# Patient Record
Sex: MALE | Race: BLACK OR AFRICAN AMERICAN | NOT HISPANIC OR LATINO | Employment: OTHER | URBAN - METROPOLITAN AREA
[De-identification: names, ages, dates, MRNs, and addresses within clinical notes are randomized per-mention and may not be internally consistent; named-entity substitution may affect disease eponyms.]

---

## 2017-04-11 ENCOUNTER — OFFICE VISIT (OUTPATIENT)
Dept: UROLOGY | Facility: CLINIC | Age: 77
End: 2017-04-11
Payer: MEDICARE

## 2017-04-11 VITALS
SYSTOLIC BLOOD PRESSURE: 122 MMHG | HEIGHT: 69 IN | DIASTOLIC BLOOD PRESSURE: 82 MMHG | BODY MASS INDEX: 28.31 KG/M2 | WEIGHT: 191.13 LBS | HEART RATE: 70 BPM

## 2017-04-11 DIAGNOSIS — N40.1 BPH WITH OBSTRUCTION/LOWER URINARY TRACT SYMPTOMS: ICD-10-CM

## 2017-04-11 DIAGNOSIS — N20.0 KIDNEY STONES: Primary | ICD-10-CM

## 2017-04-11 DIAGNOSIS — R35.1 NOCTURIA MORE THAN TWICE PER NIGHT: ICD-10-CM

## 2017-04-11 DIAGNOSIS — N13.8 BPH WITH OBSTRUCTION/LOWER URINARY TRACT SYMPTOMS: ICD-10-CM

## 2017-04-11 PROCEDURE — 3074F SYST BP LT 130 MM HG: CPT | Mod: S$GLB,,, | Performed by: UROLOGY

## 2017-04-11 PROCEDURE — 1159F MED LIST DOCD IN RCRD: CPT | Mod: S$GLB,,, | Performed by: UROLOGY

## 2017-04-11 PROCEDURE — 1126F AMNT PAIN NOTED NONE PRSNT: CPT | Mod: S$GLB,,, | Performed by: UROLOGY

## 2017-04-11 PROCEDURE — 99214 OFFICE O/P EST MOD 30 MIN: CPT | Mod: S$GLB,,, | Performed by: UROLOGY

## 2017-04-11 PROCEDURE — 3079F DIAST BP 80-89 MM HG: CPT | Mod: S$GLB,,, | Performed by: UROLOGY

## 2017-04-11 PROCEDURE — 1157F ADVNC CARE PLAN IN RCRD: CPT | Mod: S$GLB,,, | Performed by: UROLOGY

## 2017-04-11 PROCEDURE — 1160F RVW MEDS BY RX/DR IN RCRD: CPT | Mod: S$GLB,,, | Performed by: UROLOGY

## 2017-04-11 PROCEDURE — 99999 PR PBB SHADOW E&M-EST. PATIENT-LVL III: CPT | Mod: PBBFAC,,, | Performed by: UROLOGY

## 2017-04-11 RX ORDER — APIXABAN 5 MG/1
TABLET, FILM COATED ORAL 2 TIMES DAILY
Status: ON HOLD | COMMUNITY
Start: 2017-04-01 | End: 2018-05-15 | Stop reason: HOSPADM

## 2017-04-11 RX ORDER — ASCORBIC ACID 500 MG
500 TABLET ORAL DAILY
COMMUNITY
End: 2017-11-02

## 2017-04-11 NOTE — PROGRESS NOTES
Subjective:       Patient ID: Agus Ramos Jr. is a 76 y.o. male who was last seen in this office Visit date not found    Chief Complaint:   Chief Complaint   Patient presents with    Benign Prostatic Hypertrophy     annual visit with psa     Hematuria         History of Present Illness  Benign Prostatic Hypertrophy  Patient complains of lower urinary tract symptoms. He reports nocturia three times a night. He denies straining, urgency and weak stream. Patient states symptoms are of mild severity. Onset of symptoms was several years ago and was gradual in onset.He has no personal history and no family history of prostate cancer. He reports a history of no complicating symptoms. He denies flank pain, gross hematuria, and recurrent UTI.  He is currently taking no prostate medications.    Kidney Stone  He was admitted for pyelonephritis in September 2016.  CT from that time show a 1 cm proximal left ureteral stone.  We were not consulted at the time.  He is not sure that he passed this stone.  He denies gross hematuria, dysuria, fever, or flank pain.  He has never required a procedure for stones before.    ACTIVE MEDICAL ISSUES:  Patient Active Problem List   Diagnosis    Atrial fibrillation, new onset    HTN (hypertension)    CAD (coronary artery disease)    Dyslipidemia    Pyelonephritis, acute    Altered mental status    NSTEMI (non-ST elevated myocardial infarction)    Essential hypertension    Paroxysmal atrial fibrillation    Chronic kidney disease, stage III (moderate)    DM (diabetes mellitus) type II controlled with renal manifestation    Chronic diastolic CHF (congestive heart failure)       ALLERGIES AND MEDICATIONS: updated and reviewed.  Review of patient's allergies indicates:  No Known Allergies  Current Outpatient Prescriptions   Medication Sig    amlodipine (NORVASC) 10 MG tablet Take 10 mg by mouth once daily.    ascorbic acid, vitamin C, (VITAMIN C) 500 MG tablet Take 500 mg by  "mouth once daily.    aspirin 325 MG tablet Take 81 mg by mouth once daily.     atenolol (TENORMIN) 100 MG tablet Take 50 mg by mouth 2 (two) times daily.     ELIQUIS 5 mg Tab     fish oil-omega-3 fatty acids 300-1,000 mg capsule Take 2 g by mouth 2 (two) times daily.     furosemide (LASIX) 40 MG tablet Take 1 tablet (40 mg total) by mouth once daily.    glipiZIDE (GLUCOTROL) 5 MG tablet Take 10 mg by mouth 2 (two) times daily before meals.    linagliptin-metformin (JENTADUETO) 2.5-500 mg Tab Take by mouth 2 (two) times daily.    losartan (COZAAR) 50 MG tablet Take 50 mg by mouth once daily.    nitroGLYCERIN (NITROSTAT) 0.4 MG SL tablet Place 0.4 mg under the tongue every 5 (five) minutes as needed.    polyethylene glycol (GLYCOLAX) 17 gram PwPk Take by mouth as needed.     simvastatin (ZOCOR) 40 MG tablet Take 40 mg by mouth once daily.     No current facility-administered medications for this visit.        Review of Systems   Constitutional: Negative for activity change, fatigue, fever and unexpected weight change.   HENT: Negative for congestion.    Eyes: Negative for redness.   Respiratory: Negative for chest tightness and shortness of breath.    Cardiovascular: Negative for chest pain and leg swelling.   Gastrointestinal: Negative for abdominal pain, constipation, diarrhea, nausea and vomiting.   Genitourinary: Negative for dysuria, flank pain, frequency, hematuria, penile pain, penile swelling, scrotal swelling, testicular pain and urgency.   Musculoskeletal: Negative for arthralgias and back pain.   Neurological: Negative for dizziness and light-headedness.   Psychiatric/Behavioral: Negative for behavioral problems and confusion. The patient is not nervous/anxious.    All other systems reviewed and are negative.      Objective:      Vitals:    04/11/17 0946   BP: 122/82   Pulse: 70   Weight: 86.7 kg (191 lb 2.2 oz)   Height: 5' 9" (1.753 m)     Physical Exam   Nursing note and vitals " reviewed.  Constitutional: He is oriented to person, place, and time. He appears well-developed and well-nourished.   HENT:   Head: Normocephalic.   Eyes: Conjunctivae are normal.   Neck: Normal range of motion. Neck supple. No tracheal deviation present. No thyromegaly present.   Cardiovascular: Normal rate and normal heart sounds.    Pulmonary/Chest: Effort normal and breath sounds normal. No respiratory distress. He has no wheezes.   Abdominal: Soft. Bowel sounds are normal. There is no hepatosplenomegaly. There is no tenderness. There is no rebound and no CVA tenderness. No hernia.   Musculoskeletal: Normal range of motion. He exhibits no edema or tenderness.   Lymphadenopathy:     He has no cervical adenopathy.   Neurological: He is alert and oriented to person, place, and time.   Skin: Skin is warm and dry. No rash noted. No erythema.     Psychiatric: He has a normal mood and affect. His behavior is normal. Judgment and thought content normal.       Urine dipstick shows positive for RBC's.  Micro exam: 250 RBC's per HPF.      March 2017  PSA 1.6      CT 9/2016, reviewed    Assessment:       1. Kidney stones    2. BPH with obstruction/lower urinary tract symptoms    3. Nocturia more than twice per night          Plan:       1. Kidney stones  If that stone is still present, then he will need a procedure,.    - US Retroperitoneal Complete (Kidney and; Future    2. BPH with obstruction/lower urinary tract symptoms  stable    3. Nocturia more than twice per night  stable            Return in about 2 weeks (around 4/25/2017) for Follow up, Review X-ray.

## 2017-04-28 ENCOUNTER — HOSPITAL ENCOUNTER (OUTPATIENT)
Dept: RADIOLOGY | Facility: HOSPITAL | Age: 77
Discharge: HOME | End: 2017-04-28
Attending: UROLOGY
Payer: MEDICARE

## 2017-04-28 DIAGNOSIS — N20.0 KIDNEY STONES: ICD-10-CM

## 2017-04-28 PROCEDURE — 76770 US EXAM ABDO BACK WALL COMP: CPT | Mod: TC

## 2017-04-28 PROCEDURE — 76770 US EXAM ABDO BACK WALL COMP: CPT | Mod: 26,,, | Performed by: RADIOLOGY

## 2017-05-04 ENCOUNTER — OFFICE VISIT (OUTPATIENT)
Dept: UROLOGY | Facility: CLINIC | Age: 77
End: 2017-05-04
Payer: MEDICARE

## 2017-05-04 VITALS
WEIGHT: 189.63 LBS | HEIGHT: 69 IN | BODY MASS INDEX: 28.08 KG/M2 | SYSTOLIC BLOOD PRESSURE: 138 MMHG | HEART RATE: 72 BPM | DIASTOLIC BLOOD PRESSURE: 89 MMHG

## 2017-05-04 DIAGNOSIS — N40.0 BENIGN PROSTATIC HYPERPLASIA WITHOUT LOWER URINARY TRACT SYMPTOMS, UNSPECIFIED MORPHOLOGY: ICD-10-CM

## 2017-05-04 DIAGNOSIS — N20.0 KIDNEY STONES: Primary | ICD-10-CM

## 2017-05-04 DIAGNOSIS — R35.1 NOCTURIA MORE THAN TWICE PER NIGHT: ICD-10-CM

## 2017-05-04 DIAGNOSIS — R33.9 INCOMPLETE EMPTYING OF BLADDER: ICD-10-CM

## 2017-05-04 LAB
BILIRUB SERPL-MCNC: NORMAL MG/DL
BLOOD URINE, POC: POSITIVE
COLOR, POC UA: YELLOW
GLUCOSE UR QL STRIP: NORMAL
KETONES UR QL STRIP: NORMAL
LEUKOCYTE ESTERASE URINE, POC: NORMAL
NITRITE, POC UA: NORMAL
PH, POC UA: 5
PROTEIN, POC: NORMAL
SPECIFIC GRAVITY, POC UA: 1030
UROBILINOGEN, POC UA: NORMAL

## 2017-05-04 PROCEDURE — 1160F RVW MEDS BY RX/DR IN RCRD: CPT | Mod: S$GLB,,, | Performed by: UROLOGY

## 2017-05-04 PROCEDURE — 99214 OFFICE O/P EST MOD 30 MIN: CPT | Mod: 25,S$GLB,, | Performed by: UROLOGY

## 2017-05-04 PROCEDURE — 3075F SYST BP GE 130 - 139MM HG: CPT | Mod: S$GLB,,, | Performed by: UROLOGY

## 2017-05-04 PROCEDURE — 1126F AMNT PAIN NOTED NONE PRSNT: CPT | Mod: S$GLB,,, | Performed by: UROLOGY

## 2017-05-04 PROCEDURE — 81001 URINALYSIS AUTO W/SCOPE: CPT | Mod: S$GLB,,, | Performed by: UROLOGY

## 2017-05-04 PROCEDURE — 3079F DIAST BP 80-89 MM HG: CPT | Mod: S$GLB,,, | Performed by: UROLOGY

## 2017-05-04 PROCEDURE — 1159F MED LIST DOCD IN RCRD: CPT | Mod: S$GLB,,, | Performed by: UROLOGY

## 2017-05-04 PROCEDURE — 99999 PR PBB SHADOW E&M-EST. PATIENT-LVL III: CPT | Mod: PBBFAC,,, | Performed by: UROLOGY

## 2017-05-04 NOTE — MR AVS SNAPSHOT
[facility]  [address] [facility] [address]  [place] [address]  Phone: [phone]                  Agus Ramos Jr.   2017 10:30 AM   Office Visit    Description:  Male : 1940   Provider:  ROGERIO Wiggins MD   Department:  [facility]           Reason for Visit     Nephrolithiasis     Benign Prostatic Hypertrophy           Diagnoses this Visit        Comments    Kidney stones    -  Primary     Benign prostatic hyperplasia without lower urinary tract symptoms, unspecified morphology         Incomplete emptying of bladder         Nocturia more than twice per night                To Do List           Goals (5 Years of Data)     None      Follow-Up and Disposition     Return in about 6 months (around 2017) for Review X-ray.      [facility]     [facility] Nurse Care Line -  Assistance  Unless otherwise directed by your provider, please contact [facility], our nurse care line that is available for  assistance.     Registered nurses in the [facility] provide: appointment scheduling, clinical advisement, health education, and other advisory services.  Call: [phone] (toll free)               Medications           Message regarding Medications     Verify the changes and/or additions to your medication regime listed below are the same as discussed with your clinician today.  If any of these changes or additions are incorrect, please notify your healthcare provider.             Verify that the below list of medications is an accurate representation of the medications you are currently taking.  If none reported, the list may be blank. If incorrect, please contact your healthcare provider. Carry this list with you in case of emergency.           Current Medications     amlodipine (NORVASC) 10 MG tablet Take 10 mg by mouth once daily.    ascorbic acid, vitamin C, (VITAMIN C) 500 MG tablet Take 500 mg by mouth once daily.    aspirin 325 MG tablet  "Take 81 mg by mouth once daily.     atenolol (TENORMIN) 100 MG tablet Take 50 mg by mouth 2 (two) times daily.     ELIQUIS 5 mg Tab     fish oil-omega-3 fatty acids 300-1,000 mg capsule Take 2 g by mouth 2 (two) times daily.     furosemide (LASIX) 40 MG tablet Take 1 tablet (40 mg total) by mouth once daily.    glipiZIDE (GLUCOTROL) 5 MG tablet Take 10 mg by mouth 2 (two) times daily before meals.    linagliptin-metformin (JENTADUETO) 2.5-500 mg Tab Take by mouth 2 (two) times daily.    losartan (COZAAR) 50 MG tablet Take 50 mg by mouth once daily.    nitroGLYCERIN (NITROSTAT) 0.4 MG SL tablet Place 0.4 mg under the tongue every 5 (five) minutes as needed.    polyethylene glycol (GLYCOLAX) 17 gram PwPk Take by mouth as needed.     simvastatin (ZOCOR) 40 MG tablet Take 40 mg by mouth once daily.           Clinical Reference Information           Your Vitals Were     BP Pulse Height Weight BMI    138/89 72 5' 9" (1.753 m) 86 kg (189 lb 9.5 oz) 28 kg/m2      Blood Pressure          Most Recent Value    BP  138/89      Allergies as of 5/4/2017     No Known Allergies      Immunizations Administered on Date of Encounter - 5/4/2017     None      Orders Placed During Today's Visit      Normal Orders This Visit    POCT urinalysis, dipstick or tablet reag     Future Labs/Procedures Expected by Expires    X-Ray Abdomen AP 1 View  11/4/2017 5/4/2018      Smoking Cessation     If you would like to quit smoking:   You may be eligible for free services if you are a [place] resident and started smoking cigarettes before September 1, 1988.  Call the [employer]oking [employer]ess[employer] ([employer]) toll free at (665) 090-3724 or (012) 485-3092.   Call 4-800-QUIT-NOW if you do not meet the above criteria.   Contact us via email: [email or web address]   View our website for more information: [email or web address]        Language Assistance Services     ATTENTION: Language assistance services are available, free of charge. Please call " [phone].      ATENCIÓN: Si habla español, tiene a dowell disposición servicios gratuitos de asistencia lingüística. Llame al [phone].     CHÚ Ý: N?u b?n nói Ti?ng Vi?t, có các d?ch v? h? tr? ngôn ng? mi?n phí dành cho b?n. G?i s? [phone].         [facility] complies with applicable Federal civil rights laws and does not discriminate on the basis of race, color, national origin, age, disability, or sex.

## 2017-05-04 NOTE — PROGRESS NOTES
Subjective:       Patient ID: Agus Ramos Jr. is a 76 y.o. male who was last seen in this office 4/11/2017    Chief Complaint:   Chief Complaint   Patient presents with    Nephrolithiasis     2 week f/u with ultrasound     Benign Prostatic Hypertrophy       History of Present Illness  Benign Prostatic Hypertrophy  Patient complains of lower urinary tract symptoms. He reports nocturia three times a night. He denies straining, urgency and weak stream. Patient states symptoms are of mild severity. Onset of symptoms was several years ago and was gradual in onset.He has no personal history and no family history of prostate cancer. He reports a history of no complicating symptoms. He denies flank pain, gross hematuria, and recurrent UTI.  He is currently taking no prostate medications.    Kidney Stone  He was admitted for pyelonephritis in September 2016.  CT from that time show a 1 cm proximal left ureteral stone.  We were not consulted at the time.  He is not sure that he passed this stone.  He denies gross hematuria, dysuria, fever, or flank pain.  He has never required a procedure for stones before.  He is back with a renal ultrasound.      ACTIVE MEDICAL ISSUES:  Patient Active Problem List   Diagnosis    Atrial fibrillation, new onset    HTN (hypertension)    CAD (coronary artery disease)    Dyslipidemia    Pyelonephritis, acute    Altered mental status    NSTEMI (non-ST elevated myocardial infarction)    Essential hypertension    Paroxysmal atrial fibrillation    Chronic kidney disease, stage III (moderate)    DM (diabetes mellitus) type II controlled with renal manifestation    Chronic diastolic CHF (congestive heart failure)       ALLERGIES AND MEDICATIONS: updated and reviewed.  Review of patient's allergies indicates:  No Known Allergies  Current Outpatient Prescriptions   Medication Sig    amlodipine (NORVASC) 10 MG tablet Take 10 mg by mouth once daily.    ascorbic acid, vitamin C,  "(VITAMIN C) 500 MG tablet Take 500 mg by mouth once daily.    aspirin 325 MG tablet Take 81 mg by mouth once daily.     atenolol (TENORMIN) 100 MG tablet Take 50 mg by mouth 2 (two) times daily.     ELIQUIS 5 mg Tab     fish oil-omega-3 fatty acids 300-1,000 mg capsule Take 2 g by mouth 2 (two) times daily.     furosemide (LASIX) 40 MG tablet Take 1 tablet (40 mg total) by mouth once daily.    glipiZIDE (GLUCOTROL) 5 MG tablet Take 10 mg by mouth 2 (two) times daily before meals.    linagliptin-metformin (JENTADUETO) 2.5-500 mg Tab Take by mouth 2 (two) times daily.    losartan (COZAAR) 50 MG tablet Take 50 mg by mouth once daily.    nitroGLYCERIN (NITROSTAT) 0.4 MG SL tablet Place 0.4 mg under the tongue every 5 (five) minutes as needed.    polyethylene glycol (GLYCOLAX) 17 gram PwPk Take by mouth as needed.     simvastatin (ZOCOR) 40 MG tablet Take 40 mg by mouth once daily.     No current facility-administered medications for this visit.        Review of Systems   Constitutional: Negative for activity change, fatigue, fever and unexpected weight change.   HENT: Negative for congestion.    Eyes: Negative for redness.   Respiratory: Negative for chest tightness and shortness of breath.    Cardiovascular: Negative for chest pain and leg swelling.   Gastrointestinal: Negative for abdominal pain, constipation, diarrhea, nausea and vomiting.   Genitourinary: Negative for dysuria, flank pain, frequency, hematuria, penile pain, penile swelling, scrotal swelling, testicular pain and urgency.   Musculoskeletal: Negative for arthralgias and back pain.   Neurological: Negative for dizziness and light-headedness.   Psychiatric/Behavioral: Negative for behavioral problems and confusion. The patient is not nervous/anxious.    All other systems reviewed and are negative.      Objective:      Vitals:    05/04/17 1040   BP: 138/89   Pulse: 72   Weight: 86 kg (189 lb 9.5 oz)   Height: 5' 9" (1.753 m)     Physical Exam "   Nursing note and vitals reviewed.  Constitutional: He is oriented to person, place, and time. He appears well-developed and well-nourished.   HENT:   Head: Normocephalic.   Eyes: Conjunctivae are normal.   Neck: Normal range of motion. Neck supple. No tracheal deviation present. No thyromegaly present.   Cardiovascular: Normal rate and normal heart sounds.    Pulmonary/Chest: Effort normal and breath sounds normal. No respiratory distress. He has no wheezes.   Abdominal: Soft. Bowel sounds are normal. There is no hepatosplenomegaly. There is no tenderness. There is no rebound and no CVA tenderness. No hernia.   Musculoskeletal: Normal range of motion. He exhibits no edema or tenderness.   Lymphadenopathy:     He has no cervical adenopathy.   Neurological: He is alert and oriented to person, place, and time.   Skin: Skin is warm and dry. No rash noted. No erythema.     Psychiatric: He has a normal mood and affect. His behavior is normal. Judgment and thought content normal.       Urine dipstick shows negative for all components.  Micro exam: negative for WBC's or RBC's.      US Retroperitoneal Complete (Kidney and   Status: Final result   MyChart Results Release   MyChart Status: Declined    Signed by   Signed Credentials Date/Time    Phone Pager   RASHID BOGGS MD 4/28/2017 11:34 [phone]   PACS Images   Show images for US Retroperitoneal Complete (Kidney and   Reviewed By Arleen Wiggins MD on 4/28/2017 12:39 PM   External Result Report   External Result Report   Narrative   Renal ultrasound    History: Calculus of kidney    Comparison: March 25, 2016    Technique: Sonographic evaluation of the kidneys and bladder was performed.    Findings: The right kidney measures 11.4 cm in length.  There is no hydronephrosis.  The resistive index within a parenchymal artery is 0.7.      The left kidney measures 10.5 cm in length.  There is no hydronephrosis. The resistive index within a  parenchymal artery is 0.7.There is a 9 mm cyst within the midpole of the kidney.    The prostate volume is 53 cc. Postvoid residual is 139 mL.   Impression     There is no hydronephrosis. The post void residual is 139 mL..      Electronically signed by: RASHID BOGGS MD  Date: 04/28/17  Time: 11:34           Assessment:       1. Kidney stones    2. Benign prostatic hyperplasia without lower urinary tract symptoms, unspecified morphology    3. Incomplete emptying of bladder    4. Nocturia more than twice per night          Plan:       1. Kidney stones  He seems to have passed it.  He understands that the definitive test would be a CT so it is possible that the stone is still there.    - X-Ray Abdomen AP 1 View; Future  - POCT urinalysis, dipstick or tablet reag    2. Benign prostatic hyperplasia without lower urinary tract symptoms, unspecified morphology  He is not bothered by his symptoms.    3. Incomplete emptying of bladder  He may need Flomax at some point    4. Nocturia more than twice per night  Limit evening fluids            Return in about 6 months (around 11/4/2017) for Review X-ray.

## 2017-10-30 ENCOUNTER — TELEPHONE (OUTPATIENT)
Dept: UROLOGY | Facility: CLINIC | Age: 77
End: 2017-10-30

## 2017-10-30 NOTE — TELEPHONE ENCOUNTER
----- Message from Venkata Jean sent at 10/30/2017 12:02 PM CDT -----  Contact: spouse/ [phone]  Calling to speak with nurse regarding Kub at hosp. Needs to know if come straight to office first or what

## 2017-11-02 ENCOUNTER — HOSPITAL ENCOUNTER (OUTPATIENT)
Dept: RADIOLOGY | Facility: HOSPITAL | Age: 77
Discharge: HOME | End: 2017-11-02
Attending: UROLOGY
Payer: MEDICARE

## 2017-11-02 ENCOUNTER — OFFICE VISIT (OUTPATIENT)
Dept: UROLOGY | Facility: CLINIC | Age: 77
End: 2017-11-02
Payer: MEDICARE

## 2017-11-02 VITALS
HEIGHT: 69 IN | WEIGHT: 190.25 LBS | DIASTOLIC BLOOD PRESSURE: 82 MMHG | SYSTOLIC BLOOD PRESSURE: 138 MMHG | BODY MASS INDEX: 28.18 KG/M2 | HEART RATE: 70 BPM

## 2017-11-02 DIAGNOSIS — N13.8 BPH WITH OBSTRUCTION/LOWER URINARY TRACT SYMPTOMS: Primary | ICD-10-CM

## 2017-11-02 DIAGNOSIS — R35.1 NOCTURIA MORE THAN TWICE PER NIGHT: ICD-10-CM

## 2017-11-02 DIAGNOSIS — R35.0 URINARY FREQUENCY: ICD-10-CM

## 2017-11-02 DIAGNOSIS — N20.0 KIDNEY STONES: ICD-10-CM

## 2017-11-02 DIAGNOSIS — N40.1 BPH WITH OBSTRUCTION/LOWER URINARY TRACT SYMPTOMS: Primary | ICD-10-CM

## 2017-11-02 PROCEDURE — 74000 XR ABDOMEN AP 1 VIEW: CPT | Mod: TC

## 2017-11-02 PROCEDURE — 74000 XR ABDOMEN AP 1 VIEW: CPT | Mod: 26,,, | Performed by: RADIOLOGY

## 2017-11-02 PROCEDURE — 99999 PR PBB SHADOW E&M-EST. PATIENT-LVL III: CPT | Mod: PBBFAC,,, | Performed by: UROLOGY

## 2017-11-02 PROCEDURE — 99214 OFFICE O/P EST MOD 30 MIN: CPT | Mod: S$GLB,,, | Performed by: UROLOGY

## 2017-11-02 RX ORDER — METFORMIN HYDROCHLORIDE 500 MG/1
500 TABLET ORAL 2 TIMES DAILY WITH MEALS
Status: ON HOLD | COMMUNITY
End: 2018-12-02 | Stop reason: HOSPADM

## 2017-11-02 NOTE — PROGRESS NOTES
Subjective:       Patient ID: Agus Ramos Jr. is a 77 y.o. male who was last seen in this office Visit date not found    Chief Complaint:   Chief Complaint   Patient presents with    Nephrolithiasis     6 month f/u with kub     Benign Prostatic Hypertrophy       History of Present Illness  Benign Prostatic Hypertrophy  Patient complains of lower urinary tract symptoms. He reports nocturia three times a night. He denies straining, urgency and weak stream. Patient states symptoms are of mild severity. Onset of symptoms was several years ago and was gradual in onset.He has no personal history and no family history of prostate cancer. He reports a history of no complicating symptoms. He denies flank pain, gross hematuria, and recurrent UTI.  He is currently taking no prostate medications.    Kidney Stone  He was admitted for pyelonephritis in September 2016.  CT from that time show a 1 cm proximal left ureteral stone.  We were not consulted at the time.  He is not sure that he passed this stone.  He denies gross hematuria, dysuria, fever, or flank pain.  He has never required a procedure for stones before. Renal ultrasound last time did not show any stone or hydronephrosis.  He is back today with a KUB.    ACTIVE MEDICAL ISSUES:  Patient Active Problem List   Diagnosis    Atrial fibrillation, new onset    HTN (hypertension)    CAD (coronary artery disease)    Dyslipidemia    Pyelonephritis, acute    Altered mental status    NSTEMI (non-ST elevated myocardial infarction)    Essential hypertension    Paroxysmal atrial fibrillation    Chronic kidney disease, stage III (moderate)    DM (diabetes mellitus) type II controlled with renal manifestation    Chronic diastolic CHF (congestive heart failure)       ALLERGIES AND MEDICATIONS: updated and reviewed.  Review of patient's allergies indicates:  No Known Allergies  Current Outpatient Prescriptions   Medication Sig    amlodipine (NORVASC) 10 MG tablet Take  "10 mg by mouth once daily.    aspirin 325 MG tablet Take 81 mg by mouth once daily.     ELIQUIS 5 mg Tab     fish oil-omega-3 fatty acids 300-1,000 mg capsule Take 2 g by mouth 2 (two) times daily.     furosemide (LASIX) 40 MG tablet Take 1 tablet (40 mg total) by mouth once daily.    glipiZIDE (GLUCOTROL) 5 MG tablet Take 10 mg by mouth 2 (two) times daily before meals.    losartan (COZAAR) 50 MG tablet Take 50 mg by mouth once daily.    metFORMIN (GLUCOPHAGE) 500 MG tablet Take 500 mg by mouth 2 (two) times daily with meals.    nitroGLYCERIN (NITROSTAT) 0.4 MG SL tablet Place 0.4 mg under the tongue every 5 (five) minutes as needed.    polyethylene glycol (GLYCOLAX) 17 gram PwPk Take by mouth as needed.      No current facility-administered medications for this visit.        Review of Systems   Constitutional: Negative for activity change, fatigue, fever and unexpected weight change.   HENT: Negative for congestion.    Eyes: Negative for redness.   Respiratory: Negative for chest tightness and shortness of breath.    Cardiovascular: Negative for chest pain and leg swelling.   Gastrointestinal: Negative for abdominal pain, constipation, diarrhea, nausea and vomiting.   Genitourinary: Negative for dysuria, flank pain, frequency, hematuria, penile pain, penile swelling, scrotal swelling, testicular pain and urgency.   Musculoskeletal: Negative for arthralgias and back pain.   Neurological: Negative for dizziness and light-headedness.   Psychiatric/Behavioral: Negative for behavioral problems and confusion. The patient is not nervous/anxious.    All other systems reviewed and are negative.      Objective:      Vitals:    11/02/17 1046   BP: 138/82   Pulse: 70   Weight: 86.3 kg (190 lb 4.1 oz)   Height: 5' 9" (1.753 m)     Physical Exam   Nursing note and vitals reviewed.  Constitutional: He is oriented to person, place, and time. He appears well-developed and well-nourished.   HENT:   Head: Normocephalic. "   Eyes: Conjunctivae are normal.   Neck: Normal range of motion. Neck supple. No tracheal deviation present. No thyromegaly present.   Cardiovascular: Normal rate and normal heart sounds.    Pulmonary/Chest: Effort normal and breath sounds normal. No respiratory distress. He has no wheezes.   Abdominal: Soft. Bowel sounds are normal. There is no hepatosplenomegaly. There is no tenderness. There is no rebound and no CVA tenderness. No hernia.   Musculoskeletal: Normal range of motion. He exhibits no edema or tenderness.   Lymphadenopathy:     He has no cervical adenopathy.   Neurological: He is alert and oriented to person, place, and time.   Skin: Skin is warm and dry. No rash noted. No erythema.     Psychiatric: He has a normal mood and affect. His behavior is normal. Judgment and thought content normal.       Urine dipstick shows negative for all components.  Micro exam: negative for WBC's or RBC's.    X-Ray Abdomen AP 1 View   Status: Final result   MyChart Results Release     MyChart Status: Declined    PACS Images     Show images for X-Ray Abdomen AP 1 View   External Result Report     External Result Report   Narrative     KUB    Comparison : None    Results: AP radiograph.  Large amount of retained stool..  Nonspecific bowel gas pattern.  No organomegaly or abnormal calcifications.  The osseous structures demonstrate degenerative changes of the lumbar spine and bilateral hip joints.   Impression      As above      Electronically signed by: KARI QUILES MD  Date: 11/02/17  Time: 10:34           Assessment:       1. BPH with obstruction/lower urinary tract symptoms    2. Kidney stones    3. Urinary frequency    4. Nocturia more than twice per night          Plan:       1. BPH with obstruction/lower urinary tract symptoms  He may benefit from Flomax but he wants to hold.  - Prostate Specific Antigen, Diagnostic; Future    2. Kidney stones  monitor  - US Retroperitoneal Complete (Kidney and; Future    3.  Urinary frequency  Likley due to Lasix    4. Nocturia more than twice per night  Limit evening fluids            Return in about 6 months (around 5/2/2018) for Follow up, Review X-ray.

## 2018-05-03 ENCOUNTER — HOSPITAL ENCOUNTER (INPATIENT)
Facility: HOSPITAL | Age: 78
LOS: 12 days | Discharge: HOME | DRG: 291 | End: 2018-05-15
Attending: EMERGENCY MEDICINE | Admitting: EMERGENCY MEDICINE
Payer: MEDICARE

## 2018-05-03 DIAGNOSIS — D63.8 ANEMIA OF CHRONIC DISEASE: Chronic | ICD-10-CM

## 2018-05-03 DIAGNOSIS — I48.91 ATRIAL FIBRILLATION, UNSPECIFIED TYPE: ICD-10-CM

## 2018-05-03 DIAGNOSIS — R07.9 CHEST PAIN: ICD-10-CM

## 2018-05-03 DIAGNOSIS — D64.9 SYMPTOMATIC ANEMIA: ICD-10-CM

## 2018-05-03 DIAGNOSIS — J43.2 CENTRILOBULAR EMPHYSEMA: ICD-10-CM

## 2018-05-03 DIAGNOSIS — R60.9 EDEMA: ICD-10-CM

## 2018-05-03 DIAGNOSIS — J96.01 ACUTE HYPOXEMIC RESPIRATORY FAILURE: ICD-10-CM

## 2018-05-03 DIAGNOSIS — E16.2 HYPOGLYCEMIA: ICD-10-CM

## 2018-05-03 DIAGNOSIS — R06.02 SOB (SHORTNESS OF BREATH): Primary | ICD-10-CM

## 2018-05-03 DIAGNOSIS — I34.0 NONRHEUMATIC MITRAL VALVE INSUFFICIENCY: ICD-10-CM

## 2018-05-03 DIAGNOSIS — I50.33 ACUTE ON CHRONIC DIASTOLIC CONGESTIVE HEART FAILURE: ICD-10-CM

## 2018-05-03 LAB
ABO + RH BLD: NORMAL
ALBUMIN SERPL BCP-MCNC: 3.6 G/DL
ALP SERPL-CCNC: 133 U/L
ALT SERPL W/O P-5'-P-CCNC: 102 U/L
ANION GAP SERPL CALC-SCNC: 11 MMOL/L
ANISOCYTOSIS BLD QL SMEAR: SLIGHT
AST SERPL-CCNC: 74 U/L
BASOPHILS # BLD AUTO: 0.03 K/UL
BASOPHILS NFR BLD: 0.3 %
BILIRUB SERPL-MCNC: 0.6 MG/DL
BLD GP AB SCN CELLS X3 SERPL QL: NORMAL
BLD PROD TYP BPU: NORMAL
BLD PROD TYP BPU: NORMAL
BLOOD UNIT EXPIRATION DATE: NORMAL
BLOOD UNIT EXPIRATION DATE: NORMAL
BLOOD UNIT TYPE CODE: 5100
BLOOD UNIT TYPE CODE: 5100
BLOOD UNIT TYPE: NORMAL
BLOOD UNIT TYPE: NORMAL
BNP SERPL-MCNC: 640 PG/ML
BUN SERPL-MCNC: 36 MG/DL
CALCIUM SERPL-MCNC: 10.8 MG/DL
CHLORIDE SERPL-SCNC: 110 MMOL/L
CO2 SERPL-SCNC: 20 MMOL/L
CODING SYSTEM: NORMAL
CODING SYSTEM: NORMAL
CREAT SERPL-MCNC: 1.6 MG/DL
DIFFERENTIAL METHOD: ABNORMAL
DISPENSE STATUS: NORMAL
DISPENSE STATUS: NORMAL
EOSINOPHIL # BLD AUTO: 0 K/UL
EOSINOPHIL NFR BLD: 0.2 %
ERYTHROCYTE [DISTWIDTH] IN BLOOD BY AUTOMATED COUNT: 19 %
EST. GFR  (AFRICAN AMERICAN): 47 ML/MIN/1.73 M^2
EST. GFR  (NON AFRICAN AMERICAN): 40 ML/MIN/1.73 M^2
GLUCOSE SERPL-MCNC: 45 MG/DL
HCT VFR BLD AUTO: 23.2 %
HGB BLD-MCNC: 6.3 G/DL
HYPOCHROMIA BLD QL SMEAR: ABNORMAL
INR PPP: 1.2
LYMPHOCYTES # BLD AUTO: 1.3 K/UL
LYMPHOCYTES NFR BLD: 14.8 %
MCH RBC QN AUTO: 17.7 PG
MCHC RBC AUTO-ENTMCNC: 27.2 G/DL
MCV RBC AUTO: 65 FL
MONOCYTES # BLD AUTO: 1.1 K/UL
MONOCYTES NFR BLD: 12.3 %
NEUTROPHILS # BLD AUTO: 6.5 K/UL
NEUTROPHILS NFR BLD: 72.8 %
NRBC BLD-RTO: 4 /100 WBC
PLATELET # BLD AUTO: 388 K/UL
PMV BLD AUTO: 9.3 FL
POCT GLUCOSE: 115 MG/DL (ref 70–110)
POCT GLUCOSE: 127 MG/DL (ref 70–110)
POCT GLUCOSE: 40 MG/DL (ref 70–110)
POIKILOCYTOSIS BLD QL SMEAR: SLIGHT
POLYCHROMASIA BLD QL SMEAR: ABNORMAL
POTASSIUM SERPL-SCNC: 5.1 MMOL/L
PROT SERPL-MCNC: 6.3 G/DL
PROTHROMBIN TIME: 13 SEC
RBC # BLD AUTO: 3.55 M/UL
SCHISTOCYTES BLD QL SMEAR: PRESENT
SODIUM SERPL-SCNC: 141 MMOL/L
TRANS ERYTHROCYTES VOL PATIENT: NORMAL ML
TRANS ERYTHROCYTES VOL PATIENT: NORMAL ML
TROPONIN I SERPL DL<=0.01 NG/ML-MCNC: 0.1 NG/ML
TROPONIN I SERPL DL<=0.01 NG/ML-MCNC: 0.11 NG/ML
WBC # BLD AUTO: 8.99 K/UL

## 2018-05-03 PROCEDURE — 84484 ASSAY OF TROPONIN QUANT: CPT

## 2018-05-03 PROCEDURE — 25000003 PHARM REV CODE 250: Performed by: EMERGENCY MEDICINE

## 2018-05-03 PROCEDURE — 94761 N-INVAS EAR/PLS OXIMETRY MLT: CPT

## 2018-05-03 PROCEDURE — 99284 EMERGENCY DEPT VISIT MOD MDM: CPT | Mod: 25

## 2018-05-03 PROCEDURE — 82962 GLUCOSE BLOOD TEST: CPT

## 2018-05-03 PROCEDURE — 36430 TRANSFUSION BLD/BLD COMPNT: CPT

## 2018-05-03 PROCEDURE — 93010 ELECTROCARDIOGRAM REPORT: CPT | Mod: ,,, | Performed by: INTERNAL MEDICINE

## 2018-05-03 PROCEDURE — 93005 ELECTROCARDIOGRAM TRACING: CPT

## 2018-05-03 PROCEDURE — 12000002 HC ACUTE/MED SURGE SEMI-PRIVATE ROOM

## 2018-05-03 PROCEDURE — 63600175 PHARM REV CODE 636 W HCPCS: Performed by: EMERGENCY MEDICINE

## 2018-05-03 PROCEDURE — 25000003 PHARM REV CODE 250: Performed by: HOSPITALIST

## 2018-05-03 PROCEDURE — 96374 THER/PROPH/DIAG INJ IV PUSH: CPT

## 2018-05-03 PROCEDURE — 84484 ASSAY OF TROPONIN QUANT: CPT | Mod: 91

## 2018-05-03 PROCEDURE — 94640 AIRWAY INHALATION TREATMENT: CPT

## 2018-05-03 PROCEDURE — 36415 COLL VENOUS BLD VENIPUNCTURE: CPT

## 2018-05-03 PROCEDURE — 83880 ASSAY OF NATRIURETIC PEPTIDE: CPT

## 2018-05-03 PROCEDURE — 85610 PROTHROMBIN TIME: CPT

## 2018-05-03 PROCEDURE — 80053 COMPREHEN METABOLIC PANEL: CPT

## 2018-05-03 PROCEDURE — 85025 COMPLETE CBC W/AUTO DIFF WBC: CPT

## 2018-05-03 PROCEDURE — 25000242 PHARM REV CODE 250 ALT 637 W/ HCPCS: Performed by: EMERGENCY MEDICINE

## 2018-05-03 PROCEDURE — 86850 RBC ANTIBODY SCREEN: CPT

## 2018-05-03 PROCEDURE — 86920 COMPATIBILITY TEST SPIN: CPT

## 2018-05-03 PROCEDURE — P9021 RED BLOOD CELLS UNIT: HCPCS

## 2018-05-03 RX ORDER — FUROSEMIDE 10 MG/ML
40 INJECTION INTRAMUSCULAR; INTRAVENOUS
Status: DISCONTINUED | OUTPATIENT
Start: 2018-05-03 | End: 2018-05-03

## 2018-05-03 RX ORDER — FUROSEMIDE 10 MG/ML
40 INJECTION INTRAMUSCULAR; INTRAVENOUS
Status: COMPLETED | OUTPATIENT
Start: 2018-05-03 | End: 2018-05-03

## 2018-05-03 RX ORDER — METOPROLOL TARTRATE 50 MG/1
50 TABLET ORAL 2 TIMES DAILY
Status: DISCONTINUED | OUTPATIENT
Start: 2018-05-03 | End: 2018-05-15 | Stop reason: HOSPADM

## 2018-05-03 RX ORDER — DEXTROSE 50 % IN WATER (D50W) INTRAVENOUS SYRINGE
25
Status: COMPLETED | OUTPATIENT
Start: 2018-05-03 | End: 2018-05-03

## 2018-05-03 RX ORDER — HYDROCODONE BITARTRATE AND ACETAMINOPHEN 500; 5 MG/1; MG/1
TABLET ORAL
Status: DISCONTINUED | OUTPATIENT
Start: 2018-05-03 | End: 2018-05-15 | Stop reason: HOSPADM

## 2018-05-03 RX ORDER — ALBUTEROL SULFATE 2.5 MG/.5ML
2.5 SOLUTION RESPIRATORY (INHALATION)
Status: COMPLETED | OUTPATIENT
Start: 2018-05-03 | End: 2018-05-03

## 2018-05-03 RX ORDER — METOPROLOL TARTRATE 50 MG/1
75 TABLET ORAL 2 TIMES DAILY
Status: ON HOLD | COMMUNITY
End: 2018-06-03 | Stop reason: HOSPADM

## 2018-05-03 RX ORDER — PANTOPRAZOLE SODIUM 40 MG/1
40 TABLET, DELAYED RELEASE ORAL DAILY
Status: DISCONTINUED | OUTPATIENT
Start: 2018-05-04 | End: 2018-05-04

## 2018-05-03 RX ORDER — LOSARTAN POTASSIUM 25 MG/1
50 TABLET ORAL DAILY
Status: DISCONTINUED | OUTPATIENT
Start: 2018-05-04 | End: 2018-05-15 | Stop reason: HOSPADM

## 2018-05-03 RX ORDER — AMLODIPINE BESYLATE 5 MG/1
10 TABLET ORAL DAILY
Status: DISCONTINUED | OUTPATIENT
Start: 2018-05-04 | End: 2018-05-15 | Stop reason: HOSPADM

## 2018-05-03 RX ADMIN — ALBUTEROL SULFATE 2.5 MG: 2.5 SOLUTION RESPIRATORY (INHALATION) at 01:05

## 2018-05-03 RX ADMIN — FUROSEMIDE 40 MG: 10 INJECTION, SOLUTION INTRAMUSCULAR; INTRAVENOUS at 10:05

## 2018-05-03 RX ADMIN — FUROSEMIDE 40 MG: 10 INJECTION, SOLUTION INTRAMUSCULAR; INTRAVENOUS at 02:05

## 2018-05-03 RX ADMIN — DEXTROSE MONOHYDRATE 25 G: 25 INJECTION, SOLUTION INTRAVENOUS at 03:05

## 2018-05-03 RX ADMIN — METOPROLOL TARTRATE 50 MG: 50 TABLET ORAL at 08:05

## 2018-05-03 NOTE — ED PROVIDER NOTES
Encounter Date: 5/3/2018  77-year-old male complains of shortness of breath. He is alert, with no obvious respiratory distress. Crackles in bilateral bases.  Orders placed.  Patient will be seen by a provider in exam room for full evaluation and treatment.  Neil DOHERTY, 12:14 p.m.  SCRIBE #1 NOTE: I, Loni Casarez, am scribing for, and in the presence of,  Yuli Reis MD. I have scribed the following portions of the note - Other sections scribed: HPI, ROS, PE.       History     Chief Complaint   Patient presents with    Shortness of Breath     reports SOB that started 2 weeks ago. . reports having low BP. Sent over by [facility] [place] with reported a.fib. Denies CP at this time.     CC: Shortness of Breath    HPI: This 77 y.o male who has CHF, CAD, CABG, DM, HTN, PVD presents to the ED for an evaluation of gradually worsening, constant shortness of breath for the past 2-3 weeks. Patient also reports of associated bilateral leg swelling.  Patient reports she was evaluated by his PCP 3 days ago for his shortness of breath and reports being advised to return to his PCP today for a follow up.  Patient reports upon arrival to the appointment with his PCP today, he was advised to come to the ED for further evaluation.  Patient denies fever, chills, nausea, emesis, diarrhea, chest pain, back pain, or any other associated symptoms.  Patient reports he has been compliant with his medications, including his Lasix, which he reports last taking this morning.  No prior tx.  No alleviating factors.        The history is provided by the patient. No  was used.     Review of patient's allergies indicates:  No Known Allergies  Past Medical History:   Diagnosis Date    Congestive heart failure     Coronary artery disease     Diabetes mellitus     Hypertension     Peripheral vascular disease     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     S/P femoral-popliteal bypass surgery left    Stented coronary artery     Tobacco use       Past Surgical History:   Procedure Laterality Date    CARDIAC PACEMAKER PLACEMENT      CARDIAC SURGERY       Family History   Problem Relation Age of Onset    Diabetes Father     Diabetes Mother      Social History   Substance Use Topics    Smoking status: Current Every Day Smoker     Packs/day: 0.25     Years: 60.00     Types: Cigarettes    Smokeless tobacco: Never Used    Alcohol use No     Review of Systems   Constitutional: Negative for activity change, appetite change, chills and fever.   HENT: Negative for congestion, ear pain, facial swelling, rhinorrhea, sore throat and trouble swallowing.    Eyes: Negative for pain.   Respiratory: Positive for shortness of breath. Negative for cough.    Cardiovascular: Positive for leg swelling. Negative for chest pain and palpitations.   Gastrointestinal: Negative for abdominal pain, blood in stool, constipation, diarrhea, nausea and vomiting.   Genitourinary: Negative for decreased urine volume and dysuria.   Musculoskeletal: Negative for back pain, neck pain and neck stiffness.   Skin: Negative for rash and wound.   Neurological: Negative for weakness, numbness and headaches.       Physical Exam     Initial Vitals   BP Pulse Resp Temp SpO2   -- -- -- -- --      MAP       --         Physical Exam    Nursing note and vitals reviewed.  Constitutional: Vital signs are normal. He appears well-developed and well-nourished. He is active.  Non-toxic appearance. No distress.   HENT:   Head: Normocephalic and atraumatic.   Eyes: EOM are normal.   Neck: Trachea normal. Neck supple.   Cardiovascular: Normal rate and regular rhythm.   Pulmonary/Chest: He is in respiratory distress (mildly). He has decreased breath sounds.   Patient is still able to speak in complete sentences.   Abdominal: Soft. Normal appearance and bowel sounds are normal. He exhibits no distension. There is no tenderness.   Musculoskeletal: Normal range of motion. He exhibits edema (3+ pitting edema of  the bilateral lower extremities).   Neurological: He is alert and oriented to person, place, and time. He has normal strength. No cranial nerve deficit.   Skin: Skin is warm, dry and intact. No rash noted.   Psychiatric: He has a normal mood and affect.         ED Course   Procedures  Labs Reviewed - No data to display  EKG Readings: (Independently Interpreted)   Initial Reading: No STEMI. Rhythm: Atrial Fibrillation. Heart Rate: 94. Clinical Impression: Atrial Fibrillation with PVCs Other Impression: Irregularly Irregular          Medical Decision Making:   Clinical Tests:   Lab Tests: Ordered and Reviewed  ED Management:  77 year old patient with history of chronic AFib on Eliquis with 2-3 weeks of gradually worsening dyspnea.  Also with new bilateral lower extremity pitting edema. Found to be anemic. Has refused rectal exam, required to search for source of anemia. Pt will require transfusion. Has agreed, wife signed consent, wife present for all conversations. Spoke w Dr. Whitney, pt to be admitted and transfused, lasix in between transfusions. Pt did have episode of hypoglycemia during stay which resolved quickly with glucose. Otherwise stable.             Scribe Attestation:   Scribe #1: I performed the above scribed service and the documentation accurately describes the services I performed. I attest to the accuracy of the note.    Attending Attestation:           Physician Attestation for Scribe:  Physician Attestation Statement for Scribe #1: I, Yuli Reis MD, reviewed documentation, as scribed by Loni Casarez in my presence, and it is both accurate and complete.                    Clinical Impression:   The primary encounter diagnosis was Symptomatic anemia. Diagnoses of SOB (shortness of breath) and Atrial fibrillation, unspecified type were also pertinent to this visit.                           Yuli Reis MD  05/10/18 2192

## 2018-05-03 NOTE — PLAN OF CARE
Problem: Anemia (Adult)  Intervention: Facilitate Safe Activity   05/03/18 1748   Musculoskeletal Interventions   Fatigue Management activity assistance provided;paced activity encouraged   Safety Interventions   Safety Precautions emergency equipment at bedside     Intervention: Assist with Determining Underlying Cause   05/03/18 1748   Safety Interventions   Bleeding Precautions blood pressure closely monitored     Intervention: Minimize Infection Risk   05/03/18 1748   Safety Interventions   Infection Prevention hydration promoted       Goal: Identify Related Risk Factors and Signs and Symptoms  Related risk factors and signs and symptoms are identified upon initiation of Human Response Clinical Practice Guideline (CPG)  Outcome: Ongoing (interventions implemented as appropriate)  Patient admitted for low H&H, typed and screened in ED. Will transfuse 2UPRBC'S when ready per orders. Telemetry monitor intact, patient running AFib in 70's-80's. Pacemaker SOB noted on exertion. 2L 02 infusing via nasal canula. 02 sats 96%. Vss, afebrile. Plan of care reviewed with patient and family, verbalizing understanding. Will continue to monitor.    05/03/18 1748   Anemia   Related Risk Factors (Anemia) chronic illness;poor nutrition   Signs and Symptoms (Anemia) bruising;arrhythmia;dyspnea;fatigue;syncope/near syncope;weight loss     Goal: Symptom Improvement  Patient will demonstrate the desired outcomes by discharge/transition of care.  Outcome: Ongoing (interventions implemented as appropriate)   05/03/18 1748   Anemia (Adult)   Symptom Improvement making progress toward outcome

## 2018-05-04 PROBLEM — Z95.0 PACEMAKER: Status: ACTIVE | Noted: 2018-05-04

## 2018-05-04 PROBLEM — D63.8 ANEMIA OF CHRONIC DISEASE: Chronic | Status: ACTIVE | Noted: 2018-05-04

## 2018-05-04 PROBLEM — Z79.01 CHRONIC ANTICOAGULATION: Chronic | Status: ACTIVE | Noted: 2018-05-04

## 2018-05-04 PROBLEM — R79.89 ELEVATED TROPONIN: Status: ACTIVE | Noted: 2018-05-04

## 2018-05-04 PROBLEM — Z72.0 TOBACCO ABUSE: Chronic | Status: ACTIVE | Noted: 2018-05-04

## 2018-05-04 LAB
ALBUMIN SERPL BCP-MCNC: 3.5 G/DL
ALP SERPL-CCNC: 127 U/L
ALT SERPL W/O P-5'-P-CCNC: 137 U/L
ANION GAP SERPL CALC-SCNC: 11 MMOL/L
ANION GAP SERPL CALC-SCNC: 11 MMOL/L
ANISOCYTOSIS BLD QL SMEAR: ABNORMAL
AORTIC VALVE REGURGITATION: ABNORMAL
AST SERPL-CCNC: 87 U/L
BASOPHILS # BLD AUTO: 0.04 K/UL
BASOPHILS NFR BLD: 0.4 %
BILIRUB SERPL-MCNC: 0.9 MG/DL
BUN SERPL-MCNC: 34 MG/DL
BUN SERPL-MCNC: 34 MG/DL
CALCIUM SERPL-MCNC: 10.7 MG/DL
CALCIUM SERPL-MCNC: 10.7 MG/DL
CHLORIDE SERPL-SCNC: 109 MMOL/L
CHLORIDE SERPL-SCNC: 110 MMOL/L
CO2 SERPL-SCNC: 22 MMOL/L
CO2 SERPL-SCNC: 22 MMOL/L
CREAT SERPL-MCNC: 1.4 MG/DL
CREAT SERPL-MCNC: 1.4 MG/DL
DIASTOLIC DYSFUNCTION: NO
DIFFERENTIAL METHOD: ABNORMAL
EOSINOPHIL # BLD AUTO: 0 K/UL
EOSINOPHIL NFR BLD: 0.2 %
ERYTHROCYTE [DISTWIDTH] IN BLOOD BY AUTOMATED COUNT: 22.3 %
EST. GFR  (AFRICAN AMERICAN): 55 ML/MIN/1.73 M^2
EST. GFR  (AFRICAN AMERICAN): 55 ML/MIN/1.73 M^2
EST. GFR  (NON AFRICAN AMERICAN): 47 ML/MIN/1.73 M^2
EST. GFR  (NON AFRICAN AMERICAN): 47 ML/MIN/1.73 M^2
ESTIMATED AVG GLUCOSE: 131 MG/DL
ESTIMATED PA SYSTOLIC PRESSURE: 46.44
GLUCOSE SERPL-MCNC: 84 MG/DL
GLUCOSE SERPL-MCNC: 84 MG/DL
HBA1C MFR BLD HPLC: 6.2 %
HCT VFR BLD AUTO: 28.7 %
HGB BLD-MCNC: 8.5 G/DL
HYPOCHROMIA BLD QL SMEAR: ABNORMAL
LYMPHOCYTES # BLD AUTO: 1.8 K/UL
LYMPHOCYTES NFR BLD: 15.7 %
MAGNESIUM SERPL-MCNC: 2.2 MG/DL
MCH RBC QN AUTO: 20.7 PG
MCHC RBC AUTO-ENTMCNC: 29.6 G/DL
MCV RBC AUTO: 70 FL
MITRAL VALVE REGURGITATION: ABNORMAL
MONOCYTES # BLD AUTO: 1.7 K/UL
MONOCYTES NFR BLD: 14.7 %
NEUTROPHILS # BLD AUTO: 7.8 K/UL
NEUTROPHILS NFR BLD: 69.9 %
PHOSPHATE SERPL-MCNC: 3.4 MG/DL
PLATELET # BLD AUTO: 285 K/UL
PLATELET BLD QL SMEAR: ABNORMAL
PMV BLD AUTO: 9.8 FL
POCT GLUCOSE: 105 MG/DL (ref 70–110)
POCT GLUCOSE: 111 MG/DL (ref 70–110)
POCT GLUCOSE: 175 MG/DL (ref 70–110)
POCT GLUCOSE: 230 MG/DL (ref 70–110)
POCT GLUCOSE: 92 MG/DL (ref 70–110)
POIKILOCYTOSIS BLD QL SMEAR: ABNORMAL
POLYCHROMASIA BLD QL SMEAR: ABNORMAL
POTASSIUM SERPL-SCNC: 5 MMOL/L
POTASSIUM SERPL-SCNC: 5 MMOL/L
PROT SERPL-MCNC: 6.2 G/DL
RBC # BLD AUTO: 4.1 M/UL
RETIRED EF AND QEF - SEE NOTES: 50 (ref 55–65)
SODIUM SERPL-SCNC: 142 MMOL/L
SODIUM SERPL-SCNC: 143 MMOL/L
TRICUSPID VALVE REGURGITATION: ABNORMAL
TROPONIN I SERPL DL<=0.01 NG/ML-MCNC: 0.1 NG/ML
WBC # BLD AUTO: 11.27 K/UL

## 2018-05-04 PROCEDURE — 83735 ASSAY OF MAGNESIUM: CPT

## 2018-05-04 PROCEDURE — 25000003 PHARM REV CODE 250: Performed by: HOSPITALIST

## 2018-05-04 PROCEDURE — 80053 COMPREHEN METABOLIC PANEL: CPT

## 2018-05-04 PROCEDURE — 93306 TTE W/DOPPLER COMPLETE: CPT

## 2018-05-04 PROCEDURE — 63600175 PHARM REV CODE 636 W HCPCS: Performed by: HOSPITALIST

## 2018-05-04 PROCEDURE — 83036 HEMOGLOBIN GLYCOSYLATED A1C: CPT

## 2018-05-04 PROCEDURE — S4991 NICOTINE PATCH NONLEGEND: HCPCS | Performed by: INTERNAL MEDICINE

## 2018-05-04 PROCEDURE — 84100 ASSAY OF PHOSPHORUS: CPT

## 2018-05-04 PROCEDURE — 80048 BASIC METABOLIC PNL TOTAL CA: CPT

## 2018-05-04 PROCEDURE — 63600175 PHARM REV CODE 636 W HCPCS: Performed by: INTERNAL MEDICINE

## 2018-05-04 PROCEDURE — 94760 N-INVAS EAR/PLS OXIMETRY 1: CPT

## 2018-05-04 PROCEDURE — 36415 COLL VENOUS BLD VENIPUNCTURE: CPT

## 2018-05-04 PROCEDURE — 85025 COMPLETE CBC W/AUTO DIFF WBC: CPT

## 2018-05-04 PROCEDURE — 25000242 PHARM REV CODE 250 ALT 637 W/ HCPCS: Performed by: INTERNAL MEDICINE

## 2018-05-04 PROCEDURE — 93017 CV STRESS TEST TRACING ONLY: CPT

## 2018-05-04 PROCEDURE — 99223 1ST HOSP IP/OBS HIGH 75: CPT | Mod: 25,,, | Performed by: INTERNAL MEDICINE

## 2018-05-04 PROCEDURE — 12000002 HC ACUTE/MED SURGE SEMI-PRIVATE ROOM

## 2018-05-04 PROCEDURE — 94640 AIRWAY INHALATION TREATMENT: CPT

## 2018-05-04 PROCEDURE — 63600175 PHARM REV CODE 636 W HCPCS

## 2018-05-04 PROCEDURE — 78452 HT MUSCLE IMAGE SPECT MULT: CPT | Mod: 26,,, | Performed by: INTERNAL MEDICINE

## 2018-05-04 PROCEDURE — 99900035 HC TECH TIME PER 15 MIN (STAT)

## 2018-05-04 PROCEDURE — 93306 TTE W/DOPPLER COMPLETE: CPT | Mod: 26,,, | Performed by: INTERNAL MEDICINE

## 2018-05-04 PROCEDURE — 93018 CV STRESS TEST I&R ONLY: CPT | Mod: ,,, | Performed by: INTERNAL MEDICINE

## 2018-05-04 PROCEDURE — 25000003 PHARM REV CODE 250: Performed by: INTERNAL MEDICINE

## 2018-05-04 PROCEDURE — 93016 CV STRESS TEST SUPVJ ONLY: CPT | Mod: ,,, | Performed by: INTERNAL MEDICINE

## 2018-05-04 RX ORDER — AMOXICILLIN 250 MG
1 CAPSULE ORAL 2 TIMES DAILY PRN
Status: DISCONTINUED | OUTPATIENT
Start: 2018-05-04 | End: 2018-05-15 | Stop reason: HOSPADM

## 2018-05-04 RX ORDER — IBUPROFEN 200 MG
24 TABLET ORAL
Status: DISCONTINUED | OUTPATIENT
Start: 2018-05-04 | End: 2018-05-15 | Stop reason: HOSPADM

## 2018-05-04 RX ORDER — ASPIRIN 325 MG
325 TABLET ORAL DAILY
Status: DISCONTINUED | OUTPATIENT
Start: 2018-05-04 | End: 2018-05-04

## 2018-05-04 RX ORDER — GLUCAGON 1 MG
1 KIT INJECTION
Status: DISCONTINUED | OUTPATIENT
Start: 2018-05-04 | End: 2018-05-15 | Stop reason: HOSPADM

## 2018-05-04 RX ORDER — CLONIDINE HYDROCHLORIDE 0.1 MG/1
0.1 TABLET ORAL 3 TIMES DAILY PRN
Status: DISCONTINUED | OUTPATIENT
Start: 2018-05-04 | End: 2018-05-15 | Stop reason: HOSPADM

## 2018-05-04 RX ORDER — INSULIN ASPART 100 [IU]/ML
0-5 INJECTION, SOLUTION INTRAVENOUS; SUBCUTANEOUS
Status: DISCONTINUED | OUTPATIENT
Start: 2018-05-04 | End: 2018-05-15 | Stop reason: HOSPADM

## 2018-05-04 RX ORDER — ACETAMINOPHEN 500 MG
500 TABLET ORAL EVERY 6 HOURS PRN
Status: DISCONTINUED | OUTPATIENT
Start: 2018-05-04 | End: 2018-05-15 | Stop reason: HOSPADM

## 2018-05-04 RX ORDER — IBUPROFEN 200 MG
16 TABLET ORAL
Status: DISCONTINUED | OUTPATIENT
Start: 2018-05-04 | End: 2018-05-15 | Stop reason: HOSPADM

## 2018-05-04 RX ORDER — IPRATROPIUM BROMIDE AND ALBUTEROL SULFATE 2.5; .5 MG/3ML; MG/3ML
3 SOLUTION RESPIRATORY (INHALATION) EVERY 6 HOURS PRN
Status: DISCONTINUED | OUTPATIENT
Start: 2018-05-04 | End: 2018-05-09

## 2018-05-04 RX ORDER — INSULIN ASPART 100 [IU]/ML
3 INJECTION, SOLUTION INTRAVENOUS; SUBCUTANEOUS
Status: DISCONTINUED | OUTPATIENT
Start: 2018-05-04 | End: 2018-05-06

## 2018-05-04 RX ORDER — ONDANSETRON 8 MG/1
8 TABLET, ORALLY DISINTEGRATING ORAL EVERY 8 HOURS PRN
Status: DISCONTINUED | OUTPATIENT
Start: 2018-05-04 | End: 2018-05-15 | Stop reason: HOSPADM

## 2018-05-04 RX ORDER — IBUPROFEN 200 MG
1 TABLET ORAL DAILY
Status: DISCONTINUED | OUTPATIENT
Start: 2018-05-04 | End: 2018-05-15 | Stop reason: HOSPADM

## 2018-05-04 RX ORDER — RAMELTEON 8 MG/1
8 TABLET ORAL NIGHTLY PRN
Status: DISCONTINUED | OUTPATIENT
Start: 2018-05-04 | End: 2018-05-15 | Stop reason: HOSPADM

## 2018-05-04 RX ORDER — FUROSEMIDE 40 MG/1
40 TABLET ORAL DAILY
Status: DISCONTINUED | OUTPATIENT
Start: 2018-05-04 | End: 2018-05-04

## 2018-05-04 RX ORDER — ASPIRIN 81 MG/1
81 TABLET ORAL DAILY
Status: DISCONTINUED | OUTPATIENT
Start: 2018-05-04 | End: 2018-05-15 | Stop reason: HOSPADM

## 2018-05-04 RX ORDER — FUROSEMIDE 10 MG/ML
40 INJECTION INTRAMUSCULAR; INTRAVENOUS 2 TIMES DAILY
Status: DISCONTINUED | OUTPATIENT
Start: 2018-05-04 | End: 2018-05-06

## 2018-05-04 RX ORDER — REGADENOSON 0.08 MG/ML
INJECTION, SOLUTION INTRAVENOUS
Status: DISPENSED
Start: 2018-05-04 | End: 2018-05-05

## 2018-05-04 RX ADMIN — METOPROLOL TARTRATE 50 MG: 50 TABLET ORAL at 09:05

## 2018-05-04 RX ADMIN — METOPROLOL TARTRATE 50 MG: 50 TABLET ORAL at 10:05

## 2018-05-04 RX ADMIN — INSULIN ASPART 1 UNITS: 100 INJECTION, SOLUTION INTRAVENOUS; SUBCUTANEOUS at 09:05

## 2018-05-04 RX ADMIN — FUROSEMIDE 40 MG: 10 INJECTION, SOLUTION INTRAMUSCULAR; INTRAVENOUS at 10:05

## 2018-05-04 RX ADMIN — LOSARTAN POTASSIUM 50 MG: 25 TABLET, FILM COATED ORAL at 10:05

## 2018-05-04 RX ADMIN — APIXABAN 5 MG: 5 TABLET, FILM COATED ORAL at 09:05

## 2018-05-04 RX ADMIN — INSULIN ASPART 3 UNITS: 100 INJECTION, SOLUTION INTRAVENOUS; SUBCUTANEOUS at 04:05

## 2018-05-04 RX ADMIN — AMLODIPINE BESYLATE 10 MG: 5 TABLET ORAL at 10:05

## 2018-05-04 RX ADMIN — APIXABAN 5 MG: 5 TABLET, FILM COATED ORAL at 10:05

## 2018-05-04 RX ADMIN — INSULIN DETEMIR 10 UNITS: 100 INJECTION, SOLUTION SUBCUTANEOUS at 09:05

## 2018-05-04 RX ADMIN — ASPIRIN 81 MG: 81 TABLET, COATED ORAL at 10:05

## 2018-05-04 RX ADMIN — FUROSEMIDE 40 MG: 10 INJECTION, SOLUTION INTRAMUSCULAR; INTRAVENOUS at 07:05

## 2018-05-04 RX ADMIN — IPRATROPIUM BROMIDE AND ALBUTEROL SULFATE 3 ML: .5; 2.5 SOLUTION RESPIRATORY (INHALATION) at 01:05

## 2018-05-04 NOTE — PLAN OF CARE
Assumed care of . Agus Ramos Jr., admitted with shortness of breath, YOO, and lower extremity edema. Found to have low Hgb 6.5, no bleeding identified though patient refused and continues to refuse rectal exam. Transfused 2U with appropriate response to Hgb 8.5. Still no signs of bleeding. Continues to have shortness of breath. CXR on admit showing vascular congestion,  (higher than prior for him), trop mild elevated. Though his symptoms may be in part due to continued symptomatic anemia, I am concerned for CHF exacerbation in this patient with history of CAD s/p CABG with ICD in place. Review of last TTE 9/2016 shows diastolic dysfunction.     Plan  - increase lasix to 40mg IV BID  - check TTE  - monitor CBC for sustained Hgb response, monitor for bleeding  - multiple reports of VTach runs with no ICD shocks, patient asymptomatic--- will consult Cardiology     Tigist Whitney MD  [facility]   05/04/2018 8:17 AM

## 2018-05-04 NOTE — PLAN OF CARE
Problem: Diabetes, Type 2 (Adult)  Intervention: Support/Optimize Psychosocial Response to Condition   05/03/18 2000   Coping/Psychosocial Interventions   Supportive Measures active listening utilized;self-care encouraged;verbalization of feelings encouraged   Environmental Support calm environment promoted;comfort object encouraged;distractions minimized;environmental consistency promoted     Intervention: Optimize Glycemic Control   05/04/18 0148   Nutrition Interventions   Glycemic Management blood glucose monitoring       Goal: Signs and Symptoms of Listed Potential Problems Will be Absent, Minimized or Managed (Diabetes, Type 2)  Signs and symptoms of listed potential problems will be absent, minimized or managed by discharge/transition of care (reference Diabetes, Type 2 (Adult) CPG).   Outcome: Ongoing (interventions implemented as appropriate)   05/04/18 0148   Diabetes, Type 2   Problems Assessed (Type 2 Diabetes) all   Problems Present (Type 2 Diabetes) situational response       Problem: Fall Risk (Adult)  Intervention: Monitor/Assist with Self Care   05/03/18 2000   Functional Level Current   Ambulation 0 - independent  (Stand by assist)   Transferring 0 - independent  (Stand by assist)   Toileting 0 - independent  (Stand by assist)   Bathing 2 - assistive person   Dressing 2 - assistive person   Eating 0 - independent   Communication 0 - understands/communicates without difficulty   Swallowing 0 - swallows foods/liquids without difficulty   Daily Care Interventions   Self-Care Promotion independence encouraged;BADL personal objects within reach;BADL personal routines maintained   Activity   Activity Assistance Provided assistance, stand-by     Intervention: Reduce Risk/Promote Restraint Free Environment   05/04/18 0148   Safety Interventions   Environmental Safety Modification assistive device/personal items within reach;clutter free environment maintained;lighting adjusted;room near unit station;room  organization consistent   Prevent  Drop/Fall   Safety/Security Measures bed alarm set     Intervention: Review Medications/Identify Contributors to Fall Risk   18   Safety Interventions   Medication Review/Management medications reviewed     Intervention: Patient Rounds   18   Safety Interventions   Patient Rounds bed in low position;bed wheels locked;call light in reach;clutter free environment maintained;ID band on;placement of personal items at bedside;toileting offered;visualized patient     Intervention: Safety Promotion/Fall Prevention   18   Safety Interventions   Safety Promotion/Fall Prevention assistive device/personal item within reach;bed alarm set;commode/urinal/bedpan at bedside;medications reviewed;nonskid shoes/socks when out of bed;room near unit station;side rails raised x 2     Intervention: Safety Precautions   18   Safety Interventions   Safety Precautions emergency equipment at bedside       Goal: Identify Related Risk Factors and Signs and Symptoms  Related risk factors and signs and symptoms are identified upon initiation of Human Response Clinical Practice Guideline (CPG)   Outcome: Ongoing (interventions implemented as appropriate)   18   Fall Risk   Related Risk Factors (Fall Risk) age-related changes;inadequate lighting;slipper/uneven surfaces;environment unfamiliar;polypharmacy   Signs and Symptoms (Fall Risk) presence of risk factors     Goal: Absence of Falls  Patient will demonstrate the desired outcomes by discharge/transition of care.   Outcome: Ongoing (interventions implemented as appropriate)   18   Fall Risk (Adult)   Absence of Falls making progress toward outcome       Problem: Patient Care Overview  Goal: Plan of Care Review  Outcome: Ongoing (interventions implemented as appropriate)   18   Coping/Psychosocial   Plan Of Care Reviewed With patient       Problem: Fall Risk  (Adult)  Intervention: Monitor/Assist with Self Care   18   Functional Level Current   Ambulation 0 - independent  (Stand by assist)   Transferring 0 - independent  (Stand by assist)   Toileting 0 - independent  (Stand by assist)   Bathing 2 - assistive person   Dressing 2 - assistive person   Eating 0 - independent   Communication 0 - understands/communicates without difficulty   Swallowing 0 - swallows foods/liquids without difficulty   Daily Care Interventions   Self-Care Promotion independence encouraged;BADL personal objects within reach;BADL personal routines maintained   Activity   Activity Assistance Provided assistance, stand-by     Intervention: Reduce Risk/Promote Restraint Free Environment   18   Safety Interventions   Safety Precautions emergency equipment at bedside   Safety Interventions   Environmental Safety Modification assistive device/personal items within reach;clutter free environment maintained;lighting adjusted;room near unit station;room organization consistent   Prevent  Drop/Fall   Safety/Security Measures bed alarm set     Intervention: Review Medications/Identify Contributors to Fall Risk   18   Safety Interventions   Medication Review/Management medications reviewed     Intervention: Patient Rounds   18   Safety Interventions   Patient Rounds bed in low position;bed wheels locked;call light in reach;clutter free environment maintained;ID band on;placement of personal items at bedside;toileting offered;visualized patient     Intervention: Safety Promotion/Fall Prevention   18   Safety Interventions   Safety Promotion/Fall Prevention assistive device/personal item within reach;bed alarm set;commode/urinal/bedpan at bedside;medications reviewed;nonskid shoes/socks when out of bed;room near unit station;side rails raised x 2     Intervention: Safety Precautions   18   Safety Interventions   Safety Precautions emergency  equipment at bedside       Goal: Identify Related Risk Factors and Signs and Symptoms  Related risk factors and signs and symptoms are identified upon initiation of Human Response Clinical Practice Guideline (CPG)   Outcome: Ongoing (interventions implemented as appropriate)   05/04/18 0148   Fall Risk   Related Risk Factors (Fall Risk) age-related changes;inadequate lighting;slipper/uneven surfaces;environment unfamiliar;polypharmacy   Signs and Symptoms (Fall Risk) presence of risk factors     Goal: Absence of Falls  Patient will demonstrate the desired outcomes by discharge/transition of care.   Outcome: Ongoing (interventions implemented as appropriate)   05/04/18 0148   Fall Risk (Adult)   Absence of Falls making progress toward outcome       Problem: Anemia (Adult)  Intervention: Facilitate Safe Activity   05/04/18 0148   Safety Interventions   Safety Precautions emergency equipment at bedside     Intervention: Assist with Determining Underlying Cause   05/03/18 2000   Safety Interventions   Bleeding Precautions blood pressure closely monitored;coagulation study results reviewed     Intervention: Minimize Infection Risk   05/03/18 2000   Safety Interventions   Infection Prevention gylcemic control management;rest/sleep promoted

## 2018-05-04 NOTE — H&P
[facility] Medicine  History & Physical    Patient Name: Agus Ramos Jr.  MRN: [id number]  Admission Date: 5/3/2018  Attending Physician: Tigist Whitney MD   Primary Care Provider: Keaton Hardy MD         Patient information was obtained from patient.     Subjective:     Principal Problem:Symptomatic anemia    Chief Complaint: Dyspnea for two weeks.    HPI: Mr. Agus Ramos Jr. is a 77 y.o. male with essential hypertension, type 2 diabetes mellitus (HbA1c 7.5%Sept 2016), CKD Stage 3, chronic atrial fibrillation ([id number] score 4) on chronic anticoagulation, anemia of chronic disease, and tobacco abuse who presents to [facility] ED with complaints of dyspnea for two weeks.  The dyspnea is on exertion and is associated with some mild wheezing and a non-productive cough.  He denies any fevers, chills, nausea, vomiting, chest pain, palpitations, diaphoresis, hemoptysis, nor any lower extremity pain or swelling.  There have been no sick contacts or recent travel.  He has not experienced any PND or orthopnea, and has not experienced any bleeding anywhere.  He otherwise has been doing well.    Chart Review:  Previous Hospitalizations  Date Hospital Diagnosis   Sept 2016 [facility]- UTI, hyperkalemia    Sept 2013 [facility]- AFib with RVR, ANIA/DCCV     Outpatient Follow-Up  Date of Visit Physician Service   Nov 2017 Neil Wiggins MD Urology      Past Medical History:   Diagnosis Date    Anemia 05/03/2018    pending blood transfusion    Anticoagulant long-term use     Congestive heart failure     Coronary artery disease     Diabetes mellitus     Hypertension     MI (myocardial infarction)     Pacemaker     left chest    Peripheral vascular disease     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     S/P femoral-popliteal bypass surgery left    Stented coronary artery     Tobacco use        Past Surgical History:   Procedure Laterality Date    CARDIAC CATHETERIZATION      CARDIAC PACEMAKER PLACEMENT       CARDIAC SURGERY      HEMORRHOID SURGERY         Review of patient's allergies indicates:  No Known Allergies    No current facility-administered medications on file prior to encounter.      Current Outpatient Prescriptions on File Prior to Encounter   Medication Sig    amlodipine (NORVASC) 10 MG tablet Take 10 mg by mouth once daily.    aspirin 325 MG tablet Take 81 mg by mouth once daily.     ELIQUIS 5 mg Tab Take by mouth 2 (two) times daily.     fish oil-omega-3 fatty acids 300-1,000 mg capsule Take 2 g by mouth 2 (two) times daily.     furosemide (LASIX) 40 MG tablet Take 1 tablet (40 mg total) by mouth once daily.    glipiZIDE (GLUCOTROL) 5 MG tablet Take 10 mg by mouth 2 (two) times daily before meals.    losartan (COZAAR) 50 MG tablet Take 50 mg by mouth once daily.    metFORMIN (GLUCOPHAGE) 500 MG tablet Take 500 mg by mouth 2 (two) times daily with meals.    nitroGLYCERIN (NITROSTAT) 0.4 MG SL tablet Place 0.4 mg under the tongue every 5 (five) minutes as needed.    polyethylene glycol (GLYCOLAX) 17 gram PwPk Take by mouth as needed.      Family History     Problem Relation (Age of Onset)    Diabetes Father, Mother        Social History Main Topics    Smoking status: Current Every Day Smoker     Packs/day: 0.25     Years: 60.00     Types: Cigarettes    Smokeless tobacco: Never Used    Alcohol use No    Drug use: No    Sexual activity: Not Currently     Review of Systems   Constitutional: Positive for activity change and fatigue. Negative for appetite change, chills, diaphoresis, fever and unexpected weight change.   HENT: Negative.    Eyes: Negative.    Respiratory: Positive for cough, shortness of breath and wheezing. Negative for chest tightness.    Cardiovascular: Negative for chest pain, palpitations and leg swelling.   Gastrointestinal: Negative for abdominal distention, abdominal pain, blood in stool, constipation, diarrhea, nausea and vomiting.   Genitourinary: Negative for dysuria  and hematuria.   Musculoskeletal: Negative.    Skin: Negative.    Neurological: Negative for dizziness, seizures, syncope, weakness and light-headedness.   Psychiatric/Behavioral: Negative.      Objective:     Vital Signs (Most Recent):  Temp: 97.2 °F (36.2 °C) (05/04/18 0305)  Pulse: 81 (05/04/18 0305)  Resp: 18 (05/04/18 0305)  BP: (!) 143/65 (05/04/18 0305)  SpO2: 95 % (05/04/18 0305) Vital Signs (24h Range):  Temp:  [96.2 °F (35.7 °C)-98.7 °F (37.1 °C)] 97.2 °F (36.2 °C)  Pulse:  [] 81  Resp:  [18-28] 18  SpO2:  [91 %-97 %] 95 %  BP: ()/(54-95) 143/65     Weight: 87.5 kg (192 lb 14.4 oz)  Body mass index is 28.49 kg/m².    Physical Exam   Constitutional: He is oriented to person, place, and time. He appears well-developed and well-nourished. No distress.   HENT:   Head: Normocephalic and atraumatic.   Right Ear: External ear normal.   Left Ear: External ear normal.   Nose: Nose normal.   Eyes: Right eye exhibits no discharge. Left eye exhibits no discharge.   Neck: Normal range of motion.   Cardiovascular:   Irregularly irregular, normal rate, no murmurs or gallops   Pulmonary/Chest:   Mildly increased work of breathing with end-expiratory wheezing bilaterally   Abdominal: Soft. Bowel sounds are normal. He exhibits no distension. There is no tenderness. There is no rebound and no guarding.   Musculoskeletal: Normal range of motion. He exhibits no edema.   Neurological: He is alert and oriented to person, place, and time.   Skin: Skin is warm and dry. He is not diaphoretic. No erythema.   Psychiatric: He has a normal mood and affect. His behavior is normal. Judgment and thought content normal.   Nursing note and vitals reviewed.          Significant Labs: All pertinent labs within the past 24 hours have been reviewed.    Significant Imaging: I have reviewed and interpreted all pertinent imaging results/findings within the past 24 hours.    Assessment/Plan:     * Symptomatic anemia    It has been about  1.5 years since we last have labwork for Mr. Ramos.  At that time, his hemoglobin was 12.5 grams; today it is 6.3 grams.  It is unclear how acute the anemia is but it's likely been at least since his onset of symptoms two weeks ago.  Of note, he is currently on apixaban for atrial fibrillation which could be contributing to his anemia.  He has been started pRBC transfusion; will recheck his H/H in the morning to assess whether he needs another transfusion.        Essential hypertension    Patient's blood pressure is well-controlled; will continue home regimen of amlodipine, furosemide, and losartan, and provide as-needed clonidine.        Type 2 diabetes mellitus, controlled, with renal complications    Well-controlled on a home regimen of metformin and a sulfonylurea; will provide basal-prandial insulin along with insulin sliding scale.  Of note, this patient should not be on metformin given his poor chronic renal disease.        CKD Stage 3    His renal function appears to be at his baseline; will continue to monitor his urine output.        Chronic atrial fibrillation    He is currently in atrial fibrillation but with a controlled ventricular response; will continue his home regimen of metoprolol and apixaban.        Chronic anticoagulation    As addressed above.        Anemia of chronic disease    As addressed above.        Tobacco abuse    Patient was counseled on smoking cessation and he will be provided a nicotine transdermal patch applied while inpatient.  Will provide additional smoking cessation counseling prior to discharge.          VTE Risk Mitigation         Ordered     apixaban tablet 5 mg  2 times daily      05/04/18 0405           Megan Yip M.D.  Staff Nocturnist  Department of Hospital Medicine  [facility]  Pager: [phone]

## 2018-05-04 NOTE — ASSESSMENT & PLAN NOTE
It has been about 1.5 years since we last have labwork for Mr. Ramos.  At that time, his hemoglobin was 12.5 grams; today it is 6.3 grams.  It is unclear how acute the anemia is but it's likely been at least since his onset of symptoms two weeks ago.  Of note, he is currently on apixaban for atrial fibrillation which could be contributing to his anemia.  He has been started pRBC transfusion; will recheck his H/H in the morning to assess whether he needs another transfusion.

## 2018-05-04 NOTE — NURSING
Off floor via wheelchair to have stress test, Oxygen in use @ 2 L/NC, no distress noted, no concerns voiced, safety maintained.

## 2018-05-04 NOTE — CONSULTS
[facility]  Cardiology  Consult Note    Patient Name: Agus Ramos Jr.  MRN: [id number]  Admission Date: 5/3/2018  Hospital Length of Stay: 1 days  Code Status: Full Code   Attending Provider: Tigist Whitney MD   Consulting Provider: Tobi Mathias MD  Primary Care Physician: Keaton Hardy MD  Principal Problem:Symptomatic anemia    Patient information was obtained from patient and ER records.     Consults  Subjective:     Chief Complaint:  WCT, elevated troponin       HPI: Mr. Agus Ramos Jr. is a 77 y.o. male with essential hypertension, type 2 diabetes mellitus (HbA1c 7.5%Sept 2016), CKD Stage 3, chronic atrial fibrillation ([id number] score 4) on chronic anticoagulation, anemia of chronic disease, and tobacco abuse who presents to [facility] ED with complaints of dyspnea for two weeks.  The dyspnea is on exertion and is associated with some mild wheezing and a non-productive cough.  He denies any fevers, chills, nausea, vomiting, chest pain, palpitations, diaphoresis, hemoptysis, nor any lower extremity pain or swelling.  There have been no sick contacts or recent travel.  He has not experienced any PND or orthopnea, and has not experienced any bleeding anywhere.  He otherwise has been doing well.    Telemetry reviewed - variable rates with A-fib, frequent paced beats, brief runs of WCT - likely A-fib with aberrancy  Denies CP    Followed by [facility]  Chronic A-fib on eliquis  Biotronik PPM 9/27/16  CAD/CABG 2010    Echo 9/24/16    1 - Normal left ventricular systolic function (EF 60-65%).     2 - Mild left atrial enlargement.     3 - Left ventricular diastolic dysfunction.     4 - Mild mitral regurgitation.     5 - The estimated PA systolic pressure is greater than 35 mmHg.     6 - Normal right ventricular systolic function .     Past Medical History:   Diagnosis Date    Anemia 05/03/2018    pending blood transfusion    Anticoagulant long-term use     Congestive heart failure      Coronary artery disease     Diabetes mellitus     Hypertension     MI (myocardial infarction)     Pacemaker     left chest    Peripheral vascular disease     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     S/P femoral-popliteal bypass surgery left    Stented coronary artery     Tobacco use        Past Surgical History:   Procedure Laterality Date    CARDIAC CATHETERIZATION      CARDIAC PACEMAKER PLACEMENT      CARDIAC SURGERY      HEMORRHOID SURGERY         Review of patient's allergies indicates:  No Known Allergies    No current facility-administered medications on file prior to encounter.      Current Outpatient Prescriptions on File Prior to Encounter   Medication Sig    amlodipine (NORVASC) 10 MG tablet Take 10 mg by mouth once daily.    aspirin 325 MG tablet Take 81 mg by mouth once daily.     ELIQUIS 5 mg Tab Take by mouth 2 (two) times daily.     fish oil-omega-3 fatty acids 300-1,000 mg capsule Take 2 g by mouth 2 (two) times daily.     furosemide (LASIX) 40 MG tablet Take 1 tablet (40 mg total) by mouth once daily.    glipiZIDE (GLUCOTROL) 5 MG tablet Take 10 mg by mouth 2 (two) times daily before meals.    losartan (COZAAR) 50 MG tablet Take 50 mg by mouth once daily.    metFORMIN (GLUCOPHAGE) 500 MG tablet Take 500 mg by mouth 2 (two) times daily with meals.    nitroGLYCERIN (NITROSTAT) 0.4 MG SL tablet Place 0.4 mg under the tongue every 5 (five) minutes as needed.    polyethylene glycol (GLYCOLAX) 17 gram PwPk Take by mouth as needed.      Family History     Problem Relation (Age of Onset)    Diabetes Father, Mother        Social History Main Topics    Smoking status: Current Every Day Smoker     Packs/day: 0.25     Years: 60.00     Types: Cigarettes    Smokeless tobacco: Never Used    Alcohol use No    Drug use: No    Sexual activity: Not Currently     Review of Systems   Constitution: Negative for decreased appetite.   HENT: Negative for ear discharge.    Eyes: Negative for blurred vision.    Endocrine: Negative for polyphagia.   Skin: Negative for nail changes.   Neurological: Negative for aphonia.   Psychiatric/Behavioral: Negative for hallucinations.     Objective:     Vital Signs (Most Recent):  Temp: 97.7 °F (36.5 °C) (05/04/18 0734)  Pulse: (!) 113 (05/04/18 0734)  Resp: 18 (05/04/18 0734)  BP: (!) 173/79 (05/04/18 0734)  SpO2: (!) 93 % (05/04/18 0734) Vital Signs (24h Range):  Temp:  [96.2 °F (35.7 °C)-98.7 °F (37.1 °C)] 97.7 °F (36.5 °C)  Pulse:  [] 113  Resp:  [18-28] 18  SpO2:  [91 %-97 %] 93 %  BP: ()/(54-95) 173/79     Weight: 86.8 kg (191 lb 5.8 oz)  Body mass index is 28.26 kg/m².    SpO2: (!) 93 %  O2 Device (Oxygen Therapy): nasal cannula      Intake/Output Summary (Last 24 hours) at 05/04/18 1013  Last data filed at 05/04/18 0533   Gross per 24 hour   Intake          2064.67 ml   Output              480 ml   Net          1584.67 ml       Lines/Drains/Airways     Peripheral Intravenous Line                 Peripheral IV - Single Lumen 05/04/18 0020 Right Upper Arm less than 1 day                Physical Exam   Constitutional: He is oriented to person, place, and time. He appears well-developed and well-nourished.   HENT:   Head: Normocephalic and atraumatic.   Eyes: Conjunctivae are normal. Pupils are equal, round, and reactive to light.   Neck: Normal range of motion. Neck supple.   Cardiovascular: Normal rate, normal heart sounds and intact distal pulses.  An irregularly irregular rhythm present.   Pulmonary/Chest: Effort normal and breath sounds normal.   Abdominal: Soft. Bowel sounds are normal.   Musculoskeletal: Normal range of motion.   Neurological: He is alert and oriented to person, place, and time.   Skin: Skin is warm and dry.       Significant Labs: All pertinent lab results from the last 24 hours have been reviewed.    Significant Imaging: Echocardiogram:   2D echo with color flow doppler:   Results for orders placed or performed during the hospital encounter  of 09/24/16   2D echo with color flow doppler   Result Value Ref Range    EF 60 55 - 65    Mitral Valve Regurgitation MILD     Diastolic Dysfunction Yes (A)     Est. PA Systolic Pressure 34.81     Tricuspid Valve Regurgitation TRIVIAL      Assessment and Plan:     Elevated troponin    Mild elevation- likely demand ischemia from anemia. Hx CABG. Echo and stress test today        Pacemaker    Biotronik        Acute on chronic diastolic congestive heart failure    Diuresis and afterload reduction as tolerated        Type 2 diabetes mellitus, controlled, with renal complications             CKD Stage 3             Essential hypertension             Chronic atrial fibrillation    Suspect WCT is aberrancy with A-fib. Will have Biotronik interrogate pacemaker (device is not an AICD). On eliquis - may need to hold if anemia worsens            VTE Risk Mitigation         Ordered     apixaban tablet 5 mg  2 times daily      05/04/18 6048          Thank you for your consult. I will follow-up with patient. Please contact us if you have any additional questions.    Tobi Mathias MD  Cardiology   [facility]

## 2018-05-04 NOTE — ASSESSMENT & PLAN NOTE
Patient's blood pressure is well-controlled; will continue home regimen of amlodipine, furosemide, and losartan, and provide as-needed clonidine.

## 2018-05-04 NOTE — HPI
Mr. Agus Ramos Jr. is a 77 y.o. male with essential hypertension, type 2 diabetes mellitus (HbA1c 7.5%Sept 2016), CKD Stage 3, chronic atrial fibrillation ([id number] score 4) on chronic anticoagulation, anemia of chronic disease, and tobacco abuse who presents to [facility] ED with complaints of dyspnea for two weeks.  The dyspnea is on exertion and is associated with some mild wheezing and a non-productive cough.  He denies any fevers, chills, nausea, vomiting, chest pain, palpitations, diaphoresis, hemoptysis, nor any lower extremity pain or swelling.  There have been no sick contacts or recent travel.  He has not experienced any PND or orthopnea, and has not experienced any bleeding anywhere.  He otherwise has been doing well.

## 2018-05-04 NOTE — SUBJECTIVE & OBJECTIVE
Past Medical History:   Diagnosis Date    Anemia 05/03/2018    pending blood transfusion    Anticoagulant long-term use     Congestive heart failure     Coronary artery disease     Diabetes mellitus     Hypertension     MI (myocardial infarction)     Pacemaker     left chest    Peripheral vascular disease     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     S/P femoral-popliteal bypass surgery left    Stented coronary artery     Tobacco use        Past Surgical History:   Procedure Laterality Date    CARDIAC CATHETERIZATION      CARDIAC PACEMAKER PLACEMENT      CARDIAC SURGERY      HEMORRHOID SURGERY         Review of patient's allergies indicates:  No Known Allergies    No current facility-administered medications on file prior to encounter.      Current Outpatient Prescriptions on File Prior to Encounter   Medication Sig    amlodipine (NORVASC) 10 MG tablet Take 10 mg by mouth once daily.    aspirin 325 MG tablet Take 81 mg by mouth once daily.     ELIQUIS 5 mg Tab Take by mouth 2 (two) times daily.     fish oil-omega-3 fatty acids 300-1,000 mg capsule Take 2 g by mouth 2 (two) times daily.     furosemide (LASIX) 40 MG tablet Take 1 tablet (40 mg total) by mouth once daily.    glipiZIDE (GLUCOTROL) 5 MG tablet Take 10 mg by mouth 2 (two) times daily before meals.    losartan (COZAAR) 50 MG tablet Take 50 mg by mouth once daily.    metFORMIN (GLUCOPHAGE) 500 MG tablet Take 500 mg by mouth 2 (two) times daily with meals.    nitroGLYCERIN (NITROSTAT) 0.4 MG SL tablet Place 0.4 mg under the tongue every 5 (five) minutes as needed.    polyethylene glycol (GLYCOLAX) 17 gram PwPk Take by mouth as needed.      Family History     Problem Relation (Age of Onset)    Diabetes Father, Mother        Social History Main Topics    Smoking status: Current Every Day Smoker     Packs/day: 0.25     Years: 60.00     Types: Cigarettes    Smokeless tobacco: Never Used    Alcohol use No    Drug use: No    Sexual activity: Not  Currently     Review of Systems   Constitutional: Positive for activity change and fatigue. Negative for appetite change, chills, diaphoresis, fever and unexpected weight change.   HENT: Negative.    Eyes: Negative.    Respiratory: Positive for cough, shortness of breath and wheezing. Negative for chest tightness.    Cardiovascular: Negative for chest pain, palpitations and leg swelling.   Gastrointestinal: Negative for abdominal distention, abdominal pain, blood in stool, constipation, diarrhea, nausea and vomiting.   Genitourinary: Negative for dysuria and hematuria.   Musculoskeletal: Negative.    Skin: Negative.    Neurological: Negative for dizziness, seizures, syncope, weakness and light-headedness.   Psychiatric/Behavioral: Negative.      Objective:     Vital Signs (Most Recent):  Temp: 97.2 °F (36.2 °C) (05/04/18 0305)  Pulse: 81 (05/04/18 0305)  Resp: 18 (05/04/18 0305)  BP: (!) 143/65 (05/04/18 0305)  SpO2: 95 % (05/04/18 0305) Vital Signs (24h Range):  Temp:  [96.2 °F (35.7 °C)-98.7 °F (37.1 °C)] 97.2 °F (36.2 °C)  Pulse:  [] 81  Resp:  [18-28] 18  SpO2:  [91 %-97 %] 95 %  BP: ()/(54-95) 143/65     Weight: 87.5 kg (192 lb 14.4 oz)  Body mass index is 28.49 kg/m².    Physical Exam   Constitutional: He is oriented to person, place, and time. He appears well-developed and well-nourished. No distress.   HENT:   Head: Normocephalic and atraumatic.   Right Ear: External ear normal.   Left Ear: External ear normal.   Nose: Nose normal.   Eyes: Right eye exhibits no discharge. Left eye exhibits no discharge.   Neck: Normal range of motion.   Cardiovascular:   Irregularly irregular, normal rate, no murmurs or gallops   Pulmonary/Chest:   Mildly increased work of breathing with end-expiratory wheezing bilaterally   Abdominal: Soft. Bowel sounds are normal. He exhibits no distension. There is no tenderness. There is no rebound and no guarding.   Musculoskeletal: Normal range of motion. He exhibits no  edema.   Neurological: He is alert and oriented to person, place, and time.   Skin: Skin is warm and dry. He is not diaphoretic. No erythema.   Psychiatric: He has a normal mood and affect. His behavior is normal. Judgment and thought content normal.   Nursing note and vitals reviewed.          Significant Labs: All pertinent labs within the past 24 hours have been reviewed.    Significant Imaging: I have reviewed and interpreted all pertinent imaging results/findings within the past 24 hours.

## 2018-05-04 NOTE — ASSESSMENT & PLAN NOTE
Well-controlled on a home regimen of metformin and a sulfonylurea; will provide basal-prandial insulin along with insulin sliding scale.  Of note, this patient should not be on metformin given his poor chronic renal disease.

## 2018-05-04 NOTE — ASSESSMENT & PLAN NOTE
Suspect WCT is aberrancy with A-fib. Will have Biotronik interrogate pacemaker (device is not an AICD). On eliquis - may need to hold if anemia worsens

## 2018-05-04 NOTE — ASSESSMENT & PLAN NOTE
He is currently in atrial fibrillation but with a controlled ventricular response; will continue his home regimen of metoprolol and apixaban.

## 2018-05-04 NOTE — NURSING
Informed Dr. Yip of Patient's Troponin's level and inform him about Pt's c/o of SOB & use of accessory muscles. Ordered Duonebs Q6H.

## 2018-05-04 NOTE — PLAN OF CARE
"   05/04/18 1633   Discharge Assessment   Assessment Type Discharge Planning Assessment   Confirmed/corrected address and phone number on facesheet? Yes   Assessment information obtained from? Patient   Prior to hospitilization cognitive status: Alert/Oriented   Prior to hospitalization functional status: Independent   Current cognitive status: Alert/Oriented   Current Functional Status: Independent   Lives With spouse   Able to Return to Prior Arrangements yes   Is patient able to care for self after discharge? Yes   Who are your caregiver(s) and their phone number(s)? Jessika, pt spouse [phone]   Patient's perception of discharge disposition home or selfcare   Readmission Within The Last 30 Days no previous admission in last 30 days   Patient currently being followed by outpatient case management? No   Patient currently receives any other outside agency services? No   Equipment Currently Used at Home none   Do you have any problems affording any of your prescribed medications? Yes   If yes, what medications? Elquis    Is the patient taking medications as prescribed? yes   Does the patient have transportation home? Yes   Transportation Available family or friend will provide   Dialysis Name and Scheduled days N/A    Does the patient receive services at the [facility]? No   Discharge Plan A Home with family   Discharge Plan B Home with family;Home Health   Patient/Family In Agreement With Plan yes     SW to patient's room to discuss Helping the patient manage care at home.   TN/SW role explained to pt.     "Discharge planning begins on Admission" pamphlet discussed and placed in "My Health Packet" and placed at bedside.     SW's name and contact info placed on white board.     Preferred Appointment time: Afternoon appointments    Preferred pharmacy:   [facility]  [facility] - [address]  [address]  [place] [address]  Phone: [phone] Fax: [phone]    "

## 2018-05-04 NOTE — NURSING
Returned to floor via wheelchair from cards, no distress noted, oxygen in use @ 2 l/nc, safety maintained.

## 2018-05-04 NOTE — SUBJECTIVE & OBJECTIVE
Past Medical History:   Diagnosis Date    Anemia 05/03/2018    pending blood transfusion    Anticoagulant long-term use     Congestive heart failure     Coronary artery disease     Diabetes mellitus     Hypertension     MI (myocardial infarction)     Pacemaker     left chest    Peripheral vascular disease     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     S/P femoral-popliteal bypass surgery left    Stented coronary artery     Tobacco use        Past Surgical History:   Procedure Laterality Date    CARDIAC CATHETERIZATION      CARDIAC PACEMAKER PLACEMENT      CARDIAC SURGERY      HEMORRHOID SURGERY         Review of patient's allergies indicates:  No Known Allergies    No current facility-administered medications on file prior to encounter.      Current Outpatient Prescriptions on File Prior to Encounter   Medication Sig    amlodipine (NORVASC) 10 MG tablet Take 10 mg by mouth once daily.    aspirin 325 MG tablet Take 81 mg by mouth once daily.     ELIQUIS 5 mg Tab Take by mouth 2 (two) times daily.     fish oil-omega-3 fatty acids 300-1,000 mg capsule Take 2 g by mouth 2 (two) times daily.     furosemide (LASIX) 40 MG tablet Take 1 tablet (40 mg total) by mouth once daily.    glipiZIDE (GLUCOTROL) 5 MG tablet Take 10 mg by mouth 2 (two) times daily before meals.    losartan (COZAAR) 50 MG tablet Take 50 mg by mouth once daily.    metFORMIN (GLUCOPHAGE) 500 MG tablet Take 500 mg by mouth 2 (two) times daily with meals.    nitroGLYCERIN (NITROSTAT) 0.4 MG SL tablet Place 0.4 mg under the tongue every 5 (five) minutes as needed.    polyethylene glycol (GLYCOLAX) 17 gram PwPk Take by mouth as needed.      Family History     Problem Relation (Age of Onset)    Diabetes Father, Mother        Social History Main Topics    Smoking status: Current Every Day Smoker     Packs/day: 0.25     Years: 60.00     Types: Cigarettes    Smokeless tobacco: Never Used    Alcohol use No    Drug use: No    Sexual activity: Not  Currently     Review of Systems   Constitution: Negative for decreased appetite.   HENT: Negative for ear discharge.    Eyes: Negative for blurred vision.   Endocrine: Negative for polyphagia.   Skin: Negative for nail changes.   Neurological: Negative for aphonia.   Psychiatric/Behavioral: Negative for hallucinations.     Objective:     Vital Signs (Most Recent):  Temp: 97.7 °F (36.5 °C) (05/04/18 0734)  Pulse: (!) 113 (05/04/18 0734)  Resp: 18 (05/04/18 0734)  BP: (!) 173/79 (05/04/18 0734)  SpO2: (!) 93 % (05/04/18 0734) Vital Signs (24h Range):  Temp:  [96.2 °F (35.7 °C)-98.7 °F (37.1 °C)] 97.7 °F (36.5 °C)  Pulse:  [] 113  Resp:  [18-28] 18  SpO2:  [91 %-97 %] 93 %  BP: ()/(54-95) 173/79     Weight: 86.8 kg (191 lb 5.8 oz)  Body mass index is 28.26 kg/m².    SpO2: (!) 93 %  O2 Device (Oxygen Therapy): nasal cannula      Intake/Output Summary (Last 24 hours) at 05/04/18 1013  Last data filed at 05/04/18 0533   Gross per 24 hour   Intake          2064.67 ml   Output              480 ml   Net          1584.67 ml       Lines/Drains/Airways     Peripheral Intravenous Line                 Peripheral IV - Single Lumen 05/04/18 0020 Right Upper Arm less than 1 day                Physical Exam   Constitutional: He is oriented to person, place, and time. He appears well-developed and well-nourished.   HENT:   Head: Normocephalic and atraumatic.   Eyes: Conjunctivae are normal. Pupils are equal, round, and reactive to light.   Neck: Normal range of motion. Neck supple.   Cardiovascular: Normal rate, normal heart sounds and intact distal pulses.  An irregularly irregular rhythm present.   Pulmonary/Chest: Effort normal and breath sounds normal.   Abdominal: Soft. Bowel sounds are normal.   Musculoskeletal: Normal range of motion.   Neurological: He is alert and oriented to person, place, and time.   Skin: Skin is warm and dry.       Significant Labs: All pertinent lab results from the last 24 hours have been  reviewed.    Significant Imaging: Echocardiogram:   2D echo with color flow doppler:   Results for orders placed or performed during the hospital encounter of 09/24/16   2D echo with color flow doppler   Result Value Ref Range    EF 60 55 - 65    Mitral Valve Regurgitation MILD     Diastolic Dysfunction Yes (A)     Est. PA Systolic Pressure 34.81     Tricuspid Valve Regurgitation TRIVIAL

## 2018-05-04 NOTE — NURSING
Returned to floor via wheelchair from test, oxygen in use @ 2 L/NC, no distress noted safety maintained.

## 2018-05-04 NOTE — HPI
HPI: Mr. Agus Ramos Jr. is a 77 y.o. male with essential hypertension, type 2 diabetes mellitus (HbA1c 7.5%Sept 2016), CKD Stage 3, chronic atrial fibrillation ([id number] score 4) on chronic anticoagulation, anemia of chronic disease, and tobacco abuse who presents to [facility] ED with complaints of dyspnea for two weeks.  The dyspnea is on exertion and is associated with some mild wheezing and a non-productive cough.  He denies any fevers, chills, nausea, vomiting, chest pain, palpitations, diaphoresis, hemoptysis, nor any lower extremity pain or swelling.  There have been no sick contacts or recent travel.  He has not experienced any PND or orthopnea, and has not experienced any bleeding anywhere.  He otherwise has been doing well.    Telemetry reviewed - variable rates with A-fib, frequent paced beats, brief runs of WCT - likely A-fib with aberrancy  Denies CP    Followed by [facility]  Chronic A-fib on eliquis  Biotronik PPM 9/27/16  CAD/CABG 2010    Echo 9/24/16    1 - Normal left ventricular systolic function (EF 60-65%).     2 - Mild left atrial enlargement.     3 - Left ventricular diastolic dysfunction.     4 - Mild mitral regurgitation.     5 - The estimated PA systolic pressure is greater than 35 mmHg.     6 - Normal right ventricular systolic function .

## 2018-05-05 LAB
ANISOCYTOSIS BLD QL SMEAR: ABNORMAL
BASOPHILS # BLD AUTO: 0.03 K/UL
BASOPHILS NFR BLD: 0.3 %
BURR CELLS BLD QL SMEAR: ABNORMAL
DIFFERENTIAL METHOD: ABNORMAL
EOSINOPHIL # BLD AUTO: 0.2 K/UL
EOSINOPHIL NFR BLD: 2.5 %
ERYTHROCYTE [DISTWIDTH] IN BLOOD BY AUTOMATED COUNT: 22.6 %
HCT VFR BLD AUTO: 29.3 %
HGB BLD-MCNC: 8.5 G/DL
HYPOCHROMIA BLD QL SMEAR: ABNORMAL
LYMPHOCYTES # BLD AUTO: 1.4 K/UL
LYMPHOCYTES NFR BLD: 14.8 %
MCH RBC QN AUTO: 20.5 PG
MCHC RBC AUTO-ENTMCNC: 29 G/DL
MCV RBC AUTO: 71 FL
MONOCYTES # BLD AUTO: 1.6 K/UL
MONOCYTES NFR BLD: 16.7 %
NEUTROPHILS # BLD AUTO: 6.4 K/UL
NEUTROPHILS NFR BLD: 66.7 %
OVALOCYTES BLD QL SMEAR: ABNORMAL
PLATELET # BLD AUTO: 298 K/UL
PMV BLD AUTO: 9.5 FL
POCT GLUCOSE: 183 MG/DL (ref 70–110)
POCT GLUCOSE: 192 MG/DL (ref 70–110)
POCT GLUCOSE: 241 MG/DL (ref 70–110)
POCT GLUCOSE: 260 MG/DL (ref 70–110)
POIKILOCYTOSIS BLD QL SMEAR: SLIGHT
POLYCHROMASIA BLD QL SMEAR: ABNORMAL
RBC # BLD AUTO: 4.15 M/UL
TARGETS BLD QL SMEAR: ABNORMAL
WBC # BLD AUTO: 9.75 K/UL

## 2018-05-05 PROCEDURE — 12000002 HC ACUTE/MED SURGE SEMI-PRIVATE ROOM

## 2018-05-05 PROCEDURE — 63600175 PHARM REV CODE 636 W HCPCS: Performed by: HOSPITALIST

## 2018-05-05 PROCEDURE — 85025 COMPLETE CBC W/AUTO DIFF WBC: CPT

## 2018-05-05 PROCEDURE — 25000003 PHARM REV CODE 250: Performed by: HOSPITALIST

## 2018-05-05 PROCEDURE — 99232 SBSQ HOSP IP/OBS MODERATE 35: CPT | Mod: ,,, | Performed by: INTERNAL MEDICINE

## 2018-05-05 PROCEDURE — 36415 COLL VENOUS BLD VENIPUNCTURE: CPT

## 2018-05-05 PROCEDURE — 25000003 PHARM REV CODE 250: Performed by: INTERNAL MEDICINE

## 2018-05-05 RX ADMIN — FUROSEMIDE 40 MG: 10 INJECTION, SOLUTION INTRAMUSCULAR; INTRAVENOUS at 05:05

## 2018-05-05 RX ADMIN — APIXABAN 5 MG: 5 TABLET, FILM COATED ORAL at 08:05

## 2018-05-05 RX ADMIN — ASPIRIN 81 MG: 81 TABLET, COATED ORAL at 08:05

## 2018-05-05 RX ADMIN — INSULIN DETEMIR 10 UNITS: 100 INJECTION, SOLUTION SUBCUTANEOUS at 10:05

## 2018-05-05 RX ADMIN — AMLODIPINE BESYLATE 10 MG: 5 TABLET ORAL at 08:05

## 2018-05-05 RX ADMIN — INSULIN ASPART 3 UNITS: 100 INJECTION, SOLUTION INTRAVENOUS; SUBCUTANEOUS at 04:05

## 2018-05-05 RX ADMIN — INSULIN ASPART 3 UNITS: 100 INJECTION, SOLUTION INTRAVENOUS; SUBCUTANEOUS at 12:05

## 2018-05-05 RX ADMIN — METOPROLOL TARTRATE 50 MG: 50 TABLET ORAL at 08:05

## 2018-05-05 RX ADMIN — INSULIN ASPART 1 UNITS: 100 INJECTION, SOLUTION INTRAVENOUS; SUBCUTANEOUS at 10:05

## 2018-05-05 RX ADMIN — APIXABAN 5 MG: 5 TABLET, FILM COATED ORAL at 10:05

## 2018-05-05 RX ADMIN — LOSARTAN POTASSIUM 50 MG: 25 TABLET, FILM COATED ORAL at 08:05

## 2018-05-05 RX ADMIN — INSULIN ASPART 3 UNITS: 100 INJECTION, SOLUTION INTRAVENOUS; SUBCUTANEOUS at 08:05

## 2018-05-05 RX ADMIN — METOPROLOL TARTRATE 50 MG: 50 TABLET ORAL at 10:05

## 2018-05-05 RX ADMIN — FUROSEMIDE 40 MG: 10 INJECTION, SOLUTION INTRAMUSCULAR; INTRAVENOUS at 08:05

## 2018-05-05 NOTE — HOSPITAL COURSE
Denies CP  Patient reports PPM was checked and is working well - no report in chart    Echo 5/4/18    1 - Low normal left ventricular systolic function (EF 50-55%).     2 - Concentric hypertrophy.     3 - Mild left atrial enlargement.     4 - Pulmonary hypertension. The estimated PA systolic pressure is 46 mmHg.     5 - Mild aortic regurgitation.     6 - Mild to moderate mitral regurgitation.     7 - Mild tricuspid regurgitation.     Stress test 5/4/18  Impression: ABNORMAL MYOCARDIAL PERFUSION  1. The perfusion scan is free of evidence for myocardial ischemia.   2. There is mild intensity fixed defect in the apical wall of the left ventricle, consistent with myocardial injury, and moderate intensity fixed defect in the inferolateral wall of the left ventricle, consistent with myocardial injury.   3. Resting wall motion is physiologic.   4. Visually estimated LV function is low normal.

## 2018-05-05 NOTE — PLAN OF CARE
Problem: Patient Care Overview  Goal: Plan of Care Review  Outcome: Ongoing (interventions implemented as appropriate)  Pt free of accidental injury during shift. No s/s of distress noted. Denies pain/discomfort. Able to make needs known. Safety measures maintained. Call bell within reach. Will continue to monitor

## 2018-05-05 NOTE — SUBJECTIVE & OBJECTIVE
Interval History:     Review of Systems   Constitution: Negative for decreased appetite.   HENT: Negative for ear discharge.    Eyes: Negative for blurred vision.   Endocrine: Negative for polyphagia.   Skin: Negative for nail changes.   Neurological: Negative for aphonia.   Psychiatric/Behavioral: Negative for hallucinations.     Objective:     Vital Signs (Most Recent):  Temp: 97 °F (36.1 °C) (05/05/18 0805)  Pulse: 77 (05/05/18 0805)  Resp: 20 (05/05/18 0805)  BP: (!) 147/70 (05/05/18 0805)  SpO2: 99 % (05/05/18 0805) Vital Signs (24h Range):  Temp:  [97 °F (36.1 °C)-98 °F (36.7 °C)] 97 °F (36.1 °C)  Pulse:  [77-94] 77  Resp:  [18-20] 20  SpO2:  [90 %-99 %] 99 %  BP: (114-152)/(70-81) 147/70     Weight: 86.9 kg (191 lb 8.9 oz)  Body mass index is 28.29 kg/m².     SpO2: 99 %  O2 Device (Oxygen Therapy): nasal cannula      Intake/Output Summary (Last 24 hours) at 05/05/18 0841  Last data filed at 05/05/18 0500   Gross per 24 hour   Intake              240 ml   Output              600 ml   Net             -360 ml       Lines/Drains/Airways     Peripheral Intravenous Line                 Peripheral IV - Single Lumen 05/04/18 0020 Right Upper Arm 1 day                Physical Exam   Constitutional: He is oriented to person, place, and time. He appears well-developed and well-nourished.   HENT:   Head: Normocephalic and atraumatic.   Eyes: Conjunctivae are normal. Pupils are equal, round, and reactive to light.   Neck: Normal range of motion. Neck supple.   Cardiovascular: Normal rate, normal heart sounds and intact distal pulses.  An irregularly irregular rhythm present.   Pulmonary/Chest: Effort normal and breath sounds normal.   Abdominal: Soft. Bowel sounds are normal.   Musculoskeletal: Normal range of motion.   Neurological: He is alert and oriented to person, place, and time.   Skin: Skin is warm and dry.       Significant Labs: All pertinent lab results from the last 24 hours have been reviewed.    Significant  Imaging: Echocardiogram:   2D echo with color flow doppler:   Results for orders placed or performed during the hospital encounter of 05/03/18   2D echo with color flow doppler   Result Value Ref Range    EF 50 55 - 65    Mitral Valve Regurgitation MILD TO MODERATE     Aortic Valve Regurgitation MILD     Est. PA Systolic Pressure 46.44 (A)     Tricuspid Valve Regurgitation MILD

## 2018-05-05 NOTE — ASSESSMENT & PLAN NOTE
- prior TTE showing diastolic dysfunction   - with edema, pulmonary edema on CXR, elevated BNP  - repeat TTE with normal EF, no comment on diastology  - continue lasix 40mg IV BID  - wean O2 as tolerated  - NST 5/4 with no ischemia, does show scar

## 2018-05-05 NOTE — ASSESSMENT & PLAN NOTE
Mild elevation- likely demand ischemia from anemia. Hx CABG. Echo with good EF. Stress test with no ischemia - mainly scar - no further w/u planned

## 2018-05-05 NOTE — SUBJECTIVE & OBJECTIVE
Interval History: Continued shortness of breath, improving. No chest pain. No bleeding.     Review of Systems   Constitutional: Negative for chills and fever.   Respiratory: Positive for shortness of breath. Negative for cough and chest tightness.    Cardiovascular: Positive for leg swelling. Negative for chest pain and palpitations.   Gastrointestinal: Negative for abdominal pain, anal bleeding, blood in stool, constipation, diarrhea, nausea and vomiting.   Genitourinary: Negative for difficulty urinating.     Objective:     Vital Signs (Most Recent):  Temp: 97 °F (36.1 °C) (05/05/18 0805)  Pulse: 77 (05/05/18 0805)  Resp: 20 (05/05/18 0805)  BP: (!) 147/70 (05/05/18 0805)  SpO2: 99 % (05/05/18 0805) Vital Signs (24h Range):  Temp:  [97 °F (36.1 °C)-98 °F (36.7 °C)] 97 °F (36.1 °C)  Pulse:  [77-94] 77  Resp:  [18-20] 20  SpO2:  [90 %-99 %] 99 %  BP: (114-152)/(70-81) 147/70     Weight: 86.9 kg (191 lb 8.9 oz)  Body mass index is 28.29 kg/m².    Intake/Output Summary (Last 24 hours) at 05/05/18 1137  Last data filed at 05/05/18 0500   Gross per 24 hour   Intake              240 ml   Output              600 ml   Net             -360 ml      Physical Exam   Constitutional: He is oriented to person, place, and time. He appears well-developed and well-nourished. No distress.   HENT:   Head: Normocephalic and atraumatic.   Nose: Nose normal.   Mouth/Throat: Oropharynx is clear and moist.   Cardiovascular: Intact distal pulses.  Exam reveals no gallop and no friction rub.    No murmur heard.  Device L chest wall. Irregularly irregular   Pulmonary/Chest: Effort normal. No respiratory distress. He has no wheezes. He has no rales.   3L NC, weaned to 2L. Decreased breath sounds throughout   Abdominal: Soft. Bowel sounds are normal. He exhibits no distension and no mass. There is no tenderness. There is no guarding.   Musculoskeletal: He exhibits edema. He exhibits no tenderness or deformity.   Neurological: He is alert and  oriented to person, place, and time.   Skin: He is not diaphoretic.       Significant Labs: All pertinent labs within the past 24 hours have been reviewed.    Significant Imaging: I have reviewed and interpreted all pertinent imaging results/findings within the past 24 hours.

## 2018-05-05 NOTE — PLAN OF CARE
Problem: Patient Care Overview  Goal: Plan of Care Review  Outcome: Ongoing (interventions implemented as appropriate)  Patient ambulated from bed to chair and chair to bed, no falls, trauma or injury thus far this shift. Blood glucose monitored before meals and at hour of sleep. Pm results elevated, coverage per sliding scale. Hemoglobin 8.5 this am. Continue with plan of care and continue to monitor patient.

## 2018-05-05 NOTE — PROGRESS NOTES
[facility]  Cardiology  Progress Note    Patient Name: Agus Ramos Jr.  MRN: [id number]  Admission Date: 5/3/2018  Hospital Length of Stay: 2 days  Code Status: Full Code   Attending Physician: Tigist Whitney MD   Primary Care Physician: Keaton Hardy MD  Expected Discharge Date:   Principal Problem:Symptomatic anemia    Subjective:     Hospital Course:   Denies CP  Patient reports PPM was checked and is working well - no report in chart    Echo 5/4/18    1 - Low normal left ventricular systolic function (EF 50-55%).     2 - Concentric hypertrophy.     3 - Mild left atrial enlargement.     4 - Pulmonary hypertension. The estimated PA systolic pressure is 46 mmHg.     5 - Mild aortic regurgitation.     6 - Mild to moderate mitral regurgitation.     7 - Mild tricuspid regurgitation.     Stress test 5/4/18  Impression: ABNORMAL MYOCARDIAL PERFUSION  1. The perfusion scan is free of evidence for myocardial ischemia.   2. There is mild intensity fixed defect in the apical wall of the left ventricle, consistent with myocardial injury, and moderate intensity fixed defect in the inferolateral wall of the left ventricle, consistent with myocardial injury.   3. Resting wall motion is physiologic.   4. Visually estimated LV function is low normal.     Interval History:     Review of Systems   Constitution: Negative for decreased appetite.   HENT: Negative for ear discharge.    Eyes: Negative for blurred vision.   Endocrine: Negative for polyphagia.   Skin: Negative for nail changes.   Neurological: Negative for aphonia.   Psychiatric/Behavioral: Negative for hallucinations.     Objective:     Vital Signs (Most Recent):  Temp: 97 °F (36.1 °C) (05/05/18 0805)  Pulse: 77 (05/05/18 0805)  Resp: 20 (05/05/18 0805)  BP: (!) 147/70 (05/05/18 0805)  SpO2: 99 % (05/05/18 0805) Vital Signs (24h Range):  Temp:  [97 °F (36.1 °C)-98 °F (36.7 °C)] 97 °F (36.1 °C)  Pulse:  [77-94] 77  Resp:  [18-20] 20  SpO2:  [90  %-99 %] 99 %  BP: (114-152)/(70-81) 147/70     Weight: 86.9 kg (191 lb 8.9 oz)  Body mass index is 28.29 kg/m².     SpO2: 99 %  O2 Device (Oxygen Therapy): nasal cannula      Intake/Output Summary (Last 24 hours) at 05/05/18 0841  Last data filed at 05/05/18 0500   Gross per 24 hour   Intake              240 ml   Output              600 ml   Net             -360 ml       Lines/Drains/Airways     Peripheral Intravenous Line                 Peripheral IV - Single Lumen 05/04/18 0020 Right Upper Arm 1 day                Physical Exam   Constitutional: He is oriented to person, place, and time. He appears well-developed and well-nourished.   HENT:   Head: Normocephalic and atraumatic.   Eyes: Conjunctivae are normal. Pupils are equal, round, and reactive to light.   Neck: Normal range of motion. Neck supple.   Cardiovascular: Normal rate, normal heart sounds and intact distal pulses.  An irregularly irregular rhythm present.   Pulmonary/Chest: Effort normal and breath sounds normal.   Abdominal: Soft. Bowel sounds are normal.   Musculoskeletal: Normal range of motion.   Neurological: He is alert and oriented to person, place, and time.   Skin: Skin is warm and dry.       Significant Labs: All pertinent lab results from the last 24 hours have been reviewed.    Significant Imaging: Echocardiogram:   2D echo with color flow doppler:   Results for orders placed or performed during the hospital encounter of 05/03/18   2D echo with color flow doppler   Result Value Ref Range    EF 50 55 - 65    Mitral Valve Regurgitation MILD TO MODERATE     Aortic Valve Regurgitation MILD     Est. PA Systolic Pressure 46.44 (A)     Tricuspid Valve Regurgitation MILD      Assessment and Plan:     Brief HPI:     Elevated troponin    Mild elevation- likely demand ischemia from anemia. Hx CABG. Echo with good EF. Stress test with no ischemia - mainly scar - no further w/u planned        Pacemaker    Biotronik        Acute on chronic diastolic  congestive heart failure    Diuresis and afterload reduction as tolerated        Type 2 diabetes mellitus, controlled, with renal complications             CKD Stage 3             Essential hypertension             Chronic atrial fibrillation    Suspect WCT is aberrancy with A-fib. Will have Biotronik interrogate pacemaker (device is not an AICD). On eliquis - may need to hold if anemia worsens            VTE Risk Mitigation         Ordered     apixaban tablet 5 mg  2 times daily      05/04/18 0405        Will f/u prn    Tobi Mathias MD  Cardiology  [facility]

## 2018-05-05 NOTE — PROGRESS NOTES
[facility] Hospital Medicine  Progress Note    Patient Name: Agus Ramos Jr.  MRN: [id number]  Patient Class: IP- Inpatient   Admission Date: 5/3/2018  Length of Stay: 2 days  Attending Physician: Tigist Whitney MD  Primary Care Provider: Keaton Hardy MD        Subjective:     Principal Problem:Symptomatic anemia    HPI:  Mr. Agus Ramos Jr. is a 77 y.o. male with essential hypertension, type 2 diabetes mellitus (HbA1c 7.5%Sept 2016), CKD Stage 3, chronic atrial fibrillation ([id number] score 4) on chronic anticoagulation, anemia of chronic disease, and tobacco abuse who presents to [facility] ED with complaints of dyspnea for two weeks.  The dyspnea is on exertion and is associated with some mild wheezing and a non-productive cough.  He denies any fevers, chills, nausea, vomiting, chest pain, palpitations, diaphoresis, hemoptysis, nor any lower extremity pain or swelling.  There have been no sick contacts or recent travel.  He has not experienced any PND or orthopnea, and has not experienced any bleeding anywhere.  He otherwise has been doing well.    Hospital Course:  Admitted with concern for symptomatic anemia and HFpEF exacerbation. Found to have low Hgb 6.5, no bleeding identified though patient refused and continues to refuse rectal exam. Transfused 2U with appropriate response to Hgb 8.5. Continued shortness of breath symptoms after transfusion. Started diuresis for CHF exacerbation. TTE 5/4/2018 with normal EF, NST negative for ischemia but does show scar.     Interval History: Continued shortness of breath, improving. No chest pain. No bleeding.     Review of Systems   Constitutional: Negative for chills and fever.   Respiratory: Positive for shortness of breath. Negative for cough and chest tightness.    Cardiovascular: Positive for leg swelling. Negative for chest pain and palpitations.   Gastrointestinal: Negative for abdominal pain, anal bleeding, blood in stool,  constipation, diarrhea, nausea and vomiting.   Genitourinary: Negative for difficulty urinating.     Objective:     Vital Signs (Most Recent):  Temp: 97 °F (36.1 °C) (05/05/18 0805)  Pulse: 77 (05/05/18 0805)  Resp: 20 (05/05/18 0805)  BP: (!) 147/70 (05/05/18 0805)  SpO2: 99 % (05/05/18 0805) Vital Signs (24h Range):  Temp:  [97 °F (36.1 °C)-98 °F (36.7 °C)] 97 °F (36.1 °C)  Pulse:  [77-94] 77  Resp:  [18-20] 20  SpO2:  [90 %-99 %] 99 %  BP: (114-152)/(70-81) 147/70     Weight: 86.9 kg (191 lb 8.9 oz)  Body mass index is 28.29 kg/m².    Intake/Output Summary (Last 24 hours) at 05/05/18 1137  Last data filed at 05/05/18 0500   Gross per 24 hour   Intake              240 ml   Output              600 ml   Net             -360 ml      Physical Exam   Constitutional: He is oriented to person, place, and time. He appears well-developed and well-nourished. No distress.   HENT:   Head: Normocephalic and atraumatic.   Nose: Nose normal.   Mouth/Throat: Oropharynx is clear and moist.   Cardiovascular: Intact distal pulses.  Exam reveals no gallop and no friction rub.    No murmur heard.  Device L chest wall. Irregularly irregular   Pulmonary/Chest: Effort normal. No respiratory distress. He has no wheezes. He has no rales.   3L NC, weaned to 2L. Decreased breath sounds throughout   Abdominal: Soft. Bowel sounds are normal. He exhibits no distension and no mass. There is no tenderness. There is no guarding.   Musculoskeletal: He exhibits edema. He exhibits no tenderness or deformity.   Neurological: He is alert and oriented to person, place, and time.   Skin: He is not diaphoretic.       Significant Labs: All pertinent labs within the past 24 hours have been reviewed.    Significant Imaging: I have reviewed and interpreted all pertinent imaging results/findings within the past 24 hours.    Assessment/Plan:      * Symptomatic anemia    - unknown baseline Hgb  - presented with Hgb 6.3  - transfused 2U with appropriate  response  - refuses rectal exam. No objective blood loss  - monitor Hgb        Elevated troponin    NST 5/4 with no ischemia  May be due to strain from HFpEF exacerbation         Pacemaker    In place          Tobacco abuse    Patient was counseled on smoking cessation and he will be provided a nicotine transdermal patch applied while inpatient.  Will provide additional smoking cessation counseling prior to discharge.        Anemia of chronic disease    As addressed above.        Chronic anticoagulation    As addressed above.        Acute on chronic diastolic congestive heart failure    - prior TTE showing diastolic dysfunction   - with edema, pulmonary edema on CXR, elevated BNP  - repeat TTE with normal EF, no comment on diastology  - continue lasix 40mg IV BID  - wean O2 as tolerated  - NST 5/4 with no ischemia, does show scar           Type 2 diabetes mellitus, controlled, with renal complications    - Well-controlled on a home regimen of metformin and a sulfonylurea;   - detemir 10 + aspart 3 QAC while hospitalized  - ADA diet        CKD Stage 3    - renal function stable  - renally dose all meds, avoid nephrotoxins  - monitor         Essential hypertension    - generally well controlled   - continue losartan and metoprolol        Chronic atrial fibrillation    - remains in Afib  - rate controlled with metoprolol tartrate 50 mg BID  - anticoagulation with apixaban          VTE Risk Mitigation         Ordered     apixaban tablet 5 mg  2 times daily      05/04/18 2110              Tigist Whitney MD  Department of Hospital Medicine   [facility]

## 2018-05-05 NOTE — ASSESSMENT & PLAN NOTE
- unknown baseline Hgb  - presented with Hgb 6.3  - transfused 2U with appropriate response  - refuses rectal exam. No objective blood loss  - monitor Hgb

## 2018-05-05 NOTE — HOSPITAL COURSE
Admitted with concern for symptomatic anemia and HFpEF exacerbation. Found to have low Hgb 6.5, no bleeding identified though patient refused and continues to refuse rectal exam. Transfused 2U with appropriate response to Hgb 8.5. Continued shortness of breath symptoms after transfusion. Started diuresis for CHF exacerbation. TTE 5/4/2018 with normal EF, NST negative for ischemia but does show scar. Still requiring O2. Continuing diuresis.     5/8- pt with brief period of confusion, refused labs this morning but was oriented x3 after I sat down and spoke with him for awhile. Still requiring 2 liters oxygen and edema to ANGY Jennifer with wife, Jessika vias phone and she said he called her today and was not confused and oriented. Will check urine culture and continue diuresis.   5/9 - urine culture growing staph, start rocephin, no acute changes on CXR; schedule nebs and continue to wean oxygen as tolerated   5/10- urine culture now shows no growth?, waiting to hear from lab; calcium persistently high, hypercalcemia workup pending, chest CT pending, pt still requiring oxygen despite aggressive diuresis and pulmonary care   5/11- Chest CT concern for right pleural effusion ?loculated; elevated calcium more concern for malignancy - d/w IR and not enough fluid to drain but can jackelyn diagnostic tap, will broaden antibiotics x 24 hours, wait for further studies, schedule diagnostic tap next week, off eliquis on lovenox bridge   5/12 - test for home oxygen with significant drop in O2 saturation to 54% on room air with exercise; Chest CTA did not show significant findings except small pleural effusion that could explain hypoxia, pulmonology consulted, off anticoagulant for potential diagnostic thoracentesis   5/13 - pulmonology consulted; started on spiriva, plan to dc home with home oxygen  5/14 - insurance has not authorized oxygen yet, continue IV lasix ,  Patient has been discharged with home oxygen and  follow up with  Pulmonology and PCP as out patient.

## 2018-05-05 NOTE — ASSESSMENT & PLAN NOTE
- remains in Afib  - rate controlled with metoprolol tartrate 50 mg BID  - anticoagulation with apixaban

## 2018-05-05 NOTE — ASSESSMENT & PLAN NOTE
- Well-controlled on a home regimen of metformin and a sulfonylurea;   - detemir 10 + aspart 3 QAC while hospitalized  - ADA diet

## 2018-05-06 PROBLEM — J96.01 ACUTE HYPOXEMIC RESPIRATORY FAILURE: Status: ACTIVE | Noted: 2018-05-06

## 2018-05-06 LAB
ANION GAP SERPL CALC-SCNC: 6 MMOL/L
ANISOCYTOSIS BLD QL SMEAR: ABNORMAL
BASOPHILS # BLD AUTO: 0.03 K/UL
BASOPHILS NFR BLD: 0.3 %
BUN SERPL-MCNC: 22 MG/DL
BURR CELLS BLD QL SMEAR: ABNORMAL
CALCIUM SERPL-MCNC: 10.5 MG/DL
CHLORIDE SERPL-SCNC: 107 MMOL/L
CO2 SERPL-SCNC: 28 MMOL/L
CREAT SERPL-MCNC: 1.3 MG/DL
DIFFERENTIAL METHOD: ABNORMAL
EOSINOPHIL # BLD AUTO: 0.3 K/UL
EOSINOPHIL NFR BLD: 2.8 %
ERYTHROCYTE [DISTWIDTH] IN BLOOD BY AUTOMATED COUNT: 22.9 %
EST. GFR  (AFRICAN AMERICAN): 60 ML/MIN/1.73 M^2
EST. GFR  (NON AFRICAN AMERICAN): 52 ML/MIN/1.73 M^2
GLUCOSE SERPL-MCNC: 267 MG/DL
HCT VFR BLD AUTO: 28.8 %
HGB BLD-MCNC: 8.3 G/DL
HYPOCHROMIA BLD QL SMEAR: ABNORMAL
LYMPHOCYTES # BLD AUTO: 1.4 K/UL
LYMPHOCYTES NFR BLD: 13.4 %
MCH RBC QN AUTO: 20.8 PG
MCHC RBC AUTO-ENTMCNC: 28.8 G/DL
MCV RBC AUTO: 72 FL
MONOCYTES # BLD AUTO: 1.5 K/UL
MONOCYTES NFR BLD: 14.7 %
NEUTROPHILS # BLD AUTO: 7 K/UL
NEUTROPHILS NFR BLD: 70 %
OVALOCYTES BLD QL SMEAR: ABNORMAL
PLATELET # BLD AUTO: 302 K/UL
PLATELET BLD QL SMEAR: ABNORMAL
PMV BLD AUTO: 9.6 FL
POCT GLUCOSE: 162 MG/DL (ref 70–110)
POCT GLUCOSE: 172 MG/DL (ref 70–110)
POCT GLUCOSE: 245 MG/DL (ref 70–110)
POCT GLUCOSE: 252 MG/DL (ref 70–110)
POIKILOCYTOSIS BLD QL SMEAR: SLIGHT
POLYCHROMASIA BLD QL SMEAR: ABNORMAL
POTASSIUM SERPL-SCNC: 4.4 MMOL/L
RBC # BLD AUTO: 4 M/UL
SODIUM SERPL-SCNC: 141 MMOL/L
TARGETS BLD QL SMEAR: ABNORMAL
WBC # BLD AUTO: 10.23 K/UL

## 2018-05-06 PROCEDURE — 25000003 PHARM REV CODE 250: Performed by: INTERNAL MEDICINE

## 2018-05-06 PROCEDURE — 25000003 PHARM REV CODE 250: Performed by: HOSPITALIST

## 2018-05-06 PROCEDURE — S4991 NICOTINE PATCH NONLEGEND: HCPCS | Performed by: INTERNAL MEDICINE

## 2018-05-06 PROCEDURE — 27000221 HC OXYGEN, UP TO 24 HOURS

## 2018-05-06 PROCEDURE — 85025 COMPLETE CBC W/AUTO DIFF WBC: CPT

## 2018-05-06 PROCEDURE — 25000242 PHARM REV CODE 250 ALT 637 W/ HCPCS: Performed by: INTERNAL MEDICINE

## 2018-05-06 PROCEDURE — 80048 BASIC METABOLIC PNL TOTAL CA: CPT

## 2018-05-06 PROCEDURE — 12000002 HC ACUTE/MED SURGE SEMI-PRIVATE ROOM

## 2018-05-06 PROCEDURE — 36415 COLL VENOUS BLD VENIPUNCTURE: CPT

## 2018-05-06 PROCEDURE — 94640 AIRWAY INHALATION TREATMENT: CPT

## 2018-05-06 PROCEDURE — 63600175 PHARM REV CODE 636 W HCPCS: Performed by: HOSPITALIST

## 2018-05-06 RX ORDER — FUROSEMIDE 10 MG/ML
60 INJECTION INTRAMUSCULAR; INTRAVENOUS 2 TIMES DAILY
Status: DISCONTINUED | OUTPATIENT
Start: 2018-05-06 | End: 2018-05-06

## 2018-05-06 RX ORDER — INSULIN ASPART 100 [IU]/ML
5 INJECTION, SOLUTION INTRAVENOUS; SUBCUTANEOUS
Status: DISCONTINUED | OUTPATIENT
Start: 2018-05-06 | End: 2018-05-15 | Stop reason: HOSPADM

## 2018-05-06 RX ORDER — FUROSEMIDE 10 MG/ML
80 INJECTION INTRAMUSCULAR; INTRAVENOUS 2 TIMES DAILY
Status: DISCONTINUED | OUTPATIENT
Start: 2018-05-06 | End: 2018-05-15

## 2018-05-06 RX ADMIN — ASPIRIN 81 MG: 81 TABLET, COATED ORAL at 08:05

## 2018-05-06 RX ADMIN — NICOTINE 1 PATCH: 21 PATCH, EXTENDED RELEASE TRANSDERMAL at 08:05

## 2018-05-06 RX ADMIN — APIXABAN 5 MG: 5 TABLET, FILM COATED ORAL at 08:05

## 2018-05-06 RX ADMIN — INSULIN ASPART 2 UNITS: 100 INJECTION, SOLUTION INTRAVENOUS; SUBCUTANEOUS at 08:05

## 2018-05-06 RX ADMIN — FUROSEMIDE 40 MG: 10 INJECTION, SOLUTION INTRAMUSCULAR; INTRAVENOUS at 08:05

## 2018-05-06 RX ADMIN — IPRATROPIUM BROMIDE AND ALBUTEROL SULFATE 3 ML: .5; 2.5 SOLUTION RESPIRATORY (INHALATION) at 08:05

## 2018-05-06 RX ADMIN — INSULIN ASPART 3 UNITS: 100 INJECTION, SOLUTION INTRAVENOUS; SUBCUTANEOUS at 08:05

## 2018-05-06 RX ADMIN — METOPROLOL TARTRATE 50 MG: 50 TABLET ORAL at 09:05

## 2018-05-06 RX ADMIN — INSULIN ASPART 5 UNITS: 100 INJECTION, SOLUTION INTRAVENOUS; SUBCUTANEOUS at 05:05

## 2018-05-06 RX ADMIN — LOSARTAN POTASSIUM 50 MG: 25 TABLET, FILM COATED ORAL at 08:05

## 2018-05-06 RX ADMIN — INSULIN ASPART 5 UNITS: 100 INJECTION, SOLUTION INTRAVENOUS; SUBCUTANEOUS at 12:05

## 2018-05-06 RX ADMIN — INSULIN ASPART 1 UNITS: 100 INJECTION, SOLUTION INTRAVENOUS; SUBCUTANEOUS at 09:05

## 2018-05-06 RX ADMIN — FUROSEMIDE 80 MG: 10 INJECTION, SOLUTION INTRAMUSCULAR; INTRAVENOUS at 05:05

## 2018-05-06 RX ADMIN — APIXABAN 5 MG: 5 TABLET, FILM COATED ORAL at 09:05

## 2018-05-06 RX ADMIN — METOPROLOL TARTRATE 50 MG: 50 TABLET ORAL at 08:05

## 2018-05-06 RX ADMIN — AMLODIPINE BESYLATE 10 MG: 5 TABLET ORAL at 08:05

## 2018-05-06 NOTE — PLAN OF CARE
Problem: Patient Care Overview  Goal: Plan of Care Review  Outcome: Ongoing (interventions implemented as appropriate)   05/06/18 2090   Coping/Psychosocial   Plan Of Care Reviewed With patient     Patient is oriented and cooperative.  3LNC and prn RT for YOO/SOB.  No reports of pain. BCG monitored and SSI/scheduled insulin administered as ordered.  Family at bedside.  Bed alarm set.  Will continue to monitor.

## 2018-05-06 NOTE — SUBJECTIVE & OBJECTIVE
Interval History: Shortness of breath and edema improving    Review of Systems   Constitutional: Negative for chills and fever.   Respiratory: Positive for shortness of breath. Negative for cough and chest tightness.    Cardiovascular: Positive for leg swelling. Negative for chest pain and palpitations.   Gastrointestinal: Negative for abdominal pain, anal bleeding, blood in stool, constipation, diarrhea, nausea and vomiting.   Genitourinary: Negative for difficulty urinating.     Objective:     Vital Signs (Most Recent):  Temp: 97.2 °F (36.2 °C) (05/06/18 0751)  Pulse: 88 (05/06/18 0811)  Resp: (!) 24 (05/06/18 0811)  BP: (!) 169/74 (05/06/18 0751)  SpO2: (!) 92 % (05/06/18 0811) Vital Signs (24h Range):  Temp:  [97.2 °F (36.2 °C)-98.4 °F (36.9 °C)] 97.2 °F (36.2 °C)  Pulse:  [73-88] 88  Resp:  [18-24] 24  SpO2:  [91 %-100 %] 92 %  BP: (115-169)/(55-74) 169/74     Weight: 88.8 kg (195 lb 12.3 oz)  Body mass index is 28.91 kg/m².    Intake/Output Summary (Last 24 hours) at 05/06/18 0943  Last data filed at 05/06/18 0811   Gross per 24 hour   Intake              270 ml   Output              725 ml   Net             -455 ml      Physical Exam   Constitutional: He is oriented to person, place, and time. He appears well-developed and well-nourished. No distress.   HENT:   Head: Normocephalic and atraumatic.   Nose: Nose normal.   Mouth/Throat: Oropharynx is clear and moist.   Cardiovascular: Intact distal pulses.  Exam reveals no gallop and no friction rub.    No murmur heard.  Device L chest wall. Irregularly irregular   Pulmonary/Chest: Effort normal. No respiratory distress. He has no wheezes. He has no rales.   3L NC. Decreased breath sounds at bases, improving   Abdominal: Soft. Bowel sounds are normal. He exhibits no distension and no mass. There is no tenderness. There is no guarding.   Musculoskeletal: He exhibits edema. He exhibits no tenderness or deformity.   Neurological: He is alert and oriented to person,  place, and time.   Skin: He is not diaphoretic.       Significant Labs: All pertinent labs within the past 24 hours have been reviewed.    Significant Imaging: I have reviewed and interpreted all pertinent imaging results/findings within the past 24 hours.

## 2018-05-06 NOTE — PLAN OF CARE
Problem: Patient Care Overview  Goal: Plan of Care Review  Outcome: Ongoing (interventions implemented as appropriate)   05/06/18 0600   Coping/Psychosocial   Plan Of Care Reviewed With patient   Patient is AOx4, with oxygen at 3L via nasal cannula. No complaints of pain. Dyspnea on exertion noted but not in distress. Ambulating without difficulty. Blood sugar of 241 mg/dL at bedtime, sliding scale insulin and scheduled levemir given. Call light and personal items in reach, urinal at bedside. Plan of care continued.

## 2018-05-06 NOTE — ASSESSMENT & PLAN NOTE
- prior TTE showing diastolic dysfunction   - with edema, pulmonary edema on CXR, elevated BNP  - repeat TTE with normal EF, no comment on diastology  - wean O2 as tolerated  - NST 5/4 with no ischemia, does show scar   - still requiring O2- increase lasix to 60mg IV BID  - if still requiring O2 tomorrow will repeat CXR

## 2018-05-06 NOTE — ASSESSMENT & PLAN NOTE
- Well-controlled on a home regimen of metformin and a sulfonylurea;   - detemir 15 + aspart 5 QAC while hospitalized  - ADA diet

## 2018-05-07 LAB
ANION GAP SERPL CALC-SCNC: 4 MMOL/L
ANISOCYTOSIS BLD QL SMEAR: SLIGHT
BASOPHILS # BLD AUTO: 0.02 K/UL
BASOPHILS NFR BLD: 0.2 %
BUN SERPL-MCNC: 20 MG/DL
BURR CELLS BLD QL SMEAR: ABNORMAL
CALCIUM SERPL-MCNC: 10.3 MG/DL
CHLORIDE SERPL-SCNC: 106 MMOL/L
CO2 SERPL-SCNC: 31 MMOL/L
CREAT SERPL-MCNC: 1.1 MG/DL
DACRYOCYTES BLD QL SMEAR: ABNORMAL
DIFFERENTIAL METHOD: ABNORMAL
EOSINOPHIL # BLD AUTO: 0.2 K/UL
EOSINOPHIL NFR BLD: 2.1 %
ERYTHROCYTE [DISTWIDTH] IN BLOOD BY AUTOMATED COUNT: 23.1 %
EST. GFR  (AFRICAN AMERICAN): >60 ML/MIN/1.73 M^2
EST. GFR  (NON AFRICAN AMERICAN): >60 ML/MIN/1.73 M^2
GLUCOSE SERPL-MCNC: 147 MG/DL
HCT VFR BLD AUTO: 28.5 %
HGB BLD-MCNC: 8.1 G/DL
LYMPHOCYTES # BLD AUTO: 1.7 K/UL
LYMPHOCYTES NFR BLD: 18.4 %
MCH RBC QN AUTO: 20.5 PG
MCHC RBC AUTO-ENTMCNC: 28.4 G/DL
MCV RBC AUTO: 72 FL
MONOCYTES # BLD AUTO: 1.4 K/UL
MONOCYTES NFR BLD: 14.8 %
NEUTROPHILS # BLD AUTO: 6 K/UL
NEUTROPHILS NFR BLD: 64.8 %
OVALOCYTES BLD QL SMEAR: ABNORMAL
PLATELET # BLD AUTO: 275 K/UL
PMV BLD AUTO: 9.5 FL
POCT GLUCOSE: 100 MG/DL (ref 70–110)
POCT GLUCOSE: 147 MG/DL (ref 70–110)
POCT GLUCOSE: 148 MG/DL (ref 70–110)
POCT GLUCOSE: 266 MG/DL (ref 70–110)
POIKILOCYTOSIS BLD QL SMEAR: SLIGHT
POLYCHROMASIA BLD QL SMEAR: ABNORMAL
POTASSIUM SERPL-SCNC: 3.9 MMOL/L
RBC # BLD AUTO: 3.96 M/UL
SODIUM SERPL-SCNC: 141 MMOL/L
WBC # BLD AUTO: 9.31 K/UL

## 2018-05-07 PROCEDURE — 25000003 PHARM REV CODE 250: Performed by: HOSPITALIST

## 2018-05-07 PROCEDURE — 85025 COMPLETE CBC W/AUTO DIFF WBC: CPT

## 2018-05-07 PROCEDURE — 12000002 HC ACUTE/MED SURGE SEMI-PRIVATE ROOM

## 2018-05-07 PROCEDURE — S4991 NICOTINE PATCH NONLEGEND: HCPCS | Performed by: INTERNAL MEDICINE

## 2018-05-07 PROCEDURE — 80048 BASIC METABOLIC PNL TOTAL CA: CPT

## 2018-05-07 PROCEDURE — 94640 AIRWAY INHALATION TREATMENT: CPT

## 2018-05-07 PROCEDURE — 27000221 HC OXYGEN, UP TO 24 HOURS

## 2018-05-07 PROCEDURE — 25000003 PHARM REV CODE 250: Performed by: INTERNAL MEDICINE

## 2018-05-07 PROCEDURE — 36415 COLL VENOUS BLD VENIPUNCTURE: CPT

## 2018-05-07 PROCEDURE — 25000242 PHARM REV CODE 250 ALT 637 W/ HCPCS: Performed by: INTERNAL MEDICINE

## 2018-05-07 PROCEDURE — 94761 N-INVAS EAR/PLS OXIMETRY MLT: CPT

## 2018-05-07 PROCEDURE — 63600175 PHARM REV CODE 636 W HCPCS: Performed by: HOSPITALIST

## 2018-05-07 RX ADMIN — METOPROLOL TARTRATE 50 MG: 50 TABLET ORAL at 08:05

## 2018-05-07 RX ADMIN — INSULIN ASPART 5 UNITS: 100 INJECTION, SOLUTION INTRAVENOUS; SUBCUTANEOUS at 04:05

## 2018-05-07 RX ADMIN — METOPROLOL TARTRATE 50 MG: 50 TABLET ORAL at 10:05

## 2018-05-07 RX ADMIN — INSULIN ASPART 3 UNITS: 100 INJECTION, SOLUTION INTRAVENOUS; SUBCUTANEOUS at 12:05

## 2018-05-07 RX ADMIN — APIXABAN 5 MG: 5 TABLET, FILM COATED ORAL at 08:05

## 2018-05-07 RX ADMIN — CLONIDINE HYDROCHLORIDE 0.1 MG: 0.1 TABLET ORAL at 05:05

## 2018-05-07 RX ADMIN — LOSARTAN POTASSIUM 50 MG: 25 TABLET, FILM COATED ORAL at 08:05

## 2018-05-07 RX ADMIN — INSULIN ASPART 5 UNITS: 100 INJECTION, SOLUTION INTRAVENOUS; SUBCUTANEOUS at 12:05

## 2018-05-07 RX ADMIN — IPRATROPIUM BROMIDE AND ALBUTEROL SULFATE 3 ML: .5; 2.5 SOLUTION RESPIRATORY (INHALATION) at 10:05

## 2018-05-07 RX ADMIN — ASPIRIN 81 MG: 81 TABLET, COATED ORAL at 08:05

## 2018-05-07 RX ADMIN — IPRATROPIUM BROMIDE AND ALBUTEROL SULFATE 3 ML: .5; 2.5 SOLUTION RESPIRATORY (INHALATION) at 12:05

## 2018-05-07 RX ADMIN — FUROSEMIDE 80 MG: 10 INJECTION, SOLUTION INTRAMUSCULAR; INTRAVENOUS at 08:05

## 2018-05-07 RX ADMIN — NICOTINE 1 PATCH: 21 PATCH, EXTENDED RELEASE TRANSDERMAL at 08:05

## 2018-05-07 RX ADMIN — AMLODIPINE BESYLATE 10 MG: 5 TABLET ORAL at 08:05

## 2018-05-07 RX ADMIN — FUROSEMIDE 80 MG: 10 INJECTION, SOLUTION INTRAMUSCULAR; INTRAVENOUS at 04:05

## 2018-05-07 RX ADMIN — APIXABAN 5 MG: 5 TABLET, FILM COATED ORAL at 10:05

## 2018-05-07 RX ADMIN — INSULIN ASPART 5 UNITS: 100 INJECTION, SOLUTION INTRAVENOUS; SUBCUTANEOUS at 08:05

## 2018-05-07 NOTE — SUBJECTIVE & OBJECTIVE
Interval History: Shortness of breath and edema improving, now on 2L O2    Review of Systems   Constitutional: Negative for chills and fever.   Respiratory: Positive for shortness of breath. Negative for cough and chest tightness.    Cardiovascular: Positive for leg swelling. Negative for chest pain and palpitations.   Gastrointestinal: Negative for abdominal pain, anal bleeding, blood in stool, constipation, diarrhea, nausea and vomiting.   Genitourinary: Negative for difficulty urinating.     Objective:     Vital Signs (Most Recent):  Temp: 98.4 °F (36.9 °C) (05/07/18 1044)  Pulse: 71 (05/07/18 1044)  Resp: 20 (05/07/18 1044)  BP: 134/67 (05/07/18 1044)  SpO2: 95 % (05/07/18 1044) Vital Signs (24h Range):  Temp:  [97.5 °F (36.4 °C)-98.7 °F (37.1 °C)] 98.4 °F (36.9 °C)  Pulse:  [67-91] 71  Resp:  [16-21] 20  SpO2:  [90 %-99 %] 95 %  BP: (129-145)/(59-80) 134/67     Weight: 87.2 kg (192 lb 3.9 oz)  Body mass index is 28.39 kg/m².    Intake/Output Summary (Last 24 hours) at 05/07/18 1211  Last data filed at 05/07/18 0908   Gross per 24 hour   Intake              540 ml   Output             1570 ml   Net            -1030 ml      Physical Exam   Constitutional: He is oriented to person, place, and time. He appears well-developed and well-nourished. No distress.   HENT:   Head: Normocephalic and atraumatic.   Nose: Nose normal.   Mouth/Throat: Oropharynx is clear and moist.   Cardiovascular: Intact distal pulses.  Exam reveals no gallop and no friction rub.    No murmur heard.  Device L chest wall. Irregularly irregular   Pulmonary/Chest: Effort normal. No respiratory distress. He has no wheezes. He has no rales.   2L NC. Decreased breath sounds at bases, improving   Abdominal: Soft. Bowel sounds are normal. He exhibits no distension and no mass. There is no tenderness. There is no guarding.   Musculoskeletal: He exhibits edema (ankles and shins). He exhibits no tenderness or deformity.   Neurological: He is alert and  oriented to person, place, and time.   Skin: He is not diaphoretic.       Significant Labs: All pertinent labs within the past 24 hours have been reviewed.    Significant Imaging: I have reviewed and interpreted all pertinent imaging results/findings within the past 24 hours.

## 2018-05-07 NOTE — ASSESSMENT & PLAN NOTE
- prior TTE showing diastolic dysfunction   - with edema, pulmonary edema on CXR, elevated BNP  - repeat TTE with normal EF, no comment on diastology  - wean O2 as tolerated  - NST 5/4 with no ischemia, does show scar   - increased lasix to 80mg IV BID  - continue diuresis

## 2018-05-07 NOTE — PLAN OF CARE
Problem: Diabetes, Type 2 (Adult)  Intervention: Optimize Glycemic Control   05/07/18 1547   Nutrition Interventions   Glycemic Management blood glucose monitoring;supplemental insulin given         Problem: Fall Risk (Adult)  Intervention: Patient Rounds   05/07/18 1400   Safety Interventions   Patient Rounds bed in low position;bed wheels locked;call light in reach;clutter free environment maintained;ID band on;placement of personal items at bedside;toileting offered;visualized patient         Problem: Anemia (Adult)  Intervention: Minimize Infection Risk   05/07/18 0800   Safety Interventions   Infection Prevention gylcemic control management

## 2018-05-07 NOTE — PROGRESS NOTES
[facility] Hospital Medicine  Progress Note    Patient Name: Agus Ramos Jr.  MRN: [id number]  Patient Class: IP- Inpatient   Admission Date: 5/3/2018  Length of Stay: 4 days  Attending Physician: Tigist Whitney MD  Primary Care Provider: Keaton Hardy MD        Subjective:     Principal Problem:Symptomatic anemia    HPI:  Mr. Agus Ramos Jr. is a 77 y.o. male with essential hypertension, type 2 diabetes mellitus (HbA1c 7.5%Sept 2016), CKD Stage 3, chronic atrial fibrillation ([id number] score 4) on chronic anticoagulation, anemia of chronic disease, and tobacco abuse who presents to [facility] ED with complaints of dyspnea for two weeks.  The dyspnea is on exertion and is associated with some mild wheezing and a non-productive cough.  He denies any fevers, chills, nausea, vomiting, chest pain, palpitations, diaphoresis, hemoptysis, nor any lower extremity pain or swelling.  There have been no sick contacts or recent travel.  He has not experienced any PND or orthopnea, and has not experienced any bleeding anywhere.  He otherwise has been doing well.    Hospital Course:  Admitted with concern for symptomatic anemia and HFpEF exacerbation. Found to have low Hgb 6.5, no bleeding identified though patient refused and continues to refuse rectal exam. Transfused 2U with appropriate response to Hgb 8.5. Continued shortness of breath symptoms after transfusion. Started diuresis for CHF exacerbation. TTE 5/4/2018 with normal EF, NST negative for ischemia but does show scar. Still requiring O2. Continuing diuresis.     Interval History: Shortness of breath and edema improving, now on 2L O2    Review of Systems   Constitutional: Negative for chills and fever.   Respiratory: Positive for shortness of breath. Negative for cough and chest tightness.    Cardiovascular: Positive for leg swelling. Negative for chest pain and palpitations.   Gastrointestinal: Negative for abdominal pain, anal bleeding,  blood in stool, constipation, diarrhea, nausea and vomiting.   Genitourinary: Negative for difficulty urinating.     Objective:     Vital Signs (Most Recent):  Temp: 98.4 °F (36.9 °C) (05/07/18 1044)  Pulse: 71 (05/07/18 1044)  Resp: 20 (05/07/18 1044)  BP: 134/67 (05/07/18 1044)  SpO2: 95 % (05/07/18 1044) Vital Signs (24h Range):  Temp:  [97.5 °F (36.4 °C)-98.7 °F (37.1 °C)] 98.4 °F (36.9 °C)  Pulse:  [67-91] 71  Resp:  [16-21] 20  SpO2:  [90 %-99 %] 95 %  BP: (129-145)/(59-80) 134/67     Weight: 87.2 kg (192 lb 3.9 oz)  Body mass index is 28.39 kg/m².    Intake/Output Summary (Last 24 hours) at 05/07/18 1211  Last data filed at 05/07/18 0908   Gross per 24 hour   Intake              540 ml   Output             1570 ml   Net            -1030 ml      Physical Exam   Constitutional: He is oriented to person, place, and time. He appears well-developed and well-nourished. No distress.   HENT:   Head: Normocephalic and atraumatic.   Nose: Nose normal.   Mouth/Throat: Oropharynx is clear and moist.   Cardiovascular: Intact distal pulses.  Exam reveals no gallop and no friction rub.    No murmur heard.  Device L chest wall. Irregularly irregular   Pulmonary/Chest: Effort normal. No respiratory distress. He has no wheezes. He has no rales.   2L NC. Decreased breath sounds at bases, improving   Abdominal: Soft. Bowel sounds are normal. He exhibits no distension and no mass. There is no tenderness. There is no guarding.   Musculoskeletal: He exhibits edema (ankles and shins). He exhibits no tenderness or deformity.   Neurological: He is alert and oriented to person, place, and time.   Skin: He is not diaphoretic.       Significant Labs: All pertinent labs within the past 24 hours have been reviewed.    Significant Imaging: I have reviewed and interpreted all pertinent imaging results/findings within the past 24 hours.    Assessment/Plan:      * Symptomatic anemia    - unknown baseline Hgb  - presented with Hgb 6.3  -  transfused 2U with appropriate response  - refuses rectal exam. No objective blood loss  - monitor Hgb-- stable        Acute hypoxemic respiratory failure    Due to pulmonary edema  Continue diuresis          Elevated troponin    NST 5/4 with no ischemia  May be due to strain from HFpEF exacerbation         Pacemaker    In place          Tobacco abuse    Patient was counseled on smoking cessation and he will be provided a nicotine transdermal patch applied while inpatient.  Will provide additional smoking cessation counseling prior to discharge.        Anemia of chronic disease    As addressed above.        Chronic anticoagulation    As addressed above.        Acute on chronic diastolic congestive heart failure    - prior TTE showing diastolic dysfunction   - with edema, pulmonary edema on CXR, elevated BNP  - repeat TTE with normal EF, no comment on diastology  - wean O2 as tolerated  - NST 5/4 with no ischemia, does show scar   - increased lasix to 80mg IV BID  - continue diuresis          Type 2 diabetes mellitus, controlled, with renal complications    - Well-controlled on a home regimen of metformin and a sulfonylurea;   - detemir 15 + aspart 5 QAC while hospitalized  - ADA diet        CKD Stage 3    - renal function stable  - renally dose all meds, avoid nephrotoxins  - monitor         Essential hypertension    - generally well controlled   - continue losartan and metoprolol        Chronic atrial fibrillation    - remains in Afib  - rate controlled with metoprolol tartrate 50 mg BID  - anticoagulation with apixaban          VTE Risk Mitigation         Ordered     apixaban tablet 5 mg  2 times daily      05/04/18 1937              Tigist Whitney MD  Department of Hospital Medicine   [facility]

## 2018-05-07 NOTE — PROGRESS NOTES
WRITTEN HEALTHCARE DISCHARGE INFORMATION     Things that YOU are responsible for to Manage Your Care At Home:  1. Getting your prescriptions filled.  2. Taking you medications as directed. DO NOT MISS ANY DOSES!  3. Going to your follow-up doctor appointments. This is important because it allows the doctor to monitor your progress and to determine if any changes need to be made to your treatment plan.    If you are unable to make your follow up appointments, please call the number listed and reschedule this appointment.     After discharge, if you need assistance, you can call [facility] for 24/7 assistance at [phone]    If you are experience any signs or symptoms, Call your doctor or Call 911 and come to your nearest Emergency Room.    Thank you for choosing [facility] and allowing us to care for you.   From your care management team: Danii CHATMAN,RSW [phone]     You should receive a call from [facility] within 48-72 hours to help manage your care after discharge. Please try to make sure that you answer your phone for this important phone call.     Follow-up Information     Keaton Hardy MD On 5/16/2018.    Why:  Wednesday at 10am. Hospital Follow up appointment.   Contact information:  [address] [facility] [address]  [address]  [place] [address]  [phone]           Kyler Marley MD On 5/24/2018.    Specialty:  Cardiology  Why:  Thursday at 10:20am. Hospital Follow Up appointment.   Contact information:  [address] [place] [address]  [address]  [place] [address]  [phone]

## 2018-05-07 NOTE — PLAN OF CARE
Problem: Patient Care Overview  Goal: Plan of Care Review  Outcome: Ongoing (interventions implemented as appropriate)     05/07/18 0056   Coping/Psychosocial   Plan Of Care Reviewed With patient   Patient still with complaints of shortness of breath. Edema to both lower legs noted. Continues with nebulization treatment prn. Oxygen titrated to 2L with O2 saturation of 97%. Blood sugar was 252 mg/dL at bedtime, sliding scale insulin and levemir given. Uses urinal to void, urine output being monitored. No overt signs of bleeding noted. Plan of care continued.

## 2018-05-07 NOTE — ASSESSMENT & PLAN NOTE
- unknown baseline Hgb  - presented with Hgb 6.3  - transfused 2U with appropriate response  - refuses rectal exam. No objective blood loss  - monitor Hgb-- stable

## 2018-05-08 PROBLEM — F05 ACUTE CONFUSIONAL STATE: Status: ACTIVE | Noted: 2018-05-08

## 2018-05-08 LAB
ANION GAP SERPL CALC-SCNC: 4 MMOL/L
BUN SERPL-MCNC: 15 MG/DL
CALCIUM SERPL-MCNC: 11 MG/DL
CHLORIDE SERPL-SCNC: 107 MMOL/L
CO2 SERPL-SCNC: 32 MMOL/L
CREAT SERPL-MCNC: 1.1 MG/DL
EST. GFR  (AFRICAN AMERICAN): >60 ML/MIN/1.73 M^2
EST. GFR  (NON AFRICAN AMERICAN): >60 ML/MIN/1.73 M^2
GLUCOSE SERPL-MCNC: 150 MG/DL
POCT GLUCOSE: 137 MG/DL (ref 70–110)
POCT GLUCOSE: 154 MG/DL (ref 70–110)
POCT GLUCOSE: 195 MG/DL (ref 70–110)
POCT GLUCOSE: 199 MG/DL (ref 70–110)
POTASSIUM SERPL-SCNC: 4.2 MMOL/L
SODIUM SERPL-SCNC: 143 MMOL/L

## 2018-05-08 PROCEDURE — 25000003 PHARM REV CODE 250: Performed by: HOSPITALIST

## 2018-05-08 PROCEDURE — 12000002 HC ACUTE/MED SURGE SEMI-PRIVATE ROOM

## 2018-05-08 PROCEDURE — 87086 URINE CULTURE/COLONY COUNT: CPT

## 2018-05-08 PROCEDURE — 63600175 PHARM REV CODE 636 W HCPCS: Performed by: HOSPITALIST

## 2018-05-08 PROCEDURE — 27000221 HC OXYGEN, UP TO 24 HOURS

## 2018-05-08 PROCEDURE — 94761 N-INVAS EAR/PLS OXIMETRY MLT: CPT

## 2018-05-08 PROCEDURE — 25000003 PHARM REV CODE 250: Performed by: INTERNAL MEDICINE

## 2018-05-08 PROCEDURE — 87088 URINE BACTERIA CULTURE: CPT

## 2018-05-08 PROCEDURE — 87077 CULTURE AEROBIC IDENTIFY: CPT

## 2018-05-08 PROCEDURE — 80048 BASIC METABOLIC PNL TOTAL CA: CPT

## 2018-05-08 PROCEDURE — 87186 SC STD MICRODIL/AGAR DIL: CPT

## 2018-05-08 PROCEDURE — 36415 COLL VENOUS BLD VENIPUNCTURE: CPT

## 2018-05-08 RX ADMIN — METOPROLOL TARTRATE 50 MG: 50 TABLET ORAL at 09:05

## 2018-05-08 RX ADMIN — INSULIN ASPART 5 UNITS: 100 INJECTION, SOLUTION INTRAVENOUS; SUBCUTANEOUS at 09:05

## 2018-05-08 RX ADMIN — FUROSEMIDE 80 MG: 10 INJECTION, SOLUTION INTRAMUSCULAR; INTRAVENOUS at 05:05

## 2018-05-08 RX ADMIN — ASPIRIN 81 MG: 81 TABLET, COATED ORAL at 09:05

## 2018-05-08 RX ADMIN — LOSARTAN POTASSIUM 50 MG: 25 TABLET, FILM COATED ORAL at 09:05

## 2018-05-08 RX ADMIN — INSULIN ASPART 5 UNITS: 100 INJECTION, SOLUTION INTRAVENOUS; SUBCUTANEOUS at 05:05

## 2018-05-08 RX ADMIN — APIXABAN 5 MG: 5 TABLET, FILM COATED ORAL at 09:05

## 2018-05-08 RX ADMIN — INSULIN ASPART 5 UNITS: 100 INJECTION, SOLUTION INTRAVENOUS; SUBCUTANEOUS at 11:05

## 2018-05-08 RX ADMIN — FUROSEMIDE 80 MG: 10 INJECTION, SOLUTION INTRAMUSCULAR; INTRAVENOUS at 09:05

## 2018-05-08 RX ADMIN — AMLODIPINE BESYLATE 10 MG: 5 TABLET ORAL at 09:05

## 2018-05-08 NOTE — ASSESSMENT & PLAN NOTE
Wax and wanes, no reported dementia or issues at home per wife  Check urine culture   O2 saturation appropriate on 2 liters  No focal deficits to suggest CVA, TIA

## 2018-05-08 NOTE — PLAN OF CARE
Problem: Patient Care Overview  Goal: Plan of Care Review  Outcome: Ongoing (interventions implemented as appropriate)   05/08/18 0615   Coping/Psychosocial   Plan Of Care Reviewed With patient   No shortness of breath reported. Denies pain. Continues with oxygen at 2L via nasal cannula. Edema to lower legs improving. Frequent urination from lasix. Urine output continues to be monitored. Blood sugar of 148 mg/dL at bedtime, scheduled insulin given. Call light and personal items in reach, urinal at bedside. Safety maintained.

## 2018-05-08 NOTE — NURSING
Patient refusing blood draw. States he does not want to get stuck again. Explained importance of having his blood levels monitored but still refused. Will attempt again later.

## 2018-05-08 NOTE — PLAN OF CARE
Problem: Patient Care Overview  Goal: Plan of Care Review  Outcome: Ongoing (interventions implemented as appropriate)   05/08/18 8539   Coping/Psychosocial   Plan Of Care Reviewed With patient   Pt is AAOx4; NAD; VSS; no c/o pain or SOB throughout the day; Generalized edema noted to hands and legs;  resp equal bilat and unlabored, fluid can still be heard moving through lungs;  Pt voiding to urinal;  Pt remains free of falls;  Sub Q insulin given as ordered; IV flushes without difficulty;  Will continue to monitor and assess.

## 2018-05-08 NOTE — SUBJECTIVE & OBJECTIVE
Interval History: pt c/o SOB when oxygen is off     Review of Systems   Constitutional: Negative for chills and fever.   Respiratory: Positive for shortness of breath. Negative for cough and chest tightness.    Cardiovascular: Positive for leg swelling. Negative for chest pain and palpitations.   Gastrointestinal: Negative for abdominal pain, anal bleeding, blood in stool, constipation, diarrhea, nausea and vomiting.   Genitourinary: Negative for difficulty urinating.     Objective:     Vital Signs (Most Recent):  Temp: 98 °F (36.7 °C) (05/08/18 0717)  Pulse: 73 (05/08/18 0717)  Resp: 17 (05/08/18 0717)  BP: (!) 150/72 (05/08/18 0717)  SpO2: 97 % (05/08/18 0717) Vital Signs (24h Range):  Temp:  [97.8 °F (36.6 °C)-98.6 °F (37 °C)] 98 °F (36.7 °C)  Pulse:  [] 73  Resp:  [17-20] 17  SpO2:  [94 %-98 %] 97 %  BP: (104-211)/(60-90) 150/72     Weight: 87.2 kg (192 lb 3.9 oz)  Body mass index is 28.39 kg/m².    Intake/Output Summary (Last 24 hours) at 05/08/18 1137  Last data filed at 05/08/18 0800   Gross per 24 hour   Intake              840 ml   Output             1900 ml   Net            -1060 ml      Physical Exam   Constitutional: He is oriented to person, place, and time. He appears well-developed and well-nourished. No distress.   HENT:   Head: Normocephalic and atraumatic.   Nose: Nose normal.   Mouth/Throat: Oropharynx is clear and moist.   Cardiovascular: Intact distal pulses.  Exam reveals no gallop and no friction rub.    No murmur heard.  Device L chest wall. Irregularly irregular   Pulmonary/Chest: Effort normal. No respiratory distress. He has no wheezes. He has no rales.   2L NC. Decreased breath sounds at bases, improving   Abdominal: Soft. Bowel sounds are normal. He exhibits no distension and no mass. There is no tenderness. There is no guarding.   Musculoskeletal: He exhibits edema (ankles and shins). He exhibits no tenderness or deformity.   Neurological: He is alert and oriented to person,  place, and time.   Skin: He is not diaphoretic.       Significant Labs:   ABGs: No results for input(s): PH, PCO2, HCO3, POCSATURATED, BE, TOTALHB, COHB, METHB, O2HB, POCFIO2 in the last 48 hours.  BMP:   Recent Labs  Lab 05/08/18  0826   *      K 4.2      CO2 32*   BUN 15   CREATININE 1.1   CALCIUM 11.0*     CBC:   Recent Labs  Lab 05/07/18  0432   WBC 9.31   HGB 8.1*   HCT 28.5*        POCT Glucose:   Recent Labs  Lab 05/07/18  2050 05/08/18  0719 05/08/18  1117   POCTGLUCOSE 148* 137* 199*       Significant Imaging: I have reviewed all pertinent imaging results/findings within the past 24 hours.

## 2018-05-08 NOTE — PLAN OF CARE
05/08/18 1644   Discharge Reassessment   Assessment Type Discharge Planning Reassessment   Do you have any problems affording any of your prescribed medications? TBD   Discharge Plan A Home with family   Discharge Plan B Home with family;Home Health   Patient choice form signed by patient/caregiver N/A   Can the patient answer the patient profile reliably? Yes, cognitively intact   How does the patient rate their overall health at the present time? Fair   Describe the patient's ability to walk at the present time. Walks with the help of equipment   How often would a person be available to care for the patient? Often   Number of comorbid conditions (as recorded on the chart) Five or more     Not able to wean O2.

## 2018-05-08 NOTE — NURSING
"Pt refusing blood draw again; pt educated on the importance of blood draw; pt states "I'm still confused I'll do it later"  "

## 2018-05-08 NOTE — PROGRESS NOTES
[facility] Hospital Medicine  Progress Note    Patient Name: Agus Ramos Jr.  MRN: [id number]  Patient Class: IP- Inpatient   Admission Date: 5/3/2018  Length of Stay: 5 days  Attending Physician: An Joshi MD  Primary Care Provider: Keaton Hardy MD        Subjective:     Principal Problem:Symptomatic anemia    HPI:  Mr. Agus Ramos Jr. is a 77 y.o. male with essential hypertension, type 2 diabetes mellitus (HbA1c 7.5%Sept 2016), CKD Stage 3, chronic atrial fibrillation ([id number] score 4) on chronic anticoagulation, anemia of chronic disease, and tobacco abuse who presents to [facility] ED with complaints of dyspnea for two weeks.  The dyspnea is on exertion and is associated with some mild wheezing and a non-productive cough.  He denies any fevers, chills, nausea, vomiting, chest pain, palpitations, diaphoresis, hemoptysis, nor any lower extremity pain or swelling.  There have been no sick contacts or recent travel.  He has not experienced any PND or orthopnea, and has not experienced any bleeding anywhere.  He otherwise has been doing well.    Hospital Course:  Admitted with concern for symptomatic anemia and HFpEF exacerbation. Found to have low Hgb 6.5, no bleeding identified though patient refused and continues to refuse rectal exam. Transfused 2U with appropriate response to Hgb 8.5. Continued shortness of breath symptoms after transfusion. Started diuresis for CHF exacerbation. TTE 5/4/2018 with normal EF, NST negative for ischemia but does show scar. Still requiring O2. Continuing diuresis.     5/8- pt with brief period of confusion, refused labs this morning but was oriented x3 after I sat down and spoke with him for awhile. Still requiring 2 liters oxygen and edema to LE. Soke with wife, Jessika vias phone and she said he called her today and was not confused and oriented. Will check urine culture and continue diuresis.     Interval History: pt c/o SOB when oxygen is off      Review of Systems   Constitutional: Negative for chills and fever.   Respiratory: Positive for shortness of breath. Negative for cough and chest tightness.    Cardiovascular: Positive for leg swelling. Negative for chest pain and palpitations.   Gastrointestinal: Negative for abdominal pain, anal bleeding, blood in stool, constipation, diarrhea, nausea and vomiting.   Genitourinary: Negative for difficulty urinating.     Objective:     Vital Signs (Most Recent):  Temp: 98 °F (36.7 °C) (05/08/18 0717)  Pulse: 73 (05/08/18 0717)  Resp: 17 (05/08/18 0717)  BP: (!) 150/72 (05/08/18 0717)  SpO2: 97 % (05/08/18 0717) Vital Signs (24h Range):  Temp:  [97.8 °F (36.6 °C)-98.6 °F (37 °C)] 98 °F (36.7 °C)  Pulse:  [] 73  Resp:  [17-20] 17  SpO2:  [94 %-98 %] 97 %  BP: (104-211)/(60-90) 150/72     Weight: 87.2 kg (192 lb 3.9 oz)  Body mass index is 28.39 kg/m².    Intake/Output Summary (Last 24 hours) at 05/08/18 1137  Last data filed at 05/08/18 0800   Gross per 24 hour   Intake              840 ml   Output             1900 ml   Net            -1060 ml      Physical Exam   Constitutional: He is oriented to person, place, and time. He appears well-developed and well-nourished. No distress.   HENT:   Head: Normocephalic and atraumatic.   Nose: Nose normal.   Mouth/Throat: Oropharynx is clear and moist.   Cardiovascular: Intact distal pulses.  Exam reveals no gallop and no friction rub.    No murmur heard.  Device L chest wall. Irregularly irregular   Pulmonary/Chest: Effort normal. No respiratory distress. He has no wheezes. He has no rales.   2L NC. Decreased breath sounds at bases, improving   Abdominal: Soft. Bowel sounds are normal. He exhibits no distension and no mass. There is no tenderness. There is no guarding.   Musculoskeletal: He exhibits edema (ankles and shins). He exhibits no tenderness or deformity.   Neurological: He is alert and oriented to person, place, and time.   Skin: He is not diaphoretic.        Significant Labs:   ABGs: No results for input(s): PH, PCO2, HCO3, POCSATURATED, BE, TOTALHB, COHB, METHB, O2HB, POCFIO2 in the last 48 hours.  BMP:   Recent Labs  Lab 05/08/18  0826   *      K 4.2      CO2 32*   BUN 15   CREATININE 1.1   CALCIUM 11.0*     CBC:   Recent Labs  Lab 05/07/18  0432   WBC 9.31   HGB 8.1*   HCT 28.5*        POCT Glucose:   Recent Labs  Lab 05/07/18  2050 05/08/18  0719 05/08/18  1117   POCTGLUCOSE 148* 137* 199*       Significant Imaging: I have reviewed all pertinent imaging results/findings within the past 24 hours.    Assessment/Plan:      * Symptomatic anemia    - unknown baseline Hgb  - presented with Hgb 6.3  - transfused 2U with appropriate response  - refuses rectal exam. No objective blood loss  - monitor Hgb-- stable        Acute confusional state    Wax and wanes, no reported dementia or issues at home per wife  Check urine culture   O2 saturation appropriate on 2 liters  No focal deficits to suggest CVA, TIA         Acute hypoxemic respiratory failure    Due to pulmonary edema  Continue diuresis  Wean oxygen as tolerated           Elevated troponin    NST 5/4 with no ischemia  May be due to strain from HFpEF exacerbation         Pacemaker    In place          Tobacco abuse    Patient was counseled on smoking cessation and he will be provided a nicotine transdermal patch applied while inpatient.  Will provide additional smoking cessation counseling prior to discharge.        Anemia of chronic disease    As addressed above.        Chronic anticoagulation    As addressed above.        Acute on chronic diastolic congestive heart failure    - prior TTE showing diastolic dysfunction   - with edema, pulmonary edema on CXR, elevated BNP  - repeat TTE with normal EF, no comment on diastology  - wean O2 as tolerated  - NST 5/4 with no ischemia, does show scar   - increased lasix to 80mg IV BID  - continue diuresis          Type 2 diabetes mellitus,  controlled, with renal complications    - Well-controlled on a home regimen of metformin and a sulfonylurea;   - detemir 15 + aspart 5 QAC while hospitalized  - ADA diet        CKD Stage 3    - renal function stable  - renally dose all meds, avoid nephrotoxins  - monitor         Essential hypertension    - generally well controlled   - continue losartan and metoprolol        Chronic atrial fibrillation    - remains in Afib  - rate controlled with metoprolol tartrate 50 mg BID  - anticoagulation with apixaban          VTE Risk Mitigation         Ordered     apixaban tablet 5 mg  2 times daily      05/04/18 4774              An Joshi MD  Department of Hospital Medicine   [facility]

## 2018-05-09 LAB
ALLENS TEST: ABNORMAL
ANION GAP SERPL CALC-SCNC: 4 MMOL/L
BUN SERPL-MCNC: 17 MG/DL
CALCIUM SERPL-MCNC: 10.7 MG/DL
CHLORIDE SERPL-SCNC: 106 MMOL/L
CO2 SERPL-SCNC: 34 MMOL/L
CREAT SERPL-MCNC: 1 MG/DL
DELSYS: ABNORMAL
EST. GFR  (AFRICAN AMERICAN): >60 ML/MIN/1.73 M^2
EST. GFR  (NON AFRICAN AMERICAN): >60 ML/MIN/1.73 M^2
FLOW: 2
GLUCOSE SERPL-MCNC: 117 MG/DL
HCO3 UR-SCNC: 34 MMOL/L (ref 24–28)
MODE: ABNORMAL
PCO2 BLDA: 54.6 MMHG (ref 35–45)
PH SMN: 7.4 [PH] (ref 7.35–7.45)
PO2 BLDA: 99 MMHG (ref 80–100)
POC BE: 8 MMOL/L
POC SATURATED O2: 98 % (ref 95–100)
POC TCO2: 36 MMOL/L (ref 23–27)
POCT GLUCOSE: 106 MG/DL (ref 70–110)
POCT GLUCOSE: 112 MG/DL (ref 70–110)
POCT GLUCOSE: 173 MG/DL (ref 70–110)
POTASSIUM SERPL-SCNC: 4.1 MMOL/L
SAMPLE: ABNORMAL
SITE: ABNORMAL
SODIUM SERPL-SCNC: 144 MMOL/L
SP02: 99

## 2018-05-09 PROCEDURE — 80048 BASIC METABOLIC PNL TOTAL CA: CPT

## 2018-05-09 PROCEDURE — 25000242 PHARM REV CODE 250 ALT 637 W/ HCPCS: Performed by: HOSPITALIST

## 2018-05-09 PROCEDURE — 12000002 HC ACUTE/MED SURGE SEMI-PRIVATE ROOM

## 2018-05-09 PROCEDURE — 63600175 PHARM REV CODE 636 W HCPCS: Performed by: HOSPITALIST

## 2018-05-09 PROCEDURE — 82803 BLOOD GASES ANY COMBINATION: CPT

## 2018-05-09 PROCEDURE — 25000003 PHARM REV CODE 250: Performed by: INTERNAL MEDICINE

## 2018-05-09 PROCEDURE — 94640 AIRWAY INHALATION TREATMENT: CPT

## 2018-05-09 PROCEDURE — 94761 N-INVAS EAR/PLS OXIMETRY MLT: CPT

## 2018-05-09 PROCEDURE — 25000003 PHARM REV CODE 250: Performed by: HOSPITALIST

## 2018-05-09 PROCEDURE — 99900035 HC TECH TIME PER 15 MIN (STAT)

## 2018-05-09 PROCEDURE — 27000221 HC OXYGEN, UP TO 24 HOURS

## 2018-05-09 PROCEDURE — 36600 WITHDRAWAL OF ARTERIAL BLOOD: CPT

## 2018-05-09 PROCEDURE — 36415 COLL VENOUS BLD VENIPUNCTURE: CPT

## 2018-05-09 RX ORDER — IPRATROPIUM BROMIDE AND ALBUTEROL SULFATE 2.5; .5 MG/3ML; MG/3ML
3 SOLUTION RESPIRATORY (INHALATION) EVERY 6 HOURS
Status: DISCONTINUED | OUTPATIENT
Start: 2018-05-09 | End: 2018-05-15 | Stop reason: HOSPADM

## 2018-05-09 RX ADMIN — INSULIN ASPART 5 UNITS: 100 INJECTION, SOLUTION INTRAVENOUS; SUBCUTANEOUS at 05:05

## 2018-05-09 RX ADMIN — METOPROLOL TARTRATE 50 MG: 50 TABLET ORAL at 08:05

## 2018-05-09 RX ADMIN — CEFTRIAXONE 1 G: 1 INJECTION, SOLUTION INTRAVENOUS at 05:05

## 2018-05-09 RX ADMIN — IPRATROPIUM BROMIDE AND ALBUTEROL SULFATE 3 ML: .5; 2.5 SOLUTION RESPIRATORY (INHALATION) at 11:05

## 2018-05-09 RX ADMIN — FUROSEMIDE 80 MG: 10 INJECTION, SOLUTION INTRAMUSCULAR; INTRAVENOUS at 08:05

## 2018-05-09 RX ADMIN — ASPIRIN 81 MG: 81 TABLET, COATED ORAL at 08:05

## 2018-05-09 RX ADMIN — INSULIN ASPART 5 UNITS: 100 INJECTION, SOLUTION INTRAVENOUS; SUBCUTANEOUS at 08:05

## 2018-05-09 RX ADMIN — INSULIN ASPART 5 UNITS: 100 INJECTION, SOLUTION INTRAVENOUS; SUBCUTANEOUS at 11:05

## 2018-05-09 RX ADMIN — APIXABAN 5 MG: 5 TABLET, FILM COATED ORAL at 08:05

## 2018-05-09 RX ADMIN — IPRATROPIUM BROMIDE AND ALBUTEROL SULFATE 3 ML: .5; 2.5 SOLUTION RESPIRATORY (INHALATION) at 08:05

## 2018-05-09 RX ADMIN — LOSARTAN POTASSIUM 50 MG: 25 TABLET, FILM COATED ORAL at 08:05

## 2018-05-09 RX ADMIN — FUROSEMIDE 80 MG: 10 INJECTION, SOLUTION INTRAMUSCULAR; INTRAVENOUS at 05:05

## 2018-05-09 RX ADMIN — AMLODIPINE BESYLATE 10 MG: 5 TABLET ORAL at 08:05

## 2018-05-09 NOTE — ASSESSMENT & PLAN NOTE
Wax and wanes, no reported dementia or issues at home per wife  Check urine culture  - staph, start rocephin   O2 saturation appropriate on 2 liters  No focal deficits to suggest CVA, TIA

## 2018-05-09 NOTE — PLAN OF CARE
Problem: Diabetes, Type 2 (Adult)  Goal: Signs and Symptoms of Listed Potential Problems Will be Absent, Minimized or Managed (Diabetes, Type 2)  Signs and symptoms of listed potential problems will be absent, minimized or managed by discharge/transition of care (reference Diabetes, Type 2 (Adult) CPG).   Outcome: Ongoing (interventions implemented as appropriate)   05/09/18 0251   Diabetes, Type 2   Problems Assessed (Type 2 Diabetes) hyperglycemia   Received scheduled insulin for blood glucose 195 mg/dl at hs.    Problem: Fall Risk (Adult)  Goal: Absence of Falls  Patient will demonstrate the desired outcomes by discharge/transition of care.   Outcome: Ongoing (interventions implemented as appropriate)   05/09/18 0251   Fall Risk (Adult)   Absence of Falls making progress toward outcome   Located close to nursing station, instructed to call for assistance as needed.    Problem: Patient Care Overview  Goal: Plan of Care Review  Outcome: Outcome(s) achieved Date Met: 05/09/18 05/09/18 0251   Coping/Psychosocial   Plan Of Care Reviewed With patient   Resting quietly during night, no report of pain or discomfort. Tolerating diet and drinking po fluids. Voiding per urinal.

## 2018-05-09 NOTE — PROGRESS NOTES
"SW reviewed pt's follow up appointments.   Follow-up Information     Keaton Hardy MD On 5/16/2018.    Why:  Wednesday at 10am. Hospital Follow up appointment.   Contact information:  [address]  [address]  [place] [address]  [phone]           Kyler Marley MD On 5/24/2018.    Specialty:  Cardiology  Why:  Thursday at 10:20am. Hospital Follow Up appointment.   Contact information:  [address]  [address]  [place] [id number]  [phone]                 SW inquired about HELP AT HOME. Pt reiterated that he has his wife Jessika at home. SW inquired about what pt is RESPONSIBLE for MANAGING YOUR HELP AT HOME. Pt stated that he takes is medications at home and makes it to his doctor appointments. SW voiced understanding.     EDUCATION:  Pt provided with educational information on " Anemia ".  Information reviewed and placed in :My Healthcare Packet" to be brought home for pt to use as resource after discharge.  Information included:  signs and symptoms to look for and call the doctor if experiencing, and symptoms that may indicate a medical emergency: CALL 911.      All questions answered.  Teach back method used.  Pt  verbalized understanding of all information by stating, "  That he is aware of the signs and symptoms to watch for and when to contact MD and when to report to the ED immediately.     "

## 2018-05-09 NOTE — PROGRESS NOTES
[facility] Hospital Medicine  Progress Note    Patient Name: Agus Ramos Jr.  MRN: [id number]  Patient Class: IP- Inpatient   Admission Date: 5/3/2018  Length of Stay: 6 days  Attending Physician: An Joshi MD  Primary Care Provider: Keaton Hardy MD        Subjective:     Principal Problem:Symptomatic anemia    HPI:  Mr. Agus Ramos Jr. is a 77 y.o. male with essential hypertension, type 2 diabetes mellitus (HbA1c 7.5%Sept 2016), CKD Stage 3, chronic atrial fibrillation ([id number] score 4) on chronic anticoagulation, anemia of chronic disease, and tobacco abuse who presents to [facility] ED with complaints of dyspnea for two weeks.  The dyspnea is on exertion and is associated with some mild wheezing and a non-productive cough.  He denies any fevers, chills, nausea, vomiting, chest pain, palpitations, diaphoresis, hemoptysis, nor any lower extremity pain or swelling.  There have been no sick contacts or recent travel.  He has not experienced any PND or orthopnea, and has not experienced any bleeding anywhere.  He otherwise has been doing well.    Hospital Course:  Admitted with concern for symptomatic anemia and HFpEF exacerbation. Found to have low Hgb 6.5, no bleeding identified though patient refused and continues to refuse rectal exam. Transfused 2U with appropriate response to Hgb 8.5. Continued shortness of breath symptoms after transfusion. Started diuresis for CHF exacerbation. TTE 5/4/2018 with normal EF, NST negative for ischemia but does show scar. Still requiring O2. Continuing diuresis.     5/8- pt with brief period of confusion, refused labs this morning but was oriented x3 after I sat down and spoke with him for awhile. Still requiring 2 liters oxygen and edema to LE. Soke with wife, Jessika vias phone and she said he called her today and was not confused and oriented. Will check urine culture and continue diuresis.   5/9 - urine culture growing staph, start  rocephin, no acute changes on CXR; schedule nebs and continue to wean oxygen as tolerated     Interval History: pt pleasantly confused     Review of Systems   Constitutional: Negative for chills and fever.   Respiratory: Positive for shortness of breath. Negative for cough and chest tightness.    Cardiovascular: Positive for leg swelling. Negative for chest pain and palpitations.   Gastrointestinal: Negative for abdominal pain, anal bleeding, blood in stool, constipation, diarrhea, nausea and vomiting.   Genitourinary: Negative for difficulty urinating.     Objective:     Vital Signs (Most Recent):  Temp: 98.1 °F (36.7 °C) (05/09/18 1125)  Pulse: 71 (05/09/18 1136)  Resp: 18 (05/09/18 1136)  BP: 109/65 (05/09/18 1125)  SpO2: 98 % (05/09/18 1136) Vital Signs (24h Range):  Temp:  [97.9 °F (36.6 °C)-98.2 °F (36.8 °C)] 98.1 °F (36.7 °C)  Pulse:  [71-86] 71  Resp:  [18-20] 18  SpO2:  [91 %-100 %] 98 %  BP: (109-124)/(58-65) 109/65     Weight: 86.4 kg (190 lb 7.6 oz)  Body mass index is 28.13 kg/m².    Intake/Output Summary (Last 24 hours) at 05/09/18 1643  Last data filed at 05/09/18 1322   Gross per 24 hour   Intake              820 ml   Output             1500 ml   Net             -680 ml      Physical Exam   Constitutional: He is oriented to person, place, and time. He appears well-developed and well-nourished. No distress.   HENT:   Head: Normocephalic and atraumatic.   Nose: Nose normal.   Mouth/Throat: Oropharynx is clear and moist.   Cardiovascular: Intact distal pulses.  Exam reveals no gallop and no friction rub.    No murmur heard.  Device L chest wall. Irregularly irregular   Pulmonary/Chest: Effort normal. No respiratory distress. He has no wheezes. He has no rales.   2L NC. Decreased breath sounds at bases, improving   Abdominal: Soft. Bowel sounds are normal. He exhibits no distension and no mass. There is no tenderness. There is no guarding.   Musculoskeletal: He exhibits edema (ankles and shins). He  exhibits no tenderness or deformity.   Neurological: He is alert and oriented to person, place, and time.   Skin: He is not diaphoretic.       Significant Labs:   ABGs:   Recent Labs  Lab 05/09/18  0944   PH 7.402   PCO2 54.6*   HCO3 34.0*   POCSATURATED 98   BE 8     BMP:   Recent Labs  Lab 05/09/18  0403   *      K 4.1      CO2 34*   BUN 17   CREATININE 1.0   CALCIUM 10.7*     CBC: No results for input(s): WBC, HGB, HCT, PLT in the last 48 hours.    Significant Imaging: I have reviewed all pertinent imaging results/findings within the past 24 hours.    Assessment/Plan:      * Symptomatic anemia    - unknown baseline Hgb  - presented with Hgb 6.3  - transfused 2U with appropriate response  - refuses rectal exam. No objective blood loss  - monitor Hgb-- stable        Acute confusional state    Wax and wanes, no reported dementia or issues at home per wife  Check urine culture  - staph, start rocephin   O2 saturation appropriate on 2 liters  No focal deficits to suggest CVA, TIA         Acute hypoxemic respiratory failure    Due to pulmonary edema  Continue diuresis  Wean oxygen as tolerated   Schedule nebs           Elevated troponin    NST 5/4 with no ischemia  May be due to strain from HFpEF exacerbation         Pacemaker    In place          Tobacco abuse    Patient was counseled on smoking cessation and he will be provided a nicotine transdermal patch applied while inpatient.  Will provide additional smoking cessation counseling prior to discharge.        Anemia of chronic disease    As addressed above.        Chronic anticoagulation    As addressed above.        Acute on chronic diastolic congestive heart failure    - prior TTE showing diastolic dysfunction   - with edema, pulmonary edema on CXR, elevated BNP  - repeat TTE with normal EF, no comment on diastology  - wean O2 as tolerated  - NST 5/4 with no ischemia, does show scar   - increased lasix to 80mg IV BID  - continue diuresis           Type 2 diabetes mellitus, controlled, with renal complications    - Well-controlled on a home regimen of metformin and a sulfonylurea;   - detemir 15 + aspart 5 QAC while hospitalized  - ADA diet        CKD Stage 3    - renal function stable  - renally dose all meds, avoid nephrotoxins  - monitor         Essential hypertension    - generally well controlled   - continue losartan and metoprolol        Chronic atrial fibrillation    - remains in Afib  - rate controlled with metoprolol tartrate 50 mg BID  - anticoagulation with apixaban          VTE Risk Mitigation         Ordered     apixaban tablet 5 mg  2 times daily      05/04/18 4128              An Joshi MD  Department of Hospital Medicine   [facility]

## 2018-05-09 NOTE — SUBJECTIVE & OBJECTIVE
Interval History: pt pleasantly confused     Review of Systems   Constitutional: Negative for chills and fever.   Respiratory: Positive for shortness of breath. Negative for cough and chest tightness.    Cardiovascular: Positive for leg swelling. Negative for chest pain and palpitations.   Gastrointestinal: Negative for abdominal pain, anal bleeding, blood in stool, constipation, diarrhea, nausea and vomiting.   Genitourinary: Negative for difficulty urinating.     Objective:     Vital Signs (Most Recent):  Temp: 98.1 °F (36.7 °C) (05/09/18 1125)  Pulse: 71 (05/09/18 1136)  Resp: 18 (05/09/18 1136)  BP: 109/65 (05/09/18 1125)  SpO2: 98 % (05/09/18 1136) Vital Signs (24h Range):  Temp:  [97.9 °F (36.6 °C)-98.2 °F (36.8 °C)] 98.1 °F (36.7 °C)  Pulse:  [71-86] 71  Resp:  [18-20] 18  SpO2:  [91 %-100 %] 98 %  BP: (109-124)/(58-65) 109/65     Weight: 86.4 kg (190 lb 7.6 oz)  Body mass index is 28.13 kg/m².    Intake/Output Summary (Last 24 hours) at 05/09/18 1643  Last data filed at 05/09/18 1322   Gross per 24 hour   Intake              820 ml   Output             1500 ml   Net             -680 ml      Physical Exam   Constitutional: He is oriented to person, place, and time. He appears well-developed and well-nourished. No distress.   HENT:   Head: Normocephalic and atraumatic.   Nose: Nose normal.   Mouth/Throat: Oropharynx is clear and moist.   Cardiovascular: Intact distal pulses.  Exam reveals no gallop and no friction rub.    No murmur heard.  Device L chest wall. Irregularly irregular   Pulmonary/Chest: Effort normal. No respiratory distress. He has no wheezes. He has no rales.   2L NC. Decreased breath sounds at bases, improving   Abdominal: Soft. Bowel sounds are normal. He exhibits no distension and no mass. There is no tenderness. There is no guarding.   Musculoskeletal: He exhibits edema (ankles and shins). He exhibits no tenderness or deformity.   Neurological: He is alert and oriented to person, place, and  time.   Skin: He is not diaphoretic.       Significant Labs:   ABGs:   Recent Labs  Lab 05/09/18  0944   PH 7.402   PCO2 54.6*   HCO3 34.0*   POCSATURATED 98   BE 8     BMP:   Recent Labs  Lab 05/09/18  0403   *      K 4.1      CO2 34*   BUN 17   CREATININE 1.0   CALCIUM 10.7*     CBC: No results for input(s): WBC, HGB, HCT, PLT in the last 48 hours.    Significant Imaging: I have reviewed all pertinent imaging results/findings within the past 24 hours.

## 2018-05-10 PROBLEM — E83.52 HYPERCALCEMIA: Status: ACTIVE | Noted: 2018-05-10

## 2018-05-10 LAB
ANION GAP SERPL CALC-SCNC: 5 MMOL/L
BUN SERPL-MCNC: 14 MG/DL
CALCIUM SERPL-MCNC: 11.1 MG/DL
CHLORIDE SERPL-SCNC: 103 MMOL/L
CO2 SERPL-SCNC: 32 MMOL/L
CREAT SERPL-MCNC: 1.1 MG/DL
EST. GFR  (AFRICAN AMERICAN): >60 ML/MIN/1.73 M^2
EST. GFR  (NON AFRICAN AMERICAN): >60 ML/MIN/1.73 M^2
GLUCOSE SERPL-MCNC: 180 MG/DL
POCT GLUCOSE: 125 MG/DL (ref 70–110)
POCT GLUCOSE: 133 MG/DL (ref 70–110)
POCT GLUCOSE: 165 MG/DL (ref 70–110)
POCT GLUCOSE: 196 MG/DL (ref 70–110)
POCT GLUCOSE: 252 MG/DL (ref 70–110)
POTASSIUM SERPL-SCNC: 3.7 MMOL/L
SODIUM SERPL-SCNC: 140 MMOL/L

## 2018-05-10 PROCEDURE — 82397 CHEMILUMINESCENT ASSAY: CPT

## 2018-05-10 PROCEDURE — 12000002 HC ACUTE/MED SURGE SEMI-PRIVATE ROOM

## 2018-05-10 PROCEDURE — 63600175 PHARM REV CODE 636 W HCPCS: Performed by: HOSPITALIST

## 2018-05-10 PROCEDURE — 36415 COLL VENOUS BLD VENIPUNCTURE: CPT

## 2018-05-10 PROCEDURE — 25000003 PHARM REV CODE 250: Performed by: INTERNAL MEDICINE

## 2018-05-10 PROCEDURE — 25000003 PHARM REV CODE 250: Performed by: HOSPITALIST

## 2018-05-10 PROCEDURE — 94640 AIRWAY INHALATION TREATMENT: CPT

## 2018-05-10 PROCEDURE — 94761 N-INVAS EAR/PLS OXIMETRY MLT: CPT

## 2018-05-10 PROCEDURE — 25000242 PHARM REV CODE 250 ALT 637 W/ HCPCS: Performed by: HOSPITALIST

## 2018-05-10 PROCEDURE — 82306 VITAMIN D 25 HYDROXY: CPT

## 2018-05-10 PROCEDURE — 83970 ASSAY OF PARATHORMONE: CPT

## 2018-05-10 PROCEDURE — 84165 PROTEIN E-PHORESIS SERUM: CPT | Mod: 26,,, | Performed by: PATHOLOGY

## 2018-05-10 PROCEDURE — 80048 BASIC METABOLIC PNL TOTAL CA: CPT

## 2018-05-10 PROCEDURE — 27000221 HC OXYGEN, UP TO 24 HOURS

## 2018-05-10 PROCEDURE — 25500020 PHARM REV CODE 255: Performed by: HOSPITALIST

## 2018-05-10 PROCEDURE — 82330 ASSAY OF CALCIUM: CPT

## 2018-05-10 PROCEDURE — 84165 PROTEIN E-PHORESIS SERUM: CPT

## 2018-05-10 RX ADMIN — IPRATROPIUM BROMIDE AND ALBUTEROL SULFATE 3 ML: .5; 2.5 SOLUTION RESPIRATORY (INHALATION) at 01:05

## 2018-05-10 RX ADMIN — METOPROLOL TARTRATE 50 MG: 50 TABLET ORAL at 08:05

## 2018-05-10 RX ADMIN — INSULIN ASPART 5 UNITS: 100 INJECTION, SOLUTION INTRAVENOUS; SUBCUTANEOUS at 11:05

## 2018-05-10 RX ADMIN — APIXABAN 5 MG: 5 TABLET, FILM COATED ORAL at 08:05

## 2018-05-10 RX ADMIN — IPRATROPIUM BROMIDE AND ALBUTEROL SULFATE 3 ML: .5; 2.5 SOLUTION RESPIRATORY (INHALATION) at 07:05

## 2018-05-10 RX ADMIN — INSULIN ASPART 5 UNITS: 100 INJECTION, SOLUTION INTRAVENOUS; SUBCUTANEOUS at 08:05

## 2018-05-10 RX ADMIN — INSULIN ASPART 1 UNITS: 100 INJECTION, SOLUTION INTRAVENOUS; SUBCUTANEOUS at 08:05

## 2018-05-10 RX ADMIN — CEFTRIAXONE 1 G: 1 INJECTION, SOLUTION INTRAVENOUS at 05:05

## 2018-05-10 RX ADMIN — FUROSEMIDE 80 MG: 10 INJECTION, SOLUTION INTRAMUSCULAR; INTRAVENOUS at 08:05

## 2018-05-10 RX ADMIN — INSULIN ASPART 5 UNITS: 100 INJECTION, SOLUTION INTRAVENOUS; SUBCUTANEOUS at 05:05

## 2018-05-10 RX ADMIN — LOSARTAN POTASSIUM 50 MG: 25 TABLET, FILM COATED ORAL at 08:05

## 2018-05-10 RX ADMIN — ASPIRIN 81 MG: 81 TABLET, COATED ORAL at 08:05

## 2018-05-10 RX ADMIN — AMLODIPINE BESYLATE 10 MG: 5 TABLET ORAL at 08:05

## 2018-05-10 RX ADMIN — FUROSEMIDE 80 MG: 10 INJECTION, SOLUTION INTRAMUSCULAR; INTRAVENOUS at 05:05

## 2018-05-10 RX ADMIN — IPRATROPIUM BROMIDE AND ALBUTEROL SULFATE 3 ML: .5; 2.5 SOLUTION RESPIRATORY (INHALATION) at 12:05

## 2018-05-10 RX ADMIN — IOHEXOL 75 ML: 350 INJECTION, SOLUTION INTRAVENOUS at 06:05

## 2018-05-10 NOTE — ASSESSMENT & PLAN NOTE
Differential includes MM, parathyroid disease, vit d toxicity, malignancy   PTH, PTH-r, vitamin D, spep, upep, ionized calcium   Continue lasix  Chest CT

## 2018-05-10 NOTE — PLAN OF CARE
Problem: Diabetes, Type 2 (Adult)  Goal: Signs and Symptoms of Listed Potential Problems Will be Absent, Minimized or Managed (Diabetes, Type 2)  Signs and symptoms of listed potential problems will be absent, minimized or managed by discharge/transition of care (reference Diabetes, Type 2 (Adult) CPG).   Outcome: Ongoing (interventions implemented as appropriate)   05/10/18 0257   Diabetes, Type 2   Problems Assessed (Type 2 Diabetes) hyperglycemia   Received scheduled insulin for blood glucose 196 mg/dl at , no ss coverage required.    Problem: Patient Care Overview  Goal: Plan of Care Review  Outcome: Ongoing (interventions implemented as appropriate)   05/10/18 0257   Coping/Psychosocial   Plan Of Care Reviewed With patient   Resting quietly during night, no report of pain or discomfort. Tolerating diet and drinking po fluids, voiding per urinal.    Problem: Fall Risk (Adult)  Goal: Absence of Falls  Patient will demonstrate the desired outcomes by discharge/transition of care.   Outcome: Ongoing (interventions implemented as appropriate)   05/10/18 0257   Fall Risk (Adult)   Absence of Falls making progress toward outcome   Located close to nursing station, instructed to call for assistance when needed.    Problem: Urinary Tract Infection (Adult)  Goal: Signs and Symptoms of Listed Potential Problems Will be Absent, Minimized or Managed (Urinary Tract Infection)  Signs and symptoms of listed potential problems will be absent, minimized or managed by discharge/transition of care (reference Urinary Tract Infection (Adult) CPG).  Outcome: Ongoing (interventions implemented as appropriate)   05/10/18 0257   Urinary Tract Infection   Problems Assessed (Urinary Tract Infection) infection progression   Receiving iv abx as ordered.

## 2018-05-10 NOTE — PLAN OF CARE
Problem: Patient Care Overview  Goal: Plan of Care Review  Outcome: Ongoing (interventions implemented as appropriate)  Pt free of accidental injury during shift. No s/s of distress noted. Denies pain/discomfort. Able to make needs known. CBG within normal limits. Safety measures maintained. Call bell within reach. Will continue to monitor

## 2018-05-10 NOTE — PROGRESS NOTES
[facility] Hospital Medicine  Progress Note    Patient Name: Agus Ramos Jr.  MRN: [id number]  Patient Class: IP- Inpatient   Admission Date: 5/3/2018  Length of Stay: 7 days  Attending Physician: An Joshi MD  Primary Care Provider: Keaton Hardy MD        Subjective:     Principal Problem:Symptomatic anemia    HPI:  Mr. Agus Ramos Jr. is a 77 y.o. male with essential hypertension, type 2 diabetes mellitus (HbA1c 7.5%Sept 2016), CKD Stage 3, chronic atrial fibrillation ([id number] score 4) on chronic anticoagulation, anemia of chronic disease, and tobacco abuse who presents to [facility] ED with complaints of dyspnea for two weeks.  The dyspnea is on exertion and is associated with some mild wheezing and a non-productive cough.  He denies any fevers, chills, nausea, vomiting, chest pain, palpitations, diaphoresis, hemoptysis, nor any lower extremity pain or swelling.  There have been no sick contacts or recent travel.  He has not experienced any PND or orthopnea, and has not experienced any bleeding anywhere.  He otherwise has been doing well.    Hospital Course:  Admitted with concern for symptomatic anemia and HFpEF exacerbation. Found to have low Hgb 6.5, no bleeding identified though patient refused and continues to refuse rectal exam. Transfused 2U with appropriate response to Hgb 8.5. Continued shortness of breath symptoms after transfusion. Started diuresis for CHF exacerbation. TTE 5/4/2018 with normal EF, NST negative for ischemia but does show scar. Still requiring O2. Continuing diuresis.     5/8- pt with brief period of confusion, refused labs this morning but was oriented x3 after I sat down and spoke with him for awhile. Still requiring 2 liters oxygen and edema to LE. Soke with wife, Jessika vias phone and she said he called her today and was not confused and oriented. Will check urine culture and continue diuresis.   5/9 - urine culture growing staph, start  rocephin, no acute changes on CXR; schedule nebs and continue to wean oxygen as tolerated   5/10- urine culture now shows no growth?, waiting to hear from lab; calcium persistently high, hypercalcemia workup pending, chest CT pending, pt still requiring oxygen despite aggressive diuresis and pulmonary care     Interval History: pt still requiring oxygen, elevated calcium     Review of Systems   Constitutional: Negative for chills and fever.   Respiratory: Positive for shortness of breath. Negative for cough and chest tightness.    Cardiovascular: Positive for leg swelling. Negative for chest pain and palpitations.   Gastrointestinal: Negative for abdominal pain, anal bleeding, blood in stool, constipation, diarrhea, nausea and vomiting.   Genitourinary: Negative for difficulty urinating.     Objective:     Vital Signs (Most Recent):  Temp: 97.9 °F (36.6 °C) (05/10/18 1632)  Pulse: 72 (05/10/18 1632)  Resp: 18 (05/10/18 1632)  BP: (!) 120/58 (05/10/18 1632)  SpO2: (!) 94 % (05/10/18 1632) Vital Signs (24h Range):  Temp:  [97.3 °F (36.3 °C)-98.1 °F (36.7 °C)] 97.9 °F (36.6 °C)  Pulse:  [69-79] 72  Resp:  [18-20] 18  SpO2:  [93 %-99 %] 94 %  BP: (120-146)/(57-89) 120/58     Weight: 86.4 kg (190 lb 7.6 oz)  Body mass index is 28.13 kg/m².    Intake/Output Summary (Last 24 hours) at 05/10/18 8103  Last data filed at 05/10/18 1200   Gross per 24 hour   Intake              780 ml   Output             1300 ml   Net             -520 ml      Physical Exam   Constitutional: He is oriented to person, place, and time. He appears well-developed and well-nourished. No distress.   HENT:   Head: Normocephalic and atraumatic.   Nose: Nose normal.   Mouth/Throat: Oropharynx is clear and moist.   Cardiovascular: Intact distal pulses.  Exam reveals no gallop and no friction rub.    No murmur heard.  Device L chest wall. Irregularly irregular   Pulmonary/Chest: Effort normal. No respiratory distress. He has no wheezes. He has no rales.    2L NC. Decreased breath sounds at bases, improving   Abdominal: Soft. Bowel sounds are normal. He exhibits no distension and no mass. There is no tenderness. There is no guarding.   Musculoskeletal: He exhibits edema (ankles and shins). He exhibits no tenderness or deformity.   Neurological: He is alert and oriented to person, place, and time.   Skin: He is not diaphoretic.       Significant Labs:   BMP:   Recent Labs  Lab 05/10/18  0953   *      K 3.7      CO2 32*   BUN 14   CREATININE 1.1   CALCIUM 11.1*     CBC: No results for input(s): WBC, HGB, HCT, PLT in the last 48 hours.  POCT Glucose:   Recent Labs  Lab 05/10/18  0716 05/10/18  1113 05/10/18  1633   POCTGLUCOSE 125* 165* 133*       Significant Imaging: I have reviewed all pertinent imaging results/findings within the past 24 hours.    Assessment/Plan:      * Symptomatic anemia    - unknown baseline Hgb  - presented with Hgb 6.3  - transfused 2U with appropriate response  - refuses rectal exam. No objective blood loss  - monitor Hgb-- stable        Hypercalcemia    Differential includes MM, parathyroid disease, vit d toxicity, malignancy   PTH, PTH-r, vitamin D, spep, upep, ionized calcium   Continue lasix  Chest CT           Acute confusional state    Wax and wanes, no reported dementia or issues at home per wife  Check urine culture  - staph, start rocephin   O2 saturation appropriate on 2 liters  No focal deficits to suggest CVA, TIA         Acute hypoxemic respiratory failure    Due to pulmonary edema  Continue diuresis  Wean oxygen as tolerated   Schedule nebs           Elevated troponin    NST 5/4 with no ischemia  May be due to strain from HFpEF exacerbation         Pacemaker    In place          Tobacco abuse    Patient was counseled on smoking cessation and he will be provided a nicotine transdermal patch applied while inpatient.  Will provide additional smoking cessation counseling prior to discharge.        Anemia of chronic  disease    As addressed above.        Chronic anticoagulation    As addressed above.        Acute on chronic diastolic congestive heart failure    - prior TTE showing diastolic dysfunction   - with edema, pulmonary edema on CXR, elevated BNP  - repeat TTE with normal EF, no comment on diastology  - wean O2 as tolerated  - NST 5/4 with no ischemia, does show scar   - increased lasix to 80mg IV BID  - continue diuresis          Type 2 diabetes mellitus, controlled, with renal complications    - Well-controlled on a home regimen of metformin and a sulfonylurea;   - detemir 15 + aspart 5 QAC while hospitalized  - ADA diet        CKD Stage 3    - renal function stable  - renally dose all meds, avoid nephrotoxins  - monitor         Essential hypertension    - generally well controlled   - continue losartan and metoprolol        Chronic atrial fibrillation    - remains in Afib  - rate controlled with metoprolol tartrate 50 mg BID  - anticoagulation with apixaban          VTE Risk Mitigation         Ordered     apixaban tablet 5 mg  2 times daily      05/04/18 9593              An Joshi MD  Department of Hospital Medicine   [facility]

## 2018-05-10 NOTE — SUBJECTIVE & OBJECTIVE
Interval History: pt still requiring oxygen, elevated calcium     Review of Systems   Constitutional: Negative for chills and fever.   Respiratory: Positive for shortness of breath. Negative for cough and chest tightness.    Cardiovascular: Positive for leg swelling. Negative for chest pain and palpitations.   Gastrointestinal: Negative for abdominal pain, anal bleeding, blood in stool, constipation, diarrhea, nausea and vomiting.   Genitourinary: Negative for difficulty urinating.     Objective:     Vital Signs (Most Recent):  Temp: 97.9 °F (36.6 °C) (05/10/18 1632)  Pulse: 72 (05/10/18 1632)  Resp: 18 (05/10/18 1632)  BP: (!) 120/58 (05/10/18 1632)  SpO2: (!) 94 % (05/10/18 1632) Vital Signs (24h Range):  Temp:  [97.3 °F (36.3 °C)-98.1 °F (36.7 °C)] 97.9 °F (36.6 °C)  Pulse:  [69-79] 72  Resp:  [18-20] 18  SpO2:  [93 %-99 %] 94 %  BP: (120-146)/(57-89) 120/58     Weight: 86.4 kg (190 lb 7.6 oz)  Body mass index is 28.13 kg/m².    Intake/Output Summary (Last 24 hours) at 05/10/18 1733  Last data filed at 05/10/18 1200   Gross per 24 hour   Intake              780 ml   Output             1300 ml   Net             -520 ml      Physical Exam   Constitutional: He is oriented to person, place, and time. He appears well-developed and well-nourished. No distress.   HENT:   Head: Normocephalic and atraumatic.   Nose: Nose normal.   Mouth/Throat: Oropharynx is clear and moist.   Cardiovascular: Intact distal pulses.  Exam reveals no gallop and no friction rub.    No murmur heard.  Device L chest wall. Irregularly irregular   Pulmonary/Chest: Effort normal. No respiratory distress. He has no wheezes. He has no rales.   2L NC. Decreased breath sounds at bases, improving   Abdominal: Soft. Bowel sounds are normal. He exhibits no distension and no mass. There is no tenderness. There is no guarding.   Musculoskeletal: He exhibits edema (ankles and shins). He exhibits no tenderness or deformity.   Neurological: He is alert and  oriented to person, place, and time.   Skin: He is not diaphoretic.       Significant Labs:   BMP:   Recent Labs  Lab 05/10/18  0953   *      K 3.7      CO2 32*   BUN 14   CREATININE 1.1   CALCIUM 11.1*     CBC: No results for input(s): WBC, HGB, HCT, PLT in the last 48 hours.  POCT Glucose:   Recent Labs  Lab 05/10/18  0716 05/10/18  1113 05/10/18  1633   POCTGLUCOSE 125* 165* 133*       Significant Imaging: I have reviewed all pertinent imaging results/findings within the past 24 hours.

## 2018-05-11 LAB
25(OH)D3+25(OH)D2 SERPL-MCNC: 30 NG/ML
ANION GAP SERPL CALC-SCNC: 6 MMOL/L
BACTERIA UR CULT: NORMAL
BUN SERPL-MCNC: 15 MG/DL
CA-I BLDV-SCNC: 1.46 MMOL/L
CALCIUM SERPL-MCNC: 10.6 MG/DL
CHLORIDE SERPL-SCNC: 105 MMOL/L
CO2 SERPL-SCNC: 32 MMOL/L
CREAT SERPL-MCNC: 1.1 MG/DL
EST. GFR  (AFRICAN AMERICAN): >60 ML/MIN/1.73 M^2
EST. GFR  (NON AFRICAN AMERICAN): >60 ML/MIN/1.73 M^2
GLUCOSE SERPL-MCNC: 134 MG/DL
POCT GLUCOSE: 143 MG/DL (ref 70–110)
POCT GLUCOSE: 164 MG/DL (ref 70–110)
POCT GLUCOSE: 203 MG/DL (ref 70–110)
POCT GLUCOSE: 243 MG/DL (ref 70–110)
POTASSIUM SERPL-SCNC: 3.6 MMOL/L
PTH-INTACT SERPL-MCNC: 292.3 PG/ML
SODIUM SERPL-SCNC: 143 MMOL/L

## 2018-05-11 PROCEDURE — 25000003 PHARM REV CODE 250: Performed by: HOSPITALIST

## 2018-05-11 PROCEDURE — 80048 BASIC METABOLIC PNL TOTAL CA: CPT

## 2018-05-11 PROCEDURE — 63600175 PHARM REV CODE 636 W HCPCS: Performed by: HOSPITALIST

## 2018-05-11 PROCEDURE — 94640 AIRWAY INHALATION TREATMENT: CPT

## 2018-05-11 PROCEDURE — 27000221 HC OXYGEN, UP TO 24 HOURS

## 2018-05-11 PROCEDURE — 25000003 PHARM REV CODE 250: Performed by: INTERNAL MEDICINE

## 2018-05-11 PROCEDURE — 12000002 HC ACUTE/MED SURGE SEMI-PRIVATE ROOM

## 2018-05-11 PROCEDURE — 25000242 PHARM REV CODE 250 ALT 637 W/ HCPCS: Performed by: HOSPITALIST

## 2018-05-11 PROCEDURE — 94761 N-INVAS EAR/PLS OXIMETRY MLT: CPT

## 2018-05-11 PROCEDURE — 36415 COLL VENOUS BLD VENIPUNCTURE: CPT

## 2018-05-11 RX ADMIN — APIXABAN 5 MG: 5 TABLET, FILM COATED ORAL at 08:05

## 2018-05-11 RX ADMIN — INSULIN ASPART 5 UNITS: 100 INJECTION, SOLUTION INTRAVENOUS; SUBCUTANEOUS at 03:05

## 2018-05-11 RX ADMIN — INSULIN ASPART 5 UNITS: 100 INJECTION, SOLUTION INTRAVENOUS; SUBCUTANEOUS at 08:05

## 2018-05-11 RX ADMIN — PIPERACILLIN AND TAZOBACTAM 4.5 G: 4; .5 INJECTION, POWDER, LYOPHILIZED, FOR SOLUTION INTRAVENOUS; PARENTERAL at 10:05

## 2018-05-11 RX ADMIN — INSULIN ASPART 2 UNITS: 100 INJECTION, SOLUTION INTRAVENOUS; SUBCUTANEOUS at 12:05

## 2018-05-11 RX ADMIN — METOPROLOL TARTRATE 50 MG: 50 TABLET ORAL at 08:05

## 2018-05-11 RX ADMIN — INSULIN ASPART 1 UNITS: 100 INJECTION, SOLUTION INTRAVENOUS; SUBCUTANEOUS at 10:05

## 2018-05-11 RX ADMIN — FUROSEMIDE 80 MG: 10 INJECTION, SOLUTION INTRAMUSCULAR; INTRAVENOUS at 05:05

## 2018-05-11 RX ADMIN — IPRATROPIUM BROMIDE AND ALBUTEROL SULFATE 3 ML: .5; 2.5 SOLUTION RESPIRATORY (INHALATION) at 08:05

## 2018-05-11 RX ADMIN — ASPIRIN 81 MG: 81 TABLET, COATED ORAL at 08:05

## 2018-05-11 RX ADMIN — AMLODIPINE BESYLATE 10 MG: 5 TABLET ORAL at 08:05

## 2018-05-11 RX ADMIN — INSULIN ASPART 5 UNITS: 100 INJECTION, SOLUTION INTRAVENOUS; SUBCUTANEOUS at 12:05

## 2018-05-11 RX ADMIN — FUROSEMIDE 80 MG: 10 INJECTION, SOLUTION INTRAMUSCULAR; INTRAVENOUS at 08:05

## 2018-05-11 RX ADMIN — LOSARTAN POTASSIUM 50 MG: 25 TABLET, FILM COATED ORAL at 08:05

## 2018-05-11 RX ADMIN — PIPERACILLIN AND TAZOBACTAM 4.5 G: 4; .5 INJECTION, POWDER, LYOPHILIZED, FOR SOLUTION INTRAVENOUS; PARENTERAL at 03:05

## 2018-05-11 RX ADMIN — IPRATROPIUM BROMIDE AND ALBUTEROL SULFATE 3 ML: .5; 2.5 SOLUTION RESPIRATORY (INHALATION) at 01:05

## 2018-05-11 RX ADMIN — IPRATROPIUM BROMIDE AND ALBUTEROL SULFATE 3 ML: .5; 2.5 SOLUTION RESPIRATORY (INHALATION) at 07:05

## 2018-05-11 RX ADMIN — IPRATROPIUM BROMIDE AND ALBUTEROL SULFATE 3 ML: .5; 2.5 SOLUTION RESPIRATORY (INHALATION) at 12:05

## 2018-05-11 NOTE — PROGRESS NOTES
"   [facility]  Adult Nutrition  Progress Note    SUMMARY       Recommendations    Recommendation/Intervention: 1) Add Cardiac Restriction diet order 2) Provided diet education to patient with handouts, explanation. Answered questions and concerns. Provided RD contact number. 3) RD to monitor  Goals: 1) patient to continue to consume >=85% of EEN and EPN x7  days  Nutrition Goal Status: new  Communication of RD Recs: reviewed with RN    Reason for Assessment    Reason for Assessment: length of stay  Diagnosis: other (see comments) (SOB, A-Fib, Hypoglycemia, Edema, Chest Pain)    Relevant Medical History: CHF, CAD, DM, HTN, CKD3  General Information Comments: Patient consuming 100% of meals with tolerance. Denies difficulties with swallowing or chewing. Denies abdominal pain, nausea, vomiting, GI discomfort.     Nutrition Discharge Planning: Patient to discharge on a Diabetic, Cardiac Diet with renal restriction as appropriate.     Nutrition Risk Screen    Nutrition Risk Screen: no indicators present    Nutrition/Diet History    Patient Reported Diet/Restrictions/Preferences: general  Do you have any cultural, spiritual, Congregation conflicts, given your current situation?: No cultural, Congregation or ethnic food preferences.   Food Allergies: NKFA    Anthropometrics    Temp: 97.9 °F (36.6 °C)  Height Method: Measured  Height: 5' 9" (175.3 cm)  Height (inches): 69 in  Weight Method: Bed Scale  Weight: 86.2 kg (190 lb 0.6 oz)  Weight (lb): 190.04 lb  Ideal Body Weight (IBW), Male: 160 lb  % Ideal Body Weight, Male (lb): 118.78 lb  BMI (Calculated): 28.1  BMI Grade: 25 - 29.9 - overweight       Lab/Procedures/Meds    Pertinent Labs Reviewed: reviewed  Pertinent Labs Comments: Troponin 0.096 (H),  (H), Hemoglobin A1C 6.2%  Pertinent Medications Reviewed: reviewed  Pertinent Medications Comments: insulin, lasix    Physical Findings/Assessment    Overall Physical Appearance: edematous, nourished, " overweight, on oxygen therapy (moderate edema to feet)  Oral/Mouth Cavity: WDL  Skin: edema, intact (Jeremiah Score 20)    Estimated/Assessed Needs    Weight Used For Calorie Calculations: 77 kg (158 lb 11.7 oz) (NHANES II SBW)     Energy Need Method: Kcal/kg (1800 - 2000)  Protein Requirements: 70 - 77 g  Weight Used For Protein Calculations: 77 kg (160 lb 15 oz) (NHANES II SBW)  Fluid Need Method: RDA Method, other (see comments) (or per MD)     CHO Requirement: 200 g      Nutrition Prescription Ordered    Current Diet Order: 2000 calorie Diabetic, Renal    Evaluation of Received Nutrient/Fluid Intake    I/O: 50/1400  Energy Calories Required: meeting needs  Protein Required: meeting needs  Fluid Required: meeting needs  Comments: LBM: 5/7  Tolerance: tolerating  % Intake of Estimated Energy Needs: 75 - 100 %  % Meal Intake: 75 - 100 %    Nutrition Risk    Level of Risk/Frequency of Follow-up:  (F/U 1x weekly)     Assessment and Plan    Nutrition Diagnosis:  Problem: Altered Nutrition related laboratory values  Etiology: excessive sodium intake  Signs/Symptoms: Troponin 0.096,   Status:  new       Monitor and Evaluation    Food and Nutrient Intake: energy intake, food and beverage intake  Food and Nutrient Adminstration: diet order  Knowledge/Beliefs/Attitudes: food and nutrition knowledge/skill, beliefs and attitudes  Physical Activity and Function: nutrition-related ADLs and IADLs  Anthropometric Measurements: weight, weight change, body mass index  Biochemical Data, Medical Tests and Procedures: electrolyte and renal panel, gastrointestinal profile, glucose/endocrine profile, inflammatory profile, lipid profile  Nutrition-Focused Physical Findings: overall appearance, skin     Nutrition Follow-Up    yes

## 2018-05-11 NOTE — ASSESSMENT & PLAN NOTE
Differential includes MM, parathyroid disease, vit d toxicity, malignancy   PTH, PTH-r, vitamin D, spep, upep, ionized calcium   Continue lasix  Chest CT - loculated pleural effusion

## 2018-05-11 NOTE — PROGRESS NOTES
[facility] Hospital Medicine  Progress Note    Patient Name: Agus Ramos Jr.  MRN: [id number]  Patient Class: IP- Inpatient   Admission Date: 5/3/2018  Length of Stay: 8 days  Attending Physician: An Joshi MD  Primary Care Provider: Keaton Hardy MD        Subjective:     Principal Problem:Symptomatic anemia    HPI:  Mr. Agus Ramos Jr. is a 77 y.o. male with essential hypertension, type 2 diabetes mellitus (HbA1c 7.5%Sept 2016), CKD Stage 3, chronic atrial fibrillation ([id number] score 4) on chronic anticoagulation, anemia of chronic disease, and tobacco abuse who presents to [facility] ED with complaints of dyspnea for two weeks.  The dyspnea is on exertion and is associated with some mild wheezing and a non-productive cough.  He denies any fevers, chills, nausea, vomiting, chest pain, palpitations, diaphoresis, hemoptysis, nor any lower extremity pain or swelling.  There have been no sick contacts or recent travel.  He has not experienced any PND or orthopnea, and has not experienced any bleeding anywhere.  He otherwise has been doing well.    Hospital Course:  Admitted with concern for symptomatic anemia and HFpEF exacerbation. Found to have low Hgb 6.5, no bleeding identified though patient refused and continues to refuse rectal exam. Transfused 2U with appropriate response to Hgb 8.5. Continued shortness of breath symptoms after transfusion. Started diuresis for CHF exacerbation. TTE 5/4/2018 with normal EF, NST negative for ischemia but does show scar. Still requiring O2. Continuing diuresis.     5/8- pt with brief period of confusion, refused labs this morning but was oriented x3 after I sat down and spoke with him for awhile. Still requiring 2 liters oxygen and edema to LE. Soke with wife, Jessika vias phone and she said he called her today and was not confused and oriented. Will check urine culture and continue diuresis.   5/9 - urine culture growing staph, start  rocephin, no acute changes on CXR; schedule nebs and continue to wean oxygen as tolerated   5/10- urine culture now shows no growth?, waiting to hear from lab; calcium persistently high, hypercalcemia workup pending, chest CT pending, pt still requiring oxygen despite aggressive diuresis and pulmonary care   5/11- Chest CT concern for right pleural effusion ?loculated; elevated calcium more concern for malignancy - d/w IR and not enough fluid to drain but can jackelyn diagnostic tap, will broaden antibiotics x 24 hours, wait for further studies, schedule diagnostic tap next week, off eliquis on lovenox bridge     Interval History: pt still with hypoxia     Review of Systems   Constitutional: Negative for chills and fever.   Respiratory: Positive for shortness of breath. Negative for cough and chest tightness.    Cardiovascular: Positive for leg swelling. Negative for chest pain and palpitations.   Gastrointestinal: Negative for abdominal pain, anal bleeding, blood in stool, constipation, diarrhea, nausea and vomiting.   Genitourinary: Negative for difficulty urinating.     Objective:     Vital Signs (Most Recent):  Temp: 97.9 °F (36.6 °C) (05/11/18 0744)  Pulse: 74 (05/11/18 1320)  Resp: 20 (05/11/18 1320)  BP: 136/65 (05/11/18 1118)  SpO2: 98 % (05/11/18 1320) Vital Signs (24h Range):  Temp:  [97.8 °F (36.6 °C)-99.1 °F (37.3 °C)] 97.9 °F (36.6 °C)  Pulse:  [72-88] 74  Resp:  [18-20] 20  SpO2:  [89 %-98 %] 98 %  BP: (111-136)/(58-65) 136/65     Weight: 86.2 kg (190 lb 0.6 oz)  Body mass index is 28.06 kg/m².    Intake/Output Summary (Last 24 hours) at 05/11/18 1354  Last data filed at 05/11/18 1000   Gross per 24 hour   Intake               50 ml   Output              900 ml   Net             -850 ml      Physical Exam   Constitutional: He is oriented to person, place, and time. He appears well-developed and well-nourished. No distress.   HENT:   Head: Normocephalic and atraumatic.   Nose: Nose normal.   Mouth/Throat:  Oropharynx is clear and moist.   Cardiovascular: Intact distal pulses.  Exam reveals no gallop and no friction rub.    No murmur heard.  Device L chest wall. Irregularly irregular   Pulmonary/Chest: Effort normal. No respiratory distress. He has no wheezes. He has no rales.   2L NC. Decreased breath sounds at bases, improving   Abdominal: Soft. Bowel sounds are normal. He exhibits no distension and no mass. There is no tenderness. There is no guarding.   Musculoskeletal: He exhibits edema (ankles and shins). He exhibits no tenderness or deformity.   Neurological: He is alert and oriented to person, place, and time.   Skin: He is not diaphoretic.       Significant Labs:   Blood Culture: No results for input(s): LABBLOO in the last 48 hours.  BMP:   Recent Labs  Lab 05/11/18  0501   *      K 3.6      CO2 32*   BUN 15   CREATININE 1.1   CALCIUM 10.6*     CBC: No results for input(s): WBC, HGB, HCT, PLT in the last 48 hours.    Significant Imaging: I have reviewed all pertinent imaging results/findings within the past 24 hours.    Assessment/Plan:      * Symptomatic anemia    - unknown baseline Hgb  - presented with Hgb 6.3  - transfused 2U with appropriate response  - refuses rectal exam. No objective blood loss  - monitor Hgb-- stable        Hypercalcemia    Differential includes MM, parathyroid disease, vit d toxicity, malignancy   PTH, PTH-r, vitamin D, spep, upep, ionized calcium   Continue lasix  Chest CT - loculated pleural effusion           Acute confusional state    Wax and wanes, no reported dementia or issues at home per wife  Check urine culture  - staph, start rocephin   O2 saturation appropriate on 2 liters  No focal deficits to suggest CVA, TIA         Acute hypoxemic respiratory failure    Due to pulmonary edema  Continue diuresis  Wean oxygen as tolerated   Schedule nebs           Elevated troponin    NST 5/4 with no ischemia  May be due to strain from HFpEF exacerbation          Pacemaker    In place          Tobacco abuse    Patient was counseled on smoking cessation and he will be provided a nicotine transdermal patch applied while inpatient.  Will provide additional smoking cessation counseling prior to discharge.        Anemia of chronic disease    As addressed above.        Chronic anticoagulation    As addressed above.        Acute on chronic diastolic congestive heart failure    - prior TTE showing diastolic dysfunction   - with edema, pulmonary edema on CXR, elevated BNP  - repeat TTE with normal EF, no comment on diastology  - wean O2 as tolerated  - NST 5/4 with no ischemia, does show scar   - increased lasix to 80mg IV BID  - continue diuresis          Type 2 diabetes mellitus, controlled, with renal complications    - Well-controlled on a home regimen of metformin and a sulfonylurea;   - detemir 15 + aspart 5 QAC while hospitalized  - ADA diet        CKD Stage 3    - renal function stable  - renally dose all meds, avoid nephrotoxins  - monitor         Essential hypertension    - generally well controlled   - continue losartan and metoprolol        Chronic atrial fibrillation    - remains in Afib  - rate controlled with metoprolol tartrate 50 mg BID  - anticoagulation with apixaban - hold for outpatient thoracentesis           VTE Risk Mitigation     None              An Joshi MD  Department of Hospital Medicine   [facility]

## 2018-05-11 NOTE — SUBJECTIVE & OBJECTIVE
Interval History: pt still with hypoxia     Review of Systems   Constitutional: Negative for chills and fever.   Respiratory: Positive for shortness of breath. Negative for cough and chest tightness.    Cardiovascular: Positive for leg swelling. Negative for chest pain and palpitations.   Gastrointestinal: Negative for abdominal pain, anal bleeding, blood in stool, constipation, diarrhea, nausea and vomiting.   Genitourinary: Negative for difficulty urinating.     Objective:     Vital Signs (Most Recent):  Temp: 97.9 °F (36.6 °C) (05/11/18 0744)  Pulse: 74 (05/11/18 1320)  Resp: 20 (05/11/18 1320)  BP: 136/65 (05/11/18 1118)  SpO2: 98 % (05/11/18 1320) Vital Signs (24h Range):  Temp:  [97.8 °F (36.6 °C)-99.1 °F (37.3 °C)] 97.9 °F (36.6 °C)  Pulse:  [72-88] 74  Resp:  [18-20] 20  SpO2:  [89 %-98 %] 98 %  BP: (111-136)/(58-65) 136/65     Weight: 86.2 kg (190 lb 0.6 oz)  Body mass index is 28.06 kg/m².    Intake/Output Summary (Last 24 hours) at 05/11/18 1354  Last data filed at 05/11/18 1000   Gross per 24 hour   Intake               50 ml   Output              900 ml   Net             -850 ml      Physical Exam   Constitutional: He is oriented to person, place, and time. He appears well-developed and well-nourished. No distress.   HENT:   Head: Normocephalic and atraumatic.   Nose: Nose normal.   Mouth/Throat: Oropharynx is clear and moist.   Cardiovascular: Intact distal pulses.  Exam reveals no gallop and no friction rub.    No murmur heard.  Device L chest wall. Irregularly irregular   Pulmonary/Chest: Effort normal. No respiratory distress. He has no wheezes. He has no rales.   2L NC. Decreased breath sounds at bases, improving   Abdominal: Soft. Bowel sounds are normal. He exhibits no distension and no mass. There is no tenderness. There is no guarding.   Musculoskeletal: He exhibits edema (ankles and shins). He exhibits no tenderness or deformity.   Neurological: He is alert and oriented to person, place, and  time.   Skin: He is not diaphoretic.       Significant Labs:   Blood Culture: No results for input(s): LABBLOO in the last 48 hours.  BMP:   Recent Labs  Lab 05/11/18  0501   *      K 3.6      CO2 32*   BUN 15   CREATININE 1.1   CALCIUM 10.6*     CBC: No results for input(s): WBC, HGB, HCT, PLT in the last 48 hours.    Significant Imaging: I have reviewed all pertinent imaging results/findings within the past 24 hours.

## 2018-05-11 NOTE — ASSESSMENT & PLAN NOTE
- remains in Afib  - rate controlled with metoprolol tartrate 50 mg BID  - anticoagulation with apixaban - hold for outpatient thoracentesis

## 2018-05-11 NOTE — PLAN OF CARE
Problem: Patient Care Overview  Goal: Plan of Care Review  05/11/2018    Recommendations    Recommendation/Intervention: 1) Add Cardiac Restriction diet order 2) Provided diet education to patient with handouts, explanation. Answered questions and concerns. Provided RD contact number. 3) RD to monitor  Goals: 1) patient to continue to consume >=85% of EEN and EPN x7  days  Nutrition Goal Status: new  Communication of RD Recs: reviewed with HAYDEE Vazquez, MPH, RD, LDN

## 2018-05-11 NOTE — PLAN OF CARE
05/11/18 0937   Discharge Reassessment   Assessment Type Discharge Planning Reassessment   Do you have any problems affording any of your prescribed medications? No   Discharge Plan A Other  (Surgery consulted. May need Chest tube. Has liver lesions. Work up for Hypoxia in process)   Discharge Plan B Other   Patient choice form signed by patient/caregiver N/A   Can the patient answer the patient profile reliably? Yes, cognitively intact   How does the patient rate their overall health at the present time? Fair   Describe the patient's ability to walk at the present time. Walks with the help of equipment   How often would a person be available to care for the patient? Often   Number of comorbid conditions (as recorded on the chart) Five or more

## 2018-05-11 NOTE — PLAN OF CARE
Problem: Patient Care Overview  Goal: Plan of Care Review  Outcome: Ongoing (interventions implemented as appropriate)  Pt free of accidental injury during shift. No s/s of distress noted. Denies pain at present. Tolerating diet well. Antibiotics administered as scheduled. Remains on O2 2 L NC sating upper 90's. Able to make needs known. Urinal at bedside. Frequent rounds made. Will continue to monitor

## 2018-05-11 NOTE — PLAN OF CARE
Problem: Fall Risk (Adult)  Goal: Absence of Falls  Patient will demonstrate the desired outcomes by discharge/transition of care.   Outcome: Ongoing (interventions implemented as appropriate)  Absence of falls noted at present. SR up x's2. Fall risk protocol IP. Pt reminded to call for assistance prior to getting OOB. Verbalized understanding. Door ajar at all times. Call bell within reach. Will cont to monitor.

## 2018-05-11 NOTE — PROGRESS NOTES
EDIS received call from MD stating that she would like for pt to have IR appointment schedule for next week for pt's thoracentesis. EDIS voiced understanding. EDIS spoke with Lida, RN in Radiology, in regards to scheduling pt with IR as an outpatient. Isamar voiced that she has written patient information down and will have scheduling to schedule his appointment for the end of next week. EDIS inquired if pt will have a call saying when his appointment is. Lida voiced that someone will call pt with information. EDIS voiced understanding. Awaiting appointment.

## 2018-05-12 LAB
ANION GAP SERPL CALC-SCNC: 7 MMOL/L
BUN SERPL-MCNC: 17 MG/DL
CALCIUM SERPL-MCNC: 11.2 MG/DL
CHLORIDE SERPL-SCNC: 105 MMOL/L
CO2 SERPL-SCNC: 31 MMOL/L
CREAT SERPL-MCNC: 1.3 MG/DL
EST. GFR  (AFRICAN AMERICAN): 60 ML/MIN/1.73 M^2
EST. GFR  (NON AFRICAN AMERICAN): 52 ML/MIN/1.73 M^2
GLUCOSE SERPL-MCNC: 153 MG/DL
POCT GLUCOSE: 120 MG/DL (ref 70–110)
POCT GLUCOSE: 135 MG/DL (ref 70–110)
POCT GLUCOSE: 205 MG/DL (ref 70–110)
POCT GLUCOSE: 280 MG/DL (ref 70–110)
POTASSIUM SERPL-SCNC: 3.5 MMOL/L
SODIUM SERPL-SCNC: 143 MMOL/L

## 2018-05-12 PROCEDURE — 27000221 HC OXYGEN, UP TO 24 HOURS

## 2018-05-12 PROCEDURE — 25000242 PHARM REV CODE 250 ALT 637 W/ HCPCS: Performed by: HOSPITALIST

## 2018-05-12 PROCEDURE — 25000003 PHARM REV CODE 250: Performed by: HOSPITALIST

## 2018-05-12 PROCEDURE — 12000002 HC ACUTE/MED SURGE SEMI-PRIVATE ROOM

## 2018-05-12 PROCEDURE — 36415 COLL VENOUS BLD VENIPUNCTURE: CPT

## 2018-05-12 PROCEDURE — 63600175 PHARM REV CODE 636 W HCPCS: Performed by: HOSPITALIST

## 2018-05-12 PROCEDURE — 80048 BASIC METABOLIC PNL TOTAL CA: CPT

## 2018-05-12 PROCEDURE — 94640 AIRWAY INHALATION TREATMENT: CPT

## 2018-05-12 PROCEDURE — 94761 N-INVAS EAR/PLS OXIMETRY MLT: CPT

## 2018-05-12 RX ADMIN — FUROSEMIDE 80 MG: 10 INJECTION, SOLUTION INTRAMUSCULAR; INTRAVENOUS at 09:05

## 2018-05-12 RX ADMIN — PIPERACILLIN AND TAZOBACTAM 4.5 G: 4; .5 INJECTION, POWDER, LYOPHILIZED, FOR SOLUTION INTRAVENOUS; PARENTERAL at 06:05

## 2018-05-12 RX ADMIN — INSULIN ASPART 3 UNITS: 100 INJECTION, SOLUTION INTRAVENOUS; SUBCUTANEOUS at 12:05

## 2018-05-12 RX ADMIN — INSULIN ASPART 5 UNITS: 100 INJECTION, SOLUTION INTRAVENOUS; SUBCUTANEOUS at 08:05

## 2018-05-12 RX ADMIN — INSULIN ASPART 5 UNITS: 100 INJECTION, SOLUTION INTRAVENOUS; SUBCUTANEOUS at 04:05

## 2018-05-12 RX ADMIN — IPRATROPIUM BROMIDE AND ALBUTEROL SULFATE 3 ML: .5; 2.5 SOLUTION RESPIRATORY (INHALATION) at 12:05

## 2018-05-12 RX ADMIN — LOSARTAN POTASSIUM 50 MG: 25 TABLET, FILM COATED ORAL at 09:05

## 2018-05-12 RX ADMIN — IPRATROPIUM BROMIDE AND ALBUTEROL SULFATE 3 ML: .5; 2.5 SOLUTION RESPIRATORY (INHALATION) at 08:05

## 2018-05-12 RX ADMIN — IPRATROPIUM BROMIDE AND ALBUTEROL SULFATE 3 ML: .5; 2.5 SOLUTION RESPIRATORY (INHALATION) at 01:05

## 2018-05-12 RX ADMIN — ASPIRIN 81 MG: 81 TABLET, COATED ORAL at 09:05

## 2018-05-12 RX ADMIN — IPRATROPIUM BROMIDE AND ALBUTEROL SULFATE 3 ML: .5; 2.5 SOLUTION RESPIRATORY (INHALATION) at 07:05

## 2018-05-12 RX ADMIN — METOPROLOL TARTRATE 50 MG: 50 TABLET ORAL at 08:05

## 2018-05-12 RX ADMIN — METOPROLOL TARTRATE 50 MG: 50 TABLET ORAL at 09:05

## 2018-05-12 RX ADMIN — INSULIN ASPART 5 UNITS: 100 INJECTION, SOLUTION INTRAVENOUS; SUBCUTANEOUS at 11:05

## 2018-05-12 RX ADMIN — AMLODIPINE BESYLATE 10 MG: 5 TABLET ORAL at 09:05

## 2018-05-12 NOTE — PLAN OF CARE
Problem: Fall Risk (Adult)  Goal: Absence of Falls  Patient will demonstrate the desired outcomes by discharge/transition of care.   Outcome: Ongoing (interventions implemented as appropriate)  Absence of falls noted at present. SR up x's2. Fall risk protocol IP. Pt reminded to call for assistance prior to getting OOB. Verbalized understanding. Call bell within reach. Will cont to monitor.

## 2018-05-12 NOTE — SUBJECTIVE & OBJECTIVE
Interval History: pt loss IV access, awaiting replacement, pulmonology consulted    Review of Systems   Constitutional: Negative for chills and fever.   Respiratory: Positive for shortness of breath. Negative for cough and chest tightness.    Cardiovascular: Positive for leg swelling. Negative for chest pain and palpitations.   Gastrointestinal: Negative for abdominal pain, anal bleeding, blood in stool, constipation, diarrhea, nausea and vomiting.   Genitourinary: Negative for difficulty urinating.     Objective:     Vital Signs (Most Recent):  Temp: 98 °F (36.7 °C) (05/12/18 1309)  Pulse: 73 (05/12/18 1325)  Resp: 18 (05/12/18 1325)  BP: 123/60 (05/12/18 1309)  SpO2: 98 % (05/12/18 1325) Vital Signs (24h Range):  Temp:  [97.5 °F (36.4 °C)-98.2 °F (36.8 °C)] 98 °F (36.7 °C)  Pulse:  [72-73] 73  Resp:  [18-20] 18  SpO2:  [95 %-100 %] 98 %  BP: (107-126)/(52-76) 123/60     Weight: 86.2 kg (190 lb 0.6 oz)  Body mass index is 28.06 kg/m².    Intake/Output Summary (Last 24 hours) at 05/12/18 1646  Last data filed at 05/12/18 1100   Gross per 24 hour   Intake              840 ml   Output             1875 ml   Net            -1035 ml      Physical Exam   Constitutional: He is oriented to person, place, and time. He appears well-developed and well-nourished. No distress.   HENT:   Head: Normocephalic and atraumatic.   Nose: Nose normal.   Mouth/Throat: Oropharynx is clear and moist.   Cardiovascular: Intact distal pulses.  Exam reveals no gallop and no friction rub.    No murmur heard.  Device L chest wall. Irregularly irregular   Pulmonary/Chest: Effort normal. No respiratory distress. He has no wheezes. He has no rales.   2L NC. Decreased breath sounds at bases, improving   Abdominal: Soft. Bowel sounds are normal. He exhibits no distension and no mass. There is no tenderness. There is no guarding.   Musculoskeletal: He exhibits edema (ankles and shins). He exhibits no tenderness or deformity.   Neurological: He is alert  and oriented to person, place, and time.   Skin: He is not diaphoretic.       Significant Labs: All pertinent labs within the past 24 hours have been reviewed.    Significant Imaging: I have reviewed all pertinent imaging results/findings within the past 24 hours.

## 2018-05-12 NOTE — NURSING
Check room air O2 sat at rest, if greater than 88%:   Check ambulating RA O2 sat, if 88% or less:   Reapply O2 while ambulating and note O2 sat     Please chart as follows:     Testing for Home O2        O2 Sats on RA at rest =   96    O2 sats on RA with exercise  =   54    O2 sats on _2_L O2 with exercise =Nursing:  Please assess patient's O2 sats as follow to determine      76

## 2018-05-12 NOTE — NURSING
IV out,  With new IV unable to be started by this writer and the house supervisor.   MD aware of no IV at present time,  And unable to start a new IV.   House supervisor will attempt to get an IV with vein finder.

## 2018-05-12 NOTE — NURSING
Check room air O2 sat at rest, if greater than 88%:   Check ambulating RA O2 sat, if 88% or less:   Reapply O2 while ambulating and note O2 sat     Please chart as follows:     Testing for Home O2     O2 Sats on RA at rest =     O2 sats on RA with exercise  =     O2 sats on __L O2 with exercise =Nursing:  Please assess patient's O2 sats as follow to determine

## 2018-05-12 NOTE — PROGRESS NOTES
[facility] Hospital Medicine  Progress Note    Patient Name: Agus Rmaos Jr.  MRN: [id number]  Patient Class: IP- Inpatient   Admission Date: 5/3/2018  Length of Stay: 9 days  Attending Physician: An Joshi MD  Primary Care Provider: Keaton Hardy MD        Subjective:     Principal Problem:Symptomatic anemia    HPI:  Mr. Agus Ramos Jr. is a 77 y.o. male with essential hypertension, type 2 diabetes mellitus (HbA1c 7.5%Sept 2016), CKD Stage 3, chronic atrial fibrillation ([id number] score 4) on chronic anticoagulation, anemia of chronic disease, and tobacco abuse who presents to [facility] ED with complaints of dyspnea for two weeks.  The dyspnea is on exertion and is associated with some mild wheezing and a non-productive cough.  He denies any fevers, chills, nausea, vomiting, chest pain, palpitations, diaphoresis, hemoptysis, nor any lower extremity pain or swelling.  There have been no sick contacts or recent travel.  He has not experienced any PND or orthopnea, and has not experienced any bleeding anywhere.  He otherwise has been doing well.    Hospital Course:  Admitted with concern for symptomatic anemia and HFpEF exacerbation. Found to have low Hgb 6.5, no bleeding identified though patient refused and continues to refuse rectal exam. Transfused 2U with appropriate response to Hgb 8.5. Continued shortness of breath symptoms after transfusion. Started diuresis for CHF exacerbation. TTE 5/4/2018 with normal EF, NST negative for ischemia but does show scar. Still requiring O2. Continuing diuresis.     5/8- pt with brief period of confusion, refused labs this morning but was oriented x3 after I sat down and spoke with him for awhile. Still requiring 2 liters oxygen and edema to LE. Soke with wife, Jessika vias phone and she said he called her today and was not confused and oriented. Will check urine culture and continue diuresis.   5/9 - urine culture growing staph, start  rocephin, no acute changes on CXR; schedule nebs and continue to wean oxygen as tolerated   5/10- urine culture now shows no growth?, waiting to hear from lab; calcium persistently high, hypercalcemia workup pending, chest CT pending, pt still requiring oxygen despite aggressive diuresis and pulmonary care   5/11- Chest CT concern for right pleural effusion ?loculated; elevated calcium more concern for malignancy - d/w IR and not enough fluid to drain but can jackelyn diagnostic tap, will broaden antibiotics x 24 hours, wait for further studies, schedule diagnostic tap next week, off eliquis on lovenox bridge   5/12 - test for home oxygen with significant drop in O2 saturation to 54% on room air with exercise; Chest CTA did not show significant findings except small pleural effusion that could explain hypoxia, pulmonology consulted, off anticoagulant for potential diagnostic thoracentesis     Interval History: pt loss IV access, awaiting replacement, pulmonology consulted    Review of Systems   Constitutional: Negative for chills and fever.   Respiratory: Positive for shortness of breath. Negative for cough and chest tightness.    Cardiovascular: Positive for leg swelling. Negative for chest pain and palpitations.   Gastrointestinal: Negative for abdominal pain, anal bleeding, blood in stool, constipation, diarrhea, nausea and vomiting.   Genitourinary: Negative for difficulty urinating.     Objective:     Vital Signs (Most Recent):  Temp: 98 °F (36.7 °C) (05/12/18 1309)  Pulse: 73 (05/12/18 1325)  Resp: 18 (05/12/18 1325)  BP: 123/60 (05/12/18 1309)  SpO2: 98 % (05/12/18 1325) Vital Signs (24h Range):  Temp:  [97.5 °F (36.4 °C)-98.2 °F (36.8 °C)] 98 °F (36.7 °C)  Pulse:  [72-73] 73  Resp:  [18-20] 18  SpO2:  [95 %-100 %] 98 %  BP: (107-126)/(52-76) 123/60     Weight: 86.2 kg (190 lb 0.6 oz)  Body mass index is 28.06 kg/m².    Intake/Output Summary (Last 24 hours) at 05/12/18 1646  Last data filed at 05/12/18 1100    Gross per 24 hour   Intake              840 ml   Output             1875 ml   Net            -1035 ml      Physical Exam   Constitutional: He is oriented to person, place, and time. He appears well-developed and well-nourished. No distress.   HENT:   Head: Normocephalic and atraumatic.   Nose: Nose normal.   Mouth/Throat: Oropharynx is clear and moist.   Cardiovascular: Intact distal pulses.  Exam reveals no gallop and no friction rub.    No murmur heard.  Device L chest wall. Irregularly irregular   Pulmonary/Chest: Effort normal. No respiratory distress. He has no wheezes. He has no rales.   2L NC. Decreased breath sounds at bases, improving   Abdominal: Soft. Bowel sounds are normal. He exhibits no distension and no mass. There is no tenderness. There is no guarding.   Musculoskeletal: He exhibits edema (ankles and shins). He exhibits no tenderness or deformity.   Neurological: He is alert and oriented to person, place, and time.   Skin: He is not diaphoretic.       Significant Labs: All pertinent labs within the past 24 hours have been reviewed.    Significant Imaging: I have reviewed all pertinent imaging results/findings within the past 24 hours.    Assessment/Plan:      * Symptomatic anemia    - unknown baseline Hgb  - presented with Hgb 6.3  - transfused 2U with appropriate response  - refuses rectal exam. No objective blood loss  - monitor Hgb-- stable        Hypercalcemia    Differential includes MM, parathyroid disease, vit d toxicity, malignancy   PTH, PTH-r, vitamin D, spep, upep, ionized calcium   Continue lasix  Chest CT - loculated pleural effusion           Acute confusional state    Wax and wanes, no reported dementia or issues at home per wife  Check urine culture  - staph, start rocephin   O2 saturation appropriate on 2 liters  No focal deficits to suggest CVA, TIA         Acute hypoxemic respiratory failure    Due to pulmonary edema, small pleural effusion does not explain significant hypoxia,  pulmonology consulted for input  Will work on home oxygen as he qualified with O2 saturation 54% on room air   Continue diuresis  Wean oxygen as tolerated   Schedule nebs   Outpatient therapeutic thoracentesis off anticoagulation set up         Elevated troponin    NST 5/4 with no ischemia  May be due to strain from HFpEF exacerbation         Pacemaker    In place          Tobacco abuse    Patient was counseled on smoking cessation and he will be provided a nicotine transdermal patch applied while inpatient.  Will provide additional smoking cessation counseling prior to discharge.        Anemia of chronic disease    As addressed above.        Chronic anticoagulation    As addressed above.        Acute on chronic diastolic congestive heart failure    - prior TTE showing diastolic dysfunction   - with edema, pulmonary edema on CXR, elevated BNP  - repeat TTE with normal EF, no comment on diastology  - wean O2 as tolerated  - NST 5/4 with no ischemia, does show scar   - increased lasix to 80mg IV BID  - continue diuresis          Type 2 diabetes mellitus, controlled, with renal complications    - Well-controlled on a home regimen of metformin and a sulfonylurea;   - detemir 15 + aspart 5 QAC while hospitalized  - ADA diet        CKD Stage 3    - renal function stable  - renally dose all meds, avoid nephrotoxins  - monitor         Essential hypertension    - generally well controlled   - continue losartan and metoprolol        Chronic atrial fibrillation    - remains in Afib  - rate controlled with metoprolol tartrate 50 mg BID  - anticoagulation with apixaban - hold for outpatient thoracentesis           VTE Risk Mitigation     None              An Joshi MD  Department of Hospital Medicine   [facility]

## 2018-05-12 NOTE — ASSESSMENT & PLAN NOTE
Due to pulmonary edema, small pleural effusion does not explain significant hypoxia, pulmonology consulted for input  Will work on home oxygen as he qualified with O2 saturation 54% on room air   Continue diuresis  Wean oxygen as tolerated   Schedule nebs   Outpatient therapeutic thoracentesis off anticoagulation set up

## 2018-05-12 NOTE — NURSING
Kyle (House Supervisor) stated he will come start IV as soon as possible with the vein finder.

## 2018-05-13 PROBLEM — J43.2 CENTRILOBULAR EMPHYSEMA: Status: ACTIVE | Noted: 2018-05-13

## 2018-05-13 PROBLEM — R93.89 ABNORMAL CHEST CT: Status: ACTIVE | Noted: 2018-05-13

## 2018-05-13 LAB
ACANTHOCYTES BLD QL SMEAR: PRESENT
ANION GAP SERPL CALC-SCNC: 6 MMOL/L
ANISOCYTOSIS BLD QL SMEAR: SLIGHT
BASOPHILS # BLD AUTO: 0.05 K/UL
BASOPHILS NFR BLD: 0.7 %
BUN SERPL-MCNC: 14 MG/DL
CALCIUM SERPL-MCNC: 11 MG/DL
CHLORIDE SERPL-SCNC: 104 MMOL/L
CO2 SERPL-SCNC: 32 MMOL/L
CREAT SERPL-MCNC: 1.1 MG/DL
DIFFERENTIAL METHOD: ABNORMAL
EOSINOPHIL # BLD AUTO: 0.3 K/UL
EOSINOPHIL NFR BLD: 4.3 %
ERYTHROCYTE [DISTWIDTH] IN BLOOD BY AUTOMATED COUNT: 24.6 %
EST. GFR  (AFRICAN AMERICAN): >60 ML/MIN/1.73 M^2
EST. GFR  (NON AFRICAN AMERICAN): >60 ML/MIN/1.73 M^2
GLUCOSE SERPL-MCNC: 181 MG/DL
HCT VFR BLD AUTO: 28 %
HGB BLD-MCNC: 8 G/DL
HYPOCHROMIA BLD QL SMEAR: ABNORMAL
LYMPHOCYTES # BLD AUTO: 1.5 K/UL
LYMPHOCYTES NFR BLD: 19.6 %
MCH RBC QN AUTO: 20.3 PG
MCHC RBC AUTO-ENTMCNC: 28.6 G/DL
MCV RBC AUTO: 71 FL
MONOCYTES # BLD AUTO: 1.1 K/UL
MONOCYTES NFR BLD: 14.1 %
NEUTROPHILS # BLD AUTO: 4.6 K/UL
NEUTROPHILS NFR BLD: 61.3 %
OVALOCYTES BLD QL SMEAR: ABNORMAL
PLATELET # BLD AUTO: 233 K/UL
PLATELET BLD QL SMEAR: ABNORMAL
PMV BLD AUTO: 9.7 FL
POCT GLUCOSE: 156 MG/DL (ref 70–110)
POCT GLUCOSE: 157 MG/DL (ref 70–110)
POCT GLUCOSE: 295 MG/DL (ref 70–110)
POCT GLUCOSE: 344 MG/DL (ref 70–110)
POCT GLUCOSE: 405 MG/DL (ref 70–110)
POIKILOCYTOSIS BLD QL SMEAR: SLIGHT
POLYCHROMASIA BLD QL SMEAR: ABNORMAL
POTASSIUM SERPL-SCNC: 3.7 MMOL/L
RBC # BLD AUTO: 3.94 M/UL
SODIUM SERPL-SCNC: 142 MMOL/L
WBC # BLD AUTO: 7.45 K/UL

## 2018-05-13 PROCEDURE — 94640 AIRWAY INHALATION TREATMENT: CPT

## 2018-05-13 PROCEDURE — 99223 1ST HOSP IP/OBS HIGH 75: CPT | Mod: ,,, | Performed by: INTERNAL MEDICINE

## 2018-05-13 PROCEDURE — 80048 BASIC METABOLIC PNL TOTAL CA: CPT

## 2018-05-13 PROCEDURE — 36415 COLL VENOUS BLD VENIPUNCTURE: CPT

## 2018-05-13 PROCEDURE — 12000002 HC ACUTE/MED SURGE SEMI-PRIVATE ROOM

## 2018-05-13 PROCEDURE — 25000242 PHARM REV CODE 250 ALT 637 W/ HCPCS: Performed by: INTERNAL MEDICINE

## 2018-05-13 PROCEDURE — 63600175 PHARM REV CODE 636 W HCPCS: Performed by: HOSPITALIST

## 2018-05-13 PROCEDURE — 25000003 PHARM REV CODE 250: Performed by: HOSPITALIST

## 2018-05-13 PROCEDURE — 85025 COMPLETE CBC W/AUTO DIFF WBC: CPT

## 2018-05-13 PROCEDURE — 94761 N-INVAS EAR/PLS OXIMETRY MLT: CPT

## 2018-05-13 PROCEDURE — 27000221 HC OXYGEN, UP TO 24 HOURS

## 2018-05-13 PROCEDURE — 25000242 PHARM REV CODE 250 ALT 637 W/ HCPCS: Performed by: HOSPITALIST

## 2018-05-13 RX ORDER — TIOTROPIUM BROMIDE 18 UG/1
1 CAPSULE ORAL; RESPIRATORY (INHALATION) DAILY
Status: DISCONTINUED | OUTPATIENT
Start: 2018-05-13 | End: 2018-05-15 | Stop reason: HOSPADM

## 2018-05-13 RX ADMIN — IPRATROPIUM BROMIDE AND ALBUTEROL SULFATE 3 ML: .5; 2.5 SOLUTION RESPIRATORY (INHALATION) at 11:05

## 2018-05-13 RX ADMIN — LOSARTAN POTASSIUM 50 MG: 25 TABLET, FILM COATED ORAL at 09:05

## 2018-05-13 RX ADMIN — INSULIN ASPART 5 UNITS: 100 INJECTION, SOLUTION INTRAVENOUS; SUBCUTANEOUS at 04:05

## 2018-05-13 RX ADMIN — INSULIN ASPART 3 UNITS: 100 INJECTION, SOLUTION INTRAVENOUS; SUBCUTANEOUS at 12:05

## 2018-05-13 RX ADMIN — IPRATROPIUM BROMIDE AND ALBUTEROL SULFATE 3 ML: .5; 2.5 SOLUTION RESPIRATORY (INHALATION) at 01:05

## 2018-05-13 RX ADMIN — PIPERACILLIN AND TAZOBACTAM 4.5 G: 4; .5 INJECTION, POWDER, LYOPHILIZED, FOR SOLUTION INTRAVENOUS; PARENTERAL at 05:05

## 2018-05-13 RX ADMIN — INSULIN ASPART 5 UNITS: 100 INJECTION, SOLUTION INTRAVENOUS; SUBCUTANEOUS at 12:05

## 2018-05-13 RX ADMIN — INSULIN ASPART 2 UNITS: 100 INJECTION, SOLUTION INTRAVENOUS; SUBCUTANEOUS at 09:05

## 2018-05-13 RX ADMIN — ASPIRIN 81 MG: 81 TABLET, COATED ORAL at 09:05

## 2018-05-13 RX ADMIN — INSULIN ASPART 5 UNITS: 100 INJECTION, SOLUTION INTRAVENOUS; SUBCUTANEOUS at 07:05

## 2018-05-13 RX ADMIN — TIOTROPIUM BROMIDE 18 MCG: 18 CAPSULE ORAL; RESPIRATORY (INHALATION) at 05:05

## 2018-05-13 RX ADMIN — IPRATROPIUM BROMIDE AND ALBUTEROL SULFATE 3 ML: .5; 2.5 SOLUTION RESPIRATORY (INHALATION) at 07:05

## 2018-05-13 RX ADMIN — FUROSEMIDE 80 MG: 10 INJECTION, SOLUTION INTRAMUSCULAR; INTRAVENOUS at 09:05

## 2018-05-13 RX ADMIN — FUROSEMIDE 80 MG: 10 INJECTION, SOLUTION INTRAMUSCULAR; INTRAVENOUS at 05:05

## 2018-05-13 RX ADMIN — METOPROLOL TARTRATE 50 MG: 50 TABLET ORAL at 09:05

## 2018-05-13 RX ADMIN — AMLODIPINE BESYLATE 10 MG: 5 TABLET ORAL at 09:05

## 2018-05-13 RX ADMIN — IPRATROPIUM BROMIDE AND ALBUTEROL SULFATE 3 ML: .5; 2.5 SOLUTION RESPIRATORY (INHALATION) at 12:05

## 2018-05-13 RX ADMIN — PIPERACILLIN AND TAZOBACTAM 4.5 G: 4; .5 INJECTION, POWDER, LYOPHILIZED, FOR SOLUTION INTRAVENOUS; PARENTERAL at 09:05

## 2018-05-13 NOTE — PLAN OF CARE
Problem: Fall Risk (Adult)  Goal: Absence of Falls  Patient will demonstrate the desired outcomes by discharge/transition of care.   Outcome: Ongoing (interventions implemented as appropriate)  Absence of falls noted at present. SR up x's2. Fall risk protocol IP. Pt reminded to call for assistance prior to getting OOB. Verbalized understanding. Call bell within reach. Will conto to monitor.

## 2018-05-14 LAB
ALBUMIN SERPL ELPH-MCNC: 3.27 G/DL
ALPHA1 GLOB SERPL ELPH-MCNC: 0.37 G/DL
ALPHA2 GLOB SERPL ELPH-MCNC: 0.76 G/DL
ANION GAP SERPL CALC-SCNC: 6 MMOL/L
B-GLOBULIN SERPL ELPH-MCNC: 0.81 G/DL
BUN SERPL-MCNC: 15 MG/DL
CALCIUM SERPL-MCNC: 11.1 MG/DL
CHLORIDE SERPL-SCNC: 103 MMOL/L
CO2 SERPL-SCNC: 30 MMOL/L
CREAT SERPL-MCNC: 1.4 MG/DL
EST. GFR  (AFRICAN AMERICAN): 55 ML/MIN/1.73 M^2
EST. GFR  (NON AFRICAN AMERICAN): 47 ML/MIN/1.73 M^2
GAMMA GLOB SERPL ELPH-MCNC: 0.68 G/DL
GLUCOSE SERPL-MCNC: 254 MG/DL
LDH SERPL L TO P-CCNC: 276 U/L
PATHOLOGIST INTERPRETATION SPE: NORMAL
POCT GLUCOSE: 120 MG/DL (ref 70–110)
POCT GLUCOSE: 173 MG/DL (ref 70–110)
POCT GLUCOSE: 183 MG/DL (ref 70–110)
POCT GLUCOSE: 202 MG/DL (ref 70–110)
POTASSIUM SERPL-SCNC: 3.5 MMOL/L
PROT SERPL-MCNC: 5.9 G/DL
SODIUM SERPL-SCNC: 139 MMOL/L

## 2018-05-14 PROCEDURE — 25000003 PHARM REV CODE 250: Performed by: HOSPITALIST

## 2018-05-14 PROCEDURE — 99233 SBSQ HOSP IP/OBS HIGH 50: CPT | Mod: ,,, | Performed by: INTERNAL MEDICINE

## 2018-05-14 PROCEDURE — 83615 LACTATE (LD) (LDH) ENZYME: CPT

## 2018-05-14 PROCEDURE — 94640 AIRWAY INHALATION TREATMENT: CPT

## 2018-05-14 PROCEDURE — 36415 COLL VENOUS BLD VENIPUNCTURE: CPT

## 2018-05-14 PROCEDURE — 27000221 HC OXYGEN, UP TO 24 HOURS

## 2018-05-14 PROCEDURE — 12000002 HC ACUTE/MED SURGE SEMI-PRIVATE ROOM

## 2018-05-14 PROCEDURE — 80048 BASIC METABOLIC PNL TOTAL CA: CPT

## 2018-05-14 PROCEDURE — 25000242 PHARM REV CODE 250 ALT 637 W/ HCPCS: Performed by: HOSPITALIST

## 2018-05-14 PROCEDURE — 63600175 PHARM REV CODE 636 W HCPCS: Performed by: HOSPITALIST

## 2018-05-14 RX ADMIN — PIPERACILLIN AND TAZOBACTAM 4.5 G: 4; .5 INJECTION, POWDER, LYOPHILIZED, FOR SOLUTION INTRAVENOUS; PARENTERAL at 05:05

## 2018-05-14 RX ADMIN — PIPERACILLIN AND TAZOBACTAM 4.5 G: 4; .5 INJECTION, POWDER, LYOPHILIZED, FOR SOLUTION INTRAVENOUS; PARENTERAL at 01:05

## 2018-05-14 RX ADMIN — IPRATROPIUM BROMIDE AND ALBUTEROL SULFATE 3 ML: .5; 2.5 SOLUTION RESPIRATORY (INHALATION) at 08:05

## 2018-05-14 RX ADMIN — INSULIN ASPART 2 UNITS: 100 INJECTION, SOLUTION INTRAVENOUS; SUBCUTANEOUS at 12:05

## 2018-05-14 RX ADMIN — LOSARTAN POTASSIUM 50 MG: 25 TABLET, FILM COATED ORAL at 08:05

## 2018-05-14 RX ADMIN — INSULIN ASPART 5 UNITS: 100 INJECTION, SOLUTION INTRAVENOUS; SUBCUTANEOUS at 12:05

## 2018-05-14 RX ADMIN — FUROSEMIDE 80 MG: 10 INJECTION, SOLUTION INTRAMUSCULAR; INTRAVENOUS at 05:05

## 2018-05-14 RX ADMIN — METOPROLOL TARTRATE 50 MG: 50 TABLET ORAL at 08:05

## 2018-05-14 RX ADMIN — IPRATROPIUM BROMIDE AND ALBUTEROL SULFATE 3 ML: .5; 2.5 SOLUTION RESPIRATORY (INHALATION) at 07:05

## 2018-05-14 RX ADMIN — TIOTROPIUM BROMIDE 18 MCG: 18 CAPSULE ORAL; RESPIRATORY (INHALATION) at 08:05

## 2018-05-14 RX ADMIN — AMLODIPINE BESYLATE 10 MG: 5 TABLET ORAL at 08:05

## 2018-05-14 RX ADMIN — INSULIN ASPART 5 UNITS: 100 INJECTION, SOLUTION INTRAVENOUS; SUBCUTANEOUS at 05:05

## 2018-05-14 RX ADMIN — IPRATROPIUM BROMIDE AND ALBUTEROL SULFATE 3 ML: .5; 2.5 SOLUTION RESPIRATORY (INHALATION) at 01:05

## 2018-05-14 RX ADMIN — FUROSEMIDE 80 MG: 10 INJECTION, SOLUTION INTRAMUSCULAR; INTRAVENOUS at 08:05

## 2018-05-14 RX ADMIN — ASPIRIN 81 MG: 81 TABLET, COATED ORAL at 08:05

## 2018-05-14 RX ADMIN — PIPERACILLIN AND TAZOBACTAM 4.5 G: 4; .5 INJECTION, POWDER, LYOPHILIZED, FOR SOLUTION INTRAVENOUS; PARENTERAL at 08:05

## 2018-05-14 RX ADMIN — METOPROLOL TARTRATE 50 MG: 50 TABLET ORAL at 09:05

## 2018-05-14 RX ADMIN — INSULIN ASPART 5 UNITS: 100 INJECTION, SOLUTION INTRAVENOUS; SUBCUTANEOUS at 08:05

## 2018-05-14 NOTE — HPI
The patient is a 77 yom who presents with dyspnea over the past month. The patient notes dyspnea on exertion that has progressively worsened over the past month. It has been associated with lower extremity swelling. He has had intermittent periods of dyspnea over the past several years.    He is a current smoker. He does not use inhalers at home though they had been recommended to him previously. He denies cough, chest pain, or weight loss.

## 2018-05-14 NOTE — PLAN OF CARE
05/14/18 1411   Discharge Reassessment   Assessment Type Discharge Planning Reassessment   Discharge Plan A Home with family  (Awaiting [facility] approval for Home O2. Still on IV Abx. Pulm consulted and appointment scheduled. )   Discharge Plan B Home with family;Home Health   Patient choice form signed by patient/caregiver N/A   Can the patient answer the patient profile reliably? Yes, cognitively intact   How does the patient rate their overall health at the present time? Fair   Describe the patient's ability to walk at the present time. Walks with the help of equipment   How often would a person be available to care for the patient? Often   Number of comorbid conditions (as recorded on the chart) Five or more

## 2018-05-14 NOTE — PROGRESS NOTES
EDIS faxed DME order for Home O2 and Nebulizer to [facility]. SW reached out to Lara, [facility] to inform her that information has been faxed and will need to be processed.    12:03pm  EDIS spoke with Khong, [facility], who stated that she will submit for auth for oxygen. EDIS inquired if it would be today. Nancie stated if approved yes.    12:05pm  EDIS spoke with [facility], who stated that once pt is officially discharged she will schedule pt appointment. EDIS voiced that results needed to be sent to Pulmonology clinic. [facility] voiced understanding and stated that she will make sure to have results sent. EDIS voiced understanding.

## 2018-05-14 NOTE — ASSESSMENT & PLAN NOTE
Patient had FEV1 50% in 2013 with mixed restrictive-obstructive pattern on pft. Has continued to smoke. Recommend starting spiriva.

## 2018-05-14 NOTE — SUBJECTIVE & OBJECTIVE
Interval History: no new complaints     Review of Systems   Constitutional: Negative for chills and fever.   Respiratory: Positive for shortness of breath. Negative for cough and chest tightness.    Cardiovascular: Positive for leg swelling. Negative for chest pain and palpitations.   Gastrointestinal: Negative for abdominal pain, anal bleeding, blood in stool, constipation, diarrhea, nausea and vomiting.   Genitourinary: Negative for difficulty urinating.     Objective:     Vital Signs (Most Recent):  Temp: 97.5 °F (36.4 °C) (05/13/18 1910)  Pulse: 71 (05/13/18 1956)  Resp: 20 (05/13/18 1956)  BP: (!) 117/56 (05/13/18 1910)  SpO2: 99 % (05/13/18 1956) Vital Signs (24h Range):  Temp:  [97.5 °F (36.4 °C)-98.3 °F (36.8 °C)] 97.5 °F (36.4 °C)  Pulse:  [71-75] 71  Resp:  [17-20] 20  SpO2:  [92 %-100 %] 99 %  BP: (109-148)/(56-70) 117/56     Weight: 86.2 kg (190 lb 0.6 oz)  Body mass index is 28.06 kg/m².    Intake/Output Summary (Last 24 hours) at 05/13/18 2204  Last data filed at 05/13/18 1730   Gross per 24 hour   Intake             1650 ml   Output              400 ml   Net             1250 ml      Physical Exam   Constitutional: He is oriented to person, place, and time. He appears well-developed and well-nourished. No distress.   HENT:   Head: Normocephalic and atraumatic.   Nose: Nose normal.   Mouth/Throat: Oropharynx is clear and moist.   Cardiovascular: Intact distal pulses.  Exam reveals no gallop and no friction rub.    No murmur heard.  Device L chest wall. Irregularly irregular   Pulmonary/Chest: Effort normal. No respiratory distress. He has no wheezes. He has no rales.   2L NC. Decreased breath sounds at bases, improving   Abdominal: Soft. Bowel sounds are normal. He exhibits no distension and no mass. There is no tenderness. There is no guarding.   Musculoskeletal: He exhibits edema (ankles and shins). He exhibits no tenderness or deformity.   Neurological: He is alert and oriented to person, place, and  time.   Skin: He is not diaphoretic.       Significant Labs:   BMP:   Recent Labs  Lab 05/13/18  0410   *      K 3.7      CO2 32*   BUN 14   CREATININE 1.1   CALCIUM 11.0*     CBC:   Recent Labs  Lab 05/13/18  0410   WBC 7.45   HGB 8.0*   HCT 28.0*          Significant Imaging: I have reviewed all pertinent imaging results/findings within the past 24 hours.

## 2018-05-14 NOTE — ASSESSMENT & PLAN NOTE
Recommend weaning oxygen to goal SaO2 greater than 88%. If unable to wean from oxygen then recommend discharging home on supplemental oxygen.

## 2018-05-14 NOTE — PROGRESS NOTES
"[facility]  Pulmonology  Progress Note    Patient Name: Agus Ramos Jr.  MRN: [id number]  Admission Date: 5/3/2018  Hospital Length of Stay: 11 days  Code Status: Full Code  Attending Provider: An Joshi MD  Primary Care Provider: Keaton Hardy MD   Principal Problem: Symptomatic anemia    Subjective:     Interval History: feeling "a little better."  No acute issue.      Objective:     Vital Signs (Most Recent):  Temp: 97.9 °F (36.6 °C) (05/14/18 1157)  Pulse: 71 (05/14/18 1157)  Resp: 18 (05/14/18 1157)  BP: 116/80 (05/14/18 1157)  SpO2: 99 % (05/14/18 1157) Vital Signs (24h Range):  Temp:  [97.5 °F (36.4 °C)-97.9 °F (36.6 °C)] 97.9 °F (36.6 °C)  Pulse:  [70-76] 71  Resp:  [17-20] 18  SpO2:  [92 %-99 %] 99 %  BP: (116-172)/(56-80) 116/80     Weight: 86.2 kg (190 lb 0.6 oz)  Body mass index is 28.06 kg/m².      Intake/Output Summary (Last 24 hours) at 05/14/18 1203  Last data filed at 05/14/18 0952   Gross per 24 hour   Intake             1380 ml   Output             1050 ml   Net              330 ml       Physical Exam   Constitutional: He is oriented to person, place, and time. He appears well-developed and well-nourished. No distress.   HENT:   Head: Normocephalic and atraumatic.   Nose: Nose normal.   Mouth/Throat: Oropharynx is clear and moist.   Cardiovascular: Intact distal pulses.  Exam reveals no gallop and no friction rub.    No murmur heard.  Device L chest wall. Irregularly irregular   Pulmonary/Chest: Effort normal. No respiratory distress. He has no wheezes.   2L NC. Rales at bases.   Abdominal: Soft. Bowel sounds are normal. He exhibits no distension and no mass. There is no tenderness. There is no guarding.   Musculoskeletal: He exhibits edema (ankles and shins). He exhibits no tenderness or deformity.   Neurological: He is alert and oriented to person, place, and time.   Skin: He is not diaphoretic.       Vents:       Lines/Drains/Airways     Peripheral Intravenous " Line                 Peripheral IV - Single Lumen 05/13/18 0133 Left Forearm 1 day                Significant Labs:    CBC/Anemia Profile:    Recent Labs  Lab 05/13/18  0410   WBC 7.45   HGB 8.0*   HCT 28.0*      MCV 71*   RDW 24.6*        Chemistries:    Recent Labs  Lab 05/13/18  0410 05/14/18  0401    139   K 3.7 3.5    103   CO2 32* 30*   BUN 14 15   CREATININE 1.1 1.4   CALCIUM 11.0* 11.1*     bnp 640  Echo  5/4/18    1 - Low normal left ventricular systolic function (EF 50-55%).     2 - Concentric hypertrophy.     3 - Mild left atrial enlargement.     4 - Pulmonary hypertension. The estimated PA systolic pressure is 46 mmHg.     5 - Mild aortic regurgitation.     6 - Mild to moderate mitral regurgitation.     7 - Mild tricuspid regurgitation.     ABGs: No results for input(s): PH, PCO2, HCO3, POCSATURATED, BE in the last 48 hours.    Significant Imaging:  CT: I have reviewed all pertinent results/findings within the past 24 hours and my personal findings are:  5/10/18 small right effusion    Assessment/Plan:     Abnormal chest CT    As noted above, his RLL abnormalities were present on CT abd in 2016 though a little different at that time (e.g. no associated effusion). The effusion is small.  Await IR's inputs. Will need outpt follow up in pulmonary clinic.        Centrilobular emphysema    Patient had FEV1 50% in 2013 with mixed restrictive-obstructive pattern on pft. Has continued to smoke. Lama + albuterol        Acute hypoxemic respiratory failure    Most likely due to volume overload.  Diuresed as tolerated.  Recommend weaning oxygen to goal SaO2 greater than 88%. If unable to wean from oxygen then recommend discharging home on supplemental oxygen.               Marcos Pabon MD  Pulmonology  [facility]

## 2018-05-14 NOTE — ASSESSMENT & PLAN NOTE
Most likely due to volume overload.  Diuresed as tolerated.  Recommend weaning oxygen to goal SaO2 greater than 88%. If unable to wean from oxygen then recommend discharging home on supplemental oxygen.

## 2018-05-14 NOTE — ASSESSMENT & PLAN NOTE
As noted above, his RLL abnormalities were present on CT abd in 2016 though a little different at that time (e.g. no associated effusion). The effusion is too small for me to tap though I would recommend IR evaluate for thoracentesis given pt's risk factors of advanced age & smoking history. Will need outpt follow up in pulmonary clinic.

## 2018-05-14 NOTE — ASSESSMENT & PLAN NOTE
As noted above, his RLL abnormalities were present on CT abd in 2016 though a little different at that time (e.g. no associated effusion). The effusion is small.  Await IR's inputs. Will need outpt follow up in pulmonary clinic.

## 2018-05-14 NOTE — PROGRESS NOTES
[facility] Hospital Medicine  Progress Note    Patient Name: Agus Ramos Jr.  MRN: [id number]  Patient Class: IP- Inpatient   Admission Date: 5/3/2018  Length of Stay: 11 days  Attending Physician: An Joshi MD  Primary Care Provider: Keaton Hardy MD        Subjective:     Principal Problem:Symptomatic anemia    HPI:  Mr. Agus Ramos Jr. is a 77 y.o. male with essential hypertension, type 2 diabetes mellitus (HbA1c 7.5%Sept 2016), CKD Stage 3, chronic atrial fibrillation ([id number] score 4) on chronic anticoagulation, anemia of chronic disease, and tobacco abuse who presents to [facility] ED with complaints of dyspnea for two weeks.  The dyspnea is on exertion and is associated with some mild wheezing and a non-productive cough.  He denies any fevers, chills, nausea, vomiting, chest pain, palpitations, diaphoresis, hemoptysis, nor any lower extremity pain or swelling.  There have been no sick contacts or recent travel.  He has not experienced any PND or orthopnea, and has not experienced any bleeding anywhere.  He otherwise has been doing well.    Hospital Course:  Admitted with concern for symptomatic anemia and HFpEF exacerbation. Found to have low Hgb 6.5, no bleeding identified though patient refused and continues to refuse rectal exam. Transfused 2U with appropriate response to Hgb 8.5. Continued shortness of breath symptoms after transfusion. Started diuresis for CHF exacerbation. TTE 5/4/2018 with normal EF, NST negative for ischemia but does show scar. Still requiring O2. Continuing diuresis.     5/8- pt with brief period of confusion, refused labs this morning but was oriented x3 after I sat down and spoke with him for awhile. Still requiring 2 liters oxygen and edema to LE. Soke with wife, Jessika vias phone and she said he called her today and was not confused and oriented. Will check urine culture and continue diuresis.   5/9 - urine culture growing staph, start  rocephin, no acute changes on CXR; schedule nebs and continue to wean oxygen as tolerated   5/10- urine culture now shows no growth?, waiting to hear from lab; calcium persistently high, hypercalcemia workup pending, chest CT pending, pt still requiring oxygen despite aggressive diuresis and pulmonary care   5/11- Chest CT concern for right pleural effusion ?loculated; elevated calcium more concern for malignancy - d/w IR and not enough fluid to drain but can jackelyn diagnostic tap, will broaden antibiotics x 24 hours, wait for further studies, schedule diagnostic tap next week, off eliquis on lovenox bridge   5/12 - test for home oxygen with significant drop in O2 saturation to 54% on room air with exercise; Chest CTA did not show significant findings except small pleural effusion that could explain hypoxia, pulmonology consulted, off anticoagulant for potential diagnostic thoracentesis   5/13 - pulmonology consulted; started on spiriva, plan to dc home with home oxygen    Interval History: no new complaints     Review of Systems   Constitutional: Negative for chills and fever.   Respiratory: Positive for shortness of breath. Negative for cough and chest tightness.    Cardiovascular: Positive for leg swelling. Negative for chest pain and palpitations.   Gastrointestinal: Negative for abdominal pain, anal bleeding, blood in stool, constipation, diarrhea, nausea and vomiting.   Genitourinary: Negative for difficulty urinating.     Objective:     Vital Signs (Most Recent):  Temp: 97.5 °F (36.4 °C) (05/13/18 1910)  Pulse: 71 (05/13/18 1956)  Resp: 20 (05/13/18 1956)  BP: (!) 117/56 (05/13/18 1910)  SpO2: 99 % (05/13/18 1956) Vital Signs (24h Range):  Temp:  [97.5 °F (36.4 °C)-98.3 °F (36.8 °C)] 97.5 °F (36.4 °C)  Pulse:  [71-75] 71  Resp:  [17-20] 20  SpO2:  [92 %-100 %] 99 %  BP: (109-148)/(56-70) 117/56     Weight: 86.2 kg (190 lb 0.6 oz)  Body mass index is 28.06 kg/m².    Intake/Output Summary (Last 24 hours) at  05/13/18 2204  Last data filed at 05/13/18 1730   Gross per 24 hour   Intake             1650 ml   Output              400 ml   Net             1250 ml      Physical Exam   Constitutional: He is oriented to person, place, and time. He appears well-developed and well-nourished. No distress.   HENT:   Head: Normocephalic and atraumatic.   Nose: Nose normal.   Mouth/Throat: Oropharynx is clear and moist.   Cardiovascular: Intact distal pulses.  Exam reveals no gallop and no friction rub.    No murmur heard.  Device L chest wall. Irregularly irregular   Pulmonary/Chest: Effort normal. No respiratory distress. He has no wheezes. He has no rales.   2L NC. Decreased breath sounds at bases, improving   Abdominal: Soft. Bowel sounds are normal. He exhibits no distension and no mass. There is no tenderness. There is no guarding.   Musculoskeletal: He exhibits edema (ankles and shins). He exhibits no tenderness or deformity.   Neurological: He is alert and oriented to person, place, and time.   Skin: He is not diaphoretic.       Significant Labs:   BMP:   Recent Labs  Lab 05/13/18  0410   *      K 3.7      CO2 32*   BUN 14   CREATININE 1.1   CALCIUM 11.0*     CBC:   Recent Labs  Lab 05/13/18  0410   WBC 7.45   HGB 8.0*   HCT 28.0*          Significant Imaging: I have reviewed all pertinent imaging results/findings within the past 24 hours.    Assessment/Plan:      * Symptomatic anemia    - unknown baseline Hgb  - presented with Hgb 6.3  - transfused 2U with appropriate response  - refuses rectal exam. No objective blood loss  - monitor Hgb-- stable        Hypercalcemia    Differential includes MM, parathyroid disease, vit d toxicity, malignancy   PTH, PTH-r, vitamin D, spep, upep, ionized calcium   Continue lasix  Chest CT - loculated pleural effusion           Acute confusional state    Wax and wanes, no reported dementia or issues at home per wife  Check urine culture  - staph, start rocephin   O2  saturation appropriate on 2 liters  No focal deficits to suggest CVA, TIA         Acute hypoxemic respiratory failure    Due to pulmonary edema, small pleural effusion does not explain significant hypoxia, pulmonology consulted for input  Will work on home oxygen as he qualified with O2 saturation 54% on room air   Continue diuresis  Wean oxygen as tolerated   Schedule nebs   Outpatient therapeutic thoracentesis off anticoagulation set up         Elevated troponin    NST 5/4 with no ischemia  May be due to strain from HFpEF exacerbation         Pacemaker    In place          Tobacco abuse    Patient was counseled on smoking cessation and he will be provided a nicotine transdermal patch applied while inpatient.  Will provide additional smoking cessation counseling prior to discharge.        Anemia of chronic disease    As addressed above.        Chronic anticoagulation    As addressed above.        Acute on chronic diastolic congestive heart failure    - prior TTE showing diastolic dysfunction   - with edema, pulmonary edema on CXR, elevated BNP  - repeat TTE with normal EF, no comment on diastology  - wean O2 as tolerated  - NST 5/4 with no ischemia, does show scar   - increased lasix to 80mg IV BID  - continue diuresis          Type 2 diabetes mellitus, controlled, with renal complications    - Well-controlled on a home regimen of metformin and a sulfonylurea;   - detemir 15 + aspart 5 QAC while hospitalized  - ADA diet        CKD Stage 3    - renal function stable  - renally dose all meds, avoid nephrotoxins  - monitor         Essential hypertension    - generally well controlled   - continue losartan and metoprolol        Chronic atrial fibrillation    - remains in Afib  - rate controlled with metoprolol tartrate 50 mg BID  - anticoagulation with apixaban - hold for outpatient thoracentesis           VTE Risk Mitigation     None              An Joshi MD  Department of Hospital Medicine   [facility]

## 2018-05-14 NOTE — PLAN OF CARE
Problem: Patient Care Overview  Goal: Plan of Care Review  Outcome: Ongoing (interventions implemented as appropriate)   05/14/18 2895   Coping/Psychosocial   Plan Of Care Reviewed With patient   Patient denies pain or discomfort.Gen edema present. Informed to keep lower extremities elevated. Zosyn given as ordered.o2 @ 2 lpm infusing as ordered. No complains of SOB at this time. Resp treatment ordered every 6 hours.Patient states that he feels better today. Adequate food consumed. No falls or injuries noted this admit.Sitting on bed side at this time.

## 2018-05-14 NOTE — SUBJECTIVE & OBJECTIVE
"Interval History: feeling "a little better."  No acute issue.      Objective:     Vital Signs (Most Recent):  Temp: 97.9 °F (36.6 °C) (05/14/18 1157)  Pulse: 71 (05/14/18 1157)  Resp: 18 (05/14/18 1157)  BP: 116/80 (05/14/18 1157)  SpO2: 99 % (05/14/18 1157) Vital Signs (24h Range):  Temp:  [97.5 °F (36.4 °C)-97.9 °F (36.6 °C)] 97.9 °F (36.6 °C)  Pulse:  [70-76] 71  Resp:  [17-20] 18  SpO2:  [92 %-99 %] 99 %  BP: (116-172)/(56-80) 116/80     Weight: 86.2 kg (190 lb 0.6 oz)  Body mass index is 28.06 kg/m².      Intake/Output Summary (Last 24 hours) at 05/14/18 1203  Last data filed at 05/14/18 0952   Gross per 24 hour   Intake             1380 ml   Output             1050 ml   Net              330 ml       Physical Exam   Constitutional: He is oriented to person, place, and time. He appears well-developed and well-nourished. No distress.   HENT:   Head: Normocephalic and atraumatic.   Nose: Nose normal.   Mouth/Throat: Oropharynx is clear and moist.   Cardiovascular: Intact distal pulses.  Exam reveals no gallop and no friction rub.    No murmur heard.  Device L chest wall. Irregularly irregular   Pulmonary/Chest: Effort normal. No respiratory distress. He has no wheezes.   2L NC. Rales at bases.   Abdominal: Soft. Bowel sounds are normal. He exhibits no distension and no mass. There is no tenderness. There is no guarding.   Musculoskeletal: He exhibits edema (ankles and shins). He exhibits no tenderness or deformity.   Neurological: He is alert and oriented to person, place, and time.   Skin: He is not diaphoretic.       Vents:       Lines/Drains/Airways     Peripheral Intravenous Line                 Peripheral IV - Single Lumen 05/13/18 0133 Left Forearm 1 day                Significant Labs:    CBC/Anemia Profile:    Recent Labs  Lab 05/13/18  0410   WBC 7.45   HGB 8.0*   HCT 28.0*      MCV 71*   RDW 24.6*        Chemistries:    Recent Labs  Lab 05/13/18  0410 05/14/18  0401    139   K 3.7 3.5   CL " 104 103   CO2 32* 30*   BUN 14 15   CREATININE 1.1 1.4   CALCIUM 11.0* 11.1*     bnp 640  Echo  5/4/18    1 - Low normal left ventricular systolic function (EF 50-55%).     2 - Concentric hypertrophy.     3 - Mild left atrial enlargement.     4 - Pulmonary hypertension. The estimated PA systolic pressure is 46 mmHg.     5 - Mild aortic regurgitation.     6 - Mild to moderate mitral regurgitation.     7 - Mild tricuspid regurgitation.     ABGs: No results for input(s): PH, PCO2, HCO3, POCSATURATED, BE in the last 48 hours.    Significant Imaging:  CT: I have reviewed all pertinent results/findings within the past 24 hours and my personal findings are:  5/10/18 small right effusion

## 2018-05-15 VITALS
TEMPERATURE: 98 F | BODY MASS INDEX: 28.15 KG/M2 | DIASTOLIC BLOOD PRESSURE: 78 MMHG | RESPIRATION RATE: 17 BRPM | HEART RATE: 65 BPM | SYSTOLIC BLOOD PRESSURE: 144 MMHG | HEIGHT: 69 IN | WEIGHT: 190.06 LBS | OXYGEN SATURATION: 95 %

## 2018-05-15 PROBLEM — J43.2 CENTRILOBULAR EMPHYSEMA: Status: ACTIVE | Noted: 2018-05-15

## 2018-05-15 LAB
ANION GAP SERPL CALC-SCNC: 5 MMOL/L
BUN SERPL-MCNC: 14 MG/DL
CALCIUM SERPL-MCNC: 11.1 MG/DL
CHLORIDE SERPL-SCNC: 105 MMOL/L
CO2 SERPL-SCNC: 31 MMOL/L
CREAT SERPL-MCNC: 1.4 MG/DL
EST. GFR  (AFRICAN AMERICAN): 55 ML/MIN/1.73 M^2
EST. GFR  (NON AFRICAN AMERICAN): 47 ML/MIN/1.73 M^2
GLUCOSE SERPL-MCNC: 146 MG/DL
POTASSIUM SERPL-SCNC: 3.6 MMOL/L
PTH RELATED PROT SERPL-SCNC: <0.2 PMOL/L
SODIUM SERPL-SCNC: 141 MMOL/L

## 2018-05-15 PROCEDURE — 25000003 PHARM REV CODE 250: Performed by: HOSPITALIST

## 2018-05-15 PROCEDURE — 94640 AIRWAY INHALATION TREATMENT: CPT

## 2018-05-15 PROCEDURE — 36415 COLL VENOUS BLD VENIPUNCTURE: CPT

## 2018-05-15 PROCEDURE — 94761 N-INVAS EAR/PLS OXIMETRY MLT: CPT

## 2018-05-15 PROCEDURE — 27000221 HC OXYGEN, UP TO 24 HOURS

## 2018-05-15 PROCEDURE — 63600175 PHARM REV CODE 636 W HCPCS: Performed by: HOSPITALIST

## 2018-05-15 PROCEDURE — 25000242 PHARM REV CODE 250 ALT 637 W/ HCPCS: Performed by: HOSPITALIST

## 2018-05-15 PROCEDURE — 80048 BASIC METABOLIC PNL TOTAL CA: CPT

## 2018-05-15 RX ORDER — IPRATROPIUM BROMIDE AND ALBUTEROL SULFATE 2.5; .5 MG/3ML; MG/3ML
3 SOLUTION RESPIRATORY (INHALATION) EVERY 6 HOURS
Qty: 1 BOX | Refills: 0 | Status: SHIPPED | OUTPATIENT
Start: 2018-05-15 | End: 2021-06-18

## 2018-05-15 RX ORDER — FUROSEMIDE 40 MG/1
40 TABLET ORAL DAILY
Status: DISCONTINUED | OUTPATIENT
Start: 2018-05-15 | End: 2018-05-15 | Stop reason: HOSPADM

## 2018-05-15 RX ADMIN — IPRATROPIUM BROMIDE AND ALBUTEROL SULFATE 3 ML: .5; 2.5 SOLUTION RESPIRATORY (INHALATION) at 08:05

## 2018-05-15 RX ADMIN — ASPIRIN 81 MG: 81 TABLET, COATED ORAL at 08:05

## 2018-05-15 RX ADMIN — TIOTROPIUM BROMIDE 18 MCG: 18 CAPSULE ORAL; RESPIRATORY (INHALATION) at 08:05

## 2018-05-15 RX ADMIN — INSULIN ASPART 5 UNITS: 100 INJECTION, SOLUTION INTRAVENOUS; SUBCUTANEOUS at 08:05

## 2018-05-15 RX ADMIN — AMLODIPINE BESYLATE 10 MG: 5 TABLET ORAL at 08:05

## 2018-05-15 RX ADMIN — IPRATROPIUM BROMIDE AND ALBUTEROL SULFATE 3 ML: .5; 2.5 SOLUTION RESPIRATORY (INHALATION) at 12:05

## 2018-05-15 RX ADMIN — FUROSEMIDE 40 MG: 40 TABLET ORAL at 01:05

## 2018-05-15 RX ADMIN — FUROSEMIDE 80 MG: 10 INJECTION, SOLUTION INTRAMUSCULAR; INTRAVENOUS at 08:05

## 2018-05-15 RX ADMIN — LOSARTAN POTASSIUM 50 MG: 25 TABLET, FILM COATED ORAL at 08:05

## 2018-05-15 RX ADMIN — IPRATROPIUM BROMIDE AND ALBUTEROL SULFATE 3 ML: .5; 2.5 SOLUTION RESPIRATORY (INHALATION) at 01:05

## 2018-05-15 RX ADMIN — METOPROLOL TARTRATE 50 MG: 50 TABLET ORAL at 08:05

## 2018-05-15 RX ADMIN — INSULIN ASPART 5 UNITS: 100 INJECTION, SOLUTION INTRAVENOUS; SUBCUTANEOUS at 12:05

## 2018-05-15 NOTE — DISCHARGE SUMMARY
[facility] Hospital Medicine  Discharge Summary      Patient Name: Agus Ramos Jr.  MRN: [id number]  Admission Date: 5/3/2018  Hospital Length of Stay: 12 days  Discharge Date and Time:  05/15/2018 12:54 PM  Attending Physician: Ace Cisneros MD   Discharging Provider: Ace Cisneros MD  Primary Care Provider: Keaton Hardy MD      HPI:   Mr. Agus Ramos Jr. is a 77 y.o. male with essential hypertension, type 2 diabetes mellitus (HbA1c 7.5%Sept 2016), CKD Stage 3, chronic atrial fibrillation ([id number] score 4) on chronic anticoagulation, anemia of chronic disease, and tobacco abuse who presents to [facility] ED with complaints of dyspnea for two weeks.  The dyspnea is on exertion and is associated with some mild wheezing and a non-productive cough.  He denies any fevers, chills, nausea, vomiting, chest pain, palpitations, diaphoresis, hemoptysis, nor any lower extremity pain or swelling.  There have been no sick contacts or recent travel.  He has not experienced any PND or orthopnea, and has not experienced any bleeding anywhere.  He otherwise has been doing well.    * No surgery found *      Hospital Course:   Admitted with concern for symptomatic anemia and HFpEF exacerbation. Found to have low Hgb 6.5, no bleeding identified though patient refused and continues to refuse rectal exam. Transfused 2U with appropriate response to Hgb 8.5. Continued shortness of breath symptoms after transfusion. Started diuresis for CHF exacerbation. TTE 5/4/2018 with normal EF, NST negative for ischemia but does show scar. Still requiring O2. Continuing diuresis.     5/8- pt with brief period of confusion, refused labs this morning but was oriented x3 after I sat down and spoke with him for awhile. Still requiring 2 liters oxygen and edema to LECharo Rodriguez with wife, Jessika vias phone and she said he called her today and was not confused and oriented. Will check urine culture and continue  diuresis.   5/9 - urine culture growing staph, start rocephin, no acute changes on CXR; schedule nebs and continue to wean oxygen as tolerated   5/10- urine culture now shows no growth?, waiting to hear from lab; calcium persistently high, hypercalcemia workup pending, chest CT pending, pt still requiring oxygen despite aggressive diuresis and pulmonary care   5/11- Chest CT concern for right pleural effusion ?loculated; elevated calcium more concern for malignancy - d/w IR and not enough fluid to drain but can jackelyn diagnostic tap, will broaden antibiotics x 24 hours, wait for further studies, schedule diagnostic tap next week, off eliquis on lovenox bridge   5/12 - test for home oxygen with significant drop in O2 saturation to 54% on room air with exercise; Chest CTA did not show significant findings except small pleural effusion that could explain hypoxia, pulmonology consulted, off anticoagulant for potential diagnostic thoracentesis   5/13 - pulmonology consulted; started on spiriva, plan to dc home with home oxygen  5/14 - insurance has not authorized oxygen yet, continue IV lasix ,  Patient has been discharged with home oxygen and  follow up with Pulmonology and PCP as out patient.     Consults:   Consults         Status Ordering Provider     Inpatient consult to Cardiology  Once     Provider:  Tobi Mathias MD    Acknowledged AGGIE RAMOS     Inpatient consult to Pulmonology  Once     Provider:  Chevy Cuellar MD    Completed JOSH CUELLAR     Inpatient consult to Social Work  Once     Provider:  (Not yet assigned)    RADHA Graves          No new Assessment & Plan notes have been filed under this hospital service since the last note was generated.  Service: Hospital Medicine    Final Active Diagnoses:    Diagnosis Date Noted POA    PRINCIPAL PROBLEM:  Symptomatic anemia [D64.9] 05/03/2018 Yes    Chronic atrial fibrillation [I48.2] 09/19/2013 Yes     Chronic     Centrilobular emphysema [J43.2] 05/15/2018 Yes    Abnormal chest CT [R93.8] 05/13/2018 Yes    Hypercalcemia [E83.52] 05/10/2018 Yes    Acute confusional state [F05] 05/08/2018 No    Acute hypoxemic respiratory failure [J96.01] 05/06/2018 Yes    Chronic anticoagulation [Z79.01] 05/04/2018 Not Applicable     Chronic    Anemia of chronic disease [D63.8] 05/04/2018 Yes     Chronic    Tobacco abuse [Z72.0] 05/04/2018 Yes     Chronic    Pacemaker [Z95.0] 05/04/2018 Yes    Elevated troponin [R74.8] 05/04/2018 Yes    CKD Stage 3 [N18.3] 09/24/2016 Yes     Chronic    Type 2 diabetes mellitus, controlled, with renal complications [E11.29] 09/24/2016 Yes     Chronic    Acute on chronic diastolic congestive heart failure [I50.33] 09/24/2016 Yes    Essential hypertension [I10] 09/24/2016 Yes     Chronic      Problems Resolved During this Admission:    Diagnosis Date Noted Date Resolved POA    HTN (hypertension) [I10] 09/19/2013 05/04/2018 Yes    CAD (coronary artery disease) [I25.10] 09/19/2013 05/04/2018 Yes    Dyslipidemia [E78.5] 09/19/2013 05/04/2018 Yes    Paroxysmal atrial fibrillation [I48.0] 09/24/2016 05/04/2018 Yes       Discharged Condition: stable    Disposition: Home or Self Care    Follow Up:  Follow-up Information     Keaton Hardy MD On 5/16/2018.    Why:  Wednesday at 10am. Hospital Follow up appointment.   Contact information:  [address] [facility] [address]  [address]  [place] [id number]  [phone]           Kyler Marley MD On 5/24/2018.    Specialty:  Cardiology  Why:  Thursday at 10:20am. Hospital Follow Up appointment.   Contact information:  [address] [place] [address]  [address]  [facility] [id number] [phone]             Vane Dove MD On 6/20/2018.    Specialties:  Intensive Care, Pulmonary Disease  Why:  Wednesday at 3pm. You are on the Waiting List.   Contact information:  [facility] [place] [facility] [address]  [phone]             Millie Bell MD On 5/25/2018.    Specialties:   "Hematology and Oncology, Hematology  Why:  Friday at 10am. Hospital Follow up appointment.   Contact information:  [address]   [address]  [place] [address] [phone]             [facility].    Specialty:  DME Provider  Contact information:  [place] [address] [phone]                 Patient Instructions:     OXYGEN FOR HOME USE   Order Specific Question Answer Comments   Liter Flow 2    Duration With activity    Qualifying SpO2: 54%    Testing done at: Exercise/Activity    Route nasal cannula    Device home concentrator with portable unit    Length of need (in months): 99 mos    Patient condition with qualifying saturation CHF    Height: 5' 9" (1.753 m)    Weight: 86.2 kg (190 lb 0.6 oz)    Does patient have medical equipment at home? none    Alternative treatment measures have been tried or considered and deemed clinically ineffective. Yes      NEBULIZER FOR HOME USE   Order Specific Question Answer Comments   Height: 5' 9" (1.753 m)    Weight: 86.2 kg (190 lb 0.6 oz)    Does patient have medical equipment at home? none    Length of need (1-99 months): 99      NEBULIZER FOR HOME USE   Order Specific Question Answer Comments   Height: 5' 9" (1.753 m)    Weight: 86.2 kg (190 lb 0.6 oz)    Does patient have medical equipment at home? none    Length of need (1-99 months): 99      NEBULIZER KIT (SUPPLIES) FOR HOME USE   Order Specific Question Answer Comments   Height: 5' 9" (1.753 m)    Weight: 86.2 kg (190 lb 0.6 oz)    Does patient have medical equipment at home? none    Length of need (1-99 months): 99    Mask or Mouthpiece? Mask      CULTURE, FLUID  (AEROBIC)   Standing Status: Future  Standing Exp. Date: 07/13/19     CULTURE, ANAEROBE   Standing Status: Future  Standing Exp. Date: 07/13/19     US Guided Thoracentesis   Standing Status: Future  Standing Exp. Date: 05/14/19   Order Comments: Off eliquis for 5 days, diagnostic thoracentesis with concern for malignancy "   Order Specific Question Answer Comments   Reason for Exam: loculated pleural effusion, diagnostic    May the Radiologist modify the order per protocol to meet the clinical needs of the patient? Yes      WBC & Diff,Body Fluid Pleural Fluid, Right   Standing Status: Future  Standing Exp. Date: 07/13/19   Order Specific Question Answer Comments   Specimen Source Pleural Fluid, Right      LDH, Peritoneal, Pleural Fluid or IRINA Drainage, In-House Pleural Fluid, Right   Standing Status: Future  Standing Exp. Date: 07/13/19   Order Specific Question Answer Comments   Specimen Source Pleural Fluid, Right      Activity as tolerated     Cytology Specimen-Medical Cytology (Fluid/Wash/Brush)   Standing Status: Future  Standing Exp. Date: 07/13/19   Order Specific Question Answer Comments   Clinical Information: hypercalcemia, smoker, weight loss, concern for malignnacy    Specific Site: right          Significant Diagnostic Studies: Labs:   BMP:   Recent Labs  Lab 05/14/18  0401 05/15/18  0434   * 146*    141   K 3.5 3.6    105   CO2 30* 31*   BUN 15 14   CREATININE 1.4 1.4   CALCIUM 11.1* 11.1*    and CMP   Recent Labs  Lab 05/14/18  0401 05/15/18  0434    141   K 3.5 3.6    105   CO2 30* 31*   * 146*   BUN 15 14   CREATININE 1.4 1.4   CALCIUM 11.1* 11.1*   ANIONGAP 6* 5*   ESTGFRAFRICA 55* 55*   EGFRNONAA 47* 47*     Radiology: X-Ray: CXR: X-Ray Chest  CT scan: chest     Pending Diagnostic Studies:     Procedure Component Value Units Date/Time    NM Myocardial Perfusion Spect Multi Pharmacologic [[id number]] Resulted:  05/04/18 1217    Order Status:  Sent Lab Status:  In process Updated:  05/04/18 1458    PTH-related peptide [[id number]] Collected:  05/10/18 1759    Order Status:  Sent Lab Status:  In process Updated:  05/10/18 1800    Specimen:  Blood from Blood          Medications:  Reconciled Home Medications:      Medication List      START taking these medications     albuterol-ipratropium 2.5mg-0.5mg/3mL 0.5 mg-3 mg(2.5 mg base)/3 mL nebulizer solution  Commonly known as:  DUO-NEB  Take 3 mLs by nebulization every 6 (six) hours. Rescue        CONTINUE taking these medications    amLODIPine 10 MG tablet  Commonly known as:  NORVASC  Take 10 mg by mouth once daily.     aspirin 325 MG tablet  Take 81 mg by mouth once daily.     fish oil-omega-3 fatty acids 300-1,000 mg capsule  Take 2 g by mouth 2 (two) times daily.     furosemide 40 MG tablet  Commonly known as:  LASIX  Take 1 tablet (40 mg total) by mouth once daily.     glipiZIDE 5 MG tablet  Commonly known as:  GLUCOTROL  Take 10 mg by mouth 2 (two) times daily before meals.     losartan 50 MG tablet  Commonly known as:  COZAAR  Take 50 mg by mouth once daily.     metFORMIN 500 MG tablet  Commonly known as:  GLUCOPHAGE  Take 500 mg by mouth 2 (two) times daily with meals.     metoprolol tartrate 50 MG tablet  Commonly known as:  LOPRESSOR  Take 50 mg by mouth 2 (two) times daily.     nitroGLYCERIN 0.4 MG SL tablet  Commonly known as:  NITROSTAT  Place 0.4 mg under the tongue every 5 (five) minutes as needed.     polyethylene glycol 17 gram Pwpk  Commonly known as:  GLYCOLAX  Take by mouth as needed.        STOP taking these medications    ELIQUIS 5 mg Tab  Generic drug:  apixaban            Indwelling Lines/Drains at time of discharge:   Lines/Drains/Airways          No matching active lines, drains, or airways          Time spent on the discharge of patient: more than 30  minutes  Patient was seen and examined on the date of discharge and determined to be suitable for discharge.         Ace Cisneros MD  Department of Hospital Medicine  [facility]

## 2018-05-15 NOTE — PROGRESS NOTES
[facility] Hospital Medicine  Progress Note    Patient Name: Agus Ramos Jr.  MRN: [id number]  Patient Class: IP- Inpatient   Admission Date: 5/3/2018  Length of Stay: 11 days  Attending Physician: An Joshi MD  Primary Care Provider: Keaton Hardy MD        Subjective:     Principal Problem:Symptomatic anemia    HPI:  Mr. Agus Ramos Jr. is a 77 y.o. male with essential hypertension, type 2 diabetes mellitus (HbA1c 7.5%Sept 2016), CKD Stage 3, chronic atrial fibrillation (CGA9MA1-DEQx score 4) on chronic anticoagulation, anemia of chronic disease, and tobacco abuse who presents to [facility] ED with complaints of dyspnea for two weeks.  The dyspnea is on exertion and is associated with some mild wheezing and a non-productive cough.  He denies any fevers, chills, nausea, vomiting, chest pain, palpitations, diaphoresis, hemoptysis, nor any lower extremity pain or swelling.  There have been no sick contacts or recent travel.  He has not experienced any PND or orthopnea, and has not experienced any bleeding anywhere.  He otherwise has been doing well.    Hospital Course:  Admitted with concern for symptomatic anemia and HFpEF exacerbation. Found to have low Hgb 6.5, no bleeding identified though patient refused and continues to refuse rectal exam. Transfused 2U with appropriate response to Hgb 8.5. Continued shortness of breath symptoms after transfusion. Started diuresis for CHF exacerbation. TTE 5/4/2018 with normal EF, NST negative for ischemia but does show scar. Still requiring O2. Continuing diuresis.     5/8- pt with brief period of confusion, refused labs this morning but was oriented x3 after I sat down and spoke with him for awhile. Still requiring 2 liters oxygen and edema to LE. Soke with wife, Jessika vias phone and she said he called her today and was not confused and oriented. Will check urine culture and continue diuresis.   5/9 - urine culture growing staph, start  rocephin, no acute changes on CXR; schedule nebs and continue to wean oxygen as tolerated   5/10- urine culture now shows no growth?, waiting to hear from lab; calcium persistently high, hypercalcemia workup pending, chest CT pending, pt still requiring oxygen despite aggressive diuresis and pulmonary care   5/11- Chest CT concern for right pleural effusion ?loculated; elevated calcium more concern for malignancy - d/w IR and not enough fluid to drain but can jackelyn diagnostic tap, will broaden antibiotics x 24 hours, wait for further studies, schedule diagnostic tap next week, off eliquis on lovenox bridge   5/12 - test for home oxygen with significant drop in O2 saturation to 54% on room air with exercise; Chest CTA did not show significant findings except small pleural effusion that could explain hypoxia, pulmonology consulted, off anticoagulant for potential diagnostic thoracentesis   5/13 - pulmonology consulted; started on spiriva, plan to dc home with home oxygen  5/14 - insurance has not authorized oxygen yet, continue IV lasix and possible dc tomorrow, outpatient diagnostic thoracentesis     Interval History: pt reports feeling better, fluid going down in legs     Review of Systems   Constitutional: Negative for chills and fever.   Respiratory: Positive for shortness of breath. Negative for cough and chest tightness.    Cardiovascular: Positive for leg swelling. Negative for chest pain and palpitations.   Gastrointestinal: Negative for abdominal pain, anal bleeding, blood in stool, constipation, diarrhea, nausea and vomiting.   Genitourinary: Negative for difficulty urinating.     Objective:     Vital Signs (Most Recent):  Temp: 97.3 °F (36.3 °C) (05/14/18 1951)  Pulse: 72 (05/14/18 1951)  Resp: 19 (05/14/18 1951)  BP: 131/60 (05/14/18 1951)  SpO2: 100 % (05/14/18 1951) Vital Signs (24h Range):  Temp:  [97.3 °F (36.3 °C)-98 °F (36.7 °C)] 97.3 °F (36.3 °C)  Pulse:  [70-76] 72  Resp:  [17-19] 19  SpO2:  [94  %-100 %] 100 %  BP: (116-172)/(60-80) 131/60     Weight: 86.2 kg (190 lb 0.6 oz)  Body mass index is 28.06 kg/m².    Intake/Output Summary (Last 24 hours) at 05/14/18 2228  Last data filed at 05/14/18 1905   Gross per 24 hour   Intake             2220 ml   Output             1650 ml   Net              570 ml      Physical Exam   Constitutional: He is oriented to person, place, and time. He appears well-developed and well-nourished. No distress.   HENT:   Head: Normocephalic and atraumatic.   Nose: Nose normal.   Mouth/Throat: Oropharynx is clear and moist.   Cardiovascular: Intact distal pulses.  Exam reveals no gallop and no friction rub.    No murmur heard.  Device L chest wall. Irregularly irregular   Pulmonary/Chest: Effort normal. No respiratory distress. He has no wheezes.   2L NC. Rales at bases.   Abdominal: Soft. Bowel sounds are normal. He exhibits no distension and no mass. There is no tenderness. There is no guarding.   Musculoskeletal: He exhibits edema (ankles and shins). He exhibits no tenderness or deformity.   Neurological: He is alert and oriented to person, place, and time.   Skin: He is not diaphoretic.       Significant Labs:   BMP:   Recent Labs  Lab 05/14/18  0401   *      K 3.5      CO2 30*   BUN 15   CREATININE 1.4   CALCIUM 11.1*     CBC:   Recent Labs  Lab 05/13/18  0410   WBC 7.45   HGB 8.0*   HCT 28.0*          Significant Imaging: I have reviewed all pertinent imaging results/findings within the past 24 hours.    Assessment/Plan:      * Symptomatic anemia    - unknown baseline Hgb  - presented with Hgb 6.3  - transfused 2U with appropriate response  - refuses rectal exam. No objective blood loss  - monitor Hgb-- stable        Hypercalcemia    Differential includes MM, parathyroid disease, vit d toxicity, malignancy   PTH, PTH-r, vitamin D, spep, upep, ionized calcium   Continue lasix  Chest CT - loculated pleural effusion           Acute confusional state     Wax and wanes, no reported dementia or issues at home per wife  Check urine culture  - staph, start rocephin   O2 saturation appropriate on 2 liters  No focal deficits to suggest CVA, TIA         Acute hypoxemic respiratory failure    Due to pulmonary edema, small pleural effusion does not explain significant hypoxia, pulmonology consulted for input  Will work on home oxygen as he qualified with O2 saturation 54% on room air   Continue diuresis  Wean oxygen as tolerated   Schedule nebs   Outpatient therapeutic thoracentesis off anticoagulation set up         Elevated troponin    NST 5/4 with no ischemia  May be due to strain from HFpEF exacerbation         Pacemaker    In place          Tobacco abuse    Patient was counseled on smoking cessation and he will be provided a nicotine transdermal patch applied while inpatient.  Will provide additional smoking cessation counseling prior to discharge.        Anemia of chronic disease    As addressed above.        Chronic anticoagulation    As addressed above.        Acute on chronic diastolic congestive heart failure    - prior TTE showing diastolic dysfunction   - with edema, pulmonary edema on CXR, elevated BNP  - repeat TTE with normal EF, no comment on diastology  - wean O2 as tolerated  - NST 5/4 with no ischemia, does show scar   - increased lasix to 80mg IV BID  - continue diuresis          Type 2 diabetes mellitus, controlled, with renal complications    - Well-controlled on a home regimen of metformin and a sulfonylurea;   - detemir 15 + aspart 5 QAC while hospitalized  - ADA diet        CKD Stage 3    - renal function stable  - renally dose all meds, avoid nephrotoxins  - monitor         Essential hypertension    - generally well controlled   - continue losartan and metoprolol        Chronic atrial fibrillation    - remains in Afib  - rate controlled with metoprolol tartrate 50 mg BID  - anticoagulation with apixaban - hold for outpatient thoracentesis            VTE Risk Mitigation     None              An Joshi MD  Department of Hospital Medicine   [facility]

## 2018-05-15 NOTE — PROGRESS NOTES
WRITTEN HEALTHCARE DISCHARGE INFORMATION     Things that YOU are responsible for to Manage Your Care At Home:  1. Getting your prescriptions filled.  2. Taking you medications as directed. DO NOT MISS ANY DOSES!  3. Going to your follow-up doctor appointments. This is important because it allows the doctor to monitor your progress and to determine if any changes need to be made to your treatment plan.    If you are unable to make your follow up appointments, please call the number listed and reschedule this appointment.     After discharge, if you need assistance, you can call [facility] Line for 24/7 assistance at [phone]    If you are experience any signs or symptoms, Call your doctor or Call 911 and come to your nearest Emergency Room.    Thank you for choosing [facility] and allowing us to care for you.   From your care management team: Danii CHATMAN,RSW [phone]     You should receive a call from [facility] within 48-72 hours to help manage your care after discharge. Please try to make sure that you answer your phone for this important phone call.     Follow-up Information     Keaton Hardy MD On 5/16/2018.    Why:  Wednesday at 10am. Hospital Follow up appointment.   Contact information:  [address] [facility] [address]  [address]  [place] [id number]  [phone]           Kyler Marley MD On 5/24/2018.    Specialty:  Cardiology  Why:  Thursday at 10:20am. Hospital Follow Up appointment.   Contact information:  [address]  [address]  [facility] [address]  [phone]             Vane Dove MD On 6/20/2018.    Specialties:  Intensive Care, Pulmonary Disease  Why:  Wednesday at 3pm. You are on the Waiting List.   Contact information:  [address]  [facility] [address]  [phone]             Millie Bell MD On 5/25/2018.    Specialties:  Hematology and Oncology, Hematology  Why:  Friday at 10am. Hospital Follow up appointment.   Contact  information:  [address] [facility] [facility] [facility]  [address]  [place] [address]  [phone]             [facility] Beth  [place].    Specialty:  DME Provider  Contact information:  [facility] [place] [address] [phone]

## 2018-05-15 NOTE — SUBJECTIVE & OBJECTIVE
Interval History: pt reports feeling better,    Review of Systems   Constitutional: Negative for chills and fever.   Respiratory: Negative for cough, chest tightness and shortness of breath.    Cardiovascular: Negative for chest pain and palpitations.   Gastrointestinal: Negative for abdominal pain, anal bleeding, blood in stool, constipation, diarrhea, nausea and vomiting.   Genitourinary: Negative for difficulty urinating.     Objective:     Vital Signs (Most Recent):  Temp: 97.6 °F (36.4 °C) (05/15/18 1143)  Pulse: 74 (05/15/18 1143)  Resp: 18 (05/15/18 1143)  BP: (!) 144/78 (05/15/18 1143)  SpO2: (!) 92 % (05/15/18 1143) Vital Signs (24h Range):  Temp:  [97.3 °F (36.3 °C)-98.5 °F (36.9 °C)] 97.6 °F (36.4 °C)  Pulse:  [68-75] 74  Resp:  [17-20] 18  SpO2:  [92 %-100 %] 92 %  BP: (131-147)/(59-78) 144/78     Weight: 86.2 kg (190 lb 0.6 oz)  Body mass index is 28.06 kg/m².    Intake/Output Summary (Last 24 hours) at 05/15/18 1251  Last data filed at 05/15/18 0908   Gross per 24 hour   Intake             1660 ml   Output             1400 ml   Net              260 ml      Physical Exam   Constitutional: He is oriented to person, place, and time. He appears well-developed and well-nourished. No distress.   HENT:   Head: Normocephalic and atraumatic.   Nose: Nose normal.   Mouth/Throat: Oropharynx is clear and moist.   Cardiovascular: Intact distal pulses.  Exam reveals no gallop and no friction rub.    No murmur heard.  Device L chest wall. Irregularly irregular   Pulmonary/Chest: Effort normal. No respiratory distress. He has no wheezes.   2L NC. Rales at bases.   Abdominal: Soft. Bowel sounds are normal. He exhibits no distension and no mass. There is no tenderness. There is no guarding.   Musculoskeletal: He exhibits edema (ankles and shins). He exhibits no tenderness or deformity.   Neurological: He is alert and oriented to person, place, and time.   Skin: He is not diaphoretic.       Significant Labs:   BMP:      Recent Labs  Lab 05/15/18  0434   *      K 3.6      CO2 31*   BUN 14   CREATININE 1.4   CALCIUM 11.1*     CBC: No results for input(s): WBC, HGB, HCT, PLT in the last 48 hours.    Significant Imaging: I have reviewed all pertinent imaging results/findings within the past 24 hours.

## 2018-05-15 NOTE — SUBJECTIVE & OBJECTIVE
Interval History: pt reports feeling better, fluid going down in legs     Review of Systems   Constitutional: Negative for chills and fever.   Respiratory: Positive for shortness of breath. Negative for cough and chest tightness.    Cardiovascular: Positive for leg swelling. Negative for chest pain and palpitations.   Gastrointestinal: Negative for abdominal pain, anal bleeding, blood in stool, constipation, diarrhea, nausea and vomiting.   Genitourinary: Negative for difficulty urinating.     Objective:     Vital Signs (Most Recent):  Temp: 97.3 °F (36.3 °C) (05/14/18 1951)  Pulse: 72 (05/14/18 1951)  Resp: 19 (05/14/18 1951)  BP: 131/60 (05/14/18 1951)  SpO2: 100 % (05/14/18 1951) Vital Signs (24h Range):  Temp:  [97.3 °F (36.3 °C)-98 °F (36.7 °C)] 97.3 °F (36.3 °C)  Pulse:  [70-76] 72  Resp:  [17-19] 19  SpO2:  [94 %-100 %] 100 %  BP: (116-172)/(60-80) 131/60     Weight: 86.2 kg (190 lb 0.6 oz)  Body mass index is 28.06 kg/m².    Intake/Output Summary (Last 24 hours) at 05/14/18 2228  Last data filed at 05/14/18 1905   Gross per 24 hour   Intake             2220 ml   Output             1650 ml   Net              570 ml      Physical Exam   Constitutional: He is oriented to person, place, and time. He appears well-developed and well-nourished. No distress.   HENT:   Head: Normocephalic and atraumatic.   Nose: Nose normal.   Mouth/Throat: Oropharynx is clear and moist.   Cardiovascular: Intact distal pulses.  Exam reveals no gallop and no friction rub.    No murmur heard.  Device L chest wall. Irregularly irregular   Pulmonary/Chest: Effort normal. No respiratory distress. He has no wheezes.   2L NC. Rales at bases.   Abdominal: Soft. Bowel sounds are normal. He exhibits no distension and no mass. There is no tenderness. There is no guarding.   Musculoskeletal: He exhibits edema (ankles and shins). He exhibits no tenderness or deformity.   Neurological: He is alert and oriented to person, place, and time.   Skin:  He is not diaphoretic.       Significant Labs:   BMP:   Recent Labs  Lab 05/14/18  0401   *      K 3.5      CO2 30*   BUN 15   CREATININE 1.4   CALCIUM 11.1*     CBC:   Recent Labs  Lab 05/13/18  0410   WBC 7.45   HGB 8.0*   HCT 28.0*          Significant Imaging: I have reviewed all pertinent imaging results/findings within the past 24 hours.

## 2018-05-15 NOTE — ASSESSMENT & PLAN NOTE
Wax and wanes, no reported dementia or issues at home per wife  Check urine culture  - staph, start rocephin   O2 saturation appropriate on 2 liters  No focal deficits to suggest CVA, TIA   Resolved.

## 2018-05-15 NOTE — NURSING
Pt AAOx4; NAD; VSS; resp equal bilat and unlabored; catheter intact; left unit via wheelchair with O2 tank and belongings with family member.

## 2018-05-15 NOTE — PLAN OF CARE
"   05/15/18 1317   Final Note   Assessment Type Final Discharge Note   Discharge Disposition Home   What phone number can be called within the next 1-3 days to see how you are doing after discharge? [phone]   Hospital Follow Up  Appt(s) scheduled? Yes   Discharge plans and expectations educations in teach back method with documentation complete? Yes   Right Care Referral Info   Post Acute Recommendation No Care        EDUCATION:  Pt and pt's wife provided with educational information on " Asthma and CHF ".  Information reviewed and placed in :My Healthcare Packet" to be brought home for pt to use as resource after discharge.  Information included:  signs and symptoms to look for and call the doctor if experiencing, and symptoms that may indicate a medical emergency: CALL 911.      All questions answered.  Teach back method used.  Pt  verbalized understanding of all information by stating, "  That he is aware of the signs and symptoms to watch for and when to contact MD and when to report to the ED immediately. Also SW took this time to ask pt to provide at least 3 things such as SOB, chest tightness, and Dizziness are responsible to manage his care at home following d/c from hospital.  Pt informed SW take Rx to the pharmacy to be filled when discharged, be sure to take as prescribed and to go to any and all appointments scheduled at time of discharge.    "

## 2018-05-15 NOTE — PLAN OF CARE
Problem: Patient Care Overview  Goal: Plan of Care Review  Patient resting comfortably. No distress noted. Is on 2 liters of oxygen. Urinal at bedside. Will monitor blood glucose. Encouraged to call for assist. Call light in reach. Will continue to monitor. Derrek

## 2018-05-16 ENCOUNTER — PATIENT OUTREACH (OUTPATIENT)
Dept: ADMINISTRATIVE | Facility: CLINIC | Age: 78
End: 2018-05-16

## 2018-05-16 NOTE — PROGRESS NOTES
C3 nurse attempted to contact patient. No answer. The following message was left for the patient to return the call:  Good AFTERNOON  I am a nurse calling on behalf of [facility] from the Care Coordination Center.  This is a Transitional Care Call for Agus Ramos Jr. When you have a moment please contact us at [phone] or [phone] Monday through Friday, between the hours of 8 am to 4 pm. We look forward to speaking with you. On behalf of [facility] have a nice day.    The patient has a scheduled HOSFU appointment with Keaton Hardy MD on 5/16/2018  at 10am

## 2018-05-17 ENCOUNTER — TELEPHONE (OUTPATIENT)
Dept: INTERVENTIONAL RADIOLOGY/VASCULAR | Facility: HOSPITAL | Age: 78
End: 2018-05-17

## 2018-05-17 NOTE — PATIENT INSTRUCTIONS
Anemia   Anemia is a condition that occurs when your body does not have enough healthy red blood cells (RBCs). Your RBCs are the parts of your blood that carry oxygen throughout your body. A protein called hemoglobin allows your RBCs to absorb and release oxygen. Without enough RBCs or hemoglobin, your body doesnt get enough oxygen. Symptoms of anemia may then occur.    Symptoms of Anemia   Some people with anemia have no symptoms. But most people have symptoms that range from mild to severe. These can include:  Tiredness (fatigue)  Weakness  Pale skin  Shortness of breath  Dizziness or fainting  Rapid heartbeat  Trouble doing normal amounts of activity  Jaundice (yellowing of your eyes, skin, or mouth; dark urine)    Causes of Anemia   Anemia can occur when your body:  Loses too much blood  Does not make enough RBCs  Destroys your RBCs at a faster rate than it can replace them  Does not make a normal amount of hemoglobin in your RBCs    These problems can occur for many reasons, including:  A condition that you are born with (congenital or inherited). This includes sickle cell disease or thalassemia.  Heavy bleeding for any reason, including injury, surgery, childbirth, or even heavy menstrual periods.  Being low in certain nutrients, such as iron, folate, or vitamin B12. This may be due to poor diet. Also, a condition like celiac disease or Crohns disease can cause poor absorption of these nutrients  Certain chronic conditions like diabetes, arthritis, or kidney disease.  Certain chronic infections like tuberculosis or HIV.  Exposure to certain medications, such as those used for chemotherapy.  There are different types of anemia. Your doctor can tell you more about the type of anemia you have and what may have caused it.    Diagnosing Anemia   To diagnose anemia, your doctor gives you blood tests. These can include:  Complete blood cell count (CBC). This test measures the amounts of the different types of blood  cells.  Blood smear. This test checks the size and shape of your blood cells. To perform the test, your doctor views a drop of your blood under a microscope. Your doctor uses a stain to make the blood cells easier to see.  Iron studies. These tests measure the amount of iron in your blood. Your body needs iron to make hemoglobin in your RBCs.  Vitamin B12 and folate studies. These tests check for some of the components that help give RBCs a normal size and shape.  Reticulocyte count. This test measures the amount of new RBCs that your bone marrow makes.   Hemoglobin electrophoresis. This test checks for problems with your hemoglobin in RBCs.    Treating Anemia   Treatment for anemia is based on the type of anemia, its cause, and the severity of your symptoms. Treatments may include:  Diet changes. This involves increasing the amount of certain nutrients in your diet, such as iron, vitamin B12, or folate. Your doctor may also prescribe nutrient supplements.  Medications. Certain medications treat the cause of your anemia. Others help build new RBCs or relieve symptoms. If a medication is the cause of your anemia, your may need to stop or change it.   Blood transfusions. Replacing some of your blood can increase the number of healthy RBCs in your body.  Surgery. In some cases, your doctor can do surgery to treat the underlying cause of anemia. If you need surgery, your doctor will explain the procedure and outline the risks and benefits for you.    Long-term Concerns   If you have a certain type of anemia, you can expect a full recovery after treatment. If you have other types of anemia (especially a type you're born with), you will need to manage it for life. Your doctor can tell you more.    © 6903-1246 [employer], [address] [place] [address], [place] [address]. All rights reserved. This information is not intended as a substitute for professional medical care. Always follow your healthcare professional's  instructions.

## 2018-05-22 ENCOUNTER — NURSE TRIAGE (OUTPATIENT)
Dept: ADMINISTRATIVE | Facility: CLINIC | Age: 78
End: 2018-05-22

## 2018-05-22 NOTE — TELEPHONE ENCOUNTER
Reason for Disposition   MODERATE longstanding difficulty breathing (e.g., speaks in phrases, SOB even at rest, pulse 100-120) and SAME as normal    Protocols used: ST BREATHING DIFFICULTY-A-OH  Patient wife is calling to inform of a difficulty breathing episode. However she states she put the patients O2 on and the difficulty subsided. She said she has not given the treatment and will save it if the difficulty arise again patient ststes he feels much better.

## 2018-05-23 ENCOUNTER — HOSPITAL ENCOUNTER (OUTPATIENT)
Dept: PREADMISSION TESTING | Facility: HOSPITAL | Age: 78
Discharge: HOME | End: 2018-05-23
Attending: RADIOLOGY
Payer: MEDICARE

## 2018-05-23 VITALS
RESPIRATION RATE: 17 BRPM | SYSTOLIC BLOOD PRESSURE: 118 MMHG | DIASTOLIC BLOOD PRESSURE: 74 MMHG | HEART RATE: 65 BPM | WEIGHT: 190.25 LBS | OXYGEN SATURATION: 96 % | TEMPERATURE: 97 F | HEIGHT: 68 IN | BODY MASS INDEX: 28.83 KG/M2

## 2018-05-23 DIAGNOSIS — J96.01 ACUTE HYPOXEMIC RESPIRATORY FAILURE: Primary | ICD-10-CM

## 2018-05-23 DIAGNOSIS — Z01.818 PRE-OP TESTING: ICD-10-CM

## 2018-05-23 DIAGNOSIS — Z79.01 CHRONIC ANTICOAGULATION: Chronic | ICD-10-CM

## 2018-05-23 LAB
ALBUMIN SERPL BCP-MCNC: 3.2 G/DL
ALP SERPL-CCNC: 111 U/L
ALT SERPL W/O P-5'-P-CCNC: 16 U/L
ANION GAP SERPL CALC-SCNC: 7 MMOL/L
AST SERPL-CCNC: 23 U/L
BILIRUB SERPL-MCNC: 0.6 MG/DL
BUN SERPL-MCNC: 17 MG/DL
CALCIUM SERPL-MCNC: 11.3 MG/DL
CHLORIDE SERPL-SCNC: 110 MMOL/L
CO2 SERPL-SCNC: 25 MMOL/L
CREAT SERPL-MCNC: 1.3 MG/DL
EST. GFR  (AFRICAN AMERICAN): 60 ML/MIN/1.73 M^2
EST. GFR  (NON AFRICAN AMERICAN): 52 ML/MIN/1.73 M^2
GLUCOSE SERPL-MCNC: 49 MG/DL
INR PPP: 1
POTASSIUM SERPL-SCNC: 4 MMOL/L
PROT SERPL-MCNC: 6.3 G/DL
PROTHROMBIN TIME: 10.6 SEC
SODIUM SERPL-SCNC: 142 MMOL/L

## 2018-05-23 PROCEDURE — 36415 COLL VENOUS BLD VENIPUNCTURE: CPT

## 2018-05-23 PROCEDURE — 80053 COMPREHEN METABOLIC PANEL: CPT

## 2018-05-23 PROCEDURE — 85610 PROTHROMBIN TIME: CPT

## 2018-05-23 RX ORDER — ASPIRIN 81 MG/1
81 TABLET ORAL DAILY
COMMUNITY
End: 2018-07-09

## 2018-05-23 NOTE — DISCHARGE INSTRUCTIONS
"  Your surgery is scheduled for __Thursday May 24, 2018__________________.      Please report to SAME DAY SURGERY UNIT on the 2nd FLOOR at _8:00______ a.m.  Use front door entrance. The doors open at 0530 am.      If you need WHEELCHAIR assistance please call  [phone] from your cell phone or "[phone]"  from the  hospital courtesy phone located to the right after you enter the hospital lobby.      INSTRUCTIONS IMPORTANT!!!  ¨ Do not eat or drink after 12 midnight-including water. OK to brush teeth, no   gum, candy or mints!    ¨ Take only these medicines with a small swallow of water-morning of surgery.  Take Amlodipine and Metoprolol with swallow of water.        __x__  Prep instructions:   SHOWER     __x__  Please shower using Hibiclens soap the night before AND  the morning of  your surgery/procedure. Do not use Hibiclens on your face or genitals               If your surgery is around your belly button (Navel) be sure to wash inside your  belly button also. Rinse hibiclens off completely.  __x__  No shaving of procedural area at least 4-5 days before surgery due to  increased risk of skin irritation and/or possible infection.  ____  Do not wear makeup, including mascara. WEARING EYE MAKEUP MAY  LEAD TO SERIOUS EYE INJURY during surgery.  __x__  No powder, lotions or creams to your body.  __x__  You may wear only deodorant on the day of surgery.  __x__  Please remove all jewelry, including piercings and leave at home.  __x__  No money or valuables needed. Please leave at home.  You may bring your cell phone.  ____  Please bring any documents given by your doctor.  _x___  If going home the same day, arrange for a ride home. You will not be able to   drive if Anesthesia was used.  ____  Children under 18 years require a parent / guardian present the entire time   they are in surgery / recovery.  _x___  Wear loose fitting clothing. Allow for dressings, bandages.  _x___  Stop Aspirin, Ibuprofen, Motrin and Aleve at least " 3-5 days before surgery, unless otherwise instructed by your doctor, or the nurse.              You MAY use Tylenol/acetaminophen until day of surgery.  _x___  If you take diabetic medication, do not take am of surgery unless instructed by Doctor.  _x___  Call MD for temperature above 101 degrees.        _x___ Stop taking any Fish Oil supplement or any Vitamins that contain Vitamin  E at least 5 days prior to surgery.          I have read or had read and explained to me, and understand the above information.  Additional comments or instructions:Please call   [phone] if you have any questions regarding the instructions above.

## 2018-05-24 ENCOUNTER — SURGERY (OUTPATIENT)
Age: 78
End: 2018-05-24

## 2018-05-24 PROBLEM — J90 PLEURAL EFFUSION: Status: ACTIVE | Noted: 2018-05-24

## 2018-05-25 ENCOUNTER — INITIAL CONSULT (OUTPATIENT)
Dept: HEMATOLOGY/ONCOLOGY | Facility: CLINIC | Age: 78
End: 2018-05-25
Payer: MEDICARE

## 2018-05-25 VITALS
DIASTOLIC BLOOD PRESSURE: 63 MMHG | OXYGEN SATURATION: 99 % | HEART RATE: 78 BPM | WEIGHT: 192.69 LBS | BODY MASS INDEX: 29.2 KG/M2 | TEMPERATURE: 99 F | HEIGHT: 68 IN | SYSTOLIC BLOOD PRESSURE: 135 MMHG

## 2018-05-25 DIAGNOSIS — N18.30 CKD (CHRONIC KIDNEY DISEASE) STAGE 3, GFR 30-59 ML/MIN: Chronic | ICD-10-CM

## 2018-05-25 DIAGNOSIS — D64.9 ANEMIA, UNSPECIFIED TYPE: Primary | ICD-10-CM

## 2018-05-25 DIAGNOSIS — E83.52 HYPERCALCEMIA: ICD-10-CM

## 2018-05-25 PROCEDURE — 3075F SYST BP GE 130 - 139MM HG: CPT | Mod: CPTII,S$GLB,, | Performed by: INTERNAL MEDICINE

## 2018-05-25 PROCEDURE — 3078F DIAST BP <80 MM HG: CPT | Mod: CPTII,S$GLB,, | Performed by: INTERNAL MEDICINE

## 2018-05-25 PROCEDURE — 99204 OFFICE O/P NEW MOD 45 MIN: CPT | Mod: S$GLB,,, | Performed by: INTERNAL MEDICINE

## 2018-05-25 PROCEDURE — 99999 PR PBB SHADOW E&M-EST. PATIENT-LVL III: CPT | Mod: PBBFAC,,, | Performed by: INTERNAL MEDICINE

## 2018-05-25 NOTE — PROGRESS NOTES
Subjective:       Patient ID: Agus Ramos Jr. is a 77 y.o. male.    Chief Complaint: No chief complaint on file.    HPI   REASON FOR CONSULTATION: ANEMIA  REFERRING PHYSICIAN: Blanco Brunson MD    77-year-old male with past medical history of Congestive heart failure, Coronary artery disease.Diabetes mellitus, AFib Hypertension; MI (myocardial infarction),  Pacemaker; Peripheral vascular disease; S/P CABG x 3;  Stented coronary artery, Tobacco use, CKD stage 3 Anemia in CKD seen today in consultation for anemia.  Patient recently hospitalized with CHF exacerbation found to have low hemoglobin of 6.5 grams/deciliter.  No active bleeding noted. Patient refused a rectal exam.  He underwent 2 units packed red blood cell transfusion with appropriate response to hemoglobin of 8.5.  He was treated for CHF exacerbation.  He underwent chest CT imaging which revealed concern for loculated right pleural effusion.  Patient was followed by pulmonology during hospitalization and has close follow-up planned as an outpatient.  He reports  IR cancelled procedure yesterday  for diagnostic tap.  He reports his recent transfusion was his first.  He has undergone a colonoscopy 10 years ago at [facility] which was unremarkable according to patient.  Appetite and weight stable.  Patient a poor historian appears confused at times.  He is currently in wheelchair he was discharged home on home O2.  He denies shortness of breath cough.  No fatigue.  CBC from 05/13/2018 reveals a white blood cell count of 7.4 hemoglobin of 8 grams/dL hematocrit of 28% MCV of 71 platelet count of 233k.  He is here for further evaluation            Past Medical History:   Diagnosis Date    Anemia 05/03/2018    pending blood transfusion    Anticoagulant long-term use     Congestive heart failure     Coronary artery disease     Diabetes mellitus     Encounter for blood transfusion     Hypertension     MI (myocardial infarction)     Pacemaker     left  "chest    Peripheral vascular disease     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     S/P femoral-popliteal bypass surgery left    Stented coronary artery     Tobacco use        Past Surgical History:   Procedure Laterality Date    CARDIAC CATHETERIZATION      CARDIAC PACEMAKER PLACEMENT      CARDIAC SURGERY      HEMORRHOID SURGERY             Review of Systems   Constitutional: Negative for appetite change, fatigue, fever and unexpected weight change.   HENT: Negative for mouth sores.    Eyes: Negative for visual disturbance.   Respiratory: Negative for cough and shortness of breath.    Cardiovascular: Negative for chest pain.   Gastrointestinal: Negative for abdominal pain and diarrhea.   Genitourinary: Negative for frequency.   Musculoskeletal: Negative for back pain.   Skin: Negative for rash.   Neurological: Negative for headaches.   Hematological: Negative for adenopathy.   Psychiatric/Behavioral: The patient is not nervous/anxious.        Objective:       Vitals:    05/25/18 1008   BP: 135/63   BP Location: Left arm   Patient Position: Sitting   BP Method: Medium (Automatic)   Pulse: 78   Temp: 98.6 °F (37 °C)   TempSrc: Oral   SpO2: 99%   Weight: 87.4 kg (192 lb 10.9 oz)   Height: 5' 8" (1.727 m)       Physical Exam   Constitutional: He is oriented to person, place, and time. He appears well-developed and well-nourished.   HENT:   Head: Normocephalic.   Mouth/Throat: Oropharynx is clear and moist. No oropharyngeal exudate.   Eyes: Conjunctivae are normal. Pupils are equal, round, and reactive to light. No scleral icterus.   Neck: Normal range of motion. Neck supple. No thyromegaly present.   Cardiovascular: Normal rate, regular rhythm and normal heart sounds.    No murmur heard.  Pulmonary/Chest: Effort normal. He has no wheezes. He has no rales.   Bibasilar crackles   Abdominal: Soft. Bowel sounds are normal. He exhibits no distension and no mass. There is no hepatosplenomegaly. There is no tenderness. There is no rebound " and no guarding.   Musculoskeletal: Normal range of motion. He exhibits edema.   Neurological: He is alert and oriented to person, place, and time. No cranial nerve deficit.   Skin: No rash noted. No erythema.   Psychiatric: He has a normal mood and affect.       CT chest w/contrast 5/10/2018   Moderate-sized loculated right pleural effusion clinical correlation advised.    Patchy bibasilar opacities suggesting atelectasis.    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    Multifocal hypodensities within the liver partially visualized which may represent cysts though indeterminate.  Follow-up dedicated hepatic imaging recommended.    Results for ASHLEY GONZALEZ JR. (MRN [id number]) as of 5/25/2018 13:18   Ref. Range 5/13/2018 04:10   WBC Latest Ref Range: 3.90 - 12.70 K/uL 7.45   RBC Latest Ref Range: 4.60 - 6.20 M/uL 3.94 (L)   Hemoglobin Latest Ref Range: 14.0 - 18.0 g/dL 8.0 (L)   Hematocrit Latest Ref Range: 40.0 - 54.0 % 28.0 (L)   MCV Latest Ref Range: 82 - 98 fL 71 (L)   MCH Latest Ref Range: 27.0 - 31.0 pg 20.3 (L)   MCHC Latest Ref Range: 32.0 - 36.0 g/dL 28.6 (L)   RDW Latest Ref Range: 11.5 - 14.5 % 24.6 (H)   Platelets Latest Ref Range: 150 - 350 K/uL 233     Assessment:       1. Anemia, unspecified type    2. CKD Stage 3    3. Hypercalcemia        Plan:   77-year-old with multiple medical diagnoses with longstanding history of anemia in chronic kidney disease with recent hospitalization with progressive microcytic anemia of unknown etiology requiring transfusion support.  Review of laboratory data dating back to at least 2016 reveals a hemoglobin in the range of 12-13 g/dL  Recent hospitalization with acute drop in hemoglobin to 6.3g/dl  with MCV of 65 of unknown etiology..  Iron deficiency anemia is suspected with a low MCV.   It is recommended that he undergo a formal GI evaluation for any evidence of blood loss.   Anticoagulation has been held since his hospitalization.  He is followed by a endocrinology for chronic hypercalcemia related to hyperparathyroidism.  Plan anemia profile testing including CBC, CMP, iron studies, serum free light chains, zinc, copper, LDH, haptoglobin.  Await GI evaluation.  If no evidence of bleeding patient patient develops recurrent anemia he may need to undergo further evaluation for an underlying hematological neoplasm.  All questions posed answered to patient's satisfaction    Thank you for allowing me to participate in the care of your patient.     Cc: MD Keaton Castle MD

## 2018-05-25 NOTE — LETTER
May 30, 2018      Ace Cisneros MD  [address] [place] [address]           [facility]  [address] [place] [address]  Phone: [phone]          Patient: Agus Ramos Jr.   MR Number: [id number]   YOB: 1940   Date of Visit: 5/25/2018       Dear Dr. Ace Cisneros:    Thank you for referring Agus Ramos to me for evaluation. Attached you will find relevant portions of my assessment and plan of care.    If you have questions, please do not hesitate to call me. I look forward to following Agus Ramos along with you.    Sincerely,    Rufus Esparza  CC:  No Recipients    If you would like to receive this communication electronically, please contact [email or web address] or [phone] to request more information on [employer] Link access.    For providers and/or their staff who would like to refer a patient to [facility], please contact us through our one-stop-shop provider referral line, [facility] , at [phone].    If you feel you have received this communication in error or would no longer like to receive these types of communications, please e-mail [email or web address]

## 2018-05-25 NOTE — Clinical Note
"QUEST LABS FROM LAST YR DR. CHUA NOTE - CONSENT DR. MARTINEZ - [facility] RECORDS - CONSENT Fax recent hosp labs to DR. CHUA"S OFFICE Labs prior to f/u "

## 2018-05-27 ENCOUNTER — HOSPITAL ENCOUNTER (INPATIENT)
Facility: HOSPITAL | Age: 78
LOS: 7 days | Discharge: HOME HEALTH | DRG: 291 | End: 2018-06-03
Attending: EMERGENCY MEDICINE | Admitting: INTERNAL MEDICINE
Payer: MEDICARE

## 2018-05-27 DIAGNOSIS — N18.30 CKD (CHRONIC KIDNEY DISEASE) STAGE 3, GFR 30-59 ML/MIN: Chronic | ICD-10-CM

## 2018-05-27 DIAGNOSIS — D50.9 IRON DEFICIENCY ANEMIA, UNSPECIFIED IRON DEFICIENCY ANEMIA TYPE: ICD-10-CM

## 2018-05-27 DIAGNOSIS — I50.9 ACUTE ON CHRONIC CONGESTIVE HEART FAILURE, UNSPECIFIED HEART FAILURE TYPE: Primary | ICD-10-CM

## 2018-05-27 DIAGNOSIS — R06.02 SOB (SHORTNESS OF BREATH): ICD-10-CM

## 2018-05-27 DIAGNOSIS — R09.02 HYPOXIA: ICD-10-CM

## 2018-05-27 LAB
ALBUMIN SERPL BCP-MCNC: 3.1 G/DL
ALP SERPL-CCNC: 117 U/L
ALT SERPL W/O P-5'-P-CCNC: 16 U/L
ANION GAP SERPL CALC-SCNC: 8 MMOL/L
ANISOCYTOSIS BLD QL SMEAR: ABNORMAL
AST SERPL-CCNC: 19 U/L
BACTERIA #/AREA URNS HPF: NORMAL /HPF
BASOPHILS # BLD AUTO: 0.03 K/UL
BASOPHILS NFR BLD: 0.3 %
BILIRUB SERPL-MCNC: 0.5 MG/DL
BILIRUB UR QL STRIP: NEGATIVE
BNP SERPL-MCNC: 626 PG/ML
BUN SERPL-MCNC: 15 MG/DL
CALCIUM SERPL-MCNC: 10.8 MG/DL
CHLORIDE SERPL-SCNC: 109 MMOL/L
CLARITY UR: CLEAR
CO2 SERPL-SCNC: 25 MMOL/L
COLOR UR: YELLOW
CREAT SERPL-MCNC: 1.2 MG/DL
DIFFERENTIAL METHOD: ABNORMAL
EOSINOPHIL # BLD AUTO: 0.1 K/UL
EOSINOPHIL NFR BLD: 1.4 %
ERYTHROCYTE [DISTWIDTH] IN BLOOD BY AUTOMATED COUNT: 23.6 %
EST. GFR  (AFRICAN AMERICAN): >60 ML/MIN/1.73 M^2
EST. GFR  (NON AFRICAN AMERICAN): 57 ML/MIN/1.73 M^2
GLUCOSE SERPL-MCNC: 216 MG/DL
GLUCOSE UR QL STRIP: NEGATIVE
HCT VFR BLD AUTO: 30.4 %
HGB BLD-MCNC: 8.6 G/DL
HGB UR QL STRIP: NEGATIVE
HYALINE CASTS #/AREA URNS LPF: 0 /LPF
HYPOCHROMIA BLD QL SMEAR: ABNORMAL
INR PPP: 1
KETONES UR QL STRIP: NEGATIVE
LEUKOCYTE ESTERASE UR QL STRIP: NEGATIVE
LYMPHOCYTES # BLD AUTO: 0.8 K/UL
LYMPHOCYTES NFR BLD: 8.8 %
MAGNESIUM SERPL-MCNC: 1.9 MG/DL
MCH RBC QN AUTO: 20.1 PG
MCHC RBC AUTO-ENTMCNC: 28.3 G/DL
MCV RBC AUTO: 71 FL
MICROSCOPIC COMMENT: NORMAL
MONOCYTES # BLD AUTO: 0.9 K/UL
MONOCYTES NFR BLD: 9.7 %
NEUTROPHILS # BLD AUTO: 7.6 K/UL
NEUTROPHILS NFR BLD: 80.2 %
NITRITE UR QL STRIP: NEGATIVE
PH UR STRIP: 5 [PH] (ref 5–8)
PLATELET # BLD AUTO: 540 K/UL
PMV BLD AUTO: 9.2 FL
POCT GLUCOSE: 85 MG/DL (ref 70–110)
POIKILOCYTOSIS BLD QL SMEAR: SLIGHT
POLYCHROMASIA BLD QL SMEAR: ABNORMAL
POTASSIUM SERPL-SCNC: 4.5 MMOL/L
PROT SERPL-MCNC: 6.1 G/DL
PROT UR QL STRIP: ABNORMAL
PROTHROMBIN TIME: 10.7 SEC
RBC # BLD AUTO: 4.27 M/UL
RBC #/AREA URNS HPF: 0 /HPF (ref 0–4)
SODIUM SERPL-SCNC: 142 MMOL/L
SP GR UR STRIP: 1.02 (ref 1–1.03)
SQUAMOUS #/AREA URNS HPF: 2 /HPF
TROPONIN I SERPL DL<=0.01 NG/ML-MCNC: 0.1 NG/ML
TROPONIN I SERPL DL<=0.01 NG/ML-MCNC: 0.11 NG/ML
URN SPEC COLLECT METH UR: ABNORMAL
UROBILINOGEN UR STRIP-ACNC: NEGATIVE EU/DL
WBC # BLD AUTO: 9.57 K/UL
WBC #/AREA URNS HPF: 2 /HPF (ref 0–5)

## 2018-05-27 PROCEDURE — 21400001 HC TELEMETRY ROOM

## 2018-05-27 PROCEDURE — 63600175 PHARM REV CODE 636 W HCPCS: Performed by: EMERGENCY MEDICINE

## 2018-05-27 PROCEDURE — 27000221 HC OXYGEN, UP TO 24 HOURS

## 2018-05-27 PROCEDURE — 25000003 PHARM REV CODE 250: Performed by: EMERGENCY MEDICINE

## 2018-05-27 PROCEDURE — 93005 ELECTROCARDIOGRAM TRACING: CPT

## 2018-05-27 PROCEDURE — 96374 THER/PROPH/DIAG INJ IV PUSH: CPT

## 2018-05-27 PROCEDURE — 83880 ASSAY OF NATRIURETIC PEPTIDE: CPT

## 2018-05-27 PROCEDURE — 36415 COLL VENOUS BLD VENIPUNCTURE: CPT

## 2018-05-27 PROCEDURE — 80053 COMPREHEN METABOLIC PANEL: CPT

## 2018-05-27 PROCEDURE — 25000242 PHARM REV CODE 250 ALT 637 W/ HCPCS: Performed by: EMERGENCY MEDICINE

## 2018-05-27 PROCEDURE — 94761 N-INVAS EAR/PLS OXIMETRY MLT: CPT

## 2018-05-27 PROCEDURE — 85610 PROTHROMBIN TIME: CPT

## 2018-05-27 PROCEDURE — 83735 ASSAY OF MAGNESIUM: CPT

## 2018-05-27 PROCEDURE — 84484 ASSAY OF TROPONIN QUANT: CPT | Mod: 91

## 2018-05-27 PROCEDURE — 84484 ASSAY OF TROPONIN QUANT: CPT

## 2018-05-27 PROCEDURE — 94640 AIRWAY INHALATION TREATMENT: CPT

## 2018-05-27 PROCEDURE — 93010 ELECTROCARDIOGRAM REPORT: CPT | Mod: ,,, | Performed by: INTERNAL MEDICINE

## 2018-05-27 PROCEDURE — 99285 EMERGENCY DEPT VISIT HI MDM: CPT | Mod: 25

## 2018-05-27 PROCEDURE — 85025 COMPLETE CBC W/AUTO DIFF WBC: CPT

## 2018-05-27 PROCEDURE — 81000 URINALYSIS NONAUTO W/SCOPE: CPT

## 2018-05-27 RX ORDER — FUROSEMIDE 10 MG/ML
40 INJECTION INTRAMUSCULAR; INTRAVENOUS
Status: COMPLETED | OUTPATIENT
Start: 2018-05-27 | End: 2018-05-27

## 2018-05-27 RX ORDER — ASPIRIN 81 MG/1
81 TABLET ORAL DAILY
Status: DISCONTINUED | OUTPATIENT
Start: 2018-05-28 | End: 2018-06-03 | Stop reason: HOSPADM

## 2018-05-27 RX ORDER — AMLODIPINE BESYLATE 5 MG/1
10 TABLET ORAL DAILY
Status: DISCONTINUED | OUTPATIENT
Start: 2018-05-28 | End: 2018-05-28

## 2018-05-27 RX ORDER — LOSARTAN POTASSIUM 25 MG/1
50 TABLET ORAL DAILY
Status: DISCONTINUED | OUTPATIENT
Start: 2018-05-28 | End: 2018-05-28

## 2018-05-27 RX ORDER — GLIPIZIDE 5 MG/1
10 TABLET ORAL
Status: DISCONTINUED | OUTPATIENT
Start: 2018-05-27 | End: 2018-05-28

## 2018-05-27 RX ORDER — METFORMIN HYDROCHLORIDE 500 MG/1
500 TABLET ORAL 2 TIMES DAILY WITH MEALS
Status: DISCONTINUED | OUTPATIENT
Start: 2018-05-27 | End: 2018-05-28

## 2018-05-27 RX ORDER — ENOXAPARIN SODIUM 100 MG/ML
40 INJECTION SUBCUTANEOUS EVERY 24 HOURS
Status: DISCONTINUED | OUTPATIENT
Start: 2018-05-27 | End: 2018-06-03 | Stop reason: HOSPADM

## 2018-05-27 RX ORDER — FUROSEMIDE 10 MG/ML
40 INJECTION INTRAMUSCULAR; INTRAVENOUS 3 TIMES DAILY
Status: DISCONTINUED | OUTPATIENT
Start: 2018-05-27 | End: 2018-05-28

## 2018-05-27 RX ORDER — IPRATROPIUM BROMIDE AND ALBUTEROL SULFATE 2.5; .5 MG/3ML; MG/3ML
3 SOLUTION RESPIRATORY (INHALATION) EVERY 6 HOURS
Status: DISCONTINUED | OUTPATIENT
Start: 2018-05-27 | End: 2018-05-28

## 2018-05-27 RX ADMIN — GLIPIZIDE 10 MG: 5 TABLET ORAL at 06:05

## 2018-05-27 RX ADMIN — FUROSEMIDE 40 MG: 10 INJECTION, SOLUTION INTRAMUSCULAR; INTRAVENOUS at 08:05

## 2018-05-27 RX ADMIN — FUROSEMIDE 40 MG: 10 INJECTION, SOLUTION INTRAMUSCULAR; INTRAVENOUS at 02:05

## 2018-05-27 RX ADMIN — IPRATROPIUM BROMIDE AND ALBUTEROL SULFATE 3 ML: .5; 2.5 SOLUTION RESPIRATORY (INHALATION) at 07:05

## 2018-05-27 RX ADMIN — METOPROLOL TARTRATE 75 MG: 50 TABLET ORAL at 08:05

## 2018-05-27 RX ADMIN — ENOXAPARIN SODIUM 40 MG: 100 INJECTION SUBCUTANEOUS at 06:05

## 2018-05-27 RX ADMIN — METFORMIN HYDROCHLORIDE 500 MG: 500 TABLET, FILM COATED ORAL at 06:05

## 2018-05-27 RX ADMIN — FUROSEMIDE 40 MG: 10 INJECTION, SOLUTION INTRAMUSCULAR; INTRAVENOUS at 06:05

## 2018-05-27 NOTE — ED PROVIDER NOTES
Encounter Date: 5/27/2018    SCRIBE #1 NOTE: I, En Shala, am scribing for, and in the presence of, RABIA Loyd MD. Other sections scribed: HPI, ROS.       History     Chief Complaint   Patient presents with    Shortness of Breath     sob started today.  Hx COPD and CHF.   + bilat lower ext edema x 3 weeks.   denies chest pains, denies n/v/d or fever.     CC: Shortness of Breath  HPI: This 77 y.o. male with HTN, HLD, DM type II with CKD stage 3, CHF, CAD s/p CABG x3 and PCI, cardiac pacemaker in place, chronic A-Fib, PVD s/p femoral-popliteal hypass surgery, H/o tobacco abuse presents to the ED c/o SOB and bilateral leg swelling that have been gradually building up since he dc'd from this this facility 13 days ago. He reports compliance with his prescribed Lasix and low-sodium diet. He admits that he hhas been drinking a lot of water do to the warm weather. He weighs himself daily, but denies that he is gaining weight. He denies chest pain, fever, chills, abdominal pain.    Per review of medical records, pt was admitted to this hospital on 5/3 for HFpPEF exacerbation and symptomatic anemia and had transfusion, diuresis, TTE with normal EF, and negative NST. He developed pleural effusion during admit, but did not require thoracentesis. He was dc'd home 5/14 on home O2 and taken off Apixaban.      The history is provided by the patient and medical records.     Review of patient's allergies indicates:  No Known Allergies  Past Medical History:   Diagnosis Date    Anemia 05/03/2018    pending blood transfusion    Anticoagulant long-term use     Congestive heart failure     Coronary artery disease     Diabetes mellitus     Encounter for blood transfusion     Hypertension     MI (myocardial infarction)     Pacemaker     left chest    Peripheral vascular disease     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     S/P femoral-popliteal bypass surgery left    Stented coronary artery     Tobacco use      Past Surgical History:    Procedure Laterality Date    CARDIAC CATHETERIZATION      CARDIAC PACEMAKER PLACEMENT      CARDIAC SURGERY      HEMORRHOID SURGERY       Family History   Problem Relation Age of Onset    Diabetes Father     Diabetes Mother      Social History   Substance Use Topics    Smoking status: Former Smoker     Packs/day: 0.25     Years: 60.00     Types: Cigarettes     Quit date: 5/8/2018    Smokeless tobacco: Never Used    Alcohol use No     Review of Systems   Constitutional: Negative for chills and fever.   HENT: Negative for ear pain, rhinorrhea and sore throat.    Eyes: Negative for pain and visual disturbance.   Respiratory: Positive for shortness of breath.    Cardiovascular: Positive for leg swelling. Negative for chest pain.   Gastrointestinal: Negative for abdominal pain, diarrhea, nausea and vomiting.   Genitourinary: Negative for difficulty urinating, dysuria and flank pain.   Musculoskeletal: Negative for arthralgias and myalgias.   Skin: Negative for rash.   Neurological: Negative for dizziness and headaches.       Physical Exam     Initial Vitals [05/27/18 1305]   BP Pulse Resp Temp SpO2   130/81 97 18 97.3 °F (36.3 °C) 95 %      MAP       97.33         Physical Exam    Vitals reviewed.  Constitutional: He appears well-developed and well-nourished.   HENT:   Head: Normocephalic and atraumatic.   Eyes: EOM are normal. Pupils are equal, round, and reactive to light.   Neck: Normal range of motion. Neck supple.   Cardiovascular: Normal rate, normal heart sounds and intact distal pulses. An irregularly irregular rhythm present.   Pulmonary/Chest: He is in respiratory distress. He has wheezes. He has rhonchi.   Abdominal: Soft. Bowel sounds are normal.   Musculoskeletal: Normal range of motion. He exhibits edema (pitting bilateral pretibial: 2+).   Neurological: He is alert and oriented to person, place, and time.   Skin: Skin is warm and dry.   Psychiatric: He has a normal mood and affect.         ED  Course   Procedures  Labs Reviewed   CBC W/ AUTO DIFFERENTIAL - Abnormal; Notable for the following:        Result Value    RBC 4.27 (*)     Hemoglobin 8.6 (*)     Hematocrit 30.4 (*)     MCV 71 (*)     MCH 20.1 (*)     MCHC 28.3 (*)     RDW 23.6 (*)     Platelets 540 (*)     Lymph # 0.8 (*)     Gran% 80.2 (*)     Lymph% 8.8 (*)     All other components within normal limits   COMPREHENSIVE METABOLIC PANEL - Abnormal; Notable for the following:     Glucose 216 (*)     Calcium 10.8 (*)     Albumin 3.1 (*)     eGFR if non  57 (*)     All other components within normal limits   TROPONIN I - Abnormal; Notable for the following:     Troponin I 0.107 (*)     All other components within normal limits   B-TYPE NATRIURETIC PEPTIDE - Abnormal; Notable for the following:      (*)     All other components within normal limits   URINALYSIS - Abnormal; Notable for the following:     Protein, UA 3+ (*)     All other components within normal limits   PROTIME-INR   MAGNESIUM   URINALYSIS MICROSCOPIC     EKG Readings: (Independently Interpreted)   Initial Reading: No STEMI. Rhythm: Atrial Fibrillation.   Rate controlled afib at 75       X-Rays:   Independently Interpreted Readings:   Chest X-Ray: Increased vascular markings consistent with CHF are present.     Medical Decision Making:   History:   Old Medical Records: I decided to obtain old medical records.  Old Records Summarized: records from previous admission(s).  Initial Assessment:   Medical decision-making:    The patient received a medical screening exam. If performed, the EKG was independently evaluated by me and is pending final cardiology evaluation.  If performed, all radiographic studies were independently evaluated by me and are pending final radiology evaluation. If labs were ordered, they were reviewed. Vital signs are independently assessed by me.  If performed, the pulse oximetry was independently evaluated by me.  I decided to obtain the  patient's past medical record.  If available, I reviewed the patient's past medical record, including most recent labs and radiology reports.    ED Management:  Pt sats to 87% on 5L NC with talking and moving. Pulmonary edema on cxr. Will start diuresis and admit to IM.     I discussed the patient's presentation and workup with the hospitalist who agrees with placing the patient in the hospital.  I have placed orders for the hospitalist.   RABIA Loyd M.D. 3:13 PM 5/27/2018              Scribe Attestation:   Scribe #1: I performed the above scribed service and the documentation accurately describes the services I performed. I attest to the accuracy of the note.    Attending Attestation:           Physician Attestation for Scribe:  Physician Attestation Statement for Scribe #1: I, RABIA Loyd MD, reviewed documentation, as scribed by En Last in my presence, and it is both accurate and complete.                    Clinical Impression:   The primary encounter diagnosis was Acute on chronic congestive heart failure, unspecified heart failure type. Diagnoses of SOB (shortness of breath) and Hypoxia were also pertinent to this visit.                           Nils Loyd MD  05/27/18 1518       Nils Loyd MD  05/27/18 1513

## 2018-05-27 NOTE — ED TRIAGE NOTES
Pt reports sob and nonproductive cough x3-4wks. Pt reports swelling to feet and worsening sob with ambulation and laying flat. Pt denies fever and cp

## 2018-05-28 LAB
ANION GAP SERPL CALC-SCNC: 8 MMOL/L
BUN SERPL-MCNC: 15 MG/DL
CALCIUM SERPL-MCNC: 10.8 MG/DL
CHLORIDE SERPL-SCNC: 109 MMOL/L
CO2 SERPL-SCNC: 24 MMOL/L
CREAT SERPL-MCNC: 1 MG/DL
EST. GFR  (AFRICAN AMERICAN): >60 ML/MIN/1.73 M^2
EST. GFR  (NON AFRICAN AMERICAN): >60 ML/MIN/1.73 M^2
GLUCOSE SERPL-MCNC: 48 MG/DL
POCT GLUCOSE: 147 MG/DL (ref 70–110)
POCT GLUCOSE: 194 MG/DL (ref 70–110)
POCT GLUCOSE: 89 MG/DL (ref 70–110)
POCT GLUCOSE: 98 MG/DL (ref 70–110)
POTASSIUM SERPL-SCNC: 3.9 MMOL/L
SODIUM SERPL-SCNC: 141 MMOL/L
TROPONIN I SERPL DL<=0.01 NG/ML-MCNC: 0.1 NG/ML

## 2018-05-28 PROCEDURE — 99223 1ST HOSP IP/OBS HIGH 75: CPT | Mod: ,,, | Performed by: INTERNAL MEDICINE

## 2018-05-28 PROCEDURE — 94640 AIRWAY INHALATION TREATMENT: CPT

## 2018-05-28 PROCEDURE — 25000003 PHARM REV CODE 250: Performed by: INTERNAL MEDICINE

## 2018-05-28 PROCEDURE — 25000003 PHARM REV CODE 250: Performed by: EMERGENCY MEDICINE

## 2018-05-28 PROCEDURE — 21400001 HC TELEMETRY ROOM

## 2018-05-28 PROCEDURE — 27000221 HC OXYGEN, UP TO 24 HOURS

## 2018-05-28 PROCEDURE — 25000242 PHARM REV CODE 250 ALT 637 W/ HCPCS: Performed by: EMERGENCY MEDICINE

## 2018-05-28 PROCEDURE — 63600175 PHARM REV CODE 636 W HCPCS: Performed by: EMERGENCY MEDICINE

## 2018-05-28 PROCEDURE — 63600175 PHARM REV CODE 636 W HCPCS: Performed by: HOSPITALIST

## 2018-05-28 PROCEDURE — 36415 COLL VENOUS BLD VENIPUNCTURE: CPT

## 2018-05-28 PROCEDURE — 84484 ASSAY OF TROPONIN QUANT: CPT

## 2018-05-28 PROCEDURE — 25000003 PHARM REV CODE 250: Performed by: HOSPITALIST

## 2018-05-28 PROCEDURE — 94761 N-INVAS EAR/PLS OXIMETRY MLT: CPT

## 2018-05-28 PROCEDURE — 80048 BASIC METABOLIC PNL TOTAL CA: CPT

## 2018-05-28 PROCEDURE — 25000242 PHARM REV CODE 250 ALT 637 W/ HCPCS: Performed by: INTERNAL MEDICINE

## 2018-05-28 RX ORDER — IPRATROPIUM BROMIDE AND ALBUTEROL SULFATE 2.5; .5 MG/3ML; MG/3ML
3 SOLUTION RESPIRATORY (INHALATION) EVERY 6 HOURS
Status: DISCONTINUED | OUTPATIENT
Start: 2018-05-28 | End: 2018-06-03 | Stop reason: HOSPADM

## 2018-05-28 RX ORDER — GLUCAGON 1 MG
1 KIT INJECTION
Status: DISCONTINUED | OUTPATIENT
Start: 2018-05-28 | End: 2018-06-03 | Stop reason: HOSPADM

## 2018-05-28 RX ORDER — LOSARTAN POTASSIUM 25 MG/1
75 TABLET ORAL DAILY
Status: DISCONTINUED | OUTPATIENT
Start: 2018-05-28 | End: 2018-05-28

## 2018-05-28 RX ORDER — FUROSEMIDE 10 MG/ML
40 INJECTION INTRAMUSCULAR; INTRAVENOUS 2 TIMES DAILY
Status: DISCONTINUED | OUTPATIENT
Start: 2018-05-28 | End: 2018-05-29

## 2018-05-28 RX ORDER — GUAIFENESIN/DEXTROMETHORPHAN 100-10MG/5
5 SYRUP ORAL EVERY 4 HOURS PRN
Status: DISCONTINUED | OUTPATIENT
Start: 2018-05-28 | End: 2018-06-03 | Stop reason: HOSPADM

## 2018-05-28 RX ORDER — LOSARTAN POTASSIUM 25 MG/1
100 TABLET ORAL DAILY
Status: DISCONTINUED | OUTPATIENT
Start: 2018-05-28 | End: 2018-06-03 | Stop reason: HOSPADM

## 2018-05-28 RX ORDER — IBUPROFEN 200 MG
24 TABLET ORAL
Status: DISCONTINUED | OUTPATIENT
Start: 2018-05-28 | End: 2018-06-03 | Stop reason: HOSPADM

## 2018-05-28 RX ORDER — IBUPROFEN 200 MG
16 TABLET ORAL
Status: DISCONTINUED | OUTPATIENT
Start: 2018-05-28 | End: 2018-06-03 | Stop reason: HOSPADM

## 2018-05-28 RX ORDER — INSULIN ASPART 100 [IU]/ML
1-10 INJECTION, SOLUTION INTRAVENOUS; SUBCUTANEOUS
Status: DISCONTINUED | OUTPATIENT
Start: 2018-05-28 | End: 2018-06-03 | Stop reason: HOSPADM

## 2018-05-28 RX ADMIN — METOPROLOL TARTRATE 75 MG: 50 TABLET ORAL at 09:05

## 2018-05-28 RX ADMIN — FUROSEMIDE 40 MG: 10 INJECTION, SOLUTION INTRAMUSCULAR; INTRAVENOUS at 09:05

## 2018-05-28 RX ADMIN — FUROSEMIDE 40 MG: 10 INJECTION, SOLUTION INTRAMUSCULAR; INTRAVENOUS at 08:05

## 2018-05-28 RX ADMIN — GUAIFENESIN AND DEXTROMETHORPHAN 5 ML: 100; 10 SYRUP ORAL at 09:05

## 2018-05-28 RX ADMIN — IPRATROPIUM BROMIDE AND ALBUTEROL SULFATE 3 ML: .5; 2.5 SOLUTION RESPIRATORY (INHALATION) at 12:05

## 2018-05-28 RX ADMIN — ENOXAPARIN SODIUM 40 MG: 100 INJECTION SUBCUTANEOUS at 05:05

## 2018-05-28 RX ADMIN — ASPIRIN 81 MG: 81 TABLET, COATED ORAL at 08:05

## 2018-05-28 RX ADMIN — IPRATROPIUM BROMIDE AND ALBUTEROL SULFATE 3 ML: .5; 2.5 SOLUTION RESPIRATORY (INHALATION) at 07:05

## 2018-05-28 RX ADMIN — LOSARTAN POTASSIUM 100 MG: 25 TABLET, FILM COATED ORAL at 09:05

## 2018-05-28 RX ADMIN — METOPROLOL TARTRATE 75 MG: 50 TABLET ORAL at 08:05

## 2018-05-28 RX ADMIN — IPRATROPIUM BROMIDE AND ALBUTEROL SULFATE 3 ML: .5; 2.5 SOLUTION RESPIRATORY (INHALATION) at 01:05

## 2018-05-28 NOTE — HPI
Agus Ramos Jr. is a 77 y.o. male that (in part)  has a past medical history of Anemia; Anticoagulant long-term use; Congestive heart failure; Coronary artery disease; Diabetes mellitus; Encounter for blood transfusion; Hypertension; MI (myocardial infarction); Pacemaker; Peripheral vascular disease; S/P CABG x 3; S/P femoral-popliteal bypass surgery; Stented coronary artery; and Tobacco use.  has a past surgical history that includes Cardiac surgery; Cardiac pacemaker placement; Cardiac catheterization; and Hemorrhoid surgery.  Presents to [facility] complaining of recurrent shortness of breath.  Patient was discharged to the Hospital approximately 2 weeks ago after being treated for 12 days for an acute CHF exacerbation and symptomatic anemia.  Was also complicated with hypoxia and requirement for home oxygen prior to discharge.  He has shortness of breath at rest and dyspnea with exertion.  Worsened with movement.  Minimally relieved with rest and supplemental oxygen.  Moderate to severe intensity.  Associated with unintentional weight gain.  Denies chest pain.  Daily frequency.  Constant duration.    In the emergency department chest x-ray, routine laboratory studies, EKG, cardiac enzymes were obtained.  There is concern the patient was having recurrent acute on chronic diastolic heart failure.  He was given Lasix in the ED.    Hospital medicine has been asked to admit for further evaluation and treatment.

## 2018-05-28 NOTE — ASSESSMENT & PLAN NOTE
Suspect exac of HFpEF, ddx includes bronchitis  Recent testing earlier this month, no plan to repeat echo or MPI  Likely med/diet noncompliance  Inc IV lasix to 80mg bid  Cont GDMT: ASA/Statin/BBl/ARB  Change metoprolol to coreg 25mg bid  Next drug to add: aldact  Consider GI eval for etiology of anemia

## 2018-05-28 NOTE — PLAN OF CARE
Problem: Diabetes, Type 2 (Adult)  Goal: Signs and Symptoms of Listed Potential Problems Will be Absent, Minimized or Managed (Diabetes, Type 2)  Signs and symptoms of listed potential problems will be absent, minimized or managed by discharge/transition of care (reference Diabetes, Type 2 (Adult) CPG).   Outcome: Ongoing (interventions implemented as appropriate)  Blood sugar checks AC/HS, covered with insulin as needed.    Problem: Fall Risk (Adult)  Goal: Absence of Falls  Patient will demonstrate the desired outcomes by discharge/transition of care.   Outcome: Ongoing (interventions implemented as appropriate)  Patient remains free of falls, call bell within reach, bed alarm on.

## 2018-05-28 NOTE — ASSESSMENT & PLAN NOTE
?Acute anemia noted on last admit  Currently stable vs discharge, but much lower than prior baseline  Consider GI eval

## 2018-05-28 NOTE — H&P
[facility] Hospital Medicine  History & Physical    Patient Name: Agus Ramos Jr.  MRN: [id number]  Admission Date: 05/28/2018  Attending Physician: Blanco Hardy MD, MPH      PCP:     Keaton Hardy MD    CC:     Chief Complaint   Patient presents with    Shortness of Breath     sob started today.  Hx COPD and CHF.   + bilat lower ext edema x 3 weeks.   denies chest pains, denies n/v/d or fever.       HISTORY OF PRESENT ILLNESS:     Agus Ramos Jr. is a 77 y.o. male that (in part)  has a past medical history of Anemia; Anticoagulant long-term use; Congestive heart failure; Coronary artery disease; Diabetes mellitus; Encounter for blood transfusion; Hypertension; MI (myocardial infarction); Pacemaker; Peripheral vascular disease; S/P CABG x 3; S/P femoral-popliteal bypass surgery; Stented coronary artery; and Tobacco use.  has a past surgical history that includes Cardiac surgery; Cardiac pacemaker placement; Cardiac catheterization; and Hemorrhoid surgery.  Presents to [facility] Emergency Department complaining of recurrent shortness of breath.  Patient was discharged to the Hospital approximately 2 weeks ago after being treated for 12 days for an acute CHF exacerbation and symptomatic anemia.  Was also complicated with hypoxia and requirement for home oxygen prior to discharge.  He has shortness of breath at rest and dyspnea with exertion.  Worsened with movement.  Minimally relieved with rest and supplemental oxygen.  Moderate to severe intensity.  Associated with unintentional weight gain.  Denies chest pain.  Daily frequency.  Constant duration.    In the emergency department chest x-ray, routine laboratory studies, EKG, cardiac enzymes were obtained.  There is concern the patient was having recurrent acute on chronic diastolic heart failure.  He was given Lasix in the ED.    Hospital medicine has been asked to admit for further evaluation and  treatment.     ==============================================================================  Hospital Course from May 3, 2018-May 15, 2018:   Admitted with concern for symptomatic anemia and HFpEF exacerbation. Found to have low Hgb 6.5, no bleeding identified though patient refused and continues to refuse rectal exam. Transfused 2U with appropriate response to Hgb 8.5. Continued shortness of breath symptoms after transfusion. Started diuresis for CHF exacerbation. TTE 5/4/2018 with normal EF, NST negative for ischemia but does show scar. Still requiring O2. Continuing diuresis.      5/8- pt with brief period of confusion, refused labs this morning but was oriented x3 after I sat down and spoke with him for awhile. Still requiring 2 liters oxygen and edema to ANGY Jennifer with wife, Jessika vias phone and she said he called her today and was not confused and oriented. Will check urine culture and continue diuresis.   5/9 - urine culture growing staph, start rocephin, no acute changes on CXR; schedule nebs and continue to wean oxygen as tolerated   5/10- urine culture now shows no growth?, waiting to hear from lab; calcium persistently high, hypercalcemia workup pending, chest CT pending, pt still requiring oxygen despite aggressive diuresis and pulmonary care   5/11- Chest CT concern for right pleural effusion ?loculated; elevated calcium more concern for malignancy - d/w IR and not enough fluid to drain but can jackelyn diagnostic tap, will broaden antibiotics x 24 hours, wait for further studies, schedule diagnostic tap next week, off eliquis on lovenox bridge   5/12 - test for home oxygen with significant drop in O2 saturation to 54% on room air with exercise; Chest CTA did not show significant findings except small pleural effusion that could explain hypoxia, pulmonology consulted, off anticoagulant for potential diagnostic thoracentesis   5/13 - pulmonology consulted; started on spiriva, plan to dc home with home  oxygen  5/14 - insurance has not authorized oxygen yet, continue IV lasix ,  Patient has been discharged with home oxygen and  follow up with Pulmonology and PCP as out patient.     REVIEW OF SYSTEMS:     -- Constitutional: No fever or chills.  Generalized fatigue and weakness.  -- Eyes: No visual changes, diplopia, pain, tearing, blind spots, or discharge.   -- Ears, nose, mouth, throat, and face: No congestion, sore throat, epistaxis, d/c, bleeding gums, neck stiffness masses, or dental issues.  -- Respiratory: As above in history of present illness.  -- Cardiovascular: As above in history of present illness.   -- Gastrointestinal: No vomiting, abdominal pain, hematemesis, melena, dyspepsia, or change in bowel habits.  -- Genitourinary: Decreased urine output.  No hematuria, dysuria, frequency, urgency, nocturia, polyuria, stones, or incontinence.  -- Integument/breast: No rash, pruritis, pigmentation changes, dryness, or changes in hair  -- Hematologic/lymphatic:  + Easy bruising.  Denies lymphadenopathy.   -- Musculoskeletal: Chronic arthralgias.  No acute arthralgias, acute myalgias, joint swelling, acute limitations of ROM  -- Neurological: No seizures, headaches, incoordination, paraesthesias, ataxia, vertigo, or tremors.  -- Behavioral/Psych: No auditory or visual hallucinations, depression, or suicidal/homicidal ideations.  -- Endocrine: Positive for unintentional weight gain.  No heat or cold intolerance, polydipsia,  -- Allergy/Immunologic: No recurrent infections or adverse reaction to food, insects, or difficulty breathing.        PAST MEDICAL / SURGICAL HISTORY:     Past Medical History:   Diagnosis Date    Anemia 05/03/2018    pending blood transfusion    Anticoagulant long-term use     Congestive heart failure     Coronary artery disease     Diabetes mellitus     Encounter for blood transfusion     Hypertension     MI (myocardial infarction)     Pacemaker     left chest    Peripheral  vascular disease     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     S/P femoral-popliteal bypass surgery left    Stented coronary artery     Tobacco use      Past Surgical History:   Procedure Laterality Date    CARDIAC CATHETERIZATION      CARDIAC PACEMAKER PLACEMENT      CARDIAC SURGERY      HEMORRHOID SURGERY           FAMILY HISTORY:     Family History   Problem Relation Age of Onset    Diabetes Father     Diabetes Mother          SOCIAL HISTORY:     Social History     Social History    Marital status:      Spouse name: N/A    Number of children: N/A    Years of education: N/A     Social History Main Topics    Smoking status: Former Smoker     Packs/day: 0.25     Years: 60.00     Types: Cigarettes     Quit date: 5/8/2018    Smokeless tobacco: Never Used    Alcohol use No    Drug use: No    Sexual activity: Not Currently     Partners: Female     Other Topics Concern    None     Social History Narrative    None         ALLERGIES:       Review of patient's allergies indicates:  No Known Allergies      HOME MEDICATIONS:     Prior to Admission medications    Medication Sig Start Date End Date Taking? Authorizing Provider   albuterol-ipratropium 2.5mg-0.5mg/3mL (DUO-NEB) 0.5 mg-3 mg(2.5 mg base)/3 mL nebulizer solution Take 3 mLs by nebulization every 6 (six) hours. Rescue 5/15/18 5/15/19  Ace Cisneros MD   amlodipine (NORVASC) 10 MG tablet Take 10 mg by mouth once daily.    Historical Provider, MD   aspirin (ECOTRIN) 81 MG EC tablet Take 81 mg by mouth once daily.    Historical Provider, MD   fish oil-omega-3 fatty acids 300-1,000 mg capsule Take 2 g by mouth 2 (two) times daily.     Historical Provider, MD   furosemide (LASIX) 40 MG tablet Take 1 tablet (40 mg total) by mouth once daily.  Patient taking differently: Take 40 mg by mouth once daily. 1 tab in the morning  1 in the evening every other day 9/28/16   Ace Cisneros MD   glipiZIDE (GLUCOTROL) 5 MG tablet Take 10 mg by mouth 2 (two) times  daily before meals.    Historical Provider, MD   losartan (COZAAR) 50 MG tablet Take 50 mg by mouth once daily.    Historical Provider, MD   metFORMIN (GLUCOPHAGE) 500 MG tablet Take 500 mg by mouth 2 (two) times daily with meals.    Historical Provider, MD   metoprolol tartrate (LOPRESSOR) 50 MG tablet Take 75 mg by mouth 2 (two) times daily.     Historical Provider, MD   nitroGLYCERIN (NITROSTAT) 0.4 MG SL tablet Place 0.4 mg under the tongue every 5 (five) minutes as needed.    Historical Provider, MD   polyethylene glycol (GLYCOLAX) 17 gram PwPk Take by mouth as needed.     Historical Provider, MD          HOSPITAL MEDICATIONS:     Scheduled Meds:    albuterol-ipratropium  3 mL Nebulization Q6H    amLODIPine  10 mg Oral Daily    aspirin  81 mg Oral Daily    enoxaparin  40 mg Subcutaneous Daily    furosemide  40 mg Intravenous BID    losartan  50 mg Oral Daily    metoprolol tartrate  75 mg Oral BID     Continuous Infusions:   PRN Meds: dextrose 50%, dextrose 50%, glucagon (human recombinant), glucose, glucose, insulin aspart U-100, pneumoc 13-alan conj-dip cr(PF)      PHYSICAL EXAM:     Wt Readings from Last 1 Encounters:   05/27/18 1700 89.5 kg (197 lb 5 oz)   05/27/18 1305 83.9 kg (185 lb)     Body mass index is 29.14 kg/m².  Vitals:    05/27/18 1928 05/27/18 2052 05/28/18 0027 05/28/18 0030   BP:  (!) 134/98  (!) 142/69   BP Location:  Left arm  Left arm   Patient Position:  Sitting  Sitting   Pulse: 81 79 74 77   Resp: 14 17 14 17   Temp:  97.8 °F (36.6 °C)  97.6 °F (36.4 °C)   TempSrc:  Oral  Oral   SpO2: 98% (!) 93% 95% 95%   Weight:       Height:              -- General appearance: well developed. appears stated age   -- Head: normocephalic, atraumatic   -- Eyes: conjunctivae clear. Extraocular muscles intact  -- Nose: Nares normal. Septum midline.   -- Mouth/Throat: lips, mucosa, and tongue normal. no throat erythema.   -- Neck: supple, symmetrical, trachea midline, no JVD and thyroid not grossly  enlarged, appears symmetric  -- Lungs: Decreased breath sounds auscultation bilaterally with poor air excursion and prolonged expiratory phase.  Positive for bibasilar inspiratory crackles. normal respiratory effort. No use of accessory muscles.   -- Chest wall: + Pacemaker in situ.  no tenderness. equal bilateral chest rise   -- Heart: regular rate and regular rhythm. S1, S2 normal.  no click, rub or gallop   -- Abdomen: soft, non-tender, non-distended, non-tympanic; bowel sounds normal; no masses  -- Extremities: 1+ bilateral lower extremity edema to the level of the midcalf.   no cyanosis, clubbing .   -- Pulses: 2+ and symmetric   -- Skin: color normal, texture normal, turgor normal. No rashes or lesions.   -- Neurologic: Globally decreased muscle strength and tone. No focal numbness or weakness. CNII-XII intact. Renetta coma scale: eyes open spontaneously-4, oriented & converses-5, obeys commands-6.      LABORATORY STUDIES:     Recent Results (from the past 36 hour(s))   CBC auto differential    Collection Time: 05/27/18  1:30 PM   Result Value Ref Range    WBC 9.57 3.90 - 12.70 K/uL    RBC 4.27 (L) 4.60 - 6.20 M/uL    Hemoglobin 8.6 (L) 14.0 - 18.0 g/dL    Hematocrit 30.4 (L) 40.0 - 54.0 %    MCV 71 (L) 82 - 98 fL    MCH 20.1 (L) 27.0 - 31.0 pg    MCHC 28.3 (L) 32.0 - 36.0 g/dL    RDW 23.6 (H) 11.5 - 14.5 %    Platelets 540 (H) 150 - 350 K/uL    MPV 9.2 9.2 - 12.9 fL    Gran # (ANC) 7.6 1.8 - 7.7 K/uL    Lymph # 0.8 (L) 1.0 - 4.8 K/uL    Mono # 0.9 0.3 - 1.0 K/uL    Eos # 0.1 0.0 - 0.5 K/uL    Baso # 0.03 0.00 - 0.20 K/uL    Gran% 80.2 (H) 38.0 - 73.0 %    Lymph% 8.8 (L) 18.0 - 48.0 %    Mono% 9.7 4.0 - 15.0 %    Eosinophil% 1.4 0.0 - 8.0 %    Basophil% 0.3 0.0 - 1.9 %    Aniso Moderate     Poik Slight     Poly Occasional     Hypo Moderate     Differential Method Automated    Comprehensive metabolic panel    Collection Time: 05/27/18  1:30 PM   Result Value Ref Range    Sodium 142 136 - 145 mmol/L    Potassium  4.5 3.5 - 5.1 mmol/L    Chloride 109 95 - 110 mmol/L    CO2 25 23 - 29 mmol/L    Glucose 216 (H) 70 - 110 mg/dL    BUN, Bld 15 8 - 23 mg/dL    Creatinine 1.2 0.5 - 1.4 mg/dL    Calcium 10.8 (H) 8.7 - 10.5 mg/dL    Total Protein 6.1 6.0 - 8.4 g/dL    Albumin 3.1 (L) 3.5 - 5.2 g/dL    Total Bilirubin 0.5 0.1 - 1.0 mg/dL    Alkaline Phosphatase 117 55 - 135 U/L    AST 19 10 - 40 U/L    ALT 16 10 - 44 U/L    Anion Gap 8 8 - 16 mmol/L    eGFR if African American >60 >60 mL/min/1.73 m^2    eGFR if non African American 57 (A) >60 mL/min/1.73 m^2   Troponin I    Collection Time: 05/27/18  1:30 PM   Result Value Ref Range    Troponin I 0.107 (H) 0.000 - 0.026 ng/mL   Brain natriuretic peptide    Collection Time: 05/27/18  1:30 PM   Result Value Ref Range     (H) 0 - 99 pg/mL   Protime-INR    Collection Time: 05/27/18  1:30 PM   Result Value Ref Range    Prothrombin Time 10.7 9.0 - 12.5 sec    INR 1.0 0.8 - 1.2   Magnesium    Collection Time: 05/27/18  1:30 PM   Result Value Ref Range    Magnesium 1.9 1.6 - 2.6 mg/dL   Urinalysis    Collection Time: 05/27/18  2:05 PM   Result Value Ref Range    Specimen UA Urine, Clean Catch     Color, UA Yellow Yellow, Straw, Wendy    Appearance, UA Clear Clear    pH, UA 5.0 5.0 - 8.0    Specific Gravity, UA 1.020 1.005 - 1.030    Protein, UA 3+ (A) Negative    Glucose, UA Negative Negative    Ketones, UA Negative Negative    Bilirubin (UA) Negative Negative    Occult Blood UA Negative Negative    Nitrite, UA Negative Negative    Urobilinogen, UA Negative <2.0 EU/dL    Leukocytes, UA Negative Negative   Urinalysis Microscopic    Collection Time: 05/27/18  2:05 PM   Result Value Ref Range    RBC, UA 0 0 - 4 /hpf    WBC, UA 2 0 - 5 /hpf    Bacteria, UA None None-Occ /hpf    Squam Epithel, UA 2 /hpf    Hyaline Casts, UA 0 0-1/lpf /lpf    Microscopic Comment SEE COMMENT    POCT glucose    Collection Time: 05/27/18  9:26 PM   Result Value Ref Range    POCT Glucose 85 70 - 110 mg/dL    Troponin I    Collection Time: 05/27/18  9:48 PM   Result Value Ref Range    Troponin I 0.099 (H) 0.000 - 0.026 ng/mL       Lab Results   Component Value Date    INR 1.0 05/27/2018    INR 1.0 05/23/2018    INR 1.2 05/03/2018     Lab Results   Component Value Date    HGBA1C 6.2 (H) 05/04/2018     Recent Labs      05/27/18   2126   POCTGLUCOSE  85           IMAGING:     Imaging Results          X-Ray Chest AP Portable (Final result)  Result time 05/27/18 14:55:44    Final result by Blossom Morgan MD (05/27/18 14:55:44)                 Impression:      Cardiac decompensation.      Electronically signed by: Blossom Morgan MD  Date:    05/27/2018  Time:    14:55             Narrative:    EXAMINATION:  XR CHEST AP PORTABLE    CLINICAL HISTORY:  Shortness of breath    TECHNIQUE:  Single frontal view of the chest was performed.    COMPARISON:  05/10/2018    FINDINGS:  Cardiomegaly with pulmonary vascular congestion and mild to moderate pulmonary edema.  Small bilateral pleural effusions are present.  Left-sided pacemaker device is in place.  Multiple fractured sternal wires are noted, stable.  Calcified atheromatous disease affects the tortuous and dilated aortic knob.                                  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       Acute CHF exacerbation  · Evidenced by history, elevated BNP, pulmonary edema, peripheral edema  · Provide diuresis w/ IV medication  · Maintain w/ beta-blocker  · ACE inhibitor or ARB if GFR allows and remains stable  · If 1) Patient cannot tolerate ACEi or 2) NYHA class III or above, add spironolactone (or eplerenone) and hydralazine-isosorbide dinitrate   · Daily Weights  · Strict I/O  · Fluid restriction to 1,500cc daily  · Low-sodium cardiac diet  · Obtain 2D echo if <6 months     EF   Date Value Ref Range Status   05/04/2018 50 55 - 65    09/24/2016 60 55 - 65      · Chest X-ray  · Check TSH, albumin, UA, and renal function  · EKG and cardiac enzymes PRN  · DVT prophylaxis w/ pharmacological and/or mechanical measures  · Oxygen supplementation support PRN  · Cardiology consult for further evaluation and recommendations    Instructions given to patient/family:  Monitor daily weight.  Regular activity within patient's limitations.  Low salt, low fat and low choleterol diet and restrict fluid < 2L per day.  Call MD if SOB, chest pain, weight gain > 2-3 lbs per day and/or 5-6 lbs per week.   No smoking. Annual influenza vaccine required.    Minimally elevated troponin level in the setting of Coronary artery disease, CAD, and CHF  · Equivocal evidence of acute coronary syndrome at this time, but no evidence of acute ischemia on EKG.  Troponin level trend is flat overall  · Maintain adequate blood pressure control  · Heart healthy diet  · Aspirin  · Statin    Chronic kidney disease  · Renal dose medications  · Avoid nephrotoxic agents  · Maintain euvolemic state    Diabetes Mellitus Type 2  · BG is slightly above  acceptable range at this time; insulin given   · Maintain w/ subcutaneous insulin management order set  · Hold oral diabetic meds  · ADA 1800 kcal diet  · BG goal while in patient is <180mg/dL  · HgA1c = Pending    Hypertension  · Goal while inpatient is a systolic blood  pressure less than 160mmHg  · BP in acceptable range at this time  · Continue current home regimen with hold parameters  · PRN antihypertensives available    Hyperlipidemia   · Lipid panel - as an outpatient  · Cardiac diet  · Continue statin    Tobacco abuse   · Chronic tobacco use  · Tobacco cessation counseling  · Nicotine patch offered; consider Wellbutrin or Chantix through PCP as an outpatient (will require closer monitoring)  · I discussed with the patient regarding the hazardous effects of smoking on increasing risk of heart attack and stroke, worsening lung functions, and increasing cancer risk.   Patient was urged to stop smoking now.  I also offered nicotine taper (such as nicotine patch and gum) to help ease the craving to smoke.    Paroxysmal atrial fibrillation  · Currently rate controlled  · Maintain with beta-blocker with hold parameters  · Monitor on telemetry  · Maintain magnesium around 2.0  · Maintain potassium around 4.0    Chronic anticoagulation  · For treatment of paroxysmal atrial fibrillation  · Continue warfarin.  · Monitor INR closely          VTE Risk Mitigation         Ordered     IP VTE HIGH RISK PATIENT  Once      05/27/18 1729     Place DENIS hose  Until discontinued      05/27/18 1729     Place sequential compression device  Until discontinued      05/27/18 1729     enoxaparin injection 40 mg  Daily      05/27/18 1729            Adult PRN medications available   DVT prophylaxis given       DISPOSITION:     Will admit to the Hospital Medicine service for further evaluation and treatment.    Chart reviewed and updated where applicable.    High Risk Conditions:  Patient has a condition that poses threat to life and bodily function: Acute CHF exacerbation      ===============================================================    Blanco Hardy MD, MPH  Department of Hospital Medicine   [facility]  [phone] pg  (7pm - 6am)          This note is dictated using  [employer] 360 voice recognition software.  There are word recognition mistakes that are occasionally missed on review.

## 2018-05-28 NOTE — HPI
77 y.o. male that (in part)  has a past medical history of Anemia; Anticoagulant long-term use; Congestive heart failure; Coronary artery disease; Diabetes mellitus; Encounter for blood transfusion; Hypertension; MI (myocardial infarction); Pacemaker; Peripheral vascular disease; S/P CABG x 3; S/P femoral-popliteal bypass surgery; Stented coronary artery; and Tobacco use.  has a past surgical history that includes Cardiac surgery; Cardiac pacemaker placement; Cardiac catheterization; and Hemorrhoid surgery.  Presents to [facility] Emergency Department complaining of recurrent shortness of breath.  Patient was discharged to the Hospital approximately 2 weeks ago after being treated for 12 days for an acute CHF exacerbation and symptomatic anemia.  Was also complicated with hypoxia and requirement for home oxygen prior to discharge.  He has shortness of breath at rest and dyspnea with exertion.  Worsened with movement.  Minimally relieved with rest and supplemental oxygen.  Moderate to severe intensity.  Associated with unintentional weight gain.  Denies chest pain.  Daily frequency.  Constant duration.  In the emergency department chest x-ray, routine laboratory studies, EKG, cardiac enzymes were obtained.  There is concern the patient was having recurrent acute on chronic diastolic heart failure.  He was given Lasix in the ED.  Hospital medicine has been asked to admit for further evaluation and treatment.     Pt followed by [facility] with hx of CAD/CABG and Biotronik PPM.  Seen by Dr. Mathias during recent admission earlier this month.  Testing during that admission was neg.  He reports sob associated with nonprod cough and LE edema.  He denies med noncompliance but is currently salting his food.  He had no anginal sxs.  He reports ?compliance with eliquis and has had no BRBPR or melena.  Still anemic.  Poor historian in regards to his prior medical  care.    ==============================================================================  Hospital Course from May 3, 2018-May 15, 2018:   Admitted with concern for symptomatic anemia and HFpEF exacerbation. Found to have low Hgb 6.5, no bleeding identified though patient refused and continues to refuse rectal exam. Transfused 2U with appropriate response to Hgb 8.5. Continued shortness of breath symptoms after transfusion. Started diuresis for CHF exacerbation. TTE 5/4/2018 with normal EF, NST negative for ischemia but does show scar. Still requiring O2. Continuing diuresis.   5/8- pt with brief period of confusion, refused labs this morning but was oriented x3 after I sat down and spoke with him for awhile. Still requiring 2 liters oxygen and edema to ANGY Jennifer with wife, Jessika vias phone and she said he called her today and was not confused and oriented. Will check urine culture and continue diuresis.   5/9 - urine culture growing staph, start rocephin, no acute changes on CXR; schedule nebs and continue to wean oxygen as tolerated   5/10- urine culture now shows no growth?, waiting to hear from lab; calcium persistently high, hypercalcemia workup pending, chest CT pending, pt still requiring oxygen despite aggressive diuresis and pulmonary care   5/11- Chest CT concern for right pleural effusion ?loculated; elevated calcium more concern for malignancy - d/w IR and not enough fluid to drain but can jackelyn diagnostic tap, will broaden antibiotics x 24 hours, wait for further studies, schedule diagnostic tap next week, off eliquis on lovenox bridge   5/12 - test for home oxygen with significant drop in O2 saturation to 54% on room air with exercise; Chest CTA did not show significant findings except small pleural effusion that could explain hypoxia, pulmonology consulted, off anticoagulant for potential diagnostic thoracentesis   5/13 - pulmonology consulted; started on spiriva, plan to dc home with home oxygen  5/14 -  insurance has not authorized oxygen yet, continue IV lasix ,  Patient has been discharged with home oxygen and  follow up with Pulmonology and PCP as out patient.

## 2018-05-28 NOTE — SUBJECTIVE & OBJECTIVE
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      Current Outpatient Prescriptions on File Prior to Encounter   Medication Sig    albuterol-ipratropium 2.5mg-0.5mg/3mL (DUO-NEB) 0.5 mg-3 mg(2.5 mg base)/3 mL nebulizer solution Take 3 mLs by nebulization every 6 (six) hours. Rescue    amlodipine (NORVASC) 10 MG tablet Take 10 mg by mouth once daily.    aspirin (ECOTRIN) 81 MG EC tablet Take 81 mg by mouth once daily.    fish oil-omega-3 fatty acids 300-1,000 mg capsule Take 2 g by mouth 2 (two) times daily.     furosemide (LASIX) 40 MG tablet Take 1 tablet (40 mg total) by mouth once daily. (Patient taking differently: Take 40 mg by mouth once daily. 1 tab in the morning  1 in the evening every other day)    glipiZIDE (GLUCOTROL) 5 MG tablet Take 10 mg by mouth 2 (two) times daily before meals.    losartan (COZAAR) 50 MG tablet Take 50 mg by mouth once daily.    metFORMIN (GLUCOPHAGE) 500 MG tablet Take 500 mg by mouth 2 (two) times daily with meals.    metoprolol tartrate (LOPRESSOR) 50 MG tablet Take 75 mg by mouth 2 (two) times daily.     nitroGLYCERIN (NITROSTAT) 0.4 MG SL tablet Place 0.4 mg under the tongue every 5 (five) minutes as needed.    polyethylene glycol (GLYCOLAX) 17 gram  PwPk Take by mouth as needed.      Family History     Problem Relation (Age of Onset)    Diabetes Father, Mother        Social History Main Topics    Smoking status: Former Smoker     Packs/day: 0.25     Years: 60.00     Types: Cigarettes     Quit date: 5/8/2018    Smokeless tobacco: Never Used    Alcohol use No    Drug use: No    Sexual activity: Not Currently     Partners: Female     Review of Systems   Constitution: Negative for chills, diaphoresis, fever, weakness and malaise/fatigue.   HENT: Negative for nosebleeds.    Eyes: Negative for blurred vision and double vision.   Cardiovascular: Positive for leg swelling. Negative for chest pain, claudication, cyanosis, dyspnea on exertion, orthopnea, palpitations, paroxysmal nocturnal dyspnea and syncope.   Respiratory: Positive for cough and shortness of breath. Negative for wheezing.    Skin: Negative for dry skin and poor wound healing.   Musculoskeletal: Negative for back pain, joint swelling and myalgias.   Gastrointestinal: Negative for abdominal pain, nausea and vomiting.   Genitourinary: Negative for hematuria.   Neurological: Negative for dizziness, headaches, numbness and seizures.   Psychiatric/Behavioral: Negative for altered mental status and depression.     Objective:     Vital Signs (Most Recent):  Temp: 96.1 °F (35.6 °C) (05/28/18 0501)  Pulse: 79 (05/28/18 0712)  Resp: 17 (05/28/18 0712)  BP: (!) 160/83 (05/28/18 0501)  SpO2: 98 % (05/28/18 0712) Vital Signs (24h Range):  Temp:  [96.1 °F (35.6 °C)-97.8 °F (36.6 °C)] 96.1 °F (35.6 °C)  Pulse:  [] 79  Resp:  [14-54] 17  SpO2:  [76 %-98 %] 98 %  BP: (120-160)/(60-98) 160/83     Weight: 72.3 kg (159 lb 6.3 oz)  Body mass index is 23.54 kg/m².    SpO2: 98 %  O2 Device (Oxygen Therapy): nasal cannula      Intake/Output Summary (Last 24 hours) at 05/28/18 0739  Last data filed at 05/28/18 0600   Gross per 24 hour   Intake              540 ml   Output              200 ml   Net              340 ml        Lines/Drains/Airways     Peripheral Intravenous Line                 Peripheral IV - Single Lumen 05/27/18 1330 Right Antecubital less than 1 day                Physical Exam   Constitutional: He is oriented to person, place, and time. He appears well-developed and well-nourished.   HENT:   Head: Normocephalic and atraumatic.   Eyes: Conjunctivae and EOM are normal. Pupils are equal, round, and reactive to light. No scleral icterus.   Neck: Normal range of motion. Neck supple. No JVD present. Carotid bruit is not present. No tracheal deviation present. No thyromegaly present.   Cardiovascular: Normal rate, regular rhythm, S1 normal and S2 normal.  Exam reveals distant heart sounds. Exam reveals no gallop and no friction rub.    No murmur heard.  Pulmonary/Chest: Effort normal and breath sounds normal. No respiratory distress. He has no wheezes. He has no rales. He exhibits no tenderness.   Abdominal: Soft. He exhibits no distension.   Musculoskeletal: He exhibits edema.   Neurological: He is alert and oriented to person, place, and time. He has normal strength. No cranial nerve deficit.   Skin: Skin is warm and dry. No rash noted.   Psychiatric: He has a normal mood and affect. His behavior is normal.       Current Medications:   albuterol-ipratropium  3 mL Nebulization Q6H    amLODIPine  10 mg Oral Daily    aspirin  81 mg Oral Daily    enoxaparin  40 mg Subcutaneous Daily    furosemide  40 mg Intravenous BID    losartan  50 mg Oral Daily    metoprolol tartrate  75 mg Oral BID       dextrose 50%, dextrose 50%, glucagon (human recombinant), glucose, glucose, insulin aspart U-100, pneumoc 13-alan conj-dip cr(PF)    Laboratory:  CBC:    Recent Labs  Lab 05/05/18  1208 05/06/18  0711 05/07/18  0432 05/13/18  0410 05/27/18  1330   WHITE BLOOD CELL COUNT 9.75 10.23 9.31 7.45 9.57   HEMOGLOBIN 8.5 L 8.3 L 8.1 L 8.0 L 8.6 L   HEMATOCRIT 29.3 L 28.8 L 28.5 L 28.0 L 30.4 L   PLATELETS 298 302 275 233 540 H        CHEMISTRIES:    Recent Labs  Lab 05/04/18  0613  05/14/18  0401 05/15/18  0434 05/23/18  1010 05/27/18  1330 05/28/18  0412   GLUCOSE 84  < > 254 H 146 H 49  H 48 LL   SODIUM 142  < > 139 141 142 142 141   POTASSIUM 5.0  < > 3.5 3.6 4.0 4.5 3.9   BUN BLD 34 H  < > 15 14 17 15 15   CREATININE 1.4  < > 1.4 1.4 1.3 1.2 1.0   EGFR IF  55 A  < > 55 A 55 A 60 >60 >60   EGFR IF NON- 47 A  < > 47 A 47 A 52 A 57 A >60   CALCIUM 10.7 H  < > 11.1 H 11.1 H 11.3 H 10.8 H 10.8 H   MAGNESIUM 2.2  --   --   --   --  1.9  --    < > = values in this interval not displayed.    CARDIAC BIOMARKERS:    Recent Labs  Lab 05/03/18  1834 05/03/18  2334 05/27/18  1330 05/27/18  2148 05/28/18  0412   TROPONIN I 0.113 H 0.096 H 0.107 H 0.099 H 0.104 H       COAGS:    Recent Labs  Lab 09/24/16  0740 05/03/18  1351 05/23/18  1010 05/27/18  1330   INR 1.1 1.2 1.0 1.0       LIPIDS/LFTS:    Recent Labs  Lab 09/25/16  0607  09/28/16  0607 05/03/18  1351 05/04/18  0613 05/23/18  1010 05/27/18  1330   CHOLESTEROL 85 L  --   --   --   --   --   --    TRIGLYCERIDES 115  --   --   --   --   --   --    HDL 30 L  --   --   --   --   --   --    LDL CHOLESTEROL 32.0 L  --   --   --   --   --   --    NON-HDL CHOLESTEROL 55  --   --   --   --   --   --    AST 73 H  < > 41 H 74 H 87 H 23 19   ALT 44  < > 41 102 H 137 H 16 16   < > = values in this interval not displayed.    BNP:    Recent Labs  Lab 07/06/16  1527 09/24/16  0740 05/03/18  1351 05/27/18  1330    H 110 H 640 H 626 H       TSH:        Free T4:        Diagnostic Results:  ECG (personally reviewed tracings):  5/27/18 AF 75, RBBB, occ , similar to 5/4/18    Chest X-Ray (personally reviewed image(s)): 5/27/18 CMeg, CHF, L sided PPM, prior sternotomy    Echo: 5/4/18    1 - Low normal left ventricular systolic function (EF 50-55%).     2 - Concentric hypertrophy.     3 - Mild left atrial enlargement.     4 - Pulmonary hypertension. The estimated PA  systolic pressure is 46 mmHg.     5 - Mild aortic regurgitation.     6 - Mild to moderate mitral regurgitation.     7 - Mild tricuspid regurgitation.    8 - Ao root dil 3.9cm     Stress Test: L MPI 5/4/18  Nuclear Quantitative Functional Analysis:   Quantitative figures are not accurate.   Visually estimated LV function is low normal.   Impression: ABNORMAL MYOCARDIAL PERFUSION  1. The perfusion scan is free of evidence for myocardial ischemia.   2. There is mild intensity fixed defect in the apical wall of the left ventricle, consistent with myocardial injury, and moderate intensity fixed defect in the inferolateral wall of the left ventricle, consistent with myocardial injury.   3. Resting wall motion is physiologic.   4. Visually estimated LV function is low normal.   5. The ventricular volumes are normal at rest and stress.   6. The extracardiac distribution of radioactivity is normal.

## 2018-05-28 NOTE — ASSESSMENT & PLAN NOTE
Recent testing earlier this month, no plan to repeat echo or MPI  Likely med/diet noncompliance  Agree with IV lasix and transition to PO in 24 hrs  Cont GDMT: ASA/Statin/BBl/ARB  Stop amlod and titrate losartan  Consider GI eval for etiology of anemia

## 2018-05-28 NOTE — CONSULTS
[facility]  Cardiology  Consult Note    Patient Name: Agus Ramos Jr.  MRN: [id number]  Admission Date: 5/27/2018  Hospital Length of Stay: 1 days  Code Status: Full Code   Attending Provider: Ede Gómez MD   Consulting Provider: Blanco Crenshaw MD  Primary Care Physician: Keaton Hardy MD  Principal Problem:Acute on chronic diastolic congestive heart failure    Patient information was obtained from patient and ER records.     Inpatient consult to Cardiology  Consult performed by: BLANCO CRENSHAW  Consult ordered by: BLANCO GREY  Reason for consult: HFpEF        Subjective:     Chief Complaint:  SOB, cough     HPI:   77 y.o. male that (in part)  has a past medical history of Anemia; Anticoagulant long-term use; Congestive heart failure; Coronary artery disease; Diabetes mellitus; Encounter for blood transfusion; Hypertension; MI (myocardial infarction); Pacemaker; Peripheral vascular disease; S/P CABG x 3; S/P femoral-popliteal bypass surgery; Stented coronary artery; and Tobacco use.  has a past surgical history that includes Cardiac surgery; Cardiac pacemaker placement; Cardiac catheterization; and Hemorrhoid surgery.  Presents to [facility] Emergency Department complaining of recurrent shortness of breath.  Patient was discharged to the Hospital approximately 2 weeks ago after being treated for 12 days for an acute CHF exacerbation and symptomatic anemia.  Was also complicated with hypoxia and requirement for home oxygen prior to discharge.  He has shortness of breath at rest and dyspnea with exertion.  Worsened with movement.  Minimally relieved with rest and supplemental oxygen.  Moderate to severe intensity.  Associated with unintentional weight gain.  Denies chest pain.  Daily frequency.  Constant duration.  In the emergency department chest x-ray, routine laboratory studies, EKG, cardiac enzymes were obtained.  There is concern the  patient was having recurrent acute on chronic diastolic heart failure.  He was given Lasix in the ED.  Hospital medicine has been asked to admit for further evaluation and treatment.     Pt followed by [facility] with hx of CAD/CABG and Biotronik PPM.  Seen by Dr. Mathias during recent admission earlier this month.  Testing during that admission was neg.  He reports sob associated with nonprod cough and LE edema.  He denies med noncompliance but is currently salting his food.  He had no anginal sxs.  He reports ?compliance with eliquis and has had no BRBPR or melena.  Still anemic.  Poor historian in regards to his prior medical care.    ==============================================================================  Hospital Course from May 3, 2018-May 15, 2018:   Admitted with concern for symptomatic anemia and HFpEF exacerbation. Found to have low Hgb 6.5, no bleeding identified though patient refused and continues to refuse rectal exam. Transfused 2U with appropriate response to Hgb 8.5. Continued shortness of breath symptoms after transfusion. Started diuresis for CHF exacerbation. TTE 5/4/2018 with normal EF, NST negative for ischemia but does show scar. Still requiring O2. Continuing diuresis.   5/8- pt with brief period of confusion, refused labs this morning but was oriented x3 after I sat down and spoke with him for awhile. Still requiring 2 liters oxygen and edema to LE. Soke with wife, Jessika vias phone and she said he called her today and was not confused and oriented. Will check urine culture and continue diuresis.   5/9 - urine culture growing staph, start rocephin, no acute changes on CXR; schedule nebs and continue to wean oxygen as tolerated   5/10- urine culture now shows no growth?, waiting to hear from lab; calcium persistently high, hypercalcemia workup pending, chest CT pending, pt still requiring oxygen despite aggressive diuresis and pulmonary care   5/11- Chest CT concern for right pleural effusion  ?loculated; elevated calcium more concern for malignancy - d/w IR and not enough fluid to drain but can jackelyn diagnostic tap, will broaden antibiotics x 24 hours, wait for further studies, schedule diagnostic tap next week, off eliquis on lovenox bridge   5/12 - test for home oxygen with significant drop in O2 saturation to 54% on room air with exercise; Chest CTA did not show significant findings except small pleural effusion that could explain hypoxia, pulmonology consulted, off anticoagulant for potential diagnostic thoracentesis   5/13 - pulmonology consulted; started on spiriva, plan to dc home with home oxygen  5/14 - insurance has not authorized oxygen yet, continue IV lasix ,  Patient has been discharged with home oxygen and  follow up with Pulmonology and PCP as out patient.     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      Current Outpatient Prescriptions on File Prior to Encounter   Medication Sig    albuterol-ipratropium 2.5mg-0.5mg/3mL (DUO-NEB) 0.5 mg-3 mg(2.5 mg base)/3 mL nebulizer solution Take 3 mLs by nebulization every 6 (six) hours. Rescue    amlodipine (NORVASC) 10 MG tablet Take 10 mg by mouth once daily.    aspirin (ECOTRIN) 81 MG EC tablet Take 81 mg by mouth once  daily.    fish oil-omega-3 fatty acids 300-1,000 mg capsule Take 2 g by mouth 2 (two) times daily.     furosemide (LASIX) 40 MG tablet Take 1 tablet (40 mg total) by mouth once daily. (Patient taking differently: Take 40 mg by mouth once daily. 1 tab in the morning  1 in the evening every other day)    glipiZIDE (GLUCOTROL) 5 MG tablet Take 10 mg by mouth 2 (two) times daily before meals.    losartan (COZAAR) 50 MG tablet Take 50 mg by mouth once daily.    metFORMIN (GLUCOPHAGE) 500 MG tablet Take 500 mg by mouth 2 (two) times daily with meals.    metoprolol tartrate (LOPRESSOR) 50 MG tablet Take 75 mg by mouth 2 (two) times daily.     nitroGLYCERIN (NITROSTAT) 0.4 MG SL tablet Place 0.4 mg under the tongue every 5 (five) minutes as needed.    polyethylene glycol (GLYCOLAX) 17 gram PwPk Take by mouth as needed.      Family History     Problem Relation (Age of Onset)    Diabetes Father, Mother        Social History Main Topics    Smoking status: Former Smoker     Packs/day: 0.25     Years: 60.00     Types: Cigarettes     Quit date: 5/8/2018    Smokeless tobacco: Never Used    Alcohol use No    Drug use: No    Sexual activity: Not Currently     Partners: Female     Review of Systems   Constitution: Negative for chills, diaphoresis, fever, weakness and malaise/fatigue.   HENT: Negative for nosebleeds.    Eyes: Negative for blurred vision and double vision.   Cardiovascular: Positive for leg swelling. Negative for chest pain, claudication, cyanosis, dyspnea on exertion, orthopnea, palpitations, paroxysmal nocturnal dyspnea and syncope.   Respiratory: Positive for cough and shortness of breath. Negative for wheezing.    Skin: Negative for dry skin and poor wound healing.   Musculoskeletal: Negative for back pain, joint swelling and myalgias.   Gastrointestinal: Negative for abdominal pain, nausea and vomiting.   Genitourinary: Negative for hematuria.   Neurological: Negative for dizziness, headaches,  numbness and seizures.   Psychiatric/Behavioral: Negative for altered mental status and depression.     Objective:     Vital Signs (Most Recent):  Temp: 96.1 °F (35.6 °C) (05/28/18 0501)  Pulse: 79 (05/28/18 0712)  Resp: 17 (05/28/18 0712)  BP: (!) 160/83 (05/28/18 0501)  SpO2: 98 % (05/28/18 0712) Vital Signs (24h Range):  Temp:  [96.1 °F (35.6 °C)-97.8 °F (36.6 °C)] 96.1 °F (35.6 °C)  Pulse:  [] 79  Resp:  [14-54] 17  SpO2:  [76 %-98 %] 98 %  BP: (120-160)/(60-98) 160/83     Weight: 72.3 kg (159 lb 6.3 oz)  Body mass index is 23.54 kg/m².    SpO2: 98 %  O2 Device (Oxygen Therapy): nasal cannula      Intake/Output Summary (Last 24 hours) at 05/28/18 0739  Last data filed at 05/28/18 0600   Gross per 24 hour   Intake              540 ml   Output              200 ml   Net              340 ml       Lines/Drains/Airways     Peripheral Intravenous Line                 Peripheral IV - Single Lumen 05/27/18 1330 Right Antecubital less than 1 day                Physical Exam   Constitutional: He is oriented to person, place, and time. He appears well-developed and well-nourished.   HENT:   Head: Normocephalic and atraumatic.   Eyes: Conjunctivae and EOM are normal. Pupils are equal, round, and reactive to light. No scleral icterus.   Neck: Normal range of motion. Neck supple. No JVD present. Carotid bruit is not present. No tracheal deviation present. No thyromegaly present.   Cardiovascular: Normal rate, regular rhythm, S1 normal and S2 normal.  Exam reveals distant heart sounds. Exam reveals no gallop and no friction rub.    No murmur heard.  Pulmonary/Chest: Effort normal and breath sounds normal. No respiratory distress. He has no wheezes. He has no rales. He exhibits no tenderness.   Abdominal: Soft. He exhibits no distension.   Musculoskeletal: He exhibits edema.   Neurological: He is alert and oriented to person, place, and time. He has normal strength. No cranial nerve deficit.   Skin: Skin is warm and dry.  No rash noted.   Psychiatric: He has a normal mood and affect. His behavior is normal.       Current Medications:   albuterol-ipratropium  3 mL Nebulization Q6H    amLODIPine  10 mg Oral Daily    aspirin  81 mg Oral Daily    enoxaparin  40 mg Subcutaneous Daily    furosemide  40 mg Intravenous BID    losartan  50 mg Oral Daily    metoprolol tartrate  75 mg Oral BID       dextrose 50%, dextrose 50%, glucagon (human recombinant), glucose, glucose, insulin aspart U-100, pneumoc 13-alan conj-dip cr(PF)    Laboratory:  CBC:    Recent Labs  Lab 05/05/18  1208 05/06/18  0711 05/07/18  0432 05/13/18  0410 05/27/18  1330   WHITE BLOOD CELL COUNT 9.75 10.23 9.31 7.45 9.57   HEMOGLOBIN 8.5 L 8.3 L 8.1 L 8.0 L 8.6 L   HEMATOCRIT 29.3 L 28.8 L 28.5 L 28.0 L 30.4 L   PLATELETS 298 302 275 233 540 H       CHEMISTRIES:    Recent Labs  Lab 05/04/18  0613  05/14/18  0401 05/15/18  0434 05/23/18  1010 05/27/18  1330 05/28/18  0412   GLUCOSE 84  < > 254 H 146 H 49  H 48 LL   SODIUM 142  < > 139 141 142 142 141   POTASSIUM 5.0  < > 3.5 3.6 4.0 4.5 3.9   BUN BLD 34 H  < > 15 14 17 15 15   CREATININE 1.4  < > 1.4 1.4 1.3 1.2 1.0   EGFR IF  55 A  < > 55 A 55 A 60 >60 >60   EGFR IF NON- 47 A  < > 47 A 47 A 52 A 57 A >60   CALCIUM 10.7 H  < > 11.1 H 11.1 H 11.3 H 10.8 H 10.8 H   MAGNESIUM 2.2  --   --   --   --  1.9  --    < > = values in this interval not displayed.    CARDIAC BIOMARKERS:    Recent Labs  Lab 05/03/18  1834 05/03/18  2334 05/27/18  1330 05/27/18  2148 05/28/18  0412   TROPONIN I 0.113 H 0.096 H 0.107 H 0.099 H 0.104 H       COAGS:    Recent Labs  Lab 09/24/16  0740 05/03/18  1351 05/23/18  1010 05/27/18  1330   INR 1.1 1.2 1.0 1.0       LIPIDS/LFTS:    Recent Labs  Lab 09/25/16  0607  09/28/16  0607 05/03/18  1351 05/04/18  0613 05/23/18  1010 05/27/18  1330   CHOLESTEROL 85 L  --   --   --   --   --   --    TRIGLYCERIDES 115  --   --   --   --   --   --    HDL 30 L  --   --   --    --   --   --    LDL CHOLESTEROL 32.0 L  --   --   --   --   --   --    NON-HDL CHOLESTEROL 55  --   --   --   --   --   --    AST 73 H  < > 41 H 74 H 87 H 23 19   ALT 44  < > 41 102 H 137 H 16 16   < > = values in this interval not displayed.    BNP:    Recent Labs  Lab 07/06/16  1527 09/24/16  0740 05/03/18  1351 05/27/18  1330    H 110 H 640 H 626 H       TSH:        Free T4:        Diagnostic Results:  ECG (personally reviewed tracings):  5/27/18 AF 75, RBBB, occ , similar to 5/4/18    Chest X-Ray (personally reviewed image(s)): 5/27/18 CMeg, CHF, L sided PPM, prior sternotomy    Echo: 5/4/18    1 - Low normal left ventricular systolic function (EF 50-55%).     2 - Concentric hypertrophy.     3 - Mild left atrial enlargement.     4 - Pulmonary hypertension. The estimated PA systolic pressure is 46 mmHg.     5 - Mild aortic regurgitation.     6 - Mild to moderate mitral regurgitation.     7 - Mild tricuspid regurgitation.    8 - Ao root dil 3.9cm     Stress Test: L MPI 5/4/18  Nuclear Quantitative Functional Analysis:   Quantitative figures are not accurate.   Visually estimated LV function is low normal.   Impression: ABNORMAL MYOCARDIAL PERFUSION  1. The perfusion scan is free of evidence for myocardial ischemia.   2. There is mild intensity fixed defect in the apical wall of the left ventricle, consistent with myocardial injury, and moderate intensity fixed defect in the inferolateral wall of the left ventricle, consistent with myocardial injury.   3. Resting wall motion is physiologic.   4. Visually estimated LV function is low normal.   5. The ventricular volumes are normal at rest and stress.   6. The extracardiac distribution of radioactivity is normal.       Assessment and Plan:     * Acute on chronic diastolic congestive heart failure    Suspect exac of HFpEF, ddx includes bronchitis  Recent testing earlier this month, no plan to repeat echo or MPI  Likely med/diet noncompliance  Agree with IV  lasix and transition to PO in 24 hrs  Cont GDMT: ASA/Statin/BBl/ARB  Stop amlod and titrate losartan  Consider GI eval for etiology of anemia        Pacemaker    Biotronik PPM in place, appears to be functioning normally  Followed prev by [facility]        Essential hypertension    Diet noncompliance suspected  Stop amlod  Inc losartan to 100mg qd  Monitor renal fxn        Chronic atrial fibrillation    Prev on Eliquis, not on current med list  Rates acceptable and PPM in place  ?stopped for anemia on recent admit  Consider GI eval for assessment of anemia  Resume eliquis 5mg bid if no bleeding source identified        Type 2 diabetes mellitus, controlled, with renal complications    Per IM        CKD Stage 3    Stable renal fxn        Anemia of chronic disease    ?Acute anemia noted on last admit  Currently stable vs discharge, but much lower than prior baseline  Consider GI eval            VTE Risk Mitigation         Ordered     IP VTE HIGH RISK PATIENT  Once      05/27/18 1729     Place DENIS hose  Until discontinued      05/27/18 1729     Place sequential compression device  Until discontinued      05/27/18 1729     enoxaparin injection 40 mg  Daily      05/27/18 1729          Thank you for your consult. I will follow-up with patient. Please contact us if you have any additional questions.    Blanco Brunson MD  Cardiology   [facility] [place]

## 2018-05-28 NOTE — ASSESSMENT & PLAN NOTE
Prev on Eliquis, not on current med list  Rates acceptable and PPM in place  ?stopped for anemia on recent admit  Consider GI eval for assessment of anemia  Resume eliquis 5mg bid if no bleeding source identified

## 2018-05-28 NOTE — PLAN OF CARE
05/28/18 1027   Discharge Assessment   Assessment Type Discharge Planning Assessment   To patient's room to complete DC needs assessment, pt out of room.  TN to follow up at a later time.

## 2018-05-28 NOTE — CARE UPDATE
Reviewed H and P by my colleague and agree with A and P. Patient presenting with CHF- likely acute diastolic heart failure given preserved EF on recent echo.  Patient started on IV lasix. .  Likely will have short hospital stay. Patient admitted earlier this month.

## 2018-05-29 LAB
ANION GAP SERPL CALC-SCNC: 7 MMOL/L
ANION GAP SERPL CALC-SCNC: 7 MMOL/L
ANISOCYTOSIS BLD QL SMEAR: SLIGHT
BASOPHILS # BLD AUTO: 0.05 K/UL
BASOPHILS NFR BLD: 0.6 %
BUN SERPL-MCNC: 18 MG/DL
BUN SERPL-MCNC: 18 MG/DL
CALCIUM SERPL-MCNC: 10.9 MG/DL
CALCIUM SERPL-MCNC: 10.9 MG/DL
CHLORIDE SERPL-SCNC: 107 MMOL/L
CHLORIDE SERPL-SCNC: 107 MMOL/L
CO2 SERPL-SCNC: 25 MMOL/L
CO2 SERPL-SCNC: 25 MMOL/L
CREAT SERPL-MCNC: 1.1 MG/DL
CREAT SERPL-MCNC: 1.1 MG/DL
DIFFERENTIAL METHOD: ABNORMAL
EOSINOPHIL # BLD AUTO: 0.2 K/UL
EOSINOPHIL NFR BLD: 2.1 %
ERYTHROCYTE [DISTWIDTH] IN BLOOD BY AUTOMATED COUNT: 23.6 %
EST. GFR  (AFRICAN AMERICAN): >60 ML/MIN/1.73 M^2
EST. GFR  (AFRICAN AMERICAN): >60 ML/MIN/1.73 M^2
EST. GFR  (NON AFRICAN AMERICAN): >60 ML/MIN/1.73 M^2
EST. GFR  (NON AFRICAN AMERICAN): >60 ML/MIN/1.73 M^2
GLUCOSE SERPL-MCNC: 137 MG/DL
GLUCOSE SERPL-MCNC: 137 MG/DL
HCT VFR BLD AUTO: 28 %
HGB BLD-MCNC: 7.9 G/DL
HYPOCHROMIA BLD QL SMEAR: ABNORMAL
LYMPHOCYTES # BLD AUTO: 1.7 K/UL
LYMPHOCYTES NFR BLD: 19.8 %
MAGNESIUM SERPL-MCNC: 2.1 MG/DL
MCH RBC QN AUTO: 19.6 PG
MCHC RBC AUTO-ENTMCNC: 28.2 G/DL
MCV RBC AUTO: 69 FL
MONOCYTES # BLD AUTO: 1.2 K/UL
MONOCYTES NFR BLD: 14 %
NEUTROPHILS # BLD AUTO: 5.4 K/UL
NEUTROPHILS NFR BLD: 64 %
PHOSPHATE SERPL-MCNC: 3.1 MG/DL
PLATELET # BLD AUTO: 510 K/UL
PMV BLD AUTO: 9.7 FL
POCT GLUCOSE: 131 MG/DL (ref 70–110)
POCT GLUCOSE: 151 MG/DL (ref 70–110)
POCT GLUCOSE: 167 MG/DL (ref 70–110)
POCT GLUCOSE: 171 MG/DL (ref 70–110)
POTASSIUM SERPL-SCNC: 4 MMOL/L
POTASSIUM SERPL-SCNC: 4 MMOL/L
RBC # BLD AUTO: 4.04 M/UL
SODIUM SERPL-SCNC: 139 MMOL/L
SODIUM SERPL-SCNC: 139 MMOL/L
TARGETS BLD QL SMEAR: ABNORMAL
WBC # BLD AUTO: 8.43 K/UL

## 2018-05-29 PROCEDURE — 27000221 HC OXYGEN, UP TO 24 HOURS

## 2018-05-29 PROCEDURE — 94761 N-INVAS EAR/PLS OXIMETRY MLT: CPT

## 2018-05-29 PROCEDURE — 83735 ASSAY OF MAGNESIUM: CPT

## 2018-05-29 PROCEDURE — 21400001 HC TELEMETRY ROOM

## 2018-05-29 PROCEDURE — 36415 COLL VENOUS BLD VENIPUNCTURE: CPT

## 2018-05-29 PROCEDURE — 25000003 PHARM REV CODE 250: Performed by: INTERNAL MEDICINE

## 2018-05-29 PROCEDURE — 63600175 PHARM REV CODE 636 W HCPCS: Performed by: INTERNAL MEDICINE

## 2018-05-29 PROCEDURE — 63600175 PHARM REV CODE 636 W HCPCS: Performed by: HOSPITALIST

## 2018-05-29 PROCEDURE — 63600175 PHARM REV CODE 636 W HCPCS: Performed by: EMERGENCY MEDICINE

## 2018-05-29 PROCEDURE — 99233 SBSQ HOSP IP/OBS HIGH 50: CPT | Mod: ,,, | Performed by: INTERNAL MEDICINE

## 2018-05-29 PROCEDURE — 80048 BASIC METABOLIC PNL TOTAL CA: CPT

## 2018-05-29 PROCEDURE — 25000003 PHARM REV CODE 250: Performed by: EMERGENCY MEDICINE

## 2018-05-29 PROCEDURE — 94640 AIRWAY INHALATION TREATMENT: CPT

## 2018-05-29 PROCEDURE — 25000003 PHARM REV CODE 250: Performed by: HOSPITALIST

## 2018-05-29 PROCEDURE — 84100 ASSAY OF PHOSPHORUS: CPT

## 2018-05-29 PROCEDURE — 85025 COMPLETE CBC W/AUTO DIFF WBC: CPT

## 2018-05-29 PROCEDURE — 25000242 PHARM REV CODE 250 ALT 637 W/ HCPCS: Performed by: INTERNAL MEDICINE

## 2018-05-29 RX ORDER — FERROUS SULFATE 325(65) MG
325 TABLET, DELAYED RELEASE (ENTERIC COATED) ORAL DAILY
Status: DISCONTINUED | OUTPATIENT
Start: 2018-05-29 | End: 2018-06-03 | Stop reason: HOSPADM

## 2018-05-29 RX ORDER — CINACALCET 30 MG/1
30 TABLET, FILM COATED ORAL 2 TIMES DAILY WITH MEALS
Status: DISCONTINUED | OUTPATIENT
Start: 2018-05-29 | End: 2018-06-03 | Stop reason: HOSPADM

## 2018-05-29 RX ORDER — CARVEDILOL 6.25 MG/1
25 TABLET ORAL 2 TIMES DAILY
Status: DISCONTINUED | OUTPATIENT
Start: 2018-05-29 | End: 2018-05-30

## 2018-05-29 RX ORDER — FUROSEMIDE 10 MG/ML
80 INJECTION INTRAMUSCULAR; INTRAVENOUS 2 TIMES DAILY
Status: DISCONTINUED | OUTPATIENT
Start: 2018-05-29 | End: 2018-05-30

## 2018-05-29 RX ORDER — ALPRAZOLAM 0.5 MG/1
0.5 TABLET ORAL EVERY 8 HOURS PRN
Status: DISCONTINUED | OUTPATIENT
Start: 2018-05-29 | End: 2018-05-29

## 2018-05-29 RX ADMIN — FUROSEMIDE 80 MG: 10 INJECTION, SOLUTION INTRAMUSCULAR; INTRAVENOUS at 08:05

## 2018-05-29 RX ADMIN — ALPRAZOLAM 0.5 MG: 0.5 TABLET ORAL at 12:05

## 2018-05-29 RX ADMIN — INSULIN ASPART 2 UNITS: 100 INJECTION, SOLUTION INTRAVENOUS; SUBCUTANEOUS at 08:05

## 2018-05-29 RX ADMIN — IPRATROPIUM BROMIDE AND ALBUTEROL SULFATE 3 ML: .5; 2.5 SOLUTION RESPIRATORY (INHALATION) at 07:05

## 2018-05-29 RX ADMIN — CINACALCET HYDROCHLORIDE 30 MG: 30 TABLET, COATED ORAL at 04:05

## 2018-05-29 RX ADMIN — CARVEDILOL 25 MG: 6.25 TABLET, FILM COATED ORAL at 08:05

## 2018-05-29 RX ADMIN — LOSARTAN POTASSIUM 100 MG: 25 TABLET, FILM COATED ORAL at 08:05

## 2018-05-29 RX ADMIN — ENOXAPARIN SODIUM 40 MG: 100 INJECTION SUBCUTANEOUS at 04:05

## 2018-05-29 RX ADMIN — GUAIFENESIN AND DEXTROMETHORPHAN 5 ML: 100; 10 SYRUP ORAL at 12:05

## 2018-05-29 RX ADMIN — ASPIRIN 81 MG: 81 TABLET, COATED ORAL at 08:05

## 2018-05-29 RX ADMIN — FUROSEMIDE 80 MG: 10 INJECTION, SOLUTION INTRAMUSCULAR; INTRAVENOUS at 06:05

## 2018-05-29 RX ADMIN — INSULIN ASPART 2 UNITS: 100 INJECTION, SOLUTION INTRAVENOUS; SUBCUTANEOUS at 05:05

## 2018-05-29 RX ADMIN — IPRATROPIUM BROMIDE AND ALBUTEROL SULFATE 3 ML: .5; 2.5 SOLUTION RESPIRATORY (INHALATION) at 01:05

## 2018-05-29 RX ADMIN — FERROUS SULFATE TAB EC 325 MG (65 MG FE EQUIVALENT) 325 MG: 325 (65 FE) TABLET DELAYED RESPONSE at 08:05

## 2018-05-29 RX ADMIN — GUAIFENESIN AND DEXTROMETHORPHAN 5 ML: 100; 10 SYRUP ORAL at 08:05

## 2018-05-29 RX ADMIN — INSULIN ASPART 2 UNITS: 100 INJECTION, SOLUTION INTRAVENOUS; SUBCUTANEOUS at 12:05

## 2018-05-29 RX ADMIN — GUAIFENESIN AND DEXTROMETHORPHAN 5 ML: 100; 10 SYRUP ORAL at 04:05

## 2018-05-29 RX ADMIN — IPRATROPIUM BROMIDE AND ALBUTEROL SULFATE 3 ML: .5; 2.5 SOLUTION RESPIRATORY (INHALATION) at 08:05

## 2018-05-29 RX ADMIN — IPRATROPIUM BROMIDE AND ALBUTEROL SULFATE 3 ML: .5; 2.5 SOLUTION RESPIRATORY (INHALATION) at 12:05

## 2018-05-29 NOTE — PLAN OF CARE
Problem: Diabetes, Type 2 (Adult)  Intervention: Support/Optimize Psychosocial Response to Condition   05/28/18 2005 05/29/18 0224   Coping/Psychosocial Interventions   Supportive Measures --  active listening utilized;self-care encouraged;verbalization of feelings encouraged   Environmental Support calm environment promoted;distractions minimized;environmental consistency promoted;personal routine supported --    Psychosocial Support   Family/Support System Care --  self-care encouraged     Intervention: Optimize Glycemic Control   05/29/18 0224   Nutrition Interventions   Glycemic Management blood glucose monitoring       Goal: Signs and Symptoms of Listed Potential Problems Will be Absent, Minimized or Managed (Diabetes, Type 2)  Signs and symptoms of listed potential problems will be absent, minimized or managed by discharge/transition of care (reference Diabetes, Type 2 (Adult) CPG).   Outcome: Ongoing (interventions implemented as appropriate)   05/29/18 0224   Diabetes, Type 2   Problems Assessed (Type 2 Diabetes) all   Problems Present (Type 2 Diabetes) situational response       Problem: Patient Care Overview  Goal: Plan of Care Review  Outcome: Ongoing (interventions implemented as appropriate)   05/29/18 0224   Coping/Psychosocial   Plan Of Care Reviewed With patient       Problem: Pressure Ulcer Risk (Jeremiah Scale) (Adult,Obstetrics,Pediatric)  Intervention: Promote/Optimize Nutrition   05/29/18 0224   Nutrition Interventions   Oral Nutrition Promotion rest periods promoted     Intervention: Prevent/Manage Excess Moisture   05/29/18 0224   Hygiene Care   Perineal Care absorbent pad changed   Bathing/Skin Care bedtime care   Skin Interventions   Skin Protection Tubing/devices free from skin contact     Intervention: Maintain Head of Bed Elevation Less Than 30 Degrees as Tolerated   05/28/18 1800   Positioning   Head of Bed (HOB) HOB at 30-45 degrees     Intervention: Prevent/Minimize Sheer/Friction  Injuries   05/29/18 0224   Skin Interventions   Pressure Reduction Techniques frequent weight shift encouraged   Positioning   Positioning/Transfer Devices pillows     Intervention: Turn/Reposition Often   05/28/18 1602 05/29/18 0224   Skin Interventions   Pressure Reduction Techniques --  frequent weight shift encouraged   Positioning   Body Position positioned/repositioned independently;up in chair --        Goal: Identify Related Risk Factors and Signs and Symptoms  Related risk factors and signs and symptoms are identified upon initiation of Human Response Clinical Practice Guideline (CPG)   Outcome: Ongoing (interventions implemented as appropriate)   05/28/18 1949   Pressure Ulcer Risk (Jeremiah Scale)   Related Risk Factors (Pressure Ulcer Risk (Jeremiah Scale)) age extremes;medication     Goal: Skin Integrity  Patient will demonstrate the desired outcomes by discharge/transition of care.   Outcome: Ongoing (interventions implemented as appropriate)   05/29/18 0224   Pressure Ulcer Risk (Jeremiah Scale) (Adult,Obstetrics,Pediatric)   Skin Integrity making progress toward outcome       Problem: Anxiety (Adult)  Intervention: Promote Anxiety Reduction/Resolution   05/28/18 2005 05/29/18 0224   Coping/Psychosocial Interventions   Supportive Measures --  active listening utilized;self-care encouraged;verbalization of feelings encouraged   Environmental Support calm environment promoted;distractions minimized;environmental consistency promoted;personal routine supported --    Psychosocial Support   Family/Support System Care --  self-care encouraged       Goal: Identify Related Risk Factors and Signs and Symptoms  Related risk factors and signs and symptoms are identified upon initiation of Human Response Clinical Practice Guideline (CPG)   Outcome: Ongoing (interventions implemented as appropriate)   05/29/18 0224   Anxiety   Related Risk Factors (Anxiety) other (see comments);situational/maturational  crisis  (Respiratory)     Goal: Reduction/Resolution  Patient will demonstrate the desired outcomes by discharge/transition of care.   Outcome: Ongoing (interventions implemented as appropriate)   18   Anxiety (Adult)   Reduction/Resolution making progress toward outcome       Problem: Respiratory Distress Syndrome ([place],NICU)  Intervention: Correct and Maintain Adequate Oxygenation   18   Respiratory Interventions   Airway/Ventilation Management (Infant) airway patency maintained;calming measures promoted;position adjusted     Intervention: Position to Optimize Ventilation   18   Developmental Care   Developmental Care Positioning Body HOB elevated     Intervention: Provide Preventive/Supportive Care   18   Developmental Care   Environmental Modifications clustered care;lighting decreased;noise decreased   Hypothermia Management (Infant)   Warming Method adjust environmental temperature       Goal: Signs and Symptoms of Listed Potential Problems Will be Absent, Minimized or Managed (Respiratory Distress Syndrome)  Signs and symptoms of listed potential problems will be absent, minimized or managed by discharge/transition of care (reference Respiratory Distress Syndrome ([place],NICU) CPG).   Outcome: Ongoing (interventions implemented as appropriate)   18   Respiratory Distress Syndrome   Problems Assessed (Respiratory Distress Syndrome) all   Problems Present (Respiratory Distress Syndrome) other (see comments)  (Report SOB)       Problem: Fall Risk (Adult)  Intervention: Monitor/Assist with Self Care   18   Daily Care Interventions   Self-Care Promotion --  independence encouraged;BADL personal objects within reach;BADL personal routines maintained   Functional Level Current   Ambulation 2 - assistive person --    Transferring 2 - assistive person --    Toileting 2 - assistive person --    Bathing 2 - assistive person --     Dressing 2 - assistive person --    Eating 0 - independent --    Communication 0 - understands/communicates without difficulty --    Swallowing 0 - swallows foods/liquids without difficulty --    Activity   Activity Assistance Provided assistance, 1 person --      Intervention: Reduce Risk/Promote Restraint Free Environment   18   Safety Interventions   Environmental Safety Modification assistive device/personal items within reach;clutter free environment maintained;lighting adjusted;mobility aid in reach;room near unit station;room organization consistent   Prevent Brandon Drop/Fall   Safety/Security Measures bed alarm set     Intervention: Review Medications/Identify Contributors to Fall Risk   18   Safety Interventions   Medication Review/Management medications reviewed     Intervention: Patient Rounds   18   Safety Interventions   Patient Rounds bed in low position;bed wheels locked;call light in reach;clutter free environment maintained;ID band on;placement of personal items at bedside;toileting offered;visualized patient     Intervention: Safety Promotion/Fall Prevention   18   Safety Interventions   Safety Promotion/Fall Prevention assistive device/personal item within reach;bed alarm set;Fall Risk reviewed with patient/family;lighting adjusted;medications reviewed;nonskid shoes/socks when out of bed;room near unit station;side rails raised x 2;supervised activity;toileting scheduled;instructed to call staff for mobility       Goal: Identify Related Risk Factors and Signs and Symptoms  Related risk factors and signs and symptoms are identified upon initiation of Human Response Clinical Practice Guideline (CPG)   Outcome: Ongoing (interventions implemented as appropriate)   18   Fall Risk   Related Risk Factors (Fall Risk) environment unfamiliar;slipper/uneven surfaces;history of falls;gait/mobility problems;inadequate lighting;polypharmacy   Signs and  Symptoms (Fall Risk) presence of risk factors     Goal: Absence of Falls  Patient will demonstrate the desired outcomes by discharge/transition of care.   Outcome: Ongoing (interventions implemented as appropriate)   05/29/18 6987   Fall Risk (Adult)   Absence of Falls making progress toward outcome

## 2018-05-29 NOTE — ASSESSMENT & PLAN NOTE
Chronic tobacco use  Tobacco cessation counseling  Nicotine patch offered; consider Wellbutrin or Chantix through PCP as an outpatient (will require closer monitoring)  I discussed with the patient regarding the hazardous effects of smoking on increasing risk of heart attack and stroke, worsening lung functions, and increasing cancer risk.   Patient was urged to stop smoking now.  I also offered nicotine taper (such as nicotine patch and gum) to help ease the craving to smoke.

## 2018-05-29 NOTE — ASSESSMENT & PLAN NOTE
Gradually improving but still needs more diuresis as he evidently becomes SOB with laying down. Will contnue with IV lasix 80 mg BID. Renal panel in AM to check potassium and Cr. BP at goal. Appreciate further Cardiology recs.

## 2018-05-29 NOTE — SUBJECTIVE & OBJECTIVE
Interval History: feeling better but still with SOB. Calcium is up but has been this way for years    Review of Systems   Constitutional: Negative.    Respiratory: Positive for shortness of breath.    Cardiovascular: Positive for leg swelling.   Gastrointestinal: Negative.    Genitourinary: Negative.    Musculoskeletal: Negative.    Skin: Negative.    Neurological: Negative.    Psychiatric/Behavioral: Negative.      Objective:     Vital Signs (Most Recent):  Temp: 97.5 °F (36.4 °C) (05/29/18 1128)  Pulse: 77 (05/29/18 1128)  Resp: 16 (05/29/18 1128)  BP: 123/64 (05/29/18 1128)  SpO2: 97 % (05/29/18 1128) Vital Signs (24h Range):  Temp:  [97 °F (36.1 °C)-98.6 °F (37 °C)] 97.5 °F (36.4 °C)  Pulse:  [68-95] 77  Resp:  [16-21] 16  SpO2:  [92 %-98 %] 97 %  BP: (123-157)/(59-93) 123/64     Weight: 85.7 kg (188 lb 15 oz)  Body mass index is 27.9 kg/m².    Intake/Output Summary (Last 24 hours) at 05/29/18 1147  Last data filed at 05/29/18 0635   Gross per 24 hour   Intake              896 ml   Output             1250 ml   Net             -354 ml      Physical Exam   Constitutional: He is oriented to person, place, and time. He appears well-developed. No distress.   Cardiovascular: Normal rate, regular rhythm and intact distal pulses.    Pulmonary/Chest: Effort normal. He has no wheezes. He has rales (bibasilar).   Abdominal: Soft. Bowel sounds are normal.   Musculoskeletal: Normal range of motion. He exhibits edema.   Neurological: He is alert and oriented to person, place, and time.   Skin: Skin is warm. He is not diaphoretic.   Psychiatric: He has a normal mood and affect. His behavior is normal. Judgment and thought content normal. Cognition and memory are impaired.   Nursing note and vitals reviewed.      Significant Labs: All pertinent labs within the past 24 hours have been reviewed.    Significant Imaging: I have reviewed all pertinent imaging results/findings within the past 24 hours.  I have reviewed and  interpreted all pertinent imaging results/findings within the past 24 hours.

## 2018-05-29 NOTE — PROGRESS NOTES
[facility]  Cardiology  Progress Note    Patient Name: Agus Ramos Jr.  MRN: [id number]  Admission Date: 5/27/2018  Hospital Length of Stay: 2 days  Code Status: Full Code   Attending Physician: Eileen Kinsey MD   Primary Care Physician: Keaton Hardy MD  Expected Discharge Date:   Principal Problem:Acute on chronic diastolic congestive heart failure    Subjective:     Interval Hx: still sob, no cp.    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      Current Outpatient Prescriptions on File Prior to Encounter   Medication Sig    albuterol-ipratropium 2.5mg-0.5mg/3mL (DUO-NEB) 0.5 mg-3 mg(2.5 mg base)/3 mL nebulizer solution Take 3 mLs by nebulization every 6 (six) hours. Rescue    amlodipine (NORVASC) 10 MG tablet Take 10 mg by mouth once daily.    aspirin (ECOTRIN) 81 MG EC tablet Take 81 mg by mouth once daily.    fish oil-omega-3 fatty acids 300-1,000 mg capsule Take 2 g by mouth 2 (two) times daily.     furosemide (LASIX) 40 MG tablet Take 1 tablet (40 mg total) by mouth once daily. (Patient taking differently: Take 40 mg by mouth once daily. 1 tab in the morning  1 in the evening every other day)    glipiZIDE (GLUCOTROL) 5  MG tablet Take 10 mg by mouth 2 (two) times daily before meals.    losartan (COZAAR) 50 MG tablet Take 50 mg by mouth once daily.    metFORMIN (GLUCOPHAGE) 500 MG tablet Take 500 mg by mouth 2 (two) times daily with meals.    metoprolol tartrate (LOPRESSOR) 50 MG tablet Take 75 mg by mouth 2 (two) times daily.     nitroGLYCERIN (NITROSTAT) 0.4 MG SL tablet Place 0.4 mg under the tongue every 5 (five) minutes as needed.    polyethylene glycol (GLYCOLAX) 17 gram PwPk Take by mouth as needed.      Family History     Problem Relation (Age of Onset)    Diabetes Father, Mother        Social History Main Topics    Smoking status: Former Smoker     Packs/day: 0.25     Years: 60.00     Types: Cigarettes     Quit date: 5/8/2018    Smokeless tobacco: Never Used    Alcohol use No    Drug use: No    Sexual activity: Not Currently     Partners: Female     Review of Systems   Gastrointestinal: Negative for melena.   Genitourinary: Negative for hematuria.     Objective:     Vital Signs (Most Recent):  Temp: 98 °F (36.7 °C) (05/29/18 0759)  Pulse: 68 (05/29/18 0759)  Resp: 18 (05/29/18 0759)  BP: (!) 152/73 (05/29/18 0759)  SpO2: (!) 92 % (05/29/18 0759) Vital Signs (24h Range):  Temp:  [97 °F (36.1 °C)-98.6 °F (37 °C)] 98 °F (36.7 °C)  Pulse:  [68-95] 68  Resp:  [16-21] 18  SpO2:  [92 %-98 %] 92 %  BP: (119-157)/(59-93) 152/73     Weight: 85.7 kg (188 lb 15 oz)  Body mass index is 27.9 kg/m².    SpO2: (!) 92 %  O2 Device (Oxygen Therapy): nasal cannula      Intake/Output Summary (Last 24 hours) at 05/29/18 0814  Last data filed at 05/29/18 0635   Gross per 24 hour   Intake              896 ml   Output             1250 ml   Net             -354 ml       Lines/Drains/Airways     Peripheral Intravenous Line                 Peripheral IV - Single Lumen 05/27/18 1330 Right Antecubital 1 day                Physical Exam   Constitutional: He is oriented to person, place, and time. He appears well-developed and well-nourished.    HENT:   Head: Normocephalic and atraumatic.   Eyes: Conjunctivae and EOM are normal. Pupils are equal, round, and reactive to light. No scleral icterus.   Neck: Normal range of motion. Neck supple. No JVD present. Carotid bruit is not present. No tracheal deviation present. No thyromegaly present.   Cardiovascular: Normal rate, regular rhythm, S1 normal and S2 normal.  Exam reveals distant heart sounds. Exam reveals no gallop and no friction rub.    No murmur heard.  Pulmonary/Chest: Effort normal and breath sounds normal. No respiratory distress. He has no wheezes. He has no rales. He exhibits no tenderness.   Abdominal: Soft. He exhibits no distension.   Musculoskeletal: He exhibits edema.   Neurological: He is alert and oriented to person, place, and time. He has normal strength. No cranial nerve deficit.   Skin: Skin is warm and dry. No rash noted.   Psychiatric: He has a normal mood and affect. His behavior is normal.       Current Medications:   albuterol-ipratropium  3 mL Nebulization Q6H    aspirin  81 mg Oral Daily    enoxaparin  40 mg Subcutaneous Daily    furosemide  40 mg Intravenous BID    losartan  100 mg Oral Daily    metoprolol tartrate  75 mg Oral BID       ALPRAZolam, dextromethorphan-guaifenesin  mg/5 ml, dextrose 50%, dextrose 50%, glucagon (human recombinant), glucose, glucose, insulin aspart U-100, pneumoc 13-alan conj-dip cr(PF)    Laboratory:  CBC:    Recent Labs  Lab 05/06/18  0711 05/07/18  0432 05/13/18  0410 05/27/18  1330 05/29/18  0410   WHITE BLOOD CELL COUNT 10.23 9.31 7.45 9.57 8.43   HEMOGLOBIN 8.3 L 8.1 L 8.0 L 8.6 L 7.9 L   HEMATOCRIT 28.8 L 28.5 L 28.0 L 30.4 L 28.0 L   PLATELETS 302 275 233 540 H 510 H       CHEMISTRIES:    Recent Labs  Lab 05/04/18  0613  05/15/18  0434 05/23/18  1010 05/27/18  1330 05/28/18  0412 05/29/18  0410   GLUCOSE 84  < > 146 H 49  H 48  H  137 H   SODIUM 142  < > 141 142 142 141 139  139   POTASSIUM 5.0  < > 3.6 4.0 4.5 3.9  4.0  4.0   BUN BLD 34 H  < > 14 17 15 15 18  18   CREATININE 1.4  < > 1.4 1.3 1.2 1.0 1.1  1.1   EGFR IF  55 A  < > 55 A 60 >60 >60 >60  >60   EGFR IF NON- 47 A  < > 47 A 52 A 57 A >60 >60  >60   CALCIUM 10.7 H  < > 11.1 H 11.3 H 10.8 H 10.8 H 10.9 H  10.9 H   MAGNESIUM 2.2  --   --   --  1.9  --  2.1   < > = values in this interval not displayed.    CARDIAC BIOMARKERS:    Recent Labs  Lab 05/03/18  1834 05/03/18  2334 05/27/18  1330 05/27/18  2148 05/28/18  0412   TROPONIN I 0.113 H 0.096 H 0.107 H 0.099 H 0.104 H       COAGS:    Recent Labs  Lab 09/24/16  0740 05/03/18  1351 05/23/18  1010 05/27/18  1330   INR 1.1 1.2 1.0 1.0       LIPIDS/LFTS:    Recent Labs  Lab 09/25/16  0607  09/28/16  0607 05/03/18  1351 05/04/18  0613 05/23/18  1010 05/27/18  1330   CHOLESTEROL 85 L  --   --   --   --   --   --    TRIGLYCERIDES 115  --   --   --   --   --   --    HDL 30 L  --   --   --   --   --   --    LDL CHOLESTEROL 32.0 L  --   --   --   --   --   --    NON-HDL CHOLESTEROL 55  --   --   --   --   --   --    AST 73 H  < > 41 H 74 H 87 H 23 19   ALT 44  < > 41 102 H 137 H 16 16   < > = values in this interval not displayed.    BNP:    Recent Labs  Lab 07/06/16  1527 09/24/16  0740 05/03/18  1351 05/27/18  1330    H 110 H 640 H 626 H       TSH:        Free T4:        Diagnostic Results:  ECG (personally reviewed tracings):  5/27/18 AF 75, RBBB, occ , similar to 5/4/18    Chest X-Ray (personally reviewed image(s)): 5/27/18 CMeg, CHF, L sided PPM, prior sternotomy    Echo: 5/4/18    1 - Low normal left ventricular systolic function (EF 50-55%).     2 - Concentric hypertrophy.     3 - Mild left atrial enlargement.     4 - Pulmonary hypertension. The estimated PA systolic pressure is 46 mmHg.     5 - Mild aortic regurgitation.     6 - Mild to moderate mitral regurgitation.     7 - Mild tricuspid regurgitation.    8 - Ao root dil 3.9cm     Stress Test: L MPI 5/4/18  Nuclear  Quantitative Functional Analysis:   Quantitative figures are not accurate.   Visually estimated LV function is low normal.   Impression: ABNORMAL MYOCARDIAL PERFUSION  1. The perfusion scan is free of evidence for myocardial ischemia.   2. There is mild intensity fixed defect in the apical wall of the left ventricle, consistent with myocardial injury, and moderate intensity fixed defect in the inferolateral wall of the left ventricle, consistent with myocardial injury.   3. Resting wall motion is physiologic.   4. Visually estimated LV function is low normal.   5. The ventricular volumes are normal at rest and stress.   6. The extracardiac distribution of radioactivity is normal.       Assessment and Plan:     * Acute on chronic diastolic congestive heart failure    Suspect exac of HFpEF, ddx includes bronchitis  Recent testing earlier this month, no plan to repeat echo or MPI  Likely med/diet noncompliance  Inc IV lasix to 80mg bid  Cont GDMT: ASA/Statin/BBl/ARB  Change metoprolol to coreg 25mg bid  Next drug to add: aldact  Consider GI eval for etiology of anemia        Pacemaker    Biotronik PPM in place, appears to be functioning normally  Followed prev by [facility]        Essential hypertension    Diet noncompliance suspected  Cont Losartan  Change metoprolol to coreg 25mg bid  Next drug to add: aldact  Monitor renal fxn        Chronic atrial fibrillation    Prev on Eliquis, not on current med list  Rates acceptable and PPM in place  ?stopped for anemia on recent admit  Consider GI eval for assessment of anemia  Resume eliquis 5mg bid if no bleeding source identified        Type 2 diabetes mellitus, controlled, with renal complications    Per IM        CKD Stage 3    Stable renal fxn        Anemia of chronic disease    ?Acute anemia noted on last admit  Currently stable vs discharge, but much lower than prior baseline  Consider GI eval            VTE Risk Mitigation         Ordered     IP VTE HIGH RISK PATIENT  Once       05/27/18 1729     Place DENIS hose  Until discontinued      05/27/18 1729     Place sequential compression device  Until discontinued      05/27/18 1729     enoxaparin injection 40 mg  Daily      05/27/18 1729          Blanco Brunson MD  Cardiology  [facility]

## 2018-05-29 NOTE — ASSESSMENT & PLAN NOTE
Hgb is low at 7.9 g/dL however it has been ~ 8.1 g/dL. Is declining a rectal exam. Not nauseous or vomiting. Tolerating diet. No heart burn. I discussed with patient to let nurse look at stool when he has a BM. If Hgb lower and/or evidence of blood stools/enema, will consult GI. Eliquis currently on hold. Is on ASA

## 2018-05-29 NOTE — ASSESSMENT & PLAN NOTE
Diet noncompliance suspected  Cont Losartan  Change metoprolol to coreg 25mg bid  Next drug to add: aldact  Monitor renal fxn

## 2018-05-29 NOTE — PLAN OF CARE
Problem: Diabetes, Type 2 (Adult)  Goal: Signs and Symptoms of Listed Potential Problems Will be Absent, Minimized or Managed (Diabetes, Type 2)  Signs and symptoms of listed potential problems will be absent, minimized or managed by discharge/transition of care (reference Diabetes, Type 2 (Adult) CPG).   Outcome: Ongoing (interventions implemented as appropriate)   05/29/18 1750   Diabetes, Type 2   Problems Assessed (Type 2 Diabetes) hyperglycemia;hypoglycemia;situational response   Problems Present (Type 2 Diabetes) hyperglycemia

## 2018-05-29 NOTE — PLAN OF CARE
Problem: Pressure Ulcer Risk (Jeremiah Scale) (Adult,Obstetrics,Pediatric)  Goal: Identify Related Risk Factors and Signs and Symptoms  Related risk factors and signs and symptoms are identified upon initiation of Human Response Clinical Practice Guideline (CPG)   Outcome: Outcome(s) achieved Date Met: 05/29/18 05/29/18 1752   Pressure Ulcer Risk (Jeremiah Scale)   Related Risk Factors (Pressure Ulcer Risk (Jeremiah Scale)) hospitalization prolonged;medication

## 2018-05-29 NOTE — PLAN OF CARE
"TN met with patient at bedside to complete discharge needs assessment. TN explained duties of case management to patient.  TN reviewed  "[facility] Packet", "Discharge Planning Begins on Admission" brochure and discussed "Help at Home". Patient stated that his wife Jessika is his HELP AT HOME. TN also discussed his responsibilities to manage his health at home. Patient was informed to leave folder at bedside during hospital stay. Contact information added to white board.    Patient takes a taxi to doctor appointments.     PCP- Dr. Keaton Hardy    Patient Preferred Pharmacy:   [id number]  [place] - [address]  [address] [facility]  [place] [address]  Phone: [phone] Fax: [phone]      Appointment Time Preference: Afternoon       05/29/18 1448   Discharge Assessment   Assessment Type Discharge Planning Assessment   Confirmed/corrected address and phone number on facesheet? Yes   Assessment information obtained from? Patient   Prior to hospitilization cognitive status: Alert/Oriented   Prior to hospitalization functional status: Independent;Assistive Equipment   Current cognitive status: Alert/Oriented   Current Functional Status: Independent;Assistive Equipment   Lives With spouse   Able to Return to Prior Arrangements yes   Is patient able to care for self after discharge? Yes   Who are your caregiver(s) and their phone number(s)? Jessika- spouse @ [phone]   Patient's perception of discharge disposition home or selfcare   Readmission Within The Last 30 Days previous discharge plan unsuccessful  (patient admitted on 5/24 for Pleural Effusion )   Patient currently being followed by outpatient case management? No   Patient currently receives any other outside agency services? No   Equipment Currently Used at Home walker, rolling;rollator;glucometer   Do you have any problems affording any of your prescribed medications? Yes  (patient unable to name medications)   Is the patient " taking medications as prescribed? yes   Does the patient have transportation home? No  (patient states he takes a taxi )   Does the patient receive services at the [facility]? No   Discharge Plan A Home;Home with family   Discharge Plan B (TBD)   Patient/Family In Agreement With Plan yes   Readmission Questionnaire   At the time of your discharge, did someone talk to you about what your health problems were? Yes   At the time of discharge, did someone talk to you about what to watch out for regarding worsening of your health problem? Yes   At the time of discharge, did someone talk to you about what to do if you experienced worsening of your health problem? Yes   At the time of discharge, did someone talk to you about which medication to take when you left the hospital and which ones to stop taking? Yes   At the time of discharge, did someone talk to you about when and where to follow up with a doctor after you left the hospital? Yes   How often do you need to have someone help you when you read instructions, pamphlets, or other written material from your doctor or pharmacy? Often   Do you have problems taking your medications as prescribed? No   Do you have any problems affording any of  your prescribed medications? Yes   Do you have problems obtaining/receiving your medications? No   Does the patient have transportation to healthcare appointments? No  (TAXI )   Living Arrangements house   Does the patient have family/friends to help with healtcare needs after discharge? yes   Have you felt down, depressed, or hopeless? 0   Have you felt little interest or pleasure in doing things? 0

## 2018-05-29 NOTE — SUBJECTIVE & OBJECTIVE
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      Current Outpatient Prescriptions on File Prior to Encounter   Medication Sig    albuterol-ipratropium 2.5mg-0.5mg/3mL (DUO-NEB) 0.5 mg-3 mg(2.5 mg base)/3 mL nebulizer solution Take 3 mLs by nebulization every 6 (six) hours. Rescue    amlodipine (NORVASC) 10 MG tablet Take 10 mg by mouth once daily.    aspirin (ECOTRIN) 81 MG EC tablet Take 81 mg by mouth once daily.    fish oil-omega-3 fatty acids 300-1,000 mg capsule Take 2 g by mouth 2 (two) times daily.     furosemide (LASIX) 40 MG tablet Take 1 tablet (40 mg total) by mouth once daily. (Patient taking differently: Take 40 mg by mouth once daily. 1 tab in the morning  1 in the evening every other day)    glipiZIDE (GLUCOTROL) 5 MG tablet Take 10 mg by mouth 2 (two) times daily before meals.    losartan (COZAAR) 50 MG tablet Take 50 mg by mouth once daily.    metFORMIN (GLUCOPHAGE) 500 MG tablet Take 500 mg by mouth 2 (two) times daily with meals.    metoprolol tartrate (LOPRESSOR) 50 MG tablet Take 75 mg by mouth 2 (two) times daily.     nitroGLYCERIN (NITROSTAT) 0.4 MG SL tablet Place 0.4 mg under the tongue every 5 (five) minutes as needed.    polyethylene glycol (GLYCOLAX) 17 gram  PwPk Take by mouth as needed.      Family History     Problem Relation (Age of Onset)    Diabetes Father, Mother        Social History Main Topics    Smoking status: Former Smoker     Packs/day: 0.25     Years: 60.00     Types: Cigarettes     Quit date: 5/8/2018    Smokeless tobacco: Never Used    Alcohol use No    Drug use: No    Sexual activity: Not Currently     Partners: Female     Review of Systems   Gastrointestinal: Negative for melena.   Genitourinary: Negative for hematuria.     Objective:     Vital Signs (Most Recent):  Temp: 98 °F (36.7 °C) (05/29/18 0759)  Pulse: 68 (05/29/18 0759)  Resp: 18 (05/29/18 0759)  BP: (!) 152/73 (05/29/18 0759)  SpO2: (!) 92 % (05/29/18 0759) Vital Signs (24h Range):  Temp:  [97 °F (36.1 °C)-98.6 °F (37 °C)] 98 °F (36.7 °C)  Pulse:  [68-95] 68  Resp:  [16-21] 18  SpO2:  [92 %-98 %] 92 %  BP: (119-157)/(59-93) 152/73     Weight: 85.7 kg (188 lb 15 oz)  Body mass index is 27.9 kg/m².    SpO2: (!) 92 %  O2 Device (Oxygen Therapy): nasal cannula      Intake/Output Summary (Last 24 hours) at 05/29/18 0814  Last data filed at 05/29/18 0635   Gross per 24 hour   Intake              896 ml   Output             1250 ml   Net             -354 ml       Lines/Drains/Airways     Peripheral Intravenous Line                 Peripheral IV - Single Lumen 05/27/18 1330 Right Antecubital 1 day                Physical Exam   Constitutional: He is oriented to person, place, and time. He appears well-developed and well-nourished.   HENT:   Head: Normocephalic and atraumatic.   Eyes: Conjunctivae and EOM are normal. Pupils are equal, round, and reactive to light. No scleral icterus.   Neck: Normal range of motion. Neck supple. No JVD present. Carotid bruit is not present. No tracheal deviation present. No thyromegaly present.   Cardiovascular: Normal rate, regular rhythm, S1 normal and S2 normal.  Exam reveals distant heart sounds. Exam reveals no gallop and no friction rub.    No murmur  heard.  Pulmonary/Chest: Effort normal and breath sounds normal. No respiratory distress. He has no wheezes. He has no rales. He exhibits no tenderness.   Abdominal: Soft. He exhibits no distension.   Musculoskeletal: He exhibits edema.   Neurological: He is alert and oriented to person, place, and time. He has normal strength. No cranial nerve deficit.   Skin: Skin is warm and dry. No rash noted.   Psychiatric: He has a normal mood and affect. His behavior is normal.       Current Medications:   albuterol-ipratropium  3 mL Nebulization Q6H    aspirin  81 mg Oral Daily    enoxaparin  40 mg Subcutaneous Daily    furosemide  40 mg Intravenous BID    losartan  100 mg Oral Daily    metoprolol tartrate  75 mg Oral BID       ALPRAZolam, dextromethorphan-guaifenesin  mg/5 ml, dextrose 50%, dextrose 50%, glucagon (human recombinant), glucose, glucose, insulin aspart U-100, pneumoc 13-alan conj-dip cr(PF)    Laboratory:  CBC:    Recent Labs  Lab 05/06/18  0711 05/07/18  0432 05/13/18  0410 05/27/18  1330 05/29/18  0410   WHITE BLOOD CELL COUNT 10.23 9.31 7.45 9.57 8.43   HEMOGLOBIN 8.3 L 8.1 L 8.0 L 8.6 L 7.9 L   HEMATOCRIT 28.8 L 28.5 L 28.0 L 30.4 L 28.0 L   PLATELETS 302 275 233 540 H 510 H       CHEMISTRIES:    Recent Labs  Lab 05/04/18  0613  05/15/18  0434 05/23/18  1010 05/27/18  1330 05/28/18  0412 05/29/18  0410   GLUCOSE 84  < > 146 H 49  H 48  H  137 H   SODIUM 142  < > 141 142 142 141 139  139   POTASSIUM 5.0  < > 3.6 4.0 4.5 3.9 4.0  4.0   BUN BLD 34 H  < > 14 17 15 15 18  18   CREATININE 1.4  < > 1.4 1.3 1.2 1.0 1.1  1.1   EGFR IF  55 A  < > 55 A 60 >60 >60 >60  >60   EGFR IF NON- 47 A  < > 47 A 52 A 57 A >60 >60  >60   CALCIUM 10.7 H  < > 11.1 H 11.3 H 10.8 H 10.8 H 10.9 H  10.9 H   MAGNESIUM 2.2  --   --   --  1.9  --  2.1   < > = values in this interval not displayed.    CARDIAC BIOMARKERS:    Recent Labs  Lab 05/03/18  1834 05/03/18  7354  05/27/18  1330 05/27/18  2148 05/28/18  0412   TROPONIN I 0.113 H 0.096 H 0.107 H 0.099 H 0.104 H       COAGS:    Recent Labs  Lab 09/24/16  0740 05/03/18  1351 05/23/18  1010 05/27/18  1330   INR 1.1 1.2 1.0 1.0       LIPIDS/LFTS:    Recent Labs  Lab 09/25/16  0607  09/28/16  0607 05/03/18  1351 05/04/18  0613 05/23/18  1010 05/27/18  1330   CHOLESTEROL 85 L  --   --   --   --   --   --    TRIGLYCERIDES 115  --   --   --   --   --   --    HDL 30 L  --   --   --   --   --   --    LDL CHOLESTEROL 32.0 L  --   --   --   --   --   --    NON-HDL CHOLESTEROL 55  --   --   --   --   --   --    AST 73 H  < > 41 H 74 H 87 H 23 19   ALT 44  < > 41 102 H 137 H 16 16   < > = values in this interval not displayed.    BNP:    Recent Labs  Lab 07/06/16  1527 09/24/16  0740 05/03/18  1351 05/27/18  1330    H 110 H 640 H 626 H       TSH:        Free T4:        Diagnostic Results:  ECG (personally reviewed tracings):  5/27/18 AF 75, RBBB, occ , similar to 5/4/18    Chest X-Ray (personally reviewed image(s)): 5/27/18 CMeg, CHF, L sided PPM, prior sternotomy    Echo: 5/4/18    1 - Low normal left ventricular systolic function (EF 50-55%).     2 - Concentric hypertrophy.     3 - Mild left atrial enlargement.     4 - Pulmonary hypertension. The estimated PA systolic pressure is 46 mmHg.     5 - Mild aortic regurgitation.     6 - Mild to moderate mitral regurgitation.     7 - Mild tricuspid regurgitation.    8 - Ao root dil 3.9cm     Stress Test: L MPI 5/4/18  Nuclear Quantitative Functional Analysis:   Quantitative figures are not accurate.   Visually estimated LV function is low normal.   Impression: ABNORMAL MYOCARDIAL PERFUSION  1. The perfusion scan is free of evidence for myocardial ischemia.   2. There is mild intensity fixed defect in the apical wall of the left ventricle, consistent with myocardial injury, and moderate intensity fixed defect in the inferolateral wall of the left ventricle, consistent with myocardial  injury.   3. Resting wall motion is physiologic.   4. Visually estimated LV function is low normal.   5. The ventricular volumes are normal at rest and stress.   6. The extracardiac distribution of radioactivity is normal.

## 2018-05-29 NOTE — ASSESSMENT & PLAN NOTE
Given elevated PTH I will start trial of cinacalcet. Not a candidate for surgery at this time given issues with heart failure. iCa in AM

## 2018-05-29 NOTE — PLAN OF CARE
Problem: Patient Care Overview  Goal: Plan of Care Review  Outcome: Ongoing (interventions implemented as appropriate)   05/28/18 1951   Coping/Psychosocial   Plan Of Care Reviewed With patient

## 2018-05-29 NOTE — PLAN OF CARE
Problem: Pressure Ulcer Risk (Jeremiah Scale) (Adult,Obstetrics,Pediatric)  Goal: Identify Related Risk Factors and Signs and Symptoms  Related risk factors and signs and symptoms are identified upon initiation of Human Response Clinical Practice Guideline (CPG)   Outcome: Ongoing (interventions implemented as appropriate)   05/28/18 1949   Pressure Ulcer Risk (Jeremiah Scale)   Related Risk Factors (Pressure Ulcer Risk (Jeremiah Scale)) age extremes;medication

## 2018-05-29 NOTE — PROGRESS NOTES
[facility] Hospital Medicine  Progress Note    Patient Name: Agus Ramos Jr.  MRN: [id number]  Patient Class: IP- Inpatient   Admission Date: 5/27/2018  Length of Stay: 2 days  Attending Physician: Eileen Kinsey MD  Primary Care Provider: Keaton Hardy MD        Subjective:     Principal Problem:Acute on chronic diastolic congestive heart failure    HPI:    Agus Ramos Jr. is a 77 y.o. male that (in part)  has a past medical history of Anemia; Anticoagulant long-term use; Congestive heart failure; Coronary artery disease; Diabetes mellitus; Encounter for blood transfusion; Hypertension; MI (myocardial infarction); Pacemaker; Peripheral vascular disease; S/P CABG x 3; S/P femoral-popliteal bypass surgery; Stented coronary artery; and Tobacco use.  has a past surgical history that includes Cardiac surgery; Cardiac pacemaker placement; Cardiac catheterization; and Hemorrhoid surgery.  Presents to [facility] Emergency Department complaining of recurrent shortness of breath.  Patient was discharged to the Hospital approximately 2 weeks ago after being treated for 12 days for an acute CHF exacerbation and symptomatic anemia.  Was also complicated with hypoxia and requirement for home oxygen prior to discharge.  He has shortness of breath at rest and dyspnea with exertion.  Worsened with movement.  Minimally relieved with rest and supplemental oxygen.  Moderate to severe intensity.  Associated with unintentional weight gain.  Denies chest pain.  Daily frequency.  Constant duration.    In the emergency department chest x-ray, routine laboratory studies, EKG, cardiac enzymes were obtained.  There is concern the patient was having recurrent acute on chronic diastolic heart failure.  He was given Lasix in the ED.    Hospital medicine has been asked to admit for further evaluation and treatment.      ==============================================================================  Hospital Course from May 3, 2018-May 15, 2018:   Admitted with concern for symptomatic anemia and HFpEF exacerbation. Found to have low Hgb 6.5, no bleeding identified though patient refused and continues to refuse rectal exam. Transfused 2U with appropriate response to Hgb 8.5. Continued shortness of breath symptoms after transfusion. Started diuresis for CHF exacerbation. TTE 5/4/2018 with normal EF, NST negative for ischemia but does show scar. Still requiring O2. Continuing diuresis.      5/8- pt with brief period of confusion, refused labs this morning but was oriented x3 after I sat down and spoke with him for awhile. Still requiring 2 liters oxygen and edema to LE. Jennifer with wife, Jessika vias phone and she said he called her today and was not confused and oriented. Will check urine culture and continue diuresis.   5/9 - urine culture growing staph, start rocephin, no acute changes on CXR; schedule nebs and continue to wean oxygen as tolerated   5/10- urine culture now shows no growth?, waiting to hear from lab; calcium persistently high, hypercalcemia workup pending, chest CT pending, pt still requiring oxygen despite aggressive diuresis and pulmonary care   5/11- Chest CT concern for right pleural effusion ?loculated; elevated calcium more concern for malignancy - d/w IR and not enough fluid to drain but can jackelyn diagnostic tap, will broaden antibiotics x 24 hours, wait for further studies, schedule diagnostic tap next week, off eliquis on lovenox bridge   5/12 - test for home oxygen with significant drop in O2 saturation to 54% on room air with exercise; Chest CTA did not show significant findings except small pleural effusion that could explain hypoxia, pulmonology consulted, off anticoagulant for potential diagnostic thoracentesis   5/13 - pulmonology consulted; started on spiriva, plan to dc home with home oxygen  5/14 -  insurance has not authorized oxygen yet, continue IV lasix ,  Patient has been discharged with home oxygen and  follow up with Pulmonology and PCP as out patient.       Hospital Course:  No notes on file    Interval History: feeling better but still with SOB. Calcium is up but has been this way for years    Review of Systems   Constitutional: Negative.    Respiratory: Positive for shortness of breath.    Cardiovascular: Positive for leg swelling.   Gastrointestinal: Negative.    Genitourinary: Negative.    Musculoskeletal: Negative.    Skin: Negative.    Neurological: Negative.    Psychiatric/Behavioral: Negative.      Objective:     Vital Signs (Most Recent):  Temp: 97.5 °F (36.4 °C) (05/29/18 1128)  Pulse: 77 (05/29/18 1128)  Resp: 16 (05/29/18 1128)  BP: 123/64 (05/29/18 1128)  SpO2: 97 % (05/29/18 1128) Vital Signs (24h Range):  Temp:  [97 °F (36.1 °C)-98.6 °F (37 °C)] 97.5 °F (36.4 °C)  Pulse:  [68-95] 77  Resp:  [16-21] 16  SpO2:  [92 %-98 %] 97 %  BP: (123-157)/(59-93) 123/64     Weight: 85.7 kg (188 lb 15 oz)  Body mass index is 27.9 kg/m².    Intake/Output Summary (Last 24 hours) at 05/29/18 1147  Last data filed at 05/29/18 0635   Gross per 24 hour   Intake              896 ml   Output             1250 ml   Net             -354 ml      Physical Exam   Constitutional: He is oriented to person, place, and time. He appears well-developed. No distress.   Cardiovascular: Normal rate, regular rhythm and intact distal pulses.    Pulmonary/Chest: Effort normal. He has no wheezes. He has rales (bibasilar).   Abdominal: Soft. Bowel sounds are normal.   Musculoskeletal: Normal range of motion. He exhibits edema.   Neurological: He is alert and oriented to person, place, and time.   Skin: Skin is warm. He is not diaphoretic.   Psychiatric: He has a normal mood and affect. His behavior is normal. Judgment and thought content normal. Cognition and memory are impaired.   Nursing note and vitals reviewed.      Significant  Labs: All pertinent labs within the past 24 hours have been reviewed.    Significant Imaging: I have reviewed all pertinent imaging results/findings within the past 24 hours.  I have reviewed and interpreted all pertinent imaging results/findings within the past 24 hours.    Assessment/Plan:      * Acute on chronic diastolic congestive heart failure    Gradually improving but still needs more diuresis as he evidently becomes SOB with laying down. Will contnue with IV lasix 80 mg BID. Renal panel in AM to check potassium and Cr. BP at goal. Appreciate further Cardiology recs.           Hypercalcemia    Given elevated PTH I will start trial of cinacalcet. Not a candidate for surgery at this time given issues with heart failure. iCa in AM        Pacemaker    No acute issues.           Tobacco abuse    Chronic tobacco use  Tobacco cessation counseling  Nicotine patch offered; consider Wellbutrin or Chantix through PCP as an outpatient (will require closer monitoring)  I discussed with the patient regarding the hazardous effects of smoking on increasing risk of heart attack and stroke, worsening lung functions, and increasing cancer risk.   Patient was urged to stop smoking now.  I also offered nicotine taper (such as nicotine patch and gum) to help ease the craving to smoke.          Anemia of chronic disease    Hgb is low at 7.9 g/dL however it has been ~ 8.1 g/dL. Is declining a rectal exam. Not nauseous or vomiting. Tolerating diet. No heart burn. I discussed with patient to let nurse look at stool when he has a BM. If Hgb lower and/or evidence of blood stools/enema, will consult GI. Eliquis currently on hold. Is on ASA          Chronic anticoagulation    Is on eliquis for AFib. Currently on hold.           Type 2 diabetes mellitus, controlled, with renal complications    BG currently at goal. Continue insulin as needed for goal BG < 180          CKD Stage 3    Stable and at baseline. Renal panel in AM while on IV  diuresis.           Essential hypertension    Stable on current regimen          Chronic atrial fibrillation    Stable on PO BB. Cardiac monitoring.             VTE Risk Mitigation         Ordered     IP VTE HIGH RISK PATIENT  Once      05/27/18 1729     Place DENIS hose  Until discontinued      05/27/18 1729     Place sequential compression device  Until discontinued      05/27/18 1729     enoxaparin injection 40 mg  Daily      05/27/18 1729              Eileen Domingo MD  Department of Hospital Medicine   [facility]

## 2018-05-30 LAB
ALBUMIN SERPL BCP-MCNC: 2.7 G/DL
ANION GAP SERPL CALC-SCNC: 8 MMOL/L
ANISOCYTOSIS BLD QL SMEAR: ABNORMAL
BASOPHILS # BLD AUTO: 0.04 K/UL
BASOPHILS NFR BLD: 0.5 %
BUN SERPL-MCNC: 20 MG/DL
CA-I BLDV-SCNC: 1.42 MMOL/L
CALCIUM SERPL-MCNC: 10.6 MG/DL
CHLORIDE SERPL-SCNC: 105 MMOL/L
CO2 SERPL-SCNC: 28 MMOL/L
CREAT SERPL-MCNC: 1.3 MG/DL
DACRYOCYTES BLD QL SMEAR: ABNORMAL
DIFFERENTIAL METHOD: ABNORMAL
EOSINOPHIL # BLD AUTO: 0.2 K/UL
EOSINOPHIL NFR BLD: 2.1 %
ERYTHROCYTE [DISTWIDTH] IN BLOOD BY AUTOMATED COUNT: 23.4 %
EST. GFR  (AFRICAN AMERICAN): 60 ML/MIN/1.73 M^2
EST. GFR  (NON AFRICAN AMERICAN): 52 ML/MIN/1.73 M^2
FERRITIN SERPL-MCNC: 14 NG/ML
FOLATE SERPL-MCNC: 7.5 NG/ML
GIANT PLATELETS BLD QL SMEAR: PRESENT
GLUCOSE SERPL-MCNC: 134 MG/DL
HCT VFR BLD AUTO: 27.9 %
HGB BLD-MCNC: 7.9 G/DL
HYPOCHROMIA BLD QL SMEAR: ABNORMAL
IRON SERPL-MCNC: 16 UG/DL
LYMPHOCYTES # BLD AUTO: 0.9 K/UL
LYMPHOCYTES NFR BLD: 11.8 %
MCH RBC QN AUTO: 19.7 PG
MCHC RBC AUTO-ENTMCNC: 28.3 G/DL
MCV RBC AUTO: 69 FL
MONOCYTES # BLD AUTO: 1 K/UL
MONOCYTES NFR BLD: 12.1 %
NEUTROPHILS # BLD AUTO: 5.9 K/UL
NEUTROPHILS NFR BLD: 74 %
OVALOCYTES BLD QL SMEAR: ABNORMAL
PHOSPHATE SERPL-MCNC: 3.3 MG/DL
PLATELET # BLD AUTO: 542 K/UL
PLATELET BLD QL SMEAR: ABNORMAL
PMV BLD AUTO: 9.6 FL
POCT GLUCOSE: 139 MG/DL (ref 70–110)
POCT GLUCOSE: 142 MG/DL (ref 70–110)
POCT GLUCOSE: 143 MG/DL (ref 70–110)
POIKILOCYTOSIS BLD QL SMEAR: SLIGHT
POLYCHROMASIA BLD QL SMEAR: ABNORMAL
POTASSIUM SERPL-SCNC: 4.3 MMOL/L
RBC # BLD AUTO: 4.02 M/UL
SATURATED IRON: 4 %
SCHISTOCYTES BLD QL SMEAR: ABNORMAL
SODIUM SERPL-SCNC: 141 MMOL/L
TARGETS BLD QL SMEAR: ABNORMAL
TOTAL IRON BINDING CAPACITY: 369 UG/DL
TRANSFERRIN SERPL-MCNC: 249 MG/DL
VIT B12 SERPL-MCNC: 313 PG/ML
WBC # BLD AUTO: 7.99 K/UL

## 2018-05-30 PROCEDURE — 25000003 PHARM REV CODE 250: Performed by: EMERGENCY MEDICINE

## 2018-05-30 PROCEDURE — 27000221 HC OXYGEN, UP TO 24 HOURS

## 2018-05-30 PROCEDURE — 63600175 PHARM REV CODE 636 W HCPCS: Performed by: EMERGENCY MEDICINE

## 2018-05-30 PROCEDURE — 21400001 HC TELEMETRY ROOM

## 2018-05-30 PROCEDURE — 82746 ASSAY OF FOLIC ACID SERUM: CPT

## 2018-05-30 PROCEDURE — 82330 ASSAY OF CALCIUM: CPT

## 2018-05-30 PROCEDURE — 25000003 PHARM REV CODE 250: Performed by: INTERNAL MEDICINE

## 2018-05-30 PROCEDURE — 80069 RENAL FUNCTION PANEL: CPT

## 2018-05-30 PROCEDURE — 82728 ASSAY OF FERRITIN: CPT

## 2018-05-30 PROCEDURE — 36415 COLL VENOUS BLD VENIPUNCTURE: CPT

## 2018-05-30 PROCEDURE — 99222 1ST HOSP IP/OBS MODERATE 55: CPT | Mod: ,,, | Performed by: INTERNAL MEDICINE

## 2018-05-30 PROCEDURE — 85025 COMPLETE CBC W/AUTO DIFF WBC: CPT

## 2018-05-30 PROCEDURE — 94799 UNLISTED PULMONARY SVC/PX: CPT

## 2018-05-30 PROCEDURE — 94761 N-INVAS EAR/PLS OXIMETRY MLT: CPT

## 2018-05-30 PROCEDURE — 99233 SBSQ HOSP IP/OBS HIGH 50: CPT | Mod: ,,, | Performed by: INTERNAL MEDICINE

## 2018-05-30 PROCEDURE — 63600175 PHARM REV CODE 636 W HCPCS: Performed by: INTERNAL MEDICINE

## 2018-05-30 PROCEDURE — 83540 ASSAY OF IRON: CPT

## 2018-05-30 PROCEDURE — 99900035 HC TECH TIME PER 15 MIN (STAT)

## 2018-05-30 PROCEDURE — 82607 VITAMIN B-12: CPT

## 2018-05-30 PROCEDURE — 94640 AIRWAY INHALATION TREATMENT: CPT

## 2018-05-30 PROCEDURE — 25000242 PHARM REV CODE 250 ALT 637 W/ HCPCS: Performed by: INTERNAL MEDICINE

## 2018-05-30 RX ORDER — FUROSEMIDE 40 MG/1
40 TABLET ORAL 2 TIMES DAILY
Status: DISCONTINUED | OUTPATIENT
Start: 2018-05-30 | End: 2018-05-31

## 2018-05-30 RX ORDER — CARVEDILOL 6.25 MG/1
50 TABLET ORAL 2 TIMES DAILY
Status: DISCONTINUED | OUTPATIENT
Start: 2018-05-30 | End: 2018-06-01

## 2018-05-30 RX ADMIN — IRON SUCROSE 200 MG: 20 INJECTION, SOLUTION INTRAVENOUS at 03:05

## 2018-05-30 RX ADMIN — IPRATROPIUM BROMIDE AND ALBUTEROL SULFATE 3 ML: .5; 2.5 SOLUTION RESPIRATORY (INHALATION) at 01:05

## 2018-05-30 RX ADMIN — CINACALCET HYDROCHLORIDE 30 MG: 30 TABLET, COATED ORAL at 09:05

## 2018-05-30 RX ADMIN — IPRATROPIUM BROMIDE AND ALBUTEROL SULFATE 3 ML: .5; 2.5 SOLUTION RESPIRATORY (INHALATION) at 07:05

## 2018-05-30 RX ADMIN — CINACALCET HYDROCHLORIDE 30 MG: 30 TABLET, COATED ORAL at 06:05

## 2018-05-30 RX ADMIN — ENOXAPARIN SODIUM 40 MG: 100 INJECTION SUBCUTANEOUS at 06:05

## 2018-05-30 RX ADMIN — FERROUS SULFATE TAB EC 325 MG (65 MG FE EQUIVALENT) 325 MG: 325 (65 FE) TABLET DELAYED RESPONSE at 09:05

## 2018-05-30 RX ADMIN — LOSARTAN POTASSIUM 100 MG: 25 TABLET, FILM COATED ORAL at 09:05

## 2018-05-30 RX ADMIN — CARVEDILOL 50 MG: 6.25 TABLET, FILM COATED ORAL at 08:05

## 2018-05-30 RX ADMIN — FUROSEMIDE 40 MG: 40 TABLET ORAL at 06:05

## 2018-05-30 RX ADMIN — FUROSEMIDE 40 MG: 40 TABLET ORAL at 09:05

## 2018-05-30 RX ADMIN — CARVEDILOL 50 MG: 6.25 TABLET, FILM COATED ORAL at 09:05

## 2018-05-30 RX ADMIN — ASPIRIN 81 MG: 81 TABLET, COATED ORAL at 09:05

## 2018-05-30 NOTE — ASSESSMENT & PLAN NOTE
Given elevated PTH will continue trial of cinacalcet. Not a candidate for surgery at this time given issues with heart failure. iCa in upper limit of normal. Needs f/u with endocrinology

## 2018-05-30 NOTE — CONSULTS
[facility]  Hematology/Oncology  Consult Note    Patient Name: Agus Ramos Jr.  MRN: [id number]  Admission Date: 5/27/2018  Hospital Length of Stay: 3 days  Code Status: Full Code   Attending Provider: Hesham Hart MD  Consulting Provider: Millie eBll MD  Primary Care Physician: Keaton Hardy MD  Principal Problem:Acute on chronic diastolic congestive heart failure    Inpatient consult to Hematology/Oncology - [facility]  Consult performed by: MILLIE BELL  Consult ordered by: HESHAM HART        Subjective:   REASON FOR CONSULTATION; Symptomatic iron deficiency anemia  HPI:  Patient is a 77-year-old gentleman with past medical history of CHF, CAD, diabetes mellitus, hypertension, PVD, status post CABG x3 presented to the ED with complaint of progressively worsening shortness of breath and leg swelling since recent hospital discharge and patient was discharged on supplemental oxygen Patient was recently hospitalized for acute CHF exacerbation and symptomatic anemia.  At that timeat Hb 6.5g/dl .  Patient was transfused 2 units with appropriate response to hemoglobin of 8.5. He was dc'd home 5/14 on home O2 and taken off Apixaban.  CBC on presentation reveals a white blood cell count of 9.2 hemoglobin of 8.6 grams/deciliter hematocrit of 30.4% MCV of 71 and a platelet count of 540.  Consult for symptomatic iron deficiency.     Oncology Treatment Plan:   [No treatment plan]    Medications:  Continuous Infusions:  Scheduled Meds:   albuterol-ipratropium  3 mL Nebulization Q6H    aspirin  81 mg Oral Daily    carvedilol  50 mg Oral BID    cinacalcet  30 mg Oral BID WM    enoxaparin  40 mg Subcutaneous Daily    ferrous sulfate  325 mg Oral Daily    furosemide  40 mg Oral BID    losartan  100 mg Oral Daily     PRN Meds:dextromethorphan-guaifenesin  mg/5 ml, dextrose 50%, dextrose 50%, glucagon (human recombinant), glucose, glucose, insulin aspart U-100, pneumoc 13-alan  conj-dip cr(PF)     Review of patient's allergies indicates:  No Known Allergies     Past Medical History:   Diagnosis Date    Anemia 05/03/2018    pending blood transfusion    Anticoagulant long-term use     Congestive heart failure     Coronary artery disease     Diabetes mellitus     Encounter for blood transfusion     Hypertension     MI (myocardial infarction)     Pacemaker     left chest    Peripheral vascular disease     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     S/P femoral-popliteal bypass surgery left    Stented coronary artery     Tobacco use      Past Surgical History:   Procedure Laterality Date    CARDIAC CATHETERIZATION      CARDIAC PACEMAKER PLACEMENT      CARDIAC SURGERY      HEMORRHOID SURGERY       Family History     Problem Relation (Age of Onset)    Diabetes Father, Mother        Social History Main Topics    Smoking status: Former Smoker     Packs/day: 0.25     Years: 60.00     Types: Cigarettes     Quit date: 5/8/2018    Smokeless tobacco: Never Used    Alcohol use No    Drug use: No    Sexual activity: Not Currently     Partners: Female       Review of Systems   Constitutional: Positive for fatigue. Negative for appetite change, fever and unexpected weight change.   HENT: Negative for mouth sores.    Eyes: Negative for visual disturbance.   Respiratory: Positive for shortness of breath. Negative for cough.    Cardiovascular: Positive for leg swelling. Negative for chest pain.   Gastrointestinal: Negative for abdominal pain and diarrhea.   Genitourinary: Negative for frequency.   Musculoskeletal: Negative for back pain.   Skin: Negative for rash.   Neurological: Negative for headaches.   Hematological: Negative for adenopathy.   Psychiatric/Behavioral: The patient is not nervous/anxious.      Objective:     Vital Signs (Most Recent):  Temp: 98.8 °F (37.1 °C) (05/30/18 0801)  Pulse: 83 (05/30/18 0801)  Resp: 18 (05/30/18 0801)  BP: (!) 140/65 (05/30/18 0801)  SpO2: 98 % (05/30/18 0801) Vital  Signs (24h Range):  Temp:  [96.2 °F (35.7 °C)-98.8 °F (37.1 °C)] 98.8 °F (37.1 °C)  Pulse:  [73-98] 83  Resp:  [18-20] 18  SpO2:  [94 %-100 %] 98 %  BP: (123-140)/(63-97) 140/65     Weight: 84.5 kg (186 lb 4.6 oz)  Body mass index is 27.51 kg/m².  Body surface area is 2.03 meters squared.      Intake/Output Summary (Last 24 hours) at 05/30/18 1259  Last data filed at 05/30/18 0800   Gross per 24 hour   Intake              476 ml   Output             2550 ml   Net            -2074 ml       Physical Exam   Constitutional: He is oriented to person, place, and time. He appears well-developed and well-nourished.   HENT:   Head: Normocephalic.   Mouth/Throat: Oropharynx is clear and moist. No oropharyngeal exudate.   Eyes: Conjunctivae are normal. Pupils are equal, round, and reactive to light. No scleral icterus.   Neck: Normal range of motion. Neck supple. No thyromegaly present.   Cardiovascular: Normal rate, regular rhythm and normal heart sounds.    No murmur heard.  Pulmonary/Chest: Effort normal. He has no wheezes. He has rales.   Abdominal: Soft. Bowel sounds are normal. He exhibits no distension and no mass. There is no hepatosplenomegaly. There is no tenderness. There is no rebound and no guarding.   Musculoskeletal: Normal range of motion. He exhibits edema.   Neurological: He is alert and oriented to person, place, and time. No cranial nerve deficit.   Skin: No rash noted. No erythema.   Psychiatric: He has a normal mood and affect.       Significant Labs:   All pertinent labs within the past 24 hours have been reviewed    Diagnostic Results:  I have reviewed and interpreted all pertinent imaging results/findings within the past 24 hours    Assessment/Plan:     * Acute on chronic diastolic congestive heart failure    Followed by Cardiology        Symptomatic anemia    Pt with chronic anemia with component of Fe deficiency  Plan anemia profile testing    Plan IV Fe therapy during hospitalization   Consider GI  eval in setting of GEOVANNI             Chronic atrial fibrillation    Anticoagulation held last hospitalization    Agree with GI eval prior to restart of anticoagulation therapy             Thank you for your consult.      Millie Bell MD  Hematology/Oncology  [facility]

## 2018-05-30 NOTE — SUBJECTIVE & OBJECTIVE
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      Current Outpatient Prescriptions on File Prior to Encounter   Medication Sig    albuterol-ipratropium 2.5mg-0.5mg/3mL (DUO-NEB) 0.5 mg-3 mg(2.5 mg base)/3 mL nebulizer solution Take 3 mLs by nebulization every 6 (six) hours. Rescue    amlodipine (NORVASC) 10 MG tablet Take 10 mg by mouth once daily.    aspirin (ECOTRIN) 81 MG EC tablet Take 81 mg by mouth once daily.    fish oil-omega-3 fatty acids 300-1,000 mg capsule Take 2 g by mouth 2 (two) times daily.     furosemide (LASIX) 40 MG tablet Take 1 tablet (40 mg total) by mouth once daily. (Patient taking differently: Take 40 mg by mouth once daily. 1 tab in the morning  1 in the evening every other day)    glipiZIDE (GLUCOTROL) 5 MG tablet Take 10 mg by mouth 2 (two) times daily before meals.    losartan (COZAAR) 50 MG tablet Take 50 mg by mouth once daily.    metFORMIN (GLUCOPHAGE) 500 MG tablet Take 500 mg by mouth 2 (two) times daily with meals.    metoprolol tartrate (LOPRESSOR) 50 MG tablet Take 75 mg by mouth 2 (two) times daily.     nitroGLYCERIN (NITROSTAT) 0.4 MG SL tablet Place 0.4 mg under the tongue every 5 (five) minutes as needed.    polyethylene glycol (GLYCOLAX) 17 gram  PwPk Take by mouth as needed.      Family History     Problem Relation (Age of Onset)    Diabetes Father, Mother        Social History Main Topics    Smoking status: Former Smoker     Packs/day: 0.25     Years: 60.00     Types: Cigarettes     Quit date: 5/8/2018    Smokeless tobacco: Never Used    Alcohol use No    Drug use: No    Sexual activity: Not Currently     Partners: Female     Review of Systems   Gastrointestinal: Negative for melena.   Genitourinary: Negative for hematuria.     Objective:     Vital Signs (Most Recent):  Temp: 96.2 °F (35.7 °C) (05/30/18 0446)  Pulse: 98 (05/30/18 0446)  Resp: 18 (05/30/18 0446)  BP: 134/63 (05/30/18 0446)  SpO2: 95 % (05/30/18 0446) Vital Signs (24h Range):  Temp:  [96.2 °F (35.7 °C)-98.5 °F (36.9 °C)] 96.2 °F (35.7 °C)  Pulse:  [68-98] 98  Resp:  [16-20] 18  SpO2:  [92 %-100 %] 95 %  BP: (123-152)/(63-97) 134/63     Weight: 84.5 kg (186 lb 4.6 oz)  Body mass index is 27.51 kg/m².    SpO2: 95 %  O2 Device (Oxygen Therapy): nasal cannula      Intake/Output Summary (Last 24 hours) at 05/30/18 0705  Last data filed at 05/30/18 0600   Gross per 24 hour   Intake              660 ml   Output             3250 ml   Net            -2590 ml       Lines/Drains/Airways     Peripheral Intravenous Line                 Peripheral IV - Single Lumen 05/27/18 1330 Right Antecubital 2 days                Physical Exam   Constitutional: He is oriented to person, place, and time. He appears well-developed and well-nourished.   HENT:   Head: Normocephalic and atraumatic.   Eyes: Conjunctivae and EOM are normal. Pupils are equal, round, and reactive to light. No scleral icterus.   Neck: Normal range of motion. Neck supple. No JVD present. Carotid bruit is not present. No tracheal deviation present. No thyromegaly present.   Cardiovascular: Normal rate, regular rhythm, S1 normal and S2 normal.  Exam reveals distant heart sounds. Exam reveals no gallop and no friction rub.    No murmur  heard.  Pulmonary/Chest: Effort normal and breath sounds normal. No respiratory distress. He has no wheezes. He has no rales. He exhibits no tenderness.   Abdominal: Soft. He exhibits no distension.   Musculoskeletal: He exhibits edema.   Neurological: He is alert and oriented to person, place, and time. He has normal strength. No cranial nerve deficit.   Skin: Skin is warm and dry. No rash noted.   Psychiatric: He has a normal mood and affect. His behavior is normal.       Current Medications:   albuterol-ipratropium  3 mL Nebulization Q6H    aspirin  81 mg Oral Daily    carvedilol  25 mg Oral BID    cinacalcet  30 mg Oral BID WM    enoxaparin  40 mg Subcutaneous Daily    ferrous sulfate  325 mg Oral Daily    furosemide  80 mg Intravenous BID    losartan  100 mg Oral Daily       dextromethorphan-guaifenesin  mg/5 ml, dextrose 50%, dextrose 50%, glucagon (human recombinant), glucose, glucose, insulin aspart U-100, pneumoc 13-alan conj-dip cr(PF)    Laboratory:  CBC:    Recent Labs  Lab 05/07/18  0432 05/13/18  0410 05/27/18  1330 05/29/18  0410 05/30/18  0422   WHITE BLOOD CELL COUNT 9.31 7.45 9.57 8.43 7.99   HEMOGLOBIN 8.1 L 8.0 L 8.6 L 7.9 L 7.9 L   HEMATOCRIT 28.5 L 28.0 L 30.4 L 28.0 L 27.9 L   PLATELETS 275 233 540 H 510 H 542 H       CHEMISTRIES:    Recent Labs  Lab 05/04/18  0613  05/23/18  1010 05/27/18  1330 05/28/18  0412 05/29/18  0410 05/30/18  0422   GLUCOSE 84  < > 49  H 48  H  137 H 134 H   SODIUM 142  < > 142 142 141 139  139 141   POTASSIUM 5.0  < > 4.0 4.5 3.9 4.0  4.0 4.3   BUN BLD 34 H  < > 17 15 15 18  18 20   CREATININE 1.4  < > 1.3 1.2 1.0 1.1  1.1 1.3   EGFR IF  55 A  < > 60 >60 >60 >60  >60 60   EGFR IF NON- 47 A  < > 52 A 57 A >60 >60  >60 52 A   CALCIUM 10.7 H  < > 11.3 H 10.8 H 10.8 H 10.9 H  10.9 H 10.6 H   MAGNESIUM 2.2  --   --  1.9  --  2.1  --    < > = values in this interval not displayed.    CARDIAC  BIOMARKERS:    Recent Labs  Lab 05/03/18  1834 05/03/18  2334 05/27/18  1330 05/27/18  2148 05/28/18  0412   TROPONIN I 0.113 H 0.096 H 0.107 H 0.099 H 0.104 H       COAGS:    Recent Labs  Lab 09/24/16  0740 05/03/18  1351 05/23/18  1010 05/27/18  1330   INR 1.1 1.2 1.0 1.0       LIPIDS/LFTS:    Recent Labs  Lab 09/25/16  0607  09/28/16  0607 05/03/18  1351 05/04/18  0613 05/23/18  1010 05/27/18  1330   CHOLESTEROL 85 L  --   --   --   --   --   --    TRIGLYCERIDES 115  --   --   --   --   --   --    HDL 30 L  --   --   --   --   --   --    LDL CHOLESTEROL 32.0 L  --   --   --   --   --   --    NON-HDL CHOLESTEROL 55  --   --   --   --   --   --    AST 73 H  < > 41 H 74 H 87 H 23 19   ALT 44  < > 41 102 H 137 H 16 16   < > = values in this interval not displayed.    BNP:    Recent Labs  Lab 07/06/16  1527 09/24/16  0740 05/03/18  1351 05/27/18  1330    H 110 H 640 H 626 H       TSH:        Free T4:        Diagnostic Results:  ECG (personally reviewed tracings):  5/27/18 AF 75, RBBB, occ , similar to 5/4/18    Chest X-Ray (personally reviewed image(s)): 5/27/18 CMeg, CHF, L sided PPM, prior sternotomy    Echo: 5/4/18    1 - Low normal left ventricular systolic function (EF 50-55%).     2 - Concentric hypertrophy.     3 - Mild left atrial enlargement.     4 - Pulmonary hypertension. The estimated PA systolic pressure is 46 mmHg.     5 - Mild aortic regurgitation.     6 - Mild to moderate mitral regurgitation.     7 - Mild tricuspid regurgitation.    8 - Ao root dil 3.9cm     Stress Test: L MPI 5/4/18  Nuclear Quantitative Functional Analysis:   Quantitative figures are not accurate.   Visually estimated LV function is low normal.   Impression: ABNORMAL MYOCARDIAL PERFUSION  1. The perfusion scan is free of evidence for myocardial ischemia.   2. There is mild intensity fixed defect in the apical wall of the left ventricle, consistent with myocardial injury, and moderate intensity fixed defect in the  inferolateral wall of the left ventricle, consistent with myocardial injury.   3. Resting wall motion is physiologic.   4. Visually estimated LV function is low normal.   5. The ventricular volumes are normal at rest and stress.   6. The extracardiac distribution of radioactivity is normal.

## 2018-05-30 NOTE — PROGRESS NOTES
[facility] Hospital Medicine  Progress Note    Patient Name: Agus Ramos Jr.  MRN: [id number]  Patient Class: IP- Inpatient   Admission Date: 5/27/2018  Length of Stay: 3 days  Attending Physician: Eileen Kinsey MD  Primary Care Provider: Keaton Hardy MD        Subjective:     Principal Problem:Acute on chronic diastolic congestive heart failure    HPI:    Agus Ramos Jr. is a 77 y.o. male that (in part)  has a past medical history of Anemia; Anticoagulant long-term use; Congestive heart failure; Coronary artery disease; Diabetes mellitus; Encounter for blood transfusion; Hypertension; MI (myocardial infarction); Pacemaker; Peripheral vascular disease; S/P CABG x 3; S/P femoral-popliteal bypass surgery; Stented coronary artery; and Tobacco use.  has a past surgical history that includes Cardiac surgery; Cardiac pacemaker placement; Cardiac catheterization; and Hemorrhoid surgery.  Presents to [facility] Emergency Department complaining of recurrent shortness of breath.  Patient was discharged to the Hospital approximately 2 weeks ago after being treated for 12 days for an acute CHF exacerbation and symptomatic anemia.  Was also complicated with hypoxia and requirement for home oxygen prior to discharge.  He has shortness of breath at rest and dyspnea with exertion.  Worsened with movement.  Minimally relieved with rest and supplemental oxygen.  Moderate to severe intensity.  Associated with unintentional weight gain.  Denies chest pain.  Daily frequency.  Constant duration.    In the emergency department chest x-ray, routine laboratory studies, EKG, cardiac enzymes were obtained.  There is concern the patient was having recurrent acute on chronic diastolic heart failure.  He was given Lasix in the ED.    Hospital medicine has been asked to admit for further evaluation and treatment.      ==============================================================================  Hospital Course from May 3, 2018-May 15, 2018:   Admitted with concern for symptomatic anemia and HFpEF exacerbation. Found to have low Hgb 6.5, no bleeding identified though patient refused and continues to refuse rectal exam. Transfused 2U with appropriate response to Hgb 8.5. Continued shortness of breath symptoms after transfusion. Started diuresis for CHF exacerbation. TTE 5/4/2018 with normal EF, NST negative for ischemia but does show scar. Still requiring O2. Continuing diuresis.      5/8- pt with brief period of confusion, refused labs this morning but was oriented x3 after I sat down and spoke with him for awhile. Still requiring 2 liters oxygen and edema to LE. Jennifer with wife, Jessika vias phone and she said he called her today and was not confused and oriented. Will check urine culture and continue diuresis.   5/9 - urine culture growing staph, start rocephin, no acute changes on CXR; schedule nebs and continue to wean oxygen as tolerated   5/10- urine culture now shows no growth?, waiting to hear from lab; calcium persistently high, hypercalcemia workup pending, chest CT pending, pt still requiring oxygen despite aggressive diuresis and pulmonary care   5/11- Chest CT concern for right pleural effusion ?loculated; elevated calcium more concern for malignancy - d/w IR and not enough fluid to drain but can jackelyn diagnostic tap, will broaden antibiotics x 24 hours, wait for further studies, schedule diagnostic tap next week, off eliquis on lovenox bridge   5/12 - test for home oxygen with significant drop in O2 saturation to 54% on room air with exercise; Chest CTA did not show significant findings except small pleural effusion that could explain hypoxia, pulmonology consulted, off anticoagulant for potential diagnostic thoracentesis   5/13 - pulmonology consulted; started on spiriva, plan to dc home with home oxygen  5/14 -  insurance has not authorized oxygen yet, continue IV lasix ,  Patient has been discharged with home oxygen and  follow up with Pulmonology and PCP as out patient.       Hospital Course:  No notes on file    Interval History: iCa in upper limit of normal. Hgb low but unchanged. Has low iron and ferritin. Pending B12 and folic acid. No BMs yet. Still refusing rectal exam. His breathing is actually better today. Cr up a bit    Review of Systems   Constitutional: Negative.    Respiratory: Positive for shortness of breath (better).    Cardiovascular: Positive for leg swelling (better).   Gastrointestinal: Negative.    Genitourinary: Negative.    Musculoskeletal: Negative.    Skin: Negative.    Neurological: Negative.    Psychiatric/Behavioral: Negative.      Objective:     Vital Signs (Most Recent):  Temp: 98.8 °F (37.1 °C) (05/30/18 0801)  Pulse: 83 (05/30/18 0801)  Resp: 18 (05/30/18 0801)  BP: (!) 140/65 (05/30/18 0801)  SpO2: 98 % (05/30/18 0801) Vital Signs (24h Range):  Temp:  [96.2 °F (35.7 °C)-98.8 °F (37.1 °C)] 98.8 °F (37.1 °C)  Pulse:  [73-98] 83  Resp:  [18-20] 18  SpO2:  [94 %-100 %] 98 %  BP: (123-140)/(63-97) 140/65     Weight: 84.5 kg (186 lb 4.6 oz)  Body mass index is 27.51 kg/m².    Intake/Output Summary (Last 24 hours) at 05/30/18 1220  Last data filed at 05/30/18 0800   Gross per 24 hour   Intake              476 ml   Output             2550 ml   Net            -2074 ml      Physical Exam   Constitutional: He is oriented to person, place, and time. He appears well-developed. No distress.   Cardiovascular: Normal rate, regular rhythm and intact distal pulses.    Pulmonary/Chest: Effort normal. He has no wheezes. He has rales (bibasilar).   Abdominal: Soft. Bowel sounds are normal.   Musculoskeletal: Normal range of motion. He exhibits edema.   Neurological: He is alert and oriented to person, place, and time.   Skin: Skin is warm. He is not diaphoretic.   Psychiatric: He has a normal mood and affect.  His behavior is normal. Judgment and thought content normal. Cognition and memory are impaired.   Nursing note and vitals reviewed.      Significant Labs: All pertinent labs within the past 24 hours have been reviewed.    Significant Imaging: I have reviewed all pertinent imaging results/findings within the past 24 hours.  I have reviewed and interpreted all pertinent imaging results/findings within the past 24 hours.    Assessment/Plan:      * Acute on chronic diastolic congestive heart failure    Is better today. CXR reviewed. Has minimal pulm edema but suggests bibasilar atelectasis which are evident on exam. Transitioned to PO lasix. Renal panel in AM to check potassium and Cr. BP at goal. I added incentive spirometer for atelectasis. Appreciate further Cardiology recs.           Hypercalcemia    Given elevated PTH will continue trial of cinacalcet. Not a candidate for surgery at this time given issues with heart failure. iCa in upper limit of normal. Needs f/u with endocrinology        Pacemaker    No acute issues.           Tobacco abuse    Chronic tobacco use  Tobacco cessation counseling  Nicotine patch offered; consider Wellbutrin or Chantix through PCP as an outpatient (will require closer monitoring)  I discussed with the patient regarding the hazardous effects of smoking on increasing risk of heart attack and stroke, worsening lung functions, and increasing cancer risk.   Patient was urged to stop smoking now.  I also offered nicotine taper (such as nicotine patch and gum) to help ease the craving to smoke.          Anemia of chronic disease    Hgb is low at 7.9 g/dL however it has been ~ 8.1 g/dL prior to admit. Is declining a rectal exam. Not nauseous or vomiting. Tolerating diet. No heart burn. I discussed with patient to let nurse look at stool when he has a BM. If Hgb lower and/or evidence of blood stools/enema, will consult GI. Will consult Hematology for co-management of iron deficiency. Pending  B12 and folic acid. Eliquis currently on hold. Is on ASA          Chronic anticoagulation    Is on eliquis for AFib. Currently on hold.           Type 2 diabetes mellitus, controlled, with renal complications    BG currently at goal. Continue insulin as needed for goal BG < 180          CKD Stage 3    Stable and at baseline. Renal panel in AM while on IV diuresis.           Essential hypertension    Stable on current regimen          Chronic atrial fibrillation    Stable on PO BB. Cardiac monitoring.             VTE Risk Mitigation         Ordered     IP VTE HIGH RISK PATIENT  Once      05/27/18 1729     Place DENIS hose  Until discontinued      05/27/18 1729     Place sequential compression device  Until discontinued      05/27/18 1729     enoxaparin injection 40 mg  Daily      05/27/18 1729              Eileen Domingo MD  Department of Hospital Medicine   [facility]

## 2018-05-30 NOTE — ASSESSMENT & PLAN NOTE
Hgb is low at 7.9 g/dL however it has been ~ 8.1 g/dL prior to admit. Is declining a rectal exam. Not nauseous or vomiting. Tolerating diet. No heart burn. I discussed with patient to let nurse look at stool when he has a BM. If Hgb lower and/or evidence of blood stools/enema, will consult GI. Will consult Hematology for co-management of iron deficiency. Pending B12 and folic acid. Eliquis currently on hold. Is on ASA

## 2018-05-30 NOTE — ASSESSMENT & PLAN NOTE
Is better today. CXR reviewed. Has minimal pulm edema but suggests bibasilar atelectasis which are evident on exam. Transitioned to PO lasix. Renal panel in AM to check potassium and Cr. BP at goal. I added incentive spirometer for atelectasis. Appreciate further Cardiology recs.

## 2018-05-30 NOTE — PLAN OF CARE
Problem: Diabetes, Type 2 (Adult)  Intervention: Optimize Glycemic Control   05/30/18 1642   Nutrition Interventions   Glycemic Management blood glucose monitoring       Goal: Signs and Symptoms of Listed Potential Problems Will be Absent, Minimized or Managed (Diabetes, Type 2)  Signs and symptoms of listed potential problems will be absent, minimized or managed by discharge/transition of care (reference Diabetes, Type 2 (Adult) CPG).   Outcome: Ongoing (interventions implemented as appropriate)   05/30/18 1642   Diabetes, Type 2   Problems Assessed (Type 2 Diabetes) all   Problems Present (Type 2 Diabetes) hyperglycemia       Problem: Patient Care Overview  Goal: Plan of Care Review  Outcome: Ongoing (interventions implemented as appropriate)   05/30/18 1642   Coping/Psychosocial   Plan Of Care Reviewed With patient       Problem: Pressure Ulcer Risk (Jeremiah Scale) (Adult,Obstetrics,Pediatric)  Goal: Skin Integrity  Patient will demonstrate the desired outcomes by discharge/transition of care.   Outcome: Ongoing (interventions implemented as appropriate)   05/30/18 1642   Pressure Ulcer Risk (Jeremiah Scale) (Adult,Obstetrics,Pediatric)   Skin Integrity making progress toward outcome

## 2018-05-30 NOTE — ASSESSMENT & PLAN NOTE
Anticoagulation held last hospitalization    Agree with GI eval prior to restart of anticoagulation therapy

## 2018-05-30 NOTE — PLAN OF CARE
Problem: Diabetes, Type 2 (Adult)  Intervention: Support/Optimize Psychosocial Response to Condition   05/29/18 0800 05/29/18 2011   Coping/Psychosocial Interventions   Supportive Measures --  active listening utilized;self-care encouraged;verbalization of feelings encouraged   Environmental Support --  calm environment promoted;distractions minimized;environmental consistency promoted;personal routine supported;rest periods encouraged   Psychosocial Support   Family/Support System Care self-care encouraged --      Intervention: Optimize Glycemic Control   05/29/18 2011   Nutrition Interventions   Glycemic Management blood glucose monitoring       Goal: Signs and Symptoms of Listed Potential Problems Will be Absent, Minimized or Managed (Diabetes, Type 2)  Signs and symptoms of listed potential problems will be absent, minimized or managed by discharge/transition of care (reference Diabetes, Type 2 (Adult) CPG).   Outcome: Ongoing (interventions implemented as appropriate)   05/30/18 0454   Diabetes, Type 2   Problems Assessed (Type 2 Diabetes) all   Problems Present (Type 2 Diabetes) situational response       Problem: Patient Care Overview  Goal: Plan of Care Review  Outcome: Ongoing (interventions implemented as appropriate)   05/30/18 0454   Coping/Psychosocial   Plan Of Care Reviewed With patient       Problem: Pressure Ulcer Risk (Jeremiah Scale) (Adult,Obstetrics,Pediatric)  Intervention: Promote/Optimize Nutrition   05/30/18 0454   Nutrition Interventions   Oral Nutrition Promotion rest periods promoted     Intervention: Prevent/Manage Excess Moisture   05/29/18 0224 05/29/18 2011   Hygiene Care   Perineal Care absorbent pad changed --    Bathing/Skin Care --  bedtime care   Skin Interventions   Skin Protection --  Tubing/devices free from skin contact     Intervention: Maintain Head of Bed Elevation Less Than 30 Degrees as Tolerated   05/30/18 0446   Positioning   Head of Bed (HOB) HOB elevated      Intervention: Prevent/Minimize Sheer/Friction Injuries   05/29/18 0800 05/29/18 2011   Skin Interventions   Pressure Reduction Devices Pressure-redistributing mattress utilized --    Pressure Reduction Techniques --  frequent weight shift encouraged   Positioning   Positioning/Transfer Devices --  pillows     Intervention: Turn/Reposition Often   05/29/18 2011 05/30/18 0446   Skin Interventions   Pressure Reduction Techniques frequent weight shift encouraged --    Positioning   Body Position --  side-lying, left;side-lying, right;supine       Goal: Skin Integrity  Patient will demonstrate the desired outcomes by discharge/transition of care.   Outcome: Ongoing (interventions implemented as appropriate)   05/30/18 0454   Pressure Ulcer Risk (Jeremiah Scale) (Adult,Obstetrics,Pediatric)   Skin Integrity making progress toward outcome       Problem: Anxiety (Adult)  Intervention: Promote Anxiety Reduction/Resolution   05/29/18 0800 05/29/18 2011   Coping/Psychosocial Interventions   Supportive Measures --  active listening utilized;self-care encouraged;verbalization of feelings encouraged   Environmental Support --  calm environment promoted;distractions minimized;environmental consistency promoted;personal routine supported;rest periods encouraged   Psychosocial Support   Family/Support System Care self-care encouraged --    Cognitive Interventions   Sensory Stimulation Regulation --  care clustered;lighting decreased;quiet environment promoted       Goal: Identify Related Risk Factors and Signs and Symptoms  Related risk factors and signs and symptoms are identified upon initiation of Human Response Clinical Practice Guideline (CPG)   Outcome: Ongoing (interventions implemented as appropriate)   05/29/18 0224 05/30/18 0454   Anxiety   Related Risk Factors (Anxiety) other (see comments);situational/maturational crisis  (Respiratory) --    Signs and Symptoms (Anxiety) --  nervousness/tension/restlessness     Goal:  Reduction/Resolution  Patient will demonstrate the desired outcomes by discharge/transition of care.   Outcome: Ongoing (interventions implemented as appropriate)   18   Anxiety (Adult)   Reduction/Resolution making progress toward outcome       Problem: Respiratory Distress Syndrome (,NICU)  Intervention: Correct and Maintain Adequate Oxygenation   18   Respiratory Interventions   Airway/Ventilation Management (Infant) airway patency maintained;calming measures promoted     Intervention: Position to Optimize Ventilation   18   Developmental Care   Developmental Care Positioning Body HOB elevated       Goal: Signs and Symptoms of Listed Potential Problems Will be Absent, Minimized or Managed (Respiratory Distress Syndrome)  Signs and symptoms of listed potential problems will be absent, minimized or managed by discharge/transition of care (reference Respiratory Distress Syndrome ([place],NICU) CPG).   Outcome: Ongoing (interventions implemented as appropriate)   18   Respiratory Distress Syndrome   Problems Assessed (Respiratory Distress Syndrome) all   Problems Present (Respiratory Distress Syndrome) other (see comments)  (Report SOB)       Problem: Fall Risk (Adult)  Intervention: Monitor/Assist with Self Care   18   Daily Care Interventions   Self-Care Promotion independence encouraged --    Functional Level Current   Ambulation --  2 - assistive person   Transferring --  2 - assistive person   Toileting --  2 - assistive person   Bathing --  2 - assistive person   Dressing --  2 - assistive person   Eating --  0 - independent   Communication --  0 - understands/communicates without difficulty   Swallowing --  0 - swallows foods/liquids without difficulty   Activity   Activity Assistance Provided --  assistance, 1 person     Intervention: Reduce Risk/Promote Restraint Free Environment   18   Safety Interventions   Environmental  Safety Modification assistive device/personal items within reach;clutter free environment maintained;lighting adjusted;mobility aid in reach;room near unit station;room organization consistent   Prevent  Drop/Fall   Safety/Security Measures bed alarm set     Intervention: Review Medications/Identify Contributors to Fall Risk   18   Safety Interventions   Medication Review/Management medications reviewed     Intervention: Patient Rounds   18   Safety Interventions   Patient Rounds bed in low position;bed wheels locked;call light in reach;clutter free environment maintained;ID band on;placement of personal items at bedside;toileting offered;visualized patient     Intervention: Safety Promotion/Fall Prevention   18   Safety Interventions   Safety Promotion/Fall Prevention assistive device/personal item within reach;bed alarm set;Fall Risk reviewed with patient/family;lighting adjusted;nonskid shoes/socks when out of bed;room near unit station;side rails raised x 2;supervised activity;toileting scheduled;instructed to call staff for mobility       Goal: Identify Related Risk Factors and Signs and Symptoms  Related risk factors and signs and symptoms are identified upon initiation of Human Response Clinical Practice Guideline (CPG)   Outcome: Ongoing (interventions implemented as appropriate)   18   Fall Risk   Related Risk Factors (Fall Risk) environment unfamiliar;slipper/uneven surfaces;history of falls;gait/mobility problems;inadequate lighting;polypharmacy   Signs and Symptoms (Fall Risk) presence of risk factors     Goal: Absence of Falls  Patient will demonstrate the desired outcomes by discharge/transition of care.   Outcome: Ongoing (interventions implemented as appropriate)   18   Fall Risk (Adult)   Absence of Falls making progress toward outcome

## 2018-05-30 NOTE — PLAN OF CARE
Problem: Patient Care Overview  Goal: Plan of Care Review  Patient aerosol Q6 given via msk tolerated well sats 98% on 2lpm

## 2018-05-30 NOTE — ASSESSMENT & PLAN NOTE
Diet noncompliance suspected  Cont Losartan  Inc coreg 50mg bid  Next drug to add: aldact  Monitor renal fxn

## 2018-05-30 NOTE — ASSESSMENT & PLAN NOTE
Suspect exac of HFpEF, ddx includes bronchitis  Recent testing earlier this month, no plan to repeat echo or MPI  Likely med/diet noncompliance  Change lasix to 40mg po bid  Cont GDMT: ASA/Statin/BBl/ARB  Inc coreg 50mg bid  Next drug to add: aldact  Consider GI eval for etiology of anemia

## 2018-05-30 NOTE — ASSESSMENT & PLAN NOTE
Pt with chronic anemia with component of Fe deficiency  Plan anemia profile testing    Plan IV Fe therapy during hospitalization   Consider GI eval in setting of GEOVANNI

## 2018-05-30 NOTE — PROGRESS NOTES
[facility]  Cardiology  Progress Note    Patient Name: Agus Ramos Jr.  MRN: [id number]  Admission Date: 5/27/2018  Hospital Length of Stay: 3 days  Code Status: Full Code   Attending Physician: Eileen Kinsey MD   Primary Care Physician: Keaton Hardy MD  Expected Discharge Date:   Principal Problem:Acute on chronic diastolic congestive heart failure    Subjective:     Interval Hx: no cp, less sob.  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      Current Outpatient Prescriptions on File Prior to Encounter   Medication Sig    albuterol-ipratropium 2.5mg-0.5mg/3mL (DUO-NEB) 0.5 mg-3 mg(2.5 mg base)/3 mL nebulizer solution Take 3 mLs by nebulization every 6 (six) hours. Rescue    amlodipine (NORVASC) 10 MG tablet Take 10 mg by mouth once daily.    aspirin (ECOTRIN) 81 MG EC tablet Take 81 mg by mouth once daily.    fish oil-omega-3 fatty acids 300-1,000 mg capsule Take 2 g by mouth 2 (two) times daily.     furosemide (LASIX) 40 MG tablet Take 1 tablet (40 mg total) by mouth once daily. (Patient taking differently: Take 40 mg by mouth once daily. 1 tab in the morning  1 in the evening every other  day)    glipiZIDE (GLUCOTROL) 5 MG tablet Take 10 mg by mouth 2 (two) times daily before meals.    losartan (COZAAR) 50 MG tablet Take 50 mg by mouth once daily.    metFORMIN (GLUCOPHAGE) 500 MG tablet Take 500 mg by mouth 2 (two) times daily with meals.    metoprolol tartrate (LOPRESSOR) 50 MG tablet Take 75 mg by mouth 2 (two) times daily.     nitroGLYCERIN (NITROSTAT) 0.4 MG SL tablet Place 0.4 mg under the tongue every 5 (five) minutes as needed.    polyethylene glycol (GLYCOLAX) 17 gram PwPk Take by mouth as needed.      Family History     Problem Relation (Age of Onset)    Diabetes Father, Mother        Social History Main Topics    Smoking status: Former Smoker     Packs/day: 0.25     Years: 60.00     Types: Cigarettes     Quit date: 5/8/2018    Smokeless tobacco: Never Used    Alcohol use No    Drug use: No    Sexual activity: Not Currently     Partners: Female     Review of Systems   Gastrointestinal: Negative for melena.   Genitourinary: Negative for hematuria.     Objective:     Vital Signs (Most Recent):  Temp: 96.2 °F (35.7 °C) (05/30/18 0446)  Pulse: 98 (05/30/18 0446)  Resp: 18 (05/30/18 0446)  BP: 134/63 (05/30/18 0446)  SpO2: 95 % (05/30/18 0446) Vital Signs (24h Range):  Temp:  [96.2 °F (35.7 °C)-98.5 °F (36.9 °C)] 96.2 °F (35.7 °C)  Pulse:  [68-98] 98  Resp:  [16-20] 18  SpO2:  [92 %-100 %] 95 %  BP: (123-152)/(63-97) 134/63     Weight: 84.5 kg (186 lb 4.6 oz)  Body mass index is 27.51 kg/m².    SpO2: 95 %  O2 Device (Oxygen Therapy): nasal cannula      Intake/Output Summary (Last 24 hours) at 05/30/18 0705  Last data filed at 05/30/18 0600   Gross per 24 hour   Intake              660 ml   Output             3250 ml   Net            -2590 ml       Lines/Drains/Airways     Peripheral Intravenous Line                 Peripheral IV - Single Lumen 05/27/18 1330 Right Antecubital 2 days                Physical Exam   Constitutional: He is oriented to person, place, and time. He appears  well-developed and well-nourished.   HENT:   Head: Normocephalic and atraumatic.   Eyes: Conjunctivae and EOM are normal. Pupils are equal, round, and reactive to light. No scleral icterus.   Neck: Normal range of motion. Neck supple. No JVD present. Carotid bruit is not present. No tracheal deviation present. No thyromegaly present.   Cardiovascular: Normal rate, regular rhythm, S1 normal and S2 normal.  Exam reveals distant heart sounds. Exam reveals no gallop and no friction rub.    No murmur heard.  Pulmonary/Chest: Effort normal and breath sounds normal. No respiratory distress. He has no wheezes. He has no rales. He exhibits no tenderness.   Abdominal: Soft. He exhibits no distension.   Musculoskeletal: He exhibits edema.   Neurological: He is alert and oriented to person, place, and time. He has normal strength. No cranial nerve deficit.   Skin: Skin is warm and dry. No rash noted.   Psychiatric: He has a normal mood and affect. His behavior is normal.       Current Medications:   albuterol-ipratropium  3 mL Nebulization Q6H    aspirin  81 mg Oral Daily    carvedilol  25 mg Oral BID    cinacalcet  30 mg Oral BID WM    enoxaparin  40 mg Subcutaneous Daily    ferrous sulfate  325 mg Oral Daily    furosemide  80 mg Intravenous BID    losartan  100 mg Oral Daily       dextromethorphan-guaifenesin  mg/5 ml, dextrose 50%, dextrose 50%, glucagon (human recombinant), glucose, glucose, insulin aspart U-100, pneumoc 13-alan conj-dip cr(PF)    Laboratory:  CBC:    Recent Labs  Lab 05/07/18  0432 05/13/18  0410 05/27/18  1330 05/29/18  0410 05/30/18  0422   WHITE BLOOD CELL COUNT 9.31 7.45 9.57 8.43 7.99   HEMOGLOBIN 8.1 L 8.0 L 8.6 L 7.9 L 7.9 L   HEMATOCRIT 28.5 L 28.0 L 30.4 L 28.0 L 27.9 L   PLATELETS 275 233 540 H 510 H 542 H       CHEMISTRIES:    Recent Labs  Lab 05/04/18  0613  05/23/18  1010 05/27/18  1330 05/28/18  0412 05/29/18  0410 05/30/18  0422   GLUCOSE 84  < > 49  H 48  H  137 H  134 H   SODIUM 142  < > 142 142 141 139  139 141   POTASSIUM 5.0  < > 4.0 4.5 3.9 4.0  4.0 4.3   BUN BLD 34 H  < > 17 15 15 18  18 20   CREATININE 1.4  < > 1.3 1.2 1.0 1.1  1.1 1.3   EGFR IF  55 A  < > 60 >60 >60 >60  >60 60   EGFR IF NON- 47 A  < > 52 A 57 A >60 >60  >60 52 A   CALCIUM 10.7 H  < > 11.3 H 10.8 H 10.8 H 10.9 H  10.9 H 10.6 H   MAGNESIUM 2.2  --   --  1.9  --  2.1  --    < > = values in this interval not displayed.    CARDIAC BIOMARKERS:    Recent Labs  Lab 05/03/18  1834 05/03/18  2334 05/27/18  1330 05/27/18  2148 05/28/18  0412   TROPONIN I 0.113 H 0.096 H 0.107 H 0.099 H 0.104 H       COAGS:    Recent Labs  Lab 09/24/16  0740 05/03/18  1351 05/23/18  1010 05/27/18  1330   INR 1.1 1.2 1.0 1.0       LIPIDS/LFTS:    Recent Labs  Lab 09/25/16  0607  09/28/16  0607 05/03/18  1351 05/04/18  0613 05/23/18  1010 05/27/18  1330   CHOLESTEROL 85 L  --   --   --   --   --   --    TRIGLYCERIDES 115  --   --   --   --   --   --    HDL 30 L  --   --   --   --   --   --    LDL CHOLESTEROL 32.0 L  --   --   --   --   --   --    NON-HDL CHOLESTEROL 55  --   --   --   --   --   --    AST 73 H  < > 41 H 74 H 87 H 23 19   ALT 44  < > 41 102 H 137 H 16 16   < > = values in this interval not displayed.    BNP:    Recent Labs  Lab 07/06/16  1527 09/24/16  0740 05/03/18  1351 05/27/18  1330    H 110 H 640 H 626 H       TSH:        Free T4:        Diagnostic Results:  ECG (personally reviewed tracings):  5/27/18 AF 75, RBBB, occ , similar to 5/4/18    Chest X-Ray (personally reviewed image(s)): 5/27/18 CMeg, CHF, L sided PPM, prior sternotomy    Echo: 5/4/18    1 - Low normal left ventricular systolic function (EF 50-55%).     2 - Concentric hypertrophy.     3 - Mild left atrial enlargement.     4 - Pulmonary hypertension. The estimated PA systolic pressure is 46 mmHg.     5 - Mild aortic regurgitation.     6 - Mild to moderate mitral regurgitation.     7 - Mild tricuspid  regurgitation.    8 - Ao root dil 3.9cm     Stress Test: L MPI 5/4/18  Nuclear Quantitative Functional Analysis:   Quantitative figures are not accurate.   Visually estimated LV function is low normal.   Impression: ABNORMAL MYOCARDIAL PERFUSION  1. The perfusion scan is free of evidence for myocardial ischemia.   2. There is mild intensity fixed defect in the apical wall of the left ventricle, consistent with myocardial injury, and moderate intensity fixed defect in the inferolateral wall of the left ventricle, consistent with myocardial injury.   3. Resting wall motion is physiologic.   4. Visually estimated LV function is low normal.   5. The ventricular volumes are normal at rest and stress.   6. The extracardiac distribution of radioactivity is normal.       Assessment and Plan:     * Acute on chronic diastolic congestive heart failure    Suspect exac of HFpEF, ddx includes bronchitis  Recent testing earlier this month, no plan to repeat echo or MPI  Likely med/diet noncompliance  Change lasix to 40mg po bid  Cont GDMT: ASA/Statin/BBl/ARB  Inc coreg 50mg bid  Next drug to add: aldact  Consider GI eval for etiology of anemia        Pacemaker    Biotronik PPM in place, appears to be functioning normally  Followed prev by [facility]        Essential hypertension    Diet noncompliance suspected  Cont Losartan  Inc coreg 50mg bid  Next drug to add: aldact  Monitor renal fxn        Chronic atrial fibrillation    Prev on Eliquis, not on current med list  Rates acceptable and PPM in place  ?stopped for anemia on recent admit  Consider GI eval for assessment of anemia  Resume eliquis 5mg bid if no bleeding source identified        Type 2 diabetes mellitus, controlled, with renal complications    Per IM        CKD Stage 3    Stable renal fxn        Anemia of chronic disease    ?Acute anemia noted on last admit  Currently stable vs discharge, but much lower than prior baseline  Consider GI eval            VTE Risk Mitigation          Ordered     IP VTE HIGH RISK PATIENT  Once      05/27/18 1729     Place DENIS hose  Until discontinued      05/27/18 1729     Place sequential compression device  Until discontinued      05/27/18 1729     enoxaparin injection 40 mg  Daily      05/27/18 1729          Blanco Brunson MD  Cardiology  [facility]

## 2018-05-30 NOTE — HPI
Patient is a 77-year-old gentleman with past medical history of CHF, CAD, diabetes mellitus, hypertension, PVD, status post CABG x3 presented to the ED with complaint of progressively worsening shortness of breath and leg swelling since recent hospital discharge and patient was discharged on supplemental oxygen Patient was recently hospitalized for acute CHF exacerbation and symptomatic anemia.  At that timeat Hb 6.5g/dl .  Patient was transfused 2 units with appropriate response to hemoglobin of 8.5. He was dc'd home 5/14 on home O2 and taken off Apixaban.  CBC on presentation reveals a white blood cell count of 9.2 hemoglobin of 8.6 grams/deciliter hematocrit of 30.4% MCV of 71 and a platelet count of 540.  Consult for symptomatic iron deficiency.

## 2018-05-31 PROBLEM — D50.9 IRON DEFICIENCY ANEMIA: Status: ACTIVE | Noted: 2018-05-04

## 2018-05-31 LAB
ALBUMIN SERPL BCP-MCNC: 2.7 G/DL
ANION GAP SERPL CALC-SCNC: 6 MMOL/L
ANISOCYTOSIS BLD QL SMEAR: ABNORMAL
BASOPHILS # BLD AUTO: 0.05 K/UL
BASOPHILS NFR BLD: 0.7 %
BUN SERPL-MCNC: 18 MG/DL
CALCIUM SERPL-MCNC: 10.1 MG/DL
CHLORIDE SERPL-SCNC: 103 MMOL/L
CO2 SERPL-SCNC: 32 MMOL/L
CREAT SERPL-MCNC: 1.2 MG/DL
DACRYOCYTES BLD QL SMEAR: ABNORMAL
DIFFERENTIAL METHOD: ABNORMAL
EOSINOPHIL # BLD AUTO: 0.1 K/UL
EOSINOPHIL NFR BLD: 1.5 %
ERYTHROCYTE [DISTWIDTH] IN BLOOD BY AUTOMATED COUNT: 23.8 %
EST. GFR  (AFRICAN AMERICAN): >60 ML/MIN/1.73 M^2
EST. GFR  (NON AFRICAN AMERICAN): 57 ML/MIN/1.73 M^2
GLUCOSE SERPL-MCNC: 130 MG/DL
HCT VFR BLD AUTO: 29 %
HGB BLD-MCNC: 8 G/DL
HYPOCHROMIA BLD QL SMEAR: ABNORMAL
LYMPHOCYTES # BLD AUTO: 1.1 K/UL
LYMPHOCYTES NFR BLD: 15.6 %
MCH RBC QN AUTO: 19.2 PG
MCHC RBC AUTO-ENTMCNC: 27.6 G/DL
MCV RBC AUTO: 70 FL
MONOCYTES # BLD AUTO: 1.1 K/UL
MONOCYTES NFR BLD: 15.2 %
NEUTROPHILS # BLD AUTO: 4.9 K/UL
NEUTROPHILS NFR BLD: 67.5 %
OVALOCYTES BLD QL SMEAR: ABNORMAL
PHOSPHATE SERPL-MCNC: 2.8 MG/DL
PLATELET # BLD AUTO: 549 K/UL
PLATELET BLD QL SMEAR: ABNORMAL
PMV BLD AUTO: 10 FL
POCT GLUCOSE: 132 MG/DL (ref 70–110)
POCT GLUCOSE: 155 MG/DL (ref 70–110)
POCT GLUCOSE: 180 MG/DL (ref 70–110)
POCT GLUCOSE: 223 MG/DL (ref 70–110)
POIKILOCYTOSIS BLD QL SMEAR: SLIGHT
POLYCHROMASIA BLD QL SMEAR: ABNORMAL
POTASSIUM SERPL-SCNC: 3.7 MMOL/L
RBC # BLD AUTO: 4.17 M/UL
SODIUM SERPL-SCNC: 141 MMOL/L
TARGETS BLD QL SMEAR: ABNORMAL
WBC # BLD AUTO: 7.31 K/UL

## 2018-05-31 PROCEDURE — G8978 MOBILITY CURRENT STATUS: HCPCS | Mod: CJ

## 2018-05-31 PROCEDURE — 99232 SBSQ HOSP IP/OBS MODERATE 35: CPT | Mod: ,,, | Performed by: INTERNAL MEDICINE

## 2018-05-31 PROCEDURE — 25000003 PHARM REV CODE 250: Performed by: INTERNAL MEDICINE

## 2018-05-31 PROCEDURE — 97165 OT EVAL LOW COMPLEX 30 MIN: CPT

## 2018-05-31 PROCEDURE — 85025 COMPLETE CBC W/AUTO DIFF WBC: CPT

## 2018-05-31 PROCEDURE — 94799 UNLISTED PULMONARY SVC/PX: CPT

## 2018-05-31 PROCEDURE — 21400001 HC TELEMETRY ROOM

## 2018-05-31 PROCEDURE — 25000242 PHARM REV CODE 250 ALT 637 W/ HCPCS: Performed by: INTERNAL MEDICINE

## 2018-05-31 PROCEDURE — 99233 SBSQ HOSP IP/OBS HIGH 50: CPT | Mod: ,,, | Performed by: INTERNAL MEDICINE

## 2018-05-31 PROCEDURE — 63600175 PHARM REV CODE 636 W HCPCS: Performed by: INTERNAL MEDICINE

## 2018-05-31 PROCEDURE — 80069 RENAL FUNCTION PANEL: CPT

## 2018-05-31 PROCEDURE — 97161 PT EVAL LOW COMPLEX 20 MIN: CPT

## 2018-05-31 PROCEDURE — 25000003 PHARM REV CODE 250: Performed by: EMERGENCY MEDICINE

## 2018-05-31 PROCEDURE — 27000221 HC OXYGEN, UP TO 24 HOURS

## 2018-05-31 PROCEDURE — 63600175 PHARM REV CODE 636 W HCPCS: Performed by: EMERGENCY MEDICINE

## 2018-05-31 PROCEDURE — 94761 N-INVAS EAR/PLS OXIMETRY MLT: CPT

## 2018-05-31 PROCEDURE — G8979 MOBILITY GOAL STATUS: HCPCS | Mod: CI

## 2018-05-31 PROCEDURE — 36415 COLL VENOUS BLD VENIPUNCTURE: CPT

## 2018-05-31 PROCEDURE — 94640 AIRWAY INHALATION TREATMENT: CPT

## 2018-05-31 RX ORDER — LIDOCAINE HYDROCHLORIDE 10 MG/ML
1 INJECTION INFILTRATION; PERINEURAL ONCE
Status: DISCONTINUED | OUTPATIENT
Start: 2018-05-31 | End: 2018-06-03 | Stop reason: HOSPADM

## 2018-05-31 RX ORDER — FUROSEMIDE 10 MG/ML
40 INJECTION INTRAMUSCULAR; INTRAVENOUS ONCE
Status: COMPLETED | OUTPATIENT
Start: 2018-05-31 | End: 2018-05-31

## 2018-05-31 RX ORDER — FUROSEMIDE 40 MG/1
80 TABLET ORAL DAILY
Status: DISCONTINUED | OUTPATIENT
Start: 2018-06-01 | End: 2018-06-03

## 2018-05-31 RX ORDER — POLYETHYLENE GLYCOL 3350 17 G/17G
17 POWDER, FOR SOLUTION ORAL 2 TIMES DAILY
Status: DISCONTINUED | OUTPATIENT
Start: 2018-05-31 | End: 2018-06-03 | Stop reason: HOSPADM

## 2018-05-31 RX ORDER — CYANOCOBALAMIN (VITAMIN B-12) 250 MCG
500 TABLET ORAL DAILY
Status: DISCONTINUED | OUTPATIENT
Start: 2018-06-01 | End: 2018-06-03 | Stop reason: HOSPADM

## 2018-05-31 RX ADMIN — GUAIFENESIN AND DEXTROMETHORPHAN 5 ML: 100; 10 SYRUP ORAL at 10:05

## 2018-05-31 RX ADMIN — IPRATROPIUM BROMIDE AND ALBUTEROL SULFATE 3 ML: .5; 2.5 SOLUTION RESPIRATORY (INHALATION) at 07:05

## 2018-05-31 RX ADMIN — POLYETHYLENE GLYCOL 3350 17 G: 17 POWDER, FOR SOLUTION ORAL at 09:05

## 2018-05-31 RX ADMIN — INSULIN DETEMIR 5 UNITS: 100 INJECTION, SOLUTION SUBCUTANEOUS at 09:05

## 2018-05-31 RX ADMIN — CINACALCET HYDROCHLORIDE 30 MG: 30 TABLET, COATED ORAL at 08:05

## 2018-05-31 RX ADMIN — IPRATROPIUM BROMIDE AND ALBUTEROL SULFATE 3 ML: .5; 2.5 SOLUTION RESPIRATORY (INHALATION) at 02:05

## 2018-05-31 RX ADMIN — INSULIN ASPART 2 UNITS: 100 INJECTION, SOLUTION INTRAVENOUS; SUBCUTANEOUS at 08:05

## 2018-05-31 RX ADMIN — INSULIN ASPART 2 UNITS: 100 INJECTION, SOLUTION INTRAVENOUS; SUBCUTANEOUS at 12:05

## 2018-05-31 RX ADMIN — POLYETHYLENE GLYCOL 3350 17 G: 17 POWDER, FOR SOLUTION ORAL at 10:05

## 2018-05-31 RX ADMIN — CARVEDILOL 50 MG: 6.25 TABLET, FILM COATED ORAL at 09:05

## 2018-05-31 RX ADMIN — FERROUS SULFATE TAB EC 325 MG (65 MG FE EQUIVALENT) 325 MG: 325 (65 FE) TABLET DELAYED RESPONSE at 08:05

## 2018-05-31 RX ADMIN — FUROSEMIDE 40 MG: 40 TABLET ORAL at 08:05

## 2018-05-31 RX ADMIN — LOSARTAN POTASSIUM 100 MG: 25 TABLET, FILM COATED ORAL at 08:05

## 2018-05-31 RX ADMIN — FUROSEMIDE 40 MG: 10 INJECTION, SOLUTION INTRAMUSCULAR; INTRAVENOUS at 10:05

## 2018-05-31 RX ADMIN — ENOXAPARIN SODIUM 40 MG: 100 INJECTION SUBCUTANEOUS at 04:05

## 2018-05-31 RX ADMIN — GUAIFENESIN AND DEXTROMETHORPHAN 5 ML: 100; 10 SYRUP ORAL at 01:05

## 2018-05-31 RX ADMIN — IPRATROPIUM BROMIDE AND ALBUTEROL SULFATE 3 ML: .5; 2.5 SOLUTION RESPIRATORY (INHALATION) at 01:05

## 2018-05-31 RX ADMIN — ASPIRIN 81 MG: 81 TABLET, COATED ORAL at 08:05

## 2018-05-31 RX ADMIN — CARVEDILOL 50 MG: 6.25 TABLET, FILM COATED ORAL at 08:05

## 2018-05-31 NOTE — CONSULTS
"[facility]  Hematology/Oncology  Progress Note    Patient Name: Agus Ramos Jr.  MRN: [id number]  Admission Date: 5/27/2018  Hospital Length of Stay: 4 days  Code Status: Full Code   Attending Provider: Eileen Kinsey MD  Consulting Provider: Millie Bell MD  Primary Care Physician: Keaton Hardy MD  Principal Problem:Acute on chronic diastolic congestive heart failure    Consults  Subjective:     HPI:  Patient is a 77-year-old gentleman with past medical history of CHF, CAD, diabetes mellitus, hypertension, PVD, status post CABG x3 presented to the ED with complaint of progressively worsening shortness of breath and leg swelling since recent hospital discharge and patient was discharged on supplemental oxygen Patient was recently hospitalized for acute CHF exacerbation and symptomatic anemia.  At that timeat Hb 6.5g/dl .  Patient was transfused 2 units with appropriate response to hemoglobin of 8.5. He was dc'd home 5/14 on home O2 and taken off Apixaban.  CBC on presentation reveals a white blood cell count of 9.2 hemoglobin of 8.6 grams/deciliter hematocrit of 30.4% MCV of 71 and a platelet count of 540.  Consult for symptomatic iron deficiency.     Interval History: Pt sitting up eating lunch. Reports he is feeling:" a little better. " Still SOB and mild fatigue.     Oncology Treatment Plan:   [No treatment plan]    Medications:  Continuous Infusions:  Scheduled Meds:   albuterol-ipratropium  3 mL Nebulization Q6H    aspirin  81 mg Oral Daily    carvedilol  50 mg Oral BID    cinacalcet  30 mg Oral BID WM    [START ON 6/1/2018] cyanocobalamin  500 mcg Oral Daily    enoxaparin  40 mg Subcutaneous Daily    ferrous sulfate  325 mg Oral Daily    [START ON 6/1/2018] furosemide  80 mg Oral Daily    insulin detemir U-100  5 Units Subcutaneous QHS    losartan  100 mg Oral Daily    polyethylene glycol  17 g Oral BID     PRN Meds:dextromethorphan-guaifenesin  mg/5 ml, " dextrose 50%, dextrose 50%, glucagon (human recombinant), glucose, glucose, insulin aspart U-100, pneumoc 13-alan conj-dip cr(PF)     Review of Systems   Constitutional: Positive for fatigue. Negative for appetite change, fever and unexpected weight change.   HENT: Negative for mouth sores.    Eyes: Negative for visual disturbance.   Respiratory: Positive for shortness of breath. Negative for cough.    Cardiovascular: Positive for leg swelling. Negative for chest pain.   Gastrointestinal: Negative for abdominal pain and diarrhea.   Genitourinary: Negative for frequency.   Musculoskeletal: Negative for back pain.   Skin: Negative for rash.   Neurological: Negative for headaches.   Hematological: Negative for adenopathy.   Psychiatric/Behavioral: The patient is not nervous/anxious.      Objective:     Vital Signs (Most Recent):  Temp: 97.5 °F (36.4 °C) (05/31/18 1142)  Pulse: 80 (05/31/18 1328)  Resp: 18 (05/31/18 1328)  BP: 118/65 (05/31/18 1142)  SpO2: 100 % (05/31/18 1328) Vital Signs (24h Range):  Temp:  [96.4 °F (35.8 °C)-99.3 °F (37.4 °C)] 97.5 °F (36.4 °C)  Pulse:  [] 80  Resp:  [16-22] 18  SpO2:  [90 %-100 %] 100 %  BP: (117-134)/(59-97) 118/65     Weight: 84.5 kg (186 lb 4.6 oz)  Body mass index is 27.51 kg/m².  Body surface area is 2.03 meters squared.      Intake/Output Summary (Last 24 hours) at 05/31/18 1407  Last data filed at 05/31/18 0600   Gross per 24 hour   Intake             1155 ml   Output             1725 ml   Net             -570 ml       Physical Exam   Constitutional: He is oriented to person, place, and time. He appears well-developed and well-nourished.   HENT:   Head: Normocephalic.   Mouth/Throat: Oropharynx is clear and moist. No oropharyngeal exudate.   Eyes: Conjunctivae are normal. No scleral icterus.   Neck: Normal range of motion. Neck supple. No thyromegaly present.   Cardiovascular: Normal rate, regular rhythm and normal heart sounds.    No murmur heard.  Pulmonary/Chest:  Effort normal. He has no wheezes. He has rales.   Abdominal: Soft. Bowel sounds are normal. There is no tenderness. There is no rebound and no guarding.   Musculoskeletal: Normal range of motion. He exhibits edema.   Neurological: He is alert and oriented to person, place, and time. No cranial nerve deficit.   Skin: No rash noted. No erythema.   Psychiatric: He has a normal mood and affect.       Significant Labs:   All pertinent labs within the past 24 hours have been reviewed    Diagnostic Results:  I have reviewed and interpreted all pertinent imaging results/findings within the past 24 hours    Assessment/Plan:     * Acute on chronic diastolic congestive heart failure    Followed by Cardiology        Symptomatic anemia    Pt with chronic anemia with component of Fe deficiency  S/p IV Venofer x 1   Hb 8g/dl   Cont Fe supp  Cont B12 supp    GI consult pending              Chronic atrial fibrillation    Anticoagulation held last hospitalization    Agree with GI eval prior to restart of anticoagulation therapy                 Millie Bell MD  Hematology/Oncology  [facility] [place]

## 2018-05-31 NOTE — ASSESSMENT & PLAN NOTE
Was better but now more symptomatic after switching to PO yesterday. I will give an extra dose of IV lasix now and adjust PO dose starting tomorrow. Renal panel in AM to check potassium and Cr. BP at goal. Continue incentive spirometer for atelectasis. Appreciate further Cardiology recs.

## 2018-05-31 NOTE — ASSESSMENT & PLAN NOTE
Given elevated PTH will continue trial of cinacalcet. Not a candidate for surgery at this time given issues with heart failure. Per wife, he used to be on sensipar up until 2016 due to insurance coverage and high co-pay. iCa in upper limit of normal. Needs f/u with endocrinology as outpatient

## 2018-05-31 NOTE — NURSING
Bedside Report given to night nurse. Walking rounds completed. Visualized and assessed patient NAD noted. Safety precautions maintained and call light within reach.    Chart check completed.

## 2018-05-31 NOTE — PT/OT/SLP EVAL
Physical Therapy Evaluation    Patient Name:  Agus Ramos Jr.   MRN:  [id number]    Recommendations:     Discharge Recommendations:  home with home health   Discharge Equipment Recommendations: none   Barriers to discharge: None    Assessment:     Agus Ramos Jr. is a 77 y.o. male admitted with a medical diagnosis of Acute on chronic diastolic congestive heart failure.  He presents with the following impairments/functional limitations:  weakness, impaired endurance, impaired functional mobilty, gait instability, decreased lower extremity function, impaired cardiopulmonary response to activity.    Rehab Prognosis:  fair; patient would benefit from acute skilled PT services to address these deficits and reach maximum level of function.      Recent Surgery: * No surgery found *      Plan:     During this hospitalization, patient to be seen 6 x/week to address the above listed problems via gait training, therapeutic activities, therapeutic exercises  · Plan of Care Expires:  06/14/18   Plan of Care Reviewed with: patient, spouse    Subjective     Communicated with nurse Celestin prior to session.  Patient found supine in bed upon PT entry to room, agreeable to evaluation.      Chief Complaint: SOB with ambulation.   Patient comments/goals: To walk.   Pain/Comfort:  · Pain Rating 1: 0/10    Living Environment:  Patient lives at home with his spouse. Prior to admission, patients level of function was independent but limited in distance by SOB. Patient has the following equipment: walker, rolling. Upon discharge, patient will have assistance from spouse.    Objective:     Patient found with: peripheral IV, telemetry, oxygen     General Precautions: Standard, fall, respiratory   Orthopedic Precautions:N/A   Braces: N/A     Exams:  · Cognitive Exam:  Patient is oriented to Person and Place and follows 100% of simple commands   · Gross Motor Coordination:  WFL  · Postural Exam:  Patient presented with the following  abnormalities:    · -       Rounded shoulders  · -       Forward head  · Sensation:    · -       Intact  · Skin Integrity/Edema:      · -       Edema: Mild left foot  · RLE ROM: WFL  · RLE Strength: WFL  · LLE ROM: WFL  · LLE Strength: WFL    Functional Mobility:  · Transfers:     · Sit to Stand:  contact guard assistance with no AD  · Gait: Patient ambulated 80ft with Rolling Walker and CGA using 3-point gait and 2L NC O2. Patient demonstrated decreased arnold and decreased step length during gait due to impaired balance, decreased strength and decreased endurance. Patient needed 1 standing rest break during ambulation due to complaint of dizziness.     AM-PAC 6 CLICK MOBILITY  Total Score:22     Patient left up in chair with all lines intact, call button in reach, nurse notified, and PCT and spouse present.    GOALS:    Physical Therapy Goals        Problem: Physical Therapy Goal    Goal Priority Disciplines Outcome Goal Variances Interventions   Physical Therapy Goal     PT/OT, PT      Description:  Goals to be met by: 18    Patient will increase functional independence with mobility by performin. Sit to stand transfer with Supervision  2. Gait  x 200 feet with Supervision using Rolling Walker if needed  3. Lower extremity exercise program x30 reps per handout, with supervision                      History:     Past Medical History:   Diagnosis Date    Anemia 2018    pending blood transfusion    Anticoagulant long-term use     Congestive heart failure     Coronary artery disease     Diabetes mellitus     Encounter for blood transfusion     Hypertension     MI (myocardial infarction)     Pacemaker     left chest    Peripheral vascular disease     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     S/P femoral-popliteal bypass surgery left    Stented coronary artery     Tobacco use        Past Surgical History:   Procedure Laterality Date    CARDIAC CATHETERIZATION      CARDIAC PACEMAKER PLACEMENT      CARDIAC  SURGERY      HEMORRHOID SURGERY       Time Tracking:     PT Received On: 05/31/18  PT Start Time: 1423     PT Stop Time: 1438  PT Total Time (min): 15 min     Billable Minutes: Evaluation  15 with OT present    Katie Coughlin, PT  05/31/2018

## 2018-05-31 NOTE — PT/OT/SLP EVAL
Occupational Therapy   Evaluation    Name: Agus Ramos Jr.  MRN: [id number]  Admitting Diagnosis:  Acute on chronic diastolic congestive heart failure      Recommendations:     Discharge Recommendations: home with home health  Discharge Equipment Recommendations:  none  Barriers to discharge:  None    History:     Occupational Profile:  Living Environment: Pt lives with spouse with no concerns in the home  Previous level of function: independent  Roles and Routines: Pt does not drive  Equipment Owned:  walker, rolling  Assistance upon Discharge: Wife can assist as needed    Past Medical History:   Diagnosis Date    Anemia 05/03/2018    pending blood transfusion    Anticoagulant long-term use     Congestive heart failure     Coronary artery disease     Diabetes mellitus     Encounter for blood transfusion     Hypertension     MI (myocardial infarction)     Pacemaker     left chest    Peripheral vascular disease     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     S/P femoral-popliteal bypass surgery left    Stented coronary artery     Tobacco use        Past Surgical History:   Procedure Laterality Date    CARDIAC CATHETERIZATION      CARDIAC PACEMAKER PLACEMENT      CARDIAC SURGERY      HEMORRHOID SURGERY         Subjective     Chief Complaint: breathing  Patient/Family stated goals: none stated  Communicated with: PT prior to session.  Pain/Comfort:  · Pain Rating 1: 0/10    Patients cultural, spiritual, Jain conflicts given the current situation:      Objective:     Patient found with: peripheral IV, telemetry, oxygen    General Precautions: Standard, fall, respiratory   Orthopedic Precautions:N/A   Braces: N/A     Occupational Performance:      Functional Mobility/Transfers:  · Patient completed Sit <> Stand Transfer with contact guard assistance  with  rolling walker   · Patient completed Bed <> Chair Transfer using Stand Pivot technique with contact guard assistance with rolling walker  · Functional Mobility: Pt  "able to ambulate to short distance    Activities of Daily Living:  · Feeding:  modified independence    · UB Dressing: contact guard assistance      Cognitive/Visual Perceptual:  Cognitive/Psychosocial Skills:     -       Oriented to: Person, Place, Time and Situation   -       Follows Commands/attention:Follows multistep  commands  -       Communication: clear/fluent    Physical Exam:  Upper Extremity Range of Motion:     -       Right Upper Extremity: WFL  -       Left Upper Extremity: WFL  Upper Extremity Strength:    -       Right Upper Extremity: WFL  -       Left Upper Extremity: WFL   Strength:    -       Right Upper Extremity: WFL  -       Left Upper Extremity: WFL  Fine Motor Coordination:    -       Intact  Gross motor coordination:   WFL    Patient left up in chair with all lines intact, call button in reach and wife present    AMPA 6 Click:  [facility] Total Score: 19    Education:    Assessment:     Agus Ramos Jr. is a 77 y.o. male with a medical diagnosis of Acute on chronic diastolic congestive heart failure.  He presents with good family support.  Performance deficits affecting function are weakness, impaired endurance, impaired self care skills, impaired functional mobilty, gait instability, impaired balance, decreased upper extremity function, decreased lower extremity function.      Rehab Prognosis:  good; patient would benefit from acute skilled OT services to address these deficits and reach maximum level of function.         Clinical Decision Makin.  OT Low:  "Pt evaluation falls under low complexity for evaluation coding due to performance deficits noted in 1-3 areas as stated above and 0 co-morbities affecting current functional status. Data obtained from problem focused assessments. No modifications or assistance was required for completion of evaluation. Only brief occupational profile and history review completed."     Plan:     Patient to be seen 3 x/week to address the above " listed problems via therapeutic activities, therapeutic exercises  · Plan of Care Expires:    · Plan of Care Reviewed with: patient, spouse    This Plan of care has been discussed with the patient who was involved in its development and understands and is in agreement with the identified goals and treatment plan    GOALS:    Occupational Therapy Goals        Problem: Occupational Therapy Goal    Goal Priority Disciplines Outcome Interventions   Occupational Therapy Goal     OT, PT/OT Ongoing (interventions implemented as appropriate)    Description:  Goals to be met by: 6/8/18     Patient will increase functional independence with ADLs by performing:    UE Dressing with Set-up Assistance.  LE Dressing with Set-up Assistance.  Grooming while standing with Set-up Assistance.  Toileting from toilet with Set-up Assistance for hygiene and clothing management.   Bathing from  edge of bed with Modified [place].  Toilet transfer to toilet with Supervision.  Upper extremity exercise program x15 reps per handout, with independence.                      Time Tracking:     OT Date of Treatment: 05/31/18  OT Start Time: 1417  OT Stop Time: 1435  OT Total Time (min): 18 min    Billable Minutes:Evaluation 18    Nazia Urban OT  5/31/2018

## 2018-05-31 NOTE — PLAN OF CARE
Problem: Patient Care Overview  Goal: Plan of Care Review  Patient on nasal cannula 2 lpm. Aerosol treatment given as ordered. NARN.

## 2018-05-31 NOTE — SUBJECTIVE & OBJECTIVE
Interval History: more short of breath today. Was switched to PO furosemide yesterday. Is using IS. Hgb up a bit to 8. Iron low and B12 in low end of normal. Folic acid ok. S/p a dose of IV iron. Pending GI eval    Review of Systems   Constitutional: Negative.    Respiratory: Positive for shortness of breath.    Cardiovascular: Positive for leg swelling (better).   Gastrointestinal: Negative.    Genitourinary: Negative.    Musculoskeletal: Negative.    Skin: Negative.    Neurological: Negative.    Psychiatric/Behavioral: Negative.      Objective:     Vital Signs (Most Recent):  Temp: 96.4 °F (35.8 °C) (05/31/18 0759)  Pulse: 72 (05/31/18 0759)  Resp: 18 (05/31/18 0759)  BP: (!) 133/97 (05/31/18 0759)  SpO2: (!) 90 % (05/31/18 0759) Vital Signs (24h Range):  Temp:  [96.4 °F (35.8 °C)-99.3 °F (37.4 °C)] 96.4 °F (35.8 °C)  Pulse:  [] 72  Resp:  [18-22] 18  SpO2:  [90 %-100 %] 90 %  BP: (117-134)/(59-97) 133/97     Weight: 84.5 kg (186 lb 4.6 oz)  Body mass index is 27.51 kg/m².    Intake/Output Summary (Last 24 hours) at 05/31/18 1034  Last data filed at 05/31/18 0600   Gross per 24 hour   Intake             1155 ml   Output             1725 ml   Net             -570 ml      Physical Exam   Constitutional: He is oriented to person, place, and time. He appears well-developed. No distress.   Cardiovascular: Normal rate, regular rhythm and intact distal pulses.    Pulmonary/Chest: Effort normal. He has no wheezes. He has rales (bibasilar).   Abdominal: Soft. Bowel sounds are normal.   Musculoskeletal: Normal range of motion. He exhibits edema (trace).   Neurological: He is alert and oriented to person, place, and time.   Skin: Skin is warm. He is not diaphoretic.   Psychiatric: He has a normal mood and affect. His behavior is normal. Judgment and thought content normal. Cognition and memory are impaired.   Nursing note and vitals reviewed.      Significant Labs: All pertinent labs within the past 24 hours have been  reviewed.    Significant Imaging: I have reviewed all pertinent imaging results/findings within the past 24 hours.  I have reviewed and interpreted all pertinent imaging results/findings within the past 24 hours.

## 2018-05-31 NOTE — PROGRESS NOTES
[facility]  Cardiology  Progress Note    Patient Name: Agus Ramso Jr.  MRN: [id number]  Admission Date: 5/27/2018  Hospital Length of Stay: 4 days  Code Status: Full Code   Attending Physician: Eileen Kinsey MD   Primary Care Physician: Keaton Hardy MD  Expected Discharge Date:   Principal Problem:Acute on chronic diastolic congestive heart failure    Subjective:     Interval Hx: no cp, less sob.  Case d/w Dr. Domingo.    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      Current Outpatient Prescriptions on File Prior to Encounter   Medication Sig    albuterol-ipratropium 2.5mg-0.5mg/3mL (DUO-NEB) 0.5 mg-3 mg(2.5 mg base)/3 mL nebulizer solution Take 3 mLs by nebulization every 6 (six) hours. Rescue    amlodipine (NORVASC) 10 MG tablet Take 10 mg by mouth once daily.    aspirin (ECOTRIN) 81 MG EC tablet Take 81 mg by mouth once daily.    fish oil-omega-3 fatty acids 300-1,000 mg capsule Take 2 g by mouth 2 (two) times daily.     furosemide (LASIX) 40 MG tablet Take 1 tablet (40 mg total) by mouth once daily. (Patient taking differently: Take 40 mg by mouth once daily. 1 tab in the morning  1 in the evening every other day)     glipiZIDE (GLUCOTROL) 5 MG tablet Take 10 mg by mouth 2 (two) times daily before meals.    losartan (COZAAR) 50 MG tablet Take 50 mg by mouth once daily.    metFORMIN (GLUCOPHAGE) 500 MG tablet Take 500 mg by mouth 2 (two) times daily with meals.    metoprolol tartrate (LOPRESSOR) 50 MG tablet Take 75 mg by mouth 2 (two) times daily.     nitroGLYCERIN (NITROSTAT) 0.4 MG SL tablet Place 0.4 mg under the tongue every 5 (five) minutes as needed.    polyethylene glycol (GLYCOLAX) 17 gram PwPk Take by mouth as needed.      Family History     Problem Relation (Age of Onset)    Diabetes Father, Mother        Social History Main Topics    Smoking status: Former Smoker     Packs/day: 0.25     Years: 60.00     Types: Cigarettes     Quit date: 5/8/2018    Smokeless tobacco: Never Used    Alcohol use No    Drug use: No    Sexual activity: Not Currently     Partners: Female     Review of Systems   Gastrointestinal: Negative for melena.   Genitourinary: Negative for hematuria.     Objective:     Vital Signs (Most Recent):  Temp: 96.4 °F (35.8 °C) (05/31/18 0759)  Pulse: 72 (05/31/18 0759)  Resp: 18 (05/31/18 0759)  BP: (!) 133/97 (05/31/18 0759)  SpO2: (!) 90 % (05/31/18 0759) Vital Signs (24h Range):  Temp:  [96.4 °F (35.8 °C)-99.3 °F (37.4 °C)] 96.4 °F (35.8 °C)  Pulse:  [] 72  Resp:  [18-22] 18  SpO2:  [90 %-100 %] 90 %  BP: (117-134)/(59-97) 133/97     Weight: 84.5 kg (186 lb 4.6 oz)  Body mass index is 27.51 kg/m².    SpO2: (!) 90 %  O2 Device (Oxygen Therapy): nasal cannula      Intake/Output Summary (Last 24 hours) at 05/31/18 0814  Last data filed at 05/31/18 0600   Gross per 24 hour   Intake             1155 ml   Output             1725 ml   Net             -570 ml       Lines/Drains/Airways     Peripheral Intravenous Line                 Peripheral IV - Single Lumen 05/27/18 1330 Right Antecubital 3 days                Physical Exam   Constitutional: He is oriented to person, place, and time. He appears  well-developed and well-nourished.   HENT:   Head: Normocephalic and atraumatic.   Eyes: Conjunctivae and EOM are normal. Pupils are equal, round, and reactive to light. No scleral icterus.   Neck: Normal range of motion. Neck supple. No JVD present. Carotid bruit is not present. No tracheal deviation present. No thyromegaly present.   Cardiovascular: Normal rate, regular rhythm, S1 normal and S2 normal.  Exam reveals distant heart sounds. Exam reveals no gallop and no friction rub.    No murmur heard.  Pulmonary/Chest: Effort normal and breath sounds normal. No respiratory distress. He has no wheezes. He has no rales. He exhibits no tenderness.   Abdominal: Soft. He exhibits no distension.   Musculoskeletal: He exhibits edema.   Neurological: He is alert and oriented to person, place, and time. He has normal strength. No cranial nerve deficit.   Skin: Skin is warm and dry. No rash noted.   Psychiatric: He has a normal mood and affect. His behavior is normal.       Current Medications:   albuterol-ipratropium  3 mL Nebulization Q6H    aspirin  81 mg Oral Daily    carvedilol  50 mg Oral BID    cinacalcet  30 mg Oral BID WM    enoxaparin  40 mg Subcutaneous Daily    ferrous sulfate  325 mg Oral Daily    furosemide  40 mg Oral BID    losartan  100 mg Oral Daily       dextromethorphan-guaifenesin  mg/5 ml, dextrose 50%, dextrose 50%, glucagon (human recombinant), glucose, glucose, insulin aspart U-100, pneumoc 13-alan conj-dip cr(PF)    Laboratory:  CBC:    Recent Labs  Lab 05/13/18  0410 05/27/18  1330 05/29/18  0410 05/30/18  0422 05/31/18  0444   WHITE BLOOD CELL COUNT 7.45 9.57 8.43 7.99 7.31   HEMOGLOBIN 8.0 L 8.6 L 7.9 L 7.9 L 8.0 L   HEMATOCRIT 28.0 L 30.4 L 28.0 L 27.9 L 29.0 L   PLATELETS 233 540 H 510 H 542 H 549 H       CHEMISTRIES:    Recent Labs  Lab 05/04/18  0613  05/27/18  1330 05/28/18  0412 05/29/18  0410 05/30/18  0422 05/31/18  0444   GLUCOSE 84  < > 216 H 48  H  137 H 134 H 130 H    SODIUM 142  < > 142 141 139  139 141 141   POTASSIUM 5.0  < > 4.5 3.9 4.0  4.0 4.3 3.7   BUN BLD 34 H  < > 15 15 18  18 20 18   CREATININE 1.4  < > 1.2 1.0 1.1  1.1 1.3 1.2   EGFR IF  55 A  < > >60 >60 >60  >60 60 >60   EGFR IF NON- 47 A  < > 57 A >60 >60  >60 52 A 57 A   CALCIUM 10.7 H  < > 10.8 H 10.8 H 10.9 H  10.9 H 10.6 H 10.1   MAGNESIUM 2.2  --  1.9  --  2.1  --   --    < > = values in this interval not displayed.    CARDIAC BIOMARKERS:    Recent Labs  Lab 05/03/18  1834 05/03/18  2334 05/27/18  1330 05/27/18  2148 05/28/18  0412   TROPONIN I 0.113 H 0.096 H 0.107 H 0.099 H 0.104 H       COAGS:    Recent Labs  Lab 09/24/16  0740 05/03/18  1351 05/23/18  1010 05/27/18  1330   INR 1.1 1.2 1.0 1.0       LIPIDS/LFTS:    Recent Labs  Lab 09/25/16  0607  09/28/16  0607 05/03/18  1351 05/04/18  0613 05/23/18  1010 05/27/18  1330   CHOLESTEROL 85 L  --   --   --   --   --   --    TRIGLYCERIDES 115  --   --   --   --   --   --    HDL 30 L  --   --   --   --   --   --    LDL CHOLESTEROL 32.0 L  --   --   --   --   --   --    NON-HDL CHOLESTEROL 55  --   --   --   --   --   --    AST 73 H  < > 41 H 74 H 87 H 23 19   ALT 44  < > 41 102 H 137 H 16 16   < > = values in this interval not displayed.    BNP:    Recent Labs  Lab 07/06/16  1527 09/24/16  0740 05/03/18  1351 05/27/18  1330    H 110 H 640 H 626 H       TSH:        Free T4:        Diagnostic Results:  ECG (personally reviewed tracings):  5/27/18 AF 75, RBBB, occ , similar to 5/4/18    Chest X-Ray (personally reviewed image(s)): 5/27/18 CMeg, CHF, L sided PPM, prior sternotomy    Echo: 5/4/18    1 - Low normal left ventricular systolic function (EF 50-55%).     2 - Concentric hypertrophy.     3 - Mild left atrial enlargement.     4 - Pulmonary hypertension. The estimated PA systolic pressure is 46 mmHg.     5 - Mild aortic regurgitation.     6 - Mild to moderate mitral regurgitation.     7 - Mild tricuspid  regurgitation.    8 - Ao root dil 3.9cm     Stress Test: L MPI 5/4/18  Nuclear Quantitative Functional Analysis:   Quantitative figures are not accurate.   Visually estimated LV function is low normal.   Impression: ABNORMAL MYOCARDIAL PERFUSION  1. The perfusion scan is free of evidence for myocardial ischemia.   2. There is mild intensity fixed defect in the apical wall of the left ventricle, consistent with myocardial injury, and moderate intensity fixed defect in the inferolateral wall of the left ventricle, consistent with myocardial injury.   3. Resting wall motion is physiologic.   4. Visually estimated LV function is low normal.   5. The ventricular volumes are normal at rest and stress.   6. The extracardiac distribution of radioactivity is normal.       Assessment and Plan:       * Acute on chronic diastolic congestive heart failure    Suspect exac of HFpEF, ddx includes bronchitis  Recent testing earlier this month, no plan to repeat echo or MPI  Likely med/diet noncompliance  Cont lasix to 40mg po bid  Cont GDMT: ASA/Statin/BBl/ARB  Cont coreg 50mg bid  Next drug to add: aldact  Consider GI eval for etiology of anemia        Pacemaker    Biotronik PPM in place, appears to be functioning normally  Followed prev by [facility]        Essential hypertension    Diet noncompliance suspected  Cont Losartan  Cont coreg 50mg bid  Next drug to add: aldact  Monitor renal fxn        Chronic atrial fibrillation    Prev on Eliquis, not on current med list  Rates acceptable and PPM in place  ?stopped for anemia on recent admit  Consider GI eval for assessment of anemia  Resume eliquis 5mg bid if no bleeding source identified        Type 2 diabetes mellitus, controlled, with renal complications    Per IM        CKD Stage 3    Stable renal fxn        Anemia of chronic disease    ?Acute anemia noted on last admit  Currently stable vs discharge, but much lower than prior baseline  Consider GI eval            VTE Risk Mitigation          Ordered     IP VTE HIGH RISK PATIENT  Once      05/27/18 1729     Place DENIS hose  Until discontinued      05/27/18 1729     Place sequential compression device  Until discontinued      05/27/18 1729     enoxaparin injection 40 mg  Daily      05/27/18 1729        Cardiology will sign off, pls call with questions.    Blanco Brunson MD  Cardiology  [facility]

## 2018-05-31 NOTE — SUBJECTIVE & OBJECTIVE
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      Current Outpatient Prescriptions on File Prior to Encounter   Medication Sig    albuterol-ipratropium 2.5mg-0.5mg/3mL (DUO-NEB) 0.5 mg-3 mg(2.5 mg base)/3 mL nebulizer solution Take 3 mLs by nebulization every 6 (six) hours. Rescue    amlodipine (NORVASC) 10 MG tablet Take 10 mg by mouth once daily.    aspirin (ECOTRIN) 81 MG EC tablet Take 81 mg by mouth once daily.    fish oil-omega-3 fatty acids 300-1,000 mg capsule Take 2 g by mouth 2 (two) times daily.     furosemide (LASIX) 40 MG tablet Take 1 tablet (40 mg total) by mouth once daily. (Patient taking differently: Take 40 mg by mouth once daily. 1 tab in the morning  1 in the evening every other day)    glipiZIDE (GLUCOTROL) 5 MG tablet Take 10 mg by mouth 2 (two) times daily before meals.    losartan (COZAAR) 50 MG tablet Take 50 mg by mouth once daily.    metFORMIN (GLUCOPHAGE) 500 MG tablet Take 500 mg by mouth 2 (two) times daily with meals.    metoprolol tartrate (LOPRESSOR) 50 MG tablet Take 75 mg by mouth 2 (two) times daily.     nitroGLYCERIN (NITROSTAT) 0.4 MG SL tablet Place 0.4 mg under the tongue every 5 (five) minutes as needed.    polyethylene glycol (GLYCOLAX) 17 gram  PwPk Take by mouth as needed.      Family History     Problem Relation (Age of Onset)    Diabetes Father, Mother        Social History Main Topics    Smoking status: Former Smoker     Packs/day: 0.25     Years: 60.00     Types: Cigarettes     Quit date: 5/8/2018    Smokeless tobacco: Never Used    Alcohol use No    Drug use: No    Sexual activity: Not Currently     Partners: Female     Review of Systems   Gastrointestinal: Negative for melena.   Genitourinary: Negative for hematuria.     Objective:     Vital Signs (Most Recent):  Temp: 96.4 °F (35.8 °C) (05/31/18 0759)  Pulse: 72 (05/31/18 0759)  Resp: 18 (05/31/18 0759)  BP: (!) 133/97 (05/31/18 0759)  SpO2: (!) 90 % (05/31/18 0759) Vital Signs (24h Range):  Temp:  [96.4 °F (35.8 °C)-99.3 °F (37.4 °C)] 96.4 °F (35.8 °C)  Pulse:  [] 72  Resp:  [18-22] 18  SpO2:  [90 %-100 %] 90 %  BP: (117-134)/(59-97) 133/97     Weight: 84.5 kg (186 lb 4.6 oz)  Body mass index is 27.51 kg/m².    SpO2: (!) 90 %  O2 Device (Oxygen Therapy): nasal cannula      Intake/Output Summary (Last 24 hours) at 05/31/18 0814  Last data filed at 05/31/18 0600   Gross per 24 hour   Intake             1155 ml   Output             1725 ml   Net             -570 ml       Lines/Drains/Airways     Peripheral Intravenous Line                 Peripheral IV - Single Lumen 05/27/18 1330 Right Antecubital 3 days                Physical Exam   Constitutional: He is oriented to person, place, and time. He appears well-developed and well-nourished.   HENT:   Head: Normocephalic and atraumatic.   Eyes: Conjunctivae and EOM are normal. Pupils are equal, round, and reactive to light. No scleral icterus.   Neck: Normal range of motion. Neck supple. No JVD present. Carotid bruit is not present. No tracheal deviation present. No thyromegaly present.   Cardiovascular: Normal rate, regular rhythm, S1 normal and S2 normal.  Exam reveals distant heart sounds. Exam reveals no gallop and no friction rub.    No  murmur heard.  Pulmonary/Chest: Effort normal and breath sounds normal. No respiratory distress. He has no wheezes. He has no rales. He exhibits no tenderness.   Abdominal: Soft. He exhibits no distension.   Musculoskeletal: He exhibits edema.   Neurological: He is alert and oriented to person, place, and time. He has normal strength. No cranial nerve deficit.   Skin: Skin is warm and dry. No rash noted.   Psychiatric: He has a normal mood and affect. His behavior is normal.       Current Medications:   albuterol-ipratropium  3 mL Nebulization Q6H    aspirin  81 mg Oral Daily    carvedilol  50 mg Oral BID    cinacalcet  30 mg Oral BID WM    enoxaparin  40 mg Subcutaneous Daily    ferrous sulfate  325 mg Oral Daily    furosemide  40 mg Oral BID    losartan  100 mg Oral Daily       dextromethorphan-guaifenesin  mg/5 ml, dextrose 50%, dextrose 50%, glucagon (human recombinant), glucose, glucose, insulin aspart U-100, pneumoc 13-alan conj-dip cr(PF)    Laboratory:  CBC:    Recent Labs  Lab 05/13/18  0410 05/27/18  1330 05/29/18  0410 05/30/18  0422 05/31/18  0444   WHITE BLOOD CELL COUNT 7.45 9.57 8.43 7.99 7.31   HEMOGLOBIN 8.0 L 8.6 L 7.9 L 7.9 L 8.0 L   HEMATOCRIT 28.0 L 30.4 L 28.0 L 27.9 L 29.0 L   PLATELETS 233 540 H 510 H 542 H 549 H       CHEMISTRIES:    Recent Labs  Lab 05/04/18  0613  05/27/18  1330 05/28/18  0412 05/29/18  0410 05/30/18  0422 05/31/18  0444   GLUCOSE 84  < > 216 H 48  H  137 H 134 H 130 H   SODIUM 142  < > 142 141 139  139 141 141   POTASSIUM 5.0  < > 4.5 3.9 4.0  4.0 4.3 3.7   BUN BLD 34 H  < > 15 15 18  18 20 18   CREATININE 1.4  < > 1.2 1.0 1.1  1.1 1.3 1.2   EGFR IF  55 A  < > >60 >60 >60  >60 60 >60   EGFR IF NON- 47 A  < > 57 A >60 >60  >60 52 A 57 A   CALCIUM 10.7 H  < > 10.8 H 10.8 H 10.9 H  10.9 H 10.6 H 10.1   MAGNESIUM 2.2  --  1.9  --  2.1  --   --    < > = values in this interval not displayed.    CARDIAC  BIOMARKERS:    Recent Labs  Lab 05/03/18  1834 05/03/18  2334 05/27/18  1330 05/27/18  2148 05/28/18  0412   TROPONIN I 0.113 H 0.096 H 0.107 H 0.099 H 0.104 H       COAGS:    Recent Labs  Lab 09/24/16  0740 05/03/18  1351 05/23/18  1010 05/27/18  1330   INR 1.1 1.2 1.0 1.0       LIPIDS/LFTS:    Recent Labs  Lab 09/25/16  0607  09/28/16  0607 05/03/18  1351 05/04/18  0613 05/23/18  1010 05/27/18  1330   CHOLESTEROL 85 L  --   --   --   --   --   --    TRIGLYCERIDES 115  --   --   --   --   --   --    HDL 30 L  --   --   --   --   --   --    LDL CHOLESTEROL 32.0 L  --   --   --   --   --   --    NON-HDL CHOLESTEROL 55  --   --   --   --   --   --    AST 73 H  < > 41 H 74 H 87 H 23 19   ALT 44  < > 41 102 H 137 H 16 16   < > = values in this interval not displayed.    BNP:    Recent Labs  Lab 07/06/16  1527 09/24/16  0740 05/03/18  1351 05/27/18  1330    H 110 H 640 H 626 H       TSH:        Free T4:        Diagnostic Results:  ECG (personally reviewed tracings):  5/27/18 AF 75, RBBB, occ , similar to 5/4/18    Chest X-Ray (personally reviewed image(s)): 5/27/18 CMeg, CHF, L sided PPM, prior sternotomy    Echo: 5/4/18    1 - Low normal left ventricular systolic function (EF 50-55%).     2 - Concentric hypertrophy.     3 - Mild left atrial enlargement.     4 - Pulmonary hypertension. The estimated PA systolic pressure is 46 mmHg.     5 - Mild aortic regurgitation.     6 - Mild to moderate mitral regurgitation.     7 - Mild tricuspid regurgitation.    8 - Ao root dil 3.9cm     Stress Test: L MPI 5/4/18  Nuclear Quantitative Functional Analysis:   Quantitative figures are not accurate.   Visually estimated LV function is low normal.   Impression: ABNORMAL MYOCARDIAL PERFUSION  1. The perfusion scan is free of evidence for myocardial ischemia.   2. There is mild intensity fixed defect in the apical wall of the left ventricle, consistent with myocardial injury, and moderate intensity fixed defect in the  inferolateral wall of the left ventricle, consistent with myocardial injury.   3. Resting wall motion is physiologic.   4. Visually estimated LV function is low normal.   5. The ventricular volumes are normal at rest and stress.   6. The extracardiac distribution of radioactivity is normal.

## 2018-05-31 NOTE — PROGRESS NOTES
[facility] Hospital Medicine  Progress Note    Patient Name: Agus Ramos Jr.  MRN: [id number]  Patient Class: IP- Inpatient   Admission Date: 5/27/2018  Length of Stay: 4 days  Attending Physician: Eileen Kinsey MD  Primary Care Provider: Keaton Hardy MD        Subjective:     Principal Problem:Acute on chronic diastolic congestive heart failure    HPI:    Agus Ramos Jr. is a 77 y.o. male that (in part)  has a past medical history of Anemia; Anticoagulant long-term use; Congestive heart failure; Coronary artery disease; Diabetes mellitus; Encounter for blood transfusion; Hypertension; MI (myocardial infarction); Pacemaker; Peripheral vascular disease; S/P CABG x 3; S/P femoral-popliteal bypass surgery; Stented coronary artery; and Tobacco use.  has a past surgical history that includes Cardiac surgery; Cardiac pacemaker placement; Cardiac catheterization; and Hemorrhoid surgery.  Presents to [facility] Emergency Department complaining of recurrent shortness of breath.  Patient was discharged to the Hospital approximately 2 weeks ago after being treated for 12 days for an acute CHF exacerbation and symptomatic anemia.  Was also complicated with hypoxia and requirement for home oxygen prior to discharge.  He has shortness of breath at rest and dyspnea with exertion.  Worsened with movement.  Minimally relieved with rest and supplemental oxygen.  Moderate to severe intensity.  Associated with unintentional weight gain.  Denies chest pain.  Daily frequency.  Constant duration.    In the emergency department chest x-ray, routine laboratory studies, EKG, cardiac enzymes were obtained.  There is concern the patient was having recurrent acute on chronic diastolic heart failure.  He was given Lasix in the ED.    Hospital medicine has been asked to admit for further evaluation and treatment.      ==============================================================================  Hospital Course from May 3, 2018-May 15, 2018:   Admitted with concern for symptomatic anemia and HFpEF exacerbation. Found to have low Hgb 6.5, no bleeding identified though patient refused and continues to refuse rectal exam. Transfused 2U with appropriate response to Hgb 8.5. Continued shortness of breath symptoms after transfusion. Started diuresis for CHF exacerbation. TTE 5/4/2018 with normal EF, NST negative for ischemia but does show scar. Still requiring O2. Continuing diuresis.      5/8- pt with brief period of confusion, refused labs this morning but was oriented x3 after I sat down and spoke with him for awhile. Still requiring 2 liters oxygen and edema to LE. Jennifer with wife, Jessika vias phone and she said he called her today and was not confused and oriented. Will check urine culture and continue diuresis.   5/9 - urine culture growing staph, start rocephin, no acute changes on CXR; schedule nebs and continue to wean oxygen as tolerated   5/10- urine culture now shows no growth?, waiting to hear from lab; calcium persistently high, hypercalcemia workup pending, chest CT pending, pt still requiring oxygen despite aggressive diuresis and pulmonary care   5/11- Chest CT concern for right pleural effusion ?loculated; elevated calcium more concern for malignancy - d/w IR and not enough fluid to drain but can jackelyn diagnostic tap, will broaden antibiotics x 24 hours, wait for further studies, schedule diagnostic tap next week, off eliquis on lovenox bridge   5/12 - test for home oxygen with significant drop in O2 saturation to 54% on room air with exercise; Chest CTA did not show significant findings except small pleural effusion that could explain hypoxia, pulmonology consulted, off anticoagulant for potential diagnostic thoracentesis   5/13 - pulmonology consulted; started on spiriva, plan to dc home with home oxygen  5/14 -  insurance has not authorized oxygen yet, continue IV lasix ,  Patient has been discharged with home oxygen and  follow up with Pulmonology and PCP as out patient.       Hospital Course:  No notes on file    Interval History: more short of breath today. Was switched to PO furosemide yesterday. Is using IS. Hgb up a bit to 8. Iron low and B12 in low end of normal. Folic acid ok. S/p a dose of IV iron. Pending GI eval    Review of Systems   Constitutional: Negative.    Respiratory: Positive for shortness of breath.    Cardiovascular: Positive for leg swelling (better).   Gastrointestinal: Negative.    Genitourinary: Negative.    Musculoskeletal: Negative.    Skin: Negative.    Neurological: Negative.    Psychiatric/Behavioral: Negative.      Objective:     Vital Signs (Most Recent):  Temp: 96.4 °F (35.8 °C) (05/31/18 0759)  Pulse: 72 (05/31/18 0759)  Resp: 18 (05/31/18 0759)  BP: (!) 133/97 (05/31/18 0759)  SpO2: (!) 90 % (05/31/18 0759) Vital Signs (24h Range):  Temp:  [96.4 °F (35.8 °C)-99.3 °F (37.4 °C)] 96.4 °F (35.8 °C)  Pulse:  [] 72  Resp:  [18-22] 18  SpO2:  [90 %-100 %] 90 %  BP: (117-134)/(59-97) 133/97     Weight: 84.5 kg (186 lb 4.6 oz)  Body mass index is 27.51 kg/m².    Intake/Output Summary (Last 24 hours) at 05/31/18 1034  Last data filed at 05/31/18 0600   Gross per 24 hour   Intake             1155 ml   Output             1725 ml   Net             -570 ml      Physical Exam   Constitutional: He is oriented to person, place, and time. He appears well-developed. No distress.   Cardiovascular: Normal rate, regular rhythm and intact distal pulses.    Pulmonary/Chest: Effort normal. He has no wheezes. He has rales (bibasilar).   Abdominal: Soft. Bowel sounds are normal.   Musculoskeletal: Normal range of motion. He exhibits edema (trace).   Neurological: He is alert and oriented to person, place, and time.   Skin: Skin is warm. He is not diaphoretic.   Psychiatric: He has a normal mood and affect.  His behavior is normal. Judgment and thought content normal. Cognition and memory are impaired.   Nursing note and vitals reviewed.      Significant Labs: All pertinent labs within the past 24 hours have been reviewed.    Significant Imaging: I have reviewed all pertinent imaging results/findings within the past 24 hours.  I have reviewed and interpreted all pertinent imaging results/findings within the past 24 hours.    Assessment/Plan:      * Acute on chronic diastolic congestive heart failure    Was better but now more symptomatic after switching to PO yesterday. I will give an extra dose of IV lasix now and adjust PO dose starting tomorrow. Renal panel in AM to check potassium and Cr. BP at goal. Continue incentive spirometer for atelectasis. Appreciate further Cardiology recs.           Hypercalcemia    Given elevated PTH will continue trial of cinacalcet. Not a candidate for surgery at this time given issues with heart failure. Per wife, he used to be on sensipar up until 2016 due to insurance coverage and high co-pay. iCa in upper limit of normal. Needs f/u with endocrinology as outpatient        Pacemaker    No acute issues.           Tobacco abuse    Abstinent since last admission. Encouraged to continue abstinent. Is motivated to do so.           Iron deficiency anemia    Hgb is low at 7.9-8 g/dL however it has been ~ 8.1 g/dL prior to admit. Is symptomatic.  Is declining a rectal exam. Not nauseous or vomiting. Tolerating diet. No heart burn. I discussed with patient to let nurse look at stool when he has a BM (none yet). On PO ferrous sulfate, s/p IV iron (given by Hematology) and on B12 supplementation. Folic acid level ok. Is on ASA. GI consult placed for eval as he does need to be on eliquis for AFib. Appreciate further recs from Hematology as well.         Chronic anticoagulation    Was on eliquis for AFib. Is current on hold while workup for anemia in process          Type 2 diabetes mellitus,  controlled, with renal complications    BG currently at goal. Continue insulin as needed for goal BG < 180          CKD Stage 3    Stable and at baseline. Renal panel in AM while on IV diuresis.           Essential hypertension    Stable on current regimen          Chronic atrial fibrillation    Stable on PO BB. Cardiac monitoring.             VTE Risk Mitigation         Ordered     IP VTE HIGH RISK PATIENT  Once      05/27/18 1729     Place DENIS hose  Until discontinued      05/27/18 1729     Place sequential compression device  Until discontinued      05/27/18 1729     enoxaparin injection 40 mg  Daily      05/27/18 1729          [facility], PT/OT, treat constipation, GI ulisesal    Eileen Domingo MD  Department of Hospital Medicine   [facility]

## 2018-05-31 NOTE — ASSESSMENT & PLAN NOTE
Pt with chronic anemia with component of Fe deficiency  S/p IV Venofer x 1   Hb 8g/dl   Cont Fe supp  Cont B12 supp    GI consult pending

## 2018-05-31 NOTE — CONSULTS
Reason for Consult:  Anemia    HPI:  Pt is a 77 y.o. male who was admitted on 5/27/18 and currently being treated for CHF.  Pt. Now with anemia, chronic, usually moderate in severity.  No obvious precipitating factors.  No alleviating/exacerbating factors.  Possibly some associated SOB, though could be CHF related.  No reports of associated blood in his stool.  No abd. Pain, N/V, F/C, wt. Loss.  NO dysphagia, GERD sx's,  Yellowing of eyes/skin.  No diarrhea/constipation.  No black/tarry stools.      Pt. Thinks he had a C-scope about 10 years ago.      Past Medical History:   Diagnosis Date    Anemia 05/03/2018    pending blood transfusion    Anticoagulant long-term use     Congestive heart failure     Coronary artery disease     Diabetes mellitus     Encounter for blood transfusion     Hypertension     MI (myocardial infarction)     Pacemaker     left chest    Peripheral vascular disease     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     S/P femoral-popliteal bypass surgery left    Stented coronary artery     Tobacco use        Past Surgical History:   Procedure Laterality Date    CARDIAC CATHETERIZATION      CARDIAC PACEMAKER PLACEMENT      CARDIAC SURGERY      HEMORRHOID SURGERY         Family History   Problem Relation Age of Onset    Diabetes Father     Diabetes Mother        Social History     Social History    Marital status:      Spouse name: N/A    Number of children: N/A    Years of education: N/A     Occupational History    Not on file.     Social History Main Topics    Smoking status: Former Smoker     Packs/day: 0.25     Years: 60.00     Types: Cigarettes     Quit date: 5/8/2018    Smokeless tobacco: Never Used    Alcohol use No    Drug use: No    Sexual activity: Not Currently     Partners: Female     Other Topics Concern    Not on file     Social History Narrative    No narrative on file       Endoscopic History:  C-scope about 10 years ago    Review of patient's allergies indicates:  No  Known Allergies    No current facility-administered medications on file prior to encounter.      Current Outpatient Prescriptions on File Prior to Encounter   Medication Sig Dispense Refill    albuterol-ipratropium 2.5mg-0.5mg/3mL (DUO-NEB) 0.5 mg-3 mg(2.5 mg base)/3 mL nebulizer solution Take 3 mLs by nebulization every 6 (six) hours. Rescue 1 Box 0    amlodipine (NORVASC) 10 MG tablet Take 10 mg by mouth once daily.      aspirin (ECOTRIN) 81 MG EC tablet Take 81 mg by mouth once daily.      fish oil-omega-3 fatty acids 300-1,000 mg capsule Take 2 g by mouth 2 (two) times daily.       furosemide (LASIX) 40 MG tablet Take 1 tablet (40 mg total) by mouth once daily. (Patient taking differently: Take 40 mg by mouth once daily. 1 tab in the morning  1 in the evening every other day)      glipiZIDE (GLUCOTROL) 5 MG tablet Take 10 mg by mouth 2 (two) times daily before meals.      losartan (COZAAR) 50 MG tablet Take 50 mg by mouth once daily.      metFORMIN (GLUCOPHAGE) 500 MG tablet Take 500 mg by mouth 2 (two) times daily with meals.      metoprolol tartrate (LOPRESSOR) 50 MG tablet Take 75 mg by mouth 2 (two) times daily.       nitroGLYCERIN (NITROSTAT) 0.4 MG SL tablet Place 0.4 mg under the tongue every 5 (five) minutes as needed.      polyethylene glycol (GLYCOLAX) 17 gram PwPk Take by mouth as needed.        Scheduled Meds:   albuterol-ipratropium  3 mL Nebulization Q6H    aspirin  81 mg Oral Daily    carvedilol  50 mg Oral BID    cinacalcet  30 mg Oral BID WM    [START ON 6/1/2018] cyanocobalamin  500 mcg Oral Daily    enoxaparin  40 mg Subcutaneous Daily    ferrous sulfate  325 mg Oral Daily    [START ON 6/1/2018] furosemide  80 mg Oral Daily    insulin detemir U-100  5 Units Subcutaneous QHS    losartan  100 mg Oral Daily    polyethylene glycol  17 g Oral BID     Continuous Infusions:  PRN Meds:.dextromethorphan-guaifenesin  mg/5 ml, dextrose 50%, dextrose 50%, glucagon (human  recombinant), glucose, glucose, insulin aspart U-100, pneumoc 13-alan conj-dip cr(PF)    Review of Systems:  CONSTITUTIONAL: Negative for weakness, fever, weight loss, weight gain.  HEENT: Eyes: Negative for double/blurred vision. Ears: Negative pain or loss of hearing. Nose:Negative for nasal congestion. Negative for rhinorrhea Mouth: Negative for dry mouth/pain Throat: Negative for masses or hoarseness.  CARDIOVASCULAR: Negative for chest pain or palpitations.  RESPIRATORY: Negative for SOB, wheezes  GASTROINTESTINAL: See HPI  GENITOURINARY: Negative for dysuria/hematuria.  MUSCULOSKELETAL: Negative for osteoarthritis, muscle pain.  SKIN: Negative for rashes/lesions.  NEUROLOGIC: Negative for headaches, numbness/tingling.  ENDOCRINE: + for diabetes, no thyroid abnormalities.  HEMATOLOGIC: Negative for anemia or blood dyscrasias.    Patient Vitals for the past 24 hrs:   BP Temp Temp src Pulse Resp SpO2 Weight   05/31/18 1328 - - - 80 18 100 % -   05/31/18 1142 118/65 97.5 °F (36.4 °C) Oral 87 16 (!) 94 % -   05/31/18 0759 (!) 133/97 96.4 °F (35.8 °C) Oral 72 18 (!) 90 % -   05/31/18 0718 - - - 92 18 98 % -   05/31/18 0512 134/64 98.1 °F (36.7 °C) Oral 96 (!) 22 (!) 93 % 84.5 kg (186 lb 4.6 oz)   05/31/18 0213 - - - 79 19 - -   05/31/18 0212 - - - 79 19 - -   05/31/18 0123 (!) 117/59 99.3 °F (37.4 °C) Oral 91 (!) 21 95 % -   05/30/18 2102 132/62 97.6 °F (36.4 °C) Oral 104 18 97 % -   05/30/18 2000 - - - 77 - - -   05/30/18 1928 - - - 77 (!) 21 97 % -   05/30/18 1608 130/66 98.4 °F (36.9 °C) Oral 74 18 100 % -       Gen: Well developed, well nourished, no apparent distress, cooperative  HEENT: Anicteric, PERRLA, normocephalic, neck symmetrical  CV: S1, S2, no murmers/rubs, non-displaced PMI  Lungs: CTA-B, normal excursion  Abd: Soft, NT, ND, normal BS's, no HSM  Ext: No c/c/e, 1+ DP pulses to BLE's  Neuro: CN II-XII grossly intact, no asterixis.  Skin: No rashes/lesions, normal texture  Psych: AA&O x 4, normal  affect    Labs:    Recent Labs  Lab 05/31/18  0444   CALCIUM 10.1      K 3.7   CO2 32*      BUN 18   CREATININE 1.2     Recent Results (from the past 336 hour(s))   CBC auto differential    Collection Time: 05/31/18  4:44 AM   Result Value Ref Range    WBC 7.31 3.90 - 12.70 K/uL    Hemoglobin 8.0 (L) 14.0 - 18.0 g/dL    Hematocrit 29.0 (L) 40.0 - 54.0 %    Platelets 549 (H) 150 - 350 K/uL   CBC auto differential    Collection Time: 05/30/18  4:22 AM   Result Value Ref Range    WBC 7.99 3.90 - 12.70 K/uL    Hemoglobin 7.9 (L) 14.0 - 18.0 g/dL    Hematocrit 27.9 (L) 40.0 - 54.0 %    Platelets 542 (H) 150 - 350 K/uL   CBC auto differential    Collection Time: 05/29/18  4:10 AM   Result Value Ref Range    WBC 8.43 3.90 - 12.70 K/uL    Hemoglobin 7.9 (L) 14.0 - 18.0 g/dL    Hematocrit 28.0 (L) 40.0 - 54.0 %    Platelets 510 (H) 150 - 350 K/uL     Ferritin - 14    Imaging:  Reviewed CXR, + Cardiomegaly    Assessment:  Pt. Is a 77 y.o. male with:  1. Anemia, Microcytic, Fe-deficient - No overt sx's of GI bleeding.  Last c-scope about 10 years ago.    2. CAD, CHF, DM, HTN, PAD.....    Recommendations:  1. Monitor for sx's of GI bleeding.  2. Transfuse per PCP.  3. NPO p MN.  4. EGD tomorrow.  5. Outpatient C-scope, if H/H stable (patient had already eaten solid food today).        I would like to take this opportunity to thank you for this consult.  If you have any questions or concerns, please do not hesitate to contact me.

## 2018-05-31 NOTE — PHYSICIAN QUERY
PT Name: Agus Ramos Jr.  MR #: [id number]    Physician Query Form - Respiratory Condition Clarification      CDS/: Elisa Munoz RN, CCDS              Contact information: [email or web address]    This form is a permanent document in the medical record.    Query Date: May 31, 2018    By submitting this query, we are merely seeking further clarification of documentation. Please utilize your independent clinical judgment when addressing the question(s) below.    The Medical record contains the following   Indicators   Supporting Clinical Findings Location in Medical Record   X   SOB, YOO, Wheezing, Productive Cough, Use of Accessory Muscles, etc. presents to the ED c/o SOB and bilateral leg swelling  5/27 ed note   X   Acute/Chronic Illness Acute on chronic diastolic congestive heart failure  5/28 h/p      Radiology Findings     X   Respiratory Distress or Failure He is in respiratory distress and has wheezing 5/27 ed note   X   Hypoxia or Hypercapnia hypoxia 5/27 ed note   X   RR         ABGs         O2 sat Pt sats to 87% on 5L NC with talking and moving. 5/27 ed note      BiPAP/Intubation     X   Supplemental O2 2L NC    Still requiring O2. Continuing diuresis.      Patient on nasal cannula 2 lpm. Aerosol treatment given as ordered 5/29 resp note    5/29 prog note      5/30 resp note   X   Home O2, Oxygen Dependence He was dc'd home 5/14 on home O2  5/27 ed note      Treatment     X   Other  was discharged to the Hospital approximately 2 weeks ago after being treated for 12 days for an acute CHF exacerbation and symptomatic anemia.  Was also complicated with hypoxia and requirement for home oxygen prior to discharge 5/28 h/p     Provider, please specify diagnosis or diagnoses associated with above clinical findings.      [    ] Chronic Respiratory Failure with Hypoxia  [    ] Other Chronic Respiratory Failure  [    ] Acute Respiratory Insufficiency  [    ] Acute Respiratory Distress  [    ] Hypoxia Only  [x     ] Other Respiratory Diagnosis (please specify): _____acute on chronic respiratory failurew with hypoxia___________________________________  [    ] Clinically Undetermined    Please document in your progress notes daily for the duration of treatment until resolved and include in your discharge summary.

## 2018-05-31 NOTE — ASSESSMENT & PLAN NOTE
Diet noncompliance suspected  Cont Losartan  Cont coreg 50mg bid  Next drug to add: aldact  Monitor renal fxn

## 2018-05-31 NOTE — ASSESSMENT & PLAN NOTE
Hgb is low at 7.9-8 g/dL however it has been ~ 8.1 g/dL prior to admit. Is symptomatic.  Is declining a rectal exam. Not nauseous or vomiting. Tolerating diet. No heart burn. I discussed with patient to let nurse look at stool when he has a BM (none yet). On PO ferrous sulfate, s/p IV iron (given by Hematology) and on B12 supplementation. Folic acid level ok. Is on ASA. GI consult placed for eval as he does need to be on eliquis for AFib. Appreciate further recs from Hematology as well.

## 2018-05-31 NOTE — ASSESSMENT & PLAN NOTE
Suspect exac of HFpEF, ddx includes bronchitis  Recent testing earlier this month, no plan to repeat echo or MPI  Likely med/diet noncompliance  Cont lasix to 40mg po bid  Cont GDMT: ASA/Statin/BBl/ARB  Cont coreg 50mg bid  Next drug to add: aldact  Consider GI eval for etiology of anemia

## 2018-05-31 NOTE — PLAN OF CARE
Problem: Occupational Therapy Goal  Goal: Occupational Therapy Goal  Goals to be met by: 6/8/18     Patient will increase functional independence with ADLs by performing:    UE Dressing with Set-up Assistance.  LE Dressing with Set-up Assistance.  Grooming while standing with Set-up Assistance.  Toileting from toilet with Set-up Assistance for hygiene and clothing management.   Bathing from  edge of bed with Modified [place].  Toilet transfer to toilet with Supervision.  Upper extremity exercise program x15 reps per handout, with independence.    Outcome: Ongoing (interventions implemented as appropriate)  OT completed evaluation      REC: Home health OT

## 2018-05-31 NOTE — SUBJECTIVE & OBJECTIVE
"Interval History: Pt sitting up eating lunch. Reports he is feeling:" a little better. " Still SOB and mild fatigue.     Oncology Treatment Plan:   [No treatment plan]    Medications:  Continuous Infusions:  Scheduled Meds:   albuterol-ipratropium  3 mL Nebulization Q6H    aspirin  81 mg Oral Daily    carvedilol  50 mg Oral BID    cinacalcet  30 mg Oral BID WM    [START ON 6/1/2018] cyanocobalamin  500 mcg Oral Daily    enoxaparin  40 mg Subcutaneous Daily    ferrous sulfate  325 mg Oral Daily    [START ON 6/1/2018] furosemide  80 mg Oral Daily    insulin detemir U-100  5 Units Subcutaneous QHS    losartan  100 mg Oral Daily    polyethylene glycol  17 g Oral BID     PRN Meds:dextromethorphan-guaifenesin  mg/5 ml, dextrose 50%, dextrose 50%, glucagon (human recombinant), glucose, glucose, insulin aspart U-100, pneumoc 13-alan conj-dip cr(PF)     Review of Systems   Constitutional: Positive for fatigue. Negative for appetite change, fever and unexpected weight change.   HENT: Negative for mouth sores.    Eyes: Negative for visual disturbance.   Respiratory: Positive for shortness of breath. Negative for cough.    Cardiovascular: Positive for leg swelling. Negative for chest pain.   Gastrointestinal: Negative for abdominal pain and diarrhea.   Genitourinary: Negative for frequency.   Musculoskeletal: Negative for back pain.   Skin: Negative for rash.   Neurological: Negative for headaches.   Hematological: Negative for adenopathy.   Psychiatric/Behavioral: The patient is not nervous/anxious.      Objective:     Vital Signs (Most Recent):  Temp: 97.5 °F (36.4 °C) (05/31/18 1142)  Pulse: 80 (05/31/18 1328)  Resp: 18 (05/31/18 1328)  BP: 118/65 (05/31/18 1142)  SpO2: 100 % (05/31/18 1328) Vital Signs (24h Range):  Temp:  [96.4 °F (35.8 °C)-99.3 °F (37.4 °C)] 97.5 °F (36.4 °C)  Pulse:  [] 80  Resp:  [16-22] 18  SpO2:  [90 %-100 %] 100 %  BP: (117-134)/(59-97) 118/65     Weight: 84.5 kg (186 lb 4.6 " oz)  Body mass index is 27.51 kg/m².  Body surface area is 2.03 meters squared.      Intake/Output Summary (Last 24 hours) at 05/31/18 1407  Last data filed at 05/31/18 0600   Gross per 24 hour   Intake             1155 ml   Output             1725 ml   Net             -570 ml       Physical Exam   Constitutional: He is oriented to person, place, and time. He appears well-developed and well-nourished.   HENT:   Head: Normocephalic.   Mouth/Throat: Oropharynx is clear and moist. No oropharyngeal exudate.   Eyes: Conjunctivae are normal. No scleral icterus.   Neck: Normal range of motion. Neck supple. No thyromegaly present.   Cardiovascular: Normal rate, regular rhythm and normal heart sounds.    No murmur heard.  Pulmonary/Chest: Effort normal. He has no wheezes. He has rales.   Abdominal: Soft. Bowel sounds are normal. There is no tenderness. There is no rebound and no guarding.   Musculoskeletal: Normal range of motion. He exhibits edema.   Neurological: He is alert and oriented to person, place, and time. No cranial nerve deficit.   Skin: No rash noted. No erythema.   Psychiatric: He has a normal mood and affect.       Significant Labs:   All pertinent labs within the past 24 hours have been reviewed    Diagnostic Results:  I have reviewed and interpreted all pertinent imaging results/findings within the past 24 hours

## 2018-05-31 NOTE — PLAN OF CARE
Problem: Diabetes, Type 2 (Adult)  Intervention: Support/Optimize Psychosocial Response to Condition   05/29/18 0800 05/30/18 2000   Coping/Psychosocial Interventions   Supportive Measures --  active listening utilized;self-care encouraged;verbalization of feelings encouraged   Environmental Support --  calm environment promoted;distractions minimized;environmental consistency promoted;personal routine supported   Psychosocial Support   Family/Support System Care self-care encouraged --      Intervention: Optimize Glycemic Control   05/30/18 2000   Nutrition Interventions   Glycemic Management blood glucose monitoring       Goal: Signs and Symptoms of Listed Potential Problems Will be Absent, Minimized or Managed (Diabetes, Type 2)  Signs and symptoms of listed potential problems will be absent, minimized or managed by discharge/transition of care (reference Diabetes, Type 2 (Adult) CPG).   Outcome: Ongoing (interventions implemented as appropriate)   05/31/18 0350   Diabetes, Type 2   Problems Assessed (Type 2 Diabetes) all   Problems Present (Type 2 Diabetes) situational response       Problem: Patient Care Overview  Goal: Plan of Care Review  Outcome: Ongoing (interventions implemented as appropriate)   05/31/18 0350   Coping/Psychosocial   Plan Of Care Reviewed With patient       Problem: Pressure Ulcer Risk (Jeremiah Scale) (Adult,Obstetrics,Pediatric)  Intervention: Promote/Optimize Nutrition   05/30/18 0454   Nutrition Interventions   Oral Nutrition Promotion rest periods promoted     Intervention: Prevent/Manage Excess Moisture   05/29/18 0224 05/29/18 2011   Hygiene Care   Perineal Care absorbent pad changed --    Bathing/Skin Care --  bedtime care   Skin Interventions   Skin Protection --  Tubing/devices free from skin contact     Intervention: Maintain Head of Bed Elevation Less Than 30 Degrees as Tolerated   05/30/18 1800   Positioning   Head of Bed (HOB) HOB elevated     Intervention: Prevent/Minimize  Sheer/Friction Injuries   05/29/18 0800 05/29/18 2011 05/30/18 2000   Skin Interventions   Pressure Reduction Devices Pressure-redistributing mattress utilized --  --    Pressure Reduction Techniques --  frequent weight shift encouraged --    Positioning   Positioning/Transfer Devices --  --  pillows     Intervention: Turn/Reposition Often   05/29/18 2011 05/30/18 2000   Skin Interventions   Pressure Reduction Techniques frequent weight shift encouraged --    Positioning   Body Position --  positioned/repositioned independently       Goal: Skin Integrity  Patient will demonstrate the desired outcomes by discharge/transition of care.   Outcome: Ongoing (interventions implemented as appropriate)   05/31/18 0350   Pressure Ulcer Risk (Jeremiah Scale) (Adult,Obstetrics,Pediatric)   Skin Integrity making progress toward outcome       Problem: Anxiety (Adult)  Intervention: Promote Anxiety Reduction/Resolution   05/29/18 0800 05/30/18 2000   Coping/Psychosocial Interventions   Supportive Measures --  active listening utilized;self-care encouraged;verbalization of feelings encouraged   Environmental Support --  calm environment promoted;distractions minimized;environmental consistency promoted;personal routine supported   Psychosocial Support   Family/Support System Care self-care encouraged --    Cognitive Interventions   Sensory Stimulation Regulation --  care clustered;lighting decreased;quiet environment promoted       Goal: Identify Related Risk Factors and Signs and Symptoms  Related risk factors and signs and symptoms are identified upon initiation of Human Response Clinical Practice Guideline (CPG)   Outcome: Ongoing (interventions implemented as appropriate)   05/29/18 0224 05/30/18 0454   Anxiety   Related Risk Factors (Anxiety) other (see comments);situational/maturational crisis  (Respiratory) --    Signs and Symptoms (Anxiety) --  nervousness/tension/restlessness     Goal: Reduction/Resolution  Patient will  demonstrate the desired outcomes by discharge/transition of care.   Outcome: Ongoing (interventions implemented as appropriate)   18 0350   Anxiety (Adult)   Reduction/Resolution making progress toward outcome       Problem: Respiratory Distress Syndrome ([place],NICU)  Intervention: Correct and Maintain Adequate Oxygenation   18 0454   Respiratory Interventions   Airway/Ventilation Management (Infant) airway patency maintained;calming measures promoted     Intervention: Position to Optimize Ventilation   18   Developmental Care   Developmental Care Positioning Body HOB elevated     Intervention: Provide Preventive/Supportive Care   18   Developmental Care   Environmental Modifications clustered care;lighting decreased;noise decreased   Hypothermia Management (Infant)   Warming Method adjust environmental temperature       Goal: Signs and Symptoms of Listed Potential Problems Will be Absent, Minimized or Managed (Respiratory Distress Syndrome)  Signs and symptoms of listed potential problems will be absent, minimized or managed by discharge/transition of care (reference Respiratory Distress Syndrome (,NICU) CPG).   Outcome: Ongoing (interventions implemented as appropriate)   18   Respiratory Distress Syndrome   Problems Assessed (Respiratory Distress Syndrome) all   Problems Present (Respiratory Distress Syndrome) other (see comments)  (Report SOB)       Problem: Fall Risk (Adult)  Intervention: Monitor/Assist with Self Care   18 0800 18   Daily Care Interventions   Self-Care Promotion independence encouraged --    Functional Level Current   Ambulation --  0 - independent   Transferring --  1 - assistive equipment   Toileting --  0 - independent   Bathing --  0 - independent   Dressing --  0 - independent   Eating --  0 - independent   Communication --  0 - understands/communicates without difficulty   Swallowing --  0 - swallows foods/liquids  without difficulty   Activity   Activity Assistance Provided --  independent     Intervention: Reduce Risk/Promote Restraint Free Environment   18   Safety Interventions   Environmental Safety Modification assistive device/personal items within reach;clutter free environment maintained;lighting adjusted;mobility aid in reach;room near unit station;room organization consistent   Prevent [place] Drop/Fall   Safety/Security Measures bed alarm set     Intervention: Review Medications/Identify Contributors to Fall Risk   18   Safety Interventions   Medication Review/Management medications reviewed     Intervention: Patient Rounds   18   Safety Interventions   Patient Rounds bed in low position;bed wheels locked;call light in reach;clutter free environment maintained;ID band on;placement of personal items at bedside;toileting offered;visualized patient     Intervention: Safety Promotion/Fall Prevention   18   Safety Interventions   Safety Promotion/Fall Prevention assistive device/personal item within reach;bed alarm set;commode/urinal/bedpan at bedside;Fall Risk reviewed with patient/family;lighting adjusted;nonskid shoes/socks when out of bed;room near unit station;side rails raised x 2;supervised activity;toileting scheduled;instructed to call staff for mobility     Intervention: Safety Precautions   18 035   Safety Interventions   Safety Precautions emergency equipment at bedside       Goal: Identify Related Risk Factors and Signs and Symptoms  Related risk factors and signs and symptoms are identified upon initiation of Human Response Clinical Practice Guideline (CPG)   Outcome: Ongoing (interventions implemented as appropriate)   18   Fall Risk   Related Risk Factors (Fall Risk) environment unfamiliar;slipper/uneven surfaces;history of falls;gait/mobility problems;inadequate lighting;polypharmacy   Signs and Symptoms (Fall Risk) presence of risk factors      Goal: Absence of Falls  Patient will demonstrate the desired outcomes by discharge/transition of care.   Outcome: Ongoing (interventions implemented as appropriate)   05/31/18 0350   Fall Risk (Adult)   Absence of Falls making progress toward outcome

## 2018-06-01 ENCOUNTER — ANESTHESIA EVENT (OUTPATIENT)
Dept: ENDOSCOPY | Facility: HOSPITAL | Age: 78
DRG: 291 | End: 2018-06-01
Payer: MEDICARE

## 2018-06-01 ENCOUNTER — ANESTHESIA (OUTPATIENT)
Dept: ENDOSCOPY | Facility: HOSPITAL | Age: 78
DRG: 291 | End: 2018-06-01
Payer: MEDICARE

## 2018-06-01 ENCOUNTER — SURGERY (OUTPATIENT)
Age: 78
End: 2018-06-01

## 2018-06-01 PROBLEM — J96.21 ACUTE ON CHRONIC RESPIRATORY FAILURE WITH HYPOXIA: Status: ACTIVE | Noted: 2018-06-01

## 2018-06-01 LAB
ALBUMIN SERPL BCP-MCNC: 2.7 G/DL
ANION GAP SERPL CALC-SCNC: 6 MMOL/L
ANISOCYTOSIS BLD QL SMEAR: SLIGHT
BASOPHILS # BLD AUTO: 0.03 K/UL
BASOPHILS NFR BLD: 0.4 %
BNP SERPL-MCNC: 333 PG/ML
BUN SERPL-MCNC: 18 MG/DL
BURR CELLS BLD QL SMEAR: ABNORMAL
CALCIUM SERPL-MCNC: 10.1 MG/DL
CHLORIDE SERPL-SCNC: 105 MMOL/L
CO2 SERPL-SCNC: 33 MMOL/L
CREAT SERPL-MCNC: 1.2 MG/DL
D DIMER PPP IA.FEU-MCNC: 0.87 MG/L FEU
DIFFERENTIAL METHOD: ABNORMAL
EOSINOPHIL # BLD AUTO: 0.2 K/UL
EOSINOPHIL NFR BLD: 2.1 %
ERYTHROCYTE [DISTWIDTH] IN BLOOD BY AUTOMATED COUNT: 23.5 %
EST. GFR  (AFRICAN AMERICAN): >60 ML/MIN/1.73 M^2
EST. GFR  (NON AFRICAN AMERICAN): 57 ML/MIN/1.73 M^2
GIANT PLATELETS BLD QL SMEAR: PRESENT
GLUCOSE SERPL-MCNC: 116 MG/DL
HCT VFR BLD AUTO: 27.4 %
HGB BLD-MCNC: 7.7 G/DL
HYPOCHROMIA BLD QL SMEAR: ABNORMAL
LYMPHOCYTES # BLD AUTO: 1.3 K/UL
LYMPHOCYTES NFR BLD: 18 %
MCH RBC QN AUTO: 19.5 PG
MCHC RBC AUTO-ENTMCNC: 28.1 G/DL
MCV RBC AUTO: 70 FL
MONOCYTES # BLD AUTO: 1.2 K/UL
MONOCYTES NFR BLD: 15.8 %
NEUTROPHILS # BLD AUTO: 4.6 K/UL
NEUTROPHILS NFR BLD: 64 %
OVALOCYTES BLD QL SMEAR: ABNORMAL
PHOSPHATE SERPL-MCNC: 2.7 MG/DL
PLATELET # BLD AUTO: 494 K/UL
PLATELET BLD QL SMEAR: ABNORMAL
PMV BLD AUTO: 9.4 FL
POCT GLUCOSE: 122 MG/DL (ref 70–110)
POCT GLUCOSE: 127 MG/DL (ref 70–110)
POCT GLUCOSE: 132 MG/DL (ref 70–110)
POCT GLUCOSE: 154 MG/DL (ref 70–110)
POLYCHROMASIA BLD QL SMEAR: ABNORMAL
POTASSIUM SERPL-SCNC: 3.6 MMOL/L
RBC # BLD AUTO: 3.94 M/UL
SCHISTOCYTES BLD QL SMEAR: ABNORMAL
SODIUM SERPL-SCNC: 144 MMOL/L
TARGETS BLD QL SMEAR: ABNORMAL
WBC # BLD AUTO: 7.26 K/UL

## 2018-06-01 PROCEDURE — 25000003 PHARM REV CODE 250: Performed by: EMERGENCY MEDICINE

## 2018-06-01 PROCEDURE — 63600175 PHARM REV CODE 636 W HCPCS: Performed by: NURSE ANESTHETIST, CERTIFIED REGISTERED

## 2018-06-01 PROCEDURE — 85025 COMPLETE CBC W/AUTO DIFF WBC: CPT

## 2018-06-01 PROCEDURE — 25000003 PHARM REV CODE 250: Performed by: NURSE ANESTHETIST, CERTIFIED REGISTERED

## 2018-06-01 PROCEDURE — 25000242 PHARM REV CODE 250 ALT 637 W/ HCPCS: Performed by: INTERNAL MEDICINE

## 2018-06-01 PROCEDURE — 27201012 HC FORCEPS, HOT/COLD, DISP: Performed by: INTERNAL MEDICINE

## 2018-06-01 PROCEDURE — 0DB68ZX EXCISION OF STOMACH, VIA NATURAL OR ARTIFICIAL OPENING ENDOSCOPIC, DIAGNOSTIC: ICD-10-PCS | Performed by: INTERNAL MEDICINE

## 2018-06-01 PROCEDURE — 36415 COLL VENOUS BLD VENIPUNCTURE: CPT

## 2018-06-01 PROCEDURE — 63600175 PHARM REV CODE 636 W HCPCS: Performed by: INTERNAL MEDICINE

## 2018-06-01 PROCEDURE — 83880 ASSAY OF NATRIURETIC PEPTIDE: CPT

## 2018-06-01 PROCEDURE — 25000003 PHARM REV CODE 250: Performed by: INTERNAL MEDICINE

## 2018-06-01 PROCEDURE — D9220A PRA ANESTHESIA: Mod: CRNA,,, | Performed by: NURSE ANESTHETIST, CERTIFIED REGISTERED

## 2018-06-01 PROCEDURE — 80069 RENAL FUNCTION PANEL: CPT

## 2018-06-01 PROCEDURE — 27000221 HC OXYGEN, UP TO 24 HOURS

## 2018-06-01 PROCEDURE — 21400001 HC TELEMETRY ROOM

## 2018-06-01 PROCEDURE — 94761 N-INVAS EAR/PLS OXIMETRY MLT: CPT

## 2018-06-01 PROCEDURE — 99900035 HC TECH TIME PER 15 MIN (STAT)

## 2018-06-01 PROCEDURE — 37000008 HC ANESTHESIA 1ST 15 MINUTES: Performed by: INTERNAL MEDICINE

## 2018-06-01 PROCEDURE — 88305 TISSUE EXAM BY PATHOLOGIST: CPT | Performed by: PATHOLOGY

## 2018-06-01 PROCEDURE — 37000009 HC ANESTHESIA EA ADD 15 MINS: Performed by: INTERNAL MEDICINE

## 2018-06-01 PROCEDURE — 63600175 PHARM REV CODE 636 W HCPCS: Performed by: EMERGENCY MEDICINE

## 2018-06-01 PROCEDURE — 85379 FIBRIN DEGRADATION QUANT: CPT

## 2018-06-01 PROCEDURE — 43239 EGD BIOPSY SINGLE/MULTIPLE: CPT | Performed by: INTERNAL MEDICINE

## 2018-06-01 PROCEDURE — D9220A PRA ANESTHESIA: Mod: ANES,,, | Performed by: ANESTHESIOLOGY

## 2018-06-01 PROCEDURE — 94640 AIRWAY INHALATION TREATMENT: CPT

## 2018-06-01 PROCEDURE — 88305 TISSUE EXAM BY PATHOLOGIST: CPT | Mod: 26,,, | Performed by: PATHOLOGY

## 2018-06-01 RX ORDER — LIDOCAINE HCL/PF 100 MG/5ML
SYRINGE (ML) INTRAVENOUS
Status: DISCONTINUED | OUTPATIENT
Start: 2018-06-01 | End: 2018-06-01

## 2018-06-01 RX ORDER — PHENYLEPHRINE HYDROCHLORIDE 10 MG/ML
INJECTION INTRAVENOUS
Status: DISCONTINUED | OUTPATIENT
Start: 2018-06-01 | End: 2018-06-01

## 2018-06-01 RX ORDER — PHENYLEPHRINE HCL IN 0.9% NACL 1 MG/10 ML
SYRINGE (ML) INTRAVENOUS
Status: DISPENSED
Start: 2018-06-01 | End: 2018-06-02

## 2018-06-01 RX ORDER — CARVEDILOL 6.25 MG/1
25 TABLET ORAL 2 TIMES DAILY
Status: DISCONTINUED | OUTPATIENT
Start: 2018-06-01 | End: 2018-06-03

## 2018-06-01 RX ORDER — LIDOCAINE HYDROCHLORIDE 20 MG/ML
INJECTION, SOLUTION EPIDURAL; INFILTRATION; INTRACAUDAL; PERINEURAL
Status: DISPENSED
Start: 2018-06-01 | End: 2018-06-02

## 2018-06-01 RX ORDER — PROPOFOL 10 MG/ML
VIAL (ML) INTRAVENOUS
Status: DISCONTINUED | OUTPATIENT
Start: 2018-06-01 | End: 2018-06-01

## 2018-06-01 RX ORDER — SODIUM CHLORIDE 9 MG/ML
INJECTION, SOLUTION INTRAVENOUS ONCE
Status: COMPLETED | OUTPATIENT
Start: 2018-06-01 | End: 2018-06-01

## 2018-06-01 RX ORDER — HYDROCODONE BITARTRATE AND ACETAMINOPHEN 500; 5 MG/1; MG/1
TABLET ORAL
Status: DISCONTINUED | OUTPATIENT
Start: 2018-06-01 | End: 2018-06-01

## 2018-06-01 RX ORDER — PROPOFOL 10 MG/ML
VIAL (ML) INTRAVENOUS
Status: DISPENSED
Start: 2018-06-01 | End: 2018-06-02

## 2018-06-01 RX ORDER — SODIUM CHLORIDE 9 MG/ML
INJECTION, SOLUTION INTRAVENOUS CONTINUOUS PRN
Status: DISCONTINUED | OUTPATIENT
Start: 2018-06-01 | End: 2018-06-01

## 2018-06-01 RX ADMIN — LOSARTAN POTASSIUM 100 MG: 25 TABLET, FILM COATED ORAL at 08:06

## 2018-06-01 RX ADMIN — SODIUM CHLORIDE: 0.9 INJECTION, SOLUTION INTRAVENOUS at 12:06

## 2018-06-01 RX ADMIN — FUROSEMIDE 80 MG: 40 TABLET ORAL at 08:06

## 2018-06-01 RX ADMIN — POLYETHYLENE GLYCOL 3350 17 G: 17 POWDER, FOR SOLUTION ORAL at 09:06

## 2018-06-01 RX ADMIN — SODIUM CHLORIDE: 0.9 INJECTION, SOLUTION INTRAVENOUS at 11:06

## 2018-06-01 RX ADMIN — CARVEDILOL 25 MG: 6.25 TABLET, FILM COATED ORAL at 09:06

## 2018-06-01 RX ADMIN — PROPOFOL 40 MG: 10 INJECTION, EMULSION INTRAVENOUS at 12:06

## 2018-06-01 RX ADMIN — FERROUS SULFATE TAB EC 325 MG (65 MG FE EQUIVALENT) 325 MG: 325 (65 FE) TABLET DELAYED RESPONSE at 08:06

## 2018-06-01 RX ADMIN — IPRATROPIUM BROMIDE AND ALBUTEROL SULFATE 3 ML: .5; 2.5 SOLUTION RESPIRATORY (INHALATION) at 07:06

## 2018-06-01 RX ADMIN — LIDOCAINE HYDROCHLORIDE 100 MG: 20 INJECTION, SOLUTION INTRAVENOUS at 12:06

## 2018-06-01 RX ADMIN — ENOXAPARIN SODIUM 40 MG: 100 INJECTION SUBCUTANEOUS at 04:06

## 2018-06-01 RX ADMIN — ASPIRIN 81 MG: 81 TABLET, COATED ORAL at 08:06

## 2018-06-01 RX ADMIN — PHENYLEPHRINE HYDROCHLORIDE 200 MCG: 10 INJECTION INTRAVENOUS at 12:06

## 2018-06-01 RX ADMIN — INSULIN ASPART 2 UNITS: 100 INJECTION, SOLUTION INTRAVENOUS; SUBCUTANEOUS at 04:06

## 2018-06-01 RX ADMIN — CYANOCOBALAMIN TAB 250 MCG 500 MCG: 250 TAB at 08:06

## 2018-06-01 RX ADMIN — IPRATROPIUM BROMIDE AND ALBUTEROL SULFATE 3 ML: .5; 2.5 SOLUTION RESPIRATORY (INHALATION) at 12:06

## 2018-06-01 RX ADMIN — INSULIN DETEMIR 5 UNITS: 100 INJECTION, SOLUTION SUBCUTANEOUS at 09:06

## 2018-06-01 RX ADMIN — POLYETHYLENE GLYCOL 3350 17 G: 17 POWDER, FOR SOLUTION ORAL at 08:06

## 2018-06-01 RX ADMIN — PROPOFOL 10 MG: 10 INJECTION, EMULSION INTRAVENOUS at 12:06

## 2018-06-01 RX ADMIN — CARVEDILOL 50 MG: 6.25 TABLET, FILM COATED ORAL at 08:06

## 2018-06-01 RX ADMIN — IRON SUCROSE 200 MG: 20 INJECTION, SOLUTION INTRAVENOUS at 04:06

## 2018-06-01 RX ADMIN — PROPOFOL 20 MG: 10 INJECTION, EMULSION INTRAVENOUS at 12:06

## 2018-06-01 NOTE — TRANSFER OF CARE
"Anesthesia Transfer of Care Note    Patient: Agus Ramos Jr.    Procedure(s) Performed: Procedure(s) (LRB):  EGD (ESOPHAGOGASTRODUODENOSCOPY) (N/A)    Patient location: GI    Anesthesia Type: general    Transport from OR: Transported from OR on room air with adequate spontaneous ventilation    Post pain: adequate analgesia    Post assessment: no apparent anesthetic complications and tolerated procedure well    Post vital signs: stable    Level of consciousness: awake, alert and oriented    Nausea/Vomiting: no nausea/vomiting    Complications: none    Transfer of care protocol was followed      Last vitals:   Visit Vitals  BP (!) 125/59 (BP Location: Right arm, Patient Position: Lying)   Pulse 73   Temp 36.7 °C (98 °F) (Oral)   Resp (!) 22   Ht 5' 9" (1.753 m)   Wt 84 kg (185 lb 3 oz)   SpO2 100%   BMI 27.35 kg/m²     "

## 2018-06-01 NOTE — ASSESSMENT & PLAN NOTE
Hgb is low at 7.9-8 g/dL however it has been ~ 8.1 g/dL prior to admit. Is 7.7 g/dL today. Is symptomatic.  Is declining a rectal exam. Not nauseous or vomiting. Tolerating diet. No heart burn. I discussed with patient to let nurse look at stool when he has a BM (none yet). On PO ferrous sulfate, IV iron (given by Hematology) and on B12 supplementation. Folic acid level ok. Is on ASA. Is to undergo EGD today to further eval etiology. Will f/u results.

## 2018-06-01 NOTE — PLAN OF CARE
Problem: Diabetes, Type 2 (Adult)  Intervention: Support/Optimize Psychosocial Response to Condition   06/01/18 0502   Coping/Psychosocial Interventions   Supportive Measures active listening utilized   Environmental Support distractions minimized;rest periods encouraged;personal routine supported   Psychosocial Support   Family/Support System Care self-care encouraged     Intervention: Optimize Glycemic Control   06/01/18 0502   Nutrition Interventions   Glycemic Management blood glucose monitoring       Goal: Signs and Symptoms of Listed Potential Problems Will be Absent, Minimized or Managed (Diabetes, Type 2)  Signs and symptoms of listed potential problems will be absent, minimized or managed by discharge/transition of care (reference Diabetes, Type 2 (Adult) CPG).    06/01/18 0502   Diabetes, Type 2   Problems Assessed (Type 2 Diabetes) situational response   Problems Present (Type 2 Diabetes) situational response       Problem: Patient Care Overview  Goal: Plan of Care Review  Outcome: Ongoing (interventions implemented as appropriate)   06/01/18 0502   Coping/Psychosocial   Plan Of Care Reviewed With patient     Rested quietly throughout this shift.  No complaints of pain noted throughout this shift.  Noted to refuse to wear BiPap throughout this shift.  Remained free from falls and trauma throughout this shift.  Call bell within reach.  Will continue to monitor.

## 2018-06-01 NOTE — ANESTHESIA PREPROCEDURE EVALUATION
06/01/2018  Agus Ramos Jr. is a 78 y.o., male.    Anesthesia Evaluation     I have reviewed the Nursing Notes.      Review of Systems  Anesthesia Hx:  No problems with previous Anesthesia   Social:  Non-Smoker    Cardiovascular:   Pacemaker Hypertension Valvular problems/Murmurs, MR, AI Past MI CAD  CABG/stent  CHF YOO ECG has been reviewed. Echo 5/2018:  EF 55%  Mild-Mod MR  Mild-Mod AI  + pulm HTN (40's)    S/p remote CABG & stent  Pt admitted w/ acute exacerbation of chronic CHF, now improved   Pulmonary:   COPD    Renal/:   Chronic Renal Disease, CRI    Hepatic/GI:   No Bowel Prep. Denies Liver Disease. Denies Hepatitis. Anemia of unknown etiology   Neurological:  Neurology Normal    Endocrine:   Diabetes, type 2        Physical Exam  General:  Well nourished    Airway/Jaw/Neck:  AIRWAY FINDINGS: Normal           Mental Status:  Mental Status Findings: Normal        Anesthesia Plan  Type of Anesthesia, risks & benefits discussed:  Anesthesia Type:  general  Patient's Preference:   Intra-op Monitoring Plan: standard ASA monitors  Intra-op Monitoring Plan Comments:   Post Op Pain Control Plan:   Post Op Pain Control Plan Comments:   Induction:   IV  Beta Blocker:  Patient is on a Beta-Blocker and has received one dose within the past 24 hours (No further documentation required).       Informed Consent: Patient understands risks and agrees with Anesthesia plan.  Questions answered. Anesthesia consent signed with patient.  ASA Score: 3     Day of Surgery Review of History & Physical:    H&P update referred to the surgeon.     Anesthesia Plan Notes: Pt c/o SOB in GI holding area. Vitals checked and consistent w/ his vitals in flowsheet. Jeff[facility] CP. Spoke to Dr. Domingo who         Ready For Surgery From Anesthesia Perspective.

## 2018-06-01 NOTE — PROGRESS NOTES
06/01/18 0752   Patient Assessment/Suction   Level of Consciousness (AVPU) alert   Respiratory Effort Normal;Unlabored   Expansion/Accessory Muscles/Retractions expansion symmetric   All Lung Fields Breath Sounds diminished   Rhythm/Pattern, Respiratory pattern regular   PRE-TX-O2-ETCO2   O2 Device (Oxygen Therapy) nasal cannula   $ Is the patient on Low Flow Oxygen? Yes   Flow (L/min) 2   SpO2 (!) 94 %   Pulse 75   Resp 20   Aerosol Therapy   $ Aerosol Therapy Charges Aerosol Treatment   Respiratory Treatment Status given   SVN/Inhaler Treatment Route mask   Position During Treatment HOB at 45 degrees   Patient Tolerance good   Post-Treatment   Post-treatment Heart Rate (beats/min) 72   Post-treatment Resp Rate (breaths/min) 18   All Fields Breath Sounds unchanged   Preset CPAP/BiPAP Settings   Mode Of Delivery BiPAP;Standby  (no skin break down)

## 2018-06-01 NOTE — SUBJECTIVE & OBJECTIVE
Interval History: better but still feels SOB with minimal activity. Hgb 7.7 today without clear evidence of bleeding. No BMs yet.     Review of Systems   Constitutional: Negative.    Respiratory: Positive for shortness of breath.    Cardiovascular: Negative for leg swelling.   Gastrointestinal: Negative.    Genitourinary: Negative.    Musculoskeletal: Negative.    Skin: Negative.    Neurological: Negative.    Psychiatric/Behavioral: Negative.      Objective:     Vital Signs (Most Recent):  Temp: 98 °F (36.7 °C) (06/01/18 0746)  Pulse: 75 (06/01/18 0752)  Resp: 20 (06/01/18 0752)  BP: 127/72 (06/01/18 0746)  SpO2: 96 % (06/01/18 0800) Vital Signs (24h Range):  Temp:  [96.5 °F (35.8 °C)-98 °F (36.7 °C)] 98 °F (36.7 °C)  Pulse:  [60-96] 75  Resp:  [14-22] 20  SpO2:  [93 %-100 %] 96 %  BP: (112-127)/(56-72) 127/72     Weight: 84 kg (185 lb 3 oz)  Body mass index is 27.35 kg/m².    Intake/Output Summary (Last 24 hours) at 06/01/18 1032  Last data filed at 06/01/18 0800   Gross per 24 hour   Intake                0 ml   Output              525 ml   Net             -525 ml      Physical Exam   Constitutional: He is oriented to person, place, and time. He appears well-developed. No distress.   Cardiovascular: Normal rate, regular rhythm and intact distal pulses.    Pulmonary/Chest: Effort normal. He has no wheezes. He has no rales (bibasilar).   Bibasilar coarse breath sounds   Abdominal: Soft. Bowel sounds are normal.   Musculoskeletal: Normal range of motion. He exhibits edema (trace).   Neurological: He is alert and oriented to person, place, and time.   Skin: Skin is warm. He is not diaphoretic.   Psychiatric: He has a normal mood and affect. His behavior is normal. Judgment and thought content normal. Cognition and memory are not impaired.   Nursing note and vitals reviewed.      Significant Labs: All pertinent labs within the past 24 hours have been reviewed.    Significant Imaging: I have reviewed all pertinent  imaging results/findings within the past 24 hours.  I have reviewed and interpreted all pertinent imaging results/findings within the past 24 hours.

## 2018-06-01 NOTE — PT/OT/SLP PROGRESS
Physical Therapy      Patient Name:  Agus Ramos Jr.   MRN:  [id number]    Patient not seen today secondary to SAJI for EGD. Will follow-up 6/2/2018.    Devorah Roe, PT

## 2018-06-01 NOTE — PROGRESS NOTES
Pt not in room   Pt undergoing procedure  Hb 7.7g/dl   Additional IV Fe x 1 planned today  Consider prbc transfusion    D/w Dr. Jose L Ramirez to cover over wkd

## 2018-06-01 NOTE — ASSESSMENT & PLAN NOTE
I believe patient has been adequately diuresed but still feeling SOB. Continue current PO furosemide dose. Potassium and Cr stable. Will add BNP to further evaluate for hypervolemia, as well as a D dimer to start workup for PE. BP at goal. Continue incentive spirometer for atelectasis. Appreciate further Cardiology recs.

## 2018-06-01 NOTE — [employer] PATIENT INSTRUCTIONS
Discharge Summary/Instructions after an Endoscopic Procedure  Patient Name: Agus Ramos  Patient MRN: [id number]  Patient YOB: 1940 Friday, June 01, 2018  Ty Hickman MD  RESTRICTIONS:  During your procedure today, you received medications for sedation.  These   medications may affect your judgment, balance and coordination.  Therefore,   for 24 hours, you have the following restrictions:   - DO NOT drive a car, operate machinery, make legal/financial decisions,   sign important papers or drink alcohol.    ACTIVITY:  The following day: return to full activity including work, except no heavy   lifting, straining or running for 3 days if polyps were removed.  DIET:  Eat and drink normally unless instructed otherwise.     TREATMENT FOR COMMON SIDE EFFECTS:  - Mild abdominal pain, nausea, belching, bloating or excessive gas:  rest,   eat lightly and use a heating pad.  - Sore Throat: treat with throat lozenges and/or gargle with warm salt   water.  - Because air was used during the procedure, expelling large amounts of air   from your rectum or belching is normal.  - If a bowel prep was taken, you may not have a bowel movement for 1-3 days.    This is normal.  SYMPTOMS TO WATCH FOR AND REPORT TO YOUR PHYSICIAN:  1. Abdominal pain or bloating, other than gas cramps.  2. Chest pain.  3. Back pain.  4. Signs of infection such as: chills or fever occurring within 24 hours   after the procedure.  5. Rectal bleeding, which would show as bright red, maroon, or black stools.   (A tablespoon of blood from the rectum is not serious, especially if   hemorrhoids are present.)  6. Vomiting.  7. Weakness or dizziness.  GO DIRECTLY TO THE NEAREST EMERGENCY ROOM IF YOU HAVE ANY OF THE FOLLOWING:      Difficulty breathing              Chills and/or fever over 101 F   Persistent vomiting and/or vomiting blood   Severe abdominal pain   Severe chest pain   Black, tarry stools   Bleeding- more than one tablespoon   Any  other symptom or condition that you feel may need urgent attention  Your doctor recommends these additional instructions:  If any biopsies were taken, your doctors clinic will contact you in 1 to 2   weeks with any results.  - Return patient to hospital [facility] for ongoing care.   - H/H low, but stable.  - Will arrange outpt colonoscopy in near future - call with questions.  For questions, problems or results please call your physician - Ty Hickman MD at Work:  [phone].  [facility] can be reached at [phone]     IF A COMPLICATION OR EMERGENCY SITUATION ARISES AND YOU ARE UNABLE TO REACH   YOUR PHYSICIAN - GO DIRECTLY TO THE EMERGENCY ROOM.  MD Ty Watters MD  6/1/2018 1:03:05 PM  This report has been verified and signed electronically.  [employer]

## 2018-06-01 NOTE — PROGRESS NOTES
[facility] Hospital Medicine  Progress Note    Patient Name: Agus Ramos Jr.  MRN: [id number]  Patient Class: IP- Inpatient   Admission Date: 5/27/2018  Length of Stay: 5 days  Attending Physician: Eileen Kinsey MD  Primary Care Provider: Keaton Hardy MD        Subjective:     Principal Problem:Acute on chronic diastolic congestive heart failure    HPI:    Agus Ramos Jr. is a 77 y.o. male that (in part)  has a past medical history of Anemia; Anticoagulant long-term use; Congestive heart failure; Coronary artery disease; Diabetes mellitus; Encounter for blood transfusion; Hypertension; MI (myocardial infarction); Pacemaker; Peripheral vascular disease; S/P CABG x 3; S/P femoral-popliteal bypass surgery; Stented coronary artery; and Tobacco use.  has a past surgical history that includes Cardiac surgery; Cardiac pacemaker placement; Cardiac catheterization; and Hemorrhoid surgery.  Presents to [facility] Emergency Department complaining of recurrent shortness of breath.  Patient was discharged to the Hospital approximately 2 weeks ago after being treated for 12 days for an acute CHF exacerbation and symptomatic anemia.  Was also complicated with hypoxia and requirement for home oxygen prior to discharge.  He has shortness of breath at rest and dyspnea with exertion.  Worsened with movement.  Minimally relieved with rest and supplemental oxygen.  Moderate to severe intensity.  Associated with unintentional weight gain.  Denies chest pain.  Daily frequency.  Constant duration.    In the emergency department chest x-ray, routine laboratory studies, EKG, cardiac enzymes were obtained.  There is concern the patient was having recurrent acute on chronic diastolic heart failure.  He was given Lasix in the ED.    Hospital medicine has been asked to admit for further evaluation and treatment.         Hospital Course:  No notes on file    Interval History: better but  still feels SOB with minimal activity. Hgb 7.7 today without clear evidence of bleeding. No BMs yet.     Review of Systems   Constitutional: Negative.    Respiratory: Positive for shortness of breath.    Cardiovascular: Negative for leg swelling.   Gastrointestinal: Negative.    Genitourinary: Negative.    Musculoskeletal: Negative.    Skin: Negative.    Neurological: Negative.    Psychiatric/Behavioral: Negative.      Objective:     Vital Signs (Most Recent):  Temp: 98 °F (36.7 °C) (06/01/18 0746)  Pulse: 75 (06/01/18 0752)  Resp: 20 (06/01/18 0752)  BP: 127/72 (06/01/18 0746)  SpO2: 96 % (06/01/18 0800) Vital Signs (24h Range):  Temp:  [96.5 °F (35.8 °C)-98 °F (36.7 °C)] 98 °F (36.7 °C)  Pulse:  [60-96] 75  Resp:  [14-22] 20  SpO2:  [93 %-100 %] 96 %  BP: (112-127)/(56-72) 127/72     Weight: 84 kg (185 lb 3 oz)  Body mass index is 27.35 kg/m².    Intake/Output Summary (Last 24 hours) at 06/01/18 1032  Last data filed at 06/01/18 0800   Gross per 24 hour   Intake                0 ml   Output              525 ml   Net             -525 ml      Physical Exam   Constitutional: He is oriented to person, place, and time. He appears well-developed. No distress.   Cardiovascular: Normal rate, regular rhythm and intact distal pulses.    Pulmonary/Chest: Effort normal. He has no wheezes. He has no rales (bibasilar).   Bibasilar coarse breath sounds   Abdominal: Soft. Bowel sounds are normal.   Musculoskeletal: Normal range of motion. He exhibits edema (trace).   Neurological: He is alert and oriented to person, place, and time.   Skin: Skin is warm. He is not diaphoretic.   Psychiatric: He has a normal mood and affect. His behavior is normal. Judgment and thought content normal. Cognition and memory are not impaired.   Nursing note and vitals reviewed.      Significant Labs: All pertinent labs within the past 24 hours have been reviewed.    Significant Imaging: I have reviewed all pertinent imaging results/findings within  the past 24 hours.  I have reviewed and interpreted all pertinent imaging results/findings within the past 24 hours.    Assessment/Plan:      * Acute on chronic diastolic congestive heart failure    I believe patient has been adequately diuresed but still feeling SOB. Continue current PO furosemide dose. Potassium and Cr stable. Will add BNP to further evaluate for hypervolemia, as well as a D dimer to start workup for PE. BP at goal. Continue incentive spirometer for atelectasis. Appreciate further Cardiology recs.           Acute on chronic respiratory failure with hypoxia    As above        Iron deficiency anemia    Hgb is low at 7.9-8 g/dL however it has been ~ 8.1 g/dL prior to admit. Is 7.7 g/dL today. Is symptomatic.  Is declining a rectal exam. Not nauseous or vomiting. Tolerating diet. No heart burn. I discussed with patient to let nurse look at stool when he has a BM (none yet). On PO ferrous sulfate, IV iron (given by Hematology) and on B12 supplementation. Folic acid level ok. Is on ASA. Is to undergo EGD today to further eval etiology. Will f/u results.         Hypercalcemia    Given elevated PTH will continue trial of cinacalcet. Not a candidate for surgery at this time given issues with heart failure. Per wife, he used to be on sensipar up until 2016 due to insurance coverage and high co-pay. iCa in upper limit of normal. Needs f/u with endocrinology as outpatient        Pacemaker    No acute issues.           Tobacco abuse    Abstinent since last admission. Encouraged to continue abstinent. Is motivated to do so.           Chronic anticoagulation    Was on eliquis for AFib. Is current on hold while workup for anemia in process          Type 2 diabetes mellitus, controlled, with renal complications    BG currently at goal. Continue insulin as needed for goal BG < 180          CKD Stage 3    Stable and at baseline.           Essential hypertension    Stable on current regimen          Chronic atrial  fibrillation    Stable on PO BB. Cardiac monitoring.             VTE Risk Mitigation         Ordered     IP VTE HIGH RISK PATIENT  Once      05/27/18 1729     Place DENIS hose  Until discontinued      05/27/18 1729     Place sequential compression device  Until discontinued      05/27/18 1729     enoxaparin injection 40 mg  Daily      05/27/18 1729          EGD with no source of bleeding. After re-evaluating patient after procedure he states he no longer feels short of breath and feels ok. Speaking in full sentences. I will not obtain CBC in AM to avoid more pronounce anemia from blood draws. D dimer elevated to 0.80. CTA non coronary was negative last month when admitted for the same issue. BNP in the 300s which means there is not much fluid on board. Will continue with maintenance furosemide PO, supp O2 and consider transfusion in AM if symptomatic. Is already on PO and IV iron. Discussed with patient's wife.     Eileen Domingo MD  Department of Hospital Medicine   [facility]

## 2018-06-01 NOTE — PLAN OF CARE
EDIS contacted Dr. Hardy office @ [phone] to schedule PCP follow-up, EDIS spoke to Alondra, appointment scheduled for 6/6/18 @ 10:00am. Therapy recommends home health, patient and spouse both in agreement with using [facility] as home health provider. SW will continue to assist as needed. Patient have home oxygen and portable tank at bedside.          06/01/18 3768   Discharge Reassessment   Assessment Type Discharge Planning Reassessment   Provided patient/caregiver education on the expected discharge date and the discharge plan No   Do you have any problems affording any of your prescribed medications? No   Discharge Plan A Home with family;Home Health   Discharge Plan B Home with family  (PCP F/U)   Can the patient answer the patient profile reliably? Yes, cognitively intact   How does the patient rate their overall health at the present time? Fair   Describe the patient's ability to walk at the present time. Walks with the help of equipment   How often would a person be available to care for the patient? Whenever needed

## 2018-06-01 NOTE — UM SECONDARY REVIEW
[facility]    PA - Medical Necessity Issue  Agus Ramos Jr. is a 77 y.o. male that (in part)  has a past medical history of Anemia; Anticoagulant long-term use; Congestive heart failure; Coronary artery disease; Diabetes mellitus; Encounter for blood transfusion; Hypertension; MI (myocardial infarction); Pacemaker; Peripheral vascular disease; S/P CABG x 3; S/P femoral-popliteal bypass surgery; Stented coronary artery; and Tobacco use.  has a past surgical history that includes Cardiac surgery; Cardiac pacemaker placement; Cardiac catheterization; and Hemorrhoid surgery.  Presents to [facility] Emergency Department complaining of recurrent shortness of breath.  Patient was discharged to the Hospital approximately 2 weeks ago after being treated for 12 days for an acute CHF exacerbation and symptomatic anemia.  Was also complicated with hypoxia and requirement for home oxygen prior to discharge.  He has shortness of breath at rest and dyspnea with exertion.  Worsened with movement.  Minimally relieved with rest and supplemental oxygen.  Moderate to severe intensity.  Associated with unintentional weight gain.  Denies chest pain.  Daily frequency.  Constant duration.     better but still feels SOB with minimal activity. Hgb 7.7 today without clear evidence of bleeding. No BMs yet.    * Acute on chronic diastolic congestive heart failure     I believe patient has been adequately diuresed but still feeling SOB. Continue current PO furosemide dose. Potassium and Cr stable. Will add BNP to further evaluate for hypervolemia, as well as a D dimer to start workup for PE. BP at goal. Continue incentive spirometer for atelectasis.         Iron deficiency anemia     Hgb is low at 7.9-8 g/dL however it has been ~ 8.1 g/dL prior to admit. Is 7.7 g/dL today. Is symptomatic.  Is declining a rectal exam. Not nauseous or vomiting. Tolerating diet. No heart burn. I discussed with patient to let  nurse look at stool when he has a BM (none yet). On PO ferrous sulfate, IV iron (given by Hematology) and on B12 supplementation. Folic acid level ok. Is on ASA. Is to undergo EGD today to further eval etiology. Will f/u results.             EGD results:  Impression:          - Normal esophagus.                       - Erythematous mucosa in the antrum. Biopsied.                       - Normal examined duodenum.  Recommendation:      - Return patient to hospital [facility] for ongoing care.                       - H/H low, but stable.                       - Will arrange outpt colonoscopy in near future -                        call with questions.      Does not really meet IP criteria anymore but seems to have  Medical Necessity     Approved Inpatient

## 2018-06-02 LAB
POCT GLUCOSE: 103 MG/DL (ref 70–110)
POCT GLUCOSE: 111 MG/DL (ref 70–110)
POCT GLUCOSE: 163 MG/DL (ref 70–110)
POCT GLUCOSE: 189 MG/DL (ref 70–110)

## 2018-06-02 PROCEDURE — 94761 N-INVAS EAR/PLS OXIMETRY MLT: CPT

## 2018-06-02 PROCEDURE — 27000221 HC OXYGEN, UP TO 24 HOURS

## 2018-06-02 PROCEDURE — 97530 THERAPEUTIC ACTIVITIES: CPT

## 2018-06-02 PROCEDURE — 25000003 PHARM REV CODE 250: Performed by: EMERGENCY MEDICINE

## 2018-06-02 PROCEDURE — 25000003 PHARM REV CODE 250: Performed by: INTERNAL MEDICINE

## 2018-06-02 PROCEDURE — G8979 MOBILITY GOAL STATUS: HCPCS | Mod: CI

## 2018-06-02 PROCEDURE — 25000242 PHARM REV CODE 250 ALT 637 W/ HCPCS: Performed by: INTERNAL MEDICINE

## 2018-06-02 PROCEDURE — G8978 MOBILITY CURRENT STATUS: HCPCS | Mod: CJ

## 2018-06-02 PROCEDURE — 94799 UNLISTED PULMONARY SVC/PX: CPT

## 2018-06-02 PROCEDURE — G8980 MOBILITY D/C STATUS: HCPCS | Mod: CJ

## 2018-06-02 PROCEDURE — 63600175 PHARM REV CODE 636 W HCPCS: Performed by: EMERGENCY MEDICINE

## 2018-06-02 PROCEDURE — 94640 AIRWAY INHALATION TREATMENT: CPT

## 2018-06-02 PROCEDURE — 21400001 HC TELEMETRY ROOM

## 2018-06-02 RX ADMIN — LOSARTAN POTASSIUM 100 MG: 25 TABLET, FILM COATED ORAL at 08:06

## 2018-06-02 RX ADMIN — CARVEDILOL 25 MG: 6.25 TABLET, FILM COATED ORAL at 09:06

## 2018-06-02 RX ADMIN — IPRATROPIUM BROMIDE AND ALBUTEROL SULFATE 3 ML: .5; 2.5 SOLUTION RESPIRATORY (INHALATION) at 07:06

## 2018-06-02 RX ADMIN — INSULIN DETEMIR 5 UNITS: 100 INJECTION, SOLUTION SUBCUTANEOUS at 09:06

## 2018-06-02 RX ADMIN — FERROUS SULFATE TAB EC 325 MG (65 MG FE EQUIVALENT) 325 MG: 325 (65 FE) TABLET DELAYED RESPONSE at 08:06

## 2018-06-02 RX ADMIN — ENOXAPARIN SODIUM 40 MG: 100 INJECTION SUBCUTANEOUS at 05:06

## 2018-06-02 RX ADMIN — POLYETHYLENE GLYCOL 3350 17 G: 17 POWDER, FOR SOLUTION ORAL at 08:06

## 2018-06-02 RX ADMIN — INSULIN ASPART 1 UNITS: 100 INJECTION, SOLUTION INTRAVENOUS; SUBCUTANEOUS at 09:06

## 2018-06-02 RX ADMIN — IPRATROPIUM BROMIDE AND ALBUTEROL SULFATE 3 ML: .5; 2.5 SOLUTION RESPIRATORY (INHALATION) at 01:06

## 2018-06-02 RX ADMIN — CARVEDILOL 25 MG: 6.25 TABLET, FILM COATED ORAL at 08:06

## 2018-06-02 RX ADMIN — FUROSEMIDE 80 MG: 40 TABLET ORAL at 08:06

## 2018-06-02 RX ADMIN — IPRATROPIUM BROMIDE AND ALBUTEROL SULFATE 3 ML: .5; 2.5 SOLUTION RESPIRATORY (INHALATION) at 12:06

## 2018-06-02 RX ADMIN — CYANOCOBALAMIN TAB 250 MCG 500 MCG: 250 TAB at 08:06

## 2018-06-02 RX ADMIN — IPRATROPIUM BROMIDE AND ALBUTEROL SULFATE 3 ML: .5; 2.5 SOLUTION RESPIRATORY (INHALATION) at 08:06

## 2018-06-02 RX ADMIN — POLYETHYLENE GLYCOL 3350 17 G: 17 POWDER, FOR SOLUTION ORAL at 09:06

## 2018-06-02 RX ADMIN — ASPIRIN 81 MG: 81 TABLET, COATED ORAL at 08:06

## 2018-06-02 RX ADMIN — INSULIN ASPART 2 UNITS: 100 INJECTION, SOLUTION INTRAVENOUS; SUBCUTANEOUS at 01:06

## 2018-06-02 NOTE — ASSESSMENT & PLAN NOTE
I believe patient has been adequately diuresed but still feeling SOB. Continue current PO furosemide dose. Potassium and Cr stable. BNP in the 300s. IV lasix not indicated for this therefore continue PO maintenance regimen as he is now. Ddime 0.80 which is elevated, however he now states he feels better. I believe anemia is playing a role. Will not transfuse as he feels better today. Hold off on CBC in AM so I do not aggravate his anemia by taking more blood from him. BP at goal. Continue incentive spirometer for atelectasis. Appreciate further Cardiology recs.

## 2018-06-02 NOTE — PLAN OF CARE
Problem: Physical Therapy Goal  Goal: Physical Therapy Goal  Goals to be met by: 18    Patient will increase functional independence with mobility by performin. Sit to stand transfer with Supervision  2. Gait  x 200 feet with Supervision using Rolling Walker if needed  3. Lower extremity exercise program x30 reps per handout, with supervision     Outcome: Ongoing (interventions implemented as appropriate)  Pt ambulated 130 ft with RW, CGA requiring verbal cueing on proper walker management when ambulating.  Pt also performed B LE ther ex's seated in the chair requiring verbal cueing on proper exercise form:  LAQ's, Seated hip flexion, ankle pumps, Seated hip add./abd.

## 2018-06-02 NOTE — PROGRESS NOTES
[facility]  Hematology/Oncology  Progress Note    Patient Name: Agus Ramos Jr.  Admission Date: 5/27/2018  Hospital Length of Stay: 6 days  Code Status: Full Code     Subjective:     Interval History:     06/02/18: refused prbc. Tolerating iron infusion. No fever or chills.     Oncology Treatment Plan:   [No treatment plan]    Medications:  Continuous Infusions:  Scheduled Meds:   albuterol-ipratropium  3 mL Nebulization Q6H    aspirin  81 mg Oral Daily    carvedilol  25 mg Oral BID    cinacalcet  30 mg Oral BID WM    cyanocobalamin  500 mcg Oral Daily    enoxaparin  40 mg Subcutaneous Daily    ferrous sulfate  325 mg Oral Daily    furosemide  80 mg Oral Daily    insulin detemir U-100  5 Units Subcutaneous QHS    lidocaine HCL 10 mg/ml (1%)  1 mL Intradermal Once    losartan  100 mg Oral Daily    polyethylene glycol  17 g Oral BID     PRN Meds:dextromethorphan-guaifenesin  mg/5 ml, dextrose 50%, dextrose 50%, glucagon (human recombinant), glucose, glucose, insulin aspart U-100, pneumoc 13-alan conj-dip cr(PF)     Review of Systems   SOB improving  Decreased appetite  Denies cp    Objective:     Vital Signs (Most Recent):  Temp: 98.4 °F (36.9 °C) (06/02/18 1137)  Pulse: 61 (06/02/18 1137)  Resp: 16 (06/02/18 1137)  BP: 116/62 (06/02/18 1137)  SpO2: 96 % (06/02/18 1137) Vital Signs (24h Range):  Temp:  [97.5 °F (36.4 °C)-98.6 °F (37 °C)] 98.4 °F (36.9 °C)  Pulse:  [] 61  Resp:  [16-22] 16  SpO2:  [92 %-100 %] 96 %  BP: ()/(54-72) 116/62     Weight: 83.1 kg (183 lb 3.2 oz)  Body mass index is 27.05 kg/m².  Body surface area is 2.01 meters squared.      Intake/Output Summary (Last 24 hours) at 06/02/18 1313  Last data filed at 06/02/18 1258   Gross per 24 hour   Intake                0 ml   Output              600 ml   Net             -600 ml       Physical Exam    Constitutional: NAD, sitting up in bed  Oral mucosa moist, con pale    Cardiovascular: S1s2, RR.     Pulmonary/Chest: crackle at the bases    Abdominal: Soft. Bowel sounds are normal.   Musculoskeletal: NO ROM   Neurological: He is alert and oriented x 2  Skin: warm   Psychiatric: calm       Significant Labs:   CBC:   Recent Labs  Lab 06/01/18  0439   WBC 7.26   HGB 7.7*   HCT 27.4*   *   , CMP:   Recent Labs  Lab 06/01/18  0439      K 3.6      CO2 33*   *   BUN 18   CREATININE 1.2   CALCIUM 10.1   ALBUMIN 2.7*   ANIONGAP 6*   EGFRNONAA 57*   , Coagulation: No results for input(s): PT, INR, APTT in the last 48 hours., LDH: No results for input(s): LDHCSF, BFSOURCE in the last 48 hours., Reticulocytes: No results for input(s): RETIC in the last 48 hours., Tumor Markers: No results for input(s): PSA, CEA, , AFPTM, [id number],  in the last 48 hours.    Invalid input(s): ALGTM, Uric Acid No results for input(s): URICACID in the last 48 hours. and Urine Studies: No results for input(s): COLORU, APPEARANCEUA, PHUR, SPECGRAV, PROTEINUA, GLUCUA, KETONESU, BILIRUBINUA, OCCULTUA, NITRITE, UROBILINOGEN, LEUKOCYTESUR, RBCUA, WBCUA, BACTERIA, SQUAMEPITHEL, HYALINECASTS in the last 48 hours.    Invalid input(s): WRIGHTSUR    Diagnostic Results:      Assessment/Plan:     Active Diagnoses:    Diagnosis Date Noted POA    PRINCIPAL PROBLEM:  Acute on chronic diastolic congestive heart failure [I50.33] 09/24/2016 Yes    Acute on chronic respiratory failure with hypoxia [J96.21] 06/01/2018 Yes    Hypercalcemia [E83.52] 05/10/2018 Yes    Chronic anticoagulation [Z79.01] 05/04/2018 Not Applicable     Chronic    Tobacco abuse [Z72.0] 05/04/2018 Yes     Chronic    Pacemaker [Z95.0] 05/04/2018 Yes    Iron deficiency anemia [D50.9] 05/04/2018 Yes    Symptomatic anemia [D64.9] 05/03/2018 Yes    Essential hypertension [I10] 09/24/2016 Yes     Chronic    CKD Stage 3 [N18.3] 09/24/2016 Yes     Chronic    Type 2 diabetes mellitus, controlled, with renal complications [E11.29] 09/24/2016 Yes      Chronic    Chronic atrial fibrillation [I48.2] 09/19/2013 Yes     Chronic      Problems Resolved During this Admission:    Diagnosis Date Noted Date Resolved POA     Symptomatic anemia: microcytic   - has iron def.      Pt with chronic anemia   S/p IV Venofer x 1   Hb < 8g/dl and refused prbc   Cont Fe infu/supplement   Cont B12 supp     GI see pt and underwent EGD- no acute finding  - recommended elective C-scope out pt      SOB: acute on chronic    - prn BiPAP     Discussed with Dr. Domingo     Covering for dr. Arabella Ramirez M.D  Internal Medicine & Geriatric Medicine  Hematology & Oncology  Palliative Medicine    [address], [address]  [place] [address]  [phone] (Office)  [phone] (Fax)

## 2018-06-02 NOTE — PT/OT/SLP PROGRESS
"Physical Therapy Treatment    Patient Name:  Agus Ramos Jr.   MRN:  [id number]    Recommendations:     Discharge Recommendations:  home with home health   Discharge Equipment Recommendations: none   Barriers to discharge: None    Assessment:     Agus Ramos Jr. is a 78 y.o. male admitted with a medical diagnosis of Acute on chronic diastolic congestive heart failure.  He presents with the following impairments/functional limitations:  weakness, impaired endurance, impaired self care skills, impaired functional mobilty, gait instability, impaired balance, decreased upper extremity function, decreased lower extremity function.  Pt ambulated 130 ft with RW, CGA requiring verbal cueing on proper walker management when ambulating.  Pt also performed B LE ther ex's seated in the chair requiring verbal cueing on proper exercise form:  LAQ's, Seated hip flexion, ankle pumps, Seated hip add./abd.    Rehab Prognosis:  Good; patient would benefit from acute skilled PT services to address these deficits and reach maximum level of function.      Recent Surgery: Procedure(s) (LRB):  EGD (ESOPHAGOGASTRODUODENOSCOPY) (N/A) 1 Day Post-Op    Plan:     During this hospitalization, patient to be seen 6 x/week to address the above listed problems via gait training, therapeutic activities, therapeutic exercises  · Plan of Care Expires:  06/14/18   Plan of Care Reviewed with: patient    Subjective     Communicated with nursing prior to session.  Patient found laying on his R side upon PT entry to room, agreeable to treatment.      Chief Complaint: Pt reports no complaints or concerns at this time.    Patient comments/goals: Pt states " I'll get up and do some exercises".    Pain/Comfort:  · Pain Rating 1: 0/10    Patients cultural, spiritual, Hindu conflicts given the current situation:      Objective:     Patient found with: oxygen, peripheral IV, telemetry     General Precautions: Standard, fall, respiratory   Orthopedic " Precautions:N/A   Braces: N/A     Functional Mobility:  · Bed Mobility:     · Scooting: stand by assistance  · Supine to Sit: stand by assistance  · Transfers:     · Sit to Stand:  stand by assistance with rolling walker  · Gait: Pt ambulated 130 ft with RW, CGA requiring verbal cueing on proper walker management when ambulating.   · Balance: Pt with good sitting and standing balance.        AM-PAC 6 CLICK MOBILITY  Turning over in bed (including adjusting bedclothes, sheets and blankets)?: 4  Sitting down on and standing up from a chair with arms (e.g., wheelchair, bedside commode, etc.): 4  Moving from lying on back to sitting on the side of the bed?: 4  Moving to and from a bed to a chair (including a wheelchair)?: 4  Need to walk in hospital room?: 3  Climbing 3-5 steps with a railing?: 3  Total Score: 22       Therapeutic Activities and Exercises:   Pt performed B LE ther ex's seated in the chair requiring verbal cueing on proper exercise form x 15 reps:  LAQ's, Seated hip flexion, ankle pumps, Seated hip add./abd.    Patient left up in chair with all lines intact and call button in reach..    GOALS:    Physical Therapy Goals        Problem: Physical Therapy Goal    Goal Priority Disciplines Outcome Goal Variances Interventions   Physical Therapy Goal     PT/OT, PT      Description:  Goals to be met by: 18    Patient will increase functional independence with mobility by performin. Sit to stand transfer with Supervision  2. Gait  x 200 feet with Supervision using Rolling Walker if needed  3. Lower extremity exercise program x30 reps per handout, with supervision                      Time Tracking:     PT Received On: 18  PT Start Time: 0945     PT Stop Time: 1007  PT Total Time (min): 22 min     Billable Minutes: Therapeutic Activity 22    Treatment Type: Treatment  PT/PTA: PTA     PTA Visit Number: 1     Rob Crum, PTA  2018

## 2018-06-02 NOTE — PLAN OF CARE
Problem: Diabetes, Type 2 (Adult)  Intervention: Support/Optimize Psychosocial Response to Condition   06/02/18 0553   Coping/Psychosocial Interventions   Supportive Measures active listening utilized   Environmental Support rest periods encouraged;distractions minimized;environmental consistency promoted;personal routine supported   Psychosocial Support   Family/Support System Care presence promoted;self-care encouraged       Goal: Signs and Symptoms of Listed Potential Problems Will be Absent, Minimized or Managed (Diabetes, Type 2)  Signs and symptoms of listed potential problems will be absent, minimized or managed by discharge/transition of care (reference Diabetes, Type 2 (Adult) CPG).    06/02/18 0553   Diabetes, Type 2   Problems Assessed (Type 2 Diabetes) situational response   Problems Present (Type 2 Diabetes) situational response       Problem: Patient Care Overview  Goal: Plan of Care Review  Outcome: Ongoing (interventions implemented as appropriate)   06/02/18 0553   Coping/Psychosocial   Plan Of Care Reviewed With patient   Rested quietly throughout this shift.  No complaints of pain noted throughout this shift.  Remained free from falls and trauma throughout this shift. Purposeful hourly rounding in progress.  Call bell within reach.  Will continue to monitor.

## 2018-06-02 NOTE — ASSESSMENT & PLAN NOTE
Hgb is low at 7.9-8 g/dL however it has been ~ 8.1 g/dL prior to admit. Is 7.7 g/dL 6/1. Was symptomatic but now feeling better. Is declining a rectal exam. Not nauseous or vomiting. Tolerating diet. No heart burn. On PO ferrous sulfate, IV iron (given by Hematology) and on B12 supplementation. Folic acid level ok. Is on ASA. EGD with no evident source of bleeding.

## 2018-06-02 NOTE — PROGRESS NOTES
[facility] Hospital Medicine  Progress Note    Patient Name: Agus Ramos Jr.  MRN: [id number]  Patient Class: IP- Inpatient   Admission Date: 5/27/2018  Length of Stay: 6 days  Attending Physician: Eileen Kinsey MD  Primary Care Provider: Keaton Hardy MD        Subjective:     Principal Problem:Acute on chronic diastolic congestive heart failure    HPI:    Agus Ramos Jr. is a 77 y.o. male that (in part)  has a past medical history of Anemia; Anticoagulant long-term use; Congestive heart failure; Coronary artery disease; Diabetes mellitus; Encounter for blood transfusion; Hypertension; MI (myocardial infarction); Pacemaker; Peripheral vascular disease; S/P CABG x 3; S/P femoral-popliteal bypass surgery; Stented coronary artery; and Tobacco use.  has a past surgical history that includes Cardiac surgery; Cardiac pacemaker placement; Cardiac catheterization; and Hemorrhoid surgery.  Presents to [facility] Emergency Department complaining of recurrent shortness of breath.  Patient was discharged to the Hospital approximately 2 weeks ago after being treated for 12 days for an acute CHF exacerbation and symptomatic anemia.  Was also complicated with hypoxia and requirement for home oxygen prior to discharge.  He has shortness of breath at rest and dyspnea with exertion.  Worsened with movement.  Minimally relieved with rest and supplemental oxygen.  Moderate to severe intensity.  Associated with unintentional weight gain.  Denies chest pain.  Daily frequency.  Constant duration.    In the emergency department chest x-ray, routine laboratory studies, EKG, cardiac enzymes were obtained.  There is concern the patient was having recurrent acute on chronic diastolic heart failure.  He was given Lasix in the ED.    Hospital medicine has been asked to admit for further evaluation and treatment.         Hospital Course:  No notes on file    Interval History: feels better  today.     Review of Systems   Constitutional: Negative.    Respiratory: Positive for shortness of breath.    Cardiovascular: Negative for leg swelling.   Gastrointestinal: Negative.    Genitourinary: Negative.    Musculoskeletal: Negative.    Skin: Negative.    Neurological: Negative.    Psychiatric/Behavioral: Negative.      Objective:     Vital Signs (Most Recent):  Temp: 98.1 °F (36.7 °C) (06/02/18 1613)  Pulse: 80 (06/02/18 1613)  Resp: 16 (06/02/18 1613)  BP: (!) 148/72 (06/02/18 1613)  SpO2: 98 % (06/02/18 1710) Vital Signs (24h Range):  Temp:  [97.5 °F (36.4 °C)-98.6 °F (37 °C)] 98.1 °F (36.7 °C)  Pulse:  [] 80  Resp:  [16-18] 16  SpO2:  [87 %-100 %] 98 %  BP: (110-148)/(57-72) 148/72     Weight: 83.1 kg (183 lb 3.2 oz)  Body mass index is 27.05 kg/m².    Intake/Output Summary (Last 24 hours) at 06/02/18 1831  Last data filed at 06/02/18 1700   Gross per 24 hour   Intake              240 ml   Output              800 ml   Net             -560 ml      Physical Exam   Constitutional: He is oriented to person, place, and time. He appears well-developed. No distress.   Cardiovascular: Normal rate, regular rhythm and intact distal pulses.    Pulmonary/Chest: Effort normal. He has no wheezes. He has no rales (bibasilar).   Bibasilar coarse breath sounds   Abdominal: Soft. Bowel sounds are normal.   Musculoskeletal: Normal range of motion. He exhibits edema (trace).   Neurological: He is alert and oriented to person, place, and time.   Skin: Skin is warm. He is not diaphoretic.   Psychiatric: He has a normal mood and affect. His behavior is normal. Judgment and thought content normal. Cognition and memory are not impaired.   Nursing note and vitals reviewed.      Significant Labs: All pertinent labs within the past 24 hours have been reviewed.    Significant Imaging: I have reviewed all pertinent imaging results/findings within the past 24 hours.  I have reviewed and interpreted all pertinent imaging  results/findings within the past 24 hours.    Assessment/Plan:      * Acute on chronic diastolic congestive heart failure    I believe patient has been adequately diuresed but still feeling SOB. Continue current PO furosemide dose. Potassium and Cr stable. BNP in the 300s. IV lasix not indicated for this therefore continue PO maintenance regimen as he is now. Ddime 0.80 which is elevated, however he now states he feels better. I believe anemia is playing a role. Will not transfuse as he feels better today. Hold off on CBC in AM so I do not aggravate his anemia by taking more blood from him. BP at goal. Continue incentive spirometer for atelectasis. Appreciate further Cardiology recs.           Acute on chronic respiratory failure with hypoxia    As above        Iron deficiency anemia    Hgb is low at 7.9-8 g/dL however it has been ~ 8.1 g/dL prior to admit. Is 7.7 g/dL 6/1. Was symptomatic but now feeling better. Is declining a rectal exam. Not nauseous or vomiting. Tolerating diet. No heart burn. On PO ferrous sulfate, IV iron (given by Hematology) and on B12 supplementation. Folic acid level ok. Is on ASA. EGD with no evident source of bleeding.          Hypercalcemia    Given elevated PTH will continue trial of cinacalcet. Not a candidate for surgery at this time given issues with heart failure. Per wife, he used to be on sensipar up until 2016 due to insurance coverage and high co-pay. iCa in upper limit of normal. Needs f/u with endocrinology as outpatient        Pacemaker    No acute issues.           Tobacco abuse    Abstinent since last admission. Encouraged to continue abstinent. Is motivated to do so.           Chronic anticoagulation    Was on eliquis for AFib. Is current on hold while workup for anemia in process          Type 2 diabetes mellitus, controlled, with renal complications    BG currently at goal. Continue insulin as needed for goal BG < 180          CKD Stage 3    Stable and at baseline.            Essential hypertension    Stable on current regimen          Chronic atrial fibrillation    Stable on PO BB. Cardiac monitoring.             VTE Risk Mitigation         Ordered     IP VTE HIGH RISK PATIENT  Once      05/27/18 1729     Place DENIS hose  Until discontinued      05/27/18 1729     Place sequential compression device  Until discontinued      05/27/18 1729     enoxaparin injection 40 mg  Daily      05/27/18 1729        Tentative discharge tomorrow. Will need GI follow up for colonoscopy and labs in 3-5 days to re-eval anemia. Will also need       Eileen Domingo MD  Department of Hospital Medicine   [facility]

## 2018-06-02 NOTE — NURSING
Resting quietly in bed. Able to make needs known.  Denies pain at this time. Encouraged to call with PRN assist.  Verbalized understanding.  Purposeful hourly rounding in progress.  Call bell within reach.  Will continue to monitor.

## 2018-06-02 NOTE — SUBJECTIVE & OBJECTIVE
Interval History: feels better today.     Review of Systems   Constitutional: Negative.    Respiratory: Positive for shortness of breath.    Cardiovascular: Negative for leg swelling.   Gastrointestinal: Negative.    Genitourinary: Negative.    Musculoskeletal: Negative.    Skin: Negative.    Neurological: Negative.    Psychiatric/Behavioral: Negative.      Objective:     Vital Signs (Most Recent):  Temp: 98.1 °F (36.7 °C) (06/02/18 1613)  Pulse: 80 (06/02/18 1613)  Resp: 16 (06/02/18 1613)  BP: (!) 148/72 (06/02/18 1613)  SpO2: 98 % (06/02/18 1710) Vital Signs (24h Range):  Temp:  [97.5 °F (36.4 °C)-98.6 °F (37 °C)] 98.1 °F (36.7 °C)  Pulse:  [] 80  Resp:  [16-18] 16  SpO2:  [87 %-100 %] 98 %  BP: (110-148)/(57-72) 148/72     Weight: 83.1 kg (183 lb 3.2 oz)  Body mass index is 27.05 kg/m².    Intake/Output Summary (Last 24 hours) at 06/02/18 1831  Last data filed at 06/02/18 1700   Gross per 24 hour   Intake              240 ml   Output              800 ml   Net             -560 ml      Physical Exam   Constitutional: He is oriented to person, place, and time. He appears well-developed. No distress.   Cardiovascular: Normal rate, regular rhythm and intact distal pulses.    Pulmonary/Chest: Effort normal. He has no wheezes. He has no rales (bibasilar).   Bibasilar coarse breath sounds   Abdominal: Soft. Bowel sounds are normal.   Musculoskeletal: Normal range of motion. He exhibits edema (trace).   Neurological: He is alert and oriented to person, place, and time.   Skin: Skin is warm. He is not diaphoretic.   Psychiatric: He has a normal mood and affect. His behavior is normal. Judgment and thought content normal. Cognition and memory are not impaired.   Nursing note and vitals reviewed.      Significant Labs: All pertinent labs within the past 24 hours have been reviewed.    Significant Imaging: I have reviewed all pertinent imaging results/findings within the past 24 hours.  I have reviewed and interpreted  all pertinent imaging results/findings within the past 24 hours.

## 2018-06-03 VITALS
SYSTOLIC BLOOD PRESSURE: 133 MMHG | HEART RATE: 69 BPM | HEIGHT: 69 IN | TEMPERATURE: 99 F | BODY MASS INDEX: 27.13 KG/M2 | RESPIRATION RATE: 18 BRPM | DIASTOLIC BLOOD PRESSURE: 65 MMHG | OXYGEN SATURATION: 96 % | WEIGHT: 183.19 LBS

## 2018-06-03 LAB
POCT GLUCOSE: 124 MG/DL (ref 70–110)
POCT GLUCOSE: 214 MG/DL (ref 70–110)

## 2018-06-03 PROCEDURE — 94640 AIRWAY INHALATION TREATMENT: CPT

## 2018-06-03 PROCEDURE — 25000003 PHARM REV CODE 250: Performed by: INTERNAL MEDICINE

## 2018-06-03 PROCEDURE — 94761 N-INVAS EAR/PLS OXIMETRY MLT: CPT

## 2018-06-03 PROCEDURE — 25000242 PHARM REV CODE 250 ALT 637 W/ HCPCS: Performed by: INTERNAL MEDICINE

## 2018-06-03 PROCEDURE — 25000003 PHARM REV CODE 250: Performed by: EMERGENCY MEDICINE

## 2018-06-03 RX ORDER — FERROUS SULFATE 325(65) MG
325 TABLET, DELAYED RELEASE (ENTERIC COATED) ORAL DAILY
Refills: 0 | COMMUNITY
Start: 2018-06-03 | End: 2018-07-09

## 2018-06-03 RX ORDER — CARVEDILOL 6.25 MG/1
6.25 TABLET ORAL 2 TIMES DAILY
Qty: 60 TABLET | Refills: 11 | Status: SHIPPED | OUTPATIENT
Start: 2018-06-03 | End: 2018-07-09

## 2018-06-03 RX ORDER — UBIDECARENONE 75 MG
500 CAPSULE ORAL DAILY
COMMUNITY
Start: 2018-06-03 | End: 2018-07-09

## 2018-06-03 RX ORDER — FUROSEMIDE 40 MG/1
40 TABLET ORAL DAILY
Status: DISCONTINUED | OUTPATIENT
Start: 2018-06-03 | End: 2018-06-03 | Stop reason: HOSPADM

## 2018-06-03 RX ORDER — CARVEDILOL 6.25 MG/1
6.25 TABLET ORAL 2 TIMES DAILY
Status: DISCONTINUED | OUTPATIENT
Start: 2018-06-03 | End: 2018-06-03 | Stop reason: HOSPADM

## 2018-06-03 RX ADMIN — LOSARTAN POTASSIUM 100 MG: 25 TABLET, FILM COATED ORAL at 08:06

## 2018-06-03 RX ADMIN — INSULIN ASPART 4 UNITS: 100 INJECTION, SOLUTION INTRAVENOUS; SUBCUTANEOUS at 12:06

## 2018-06-03 RX ADMIN — ASPIRIN 81 MG: 81 TABLET, COATED ORAL at 08:06

## 2018-06-03 RX ADMIN — IPRATROPIUM BROMIDE AND ALBUTEROL SULFATE 3 ML: .5; 2.5 SOLUTION RESPIRATORY (INHALATION) at 08:06

## 2018-06-03 RX ADMIN — FERROUS SULFATE TAB EC 325 MG (65 MG FE EQUIVALENT) 325 MG: 325 (65 FE) TABLET DELAYED RESPONSE at 08:06

## 2018-06-03 RX ADMIN — FUROSEMIDE 40 MG: 40 TABLET ORAL at 08:06

## 2018-06-03 RX ADMIN — CARVEDILOL 6.25 MG: 6.25 TABLET, FILM COATED ORAL at 08:06

## 2018-06-03 RX ADMIN — CYANOCOBALAMIN TAB 250 MCG 500 MCG: 250 TAB at 08:06

## 2018-06-03 RX ADMIN — IPRATROPIUM BROMIDE AND ALBUTEROL SULFATE 3 ML: .5; 2.5 SOLUTION RESPIRATORY (INHALATION) at 12:06

## 2018-06-03 NOTE — NURSING
Resting quietly in bed.  Able to make needs known.  Denies pain at this time.   Encouraged to call with PRN assist.  Verbalized understanding.  Instructed to call staff for assist with mobility and transfer.  Verbalized understanding. Call bell within reach. Will continue to monitor.

## 2018-06-03 NOTE — PLAN OF CARE
06/03/18 1038   Final Note   Assessment Type Final Discharge Note   Discharge Disposition Home-Health  ([facility])   What phone number can be called within the next 1-3 days to see how you are doing after discharge? ([phone] )   Hospital Follow Up  Appt(s) scheduled? Yes   Discharge plans and expectations educations in teach back method with documentation complete? Yes   Right Care Referral Info   Post Acute Recommendation Home-care   Referral Type Home Health   Facility Name [place], [place]

## 2018-06-03 NOTE — NURSING
Pt discharge. Transported off unit with pct via wheelchair and accompanied by family. Pt aaox4. resp even and unlabored on 2l 02 per NC. In no acute distress.

## 2018-06-03 NOTE — PLAN OF CARE
[facility]    HOME HEALTH ORDERS  FACE TO FACE ENCOUNTER    Patient Name: Agus Ramos Jr.  YOB: 1940    PCP: Keaton Hardy MD   PCP Address: [address] / [place] [address]  PCP Phone Number: [phone]  PCP Fax: [phone]    Encounter Date: 06/03/2018    Admit to Home Health    Diagnoses:  Active Hospital Problems    Diagnosis  POA    *Acute on chronic diastolic congestive heart failure [I50.33]  Yes     Priority: 1 - High    Acute on chronic respiratory failure with hypoxia [J96.21]  Yes     Priority: 2     Iron deficiency anemia [D50.9]  Yes     Priority: 3     Hypercalcemia [E83.52]  Yes    Chronic anticoagulation [Z79.01]  Not Applicable     Chronic    Tobacco abuse [Z72.0]  Yes     Chronic    Pacemaker [Z95.0]  Yes    Symptomatic anemia [D64.9]  Yes    Essential hypertension [I10]  Yes     Chronic    CKD Stage 3 [N18.3]  Yes     Chronic    Type 2 diabetes mellitus, controlled, with renal complications [E11.29]  Yes     Chronic    Chronic atrial fibrillation [I48.2]  Yes     Chronic      Resolved Hospital Problems    Diagnosis Date Resolved POA   No resolved problems to display.       Future Appointments  Date Time Provider Department Center   6/20/2018 3:00 PM [facility]ove [facility]   6/21/2018 8:55 AM LAB, [facility]   6/29/2018 10:00 AM Millie Bell MD [facility] HEM [facility]     Follow-up Information     Keaton Hardy MD. Go on 6/6/2018.    Specialty:  Internal Medicine  Why:  Outpatient Services, PCP Follow-up Appointment, Please arrive to clinic for 10:00AM  Contact information:  [address]  [place] [address]  [phone]                     I have seen and examined this patient face to face today. My clinical findings that support the need for the home health skilled services and home bound status are the following:  Requiring assistive device to leave home  due to unsteady gait caused by  Heart Failure, Weakness/Debility and Anemia.  Patient with medication mismanagement issues requiring home bound status as evidenced by  Unstable vital signs (blood pressure, heart rate) and Poor understanding of medication regimen/dosage.  Medical restrictions requiring assistance of another human to leave home due to  Dyspnea on exertion (SOB) and Home oxygen requirement.    Allergies:Review of patient's allergies indicates:  No Known Allergies    Diet: cardiac diet and diabetic diet: 1800 calorie    Activities: ambulate in house with assistance    Nursing:   SN to complete comprehensive assessment including routine vital signs. Instruct on disease process and s/s of complications to report to MD. Review/verify medication list sent home with the patient at time of discharge  and instruct patient/caregiver as needed. Frequency may be adjusted depending on start of care date.    Notify MD if SBP > 160 or < 90; DBP > 90 or < 50; HR > 120 or < 50; Temp > 101    Labs: CBC and BMP on 6/5/2018. Send results to Dr Keaton Hardy (PCP)      CONSULTS:    Physical Therapy to evaluate and treat. Evaluate for home safety and equipment needs; Establish/upgrade home exercise program. Perform / instruct on therapeutic exercises, gait training, transfer training, and Range of Motion.  Occupational Therapy to evaluate and treat. Evaluate home environment for safety and equipment needs. Perform/Instruct on transfers, ADL training, ROM, and therapeutic exercises.    MISCELLANEOUS CARE:  Home Oxygen:  Oxygen at 2 L/min nasal canula to be used:  Continuously. and Assess oxygen saturation via pulse oximeter as needed for increase in SOB.    Medications: Review discharge medications with patient and family and provide education.      Current Discharge Medication List      START taking these medications    Details   carvedilol (COREG) 6.25 MG tablet Take 1 tablet (6.25 mg total) by mouth 2 (two) times  daily.  Qty: 60 tablet, Refills: 11      cyanocobalamin 500 MCG tablet Take 1 tablet (500 mcg total) by mouth once daily.      ferrous sulfate 325 (65 FE) MG EC tablet Take 1 tablet (325 mg total) by mouth once daily.  Refills: 0         CONTINUE these medications which have NOT CHANGED    Details   albuterol-ipratropium 2.5mg-0.5mg/3mL (DUO-NEB) 0.5 mg-3 mg(2.5 mg base)/3 mL nebulizer solution Take 3 mLs by nebulization every 6 (six) hours. Rescue  Qty: 1 Box, Refills: 0      aspirin (ECOTRIN) 81 MG EC tablet Take 81 mg by mouth once daily.      fish oil-omega-3 fatty acids 300-1,000 mg capsule Take 2 g by mouth 2 (two) times daily.       furosemide (LASIX) 40 MG tablet Take 1 tablet (40 mg total) by mouth once daily.      glipiZIDE (GLUCOTROL) 5 MG tablet Take 10 mg by mouth 2 (two) times daily before meals.      losartan (COZAAR) 50 MG tablet Take 50 mg by mouth once daily.      metFORMIN (GLUCOPHAGE) 500 MG tablet Take 500 mg by mouth 2 (two) times daily with meals.      nitroGLYCERIN (NITROSTAT) 0.4 MG SL tablet Place 0.4 mg under the tongue every 5 (five) minutes as needed.      polyethylene glycol (GLYCOLAX) 17 gram PwPk Take by mouth as needed.          STOP taking these medications       amlodipine (NORVASC) 10 MG tablet Comments:   Reason for Stopping:         metoprolol tartrate (LOPRESSOR) 50 MG tablet Comments:   Reason for Stopping:               I certify that this patient is confined to his home and needs physical therapy and occupational therapy.

## 2018-06-03 NOTE — HOSPITAL COURSE
Mr Ramos presented with acute on chronic respiratory failure with hypoxia. Initially treated with IV lasix for suspected diastolic heart failure exacerbation. Responded ok to this but still with dyspnea and using accessory muscles with minimal activity. CXR and lung exam consistent with bibasilar atelectasis and small effusion. An incentive spirometer added resulting in good re-expansion of lung by day 3 of incentive spirometer use. Hgb also noted to be low chronically ranging between high end of 7 g/dL and lower end of 8 g/dL. Eliquis on hold since his last admission. Uses this for AFib. Is noted to be iron and B12 deficient. Hematology and GI consulted. Started on PO ferrous sulfate, PO B12 and IV iron (given by Hematologist). Underwent EGD to eval for bleed. Found some erythematous mucosa in the antrum (Biopsied) but otherwise normal. Plan is to proceed with colonoscopy as outpatient. Had a BM with brown stool. CBC held for two days and patient actually felt better within that period of time. No blood transfusion given. Patient is to continue with supplemental O2 (has portable unit at home), ferrous sulfate PO (with miralax daily to prevent constipation), PO B12 and PO lasix. Is to hold off on eliquis until colonoscopy done and cleared by GI (and Hematologist) to restart. Is on ASA 81 mg daily. Home health set up for PT/OT, nursing and repeat CBC and BMP in 3 days. F/u with PCP within next 7 days.

## 2018-06-03 NOTE — PLAN OF CARE
06/03/18 0849   Medicare Message   Important Message from Medicare regarding Discharge Appeal Rights Given to patient/caregiver;Explained to patient/caregiver;Signed/date by patient/caregiver;Other (comments)

## 2018-06-03 NOTE — PROGRESS NOTES
Patient to discharge with Home Health Services; no preference for provider was indicated; Patient Choice form signed for [facility] to choose provider. Contacted [facility] to set up HH services for patient; faxed plan of care and patient packet to [facility] [phone]    9:00am  Elen returned call and indicated that she will be processing the services    9:45am  Elen called and indicated that [facility] was chosen as HH provider; faxed and rite faxed to [facility] and [facility]

## 2018-06-03 NOTE — PLAN OF CARE
Problem: Diabetes, Type 2 (Adult)  Intervention: Support/Optimize Psychosocial Response to Condition   06/03/18 0615   Coping/Psychosocial Interventions   Supportive Measures active listening utilized;verbalization of feelings encouraged   Environmental Support rest periods encouraged;distractions minimized   Psychosocial Support   Family/Support System Care presence promoted;support provided;self-care encouraged     Intervention: Optimize Glycemic Control   06/03/18 0615   Nutrition Interventions   Glycemic Management blood glucose monitoring       Goal: Signs and Symptoms of Listed Potential Problems Will be Absent, Minimized or Managed (Diabetes, Type 2)  Signs and symptoms of listed potential problems will be absent, minimized or managed by discharge/transition of care (reference Diabetes, Type 2 (Adult) CPG).   Outcome: Ongoing (interventions implemented as appropriate)   06/03/18 0615   Diabetes, Type 2   Problems Assessed (Type 2 Diabetes) hyperglycemia   Problems Present (Type 2 Diabetes) hyperglycemia       Problem: Patient Care Overview  Goal: Plan of Care Review  Outcome: Ongoing (interventions implemented as appropriate)   06/03/18 0615   Coping/Psychosocial   Plan Of Care Reviewed With patient     Rested quietly in bed throughout this shift.  No complaints of pain noted throughout this shift.  Remained free from falls and trauma throughout this shift.  Purposeful hourly rounding in progress.  Call bell within reach.  Will continue to monitor.        detailed exam

## 2018-06-03 NOTE — NURSING
Discharge instructions given to pt. Pt verbalized understanding. Iv removed with catheter intact. Pt aaox4. resp even and unlabored on 2L O2 per Nc. In no distress. Spoke with pt wife. States she will arrive after 12pm.

## 2018-06-03 NOTE — DISCHARGE SUMMARY
[facility] Hospital Medicine  Discharge Summary      Patient Name: Agus Ramos Jr.  MRN: [id number]  Admission Date: 5/27/2018  Hospital Length of Stay: 7 days  Discharge Date and Time:  06/03/2018 8:52 AM  Attending Physician: Eileen Kinsey MD   Discharging Provider: Eileen Domingo MD  Primary Care Provider: Keaton Hardy MD      HPI:     Agus Ramos Jr. is a 77 y.o. male that (in part)  has a past medical history of Anemia; Anticoagulant long-term use; Congestive heart failure; Coronary artery disease; Diabetes mellitus; Encounter for blood transfusion; Hypertension; MI (myocardial infarction); Pacemaker; Peripheral vascular disease; S/P CABG x 3; S/P femoral-popliteal bypass surgery; Stented coronary artery; and Tobacco use.  has a past surgical history that includes Cardiac surgery; Cardiac pacemaker placement; Cardiac catheterization; and Hemorrhoid surgery.  Presents to [facility] Emergency Department complaining of recurrent shortness of breath.  Patient was discharged to the Hospital approximately 2 weeks ago after being treated for 12 days for an acute CHF exacerbation and symptomatic anemia.  Was also complicated with hypoxia and requirement for home oxygen prior to discharge.  He has shortness of breath at rest and dyspnea with exertion.  Worsened with movement.  Minimally relieved with rest and supplemental oxygen.  Moderate to severe intensity.  Associated with unintentional weight gain.  Denies chest pain.  Daily frequency.  Constant duration.    In the emergency department chest x-ray, routine laboratory studies, EKG, cardiac enzymes were obtained.  There is concern the patient was having recurrent acute on chronic diastolic heart failure.  He was given Lasix in the ED.    Hospital medicine has been asked to admit for further evaluation and treatment.         Procedure(s) (LRB):  EGD (ESOPHAGOGASTRODUODENOSCOPY) (N/A)      Hospital Course:     Richard presented with acute on chronic respiratory failure with hypoxia. Initially treated with IV lasix for suspected diastolic heart failure exacerbation. Responded ok to this but still with dyspnea and using accessory muscles with minimal activity. CXR and lung exam consistent with bibasilar atelectasis and small effusion. An incentive spirometer added resulting in good re-expansion of lung by day 3 of incentive spirometer use. Hgb also noted to be low chronically ranging between high end of 7 g/dL and lower end of 8 g/dL. Eliquis on hold since his last admission. Uses this for AFib. Is noted to be iron and B12 deficient. Hematology and GI consulted. Started on PO ferrous sulfate, PO B12 and IV iron (given by Hematologist). Underwent EGD to eval for bleed. Found some erythematous mucosa in the antrum (Biopsied) but otherwise normal. Plan is to proceed with colonoscopy as outpatient. Had a BM with brown stool. CBC held for two days and patient actually felt better within that period of time. No blood transfusion given. Patient is to continue with supplemental O2 (has portable unit at home), ferrous sulfate PO (with miralax daily to prevent constipation), PO B12 and PO lasix. Is to hold off on eliquis until colonoscopy done and cleared by GI (and Hematologist) to restart. Is on ASA 81 mg daily. Home health set up for PT/OT, nursing and repeat CBC and BMP in 3 days. F/u with PCP within next 7 days.      Consults:   Consults         Status Ordering Provider     Inpatient consult to Cardiology  Once     Provider:  Blanco Brunson MD    Completed BLANCO GREY     Inpatient consult to Gastroenterology  Once     Provider:  Sunday Nunez MD    Completed HESHAM HART     Inpatient consult to Hematology/Oncology - [facility]  Once     Provider:  Millie Bell MD    Completed HESHAM HART        Final Active Diagnoses:    Diagnosis Date Noted POA    PRINCIPAL PROBLEM:  Acute on chronic diastolic  congestive heart failure [I50.33] 09/24/2016 Yes    Acute on chronic respiratory failure with hypoxia [J96.21] 06/01/2018 Yes    Iron deficiency anemia [D50.9] 05/04/2018 Yes    Hypercalcemia [E83.52] 05/10/2018 Yes    Chronic anticoagulation [Z79.01] 05/04/2018 Not Applicable     Chronic    Tobacco abuse [Z72.0] 05/04/2018 Yes     Chronic    Pacemaker [Z95.0] 05/04/2018 Yes    Symptomatic anemia [D64.9] 05/03/2018 Yes    Essential hypertension [I10] 09/24/2016 Yes     Chronic    CKD Stage 3 [N18.3] 09/24/2016 Yes     Chronic    Type 2 diabetes mellitus, controlled, with renal complications [E11.29] 09/24/2016 Yes     Chronic    Chronic atrial fibrillation [I48.2] 09/19/2013 Yes     Chronic      Problems Resolved During this Admission:    Diagnosis Date Noted Date Resolved POA       Discharged Condition: good    Disposition: [facility]-Health Care [facility]    Follow Up:  Follow-up Information     Keaton Hardy MD. Go on 6/6/2018.    Specialty:  Internal Medicine  Why:  Outpatient Services, PCP Follow-up Appointment, Please arrive to clinic for 10:00AM  Contact information:  [facility] [address]A [address]ILL [facility]  [address] [place], [place] [id number] [phone]                 Medications:  Reconciled Home Medications:      Medication List      START taking these medications    carvedilol 6.25 MG tablet  Commonly known as:  COREG  Take 1 tablet (6.25 mg total) by mouth 2 (two) times daily.     cyanocobalamin 500 MCG tablet  Take 1 tablet (500 mcg total) by mouth once daily.     ferrous sulfate 325 (65 FE) MG EC tablet  Take 1 tablet (325 mg total) by mouth once daily.        CHANGE how you take these medications    furosemide 40 MG tablet  Commonly known as:  LASIX  Take 1 tablet (40 mg total) by mouth once daily.  What changed:  additional instructions        CONTINUE taking these medications    albuterol-ipratropium 2.5 mg-0.5 mg/3 mL nebulizer solution  Commonly known as:  DUO-NEB  Take 3 mLs by nebulization every 6 (six) hours.  Rescue     aspirin 81 MG EC tablet  Commonly known as:  ECOTRIN  Take 81 mg by mouth once daily.     fish oil-omega-3 fatty acids 300-1,000 mg capsule  Take 2 g by mouth 2 (two) times daily.     glipiZIDE 5 MG tablet  Commonly known as:  GLUCOTROL  Take 10 mg by mouth 2 (two) times daily before meals.     losartan 50 MG tablet  Commonly known as:  COZAAR  Take 50 mg by mouth once daily.     metFORMIN 500 MG tablet  Commonly known as:  GLUCOPHAGE  Take 500 mg by mouth 2 (two) times daily with meals.     nitroGLYCERIN 0.4 MG SL tablet  Commonly known as:  NITROSTAT  Place 0.4 mg under the tongue every 5 (five) minutes as needed.     polyethylene glycol 17 gram Pwpk  Commonly known as:  GLYCOLAX  Take by mouth as needed.        STOP taking these medications    amLODIPine 10 MG tablet  Commonly known as:  NORVASC     metoprolol tartrate 50 MG tablet  Commonly known as:  LOPRESSOR            Indwelling Lines/Drains at time of discharge:   Lines/Drains/Airways          No matching active lines, drains, or airways          Time spent on the discharge of patient: > 35 minutes  Patient was seen and examined on the date of discharge and determined to be suitable for discharge.         Eileen Domingo MD  Department of Hospital Medicine  [facility]

## 2018-06-03 NOTE — PLAN OF CARE
06/03/18 0849   Medicare Message   Important Message from Medicare regarding Discharge Appeal Rights Given to patient/caregiver;Explained to patient/caregiver;Signed/date by patient/caregiver;Other (comments)   Patient expressed understanding of IMM Notice; initialed; dated

## 2018-06-04 NOTE — ANESTHESIA POSTPROCEDURE EVALUATION
"Anesthesia Post Evaluation    Patient: Agus Ramos Jr.    Procedure(s) Performed: Procedure(s) (LRB):  EGD (ESOPHAGOGASTRODUODENOSCOPY) (N/A)    Final Anesthesia Type: general  Patient location during evaluation: GI PACU  Patient participation: Yes- Able to Participate  Level of consciousness: awake and alert and oriented  Post-procedure vital signs: reviewed and stable  Pain management: adequate  Airway patency: patent  PONV status at discharge: No PONV  Anesthetic complications: no      Cardiovascular status: blood pressure returned to baseline and hemodynamically stable  Respiratory status: unassisted and spontaneous ventilation  Hydration status: euvolemic  Follow-up not needed.  Comments: Pt's breathing is at baseline        Visit Vitals  /65 (BP Location: Left arm, Patient Position: Sitting)   Pulse 69   Temp 36.9 °C (98.5 °F) (Oral)   Resp 18   Ht 5' 9" (1.753 m)   Wt 83.1 kg (183 lb 3.2 oz)   SpO2 96%   BMI 27.05 kg/m²       Pain/Jg Score: Pain Assessment Performed: Yes (6/3/2018 10:00 AM)  Presence of Pain: denies (6/3/2018 10:00 AM)      "

## 2018-06-05 ENCOUNTER — PATIENT OUTREACH (OUTPATIENT)
Dept: ADMINISTRATIVE | Facility: CLINIC | Age: 78
End: 2018-06-05

## 2018-06-05 RX ORDER — ATORVASTATIN CALCIUM 10 MG/1
10 TABLET, FILM COATED ORAL DAILY
COMMUNITY
End: 2018-06-07

## 2018-06-05 NOTE — PT/OT/SLP DISCHARGE
Physical Therapy Discharge Summary    Name: Agus Ramos Jr.  MRN: [id number]   Principal Problem: Acute on chronic diastolic congestive heart failure     Patient Discharged from acute Physical Therapy on 6/3/18.  Please refer to prior PT noted date on 18 for functional status.     Assessment:     Goals partially met. Patient appropriate for care in another setting.    Objective:     GOALS:    Physical Therapy Goals        Problem: Physical Therapy Goal    Goal Priority Disciplines Outcome Goal Variances Interventions   Physical Therapy Goal     PT/OT, PT Unable to achieve outcome(s) by discharge     Description:  Goals to be met by: 18    Patient will increase functional independence with mobility by performin. Sit to stand transfer with Supervision  2. Gait  x 200 feet with Supervision using Rolling Walker if needed  3. Lower extremity exercise program x30 reps per handout, with supervision                      Reasons for Discontinuation of Therapy Services  Transfer to alternate level of care.      Plan:     Patient Discharged to: Home with Home Health Service.    Katie Coughlin, PT  2018

## 2018-06-05 NOTE — PATIENT INSTRUCTIONS
"Discharge Instructions for Heart Failure  You have been diagnosed with heart failure. The term "heart failure" sounds scary as it suggests the heart has stopped working. But it actually means the heart is not doing its job as well as it should. Heart failure happens when your heart muscle cannot keep up with your body's need for blood flow. Symptoms of heart failure can be controlled by changes in your lifestyle and by following your doctor's advice.   Home Care  Activity   Ask your doctor about an exercise program. You can benefit from simple activities such as walking or gardening. Exercising most days of the week can make you feel better. Don't be discouraged if your progress is slow at first. Rest as needed and stop activity if you develop symptoms such as chest pain, lightheadedness, or significant shortness of breath. Your doctor may prescribe a cardiac rehabilitation program. This is a program to help recover from heart disease through professional lifestyle counseling and education and medically supervised physical activity.   Diet   Follow a heart healthy diet and work hard to remove salt from your diet. Try to limit total salt intake to 2000 mg a day or less. Salt causes your body to retain water, which can make it harder for your heart to pump. You can limit salt by doing the following:  Limit canned, dried, packaged, and fast foods.  Don't add salt to your food at the table.  Season foods with herbs instead of salt when you cook.  Monitor your fluid intake. Drinking too much fluid can make heart failure worse. It is commonly advised to limit total fluid intake to less than 66 ounces (2 liters) a day.  Limit alcohol. Too much alcohol can be harmful to the heart. Alcohol should be limited to no more than one serving a day for women and two servings a day for men.  Tobacco   Break the smoking habit. Smoking increases your chance of having a heart attack, which will worsen heart failure. Quitting smoking is " the number one thing you can do to improve your health. Enroll in a stop smoking program and ask your doctor about medications or nicotine replacement therapy. These methods improve your chances of success.  Medications   Take your medications exactly as prescribed. Learn the names and purpose of each of your medications. Keep an accurate medication list with you at all times including current dosages. Don't skip doses. If you miss a dose of your medication, take it as soon as you remember, unless it's almost time for your next dose. In that case, just wait and take your next dose at the normal time. Don't take a double dose. If you are unsure, contact your doctor or pharmacist.  Weight monitoring   Weigh yourself every day. Do this at the same time of day and in the same kind of clothes. Ideally, weigh yourself first thing every morning after you empty your bladder, but before you eat breakfast. Record your weight and take a record of it to each of your doctor's visit. If your weight increases by 3 pounds in one day or 5 pounds in one week, you should contact your doctor. This is a sign that you are retaining more fluid than you should be, which can worsen heart failure.  Symptoms   Heart failure can cause a variety of symptoms including the following:  Shortness of breath  Difficulty breathing at night  Swelling in the legs and feet or in the abdomen  Becoming easily fatigued  Irregular or rapid heartbeat  Weakness or lightheadedness  It is important to know what to do if you develop signs of worsening heart failure.  Follow-Up  Make a follow-up appointment as directed by our staff. They will provide specific instruction for timing of appointments. Depending on the type and severity of heart failure you have, you may require follow up as early as 7 days from hospital discharge. Keep appointments for checkups and lab tests that are needed to check your medications and condition.  .    When to Call Your Doctor  Call  your doctor right away if you have any of the following signs of worsening heart failure:  Sudden weight gain (3 or more pounds in one day or 5 or more pounds in one week)  Trouble breathing not related to being active  New or increased swelling of your legs or ankles  Swelling or pain in your abdomen  Breathing trouble at night (waking up short of breath, needing more pillows to breathe)  Frequent coughing that doesnt go away  Feeling much more tired than usual  When to Seek Emergency Medical Attention   Call 911 right away if you develop:  Severe shortness of breath, such that you cannot catch your breath, even resting  Severe chest pain that does not resolve with rest or nitroglycerin  Pink, foamy mucus with cough and shortness of breath  A continuous rapid or irregular heartbeat  Passing out or fainting  Acute stroke symptoms such as sudden numbness or weakness on one side of your face, arm, or leg, or sudden confusion, trouble speaking or vision changes.   © 4071-2704 [employer], [address] [place] [address], [place] [address]. All rights reserved. This information is not intended as a substitute for professional medical care. Always follow your healthcare professional's instructions.

## 2018-06-07 ENCOUNTER — OFFICE VISIT (OUTPATIENT)
Dept: UROLOGY | Facility: CLINIC | Age: 78
End: 2018-06-07
Payer: MEDICARE

## 2018-06-07 VITALS
SYSTOLIC BLOOD PRESSURE: 118 MMHG | BODY MASS INDEX: 29.03 KG/M2 | DIASTOLIC BLOOD PRESSURE: 72 MMHG | WEIGHT: 196 LBS | HEART RATE: 68 BPM | HEIGHT: 69 IN

## 2018-06-07 DIAGNOSIS — N20.0 KIDNEY STONES: Primary | ICD-10-CM

## 2018-06-07 DIAGNOSIS — R35.1 NOCTURIA MORE THAN TWICE PER NIGHT: ICD-10-CM

## 2018-06-07 DIAGNOSIS — N40.0 BPH WITHOUT OBSTRUCTION/LOWER URINARY TRACT SYMPTOMS: ICD-10-CM

## 2018-06-07 PROCEDURE — 99213 OFFICE O/P EST LOW 20 MIN: CPT | Mod: S$GLB,,, | Performed by: UROLOGY

## 2018-06-07 PROCEDURE — 3074F SYST BP LT 130 MM HG: CPT | Mod: CPTII,S$GLB,, | Performed by: UROLOGY

## 2018-06-07 PROCEDURE — 3078F DIAST BP <80 MM HG: CPT | Mod: CPTII,S$GLB,, | Performed by: UROLOGY

## 2018-06-07 PROCEDURE — 99999 PR PBB SHADOW E&M-EST. PATIENT-LVL III: CPT | Mod: PBBFAC,,, | Performed by: UROLOGY

## 2018-06-07 RX ORDER — METOPROLOL SUCCINATE 50 MG/1
75 TABLET, EXTENDED RELEASE ORAL 2 TIMES DAILY
Status: ON HOLD | COMMUNITY
End: 2018-07-09

## 2018-06-07 RX ORDER — AMLODIPINE BESYLATE 10 MG/1
10 TABLET ORAL DAILY
COMMUNITY
End: 2018-07-09

## 2018-06-07 NOTE — PROGRESS NOTES
Subjective:       Patient ID: Agus Ramos Jr. is a 78 y.o. male who was last seen in this office Visit date not found    Chief Complaint:   Chief Complaint   Patient presents with    Benign Prostatic Hypertrophy     6 month f/u pt was in the hospital no ultrasound or psa was done         History of Present Illness  Benign Prostatic Hypertrophy  Patient complains of lower urinary tract symptoms. He reports nocturia three times a night. He denies straining, urgency and weak stream. Patient states symptoms are of mild severity. Onset of symptoms was several years ago and was gradual in onset.He has no personal history and no family history of prostate cancer. He reports a history of no complicating symptoms. He denies flank pain, gross hematuria, and recurrent UTI.  He is currently taking no prostate medications.    Kidney Stone  He was admitted for pyelonephritis in September 2016.  CT from that time show a 1 cm proximal left ureteral stone.  We were not consulted at the time.  He is not sure that he passed this stone.  He denies gross hematuria, dysuria, fever, or flank pain.  He has never required a procedure for stones before. Renal ultrasound last time did not show any stone or hydronephrosis.  He is back today with a KUB.      ACTIVE MEDICAL ISSUES:  Patient Active Problem List   Diagnosis    Chronic atrial fibrillation    Essential hypertension    CKD Stage 3    Type 2 diabetes mellitus, controlled, with renal complications    Acute on chronic diastolic congestive heart failure    Symptomatic anemia    Chronic anticoagulation    Iron deficiency anemia    Tobacco abuse    Pacemaker    Elevated troponin    Acute hypoxemic respiratory failure    Acute confusional state    Hypercalcemia    Centrilobular emphysema    Abnormal chest CT    Pleural effusion    Acute on chronic respiratory failure with hypoxia       ALLERGIES AND MEDICATIONS: updated and reviewed.  Review of patient's allergies  indicates:  No Known Allergies  Current Outpatient Prescriptions   Medication Sig    albuterol-ipratropium 2.5mg-0.5mg/3mL (DUO-NEB) 0.5 mg-3 mg(2.5 mg base)/3 mL nebulizer solution Take 3 mLs by nebulization every 6 (six) hours. Rescue    amLODIPine (NORVASC) 10 MG tablet Take 10 mg by mouth once daily.    aspirin (ECOTRIN) 81 MG EC tablet Take 81 mg by mouth once daily.    carvedilol (COREG) 6.25 MG tablet Take 1 tablet (6.25 mg total) by mouth 2 (two) times daily.    cyanocobalamin 500 MCG tablet Take 1 tablet (500 mcg total) by mouth once daily.    ferrous sulfate 325 (65 FE) MG EC tablet Take 1 tablet (325 mg total) by mouth once daily.    fish oil-omega-3 fatty acids 300-1,000 mg capsule Take 2 g by mouth 2 (two) times daily.     furosemide (LASIX) 40 MG tablet Take 1 tablet (40 mg total) by mouth once daily. (Patient taking differently: Take 40 mg by mouth once daily. 1 tab in the morning  1 in the evening every other day)    glipiZIDE (GLUCOTROL) 5 MG tablet Take 10 mg by mouth 2 (two) times daily before meals.    losartan (COZAAR) 50 MG tablet Take 50 mg by mouth once daily.    metFORMIN (GLUCOPHAGE) 500 MG tablet Take 500 mg by mouth 2 (two) times daily with meals.    metoprolol succinate (TOPROL-XL) 50 MG 24 hr tablet Take 50 mg by mouth once daily.    nitroGLYCERIN (NITROSTAT) 0.4 MG SL tablet Place 0.4 mg under the tongue every 5 (five) minutes as needed.    polyethylene glycol (GLYCOLAX) 17 gram PwPk Take by mouth as needed.      No current facility-administered medications for this visit.        Review of Systems   Constitutional: Negative for activity change, fatigue, fever and unexpected weight change.   HENT: Negative for congestion.    Eyes: Negative for redness.   Respiratory: Negative for chest tightness and shortness of breath.    Cardiovascular: Negative for chest pain and leg swelling.   Gastrointestinal: Negative for abdominal pain, constipation, diarrhea, nausea and vomiting.  "  Genitourinary: Negative for dysuria, flank pain, frequency, hematuria, penile pain, penile swelling, scrotal swelling, testicular pain and urgency.   Musculoskeletal: Negative for arthralgias and back pain.   Neurological: Negative for dizziness and light-headedness.   Psychiatric/Behavioral: Negative for behavioral problems and confusion. The patient is not nervous/anxious.    All other systems reviewed and are negative.      Objective:      Vitals:    06/07/18 0954   BP: 118/72   Pulse: 68   Weight: 88.9 kg (195 lb 15.8 oz)   Height: 5' 9" (1.753 m)     Physical Exam   Nursing note and vitals reviewed.  Constitutional: He is oriented to person, place, and time. He appears well-developed and well-nourished.   HENT:   Head: Normocephalic.   Eyes: Conjunctivae are normal.   Neck: Normal range of motion. Neck supple. No tracheal deviation present. No thyromegaly present.   Cardiovascular: Normal rate and normal heart sounds.    Pulmonary/Chest: Effort normal and breath sounds normal. No respiratory distress. He has no wheezes.   Abdominal: Soft. Bowel sounds are normal. There is no hepatosplenomegaly. There is no tenderness. There is no rebound and no CVA tenderness. No hernia.   Musculoskeletal: Normal range of motion. He exhibits no edema or tenderness.   Lymphadenopathy:     He has no cervical adenopathy.   Neurological: He is alert and oriented to person, place, and time.   Skin: Skin is warm and dry. No rash noted. No erythema.     Psychiatric: He has a normal mood and affect. His behavior is normal. Judgment and thought content normal.       Urine dipstick shows negative for all components.  Micro exam: negative for WBC's or RBC's.    Assessment:       1. Kidney stones    2. BPH without obstruction/lower urinary tract symptoms    3. Nocturia more than twice per night          Plan:       1. Kidney stones    - US Retroperitoneal Complete (Kidney and; Future    2. BPH without obstruction/lower urinary tract " symptoms  He is declining prostate cancer screening at this point.    3. Nocturia more than twice per night  Limit evening fluids            Follow-up in about 1 year (around 6/7/2019) for Follow up, Review X-ray.

## 2018-06-12 ENCOUNTER — NURSE TRIAGE (OUTPATIENT)
Dept: ADMINISTRATIVE | Facility: CLINIC | Age: 78
End: 2018-06-12

## 2018-06-12 NOTE — TELEPHONE ENCOUNTER
Reason for Disposition   MILD difficulty breathing (e.g., minimal/no SOB at rest, SOB with walking, pulse < 100) of new onset or worse than normal    Protocols used: ST BREATHING DIFFICULTY-A-OH

## 2018-06-20 ENCOUNTER — OFFICE VISIT (OUTPATIENT)
Dept: PULMONOLOGY | Facility: CLINIC | Age: 78
End: 2018-06-20
Payer: MEDICARE

## 2018-06-20 ENCOUNTER — TELEPHONE (OUTPATIENT)
Dept: UROLOGY | Facility: CLINIC | Age: 78
End: 2018-06-20

## 2018-06-20 VITALS
OXYGEN SATURATION: 96 % | SYSTOLIC BLOOD PRESSURE: 118 MMHG | BODY MASS INDEX: 28.24 KG/M2 | DIASTOLIC BLOOD PRESSURE: 72 MMHG | WEIGHT: 190.69 LBS | HEART RATE: 86 BPM | HEIGHT: 69 IN

## 2018-06-20 DIAGNOSIS — R93.89 ABNORMAL CHEST CT: ICD-10-CM

## 2018-06-20 DIAGNOSIS — I50.33 ACUTE ON CHRONIC DIASTOLIC CONGESTIVE HEART FAILURE: ICD-10-CM

## 2018-06-20 DIAGNOSIS — J43.2 CENTRILOBULAR EMPHYSEMA: ICD-10-CM

## 2018-06-20 DIAGNOSIS — J96.01 ACUTE HYPOXEMIC RESPIRATORY FAILURE: ICD-10-CM

## 2018-06-20 PROCEDURE — 99999 PR PBB SHADOW E&M-EST. PATIENT-LVL III: CPT | Mod: PBBFAC,,, | Performed by: INTERNAL MEDICINE

## 2018-06-20 PROCEDURE — 3074F SYST BP LT 130 MM HG: CPT | Mod: CPTII,S$GLB,, | Performed by: INTERNAL MEDICINE

## 2018-06-20 PROCEDURE — 3078F DIAST BP <80 MM HG: CPT | Mod: CPTII,S$GLB,, | Performed by: INTERNAL MEDICINE

## 2018-06-20 PROCEDURE — 99204 OFFICE O/P NEW MOD 45 MIN: CPT | Mod: S$GLB,,, | Performed by: INTERNAL MEDICINE

## 2018-06-20 RX ORDER — ATORVASTATIN CALCIUM 20 MG/1
20 TABLET, FILM COATED ORAL DAILY
COMMUNITY
End: 2018-07-09

## 2018-06-20 RX ORDER — OMEPRAZOLE 40 MG/1
CAPSULE, DELAYED RELEASE ORAL
COMMUNITY
Start: 2018-06-13 | End: 2018-07-09

## 2018-06-20 NOTE — TELEPHONE ENCOUNTER
Spoke to pt's wife she states pt is still taking lipitor 20mg an just wanted us to add it back to his chart. He made a mistake he thought he stop taking it. So advised her I would add it back on./greg

## 2018-06-20 NOTE — PATIENT INSTRUCTIONS

## 2018-06-20 NOTE — PROGRESS NOTES
"Subjective:       Patient ID: Agus Ramos Jr. is a 78 y.o. male.    Chief Complaint: Abnormal Ct Scan    78 year old with a history of COPD.  Patient complains of intermittent wheezing, cough with acitivity.  No improvement with nebulizer inhaler.  Cough is non-productive.  No hemopysis.  Patient complains of YOO.  Has microcytic anemia, iron deficiency which he states had previously been on IV iron therapy.  Patient also has diastolic heart failure with volume overload, he is not exactly adherent to a low sodium diet.  He has been told that he has COPD and has tried Spiriva in the past without benefit also.        Abnormal Ct Scan   Pertinent negatives include no abdominal pain, arthralgias, chest pain or headaches.     Review of Systems   Constitutional: Negative for weight loss and weight gain.   HENT: Negative for trouble swallowing.    Respiratory: Negative for hemoptysis.    Cardiovascular: Negative for chest pain and leg swelling.   Genitourinary: Negative for difficulty urinating.   Endocrine: Negative for cold intolerance and heat intolerance.    Musculoskeletal: Negative for arthralgias.   Gastrointestinal: Negative for abdominal pain, abdominal distention and acid reflux.   Neurological: Negative for headaches.   Hematological: Negative for adenopathy.   Psychiatric/Behavioral: Negative for confusion.       Objective:       Vitals:    06/20/18 1522   BP: 118/72   BP Location: Left arm   Patient Position: Sitting   Pulse: 86   SpO2: 96%  Comment: 2.0   Weight: 86.5 kg (190 lb 11.2 oz)   Height: 5' 9" (1.753 m)     Physical Exam   Constitutional: He is oriented to person, place, and time. He appears well-developed.   HENT:   Head: Normocephalic.   Right Ear: External ear normal.   Left Ear: External ear normal.   Nose: Nose normal.   Mouth/Throat: Oropharynx is clear and moist.   Neck: Normal range of motion. Neck supple.   Cardiovascular: Regular rhythm.    No murmur heard.  Pulmonary/Chest: Normal " expansion and hyperinflation. He has no wheezes. He has no rales.   Abdominal: Soft. Bowel sounds are normal. He exhibits no distension and no mass. There is no hepatosplenomegaly.   Musculoskeletal: He exhibits edema (2 plus pitting).   Lymphadenopathy: No supraclavicular adenopathy is present.     He has no cervical adenopathy.   Neurological: He is alert and oriented to person, place, and time.   Skin: Skin is warm and dry.   Psychiatric: He has a normal mood and affect.     Personal Diagnostic Review  CT of chest performed on 5/10/2018 with contrast revealed Moderate-sized loculated right pleural effusion clinical correlation advised.    Patchy bibasilar opacities suggesting atelectasis.    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    Multifocal hypodensities within the liver partially visualized which may represent cysts though indeterminate.  Follow-up dedicated hepatic imaging recommended..  No flowsheet data found.      Assessment:       1. Abnormal chest CT    2. Centrilobular emphysema    3. Acute hypoxemic respiratory failure    4. Acute on chronic diastolic congestive heart failure        Outpatient Encounter Prescriptions as of 6/20/2018   Medication Sig Dispense Refill    albuterol-ipratropium 2.5mg-0.5mg/3mL (DUO-NEB) 0.5 mg-3 mg(2.5 mg base)/3 mL nebulizer solution Take 3 mLs by nebulization every 6 (six) hours. Rescue 1 Box 0    aspirin (ECOTRIN) 81 MG EC tablet Take 81 mg by mouth once daily.      atorvastatin (LIPITOR) 20 MG tablet Take 20 mg by mouth once daily.      carvedilol (COREG) 6.25 MG tablet Take 1 tablet (6.25 mg total) by mouth 2 (two) times daily. 60 tablet 11    cyanocobalamin 500 MCG tablet Take 1 tablet (500 mcg total) by mouth once daily.      ferrous sulfate 325 (65 FE) MG EC tablet Take 1 tablet (325 mg total) by mouth once daily.  0    fish oil-omega-3 fatty acids 300-1,000 mg capsule  "Take 2 g by mouth 2 (two) times daily.       furosemide (LASIX) 40 MG tablet Take 1 tablet (40 mg total) by mouth once daily. (Patient taking differently: Take 40 mg by mouth once daily. 1 tab in the morning  1 in the evening every other day)      glipiZIDE (GLUCOTROL) 5 MG tablet Take 10 mg by mouth 2 (two) times daily before meals.      losartan (COZAAR) 50 MG tablet Take 50 mg by mouth once daily.      metFORMIN (GLUCOPHAGE) 500 MG tablet Take 500 mg by mouth 2 (two) times daily with meals.      metoprolol succinate (TOPROL-XL) 50 MG 24 hr tablet Take 50 mg by mouth once daily.      nitroGLYCERIN (NITROSTAT) 0.4 MG SL tablet Place 0.4 mg under the tongue every 5 (five) minutes as needed.      omeprazole (PRILOSEC) 40 MG capsule       polyethylene glycol (GLYCOLAX) 17 gram PwPk Take by mouth as needed.       amLODIPine (NORVASC) 10 MG tablet Take 10 mg by mouth once daily.       No facility-administered encounter medications on file as of 6/20/2018.      No orders of the defined types were placed in this encounter.    Plan:     Problem List Items Addressed This Visit     Acute on chronic diastolic congestive heart failure    Overview     Low sodium diet discussed.           Acute hypoxemic respiratory failure    Overview     Likely combination of COPD and HFpEF with exacerbation.         Centrilobular emphysema    Overview     Patient is not interested in a controller, does not feel he gets relief from duoneb nebulizer.         Abnormal chest CT    Overview     Although pleural effusion is described as "moderate", I would consider it extremely small.  After this CT he had a IR ultrasound who agreed that this was insignificant.               "

## 2018-06-20 NOTE — TELEPHONE ENCOUNTER
----- Message from Eileen Vasquez sent at 6/20/2018  9:34 AM CDT -----  Contact: wife  Pt wife calling to find out if pt is to stop taking the 20 mg of atorvastatin as well as the 10 mg. Please call [phone].

## 2018-06-21 ENCOUNTER — LAB VISIT (OUTPATIENT)
Dept: LAB | Facility: HOSPITAL | Age: 78
End: 2018-06-21
Attending: INTERNAL MEDICINE
Payer: MEDICARE

## 2018-06-21 DIAGNOSIS — E83.52 HYPERCALCEMIA: ICD-10-CM

## 2018-06-21 DIAGNOSIS — D64.9 ANEMIA, UNSPECIFIED TYPE: ICD-10-CM

## 2018-06-21 DIAGNOSIS — N18.30 CKD (CHRONIC KIDNEY DISEASE) STAGE 3, GFR 30-59 ML/MIN: Chronic | ICD-10-CM

## 2018-06-21 LAB
ALBUMIN SERPL BCP-MCNC: 3 G/DL
ALP SERPL-CCNC: 107 U/L
ALT SERPL W/O P-5'-P-CCNC: 19 U/L
ANION GAP SERPL CALC-SCNC: 9 MMOL/L
AST SERPL-CCNC: 20 U/L
BASOPHILS # BLD AUTO: 0.03 K/UL
BASOPHILS NFR BLD: 0.4 %
BILIRUB SERPL-MCNC: 0.8 MG/DL
BUN SERPL-MCNC: 20 MG/DL
CALCIUM SERPL-MCNC: 11.2 MG/DL
CHLORIDE SERPL-SCNC: 106 MMOL/L
CO2 SERPL-SCNC: 26 MMOL/L
CREAT SERPL-MCNC: 1.3 MG/DL
DIFFERENTIAL METHOD: ABNORMAL
EOSINOPHIL # BLD AUTO: 0.2 K/UL
EOSINOPHIL NFR BLD: 2 %
ERYTHROCYTE [DISTWIDTH] IN BLOOD BY AUTOMATED COUNT: 28.4 %
ERYTHROCYTE [SEDIMENTATION RATE] IN BLOOD BY WESTERGREN METHOD: 12 MM/HR
EST. GFR  (AFRICAN AMERICAN): 60 ML/MIN/1.73 M^2
EST. GFR  (NON AFRICAN AMERICAN): 52 ML/MIN/1.73 M^2
FERRITIN SERPL-MCNC: 47 NG/ML
GLUCOSE SERPL-MCNC: 139 MG/DL
HAPTOGLOB SERPL-MCNC: 178 MG/DL
HCT VFR BLD AUTO: 33.1 %
HGB BLD-MCNC: 9.6 G/DL
IRON SERPL-MCNC: 96 UG/DL
LDH SERPL L TO P-CCNC: 254 U/L
LYMPHOCYTES # BLD AUTO: 1 K/UL
LYMPHOCYTES NFR BLD: 11.9 %
MCH RBC QN AUTO: 21.9 PG
MCHC RBC AUTO-ENTMCNC: 29 G/DL
MCV RBC AUTO: 75 FL
MONOCYTES # BLD AUTO: 0.9 K/UL
MONOCYTES NFR BLD: 10.7 %
NEUTROPHILS # BLD AUTO: 6 K/UL
NEUTROPHILS NFR BLD: 74.7 %
PLATELET # BLD AUTO: 405 K/UL
PMV BLD AUTO: 9.5 FL
POTASSIUM SERPL-SCNC: 4.1 MMOL/L
PROT SERPL-MCNC: 5.9 G/DL
RBC # BLD AUTO: 4.39 M/UL
RETICS/RBC NFR AUTO: 2.6 %
SATURATED IRON: 25 %
SODIUM SERPL-SCNC: 141 MMOL/L
TOTAL IRON BINDING CAPACITY: 391 UG/DL
TRANSFERRIN SERPL-MCNC: 264 MG/DL
WBC # BLD AUTO: 7.96 K/UL

## 2018-06-21 PROCEDURE — 80053 COMPREHEN METABOLIC PANEL: CPT

## 2018-06-21 PROCEDURE — 83520 IMMUNOASSAY QUANT NOS NONAB: CPT | Mod: 59

## 2018-06-21 PROCEDURE — 83540 ASSAY OF IRON: CPT

## 2018-06-21 PROCEDURE — 83010 ASSAY OF HAPTOGLOBIN QUANT: CPT

## 2018-06-21 PROCEDURE — 83615 LACTATE (LD) (LDH) ENZYME: CPT

## 2018-06-21 PROCEDURE — 85652 RBC SED RATE AUTOMATED: CPT

## 2018-06-21 PROCEDURE — 85045 AUTOMATED RETICULOCYTE COUNT: CPT

## 2018-06-21 PROCEDURE — 82525 ASSAY OF COPPER: CPT

## 2018-06-21 PROCEDURE — 84630 ASSAY OF ZINC: CPT

## 2018-06-21 PROCEDURE — 36415 COLL VENOUS BLD VENIPUNCTURE: CPT

## 2018-06-21 PROCEDURE — 82728 ASSAY OF FERRITIN: CPT

## 2018-06-21 PROCEDURE — 85025 COMPLETE CBC W/AUTO DIFF WBC: CPT

## 2018-06-22 LAB
KAPPA LC SER QL IA: 2.6 MG/DL
KAPPA LC/LAMBDA SER IA: 0.99
LAMBDA LC SER QL IA: 2.62 MG/DL

## 2018-06-25 LAB — ZINC SERPL-MCNC: 74 UG/DL (ref 60–130)

## 2018-06-26 LAB — COPPER SERPL-MCNC: 1873 UG/L (ref 665–1480)

## 2018-06-29 ENCOUNTER — OFFICE VISIT (OUTPATIENT)
Dept: HEMATOLOGY/ONCOLOGY | Facility: CLINIC | Age: 78
End: 2018-06-29
Payer: MEDICARE

## 2018-06-29 VITALS
OXYGEN SATURATION: 90 % | TEMPERATURE: 99 F | SYSTOLIC BLOOD PRESSURE: 138 MMHG | DIASTOLIC BLOOD PRESSURE: 68 MMHG | HEART RATE: 88 BPM

## 2018-06-29 DIAGNOSIS — D50.9 IRON DEFICIENCY ANEMIA, UNSPECIFIED IRON DEFICIENCY ANEMIA TYPE: ICD-10-CM

## 2018-06-29 DIAGNOSIS — I10 ESSENTIAL HYPERTENSION: Chronic | ICD-10-CM

## 2018-06-29 DIAGNOSIS — N18.30 CKD (CHRONIC KIDNEY DISEASE) STAGE 3, GFR 30-59 ML/MIN: Chronic | ICD-10-CM

## 2018-06-29 DIAGNOSIS — D64.9 ANEMIA, UNSPECIFIED TYPE: Primary | ICD-10-CM

## 2018-06-29 PROCEDURE — 99999 PR PBB SHADOW E&M-EST. PATIENT-LVL IV: CPT | Mod: PBBFAC,,, | Performed by: INTERNAL MEDICINE

## 2018-06-29 PROCEDURE — 3075F SYST BP GE 130 - 139MM HG: CPT | Mod: CPTII,S$GLB,, | Performed by: INTERNAL MEDICINE

## 2018-06-29 PROCEDURE — 3078F DIAST BP <80 MM HG: CPT | Mod: CPTII,S$GLB,, | Performed by: INTERNAL MEDICINE

## 2018-06-29 PROCEDURE — 99213 OFFICE O/P EST LOW 20 MIN: CPT | Mod: S$GLB,,, | Performed by: INTERNAL MEDICINE

## 2018-06-29 NOTE — PROGRESS NOTES
Subjective:       Patient ID: Agus Ramos Jr. is a 78 y.o. male.    Chief Complaint: Follow-up    HPI   Diagnosis: ANEMIA      77-year-old male with past medical history of Congestive heart failure, Coronary artery disease.Diabetes mellitus, AFib Hypertension; MI (myocardial infarction),  Pacemaker; Peripheral vascular disease; S/P CABG x 3;  Stented coronary artery, Tobacco use, CKD stage 3 Anemia in CKD seen today for f/u for anemia.  Patient hospitalized  5/2018 with CHF exacerbation found to have low hemoglobin of 6.5 grams/deciliter.  No active bleeding noted. Patient refused a rectal exam.  He underwent 2 units packed red blood cell transfusion with appropriate response to hemoglobin of 8.5.  He was treated for CHF exacerbation.  He underwent chest CT imaging which revealed concern for loculated right pleural effusion.  Patient was followed by pulmonology during hospitalization and has close follow-up planned as an outpatient.  He reports  IR cancelled procedure yesterday  for diagnostic tap.  He reports his recent transfusion was his first.  He has undergone a colonoscopy 10 years ago at [facility] which was unremarkable according to patient.  Appetite and weight stable.  Patient a poor historian appears confused at times.  He is currently in wheelchair he was discharged home on home O2.  He denies shortness of breath cough.  No fatigue.  CBC from 05/13/2018 reveals a white blood cell count of 7.4 hemoglobin of 8 grams/dL hematocrit of 28% MCV of 71 platelet count of 233k.      Pt re hospitalized 5/27/2018 with acute on chronic respiratory failure with hypoxia. . CXR and lung exam consistent with bibasilar atelectasis and small effusion. . Hgb also noted to be low chronically ranging between high end of 7 g/dL and lower end of 8 g/dL. Pt  Underwent EGD to eval for bleed. Found some erythematous mucosa in the antrum (Biopsied) but otherwise norm.CBC on presentation reveals a white blood cell count of 9.2  hemoglobin of 8.6 grams/deciliter hematocrit of 30.4% MCV of 71 and a platelet count of 540 . He underwent prbc transfusion and IV Fe during hospitalization.     underwent EGD- no acute finding    Pt  followed by GI and C scope planned as an outpatient  Patient is forgetful and has poor insight into his disease(s)   He is on chronic O2.  Patient is out of O2 during today's visit  Pulse ox 87-90%  Patient is unaware that oxygen tank low    No melena, hematochezia or change in bowel habits.     CBC and 6/272018 reveals a white blood cell count of 7 900/mm3 hemoglobin of 9.6 grams/deciliter hematocrit of 33.1% a platelet count of 405k    He is followed by a endocrinology for chronic hypercalcemia related to hyperparathyroidism.     Past Medical History:   Diagnosis Date    Anemia 05/03/2018    pending blood transfusion    Anticoagulant long-term use     Congestive heart failure     Coronary artery disease     Diabetes mellitus     Encounter for blood transfusion     Hypertension     MI (myocardial infarction)     Pacemaker     left chest    Peripheral vascular disease     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     S/P femoral-popliteal bypass surgery left    Stented coronary artery     Tobacco use        Past Surgical History:   Procedure Laterality Date    CARDIAC CATHETERIZATION      CARDIAC PACEMAKER PLACEMENT      CARDIAC SURGERY      ESOPHAGOGASTRODUODENOSCOPY N/A 6/1/2018    Procedure: EGD (ESOPHAGOGASTRODUODENOSCOPY);  Surgeon: Ty Hickman MD;  Location: [facility];  Service: Endoscopy;  Laterality: N/A;    HEMORRHOID SURGERY             Review of Systems   Constitutional: Negative for appetite change, fatigue, fever and unexpected weight change.   HENT: Negative for mouth sores.    Eyes: Negative for visual disturbance.   Respiratory: Negative for cough and shortness of breath.    Cardiovascular: Negative for chest pain.   Gastrointestinal: Negative for abdominal pain and diarrhea.   Genitourinary: Negative for  frequency.   Musculoskeletal: Negative for back pain.   Skin: Negative for rash.   Neurological: Negative for headaches.   Hematological: Negative for adenopathy.   Psychiatric/Behavioral: The patient is not nervous/anxious.        Objective:       Vitals:    06/29/18 1014 06/29/18 1018   BP: (!) 141/85 138/68   BP Location: Left arm Left arm   Patient Position: Sitting Sitting   BP Method: Medium (Automatic) Large (Manual)   Pulse: 88    Temp: 98.5 °F (36.9 °C)    TempSrc: Oral    SpO2: (!) 87% (!) 90%       Physical Exam   Constitutional: He is oriented to person, place, and time. He appears well-developed and well-nourished.   In wheelchair   HENT:   Head: Normocephalic.   Mouth/Throat: Oropharynx is clear and moist. No oropharyngeal exudate.   Eyes: Conjunctivae are normal. No scleral icterus.   Neck: Normal range of motion. Neck supple. No thyromegaly present.   Cardiovascular: Normal rate, regular rhythm and normal heart sounds.    No murmur heard.  Pulmonary/Chest: Effort normal. He has no wheezes. He has no rales.   Abdominal: Soft. Bowel sounds are normal. There is no hepatosplenomegaly. There is no tenderness. There is no rebound and no guarding.   Musculoskeletal: He exhibits edema.   In wheelchair   Neurological: He is alert and oriented to person, place, and time. No cranial nerve deficit.   Skin: No rash noted. No erythema.   Psychiatric: He has a normal mood and affect.       CT chest w/contrast 5/10/2018   Moderate-sized loculated right pleural effusion clinical correlation advised.    Patchy bibasilar opacities suggesting atelectasis.    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    Multifocal hypodensities within the liver partially visualized which may represent cysts though indeterminate.  Follow-up dedicated hepatic imaging recommended.        Results for ASLHEY GONZALEZ JR. (MRN [id number]) as of 6/29/2018  10:18   Ref. Range 6/21/2018 09:16   Zinc, Serum-ALT Latest Ref Range: 60 - 130 ug/dL 74     Results for ASHLEY GONZALEZ JR. (MRN [id number]) as of 6/29/2018 10:18   Ref. Range 6/21/2018 09:16   [place] Free Light Chains Latest Ref Range: 0.33 - 1.94 mg/dL 2.60 (H)   Lambda Free Light Chains Latest Ref Range: 0.57 - 2.63 mg/dL 2.62   Kappa/Lambda FLC Ratio Latest Ref Range: 0.26 - 1.65  0.99   Results for ASHLEY GONZALEZ JR. (MRN [id number]) as of 6/29/2018 10:18   Ref. Range 6/21/2018 09:16   Haptoglobin Latest Ref Range: 30 - 250 mg/dL 178     Results for ASHLEY GONZALEZ JR. (MRN [id number]) as of 6/29/2018 16:39   Ref. Range 6/21/2018 09:16   Haptoglobin Latest Ref Range: 30 - 250 mg/dL 178   Retic Latest Ref Range: 0.4 - 2.0 % 2.6 (H)   Sed Rate Latest Ref Range: 0 - 10 mm/Hr 12 (H)       Lab Results   Component Value Date    WBC 7.96 06/21/2018    HGB 9.6 (L) 06/21/2018    HCT 33.1 (L) 06/21/2018    MCV 75 (L) 06/21/2018     (H) 06/21/2018     Lab Results   Component Value Date    IRON 96 06/21/2018    TIBC 391 06/21/2018    FERRITIN 47 06/21/2018       SPEP nl     Assessment:       1. Anemia, unspecified type    2. CKD Stage 3    3. Essential hypertension        Plan:   77-year-old with multiple medical diagnoses with longstanding history of anemia in chronic kidney disease  S/p 2u prbc 5/3/2018   S/p IV Fe   CBC and 6/272018 reveals a wbc 7 900/mm3 Hb 9.6 grams/deciliter Hct  33.1% a plt ct 405k  He is followed by GI and process of undergoing GI evaluation  C scope planned in August  Patient will restart anticoagulation until planned procedure    Await GI eval  Cont to monitor counts    Pt agreeable with plan    F/u 2 mos with cbc. Fe studies, retic ct         Cc: MD Keaton Castle MD

## 2018-07-09 ENCOUNTER — HOSPITAL ENCOUNTER (OUTPATIENT)
Facility: HOSPITAL | Age: 78
Discharge: HOME | End: 2018-07-10
Attending: EMERGENCY MEDICINE | Admitting: INTERNAL MEDICINE
Payer: MEDICARE

## 2018-07-09 DIAGNOSIS — I50.9 ACUTE CHF: ICD-10-CM

## 2018-07-09 DIAGNOSIS — Z72.0 TOBACCO ABUSE: Chronic | ICD-10-CM

## 2018-07-09 DIAGNOSIS — D64.9 ANEMIA, UNSPECIFIED TYPE: ICD-10-CM

## 2018-07-09 DIAGNOSIS — Z95.0 PACEMAKER: ICD-10-CM

## 2018-07-09 DIAGNOSIS — R79.89 ELEVATED TROPONIN I LEVEL: ICD-10-CM

## 2018-07-09 DIAGNOSIS — Z79.01 CHRONIC ANTICOAGULATION: Chronic | ICD-10-CM

## 2018-07-09 DIAGNOSIS — I50.1 ACUTE PULMONARY EDEMA WITH CONGESTIVE HEART FAILURE: Primary | ICD-10-CM

## 2018-07-09 DIAGNOSIS — I50.20 SYSTOLIC CONGESTIVE HEART FAILURE: ICD-10-CM

## 2018-07-09 DIAGNOSIS — E11.21 CONTROLLED TYPE 2 DIABETES MELLITUS WITH DIABETIC NEPHROPATHY, UNSPECIFIED WHETHER LONG TERM INSULIN USE: Chronic | ICD-10-CM

## 2018-07-09 DIAGNOSIS — D64.9 SYMPTOMATIC ANEMIA: ICD-10-CM

## 2018-07-09 DIAGNOSIS — N18.30 CKD (CHRONIC KIDNEY DISEASE) STAGE 3, GFR 30-59 ML/MIN: Chronic | ICD-10-CM

## 2018-07-09 DIAGNOSIS — I50.33 ACUTE ON CHRONIC DIASTOLIC CONGESTIVE HEART FAILURE: ICD-10-CM

## 2018-07-09 DIAGNOSIS — I48.20 CHRONIC ATRIAL FIBRILLATION: Chronic | ICD-10-CM

## 2018-07-09 DIAGNOSIS — I10 ESSENTIAL HYPERTENSION: Chronic | ICD-10-CM

## 2018-07-09 DIAGNOSIS — R06.02 SHORTNESS OF BREATH: ICD-10-CM

## 2018-07-09 DIAGNOSIS — D50.9 IRON DEFICIENCY ANEMIA, UNSPECIFIED IRON DEFICIENCY ANEMIA TYPE: ICD-10-CM

## 2018-07-09 LAB
ALBUMIN SERPL BCP-MCNC: 3.1 G/DL
ALP SERPL-CCNC: 132 U/L
ALT SERPL W/O P-5'-P-CCNC: 18 U/L
ANION GAP SERPL CALC-SCNC: 7 MMOL/L
ANISOCYTOSIS BLD QL SMEAR: ABNORMAL
AST SERPL-CCNC: 18 U/L
BASOPHILS # BLD AUTO: 0.02 K/UL
BASOPHILS NFR BLD: 0.3 %
BILIRUB SERPL-MCNC: 1 MG/DL
BNP SERPL-MCNC: 553 PG/ML
BUN SERPL-MCNC: 25 MG/DL
CALCIUM SERPL-MCNC: 11.6 MG/DL
CHLORIDE SERPL-SCNC: 105 MMOL/L
CO2 SERPL-SCNC: 28 MMOL/L
CREAT SERPL-MCNC: 1.5 MG/DL
DIFFERENTIAL METHOD: ABNORMAL
EOSINOPHIL # BLD AUTO: 0.1 K/UL
EOSINOPHIL NFR BLD: 0.8 %
ERYTHROCYTE [DISTWIDTH] IN BLOOD BY AUTOMATED COUNT: 24.7 %
EST. GFR  (AFRICAN AMERICAN): 50 ML/MIN/1.73 M^2
EST. GFR  (NON AFRICAN AMERICAN): 43 ML/MIN/1.73 M^2
GIANT PLATELETS BLD QL SMEAR: PRESENT
GLUCOSE SERPL-MCNC: 114 MG/DL
HCT VFR BLD AUTO: 34.3 %
HGB BLD-MCNC: 10.2 G/DL
HYPOCHROMIA BLD QL SMEAR: ABNORMAL
INR PPP: 1.1
LYMPHOCYTES # BLD AUTO: 1.1 K/UL
LYMPHOCYTES NFR BLD: 13.9 %
MCH RBC QN AUTO: 22.3 PG
MCHC RBC AUTO-ENTMCNC: 29.7 G/DL
MCV RBC AUTO: 75 FL
MONOCYTES # BLD AUTO: 0.9 K/UL
MONOCYTES NFR BLD: 12 %
NEUTROPHILS # BLD AUTO: 5.6 K/UL
NEUTROPHILS NFR BLD: 73 %
OVALOCYTES BLD QL SMEAR: ABNORMAL
PLATELET # BLD AUTO: 312 K/UL
PLATELET BLD QL SMEAR: ABNORMAL
PMV BLD AUTO: 10 FL
POCT GLUCOSE: 151 MG/DL (ref 70–110)
POCT GLUCOSE: 72 MG/DL (ref 70–110)
POIKILOCYTOSIS BLD QL SMEAR: SLIGHT
POLYCHROMASIA BLD QL SMEAR: ABNORMAL
POTASSIUM SERPL-SCNC: 4.1 MMOL/L
PROT SERPL-MCNC: 6.4 G/DL
PROTHROMBIN TIME: 11.5 SEC
RBC # BLD AUTO: 4.57 M/UL
SODIUM SERPL-SCNC: 140 MMOL/L
TROPONIN I SERPL DL<=0.01 NG/ML-MCNC: 0.16 NG/ML
WBC # BLD AUTO: 7.65 K/UL
WBC TOXIC VACUOLES BLD QL SMEAR: PRESENT

## 2018-07-09 PROCEDURE — 84439 ASSAY OF FREE THYROXINE: CPT

## 2018-07-09 PROCEDURE — 25000242 PHARM REV CODE 250 ALT 637 W/ HCPCS: Performed by: EMERGENCY MEDICINE

## 2018-07-09 PROCEDURE — 96376 TX/PRO/DX INJ SAME DRUG ADON: CPT

## 2018-07-09 PROCEDURE — 84443 ASSAY THYROID STIM HORMONE: CPT

## 2018-07-09 PROCEDURE — 83880 ASSAY OF NATRIURETIC PEPTIDE: CPT

## 2018-07-09 PROCEDURE — 96375 TX/PRO/DX INJ NEW DRUG ADDON: CPT

## 2018-07-09 PROCEDURE — 94761 N-INVAS EAR/PLS OXIMETRY MLT: CPT

## 2018-07-09 PROCEDURE — 83036 HEMOGLOBIN GLYCOSYLATED A1C: CPT

## 2018-07-09 PROCEDURE — 86141 C-REACTIVE PROTEIN HS: CPT

## 2018-07-09 PROCEDURE — 84481 FREE ASSAY (FT-3): CPT

## 2018-07-09 PROCEDURE — 96372 THER/PROPH/DIAG INJ SC/IM: CPT

## 2018-07-09 PROCEDURE — 63600175 PHARM REV CODE 636 W HCPCS: Performed by: EMERGENCY MEDICINE

## 2018-07-09 PROCEDURE — 85610 PROTHROMBIN TIME: CPT

## 2018-07-09 PROCEDURE — 93005 ELECTROCARDIOGRAM TRACING: CPT

## 2018-07-09 PROCEDURE — 85025 COMPLETE CBC W/AUTO DIFF WBC: CPT

## 2018-07-09 PROCEDURE — 25000003 PHARM REV CODE 250: Performed by: EMERGENCY MEDICINE

## 2018-07-09 PROCEDURE — 84484 ASSAY OF TROPONIN QUANT: CPT | Mod: 91

## 2018-07-09 PROCEDURE — 84484 ASSAY OF TROPONIN QUANT: CPT

## 2018-07-09 PROCEDURE — G0378 HOSPITAL OBSERVATION PER HR: HCPCS

## 2018-07-09 PROCEDURE — 80053 COMPREHEN METABOLIC PANEL: CPT

## 2018-07-09 PROCEDURE — 27000221 HC OXYGEN, UP TO 24 HOURS

## 2018-07-09 PROCEDURE — 99285 EMERGENCY DEPT VISIT HI MDM: CPT | Mod: 25

## 2018-07-09 PROCEDURE — 36415 COLL VENOUS BLD VENIPUNCTURE: CPT

## 2018-07-09 PROCEDURE — 93010 ELECTROCARDIOGRAM REPORT: CPT | Mod: ,,, | Performed by: INTERNAL MEDICINE

## 2018-07-09 PROCEDURE — 96374 THER/PROPH/DIAG INJ IV PUSH: CPT

## 2018-07-09 PROCEDURE — 94640 AIRWAY INHALATION TREATMENT: CPT

## 2018-07-09 RX ORDER — ATORVASTATIN CALCIUM 20 MG/1
20 TABLET, FILM COATED ORAL NIGHTLY
COMMUNITY

## 2018-07-09 RX ORDER — INSULIN ASPART 100 [IU]/ML
0-5 INJECTION, SOLUTION INTRAVENOUS; SUBCUTANEOUS
Status: DISCONTINUED | OUTPATIENT
Start: 2018-07-09 | End: 2018-07-10 | Stop reason: HOSPADM

## 2018-07-09 RX ORDER — FUROSEMIDE 80 MG/1
40 TABLET ORAL 2 TIMES DAILY
Status: ON HOLD | COMMUNITY
End: 2019-04-16 | Stop reason: HOSPADM

## 2018-07-09 RX ORDER — METHYLPREDNISOLONE SOD SUCC 125 MG
80 VIAL (EA) INJECTION EVERY 8 HOURS
Status: DISCONTINUED | OUTPATIENT
Start: 2018-07-09 | End: 2018-07-10 | Stop reason: HOSPADM

## 2018-07-09 RX ORDER — IBUPROFEN 200 MG
16 TABLET ORAL
Status: DISCONTINUED | OUTPATIENT
Start: 2018-07-09 | End: 2018-07-10 | Stop reason: HOSPADM

## 2018-07-09 RX ORDER — INSULIN ASPART 100 [IU]/ML
5 INJECTION, SOLUTION INTRAVENOUS; SUBCUTANEOUS
Status: DISCONTINUED | OUTPATIENT
Start: 2018-07-10 | End: 2018-07-10 | Stop reason: HOSPADM

## 2018-07-09 RX ORDER — IPRATROPIUM BROMIDE AND ALBUTEROL SULFATE 2.5; .5 MG/3ML; MG/3ML
3 SOLUTION RESPIRATORY (INHALATION) EVERY 6 HOURS
Status: DISCONTINUED | OUTPATIENT
Start: 2018-07-10 | End: 2018-07-10 | Stop reason: HOSPADM

## 2018-07-09 RX ORDER — IBUPROFEN 200 MG
24 TABLET ORAL
Status: DISCONTINUED | OUTPATIENT
Start: 2018-07-09 | End: 2018-07-10 | Stop reason: HOSPADM

## 2018-07-09 RX ORDER — FUROSEMIDE 10 MG/ML
40 INJECTION INTRAMUSCULAR; INTRAVENOUS
Status: COMPLETED | OUTPATIENT
Start: 2018-07-09 | End: 2018-07-09

## 2018-07-09 RX ORDER — ASPIRIN 325 MG
81 TABLET ORAL DAILY
Status: ON HOLD | COMMUNITY
End: 2018-07-10 | Stop reason: HOSPADM

## 2018-07-09 RX ORDER — IPRATROPIUM BROMIDE AND ALBUTEROL SULFATE 2.5; .5 MG/3ML; MG/3ML
3 SOLUTION RESPIRATORY (INHALATION)
Status: COMPLETED | OUTPATIENT
Start: 2018-07-09 | End: 2018-07-09

## 2018-07-09 RX ORDER — FUROSEMIDE 10 MG/ML
60 INJECTION INTRAMUSCULAR; INTRAVENOUS EVERY 6 HOURS
Status: DISCONTINUED | OUTPATIENT
Start: 2018-07-10 | End: 2018-07-10

## 2018-07-09 RX ORDER — NAPROXEN SODIUM 220 MG/1
81 TABLET, FILM COATED ORAL DAILY
Qty: 30 TABLET | Refills: 0 | COMMUNITY
Start: 2018-07-09 | End: 2018-09-07

## 2018-07-09 RX ORDER — LOSARTAN POTASSIUM 25 MG/1
50 TABLET ORAL DAILY
Status: DISCONTINUED | OUTPATIENT
Start: 2018-07-10 | End: 2018-07-10

## 2018-07-09 RX ORDER — AMLODIPINE BESYLATE 5 MG/1
5 TABLET ORAL DAILY
Status: ON HOLD | COMMUNITY
End: 2018-12-02 | Stop reason: HOSPADM

## 2018-07-09 RX ORDER — METOPROLOL TARTRATE 50 MG/1
50 TABLET ORAL
Status: COMPLETED | OUTPATIENT
Start: 2018-07-09 | End: 2018-07-09

## 2018-07-09 RX ORDER — METOPROLOL TARTRATE 25 MG/1
25 TABLET, FILM COATED ORAL 2 TIMES DAILY
Status: ON HOLD | COMMUNITY
End: 2018-12-02 | Stop reason: HOSPADM

## 2018-07-09 RX ORDER — DILTIAZEM HYDROCHLORIDE 5 MG/ML
15 INJECTION INTRAVENOUS
Status: COMPLETED | OUTPATIENT
Start: 2018-07-09 | End: 2018-07-09

## 2018-07-09 RX ORDER — GLUCAGON 1 MG
1 KIT INJECTION
Status: DISCONTINUED | OUTPATIENT
Start: 2018-07-09 | End: 2018-07-10 | Stop reason: HOSPADM

## 2018-07-09 RX ADMIN — FUROSEMIDE 40 MG: 10 INJECTION, SOLUTION INTRAMUSCULAR; INTRAVENOUS at 02:07

## 2018-07-09 RX ADMIN — METHYLPREDNISOLONE SODIUM SUCCINATE 80 MG: 125 INJECTION, POWDER, FOR SOLUTION INTRAMUSCULAR; INTRAVENOUS at 09:07

## 2018-07-09 RX ADMIN — IPRATROPIUM BROMIDE AND ALBUTEROL SULFATE 3 ML: .5; 2.5 SOLUTION RESPIRATORY (INHALATION) at 02:07

## 2018-07-09 RX ADMIN — METOPROLOL TARTRATE 50 MG: 50 TABLET ORAL at 05:07

## 2018-07-09 RX ADMIN — DILTIAZEM HYDROCHLORIDE 15 MG: 5 INJECTION INTRAVENOUS at 05:07

## 2018-07-09 RX ADMIN — NITROGLYCERIN 1 INCH: 20 OINTMENT TOPICAL at 02:07

## 2018-07-09 NOTE — ED TRIAGE NOTES
Pt c/o sob for 4wks with edema in lower extremities. Pt on home o2 at 2L-NC. Crackles on ascultation

## 2018-07-09 NOTE — PLAN OF CARE
Discharge planning assessment completed.  Patient from home with spuose and needs assistance (RW) with ambulation. Patient has home o2 and Nebulizer.  Patient prefers morning appointments and patient's spouse assists with transportation.  Other that wife and HH, there is no other help at home.Patient's preferred pharamcy is [facility] on [employer].  Patient had HH through [facility] .  Per wife, patient has been seen by SN but PT is pending.      07/09/18 9106   Discharge Assessment   Assessment Type Discharge Planning Assessment   Assessment information obtained from? Patient;Caregiver  (Wife)   Prior to hospitilization cognitive status: Alert/Oriented   Prior to hospitalization functional status: Independent;Assistive Equipment   Current cognitive status: Alert/Oriented   Current Functional Status: Independent;Assistive Equipment   Facility Arrived From: home   Lives With spouse   Able to Return to Prior Arrangements yes   Is patient able to care for self after discharge? Yes  (with assistance)   Who are your caregiver(s) and their phone number(s)? Jessika Ramos; [phone]   Patient's perception of discharge disposition home health  ([facility])   Readmission Within The Last 30 Days no previous admission in last 30 days   Patient currently being followed by outpatient case management? No   Patient currently receives any other outside agency services? No   Equipment Currently Used at Home walker, rolling;nebulizer;oxygen   Do you have any problems affording any of your prescribed medications? No   Is the patient taking medications as prescribed? yes   Does the patient have transportation home? Yes   Transportation Available family or friend will provide  (wife assists with transportation to app.)   Dialysis Name and Scheduled days n/a   Does the patient receive services at the [facility]? No   Discharge Plan A Home Health   Patient/Family In Agreement With Plan yes   Annika  Branden Alexandre, AMY-EDIS, [facility]  7/9/2018

## 2018-07-09 NOTE — ED PROVIDER NOTES
Encounter Date: 7/9/2018  78 y.o. male with CHF c/o SOB worsening x2 days. Orders placed.  Patient will be seen by another provider for further evaluation when an exam room is available. Neil DOHERTY, 1:45 PM    SCRIBE #1 NOTE: IRadha am scribing for, and in the presence of,  Kevon Payan MD. I have scribed the following portions of the note - Other sections scribed: ROS, HPI.       History     Chief Complaint   Patient presents with    Shortness of Breath     SOB that began on yesterday, wears 2 L NC daily; hx of CHF     CC: Shortness of Breath    HPI: Patient is an 78 y.o. M with a past medical history of A-Fib; Anemia (05/03/2018); Anticoagulant long-term use; Congestive heart failure; Coronary artery disease; Diabetes mellitus; Emphysema; Hypertension; MI; Pacemaker; Peripheral vascular disease; CABG x 3 (10 ); and Stented coronary artery who presents to the ED for evaluation of a 4-month history of consistent shortness of breath. No modifying factors reported. Patient has home O2 (2 L) and reports taking 4 breathing treatments per day. He also complains of progressively worsening BLE edema and intermittent palpitations. No symptomatic treatment prior to arrival. Patient denies chest pain and/or abdominal distention.        The history is provided by the patient. No  was used.     Review of patient's allergies indicates:  No Known Allergies  Past Medical History:   Diagnosis Date    Anemia 05/03/2018    pending blood transfusion    Anticoagulant long-term use     Congestive heart failure     Coronary artery disease     Diabetes mellitus     Encounter for blood transfusion     Hypertension     MI (myocardial infarction)     Pacemaker     left chest    Peripheral vascular disease     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     S/P femoral-popliteal bypass surgery left    Stented coronary artery     Tobacco use      Past Surgical History:   Procedure Laterality Date    CARDIAC  CATHETERIZATION      CARDIAC PACEMAKER PLACEMENT      CARDIAC SURGERY      ESOPHAGOGASTRODUODENOSCOPY N/A 6/1/2018    Procedure: EGD (ESOPHAGOGASTRODUODENOSCOPY);  Surgeon: Ty Hickman MD;  Location: [facility];  Service: Endoscopy;  Laterality: N/A;    HEMORRHOID SURGERY       Family History   Problem Relation Age of Onset    Diabetes Father     Diabetes Mother      Social History   Substance Use Topics    Smoking status: Former Smoker     Packs/day: 1.00     Years: 60.00     Types: Cigarettes     Quit date: 5/8/2018    Smokeless tobacco: Never Used    Alcohol use No     Review of Systems   Constitutional: Negative for chills and fever.   HENT: Negative for sore throat.    Eyes: Negative for visual disturbance.   Respiratory: Positive for shortness of breath.    Cardiovascular: Positive for palpitations (intermittent) and leg swelling (bilaterally). Negative for chest pain.   Gastrointestinal: Negative for abdominal distention, nausea and vomiting.   Genitourinary: Negative for dysuria.   Musculoskeletal: Negative for back pain.   Skin: Negative for rash.   Neurological: Negative for light-headedness and headaches.       Physical Exam     Initial Vitals [07/09/18 1345]   BP Pulse Resp Temp SpO2   (!) 143/97 95 19 97.6 °F (36.4 °C) (!) 91 %      MAP       --         Physical Exam    Nursing note and vitals reviewed.  Constitutional: He appears well-developed and well-nourished. He is not diaphoretic. No distress.   HENT:   Head: Normocephalic and atraumatic.   Eyes: EOM are normal. Pupils are equal, round, and reactive to light. No scleral icterus.   Arcus senilis.  Pale conjunctiva.    Neck: Normal range of motion. Neck supple.   Cardiovascular: Normal heart sounds and intact distal pulses. An irregular rhythm present. Tachycardia present.  Exam reveals no gallop and no friction rub.    No murmur heard.  Irregular rate, fluctuating between 100 and 130.   Pulmonary/Chest: No stridor. No respiratory  distress. He has no wheezes. He has no rhonchi. He has rales (more than midway up the lungs).   Abdominal: Soft. Bowel sounds are normal. He exhibits no distension. There is tenderness (non-focal). There is no rebound and no guarding.   Musculoskeletal: Normal range of motion. He exhibits edema (BLE edema (1+, pitting)). He exhibits no tenderness.   Neurological: He is alert and oriented to person, place, and time. He has normal strength. No cranial nerve deficit.   Skin: Skin is warm and dry. No rash noted.   Psychiatric: He has a normal mood and affect. His behavior is normal.         ED Course   Procedures  Labs Reviewed   CBC W/ AUTO DIFFERENTIAL - Abnormal; Notable for the following:        Result Value    RBC 4.57 (*)     Hemoglobin 10.2 (*)     Hematocrit 34.3 (*)     MCV 75 (*)     MCH 22.3 (*)     MCHC 29.7 (*)     RDW 24.7 (*)     Lymph% 13.9 (*)     All other components within normal limits   COMPREHENSIVE METABOLIC PANEL - Abnormal; Notable for the following:     Glucose 114 (*)     BUN, Bld 25 (*)     Creatinine 1.5 (*)     Calcium 11.6 (*)     Albumin 3.1 (*)     Anion Gap 7 (*)     eGFR if  50 (*)     eGFR if non  43 (*)     All other components within normal limits   TROPONIN I - Abnormal; Notable for the following:     Troponin I 0.157 (*)     All other components within normal limits   B-TYPE NATRIURETIC PEPTIDE - Abnormal; Notable for the following:      (*)     All other components within normal limits   PROTIME-INR     EKG Readings: (Independently Interpreted)   Initial Reading: No STEMI. Rhythm: Atrial Fibrillation. Heart Rate: 100. Ectopy: No Ectopy. Conduction: RBBB. ST Segments: Normal ST Segments. T Waves Flipped: II, III, AVF, V4, V5, V6 and V3. Axis: Normal. Clinical Impression: with RBBB       Imaging Results          X-Ray Chest AP Portable (Final result)  Result time 07/09/18 14:40:17    Final result by Jovan Land MD (07/09/18 14:40:17)                  Impression:      CHF pattern similar to prior exam as further detailed above.      Electronically signed by: Jovan Land MD  Date:    07/09/2018  Time:    14:40             Narrative:    EXAMINATION:  XR CHEST AP PORTABLE    CLINICAL HISTORY:  CHF;    TECHNIQUE:  Frontal view of the chest was performed.    COMPARISON:  05/30/2018    FINDINGS:  Pacing device in the left chest wall with leads in stable position.  Prior median sternotomy for apparent cardiac revascularization procedure.  Multiple broken sternal wires.  Multiple overlying cardiac monitoring leads.    The cardiomediastinal silhouette is mildly enlarged similar to prior study.  Mild pulmonary vascular engorgement with perihilar opacities suggesting some component of pulmonary edema.  No large focal consolidation identified.  There are small bilateral pleural effusions.  No pneumothorax.                                 Medical Decision Making:   ED Management:  Will obs to trend trops and diurese. Noted 2 short runs of aivr or junctional rhythm. Will offer additional po metoprolol. Asymptomatic and good rate control. Will be in telemetry while in obs.             Scribe Attestation:   Scribe #1: I performed the above scribed service and the documentation accurately describes the services I performed. I attest to the accuracy of the note.    Attending Attestation:           Physician Attestation for Scribe:  Physician Attestation Statement for Scribe #1: I, Kevon Payan MD, reviewed documentation, as scribed by Radha Epstein in my presence, and it is both accurate and complete.                    Clinical Impression:   The primary encounter diagnosis was Acute pulmonary edema with congestive heart failure. Diagnoses of Shortness of breath and Elevated troponin I level were also pertinent to this visit.                             Kevon Payan MD  07/09/18 7498       Kevon Payan MD  07/09/18 0543

## 2018-07-09 NOTE — CONSULTS
Discharge planning assessment completed.  Patient from home with spuose and needs assistance (RW) with ambulation. Patient has home o2 and Nebulizer.  Patient prefers morning appointments and patient's spouse assists with transportation.  Other that wife and HH, there is no other help at home.Patient's preferred pharamcy is [facility] on [address].  Patient had HH through [facility] .  Per wife, patient has been seen by SN but PT is pending.      07/09/18 8126   Discharge Assessment   Assessment Type Discharge Planning Assessment   Assessment information obtained from? Patient;Caregiver  (Wife)   Prior to hospitilization cognitive status: Alert/Oriented   Prior to hospitalization functional status: Independent;Assistive Equipment   Current cognitive status: Alert/Oriented   Current Functional Status: Independent;Assistive Equipment   Facility Arrived From: home   Lives With spouse   Able to Return to Prior Arrangements yes   Is patient able to care for self after discharge? Yes  (with assistance)   Who are your caregiver(s) and their phone number(s)? Jessika Ramos; [phone]   Patient's perception of discharge disposition home health  ([facility])   Readmission Within The Last 30 Days no previous admission in last 30 days   Patient currently being followed by outpatient case management? No   Patient currently receives any other outside agency services? No   Equipment Currently Used at Home walker, rolling;nebulizer;oxygen   Do you have any problems affording any of your prescribed medications? No   Is the patient taking medications as prescribed? yes   Does the patient have transportation home? Yes   Transportation Available family or friend will provide  (wife assists with transportation to app.)   Dialysis Name and Scheduled days n/a   Does the patient receive services at the [facility]? No   Discharge Plan A Home Health   Patient/Family In Agreement With Plan yes   Annika  Branden Alexandre, AMY-EDIS, [facility]  7/9/2018

## 2018-07-10 VITALS
SYSTOLIC BLOOD PRESSURE: 129 MMHG | OXYGEN SATURATION: 97 % | WEIGHT: 190.06 LBS | RESPIRATION RATE: 20 BRPM | BODY MASS INDEX: 28.15 KG/M2 | TEMPERATURE: 98 F | HEIGHT: 69 IN | HEART RATE: 88 BPM | DIASTOLIC BLOOD PRESSURE: 69 MMHG

## 2018-07-10 LAB
25(OH)D3+25(OH)D2 SERPL-MCNC: 34 NG/ML
AORTIC VALVE REGURGITATION: ABNORMAL
CHOLEST SERPL-MCNC: 103 MG/DL
CHOLEST/HDLC SERPL: 2.8 {RATIO}
CRP SERPL-MCNC: 21.79 MG/L
ERYTHROCYTE [SEDIMENTATION RATE] IN BLOOD BY WESTERGREN METHOD: 18 MM/HR
ESTIMATED AVG GLUCOSE: 97 MG/DL
ESTIMATED PA SYSTOLIC PRESSURE: 72.32
HBA1C MFR BLD HPLC: 5 %
HDLC SERPL-MCNC: 37 MG/DL
HDLC SERPL: 35.9 %
LDLC SERPL CALC-MCNC: 52 MG/DL
MITRAL VALVE REGURGITATION: ABNORMAL
NONHDLC SERPL-MCNC: 66 MG/DL
POCT GLUCOSE: 209 MG/DL (ref 70–110)
POCT GLUCOSE: 211 MG/DL (ref 70–110)
POCT GLUCOSE: 255 MG/DL (ref 70–110)
PREALB SERPL-MCNC: 18 MG/DL
RETIRED EF AND QEF - SEE NOTES: 50 (ref 55–65)
T3FREE SERPL-MCNC: 2 PG/ML
T4 FREE SERPL-MCNC: 1.06 NG/DL
TRICUSPID VALVE REGURGITATION: ABNORMAL
TRIGL SERPL-MCNC: 70 MG/DL
TROPONIN I SERPL DL<=0.01 NG/ML-MCNC: 0.14 NG/ML
TROPONIN I SERPL DL<=0.01 NG/ML-MCNC: 0.15 NG/ML
TSH SERPL DL<=0.005 MIU/L-ACNC: 1.11 UIU/ML

## 2018-07-10 PROCEDURE — 63600175 PHARM REV CODE 636 W HCPCS: Performed by: EMERGENCY MEDICINE

## 2018-07-10 PROCEDURE — 83970 ASSAY OF PARATHORMONE: CPT

## 2018-07-10 PROCEDURE — 27000221 HC OXYGEN, UP TO 24 HOURS

## 2018-07-10 PROCEDURE — 84134 ASSAY OF PREALBUMIN: CPT

## 2018-07-10 PROCEDURE — S4991 NICOTINE PATCH NONLEGEND: HCPCS | Performed by: HOSPITALIST

## 2018-07-10 PROCEDURE — 63600175 PHARM REV CODE 636 W HCPCS: Performed by: HOSPITALIST

## 2018-07-10 PROCEDURE — 84484 ASSAY OF TROPONIN QUANT: CPT

## 2018-07-10 PROCEDURE — 25000242 PHARM REV CODE 250 ALT 637 W/ HCPCS: Performed by: EMERGENCY MEDICINE

## 2018-07-10 PROCEDURE — 93306 TTE W/DOPPLER COMPLETE: CPT

## 2018-07-10 PROCEDURE — 25000003 PHARM REV CODE 250: Performed by: EMERGENCY MEDICINE

## 2018-07-10 PROCEDURE — 94640 AIRWAY INHALATION TREATMENT: CPT

## 2018-07-10 PROCEDURE — 93306 TTE W/DOPPLER COMPLETE: CPT | Mod: 26,,, | Performed by: INTERNAL MEDICINE

## 2018-07-10 PROCEDURE — 82306 VITAMIN D 25 HYDROXY: CPT

## 2018-07-10 PROCEDURE — 96372 THER/PROPH/DIAG INJ SC/IM: CPT

## 2018-07-10 PROCEDURE — 96375 TX/PRO/DX INJ NEW DRUG ADDON: CPT | Mod: 59

## 2018-07-10 PROCEDURE — 25000003 PHARM REV CODE 250: Performed by: HOSPITALIST

## 2018-07-10 PROCEDURE — 25000003 PHARM REV CODE 250: Performed by: NURSE PRACTITIONER

## 2018-07-10 PROCEDURE — 85652 RBC SED RATE AUTOMATED: CPT

## 2018-07-10 PROCEDURE — 96376 TX/PRO/DX INJ SAME DRUG ADON: CPT | Mod: 59

## 2018-07-10 PROCEDURE — 36415 COLL VENOUS BLD VENIPUNCTURE: CPT

## 2018-07-10 PROCEDURE — 82962 GLUCOSE BLOOD TEST: CPT

## 2018-07-10 PROCEDURE — 94799 UNLISTED PULMONARY SVC/PX: CPT

## 2018-07-10 PROCEDURE — G0378 HOSPITAL OBSERVATION PER HR: HCPCS

## 2018-07-10 PROCEDURE — 94761 N-INVAS EAR/PLS OXIMETRY MLT: CPT

## 2018-07-10 PROCEDURE — 80061 LIPID PANEL: CPT

## 2018-07-10 RX ORDER — FUROSEMIDE 10 MG/ML
40 INJECTION INTRAMUSCULAR; INTRAVENOUS 2 TIMES DAILY
Status: DISCONTINUED | OUTPATIENT
Start: 2018-07-10 | End: 2018-07-10 | Stop reason: HOSPADM

## 2018-07-10 RX ORDER — ASPIRIN 325 MG
325 TABLET ORAL DAILY
Status: DISCONTINUED | OUTPATIENT
Start: 2018-07-10 | End: 2018-07-10 | Stop reason: HOSPADM

## 2018-07-10 RX ORDER — IPRATROPIUM BROMIDE AND ALBUTEROL SULFATE 2.5; .5 MG/3ML; MG/3ML
SOLUTION RESPIRATORY (INHALATION)
Status: DISCONTINUED
Start: 2018-07-10 | End: 2018-07-10 | Stop reason: HOSPADM

## 2018-07-10 RX ORDER — IBUPROFEN 200 MG
1 TABLET ORAL DAILY
Status: DISCONTINUED | OUTPATIENT
Start: 2018-07-10 | End: 2018-07-10 | Stop reason: HOSPADM

## 2018-07-10 RX ORDER — TRAMADOL HYDROCHLORIDE 50 MG/1
100 TABLET ORAL EVERY 6 HOURS PRN
Status: DISCONTINUED | OUTPATIENT
Start: 2018-07-10 | End: 2018-07-10 | Stop reason: HOSPADM

## 2018-07-10 RX ORDER — AMLODIPINE BESYLATE 5 MG/1
5 TABLET ORAL DAILY
Status: DISCONTINUED | OUTPATIENT
Start: 2018-07-10 | End: 2018-07-10 | Stop reason: HOSPADM

## 2018-07-10 RX ORDER — FUROSEMIDE 20 MG/1
20 TABLET ORAL DAILY
Status: DISCONTINUED | OUTPATIENT
Start: 2018-07-10 | End: 2018-07-10

## 2018-07-10 RX ORDER — METOPROLOL TARTRATE 1 MG/ML
5 INJECTION, SOLUTION INTRAVENOUS EVERY 5 MIN PRN
Status: DISCONTINUED | OUTPATIENT
Start: 2018-07-10 | End: 2018-07-10 | Stop reason: HOSPADM

## 2018-07-10 RX ORDER — ISOSORBIDE DINITRATE 20 MG/1
20 TABLET ORAL 3 TIMES DAILY
Status: DISCONTINUED | OUTPATIENT
Start: 2018-07-10 | End: 2018-07-10 | Stop reason: HOSPADM

## 2018-07-10 RX ORDER — ATORVASTATIN CALCIUM 10 MG/1
20 TABLET, FILM COATED ORAL NIGHTLY
Status: DISCONTINUED | OUTPATIENT
Start: 2018-07-10 | End: 2018-07-10 | Stop reason: HOSPADM

## 2018-07-10 RX ORDER — NITROGLYCERIN 0.4 MG/1
0.4 TABLET SUBLINGUAL EVERY 5 MIN PRN
Status: DISCONTINUED | OUTPATIENT
Start: 2018-07-10 | End: 2018-07-10 | Stop reason: HOSPADM

## 2018-07-10 RX ORDER — ASPIRIN 81 MG/1
81 TABLET ORAL DAILY
COMMUNITY
Start: 2018-07-10 | End: 2018-07-10 | Stop reason: HOSPADM

## 2018-07-10 RX ORDER — GUAIFENESIN/DEXTROMETHORPHAN 100-10MG/5
10 SYRUP ORAL EVERY 6 HOURS
Status: DISCONTINUED | OUTPATIENT
Start: 2018-07-10 | End: 2018-07-10 | Stop reason: HOSPADM

## 2018-07-10 RX ORDER — HYDRALAZINE HYDROCHLORIDE 25 MG/1
75 TABLET, FILM COATED ORAL EVERY 8 HOURS
Status: DISCONTINUED | OUTPATIENT
Start: 2018-07-10 | End: 2018-07-10 | Stop reason: HOSPADM

## 2018-07-10 RX ORDER — HYDRALAZINE HYDROCHLORIDE 25 MG/1
50 TABLET, FILM COATED ORAL EVERY 8 HOURS
Status: DISCONTINUED | OUTPATIENT
Start: 2018-07-10 | End: 2018-07-10

## 2018-07-10 RX ORDER — FUROSEMIDE 10 MG/ML
40 INJECTION INTRAMUSCULAR; INTRAVENOUS EVERY 12 HOURS
Status: DISCONTINUED | OUTPATIENT
Start: 2018-07-10 | End: 2018-07-10

## 2018-07-10 RX ORDER — ISOSORBIDE DINITRATE AND HYDRALAZINE HYDROCHLORIDE 37.5; 2 MG/1; MG/1
1 TABLET ORAL 3 TIMES DAILY
Status: DISCONTINUED | OUTPATIENT
Start: 2018-07-10 | End: 2018-07-10

## 2018-07-10 RX ORDER — SODIUM CHLORIDE 9 MG/ML
INJECTION, SOLUTION INTRAVENOUS CONTINUOUS
Status: DISCONTINUED | OUTPATIENT
Start: 2018-07-10 | End: 2018-07-10

## 2018-07-10 RX ADMIN — NITROGLYCERIN 1 INCH: 20 OINTMENT TOPICAL at 05:07

## 2018-07-10 RX ADMIN — IPRATROPIUM BROMIDE AND ALBUTEROL SULFATE 3 ML: .5; 2.5 SOLUTION RESPIRATORY (INHALATION) at 12:07

## 2018-07-10 RX ADMIN — ISOSORBIDE DINITRATE 20 MG: 20 TABLET ORAL at 09:07

## 2018-07-10 RX ADMIN — INSULIN ASPART 2 UNITS: 100 INJECTION, SOLUTION INTRAVENOUS; SUBCUTANEOUS at 12:07

## 2018-07-10 RX ADMIN — APIXABAN 5 MG: 5 TABLET, FILM COATED ORAL at 08:07

## 2018-07-10 RX ADMIN — INSULIN ASPART 3 UNITS: 100 INJECTION, SOLUTION INTRAVENOUS; SUBCUTANEOUS at 06:07

## 2018-07-10 RX ADMIN — FUROSEMIDE 40 MG: 10 INJECTION, SOLUTION INTRAMUSCULAR; INTRAVENOUS at 08:07

## 2018-07-10 RX ADMIN — ASPIRIN 325 MG ORAL TABLET 325 MG: 325 PILL ORAL at 08:07

## 2018-07-10 RX ADMIN — AMLODIPINE BESYLATE 5 MG: 5 TABLET ORAL at 08:07

## 2018-07-10 RX ADMIN — NITROGLYCERIN 1 INCH: 20 OINTMENT TOPICAL at 12:07

## 2018-07-10 RX ADMIN — GUAIFENESIN AND DEXTROMETHORPHAN 10 ML: 100; 10 SYRUP ORAL at 12:07

## 2018-07-10 RX ADMIN — FUROSEMIDE 60 MG: 10 INJECTION, SOLUTION INTRAVENOUS at 12:07

## 2018-07-10 RX ADMIN — IPRATROPIUM BROMIDE AND ALBUTEROL SULFATE 3 ML: .5; 2.5 SOLUTION RESPIRATORY (INHALATION) at 07:07

## 2018-07-10 RX ADMIN — ISOSORBIDE DINITRATE 20 MG: 20 TABLET ORAL at 03:07

## 2018-07-10 RX ADMIN — INSULIN ASPART 5 UNITS: 100 INJECTION, SOLUTION INTRAVENOUS; SUBCUTANEOUS at 12:07

## 2018-07-10 RX ADMIN — HYDRALAZINE HYDROCHLORIDE 50 MG: 25 TABLET ORAL at 03:07

## 2018-07-10 RX ADMIN — METOPROLOL SUCCINATE 75 MG: 50 TABLET, EXTENDED RELEASE ORAL at 08:07

## 2018-07-10 RX ADMIN — GUAIFENESIN AND DEXTROMETHORPHAN 10 ML: 100; 10 SYRUP ORAL at 05:07

## 2018-07-10 RX ADMIN — METHYLPREDNISOLONE SODIUM SUCCINATE 80 MG: 125 INJECTION, POWDER, FOR SOLUTION INTRAMUSCULAR; INTRAVENOUS at 05:07

## 2018-07-10 RX ADMIN — IPRATROPIUM BROMIDE AND ALBUTEROL SULFATE 3 ML: .5; 2.5 SOLUTION RESPIRATORY (INHALATION) at 01:07

## 2018-07-10 RX ADMIN — INSULIN ASPART 5 UNITS: 100 INJECTION, SOLUTION INTRAVENOUS; SUBCUTANEOUS at 06:07

## 2018-07-10 RX ADMIN — INSULIN DETEMIR 12 UNITS: 100 INJECTION, SOLUTION SUBCUTANEOUS at 08:07

## 2018-07-10 NOTE — PROGRESS NOTES
07/09/18 1913   Vital Signs   Temp 96 °F (35.6 °C)   Temp src Oral   Pulse 83   Heart Rate Source Monitor   Resp 18   SpO2 95 %   BP (!) 148/85   MAP (mmHg) 110   BP Location Right arm   Patient Position Lying     Patient arrived to unit via stretcher accompanied by family. Patient is awake and alert, disoriented to time and sometimes situation. Patient appears short of breathe and is using his abdominal muscles to breathe. Shift assessment initiated. Tele monitoring initiated. IV is c/d/i. Skin is intact. Patient oriented to room, bed is low, alarm set and the call light is within reach.

## 2018-07-10 NOTE — HOSPITAL COURSE
78 y.o. past medical history of Anemia; Anticoagulant long-term use; Congestive heart failure; Coronary artery disease; Diabetes mellitus; Encounter for blood transfusion; Hypertension; MI (myocardial infarction); Pacemaker; Peripheral vascular disease; S/P CABG x 3; S/P femoral-popliteal bypass surgery; Stented coronary artery; and Tobacco use. Patient presented for evaluation of SOB with associated LE edema. He was administered IV lasix from which he responded well. He is sating above 97% on usual 2L oxygen. His losartan was held during hospitalization 2/2 renal and restarted at discharge. He has chronic troponin emia, had elevated troponins this time that trended flat. BNP consistent with prior. Repeat 2D echo showed findings consistent with prior echo. Patient denies chest pain. Is at his baseline. Follow up in clinic with cardiology.

## 2018-07-10 NOTE — H&P
[facility] Hospital Medicine  History & Physical    Patient Name: Agus Ramos Jr.  MRN: [id number]  Admission Date: 07/10/2018  Attending Physician: Blanco Hardy MD, MPH      PCP:     Keaton Hardy MD    CC:     Chief Complaint   Patient presents with    Shortness of Breath     SOB that began on yesterday, wears 2 L NC daily; hx of CHF       HISTORY OF PRESENT ILLNESS:     Agus Ramos Jr. is a 78 y.o. male that (in part)  has a past medical history of Anemia; Anticoagulant long-term use; Congestive heart failure; Coronary artery disease; Diabetes mellitus; Encounter for blood transfusion; Hypertension; MI (myocardial infarction); Pacemaker; Peripheral vascular disease; S/P CABG x 3; S/P femoral-popliteal bypass surgery; Stented coronary artery; and Tobacco use.  has a past surgical history that includes Cardiac surgery; Cardiac pacemaker placement; Cardiac catheterization; Hemorrhoid surgery; and Esophagogastroduodenoscopy (N/A, 6/1/2018).   Presents to [facility] Emergency Department complaining of shortness of breath as described below in detail.  Patient reports complaints of the home medication regimen for CHF, COPD, diabetes.     Description of symptoms    Location:  Chest  Onset:  Several weeks ago progressive worsening  Character:  Shortness of breath and dyspnea with exertion  Frequency:  Daily  Duration:  Constant  Associated Symptoms:  Positive for nonproductive cough, shortness of breath, dyspnea exertion, peripheral edema.  Denies fever, chills, stridor, wheezing, night sweats, hemoptysis, syncope, palpitations, or abdominal pain.  Radiation:  Radiation of chest  Exacerbating factors:  Minimal physical exertion  Relieving factors:  Some relief with supplemental oxygen and Lasix given in the ED.  Patient uses 2 L nasal cannula oxygen at home for treatment of emphysema     In the emergency department routine laboratory studies, EKG, cardiac  enzymes, chest x-ray were obtained.  There is evidence of acute CHF exacerbation with pulmonary edema.  Patient was given diuretics.  He was also minimally elevated troponin level appears to be consistent with his baseline troponin level.    Hospital medicine has been asked to admit for further evaluation and treatment.       REVIEW OF SYSTEMS:     -- Constitutional: No fever or chills.  -- Eyes: No visual changes, diplopia, pain, tearing, blind spots, or discharge.   -- Ears, nose, mouth, throat, and face: No congestion, sore throat, epistaxis, d/c, bleeding gums, neck stiffness masses, or dental issues.  -- Respiratory:  As above in the HPI  -- Cardiovascular:  As above in the HPI.   -- Gastrointestinal: No vomiting, abdominal pain, hematemesis, melena, dyspepsia, or change in bowel habits.  -- Genitourinary: No hematuria, dysuria, frequency, urgency, nocturia, polyuria, stones, or incontinence.  -- Integument/breast: No rash, pruritis, pigmentation changes, dryness, or changes in hair  -- Hematologic/lymphatic: No easy bruising or lymphadenopathy.   -- Musculoskeletal: + peripheral edema.  No acute arthralgias, acute myalgias, joint swelling, acute limitations of ROM.  Subacute generalized muscle weakness without focal deficit  -- Neurological: No seizures, headaches, incoordination, paraesthesias, ataxia, vertigo, or tremors.  -- Behavioral/Psych: No auditory or visual hallucinations, depression, or suicidal/homicidal ideations.  -- Endocrine: No heat or cold intolerance, polydipsia, or unintentional weight gain / loss.  -- Allergy/Immunologic: No recurrent infections or adverse reaction to food, insects, or difficulty breathing.    Pain Scale  0 on scale of 1 to 10    PAST MEDICAL / SURGICAL HISTORY:     Past Medical History:   Diagnosis Date    Anemia 05/03/2018    pending blood transfusion    Anticoagulant long-term use     Congestive heart failure     Coronary artery disease     Diabetes mellitus      Encounter for blood transfusion     Hypertension     MI (myocardial infarction)     Pacemaker     left chest    Peripheral vascular disease     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     S/P femoral-popliteal bypass surgery left    Stented coronary artery     Tobacco use      Past Surgical History:   Procedure Laterality Date    CARDIAC CATHETERIZATION      CARDIAC PACEMAKER PLACEMENT      CARDIAC SURGERY      ESOPHAGOGASTRODUODENOSCOPY N/A 6/1/2018    Procedure: EGD (ESOPHAGOGASTRODUODENOSCOPY);  Surgeon: Ty Hickman MD;  [facility];  Service: Endoscopy;  Laterality: N/A;    HEMORRHOID SURGERY           FAMILY HISTORY:     Family History   Problem Relation Age of Onset    Diabetes Father     Diabetes Mother          SOCIAL HISTORY:     Social History     Social History    Marital status:      Spouse name: N/A    Number of children: N/A    Years of education: N/A     Social History Main Topics    Smoking status: Former Smoker     Packs/day: 1.00     Years: 60.00     Types: Cigarettes     Quit date: 5/8/2018    Smokeless tobacco: Never Used    Alcohol use No    Drug use: No    Sexual activity: Not Currently     Partners: Female     Other Topics Concern    None     Social History Narrative    None         ALLERGIES:       Review of patient's allergies indicates:  No Known Allergies      HEALTH SCREENING:     Prevnar 13 pneumonia vaccine =  evidence of previous vaccination found in the medical record      HOME MEDICATIONS:     Prior to Admission medications    Medication Sig Start Date End Date Taking? Authorizing Provider   amLODIPine (NORVASC) 5 MG tablet Take 5 mg by mouth once daily.   Yes Historical Provider, MD   apixaban (ELIQUIS) 5 mg Tab Take 5 mg by mouth 2 (two) times daily.   Yes Historical Provider, MD   aspirin 325 MG tablet Take 81 mg by mouth once daily.   Yes Historical Provider, MD   atorvastatin (LIPITOR) 20 MG tablet Take 20 mg by mouth every evening.   Yes Historical Provider, MD    fish oil-omega-3 fatty acids 300-1,000 mg capsule Take 2 g by mouth 2 (two) times daily.    Yes Historical Provider, MD   furosemide (LASIX) 80 MG tablet Take 40 mg by mouth 2 (two) times daily.    Yes Historical Provider, MD   glipiZIDE (GLUCOTROL) 5 MG tablet Take 10 mg by mouth 2 (two) times daily before meals.   Yes Historical Provider, MD   losartan (COZAAR) 50 MG tablet Take 50 mg by mouth once daily.   Yes Historical Provider, MD   metFORMIN (GLUCOPHAGE) 500 MG tablet Take 500 mg by mouth 2 (two) times daily with meals.   Yes Historical Provider, MD   metoprolol tartrate (LOPRESSOR) 25 MG tablet Take 25 mg by mouth 2 (two) times daily.   Yes Historical Provider, MD   polyethylene glycol (GLYCOLAX) 17 gram PwPk Take by mouth as needed.    Yes Historical Provider, MD   albuterol-ipratropium 2.5mg-0.5mg/3mL (DUO-NEB) 0.5 mg-3 mg(2.5 mg base)/3 mL nebulizer solution Take 3 mLs by nebulization every 6 (six) hours. Rescue 5/15/18 5/15/19  Ace Cisneros MD   aspirin 81 MG Chew Take 1 tablet (81 mg total) by mouth once daily. 7/9/18 7/9/19  Kevon Payan MD   nitroGLYCERIN (NITROSTAT) 0.4 MG SL tablet Place 0.4 mg under the tongue every 5 (five) minutes as needed.    Historical Provider, MD          HOSPITAL MEDICATIONS:     Scheduled Meds:    albuterol-ipratropium  3 mL Nebulization Q6H    amLODIPine  5 mg Oral Daily    apixaban  5 mg Oral BID    aspirin  325 mg Oral Daily    atorvastatin  20 mg Oral QHS    dextromethorphan-guaifenesin  mg/5 ml  10 mL Oral Q6H    furosemide  40 mg Intravenous Q12H    insulin aspart U-100  5 Units Subcutaneous TIDWM    insulin detemir U-100  12 Units Subcutaneous Daily    isosorbide-hydrALAZINE 20-37.5 mg  1 tablet Oral TID    losartan  50 mg Oral Daily    methylPREDNISolone sodium succinate  80 mg Intravenous Q8H    metoprolol succinate  75 mg Oral BID    nicotine  1 patch Transdermal Daily    nitroGLYCERIN 2% TD oint  1 inch Topical (Top)  Q6H    nitroGLYCERIN 2% TD oint  2 inch Topical (Top) Once     Continuous Infusions:   PRN Meds: dextrose 50%, dextrose 50%, glucagon (human recombinant), glucose, glucose, insulin aspart U-100, metoprolol, nitroGLYCERIN      PHYSICAL EXAM:     Wt Readings from Last 1 Encounters:   07/10/18 0329 86.2 kg (190 lb 0.6 oz)   07/09/18 1636 86.2 kg (190 lb)   07/09/18 1345 86.2 kg (190 lb)     Body mass index is 28.06 kg/m².  Vitals:    07/09/18 2343 07/10/18 0054 07/10/18 0328 07/10/18 0329   BP: 127/78  (!) 156/71    BP Location: Left arm  Right arm    Patient Position: Lying  Lying    Pulse: 93 (!) 58 76    Resp: 18 18 18    Temp: 96.4 °F (35.8 °C)  97.7 °F (36.5 °C)    TempSrc: Oral  Oral    SpO2: (!) 94% 98% (!) 93%    Weight:    86.2 kg (190 lb 0.6 oz)   Height:            -- General appearance:  Chronically ill-appearing male who is lying in bed and appears comfortable.  well developed. appears stated age   -- Head: normocephalic, atraumatic   -- Eyes: conjunctivae clear. Extraocular muscles intact  -- Nose nasal cannula oxygen in place.: Nares normal. Septum midline.   -- Mouth/Throat: lips, mucosa, and tongue normal. no throat erythema.   -- Neck: supple, symmetrical, trachea midline, no JVD and thyroid not grossly enlarged, appears symmetric  -- Lungs:  Decreased breath sounds to auscultation bilaterally with prolonged expiratory phase and poor air excursion.  Bibasilar crackles. normal respiratory effort. No use of accessory muscles.   -- Chest wall: no tenderness. equal bilateral chest rise   -- Heart: regular rate and regular rhythm. S1, S2 normal.  no click, rub or gallop   -- Abdomen: soft, non-tender, non-distended, non-tympanic; bowel sounds normal; no masses  -- Extremities: no cyanosis, clubbing.  1+ bilateral pitting edema to the level of the mid leg  -- Pulses: 2+ and symmetric   -- Skin: color normal, texture normal, turgor normal. No rashes or lesions.   -- Neurologic:  Globally decreased muscle  strength and tone. No focal numbness or weakness. CNII-XII intact. [place] coma scale: eyes open spontaneously-4, oriented & converses-5, obeys commands-6.      LABORATORY STUDIES:     Recent Results (from the past 36 hour(s))   CBC auto differential    Collection Time: 07/09/18  2:10 PM   Result Value Ref Range    WBC 7.65 3.90 - 12.70 K/uL    RBC 4.57 (L) 4.60 - 6.20 M/uL    Hemoglobin 10.2 (L) 14.0 - 18.0 g/dL    Hematocrit 34.3 (L) 40.0 - 54.0 %    MCV 75 (L) 82 - 98 fL    MCH 22.3 (L) 27.0 - 31.0 pg    MCHC 29.7 (L) 32.0 - 36.0 g/dL    RDW 24.7 (H) 11.5 - 14.5 %    Platelets 312 150 - 350 K/uL    MPV 10.0 9.2 - 12.9 fL    Gran # (ANC) 5.6 1.8 - 7.7 K/uL    Lymph # 1.1 1.0 - 4.8 K/uL    Mono # 0.9 0.3 - 1.0 K/uL    Eos # 0.1 0.0 - 0.5 K/uL    Baso # 0.02 0.00 - 0.20 K/uL    Gran% 73.0 38.0 - 73.0 %    Lymph% 13.9 (L) 18.0 - 48.0 %    Mono% 12.0 4.0 - 15.0 %    Eosinophil% 0.8 0.0 - 8.0 %    Basophil% 0.3 0.0 - 1.9 %    Platelet Estimate Appears normal     Aniso Moderate     Poik Slight     Poly Occasional     Hypo Moderate     Ovalocytes Occasional     Large/Giant Platelets Present     Vacuolated Granulocytes Present     Differential Method Automated    Comprehensive metabolic panel    Collection Time: 07/09/18  2:10 PM   Result Value Ref Range    Sodium 140 136 - 145 mmol/L    Potassium 4.1 3.5 - 5.1 mmol/L    Chloride 105 95 - 110 mmol/L    CO2 28 23 - 29 mmol/L    Glucose 114 (H) 70 - 110 mg/dL    BUN, Bld 25 (H) 8 - 23 mg/dL    Creatinine 1.5 (H) 0.5 - 1.4 mg/dL    Calcium 11.6 (H) 8.7 - 10.5 mg/dL    Total Protein 6.4 6.0 - 8.4 g/dL    Albumin 3.1 (L) 3.5 - 5.2 g/dL    Total Bilirubin 1.0 0.1 - 1.0 mg/dL    Alkaline Phosphatase 132 55 - 135 U/L    AST 18 10 - 40 U/L    ALT 18 10 - 44 U/L    Anion Gap 7 (L) 8 - 16 mmol/L    eGFR if African American 50 (A) >60 mL/min/1.73 m^2    eGFR if non African American 43 (A) >60 mL/min/1.73 m^2   Troponin I    Collection Time: 07/09/18  2:10 PM   Result Value Ref Range     Troponin I 0.157 (H) 0.000 - 0.026 ng/mL   Brain natriuretic peptide    Collection Time: 07/09/18  2:10 PM   Result Value Ref Range     (H) 0 - 99 pg/mL   Protime-INR    Collection Time: 07/09/18  2:10 PM   Result Value Ref Range    Prothrombin Time 11.5 9.0 - 12.5 sec    INR 1.1 0.8 - 1.2   POCT glucose    Collection Time: 07/09/18  7:56 PM   Result Value Ref Range    POCT Glucose 72 70 - 110 mg/dL   POCT glucose    Collection Time: 07/09/18 11:42 PM   Result Value Ref Range    POCT Glucose 151 (H) 70 - 110 mg/dL   Troponin I    Collection Time: 07/09/18 11:43 PM   Result Value Ref Range    Troponin I 0.146 (H) 0.000 - 0.026 ng/mL       Lab Results   Component Value Date    INR 1.1 07/09/2018    INR 1.0 05/27/2018    INR 1.0 05/23/2018     Lab Results   Component Value Date    HGBA1C 6.2 (H) 05/04/2018     Recent Labs      07/09/18   1956  07/09/18   2342   POCTGLUCOSE  72  151*           IMAGING:     Imaging Results          X-Ray Chest AP Portable (Final result)  Result time 07/09/18 14:40:17    Final result by Jovan Land MD (07/09/18 14:40:17)                 Impression:      CHF pattern similar to prior exam as further detailed above.      Electronically signed by: Jovan Land MD  Date:    07/09/2018  Time:    14:40             Narrative:    EXAMINATION:  XR CHEST AP PORTABLE    CLINICAL HISTORY:  CHF;    TECHNIQUE:  Frontal view of the chest was performed.    COMPARISON:  05/30/2018    FINDINGS:  Pacing device in the left chest wall with leads in stable position.  Prior median sternotomy for apparent cardiac revascularization procedure.  Multiple broken sternal wires.  Multiple overlying cardiac monitoring leads.    The cardiomediastinal silhouette is mildly enlarged similar to prior study.  Mild pulmonary vascular engorgement with perihilar opacities suggesting some component of pulmonary edema.  No large focal consolidation identified.  There are small bilateral pleural effusions.  No  pneumothorax.                                  CONSULTS:     IP CONSULT TO SOCIAL WORK/CASE MANAGEMENT       ASSESSMENT & PLAN:     Primary Diagnosis:  Acute pulmonary edema with congestive heart failure    Active Hospital Problems    Diagnosis  POA    *Acute pulmonary edema with congestive heart failure [I50.1]  Yes     Priority: 1 - High    Acute on chronic diastolic congestive heart failure [I50.33]  Yes     Priority: 1 - High     Low sodium diet discussed.        Centrilobular emphysema [J43.2]  Yes     Patient is not interested in a controller, does not feel he gets relief from duoneb nebulizer.     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       Acute CHF exacerbation  · Evidenced by history, elevated BNP, pulmonary edema, peripheral edema  · Provide diuresis w/ IV medication  · Maintain w/ beta-blocker  · ACE inhibitor or ARB if GFR allows and remains stable  · If 1) Patient cannot tolerate ACEi or 2) NYHA class III or above, add spironolactone (or eplerenone) and hydralazine-isosorbide dinitrate   · Daily Weights  · Strict I/O  · Fluid restriction to 1,500cc daily  · Low-sodium cardiac diet  · Obtain 2D echo if <6 months     EF   Date Value Ref Range Status   05/04/2018 50 55 - 65    09/24/2016 60 55 - 65      · Chest X-ray  · Check TSH, albumin, UA, and renal function  · EKG and cardiac enzymes PRN  · DVT prophylaxis w/ pharmacological and/or mechanical measures  · Oxygen supplementation  support PRN    Instructions given to patient/family:  Monitor daily weight.  Regular activity within patient's limitations.  Low salt, low fat and low choleterol diet and restrict fluid < 2L per day.  Call MD if SOB, chest pain, weight gain > 2-3 lbs per day and/or 5-6 lbs per week.   No smoking. Annual influenza vaccine required.    Elevated troponin  · Intitial EKG findings shows no evidence of ST-elevation MI  · Both troponins are normal limits, but consistent with his baseline troponinemia  Recent Labs      07/09/18   1410  07/09/18   2343   TROPONINI  0.157*  0.146*     · Initiated ACS protocol to rule out MI, then explore noncardiac etiology  · Trend cardiac enzymes  · Aspirin  · Beta-blocker (after stress or immediately if evidence of NSTEMI)  · Statin  · Supplemental oxygen  · nitroglycerine and morphine PRN  · 2D echocardiogram  · TSH, FT3, FT4  · ESR and cardiac specific CRP   · PT, PTT, INR   · Tight glycemic control  · Monitor on telemetry unit  · Consult cardiologist  · Will admit for rule out acute MI and possible further provocative testing for risk stratification.    Coronary artery disease  · Equivocal evidence of acute coronary syndrome at this time with minimally elevated troponin level  · Maintain adequate blood pressure control  · Heart healthy diet  · Aspirin  · Statin    Hypertension  · Goal while inpatient is a systolic blood pressure less than 160mmHg  · BP in acceptable range at this time  · Continue current home regimen with hold parameters  · PRN antihypertensives available    Diabetes mellitus type 2  · BG in acceptable range at this time  · Maintain w/ subcutaneous insulin management order set  · Hold oral diabetic meds  · ADA 1800 kcal diet  · BG goal while in patient is <180mg/dL  · HgA1c = Pending    Tobacco abuse   · Chronic tobacco use  · Tobacco cessation counseling  · Nicotine patch offered; consider Wellbutrin or Chantix through PCP as an outpatient (will require closer  monitoring)  · I discussed with the patient regarding the hazardous effects of smoking on increasing risk of heart attack and stroke, worsening lung functions, and increasing cancer risk.   Patient was urged to stop smoking now.  I also offered nicotine taper (such as nicotine patch and gum) to help ease the craving to smoke.    Stable COPD  · No acute issues  · Continue with home medication regimen  · Nebulizer treatments (albuterol/atrovent)  · Titrate O2 sats between 88 to 93%.  No supplemental 02 for 02 saturation greater than 93% due to V/Q mismatch  · Consider initiating regimen of Breo, Advair 500/50mg, Spiriva 18mcg, and/or Daliresp 500mcg at or before discharge.  May also defer to outpatient pulmonology after formal pulmonary function testing.  · Mucolytics as needed  · Outpatient referral to pulmonology for further optimization  · Tobacco cessation counseling    Peripheral vascular disease   · No evidence of acute issue at this time  · Maintain adequate blood pressure control  · Heart healthy diet  · Aspirin  · Statin    Chronic anticoagulation  · For treatment of chronic atrial fibrillation  · Monitor INR closely    Chronic Kidney Disease  · Renal dose medications  · Avoid nephrotoxic agents  · Maintain euvolemic state          VTE Risk Mitigation         Ordered     apixaban tablet 5 mg  2 times daily      07/10/18 0337     IP VTE HIGH RISK PATIENT  Once      07/09/18 1914     Place sequential compression device  Until discontinued      07/09/18 1914            Adult PRN medications available   DVT prophylaxis given       DISPOSITION:     Will admit to the Hospital Medicine service for further evaluation and treatment.    Chart reviewed and updated where applicable.    High Risk Conditions:  Patient has a condition that poses threat to life and bodily function:  Acute CHF exacerbation and elevated troponin level      ===============================================================    Blanco Hardy  MD, MPH  Department of Hospital Medicine   [facility]  [phone] pg  (7pm - 6am)          This note is dictated using Dragon Medical 360 voice recognition software.  There are word recognition mistakes that are occasionally missed on review.

## 2018-07-10 NOTE — PLAN OF CARE
Problem: Patient Care Overview  Goal: Plan of Care Review   07/10/18 0240   Coping/Psychosocial   Plan Of Care Reviewed With patient       Problem: Fall Risk (Adult)  Intervention: Safety Promotion/Fall Prevention   07/09/18 1950   Safety Interventions   Safety Promotion/Fall Prevention bed alarm set;commode/urinal/bedpan at bedside;Fall Risk reviewed with patient/family;lighting adjusted;nonskid shoes/socks when out of bed;side rails raised x 2         Problem: Cardiac: Heart Failure (Adult)  Intervention: Provide Oxygenation/Ventilation/Perfusion Support   07/10/18 0240   Positioning   Head of Bed (HOB) HOB at 30-45 degrees   Respiratory Interventions   Airway/Ventilation Management airway patency maintained;pulmonary hygiene promoted     Intervention: Support Psychosocial Response to Heart Failure   07/10/18 0240   Coping/Psychosocial Interventions   Supportive Measures active listening utilized     Intervention: Gradually Correct Positive Fluid Balance   07/10/18 0240   Nutrition Interventions   Fluid/Electrolyte Management electrolyte binding therapy initiated;fluids restricted       Goal: Signs and Symptoms of Listed Potential Problems Will be Absent, Minimized or Managed (Cardiac: Heart Failure)  Signs and symptoms of listed potential problems will be absent, minimized or managed by discharge/transition of care (reference Cardiac: Heart Failure (Adult) CPG).   07/10/18 0240   Cardiac: Heart Failure   Problems Assessed (Heart Failure) all   Problems Present (Heart Failure) decreased quality of life;fluid/electrolyte imbalance;functional decline/self-care deficit;respiratory compromise;situational response

## 2018-07-10 NOTE — HPI
Agus Ramos Jr. is a 78 y.o. male that (in part)  has a past medical history of Anemia; Anticoagulant long-term use; Congestive heart failure; Coronary artery disease; Diabetes mellitus; Encounter for blood transfusion; Hypertension; MI (myocardial infarction); Pacemaker; Peripheral vascular disease; S/P CABG x 3; S/P femoral-popliteal bypass surgery; Stented coronary artery; and Tobacco use.  has a past surgical history that includes Cardiac surgery; Cardiac pacemaker placement; Cardiac catheterization; Hemorrhoid surgery; and Esophagogastroduodenoscopy (N/A, 6/1/2018).   Presents to [facility] Emergency Department complaining of shortness of breath as described below in detail.  Patient reports complaints of the home medication regimen for CHF, COPD, diabetes.     Description of symptoms    Location:  Chest  Onset:  Several weeks ago progressive worsening  Character:  Shortness of breath and dyspnea with exertion  Frequency:  Daily  Duration:  Constant  Associated Symptoms:  Positive for nonproductive cough, shortness of breath, dyspnea exertion, peripheral edema.  Denies fever, chills, stridor, wheezing, night sweats, hemoptysis, syncope, palpitations, or abdominal pain.  Radiation:  Radiation of chest  Exacerbating factors:  Minimal physical exertion  Relieving factors:  Some relief with supplemental oxygen and Lasix given in the ED.  Patient uses 2 L nasal cannula oxygen at home for treatment of emphysema     In the emergency department routine laboratory studies, EKG, cardiac enzymes, chest x-ray were obtained.  There is evidence of acute CHF exacerbation with pulmonary edema.  Patient was given diuretics.  He was also minimally elevated troponin level appears to be consistent with his baseline troponin level.    Hospital medicine has been asked to admit for further evaluation and treatment.

## 2018-07-10 NOTE — NURSING
Patient to Cardiology via wheelchair with oxygen  by transport. Telemetry monitoring in progress. Patient in NAD and without complaints.

## 2018-07-10 NOTE — DISCHARGE SUMMARY
[facility] Hospital Medicine  Discharge Summary      Patient Name: Agus Ramos Jr.  MRN: [id number]  Admission Date: 7/9/2018  Hospital Length of Stay: 0 days  Discharge Date and Time:  07/10/2018 5:33 PM  Attending Physician: Brenda Burger MD   Discharging Provider: LEANNE Solano  Primary Care Provider: Keaton Hardy MD      HPI:   Agus Ramos Jr. is a 78 y.o. male that (in part)  has a past medical history of Anemia; Anticoagulant long-term use; Congestive heart failure; Coronary artery disease; Diabetes mellitus; Encounter for blood transfusion; Hypertension; MI (myocardial infarction); Pacemaker; Peripheral vascular disease; S/P CABG x 3; S/P femoral-popliteal bypass surgery; Stented coronary artery; and Tobacco use.  has a past surgical history that includes Cardiac surgery; Cardiac pacemaker placement; Cardiac catheterization; Hemorrhoid surgery; and Esophagogastroduodenoscopy (N/A, 6/1/2018).   Presents to [facility] Emergency Department complaining of shortness of breath as described below in detail.  Patient reports complaints of the home medication regimen for CHF, COPD, diabetes.     Description of symptoms    Location:  Chest  Onset:  Several weeks ago progressive worsening  Character:  Shortness of breath and dyspnea with exertion  Frequency:  Daily  Duration:  Constant  Associated Symptoms:  Positive for nonproductive cough, shortness of breath, dyspnea exertion, peripheral edema.  Denies fever, chills, stridor, wheezing, night sweats, hemoptysis, syncope, palpitations, or abdominal pain.  Radiation:  Radiation of chest  Exacerbating factors:  Minimal physical exertion  Relieving factors:  Some relief with supplemental oxygen and Lasix given in the ED.  Patient uses 2 L nasal cannula oxygen at home for treatment of emphysema     In the emergency department routine laboratory studies, EKG, cardiac enzymes, chest x-ray were obtained.   There is evidence of acute CHF exacerbation with pulmonary edema.  Patient was given diuretics.  He was also minimally elevated troponin level appears to be consistent with his baseline troponin level.    Hospital medicine has been asked to admit for further evaluation and treatment.       * No surgery found *      Hospital Course:   78 y.o. past medical history of Anemia; Anticoagulant long-term use; Congestive heart failure; Coronary artery disease; Diabetes mellitus; Encounter for blood transfusion; Hypertension; MI (myocardial infarction); Pacemaker; Peripheral vascular disease; S/P CABG x 3; S/P femoral-popliteal bypass surgery; Stented coronary artery; and Tobacco use. Patient presented for evaluation of SOB with associated LE edema. He was administered IV lasix from which he responded well. He is sating above 97% on usual 2L oxygen. His losartan was held during hospitalization 2/2 renal and restarted at discharge. He has chronic troponin emia, had elevated troponins this time that trended flat. BNP consistent with prior. Repeat 2D echo showed findings consistent with prior echo. Patient denies chest pain. Is at his baseline. Follow up in clinic with cardiology.     Consults:   Consults         Status Ordering Provider     IP consult to case management  Once     Provider:  (Not yet assigned)    Completed SUSANA DELA CRUZ          No new Assessment & Plan notes have been filed under this hospital service since the last note was generated.  Service: Hospital Medicine    Final Active Diagnoses:    Diagnosis Date Noted POA    PRINCIPAL PROBLEM:  Acute pulmonary edema with congestive heart failure [I50.1] 07/09/2018 Yes    Centrilobular emphysema [J43.2] 05/15/2018 Yes    Troponin level elevated [R74.8] 05/04/2018 Yes    Tobacco abuse [Z72.0] 05/04/2018 Yes     Chronic    Chronic anticoagulation [Z79.01] 05/04/2018 Not Applicable     Chronic    Pacemaker [Z95.0] 05/04/2018 Yes    Anemia [D64.9]  05/03/2018 Yes    Acute on chronic diastolic congestive heart failure [I50.33] 09/24/2016 Yes    CKD Stage 3 [N18.3] 09/24/2016 Yes     Chronic    Essential hypertension [I10] 09/24/2016 Yes     Chronic    Type 2 diabetes mellitus, controlled, 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 Go on 7/23/2018.    Specialty:  Internal Medicine  Why:  Hospital follow up, please arrive at 10:00 AM  Contact information:  [address]  [address]  [place] [address]  [phone]             Tobi Mathias MD. Go on 7/26/2018.    Specialty:  Cardiology  Why:  Hospital follow up, please arrive at 1:15 PM   Contact information:  [address]  [address]  [place] [address]  [phone]                 Patient Instructions:     Diet Cardiac   Scheduling Instructions: Fluid restriction     Diet Cardiac     Activity as tolerated     Activity as tolerated         Significant Diagnostic Studies: Labs:   CMP   Recent Labs  Lab 07/09/18  1410      K 4.1      CO2 28   *   BUN 25*   CREATININE 1.5*   CALCIUM 11.6*   PROT 6.4   ALBUMIN 3.1*   BILITOT 1.0   ALKPHOS 132   AST 18   ALT 18   ANIONGAP 7*   ESTGFRAFRICA 50*   EGFRNONAA 43*   , CBC   Recent Labs  Lab 07/09/18  1410   WBC 7.65   HGB 10.2*   HCT 34.3*      , INR   Lab Results   Component Value Date    INR 1.1 07/09/2018    INR 1.0 05/27/2018    INR 1.0 05/23/2018   , Lipid Panel   Lab Results   Component Value Date    CHOL 103 (L) 07/10/2018    HDL 37 (L) 07/10/2018    LDLCALC 52.0 (L) 07/10/2018    TRIG 70 07/10/2018    CHOLHDL 35.9 07/10/2018   , Troponin   Recent Labs  Lab 07/10/18  0607   TROPONINI 0.144*    and A1C:   Recent Labs  Lab  05/04/18  0612 07/09/18  1410   HGBA1C 6.2* 5.0       Pending Diagnostic Studies:     Procedure Component Value Units Date/Time    PTH, intact [[id number]] Collected:  07/10/18 0943    Order Status:  Sent Lab Status:  In process Updated:  07/10/18 1003    Specimen:  Blood from Blood          Medications:  Reconciled Home Medications:      Medication List      START taking these medications    aspirin 81 MG Chew  Take 1 tablet (81 mg total) by mouth once daily.  Replaces:  aspirin 81 MG EC tablet        CHANGE how you take these medications    amLODIPine 5 MG tablet  Commonly known as:  NORVASC  Take 5 mg by mouth once daily.  What changed:  Another medication with the same name was removed. Continue taking this medication, and follow the directions you see here.     atorvastatin 20 MG tablet  Commonly known as:  LIPITOR  Take 20 mg by mouth every evening.  What changed:  Another medication with the same name was removed. Continue taking this medication, and follow the directions you see here.     furosemide 80 MG tablet  Commonly known as:  LASIX  Take 40 mg by mouth 2 (two) times daily.  What changed:  Another medication with the same name was removed. Continue taking this medication, and follow the directions you see here.        CONTINUE taking these medications    albuterol-ipratropium 2.5 mg-0.5 mg/3 mL nebulizer solution  Commonly known as:  DUO-NEB  Take 3 mLs by nebulization every 6 (six) hours. Rescue     ELIQUIS 5 mg Tab  Generic drug:  apixaban  Take 5 mg by mouth 2 (two) times daily.     fish oil-omega-3 fatty acids 300-1,000 mg capsule  Take 2 g by mouth 2 (two) times daily.     glipiZIDE 5 MG tablet  Commonly known as:  GLUCOTROL  Take 10 mg by mouth 2 (two) times daily before meals.     losartan 50 MG tablet  Commonly known as:  COZAAR  Take 50 mg by mouth once daily.     metFORMIN 500 MG tablet  Commonly known as:  GLUCOPHAGE  Take 500 mg by mouth 2 (two) times daily with meals.     metoprolol  tartrate 25 MG tablet  Commonly known as:  LOPRESSOR  Take 25 mg by mouth 2 (two) times daily.     nitroGLYCERIN 0.4 MG SL tablet  Commonly known as:  NITROSTAT  Place 0.4 mg under the tongue every 5 (five) minutes as needed.     polyethylene glycol 17 gram Pwpk  Commonly known as:  GLYCOLAX  Take by mouth as needed.        STOP taking these medications    aspirin 325 MG tablet     aspirin 81 MG EC tablet  Commonly known as:  ECOTRIN  Replaced by:  aspirin 81 MG Chew     carvedilol 6.25 MG tablet  Commonly known as:  COREG     cyanocobalamin 500 MCG tablet     ferrous sulfate 325 (65 FE) MG EC tablet     omeprazole 40 MG capsule  Commonly known as:  PRILOSEC            Indwelling Lines/Drains at time of discharge:   Lines/Drains/Airways          No matching active lines, drains, or airways          Time spent on the discharge of patient: 45 minutes  Patient was seen and examined on the date of discharge and determined to be suitable for discharge.         ANDRE Flower, FNP-C  Hospitalist - Department of Hospital Medicine  [address] [place] [address]  Office [phone]; Pager [phone]

## 2018-07-11 LAB — PTH-INTACT SERPL-MCNC: 103.6 PG/ML

## 2018-07-11 NOTE — NURSING
Nurse called and spoke with wife Jessika and notified her that patient is discharged from hospital and ready for . Nurse advised to bring patient's home oxygen to hospital for transport. Spouse verbalized understanding.

## 2018-07-11 NOTE — NURSING
Pt discharged. PIV removed catheter intact and tele monitor discontinued by previous shift nurse, HAYDEE Aguayo. Discharge orders reviewed with pt and pt's wife. Copy placed in blue folder and given to pt. Pt's belongings removed from room and given to pt. Pt assisted to wheelchair and is on personal 2LNC of oxygen. Pt appears to be in no apparent distress. Pt assisted off unit via wheelchair by transport via wheelchair. Family members are at pt's side.

## 2018-07-11 NOTE — NURSING
Message left for patient's spouse, Jessika at [phone] to return call to Observation unit regarding patient's discharge.

## 2018-08-29 ENCOUNTER — LAB VISIT (OUTPATIENT)
Dept: LAB | Facility: HOSPITAL | Age: 78
End: 2018-08-29
Attending: INTERNAL MEDICINE
Payer: MEDICARE

## 2018-08-29 DIAGNOSIS — D64.9 ANEMIA, UNSPECIFIED TYPE: ICD-10-CM

## 2018-08-29 LAB
ALBUMIN SERPL BCP-MCNC: 2.8 G/DL
ALP SERPL-CCNC: 128 U/L
ALT SERPL W/O P-5'-P-CCNC: 7 U/L
ANION GAP SERPL CALC-SCNC: 3 MMOL/L
ANISOCYTOSIS BLD QL SMEAR: SLIGHT
AST SERPL-CCNC: 13 U/L
BASOPHILS # BLD AUTO: 0.03 K/UL
BASOPHILS NFR BLD: 0.6 %
BILIRUB SERPL-MCNC: 0.4 MG/DL
BUN SERPL-MCNC: 16 MG/DL
CALCIUM SERPL-MCNC: 11.5 MG/DL
CHLORIDE SERPL-SCNC: 106 MMOL/L
CO2 SERPL-SCNC: 33 MMOL/L
CREAT SERPL-MCNC: 1.3 MG/DL
DIFFERENTIAL METHOD: ABNORMAL
EOSINOPHIL # BLD AUTO: 0.2 K/UL
EOSINOPHIL NFR BLD: 4.2 %
ERYTHROCYTE [DISTWIDTH] IN BLOOD BY AUTOMATED COUNT: 19.2 %
EST. GFR  (AFRICAN AMERICAN): 60 ML/MIN/1.73 M^2
EST. GFR  (NON AFRICAN AMERICAN): 52 ML/MIN/1.73 M^2
FERRITIN SERPL-MCNC: 47 NG/ML
GLUCOSE SERPL-MCNC: 79 MG/DL
HCT VFR BLD AUTO: 35.4 %
HGB BLD-MCNC: 10.4 G/DL
HYPOCHROMIA BLD QL SMEAR: ABNORMAL
IRON SERPL-MCNC: 273 UG/DL
LYMPHOCYTES # BLD AUTO: 1.2 K/UL
LYMPHOCYTES NFR BLD: 22.2 %
MCH RBC QN AUTO: 23.5 PG
MCHC RBC AUTO-ENTMCNC: 29.4 G/DL
MCV RBC AUTO: 80 FL
MONOCYTES # BLD AUTO: 0.5 K/UL
MONOCYTES NFR BLD: 10.2 %
NEUTROPHILS # BLD AUTO: 3.2 K/UL
NEUTROPHILS NFR BLD: 62.8 %
OVALOCYTES BLD QL SMEAR: ABNORMAL
PLATELET # BLD AUTO: 311 K/UL
PLATELET BLD QL SMEAR: ABNORMAL
PMV BLD AUTO: 9.3 FL
POLYCHROMASIA BLD QL SMEAR: ABNORMAL
POTASSIUM SERPL-SCNC: 4 MMOL/L
PROT SERPL-MCNC: 6 G/DL
RBC # BLD AUTO: 4.42 M/UL
RETICS/RBC NFR AUTO: 1.3 %
SATURATED IRON: 81 %
SODIUM SERPL-SCNC: 142 MMOL/L
TARGETS BLD QL SMEAR: ABNORMAL
TOTAL IRON BINDING CAPACITY: 336 UG/DL
TRANSFERRIN SERPL-MCNC: 227 MG/DL
WBC # BLD AUTO: 5.18 K/UL

## 2018-08-29 PROCEDURE — 85045 AUTOMATED RETICULOCYTE COUNT: CPT

## 2018-08-29 PROCEDURE — 85025 COMPLETE CBC W/AUTO DIFF WBC: CPT

## 2018-08-29 PROCEDURE — 82728 ASSAY OF FERRITIN: CPT

## 2018-08-29 PROCEDURE — 36415 COLL VENOUS BLD VENIPUNCTURE: CPT

## 2018-08-29 PROCEDURE — 80053 COMPREHEN METABOLIC PANEL: CPT

## 2018-08-29 PROCEDURE — 83540 ASSAY OF IRON: CPT

## 2018-09-07 ENCOUNTER — OFFICE VISIT (OUTPATIENT)
Dept: HEMATOLOGY/ONCOLOGY | Facility: CLINIC | Age: 78
End: 2018-09-07
Payer: MEDICARE

## 2018-09-07 VITALS
DIASTOLIC BLOOD PRESSURE: 82 MMHG | HEART RATE: 77 BPM | SYSTOLIC BLOOD PRESSURE: 150 MMHG | TEMPERATURE: 98 F | OXYGEN SATURATION: 99 %

## 2018-09-07 DIAGNOSIS — N18.9 CHRONIC KIDNEY DISEASE, UNSPECIFIED CKD STAGE: ICD-10-CM

## 2018-09-07 DIAGNOSIS — D50.9 IRON DEFICIENCY ANEMIA, UNSPECIFIED IRON DEFICIENCY ANEMIA TYPE: Primary | ICD-10-CM

## 2018-09-07 DIAGNOSIS — D50.9 MICROCYTIC ANEMIA: ICD-10-CM

## 2018-09-07 PROCEDURE — 99213 OFFICE O/P EST LOW 20 MIN: CPT | Mod: S$PBB,,, | Performed by: INTERNAL MEDICINE

## 2018-09-07 PROCEDURE — 99999 PR PBB SHADOW E&M-EST. PATIENT-LVL III: CPT | Mod: PBBFAC,,, | Performed by: INTERNAL MEDICINE

## 2018-09-07 PROCEDURE — 1101F PT FALLS ASSESS-DOCD LE1/YR: CPT | Mod: CPTII,,, | Performed by: INTERNAL MEDICINE

## 2018-09-07 PROCEDURE — 3077F SYST BP >= 140 MM HG: CPT | Mod: CPTII,,, | Performed by: INTERNAL MEDICINE

## 2018-09-07 PROCEDURE — 99213 OFFICE O/P EST LOW 20 MIN: CPT | Mod: PBBFAC | Performed by: INTERNAL MEDICINE

## 2018-09-07 PROCEDURE — 3079F DIAST BP 80-89 MM HG: CPT | Mod: CPTII,,, | Performed by: INTERNAL MEDICINE

## 2018-09-07 NOTE — PROGRESS NOTES
Subjective:       Patient ID: Agus Ramos Jr. is a 78 y.o. male.    Chief Complaint: Follow-up    HPI   Diagnosis: ANEMIA      77-year-old male with past medical history of Congestive heart failure, Coronary artery disease.Diabetes mellitus, AFib Hypertension; MI (myocardial infarction),  Pacemaker; Peripheral vascular disease; S/P CABG x 3;  Stented coronary artery, Tobacco use, CKD stage 3 Anemia in CKD seen today for f/u for anemia.  Patient hospitalized  5/2018 with CHF exacerbation found to have low hemoglobin of 6.5 grams/deciliter.  No active bleeding noted. Patient refused a rectal exam.  He underwent 2 units packed red blood cell transfusion with appropriate response to hemoglobin of 8.5.  He was treated for CHF exacerbation.  He underwent chest CT imaging which revealed concern for loculated right pleural effusion.  Patient was followed by pulmonology during hospitalization and has close follow-up planned as an outpatient.  He reports  IR cancelled procedure yesterday  for diagnostic tap.  He reports his recent transfusion was his first.  He has undergone a colonoscopy 10 years ago at [facility] which was unremarkable according to patient.    Patient a poor historian appears confused at times.  He is currently in wheelchair he was discharged home on home O2.  He denies shortness of breath cough.  No fatigue.  CBC from 05/13/2018 reveals a white blood cell count of 7.4 hemoglobin of 8 grams/dL hematocrit of 28% MCV of 71 platelet count of 233k.      Pt re hospitalized 5/27/2018 with acute on chronic respiratory failure with hypoxia. . CXR and lung exam consistent with bibasilar atelectasis and small effusion. . Hgb also noted to be low chronically ranging between high end of 7 g/dL and lower end of 8 g/dL. Pt  Underwent EGD to eval for bleed. Found some erythematous mucosa in the antrum (Biopsied) but otherwise norm.CBC on presentation reveals a white blood cell count of 9.2 hemoglobin of 8.6  grams/deciliter hematocrit of 30.4% MCV of 71 and a platelet count of 540 . He underwent prbc transfusion and IV Fe during hospitalization.     underwent EGD- no acute finding    He has no new issues  Mild fatigre  Pt  followed by GI and C scope planned in near future  Cardiac clearance pending   No CP   He is on chronic O2.      No melena, hematochezia or change in bowel habits.     CBC and  reveals a white blood cell count of 5100/mm3 hemoglobin of 10.4  grams/deciliter hematocrit of 35.4% a platelet count of 311k    He is followed by a endocrinology for chronic hypercalcemia related to hyperparathyroidism.     Past Medical History:   Diagnosis Date    Anemia 05/03/2018    pending blood transfusion    Anticoagulant long-term use     Congestive heart failure     Coronary artery disease     Diabetes mellitus     Encounter for blood transfusion     Hypertension     MI (myocardial infarction)     Pacemaker     left chest    Peripheral vascular disease     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     S/P femoral-popliteal bypass surgery left    Stented coronary artery     Tobacco use        Past Surgical History:   Procedure Laterality Date    CARDIAC CATHETERIZATION      CARDIAC PACEMAKER PLACEMENT      CARDIAC SURGERY      CARDIOVERSION N/A 9/19/2013    [facility]    EGD (ESOPHAGOGASTRODUODENOSCOPY) N/A 6/1/2018    Performed by [facility]man [facility] at [facility]O    ESOPHAGOGASTRODUODENOSCOPY N/A 6/1/2018    Procedure: EGD (ESOPHAGOGASTRODUODENOSCOPY);  Surgeon: Ty Hickman MD;  Location[facility];  Service: Endoscopy;  Laterality: N/A;    HEMORRHOID SURGERY      TRANSESOPHAGEAL ECHOCARDIOGRAM (ANIA) N/A 9/19/2013    Performed by Maryann Surgeon at [facility] MARYANN           Review of Systems   Constitutional: Negative for appetite change, fatigue, fever and unexpected weight change.   HENT: Negative for mouth sores.    Eyes: Negative for visual disturbance.   Respiratory: Negative for cough and shortness of  breath.    Cardiovascular: Negative for chest pain.   Gastrointestinal: Negative for abdominal pain and diarrhea.   Genitourinary: Negative for frequency.   Musculoskeletal: Negative for back pain.   Skin: Negative for rash.   Neurological: Negative for headaches.   Hematological: Negative for adenopathy.   Psychiatric/Behavioral: The patient is not nervous/anxious.        Objective:       Vitals:    09/07/18 1508 09/07/18 1511   BP: (!) 168/74 (!) 150/82   BP Location: Right arm Left arm   Patient Position: Sitting Sitting   BP Method: Large (Manual) Large (Manual)   Pulse: 77    Temp: 97.8 °F (36.6 °C)    TempSrc: Oral    SpO2: 99%        Physical Exam   Constitutional: He is oriented to person, place, and time. He appears well-developed and well-nourished.   In wheelchair   HENT:   Head: Normocephalic.   Mouth/Throat: Oropharynx is clear and moist. No oropharyngeal exudate.   Eyes: Conjunctivae are normal. No scleral icterus.   Neck: Normal range of motion. Neck supple. No thyromegaly present.   Cardiovascular: Normal rate, regular rhythm and normal heart sounds.   No murmur heard.  Pulmonary/Chest: Effort normal. He has no wheezes. He has no rales.   Abdominal: Soft. Bowel sounds are normal. There is no hepatosplenomegaly. There is no tenderness. There is no rebound and no guarding.   Musculoskeletal: He exhibits edema.   In wheelchair   Neurological: He is alert and oriented to person, place, and time. No cranial nerve deficit.   Skin: No rash noted. No erythema.   Psychiatric: He has a normal mood and affect.       CT chest w/contrast 5/10/2018   Moderate-sized loculated right pleural effusion clinical correlation advised.    Patchy bibasilar opacities suggesting atelectasis.    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    Multifocal hypodensities within the liver partially visualized which may represent cysts though  indeterminate.  Follow-up dedicated hepatic imaging recommended.        Results for ASHLEY GONZALEZ JR. (MRN [id number]) as of 6/29/2018 10:18   Ref. Range 6/21/2018 09:16   Zinc, Serum-ALT Latest Ref Range: 60 - 130 ug/dL 74     Results for ASHLEY GONZALEZ JR. (MRN [id number]) as of 6/29/2018 10:18   Ref. Range 6/21/2018 09:16   [place] Free Light Chains Latest Ref Range: 0.33 - 1.94 mg/dL 2.60 (H)   Lambda Free Light Chains Latest Ref Range: 0.57 - 2.63 mg/dL 2.62   Kappa/Lambda FLC Ratio Latest Ref Range: 0.26 - 1.65  0.99   Results for ASHLEY GONZALEZ JR. (MRN [id number]) as of 6/29/2018 10:18   Ref. Range 6/21/2018 09:16   Haptoglobin Latest Ref Range: 30 - 250 mg/dL 178     Results for ASHLEY GONZALEZ JR. (MRN [id number]) as of 6/29/2018 16:39   Ref. Range 6/21/2018 09:16   Haptoglobin Latest Ref Range: 30 - 250 mg/dL 178   Retic Latest Ref Range: 0.4 - 2.0 % 2.6 (H)   Sed Rate Latest Ref Range: 0 - 10 mm/Hr 12 (H)       Lab Results   Component Value Date    WBC 5.18 08/29/2018    HGB 10.4 (L) 08/29/2018    HCT 35.4 (L) 08/29/2018    MCV 80 (L) 08/29/2018     08/29/2018     Lab Results   Component Value Date    IRON 273 (H) 08/29/2018    TIBC 336 08/29/2018    FERRITIN 47 08/29/2018       SPEP nl     Assessment:       1. Iron deficiency anemia, unspecified iron deficiency anemia type        Plan:   77-year-old with multiple medical diagnoses with longstanding history of anemia in chronic kidney disease  S/p 2u prbc 5/3/2018   S/p IV Fe   Hb 10.4g/dl -stable   He is followed by GI and process of undergoing GI evaluation  Pt awaiting GI evaluation- postponed awaiting cardiac clearance   Cont to monitor counts    Pt agreeable with plan    F/u 2 mos with cbc. Fe studies, retic ct         Cc: MD Keaton Castle MD

## 2018-11-30 ENCOUNTER — HOSPITAL ENCOUNTER (OUTPATIENT)
Facility: HOSPITAL | Age: 78
Discharge: HOME | End: 2018-12-02
Attending: EMERGENCY MEDICINE | Admitting: HOSPITALIST
Payer: MEDICARE

## 2018-11-30 DIAGNOSIS — R82.71 BACTERIURIA: ICD-10-CM

## 2018-11-30 DIAGNOSIS — I50.23 ACUTE ON CHRONIC SYSTOLIC CONGESTIVE HEART FAILURE: Primary | ICD-10-CM

## 2018-11-30 DIAGNOSIS — E11.21 CONTROLLED TYPE 2 DIABETES MELLITUS WITH DIABETIC NEPHROPATHY, UNSPECIFIED WHETHER LONG TERM INSULIN USE: Chronic | ICD-10-CM

## 2018-11-30 DIAGNOSIS — N18.30 CKD (CHRONIC KIDNEY DISEASE) STAGE 3, GFR 30-59 ML/MIN: Chronic | ICD-10-CM

## 2018-11-30 DIAGNOSIS — I48.20 CHRONIC ATRIAL FIBRILLATION: Chronic | ICD-10-CM

## 2018-11-30 DIAGNOSIS — I10 ESSENTIAL HYPERTENSION: Chronic | ICD-10-CM

## 2018-11-30 DIAGNOSIS — R06.02 SHORTNESS OF BREATH: ICD-10-CM

## 2018-11-30 DIAGNOSIS — I50.9 CHF (CONGESTIVE HEART FAILURE): ICD-10-CM

## 2018-11-30 DIAGNOSIS — I48.91 ATRIAL FIBRILLATION: ICD-10-CM

## 2018-11-30 LAB
ALBUMIN SERPL BCP-MCNC: 2.7 G/DL
ALLENS TEST: ABNORMAL
ALP SERPL-CCNC: 135 U/L
ALT SERPL W/O P-5'-P-CCNC: 10 U/L
ANION GAP SERPL CALC-SCNC: 6 MMOL/L
AST SERPL-CCNC: 18 U/L
BACTERIA #/AREA URNS HPF: ABNORMAL /HPF
BASOPHILS # BLD AUTO: 0.03 K/UL
BASOPHILS NFR BLD: 0.4 %
BILIRUB SERPL-MCNC: 0.6 MG/DL
BILIRUB UR QL STRIP: NEGATIVE
BNP SERPL-MCNC: 661 PG/ML
BUN SERPL-MCNC: 27 MG/DL
CALCIUM SERPL-MCNC: 11.2 MG/DL
CHLORIDE SERPL-SCNC: 108 MMOL/L
CLARITY UR: ABNORMAL
CO2 SERPL-SCNC: 27 MMOL/L
COLOR UR: YELLOW
CREAT SERPL-MCNC: 1.5 MG/DL
DELSYS: ABNORMAL
DIFFERENTIAL METHOD: ABNORMAL
EOSINOPHIL # BLD AUTO: 0.1 K/UL
EOSINOPHIL NFR BLD: 1.1 %
ERYTHROCYTE [DISTWIDTH] IN BLOOD BY AUTOMATED COUNT: 18.3 %
EST. GFR  (AFRICAN AMERICAN): 50 ML/MIN/1.73 M^2
EST. GFR  (NON AFRICAN AMERICAN): 43 ML/MIN/1.73 M^2
FLOW: 4
GLUCOSE SERPL-MCNC: 133 MG/DL
GLUCOSE UR QL STRIP: NEGATIVE
HCO3 UR-SCNC: 25.4 MMOL/L (ref 24–28)
HCT VFR BLD AUTO: 34.2 %
HGB BLD-MCNC: 10.5 G/DL
HGB UR QL STRIP: ABNORMAL
HYALINE CASTS #/AREA URNS LPF: 0 /LPF
INR PPP: 1.1
KETONES UR QL STRIP: NEGATIVE
LEUKOCYTE ESTERASE UR QL STRIP: ABNORMAL
LYMPHOCYTES # BLD AUTO: 0.9 K/UL
LYMPHOCYTES NFR BLD: 11.3 %
MCH RBC QN AUTO: 24.1 PG
MCHC RBC AUTO-ENTMCNC: 30.7 G/DL
MCV RBC AUTO: 79 FL
MICROSCOPIC COMMENT: ABNORMAL
MODE: ABNORMAL
MONOCYTES # BLD AUTO: 0.9 K/UL
MONOCYTES NFR BLD: 10.8 %
NEUTROPHILS # BLD AUTO: 6.4 K/UL
NEUTROPHILS NFR BLD: 76.4 %
NITRITE UR QL STRIP: NEGATIVE
PCO2 BLDA: 40.5 MMHG (ref 35–45)
PH SMN: 7.41 [PH] (ref 7.35–7.45)
PH UR STRIP: 6 [PH] (ref 5–8)
PLATELET # BLD AUTO: 348 K/UL
PMV BLD AUTO: 9.8 FL
PO2 BLDA: 64 MMHG (ref 80–100)
POC BE: 1 MMOL/L
POC SATURATED O2: 92 % (ref 95–100)
POC TCO2: 27 MMOL/L (ref 23–27)
POCT GLUCOSE: 130 MG/DL (ref 70–110)
POCT GLUCOSE: 142 MG/DL (ref 70–110)
POTASSIUM SERPL-SCNC: 4.2 MMOL/L
PROT SERPL-MCNC: 5.9 G/DL
PROT UR QL STRIP: ABNORMAL
PROTHROMBIN TIME: 11.1 SEC
RBC # BLD AUTO: 4.35 M/UL
RBC #/AREA URNS HPF: 1 /HPF (ref 0–4)
SAMPLE: ABNORMAL
SITE: ABNORMAL
SODIUM SERPL-SCNC: 141 MMOL/L
SP GR UR STRIP: 1.01 (ref 1–1.03)
SQUAMOUS #/AREA URNS HPF: 2 /HPF
TROPONIN I SERPL DL<=0.01 NG/ML-MCNC: 0.1 NG/ML
TROPONIN I SERPL DL<=0.01 NG/ML-MCNC: 0.1 NG/ML
URN SPEC COLLECT METH UR: ABNORMAL
UROBILINOGEN UR STRIP-ACNC: NEGATIVE EU/DL
WBC # BLD AUTO: 8.34 K/UL
WBC #/AREA URNS HPF: 2 /HPF (ref 0–5)

## 2018-11-30 PROCEDURE — 99291 CRITICAL CARE FIRST HOUR: CPT | Mod: 25

## 2018-11-30 PROCEDURE — 25000003 PHARM REV CODE 250: Performed by: EMERGENCY MEDICINE

## 2018-11-30 PROCEDURE — 63600175 PHARM REV CODE 636 W HCPCS: Performed by: PHYSICIAN ASSISTANT

## 2018-11-30 PROCEDURE — G0378 HOSPITAL OBSERVATION PER HR: HCPCS

## 2018-11-30 PROCEDURE — 36600 WITHDRAWAL OF ARTERIAL BLOOD: CPT

## 2018-11-30 PROCEDURE — 80053 COMPREHEN METABOLIC PANEL: CPT

## 2018-11-30 PROCEDURE — 84484 ASSAY OF TROPONIN QUANT: CPT | Mod: 91

## 2018-11-30 PROCEDURE — 25000242 PHARM REV CODE 250 ALT 637 W/ HCPCS: Performed by: EMERGENCY MEDICINE

## 2018-11-30 PROCEDURE — 94761 N-INVAS EAR/PLS OXIMETRY MLT: CPT

## 2018-11-30 PROCEDURE — 27000221 HC OXYGEN, UP TO 24 HOURS

## 2018-11-30 PROCEDURE — 81000 URINALYSIS NONAUTO W/SCOPE: CPT

## 2018-11-30 PROCEDURE — 96374 THER/PROPH/DIAG INJ IV PUSH: CPT

## 2018-11-30 PROCEDURE — 82803 BLOOD GASES ANY COMBINATION: CPT

## 2018-11-30 PROCEDURE — 96375 TX/PRO/DX INJ NEW DRUG ADDON: CPT

## 2018-11-30 PROCEDURE — 82962 GLUCOSE BLOOD TEST: CPT

## 2018-11-30 PROCEDURE — 93005 ELECTROCARDIOGRAM TRACING: CPT

## 2018-11-30 PROCEDURE — 83880 ASSAY OF NATRIURETIC PEPTIDE: CPT

## 2018-11-30 PROCEDURE — 85610 PROTHROMBIN TIME: CPT

## 2018-11-30 PROCEDURE — 94640 AIRWAY INHALATION TREATMENT: CPT

## 2018-11-30 PROCEDURE — 85025 COMPLETE CBC W/AUTO DIFF WBC: CPT

## 2018-11-30 PROCEDURE — 25000003 PHARM REV CODE 250: Performed by: PHYSICIAN ASSISTANT

## 2018-11-30 PROCEDURE — 99900035 HC TECH TIME PER 15 MIN (STAT)

## 2018-11-30 PROCEDURE — 63600175 PHARM REV CODE 636 W HCPCS: Performed by: EMERGENCY MEDICINE

## 2018-11-30 PROCEDURE — 93010 ELECTROCARDIOGRAM REPORT: CPT | Mod: ,,, | Performed by: INTERNAL MEDICINE

## 2018-11-30 PROCEDURE — 84484 ASSAY OF TROPONIN QUANT: CPT

## 2018-11-30 PROCEDURE — 83036 HEMOGLOBIN GLYCOSYLATED A1C: CPT

## 2018-11-30 RX ORDER — FUROSEMIDE 10 MG/ML
40 INJECTION INTRAMUSCULAR; INTRAVENOUS 2 TIMES DAILY
Status: DISCONTINUED | OUTPATIENT
Start: 2018-12-01 | End: 2018-12-01

## 2018-11-30 RX ORDER — ATORVASTATIN CALCIUM 10 MG/1
20 TABLET, FILM COATED ORAL NIGHTLY
Status: DISCONTINUED | OUTPATIENT
Start: 2018-11-30 | End: 2018-12-02 | Stop reason: HOSPADM

## 2018-11-30 RX ORDER — HYDRALAZINE HYDROCHLORIDE 20 MG/ML
10 INJECTION INTRAMUSCULAR; INTRAVENOUS
Status: DISCONTINUED | OUTPATIENT
Start: 2018-11-30 | End: 2018-12-02 | Stop reason: HOSPADM

## 2018-11-30 RX ORDER — GLUCAGON 1 MG
1 KIT INJECTION
Status: DISCONTINUED | OUTPATIENT
Start: 2018-11-30 | End: 2018-12-02 | Stop reason: HOSPADM

## 2018-11-30 RX ORDER — INSULIN ASPART 100 [IU]/ML
1-10 INJECTION, SOLUTION INTRAVENOUS; SUBCUTANEOUS
Status: DISCONTINUED | OUTPATIENT
Start: 2018-11-30 | End: 2018-12-02 | Stop reason: HOSPADM

## 2018-11-30 RX ORDER — FUROSEMIDE 10 MG/ML
80 INJECTION INTRAMUSCULAR; INTRAVENOUS 2 TIMES DAILY
Status: DISCONTINUED | OUTPATIENT
Start: 2018-11-30 | End: 2018-11-30

## 2018-11-30 RX ORDER — METOPROLOL TARTRATE 25 MG/1
25 TABLET, FILM COATED ORAL 2 TIMES DAILY
Status: DISCONTINUED | OUTPATIENT
Start: 2018-11-30 | End: 2018-11-30

## 2018-11-30 RX ORDER — HYDROCODONE BITARTRATE AND ACETAMINOPHEN 5; 325 MG/1; MG/1
1 TABLET ORAL EVERY 4 HOURS PRN
Status: DISCONTINUED | OUTPATIENT
Start: 2018-11-30 | End: 2018-12-02 | Stop reason: HOSPADM

## 2018-11-30 RX ORDER — IPRATROPIUM BROMIDE AND ALBUTEROL SULFATE 2.5; .5 MG/3ML; MG/3ML
3 SOLUTION RESPIRATORY (INHALATION)
Status: COMPLETED | OUTPATIENT
Start: 2018-11-30 | End: 2018-11-30

## 2018-11-30 RX ORDER — FERROUS GLUCONATE 324(38)MG
324 TABLET ORAL
Status: DISCONTINUED | OUTPATIENT
Start: 2018-12-01 | End: 2018-12-02 | Stop reason: HOSPADM

## 2018-11-30 RX ORDER — AMLODIPINE BESYLATE 5 MG/1
5 TABLET ORAL DAILY
Status: DISCONTINUED | OUTPATIENT
Start: 2018-12-01 | End: 2018-12-02

## 2018-11-30 RX ORDER — IPRATROPIUM BROMIDE AND ALBUTEROL SULFATE 2.5; .5 MG/3ML; MG/3ML
3 SOLUTION RESPIRATORY (INHALATION) EVERY 4 HOURS
Status: DISCONTINUED | OUTPATIENT
Start: 2018-11-30 | End: 2018-11-30

## 2018-11-30 RX ORDER — FUROSEMIDE 10 MG/ML
80 INJECTION INTRAMUSCULAR; INTRAVENOUS
Status: COMPLETED | OUTPATIENT
Start: 2018-11-30 | End: 2018-11-30

## 2018-11-30 RX ORDER — LOSARTAN POTASSIUM 25 MG/1
50 TABLET ORAL DAILY
Status: DISCONTINUED | OUTPATIENT
Start: 2018-12-01 | End: 2018-11-30

## 2018-11-30 RX ORDER — NITROGLYCERIN 0.4 MG/1
0.4 TABLET SUBLINGUAL EVERY 5 MIN PRN
Status: DISCONTINUED | OUTPATIENT
Start: 2018-11-30 | End: 2018-12-02 | Stop reason: HOSPADM

## 2018-11-30 RX ORDER — IBUPROFEN 200 MG
24 TABLET ORAL
Status: DISCONTINUED | OUTPATIENT
Start: 2018-11-30 | End: 2018-12-02 | Stop reason: HOSPADM

## 2018-11-30 RX ORDER — HYDROCODONE BITARTRATE AND ACETAMINOPHEN 5; 325 MG/1; MG/1
1 TABLET ORAL
Status: COMPLETED | OUTPATIENT
Start: 2018-11-30 | End: 2018-11-30

## 2018-11-30 RX ORDER — POLYETHYLENE GLYCOL 3350 17 G/17G
17 POWDER, FOR SOLUTION ORAL 3 TIMES DAILY PRN
Status: DISCONTINUED | OUTPATIENT
Start: 2018-11-30 | End: 2018-12-02 | Stop reason: HOSPADM

## 2018-11-30 RX ORDER — FERROUS GLUCONATE 324(38)MG
324 TABLET ORAL
Status: ON HOLD | COMMUNITY
End: 2021-12-03 | Stop reason: HOSPADM

## 2018-11-30 RX ORDER — IBUPROFEN 200 MG
16 TABLET ORAL
Status: DISCONTINUED | OUTPATIENT
Start: 2018-11-30 | End: 2018-12-02 | Stop reason: HOSPADM

## 2018-11-30 RX ORDER — METHYLPREDNISOLONE SOD SUCC 125 MG
125 VIAL (EA) INJECTION EVERY 6 HOURS
Status: DISCONTINUED | OUTPATIENT
Start: 2018-11-30 | End: 2018-11-30

## 2018-11-30 RX ORDER — LEVALBUTEROL INHALATION SOLUTION 0.63 MG/3ML
0.63 SOLUTION RESPIRATORY (INHALATION) EVERY 8 HOURS
Status: DISCONTINUED | OUTPATIENT
Start: 2018-12-01 | End: 2018-12-02 | Stop reason: HOSPADM

## 2018-11-30 RX ADMIN — FUROSEMIDE 80 MG: 10 INJECTION, SOLUTION INTRAVENOUS at 12:11

## 2018-11-30 RX ADMIN — HYDRALAZINE HYDROCHLORIDE 10 MG: 20 INJECTION INTRAMUSCULAR; INTRAVENOUS at 04:11

## 2018-11-30 RX ADMIN — CEFTRIAXONE 1 G: 1 INJECTION, SOLUTION INTRAVENOUS at 05:11

## 2018-11-30 RX ADMIN — ATORVASTATIN CALCIUM 20 MG: 10 TABLET, FILM COATED ORAL at 08:11

## 2018-11-30 RX ADMIN — HYDROCODONE BITARTRATE AND ACETAMINOPHEN 1 TABLET: 5; 325 TABLET ORAL at 05:11

## 2018-11-30 RX ADMIN — IPRATROPIUM BROMIDE AND ALBUTEROL SULFATE 3 ML: .5; 3 SOLUTION RESPIRATORY (INHALATION) at 04:11

## 2018-11-30 RX ADMIN — METOPROLOL TARTRATE 25 MG: 25 TABLET ORAL at 08:11

## 2018-11-30 RX ADMIN — METHYLPREDNISOLONE SODIUM SUCCINATE 125 MG: 125 INJECTION, POWDER, FOR SOLUTION INTRAMUSCULAR; INTRAVENOUS at 08:11

## 2018-11-30 RX ADMIN — APIXABAN 5 MG: 5 TABLET, FILM COATED ORAL at 08:11

## 2018-11-30 RX ADMIN — OMEGA-3 FATTY ACIDS CAP 1000 MG 1 CAPSULE: 1000 CAP at 10:11

## 2018-11-30 RX ADMIN — IPRATROPIUM BROMIDE AND ALBUTEROL SULFATE 3 ML: .5; 3 SOLUTION RESPIRATORY (INHALATION) at 12:11

## 2018-11-30 NOTE — ED NOTES
Patient sitting up in bed with family at side. No acute distress and no needs noted. SR upX2 call light within reach. Continue to monitor.

## 2018-11-30 NOTE — ED PROVIDER NOTES
"Encounter Date: 11/30/2018    SORT:  78 y.o. male presenting to ED for SOB similar to past CHF flares per patient. Limited triage exam:  Non-toxic. If orders were placed, they are pending.     Candelario Toledo PA-C  11/30/2018  12:00 PM   SCRIBE #1 NOTE: I, Aria Gimenez, am scribing for, and in the presence of,  Keaton Schrader MD. I have scribed the following portions of the note - Other sections scribed: HPI, ROS.       History     Chief Complaint   Patient presents with    Shortness of Breath     " I am not breathing well, its been on and off for a week." Pt presents on 2L oxygen     CC: Shortness of Breath     HPI: This is a 79 y/o male with PMHx of Anemia, CHF, COPD, CAD, DM, HTN, Hyperlipidemia, MI, Pacemaker, Peripheral vascular disease, S/P CABG, S/P femoral-popliteal bypass surgery, and Stented coronary artery who presents to the ED for emergent evaluation of acute onset SOB that began yesterday with associated cough. Pt denies chest pain or tightness. Pt also notes urinary hesitancy. Pt had similar symptoms in the past and was told that he had fluid buildup secondary to CHF. Pt notes black stool secondary to taking iron pills for anemia. Denies chest pain, fever, chills, nausea, vomiting, diarrhea, abdominal pain, headache, or arm/leg trouble. No further symptoms reported.     Of note, pt denies smoking cigarettes, illicit drug use, or drinking EtOH. Denies known allergies.    Surgeries noted: Bypass and stent placement      The history is provided by the patient. No  was used.     Review of patient's allergies indicates:  No Known Allergies  Past Medical History:   Diagnosis Date    Anemia 05/03/2018    pending blood transfusion    Anticoagulant long-term use     Atrial fibrillation     Congestive heart failure     COPD (chronic obstructive pulmonary disease)     Coronary artery disease     Diabetes mellitus     Encounter for blood transfusion     Hypertension     MI " (myocardial infarction)     Pacemaker     left chest    Peripheral vascular disease     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     S/P femoral-popliteal bypass surgery left    Stented coronary artery     Tobacco use      Past Surgical History:   Procedure Laterality Date    CARDIAC CATHETERIZATION      CARDIAC PACEMAKER PLACEMENT      CARDIAC SURGERY      CARDIOVERSION N/A 2013    Performed by [facility] at [facility]    EGD (ESOPHAGOGASTRODUODENOSCOPY) N/A 2018    Performed by Ty Hickman MD at [facility]    ESOPHAGOGASTRODUODENOSCOPY N/A 2018    Procedure: EGD (ESOPHAGOGASTRODUODENOSCOPY);  Surgeon: Ty Hickman MD;  Location: [facility];  Service: Endoscopy;  Laterality: N/A;    HEMORRHOID SURGERY      TRANSESOPHAGEAL ECHOCARDIOGRAM (ANIA) N/A 2013    Performed by [facility] Surgeon at [facility]     Family History   Problem Relation Age of Onset    Diabetes Father     Diabetes Mother      Social History     Tobacco Use    Smoking status: Former Smoker     Packs/day: 1.00     Years: 60.00     Pack years: 60.00     Types: Cigarettes     Last attempt to quit: 2018     Years since quittin.5    Smokeless tobacco: Never Used   Substance Use Topics    Alcohol use: No    Drug use: No     Review of Systems   Constitutional: Negative for chills and fever.   HENT: Negative for congestion, ear pain, rhinorrhea and sore throat.    Eyes: Negative for pain and visual disturbance.   Respiratory: Positive for cough and shortness of breath.    Cardiovascular: Negative for chest pain.   Gastrointestinal: Positive for blood in stool (chronic secondary to taking iron pills). Negative for abdominal pain, diarrhea, nausea and vomiting.   Genitourinary: Positive for difficulty urinating. Negative for dysuria.   Musculoskeletal: Negative for back pain and neck pain.   Skin: Negative for rash.   Neurological: Negative for headaches.   All other systems reviewed and are negative.      Physical Exam     Initial Vitals  [11/30/18 1158]   BP Pulse Resp Temp SpO2   (!) 146/85 77 20 97.9 °F (36.6 °C) (!) 94 %      MAP       --         Physical Exam  The patient was examined specifically for the following:   General:No significant distress, Good color, Warm and dry. Head and neck:Scalp atraumatic, Neck supple. Neurological:Appropriate conversation, Gross motor deficits. Eyes:Conjugate gaze, Clear corneas. ENT: No epistaxis. Cardiac: Regular rate and rhythm, Grossly normal heart tones. Pulmonary: Wheezing, Rales. Gastrointestinal: Abdominal tenderness, Abdominal distention. Musculoskeletal: Extremity deformity, Apparent pain with range of motion of the joints. Skin: Rash.   The findings on examination were normal except for the following:  Patient is in moderate respiratory distress.  He has bilateral pedal edema.  Vital signs are essentially stable.  Room air oxygen saturations are 94%.  The abdomen is nontender. Heart tones are remarkable for an irregularly irregular rhythm. The abdomen is nontender the patient is alert and bright and appropriate in conversation with a grossly nonfocal neurologic exam.   ED Course   Critical Care  Date/Time: 11/30/2018 4:23 PM  Performed by: Keaton Schrader MD  Authorized by: Keaton Schrader MD   Direct patient critical care time: 35 minutes  Additional history critical care time: 11 minutes  Ordering / reviewing critical care time: 11 minutes  Documentation critical care time: 11 minutes  Consulting other physicians critical care time: 9 minutes  Consult with family critical care time: 5 minutes  Total critical care time (exclusive of procedural time) : 82 minutes  Critical care time was exclusive of separately billable procedures and treating other patients and teaching time.  Critical care was necessary to treat or prevent imminent or life-threatening deterioration of the following conditions: cardiac failure and respiratory failure.  Critical care was time spent personally by me on the following  activities: development of treatment plan with patient or surrogate, discussions with primary provider, evaluation of patient's response to treatment, examination of patient, obtaining history from patient or surrogate, ordering and performing treatments and interventions, ordering and review of laboratory studies, ordering and review of radiographic studies, re-evaluation of patient's condition, pulse oximetry and review of old charts.        Labs Reviewed   CBC W/ AUTO DIFFERENTIAL - Abnormal; Notable for the following components:       Result Value    RBC 4.35 (*)     Hemoglobin 10.5 (*)     Hematocrit 34.2 (*)     MCV 79 (*)     MCH 24.1 (*)     MCHC 30.7 (*)     RDW 18.3 (*)     Lymph # 0.9 (*)     Gran% 76.4 (*)     Lymph% 11.3 (*)     All other components within normal limits   COMPREHENSIVE METABOLIC PANEL - Abnormal; Notable for the following components:    Glucose 133 (*)     BUN, Bld 27 (*)     Creatinine 1.5 (*)     Calcium 11.2 (*)     Total Protein 5.9 (*)     Albumin 2.7 (*)     Anion Gap 6 (*)     eGFR if  50 (*)     eGFR if non  43 (*)     All other components within normal limits   TROPONIN I - Abnormal; Notable for the following components:    Troponin I 0.098 (*)     All other components within normal limits   B-TYPE NATRIURETIC PEPTIDE - Abnormal; Notable for the following components:     (*)     All other components within normal limits   URINALYSIS, REFLEX TO URINE CULTURE - Abnormal; Notable for the following components:    Appearance, UA Cloudy (*)     Protein, UA 2+ (*)     Occult Blood UA 1+ (*)     Leukocytes, UA Trace (*)     All other components within normal limits    Narrative:     Preferred Collection Type->Urine, Clean Catch   URINALYSIS MICROSCOPIC - Abnormal; Notable for the following components:    Bacteria, UA Many (*)     All other components within normal limits    Narrative:     Preferred Collection Type->Urine, Clean Catch   POCT  GLUCOSE - Abnormal; Notable for the following components:    POCT Glucose 142 (*)     All other components within normal limits   PROTIME-INR   POCT GLUCOSE MONITORING CONTINUOUS     EKG Readings: (Independently Interpreted)   This patient is in atrial fibrillation with a heart rate of 101.  Patient also has PVCs.  He also has paced beats.  There are nonspecific ST segment and T-wave changes.  There is no definite evidence of acute myocardial infarction or malignant arrhythmia.  The patient does have an intraventricular conduction delay.       Imaging Results          X-Ray Chest AP Portable (Final result)  Result time 11/30/18 12:51:42    Final result by Adama Velez MD (11/30/18 12:51:42)                 Impression:      1. Findings suggesting edema, noting possible trace effusions versus atelectasis or soft tissue attenuation.      Electronically signed by: Adama Velez MD  Date:    11/30/2018  Time:    12:51             Narrative:    EXAMINATION:  XR CHEST AP PORTABLE    CLINICAL HISTORY:  CHF;    TECHNIQUE:  Single frontal view of the chest was performed.    COMPARISON:  07/09/2018    FINDINGS:  Left chest wall pacer noted.  The cardiomediastinal silhouette is enlarged, similar to the previous exam noting tortuosity of the aorta, and prominence of the aortic arch.  There is stable elevation of the right hemidiaphragm..  There is slight obscuration of the costophrenic angles, likely related atelectasis and overlying soft tissues, trace effusions however are not excluded..  The trachea is midline.  The lungs are symmetrically expanded bilaterally with patchy increased interstitial and parenchymal attenuation, suggesting edema.  No large focal consolidation seen.  There is no pneumothorax.  The osseous structures are remarkable for degenerative changes..                              Medical decision making:  This patient presents to the emergency room with respiratory distress. I believe that he has an  exacerbation of congestive heart failure.  He will be admitted for diuresis.  I discussed this case with Dr. Whitney, internal medicine who recommends Lasix 80 mg b.i.d. Day.  We will fluid restrict him.  We will do a low-sodium diet.  I will trend his markers.  I will treat his back to urea with IV Rocephin.  I doubt sepsis.  Exacerbation of COPD remains in the differential diagnosis.  I will also treat with bronchodilators.                 Scribe Attestation:   Scribe #1: I performed the above scribed service and the documentation accurately describes the services I performed. I attest to the accuracy of the note.    Attending Attestation:           Physician Attestation for Scribe:  Physician Attestation Statement for Scribe #1: I, Keaton Schrader MD, reviewed documentation, as scribed by Aria Gimenez in my presence, and it is both accurate and complete.                    Clinical Impression:   The primary encounter diagnosis was Acute on chronic systolic congestive heart failure. Diagnoses of Shortness of breath and Bacteriuria were also pertinent to this visit.                             Keaton Schrader MD  11/30/18 [id number]

## 2018-11-30 NOTE — ED TRIAGE NOTES
Pt arrived to ED with c/o SOB x 2 day. R arm and leg swelling noted. Pt on EDX side and EKG done, EKG should abnormal rhythm. No acute distress noted. Pt connected to continuous cardiac monitor, bp cuff, and pulse ox.

## 2018-12-01 PROBLEM — Z95.1 HX OF CABG: Status: ACTIVE | Noted: 2018-12-01

## 2018-12-01 LAB
ANION GAP SERPL CALC-SCNC: 11 MMOL/L
AORTIC ROOT ANNULUS: 3.9 CM
AORTIC VALVE CUSP SEPERATION: 1.95 CM
ASCENDING AORTA: 3.1 CM
AV INDEX (PROSTH): 0.65
AV MEAN GRADIENT: 6.44 MMHG
AV PEAK GRADIENT: 12.67 MMHG
AV VALVE AREA: 2.15 CM2
BASOPHILS # BLD AUTO: 0.01 K/UL
BASOPHILS NFR BLD: 0.2 %
BSA FOR ECHO PROCEDURE: 2.02 M2
BUN SERPL-MCNC: 33 MG/DL
CALCIUM SERPL-MCNC: 10.9 MG/DL
CHLORIDE SERPL-SCNC: 107 MMOL/L
CO2 SERPL-SCNC: 21 MMOL/L
CREAT SERPL-MCNC: 1.5 MG/DL
CV ECHO LV RWT: 0.52 CM
DIFFERENTIAL METHOD: ABNORMAL
DOP CALC AO PEAK VEL: 1.78 M/S
DOP CALC AO VTI: 26.42 CM
DOP CALC LVOT AREA: 3.33 CM2
DOP CALC LVOT DIAMETER: 2.06 CM
DOP CALC LVOT STROKE VOLUME: 56.86 CM3
DOP CALCLVOT PEAK VEL VTI: 17.07 CM
ECHO LV POSTERIOR WALL: 1.33 CM (ref 0.6–1.1)
EOSINOPHIL # BLD AUTO: 0 K/UL
EOSINOPHIL NFR BLD: 0 %
ERYTHROCYTE [DISTWIDTH] IN BLOOD BY AUTOMATED COUNT: 18.2 %
EST. GFR  (AFRICAN AMERICAN): 50 ML/MIN/1.73 M^2
EST. GFR  (NON AFRICAN AMERICAN): 43 ML/MIN/1.73 M^2
ESTIMATED AVG GLUCOSE: 100 MG/DL
FRACTIONAL SHORTENING: 18 % (ref 28–44)
GLUCOSE SERPL-MCNC: 205 MG/DL
HBA1C MFR BLD HPLC: 5.1 %
HCT VFR BLD AUTO: 32.4 %
HGB BLD-MCNC: 10.3 G/DL
INTERVENTRICULAR SEPTUM: 1.52 CM (ref 0.6–1.1)
IVRT: 0.07 MSEC
LA MAJOR: 5.01 CM
LA MINOR: 5.69 CM
LA WIDTH: 3.99 CM
LEFT ATRIUM SIZE: 4.64 CM
LEFT ATRIUM VOLUME INDEX: 41.5 ML/M2
LEFT ATRIUM VOLUME: 83.85 CM3
LEFT INTERNAL DIMENSION IN SYSTOLE: 4.21 CM (ref 2.1–4)
LEFT VENTRICLE DIASTOLIC VOLUME INDEX: 61.71 ML/M2
LEFT VENTRICLE DIASTOLIC VOLUME: 124.66 ML
LEFT VENTRICLE MASS INDEX: 153.5 G/M2
LEFT VENTRICLE SYSTOLIC VOLUME INDEX: 39.2 ML/M2
LEFT VENTRICLE SYSTOLIC VOLUME: 79.24 ML
LEFT VENTRICULAR INTERNAL DIMENSION IN DIASTOLE: 5.12 CM (ref 3.5–6)
LEFT VENTRICULAR MASS: 310.14 G
LYMPHOCYTES # BLD AUTO: 0.5 K/UL
LYMPHOCYTES NFR BLD: 9.4 %
MCH RBC QN AUTO: 24.1 PG
MCHC RBC AUTO-ENTMCNC: 31.8 G/DL
MCV RBC AUTO: 76 FL
MONOCYTES # BLD AUTO: 0 K/UL
MONOCYTES NFR BLD: 0.5 %
MV PEAK E VEL: 1.29 M/S
NEUTROPHILS # BLD AUTO: 5.1 K/UL
NEUTROPHILS NFR BLD: 89.9 %
PISA TR MAX VEL: 4.41 M/S
PLATELET # BLD AUTO: 369 K/UL
PMV BLD AUTO: 10.7 FL
POCT GLUCOSE: 182 MG/DL (ref 70–110)
POCT GLUCOSE: 212 MG/DL (ref 70–110)
POCT GLUCOSE: 214 MG/DL (ref 70–110)
POCT GLUCOSE: 270 MG/DL (ref 70–110)
POTASSIUM SERPL-SCNC: 4.6 MMOL/L
PV PEAK VELOCITY: 1.09 CM/S
RA MAJOR: 4.49 CM
RA PRESSURE: 8 MMHG
RA WIDTH: 3.99 CM
RBC # BLD AUTO: 4.28 M/UL
RIGHT VENTRICULAR END-DIASTOLIC DIMENSION: 4.35 CM
RV TISSUE DOPPLER FREE WALL SYSTOLIC VELOCITY 1 (APICAL 4 CHAMBER VIEW): 5.78 M/S
SINUS: 3.91 CM
SODIUM SERPL-SCNC: 139 MMOL/L
STJ: 2.73 CM
TR MAX PG: 77.79 MMHG
TRICUSPID ANNULAR PLANE SYSTOLIC EXCURSION: 1.32 CM
TROPONIN I SERPL DL<=0.01 NG/ML-MCNC: 0.11 NG/ML
TV REST PULMONARY ARTERY PRESSURE: 85.79 MMHG
WBC # BLD AUTO: 5.63 K/UL

## 2018-12-01 PROCEDURE — 25000242 PHARM REV CODE 250 ALT 637 W/ HCPCS: Performed by: PHYSICIAN ASSISTANT

## 2018-12-01 PROCEDURE — 36415 COLL VENOUS BLD VENIPUNCTURE: CPT

## 2018-12-01 PROCEDURE — 25000242 PHARM REV CODE 250 ALT 637 W/ HCPCS: Performed by: INTERNAL MEDICINE

## 2018-12-01 PROCEDURE — 94640 AIRWAY INHALATION TREATMENT: CPT

## 2018-12-01 PROCEDURE — 85025 COMPLETE CBC W/AUTO DIFF WBC: CPT

## 2018-12-01 PROCEDURE — 96376 TX/PRO/DX INJ SAME DRUG ADON: CPT | Mod: 59

## 2018-12-01 PROCEDURE — 94644 CONT INHLJ TX 1ST HOUR: CPT

## 2018-12-01 PROCEDURE — 96372 THER/PROPH/DIAG INJ SC/IM: CPT

## 2018-12-01 PROCEDURE — G0378 HOSPITAL OBSERVATION PER HR: HCPCS

## 2018-12-01 PROCEDURE — 63600175 PHARM REV CODE 636 W HCPCS: Performed by: PHYSICIAN ASSISTANT

## 2018-12-01 PROCEDURE — 25000003 PHARM REV CODE 250: Performed by: PHYSICIAN ASSISTANT

## 2018-12-01 PROCEDURE — 99220 PR INITIAL OBSERVATION CARE,LEVL III: CPT | Mod: 25,,, | Performed by: INTERNAL MEDICINE

## 2018-12-01 PROCEDURE — 63600175 PHARM REV CODE 636 W HCPCS: Performed by: NURSE PRACTITIONER

## 2018-12-01 PROCEDURE — 25000003 PHARM REV CODE 250: Performed by: EMERGENCY MEDICINE

## 2018-12-01 PROCEDURE — 63600175 PHARM REV CODE 636 W HCPCS: Performed by: EMERGENCY MEDICINE

## 2018-12-01 PROCEDURE — 84484 ASSAY OF TROPONIN QUANT: CPT

## 2018-12-01 PROCEDURE — 80048 BASIC METABOLIC PNL TOTAL CA: CPT

## 2018-12-01 PROCEDURE — 25000003 PHARM REV CODE 250: Performed by: HOSPITALIST

## 2018-12-01 PROCEDURE — 27000221 HC OXYGEN, UP TO 24 HOURS

## 2018-12-01 RX ORDER — METHYLPREDNISOLONE SOD SUCC 125 MG
125 VIAL (EA) INJECTION EVERY 6 HOURS
Status: DISCONTINUED | OUTPATIENT
Start: 2018-12-01 | End: 2018-12-01

## 2018-12-01 RX ORDER — ALBUTEROL SULFATE 2.5 MG/.5ML
15 SOLUTION RESPIRATORY (INHALATION) ONCE
Status: COMPLETED | OUTPATIENT
Start: 2018-12-01 | End: 2018-12-01

## 2018-12-01 RX ORDER — FUROSEMIDE 40 MG/1
40 TABLET ORAL 2 TIMES DAILY
Status: DISCONTINUED | OUTPATIENT
Start: 2018-12-01 | End: 2018-12-02

## 2018-12-01 RX ORDER — GLUCOSAM/CHONDRO/HERB 149/HYAL 750-100 MG
1 TABLET ORAL 2 TIMES DAILY
Status: DISCONTINUED | OUTPATIENT
Start: 2018-12-01 | End: 2018-12-02 | Stop reason: HOSPADM

## 2018-12-01 RX ORDER — IPRATROPIUM BROMIDE 0.5 MG/2.5ML
1 SOLUTION RESPIRATORY (INHALATION) ONCE
Status: COMPLETED | OUTPATIENT
Start: 2018-12-01 | End: 2018-12-01

## 2018-12-01 RX ORDER — METHYLPREDNISOLONE SOD SUCC 125 MG
80 VIAL (EA) INJECTION EVERY 6 HOURS
Status: DISCONTINUED | OUTPATIENT
Start: 2018-12-01 | End: 2018-12-02

## 2018-12-01 RX ADMIN — APIXABAN 5 MG: 5 TABLET, FILM COATED ORAL at 09:12

## 2018-12-01 RX ADMIN — ATORVASTATIN CALCIUM 20 MG: 10 TABLET, FILM COATED ORAL at 09:12

## 2018-12-01 RX ADMIN — CEFTRIAXONE 1 G: 1 INJECTION, SOLUTION INTRAVENOUS at 05:12

## 2018-12-01 RX ADMIN — INSULIN ASPART 3 UNITS: 100 INJECTION, SOLUTION INTRAVENOUS; SUBCUTANEOUS at 09:12

## 2018-12-01 RX ADMIN — INSULIN ASPART 4 UNITS: 100 INJECTION, SOLUTION INTRAVENOUS; SUBCUTANEOUS at 12:12

## 2018-12-01 RX ADMIN — ALBUTEROL SULFATE 15 MG: 2.5 SOLUTION RESPIRATORY (INHALATION) at 01:12

## 2018-12-01 RX ADMIN — APIXABAN 5 MG: 5 TABLET, FILM COATED ORAL at 10:12

## 2018-12-01 RX ADMIN — METHYLPREDNISOLONE SODIUM SUCCINATE 80 MG: 125 INJECTION, POWDER, FOR SOLUTION INTRAMUSCULAR; INTRAVENOUS at 05:12

## 2018-12-01 RX ADMIN — METHYLPREDNISOLONE SODIUM SUCCINATE 125 MG: 125 INJECTION, POWDER, FOR SOLUTION INTRAMUSCULAR; INTRAVENOUS at 10:12

## 2018-12-01 RX ADMIN — LEVALBUTEROL HYDROCHLORIDE 0.63 MG: 0.63 SOLUTION RESPIRATORY (INHALATION) at 03:12

## 2018-12-01 RX ADMIN — OMEGA-3 FATTY ACIDS CAP 1000 MG 1 CAPSULE: 1000 CAP at 09:12

## 2018-12-01 RX ADMIN — LEVALBUTEROL HYDROCHLORIDE 0.63 MG: 0.63 SOLUTION RESPIRATORY (INHALATION) at 12:12

## 2018-12-01 RX ADMIN — FUROSEMIDE 40 MG: 40 TABLET ORAL at 10:12

## 2018-12-01 RX ADMIN — INSULIN ASPART 4 UNITS: 100 INJECTION, SOLUTION INTRAVENOUS; SUBCUTANEOUS at 10:12

## 2018-12-01 RX ADMIN — AMLODIPINE BESYLATE 5 MG: 5 TABLET ORAL at 10:12

## 2018-12-01 RX ADMIN — FERROUS GLUCONATE TAB 324 MG (37.5 MG ELEMENTAL IRON) 324 MG: 324 (37.5 FE) TAB at 10:12

## 2018-12-01 RX ADMIN — OMEGA-3 FATTY ACIDS CAP 1000 MG 1 CAPSULE: 1000 CAP at 10:12

## 2018-12-01 RX ADMIN — IPRATROPIUM BROMIDE 1 MG: 0.5 SOLUTION RESPIRATORY (INHALATION) at 01:12

## 2018-12-01 RX ADMIN — LEVALBUTEROL HYDROCHLORIDE 0.63 MG: 0.63 SOLUTION RESPIRATORY (INHALATION) at 11:12

## 2018-12-01 RX ADMIN — LEVALBUTEROL HYDROCHLORIDE 0.63 MG: 0.63 SOLUTION RESPIRATORY (INHALATION) at 07:12

## 2018-12-01 RX ADMIN — FUROSEMIDE 40 MG: 40 TABLET ORAL at 09:12

## 2018-12-01 NOTE — ASSESSMENT & PLAN NOTE
H&H stable compared to previous. No signs of active bleed. Will recheck CBC in AM and continue home iron supplementation

## 2018-12-01 NOTE — SUBJECTIVE & OBJECTIVE
Interval History: Still SOB, patient reports mild improvement in symptoms.     Review of Systems   Constitutional: Negative for chills and fever.   Eyes: Negative for photophobia and visual disturbance.   Respiratory: Positive for shortness of breath. Negative for chest tightness.    Cardiovascular: Negative for chest pain, palpitations and leg swelling.   Gastrointestinal: Negative for abdominal pain, nausea and vomiting.   Genitourinary: Negative for dysuria and hematuria.     Objective:     Vital Signs (Most Recent):  Temp: 97.4 °F (36.3 °C) (12/01/18 1647)  Pulse: 101 (12/01/18 1647)  Resp: 18 (12/01/18 1647)  BP: 132/84 (12/01/18 1647)  SpO2: (!) 94 % (12/01/18 1647) Vital Signs (24h Range):  Temp:  [97.1 °F (36.2 °C)-98.4 °F (36.9 °C)] 97.4 °F (36.3 °C)  Pulse:  [] 101  Resp:  [18-29] 18  SpO2:  [91 %-96 %] 94 %  BP: (115-183)/(69-88) 132/84     Weight: 83.5 kg (184 lb)  Body mass index is 27.17 kg/m².    Intake/Output Summary (Last 24 hours) at 12/1/2018 1738  Last data filed at 12/1/2018 1652  Gross per 24 hour   Intake 560 ml   Output 625 ml   Net -65 ml      Physical Exam   Constitutional: He is oriented to person, place, and time. He appears well-developed and well-nourished. No distress.   On 2L of O2   HENT:   Head: Normocephalic and atraumatic.   Eyes: Conjunctivae and EOM are normal. Right eye exhibits no discharge. Left eye exhibits no discharge.   Neck: Normal range of motion. No thyromegaly present.   Cardiovascular: Normal rate, regular rhythm and intact distal pulses.   No murmur heard.  Pulmonary/Chest: Effort normal. No respiratory distress. He has wheezes (few wheezes).   Diminished breath sounds, with poor air movement   Abdominal: Soft. Bowel sounds are normal. He exhibits no distension and no mass. There is no tenderness.   Musculoskeletal: He exhibits edema (trace). He exhibits no deformity.   Neurological: He is alert and oriented to person, place, and time.   Skin: Skin is warm and  dry.   Psychiatric: He has a normal mood and affect. His behavior is normal.   Nursing note and vitals reviewed.      Significant Labs:   CBC:   Recent Labs   Lab 11/30/18  1234 12/01/18  0515   WBC 8.34 5.63   HGB 10.5* 10.3*   HCT 34.2* 32.4*    369*     CMP:   Recent Labs   Lab 11/30/18  1234 12/01/18  0515    139   K 4.2 4.6    107   CO2 27 21*   * 205*   BUN 27* 33*   CREATININE 1.5* 1.5*   CALCIUM 11.2* 10.9*   PROT 5.9*  --    ALBUMIN 2.7*  --    BILITOT 0.6  --    ALKPHOS 135  --    AST 18  --    ALT 10  --    ANIONGAP 6* 11   EGFRNONAA 43* 43*     Coagulation:   Recent Labs   Lab 11/30/18  1234   INR 1.1     Urine Studies:   Recent Labs   Lab 11/30/18  1346   COLORU Yellow   APPEARANCEUA Cloudy*   PHUR 6.0   SPECGRAV 1.015   PROTEINUA 2+*   GLUCUA Negative   KETONESU Negative   BILIRUBINUA Negative   OCCULTUA 1+*   NITRITE Negative   UROBILINOGEN Negative   LEUKOCYTESUR Trace*   RBCUA 1   WBCUA 2   BACTERIA Many*   SQUAMEPITHEL 2   HYALINECASTS 0       Significant Imaging:   Imaging Results          X-Ray Chest AP Portable (Final result)  Result time 11/30/18 12:51:42    Final result by Adama Velez MD (11/30/18 12:51:42)                 Impression:      1. Findings suggesting edema, noting possible trace effusions versus atelectasis or soft tissue attenuation.      Electronically signed by: Adama Velez MD  Date:    11/30/2018  Time:    12:51             Narrative:    EXAMINATION:  XR CHEST AP PORTABLE    CLINICAL HISTORY:  CHF;    TECHNIQUE:  Single frontal view of the chest was performed.    COMPARISON:  07/09/2018    FINDINGS:  Left chest wall pacer noted.  The cardiomediastinal silhouette is enlarged, similar to the previous exam noting tortuosity of the aorta, and prominence of the aortic arch.  There is stable elevation of the right hemidiaphragm..  There is slight obscuration of the costophrenic angles, likely related atelectasis and overlying soft tissues, trace  effusions however are not excluded..  The trachea is midline.  The lungs are symmetrically expanded bilaterally with patchy increased interstitial and parenchymal attenuation, suggesting edema.  No large focal consolidation seen.  There is no pneumothorax.  The osseous structures are remarkable for degenerative changes..

## 2018-12-01 NOTE — HOSPITAL COURSE
Agus Ramos . 78 y.o. male placed for COPD exacerbation and UTI. Symptoms improve neb and steroid. Started on ICS/LABA.  Cardiology felt symptoms from COPD rather then CHF. 2 D echo EF 45%, normal diastolic function,mild to mod MR, mild AR, and pulmonary htn 85.7 mmhg. Patient has chronic atrial fibrillation and rate better controlled once started diltiazem. Patient walked with  ft and 02 saturation 92% on 2 L NC. Met with wife and daughter in law at bedside to discussed all the above. Patient stable for discharge home with HH and family.

## 2018-12-01 NOTE — ASSESSMENT & PLAN NOTE
Likely related to CHF/COPD/CRI. Doubt ACS. Stress test 5/2018 negative for ischemia. No further w/u planned

## 2018-12-01 NOTE — HPI
HPI: Agus Ramos Jr. 78 y.o. male with COPD, CHF, Afib, DM2, HTN, HLD, s/p pacemaker, presenting to the hospital with a chief complaint of SOB. He reports for the last week he has been experiencing increasing SOB. He reports he is currently unable to ambulate throughout the room. He has taken extra lasix at home without improvement of the symptoms. The SOB is worsened with exertion. He reports he occasionally experience orthopnea, but also becomes SOB when sitting up. He reports he does not feel as though he is holding extra fluid, and has minimal relief with lasix provided by ED. He reports compliance with his Low salt and fluid restriction diet. His wife has attempted to give extra doses of lasix at home without relief of the symptoms.  The duo-nebs in the ED have provided some relief. He denies fever/chills, change in cough, sick contacts, N/V/D, abdominal pain, chest pain, and syncope. He endorses dysuria and SOB.     In the ED,  Troponin elevated consistent with baseline, Cr of 1.5, UA with many bacteria, ABG with PH of 7.4 and O2 of 64, chest x-ray with trace effusions and evidence of edema, and EKG with afib.     Less SOB  Denies CP  EKG A-fib 101 IVCD  Troponin 0.1 flat pattern      Known to me from admission 5/2018    Chronic A-fib on eliquis  Biotronik PPM 9/27/16  CAD/CABG 2010    Echo 5/4/18    1 - Low normal left ventricular systolic function (EF 50-55%).     2 - Concentric hypertrophy.     3 - Mild left atrial enlargement.     4 - Pulmonary hypertension. The estimated PA systolic pressure is 46 mmHg.     5 - Mild aortic regurgitation.     6 - Mild to moderate mitral regurgitation.     7 - Mild tricuspid regurgitation.      Stress test 5/4/18  Impression: ABNORMAL MYOCARDIAL PERFUSION  1. The perfusion scan is free of evidence for myocardial ischemia.   2. There is mild intensity fixed defect in the apical wall of the left ventricle, consistent with myocardial injury, and moderate intensity  fixed defect in the inferolateral wall of the left ventricle, consistent with myocardial injury.   3. Resting wall motion is physiologic.   4. Visually estimated LV function is low normal.

## 2018-12-01 NOTE — CONSULTS
[facility]  Cardiology  Consult Note    Patient Name: Agus Ramos Jr.  MRN: [id number]  Admission Date: 11/30/2018  Hospital Length of Stay: 0 days  Code Status: Full Code   Attending Provider: Marita Starr MD   Consulting Provider: Tobi Mathias MD  Primary Care Physician: Keaton Hardy MD  Principal Problem:Acute on chronic systolic congestive heart failure    Patient information was obtained from patient and ER records.     Consults  Subjective:     Chief Complaint:  SOB     HPI: Agus Ramos Jr. 78 y.o. male with COPD, CHF, Afib, DM2, HTN, HLD, s/p pacemaker, presenting to the hospital with a chief complaint of SOB. He reports for the last week he has been experiencing increasing SOB. He reports he is currently unable to ambulate throughout the room. He has taken extra lasix at home without improvement of the symptoms. The SOB is worsened with exertion. He reports he occasionally experience orthopnea, but also becomes SOB when sitting up. He reports he does not feel as though he is holding extra fluid, and has minimal relief with lasix provided by ED. He reports compliance with his Low salt and fluid restriction diet. His wife has attempted to give extra doses of lasix at home without relief of the symptoms.  The duo-nebs in the ED have provided some relief. He denies fever/chills, change in cough, sick contacts, N/V/D, abdominal pain, chest pain, and syncope. He endorses dysuria and SOB.     In the ED,  Troponin elevated consistent with baseline, Cr of 1.5, UA with many bacteria, ABG with PH of 7.4 and O2 of 64, chest x-ray with trace effusions and evidence of edema, and EKG with afib.     Less SOB  Denies CP  EKG A-fib 101 IVCD  Troponin 0.1 flat pattern      Known to me from admission 5/2018    Chronic A-fib on eliquis  Biotronik PPM 9/27/16  CAD/CABG 2010    Echo 5/4/18    1 - Low normal left ventricular systolic function (EF 50-55%).     2 - Concentric hypertrophy.      3 - Mild left atrial enlargement.     4 - Pulmonary hypertension. The estimated PA systolic pressure is 46 mmHg.     5 - Mild aortic regurgitation.     6 - Mild to moderate mitral regurgitation.     7 - Mild tricuspid regurgitation.      Stress test 5/4/18  Impression: ABNORMAL MYOCARDIAL PERFUSION  1. The perfusion scan is free of evidence for myocardial ischemia.   2. There is mild intensity fixed defect in the apical wall of the left ventricle, consistent with myocardial injury, and moderate intensity fixed defect in the inferolateral wall of the left ventricle, consistent with myocardial injury.   3. Resting wall motion is physiologic.   4. Visually estimated LV function is low normal.        Past Medical History:   Diagnosis Date    Anemia 05/03/2018    pending blood transfusion    Anticoagulant long-term use     apixaban    Atrial fibrillation     CKD (chronic kidney disease)     Congestive heart failure     COPD (chronic obstructive pulmonary disease)     Coronary artery disease     Diabetes mellitus     Encounter for blood transfusion     HLD (hyperlipidemia)     Hypertension     MI (myocardial infarction)     Pacemaker     left chest    Peripheral vascular disease     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     S/P femoral-popliteal bypass surgery left    Stented coronary artery     Tobacco use        Past Surgical History:   Procedure Laterality Date    CARDIAC CATHETERIZATION      CARDIAC PACEMAKER PLACEMENT      CARDIAC SURGERY      3 vessel CABG    CARDIOVERSION N/A 9/19/2013    Performed by [facility]ann Surgeon at [facility]    cardioverted      CORONARY ANGIOPLASTY WITH STENT PLACEMENT      EGD (ESOPHAGOGASTRODUODENOSCOPY) N/A 6/1/2018    Performed by Ty iHckman MD at [facility] END[facility]    ESOPHAGOGASTRODUODENOSCOPY N/A 6/1/2018    Procedure: EGD (ESOPHAGOGASTRODUODENOSCOPY);  Surgeon: Ty Hickman MD;  Location: [facility] ENDO;  Service: Endoscopy;  Laterality: N/A;    HEMORRHOID SURGERY      TRANSESOPHAGEAL  ECHOCARDIOGRAM (ANIA) N/A 2013    [facility]    VASCULAR SURGERY      femoral artery popliteal bypass       Review of patient's allergies indicates:  No Known Allergies    No current facility-administered medications on file prior to encounter.      Current Outpatient Medications on File Prior to Encounter   Medication Sig    albuterol-ipratropium 2.5mg-0.5mg/3mL (DUO-NEB) 0.5 mg-3 mg(2.5 mg base)/3 mL nebulizer solution Take 3 mLs by nebulization every 6 (six) hours. Rescue    amLODIPine (NORVASC) 5 MG tablet Take 5 mg by mouth once daily.    apixaban (ELIQUIS) 5 mg Tab Take 5 mg by mouth 2 (two) times daily.    atorvastatin (LIPITOR) 20 MG tablet Take 20 mg by mouth every evening.    ferrous gluconate (FERGON) 324 MG tablet Take 324 mg by mouth daily with breakfast.    fish oil-omega-3 fatty acids 300-1,000 mg capsule Take 2 g by mouth 2 (two) times daily.     furosemide (LASIX) 80 MG tablet Take 40 mg by mouth 2 (two) times daily.     glipiZIDE (GLUCOTROL) 5 MG tablet Take 10 mg by mouth 2 (two) times daily before meals.    losartan (COZAAR) 50 MG tablet Take 50 mg by mouth once daily.    metoprolol tartrate (LOPRESSOR) 25 MG tablet Take 25 mg by mouth 2 (two) times daily.    metFORMIN (GLUCOPHAGE) 500 MG tablet Take 500 mg by mouth 2 (two) times daily with meals.    nitroGLYCERIN (NITROSTAT) 0.4 MG SL tablet Place 0.4 mg under the tongue every 5 (five) minutes as needed.    polyethylene glycol (GLYCOLAX) 17 gram PwPk Take by mouth as needed.      Family History     Problem Relation (Age of Onset)    Diabetes Father, Mother        Tobacco Use    Smoking status: Former Smoker     Packs/day: 1.00     Years: 60.00     Pack years: 60.00     Types: Cigarettes     Last attempt to quit: 2018     Years since quittin.5    Smokeless tobacco: Never Used   Substance and Sexual Activity    Alcohol use: No    Drug use: No    Sexual activity: Not on file     Review of  Systems   Constitution: Negative for decreased appetite.   HENT: Negative for ear discharge.    Eyes: Negative for blurred vision.   Endocrine: Negative for polyphagia.   Skin: Negative for nail changes.   Neurological: Negative for aphonia.   Psychiatric/Behavioral: Negative for hallucinations.     Objective:     Vital Signs (Most Recent):  Temp: 97.5 °F (36.4 °C) (12/01/18 0746)  Pulse: 91 (12/01/18 0747)  Resp: 20 (12/01/18 0747)  BP: (!) 183/88 (12/01/18 0746)  SpO2: (!) 94 % (12/01/18 0747) Vital Signs (24h Range):  Temp:  [97.1 °F (36.2 °C)-98.4 °F (36.9 °C)] 97.5 °F (36.4 °C)  Pulse:  [] 91  Resp:  [18-46] 20  SpO2:  [91 %-99 %] 94 %  BP: (115-213)/() 183/88     Weight: 83.5 kg (184 lb 1.4 oz)  Body mass index is 27.18 kg/m².    SpO2: (!) 94 %  O2 Device (Oxygen Therapy): nasal cannula      Intake/Output Summary (Last 24 hours) at 12/1/2018 1031  Last data filed at 12/1/2018 0529  Gross per 24 hour   Intake --   Output 665 ml   Net -665 ml       Lines/Drains/Airways     Peripheral Intravenous Line                 Peripheral IV - Single Lumen 11/30/18 1232 Right Antecubital less than 1 day                Physical Exam   Constitutional: He is oriented to person, place, and time. He appears well-developed and well-nourished.   HENT:   Head: Normocephalic and atraumatic.   Eyes: Conjunctivae are normal. Pupils are equal, round, and reactive to light.   Neck: Normal range of motion. Neck supple.   Cardiovascular: Normal rate, normal heart sounds and intact distal pulses. An irregularly irregular rhythm present.   Pulmonary/Chest: Effort normal.   Abdominal: Soft. Bowel sounds are normal.   Musculoskeletal: Normal range of motion.   Neurological: He is alert and oriented to person, place, and time.   Skin: Skin is warm and dry.       Significant Labs: All pertinent lab results from the last 24 hours have been reviewed.    Significant Imaging: Echocardiogram:   2D echo with color flow doppler:   Results  for orders placed or performed during the hospital encounter of 07/09/18   2D echo with color flow doppler   Result Value Ref Range    QEF 50 55 - 65    Mitral Valve Regurgitation TRIVIAL TO MILD     Aortic Valve Regurgitation MILD     Est. PA Systolic Pressure 72.32 (A)     Tricuspid Valve Regurgitation MILD TO MODERATE      Assessment and Plan:     * Acute on chronic systolic congestive heart failure    Diuresis and afterload reduction as tolerated. Echo 5/2018 with good EF     Hx of CABG          Centrilobular emphysema    Suspect this accounts for most of his SOB     Troponin level elevated    Likely related to CHF/COPD/CRI. Doubt ACS. Stress test 5/2018 negative for ischemia. No further w/u planned     Pacemaker    Biotronik     Type 2 diabetes mellitus, controlled, with renal complications          CKD Stage 3          Essential hypertension          Chronic atrial fibrillation    Rate controlled on eliquis         VTE Risk Mitigation (From admission, onward)        Ordered     apixaban tablet 5 mg  2 times daily      11/30/18 7803          Thank you for your consult. I will sign off. Please contact us if you have any additional questions.    Tobi Mathias MD  Cardiology   [facility]

## 2018-12-01 NOTE — ASSESSMENT & PLAN NOTE
Patient with lung exam few rales at bases,  elevated from baseline ,  chest x-ray with signs of edema.  Troponin elevated consistent with baseline.   Previous Echo with EF of 55% Repeat pending  Continue home metoprolol and holding losartan in setting of kidney function  IV lasix will monitor kidney function  Daily weights  Strict I&O's   Low salt cardiac diet  Cardiac monitoring  Will continue anticoagulation for afib

## 2018-12-01 NOTE — PLAN OF CARE
12/01/18 0845   Discharge Assessment   Assessment Type Discharge Planning Assessment   Confirmed/corrected address and phone number on facesheet? Yes   Assessment information obtained from? Patient   Communicated expected length of stay with patient/caregiver yes   Prior to hospitilization cognitive status: Alert/Oriented   Prior to hospitalization functional status: Independent  (Reported that he had HH but does not know provider name)   Current cognitive status: Alert/Oriented   Current Functional Status: Independent  (Repoted that he had HH but does not know provider name)   Facility Arrived From: Home   Lives With spouse   Able to Return to Prior Arrangements yes   Is patient able to care for self after discharge? Yes   Who are your caregiver(s) and their phone number(s)? Spouse-Jessika: [phone]   Patient's perception of discharge disposition home or selfcare   Readmission Within The Last 30 Days planned readmission   If yes, most recent facility name: [facility]   Patient currently being followed by outpatient case management? No   Patient currently receives any other outside agency services? No   Equipment Currently Used at Home none   Do you have any problems affording any of your prescribed medications? No   Is the patient taking medications as prescribed? yes   Does the patient have transportation home? Yes   Transportation Available car;public transportation;family or friend will provide   Does the patient receive services at the [facility]? No   Discharge Plan A Home with family;Home Health  (Possible HH if does not have; resume if actually has)   Discharge Plan B Other  (TBD)   Patient/Family In Agreement With Plan yes   Readmission Questionnaire   At the time of your discharge, did someone talk to you about what your health problems were? Yes   At the time of discharge, did someone talk to you about what to watch out for regarding worsening of your health problem? Yes   At the time of  discharge, did someone talk to you about what to do if you experienced worsening of your health problem? Yes   At the time of discharge, did someone talk to you about which medication to take when you left the hospital and which ones to stop taking? Yes   At the time of discharge, did someone talk to you about when and where to follow up with a doctor after you left the hospital? Yes   What do you believe caused you to be sick enough to be re-admitted? Same symptoms; unknown   How often do you need to have someone help you when you read instructions, pamphlets, or other written material from your doctor or pharmacy? Sometimes   Do you have problems taking your medications as prescribed? No   Do you have any problems affording any of  your prescribed medications? No   Do you have problems obtaining/receiving your medications? No   Does the patient have transportation to healthcare appointments? Yes   Living Arrangements house   Does your caregiver provide all the help you need? Yes   If no, what kind of help do you need at home? Assist with care when short of breath or weak-spouse assists     Patient reported that he is mostly independent at home with spouse who assists if/as needed; assist not usually needed unless SOB or weak; reportedly has HH but does not know provider name; reported no DME at home; no needs anticipated for discharge but resume HH if he has it; may benefit from HH    PCP: Keaton Hardy MD [address] [place] [address] [address] / [place]     Extended Emergency Contact Information  Primary Emergency Contact: Jessika Ramos  Address: [address] [place] [address] [place] [address] [place]  Home Phone: [phone]  Mobile Phone: [phone]  Relation: Spouse     [employer] [address] [place] - [address]  [address]  [place] [address]  Phone: [phone] Fax: [phone]    Payor: [employer] MANAGED MEDICARE / Plan: [employer] CHOICES 65 /  "Product Type: Medicare Advantage /      LCSW/Patient: Discharge Planning Review   LCSW to patient's room to discuss: Help at Home and who helps the patient manage care at home   Patient identified with 2 identifiers: Name and date of birth   LCSW name and contact number written on communication board   LCSW explained role to patient   To patient's room to discuss patient managing her care at home; who will help at home with recovery   "Discharge planning begins on Admission" brochure discussed and placed in "My Health Packet" at bedside   LCSW discussed preferences for follow-up appointments; providers: if services and/or equipment is ordered   Things You are responsible for at home:  1. Getting your prescriptions filled.  2. Taking you medications as directed. DO NOT MISS ANY DOSES!  3. Going to your follow-up doctor appointments. This is important because it allows the doctor to monitor your progress and to determine if any changes need to be made to your treatment plan.   1)  Warning signs/symptoms information reviewed with and provided to patient in blue folder  2)  Discussed and reinforced patient responsibility for managing health care at home: following-through with scheduled follow-up appointments or scheduling those that could not be set.       "

## 2018-12-01 NOTE — ASSESSMENT & PLAN NOTE
Possibly contributory to SOB. Some wheezing on exam. Denies symptoms consistent with a COPD exacerbation, no change in cough or sick contacts. Has refused controller medication in the past. Uses 2L of O2 at home.   -Solumederol x1 then transition to PO   -Supplemental O2 for 88-92%  -xopenex based on tachycardia  -Patient reports he no longer smokes    Still SOB with ambulation. Patient reports mild improvement in symptoms with steroids and breathing treatments. Will continue prednisone PO, xopenex, and supplemental O2

## 2018-12-01 NOTE — NURSING
O2 Sats on 2L via NC at rest = 93%    O2 sats on 2L via NC with exercise  = 88%    Patient ambulated approximately 30 feet in hallway with O2 assisted by nurse. Patient tolerated well. SOB noted. Patient returned to bed without difficulty. Patient resting. Will continue to monitor.

## 2018-12-01 NOTE — SUBJECTIVE & OBJECTIVE
Past Medical History:   Diagnosis Date    Anemia 05/03/2018    pending blood transfusion    Anticoagulant long-term use     apixaban    Atrial fibrillation     CKD (chronic kidney disease)     Congestive heart failure     COPD (chronic obstructive pulmonary disease)     Coronary artery disease     Diabetes mellitus     Encounter for blood transfusion     HLD (hyperlipidemia)     Hypertension     MI (myocardial infarction)     Pacemaker     left chest    Peripheral vascular disease     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     S/P femoral-popliteal bypass surgery left    Stented coronary artery     Tobacco use        Past Surgical History:   Procedure Laterality Date    CARDIAC CATHETERIZATION      CARDIAC PACEMAKER PLACEMENT      CARDIAC SURGERY      3 vessel CABG    CARDIOVERSION N/A 9/19/2013    [facility]    cardioverted      CORONARY ANGIOPLASTY WITH STENT PLACEMENT      EGD (ESOPHAGOGASTRODUODENOSCOPY) N/A 6/1/2018    [facility] ENDO    ESOPHAGOGASTRODUODENOSCOPY N/A 6/1/2018    Procedure: EGD (ESOPHAGOGASTRODUODENOSCOPY);  Surgeon: Ty Hickman MD;  [facility];  Service: Endoscopy;  Laterality: N/A;    HEMORRHOID SURGERY      TRANSESOPHAGEAL ECHOCARDIOGRAM (ANIA) N/A 9/19/2013    Per[facility] Surgeon [facility]    VASCULAR SURGERY      femoral artery popliteal bypass       Review of patient's allergies indicates:  No Known Allergies    No current facility-administered medications on file prior to encounter.      Current Outpatient Medications on File Prior to Encounter   Medication Sig    albuterol-ipratropium 2.5mg-0.5mg/3mL (DUO-NEB) 0.5 mg-3 mg(2.5 mg base)/3 mL nebulizer solution Take 3 mLs by nebulization every 6 (six) hours. Rescue    amLODIPine (NORVASC) 5 MG tablet Take 5 mg by mouth once daily.    apixaban (ELIQUIS) 5 mg Tab Take 5 mg by mouth 2 (two) times daily.    atorvastatin (LIPITOR) 20 MG tablet Take 20 mg by mouth  every evening.    ferrous gluconate (FERGON) 324 MG tablet Take 324 mg by mouth daily with breakfast.    fish oil-omega-3 fatty acids 300-1,000 mg capsule Take 2 g by mouth 2 (two) times daily.     furosemide (LASIX) 80 MG tablet Take 40 mg by mouth 2 (two) times daily.     glipiZIDE (GLUCOTROL) 5 MG tablet Take 10 mg by mouth 2 (two) times daily before meals.    losartan (COZAAR) 50 MG tablet Take 50 mg by mouth once daily.    metoprolol tartrate (LOPRESSOR) 25 MG tablet Take 25 mg by mouth 2 (two) times daily.    metFORMIN (GLUCOPHAGE) 500 MG tablet Take 500 mg by mouth 2 (two) times daily with meals.    nitroGLYCERIN (NITROSTAT) 0.4 MG SL tablet Place 0.4 mg under the tongue every 5 (five) minutes as needed.    polyethylene glycol (GLYCOLAX) 17 gram PwPk Take by mouth as needed.      Family History     Problem Relation (Age of Onset)    Diabetes Father, Mother        Tobacco Use    Smoking status: Former Smoker     Packs/day: 1.00     Years: 60.00     Pack years: 60.00     Types: Cigarettes     Last attempt to quit: 2018     Years since quittin.5    Smokeless tobacco: Never Used   Substance and Sexual Activity    Alcohol use: No    Drug use: No    Sexual activity: Not on file     Review of Systems   Constitutional: Negative for appetite change, chills and fever.   HENT: Negative for nosebleeds and tinnitus.    Eyes: Negative for photophobia and visual disturbance.   Respiratory: Positive for shortness of breath. Negative for cough and wheezing.    Cardiovascular: Negative for chest pain, palpitations and leg swelling.   Gastrointestinal: Negative for abdominal distention, abdominal pain, diarrhea, nausea and vomiting.        Dark stools from iron pills   Genitourinary: Positive for dysuria. Negative for flank pain and hematuria.   Musculoskeletal: Negative for gait problem and joint swelling.   Skin: Negative for rash and wound.   Neurological: Negative for seizures and syncope.      Objective:     Vital Signs (Most Recent):  Temp: 98.4 °F (36.9 °C) (11/30/18 1745)  Pulse: (!) 117 (11/30/18 1746)  Resp: (!) 29 (11/30/18 1746)  BP: 135/74 (11/30/18 1746)  SpO2: (!) 93 % (11/30/18 1749) Vital Signs (24h Range):  Temp:  [97.5 °F (36.4 °C)-98.4 °F (36.9 °C)] 98.4 °F (36.9 °C)  Pulse:  [] 117  Resp:  [20-46] 29  SpO2:  [93 %-99 %] 93 %  BP: (135-213)/() 135/74     Weight: 81.6 kg (180 lb)  Body mass index is 26.58 kg/m².    Physical Exam   Constitutional: He is oriented to person, place, and time. He appears well-developed and well-nourished. No distress.   Receiving breathing treatment   HENT:   Head: Normocephalic and atraumatic.   Right Ear: External ear normal.   Left Ear: External ear normal.   Eyes: Conjunctivae and EOM are normal. Right eye exhibits no discharge. Left eye exhibits no discharge.   Neck: Normal range of motion. No thyromegaly present.   Cardiovascular: Normal rate, regular rhythm and intact distal pulses.   No murmur heard.  Pulmonary/Chest: Effort normal. He has wheezes. He has rales.   Diminished breath sounds. Few wheezes at bases  Increased work of breathing   Abdominal: Soft. Bowel sounds are normal. He exhibits no distension and no mass. There is no tenderness.   Musculoskeletal: He exhibits edema (to mid shin). He exhibits no deformity.   +1 pedal edema bilaterally   Neurological: He is alert and oriented to person, place, and time.   Skin: Skin is warm and dry.   Psychiatric: He has a normal mood and affect. His behavior is normal.   Nursing note and vitals reviewed.        CRANIAL NERVES     CN III, IV, VI   Extraocular motions are normal.        Significant Labs:   CBC:   Recent Labs   Lab 11/30/18  1234   WBC 8.34   HGB 10.5*   HCT 34.2*        CMP:   Recent Labs   Lab 11/30/18  1234      K 4.2      CO2 27   *   BUN 27*   CREATININE 1.5*   CALCIUM 11.2*   PROT 5.9*   ALBUMIN 2.7*   BILITOT 0.6   ALKPHOS 135   AST 18   ALT 10    ANIONGAP 6*   EGFRNONAA 43*     Cardiac Markers:   Recent Labs   Lab 11/30/18  1234   *     Coagulation:   Recent Labs   Lab 11/30/18  1234   INR 1.1     Troponin:   Recent Labs   Lab 11/30/18  1234   TROPONINI 0.098*     Urine Studies:   Recent Labs   Lab 11/30/18  1346   COLORU Yellow   APPEARANCEUA Cloudy*   PHUR 6.0   SPECGRAV 1.015   PROTEINUA 2+*   GLUCUA Negative   KETONESU Negative   BILIRUBINUA Negative   OCCULTUA 1+*   NITRITE Negative   UROBILINOGEN Negative   LEUKOCYTESUR Trace*   RBCUA 1   WBCUA 2   BACTERIA Many*   SQUAMEPITHEL 2   HYALINECASTS 0       Significant Imaging:   Imaging Results          X-Ray Chest AP Portable (Final result)  Result time 11/30/18 12:51:42    Final result by Adama Velez MD (11/30/18 12:51:42)                 Impression:      1. Findings suggesting edema, noting possible trace effusions versus atelectasis or soft tissue attenuation.      Electronically signed by: Adama Velez MD  Date:    11/30/2018  Time:    12:51             Narrative:    EXAMINATION:  XR CHEST AP PORTABLE    CLINICAL HISTORY:  CHF;    TECHNIQUE:  Single frontal view of the chest was performed.    COMPARISON:  07/09/2018    FINDINGS:  Left chest wall pacer noted.  The cardiomediastinal silhouette is enlarged, similar to the previous exam noting tortuosity of the aorta, and prominence of the aortic arch.  There is stable elevation of the right hemidiaphragm..  There is slight obscuration of the costophrenic angles, likely related atelectasis and overlying soft tissues, trace effusions however are not excluded..  The trachea is midline.  The lungs are symmetrically expanded bilaterally with patchy increased interstitial and parenchymal attenuation, suggesting edema.  No large focal consolidation seen.  There is no pneumothorax.  The osseous structures are remarkable for degenerative changes..

## 2018-12-01 NOTE — ASSESSMENT & PLAN NOTE
Possibly contributory to SOB. Some wheezing on exam. Denies symptoms consistent with a COPD exacerbation, no change in cough or sick contacts. Has refused controller medication in the past. Uses 2L of O2 at home.   -Solumederol x1 then transition to PO   -Supplemental O2 for 88-92%  -xopenex based on tachycardia  -Patient reports he no longer smokes

## 2018-12-01 NOTE — HPI
Agus Ramos Jr. 78 y.o. male with COPD, CHF, Afib, DM2, HTN, HLD, s/p pacemaker, presenting to the hospital with a chief complaint of SOB. He reports for the last week he has been experiencing increasing SOB. He reports he is currently unable to ambulate throughout the room. He has taken extra lasix at home without improvement of the symptoms. The SOB is worsened with exertion. He reports he occasionally experience orthopnea, but also becomes SOB when sitting up. He reports he does not feel as though he is holding extra fluid, and has minimal relief with lasix provided by ED. He reports compliance with his Low salt and fluid restriction diet. His wife has attempted to give extra doses of lasix at home without relief of the symptoms.  The duo-nebs in the ED have provided some relief. He denies fever/chills, change in cough, sick contacts, N/V/D, abdominal pain, chest pain, and syncope. He endorses dysuria and SOB.    In the ED,  Troponin elevated consistent with baseline, Cr of 1.5, UA with many bacteria, ABG with PH of 7.4 and O2 of 64, chest x-ray with trace effusions and evidence of edema, and EKG with afib.

## 2018-12-01 NOTE — ASSESSMENT & PLAN NOTE
EKG in ED of afib, became mildly tachycardic after the addition of duo-nebs. Will continue home apixaban and metoprolol. Monitor overnight on telemetry.

## 2018-12-01 NOTE — H&P
"[facility] Medicine  History & Physical    Patient Name: Agus Ramos Jr.  MRN: [id number]  Admission Date: 11/30/2018  Attending Physician: Marita Starr MD   Primary Care Provider: Keaton Hardy MD         Patient information was obtained from patient and ER records.     Subjective:     Principal Problem:Acute on chronic systolic congestive heart failure    Chief Complaint:   Chief Complaint   Patient presents with    Shortness of Breath     " I am not breathing well, its been on and off for a week." Pt presents on 2L oxygen        HPI: Agus Ramos Jr. 78 y.o. male with COPD, CHF, Afib, DM2, HTN, HLD, s/p pacemaker, presenting to the hospital with a chief complaint of SOB. He reports for the last week he has been experiencing increasing SOB. He reports he is currently unable to ambulate throughout the room. He has taken extra lasix at home without improvement of the symptoms. The SOB is worsened with exertion. He reports he occasionally experience orthopnea, but also becomes SOB when sitting up. He reports he does not feel as though he is holding extra fluid, and has minimal relief with lasix provided by ED. He reports compliance with his Low salt and fluid restriction diet. His wife has attempted to give extra doses of lasix at home without relief of the symptoms.  The duo-nebs in the ED have provided some relief. He denies fever/chills, change in cough, sick contacts, N/V/D, abdominal pain, chest pain, and syncope. He endorses dysuria and SOB.    In the ED,  Troponin elevated consistent with baseline, Cr of 1.5, UA with many bacteria, ABG with PH of 7.4 and O2 of 64, chest x-ray with trace effusions and evidence of edema, and EKG with afib.     Past Medical History:   Diagnosis Date    Anemia 05/03/2018    pending blood transfusion    Anticoagulant long-term use     apixaban    Atrial fibrillation     CKD (chronic kidney disease)     Congestive heart failure     COPD " (chronic obstructive pulmonary disease)     Coronary artery disease     Diabetes mellitus     Encounter for blood transfusion     HLD (hyperlipidemia)     Hypertension     MI (myocardial infarction)     Pacemaker     left chest    Peripheral vascular disease     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     S/P femoral-popliteal bypass surgery left    Stented coronary artery     Tobacco use        Past Surgical History:   Procedure Laterality Date    CARDIAC CATHETERIZATION      CARDIAC PACEMAKER PLACEMENT      CARDIAC SURGERY      3 vessel CABG    CARDIOVERSION N/A 9/19/2013    Performed by [facility]geon at [facility]    cardioverted      CORONARY ANGIOPLASTY WITH STENT PLACEMENT      EGD (ESOPHAGOGASTRODUODENOSCOPY) N/A 6/1/2018    Performed by Ty Hickman MD at [facility] ENDO    ESOPHAGOGASTRODUODENOSCOPY N/A 6/1/2018    Procedure: EGD (ESOPHAGOGASTRODUODENOSCOPY);  Surgeon: Ty Hickman MD;  Location: [facility];  Service: Endoscopy;  Laterality: N/A;    HEMORRHOID SURGERY      TRANSESOPHAGEAL ECHOCARDIOGRAM (ANIA) N/A 9/19/2013    Performed by [facility] Surgeon at [facility]ARYANN    VASCULAR SURGERY      femoral artery popliteal bypass       Review of patient's allergies indicates:  No Known Allergies    No current facility-administered medications on file prior to encounter.      Current Outpatient Medications on File Prior to Encounter   Medication Sig    albuterol-ipratropium 2.5mg-0.5mg/3mL (DUO-NEB) 0.5 mg-3 mg(2.5 mg base)/3 mL nebulizer solution Take 3 mLs by nebulization every 6 (six) hours. Rescue    amLODIPine (NORVASC) 5 MG tablet Take 5 mg by mouth once daily.    apixaban (ELIQUIS) 5 mg Tab Take 5 mg by mouth 2 (two) times daily.    atorvastatin (LIPITOR) 20 MG tablet Take 20 mg by mouth every evening.    ferrous gluconate (FERGON) 324 MG tablet Take 324 mg by mouth daily with breakfast.    fish oil-omega-3 fatty acids 300-1,000 mg capsule Take 2 g by mouth 2 (two) times daily.     furosemide (LASIX) 80 MG tablet  Take 40 mg by mouth 2 (two) times daily.     glipiZIDE (GLUCOTROL) 5 MG tablet Take 10 mg by mouth 2 (two) times daily before meals.    losartan (COZAAR) 50 MG tablet Take 50 mg by mouth once daily.    metoprolol tartrate (LOPRESSOR) 25 MG tablet Take 25 mg by mouth 2 (two) times daily.    metFORMIN (GLUCOPHAGE) 500 MG tablet Take 500 mg by mouth 2 (two) times daily with meals.    nitroGLYCERIN (NITROSTAT) 0.4 MG SL tablet Place 0.4 mg under the tongue every 5 (five) minutes as needed.    polyethylene glycol (GLYCOLAX) 17 gram PwPk Take by mouth as needed.      Family History     Problem Relation (Age of Onset)    Diabetes Father, Mother        Tobacco Use    Smoking status: Former Smoker     Packs/day: 1.00     Years: 60.00     Pack years: 60.00     Types: Cigarettes     Last attempt to quit: 2018     Years since quittin.5    Smokeless tobacco: Never Used   Substance and Sexual Activity    Alcohol use: No    Drug use: No    Sexual activity: Not on file     Review of Systems   Constitutional: Negative for appetite change, chills and fever.   HENT: Negative for nosebleeds and tinnitus.    Eyes: Negative for photophobia and visual disturbance.   Respiratory: Positive for shortness of breath. Negative for cough and wheezing.    Cardiovascular: Negative for chest pain, palpitations and leg swelling.   Gastrointestinal: Negative for abdominal distention, abdominal pain, diarrhea, nausea and vomiting.        Dark stools from iron pills   Genitourinary: Positive for dysuria. Negative for flank pain and hematuria.   Musculoskeletal: Negative for gait problem and joint swelling.   Skin: Negative for rash and wound.   Neurological: Negative for seizures and syncope.     Objective:     Vital Signs (Most Recent):  Temp: 98.4 °F (36.9 °C) (18)  Pulse: (!) 117 (18)  Resp: (!) 29 (18)  BP: 135/74 (18)  SpO2: (!) 93 % (18) Vital Signs (24h Range):  Temp:   [97.5 °F (36.4 °C)-98.4 °F (36.9 °C)] 98.4 °F (36.9 °C)  Pulse:  [] 117  Resp:  [20-46] 29  SpO2:  [93 %-99 %] 93 %  BP: (135-213)/() 135/74     Weight: 81.6 kg (180 lb)  Body mass index is 26.58 kg/m².    Physical Exam   Constitutional: He is oriented to person, place, and time. He appears well-developed and well-nourished. No distress.   Receiving breathing treatment   HENT:   Head: Normocephalic and atraumatic.   Right Ear: External ear normal.   Left Ear: External ear normal.   Eyes: Conjunctivae and EOM are normal. Right eye exhibits no discharge. Left eye exhibits no discharge.   Neck: Normal range of motion. No thyromegaly present.   Cardiovascular: Normal rate, Irregular rhythm and intact distal pulses.   No murmur heard.  Pulmonary/Chest: Effort normal. He has wheezes. He has rales.   Diminished breath sounds. Few wheezes at bases  Increased work of breathing   Abdominal: Soft. Bowel sounds are normal. He exhibits no distension and no mass. There is no tenderness.   Musculoskeletal: He exhibits edema (to mid shin). He exhibits no deformity.   +1 pedal edema bilaterally   Neurological: He is alert and oriented to person, place, and time.   Skin: Skin is warm and dry.   Psychiatric: He has a normal mood and affect. His behavior is normal.   Nursing note and vitals reviewed.        CRANIAL NERVES     CN III, IV, VI   Extraocular motions are normal.        Significant Labs:   CBC:   Recent Labs   Lab 11/30/18  1234   WBC 8.34   HGB 10.5*   HCT 34.2*        CMP:   Recent Labs   Lab 11/30/18  1234      K 4.2      CO2 27   *   BUN 27*   CREATININE 1.5*   CALCIUM 11.2*   PROT 5.9*   ALBUMIN 2.7*   BILITOT 0.6   ALKPHOS 135   AST 18   ALT 10   ANIONGAP 6*   EGFRNONAA 43*     Cardiac Markers:   Recent Labs   Lab 11/30/18  1234   *     Coagulation:   Recent Labs   Lab 11/30/18  1234   INR 1.1     Troponin:   Recent Labs   Lab 11/30/18  1234   TROPONINI 0.098*     Urine  Studies:   Recent Labs   Lab 11/30/18  1346   COLORU Yellow   APPEARANCEUA Cloudy*   PHUR 6.0   SPECGRAV 1.015   PROTEINUA 2+*   GLUCUA Negative   KETONESU Negative   BILIRUBINUA Negative   OCCULTUA 1+*   NITRITE Negative   UROBILINOGEN Negative   LEUKOCYTESUR Trace*   RBCUA 1   WBCUA 2   BACTERIA Many*   SQUAMEPITHEL 2   HYALINECASTS 0       Significant Imaging:   Imaging Results          X-Ray Chest AP Portable (Final result)  Result time 11/30/18 12:51:42    Final result by Adama Velez MD (11/30/18 12:51:42)                 Impression:      1. Findings suggesting edema, noting possible trace effusions versus atelectasis or soft tissue attenuation.      Electronically signed by: Adama Velez MD  Date:    11/30/2018  Time:    12:51             Narrative:    EXAMINATION:  XR CHEST AP PORTABLE    CLINICAL HISTORY:  CHF;    TECHNIQUE:  Single frontal view of the chest was performed.    COMPARISON:  07/09/2018    FINDINGS:  Left chest wall pacer noted.  The cardiomediastinal silhouette is enlarged, similar to the previous exam noting tortuosity of the aorta, and prominence of the aortic arch.  There is stable elevation of the right hemidiaphragm..  There is slight obscuration of the costophrenic angles, likely related atelectasis and overlying soft tissues, trace effusions however are not excluded..  The trachea is midline.  The lungs are symmetrically expanded bilaterally with patchy increased interstitial and parenchymal attenuation, suggesting edema.  No large focal consolidation seen.  There is no pneumothorax.  The osseous structures are remarkable for degenerative changes..                                  Assessment/Plan:     * Acute on chronic systolic congestive heart failure    Patient with lung exam few rales at bases,  elevated from baseline ,  chest x-ray with signs of edema.  Troponin elevated consistent with baseline.   Previous Echo with EF of 55% Repeat pending  Continue home  metoprolol and holding losartan in setting of kidney function  IV lasix will monitor kidney function  Daily weights  Strict I&O's   Low salt cardiac diet  Cardiac monitoring  Will continue anticoagulation for afib     Bacteriuria    UA in the ED with many bacteria. Patient reports some symptoms of dysuria and difficulty urinating will treat with Rocephin.  patient afebrile and without leukocytosis.      Centrilobular emphysema    Possibly contributory to SOB. Some wheezing on exam. Denies symptoms consistent with a COPD exacerbation, no change in cough or sick contacts. Has refused controller medication in the past. Uses 2L of O2 at home.   -Solumederol x1 then transition to PO   -Supplemental O2 for 88-92%  -xopenex based on tachycardia  -Patient reports he no longer smokes     Troponin level elevated    Troponin elevated. Lower than previous. No complaints of chest pain at this time. EKG of afib. Most likely due to CHF. Will continue home apixaban, metoprolol. Monitor on telemetry and continue to trend troponin.      Iron deficiency anemia    H&H stable compared to previous. No signs of active bleed. Will recheck CBC in AM and continue home iron supplementation     Type 2 diabetes mellitus, controlled, with renal complications    BG of 133  Hold home metformin  A1c of 5.0 5 months ago  Dose adjusted insulin in hospital  Hypoglycemic protocol  POCT glucose checks  Diabetic diet low salt     CKD Stage 3    Creatinine near patient's baseline. Will monitor while patient is receiving IV lasix. Holding home losartan to prevent ADRIAN. Avoid nephrotoxic drugs and renal dose medications. Repeat BMP in AM.      Essential hypertension    Hypertensive over course of admission.  SBP goal of <160 in hosp.  Continue home amlodipine, metoprolol. Holding losartan in setting of kidney function with continued IV diuresis.   PRN hydralazine      Chronic atrial fibrillation    EKG in ED of afib, became mildly tachycardic after the  addition of duo-nebs. Will continue home apixaban and metoprolol. Monitor overnight on telemetry.        VTE Risk Mitigation (From admission, onward)        Ordered     apixaban tablet 5 mg  2 times daily      11/30/18 5383        VTE: apixaban  Code: full  Diet: diabetic low salt fluid restriction  Dispo: pending improvement in respiratory status     Fidel Moore PA-C  Department of Hospital Medicine   [facility]

## 2018-12-01 NOTE — NURSING
O2 Sats on 2L at rest = 90%    O2 sats on 2L with exercise  = 85%    Patient ambulated approximately 30 feet. Patient complain SOB. Telemetry Monitor tech called and reported patient had a 11 beat and 15 beat run of VTach. BESSY Eng notified. Will continue to monitor.

## 2018-12-01 NOTE — ASSESSMENT & PLAN NOTE
Creatinine near patient's baseline. Will monitor while patient is receiving IV lasix. Holding home losartan to prevent ADRIAN. Avoid nephrotoxic drugs and renal dose medications. Repeat BMP in AM.

## 2018-12-01 NOTE — ASSESSMENT & PLAN NOTE
Troponin elevated. Lower than previous. No complaints of chest pain at this time. EKG of afib. Most likely due to CHF. Will continue home apixaban, metoprolol. Monitor on telemetry and continue to trend troponin.

## 2018-12-01 NOTE — ASSESSMENT & PLAN NOTE
BG of 133  Hold home metformin  A1c of 5.0 5 months ago  Dose adjusted insulin in hospital  Hypoglycemic protocol  POCT glucose checks  Diabetic diet low salt

## 2018-12-01 NOTE — ASSESSMENT & PLAN NOTE
UA in the ED with many bacteria. Patient reports some symptoms of dysuria and difficulty urinating will treat with Rocephin.  patient afebrile and without leukocytosis.

## 2018-12-01 NOTE — ASSESSMENT & PLAN NOTE
Patient with lung exam few rales at bases,  elevated from baseline ,  chest x-ray with signs of edema.  Troponin elevated consistent with baseline.   Previous Echo with EF of 55% Repeat pending  Continue home metoprolol and holding losartan in setting of kidney function  IV lasix will monitor kidney function  Daily weights  Strict I&O's   Low salt cardiac diet  Cardiac monitoring  Will continue anticoagulation for afib    Seen today by cardiology. Believed SOB more likely due to COPD. Will discontinue IV diuretics and continue home PO lasix

## 2018-12-01 NOTE — ASSESSMENT & PLAN NOTE
Hypertensive over course of admission.  SBP goal of <160 in hosp.  Continue home amlodipine, metoprolol. Holding losartan in setting of kidney function with continued IV diuresis.   PRN hydralazine

## 2018-12-01 NOTE — ASSESSMENT & PLAN NOTE
Possibly contributory to SOB. Some wheezing on exam. Denies symptoms consistent with a COPD exacerbation, no change in cough or sick contacts. Has refused controller medication in the past.   -Solumederol x1 then transition to PO   -Supplemental O2 for 88-92%  -xopenex based on tachycardia  -Patient reports he no longer smokes

## 2018-12-01 NOTE — SUBJECTIVE & OBJECTIVE
Past Medical History:   Diagnosis Date    Anemia 05/03/2018    pending blood transfusion    Anticoagulant long-term use     apixaban    Atrial fibrillation     CKD (chronic kidney disease)     Congestive heart failure     COPD (chronic obstructive pulmonary disease)     Coronary artery disease     Diabetes mellitus     Encounter for blood transfusion     HLD (hyperlipidemia)     Hypertension     MI (myocardial infarction)     Pacemaker     left chest    Peripheral vascular disease     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     S/P femoral-popliteal bypass surgery left    Stented coronary artery     Tobacco use        Past Surgical History:   Procedure Laterality Date    CARDIAC CATHETERIZATION      CARDIAC PACEMAKER PLACEMENT      CARDIAC SURGERY      3 vessel CABG    CARDIOVERSION N/A 9/19/2013    [facility]    cardioverted      CORONARY ANGIOPLASTY WITH STENT PLACEMENT      EGD (ESOPHAGOGASTRODUODENOSCOPY) N/A 6/1/2018    Performed [facility] yT [facility]man [facility] at [facility] ENDO    ESOPHAGOGASTRODUODENOSCOPY N/A 6/1/2018    Procedure: EGD (ESOPHAGOGASTRODUODENOSCOPY);  Surgeon: Ty Hickman MD;  Location: [facility];  Service: Endoscopy;  Laterality: N/A;    HEMORRHOID SURGERY      TRANSESOPHAGEAL ECHOCARDIOGRAM (ANIA) N/A 9/19/2013    Performed by [facility] Surgeon at [facility]    VASCULAR SURGERY      femoral artery popliteal bypass       Review of patient's allergies indicates:  No Known Allergies    No current facility-administered medications on file prior to encounter.      Current Outpatient Medications on File Prior to Encounter   Medication Sig    albuterol-ipratropium 2.5mg-0.5mg/3mL (DUO-NEB) 0.5 mg-3 mg(2.5 mg base)/3 mL nebulizer solution Take 3 mLs by nebulization every 6 (six) hours. Rescue    amLODIPine (NORVASC) 5 MG tablet Take 5 mg by mouth once daily.    apixaban (ELIQUIS) 5 mg Tab Take 5 mg by mouth 2 (two) times daily.    atorvastatin (LIPITOR) 20 MG tablet Take 20 mg by mouth  every evening.    ferrous gluconate (FERGON) 324 MG tablet Take 324 mg by mouth daily with breakfast.    fish oil-omega-3 fatty acids 300-1,000 mg capsule Take 2 g by mouth 2 (two) times daily.     furosemide (LASIX) 80 MG tablet Take 40 mg by mouth 2 (two) times daily.     glipiZIDE (GLUCOTROL) 5 MG tablet Take 10 mg by mouth 2 (two) times daily before meals.    losartan (COZAAR) 50 MG tablet Take 50 mg by mouth once daily.    metoprolol tartrate (LOPRESSOR) 25 MG tablet Take 25 mg by mouth 2 (two) times daily.    metFORMIN (GLUCOPHAGE) 500 MG tablet Take 500 mg by mouth 2 (two) times daily with meals.    nitroGLYCERIN (NITROSTAT) 0.4 MG SL tablet Place 0.4 mg under the tongue every 5 (five) minutes as needed.    polyethylene glycol (GLYCOLAX) 17 gram PwPk Take by mouth as needed.      Family History     Problem Relation (Age of Onset)    Diabetes Father, Mother        Tobacco Use    Smoking status: Former Smoker     Packs/day: 1.00     Years: 60.00     Pack years: 60.00     Types: Cigarettes     Last attempt to quit: 2018     Years since quittin.5    Smokeless tobacco: Never Used   Substance and Sexual Activity    Alcohol use: No    Drug use: No    Sexual activity: Not on file     Review of Systems   Constitution: Negative for decreased appetite.   HENT: Negative for ear discharge.    Eyes: Negative for blurred vision.   Endocrine: Negative for polyphagia.   Skin: Negative for nail changes.   Neurological: Negative for aphonia.   Psychiatric/Behavioral: Negative for hallucinations.     Objective:     Vital Signs (Most Recent):  Temp: 97.5 °F (36.4 °C) (18)  Pulse: 91 (18)  Resp: 20 (18)  BP: (!) 183/88 (18)  SpO2: (!) 94 % (18) Vital Signs (24h Range):  Temp:  [97.1 °F (36.2 °C)-98.4 °F (36.9 °C)] 97.5 °F (36.4 °C)  Pulse:  [] 91  Resp:  [18-46] 20  SpO2:  [91 %-99 %] 94 %  BP: (115-213)/() 183/88     Weight: 83.5 kg (184 lb  1.4 oz)  Body mass index is 27.18 kg/m².    SpO2: (!) 94 %  O2 Device (Oxygen Therapy): nasal cannula      Intake/Output Summary (Last 24 hours) at 12/1/2018 1031  Last data filed at 12/1/2018 0529  Gross per 24 hour   Intake --   Output 665 ml   Net -665 ml       Lines/Drains/Airways     Peripheral Intravenous Line                 Peripheral IV - Single Lumen 11/30/18 1232 Right Antecubital less than 1 day                Physical Exam   Constitutional: He is oriented to person, place, and time. He appears well-developed and well-nourished.   HENT:   Head: Normocephalic and atraumatic.   Eyes: Conjunctivae are normal. Pupils are equal, round, and reactive to light.   Neck: Normal range of motion. Neck supple.   Cardiovascular: Normal rate, normal heart sounds and intact distal pulses. An irregularly irregular rhythm present.   Pulmonary/Chest: Effort normal.   Abdominal: Soft. Bowel sounds are normal.   Musculoskeletal: Normal range of motion.   Neurological: He is alert and oriented to person, place, and time.   Skin: Skin is warm and dry.       Significant Labs: All pertinent lab results from the last 24 hours have been reviewed.    Significant Imaging: Echocardiogram:   2D echo with color flow doppler:   Results for orders placed or performed during the hospital encounter of 07/09/18   2D echo with color flow doppler   Result Value Ref Range    QEF 50 55 - 65    Mitral Valve Regurgitation TRIVIAL TO MILD     Aortic Valve Regurgitation MILD     Est. PA Systolic Pressure 72.32 (A)     Tricuspid Valve Regurgitation MILD TO MODERATE

## 2018-12-01 NOTE — PLAN OF CARE
Problem: Patient Care Overview  Goal: Plan of Care Review   11/30/18 1930   Coping/Psychosocial   Plan Of Care Reviewed With patient   Pt remained free of falls during current shift. Denied pain and did not receive any prn pain medications. IV lasix is being administered and monitoring pt's output. Breathing treatments are being given Q8H. Plan of care and fall precautions reviewed with pt and verbalized understanding. Bed locked, lowered, SR up x2 and call light placed within reach.

## 2018-12-01 NOTE — NURSING
PER handoff received from ASHLEE Mtz RN. Responds to voice and oriented x4. Denies having any pain, however pt gets YOO. Assessment completed per Doc Flowsheets; reference if needed. Fall and safety precautions maintained. Bed alarm activated and audible. Bed locked in lowest position, with side rails up x2. Call bell and personal items within reach. Will continue to monitor pt for any changes.

## 2018-12-01 NOTE — ASSESSMENT & PLAN NOTE
Will continue home atorvastatin, losartan, and apixaban. Patient denies chest pain currently. Troponins have remained flat.

## 2018-12-02 VITALS
BODY MASS INDEX: 27.11 KG/M2 | DIASTOLIC BLOOD PRESSURE: 79 MMHG | HEIGHT: 69 IN | SYSTOLIC BLOOD PRESSURE: 142 MMHG | WEIGHT: 183 LBS | HEART RATE: 98 BPM | RESPIRATION RATE: 20 BRPM | OXYGEN SATURATION: 96 % | TEMPERATURE: 97 F

## 2018-12-02 LAB
ANION GAP SERPL CALC-SCNC: 9 MMOL/L
BASOPHILS # BLD AUTO: 0 K/UL
BASOPHILS NFR BLD: 0 %
BUN SERPL-MCNC: 41 MG/DL
CALCIUM SERPL-MCNC: 10.5 MG/DL
CHLORIDE SERPL-SCNC: 107 MMOL/L
CO2 SERPL-SCNC: 25 MMOL/L
CREAT SERPL-MCNC: 1.6 MG/DL
DIFFERENTIAL METHOD: ABNORMAL
EOSINOPHIL # BLD AUTO: 0 K/UL
EOSINOPHIL NFR BLD: 0 %
ERYTHROCYTE [DISTWIDTH] IN BLOOD BY AUTOMATED COUNT: 18.3 %
EST. GFR  (AFRICAN AMERICAN): 46 ML/MIN/1.73 M^2
EST. GFR  (NON AFRICAN AMERICAN): 40 ML/MIN/1.73 M^2
GLUCOSE SERPL-MCNC: 185 MG/DL
HCT VFR BLD AUTO: 32.8 %
HGB BLD-MCNC: 10.3 G/DL
LYMPHOCYTES # BLD AUTO: 0.4 K/UL
LYMPHOCYTES NFR BLD: 9.9 %
MCH RBC QN AUTO: 24.3 PG
MCHC RBC AUTO-ENTMCNC: 31.4 G/DL
MCV RBC AUTO: 77 FL
MONOCYTES # BLD AUTO: 0.5 K/UL
MONOCYTES NFR BLD: 10.8 %
NEUTROPHILS # BLD AUTO: 3.4 K/UL
NEUTROPHILS NFR BLD: 79.3 %
PLATELET # BLD AUTO: 362 K/UL
PMV BLD AUTO: 10.4 FL
POCT GLUCOSE: 187 MG/DL (ref 70–110)
POCT GLUCOSE: 195 MG/DL (ref 70–110)
POTASSIUM SERPL-SCNC: 4.4 MMOL/L
RBC # BLD AUTO: 4.24 M/UL
SODIUM SERPL-SCNC: 141 MMOL/L
WBC # BLD AUTO: 4.25 K/UL

## 2018-12-02 PROCEDURE — G0378 HOSPITAL OBSERVATION PER HR: HCPCS

## 2018-12-02 PROCEDURE — 93010 ELECTROCARDIOGRAM REPORT: CPT | Mod: ,,, | Performed by: INTERNAL MEDICINE

## 2018-12-02 PROCEDURE — 94761 N-INVAS EAR/PLS OXIMETRY MLT: CPT

## 2018-12-02 PROCEDURE — 97162 PT EVAL MOD COMPLEX 30 MIN: CPT | Performed by: PHYSICAL THERAPIST

## 2018-12-02 PROCEDURE — G8978 MOBILITY CURRENT STATUS: HCPCS | Mod: CK | Performed by: PHYSICAL THERAPIST

## 2018-12-02 PROCEDURE — 85025 COMPLETE CBC W/AUTO DIFF WBC: CPT

## 2018-12-02 PROCEDURE — 36415 COLL VENOUS BLD VENIPUNCTURE: CPT

## 2018-12-02 PROCEDURE — 27000221 HC OXYGEN, UP TO 24 HOURS

## 2018-12-02 PROCEDURE — 25000003 PHARM REV CODE 250: Performed by: PHYSICIAN ASSISTANT

## 2018-12-02 PROCEDURE — 25000003 PHARM REV CODE 250: Performed by: NURSE PRACTITIONER

## 2018-12-02 PROCEDURE — 63600175 PHARM REV CODE 636 W HCPCS: Performed by: NURSE PRACTITIONER

## 2018-12-02 PROCEDURE — 25000003 PHARM REV CODE 250: Performed by: HOSPITALIST

## 2018-12-02 PROCEDURE — 93005 ELECTROCARDIOGRAM TRACING: CPT

## 2018-12-02 PROCEDURE — 25000242 PHARM REV CODE 250 ALT 637 W/ HCPCS: Performed by: PHYSICIAN ASSISTANT

## 2018-12-02 PROCEDURE — 96372 THER/PROPH/DIAG INJ SC/IM: CPT

## 2018-12-02 PROCEDURE — 96376 TX/PRO/DX INJ SAME DRUG ADON: CPT

## 2018-12-02 PROCEDURE — G8980 MOBILITY D/C STATUS: HCPCS | Mod: CK | Performed by: PHYSICAL THERAPIST

## 2018-12-02 PROCEDURE — 25000003 PHARM REV CODE 250: Performed by: EMERGENCY MEDICINE

## 2018-12-02 PROCEDURE — G8979 MOBILITY GOAL STATUS: HCPCS | Mod: CI | Performed by: PHYSICAL THERAPIST

## 2018-12-02 PROCEDURE — 80048 BASIC METABOLIC PNL TOTAL CA: CPT

## 2018-12-02 PROCEDURE — 94640 AIRWAY INHALATION TREATMENT: CPT

## 2018-12-02 RX ORDER — DILTIAZEM HYDROCHLORIDE 30 MG/1
30 TABLET, FILM COATED ORAL EVERY 12 HOURS
Status: DISCONTINUED | OUTPATIENT
Start: 2018-12-02 | End: 2018-12-02 | Stop reason: HOSPADM

## 2018-12-02 RX ORDER — LOSARTAN POTASSIUM 50 MG/1
25 TABLET ORAL DAILY
Status: ON HOLD
Start: 2018-12-02 | End: 2019-04-16 | Stop reason: HOSPADM

## 2018-12-02 RX ORDER — FUROSEMIDE 40 MG/1
40 TABLET ORAL 2 TIMES DAILY
Status: DISCONTINUED | OUTPATIENT
Start: 2018-12-02 | End: 2018-12-02 | Stop reason: HOSPADM

## 2018-12-02 RX ORDER — DILTIAZEM HYDROCHLORIDE 30 MG/1
30 TABLET, FILM COATED ORAL EVERY 6 HOURS
Status: DISCONTINUED | OUTPATIENT
Start: 2018-12-02 | End: 2018-12-02

## 2018-12-02 RX ORDER — PREDNISONE 20 MG/1
40 TABLET ORAL DAILY
Qty: 10 TABLET | Refills: 0 | Status: SHIPPED | OUTPATIENT
Start: 2018-12-02 | End: 2018-12-07

## 2018-12-02 RX ORDER — CIPROFLOXACIN 500 MG/1
500 TABLET ORAL 2 TIMES DAILY
Qty: 10 TABLET | Refills: 0 | Status: SHIPPED | OUTPATIENT
Start: 2018-12-02 | End: 2018-12-07

## 2018-12-02 RX ORDER — DILTIAZEM HYDROCHLORIDE 120 MG/1
120 CAPSULE, COATED, EXTENDED RELEASE ORAL DAILY
Status: DISCONTINUED | OUTPATIENT
Start: 2018-12-02 | End: 2018-12-02

## 2018-12-02 RX ORDER — DILTIAZEM HYDROCHLORIDE 30 MG/1
30 TABLET, FILM COATED ORAL EVERY 12 HOURS
Qty: 60 TABLET | Refills: 3 | Status: SHIPPED | OUTPATIENT
Start: 2018-12-02 | End: 2021-06-18

## 2018-12-02 RX ORDER — FLUTICASONE FUROATE AND VILANTEROL 100; 25 UG/1; UG/1
1 POWDER RESPIRATORY (INHALATION) DAILY
Status: DISCONTINUED | OUTPATIENT
Start: 2018-12-02 | End: 2018-12-02 | Stop reason: HOSPADM

## 2018-12-02 RX ORDER — METHYLPREDNISOLONE SOD SUCC 125 MG
60 VIAL (EA) INJECTION EVERY 6 HOURS
Status: DISCONTINUED | OUTPATIENT
Start: 2018-12-02 | End: 2018-12-02 | Stop reason: HOSPADM

## 2018-12-02 RX ORDER — FUROSEMIDE 10 MG/ML
40 INJECTION INTRAMUSCULAR; INTRAVENOUS ONCE
Status: DISCONTINUED | OUTPATIENT
Start: 2018-12-02 | End: 2018-12-02

## 2018-12-02 RX ADMIN — METHYLPREDNISOLONE SODIUM SUCCINATE 80 MG: 125 INJECTION, POWDER, FOR SOLUTION INTRAMUSCULAR; INTRAVENOUS at 06:12

## 2018-12-02 RX ADMIN — METHYLPREDNISOLONE SODIUM SUCCINATE 80 MG: 125 INJECTION, POWDER, FOR SOLUTION INTRAMUSCULAR; INTRAVENOUS at 11:12

## 2018-12-02 RX ADMIN — AMLODIPINE BESYLATE 5 MG: 5 TABLET ORAL at 08:12

## 2018-12-02 RX ADMIN — INSULIN ASPART 2 UNITS: 100 INJECTION, SOLUTION INTRAVENOUS; SUBCUTANEOUS at 08:12

## 2018-12-02 RX ADMIN — LEVALBUTEROL HYDROCHLORIDE 0.63 MG: 0.63 SOLUTION RESPIRATORY (INHALATION) at 07:12

## 2018-12-02 RX ADMIN — OMEGA-3 FATTY ACIDS CAP 1000 MG 1 CAPSULE: 1000 CAP at 08:12

## 2018-12-02 RX ADMIN — FERROUS GLUCONATE TAB 324 MG (37.5 MG ELEMENTAL IRON) 324 MG: 324 (37.5 FE) TAB at 08:12

## 2018-12-02 RX ADMIN — DILTIAZEM HYDROCHLORIDE 30 MG: 30 TABLET, FILM COATED ORAL at 09:12

## 2018-12-02 RX ADMIN — APIXABAN 5 MG: 5 TABLET, FILM COATED ORAL at 08:12

## 2018-12-02 RX ADMIN — FUROSEMIDE 40 MG: 40 TABLET ORAL at 08:12

## 2018-12-02 RX ADMIN — METHYLPREDNISOLONE SODIUM SUCCINATE 80 MG: 125 INJECTION, POWDER, FOR SOLUTION INTRAMUSCULAR; INTRAVENOUS at 02:12

## 2018-12-02 NOTE — PLAN OF CARE
Problem: Physical Therapy Goal  Goal: Physical Therapy Goal  Goals to be met by: 12/15/2018     Patient will increase functional independence with mobility by performin. Supine to sit with Modified McKenzie  2. Sit to supine with Modified McKenzie  3. Sit to stand transfer with Modified McKenzie  4. Gait  x 350 feet with Modified McKenzie using Rolling Walker.    Recommend: HHPT and Shower chair at time of discharge.        Darwin Adams,PT  2018

## 2018-12-02 NOTE — PLAN OF CARE
[facility]    HOME HEALTH ORDERS  FACE TO FACE ENCOUNTER    Patient Name: Agus Ramos Jr.  YOB: 1940    PCP: Keaton Hardy MD   PCP Address: [address] / [place] [address]  PCP Phone Number: [phone]  PCP Fax: [phone]    Encounter Date: 12/02/2018    Admit to Home Health    Diagnoses:  Active Hospital Problems    Diagnosis  POA    *Centrilobular emphysema [J43.2]  Yes     Patient is not interested in a controller, does not feel he gets relief from duoneb nebulizer.      Hx of CABG [Z95.1]  Not Applicable    Acute on chronic systolic congestive heart failure [I50.23]  Yes    Bacteriuria [R82.71]  Unknown    Iron deficiency anemia [D50.9]  Yes    Troponin level elevated [R74.8]  Yes    Pacemaker [Z95.0]  Yes    CKD Stage 3 [N18.3]  Yes     Chronic    Essential hypertension [I10]  Yes     Chronic    Type 2 diabetes mellitus, controlled, with renal complications [E11.29]  Yes     Chronic    Chronic atrial fibrillation [I48.2]  Yes     Chronic      Resolved Hospital Problems   No resolved problems to display.       Future Appointments   Date Time Provider Department Center   12/7/2018  1:40 PM Millie Bell MD [facility] HEM ONC [facility]           I have seen and examined this patient face to face today. My clinical findings that support the need for the home health skilled services and home bound status are the following:  Weakness/numbness causing balance and gait disturbance due to Weakness/Debility making it taxing to leave home.  Requiring assistive device to leave home due to unsteady gait caused by  Weakness/Debility.  Medical restrictions requiring assistance of another human to leave home due to  Unstable ambulation.    Allergies:Review of patient's allergies indicates:  No Known Allergies    Diet: cardiac diet and diabetic diet: 2000 calorie    Activities: activity as tolerated    Nursing:   SN to complete comprehensive  assessment including routine vital signs. Instruct on disease process and s/s of complications to report to MD. Review/verify medication list sent home with the patient at time of discharge  and instruct patient/caregiver as needed. Frequency may be adjusted depending on start of care date.    Notify MD if SBP > 160 or < 90; DBP > 90 or < 50; HR > 120 or < 50; Temp > 101;       CONSULTS:    Physical Therapy to evaluate and treat. Evaluate for home safety and equipment needs; Establish/upgrade home exercise program. Perform / instruct on therapeutic exercises, gait training, transfer training, and Range of Motion.  Occupational Therapy to evaluate and treat. Evaluate home environment for safety and equipment needs. Perform/Instruct on transfers, ADL training, ROM, and therapeutic exercises.   to evaluate for community resources/long-range planning.  Aide to provide assistance with personal care, ADLs, and vital signs.    MISCELLANEOUS CARE:  Diabetic Care:   SN to perform and educate Diabetic management with blood glucose monitoring:, Fingerstick blood sugar AC and HS and Report CBG < 60 or > 350 to physician.    WOUND CARE ORDERS  n/a      Medications: Review discharge medications with patient and family and provide education.      Current Discharge Medication List      START taking these medications    Details   ciprofloxacin HCl (CIPRO) 500 MG tablet Take 1 tablet (500 mg total) by mouth 2 (two) times daily. for 5 days  Qty: 10 tablet, Refills: 0      diltiaZEM (CARDIZEM) 30 MG tablet Take 1 tablet (30 mg total) by mouth every 12 (twelve) hours.  Qty: 60 tablet, Refills: 3      predniSONE (DELTASONE) 20 MG tablet Take 2 tablets (40 mg total) by mouth once daily. for 5 days  Qty: 10 tablet, Refills: 0         CONTINUE these medications which have CHANGED    Details   losartan (COZAAR) 50 MG tablet Take 0.5 tablets (25 mg total) by mouth once daily.         CONTINUE these medications which have NOT  CHANGED    Details   albuterol-ipratropium 2.5mg-0.5mg/3mL (DUO-NEB) 0.5 mg-3 mg(2.5 mg base)/3 mL nebulizer solution Take 3 mLs by nebulization every 6 (six) hours. Rescue  Qty: 1 Box, Refills: 0      apixaban (ELIQUIS) 5 mg Tab Take 5 mg by mouth 2 (two) times daily.      atorvastatin (LIPITOR) 20 MG tablet Take 20 mg by mouth every evening.      ferrous gluconate (FERGON) 324 MG tablet Take 324 mg by mouth daily with breakfast.      fish oil-omega-3 fatty acids 300-1,000 mg capsule Take 2 g by mouth 2 (two) times daily.       furosemide (LASIX) 80 MG tablet Take 40 mg by mouth 2 (two) times daily.       glipiZIDE (GLUCOTROL) 5 MG tablet Take 10 mg by mouth 2 (two) times daily before meals.      nitroGLYCERIN (NITROSTAT) 0.4 MG SL tablet Place 0.4 mg under the tongue every 5 (five) minutes as needed.      polyethylene glycol (GLYCOLAX) 17 gram PwPk Take by mouth as needed.          STOP taking these medications       amLODIPine (NORVASC) 5 MG tablet Comments:   Reason for Stopping:         metoprolol tartrate (LOPRESSOR) 25 MG tablet Comments:   Reason for Stopping:         metFORMIN (GLUCOPHAGE) 500 MG tablet Comments:   Reason for Stopping:               I certify that this patient is confined to his home and needs intermittent skilled nursing care, physical therapy and occupational therapy.

## 2018-12-02 NOTE — NURSING
Patient resting in bed, respirations even and unlabored. Patient wearing oxygen. Patient denies any complaints. Patient instructed to notify staff if he needs to ambulate to urinate or transfer to bedside chair. Patient verbalized understanding. Will continue to monitor.

## 2018-12-02 NOTE — PROGRESS NOTES
[facility] Hospital Medicine  Progress Note    Patient Name: Agus Ramos Jr.  MRN: [id number]  Patient Class: OP- Observation   Admission Date: 11/30/2018  Length of Stay: 0 days  Attending Physician: Marita Starr MD  Primary Care Provider: Keaton Hardy MD        Subjective:     Principal Problem:Centrilobular emphysema    HPI:  Agus Ramos Jr. 78 y.o. male with COPD, CHF, Afib, DM2, HTN, HLD, s/p pacemaker, presenting to the hospital with a chief complaint of SOB. He reports for the last week he has been experiencing increasing SOB. He reports he is currently unable to ambulate throughout the room. He has taken extra lasix at home without improvement of the symptoms. The SOB is worsened with exertion. He reports he occasionally experience orthopnea, but also becomes SOB when sitting up. He reports he does not feel as though he is holding extra fluid, and has minimal relief with lasix provided by ED. He reports compliance with his Low salt and fluid restriction diet. His wife has attempted to give extra doses of lasix at home without relief of the symptoms.  The duo-nebs in the ED have provided some relief. He denies fever/chills, change in cough, sick contacts, N/V/D, abdominal pain, chest pain, and syncope. He endorses dysuria and SOB.    In the ED,  Troponin elevated consistent with baseline, Cr of 1.5, UA with many bacteria, ABG with PH of 7.4 and O2 of 64, chest x-ray with trace effusions and evidence of edema, and EKG with afib.     Hospital Course:  Agus Ramos Jr. 78 y.o. male placed in observation for management of SOB. In the ED,  Troponin elevated consistent with baseline, Cr of 1.5, UA with many bacteria, ABG with PH of 7.4 and O2 of 64, chest x-ray with trace effusions and evidence of edema, and EKG with afib. Started on lasix, xopenex, and steroids. Cardiology consulted and believed SOB symptoms likely secondary to COPD. Recommended afterload reduction and COPD  treatment. Patient desatted on initial walk test and still reports increased WOB. PT/OT consult pending.     Interval History: Patient reports some improvement in SOB, but still not at his baseline.     Review of Systems   Constitutional: Negative for chills and fever.   Eyes: Negative for photophobia and visual disturbance.   Respiratory: Positive for shortness of breath. Negative for chest tightness.    Cardiovascular: Negative for chest pain and palpitations.   Gastrointestinal: Negative for abdominal pain, nausea and vomiting.   Genitourinary: Negative for dysuria and hematuria.     Objective:     Vital Signs (Most Recent):  Temp: 97.4 °F (36.3 °C) (12/02/18 1118)  Pulse: 98 (12/02/18 1118)  Resp: 20 (12/02/18 1118)  BP: (!) 142/79 (12/02/18 1118)  SpO2: 96 % (12/02/18 1118) Vital Signs (24h Range):  Temp:  [97.4 °F (36.3 °C)-98 °F (36.7 °C)] 97.4 °F (36.3 °C)  Pulse:  [] 98  Resp:  [18-20] 20  SpO2:  [94 %-97 %] 96 %  BP: (128-157)/() 142/79     Weight: 83 kg (182 lb 15.7 oz)  Body mass index is 27.02 kg/m².    Intake/Output Summary (Last 24 hours) at 12/2/2018 1249  Last data filed at 12/2/2018 1143  Gross per 24 hour   Intake 240 ml   Output 300 ml   Net -60 ml      Physical Exam   Constitutional: He is oriented to person, place, and time. He appears well-developed and well-nourished. No distress.   Sitting next to bed on supplemental oxygen   HENT:   Head: Normocephalic and atraumatic.   Eyes: Conjunctivae and EOM are normal. Right eye exhibits no discharge. Left eye exhibits no discharge.   Neck: Normal range of motion. No thyromegaly present.   Cardiovascular: Normal rate and intact distal pulses.   No murmur heard.  irregular   Pulmonary/Chest: Breath sounds normal. No respiratory distress.   Increased work of breathing  Crackles at bases. Poor air movement throughout   Abdominal: Soft. Bowel sounds are normal. He exhibits no distension and no mass. There is no tenderness.   Musculoskeletal:  He exhibits no edema or deformity.   No edema to legs   Neurological: He is alert and oriented to person, place, and time.   Skin: Skin is warm and dry.   Psychiatric: He has a normal mood and affect. His behavior is normal.   Nursing note and vitals reviewed.      Significant Labs:   CBC:   Recent Labs   Lab 12/01/18  0515 12/02/18  0536   WBC 5.63 4.25   HGB 10.3* 10.3*   HCT 32.4* 32.8*   * 362*     CMP:   Recent Labs   Lab 12/01/18  0515 12/02/18  0535    141   K 4.6 4.4    107   CO2 21* 25   * 185*   BUN 33* 41*   CREATININE 1.5* 1.6*   CALCIUM 10.9* 10.5   ANIONGAP 11 9   EGFRNONAA 43* 40*       Significant Imaging:   Imaging Results          X-Ray Chest AP Portable (Final result)  Result time 11/30/18 12:51:42    Final result by Adama Velez MD (11/30/18 12:51:42)                 Impression:      1. Findings suggesting edema, noting possible trace effusions versus atelectasis or soft tissue attenuation.      Electronically signed by: Adama Velez MD  Date:    11/30/2018  Time:    12:51             Narrative:    EXAMINATION:  XR CHEST AP PORTABLE    CLINICAL HISTORY:  CHF;    TECHNIQUE:  Single frontal view of the chest was performed.    COMPARISON:  07/09/2018    FINDINGS:  Left chest wall pacer noted.  The cardiomediastinal silhouette is enlarged, similar to the previous exam noting tortuosity of the aorta, and prominence of the aortic arch.  There is stable elevation of the right hemidiaphragm..  There is slight obscuration of the costophrenic angles, likely related atelectasis and overlying soft tissues, trace effusions however are not excluded..  The trachea is midline.  The lungs are symmetrically expanded bilaterally with patchy increased interstitial and parenchymal attenuation, suggesting edema.  No large focal consolidation seen.  There is no pneumothorax.  The osseous structures are remarkable for degenerative changes..                                   Assessment/Plan:      * Centrilobular emphysema    Possibly contributory to SOB. Some wheezing on exam. Denies symptoms consistent with a COPD exacerbation, no change in cough or sick contacts. Has refused controller medication in the past. Uses 2L of O2 at home.   -Solumederol x1 then transition to PO   -Supplemental O2 for 88-92%  -xopenex based on tachycardia  -Patient reports he no longer smokes    Still SOB with ambulation. Patient reports mild improvement in symptoms with steroids and breathing treatments. Will continue prednisone PO, xopenex, and supplemental O2    12/2 Will decrease IV steroids and start patient on Breo. Mild improvement in symptoms. Continue breathing treatments. Patient still SOB with exertion.      Hx of CABG    Will continue home atorvastatin, losartan, and apixaban. Patient denies chest pain currently. Troponins have remained flat.      Bacteriuria    UA in the ED with many bacteria. Patient reports some symptoms of dysuria and difficulty urinating will treat with Rocephin.  patient afebrile and without leukocytosis.      Acute on chronic systolic congestive heart failure    Patient with lung exam few rales at bases,  elevated from baseline ,  chest x-ray with signs of edema.  Troponin elevated consistent with baseline.   Previous Echo with EF of 55% Repeat pending  Continue home metoprolol and holding losartan in setting of kidney function  IV lasix will monitor kidney function  Daily weights  Strict I&O's   Low salt cardiac diet  Cardiac monitoring  Will continue anticoagulation for afib    Seen by cardiology. Believed SOB more likely due to COPD. Will discontinue IV diuretics and continue home PO lasix     Troponin level elevated    Troponin elevated. Lower than previous. No complaints of chest pain at this time. EKG of afib. Most likely due to CHF. Will continue home apixaban, metoprolol. Monitor on telemetry and continue to trend troponin.      Pacemaker    Follows  with [employer]       Iron deficiency anemia    H&H stable compared to previous. No signs of active bleed. Will recheck CBC in AM and continue home iron supplementation     Type 2 diabetes mellitus, controlled, with renal complications    BG of 133  Hold home metformin  A1c of 5.0 5 months ago  Dose adjusted insulin in hospital  Hypoglycemic protocol  POCT glucose checks  Diabetic diet low salt     CKD Stage 3    Creatinine near patient's baseline. Will monitor while patient is receiving IV lasix. Holding home losartan to prevent ADRIAN. Avoid nephrotoxic drugs and renal dose medications. Repeat BMP in AM. Creatinine mildly increased. Holding losartan for now until Diltiazem as titrated     Essential hypertension    Hypertensive over course of admission.  SBP goal of <160 in hosp.  Continue home amlodipine, metoprolol. Holding losartan in setting of kidney function with continued IV diuresis.   PRN hydralazine   Started on diltiazem to control BP and HR. Avoided beta blocker duet to COPD. Will hold losartan as ditiazem is titrated     Chronic atrial fibrillation    EKG in ED of afib, became mildly tachycardic after the addition of duo-nebs. Will continue home apixaban and metoprolol. Monitor overnight on telemetry.     Started on Diltiazem to control HR of afib. Will avoid beta blocker given COPD history.        VTE Risk Mitigation (From admission, onward)        Ordered     apixaban tablet 5 mg  2 times daily      11/30/18 1730        VTE: apixaban  Code: full  Diet: diabetic cardiac  Dispo: pending improvement in respiratory status and PT/OT recs.       Fidel Moore PA-C  Department of Hospital Medicine   [facility]

## 2018-12-02 NOTE — PLAN OF CARE
Problem: Breathing Pattern Ineffective (Adult)  Intervention: Optimize Oxygenation/Ventilation/Perfusion   12/01/18 1911   Respiratory Interventions   Airway/Ventilation Management airway patency maintained;calming measures promoted   Breathing Techniques/Airway Clearance deep/controlled cough encouraged   Positioning   Head of Bed (HOB) HOB at 30 degrees     Intervention: Minimize Oxygen Consumption/Demand   12/01/18 0720 12/01/18 1810   Coping/Psychosocial Interventions   Environmental Support calm environment promoted;environmental consistency promoted;rest periods encouraged --    Activity   Activity Management --  activity adjusted per tolerance*     Intervention: Monitor/Manage Contributing Psychosocial Factors   12/01/18 0950   Coping/Psychosocial Interventions   Supportive Measures active listening utilized;relaxation techniques promoted;verbalization of feelings encouraged

## 2018-12-02 NOTE — ASSESSMENT & PLAN NOTE
Creatinine near patient's baseline. Will monitor while patient is receiving IV lasix. Holding home losartan to prevent ADRIAN. Avoid nephrotoxic drugs and renal dose medications. Repeat BMP in AM. Creatinine mildly increased. Holding losartan for now until Diltiazem as titrated

## 2018-12-02 NOTE — SUBJECTIVE & OBJECTIVE
Interval History: Patient reports some improvement in SOB, but still not at his baseline.     Review of Systems   Constitutional: Negative for chills and fever.   Eyes: Negative for photophobia and visual disturbance.   Respiratory: Positive for shortness of breath. Negative for chest tightness.    Cardiovascular: Negative for chest pain and palpitations.   Gastrointestinal: Negative for abdominal pain, nausea and vomiting.   Genitourinary: Negative for dysuria and hematuria.     Objective:     Vital Signs (Most Recent):  Temp: 97.4 °F (36.3 °C) (12/02/18 1118)  Pulse: 98 (12/02/18 1118)  Resp: 20 (12/02/18 1118)  BP: (!) 142/79 (12/02/18 1118)  SpO2: 96 % (12/02/18 1118) Vital Signs (24h Range):  Temp:  [97.4 °F (36.3 °C)-98 °F (36.7 °C)] 97.4 °F (36.3 °C)  Pulse:  [] 98  Resp:  [18-20] 20  SpO2:  [94 %-97 %] 96 %  BP: (128-157)/() 142/79     Weight: 83 kg (182 lb 15.7 oz)  Body mass index is 27.02 kg/m².    Intake/Output Summary (Last 24 hours) at 12/2/2018 1249  Last data filed at 12/2/2018 1143  Gross per 24 hour   Intake 240 ml   Output 300 ml   Net -60 ml      Physical Exam   Constitutional: He is oriented to person, place, and time. He appears well-developed and well-nourished. No distress.   Sitting next to bed on supplemental oxygen   HENT:   Head: Normocephalic and atraumatic.   Eyes: Conjunctivae and EOM are normal. Right eye exhibits no discharge. Left eye exhibits no discharge.   Neck: Normal range of motion. No thyromegaly present.   Cardiovascular: Normal rate and intact distal pulses.   No murmur heard.  irregular   Pulmonary/Chest: Breath sounds normal. No respiratory distress.   Increased work of breathing  Crackles at bases. Poor air movement throughout   Abdominal: Soft. Bowel sounds are normal. He exhibits no distension and no mass. There is no tenderness.   Musculoskeletal: He exhibits no edema or deformity.   No edema to legs   Neurological: He is alert and oriented to person,  place, and time.   Skin: Skin is warm and dry.   Psychiatric: He has a normal mood and affect. His behavior is normal.   Nursing note and vitals reviewed.      Significant Labs:   CBC:   Recent Labs   Lab 12/01/18  0515 12/02/18  0536   WBC 5.63 4.25   HGB 10.3* 10.3*   HCT 32.4* 32.8*   * 362*     CMP:   Recent Labs   Lab 12/01/18  0515 12/02/18  0535    141   K 4.6 4.4    107   CO2 21* 25   * 185*   BUN 33* 41*   CREATININE 1.5* 1.6*   CALCIUM 10.9* 10.5   ANIONGAP 11 9   EGFRNONAA 43* 40*       Significant Imaging:   Imaging Results          X-Ray Chest AP Portable (Final result)  Result time 11/30/18 12:51:42    Final result by Adama Velez MD (11/30/18 12:51:42)                 Impression:      1. Findings suggesting edema, noting possible trace effusions versus atelectasis or soft tissue attenuation.      Electronically signed by: Adama Velez MD  Date:    11/30/2018  Time:    12:51             Narrative:    EXAMINATION:  XR CHEST AP PORTABLE    CLINICAL HISTORY:  CHF;    TECHNIQUE:  Single frontal view of the chest was performed.    COMPARISON:  07/09/2018    FINDINGS:  Left chest wall pacer noted.  The cardiomediastinal silhouette is enlarged, similar to the previous exam noting tortuosity of the aorta, and prominence of the aortic arch.  There is stable elevation of the right hemidiaphragm..  There is slight obscuration of the costophrenic angles, likely related atelectasis and overlying soft tissues, trace effusions however are not excluded..  The trachea is midline.  The lungs are symmetrically expanded bilaterally with patchy increased interstitial and parenchymal attenuation, suggesting edema.  No large focal consolidation seen.  There is no pneumothorax.  The osseous structures are remarkable for degenerative changes..

## 2018-12-02 NOTE — PROGRESS NOTES
Patient will discharge with Home Health services; signed patient choice form for [facility] to choose his HH provider. Contacted [facility] to set up services; faxed plan of care and all needed patient information to [facility] at [phone].    Marcia indicated that patient previously had [facility]; [facility] will be patient's provider; [facility] confirmed acceptance.

## 2018-12-02 NOTE — PT/OT/SLP EVAL
Physical Therapy Evaluation    Patient Name:  Agus Ramos Jr.   MRN:  [id number]    Recommendations:     Discharge Recommendations:  home health PT   Discharge Equipment Recommendations: bedside commode, oxygen, walker, rolling(- 2.0 Lpm of O2 via N.C. )   Barriers to discharge: None    Assessment:     Agus Ramos Jr. is a 78 y.o. male admitted with a medical diagnosis of Centrilobular emphysema.  He presents with the following impairments/functional limitations:  weakness, impaired endurance, impaired functional mobilty, gait instability, decreased coordination, decreased safety awareness, decreased lower extremity function, impaired cardiopulmonary response to activity.    Rehab Prognosis:  Fair +; patient would benefit from acute skilled PT services to address these deficits and reach maximum level of function.      Recent Surgery: * No surgery found *      Plan:     During this hospitalization, patient to be seen 5 x/week to address the above listed problems via gait training, therapeutic activities, therapeutic exercises  · Plan of Care Expires:  12/15/18   Plan of Care Reviewed with: patient    Subjective     Communicated with Nurse Aguayo prior to session.  Patient found sitting up in chair upon PT entry to room, agreeable to evaluation.      Chief Complaint: SOB with exertion  Patient comments/goals: to return to PLOF    Pain/Comfort:  Pain Rating 1: 0/10    Patients cultural, spiritual, Islam conflicts given the current situation:      Living Environment:  Pt lives in a [facility] with 1 MIGUEL with Spouse  Prior to admission, patients level of function was Mod I.  Patient has the following equipment:  .  DME owned (not currently used): none.  Upon discharge, patient will have assistance from self and spouse.    Objective:     Patient found with: oxygen, peripheral IV, telemetry(- 2.0 Lpm of O2 via N.C. )     General Precautions: Standard, fall   Orthopedic Precautions:Full weight bearing   Braces: N/A      Exams:  · Cognitive Exam:  Patient is oriented to Person and Situation  · Gross Motor Coordination:  WFL  · Postural Exam:  Patient presented with the following abnormalities:    · -       Rounded shoulders  · -       Forward head  · -       Abnormal trunk flexion  · Skin Integrity/Edema:      · -       Skin integrity: Visible skin intact  · -       Edema: None noted .  · RLE ROM: WFL  · RLE Strength: WFL  · LLE ROM: WFL  · LLE Strength: WFL    Functional Mobility:  · Bed Mobility:     · Supine to Sit: minimum assistance  · Sit to Supine: minimum assistance  · Transfers:     · Sit to Stand:  minimum assistance with rolling walker  · Gait: 75' with RW and Min A/CGA and 2.0 Lpm of O2 via N.C. Pt self limiting with task and turned around to head back to room without warning or request.  Pt displayed no SOB with O2 sats in the 92% upon return to room.     AM-PAC 6 CLICK MOBILITY  Total Score:17     Patient left supine with all lines intact, call button in reach and Nurse Dede present.    GOALS:   Multidisciplinary Problems     Physical Therapy Goals        Problem: Physical Therapy Goal    Goal Priority Disciplines Outcome Goal Variances Interventions   Physical Therapy Goal     PT, PT/OT      Description:  Goals to be met by: 12/15/2018     Patient will increase functional independence with mobility by performin. Supine to sit with Modified Jennings  2. Sit to supine with Modified Jennings  3. Sit to stand transfer with Modified Jennings  4. Gait  x 350 feet with Modified Jennings using Rolling Walker.    Recommend: HHPT and Shower chair at time of discharge.                          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     S/P femoral-popliteal bypass surgery left    Stented coronary artery     Tobacco use        Past Surgical History:   Procedure Laterality Date    CARDIAC CATHETERIZATION      CARDIAC PACEMAKER PLACEMENT      CARDIAC SURGERY      3 vessel CABG    CARDIOVERSION N/A 9/19/2013    Performed by Maryann Surgeon at [facility] MARYANN    cardioverted      CORONARY ANGIOPLASTY WITH STENT PLACEMENT      EGD (ESOPHAGOGASTRODUODENOSCOPY) N/A 6/1/2018    Performed by Ty Hickman MD at [facility] ENDO    ESOPHAGOGASTRODUODENOSCOPY N/A 6/1/2018    Procedure: EGD (ESOPHAGOGASTRODUODENOSCOPY);  Surgeon: Ty Hickman MD;  Location: [facility];  Service: Endoscopy;  Laterality: N/A;    HEMORRHOID SURGERY      TRANSESOPHAGEAL ECHOCARDIOGRAM (ANIA) N/A 9/19/2013    Performed by [facility] at [facility]    VASCULAR SURGERY      femoral artery popliteal bypass       Time Tracking:     PT Received On: 12/02/18  PT Start Time: 1345     PT Stop Time: 1415  PT Total Time (min): 30 min     Billable Minutes: Evaluation 30 min      Darwin Adams, PT  12/02/2018

## 2018-12-02 NOTE — PROGRESS NOTES
[facility] Hospital Medicine  Progress Note    Patient Name: Agus Ramos Jr.  MRN: [id number]  Patient Class: OP- Observation   Admission Date: 11/30/2018  Length of Stay: 0 days  Attending Physician: Marita Starr MD  Primary Care Provider: Keaton Hardy MD        Subjective:     Principal Problem:Acute on chronic systolic congestive heart failure    HPI:  Agus Raoms Jr. 78 y.o. male with COPD, CHF, Afib, DM2, HTN, HLD, s/p pacemaker, presenting to the hospital with a chief complaint of SOB. He reports for the last week he has been experiencing increasing SOB. He reports he is currently unable to ambulate throughout the room. He has taken extra lasix at home without improvement of the symptoms. The SOB is worsened with exertion. He reports he occasionally experience orthopnea, but also becomes SOB when sitting up. He reports he does not feel as though he is holding extra fluid, and has minimal relief with lasix provided by ED. He reports compliance with his Low salt and fluid restriction diet. His wife has attempted to give extra doses of lasix at home without relief of the symptoms.  The duo-nebs in the ED have provided some relief. He denies fever/chills, change in cough, sick contacts, N/V/D, abdominal pain, chest pain, and syncope. He endorses dysuria and SOB.    In the ED,  Troponin elevated consistent with baseline, Cr of 1.5, UA with many bacteria, ABG with PH of 7.4 and O2 of 64, chest x-ray with trace effusions and evidence of edema, and EKG with afib.     Hospital Course:  Agus Ramos Jr. 78 y.o. male placed in observation for management of SOB. In the ED,  Troponin elevated consistent with baseline, Cr of 1.5, UA with many bacteria, ABG with PH of 7.4 and O2 of 64, chest x-ray with trace effusions and evidence of edema, and EKG with afib. Started on lasix, xopenex, and steroids. Cardiology consulted and believed SOB symptoms likely secondary to COPD. Recommended  afterload reduction and COPD treatment. Patient desatted on initial walk test and still reports increased WOB.     Interval History: Still SOB, patient reports mild improvement in symptoms.     Review of Systems   Constitutional: Negative for chills and fever.   Eyes: Negative for photophobia and visual disturbance.   Respiratory: Positive for shortness of breath. Negative for chest tightness.    Cardiovascular: Negative for chest pain, palpitations and leg swelling.   Gastrointestinal: Negative for abdominal pain, nausea and vomiting.   Genitourinary: Negative for dysuria and hematuria.     Objective:     Vital Signs (Most Recent):  Temp: 97.4 °F (36.3 °C) (12/01/18 1647)  Pulse: 101 (12/01/18 1647)  Resp: 18 (12/01/18 1647)  BP: 132/84 (12/01/18 1647)  SpO2: (!) 94 % (12/01/18 1647) Vital Signs (24h Range):  Temp:  [97.1 °F (36.2 °C)-98.4 °F (36.9 °C)] 97.4 °F (36.3 °C)  Pulse:  [] 101  Resp:  [18-29] 18  SpO2:  [91 %-96 %] 94 %  BP: (115-183)/(69-88) 132/84     Weight: 83.5 kg (184 lb)  Body mass index is 27.17 kg/m².    Intake/Output Summary (Last 24 hours) at 12/1/2018 1738  Last data filed at 12/1/2018 1652  Gross per 24 hour   Intake 560 ml   Output 625 ml   Net -65 ml      Physical Exam   Constitutional: He is oriented to person, place, and time. He appears well-developed and well-nourished. No distress.   On 2L of O2   HENT:   Head: Normocephalic and atraumatic.   Eyes: Conjunctivae and EOM are normal. Right eye exhibits no discharge. Left eye exhibits no discharge.   Neck: Normal range of motion. No thyromegaly present.   Cardiovascular: Normal rate, regular rhythm and intact distal pulses.   No murmur heard.  Pulmonary/Chest: Effort normal. No respiratory distress. He has wheezes (few wheezes).   Diminished breath sounds, with poor air movement   Abdominal: Soft. Bowel sounds are normal. He exhibits no distension and no mass. There is no tenderness.   Musculoskeletal: He exhibits edema (trace). He  exhibits no deformity.   Neurological: He is alert and oriented to person, place, and time.   Skin: Skin is warm and dry.   Psychiatric: He has a normal mood and affect. His behavior is normal.   Nursing note and vitals reviewed.      Significant Labs:   CBC:   Recent Labs   Lab 11/30/18  1234 12/01/18  0515   WBC 8.34 5.63   HGB 10.5* 10.3*   HCT 34.2* 32.4*    369*     CMP:   Recent Labs   Lab 11/30/18  1234 12/01/18  0515    139   K 4.2 4.6    107   CO2 27 21*   * 205*   BUN 27* 33*   CREATININE 1.5* 1.5*   CALCIUM 11.2* 10.9*   PROT 5.9*  --    ALBUMIN 2.7*  --    BILITOT 0.6  --    ALKPHOS 135  --    AST 18  --    ALT 10  --    ANIONGAP 6* 11   EGFRNONAA 43* 43*     Coagulation:   Recent Labs   Lab 11/30/18  1234   INR 1.1     Urine Studies:   Recent Labs   Lab 11/30/18  1346   COLORU Yellow   APPEARANCEUA Cloudy*   PHUR 6.0   SPECGRAV 1.015   PROTEINUA 2+*   GLUCUA Negative   KETONESU Negative   BILIRUBINUA Negative   OCCULTUA 1+*   NITRITE Negative   UROBILINOGEN Negative   LEUKOCYTESUR Trace*   RBCUA 1   WBCUA 2   BACTERIA Many*   SQUAMEPITHEL 2   HYALINECASTS 0       Significant Imaging:   Imaging Results          X-Ray Chest AP Portable (Final result)  Result time 11/30/18 12:51:42    Final result by Adama Velez MD (11/30/18 12:51:42)                 Impression:      1. Findings suggesting edema, noting possible trace effusions versus atelectasis or soft tissue attenuation.      Electronically signed by: Adama Velez MD  Date:    11/30/2018  Time:    12:51             Narrative:    EXAMINATION:  XR CHEST AP PORTABLE    CLINICAL HISTORY:  CHF;    TECHNIQUE:  Single frontal view of the chest was performed.    COMPARISON:  07/09/2018    FINDINGS:  Left chest wall pacer noted.  The cardiomediastinal silhouette is enlarged, similar to the previous exam noting tortuosity of the aorta, and prominence of the aortic arch.  There is stable elevation of the right hemidiaphragm..   There is slight obscuration of the costophrenic angles, likely related atelectasis and overlying soft tissues, trace effusions however are not excluded..  The trachea is midline.  The lungs are symmetrically expanded bilaterally with patchy increased interstitial and parenchymal attenuation, suggesting edema.  No large focal consolidation seen.  There is no pneumothorax.  The osseous structures are remarkable for degenerative changes..                                  Assessment/Plan:      * Acute on chronic systolic congestive heart failure    Patient with lung exam few rales at bases,  elevated from baseline ,  chest x-ray with signs of edema.  Troponin elevated consistent with baseline.   Previous Echo with EF of 55% Repeat pending  Continue home metoprolol and holding losartan in setting of kidney function  IV lasix will monitor kidney function  Daily weights  Strict I&O's   Low salt cardiac diet  Cardiac monitoring  Will continue anticoagulation for afib    Seen today by cardiology. Believed SOB more likely due to COPD. Will discontinue IV diuretics and continue home PO lasix     Hx of CABG    Will continue home atorvastatin, losartan, and apixaban. Patient denies chest pain currently. Troponins have remained flat.      Bacteriuria    UA in the ED with many bacteria. Patient reports some symptoms of dysuria and difficulty urinating will treat with Rocephin.  patient afebrile and without leukocytosis.      Centrilobular emphysema    Possibly contributory to SOB. Some wheezing on exam. Denies symptoms consistent with a COPD exacerbation, no change in cough or sick contacts. Has refused controller medication in the past. Uses 2L of O2 at home.   -Solumederol x1 then transition to PO   -Supplemental O2 for 88-92%  -xopenex based on tachycardia  -Patient reports he no longer smokes    Still SOB with ambulation. Patient reports mild improvement in symptoms with steroids and breathing treatments. Will continue  prednisone PO, xopenex, and supplemental O2     Troponin level elevated    Troponin elevated. Lower than previous. No complaints of chest pain at this time. EKG of afib. Most likely due to CHF. Will continue home apixaban, metoprolol. Monitor on telemetry and continue to trend troponin.      Pacemaker    Follows with [employer]       Iron deficiency anemia    H&H stable compared to previous. No signs of active bleed. Will recheck CBC in AM and continue home iron supplementation     Type 2 diabetes mellitus, controlled, with renal complications    BG of 133  Hold home metformin  A1c of 5.0 5 months ago  Dose adjusted insulin in hospital  Hypoglycemic protocol  POCT glucose checks  Diabetic diet low salt     CKD Stage 3    Creatinine near patient's baseline. Will monitor while patient is receiving IV lasix. Holding home losartan to prevent ADRIAN. Avoid nephrotoxic drugs and renal dose medications. Repeat BMP in AM.      Essential hypertension    Hypertensive over course of admission.  SBP goal of <160 in hosp.  Continue home amlodipine, metoprolol. Holding losartan in setting of kidney function with continued IV diuresis.   PRN hydralazine      Chronic atrial fibrillation    EKG in ED of afib, became mildly tachycardic after the addition of duo-nebs. Will continue home apixaban and metoprolol. Monitor overnight on telemetry.        VTE Risk Mitigation (From admission, onward)        Ordered     apixaban tablet 5 mg  2 times daily      11/30/18 1738        VTE: apixaban  Code: full  Diet: diabetic cardiac  Dispo: likely tomorrow pending improvement in respiratory status      Fidel Moore PA-C  Department of Hospital Medicine   [facility]

## 2018-12-02 NOTE — ASSESSMENT & PLAN NOTE
Patient with lung exam few rales at bases,  elevated from baseline ,  chest x-ray with signs of edema.  Troponin elevated consistent with baseline.   Previous Echo with EF of 55% Repeat pending  Continue home metoprolol and holding losartan in setting of kidney function  IV lasix will monitor kidney function  Daily weights  Strict I&O's   Low salt cardiac diet  Cardiac monitoring  Will continue anticoagulation for afib    Seen by cardiology. Believed SOB more likely due to COPD. Will discontinue IV diuretics and continue home PO lasix

## 2018-12-02 NOTE — NURSING
O2 Sats on 2L/NC at rest = 90%    O2 sats on 2L/NC with exercise  = 89%    Patient short of breath on exertion. Patient returned to bedside chair. Nurse instructed patient to do purse lip breathing. Will continue to monitor.

## 2018-12-02 NOTE — ASSESSMENT & PLAN NOTE
Hypertensive over course of admission.  SBP goal of <160 in hosp.  Continue home amlodipine, metoprolol. Holding losartan in setting of kidney function with continued IV diuresis.   PRN hydralazine   Started on diltiazem to control BP and HR. Avoided beta blocker duet to COPD. Will hold losartan as ditiazem is titrated

## 2018-12-02 NOTE — PLAN OF CARE
12/02/18 1535   Final Note   Assessment Type Final Discharge Note   Anticipated Discharge Disposition Home-Health   What phone number can be called within the next 1-3 days to see how you are doing after discharge? ([phone] )   Hospital Follow Up  Appt(s) scheduled? Yes   Discharge plans and expectations educations in teach back method with documentation complete? Yes   Right Care Referral Info   Post Acute Recommendation Home-care   Referral Type Home Health   Facility Name [facility], [place]        12/02/18 1535   Final Note   Assessment Type Final Discharge Note   Anticipated Discharge Disposition Home-Health   What phone number can be called within the next 1-3 days to see how you are doing after discharge? ([phone] )   Hospital Follow Up  Appt(s) scheduled? Yes   Discharge plans and expectations educations in teach back method with documentation complete? Yes   Right Care Referral Info   Post Acute Recommendation Home-care   Referral Type Home Health   Facility Name [facility], [place]        12/02/18 1535   Final Note   Assessment Type Final Discharge Note   Anticipated Discharge Disposition Home-Health   What phone number can be called within the next 1-3 days to see how you are doing after discharge? ([phone] )   Hospital Follow Up  Appt(s) scheduled? Yes   Discharge plans and expectations educations in teach back method with documentation complete? Yes   Right Care Referral Info   Post Acute Recommendation Home-care   Referral Type Home Health   Facility Name [facility], [place]        12/02/18 1535   Final Note   Assessment Type Final Discharge Note   Anticipated Discharge Disposition Home-Health   What phone number can be called within the next 1-3 days to see how you are doing after discharge? ([phone] )   Hospital Follow Up  Appt(s) scheduled? Yes   Discharge plans and expectations  educations in teach back method with documentation complete? Yes   Right Care Referral Info   Post Acute Recommendation Home-care   Referral Type Home Health   Facility Name [facility], [place]

## 2018-12-02 NOTE — ASSESSMENT & PLAN NOTE
Possibly contributory to SOB. Some wheezing on exam. Denies symptoms consistent with a COPD exacerbation, no change in cough or sick contacts. Has refused controller medication in the past. Uses 2L of O2 at home.   -Solumederol x1 then transition to PO   -Supplemental O2 for 88-92%  -xopenex based on tachycardia  -Patient reports he no longer smokes    Still SOB with ambulation. Patient reports mild improvement in symptoms with steroids and breathing treatments. Will continue prednisone PO, xopenex, and supplemental O2    12/2 Will decrease IV steroids and start patient on Breo. Mild improvement in symptoms. Continue breathing treatments. Patient still SOB with exertion.

## 2018-12-02 NOTE — NURSING
PER handoff received from RACHAEL Darling RN. Responds to voice and is oriented x4. Deneis having any pain and no apparent distress. Assessment completed per Doc Flowsheets; reference if needed. Fall and safety precautions maintained. Bed alarm activated and audible. Bed locked in lowest position, with side rails up x3. Call bell and personal items within reach. Will continue to monitor pt for any changes.

## 2018-12-02 NOTE — ASSESSMENT & PLAN NOTE
EKG in ED of afib, became mildly tachycardic after the addition of duo-nebs. Will continue home apixaban and metoprolol. Monitor overnight on telemetry.     Started on Diltiazem to control HR of afib. Will avoid beta blocker given COPD history.

## 2018-12-02 NOTE — NURSING
Reviewed all discharge instructions with patient's spouse and patient including, new medications, continued medications, changed medications, stopped medications, follow-up appointments and signs/symptoms to report to PCP or seek emergency medical care. Patient verbalized understanding to all instructions and education. Patient denies any complaints or concerns at this time. Awaiting transport.

## 2018-12-03 NOTE — DISCHARGE SUMMARY
[facility] Hospital Medicine  Discharge Summary      Patient Name: Agus Ramos Jr.  MRN: [id number]  Admission Date: 11/30/2018  Hospital Length of Stay: 0 days  Discharge Date and Time:  12/2/18  Attending Physician: No att. providers found   Discharging Provider: Jeanne Chowdhury NP  Primary Care Provider: Keaton Hardy MD      HPI:   Agus Ramos Jr. 78 y.o. male with COPD, CHF, Afib, DM2, HTN, HLD, s/p pacemaker, presenting to the hospital with a chief complaint of SOB. He reports for the last week he has been experiencing increasing SOB. He reports he is currently unable to ambulate throughout the room. He has taken extra lasix at home without improvement of the symptoms. The SOB is worsened with exertion. He reports he occasionally experience orthopnea, but also becomes SOB when sitting up. He reports he does not feel as though he is holding extra fluid, and has minimal relief with lasix provided by ED. He reports compliance with his Low salt and fluid restriction diet. His wife has attempted to give extra doses of lasix at home without relief of the symptoms.  The duo-nebs in the ED have provided some relief. He denies fever/chills, change in cough, sick contacts, N/V/D, abdominal pain, chest pain, and syncope. He endorses dysuria and SOB.    In the ED,  Troponin elevated consistent with baseline, Cr of 1.5, UA with many bacteria, ABG with PH of 7.4 and O2 of 64, chest x-ray with trace effusions and evidence of edema, and EKG with afib.     * No surgery found *      Hospital Course:   Agus Ramos Jr. 78 y.o. male placed for COPD exacerbation and UTI. Symptoms improve neb and steroid. Started on ICS/LABA.  Cardiology felt symptoms from COPD rather then CHF. 2 D echo EF 45%, normal diastolic function,mild to mod MR, mild AR, and pulmonary htn 85.7 mmhg. Patient has chronic atrial fibrillation and rate better controlled once started diltiazem. Patient walked with  ft  and 02 saturation 92% on 2 L NC. Met with wife and daughter in law at bedside to discussed all the above. Patient stable for discharge home with HH and family.      Consults:     No new Assessment & Plan notes have been filed under this hospital service since the last note was generated.  Service: Hospital Medicine    Final Active Diagnoses:    Diagnosis Date Noted POA    PRINCIPAL PROBLEM:  Centrilobular emphysema [J43.2] 05/15/2018 Yes    Hx of CABG [Z95.1] 12/01/2018 Not Applicable    Acute on chronic systolic congestive heart failure [I50.23] 11/30/2018 Yes    Bacteriuria [R82.71] 11/30/2018 Unknown    Iron deficiency anemia [D50.9] 05/04/2018 Yes    Troponin level elevated [R74.8] 05/04/2018 Yes    Pacemaker [Z95.0] 05/04/2018 Yes    CKD Stage 3 [N18.3] 09/24/2016 Yes     Chronic    Essential hypertension [I10] 09/24/2016 Yes     Chronic    Type 2 diabetes mellitus, controlled, 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    Why:  Home Health: SN; PT, OT, SW, Aide  Contact information:  [address] [facility] [address] [phone]             Schedule an appointment as soon as possible for a visit with Keaton Hardy MD.    Specialty:  Internal Medicine  Why:  Call to schedule PCP hosptial follow-up   Contact information:  [address] [place] [address]  [address]  [facility] [address]  [phone]                   Contact information for after-discharge care     Home Medical Care     [facility] .    Service:  Home Health Services  Contact information:  [address] [facility] [address] [phone]                             Patient Instructions:      Diet Cardiac     Diet diabetic     Notify your health care  provider if you experience any of the following:  difficulty breathing or increased cough       Pending Diagnostic Studies:     None         Medications:  Reconciled Home Medications:      Medication List      START taking these medications    ciprofloxacin HCl 500 MG tablet  Commonly known as:  CIPRO  Take 1 tablet (500 mg total) by mouth 2 (two) times daily. for 5 days     diltiaZEM 30 MG tablet  Commonly known as:  CARDIZEM  Take 1 tablet (30 mg total) by mouth every 12 (twelve) hours.     predniSONE 20 MG tablet  Commonly known as:  DELTASONE  Take 2 tablets (40 mg total) by mouth once daily. for 5 days        CHANGE how you take these medications    losartan 50 MG tablet  Commonly known as:  COZAAR  Take 0.5 tablets (25 mg total) by mouth once daily.  What changed:  how much to take        CONTINUE taking these medications    albuterol-ipratropium 2.5 mg-0.5 mg/3 mL nebulizer solution  Commonly known as:  DUO-NEB  Take 3 mLs by nebulization every 6 (six) hours. Rescue     atorvastatin 20 MG tablet  Commonly known as:  LIPITOR  Take 20 mg by mouth every evening.     ELIQUIS 5 mg Tab  Generic drug:  apixaban  Take 5 mg by mouth 2 (two) times daily.     ferrous gluconate 324 MG tablet  Commonly known as:  FERGON  Take 324 mg by mouth daily with breakfast.     fish oil-omega-3 fatty acids 300-1,000 mg capsule  Take 2 g by mouth 2 (two) times daily.     furosemide 80 MG tablet  Commonly known as:  LASIX  Take 40 mg by mouth 2 (two) times daily.     glipiZIDE 5 MG tablet  Commonly known as:  GLUCOTROL  Take 10 mg by mouth 2 (two) times daily before meals.     nitroGLYCERIN 0.4 MG SL tablet  Commonly known as:  NITROSTAT  Place 0.4 mg under the tongue every 5 (five) minutes as needed.     polyethylene glycol 17 gram Pwpk  Commonly known as:  GLYCOLAX  Take by mouth as needed.        STOP taking these medications    amLODIPine 5 MG tablet  Commonly known as:  NORVASC     metFORMIN 500 MG tablet  Commonly known as:   GLUCOPHAGE     metoprolol tartrate 25 MG tablet  Commonly known as:  LOPRESSOR            Indwelling Lines/Drains at time of discharge:   Lines/Drains/Airways          None          Time spent on the discharge of patient: 30 minutes  Patient was seen and examined on the date of discharge and determined to be suitable for discharge.         Jeanne Chowdhury NP  Department of Hospital Medicine  [facility]

## 2018-12-07 ENCOUNTER — OFFICE VISIT (OUTPATIENT)
Dept: HEMATOLOGY/ONCOLOGY | Facility: CLINIC | Age: 78
End: 2018-12-07
Payer: MEDICARE

## 2018-12-07 VITALS
HEART RATE: 72 BPM | WEIGHT: 184 LBS | TEMPERATURE: 99 F | DIASTOLIC BLOOD PRESSURE: 64 MMHG | OXYGEN SATURATION: 99 % | SYSTOLIC BLOOD PRESSURE: 122 MMHG | BODY MASS INDEX: 27.25 KG/M2 | HEIGHT: 69 IN

## 2018-12-07 DIAGNOSIS — J44.9 CHRONIC OBSTRUCTIVE PULMONARY DISEASE, UNSPECIFIED COPD TYPE: ICD-10-CM

## 2018-12-07 DIAGNOSIS — Z79.01 CHRONIC ANTICOAGULATION: Chronic | ICD-10-CM

## 2018-12-07 DIAGNOSIS — D50.9 IRON DEFICIENCY ANEMIA, UNSPECIFIED IRON DEFICIENCY ANEMIA TYPE: Primary | ICD-10-CM

## 2018-12-07 DIAGNOSIS — I48.20 CHRONIC ATRIAL FIBRILLATION: Chronic | ICD-10-CM

## 2018-12-07 PROCEDURE — 99999 PR PBB SHADOW E&M-EST. PATIENT-LVL IV: CPT | Mod: PBBFAC,,, | Performed by: INTERNAL MEDICINE

## 2018-12-07 PROCEDURE — 1101F PT FALLS ASSESS-DOCD LE1/YR: CPT | Mod: CPTII,S$GLB,, | Performed by: INTERNAL MEDICINE

## 2018-12-07 PROCEDURE — 3078F DIAST BP <80 MM HG: CPT | Mod: CPTII,S$GLB,, | Performed by: INTERNAL MEDICINE

## 2018-12-07 PROCEDURE — 99213 OFFICE O/P EST LOW 20 MIN: CPT | Mod: S$GLB,,, | Performed by: INTERNAL MEDICINE

## 2018-12-07 PROCEDURE — 3074F SYST BP LT 130 MM HG: CPT | Mod: CPTII,S$GLB,, | Performed by: INTERNAL MEDICINE

## 2018-12-07 RX ORDER — LOSARTAN POTASSIUM 25 MG/1
25 TABLET ORAL DAILY
Status: ON HOLD | COMMUNITY
Start: 2018-12-05 | End: 2021-06-15 | Stop reason: HOSPADM

## 2018-12-07 RX ORDER — FUROSEMIDE 40 MG/1
TABLET ORAL
Status: ON HOLD | COMMUNITY
Start: 2018-11-09 | End: 2019-04-16 | Stop reason: HOSPADM

## 2018-12-07 RX ORDER — METFORMIN HYDROCHLORIDE 500 MG/1
TABLET, EXTENDED RELEASE ORAL
Status: ON HOLD | COMMUNITY
Start: 2018-09-07 | End: 2019-04-16 | Stop reason: HOSPADM

## 2018-12-07 NOTE — PROGRESS NOTES
Subjective:       Patient ID: Agus Ramos Jr. is a 78 y.o. male.    Chief Complaint: Follow-up    HPI   Diagnosis: ANEMIA      78-year-old male with past medical history of Congestive heart failure, Coronary artery disease.Diabetes mellitus, AFib Hypertension; MI (myocardial infarction),  Pacemaker; Peripheral vascular disease; S/P CABG x 3;  Stented coronary artery, Tobacco use, CKD stage 3 Anemia in CKD seen today for f/u for anemia.  Patient hospitalized  5/2018 with CHF exacerbation found to have low hemoglobin of 6.5 grams/deciliter.  No active bleeding noted. Patient refused a rectal exam.  He underwent 2 units packed red blood cell transfusion with appropriate response to hemoglobin of 8.5.  He was treated for CHF exacerbation.  He underwent chest CT imaging which revealed concern for loculated right pleural effusion.  Patient was followed by pulmonology during hospitalization and has close follow-up planned as an outpatient.  He reports  IR cancelled procedure yesterday  for diagnostic tap.  He reports his recent transfusion was his first.  He has undergone a colonoscopy 10 years ago at [facility] which was unremarkable according to patient.    Patient a poor historian appears confused at times.  He is currently in wheelchair he was discharged home on home O2.  He denies shortness of breath cough.  No fatigue.  CBC from 05/13/2018 reveals a white blood cell count of 7.4 hemoglobin of 8 grams/dL hematocrit of 28% MCV of 71 platelet count of 233k.      Pt re hospitalized 5/27/2018 with acute on chronic respiratory failure with hypoxia. . CXR and lung exam consistent with bibasilar atelectasis and small effusion. . Hgb also noted to be low chronically ranging between high end of 7 g/dL and lower end of 8 g/dL. Pt  Underwent EGD to eval for bleed. Found some erythematous mucosa in the antrum (Biopsied) but otherwise norm.CBC on presentation reveals a white blood cell count of 9.2 hemoglobin of 8.6  grams/deciliter hematocrit of 30.4% MCV of 71 and a platelet count of 540 . He underwent prbc transfusion and IV Fe during hospitalization.     Pt recently hospitalized 11/30/2018 with SOB . He was diagnosed with COPD exacerbation and UTI. Treated with neb and steroid. Cardiology determined  symptoms from COPD rather then CHF. 2 D echo EF 45%, normal diastolic function,mild to mod MR, mild AR, and pulmonary htn 85.7 mmhg. Patient has chronic atrial fibrillation and rate better controlled once started diltiazem.   Patient walked with  ft and 02 saturation 92% on 2 L       EGD 6/1/2018 - no acute finding, bleeding source  Pt has not undergone C-scope  He is unclear regarding GI f/u       Mild fatigue-chronic   No CP   He is on chronic O2. 2liter  He has occasional cough-chronic   He has leg swelling -chronic       No melena, hematochezia or change in bowel habits.     CBC 12/2/2018   reveals a white blood cell count of 4250/mm3 hemoglobin of 10.3 grams/deciliter hematocrit of 32.9% a platelet count of 362 k    He is followed by a endocrinology for chronic hypercalcemia related to hyperparathyroidism.     Past Medical History:   Diagnosis Date    Anemia 05/03/2018    pending blood transfusion    Anticoagulant long-term use     apixaban    Atrial fibrillation     CKD (chronic kidney disease)     Congestive heart failure     COPD (chronic obstructive pulmonary disease)     Coronary artery disease     Diabetes mellitus     Encounter for blood transfusion     HLD (hyperlipidemia)     Hypertension     MI (myocardial infarction)     Pacemaker     left chest    Peripheral vascular disease     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     S/P femoral-popliteal bypass surgery left    Stented coronary artery     Tobacco use        Past Surgical History:   Procedure Laterality Date    CARDIAC CATHETERIZATION      CARDIAC PACEMAKER PLACEMENT      CARDIAC SURGERY      3 vessel CABG    CARDIOVERSION N/A 9/19/2013    Performed by  "Maryann Surgeon at [facility] MARYANN    cardioverted      CORONARY ANGIOPLASTY WITH STENT PLACEMENT      EGD (ESOPHAGOGASTRODUODENOSCOPY) N/A 6/1/2018    Performed by Ty Hickman MD at [facility] ENDO    ESOPHAGOGASTRODUODENOSCOPY N/A 6/1/2018    Procedure: EGD (ESOPHAGOGASTRODUODENOSCOPY);  Surgeon: Ty Hickman MD;  Location: [facility] ENDO;  Service: Endoscopy;  Laterality: N/A;    HEMORRHOID SURGERY      TRANSESOPHAGEAL ECHOCARDIOGRAM (ANIA) N/A 9/19/2013    Performed by [facility] Surgeon at [facility] MARYANN    VASCULAR SURGERY      femoral artery popliteal bypass           Review of Systems   Constitutional: Negative for appetite change, fatigue, fever and unexpected weight change.   HENT: Negative for mouth sores.    Eyes: Negative for visual disturbance.   Respiratory: Negative for cough and shortness of breath.    Cardiovascular: Negative for chest pain.   Gastrointestinal: Negative for abdominal pain and diarrhea.   Genitourinary: Negative for frequency.   Musculoskeletal: Negative for back pain.   Skin: Negative for rash.   Neurological: Negative for headaches.   Hematological: Negative for adenopathy.   Psychiatric/Behavioral: The patient is not nervous/anxious.        Objective:       Vitals:    12/07/18 1338   BP: 122/64   BP Location: Right arm   Patient Position: Sitting   BP Method: Large (Automatic)   Pulse: 72   Temp: 98.5 °F (36.9 °C)   TempSrc: Oral   SpO2: 99%   Weight: 83.5 kg (184 lb)   Height: 5' 9" (1.753 m)       Physical Exam   Constitutional: He is oriented to person, place, and time. He appears well-developed and well-nourished.   In wheelchair   HENT:   Head: Normocephalic.   Mouth/Throat: Oropharynx is clear and moist. No oropharyngeal exudate.   Eyes: Conjunctivae are normal. No scleral icterus.   Neck: Normal range of motion. Neck supple. No thyromegaly present.   Cardiovascular: Normal rate, regular rhythm and normal heart sounds.   No murmur heard.  Pulmonary/Chest: Effort normal. He has no wheezes. He has " no rales.   Abdominal: Soft. Bowel sounds are normal. There is no hepatosplenomegaly. There is no tenderness. There is no rebound and no guarding.   Musculoskeletal: He exhibits edema.   In wheelchair   Neurological: He is alert and oriented to person, place, and time. No cranial nerve deficit.   Skin: No rash noted. No erythema.   Psychiatric: He has a normal mood and affect.       CT chest w/contrast 5/10/2018   Moderate-sized loculated right pleural effusion clinical correlation advised.    Patchy bibasilar opacities suggesting atelectasis.    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    Multifocal hypodensities within the liver partially visualized which may represent cysts though indeterminate.  Follow-up dedicated hepatic imaging recommended.        Results for ASHLEY GONZALEZ JR. (MRN [id number]) as of 6/29/2018 10:18   Ref. Range 6/21/2018 09:16   Zinc, Serum-ALT Latest Ref Range: 60 - 130 ug/dL 74     Results for ASHLEY GONZALEZ JR. (MRN [id number]) as of 6/29/2018 10:18   Ref. Range 6/21/2018 09:16   [place] Free Light Chains Latest Ref Range: 0.33 - 1.94 mg/dL 2.60 (H)   Lambda Free Light Chains Latest Ref Range: 0.57 - 2.63 mg/dL 2.62   Kappa/Lambda FLC Ratio Latest Ref Range: 0.26 - 1.65  0.99       Results for ASHLEY GONZALEZ JR. (MRN [id number]) as of 6/29/2018 16:39   Ref. Range 6/21/2018 09:16   Haptoglobin Latest Ref Range: 30 - 250 mg/dL 178   Retic Latest Ref Range: 0.4 - 2.0 % 2.6 (H)   Sed Rate Latest Ref Range: 0 - 10 mm/Hr 12 (H)       Lab Results   Component Value Date    WBC 4.25 12/02/2018    HGB 10.3 (L) 12/02/2018    HCT 32.8 (L) 12/02/2018    MCV 77 (L) 12/02/2018     (H) 12/02/2018     Lab Results   Component Value Date    IRON 273 (H) 08/29/2018    TIBC 336 08/29/2018    FERRITIN 47 08/29/2018       SPEP nl     Assessment:       1. Iron deficiency anemia, unspecified iron deficiency anemia type     2. Chronic anticoagulation    3. Chronic atrial fibrillation    4. Chronic obstructive pulmonary disease, unspecified COPD type        Plan:   1-4 77-year-old with multiple medical diagnoses with longstanding history of anemia in chronic kidney disease  S/p 2u prbc 5/3/2018   S/p IV Fe   Hb 10.3g/dl -stable   He is followed by GI and process of undergoing GI evaluation  GI clinic reports PT CANCELED C-scope   Pt provided with contact info to reschedule GI eval with Dr. Nunez   Hb stable   Cont to monitor counts    Pt agreeable with plan      F/u 2 mos with cbc. Fe studies, retic ct         Cc: MD Keaton Castle MD Scott Pollack, MD

## 2018-12-29 ENCOUNTER — NURSE TRIAGE (OUTPATIENT)
Dept: ADMINISTRATIVE | Facility: CLINIC | Age: 78
End: 2018-12-29

## 2018-12-29 NOTE — TELEPHONE ENCOUNTER
Calf Arm and hands are swollen!!  In 7 days he has gained 5 lbs.  Called to Dr. Hardy  For advice on medication.  Left info with answering service who states she will contact patient and then transfer to Dr. Hardy

## 2018-12-31 ENCOUNTER — HOSPITAL ENCOUNTER (INPATIENT)
Facility: HOSPITAL | Age: 78
LOS: 8 days | Discharge: HOME HEALTH | DRG: 291 | End: 2019-01-08
Attending: EMERGENCY MEDICINE | Admitting: EMERGENCY MEDICINE
Payer: MEDICARE

## 2018-12-31 DIAGNOSIS — I50.9 ACUTE ON CHRONIC CONGESTIVE HEART FAILURE, UNSPECIFIED HEART FAILURE TYPE: Primary | ICD-10-CM

## 2018-12-31 DIAGNOSIS — R06.02 SHORTNESS OF BREATH: ICD-10-CM

## 2018-12-31 PROBLEM — J96.11 CHRONIC RESPIRATORY FAILURE WITH HYPOXIA: Chronic | Status: ACTIVE | Noted: 2018-06-01

## 2018-12-31 PROBLEM — Z72.0 TOBACCO ABUSE: Status: ACTIVE | Noted: 2018-05-04

## 2018-12-31 PROBLEM — J43.2 CENTRILOBULAR EMPHYSEMA: Chronic | Status: ACTIVE | Noted: 2018-05-15

## 2018-12-31 PROBLEM — D50.9 IRON DEFICIENCY ANEMIA: Chronic | Status: ACTIVE | Noted: 2018-05-04

## 2018-12-31 PROBLEM — Z95.0 PACEMAKER: Chronic | Status: ACTIVE | Noted: 2018-05-04

## 2018-12-31 LAB
ALBUMIN SERPL BCP-MCNC: 2.3 G/DL
ALP SERPL-CCNC: 138 U/L
ALT SERPL W/O P-5'-P-CCNC: 15 U/L
ANION GAP SERPL CALC-SCNC: 9 MMOL/L
AST SERPL-CCNC: 20 U/L
BASOPHILS # BLD AUTO: 0.02 K/UL
BASOPHILS NFR BLD: 0.2 %
BILIRUB SERPL-MCNC: 0.7 MG/DL
BNP SERPL-MCNC: 958 PG/ML
BUN SERPL-MCNC: 22 MG/DL
CALCIUM SERPL-MCNC: 10.4 MG/DL
CHLORIDE SERPL-SCNC: 111 MMOL/L
CO2 SERPL-SCNC: 23 MMOL/L
CREAT SERPL-MCNC: 1.4 MG/DL
DIFFERENTIAL METHOD: ABNORMAL
EOSINOPHIL # BLD AUTO: 0.1 K/UL
EOSINOPHIL NFR BLD: 1.1 %
ERYTHROCYTE [DISTWIDTH] IN BLOOD BY AUTOMATED COUNT: 20.9 %
EST. GFR  (AFRICAN AMERICAN): 54 ML/MIN/1.73 M^2
EST. GFR  (NON AFRICAN AMERICAN): 47 ML/MIN/1.73 M^2
GLUCOSE SERPL-MCNC: 89 MG/DL
HCT VFR BLD AUTO: 36.6 %
HGB BLD-MCNC: 11.1 G/DL
LYMPHOCYTES # BLD AUTO: 1 K/UL
LYMPHOCYTES NFR BLD: 12.2 %
MAGNESIUM SERPL-MCNC: 2.1 MG/DL
MCH RBC QN AUTO: 24.6 PG
MCHC RBC AUTO-ENTMCNC: 30.3 G/DL
MCV RBC AUTO: 81 FL
MONOCYTES # BLD AUTO: 1 K/UL
MONOCYTES NFR BLD: 13 %
NEUTROPHILS # BLD AUTO: 5.9 K/UL
NEUTROPHILS NFR BLD: 73.5 %
PLATELET # BLD AUTO: 350 K/UL
PMV BLD AUTO: 9.4 FL
POTASSIUM SERPL-SCNC: 4.2 MMOL/L
PROT SERPL-MCNC: 5.4 G/DL
RBC # BLD AUTO: 4.51 M/UL
SODIUM SERPL-SCNC: 143 MMOL/L
TROPONIN I SERPL DL<=0.01 NG/ML-MCNC: 0.11 NG/ML
TROPONIN I SERPL DL<=0.01 NG/ML-MCNC: 0.13 NG/ML
WBC # BLD AUTO: 8.01 K/UL

## 2018-12-31 PROCEDURE — 80053 COMPREHEN METABOLIC PANEL: CPT

## 2018-12-31 PROCEDURE — 25000003 PHARM REV CODE 250: Performed by: EMERGENCY MEDICINE

## 2018-12-31 PROCEDURE — G0378 HOSPITAL OBSERVATION PER HR: HCPCS

## 2018-12-31 PROCEDURE — 25000003 PHARM REV CODE 250: Performed by: HOSPITALIST

## 2018-12-31 PROCEDURE — 85025 COMPLETE CBC W/AUTO DIFF WBC: CPT

## 2018-12-31 PROCEDURE — 93010 ELECTROCARDIOGRAM REPORT: CPT | Mod: ,,, | Performed by: INTERNAL MEDICINE

## 2018-12-31 PROCEDURE — 25000242 PHARM REV CODE 250 ALT 637 W/ HCPCS: Performed by: EMERGENCY MEDICINE

## 2018-12-31 PROCEDURE — 84484 ASSAY OF TROPONIN QUANT: CPT | Mod: 91

## 2018-12-31 PROCEDURE — 96374 THER/PROPH/DIAG INJ IV PUSH: CPT

## 2018-12-31 PROCEDURE — 63600175 PHARM REV CODE 636 W HCPCS: Performed by: EMERGENCY MEDICINE

## 2018-12-31 PROCEDURE — 93005 ELECTROCARDIOGRAM TRACING: CPT

## 2018-12-31 PROCEDURE — 94761 N-INVAS EAR/PLS OXIMETRY MLT: CPT

## 2018-12-31 PROCEDURE — 36415 COLL VENOUS BLD VENIPUNCTURE: CPT

## 2018-12-31 PROCEDURE — 11000001 HC ACUTE MED/SURG PRIVATE ROOM

## 2018-12-31 PROCEDURE — 84484 ASSAY OF TROPONIN QUANT: CPT

## 2018-12-31 PROCEDURE — 96375 TX/PRO/DX INJ NEW DRUG ADDON: CPT

## 2018-12-31 PROCEDURE — 99285 EMERGENCY DEPT VISIT HI MDM: CPT | Mod: 25

## 2018-12-31 PROCEDURE — 27000221 HC OXYGEN, UP TO 24 HOURS

## 2018-12-31 PROCEDURE — 94640 AIRWAY INHALATION TREATMENT: CPT

## 2018-12-31 PROCEDURE — 83880 ASSAY OF NATRIURETIC PEPTIDE: CPT

## 2018-12-31 PROCEDURE — 93010 EKG 12-LEAD: ICD-10-PCS | Mod: ,,, | Performed by: INTERNAL MEDICINE

## 2018-12-31 PROCEDURE — 63600175 PHARM REV CODE 636 W HCPCS: Performed by: HOSPITALIST

## 2018-12-31 PROCEDURE — 83735 ASSAY OF MAGNESIUM: CPT

## 2018-12-31 RX ORDER — FUROSEMIDE 10 MG/ML
20 INJECTION INTRAMUSCULAR; INTRAVENOUS ONCE
Status: COMPLETED | OUTPATIENT
Start: 2018-12-31 | End: 2018-12-31

## 2018-12-31 RX ORDER — ATORVASTATIN CALCIUM 10 MG/1
20 TABLET, FILM COATED ORAL NIGHTLY
Status: DISCONTINUED | OUTPATIENT
Start: 2018-12-31 | End: 2019-01-08 | Stop reason: HOSPADM

## 2018-12-31 RX ORDER — FUROSEMIDE 10 MG/ML
40 INJECTION INTRAMUSCULAR; INTRAVENOUS 2 TIMES DAILY
Status: DISCONTINUED | OUTPATIENT
Start: 2018-12-31 | End: 2018-12-31

## 2018-12-31 RX ORDER — HYDRALAZINE HYDROCHLORIDE 20 MG/ML
10 INJECTION INTRAMUSCULAR; INTRAVENOUS
Status: COMPLETED | OUTPATIENT
Start: 2018-12-31 | End: 2018-12-31

## 2018-12-31 RX ORDER — POLYETHYLENE GLYCOL 3350 17 G/17G
17 POWDER, FOR SOLUTION ORAL DAILY
Status: DISCONTINUED | OUTPATIENT
Start: 2019-01-01 | End: 2019-01-08 | Stop reason: HOSPADM

## 2018-12-31 RX ORDER — LABETALOL HYDROCHLORIDE 5 MG/ML
20 INJECTION, SOLUTION INTRAVENOUS
Status: ACTIVE | OUTPATIENT
Start: 2018-12-31 | End: 2019-01-01

## 2018-12-31 RX ORDER — FUROSEMIDE 10 MG/ML
20 INJECTION INTRAMUSCULAR; INTRAVENOUS
Status: COMPLETED | OUTPATIENT
Start: 2018-12-31 | End: 2018-12-31

## 2018-12-31 RX ORDER — HYDRALAZINE HYDROCHLORIDE 20 MG/ML
10 INJECTION INTRAMUSCULAR; INTRAVENOUS EVERY 8 HOURS PRN
Status: DISCONTINUED | OUTPATIENT
Start: 2018-12-31 | End: 2019-01-08 | Stop reason: HOSPADM

## 2018-12-31 RX ORDER — DILTIAZEM HYDROCHLORIDE 5 MG/ML
20 INJECTION INTRAVENOUS
Status: COMPLETED | OUTPATIENT
Start: 2018-12-31 | End: 2018-12-31

## 2018-12-31 RX ORDER — FUROSEMIDE 10 MG/ML
40 INJECTION INTRAMUSCULAR; INTRAVENOUS 2 TIMES DAILY
Status: DISCONTINUED | OUTPATIENT
Start: 2019-01-01 | End: 2019-01-02

## 2018-12-31 RX ORDER — PROCHLORPERAZINE EDISYLATE 5 MG/ML
10 INJECTION INTRAMUSCULAR; INTRAVENOUS EVERY 6 HOURS PRN
Status: DISCONTINUED | OUTPATIENT
Start: 2018-12-31 | End: 2019-01-08 | Stop reason: HOSPADM

## 2018-12-31 RX ORDER — LOSARTAN POTASSIUM 25 MG/1
25 TABLET ORAL DAILY
Status: DISCONTINUED | OUTPATIENT
Start: 2019-01-01 | End: 2019-01-01

## 2018-12-31 RX ORDER — FUROSEMIDE 40 MG/1
40 TABLET ORAL 2 TIMES DAILY
Status: DISCONTINUED | OUTPATIENT
Start: 2018-12-31 | End: 2018-12-31

## 2018-12-31 RX ORDER — DILTIAZEM HYDROCHLORIDE 30 MG/1
30 TABLET, FILM COATED ORAL EVERY 12 HOURS
Status: DISCONTINUED | OUTPATIENT
Start: 2018-12-31 | End: 2019-01-01

## 2018-12-31 RX ORDER — ACETAMINOPHEN 325 MG/1
650 TABLET ORAL EVERY 6 HOURS PRN
Status: DISCONTINUED | OUTPATIENT
Start: 2018-12-31 | End: 2019-01-08 | Stop reason: HOSPADM

## 2018-12-31 RX ORDER — FERROUS GLUCONATE 324(38)MG
324 TABLET ORAL
Status: DISCONTINUED | OUTPATIENT
Start: 2019-01-01 | End: 2019-01-08 | Stop reason: HOSPADM

## 2018-12-31 RX ORDER — IPRATROPIUM BROMIDE AND ALBUTEROL SULFATE 2.5; .5 MG/3ML; MG/3ML
3 SOLUTION RESPIRATORY (INHALATION)
Status: COMPLETED | OUTPATIENT
Start: 2018-12-31 | End: 2018-12-31

## 2018-12-31 RX ORDER — IPRATROPIUM BROMIDE AND ALBUTEROL SULFATE 2.5; .5 MG/3ML; MG/3ML
3 SOLUTION RESPIRATORY (INHALATION) EVERY 6 HOURS
Status: DISCONTINUED | OUTPATIENT
Start: 2018-12-31 | End: 2019-01-01

## 2018-12-31 RX ORDER — FUROSEMIDE 10 MG/ML
40 INJECTION INTRAMUSCULAR; INTRAVENOUS
Status: DISCONTINUED | OUTPATIENT
Start: 2018-12-31 | End: 2018-12-31

## 2018-12-31 RX ADMIN — DILTIAZEM HYDROCHLORIDE 20 MG: 5 INJECTION INTRAVENOUS at 03:12

## 2018-12-31 RX ADMIN — FUROSEMIDE 20 MG: 10 INJECTION, SOLUTION INTRAMUSCULAR; INTRAVENOUS at 09:12

## 2018-12-31 RX ADMIN — DILTIAZEM HYDROCHLORIDE 30 MG: 30 TABLET, FILM COATED ORAL at 09:12

## 2018-12-31 RX ADMIN — IPRATROPIUM BROMIDE AND ALBUTEROL SULFATE 3 ML: .5; 3 SOLUTION RESPIRATORY (INHALATION) at 05:12

## 2018-12-31 RX ADMIN — APIXABAN 5 MG: 5 TABLET, FILM COATED ORAL at 09:12

## 2018-12-31 RX ADMIN — IPRATROPIUM BROMIDE AND ALBUTEROL SULFATE 3 ML: .5; 3 SOLUTION RESPIRATORY (INHALATION) at 08:12

## 2018-12-31 RX ADMIN — FUROSEMIDE 20 MG: 10 INJECTION, SOLUTION INTRAMUSCULAR; INTRAVENOUS at 05:12

## 2018-12-31 RX ADMIN — ATORVASTATIN CALCIUM 20 MG: 10 TABLET, FILM COATED ORAL at 09:12

## 2018-12-31 RX ADMIN — HYDRALAZINE HYDROCHLORIDE 10 MG: 20 INJECTION INTRAMUSCULAR; INTRAVENOUS at 04:12

## 2018-12-31 RX ADMIN — NITROGLYCERIN 4 INCH: 20 OINTMENT TOPICAL at 04:12

## 2018-12-31 NOTE — ED NOTES
md at bedside; verbal orders to go up on oxygen from 2L to 4L because pt is sob; pt tolerating well; pt sat up higher in bed

## 2018-12-31 NOTE — ED TRIAGE NOTES
Pt arrived via personal transportation with wife at bedside; per wife pt went to doctor's office today with sob; pt states sob has been going on for the past few months and just got worse the past few days; denies cp, fever, chills, diarrhea at this time; pt's wife states pt's doctor sent him here

## 2018-12-31 NOTE — ED NOTES
Per charge nurse pt was getting up to pee and felt short of breathe; nonrebreather placed per charge nurse; pt tolerated well

## 2018-12-31 NOTE — ED NOTES
Pt reports that its getting harder for him to breathe. Pt was attempting to use urinal. Pt taken off of nasal cannula and placed on non re breather at 10 LPM. MD notified.

## 2019-01-01 PROBLEM — I47.29 NSVT (NONSUSTAINED VENTRICULAR TACHYCARDIA): Status: ACTIVE | Noted: 2019-01-01

## 2019-01-01 PROBLEM — I25.810 CORONARY ARTERY DISEASE INVOLVING CORONARY BYPASS GRAFT OF NATIVE HEART WITHOUT ANGINA PECTORIS: Status: ACTIVE | Noted: 2019-01-01

## 2019-01-01 PROBLEM — I50.9 ACUTE ON CHRONIC CONGESTIVE HEART FAILURE: Status: ACTIVE | Noted: 2019-01-01

## 2019-01-01 PROBLEM — G47.33 OSA (OBSTRUCTIVE SLEEP APNEA): Status: ACTIVE | Noted: 2019-01-01

## 2019-01-01 PROBLEM — R33.9 URINARY RETENTION: Status: ACTIVE | Noted: 2019-01-01

## 2019-01-01 PROBLEM — R39.198 DIFFICULTY VOIDING: Status: ACTIVE | Noted: 2019-01-01

## 2019-01-01 LAB
TROPONIN I SERPL DL<=0.01 NG/ML-MCNC: 0.1 NG/ML
TROPONIN I SERPL DL<=0.01 NG/ML-MCNC: 0.14 NG/ML

## 2019-01-01 PROCEDURE — 25000003 PHARM REV CODE 250: Performed by: INTERNAL MEDICINE

## 2019-01-01 PROCEDURE — 63600175 PHARM REV CODE 636 W HCPCS: Performed by: HOSPITALIST

## 2019-01-01 PROCEDURE — 25000003 PHARM REV CODE 250: Performed by: EMERGENCY MEDICINE

## 2019-01-01 PROCEDURE — 99222 1ST HOSP IP/OBS MODERATE 55: CPT | Mod: ,,, | Performed by: UROLOGY

## 2019-01-01 PROCEDURE — 25000242 PHARM REV CODE 250 ALT 637 W/ HCPCS: Performed by: EMERGENCY MEDICINE

## 2019-01-01 PROCEDURE — 84484 ASSAY OF TROPONIN QUANT: CPT | Mod: 91

## 2019-01-01 PROCEDURE — 94640 AIRWAY INHALATION TREATMENT: CPT

## 2019-01-01 PROCEDURE — 25000003 PHARM REV CODE 250: Performed by: HOSPITALIST

## 2019-01-01 PROCEDURE — 25000003 PHARM REV CODE 250: Performed by: NURSE PRACTITIONER

## 2019-01-01 PROCEDURE — 36415 COLL VENOUS BLD VENIPUNCTURE: CPT

## 2019-01-01 PROCEDURE — 99222 PR INITIAL HOSPITAL CARE,LEVL II: ICD-10-PCS | Mod: ,,, | Performed by: UROLOGY

## 2019-01-01 PROCEDURE — 27000221 HC OXYGEN, UP TO 24 HOURS

## 2019-01-01 PROCEDURE — 94761 N-INVAS EAR/PLS OXIMETRY MLT: CPT

## 2019-01-01 PROCEDURE — 21400001 HC TELEMETRY ROOM

## 2019-01-01 PROCEDURE — 99223 1ST HOSP IP/OBS HIGH 75: CPT | Mod: ,,, | Performed by: INTERNAL MEDICINE

## 2019-01-01 PROCEDURE — 99223 PR INITIAL HOSPITAL CARE,LEVL III: ICD-10-PCS | Mod: ,,, | Performed by: INTERNAL MEDICINE

## 2019-01-01 RX ORDER — LOSARTAN POTASSIUM 25 MG/1
50 TABLET ORAL DAILY
Status: DISCONTINUED | OUTPATIENT
Start: 2019-01-01 | End: 2019-01-03

## 2019-01-01 RX ORDER — CARVEDILOL 3.12 MG/1
3.12 TABLET ORAL 2 TIMES DAILY
Status: DISCONTINUED | OUTPATIENT
Start: 2019-01-01 | End: 2019-01-02

## 2019-01-01 RX ORDER — TAMSULOSIN HYDROCHLORIDE 0.4 MG/1
0.4 CAPSULE ORAL DAILY
Status: DISCONTINUED | OUTPATIENT
Start: 2019-01-01 | End: 2019-01-08 | Stop reason: HOSPADM

## 2019-01-01 RX ORDER — ASPIRIN 81 MG/1
81 TABLET ORAL DAILY
Status: DISCONTINUED | OUTPATIENT
Start: 2019-01-01 | End: 2019-01-08 | Stop reason: HOSPADM

## 2019-01-01 RX ORDER — TAMSULOSIN HYDROCHLORIDE 0.4 MG/1
0.4 CAPSULE ORAL DAILY
Status: DISCONTINUED | OUTPATIENT
Start: 2019-01-01 | End: 2019-01-01

## 2019-01-01 RX ORDER — DEXTROMETHORPHAN POLISTIREX 30 MG/5 ML
1 SUSPENSION, EXTENDED RELEASE 12 HR ORAL ONCE
Status: COMPLETED | OUTPATIENT
Start: 2019-01-01 | End: 2019-01-01

## 2019-01-01 RX ORDER — IPRATROPIUM BROMIDE AND ALBUTEROL SULFATE 2.5; .5 MG/3ML; MG/3ML
3 SOLUTION RESPIRATORY (INHALATION) EVERY 6 HOURS
Status: DISCONTINUED | OUTPATIENT
Start: 2019-01-01 | End: 2019-01-08 | Stop reason: HOSPADM

## 2019-01-01 RX ADMIN — TAMSULOSIN HYDROCHLORIDE 0.4 MG: 0.4 CAPSULE ORAL at 12:01

## 2019-01-01 RX ADMIN — CARVEDILOL 3.12 MG: 3.12 TABLET, FILM COATED ORAL at 09:01

## 2019-01-01 RX ADMIN — FUROSEMIDE 40 MG: 10 INJECTION, SOLUTION INTRAVENOUS at 05:01

## 2019-01-01 RX ADMIN — MINERAL OIL 1 ENEMA: 100 ENEMA RECTAL at 12:01

## 2019-01-01 RX ADMIN — ATORVASTATIN CALCIUM 20 MG: 10 TABLET, FILM COATED ORAL at 09:01

## 2019-01-01 RX ADMIN — APIXABAN 5 MG: 5 TABLET, FILM COATED ORAL at 09:01

## 2019-01-01 RX ADMIN — LOSARTAN POTASSIUM 50 MG: 25 TABLET, FILM COATED ORAL at 09:01

## 2019-01-01 RX ADMIN — IPRATROPIUM BROMIDE AND ALBUTEROL SULFATE 3 ML: .5; 3 SOLUTION RESPIRATORY (INHALATION) at 08:01

## 2019-01-01 RX ADMIN — FUROSEMIDE 40 MG: 10 INJECTION, SOLUTION INTRAVENOUS at 09:01

## 2019-01-01 RX ADMIN — DILTIAZEM HYDROCHLORIDE 30 MG: 30 TABLET, FILM COATED ORAL at 09:01

## 2019-01-01 RX ADMIN — IPRATROPIUM BROMIDE AND ALBUTEROL SULFATE 3 ML: .5; 3 SOLUTION RESPIRATORY (INHALATION) at 07:01

## 2019-01-01 RX ADMIN — IPRATROPIUM BROMIDE AND ALBUTEROL SULFATE 3 ML: .5; 3 SOLUTION RESPIRATORY (INHALATION) at 01:01

## 2019-01-01 RX ADMIN — ASPIRIN 81 MG: 81 TABLET, COATED ORAL at 09:01

## 2019-01-01 RX ADMIN — FERROUS GLUCONATE TAB 324 MG (37.5 MG ELEMENTAL IRON) 324 MG: 324 (37.5 FE) TAB at 07:01

## 2019-01-01 RX ADMIN — POLYETHYLENE GLYCOL 3350 17 G: 17 POWDER, FOR SOLUTION ORAL at 10:01

## 2019-01-01 NOTE — ASSESSMENT & PLAN NOTE
No formal diagnosis however, suspect that the patient likely has some contributory symptoms of SJ - Elevated PA systolic pressure = 85.8 with diastolic dysfunction per 2D echo  Hypoxia in the setting of CHF ex + urinary retension  CPAP nightly to keep sats above 90%

## 2019-01-01 NOTE — NURSING
Post void bladder scan >515ml.  Pt stating he does have some feeling of still needing to urinate but can not.  LEANNE Navarro, notified.

## 2019-01-01 NOTE — SUBJECTIVE & OBJECTIVE
Past Medical History:   Diagnosis Date    Anemia 05/03/2018    pending blood transfusion    Anticoagulant long-term use     apixaban    Atrial fibrillation     CKD (chronic kidney disease)     Congestive heart failure     COPD (chronic obstructive pulmonary disease)     Coronary artery disease     Diabetes mellitus     Encounter for blood transfusion     HLD (hyperlipidemia)     Hypertension     MI (myocardial infarction)     On home oxygen therapy     Pacemaker     left chest    Peripheral vascular disease     Requires assistance with activities of daily living (ADL)     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     S/P femoral-popliteal bypass surgery left    Stented coronary artery     Tobacco use     Unsteady gait        Past Surgical History:   Procedure Laterality Date    CARDIAC CATHETERIZATION      CARDIAC PACEMAKER PLACEMENT      CARDIAC SURGERY      3 vessel CABG    CARDIOVERSION N/A 9/19/2013    Performed by [facility] at [facility]    cardioverted      CORONARY ANGIOPLASTY WITH STENT PLACEMENT      EGD (ESOPHAGOGASTRODUODENOSCOPY) N/A 6/1/2018    [facility] at [facility] ENDO    HEMORRHOID SURGERY      TRANSESOPHAGEAL ECHOCARDIOGRAM (ANIA) N/A 9/19/2013    Performed by [facility] Surgeon at [facility]    VASCULAR SURGERY      femoral artery popliteal bypass       Review of patient's allergies indicates:  No Known Allergies    No current facility-administered medications on file prior to encounter.      Current Outpatient Medications on File Prior to Encounter   Medication Sig    albuterol-ipratropium 2.5mg-0.5mg/3mL (DUO-NEB) 0.5 mg-3 mg(2.5 mg base)/3 mL nebulizer solution Take 3 mLs by nebulization every 6 (six) hours. Rescue    apixaban (ELIQUIS) 5 mg Tab Take 5 mg by mouth 2 (two) times daily.    atorvastatin (LIPITOR) 20 MG tablet Take 20 mg by mouth every evening.    diltiaZEM (CARDIZEM) 30 MG tablet Take 1 tablet (30 mg total) by mouth every 12 (twelve) hours.    ferrous gluconate  (FERGON) 324 MG tablet Take 324 mg by mouth daily with breakfast.    fish oil-omega-3 fatty acids 300-1,000 mg capsule Take 2 g by mouth 2 (two) times daily.     furosemide (LASIX) 40 MG tablet     furosemide (LASIX) 80 MG tablet Take 40 mg by mouth 2 (two) times daily.     glipiZIDE (GLUCOTROL) 5 MG tablet Take 10 mg by mouth 2 (two) times daily before meals.    losartan (COZAAR) 25 MG tablet     losartan (COZAAR) 50 MG tablet Take 0.5 tablets (25 mg total) by mouth once daily.    metFORMIN (GLUCOPHAGE-XR) 500 MG 24 hr tablet     nitroGLYCERIN (NITROSTAT) 0.4 MG SL tablet Place 0.4 mg under the tongue every 5 (five) minutes as needed.    polyethylene glycol (GLYCOLAX) 17 gram PwPk Take by mouth as needed.      Family History     Problem Relation (Age of Onset)    Diabetes Father, Mother        Tobacco Use    Smoking status: Former Smoker     Packs/day: 1.00     Years: 60.00     Pack years: 60.00     Types: Cigarettes     Last attempt to quit: 2018     Years since quittin.6    Smokeless tobacco: Never Used   Substance and Sexual Activity    Alcohol use: No    Drug use: No    Sexual activity: Not on file     Review of Systems   Constitutional: Negative for chills and fever.   Eyes: Negative for photophobia and visual disturbance.   Respiratory: Positive for shortness of breath. Negative for cough.    Cardiovascular: Positive for leg swelling. Negative for chest pain and palpitations.   Gastrointestinal: Negative for abdominal pain, diarrhea, nausea and vomiting.   Genitourinary: Negative for frequency, hematuria and urgency.   Skin: Negative for pallor, rash and wound.   Neurological: Negative for light-headedness and headaches.   Psychiatric/Behavioral: Negative for confusion and decreased concentration.     Objective:     Vital Signs (Most Recent):  Temp: 97.3 °F (36.3 °C) (18)  Pulse: 103 (18)  Resp: (!) 22 (18)  BP: (!) 174/82 (18)  SpO2: 98 %  (12/31/18 2016) Vital Signs (24h Range):  Temp:  [97.3 °F (36.3 °C)-98.3 °F (36.8 °C)] 97.3 °F (36.3 °C)  Pulse:  [] 103  Resp:  [20-26] 22  SpO2:  [90 %-100 %] 98 %  BP: (133-182)/() 174/82     Weight: 93 kg (205 lb 0.4 oz)  Body mass index is 30.28 kg/m².    Physical Exam   Constitutional: He is oriented to person, place, and time. He appears well-developed and well-nourished. He has a sickly appearance. No distress.   HENT:   Head: Normocephalic and atraumatic.   Right Ear: External ear normal.   Left Ear: External ear normal.   Nose: Nose normal.   Mouth/Throat: Oropharynx is clear and moist.   Eyes: Conjunctivae and EOM are normal. Pupils are equal, round, and reactive to light.   Neck: Normal range of motion. Neck supple.   Cardiovascular: Normal rate and intact distal pulses. An irregularly irregular rhythm present. Exam reveals distant heart sounds.   Pulmonary/Chest: Accessory muscle usage present. No respiratory distress. He has decreased breath sounds. He has no wheezes. He has no rales.   Abdominal: Soft. Bowel sounds are normal. He exhibits no distension. There is no tenderness.   No palpable hepatomegaly or splenomegaly   Musculoskeletal: Normal range of motion. He exhibits edema. He exhibits no tenderness.   Neurological: He is alert and oriented to person, place, and time.   Skin: Skin is warm and dry.   Psychiatric: He has a normal mood and affect. Thought content normal.   Nursing note and vitals reviewed.        CRANIAL NERVES     CN III, IV, VI   Pupils are equal, round, and reactive to light.  Extraocular motions are normal.        Significant Labs: All pertinent labs within the past 24 hours have been reviewed.    Significant Imaging: I have reviewed all pertinent imaging results/findings within the past 24 hours.

## 2019-01-01 NOTE — ASSESSMENT & PLAN NOTE
Elevated BNP, evidence of edema on exam and chest xray, troponin also mildly elevated consistent with prior CHF exacerbations.  Continue diuresis with IV lasix and afterload reduction as tolerated, monitor on tele, I/O's, daily weights.

## 2019-01-01 NOTE — ASSESSMENT & PLAN NOTE
Pt has HFpEF (EF 45% by recent echo)  Known CAD s/p CABG, MPI 5/2018 neg for ischemia  Trop elev c/w prior admissions/CHF exac, current presentation does not seem c/w ACS  NSVT noted  Suggest continued IV lasix diuresis, goal net neg 1-1.5L daily, may need stearns  Stop dilt  Start coreg 3.125mg bid  Titrate losartan 50mg qd  If continued NSVT despite adequate rx of CHF, pt will need diag cath

## 2019-01-01 NOTE — PROGRESS NOTES
[facility] Hospital Medicine  Progress Note    Patient Name: Agus Ramos Jr.  MRN: [id number]  Patient Class: OP- Observation   Admission Date: 12/31/2018  Length of Stay: 0 days  Attending Physician: Brenda Burger MD  Primary Care Provider: Keaton Hardy MD        Subjective:     Principal Problem:Acute on chronic diastolic congestive heart failure    HPI:  78 y.o. male with chronic a fib, HTN, HLD, CKD stage III, DM2, chronic OAC, tobacco abuse, chronic systolic heart failure, anemia, pacemaker in situ, COPD, and CAD s/p CABG presents with a complaint of SOB progressively worsening for the past 5 weeks.  His PCP directed him to the ED today.  Denies fever, chills, cough, chest pain, dizziness, syncope, N/V/D, abdominal pain, bloody or black stools, or dysuria.  Reports adherence to home treatment plan.  In the ED he was found to have elevated BNP of 958 and pulmonary edema on chest xray, troponin also mildly elevated at 0.126.  He received nebulizer treatment, IV lasix, and nitro paste with improvement.  Short runs of V tach noted with exertion on tele.  Discussed with Cardiology in ED and placed in observation for further evaluation and treatment.    Hospital Course:  Patient admits to poor compliance to diet over the holidays - He has LVEF 45% and PA pressure 85.79 - continued 2-3+ pitting edema; activity intolerance, dyspnea with minimal exertion and at rest, and although he uses home oxygen sat 2L he is requiring 3.5L to keep sats above 85%. His sat drops to 82% on his usual 2L at rest. Additionally, the patient has been having low urinary output despite IV lasix 40 mg BID 2/2 urinary retension. Multiple attempts at catheter placement ordered by ED were unsuccessful overnight - Ordered to discontinue attempts at catheter placement - bowel regimen and added Flomax to patient's medication regimen. Urology was consulted and agrees with decreasing attempts at catheter placement  unless the patient becomes uncomfortable due to inability to void - fluid restriction, daily weight, continue IV lasix - bowel regimen with mineral oil enemal - strict I&O's - supplemental oxygen at 3.5L    Interval History: slow to improve; patient denies being at baseline - 2-3+pitting edema and urinary retension    Review of Systems   Constitutional: Positive for activity change, appetite change and fatigue. Negative for chills, diaphoresis and fever.   HENT: Negative for congestion, hearing loss, sore throat, tinnitus and trouble swallowing.    Eyes: Negative for photophobia, discharge, itching and visual disturbance.   Respiratory: Positive for shortness of breath. Negative for apnea, cough, wheezing and stridor.         Dusky nailbeds; obvious WOB   Cardiovascular: Positive for leg swelling. Negative for chest pain and palpitations.   Gastrointestinal: Negative for abdominal distention, abdominal pain, blood in stool, constipation, diarrhea and nausea.   Endocrine: Negative for polydipsia, polyphagia and polyuria.   Genitourinary: Positive for difficulty urinating, hematuria and penile pain. Negative for dysuria, flank pain and frequency.   Musculoskeletal: Positive for joint swelling. Negative for arthralgias and neck stiffness.   Skin: Positive for pallor. Negative for color change, rash and wound.   Neurological: Positive for dizziness and weakness. Negative for tremors, seizures, light-headedness, numbness and headaches.   Hematological: Negative for adenopathy.   Psychiatric/Behavioral: Negative for hallucinations and self-injury.     Objective:     Vital Signs (Most Recent):  Temp: 97.6 °F (36.4 °C) (01/01/19 1133)  Pulse: 94 (01/01/19 1133)  Resp: 18 (01/01/19 1133)  BP: (!) 167/94 (01/01/19 1133)  SpO2: 97 % (01/01/19 1133) Vital Signs (24h Range):  Temp:  [97.2 °F (36.2 °C)-98.3 °F (36.8 °C)] 97.6 °F (36.4 °C)  Pulse:  [] 94  Resp:  [18-26] 18  SpO2:  [90 %-100 %] 97 %  BP: (120-189)/()  167/94     Weight: 92.5 kg (203 lb 14.8 oz)  Body mass index is 30.11 kg/m².    Intake/Output Summary (Last 24 hours) at 1/1/2019 1427  Last data filed at 1/1/2019 1240  Gross per 24 hour   Intake 240 ml   Output 520 ml   Net -280 ml      Physical Exam   Constitutional: He appears well-developed and well-nourished. He is cooperative.   HENT:   Head: Normocephalic and atraumatic.   Eyes: Conjunctivae, EOM and lids are normal. Pupils are equal, round, and reactive to light.   Neck: Normal range of motion and full passive range of motion without pain. Neck supple. JVD present. No edema present. No thyroid mass present.   Cardiovascular: Normal rate, S1 normal, S2 normal and intact distal pulses.   No murmur heard.  Pulmonary/Chest: He is in respiratory distress. He has rales.   Abdominal: Soft. Bowel sounds are normal. He exhibits no distension and no abdominal bruit. There is no splenomegaly or hepatomegaly. There is no tenderness. There is no CVA tenderness.   Genitourinary: Penile tenderness present.   Genitourinary Comments: Urinary retension   Musculoskeletal: Normal range of motion. He exhibits edema.   Lymphadenopathy:     He has no cervical adenopathy.     He has no axillary adenopathy.   Neurological: He is alert. He has normal reflexes. He displays no tremor. He displays no seizure activity.   Skin: Skin is warm, dry and intact.   Psychiatric: He has a normal mood and affect. His speech is normal. Thought content normal. Cognition and memory are normal.       Significant Labs:   CBC:   Recent Labs   Lab 12/31/18  1545   WBC 8.01   HGB 11.1*   HCT 36.6*        CMP:   Recent Labs   Lab 12/31/18  1545      K 4.2   *   CO2 23   GLU 89   BUN 22   CREATININE 1.4   CALCIUM 10.4   PROT 5.4*   ALBUMIN 2.3*   BILITOT 0.7   ALKPHOS 138*   AST 20   ALT 15   ANIONGAP 9   EGFRNONAA 47*     Cardiac Markers:   Recent Labs   Lab 12/31/18  1545   *     Lipase: No results for input(s): LIPASE in the  last 48 hours.  Troponin:   Recent Labs   Lab 12/31/18  2131 01/01/19  0346 01/01/19  0830   TROPONINI 0.113* 0.136* 0.104*     TSH:   Recent Labs   Lab 07/09/18  2343   TSH 1.108     Significant Imaging:   Imaging Results          X-Ray Chest AP Portable (Final result)  Result time 12/31/18 15:32:46    Final result by Macarena Woodard MD (12/31/18 15:32:46)                 Impression:      There is no evidence acute pulmonary disease.  The chest appears similar to November 30, 2018.      Electronically signed by: Macarena Woodard MD  Date:    12/31/2018  Time:    15:32             Narrative:    EXAMINATION:  XR CHEST AP PORTABLE    CLINICAL HISTORY:  Shortness of breath    TECHNIQUE:  Single frontal view of the chest was performed.    COMPARISON:  None    FINDINGS:  There is a left-sided pacer defibrillator with leads in the right atrium and right ventricle.  The patient is status post sternotomy.  The cardiac silhouette is enlarged.  There is no pneumothorax.                                  Assessment/Plan:      * Acute on chronic diastolic congestive heart failure    Patient with known heart failure and elevated PA pressures - presents after poor diet compliance with likely CHF exacerbation - has been slow to improve despite IV lasix - has urinary retension  Continuous cardiac monitoring and continue to trend CE  ASA 81 mg daily  Diuresis patient - furosemide 40 mg IVP BID  Pulse OX Q4 with vitals  Oxygen supplementation to keep sats above 89%  Add BB - coreg/ IV diuretic/continue home ARB &statin  Lipid panel and TSH for completeness  UA with next void         SJ (obstructive sleep apnea)    No formal diagnosis however, suspect that the patient likely has some contributory symptoms of SJ - Elevated PA systolic pressure = 85.8 with diastolic dysfunction per 2D echo  Hypoxia in the setting of CHF ex + urinary retension  CPAP nightly to keep sats above 90%     Urinary retention    Initial post void residuals>500  multiple attempts at cath placement unsuccessful - patient is voiding ok but continues to have high post void residuals - bowel regimen with enema administered and started on flomax. Urology consulted who agreed with plan and recommended discontinuing attempts at stearns placement considering patient is on chronic long term anticoagulation therapy with apixaban 2/2 afib - I agree with this opinion. His renal functions appear stable will continue to monitor closely if patient becomes uncomfortable will consider reattempt as recommended by urology with couda  Strict I&O's  Post void residuals q12  Monitor BP closely       Type 2 diabetes mellitus, controlled, with renal complications    Last HgbA1c   Lab Results   Component Value Date    HGBA1C 5.1 11/30/2018     Hold oral antihyperglycemics while inpatient  PRN sliding scale insulin  ACHS glucose monitoring   ADA diet     Essential hypertension    Decent controlled, continue home medications and monitor blood pressure, adjust as needed.     Chronic atrial fibrillation    Continue diltiazem and apixaban, monitor on tele     Acute on chronic systolic congestive heart failure    Elevated BNP, evidence of edema on exam and chest xray, troponin also mildly elevated consistent with prior CHF exacerbations.  Continue diuresis with IV lasix and afterload reduction as tolerated, monitor on tele, I/O's, daily weights.     Chronic respiratory failure with hypoxia    Continue home supplemental oxygen 2L NC     Centrilobular emphysema    Recently started on Breo, continue nebs     Pacemaker    Biotronic      Iron deficiency anemia    Patient H/H stable and consistent with baseline. No evidence acute bleeding or indication for transfusion at this time.      Chronic anticoagulation    Continue apixaban     CKD Stage 3    Stable, at baseline, maintain euvolemic state, strict I/O's, monitor renal function and electrolytes, avoid nephrotoxic agents.       VTE Risk Mitigation (From  admission, onward)        Ordered     apixaban tablet 5 mg  2 times daily      12/31/18 6165              ANDRE Flower, FNP-C  Hospitalist - Department of Hospital Medicine  [address] [place] [address]  Office [phone]; Pager [phone]

## 2019-01-01 NOTE — CONSULTS
[facility]  Urology  Consult Note    Patient Name: Agus Ramos Jr.  MRN: [id number]  Admission Date: 12/31/2018  Hospital Length of Stay: 0   Code Status: Full Code   Attending Provider: Brenda Burger MD   Consulting Provider: Frank Ibrahim MD  Primary Care Physician: Keaton Hardy MD  Principal Problem:Acute on chronic diastolic congestive heart failure    Inpatient consult to Urology  Consult performed by: Frank Ibrahim MD  Consult ordered by: Megan Yip MD          Subjective:     HPI:  79 y/o male admitted for CHF with complaint overnight of difficulty voiding. RN attempted I&O cath but unable to pass. He has since been able to void and no longer has discomfort. Bladder scan at 0900 was 400; he voided 200cc in urinal shortly thereafter. He has no associated c/o now. He is on flomax.    Past Medical History:   Diagnosis Date    Anemia 05/03/2018    pending blood transfusion    Anticoagulant long-term use     apixaban    Atrial fibrillation     CKD (chronic kidney disease)     Congestive heart failure     COPD (chronic obstructive pulmonary disease)     Coronary artery disease     Diabetes mellitus     Encounter for blood transfusion     HLD (hyperlipidemia)     Hypertension     MI (myocardial infarction)     On home oxygen therapy     Pacemaker     left chest    Peripheral vascular disease     Requires assistance with activities of daily living (ADL)     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     S/P femoral-popliteal bypass surgery left    Stented coronary artery     Tobacco use     Unsteady gait        Past Surgical History:   Procedure Laterality Date    CARDIAC CATHETERIZATION      CARDIAC PACEMAKER PLACEMENT      CARDIAC SURGERY      3 vessel CABG    CARDIOVERSION N/A 9/19/2013    Performed by [facility] at [facility] MARYANN    cardioverted      CORONARY ANGIOPLASTY WITH STENT PLACEMENT      EGD (ESOPHAGOGASTRODUODENOSCOPY) N/A 6/1/2018    Performed by Ty Hickman  MD at [facility] ENDO    HEMORRHOID SURGERY      TRANSESOPHAGEAL ECHOCARDIOGRAM (ANIA) N/A 2013    Performed by [facility]ann Surgeon at [facility] MARYANN    VASCULAR SURGERY      femoral artery popliteal bypass       Review of patient's allergies indicates:  No Known Allergies    Family History     Problem Relation (Age of Onset)    Diabetes Father, Mother          Tobacco Use    Smoking status: Former Smoker     Packs/day: 1.00     Years: 60.00     Pack years: 60.00     Types: Cigarettes     Last attempt to quit: 2018     Years since quittin.6    Smokeless tobacco: Never Used   Substance and Sexual Activity    Alcohol use: No    Drug use: No    Sexual activity: Not on file       Review of Systems   Constitutional: Negative for appetite change, chills and fever.   HENT: Negative for sneezing and sore throat.    Eyes: Negative for visual disturbance.   Respiratory: Positive for shortness of breath. Negative for chest tightness.    Cardiovascular: Positive for leg swelling. Negative for chest pain.   Gastrointestinal: Negative for abdominal distention, nausea and vomiting.   Genitourinary: Positive for difficulty urinating.   Musculoskeletal: Negative for arthralgias and neck pain.   Skin: Negative for color change.   Neurological: Negative for dizziness and seizures.   Psychiatric/Behavioral: Negative for hallucinations.       Objective:     Temp:  [97.2 °F (36.2 °C)-98.3 °F (36.8 °C)] 97.6 °F (36.4 °C)  Pulse:  [] 94  Resp:  [18-26] 18  SpO2:  [90 %-100 %] 97 %  BP: (120-189)/() 174/83     Body mass index is 30.11 kg/m².    Date 19 07 - 19 0659   Shift 2697-7710 1035-4865 1390-3325 24 Hour Total   INTAKE   P.O. 240   240   Shift Total(mL/kg) 240(2.6)   240(2.6)   OUTPUT   Urine(mL/kg/hr) 125   125   Shift Total(mL/kg) 125(1.4)   125(1.4)   Weight (kg) 92.5 92.5 92.5 92.5     Bladder Scan Volume (mL): 443 mL (19 0910)    Drains          None          Physical Exam   Constitutional: He  appears well-developed and well-nourished. No distress.   HENT:   Head: Normocephalic and atraumatic.   Eyes: Conjunctivae and EOM are normal. Pupils are equal, round, and reactive to light. Right eye exhibits no discharge. Left eye exhibits no discharge.   Neck: Normal range of motion. Neck supple. No tracheal deviation present. No thyromegaly present.   Cardiovascular: Normal rate.  PMI is not displaced.    Pulmonary/Chest: Effort normal. No accessory muscle usage. No respiratory distress. He exhibits no mass, no tenderness and no crepitus.   Abdominal: Soft. He exhibits no distension and no mass. There is no hepatosplenomegaly. There is no tenderness. No hernia.   Musculoskeletal: He exhibits edema.   Neurological: No cranial nerve deficit or sensory deficit.   Skin: Skin is warm and dry. No rash noted. No erythema.     Psychiatric: He has a normal mood and affect. His speech is normal and behavior is normal. Judgment and thought content normal. His mood appears not anxious. He does not exhibit a depressed mood.       Significant Labs:    BMP:  Recent Labs   Lab 12/31/18  1545      K 4.2   *   CO2 23   BUN 22   CREATININE 1.4   CALCIUM 10.4       CBC:  Recent Labs   Lab 12/31/18  1545   WBC 8.01   HGB 11.1*   HCT 36.6*                Assessment and Plan:     Difficulty voiding    -- Difficulty noted overnight, resolved today  -- Adequate voiding this AM; recommend holding on further catheter placement at this time and attempt only if he is uncomfortable due to inability to void.  -- If repeat catheter needed, recommend using 18 [place]inese coude stearns  -- Continue flomax         VTE Risk Mitigation (From admission, onward)        Ordered     apixaban tablet 5 mg  2 times daily      12/31/18 1853     IP VTE HIGH RISK PATIENT  Once      12/31/18 1853     Place sequential compression device  Until discontinued      12/31/18 1853     Place DENIS hose  Until discontinued      12/31/18 1853          Thank  you for your consult. I will follow-up with patient. Please contact us if you have any additional questions.    Frank Ibrahim MD  Urology  [facility]

## 2019-01-01 NOTE — PLAN OF CARE
01/01/19 1010   Discharge Assessment   Assessment Type Discharge Planning Assessment   Confirmed/corrected address and phone number on facesheet? Yes   Assessment information obtained from? Patient   Communicated expected length of stay with patient/caregiver yes   Prior to hospitilization cognitive status: Alert/Oriented   Prior to hospitalization functional status: Independent;Assistive Equipment;Needs Assistance  ([employer] and other DME; spouse assists with bathing; reportedly has HH)   Current cognitive status: Alert/Oriented   Current Functional Status: Independent;Assistive Equipment;Needs Assistance  (O2; other DME; spouse assists with bathing; reportedly has HH)   Facility Arrived From: Home   Lives With spouse   Able to Return to Prior Arrangements yes   Is patient able to care for self after discharge? Yes   Who are your caregiver(s) and their phone number(s)? Jessika-spouse: [phone]   Patient's perception of discharge disposition home or selfcare;home health  (Reportedly has HH)   Readmission Within the Last 30 Days other (see comments)  (Did not know or recall any hospitalizations)   Patient currently being followed by outpatient case management? No   Patient currently receives any other outside agency services? No   Equipment Currently Used at Home oxygen;other (see comments)  (Reported that he has other DME--could not recall)   Do you have any problems affording any of your prescribed medications? No   Is the patient taking medications as prescribed? yes   Does the patient have transportation home? Yes   Transportation Anticipated public transportation   Discharge Plan B Other  (TBD)   Patient/Family in Agreement with Plan yes     Patient reported that he is independent at home with spouse who assist with bathing; mostly independent; believes he has HH; uses [facility] and has other DME but could not recall; discharge to resume HH if current    PCP: Keaton Hardy MD  [address]  "/ [place]    Extended Emergency Contact Information  Primary Emergency Contact: Jessika Ramos  Address: [address] [address] [place]  Home Phone: [phone]  Mobile Phone: [phone]  Relation: Spouse     [employer] [address] [facility]  [address] [facility] [address]  Phone: [phone] Fax: [phone]    Payor: [employer]ARE / Plan: Medxnote 65 / Product Type: Medicare Advantage /      LCSW/Patient: Discharge Planning Review   LCSW to patient's room to discuss: Help at Home and who helps the patient manage care at home   Patient identified with 2 identifiers: Name and date of birth   LCSW name and contact number written on communication board   LCSW explained role to patient   To patient's room to discuss patient managing her care at home; who will help at home with recovery   "Discharge planning begins on Admission" brochure discussed and placed in "My Health Packet" at bedside   LCSW discussed preferences for follow-up appointments; providers: if services and/or equipment is ordered   Things You are responsible for at home:  1. Getting your prescriptions filled.  2. Taking you medications as directed. DO NOT MISS ANY DOSES!  3. Going to your follow-up doctor appointments. This is important because it allows the doctor to monitor your progress and to determine if any changes need to be made to your treatment plan.   1)  Warning signs/symptoms information reviewed with and provided to patient in blue folder  2)  Discussed and reinforced patient responsibility for managing health care at home: following-through with scheduled follow-up appointments or scheduling those that could not be set.       "

## 2019-01-01 NOTE — ASSESSMENT & PLAN NOTE
Initial post void residuals>500 multiple attempts at cath placement unsuccessful - patient is voiding ok but continues to have high post void residuals - bowel regimen with enema administered and started on flomax. Urology consulted who agreed with plan and recommended discontinuing attempts at stearns placement considering patient is on chronic long term anticoagulation therapy with apixaban 2/2 afib - I agree with this opinion. His renal functions appear stable will continue to monitor closely if patient becomes uncomfortable will consider reattempt as recommended by urology with thiago  Strict I&O's  Post void residuals q12  Monitor BP closely

## 2019-01-01 NOTE — PLAN OF CARE
Problem: Fall Injury Risk  Goal: Absence of Fall and Fall-Related Injury  Outcome: Ongoing (interventions implemented as appropriate)  Intervention: Identify and Manage Contributors to Fall Injury Risk   01/01/19 0434   Manage Acute Allergic Reaction   Medication Review/Management high risk medications identified   Identify and Manage Contributors to Fall Injury Risk   Self-Care Promotion safe use of adaptive equipment encouraged     Intervention: Promote Injury-Free Environment   01/01/19 0434   Optimize [place] and Functional Mobility   Environmental Safety Modification assistive device/personal items within reach;clutter free environment maintained;lighting adjusted;mattress on floor;room organization consistent;room near unit station;mobility aid in reach   Optimize Balance and Safe Activity   Safety Promotion/Fall Prevention assistive device/personal item within reach;bed alarm set;bed alarm refused;side rails raised x 2;instructed to call staff for mobility

## 2019-01-01 NOTE — HPI
78 y.o. male with chronic a fib, HTN, HLD, CKD stage III, DM2, chronic OAC, tobacco abuse, chronic systolic heart failure, anemia, pacemaker in situ, COPD, and CAD s/p CABG presents with a complaint of SOB progressively worsening for the past 5 weeks.  His PCP directed him to the ED today.  Denies fever, chills, cough, chest pain, dizziness, syncope, N/V/D, abdominal pain, bloody or black stools, or dysuria.  Reports adherence to home treatment plan.  In the ED he was found to have elevated BNP of 958 and pulmonary edema on chest xray, troponin also mildly elevated at 0.126.  He received nebulizer treatment, IV lasix, and nitro paste with improvement.  Short runs of V tach noted with exertion on tele.  Discussed with Cardiology in ED and placed in observation for further evaluation and treatment.    He describes several months of SOB and recent acute worsening.  Also complains of LH/dizziness without LOC.  No anginal sxs.  He was noted to have episodic WCT in ER with any activity.  He states he is feeling better since admit.  Apparent difficulty with UOP, may need urology to assist with stearns placement.    Seen by Dr. Mathias 12/1/18 and me during hospitalization 5/2018:  Pt prev followed by [facility] with hx of CAD/CABG and Biotronik PPM.  Seen by Dr. Mathias during recent admission earlier this month.  Testing during that admission was neg.  He reports sob associated with nonprod cough and LE edema.  He denies med noncompliance but is currently salting his food.  He had no anginal sxs.  He reports ?compliance with eliquis and has had no BRBPR or melena.  Still anemic.  Poor historian in regards to his prior medical care.

## 2019-01-01 NOTE — NURSING
Patient complaints that he cannot urinate well the whole day. Bladder scan done - 593 mls in bladder. Informed Dr. Yip - ordered for straight cath.      Patient felt dizzy as well after using urine bottle. BP checked 155/80 manually, HR 93. Sat him on bed - dizziness gradually improved.

## 2019-01-01 NOTE — ASSESSMENT & PLAN NOTE
Patient with known heart failure and elevated PA pressures - presents after poor diet compliance with likely CHF exacerbation - has been slow to improve despite IV lasix - has urinary retension  Continuous cardiac monitoring and continue to trend CE  ASA 81 mg daily  Diuresis patient - furosemide 40 mg IVP BID  Pulse OX Q4 with vitals  Oxygen supplementation to keep sats above 89%  Continue BB/diuretic/ACEI/ASA/statin

## 2019-01-01 NOTE — NURSING TRANSFER
Nursing Transfer Note      12/31/2018     Transfer From: ED    Transfer via stretcher    Transfer with 10L via NRB O2, cardiac monitoring    Transported by HAYDEE Koehler - ED    Medicines sent: no    Chart send with patient: Yes    Notified: spouse    Patient reassessed at: 12/31/2018, 1827    Upon arrival to floor: cardiac monitor applied, patient oriented to room, call bell in reach and bed in lowest position. Patient transferred to bed with assistance by RN and PCT. Patient connected to O2 via NRB. Will continue to monitor.

## 2019-01-01 NOTE — SUBJECTIVE & OBJECTIVE
Past Medical History:   Diagnosis Date    Anemia 2018    pending blood transfusion    Anticoagulant long-term use     apixaban    Atrial fibrillation     CKD (chronic kidney disease)     Congestive heart failure     COPD (chronic obstructive pulmonary disease)     Coronary artery disease     Diabetes mellitus     Encounter for blood transfusion     HLD (hyperlipidemia)     Hypertension     MI (myocardial infarction)     On home oxygen therapy     Pacemaker     left chest    Peripheral vascular disease     Requires assistance with activities of daily living (ADL)     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     S/P femoral-popliteal bypass surgery left    Stented coronary artery     Tobacco use     Unsteady gait        Past Surgical History:   Procedure Laterality Date    CARDIAC CATHETERIZATION      CARDIAC PACEMAKER PLACEMENT      CARDIAC SURGERY      3 vessel CABG    CARDIOVERSION N/A 2013    [facility]    cardioverted      CORONARY ANGIOPLASTY WITH STENT PLACEMENT      EGD (ESOPHAGOGASTRODUODENOSCOPY) N/A 2018    [facility]    HEMORRHOID SURGERY      TRANSESOPHAGEAL ECHOCARDIOGRAM (ANIA) N/A 2013    [facility]    VASCULAR SURGERY      femoral artery popliteal bypass       Review of patient's allergies indicates:  No Known Allergies    Family History     Problem Relation (Age of Onset)    Diabetes Father, Mother          Tobacco Use    Smoking status: Former Smoker     Packs/day: 1.00     Years: 60.00     Pack years: 60.00     Types: Cigarettes     Last attempt to quit: 2018     Years since quittin.6    Smokeless tobacco: Never Used   Substance and Sexual Activity    Alcohol use: No    Drug use: No    Sexual activity: Not on file       Review of Systems   Constitutional: Negative for appetite change, chills and fever.   HENT: Negative for sneezing and sore throat.    Eyes: Negative for  visual disturbance.   Respiratory: Positive for shortness of breath. Negative for chest tightness.    Cardiovascular: Positive for leg swelling. Negative for chest pain.   Gastrointestinal: Negative for abdominal distention, nausea and vomiting.   Genitourinary: Positive for difficulty urinating.   Musculoskeletal: Negative for arthralgias and neck pain.   Skin: Negative for color change.   Neurological: Negative for dizziness and seizures.   Psychiatric/Behavioral: Negative for hallucinations.       Objective:     Temp:  [97.2 °F (36.2 °C)-98.3 °F (36.8 °C)] 97.6 °F (36.4 °C)  Pulse:  [] 94  Resp:  [18-26] 18  SpO2:  [90 %-100 %] 97 %  BP: (120-189)/() 174/83     Body mass index is 30.11 kg/m².    Date 01/01/19 0700 - 01/02/19 0659   Shift 5356-0994 2612-9897 9899-5104 24 Hour Total   INTAKE   P.O. 240   240   Shift Total(mL/kg) 240(2.6)   240(2.6)   OUTPUT   Urine(mL/kg/hr) 125   125   Shift Total(mL/kg) 125(1.4)   125(1.4)   Weight (kg) 92.5 92.5 92.5 92.5     Bladder Scan Volume (mL): 443 mL (01/01/19 0910)    Drains          None          Physical Exam   Constitutional: He appears well-developed and well-nourished. No distress.   HENT:   Head: Normocephalic and atraumatic.   Eyes: Conjunctivae and EOM are normal. Pupils are equal, round, and reactive to light. Right eye exhibits no discharge. Left eye exhibits no discharge.   Neck: Normal range of motion. Neck supple. No tracheal deviation present. No thyromegaly present.   Cardiovascular: Normal rate.  PMI is not displaced.    Pulmonary/Chest: Effort normal. No accessory muscle usage. No respiratory distress. He exhibits no mass, no tenderness and no crepitus.   Abdominal: Soft. He exhibits no distension and no mass. There is no hepatosplenomegaly. There is no tenderness. No hernia.   Musculoskeletal: He exhibits edema.   Neurological: No cranial nerve deficit or sensory deficit.   Skin: Skin is warm and dry. No rash noted. No erythema.      Psychiatric: He has a normal mood and affect. His speech is normal and behavior is normal. Judgment and thought content normal. His mood appears not anxious. He does not exhibit a depressed mood.       Significant Labs:    BMP:  Recent Labs   Lab 12/31/18  1545      K 4.2   *   CO2 23   BUN 22   CREATININE 1.4   CALCIUM 10.4       CBC:  Recent Labs   Lab 12/31/18  1545   WBC 8.01   HGB 11.1*   HCT 36.6*

## 2019-01-01 NOTE — HPI
78 y.o. male with chronic a fib, HTN, HLD, CKD stage III, DM2, chronic OAC, tobacco abuse, chronic systolic heart failure, anemia, pacemaker in situ, COPD, and CAD s/p CABG presents with a complaint of SOB progressively worsening for the past 5 weeks.  His PCP directed him to the ED today.  Denies fever, chills, cough, chest pain, dizziness, syncope, N/V/D, abdominal pain, bloody or black stools, or dysuria.  Reports adherence to home treatment plan.  In the ED he was found to have elevated BNP of 958 and pulmonary edema on chest xray, troponin also mildly elevated at 0.126.  He received nebulizer treatment, IV lasix, and nitro paste with improvement.  Short runs of V tach noted with exertion on tele.  Discussed with Cardiology in ED and placed in observation for further evaluation and treatment.

## 2019-01-01 NOTE — H&P
[facility] Medicine  History & Physical    Patient Name: Agus Ramos Jr.  MRN: [id number]  Admission Date: 12/31/2018  Attending Physician: Brenda Burger MD   Primary Care Provider: Keaton Hardy MD         Patient information was obtained from patient, past medical records and ER records.     Subjective:     Principal Problem:Acute on chronic systolic congestive heart failure    Chief Complaint:   Chief Complaint   Patient presents with    Shortness of Breath     x 5 wks, hx of CHF and COPD, wears 2L home O2        HPI: 78 y.o. male with chronic a fib, HTN, HLD, CKD stage III, DM2, chronic OAC, tobacco abuse, chronic systolic heart failure, anemia, pacemaker in situ, COPD, and CAD s/p CABG presents with a complaint of SOB progressively worsening for the past 5 weeks.  His PCP directed him to the ED today.  Denies fever, chills, cough, chest pain, dizziness, syncope, N/V/D, abdominal pain, bloody or black stools, or dysuria.  Reports adherence to home treatment plan.  In the ED he was found to have elevated BNP of 958 and pulmonary edema on chest xray, troponin also mildly elevated at 0.126.  He received nebulizer treatment, IV lasix, and nitro paste with improvement.  Short runs of V tach noted with exertion on tele.  Discussed with Cardiology in ED and placed in observation for further evaluation and treatment.    Past Medical History:   Diagnosis Date    Anemia 05/03/2018    pending blood transfusion    Anticoagulant long-term use     apixaban    Atrial fibrillation     CKD (chronic kidney disease)     Congestive heart failure     COPD (chronic obstructive pulmonary disease)     Coronary artery disease     Diabetes mellitus     Encounter for blood transfusion     HLD (hyperlipidemia)     Hypertension     MI (myocardial infarction)     On home oxygen therapy     Pacemaker     left chest    Peripheral vascular disease     Requires assistance with  activities of daily living (ADL)     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     S/P femoral-popliteal bypass surgery left    Stented coronary artery     Tobacco use     Unsteady gait        Past Surgical History:   Procedure Laterality Date    CARDIAC CATHETERIZATION      CARDIAC PACEMAKER PLACEMENT      CARDIAC SURGERY      3 vessel CABG    CARDIOVERSION N/A 9/19/2013    [facility]    cardioverted      CORONARY ANGIOPLASTY WITH STENT PLACEMENT      EGD (ESOPHAGOGASTRODUODENOSCOPY) N/A 6/1/2018    Performed by Ty H[facility]man MD at [facility] ENDO    HEMORRHOID SURGERY      TRANSESOPHAGEAL ECHOCARDIOGRAM (ANIA) N/A 9/19/2013    Performed by [facility] at [facility]    VASCULAR SURGERY      femoral artery popliteal bypass       Review of patient's allergies indicates:  No Known Allergies    No current facility-administered medications on file prior to encounter.      Current Outpatient Medications on File Prior to Encounter   Medication Sig    albuterol-ipratropium 2.5mg-0.5mg/3mL (DUO-NEB) 0.5 mg-3 mg(2.5 mg base)/3 mL nebulizer solution Take 3 mLs by nebulization every 6 (six) hours. Rescue    apixaban (ELIQUIS) 5 mg Tab Take 5 mg by mouth 2 (two) times daily.    atorvastatin (LIPITOR) 20 MG tablet Take 20 mg by mouth every evening.    diltiaZEM (CARDIZEM) 30 MG tablet Take 1 tablet (30 mg total) by mouth every 12 (twelve) hours.    ferrous gluconate (FERGON) 324 MG tablet Take 324 mg by mouth daily with breakfast.    fish oil-omega-3 fatty acids 300-1,000 mg capsule Take 2 g by mouth 2 (two) times daily.     furosemide (LASIX) 40 MG tablet     furosemide (LASIX) 80 MG tablet Take 40 mg by mouth 2 (two) times daily.     glipiZIDE (GLUCOTROL) 5 MG tablet Take 10 mg by mouth 2 (two) times daily before meals.    losartan (COZAAR) 25 MG tablet     losartan (COZAAR) 50 MG tablet Take 0.5 tablets (25 mg total) by mouth once daily.    metFORMIN (GLUCOPHAGE-XR) 500 MG 24 hr tablet     nitroGLYCERIN  (NITROSTAT) 0.4 MG SL tablet Place 0.4 mg under the tongue every 5 (five) minutes as needed.    polyethylene glycol (GLYCOLAX) 17 gram PwPk Take by mouth as needed.      Family History     Problem Relation (Age of Onset)    Diabetes Father, Mother        Tobacco Use    Smoking status: Former Smoker     Packs/day: 1.00     Years: 60.00     Pack years: 60.00     Types: Cigarettes     Last attempt to quit: 2018     Years since quittin.6    Smokeless tobacco: Never Used   Substance and Sexual Activity    Alcohol use: No    Drug use: No    Sexual activity: Not on file     Review of Systems   Constitutional: Negative for chills and fever.   Eyes: Negative for photophobia and visual disturbance.   Respiratory: Positive for shortness of breath. Negative for cough.    Cardiovascular: Positive for leg swelling. Negative for chest pain and palpitations.   Gastrointestinal: Negative for abdominal pain, diarrhea, nausea and vomiting.   Genitourinary: Negative for frequency, hematuria and urgency.   Skin: Negative for pallor, rash and wound.   Neurological: Negative for light-headedness and headaches.   Psychiatric/Behavioral: Negative for confusion and decreased concentration.     Objective:     Vital Signs (Most Recent):  Temp: 97.3 °F (36.3 °C) (18)  Pulse: 103 (18)  Resp: (!) 22 (18)  BP: (!) 174/82 (18)  SpO2: 98 % (18) Vital Signs (24h Range):  Temp:  [97.3 °F (36.3 °C)-98.3 °F (36.8 °C)] 97.3 °F (36.3 °C)  Pulse:  [] 103  Resp:  [20-26] 22  SpO2:  [90 %-100 %] 98 %  BP: (133-182)/() 174/82     Weight: 93 kg (205 lb 0.4 oz)  Body mass index is 30.28 kg/m².    Physical Exam   Constitutional: He is oriented to person, place, and time. He appears well-developed and well-nourished. He has a sickly appearance. No distress.   HENT:   Head: Normocephalic and atraumatic.   Right Ear: External ear normal.   Left Ear: External ear normal.   Nose: Nose  normal.   Mouth/Throat: Oropharynx is clear and moist.   Eyes: Conjunctivae and EOM are normal. Pupils are equal, round, and reactive to light.   Neck: Normal range of motion. Neck supple.   Cardiovascular: Normal rate and intact distal pulses. An irregularly irregular rhythm present. Exam reveals distant heart sounds.   Pulmonary/Chest: Accessory muscle usage present. No respiratory distress. He has decreased breath sounds. He has no wheezes. He has no rales.   Abdominal: Soft. Bowel sounds are normal. He exhibits no distension. There is no tenderness.   No palpable hepatomegaly or splenomegaly   Musculoskeletal: Normal range of motion. He exhibits edema. He exhibits no tenderness.   Neurological: He is alert and oriented to person, place, and time.   Skin: Skin is warm and dry.   Psychiatric: He has a normal mood and affect. Thought content normal.   Nursing note and vitals reviewed.        CRANIAL NERVES     CN III, IV, VI   Pupils are equal, round, and reactive to light.  Extraocular motions are normal.        Significant Labs: All pertinent labs within the past 24 hours have been reviewed.    Significant Imaging: I have reviewed all pertinent imaging results/findings within the past 24 hours.    Assessment/Plan:     * Acute on chronic systolic congestive heart failure    Elevated BNP, evidence of edema on exam and chest xray, troponin also mildly elevated consistent with prior CHF exacerbations.  Continue diuresis with IV lasix and afterload reduction as tolerated, monitor on tele, I/O's, daily weights.     Chronic respiratory failure with hypoxia    Continue home supplemental oxygen 2L NC     Centrilobular emphysema    Recently started on Breo, continue nebs     Pacemaker    Biotronic      Iron deficiency anemia    Patient H/H stable and consistent with baseline. No evidence acute bleeding or indication for transfusion at this time.      Chronic anticoagulation    Continue apixaban     Type 2 diabetes  mellitus, controlled, with renal complications    Last HgbA1c   Lab Results   Component Value Date    HGBA1C 5.1 11/30/2018     Hold oral antihyperglycemics while inpatient  PRN sliding scale insulin  ACHS glucose monitoring   ADA diet     CKD Stage 3    Stable, at baseline, maintain euvolemic state, strict I/O's, monitor renal function and electrolytes, avoid nephrotoxic agents.     Essential hypertension    Well controlled, continue home medications and monitor blood pressure, adjust as needed.     Chronic atrial fibrillation    Continue diltiazem and apixaban, monitor on tele       VTE Risk Mitigation (From admission, onward)        Ordered     apixaban tablet 5 mg  2 times daily      12/31/18 1853     IP VTE HIGH RISK PATIENT  Once      12/31/18 1853     Place sequential compression device  Until discontinued      12/31/18 1853     Place DENIS hose  Until discontinued      12/31/18 1853        Giovani Churchill Jr., APRN, AGAB[facility]-BC  Hospitalist - Department of Hospital Medicine  [facility] [address] [place] [address]  Office #: [phone]; Pager #: [phone]

## 2019-01-01 NOTE — ASSESSMENT & PLAN NOTE
Patient with known heart failure and elevated PA pressures - presents after poor diet compliance with likely CHF exacerbation - has been slow to improve despite IV lasix - has urinary retension  Continuous cardiac monitoring and continue to trend CE  ASA 81 mg daily  Diuresis patient - furosemide 40 mg IVP BID  Pulse OX Q4 with vitals  Oxygen supplementation to keep sats above 89%  Add BB - coreg/ IV diuretic/continue home ARB &statin  Lipid panel and TSH for completeness  UA with next void  He refused CPAP over night,he has been consulted.  HR is elevated ,increased coreg,  Monitor UOP with  bladder scan.  Consulted PT,OT   check bladder scan.

## 2019-01-01 NOTE — ASSESSMENT & PLAN NOTE
-- Difficulty noted overnight, resolved today  -- Adequate voiding this AM; recommend holding on further catheter placement at this time and attempt only if he is uncomfortable due to inability to void.  -- If repeat catheter needed, recommend using 18 Armenian coude stearns  -- Continue flomax

## 2019-01-01 NOTE — HOSPITAL COURSE
Patient with history of CHF,admits to poor compliance to diet over the holidays - He has LVEF 45% and PA pressure 85.79 - continued 2-3+ pitting edema; activity intolerance, dyspnea with minimal exertion and at rest, and although he uses home oxygen sat 2L he is requiring 3.5L to keep sats above 85%. His sat drops to 82% on his usual 2L at rest. Additionally, the patient has been having low urinary output despite IV lasix 40 mg BID 2/2 urinary retension. Multiple attempts at catheter placement ordered by ED were unsuccessful overnight - Ordered to discontinue attempts at catheter placement - bowel regimen and added Flomax to patient's medication regimen. Urology was consulted and agrees with decreasing attempts at catheter placement unless the patient becomes uncomfortable due to inability to void - fluid restriction, daily weight, continue IV lasix - bowel regimen with mineral oil enemal - strict I&O's - supplemental oxygen at 3.5L  He refused CPAP over night,he has been consulted.  HR was elevated ,increased coreg,improved.  Monitored UOP with  bladder scan with improvement,did not required stearns,  Consulted PT,OT   Leg swelling did not improved .elevatde legs,leg swelling gradually is improved.  His symptoms much improved,he was stable on NC O 2.PT,IT was following.  Patient has been discharged home with HH, and follow up with PCP in next few days,CHF education has been done.

## 2019-01-01 NOTE — CONSULTS
[facility]  Cardiology  Consult Note    Patient Name: Agus Ramos Jr.  MRN: [id number]  Admission Date: 12/31/2018  Hospital Length of Stay: 0 days  Code Status: Full Code   Attending Provider: Brenda Burgre MD   Consulting Provider: Blanco Brunson MD  Primary Care Physician: Keaton Hardy MD  Principal Problem:Acute on chronic diastolic congestive heart failure    Patient information was obtained from patient and ER records.     Inpatient consult to Cardiology  Consult performed by: Blanco Brunson MD  Consult ordered by: Nils Loyd MD  Reason for consult: CAD/CHF/NSVT        Subjective:     Chief Complaint:  SOB     HPI:   78 y.o. male with chronic a fib, HTN, HLD, CKD stage III, DM2, chronic OAC, tobacco abuse, chronic systolic heart failure, anemia, pacemaker in situ, COPD, and CAD s/p CABG presents with a complaint of SOB progressively worsening for the past 5 weeks.  His PCP directed him to the ED today.  Denies fever, chills, cough, chest pain, dizziness, syncope, N/V/D, abdominal pain, bloody or black stools, or dysuria.  Reports adherence to home treatment plan.  In the ED he was found to have elevated BNP of 958 and pulmonary edema on chest xray, troponin also mildly elevated at 0.126.  He received nebulizer treatment, IV lasix, and nitro paste with improvement.  Short runs of V tach noted with exertion on tele.  Discussed with Cardiology in ED and placed in observation for further evaluation and treatment.    He describes several months of SOB and recent acute worsening.  Also complains of LH/dizziness without LOC.  No anginal sxs.  He was noted to have episodic WCT in ER with any activity.  He states he is feeling better since admit.  Apparent difficulty with UOP, may need urology to assist with stearns placement.    Seen by Dr. Mathias 12/1/18 and me during hospitalization 5/2018:  Pt prev followed by [facility] with hx of CAD/CABG and Biotronik PPM.  Seen by   [facility] during recent admission earlier this month.  Testing during that admission was neg.  He reports sob associated with nonprod cough and LE edema.  He denies med noncompliance but is currently salting his food.  He had no anginal sxs.  He reports ?compliance with eliquis and has had no BRBPR or melena.  Still anemic.  Poor historian in regards to his prior medical care.        Past Medical History:   Diagnosis Date    Anemia 05/03/2018    pending blood transfusion    Anticoagulant long-term use     apixaban    Atrial fibrillation     CKD (chronic kidney disease)     Congestive heart failure     COPD (chronic obstructive pulmonary disease)     Coronary artery disease     Diabetes mellitus     Encounter for blood transfusion     HLD (hyperlipidemia)     Hypertension     MI (myocardial infarction)     On home oxygen therapy     Pacemaker     left chest    Peripheral vascular disease     Requires assistance with activities of daily living (ADL)     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     S/P femoral-popliteal bypass surgery left    Stented coronary artery     Tobacco use     Unsteady gait        Past Surgical History:   Procedure Laterality Date    CARDIAC CATHETERIZATION      CARDIAC PACEMAKER PLACEMENT      CARDIAC SURGERY      3 vessel CABG    CARDIOVERSION N/A 9/19/2013    Performed by Maryann Surgeon at [facility] MARYANN    cardioverted      CORONARY ANGIOPLASTY WITH STENT PLACEMENT      EGD (ESOPHAGOGASTRODUODENOSCOPY) N/A 6/1/2018    Performed by Ty Hickman MD at [facility] ENDO    HEMORRHOID SURGERY      TRANSESOPHAGEAL ECHOCARDIOGRAM (ANIA) N/A 9/19/2013    Performed by [facility]ann Surgeon at [facility]    VASCULAR SURGERY      femoral artery popliteal bypass       Review of patient's allergies indicates:  No Known Allergies    No current facility-administered medications on file prior to encounter.      Current Outpatient Medications on File Prior to Encounter   Medication Sig    albuterol-ipratropium 2.5mg-0.5mg/3mL  (DUO-NEB) 0.5 mg-3 mg(2.5 mg base)/3 mL nebulizer solution Take 3 mLs by nebulization every 6 (six) hours. Rescue    apixaban (ELIQUIS) 5 mg Tab Take 5 mg by mouth 2 (two) times daily.    atorvastatin (LIPITOR) 20 MG tablet Take 20 mg by mouth every evening.    diltiaZEM (CARDIZEM) 30 MG tablet Take 1 tablet (30 mg total) by mouth every 12 (twelve) hours.    ferrous gluconate (FERGON) 324 MG tablet Take 324 mg by mouth daily with breakfast.    fish oil-omega-3 fatty acids 300-1,000 mg capsule Take 2 g by mouth 2 (two) times daily.     furosemide (LASIX) 40 MG tablet     furosemide (LASIX) 80 MG tablet Take 40 mg by mouth 2 (two) times daily.     glipiZIDE (GLUCOTROL) 5 MG tablet Take 10 mg by mouth 2 (two) times daily before meals.    losartan (COZAAR) 25 MG tablet     losartan (COZAAR) 50 MG tablet Take 0.5 tablets (25 mg total) by mouth once daily.    metFORMIN (GLUCOPHAGE-XR) 500 MG 24 hr tablet     nitroGLYCERIN (NITROSTAT) 0.4 MG SL tablet Place 0.4 mg under the tongue every 5 (five) minutes as needed.    polyethylene glycol (GLYCOLAX) 17 gram PwPk Take by mouth as needed.      Family History     Problem Relation (Age of Onset)    Diabetes Father, Mother        Tobacco Use    Smoking status: Former Smoker     Packs/day: 1.00     Years: 60.00     Pack years: 60.00     Types: Cigarettes     Last attempt to quit: 2018     Years since quittin.6    Smokeless tobacco: Never Used   Substance and Sexual Activity    Alcohol use: No    Drug use: No    Sexual activity: Not on file     Review of Systems   Constitution: Negative for chills, diaphoresis, fever, weakness and malaise/fatigue.   HENT: Negative for nosebleeds.    Eyes: Negative for blurred vision and double vision.   Cardiovascular: Positive for leg swelling. Negative for chest pain, claudication, cyanosis, dyspnea on exertion, orthopnea, palpitations, paroxysmal nocturnal dyspnea and syncope.   Respiratory: Positive for shortness of  breath. Negative for cough and wheezing.    Skin: Negative for dry skin and poor wound healing.   Musculoskeletal: Negative for back pain, joint swelling and myalgias.   Gastrointestinal: Negative for abdominal pain, nausea and vomiting.   Genitourinary: Negative for hematuria.   Neurological: Positive for dizziness. Negative for headaches, numbness and seizures.   Psychiatric/Behavioral: Negative for altered mental status and depression.     Objective:     Vital Signs (Most Recent):  Temp: 97.9 °F (36.6 °C) (01/01/19 0727)  Pulse: 80 (01/01/19 0756)  Resp: 18 (01/01/19 0756)  BP: (!) 174/83 (01/01/19 0727)  SpO2: 95 % (01/01/19 0756) Vital Signs (24h Range):  Temp:  [97.2 °F (36.2 °C)-98.3 °F (36.8 °C)] 97.9 °F (36.6 °C)  Pulse:  [] 80  Resp:  [18-26] 18  SpO2:  [90 %-100 %] 95 %  BP: (120-189)/() 174/83     Weight: 92.5 kg (203 lb 14.8 oz)  Body mass index is 30.11 kg/m².    SpO2: 95 %  O2 Device (Oxygen Therapy): nasal cannula      Intake/Output Summary (Last 24 hours) at 1/1/2019 0850  Last data filed at 1/1/2019 0847  Gross per 24 hour   Intake --   Output 395 ml   Net -395 ml       Lines/Drains/Airways     Peripheral Intravenous Line                 Peripheral IV - Single Lumen 12/31/18 1544 Right Hand less than 1 day                Physical Exam   Constitutional: He is oriented to person, place, and time. He appears well-developed and well-nourished.   HENT:   Head: Normocephalic and atraumatic.   Eyes: Conjunctivae and EOM are normal. Pupils are equal, round, and reactive to light. No scleral icterus.   Neck: Normal range of motion. Neck supple. No JVD present. Carotid bruit is not present. No tracheal deviation present. No thyromegaly present.   Cardiovascular: S1 normal and S2 normal. An irregularly irregular rhythm present. Tachycardia present. Exam reveals distant heart sounds. Exam reveals no gallop and no friction rub.   No murmur heard.  Pulmonary/Chest: Effort normal. No respiratory  distress. He has no wheezes. He has rales. He exhibits no tenderness.   Abdominal: Soft. He exhibits no distension.   obese   Musculoskeletal: Normal range of motion. He exhibits edema.   Neurological: He is alert and oriented to person, place, and time. He has normal strength. No cranial nerve deficit.   Skin: Skin is warm and dry. No rash noted.   Psychiatric: He has a normal mood and affect. His behavior is normal.       Current Medications:   albuterol-ipratropium  3 mL Nebulization Q6H    apixaban  5 mg Oral BID    atorvastatin  20 mg Oral QHS    diltiaZEM  30 mg Oral Q12H    ferrous gluconate  324 mg Oral Daily with breakfast    furosemide  40 mg Intravenous BID    losartan  25 mg Oral Daily    polyethylene glycol  17 g Oral Daily       acetaminophen, hydrALAZINE, prochlorperazine    Laboratory:  CBC:  Recent Labs   Lab 08/29/18  1013 11/30/18  1234 12/01/18  0515 12/02/18  0536 12/31/18  1545   WHITE BLOOD CELL COUNT 5.18 8.34 5.63 4.25 8.01   HEMOGLOBIN 10.4 L 10.5 L 10.3 L 10.3 L 11.1 L   HEMATOCRIT 35.4 L 34.2 L 32.4 L 32.8 L 36.6 L   PLATELETS 311 348 369 H 362 H 350       CHEMISTRIES:  Recent Labs   Lab 05/04/18  0613  05/27/18  1330  05/29/18  0410  08/29/18  1013 11/30/18  1234 12/01/18  0515 12/02/18  0535 12/31/18  1545 12/31/18  2131   GLUCOSE 84   < > 216 H   < > 137 H  137 H   < > 79 133 H 205 H 185 H 89  --    SODIUM 142   < > 142   < > 139  139   < > 142 141 139 141 143  --    POTASSIUM 5.0   < > 4.5   < > 4.0  4.0   < > 4.0 4.2 4.6 4.4 4.2  --    BUN BLD 34 H   < > 15   < > 18  18   < > 16 27 H 33 H 41 H 22  --    CREATININE 1.4   < > 1.2   < > 1.1  1.1   < > 1.3 1.5 H 1.5 H 1.6 H 1.4  --    EGFR IF  55 A   < > >60   < > >60  >60   < > 60 50 A 50 A 46 A 54 A  --    EGFR IF NON- 47 A   < > 57 A   < > >60  >60   < > 52 A 43 A 43 A 40 A 47 A  --    CALCIUM 10.7 H   < > 10.8 H   < > 10.9 H  10.9 H   < > 11.5 H 11.2 H 10.9 H 10.5 10.4  --    MAGNESIUM  2.2  --  1.9  --  2.1  --   --   --   --   --   --  2.1    < > = values in this interval not displayed.       CARDIAC BIOMARKERS:  Recent Labs   Lab 11/30/18  1803 12/01/18  0001 12/31/18  1545 12/31/18  2131 01/01/19  0346   TROPONIN I 0.100 H 0.107 H 0.126 H 0.113 H 0.136 H       COAGS:  Recent Labs   Lab 05/03/18  1351 05/23/18  1010 05/27/18  1330 07/09/18  1410 11/30/18  1234   INR 1.2 1.0 1.0 1.1 1.1       LIPIDS/LFTS:  Recent Labs   Lab 09/25/16  0607  06/21/18  0916 07/09/18  1410 07/10/18  0607 08/29/18  1013 11/30/18  1234 12/31/18  1545   CHOLESTEROL 85 L  --   --   --  103 L  --   --   --    TRIGLYCERIDES 115  --   --   --  70  --   --   --    HDL 30 L  --   --   --  37 L  --   --   --    LDL CHOLESTEROL 32.0 L  --   --   --  52.0 L  --   --   --    NON-HDL CHOLESTEROL 55  --   --   --  66  --   --   --    AST 73 H   < > 20 18  --  13 18 20   ALT 44   < > 19 18  --  7 L 10 15    < > = values in this interval not displayed.       BNP:  Recent Labs   Lab 05/27/18  1330 06/01/18  1042 07/09/18  1410 11/30/18  1234 12/31/18  1545    H 333 H 553 H 661 H 958 H       TSH:  Recent Labs   Lab 07/09/18  2343   TSH 1.108       Free T4:  Recent Labs   Lab 07/09/18  2343   FREE T4 1.06       Diagnostic Results:  ECG (personally reviewed tracings):  12/31/18 1457 AF 98, RBBB, PVC, inflat TW abnl ?isch, similar to 12/2/18    Chest X-Ray (personally reviewed image(s)): 12/31/18 CHF, L PPM (2 lead), sternotomy    Echo: 12/1/18  Mildly decreased left ventricular systolic function. The estimated ejection fraction is 45%  Concentric left ventricular hypertrophy.  Left ventricular diastolic dysfunction.  Mildly reduced right ventricular systolic function.  Severe left atrial enlargement.  Moderate atrial enlargement.  Mild aortic regurgitation.  Mild-to-moderate mitral regurgitation.  Moderate tricuspid regurgitation.  The estimated PA systolic pressure is 85.79 mm Hg  Pulmonary hypertension present.    Stress Test: L  MPI 5/4/18  Nuclear Quantitative Functional Analysis:   Quantitative figures are not accurate.   Visually estimated LV function is low normal.   Impression: ABNORMAL MYOCARDIAL PERFUSION  1. The perfusion scan is free of evidence for myocardial ischemia.   2. There is mild intensity fixed defect in the apical wall of the left ventricle, consistent with myocardial injury, and moderate intensity fixed defect in the inferolateral wall of the left ventricle, consistent with myocardial injury.   3. Resting wall motion is physiologic.   4. Visually estimated LV function is low normal.   5. The ventricular volumes are normal at rest and stress.   6. The extracardiac distribution of radioactivity is normal.       Assessment and Plan:     * Acute on chronic diastolic congestive heart failure    Pt has HFpEF (EF 45% by recent echo)  Known CAD s/p CABG, MPI 5/2018 neg for ischemia  Trop elev c/w prior admissions/CHF exac, current presentation does not seem c/w ACS  NSVT noted  Suggest continued IV lasix diuresis, goal net neg 1-1.5L daily, may need setarns  Stop dilt  Start coreg 3.125mg bid  Titrate losartan 50mg qd  If continued NSVT despite adequate rx of CHF, pt will need diag cath     Coronary artery disease involving coronary bypass graft of native heart without angina pectoris    Cont Statin/ARB  Add coreg  Start ASA 81mg qd     NSVT (nonsustained ventricular tachycardia)    As above  Start BBl  Rx CHF  If persistent episodes, will need [facility]     Pacemaker    Biotronik     Essential hypertension    Stop dilt  Start coreg and titrate losartan     Chronic atrial fibrillation    Chronic  Cont eliquis  Plan rate control strategy     Type 2 diabetes mellitus, controlled, with renal complications    Per IM     CKD Stage 3    Per IM  Creat stable     Chronic anticoagulation    As above         VTE Risk Mitigation (From admission, onward)        Ordered     apixaban tablet 5 mg  2 times daily      12/31/18 6583     IP VTE HIGH RISK  PATIENT  Once      12/31/18 1853     Place sequential compression device  Until discontinued      12/31/18 1853     Place DENIS hose  Until discontinued      12/31/18 1853          Thank you for your consult. I will follow-up with patient. Please contact us if you have any additional questions.    Blanco Brunson MD  Cardiology   [facility]

## 2019-01-01 NOTE — ASSESSMENT & PLAN NOTE
Last HgbA1c   Lab Results   Component Value Date    HGBA1C 5.1 11/30/2018     Hold oral antihyperglycemics while inpatient  PRN sliding scale insulin  ACHS glucose monitoring   ADA diet

## 2019-01-01 NOTE — SUBJECTIVE & OBJECTIVE
Interval History: slow to improve; patient denies being at baseline - 2-3+pitting edema and urinary retension    Review of Systems   Constitutional: Positive for activity change, appetite change and fatigue. Negative for chills, diaphoresis and fever.   HENT: Negative for congestion, hearing loss, sore throat, tinnitus and trouble swallowing.    Eyes: Negative for photophobia, discharge, itching and visual disturbance.   Respiratory: Positive for shortness of breath. Negative for apnea, cough, wheezing and stridor.         Dusky nailbeds; obvious WOB   Cardiovascular: Positive for leg swelling. Negative for chest pain and palpitations.   Gastrointestinal: Negative for abdominal distention, abdominal pain, blood in stool, constipation, diarrhea and nausea.   Endocrine: Negative for polydipsia, polyphagia and polyuria.   Genitourinary: Positive for difficulty urinating, hematuria and penile pain. Negative for dysuria, flank pain and frequency.   Musculoskeletal: Positive for joint swelling. Negative for arthralgias and neck stiffness.   Skin: Positive for pallor. Negative for color change, rash and wound.   Neurological: Positive for dizziness and weakness. Negative for tremors, seizures, light-headedness, numbness and headaches.   Hematological: Negative for adenopathy.   Psychiatric/Behavioral: Negative for hallucinations and self-injury.     Objective:     Vital Signs (Most Recent):  Temp: 97.6 °F (36.4 °C) (01/01/19 1133)  Pulse: 94 (01/01/19 1133)  Resp: 18 (01/01/19 1133)  BP: (!) 167/94 (01/01/19 1133)  SpO2: 97 % (01/01/19 1133) Vital Signs (24h Range):  Temp:  [97.2 °F (36.2 °C)-98.3 °F (36.8 °C)] 97.6 °F (36.4 °C)  Pulse:  [] 94  Resp:  [18-26] 18  SpO2:  [90 %-100 %] 97 %  BP: (120-189)/() 167/94     Weight: 92.5 kg (203 lb 14.8 oz)  Body mass index is 30.11 kg/m².    Intake/Output Summary (Last 24 hours) at 1/1/2019 1427  Last data filed at 1/1/2019 1240  Gross per 24 hour   Intake 240 ml    Output 520 ml   Net -280 ml      Physical Exam   Constitutional: He appears well-developed and well-nourished. He is cooperative.   HENT:   Head: Normocephalic and atraumatic.   Eyes: Conjunctivae, EOM and lids are normal. Pupils are equal, round, and reactive to light.   Neck: Normal range of motion and full passive range of motion without pain. Neck supple. JVD present. No edema present. No thyroid mass present.   Cardiovascular: Normal rate, S1 normal, S2 normal and intact distal pulses.   No murmur heard.  Pulmonary/Chest: He is in respiratory distress. He has rales.   Abdominal: Soft. Bowel sounds are normal. He exhibits no distension and no abdominal bruit. There is no splenomegaly or hepatomegaly. There is no tenderness. There is no CVA tenderness.   Genitourinary: Penile tenderness present.   Genitourinary Comments: Urinary retension   Musculoskeletal: Normal range of motion. He exhibits edema.   Lymphadenopathy:     He has no cervical adenopathy.     He has no axillary adenopathy.   Neurological: He is alert. He has normal reflexes. He displays no tremor. He displays no seizure activity.   Skin: Skin is warm, dry and intact.   Psychiatric: He has a normal mood and affect. His speech is normal. Thought content normal. Cognition and memory are normal.       Significant Labs:   CBC:   Recent Labs   Lab 12/31/18  1545   WBC 8.01   HGB 11.1*   HCT 36.6*        CMP:   Recent Labs   Lab 12/31/18  1545      K 4.2   *   CO2 23   GLU 89   BUN 22   CREATININE 1.4   CALCIUM 10.4   PROT 5.4*   ALBUMIN 2.3*   BILITOT 0.7   ALKPHOS 138*   AST 20   ALT 15   ANIONGAP 9   EGFRNONAA 47*     Cardiac Markers:   Recent Labs   Lab 12/31/18  1545   *     Lipase: No results for input(s): LIPASE in the last 48 hours.  Troponin:   Recent Labs   Lab 12/31/18  2131 01/01/19  0346 01/01/19  0830   TROPONINI 0.113* 0.136* 0.104*     TSH:   Recent Labs   Lab 07/09/18  2343   TSH 1.108     Significant Imaging:    Imaging Results          X-Ray Chest AP Portable (Final result)  Result time 12/31/18 15:32:46    Final result by Macarena Woodard MD (12/31/18 15:32:46)                 Impression:      There is no evidence acute pulmonary disease.  The chest appears similar to November 30, 2018.      Electronically signed by: Macarena Woodard MD  Date:    12/31/2018  Time:    15:32             Narrative:    EXAMINATION:  XR CHEST AP PORTABLE    CLINICAL HISTORY:  Shortness of breath    TECHNIQUE:  Single frontal view of the chest was performed.    COMPARISON:  None    FINDINGS:  There is a left-sided pacer defibrillator with leads in the right atrium and right ventricle.  The patient is status post sternotomy.  The cardiac silhouette is enlarged.  There is no pneumothorax.

## 2019-01-01 NOTE — NURSING
Tried In and out cathter but failed. 2x Nurse tried. Dr. Yip informed - awaiting further order.      Dr. Yip ordered for Urology consult.

## 2019-01-01 NOTE — SUBJECTIVE & OBJECTIVE
Past Medical History:   Diagnosis Date    Anemia 05/03/2018    pending blood transfusion    Anticoagulant long-term use     apixaban    Atrial fibrillation     CKD (chronic kidney disease)     Congestive heart failure     COPD (chronic obstructive pulmonary disease)     Coronary artery disease     Diabetes mellitus     Encounter for blood transfusion     HLD (hyperlipidemia)     Hypertension     MI (myocardial infarction)     On home oxygen therapy     Pacemaker     left chest    Peripheral vascular disease     Requires assistance with activities of daily living (ADL)     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     S/P femoral-popliteal bypass surgery left    Stented coronary artery     Tobacco use     Unsteady gait        Past Surgical History:   Procedure Laterality Date    CARDIAC CATHETERIZATION      CARDIAC PACEMAKER PLACEMENT      CARDIAC SURGERY      3 vessel CABG    CARDIOVERSION N/A 9/19/2013    [facility]formed [facility]    cardioverted      CORONARY ANGIOPLASTY WITH STENT PLACEMENT      EGD (ESOPHAGOGASTRODUODENOSCOPY) N/A 6/1/2018    [facility]    HEMORRHOID SURGERY      TRANSESOPHAGEAL ECHOCARDIOGRAM (ANIA) N/A 9/19/2013    Performed by [facility] at [facility]    VASCULAR SURGERY      femoral artery popliteal bypass       Review of patient's allergies indicates:  No Known Allergies    No current facility-administered medications on file prior to encounter.      Current Outpatient Medications on File Prior to Encounter   Medication Sig    albuterol-ipratropium 2.5mg-0.5mg/3mL (DUO-NEB) 0.5 mg-3 mg(2.5 mg base)/3 mL nebulizer solution Take 3 mLs by nebulization every 6 (six) hours. Rescue    apixaban (ELIQUIS) 5 mg Tab Take 5 mg by mouth 2 (two) times daily.    atorvastatin (LIPITOR) 20 MG tablet Take 20 mg by mouth every evening.    diltiaZEM (CARDIZEM) 30 MG tablet Take 1 tablet (30 mg total) by mouth every 12 (twelve) hours.    ferrous gluconate  (FERGON) 324 MG tablet Take 324 mg by mouth daily with breakfast.    fish oil-omega-3 fatty acids 300-1,000 mg capsule Take 2 g by mouth 2 (two) times daily.     furosemide (LASIX) 40 MG tablet     furosemide (LASIX) 80 MG tablet Take 40 mg by mouth 2 (two) times daily.     glipiZIDE (GLUCOTROL) 5 MG tablet Take 10 mg by mouth 2 (two) times daily before meals.    losartan (COZAAR) 25 MG tablet     losartan (COZAAR) 50 MG tablet Take 0.5 tablets (25 mg total) by mouth once daily.    metFORMIN (GLUCOPHAGE-XR) 500 MG 24 hr tablet     nitroGLYCERIN (NITROSTAT) 0.4 MG SL tablet Place 0.4 mg under the tongue every 5 (five) minutes as needed.    polyethylene glycol (GLYCOLAX) 17 gram PwPk Take by mouth as needed.      Family History     Problem Relation (Age of Onset)    Diabetes Father, Mother        Tobacco Use    Smoking status: Former Smoker     Packs/day: 1.00     Years: 60.00     Pack years: 60.00     Types: Cigarettes     Last attempt to quit: 2018     Years since quittin.6    Smokeless tobacco: Never Used   Substance and Sexual Activity    Alcohol use: No    Drug use: No    Sexual activity: Not on file     Review of Systems   Constitution: Negative for chills, diaphoresis, fever, weakness and malaise/fatigue.   HENT: Negative for nosebleeds.    Eyes: Negative for blurred vision and double vision.   Cardiovascular: Positive for leg swelling. Negative for chest pain, claudication, cyanosis, dyspnea on exertion, orthopnea, palpitations, paroxysmal nocturnal dyspnea and syncope.   Respiratory: Positive for shortness of breath. Negative for cough and wheezing.    Skin: Negative for dry skin and poor wound healing.   Musculoskeletal: Negative for back pain, joint swelling and myalgias.   Gastrointestinal: Negative for abdominal pain, nausea and vomiting.   Genitourinary: Negative for hematuria.   Neurological: Positive for dizziness. Negative for headaches, numbness and seizures.    Psychiatric/Behavioral: Negative for altered mental status and depression.     Objective:     Vital Signs (Most Recent):  Temp: 97.9 °F (36.6 °C) (01/01/19 0727)  Pulse: 80 (01/01/19 0756)  Resp: 18 (01/01/19 0756)  BP: (!) 174/83 (01/01/19 0727)  SpO2: 95 % (01/01/19 0756) Vital Signs (24h Range):  Temp:  [97.2 °F (36.2 °C)-98.3 °F (36.8 °C)] 97.9 °F (36.6 °C)  Pulse:  [] 80  Resp:  [18-26] 18  SpO2:  [90 %-100 %] 95 %  BP: (120-189)/() 174/83     Weight: 92.5 kg (203 lb 14.8 oz)  Body mass index is 30.11 kg/m².    SpO2: 95 %  O2 Device (Oxygen Therapy): nasal cannula      Intake/Output Summary (Last 24 hours) at 1/1/2019 0850  Last data filed at 1/1/2019 0847  Gross per 24 hour   Intake --   Output 395 ml   Net -395 ml       Lines/Drains/Airways     Peripheral Intravenous Line                 Peripheral IV - Single Lumen 12/31/18 1544 Right Hand less than 1 day                Physical Exam   Constitutional: He is oriented to person, place, and time. He appears well-developed and well-nourished.   HENT:   Head: Normocephalic and atraumatic.   Eyes: Conjunctivae and EOM are normal. Pupils are equal, round, and reactive to light. No scleral icterus.   Neck: Normal range of motion. Neck supple. No JVD present. Carotid bruit is not present. No tracheal deviation present. No thyromegaly present.   Cardiovascular: S1 normal and S2 normal. An irregularly irregular rhythm present. Tachycardia present. Exam reveals distant heart sounds. Exam reveals no gallop and no friction rub.   No murmur heard.  Pulmonary/Chest: Effort normal. No respiratory distress. He has no wheezes. He has rales. He exhibits no tenderness.   Abdominal: Soft. He exhibits no distension.   obese   Musculoskeletal: Normal range of motion. He exhibits edema.   Neurological: He is alert and oriented to person, place, and time. He has normal strength. No cranial nerve deficit.   Skin: Skin is warm and dry. No rash noted.   Psychiatric: He has  a normal mood and affect. His behavior is normal.       Current Medications:   albuterol-ipratropium  3 mL Nebulization Q6H    apixaban  5 mg Oral BID    atorvastatin  20 mg Oral QHS    diltiaZEM  30 mg Oral Q12H    ferrous gluconate  324 mg Oral Daily with breakfast    furosemide  40 mg Intravenous BID    losartan  25 mg Oral Daily    polyethylene glycol  17 g Oral Daily       acetaminophen, hydrALAZINE, prochlorperazine    Laboratory:  CBC:  Recent Labs   Lab 08/29/18  1013 11/30/18  1234 12/01/18  0515 12/02/18  0536 12/31/18  1545   WHITE BLOOD CELL COUNT 5.18 8.34 5.63 4.25 8.01   HEMOGLOBIN 10.4 L 10.5 L 10.3 L 10.3 L 11.1 L   HEMATOCRIT 35.4 L 34.2 L 32.4 L 32.8 L 36.6 L   PLATELETS 311 348 369 H 362 H 350       CHEMISTRIES:  Recent Labs   Lab 05/04/18  0613  05/27/18  1330  05/29/18  0410  08/29/18  1013 11/30/18  1234 12/01/18  0515 12/02/18  0535 12/31/18  1545 12/31/18  2131   GLUCOSE 84   < > 216 H   < > 137 H  137 H   < > 79 133 H 205 H 185 H 89  --    SODIUM 142   < > 142   < > 139  139   < > 142 141 139 141 143  --    POTASSIUM 5.0   < > 4.5   < > 4.0  4.0   < > 4.0 4.2 4.6 4.4 4.2  --    BUN BLD 34 H   < > 15   < > 18  18   < > 16 27 H 33 H 41 H 22  --    CREATININE 1.4   < > 1.2   < > 1.1  1.1   < > 1.3 1.5 H 1.5 H 1.6 H 1.4  --    EGFR IF  55 A   < > >60   < > >60  >60   < > 60 50 A 50 A 46 A 54 A  --    EGFR IF NON- 47 A   < > 57 A   < > >60  >60   < > 52 A 43 A 43 A 40 A 47 A  --    CALCIUM 10.7 H   < > 10.8 H   < > 10.9 H  10.9 H   < > 11.5 H 11.2 H 10.9 H 10.5 10.4  --    MAGNESIUM 2.2  --  1.9  --  2.1  --   --   --   --   --   --  2.1    < > = values in this interval not displayed.       CARDIAC BIOMARKERS:  Recent Labs   Lab 11/30/18  1803 12/01/18  0001 12/31/18  1545 12/31/18  2131 01/01/19  0346   TROPONIN I 0.100 H 0.107 H 0.126 H 0.113 H 0.136 H       COAGS:  Recent Labs   Lab 05/03/18  1351 05/23/18  1010 05/27/18  1330 07/09/18  1410  11/30/18  1234   INR 1.2 1.0 1.0 1.1 1.1       LIPIDS/LFTS:  Recent Labs   Lab 09/25/16  0607  06/21/18  0916 07/09/18  1410 07/10/18  0607 08/29/18  1013 11/30/18  1234 12/31/18  1545   CHOLESTEROL 85 L  --   --   --  103 L  --   --   --    TRIGLYCERIDES 115  --   --   --  70  --   --   --    HDL 30 L  --   --   --  37 L  --   --   --    LDL CHOLESTEROL 32.0 L  --   --   --  52.0 L  --   --   --    NON-HDL CHOLESTEROL 55  --   --   --  66  --   --   --    AST 73 H   < > 20 18  --  13 18 20   ALT 44   < > 19 18  --  7 L 10 15    < > = values in this interval not displayed.       BNP:  Recent Labs   Lab 05/27/18  1330 06/01/18  1042 07/09/18  1410 11/30/18  1234 12/31/18  1545    H 333 H 553 H 661 H 958 H       TSH:  Recent Labs   Lab 07/09/18  2343   TSH 1.108       Free T4:  Recent Labs   Lab 07/09/18  2343   FREE T4 1.06       Diagnostic Results:  ECG (personally reviewed tracings):  12/31/18 1457 AF 98, RBBB, PVC, inflat TW abnl ?isch, similar to 12/2/18    Chest X-Ray (personally reviewed image(s)): 12/31/18 CHF, L PPM (2 lead), sternotomy    Echo: 12/1/18  Mildly decreased left ventricular systolic function. The estimated ejection fraction is 45%  Concentric left ventricular hypertrophy.  Left ventricular diastolic dysfunction.  Mildly reduced right ventricular systolic function.  Severe left atrial enlargement.  Moderate atrial enlargement.  Mild aortic regurgitation.  Mild-to-moderate mitral regurgitation.  Moderate tricuspid regurgitation.  The estimated PA systolic pressure is 85.79 mm Hg  Pulmonary hypertension present.    Stress Test: L MPI 5/4/18  Nuclear Quantitative Functional Analysis:   Quantitative figures are not accurate.   Visually estimated LV function is low normal.   Impression: ABNORMAL MYOCARDIAL PERFUSION  1. The perfusion scan is free of evidence for myocardial ischemia.   2. There is mild intensity fixed defect in the apical wall of the left ventricle, consistent with myocardial  injury, and moderate intensity fixed defect in the inferolateral wall of the left ventricle, consistent with myocardial injury.   3. Resting wall motion is physiologic.   4. Visually estimated LV function is low normal.   5. The ventricular volumes are normal at rest and stress.   6. The extracardiac distribution of radioactivity is normal.

## 2019-01-01 NOTE — NURSING
Patient tried to use urine bottle, felt short of breath after. Requested to sit on his chair, breathing has improved on 2L oxygen.

## 2019-01-01 NOTE — NURSING
Received report from HAYDEE Aguayo. Alert and oriented. Short of breath on exertion - received patient with Non re breathing mask at 10L. Re checked Oxygen - 99% on non rebreathing mask . Change Oxgyen to nasal cannula - O2 sat 95%. IV line intact over right hand. Call bell given on his reach. Instructed to call for assistance all the time. No skid socks on. Bed on lowest position. Safety maintained.

## 2019-01-01 NOTE — NURSING
Patient tried urinating - passed 10mls only. No pain over suprapubic area, no discomfort either when palpated.     Repeat troponin 0.136 - dr. Yip Informed. Awaiting for order.

## 2019-01-01 NOTE — HPI
77 y/o male admitted for CHF with complaint overnight of difficulty voiding. RN attempted I&O cath but unable to pass. He has since been able to void and no longer has discomfort. Bladder scan at 0900 was 400; he voided 200cc in urinal shortly thereafter. He has no associated c/o now. He is on flomax.

## 2019-01-02 LAB
ANION GAP SERPL CALC-SCNC: 9 MMOL/L
BUN SERPL-MCNC: 24 MG/DL
CALCIUM SERPL-MCNC: 10.7 MG/DL
CHLORIDE SERPL-SCNC: 110 MMOL/L
CO2 SERPL-SCNC: 24 MMOL/L
CREAT SERPL-MCNC: 1.4 MG/DL
EST. GFR  (AFRICAN AMERICAN): 54 ML/MIN/1.73 M^2
EST. GFR  (NON AFRICAN AMERICAN): 47 ML/MIN/1.73 M^2
GLUCOSE SERPL-MCNC: 102 MG/DL
POTASSIUM SERPL-SCNC: 4.7 MMOL/L
SODIUM SERPL-SCNC: 143 MMOL/L

## 2019-01-02 PROCEDURE — 25000003 PHARM REV CODE 250: Performed by: INTERNAL MEDICINE

## 2019-01-02 PROCEDURE — 94761 N-INVAS EAR/PLS OXIMETRY MLT: CPT

## 2019-01-02 PROCEDURE — 99233 PR SUBSEQUENT HOSPITAL CARE,LEVL III: ICD-10-PCS | Mod: ,,, | Performed by: INTERNAL MEDICINE

## 2019-01-02 PROCEDURE — G8979 MOBILITY GOAL STATUS: HCPCS | Mod: CJ

## 2019-01-02 PROCEDURE — 97161 PT EVAL LOW COMPLEX 20 MIN: CPT

## 2019-01-02 PROCEDURE — 80048 BASIC METABOLIC PNL TOTAL CA: CPT

## 2019-01-02 PROCEDURE — G8987 SELF CARE CURRENT STATUS: HCPCS | Mod: CJ

## 2019-01-02 PROCEDURE — 94640 AIRWAY INHALATION TREATMENT: CPT

## 2019-01-02 PROCEDURE — G8988 SELF CARE GOAL STATUS: HCPCS | Mod: CI

## 2019-01-02 PROCEDURE — 25000242 PHARM REV CODE 250 ALT 637 W/ HCPCS: Performed by: EMERGENCY MEDICINE

## 2019-01-02 PROCEDURE — G8978 MOBILITY CURRENT STATUS: HCPCS | Mod: CK

## 2019-01-02 PROCEDURE — 25000003 PHARM REV CODE 250: Performed by: EMERGENCY MEDICINE

## 2019-01-02 PROCEDURE — 27000190 HC CPAP FULL FACE MASK W/VALVE

## 2019-01-02 PROCEDURE — 99232 PR SUBSEQUENT HOSPITAL CARE,LEVL II: ICD-10-PCS | Mod: ,,, | Performed by: UROLOGY

## 2019-01-02 PROCEDURE — 25000003 PHARM REV CODE 250: Performed by: HOSPITALIST

## 2019-01-02 PROCEDURE — 99232 SBSQ HOSP IP/OBS MODERATE 35: CPT | Mod: ,,, | Performed by: UROLOGY

## 2019-01-02 PROCEDURE — 63600175 PHARM REV CODE 636 W HCPCS: Performed by: EMERGENCY MEDICINE

## 2019-01-02 PROCEDURE — 99900035 HC TECH TIME PER 15 MIN (STAT)

## 2019-01-02 PROCEDURE — 21400001 HC TELEMETRY ROOM

## 2019-01-02 PROCEDURE — 97165 OT EVAL LOW COMPLEX 30 MIN: CPT

## 2019-01-02 PROCEDURE — 63600175 PHARM REV CODE 636 W HCPCS: Performed by: INTERNAL MEDICINE

## 2019-01-02 PROCEDURE — 94660 CPAP INITIATION&MGMT: CPT

## 2019-01-02 PROCEDURE — 97116 GAIT TRAINING THERAPY: CPT

## 2019-01-02 PROCEDURE — 25000003 PHARM REV CODE 250: Performed by: NURSE PRACTITIONER

## 2019-01-02 PROCEDURE — 27000221 HC OXYGEN, UP TO 24 HOURS

## 2019-01-02 PROCEDURE — 99233 SBSQ HOSP IP/OBS HIGH 50: CPT | Mod: ,,, | Performed by: INTERNAL MEDICINE

## 2019-01-02 PROCEDURE — 36415 COLL VENOUS BLD VENIPUNCTURE: CPT

## 2019-01-02 RX ORDER — CARVEDILOL 6.25 MG/1
6.25 TABLET ORAL 2 TIMES DAILY
Status: DISCONTINUED | OUTPATIENT
Start: 2019-01-02 | End: 2019-01-02

## 2019-01-02 RX ORDER — FUROSEMIDE 10 MG/ML
80 INJECTION INTRAMUSCULAR; INTRAVENOUS 2 TIMES DAILY
Status: DISCONTINUED | OUTPATIENT
Start: 2019-01-02 | End: 2019-01-03

## 2019-01-02 RX ORDER — CARVEDILOL 6.25 MG/1
25 TABLET ORAL 2 TIMES DAILY
Status: DISCONTINUED | OUTPATIENT
Start: 2019-01-02 | End: 2019-01-08 | Stop reason: HOSPADM

## 2019-01-02 RX ADMIN — CARVEDILOL 25 MG: 6.25 TABLET, FILM COATED ORAL at 08:01

## 2019-01-02 RX ADMIN — IPRATROPIUM BROMIDE AND ALBUTEROL SULFATE 3 ML: .5; 3 SOLUTION RESPIRATORY (INHALATION) at 12:01

## 2019-01-02 RX ADMIN — ATORVASTATIN CALCIUM 20 MG: 10 TABLET, FILM COATED ORAL at 08:01

## 2019-01-02 RX ADMIN — ASPIRIN 81 MG: 81 TABLET, COATED ORAL at 09:01

## 2019-01-02 RX ADMIN — TAMSULOSIN HYDROCHLORIDE 0.4 MG: 0.4 CAPSULE ORAL at 09:01

## 2019-01-02 RX ADMIN — CARVEDILOL 25 MG: 6.25 TABLET, FILM COATED ORAL at 09:01

## 2019-01-02 RX ADMIN — APIXABAN 5 MG: 5 TABLET, FILM COATED ORAL at 08:01

## 2019-01-02 RX ADMIN — IPRATROPIUM BROMIDE AND ALBUTEROL SULFATE 3 ML: .5; 3 SOLUTION RESPIRATORY (INHALATION) at 08:01

## 2019-01-02 RX ADMIN — FUROSEMIDE 80 MG: 10 INJECTION, SOLUTION INTRAVENOUS at 05:01

## 2019-01-02 RX ADMIN — LOSARTAN POTASSIUM 50 MG: 25 TABLET, FILM COATED ORAL at 09:01

## 2019-01-02 RX ADMIN — FERROUS GLUCONATE TAB 324 MG (37.5 MG ELEMENTAL IRON) 324 MG: 324 (37.5 FE) TAB at 09:01

## 2019-01-02 RX ADMIN — IPRATROPIUM BROMIDE AND ALBUTEROL SULFATE 3 ML: .5; 3 SOLUTION RESPIRATORY (INHALATION) at 07:01

## 2019-01-02 RX ADMIN — APIXABAN 5 MG: 5 TABLET, FILM COATED ORAL at 09:01

## 2019-01-02 RX ADMIN — FUROSEMIDE 80 MG: 10 INJECTION, SOLUTION INTRAVENOUS at 09:01

## 2019-01-02 RX ADMIN — IPRATROPIUM BROMIDE AND ALBUTEROL SULFATE 3 ML: .5; 3 SOLUTION RESPIRATORY (INHALATION) at 01:01

## 2019-01-02 RX ADMIN — HYDRALAZINE HYDROCHLORIDE 10 MG: 20 INJECTION INTRAMUSCULAR; INTRAVENOUS at 08:01

## 2019-01-02 NOTE — HOSPITAL COURSE
12/31/18: adm with CHF and concern for ?WCT (seems to be AF with BBB)  1/1/19: issues with urinary retention, stearns apparently not needed  1-3:  No acute events

## 2019-01-02 NOTE — PLAN OF CARE
Problem: Fall Injury Risk  Goal: Absence of Fall and Fall-Related Injury    Intervention: Identify and Manage Contributors to Fall Injury Risk   01/01/19 2015   Manage Acute Allergic Reaction   Medication Review/Management medications reviewed   Identify and Manage Contributors to Fall Injury Risk   Self-Care Promotion independence encouraged;BADL personal objects within reach;BADL personal routines maintained     Intervention: Promote Injury-Free Environment   01/01/19 2015 01/02/19 1600   Optimize [place] and Functional Mobility   Environmental Safety Modification assistive device/personal items within reach;clutter free environment maintained;lighting adjusted;room near unit station --    Optimize Balance and Safe Activity   Safety Promotion/Fall Prevention --  bed alarm set;Fall Risk reviewed with patient/family;instructed to call staff for mobility

## 2019-01-02 NOTE — ASSESSMENT & PLAN NOTE
Pt has HFpEF (EF 45% by recent echo)  Known CAD s/p CABG, MPI 5/2018 neg for ischemia  Trop elev c/w prior admissions/CHF exac, current presentation does not seem c/w ACS  No further NSVT  Suggest continued IV lasix diuresis, goal net neg 1-1.5L daily  Suggest stearns placement if pt continues to have difficulty with monitoring UOP  Inc coreg 6.25mg bid  Cont losartan 50mg qd  If continued NSVT despite adequate rx of CHF, pt will need diag cath

## 2019-01-02 NOTE — SUBJECTIVE & OBJECTIVE
Past Medical History:   Diagnosis Date    Anemia 05/03/2018    pending blood transfusion    Anticoagulant long-term use     apixaban    Atrial fibrillation     CKD (chronic kidney disease)     Congestive heart failure     COPD (chronic obstructive pulmonary disease)     Coronary artery disease     Diabetes mellitus     Encounter for blood transfusion     HLD (hyperlipidemia)     Hypertension     MI (myocardial infarction)     On home oxygen therapy     Pacemaker     left chest    Peripheral vascular disease     Requires assistance with activities of daily living (ADL)     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     S/P femoral-popliteal bypass surgery left    Stented coronary artery     Tobacco use     Unsteady gait        Past Surgical History:   Procedure Laterality Date    CARDIAC CATHETERIZATION      CARDIAC PACEMAKER PLACEMENT      CARDIAC SURGERY      3 vessel CABG    CARDIOVERSION N/A 9/19/2013    Performed [facility] at [facility]    cardioverted      CORONARY ANGIOPLASTY WITH STENT PLACEMENT      EGD (ESOPHAGOGASTRODUODENOSCOPY) N/A 6/1/2018    Performed by Ty Hickman MD at [facility] ENDO    HEMORRHOID SURGERY      TRANSESOPHAGEAL ECHOCARDIOGRAM (ANIA) N/A 9/19/2013    Performed by Maryann Surgeon at [facility]    VASCULAR SURGERY      femoral artery popliteal bypass       Review of patient's allergies indicates:  No Known Allergies    No current facility-administered medications on file prior to encounter.      Current Outpatient Medications on File Prior to Encounter   Medication Sig    albuterol-ipratropium 2.5mg-0.5mg/3mL (DUO-NEB) 0.5 mg-3 mg(2.5 mg base)/3 mL nebulizer solution Take 3 mLs by nebulization every 6 (six) hours. Rescue    apixaban (ELIQUIS) 5 mg Tab Take 5 mg by mouth 2 (two) times daily.    atorvastatin (LIPITOR) 20 MG tablet Take 20 mg by mouth every evening.    diltiaZEM (CARDIZEM) 30 MG tablet Take 1 tablet (30 mg total) by mouth every 12 (twelve) hours.    ferrous gluconate  (FERGON) 324 MG tablet Take 324 mg by mouth daily with breakfast.    fish oil-omega-3 fatty acids 300-1,000 mg capsule Take 2 g by mouth 2 (two) times daily.     furosemide (LASIX) 40 MG tablet     furosemide (LASIX) 80 MG tablet Take 40 mg by mouth 2 (two) times daily.     glipiZIDE (GLUCOTROL) 5 MG tablet Take 10 mg by mouth 2 (two) times daily before meals.    losartan (COZAAR) 25 MG tablet     losartan (COZAAR) 50 MG tablet Take 0.5 tablets (25 mg total) by mouth once daily.    metFORMIN (GLUCOPHAGE-XR) 500 MG 24 hr tablet     nitroGLYCERIN (NITROSTAT) 0.4 MG SL tablet Place 0.4 mg under the tongue every 5 (five) minutes as needed.    polyethylene glycol (GLYCOLAX) 17 gram PwPk Take by mouth as needed.      Family History     Problem Relation (Age of Onset)    Diabetes Father, Mother        Tobacco Use    Smoking status: Former Smoker     Packs/day: 1.00     Years: 60.00     Pack years: 60.00     Types: Cigarettes     Last attempt to quit: 2018     Years since quittin.6    Smokeless tobacco: Never Used   Substance and Sexual Activity    Alcohol use: No    Drug use: No    Sexual activity: Not on file     Review of Systems   Constitution: Negative for chills, diaphoresis, fever, weakness and malaise/fatigue.   HENT: Negative for nosebleeds.    Eyes: Negative for blurred vision and double vision.   Cardiovascular: Positive for leg swelling. Negative for chest pain, claudication, cyanosis, dyspnea on exertion, orthopnea, palpitations, paroxysmal nocturnal dyspnea and syncope.   Respiratory: Positive for shortness of breath. Negative for cough and wheezing.    Skin: Negative for dry skin and poor wound healing.   Musculoskeletal: Negative for back pain, joint swelling and myalgias.   Gastrointestinal: Negative for abdominal pain, nausea and vomiting.   Genitourinary: Negative for hematuria.   Neurological: Positive for dizziness. Negative for headaches, numbness and seizures.    Psychiatric/Behavioral: Negative for altered mental status and depression.     Objective:     Vital Signs (Most Recent):  Temp: 98 °F (36.7 °C) (01/02/19 0521)  Pulse: (!) 125 (01/02/19 0521)  Resp: (!) 22 (01/02/19 0521)  BP: (!) 133/96 (01/02/19 0521)  SpO2: (!) 92 % (01/02/19 0521) Vital Signs (24h Range):  Temp:  [97.5 °F (36.4 °C)-98 °F (36.7 °C)] 98 °F (36.7 °C)  Pulse:  [] 125  Resp:  [18-24] 22  SpO2:  [92 %-99 %] 92 %  BP: (133-176)/(70-96) 133/96     Weight: 92.5 kg (203 lb 14.8 oz)  Body mass index is 30.11 kg/m².    SpO2: (!) 92 %  O2 Device (Oxygen Therapy): nasal cannula      Intake/Output Summary (Last 24 hours) at 1/2/2019 0630  Last data filed at 1/1/2019 1915  Gross per 24 hour   Intake 480 ml   Output 600 ml   Net -120 ml       Lines/Drains/Airways     Peripheral Intravenous Line                 Peripheral IV - Single Lumen 12/31/18 1544 Right Hand 1 day                Physical Exam   Constitutional: He is oriented to person, place, and time. He appears well-developed and well-nourished.   HENT:   Head: Normocephalic and atraumatic.   Eyes: Conjunctivae and EOM are normal. Pupils are equal, round, and reactive to light. No scleral icterus.   Neck: Normal range of motion. Neck supple. No JVD present. Carotid bruit is not present. No tracheal deviation present. No thyromegaly present.   Cardiovascular: S1 normal and S2 normal. An irregularly irregular rhythm present. Tachycardia present. Exam reveals distant heart sounds. Exam reveals no gallop and no friction rub.   No murmur heard.  Pulmonary/Chest: Effort normal. No respiratory distress. He has no wheezes. He has rales. He exhibits no tenderness.   Abdominal: Soft. He exhibits no distension.   obese   Musculoskeletal: Normal range of motion. He exhibits edema.   Neurological: He is alert and oriented to person, place, and time. He has normal strength. No cranial nerve deficit.   Skin: Skin is warm and dry. No rash noted.   Psychiatric: He  has a normal mood and affect. His behavior is normal.       Current Medications:   albuterol-ipratropium  3 mL Nebulization Q6H    apixaban  5 mg Oral BID    aspirin  81 mg Oral Daily    atorvastatin  20 mg Oral QHS    carvedilol  3.125 mg Oral BID    ferrous gluconate  324 mg Oral Daily with breakfast    furosemide  40 mg Intravenous BID    losartan  50 mg Oral Daily    polyethylene glycol  17 g Oral Daily    tamsulosin  0.4 mg Oral Daily       acetaminophen, hydrALAZINE, prochlorperazine    Laboratory:  CBC:  Recent Labs   Lab 08/29/18  1013 11/30/18  1234 12/01/18  0515 12/02/18  0536 12/31/18  1545   WHITE BLOOD CELL COUNT 5.18 8.34 5.63 4.25 8.01   HEMOGLOBIN 10.4 L 10.5 L 10.3 L 10.3 L 11.1 L   HEMATOCRIT 35.4 L 34.2 L 32.4 L 32.8 L 36.6 L   PLATELETS 311 348 369 H 362 H 350       CHEMISTRIES:  Recent Labs   Lab 05/04/18  0613  05/27/18  1330  05/29/18  0410  08/29/18  1013 11/30/18  1234 12/01/18  0515 12/02/18  0535 12/31/18  1545 12/31/18  2131   GLUCOSE 84   < > 216 H   < > 137 H  137 H   < > 79 133 H 205 H 185 H 89  --    SODIUM 142   < > 142   < > 139  139   < > 142 141 139 141 143  --    POTASSIUM 5.0   < > 4.5   < > 4.0  4.0   < > 4.0 4.2 4.6 4.4 4.2  --    BUN BLD 34 H   < > 15   < > 18  18   < > 16 27 H 33 H 41 H 22  --    CREATININE 1.4   < > 1.2   < > 1.1  1.1   < > 1.3 1.5 H 1.5 H 1.6 H 1.4  --    EGFR IF  55 A   < > >60   < > >60  >60   < > 60 50 A 50 A 46 A 54 A  --    EGFR IF NON- 47 A   < > 57 A   < > >60  >60   < > 52 A 43 A 43 A 40 A 47 A  --    CALCIUM 10.7 H   < > 10.8 H   < > 10.9 H  10.9 H   < > 11.5 H 11.2 H 10.9 H 10.5 10.4  --    MAGNESIUM 2.2  --  1.9  --  2.1  --   --   --   --   --   --  2.1    < > = values in this interval not displayed.       CARDIAC BIOMARKERS:  Recent Labs   Lab 12/01/18  0001 12/31/18  1545 12/31/18  2131 01/01/19  0346 01/01/19  0830   TROPONIN I 0.107 H 0.126 H 0.113 H 0.136 H 0.104 H       COAGS:  Recent  Labs   Lab 05/03/18  1351 05/23/18  1010 05/27/18  1330 07/09/18  1410 11/30/18  1234   INR 1.2 1.0 1.0 1.1 1.1       LIPIDS/LFTS:  Recent Labs   Lab 09/25/16  0607  06/21/18  0916 07/09/18  1410 07/10/18  0607 08/29/18  1013 11/30/18  1234 12/31/18  1545   CHOLESTEROL 85 L  --   --   --  103 L  --   --   --    TRIGLYCERIDES 115  --   --   --  70  --   --   --    HDL 30 L  --   --   --  37 L  --   --   --    LDL CHOLESTEROL 32.0 L  --   --   --  52.0 L  --   --   --    NON-HDL CHOLESTEROL 55  --   --   --  66  --   --   --    AST 73 H   < > 20 18  --  13 18 20   ALT 44   < > 19 18  --  7 L 10 15    < > = values in this interval not displayed.       BNP:  Recent Labs   Lab 05/27/18  1330 06/01/18  1042 07/09/18  1410 11/30/18  1234 12/31/18  1545    H 333 H 553 H 661 H 958 H       TSH:  Recent Labs   Lab 07/09/18  2343   TSH 1.108       Free T4:  Recent Labs   Lab 07/09/18  2343   FREE T4 1.06       Diagnostic Results:  ECG (personally reviewed tracings):  12/31/18 1457 AF 98, RBBB, PVC, inflat TW abnl ?isch, similar to 12/2/18    Chest X-Ray (personally reviewed image(s)): 12/31/18 CHF, L PPM (2 lead), sternotomy    Echo: 12/1/18  Mildly decreased left ventricular systolic function. The estimated ejection fraction is 45%  Concentric left ventricular hypertrophy.  Left ventricular diastolic dysfunction.  Mildly reduced right ventricular systolic function.  Severe left atrial enlargement.  Moderate atrial enlargement.  Mild aortic regurgitation.  Mild-to-moderate mitral regurgitation.  Moderate tricuspid regurgitation.  The estimated PA systolic pressure is 85.79 mm Hg  Pulmonary hypertension present.    Stress Test: L MPI 5/4/18  Nuclear Quantitative Functional Analysis:   Quantitative figures are not accurate.   Visually estimated LV function is low normal.   Impression: ABNORMAL MYOCARDIAL PERFUSION  1. The perfusion scan is free of evidence for myocardial ischemia.   2. There is mild intensity fixed defect  in the apical wall of the left ventricle, consistent with myocardial injury, and moderate intensity fixed defect in the inferolateral wall of the left ventricle, consistent with myocardial injury.   3. Resting wall motion is physiologic.   4. Visually estimated LV function is low normal.   5. The ventricular volumes are normal at rest and stress.   6. The extracardiac distribution of radioactivity is normal.

## 2019-01-02 NOTE — PT/OT/SLP EVAL
Occupational Therapy   Evaluation    Name: Agus Ramos Jr.  MRN: [id number]  Admitting Diagnosis:  Acute on chronic combined systolic and diastolic congestive heart failure      Recommendations:     Discharge Recommendations: (home with family and HH OT)  Discharge Equipment Recommendations:  none  Barriers to discharge:  None    History:     Occupational Profile:  Living Environment: The patient lives with his spouse. The patient was resistant to providing a PLOF or info re: home  Previous level of function: Per chart, the patient receivied assist to bathe from his spouse. The patient amb using a RW.  Roles and Routines: did not provide info  Equipment Used at Home:  oxygen, walker, rolling  Assistance upon Discharge: spouse    Past Medical History:   Diagnosis Date    Anemia 05/03/2018    pending blood transfusion    Anticoagulant long-term use     apixaban    Atrial fibrillation     CKD (chronic kidney disease)     Congestive heart failure     COPD (chronic obstructive pulmonary disease)     Coronary artery disease     Diabetes mellitus     Encounter for blood transfusion     HLD (hyperlipidemia)     Hypertension     MI (myocardial infarction)     On home oxygen therapy     Pacemaker     left chest    Peripheral vascular disease     Requires assistance with activities of daily living (ADL)     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     S/P femoral-popliteal bypass surgery left    Stented coronary artery     Tobacco use     Unsteady gait        Past Surgical History:   Procedure Laterality Date    CARDIAC CATHETERIZATION      CARDIAC PACEMAKER PLACEMENT      CARDIAC SURGERY      3 vessel CABG    CARDIOVERSION N/A 9/19/2013    Performed by Maryann Surgeon at [facility] MARYANN    cardioverted      CORONARY ANGIOPLASTY WITH STENT PLACEMENT      EGD (ESOPHAGOGASTRODUODENOSCOPY) N/A 6/1/2018    Performed by Ty Hickman MD at [facility] ENDO    HEMORRHOID SURGERY      TRANSESOPHAGEAL ECHOCARDIOGRAM (ANIA) N/A 9/19/2013     "Performed by Maryann Surgeon at [facility] MARYANN    VASCULAR SURGERY      femoral artery popliteal bypass       Subjective     Chief Complaint: c/o "it's too early for me to have therapy"  Patient/Family Comments/goals: none stated     Pain/Comfort:  · Pain Rating 1: 0/10    Patients cultural, spiritual, Zoroastrian conflicts given the current situation: no    Objective:     Communicated with: nurse prior to session.  Patient found in bed with:  bed alarm, telemetry, peripheral IV, oxygen upon OT entry to room.  The patient was agreeable to OT eval with maximum encouragement and education.    General Precautions: Standard, fall, respiratory   Orthopedic Precautions:N/A   Braces: N/A     Occupational Performance:    Bed Mobility:    · Patient completed Scooting/Bridging with contact guard assistance  · Patient completed Supine to Sit with stand by assistance  · Patient completed Sit to Supine with stand by assistance    Functional Mobility/Transfers:  · Patient completed Sit <> Stand Transfer with minimum assistance  with  rolling walker  (1x from the bed, 1x from the chair)  · Functional Mobility: The patient amb using a RW with CGA ~25'x2.    Activities of Daily Living:  · Upper Body Dressing: contact guard assistance    · Lower Body Dressing: dependence      Cognitive/Visual Perceptual:  Cognitive/Psychosocial Skills:     -       Oriented to: Person and Place   -       Follows Commands/attention:Follows one-step commands  -       Communication: clear/fluent  -       Memory: No Deficits noted  -       Safety awareness/insight to disability: impaired   -       Mood/Affect/Coping skills/emotional control: slightly agitated with encouragement needed to participate in tasks    Physical Exam:  Balance: -       fair+  Postural examination/scapula alignment:    -       Rounded shoulders  -       Forward head  Skin integrity: Visible skin intact  Edema:  BUE  Upper Extremity Range of Motion:     -       Right Upper Extremity: WFL  - " "      Left Upper Extremity: WFL  Upper Extremity Strength: -       Right Upper Extremity: WFL  -       Left Upper Extremity: WFL    AMPAC 6 Click ADL:  AMPAC Total Score: 20    Treatment & Education:  The patient participated in OT eval. The patient was educated re: OT role and POC. The patient was given pillows to elevate BUE for edema control.  Education:    Patient left HOB elevated with all lines intact, call button in reach, bed alarm on, nurse notified and BUE elevated on pillows    Assessment:     Agus Ramos Jr. is a 78 y.o. male with a medical diagnosis of Acute on chronic combined systolic and diastolic congestive heart failure.  He presents with the following performance deficits affecting function: weakness, impaired endurance, impaired self care skills, gait instability, impaired functional mobilty, impaired balance, decreased upper extremity function, decreased safety awareness, impaired cardiopulmonary response to activity, edema.      Rehab Prognosis: Fair; patient would benefit from acute skilled OT services to address these deficits and reach maximum level of function.         Clinical Decision Makin.  OT Low:  "Pt evaluation falls under low complexity for evaluation coding due to performance deficits noted in 1-3 areas as stated above and 0 co-morbities affecting current functional status. Data obtained from problem focused assessments. No modifications or assistance was required for completion of evaluation. Only brief occupational profile and history review completed."     Plan:     Patient to be seen 2 x/week to address the above listed problems via self-care/home management, therapeutic activities, therapeutic exercises  · Plan of Care Expires: 19  · Plan of Care Reviewed with: patient    This Plan of care has been discussed with the patient who was involved in its development and understands and is in agreement with the identified goals and treatment plan    GOALS: "   Multidisciplinary Problems     Occupational Therapy Goals        Problem: Occupational Therapy Goal    Goal Priority Disciplines Outcome Interventions   Occupational Therapy Goal     OT, PT/OT Ongoing (interventions implemented as appropriate)    Description:  Goals to be met by: 1/16/19      Patient will increase functional independence with ADLs by performing:    UE Dressing with Modified [place] and Supervision.  LE Dressing with Stand-by Assistance.  Grooming while standing at sink with Stand-by Assistance.  Supine to sit with Supervision.  Step transfer with Stand-by Assistance  Toilet transfer to the toilet with Stand-by Assistance.  Upper extremity exercise program x10 reps per handout, with assistance as needed.                      Time Tracking:     OT Date of Treatment: 01/02/19  OT Start Time: 1436  OT Stop Time: 1503  OT Total Time (min): 27 min    Billable Minutes:Evaluation 15 (with PT)  Total Time 27    Anna Dumont OT  1/2/2019

## 2019-01-02 NOTE — PROGRESS NOTES
[facility]  Urology  Progress Note    Patient Name: Agus Ramos Jr.  MRN: [id number]  Admission Date: 12/31/2018  Hospital Length of Stay: 1 days  Code Status: Full Code   Attending Provider: Ace Cisneros MD   Primary Care Physician: Keaton Hardy MD    Subjective:     HPI:  79 y/o male admitted for CHF with complaint overnight of difficulty voiding. RN attempted I&O cath but unable to pass. He has since been able to void and no longer has discomfort. Bladder scan at 0900 was 400; he voided 200cc in urinal shortly thereafter. He has no associated c/o now. He is on flomax.    Interval History: He is voiding better.      Review of Systems   Constitutional: Negative.  Negative for fever.   HENT: Negative.    Eyes: Negative.    Respiratory: Negative for cough, chest tightness and shortness of breath.    Cardiovascular: Negative for chest pain.   Gastrointestinal: Negative.  Negative for constipation, diarrhea and nausea.   Genitourinary: Negative for difficulty urinating.   Musculoskeletal: Negative.    Neurological: Negative.    Psychiatric/Behavioral: Negative.      Objective:     Temp:  [97.5 °F (36.4 °C)-98.2 °F (36.8 °C)] 98.2 °F (36.8 °C)  Pulse:  [] 89  Resp:  [18-24] 22  SpO2:  [92 %-99 %] 94 %  BP: (133-188)/() 150/88     Body mass index is 30.11 kg/m².       Bladder Scan Volume (mL): 0 mL (01/02/19 0820)    Drains          None          Physical Exam   Nursing note and vitals reviewed.  Constitutional: He is oriented to person, place, and time. He appears well-developed and well-nourished.   HENT:   Head: Normocephalic.   Eyes: Conjunctivae are normal.   Neck: Normal range of motion. Neck supple. No tracheal deviation present. No thyromegaly present.   Cardiovascular: Normal rate and normal heart sounds.    Pulmonary/Chest: Effort normal and breath sounds normal. No respiratory distress. He has no wheezes.   Abdominal: Soft. Bowel sounds are normal. There is no  hepatosplenomegaly. There is no tenderness. There is no rebound and no CVA tenderness. No hernia.   Musculoskeletal: Normal range of motion. He exhibits no edema or tenderness.   Lymphadenopathy:     He has no cervical adenopathy.   Neurological: He is alert and oriented to person, place, and time.   Skin: Skin is warm and dry. No rash noted. No erythema.     Psychiatric: He has a normal mood and affect. His behavior is normal. Judgment and thought content normal.       Significant Labs:    BMP:  Recent Labs   Lab 12/31/18  1545 01/02/19  0546    143   K 4.2 4.7   * 110   CO2 23 24   BUN 22 24*   CREATININE 1.4 1.4   CALCIUM 10.4 10.7*       CBC:   Recent Labs   Lab 12/31/18  1545   WBC 8.01   HGB 11.1*   HCT 36.6*              Significant Imaging:                    Assessment/Plan:     Difficulty voiding    -- Difficulty noted overnight, resolved today  -- Adequate voiding this AM; recommend holding on further catheter placement at this time and attempt only if he is uncomfortable due to inability to void.  -- If repeat catheter needed, recommend using 18 Nepali coude stearns  -- Continue flomax    Will sign off  Follow up in 3-4 weeks         VTE Risk Mitigation (From admission, onward)        Ordered     apixaban tablet 5 mg  2 times daily      12/31/18 1540          AISHA Wiggins MD  Urology  [facility]

## 2019-01-02 NOTE — PROGRESS NOTES
[facility] Medicine  Progress Note    Patient Name: Agus Ramos Jr.  MRN: [id number]  Patient Class: IP- Inpatient   Admission Date: 12/31/2018  Length of Stay: 1 days  Attending Physician: Ace Cisneros MD  Primary Care Provider: Keaton Hardy MD        Subjective:     Principal Problem:Acute on chronic combined systolic and diastolic congestive heart failure    HPI:  78 y.o. male with chronic a fib, HTN, HLD, CKD stage III, DM2, chronic OAC, tobacco abuse, chronic systolic heart failure, anemia, pacemaker in situ, COPD, and CAD s/p CABG presents with a complaint of SOB progressively worsening for the past 5 weeks.  His PCP directed him to the ED today.  Denies fever, chills, cough, chest pain, dizziness, syncope, N/V/D, abdominal pain, bloody or black stools, or dysuria.  Reports adherence to home treatment plan.  In the ED he was found to have elevated BNP of 958 and pulmonary edema on chest xray, troponin also mildly elevated at 0.126.  He received nebulizer treatment, IV lasix, and nitro paste with improvement.  Short runs of V tach noted with exertion on tele.  Discussed with Cardiology in ED and placed in observation for further evaluation and treatment.    Hospital Course:  Patient admits to poor compliance to diet over the holidays - He has LVEF 45% and PA pressure 85.79 - continued 2-3+ pitting edema; activity intolerance, dyspnea with minimal exertion and at rest, and although he uses home oxygen sat 2L he is requiring 3.5L to keep sats above 85%. His sat drops to 82% on his usual 2L at rest. Additionally, the patient has been having low urinary output despite IV lasix 40 mg BID 2/2 urinary retension. Multiple attempts at catheter placement ordered by ED were unsuccessful overnight - Ordered to discontinue attempts at catheter placement - bowel regimen and added Flomax to patient's medication regimen. Urology was consulted and agrees with decreasing attempts at  catheter placement unless the patient becomes uncomfortable due to inability to void - fluid restriction, daily weight, continue IV lasix - bowel regimen with mineral oil enemal - strict I&O's - supplemental oxygen at 3.5L  He refused CPAP over night,he has been consulted.  HR is elevated ,increased coreg,  Monitor UOP with  bladder scan.  Consulted PT,OT       Interval History: per nursing,patient is refused CPAP over night.    Review of Systems   Constitutional: Positive for activity change, appetite change and fatigue. Negative for chills, diaphoresis and fever.   HENT: Negative for congestion, hearing loss, sore throat, tinnitus and trouble swallowing.    Eyes: Negative for photophobia, discharge, itching and visual disturbance.   Respiratory: Positive for shortness of breath. Negative for apnea, cough, wheezing and stridor.         Dusky nailbeds; obvious WOB   Cardiovascular: Positive for leg swelling. Negative for chest pain and palpitations.   Gastrointestinal: Negative for abdominal distention, abdominal pain, blood in stool, constipation, diarrhea and nausea.   Endocrine: Negative for polydipsia, polyphagia and polyuria.   Genitourinary: Positive for difficulty urinating, hematuria and penile pain. Negative for dysuria, flank pain and frequency.   Musculoskeletal: Positive for joint swelling. Negative for arthralgias and neck stiffness.   Skin: Positive for pallor. Negative for color change, rash and wound.   Neurological: Positive for dizziness and weakness. Negative for tremors, seizures, light-headedness, numbness and headaches.   Hematological: Negative for adenopathy.   Psychiatric/Behavioral: Negative for hallucinations and self-injury.     Objective:     Vital Signs (Most Recent):  Temp: 98 °F (36.7 °C) (01/02/19 0521)  Pulse: (!) 125 (01/02/19 0521)  Resp: (!) 22 (01/02/19 0521)  BP: (!) 133/96 (01/02/19 0521)  SpO2: (!) 92 % (01/02/19 0521) Vital Signs (24h Range):  Temp:  [97.5 °F (36.4 °C)-98 °F  (36.7 °C)] 98 °F (36.7 °C)  Pulse:  [] 125  Resp:  [18-24] 22  SpO2:  [92 %-99 %] 92 %  BP: (133-176)/(70-96) 133/96     Weight: 92.5 kg (203 lb 14.8 oz)  Body mass index is 30.11 kg/m².    Intake/Output Summary (Last 24 hours) at 1/2/2019 0740  Last data filed at 1/1/2019 2345  Gross per 24 hour   Intake 480 ml   Output 745 ml   Net -265 ml      Physical Exam   Constitutional: He appears well-developed and well-nourished. He is cooperative.   HENT:   Head: Normocephalic and atraumatic.   Eyes: Conjunctivae, EOM and lids are normal. Pupils are equal, round, and reactive to light.   Neck: Normal range of motion and full passive range of motion without pain. Neck supple. JVD present. No edema present. No thyroid mass present.   Cardiovascular: Normal rate, S1 normal, S2 normal and intact distal pulses.   No murmur heard.  Pulmonary/Chest: He is in respiratory distress. He has rales.   Abdominal: Soft. Bowel sounds are normal. He exhibits no distension and no abdominal bruit. There is no splenomegaly or hepatomegaly. There is no tenderness. There is no CVA tenderness.   Genitourinary: Penile tenderness present.   Genitourinary Comments: Urinary retension   Musculoskeletal: Normal range of motion. He exhibits edema.   Lymphadenopathy:     He has no cervical adenopathy.     He has no axillary adenopathy.   Neurological: He is alert. He has normal reflexes. He displays no tremor. He displays no seizure activity.   Skin: Skin is warm, dry and intact.   Psychiatric: He has a normal mood and affect. His speech is normal. Thought content normal. Cognition and memory are normal.       Significant Labs:   CBC:   Recent Labs   Lab 12/31/18  1545   WBC 8.01   HGB 11.1*   HCT 36.6*        CMP:   Recent Labs   Lab 12/31/18  1545      K 4.2   *   CO2 23   GLU 89   BUN 22   CREATININE 1.4   CALCIUM 10.4   PROT 5.4*   ALBUMIN 2.3*   BILITOT 0.7   ALKPHOS 138*   AST 20   ALT 15   ANIONGAP 9   EGFRNONAA 47*      Cardiac Markers:   Recent Labs   Lab 12/31/18  1545   *     Lipase: No results for input(s): LIPASE in the last 48 hours.  Troponin:   Recent Labs   Lab 12/31/18  2131 01/01/19  0346 01/01/19  0830   TROPONINI 0.113* 0.136* 0.104*     TSH:   Recent Labs   Lab 07/09/18  2343   TSH 1.108     Significant Imaging:   Imaging Results          X-Ray Chest AP Portable (Final result)  Result time 12/31/18 15:32:46    Final result by Macarena Woodard MD (12/31/18 15:32:46)                 Impression:      There is no evidence acute pulmonary disease.  The chest appears similar to November 30, 2018.      Electronically signed by: Macarena Woodard MD  Date:    12/31/2018  Time:    15:32             Narrative:    EXAMINATION:  XR CHEST AP PORTABLE    CLINICAL HISTORY:  Shortness of breath    TECHNIQUE:  Single frontal view of the chest was performed.    COMPARISON:  None    FINDINGS:  There is a left-sided pacer defibrillator with leads in the right atrium and right ventricle.  The patient is status post sternotomy.  The cardiac silhouette is enlarged.  There is no pneumothorax.                                  Assessment/Plan:      * Acute on chronic combined systolic and diastolic congestive heart failure    Patient with known heart failure and elevated PA pressures - presents after poor diet compliance with likely CHF exacerbation - has been slow to improve despite IV lasix - has urinary retension  Continuous cardiac monitoring and continue to trend CE  ASA 81 mg daily  Diuresis patient - furosemide 40 mg IVP BID  Pulse OX Q4 with vitals  Oxygen supplementation to keep sats above 89%  Add BB - coreg/ IV diuretic/continue home ARB &statin  Lipid panel and TSH for completeness  UA with next void  He refused CPAP over night,he has been consulted.  HR is elevated ,increased coreg,  Monitor UOP with  bladder scan.  Consulted PT,OT   check bladder scan.         Chronic atrial fibrillation    Continue diltiazem and  apixaban, monitor on tele,HR is elevated,incrteased coreg.     SJ (obstructive sleep apnea)    No formal diagnosis however, suspect that the patient likely has some contributory symptoms of SJ - Elevated PA systolic pressure = 85.8 with diastolic dysfunction per 2D echo  Hypoxia in the setting of CHF ex + urinary retension  CPAP nightly to keep sats above 90%     Urinary retention    Initial post void residuals>500 multiple attempts at cath placement unsuccessful - patient is voiding ok but continues to have high post void residuals - bowel regimen with enema administered and started on flomax. Urology consulted who agreed with plan and recommended discontinuing attempts at stearns placement considering patient is on chronic long term anticoagulation therapy with apixaban 2/2 afib - I agree with this opinion. His renal functions appear stable will continue to monitor closely if patient becomes uncomfortable will consider reattempt as recommended by urology with couda  Strict I&O's  Post void residuals q 6 with balder scan.  Monitor BP closely       Difficulty voiding    monitor with bladder scan,on flomax.       NSVT (nonsustained ventricular tachycardia)    cardiology is following,oncreased BB.       Chronic respiratory failure with hypoxia    Continue home supplemental oxygen 2L NC     Centrilobular emphysema    Recently started on Breo, continue nebs     Pacemaker    Biotronic      Iron deficiency anemia    Patient H/H stable and consistent with baseline. No evidence acute bleeding or indication for transfusion at this time.      Chronic anticoagulation    Continue apixaban     Type 2 diabetes mellitus, controlled, with renal complications    Last HgbA1c   Lab Results   Component Value Date    HGBA1C 5.1 11/30/2018     Hold oral antihyperglycemics while inpatient  PRN sliding scale insulin  ACHS glucose monitoring   ADA diet     CKD Stage 3    Stable, at baseline, maintain euvolemic state, strict I/O's, monitor  renal function and electrolytes, avoid nephrotoxic agents.     Essential hypertension    Decent controlled, continue home medications and monitor blood pressure, adjust as needed.       VTE Risk Mitigation (From admission, onward)        Ordered     apixaban tablet 5 mg  2 times daily      12/31/18 0451              Ace Cisneros MD  Department of Hospital Medicine   [facility]

## 2019-01-02 NOTE — ASSESSMENT & PLAN NOTE
Initial post void residuals>500 multiple attempts at cath placement unsuccessful - patient is voiding ok but continues to have high post void residuals - bowel regimen with enema administered and started on flomax. Urology consulted who agreed with plan and recommended discontinuing attempts at stearns placement considering patient is on chronic long term anticoagulation therapy with apixaban 2/2 afib - I agree with this opinion. His renal functions appear stable will continue to monitor closely if patient becomes uncomfortable will consider reattempt as recommended by urology with thiago  Strict I&O's  Post void residuals q 6 with balder scan.  Monitor BP closely

## 2019-01-02 NOTE — PT/OT/SLP EVAL
Physical Therapy Evaluation    Patient Name:  Agus Ramos Jr.   MRN:  [id number]    Recommendations:     Discharge Recommendations:  (home health PT with assistance from spouse as needed)   Discharge Equipment Recommendations: walker, rolling   Barriers to discharge: limited endurance for functional mobility     Assessment:     Agus Ramos Jr. is a 78 y.o. male admitted with a medical diagnosis of Acute on chronic combined systolic and diastolic congestive heart failure.  He presents with the following impairments/functional limitations:  weakness, impaired endurance, impaired functional mobilty, impaired balance, decreased safety awareness, impaired cardiopulmonary response to activity.    Rehab Prognosis: Good; patient would benefit from acute skilled PT services to address these deficits and reach maximum level of function.    Recent Surgery: * No surgery found *      Plan:     During this hospitalization, patient to be seen 2 x/week to address the identified rehab impairments via gait training, therapeutic activities, therapeutic exercises and progress toward the following goals:    · Plan of Care Expires:  01/16/19    Subjective     Chief Complaint: Too early in his recovery to be doing this.   Patient/Family Comments/goals: Did not verbalize.   Pain/Comfort:  · Pain Rating 1: 0/10    Living Environment:  Patient lives at home with his spouse. Prior to admission, patients level of function was independent. Equipment used at home: oxygen. Upon discharge, patient will have assistance from spouse.    Objective:     Communicated with nurse prior to session.  Patient found right sidelying in bed with peripheral IV, bed alarm on, telemetry, oxygen upon PT entry to room.    General Precautions: Standard, fall, respiratory   Orthopedic Precautions:N/A   Braces: N/A     Exams:  · Cognitive Exam:  Patient is oriented to Person  · Gross Motor Coordination:  WFL  · Sensation:    · -       Intact  · Skin  Integrity/Edema:      · -       Skin integrity: Visible skin intact  · RLE ROM: WFL  · RLE Strength: WFL  · LLE ROM: WFL  · LLE Strength: WFL    Functional Mobility:  · Bed Mobility:     · Supine to Sit: stand by assistance  · Sit to Supine: stand by assistance  · Transfers:     · Sit to Stand:  minimum assistance with rolling walker x1 from bed and x1 from bedside chair   · Gait: 25ft x2 trials with seated rest break between trials, RW, CGA, and oxygen. Patient limited in ambulation distance due to SOB. He needed verbal cues for pursed lip breathing.     AM-PAC 6 CLICK MOBILITY  Total Score:17     Patient left right sidelying with all lines intact, call button in reach, bed alarm on and nurse Kirti notified.    GOALS:   Multidisciplinary Problems     Physical Therapy Goals        Problem: Physical Therapy Goal    Goal Priority Disciplines Outcome Goal Variances Interventions   Physical Therapy Goal     PT, PT/OT      Description:  Goals to be met by: 19    Patient will increase functional independence with mobility by performin. Sit to stand transfer with Supervision  2. Gait x200 feet with Supervision using Rolling Walker  3. Lower extremity exercise program x30 reps per handout, with supervision                      History:     Past Medical History:   Diagnosis Date    Anemia 2018    pending blood transfusion    Anticoagulant long-term use     apixaban    Atrial fibrillation     CKD (chronic kidney disease)     Congestive heart failure     COPD (chronic obstructive pulmonary disease)     Coronary artery disease     Diabetes mellitus     Encounter for blood transfusion     HLD (hyperlipidemia)     Hypertension     MI (myocardial infarction)     On home oxygen therapy     Pacemaker     left chest    Peripheral vascular disease     Requires assistance with activities of daily living (ADL)     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     S/P femoral-popliteal bypass surgery left    Stented coronary  artery     Tobacco use     Unsteady gait        Past Surgical History:   Procedure Laterality Date    CARDIAC CATHETERIZATION      CARDIAC PACEMAKER PLACEMENT      CARDIAC SURGERY      3 vessel CABG    CARDIOVERSION N/A 9/19/2013    Performed by [facility]ann Surgeon at [facility]    cardioverted      CORONARY ANGIOPLASTY WITH STENT PLACEMENT      EGD (ESOPHAGOGASTRODUODENOSCOPY) N/A 6/1/2018    Performed [facility] at [facility] ENDO    HEMORRHOID SURGERY      TRANSESOPHAGEAL ECHOCARDIOGRAM (ANIA) N/A 9/19/2013    Performed by Maryann Surgeon at [facility]    VASCULAR SURGERY      femoral artery popliteal bypass     Time Tracking:     PT Received On: 01/02/19  PT Start Time: 1436     PT Stop Time: 1503  PT Total Time (min): 27 min     Billable Minutes: Evaluation 15 and Gait Training 12      Katie Coughlin, PT  01/02/2019

## 2019-01-02 NOTE — NURSING
Bladder scan performed which read 0. Patient stated he urinated in the toilet while having a bowel movement.

## 2019-01-02 NOTE — NURSING
Bedside report received from NIRALI Richard. Patient is awake and alert. Patient appears to be in no apparent distress. Safety maintained. Will continue to monitor.

## 2019-01-02 NOTE — PLAN OF CARE
Problem: Breathing Pattern Ineffective  Goal: Effective Breathing Pattern    Intervention: Promote Improved Breathing Pattern   01/01/19 2015 01/02/19 0620 01/02/19 0748   Promote Airway Secretion Clearance   Breathing Techniques/Airway Clearance --  --  deep/controlled cough encouraged;pursed-lip breathing encouraged   Promote Anxiety Reduction   Supportive Measures active listening utilized --  --    Prevent Additional Skin Injury   Head of Bed (HOB) --  HOB at 30-45 degrees --          Comments: O2 sats monitored throughout shift.  Neuro checks performed.  Patient urged to follow fall risk precautions.

## 2019-01-02 NOTE — SUBJECTIVE & OBJECTIVE
Interval History: He is voiding better.      Review of Systems   Constitutional: Negative.  Negative for fever.   HENT: Negative.    Eyes: Negative.    Respiratory: Negative for cough, chest tightness and shortness of breath.    Cardiovascular: Negative for chest pain.   Gastrointestinal: Negative.  Negative for constipation, diarrhea and nausea.   Genitourinary: Negative for difficulty urinating.   Musculoskeletal: Negative.    Neurological: Negative.    Psychiatric/Behavioral: Negative.      Objective:     Temp:  [97.5 °F (36.4 °C)-98.2 °F (36.8 °C)] 98.2 °F (36.8 °C)  Pulse:  [] 89  Resp:  [18-24] 22  SpO2:  [92 %-99 %] 94 %  BP: (133-188)/() 150/88     Body mass index is 30.11 kg/m².       Bladder Scan Volume (mL): 0 mL (01/02/19 0820)    Drains          None          Physical Exam   Nursing note and vitals reviewed.  Constitutional: He is oriented to person, place, and time. He appears well-developed and well-nourished.   HENT:   Head: Normocephalic.   Eyes: Conjunctivae are normal.   Neck: Normal range of motion. Neck supple. No tracheal deviation present. No thyromegaly present.   Cardiovascular: Normal rate and normal heart sounds.    Pulmonary/Chest: Effort normal and breath sounds normal. No respiratory distress. He has no wheezes.   Abdominal: Soft. Bowel sounds are normal. There is no hepatosplenomegaly. There is no tenderness. There is no rebound and no CVA tenderness. No hernia.   Musculoskeletal: Normal range of motion. He exhibits no edema or tenderness.   Lymphadenopathy:     He has no cervical adenopathy.   Neurological: He is alert and oriented to person, place, and time.   Skin: Skin is warm and dry. No rash noted. No erythema.     Psychiatric: He has a normal mood and affect. His behavior is normal. Judgment and thought content normal.       Significant Labs:    BMP:  Recent Labs   Lab 12/31/18  1545 01/02/19  0546    143   K 4.2 4.7   * 110   CO2 23 24   BUN 22 24*    CREATININE 1.4 1.4   CALCIUM 10.4 10.7*       CBC:   Recent Labs   Lab 12/31/18  1545   WBC 8.01   HGB 11.1*   HCT 36.6*              Significant Imaging:

## 2019-01-02 NOTE — PLAN OF CARE
Problem: Occupational Therapy Goal  Goal: Occupational Therapy Goal  Goals to be met by: 1/16/19      Patient will increase functional independence with ADLs by performing:    UE Dressing with Modified [place] and Supervision.  LE Dressing with Stand-by Assistance.  Grooming while standing at sink with Stand-by Assistance.  Supine to sit with Supervision.  Step transfer with Stand-by Assistance  Toilet transfer to the toilet with Stand-by Assistance.  Upper extremity exercise program x10 reps per handout, with assistance as needed.    Outcome: Ongoing (interventions implemented as appropriate)  The patient will benefit from OT to address functional deficits.    Comments: The patient will benefit from  OT

## 2019-01-02 NOTE — PLAN OF CARE
Problem: Fluid Imbalance (Heart Failure)  Goal: Fluid Balance    Intervention: Monitor and Manage Fluid Balance   01/01/19 1921   Monitor and Manage Cardiac Rhythm Effects   Fluid/Electrolyte Management fluids restricted;other (see comments)  (IV lasix administered as ordered)

## 2019-01-02 NOTE — PLAN OF CARE
Problem: Physical Therapy Goal  Goal: Physical Therapy Goal  Goals to be met by: 19    Patient will increase functional independence with mobility by performin. Sit to stand transfer with Supervision  2. Gait x200 feet with Supervision using Rolling Walker  3. Lower extremity exercise program x30 reps per handout, with supervision      Patient ambulated 25ft x2 trials with seated rest break between, RW, CGA, and oxygen. He stated that it is too early in his recovery for walking so he does not want PT to come back and see him.

## 2019-01-02 NOTE — PROGRESS NOTES
[facility]  Cardiology  Progress Note    Patient Name: Agus Ramos Jr.  MRN: [id number]  Admission Date: 12/31/2018  Hospital Length of Stay: 1 days  Code Status: Full Code   Attending Physician: Ace Cisneros MD   Primary Care Physician: Keaton Hardy MD  Expected Discharge Date:   Principal Problem:Acute on chronic diastolic congestive heart failure    Subjective:     Hospital Course:   12/31/18: adm with CHF and concern for ?WCT (seems to be AF with BBB)  1/1/19: issues with urinary retention, stearns apparently not needed    Interval Hx: still SOB, no CP.    Tele: -110s, occ PVCs (pers rev)      Past Medical History:   Diagnosis Date    Anemia 05/03/2018    pending blood transfusion    Anticoagulant long-term use     apixaban    Atrial fibrillation     CKD (chronic kidney disease)     Congestive heart failure     COPD (chronic obstructive pulmonary disease)     Coronary artery disease     Diabetes mellitus     Encounter for blood transfusion     HLD (hyperlipidemia)     Hypertension     MI (myocardial infarction)     On home oxygen therapy     Pacemaker     left chest    Peripheral vascular disease     Requires assistance with activities of daily living (ADL)     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     S/P femoral-popliteal bypass surgery left    Stented coronary artery     Tobacco use     Unsteady gait        Past Surgical History:   Procedure Laterality Date    CARDIAC CATHETERIZATION      CARDIAC PACEMAKER PLACEMENT      CARDIAC SURGERY      3 vessel CABG    CARDIOVERSION N/A 9/19/2013    Performed by [facility]ann Sur[facility] at [facility]    cardioverted      CORONARY ANGIOPLASTY WITH STENT PLACEMENT      EGD (ESOPHAGOGASTRODUODENOSCOPY) N/A 6/1/2018    Performed by Ty Hickman MD at [facility] ENDO    HEMORRHOID SURGERY      TRANSESOPHAGEAL ECHOCARDIOGRAM (ANIA) N/A 9/19/2013    Performed by Maryann Surgeon at [facility]    VASCULAR SURGERY      femoral artery popliteal bypass        Review of patient's allergies indicates:  No Known Allergies    No current facility-administered medications on file prior to encounter.      Current Outpatient Medications on File Prior to Encounter   Medication Sig    albuterol-ipratropium 2.5mg-0.5mg/3mL (DUO-NEB) 0.5 mg-3 mg(2.5 mg base)/3 mL nebulizer solution Take 3 mLs by nebulization every 6 (six) hours. Rescue    apixaban (ELIQUIS) 5 mg Tab Take 5 mg by mouth 2 (two) times daily.    atorvastatin (LIPITOR) 20 MG tablet Take 20 mg by mouth every evening.    diltiaZEM (CARDIZEM) 30 MG tablet Take 1 tablet (30 mg total) by mouth every 12 (twelve) hours.    ferrous gluconate (FERGON) 324 MG tablet Take 324 mg by mouth daily with breakfast.    fish oil-omega-3 fatty acids 300-1,000 mg capsule Take 2 g by mouth 2 (two) times daily.     furosemide (LASIX) 40 MG tablet     furosemide (LASIX) 80 MG tablet Take 40 mg by mouth 2 (two) times daily.     glipiZIDE (GLUCOTROL) 5 MG tablet Take 10 mg by mouth 2 (two) times daily before meals.    losartan (COZAAR) 25 MG tablet     losartan (COZAAR) 50 MG tablet Take 0.5 tablets (25 mg total) by mouth once daily.    metFORMIN (GLUCOPHAGE-XR) 500 MG 24 hr tablet     nitroGLYCERIN (NITROSTAT) 0.4 MG SL tablet Place 0.4 mg under the tongue every 5 (five) minutes as needed.    polyethylene glycol (GLYCOLAX) 17 gram PwPk Take by mouth as needed.      Family History     Problem Relation (Age of Onset)    Diabetes Father, Mother        Tobacco Use    Smoking status: Former Smoker     Packs/day: 1.00     Years: 60.00     Pack years: 60.00     Types: Cigarettes     Last attempt to quit: 2018     Years since quittin.6    Smokeless tobacco: Never Used   Substance and Sexual Activity    Alcohol use: No    Drug use: No    Sexual activity: Not on file     Review of Systems   Constitution: Negative for chills, diaphoresis, fever, weakness and malaise/fatigue.   HENT: Negative for nosebleeds.    Eyes:  Negative for blurred vision and double vision.   Cardiovascular: Positive for leg swelling. Negative for chest pain, claudication, cyanosis, dyspnea on exertion, orthopnea, palpitations, paroxysmal nocturnal dyspnea and syncope.   Respiratory: Positive for shortness of breath. Negative for cough and wheezing.    Skin: Negative for dry skin and poor wound healing.   Musculoskeletal: Negative for back pain, joint swelling and myalgias.   Gastrointestinal: Negative for abdominal pain, nausea and vomiting.   Genitourinary: Negative for hematuria.   Neurological: Positive for dizziness. Negative for headaches, numbness and seizures.   Psychiatric/Behavioral: Negative for altered mental status and depression.     Objective:     Vital Signs (Most Recent):  Temp: 98 °F (36.7 °C) (01/02/19 0521)  Pulse: (!) 125 (01/02/19 0521)  Resp: (!) 22 (01/02/19 0521)  BP: (!) 133/96 (01/02/19 0521)  SpO2: (!) 92 % (01/02/19 0521) Vital Signs (24h Range):  Temp:  [97.5 °F (36.4 °C)-98 °F (36.7 °C)] 98 °F (36.7 °C)  Pulse:  [] 125  Resp:  [18-24] 22  SpO2:  [92 %-99 %] 92 %  BP: (133-176)/(70-96) 133/96     Weight: 92.5 kg (203 lb 14.8 oz)  Body mass index is 30.11 kg/m².    SpO2: (!) 92 %  O2 Device (Oxygen Therapy): nasal cannula      Intake/Output Summary (Last 24 hours) at 1/2/2019 0630  Last data filed at 1/1/2019 1915  Gross per 24 hour   Intake 480 ml   Output 600 ml   Net -120 ml       Lines/Drains/Airways     Peripheral Intravenous Line                 Peripheral IV - Single Lumen 12/31/18 1544 Right Hand 1 day                Physical Exam   Constitutional: He is oriented to person, place, and time. He appears well-developed and well-nourished.   HENT:   Head: Normocephalic and atraumatic.   Eyes: Conjunctivae and EOM are normal. Pupils are equal, round, and reactive to light. No scleral icterus.   Neck: Normal range of motion. Neck supple. No JVD present. Carotid bruit is not present. No tracheal deviation present. No  thyromegaly present.   Cardiovascular: S1 normal and S2 normal. An irregularly irregular rhythm present. Tachycardia present. Exam reveals distant heart sounds. Exam reveals no gallop and no friction rub.   No murmur heard.  Pulmonary/Chest: Effort normal. No respiratory distress. He has no wheezes. He has rales. He exhibits no tenderness.   Abdominal: Soft. He exhibits no distension.   obese   Musculoskeletal: Normal range of motion. He exhibits edema.   Neurological: He is alert and oriented to person, place, and time. He has normal strength. No cranial nerve deficit.   Skin: Skin is warm and dry. No rash noted.   Psychiatric: He has a normal mood and affect. His behavior is normal.       Current Medications:   albuterol-ipratropium  3 mL Nebulization Q6H    apixaban  5 mg Oral BID    aspirin  81 mg Oral Daily    atorvastatin  20 mg Oral QHS    carvedilol  3.125 mg Oral BID    ferrous gluconate  324 mg Oral Daily with breakfast    furosemide  40 mg Intravenous BID    losartan  50 mg Oral Daily    polyethylene glycol  17 g Oral Daily    tamsulosin  0.4 mg Oral Daily       acetaminophen, hydrALAZINE, prochlorperazine    Laboratory:  CBC:  Recent Labs   Lab 08/29/18  1013 11/30/18  1234 12/01/18  0515 12/02/18  0536 12/31/18  1545   WHITE BLOOD CELL COUNT 5.18 8.34 5.63 4.25 8.01   HEMOGLOBIN 10.4 L 10.5 L 10.3 L 10.3 L 11.1 L   HEMATOCRIT 35.4 L 34.2 L 32.4 L 32.8 L 36.6 L   PLATELETS 311 348 369 H 362 H 350       CHEMISTRIES:  Recent Labs   Lab 05/04/18  0613  05/27/18  1330  05/29/18  0410  08/29/18  1013 11/30/18  1234 12/01/18  0515 12/02/18  0535 12/31/18  1545 12/31/18  2131   GLUCOSE 84   < > 216 H   < > 137 H  137 H   < > 79 133 H 205 H 185 H 89  --    SODIUM 142   < > 142   < > 139  139   < > 142 141 139 141 143  --    POTASSIUM 5.0   < > 4.5   < > 4.0  4.0   < > 4.0 4.2 4.6 4.4 4.2  --    BUN BLD 34 H   < > 15   < > 18  18   < > 16 27 H 33 H 41 H 22  --    CREATININE 1.4   < > 1.2   < > 1.1   1.1   < > 1.3 1.5 H 1.5 H 1.6 H 1.4  --    EGFR IF  55 A   < > >60   < > >60  >60   < > 60 50 A 50 A 46 A 54 A  --    EGFR IF NON- 47 A   < > 57 A   < > >60  >60   < > 52 A 43 A 43 A 40 A 47 A  --    CALCIUM 10.7 H   < > 10.8 H   < > 10.9 H  10.9 H   < > 11.5 H 11.2 H 10.9 H 10.5 10.4  --    MAGNESIUM 2.2  --  1.9  --  2.1  --   --   --   --   --   --  2.1    < > = values in this interval not displayed.       CARDIAC BIOMARKERS:  Recent Labs   Lab 12/01/18  0001 12/31/18  1545 12/31/18  2131 01/01/19  0346 01/01/19  0830   TROPONIN I 0.107 H 0.126 H 0.113 H 0.136 H 0.104 H       COAGS:  Recent Labs   Lab 05/03/18  1351 05/23/18  1010 05/27/18  1330 07/09/18  1410 11/30/18  1234   INR 1.2 1.0 1.0 1.1 1.1       LIPIDS/LFTS:  Recent Labs   Lab 09/25/16  0607  06/21/18  0916 07/09/18  1410 07/10/18  0607 08/29/18  1013 11/30/18  1234 12/31/18  1545   CHOLESTEROL 85 L  --   --   --  103 L  --   --   --    TRIGLYCERIDES 115  --   --   --  70  --   --   --    HDL 30 L  --   --   --  37 L  --   --   --    LDL CHOLESTEROL 32.0 L  --   --   --  52.0 L  --   --   --    NON-HDL CHOLESTEROL 55  --   --   --  66  --   --   --    AST 73 H   < > 20 18  --  13 18 20   ALT 44   < > 19 18  --  7 L 10 15    < > = values in this interval not displayed.       BNP:  Recent Labs   Lab 05/27/18  1330 06/01/18  1042 07/09/18  1410 11/30/18  1234 12/31/18  1545    H 333 H 553 H 661 H 958 H       TSH:  Recent Labs   Lab 07/09/18  2343   TSH 1.108       Free T4:  Recent Labs   Lab 07/09/18  2343   FREE T4 1.06       Diagnostic Results:  ECG (personally reviewed tracings):  12/31/18 1457 AF 98, RBBB, PVC, inflat TW abnl ?isch, similar to 12/2/18    Chest X-Ray (personally reviewed image(s)): 12/31/18 CHF, L PPM (2 lead), sternotomy    Echo: 12/1/18  Mildly decreased left ventricular systolic function. The estimated ejection fraction is 45%  Concentric left ventricular hypertrophy.  Left ventricular  diastolic dysfunction.  Mildly reduced right ventricular systolic function.  Severe left atrial enlargement.  Moderate atrial enlargement.  Mild aortic regurgitation.  Mild-to-moderate mitral regurgitation.  Moderate tricuspid regurgitation.  The estimated PA systolic pressure is 85.79 mm Hg  Pulmonary hypertension present.    Stress Test: L MPI 5/4/18  Nuclear Quantitative Functional Analysis:   Quantitative figures are not accurate.   Visually estimated LV function is low normal.   Impression: ABNORMAL MYOCARDIAL PERFUSION  1. The perfusion scan is free of evidence for myocardial ischemia.   2. There is mild intensity fixed defect in the apical wall of the left ventricle, consistent with myocardial injury, and moderate intensity fixed defect in the inferolateral wall of the left ventricle, consistent with myocardial injury.   3. Resting wall motion is physiologic.   4. Visually estimated LV function is low normal.   5. The ventricular volumes are normal at rest and stress.   6. The extracardiac distribution of radioactivity is normal.       Assessment and Plan:     * Acute on chronic diastolic congestive heart failure    Pt has HFpEF (EF 45% by recent echo)  Known CAD s/p CABG, MPI 5/2018 neg for ischemia  Trop elev c/w prior admissions/CHF exac, current presentation does not seem c/w ACS  No further NSVT  Suggest continued IV lasix diuresis, goal net neg 1-1.5L daily  Suggest steanrs placement if pt continues to have difficulty with monitoring UOP  Inc coreg 6.25mg bid  Cont losartan 50mg qd  If continued NSVT despite adequate rx of CHF, pt will need diag cath     Coronary artery disease involving coronary bypass graft of native heart without angina pectoris    Cont Statin/ARB/ASA  Titrate coreg       NSVT (nonsustained ventricular tachycardia)    As above  Titrate coreg  Rx CHF  If persistent episodes, will need [facility]     Pacemaker    Biotronik     Essential hypertension    Titrate coreg  Cont losartan     Chronic  atrial fibrillation    Chronic  Cont eliquis  Plan rate control strategy     Type 2 diabetes mellitus, controlled, with renal complications    Per IM     CKD Stage 3    Per IM  Labs pending today     Chronic anticoagulation    As above         VTE Risk Mitigation (From admission, onward)        Ordered     apixaban tablet 5 mg  2 times daily      12/31/18 9643          Blanco Brunson MD  Cardiology  [facility]

## 2019-01-03 LAB
ANION GAP SERPL CALC-SCNC: 6 MMOL/L
BUN SERPL-MCNC: 28 MG/DL
CALCIUM SERPL-MCNC: 10.3 MG/DL
CHLORIDE SERPL-SCNC: 111 MMOL/L
CO2 SERPL-SCNC: 25 MMOL/L
CREAT SERPL-MCNC: 1.4 MG/DL
EST. GFR  (AFRICAN AMERICAN): 54 ML/MIN/1.73 M^2
EST. GFR  (NON AFRICAN AMERICAN): 47 ML/MIN/1.73 M^2
GLUCOSE SERPL-MCNC: 111 MG/DL
POTASSIUM SERPL-SCNC: 4.2 MMOL/L
SODIUM SERPL-SCNC: 142 MMOL/L

## 2019-01-03 PROCEDURE — 99233 PR SUBSEQUENT HOSPITAL CARE,LEVL III: ICD-10-PCS | Mod: ,,, | Performed by: INTERNAL MEDICINE

## 2019-01-03 PROCEDURE — 94640 AIRWAY INHALATION TREATMENT: CPT

## 2019-01-03 PROCEDURE — 25000003 PHARM REV CODE 250: Performed by: HOSPITALIST

## 2019-01-03 PROCEDURE — 99900035 HC TECH TIME PER 15 MIN (STAT)

## 2019-01-03 PROCEDURE — 25000003 PHARM REV CODE 250: Performed by: NURSE PRACTITIONER

## 2019-01-03 PROCEDURE — 27000221 HC OXYGEN, UP TO 24 HOURS

## 2019-01-03 PROCEDURE — 99233 SBSQ HOSP IP/OBS HIGH 50: CPT | Mod: ,,, | Performed by: INTERNAL MEDICINE

## 2019-01-03 PROCEDURE — 94660 CPAP INITIATION&MGMT: CPT

## 2019-01-03 PROCEDURE — 25000003 PHARM REV CODE 250: Performed by: EMERGENCY MEDICINE

## 2019-01-03 PROCEDURE — 63600175 PHARM REV CODE 636 W HCPCS: Performed by: INTERNAL MEDICINE

## 2019-01-03 PROCEDURE — 80048 BASIC METABOLIC PNL TOTAL CA: CPT

## 2019-01-03 PROCEDURE — 21400001 HC TELEMETRY ROOM

## 2019-01-03 PROCEDURE — 25000242 PHARM REV CODE 250 ALT 637 W/ HCPCS: Performed by: EMERGENCY MEDICINE

## 2019-01-03 PROCEDURE — 25000003 PHARM REV CODE 250: Performed by: INTERNAL MEDICINE

## 2019-01-03 RX ORDER — FUROSEMIDE 10 MG/ML
100 INJECTION INTRAMUSCULAR; INTRAVENOUS 2 TIMES DAILY
Status: DISCONTINUED | OUTPATIENT
Start: 2019-01-03 | End: 2019-01-04

## 2019-01-03 RX ORDER — LOSARTAN POTASSIUM 25 MG/1
100 TABLET ORAL DAILY
Status: DISCONTINUED | OUTPATIENT
Start: 2019-01-03 | End: 2019-01-08 | Stop reason: HOSPADM

## 2019-01-03 RX ADMIN — FUROSEMIDE 100 MG: 10 INJECTION, SOLUTION INTRAMUSCULAR; INTRAVENOUS at 09:01

## 2019-01-03 RX ADMIN — FERROUS GLUCONATE TAB 324 MG (37.5 MG ELEMENTAL IRON) 324 MG: 324 (37.5 FE) TAB at 09:01

## 2019-01-03 RX ADMIN — CARVEDILOL 25 MG: 6.25 TABLET, FILM COATED ORAL at 09:01

## 2019-01-03 RX ADMIN — ATORVASTATIN CALCIUM 20 MG: 10 TABLET, FILM COATED ORAL at 10:01

## 2019-01-03 RX ADMIN — TAMSULOSIN HYDROCHLORIDE 0.4 MG: 0.4 CAPSULE ORAL at 09:01

## 2019-01-03 RX ADMIN — APIXABAN 5 MG: 5 TABLET, FILM COATED ORAL at 10:01

## 2019-01-03 RX ADMIN — LOSARTAN POTASSIUM 100 MG: 25 TABLET, FILM COATED ORAL at 09:01

## 2019-01-03 RX ADMIN — FUROSEMIDE 100 MG: 10 INJECTION, SOLUTION INTRAMUSCULAR; INTRAVENOUS at 05:01

## 2019-01-03 RX ADMIN — APIXABAN 5 MG: 5 TABLET, FILM COATED ORAL at 09:01

## 2019-01-03 RX ADMIN — CARVEDILOL 25 MG: 6.25 TABLET, FILM COATED ORAL at 10:01

## 2019-01-03 RX ADMIN — IPRATROPIUM BROMIDE AND ALBUTEROL SULFATE 3 ML: .5; 3 SOLUTION RESPIRATORY (INHALATION) at 01:01

## 2019-01-03 RX ADMIN — IPRATROPIUM BROMIDE AND ALBUTEROL SULFATE 3 ML: .5; 3 SOLUTION RESPIRATORY (INHALATION) at 07:01

## 2019-01-03 RX ADMIN — ASPIRIN 81 MG: 81 TABLET, COATED ORAL at 09:01

## 2019-01-03 NOTE — PROGRESS NOTES
[facility] Medicine  Progress Note    Patient Name: Agus Ramos Jr.  MRN: [id number]  Patient Class: IP- Inpatient   Admission Date: 12/31/2018  Length of Stay: 2 days  Attending Physician: Ace Cisneros MD  Primary Care Provider: Keaton Hardy MD        Subjective:     Principal Problem:Acute on chronic combined systolic and diastolic congestive heart failure    HPI:  78 y.o. male with chronic a fib, HTN, HLD, CKD stage III, DM2, chronic OAC, tobacco abuse, chronic systolic heart failure, anemia, pacemaker in situ, COPD, and CAD s/p CABG presents with a complaint of SOB progressively worsening for the past 5 weeks.  His PCP directed him to the ED today.  Denies fever, chills, cough, chest pain, dizziness, syncope, N/V/D, abdominal pain, bloody or black stools, or dysuria.  Reports adherence to home treatment plan.  In the ED he was found to have elevated BNP of 958 and pulmonary edema on chest xray, troponin also mildly elevated at 0.126.  He received nebulizer treatment, IV lasix, and nitro paste with improvement.  Short runs of V tach noted with exertion on tele.  Discussed with Cardiology in ED and placed in observation for further evaluation and treatment.    Hospital Course:  Patient admits to poor compliance to diet over the holidays - He has LVEF 45% and PA pressure 85.79 - continued 2-3+ pitting edema; activity intolerance, dyspnea with minimal exertion and at rest, and although he uses home oxygen sat 2L he is requiring 3.5L to keep sats above 85%. His sat drops to 82% on his usual 2L at rest. Additionally, the patient has been having low urinary output despite IV lasix 40 mg BID 2/2 urinary retension. Multiple attempts at catheter placement ordered by ED were unsuccessful overnight - Ordered to discontinue attempts at catheter placement - bowel regimen and added Flomax to patient's medication regimen. Urology was consulted and agrees with decreasing attempts at  catheter placement unless the patient becomes uncomfortable due to inability to void - fluid restriction, daily weight, continue IV lasix - bowel regimen with mineral oil enemal - strict I&O's - supplemental oxygen at 3.5L  He refused CPAP over night,he has been consulted.he is more complaint today.  HR is elevated ,increased coreg,improved.  Monitor UOP with  bladder scan.  Consulted PT,OT   Leg swelling did not improved yet.    Interval History: he is stable on NC O 2,    Review of Systems   Constitutional: Positive for activity change, appetite change and fatigue. Negative for chills, diaphoresis and fever.   HENT: Negative for congestion, hearing loss, sore throat, tinnitus and trouble swallowing.    Eyes: Negative for photophobia, discharge, itching and visual disturbance.   Respiratory: Positive for shortness of breath. Negative for apnea, cough, wheezing and stridor.         Dusky nailbeds; obvious WOB   Cardiovascular: Positive for leg swelling. Negative for chest pain and palpitations.   Gastrointestinal: Negative for abdominal distention, abdominal pain, blood in stool, constipation, diarrhea and nausea.   Endocrine: Negative for polydipsia, polyphagia and polyuria.   Genitourinary: Positive for difficulty urinating. Negative for dysuria, flank pain, frequency, hematuria and penile pain.   Musculoskeletal: Positive for joint swelling. Negative for arthralgias and neck stiffness.   Skin: Positive for pallor. Negative for color change, rash and wound.   Neurological: Positive for weakness. Negative for dizziness, tremors, seizures, light-headedness, numbness and headaches.   Hematological: Negative for adenopathy.   Psychiatric/Behavioral: Negative for hallucinations and self-injury.     Objective:     Vital Signs (Most Recent):  Temp: 97.1 °F (36.2 °C) (01/03/19 0742)  Pulse: 79 (01/03/19 0742)  Resp: 19 (01/03/19 0742)  BP: (!) 173/74 (01/03/19 0742)  SpO2: 98 % (01/03/19 0742) Vital Signs (24h  Range):  Temp:  [97.1 °F (36.2 °C)-98.4 °F (36.9 °C)] 97.1 °F (36.2 °C)  Pulse:  [68-94] 79  Resp:  [18-21] 19  SpO2:  [92 %-98 %] 98 %  BP: (110-180)/(63-88) 173/74     Weight: 92.5 kg (203 lb 14.8 oz)  Body mass index is 30.11 kg/m².    Intake/Output Summary (Last 24 hours) at 1/3/2019 0859  Last data filed at 1/3/2019 0200  Gross per 24 hour   Intake 220 ml   Output 510 ml   Net -290 ml      Physical Exam   Constitutional: He appears well-developed and well-nourished. He is cooperative.   HENT:   Head: Normocephalic and atraumatic.   Eyes: Conjunctivae, EOM and lids are normal. Pupils are equal, round, and reactive to light.   Neck: Normal range of motion and full passive range of motion without pain. Neck supple. JVD present. No edema present. No thyroid mass present.   Cardiovascular: Normal rate, S1 normal, S2 normal and intact distal pulses.   No murmur heard.  Pulmonary/Chest: No respiratory distress. He has rales.   Abdominal: Soft. Bowel sounds are normal. He exhibits no distension and no abdominal bruit. There is no splenomegaly or hepatomegaly. There is no tenderness. There is no CVA tenderness.   Genitourinary: No penile tenderness.   Genitourinary Comments: Urinary retension   Musculoskeletal: Normal range of motion. He exhibits edema.   Lymphadenopathy:     He has no cervical adenopathy.     He has no axillary adenopathy.   Neurological: He is alert. He has normal reflexes. He displays no tremor. He displays no seizure activity.   Skin: Skin is warm, dry and intact.   Psychiatric: He has a normal mood and affect. His speech is normal. Thought content normal. Cognition and memory are normal.       Significant Labs:   CBC:   No results for input(s): WBC, HGB, HCT, PLT in the last 48 hours.  CMP:   Recent Labs   Lab 01/02/19  0546 01/03/19  0613    142   K 4.7 4.2    111*   CO2 24 25    111*   BUN 24* 28*   CREATININE 1.4 1.4   CALCIUM 10.7* 10.3   ANIONGAP 9 6*   EGFRNONAA 47* 47*      Cardiac Markers:   No results for input(s): CKMB, MYOGLOBIN, BNP, TROPISTAT in the last 48 hours.  Lipase: No results for input(s): LIPASE in the last 48 hours.  Troponin:   No results for input(s): TROPONINI in the last 48 hours.  TSH:   Recent Labs   Lab 07/09/18  2343   TSH 1.108     Significant Imaging:   Imaging Results          X-Ray Chest AP Portable (Final result)  Result time 12/31/18 15:32:46    Final result by Macarena Woodard MD (12/31/18 15:32:46)                 Impression:      There is no evidence acute pulmonary disease.  The chest appears similar to November 30, 2018.      Electronically signed by: Macarena Woodard MD  Date:    12/31/2018  Time:    15:32             Narrative:    EXAMINATION:  XR CHEST AP PORTABLE    CLINICAL HISTORY:  Shortness of breath    TECHNIQUE:  Single frontal view of the chest was performed.    COMPARISON:  None    FINDINGS:  There is a left-sided pacer defibrillator with leads in the right atrium and right ventricle.  The patient is status post sternotomy.  The cardiac silhouette is enlarged.  There is no pneumothorax.                                  Assessment/Plan:      * Acute on chronic combined systolic and diastolic congestive heart failure    Patient with known heart failure and elevated PA pressures - presents after poor diet compliance with likely CHF exacerbation - has been slow to improve despite IV lasix - has urinary retension  Continuous cardiac monitoring and continue to trend CE  ASA 81 mg daily  Diuresis patient - furosemide 40 mg IVP BID  Pulse OX Q4 with vitals  Oxygen supplementation to keep sats above 89%  Add BB - coreg/ IV diuretic/continue home ARB &statin  Lipid panel and TSH for completeness  UA with next void  He refused CPAP over night,he has been consulted.  HR is elevated ,increased coreg,  Monitor UOP with  bladder scan.  Consulted PT,OT   check bladder scan.not require stearns  Leg swelling did not improved yet.  Lasix is  increased.         Chronic atrial fibrillation    Continue diltiazem and apixaban, monitor on tele,HR is elevated,incrteased coreg.     SJ (obstructive sleep apnea)    No formal diagnosis however, suspect that the patient likely has some contributory symptoms of SJ - Elevated PA systolic pressure = 85.8 with diastolic dysfunction per 2D echo  Hypoxia in the setting of CHF ex + urinary retension  CPAP nightly to keep sats above 90%     Urinary retention    Initial post void residuals>500 multiple attempts at cath placement unsuccessful - patient is voiding ok but continues to have high post void residuals - bowel regimen with enema administered and started on flomax. Urology consulted who agreed with plan and recommended discontinuing attempts at stearns placement considering patient is on chronic long term anticoagulation therapy with apixaban 2/2 afib - I agree with this opinion. His renal functions appear stable will continue to monitor closely if patient becomes uncomfortable will consider reattempt as recommended by urology with couda  Strict I&O's  Post void residuals q 6 with balder scan.  Monitor BP closely       Difficulty voiding    monitor with bladder scan,on flomax.not require stearns.       NSVT (nonsustained ventricular tachycardia)    cardiology is following,increased BB.improved.       Chronic respiratory failure with hypoxia    Continue home supplemental oxygen 2L NC     Centrilobular emphysema    Recently started on Breo, continue nebs     Pacemaker    Biotronic      Iron deficiency anemia    Patient H/H stable and consistent with baseline. No evidence acute bleeding or indication for transfusion at this time.      Chronic anticoagulation    Continue apixaban     Type 2 diabetes mellitus, controlled, with renal complications    Last HgbA1c   Lab Results   Component Value Date    HGBA1C 5.1 11/30/2018     Hold oral antihyperglycemics while inpatient  PRN sliding scale insulin  ACHS glucose monitoring    ADA diet     CKD Stage 3    Stable, at baseline, maintain euvolemic state, strict I/O's, monitor renal function and electrolytes, avoid nephrotoxic agents.     Essential hypertension    Decent controlled, continue home medications and monitor blood pressure, adjust as needed.       VTE Risk Mitigation (From admission, onward)        Ordered     apixaban tablet 5 mg  2 times daily      12/31/18 6642              Ace Cisneros MD  Department of Hospital Medicine   [facility]

## 2019-01-03 NOTE — ASSESSMENT & PLAN NOTE
Patient with known heart failure and elevated PA pressures - presents after poor diet compliance with likely CHF exacerbation - has been slow to improve despite IV lasix - has urinary retension  Continuous cardiac monitoring and continue to trend CE  ASA 81 mg daily  Diuresis patient - furosemide 40 mg IVP BID  Pulse OX Q4 with vitals  Oxygen supplementation to keep sats above 89%  Add BB - coreg/ IV diuretic/continue home ARB &statin  Lipid panel and TSH for completeness  UA with next void  He refused CPAP over night,he has been consulted.  HR is elevated ,increased coreg,  Monitor UOP with  bladder scan.  Consulted PT,OT   check bladder scan.not require stearns  Leg swelling did not improved yet.  Lasix is increased.

## 2019-01-03 NOTE — PROGRESS NOTES
[facility]  Cardiology  Progress Note    Patient Name: Agus Ramos Jr.  MRN: [id number]  Admission Date: 12/31/2018  Hospital Length of Stay: 2 days  Code Status: Full Code   Attending Physician: Ace Cisneros MD   Primary Care Physician: Keaton Hardy MD  Expected Discharge Date:   Principal Problem:Acute on chronic combined systolic and diastolic congestive heart failure    Subjective:     Hospital Course:   12/31/18: adm with CHF and concern for ?WCT (seems to be AF with BBB)  1/1/19: issues with urinary retention, stearns apparently not needed    Interval Hx: No CP, SOB improving.    Tele: AF 70s, occ PVCs/ (pers rev)      Past Medical History:   Diagnosis Date    Anemia 05/03/2018    pending blood transfusion    Anticoagulant long-term use     apixaban    Atrial fibrillation     CKD (chronic kidney disease)     Congestive heart failure     COPD (chronic obstructive pulmonary disease)     Coronary artery disease     Diabetes mellitus     Encounter for blood transfusion     HLD (hyperlipidemia)     Hypertension     MI (myocardial infarction)     On home oxygen therapy     Pacemaker     left chest    Peripheral vascular disease     Requires assistance with activities of daily living (ADL)     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     S/P femoral-popliteal bypass surgery left    Stented coronary artery     Tobacco use     Unsteady gait        Past Surgical History:   Procedure Laterality Date    CARDIAC CATHETERIZATION      CARDIAC PACEMAKER PLACEMENT      CARDIAC SURGERY      3 vessel CABG    CARDIOVERSION N/A 9/19/2013    Performed by Maryann Surgeon at [facility] MARYANN    cardioverted      CORONARY ANGIOPLASTY WITH STENT PLACEMENT      EGD (ESOPHAGOGASTRODUODENOSCOPY) N/A 6/1/2018    Performed by Ty Hickman MD at [facility] ENDO    HEMORRHOID SURGERY      TRANSESOPHAGEAL ECHOCARDIOGRAM (ANIA) N/A 9/19/2013    Performed by Maryann Surgeon at [facility] MARYANN    VASCULAR SURGERY      femoral artery  popliteal bypass       Review of patient's allergies indicates:  No Known Allergies    No current facility-administered medications on file prior to encounter.      Current Outpatient Medications on File Prior to Encounter   Medication Sig    albuterol-ipratropium 2.5mg-0.5mg/3mL (DUO-NEB) 0.5 mg-3 mg(2.5 mg base)/3 mL nebulizer solution Take 3 mLs by nebulization every 6 (six) hours. Rescue    apixaban (ELIQUIS) 5 mg Tab Take 5 mg by mouth 2 (two) times daily.    atorvastatin (LIPITOR) 20 MG tablet Take 20 mg by mouth every evening.    diltiaZEM (CARDIZEM) 30 MG tablet Take 1 tablet (30 mg total) by mouth every 12 (twelve) hours.    ferrous gluconate (FERGON) 324 MG tablet Take 324 mg by mouth daily with breakfast.    fish oil-omega-3 fatty acids 300-1,000 mg capsule Take 2 g by mouth 2 (two) times daily.     furosemide (LASIX) 40 MG tablet     furosemide (LASIX) 80 MG tablet Take 40 mg by mouth 2 (two) times daily.     glipiZIDE (GLUCOTROL) 5 MG tablet Take 10 mg by mouth 2 (two) times daily before meals.    losartan (COZAAR) 25 MG tablet     losartan (COZAAR) 50 MG tablet Take 0.5 tablets (25 mg total) by mouth once daily.    metFORMIN (GLUCOPHAGE-XR) 500 MG 24 hr tablet     nitroGLYCERIN (NITROSTAT) 0.4 MG SL tablet Place 0.4 mg under the tongue every 5 (five) minutes as needed.    polyethylene glycol (GLYCOLAX) 17 gram PwPk Take by mouth as needed.      Family History     Problem Relation (Age of Onset)    Diabetes Father, Mother        Tobacco Use    Smoking status: Former Smoker     Packs/day: 1.00     Years: 60.00     Pack years: 60.00     Types: Cigarettes     Last attempt to quit: 2018     Years since quittin.6    Smokeless tobacco: Never Used   Substance and Sexual Activity    Alcohol use: No    Drug use: No    Sexual activity: Not on file     Review of Systems   Gastrointestinal: Negative for melena.   Genitourinary: Negative for hematuria.     Objective:     Vital Signs (Most  Recent):  Temp: 97.1 °F (36.2 °C) (01/03/19 0742)  Pulse: 79 (01/03/19 0742)  Resp: 19 (01/03/19 0742)  BP: (!) 173/74 (01/03/19 0742)  SpO2: 98 % (01/03/19 0742) Vital Signs (24h Range):  Temp:  [97.1 °F (36.2 °C)-98.4 °F (36.9 °C)] 97.1 °F (36.2 °C)  Pulse:  [68-94] 79  Resp:  [18-21] 19  SpO2:  [92 %-98 %] 98 %  BP: (110-180)/(63-88) 173/74     Weight: 92.5 kg (203 lb 14.8 oz)  Body mass index is 30.11 kg/m².    SpO2: 98 %  O2 Device (Oxygen Therapy): nasal cannula w/ humidification      Intake/Output Summary (Last 24 hours) at 1/3/2019 0813  Last data filed at 1/3/2019 0200  Gross per 24 hour   Intake 280 ml   Output 510 ml   Net -230 ml       Lines/Drains/Airways     Peripheral Intravenous Line                 Peripheral IV - Single Lumen 12/31/18 1544 Right Hand 2 days                Physical Exam   Constitutional: He is oriented to person, place, and time. He appears well-developed and well-nourished.   HENT:   Head: Normocephalic and atraumatic.   Eyes: Conjunctivae and EOM are normal. Pupils are equal, round, and reactive to light. No scleral icterus.   Neck: Normal range of motion. Neck supple. No JVD present. Carotid bruit is not present. No tracheal deviation present. No thyromegaly present.   Cardiovascular: S1 normal and S2 normal. An irregularly irregular rhythm present. Tachycardia present. Exam reveals distant heart sounds. Exam reveals no gallop and no friction rub.   No murmur heard.  Pulmonary/Chest: Effort normal. No respiratory distress. He has no wheezes. He has no rales. He exhibits no tenderness.   Abdominal: Soft. He exhibits no distension.   obese   Musculoskeletal: Normal range of motion. He exhibits edema.   Neurological: He is alert and oriented to person, place, and time. He has normal strength. No cranial nerve deficit.   Skin: Skin is warm and dry. No rash noted.   Psychiatric: He has a normal mood and affect. His behavior is normal.       Current Medications:    albuterol-ipratropium  3 mL Nebulization Q6H    apixaban  5 mg Oral BID    aspirin  81 mg Oral Daily    atorvastatin  20 mg Oral QHS    carvedilol  25 mg Oral BID    ferrous gluconate  324 mg Oral Daily with breakfast    furosemide  80 mg Intravenous BID    losartan  50 mg Oral Daily    polyethylene glycol  17 g Oral Daily    tamsulosin  0.4 mg Oral Daily       acetaminophen, hydrALAZINE, prochlorperazine    Laboratory:  CBC:  Recent Labs   Lab 08/29/18  1013 11/30/18  1234 12/01/18  0515 12/02/18  0536 12/31/18  1545   WHITE BLOOD CELL COUNT 5.18 8.34 5.63 4.25 8.01   HEMOGLOBIN 10.4 L 10.5 L 10.3 L 10.3 L 11.1 L   HEMATOCRIT 35.4 L 34.2 L 32.4 L 32.8 L 36.6 L   PLATELETS 311 348 369 H 362 H 350       CHEMISTRIES:  Recent Labs   Lab 05/04/18  0613  05/27/18  1330  05/29/18  0410  12/01/18  0515 12/02/18  0535 12/31/18  1545 12/31/18  2131 01/02/19  0546 01/03/19  0613   GLUCOSE 84   < > 216 H   < > 137 H  137 H   < > 205 H 185 H 89  --  102 111 H   SODIUM 142   < > 142   < > 139  139   < > 139 141 143  --  143 142   POTASSIUM 5.0   < > 4.5   < > 4.0  4.0   < > 4.6 4.4 4.2  --  4.7 4.2   BUN BLD 34 H   < > 15   < > 18  18   < > 33 H 41 H 22  --  24 H 28 H   CREATININE 1.4   < > 1.2   < > 1.1  1.1   < > 1.5 H 1.6 H 1.4  --  1.4 1.4   EGFR IF  55 A   < > >60   < > >60  >60   < > 50 A 46 A 54 A  --  54 A 54 A   EGFR IF NON- 47 A   < > 57 A   < > >60  >60   < > 43 A 40 A 47 A  --  47 A 47 A   CALCIUM 10.7 H   < > 10.8 H   < > 10.9 H  10.9 H   < > 10.9 H 10.5 10.4  --  10.7 H 10.3   MAGNESIUM 2.2  --  1.9  --  2.1  --   --   --   --  2.1  --   --     < > = values in this interval not displayed.       CARDIAC BIOMARKERS:  Recent Labs   Lab 12/01/18  0001 12/31/18  1545 12/31/18  2131 01/01/19  0346 01/01/19  0830   TROPONIN I 0.107 H 0.126 H 0.113 H 0.136 H 0.104 H       COAGS:  Recent Labs   Lab 05/03/18  1351 05/23/18  1010 05/27/18  1330 07/09/18  1410 11/30/18  1234    INR 1.2 1.0 1.0 1.1 1.1       LIPIDS/LFTS:  Recent Labs   Lab 09/25/16  0607  06/21/18  0916 07/09/18  1410 07/10/18  0607 08/29/18  1013 11/30/18  1234 12/31/18  1545   CHOLESTEROL 85 L  --   --   --  103 L  --   --   --    TRIGLYCERIDES 115  --   --   --  70  --   --   --    HDL 30 L  --   --   --  37 L  --   --   --    LDL CHOLESTEROL 32.0 L  --   --   --  52.0 L  --   --   --    NON-HDL CHOLESTEROL 55  --   --   --  66  --   --   --    AST 73 H   < > 20 18  --  13 18 20   ALT 44   < > 19 18  --  7 L 10 15    < > = values in this interval not displayed.       BNP:  Recent Labs   Lab 05/27/18  1330 06/01/18  1042 07/09/18  1410 11/30/18  1234 12/31/18  1545    H 333 H 553 H 661 H 958 H       TSH:  Recent Labs   Lab 07/09/18  2343   TSH 1.108       Free T4:  Recent Labs   Lab 07/09/18  2343   FREE T4 1.06       Diagnostic Results:  ECG (personally reviewed tracings):  12/31/18 1457 AF 98, RBBB, PVC, inflat TW abnl ?isch, similar to 12/2/18    Chest X-Ray (personally reviewed image(s)): 12/31/18 CHF, L PPM (2 lead), sternotomy    Echo: 12/1/18  Mildly decreased left ventricular systolic function. The estimated ejection fraction is 45%  Concentric left ventricular hypertrophy.  Left ventricular diastolic dysfunction.  Mildly reduced right ventricular systolic function.  Severe left atrial enlargement.  Moderate atrial enlargement.  Mild aortic regurgitation.  Mild-to-moderate mitral regurgitation.  Moderate tricuspid regurgitation.  The estimated PA systolic pressure is 85.79 mm Hg  Pulmonary hypertension present.    Stress Test: L MPI 5/4/18  Nuclear Quantitative Functional Analysis:   Quantitative figures are not accurate.   Visually estimated LV function is low normal.   Impression: ABNORMAL MYOCARDIAL PERFUSION  1. The perfusion scan is free of evidence for myocardial ischemia.   2. There is mild intensity fixed defect in the apical wall of the left ventricle, consistent with myocardial injury, and  moderate intensity fixed defect in the inferolateral wall of the left ventricle, consistent with myocardial injury.   3. Resting wall motion is physiologic.   4. Visually estimated LV function is low normal.   5. The ventricular volumes are normal at rest and stress.   6. The extracardiac distribution of radioactivity is normal.       Assessment and Plan:     * Acute on chronic combined systolic and diastolic congestive heart failure    Pt has HFpEF (EF 45% by recent echo)  Known CAD s/p CABG, MPI 5/2018 neg for ischemia  Trop elev c/w prior admissions/CHF exac, current presentation does not seem c/w ACS  No further NSVT  Suggest continued IV lasix diuresis, goal net neg 1-1.5L daily  Inc lasix 100mg bid, consider zaroxylyn  Inc losartan 100mg qd     Coronary artery disease involving coronary bypass graft of native heart without angina pectoris    Cont Statin/ARB/coreg/ASA  Titrate losartan       NSVT (nonsustained ventricular tachycardia)    As above  Titrate losartan  Rx CHF  If persistent episodes, will need C     Pacemaker    Biotronik     Essential hypertension    Titrate  losartan     Chronic atrial fibrillation    Chronic  Cont eliquis  Plan rate control strategy     Type 2 diabetes mellitus, controlled, with renal complications    Per IM     CKD Stage 3    Per IM     Chronic anticoagulation    As above         VTE Risk Mitigation (From admission, onward)        Ordered     apixaban tablet 5 mg  2 times daily      12/31/18 4893          Blanco Brunson MD  Cardiology  [facility]

## 2019-01-03 NOTE — SUBJECTIVE & OBJECTIVE
Past Medical History:   Diagnosis Date    Anemia 05/03/2018    pending blood transfusion    Anticoagulant long-term use     apixaban    Atrial fibrillation     CKD (chronic kidney disease)     Congestive heart failure     COPD (chronic obstructive pulmonary disease)     Coronary artery disease     Diabetes mellitus     Encounter for blood transfusion     HLD (hyperlipidemia)     Hypertension     MI (myocardial infarction)     On home oxygen therapy     Pacemaker     left chest    Peripheral vascular disease     Requires assistance with activities of daily living (ADL)     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     S/P femoral-popliteal bypass surgery left    Stented coronary artery     Tobacco use     Unsteady gait        Past Surgical History:   Procedure Laterality Date    CARDIAC CATHETERIZATION      CARDIAC PACEMAKER PLACEMENT      CARDIAC SURGERY      3 vessel CABG    CARDIOVERSION N/A 9/19/2013    Performed by [facility] at [facility]    cardioverted      CORONARY ANGIOPLASTY WITH STENT PLACEMENT      EGD (ESOPHAGOGASTRODUODENOSCOPY) N/A 6/1/2018    Performed by Ty [facility]man [facility] at [facility] ENDO    HEMORRHOID SURGERY      TRANSESOPHAGEAL ECHOCARDIOGRAM (ANIA) N/A 9/19/2013    Performed by Maryann Surgeon at [facility]    VASCULAR SURGERY      femoral artery popliteal bypass       Review of patient's allergies indicates:  No Known Allergies    No current facility-administered medications on file prior to encounter.      Current Outpatient Medications on File Prior to Encounter   Medication Sig    albuterol-ipratropium 2.5mg-0.5mg/3mL (DUO-NEB) 0.5 mg-3 mg(2.5 mg base)/3 mL nebulizer solution Take 3 mLs by nebulization every 6 (six) hours. Rescue    apixaban (ELIQUIS) 5 mg Tab Take 5 mg by mouth 2 (two) times daily.    atorvastatin (LIPITOR) 20 MG tablet Take 20 mg by mouth every evening.    diltiaZEM (CARDIZEM) 30 MG tablet Take 1 tablet (30 mg total) by mouth every 12 (twelve) hours.    ferrous gluconate  (FERGON) 324 MG tablet Take 324 mg by mouth daily with breakfast.    fish oil-omega-3 fatty acids 300-1,000 mg capsule Take 2 g by mouth 2 (two) times daily.     furosemide (LASIX) 40 MG tablet     furosemide (LASIX) 80 MG tablet Take 40 mg by mouth 2 (two) times daily.     glipiZIDE (GLUCOTROL) 5 MG tablet Take 10 mg by mouth 2 (two) times daily before meals.    losartan (COZAAR) 25 MG tablet     losartan (COZAAR) 50 MG tablet Take 0.5 tablets (25 mg total) by mouth once daily.    metFORMIN (GLUCOPHAGE-XR) 500 MG 24 hr tablet     nitroGLYCERIN (NITROSTAT) 0.4 MG SL tablet Place 0.4 mg under the tongue every 5 (five) minutes as needed.    polyethylene glycol (GLYCOLAX) 17 gram PwPk Take by mouth as needed.      Family History     Problem Relation (Age of Onset)    Diabetes Father, Mother        Tobacco Use    Smoking status: Former Smoker     Packs/day: 1.00     Years: 60.00     Pack years: 60.00     Types: Cigarettes     Last attempt to quit: 2018     Years since quittin.6    Smokeless tobacco: Never Used   Substance and Sexual Activity    Alcohol use: No    Drug use: No    Sexual activity: Not on file     Review of Systems   Gastrointestinal: Negative for melena.   Genitourinary: Negative for hematuria.     Objective:     Vital Signs (Most Recent):  Temp: 97.1 °F (36.2 °C) (19)  Pulse: 79 (19)  Resp: 19 (19)  BP: (!) 173/74 (19)  SpO2: 98 % (19) Vital Signs (24h Range):  Temp:  [97.1 °F (36.2 °C)-98.4 °F (36.9 °C)] 97.1 °F (36.2 °C)  Pulse:  [68-94] 79  Resp:  [18-21] 19  SpO2:  [92 %-98 %] 98 %  BP: (110-180)/(63-88) 173/74     Weight: 92.5 kg (203 lb 14.8 oz)  Body mass index is 30.11 kg/m².    SpO2: 98 %  O2 Device (Oxygen Therapy): nasal cannula w/ humidification      Intake/Output Summary (Last 24 hours) at 1/3/2019 0813  Last data filed at 1/3/2019 0200  Gross per 24 hour   Intake 280 ml   Output 510 ml   Net -230 ml        Lines/Drains/Airways     Peripheral Intravenous Line                 Peripheral IV - Single Lumen 12/31/18 1544 Right Hand 2 days                Physical Exam   Constitutional: He is oriented to person, place, and time. He appears well-developed and well-nourished.   HENT:   Head: Normocephalic and atraumatic.   Eyes: Conjunctivae and EOM are normal. Pupils are equal, round, and reactive to light. No scleral icterus.   Neck: Normal range of motion. Neck supple. No JVD present. Carotid bruit is not present. No tracheal deviation present. No thyromegaly present.   Cardiovascular: S1 normal and S2 normal. An irregularly irregular rhythm present. Tachycardia present. Exam reveals distant heart sounds. Exam reveals no gallop and no friction rub.   No murmur heard.  Pulmonary/Chest: Effort normal. No respiratory distress. He has no wheezes. He has no rales. He exhibits no tenderness.   Abdominal: Soft. He exhibits no distension.   obese   Musculoskeletal: Normal range of motion. He exhibits edema.   Neurological: He is alert and oriented to person, place, and time. He has normal strength. No cranial nerve deficit.   Skin: Skin is warm and dry. No rash noted.   Psychiatric: He has a normal mood and affect. His behavior is normal.       Current Medications:   albuterol-ipratropium  3 mL Nebulization Q6H    apixaban  5 mg Oral BID    aspirin  81 mg Oral Daily    atorvastatin  20 mg Oral QHS    carvedilol  25 mg Oral BID    ferrous gluconate  324 mg Oral Daily with breakfast    furosemide  80 mg Intravenous BID    losartan  50 mg Oral Daily    polyethylene glycol  17 g Oral Daily    tamsulosin  0.4 mg Oral Daily       acetaminophen, hydrALAZINE, prochlorperazine    Laboratory:  CBC:  Recent Labs   Lab 08/29/18  1013 11/30/18  1234 12/01/18  0515 12/02/18  0536 12/31/18  1545   WHITE BLOOD CELL COUNT 5.18 8.34 5.63 4.25 8.01   HEMOGLOBIN 10.4 L 10.5 L 10.3 L 10.3 L 11.1 L   HEMATOCRIT 35.4 L 34.2 L 32.4 L 32.8  L 36.6 L   PLATELETS 311 348 369 H 362 H 350       CHEMISTRIES:  Recent Labs   Lab 05/04/18  0613  05/27/18  1330  05/29/18  0410  12/01/18  0515 12/02/18  0535 12/31/18  1545 12/31/18  2131 01/02/19  0546 01/03/19  0613   GLUCOSE 84   < > 216 H   < > 137 H  137 H   < > 205 H 185 H 89  --  102 111 H   SODIUM 142   < > 142   < > 139  139   < > 139 141 143  --  143 142   POTASSIUM 5.0   < > 4.5   < > 4.0  4.0   < > 4.6 4.4 4.2  --  4.7 4.2   BUN BLD 34 H   < > 15   < > 18  18   < > 33 H 41 H 22  --  24 H 28 H   CREATININE 1.4   < > 1.2   < > 1.1  1.1   < > 1.5 H 1.6 H 1.4  --  1.4 1.4   EGFR IF  55 A   < > >60   < > >60  >60   < > 50 A 46 A 54 A  --  54 A 54 A   EGFR IF NON- 47 A   < > 57 A   < > >60  >60   < > 43 A 40 A 47 A  --  47 A 47 A   CALCIUM 10.7 H   < > 10.8 H   < > 10.9 H  10.9 H   < > 10.9 H 10.5 10.4  --  10.7 H 10.3   MAGNESIUM 2.2  --  1.9  --  2.1  --   --   --   --  2.1  --   --     < > = values in this interval not displayed.       CARDIAC BIOMARKERS:  Recent Labs   Lab 12/01/18  0001 12/31/18  1545 12/31/18  2131 01/01/19  0346 01/01/19  0830   TROPONIN I 0.107 H 0.126 H 0.113 H 0.136 H 0.104 H       COAGS:  Recent Labs   Lab 05/03/18  1351 05/23/18  1010 05/27/18  1330 07/09/18  1410 11/30/18  1234   INR 1.2 1.0 1.0 1.1 1.1       LIPIDS/LFTS:  Recent Labs   Lab 09/25/16  0607  06/21/18  0916 07/09/18  1410 07/10/18  0607 08/29/18  1013 11/30/18  1234 12/31/18  1545   CHOLESTEROL 85 L  --   --   --  103 L  --   --   --    TRIGLYCERIDES 115  --   --   --  70  --   --   --    HDL 30 L  --   --   --  37 L  --   --   --    LDL CHOLESTEROL 32.0 L  --   --   --  52.0 L  --   --   --    NON-HDL CHOLESTEROL 55  --   --   --  66  --   --   --    AST 73 H   < > 20 18  --  13 18 20   ALT 44   < > 19 18  --  7 L 10 15    < > = values in this interval not displayed.       BNP:  Recent Labs   Lab 05/27/18  1330 06/01/18  1042 07/09/18  1410 11/30/18  1234 12/31/18  1545     H 333 H 553 H 661 H 958 H       TSH:  Recent Labs   Lab 07/09/18  2343   TSH 1.108       Free T4:  Recent Labs   Lab 07/09/18  2343   FREE T4 1.06       Diagnostic Results:  ECG (personally reviewed tracings):  12/31/18 1457 AF 98, RBBB, PVC, inflat TW abnl ?isch, similar to 12/2/18    Chest X-Ray (personally reviewed image(s)): 12/31/18 CHF, L PPM (2 lead), sternotomy    Echo: 12/1/18  Mildly decreased left ventricular systolic function. The estimated ejection fraction is 45%  Concentric left ventricular hypertrophy.  Left ventricular diastolic dysfunction.  Mildly reduced right ventricular systolic function.  Severe left atrial enlargement.  Moderate atrial enlargement.  Mild aortic regurgitation.  Mild-to-moderate mitral regurgitation.  Moderate tricuspid regurgitation.  The estimated PA systolic pressure is 85.79 mm Hg  Pulmonary hypertension present.    Stress Test: L MPI 5/4/18  Nuclear Quantitative Functional Analysis:   Quantitative figures are not accurate.   Visually estimated LV function is low normal.   Impression: ABNORMAL MYOCARDIAL PERFUSION  1. The perfusion scan is free of evidence for myocardial ischemia.   2. There is mild intensity fixed defect in the apical wall of the left ventricle, consistent with myocardial injury, and moderate intensity fixed defect in the inferolateral wall of the left ventricle, consistent with myocardial injury.   3. Resting wall motion is physiologic.   4. Visually estimated LV function is low normal.   5. The ventricular volumes are normal at rest and stress.   6. The extracardiac distribution of radioactivity is normal.

## 2019-01-03 NOTE — ASSESSMENT & PLAN NOTE
Pt has HFpEF (EF 45% by recent echo)  Known CAD s/p CABG, MPI 5/2018 neg for ischemia  Trop elev c/w prior admissions/CHF exac, current presentation does not seem c/w ACS  No further NSVT  Suggest continued IV lasix diuresis, goal net neg 1-1.5L daily  Inc lasix 100mg bid, consider zaroxylyn  Inc losartan 100mg qd

## 2019-01-03 NOTE — SUBJECTIVE & OBJECTIVE
Interval History: he is stable on NC O 2,    Review of Systems   Constitutional: Positive for activity change, appetite change and fatigue. Negative for chills, diaphoresis and fever.   HENT: Negative for congestion, hearing loss, sore throat, tinnitus and trouble swallowing.    Eyes: Negative for photophobia, discharge, itching and visual disturbance.   Respiratory: Positive for shortness of breath. Negative for apnea, cough, wheezing and stridor.         Dusky nailbeds; obvious WOB   Cardiovascular: Positive for leg swelling. Negative for chest pain and palpitations.   Gastrointestinal: Negative for abdominal distention, abdominal pain, blood in stool, constipation, diarrhea and nausea.   Endocrine: Negative for polydipsia, polyphagia and polyuria.   Genitourinary: Positive for difficulty urinating. Negative for dysuria, flank pain, frequency, hematuria and penile pain.   Musculoskeletal: Positive for joint swelling. Negative for arthralgias and neck stiffness.   Skin: Positive for pallor. Negative for color change, rash and wound.   Neurological: Positive for weakness. Negative for dizziness, tremors, seizures, light-headedness, numbness and headaches.   Hematological: Negative for adenopathy.   Psychiatric/Behavioral: Negative for hallucinations and self-injury.     Objective:     Vital Signs (Most Recent):  Temp: 97.1 °F (36.2 °C) (01/03/19 0742)  Pulse: 79 (01/03/19 0742)  Resp: 19 (01/03/19 0742)  BP: (!) 173/74 (01/03/19 0742)  SpO2: 98 % (01/03/19 0742) Vital Signs (24h Range):  Temp:  [97.1 °F (36.2 °C)-98.4 °F (36.9 °C)] 97.1 °F (36.2 °C)  Pulse:  [68-94] 79  Resp:  [18-21] 19  SpO2:  [92 %-98 %] 98 %  BP: (110-180)/(63-88) 173/74     Weight: 92.5 kg (203 lb 14.8 oz)  Body mass index is 30.11 kg/m².    Intake/Output Summary (Last 24 hours) at 1/3/2019 0859  Last data filed at 1/3/2019 0200  Gross per 24 hour   Intake 220 ml   Output 510 ml   Net -290 ml      Physical Exam   Constitutional: He appears  well-developed and well-nourished. He is cooperative.   HENT:   Head: Normocephalic and atraumatic.   Eyes: Conjunctivae, EOM and lids are normal. Pupils are equal, round, and reactive to light.   Neck: Normal range of motion and full passive range of motion without pain. Neck supple. JVD present. No edema present. No thyroid mass present.   Cardiovascular: Normal rate, S1 normal, S2 normal and intact distal pulses.   No murmur heard.  Pulmonary/Chest: No respiratory distress. He has rales.   Abdominal: Soft. Bowel sounds are normal. He exhibits no distension and no abdominal bruit. There is no splenomegaly or hepatomegaly. There is no tenderness. There is no CVA tenderness.   Genitourinary: No penile tenderness.   Genitourinary Comments: Urinary retension   Musculoskeletal: Normal range of motion. He exhibits edema.   Lymphadenopathy:     He has no cervical adenopathy.     He has no axillary adenopathy.   Neurological: He is alert. He has normal reflexes. He displays no tremor. He displays no seizure activity.   Skin: Skin is warm, dry and intact.   Psychiatric: He has a normal mood and affect. His speech is normal. Thought content normal. Cognition and memory are normal.       Significant Labs:   CBC:   No results for input(s): WBC, HGB, HCT, PLT in the last 48 hours.  CMP:   Recent Labs   Lab 01/02/19  0546 01/03/19  0613    142   K 4.7 4.2    111*   CO2 24 25    111*   BUN 24* 28*   CREATININE 1.4 1.4   CALCIUM 10.7* 10.3   ANIONGAP 9 6*   EGFRNONAA 47* 47*     Cardiac Markers:   No results for input(s): CKMB, MYOGLOBIN, BNP, TROPISTAT in the last 48 hours.  Lipase: No results for input(s): LIPASE in the last 48 hours.  Troponin:   No results for input(s): TROPONINI in the last 48 hours.  TSH:   Recent Labs   Lab 07/09/18  2343   TSH 1.108     Significant Imaging:   Imaging Results          X-Ray Chest AP Portable (Final result)  Result time 12/31/18 15:32:46    Final result by Macarena  MD Vance (12/31/18 15:32:46)                 Impression:      There is no evidence acute pulmonary disease.  The chest appears similar to November 30, 2018.      Electronically signed by: Macarena Woodard MD  Date:    12/31/2018  Time:    15:32             Narrative:    EXAMINATION:  XR CHEST AP PORTABLE    CLINICAL HISTORY:  Shortness of breath    TECHNIQUE:  Single frontal view of the chest was performed.    COMPARISON:  None    FINDINGS:  There is a left-sided pacer defibrillator with leads in the right atrium and right ventricle.  The patient is status post sternotomy.  The cardiac silhouette is enlarged.  There is no pneumothorax.

## 2019-01-03 NOTE — PLAN OF CARE
Problem: Fall Injury Risk  Goal: Absence of Fall and Fall-Related Injury    Intervention: Identify and Manage Contributors to Fall Injury Risk   01/02/19 2000   Manage Acute Allergic Reaction   Medication Review/Management medications reviewed   Identify and Manage Contributors to Fall Injury Risk   Self-Care Promotion independence encouraged;BADL personal objects within reach;BADL personal routines maintained     Intervention: Promote Injury-Free Environment   01/02/19 2000 01/03/19 0200   Optimize [place] and Functional Mobility   Environmental Safety Modification clutter free environment maintained;lighting adjusted;room near unit station --    Optimize Balance and Safe Activity   Safety Promotion/Fall Prevention --  bed alarm set;lighting adjusted;medications reviewed;nonskid shoes/socks when out of bed;room near unit station;side rails raised x 2;instructed to call staff for mobility         Comments: Fall prevention precautions used throughout shift, including low bed position, assistance when standing, and utilization of bed alarm.

## 2019-01-03 NOTE — NURSING
Bedside report received from NIRALI Richard. Patient is awake and alert, sitting at bedside. Patient appears to be in no apparent distress. Safety maintained. Will continue to monitor.

## 2019-01-03 NOTE — NURSING
Report given to NIRALI Richard. Patient was in no apparent distress. 12 Hour chart check complete.

## 2019-01-04 LAB
ANION GAP SERPL CALC-SCNC: 6 MMOL/L
BUN SERPL-MCNC: 27 MG/DL
CALCIUM SERPL-MCNC: 10.1 MG/DL
CHLORIDE SERPL-SCNC: 111 MMOL/L
CO2 SERPL-SCNC: 25 MMOL/L
CREAT SERPL-MCNC: 1.4 MG/DL
EST. GFR  (AFRICAN AMERICAN): 54 ML/MIN/1.73 M^2
EST. GFR  (NON AFRICAN AMERICAN): 47 ML/MIN/1.73 M^2
GLUCOSE SERPL-MCNC: 93 MG/DL
POCT GLUCOSE: 109 MG/DL (ref 70–110)
POCT GLUCOSE: 119 MG/DL (ref 70–110)
POCT GLUCOSE: 145 MG/DL (ref 70–110)
POTASSIUM SERPL-SCNC: 4.8 MMOL/L
SODIUM SERPL-SCNC: 142 MMOL/L

## 2019-01-04 PROCEDURE — 80048 BASIC METABOLIC PNL TOTAL CA: CPT

## 2019-01-04 PROCEDURE — 97530 THERAPEUTIC ACTIVITIES: CPT

## 2019-01-04 PROCEDURE — 27000221 HC OXYGEN, UP TO 24 HOURS

## 2019-01-04 PROCEDURE — 99232 SBSQ HOSP IP/OBS MODERATE 35: CPT | Mod: ,,, | Performed by: INTERNAL MEDICINE

## 2019-01-04 PROCEDURE — 36415 COLL VENOUS BLD VENIPUNCTURE: CPT

## 2019-01-04 PROCEDURE — 97110 THERAPEUTIC EXERCISES: CPT

## 2019-01-04 PROCEDURE — 21400001 HC TELEMETRY ROOM

## 2019-01-04 PROCEDURE — 25000003 PHARM REV CODE 250: Performed by: HOSPITALIST

## 2019-01-04 PROCEDURE — 97116 GAIT TRAINING THERAPY: CPT

## 2019-01-04 PROCEDURE — 25000003 PHARM REV CODE 250: Performed by: NURSE PRACTITIONER

## 2019-01-04 PROCEDURE — 25000003 PHARM REV CODE 250: Performed by: EMERGENCY MEDICINE

## 2019-01-04 PROCEDURE — 25000003 PHARM REV CODE 250: Performed by: INTERNAL MEDICINE

## 2019-01-04 PROCEDURE — 63600175 PHARM REV CODE 636 W HCPCS: Performed by: INTERNAL MEDICINE

## 2019-01-04 PROCEDURE — 94640 AIRWAY INHALATION TREATMENT: CPT

## 2019-01-04 PROCEDURE — 99232 PR SUBSEQUENT HOSPITAL CARE,LEVL II: ICD-10-PCS | Mod: ,,, | Performed by: INTERNAL MEDICINE

## 2019-01-04 PROCEDURE — 25000242 PHARM REV CODE 250 ALT 637 W/ HCPCS: Performed by: EMERGENCY MEDICINE

## 2019-01-04 PROCEDURE — 94761 N-INVAS EAR/PLS OXIMETRY MLT: CPT

## 2019-01-04 RX ORDER — GLUCAGON 1 MG
1 KIT INJECTION
Status: DISCONTINUED | OUTPATIENT
Start: 2019-01-04 | End: 2019-01-06 | Stop reason: SDUPTHER

## 2019-01-04 RX ORDER — IBUPROFEN 200 MG
24 TABLET ORAL
Status: DISCONTINUED | OUTPATIENT
Start: 2019-01-04 | End: 2019-01-08 | Stop reason: HOSPADM

## 2019-01-04 RX ORDER — IBUPROFEN 200 MG
16 TABLET ORAL
Status: DISCONTINUED | OUTPATIENT
Start: 2019-01-04 | End: 2019-01-06 | Stop reason: SDUPTHER

## 2019-01-04 RX ORDER — IBUPROFEN 200 MG
16 TABLET ORAL
Status: DISCONTINUED | OUTPATIENT
Start: 2019-01-04 | End: 2019-01-08 | Stop reason: HOSPADM

## 2019-01-04 RX ORDER — GLUCAGON 1 MG
1 KIT INJECTION
Status: DISCONTINUED | OUTPATIENT
Start: 2019-01-04 | End: 2019-01-08 | Stop reason: HOSPADM

## 2019-01-04 RX ORDER — INSULIN ASPART 100 [IU]/ML
0-5 INJECTION, SOLUTION INTRAVENOUS; SUBCUTANEOUS
Status: DISCONTINUED | OUTPATIENT
Start: 2019-01-04 | End: 2019-01-08 | Stop reason: HOSPADM

## 2019-01-04 RX ORDER — FUROSEMIDE 40 MG/1
80 TABLET ORAL 2 TIMES DAILY
Status: DISCONTINUED | OUTPATIENT
Start: 2019-01-04 | End: 2019-01-07

## 2019-01-04 RX ORDER — IBUPROFEN 200 MG
24 TABLET ORAL
Status: DISCONTINUED | OUTPATIENT
Start: 2019-01-04 | End: 2019-01-06 | Stop reason: SDUPTHER

## 2019-01-04 RX ADMIN — LOSARTAN POTASSIUM 100 MG: 25 TABLET, FILM COATED ORAL at 08:01

## 2019-01-04 RX ADMIN — FUROSEMIDE 80 MG: 40 TABLET ORAL at 05:01

## 2019-01-04 RX ADMIN — IPRATROPIUM BROMIDE AND ALBUTEROL SULFATE 3 ML: .5; 3 SOLUTION RESPIRATORY (INHALATION) at 07:01

## 2019-01-04 RX ADMIN — ATORVASTATIN CALCIUM 20 MG: 10 TABLET, FILM COATED ORAL at 09:01

## 2019-01-04 RX ADMIN — ASPIRIN 81 MG: 81 TABLET, COATED ORAL at 08:01

## 2019-01-04 RX ADMIN — APIXABAN 5 MG: 5 TABLET, FILM COATED ORAL at 08:01

## 2019-01-04 RX ADMIN — CARVEDILOL 25 MG: 6.25 TABLET, FILM COATED ORAL at 09:01

## 2019-01-04 RX ADMIN — FERROUS GLUCONATE TAB 324 MG (37.5 MG ELEMENTAL IRON) 324 MG: 324 (37.5 FE) TAB at 08:01

## 2019-01-04 RX ADMIN — FUROSEMIDE 100 MG: 10 INJECTION, SOLUTION INTRAMUSCULAR; INTRAVENOUS at 08:01

## 2019-01-04 RX ADMIN — APIXABAN 5 MG: 5 TABLET, FILM COATED ORAL at 09:01

## 2019-01-04 RX ADMIN — CARVEDILOL 25 MG: 6.25 TABLET, FILM COATED ORAL at 08:01

## 2019-01-04 RX ADMIN — TAMSULOSIN HYDROCHLORIDE 0.4 MG: 0.4 CAPSULE ORAL at 08:01

## 2019-01-04 RX ADMIN — IPRATROPIUM BROMIDE AND ALBUTEROL SULFATE 3 ML: .5; 3 SOLUTION RESPIRATORY (INHALATION) at 01:01

## 2019-01-04 NOTE — PROGRESS NOTES
[facility]  Cardiology  Progress Note    Patient Name: Agus Ramos Jr.  MRN: [id number]  Admission Date: 12/31/2018  Hospital Length of Stay: 3 days  Code Status: Full Code   Attending Physician: Ace Cisneros MD   Primary Care Physician: Keaton Hardy MD  Expected Discharge Date:   Principal Problem:Acute on chronic combined systolic and diastolic congestive heart failure    Subjective:     Hospital Course:   12/31/18: adm with CHF and concern for ?WCT (seems to be AF with BBB)  1/1/19: issues with urinary retention, stearns apparently not needed  1-3:  No acute events    Interval History:  He denies any chest pain shortness of breath    Telemetry:  AFib rate controlled    Review of Systems   All other systems reviewed and are negative.    Objective:     Vital Signs (Most Recent):  Temp: 97.5 °F (36.4 °C) (01/04/19 0719)  Pulse: 64 (01/04/19 0719)  Resp: 18 (01/04/19 0719)  BP: (!) 140/81 (01/04/19 0719)  SpO2: 96 % (01/04/19 0719) Vital Signs (24h Range):  Temp:  [97.5 °F (36.4 °C)-98.8 °F (37.1 °C)] 97.5 °F (36.4 °C)  Pulse:  [64-89] 64  Resp:  [16-20] 18  SpO2:  [92 %-98 %] 96 %  BP: (118-177)/(68-92) 140/81     Weight: 92.5 kg (203 lb 14.8 oz)  Body mass index is 30.11 kg/m².     SpO2: 96 %  O2 Device (Oxygen Therapy): nasal cannula      Intake/Output Summary (Last 24 hours) at 1/4/2019 1019  Last data filed at 1/3/2019 2030  Gross per 24 hour   Intake 360 ml   Output 670 ml   Net -310 ml       Lines/Drains/Airways     Peripheral Intravenous Line                 Peripheral IV - Single Lumen 12/31/18 1544 Right Hand 3 days                Physical Exam   Constitutional: He is oriented to person, place, and time. No distress.   Cardiovascular: Normal rate. An irregularly irregular rhythm present.   Pulmonary/Chest:   Decreased breath sounds bilaterally   Musculoskeletal: He exhibits no edema.   Neurological: He is alert and oriented to person, place, and time.       Significant Labs:    BMP:   Recent Labs   Lab 01/03/19  0613 01/04/19  0606   * 93    142   K 4.2 4.8   * 111*   CO2 25 25   BUN 28* 27*   CREATININE 1.4 1.4   CALCIUM 10.3 10.1    and CBC No results for input(s): WBC, HGB, HCT, PLT in the last 48 hours.    Significant Imaging: Echocardiogram:   Transthoracic echo (TTE) complete (Cupid Only):   Results for orders placed or performed during the hospital encounter of 11/30/18   Transthoracic echo (TTE) complete (Cupid Only)   Result Value Ref Range    BSA 2.02 m2    LA WIDTH 3.99 cm    AORTIC VALVE CUSP SEPERATION 1.95 cm    PV PEAK VELOCITY 1.09 cm/s    LVIDD 5.12 3.5 - 6.0 cm    IVS 1.52 (A) 0.6 - 1.1 cm    PW 1.33 (A) 0.6 - 1.1 cm    Ao root annulus 3.90 cm    LVIDS 4.21 (A) 2.1 - 4.0 cm    FS 18 28 - 44 %    LA volume 83.85 cm3    Sinus 3.91 cm    STJ 2.73 cm    Ascending aorta 3.10 cm    LV mass 310.14 g    LA size 4.64 cm    RVDD 4.35 cm    TAPSE 1.32 cm    RV S' 5.78 m/s    Left Ventricle Relative Wall Thickness 0.52 cm    AV mean gradient 6.44 mmHg    AV valve area 2.15 cm2    AV index (prosthetic) 0.65     IVRT 0.07 msec    LVOT diameter 2.06 cm    LVOT area 3.33 cm2    LVOT peak VTI 17.07 cm    Ao peak thien 1.78 m/s    Ao VTI 26.42 cm    LVOT stroke volume 56.86 cm3    AV peak gradient 12.67 mmHg    MV Peak E Thien 1.29 m/s    TR Max Thien 4.41 m/s    LV Systolic Volume 79.24 mL    LV Systolic Volume Index 39.2 mL/m2    LV Diastolic Volume 124.66 mL    LV Diastolic Volume Index 61.71 mL/m2    LA Volume Index 41.5 mL/m2    LV Mass Index 153.5 g/m2    RA Major Axis 4.49 cm    Left Atrium Minor Axis 5.69 cm    Left Atrium Major Axis 5.01 cm    Triscuspid Valve Regurgitation Peak Gradient 77.79 mmHg    RA Width 3.99 cm    Right Atrial Pressure (from IVC) 8 mmHg    TV rest pulmonary artery pressure 85.79 mmHg     Assessment and Plan:     Brief HPI:     * Acute on chronic combined systolic and diastolic congestive heart failure    Pt has HFpEF (EF 45% by recent  echo)  Known CAD s/p CABG, MPI 5/2018 neg for ischemia  Trop elev c/w prior admissions/CHF exac, current presentation does not seem c/w ACS  No further NSVT  Change to maintenance Lasix     Coronary artery disease involving coronary bypass graft of native heart without angina pectoris    Cont Statin/ARB/coreg/ASA  Titrate losartan       NSVT (nonsustained ventricular tachycardia)    As above  Titrate losartan  Rx CHF  If persistent episodes, will need [facility]     Pacemaker    Biotronik     Chronic anticoagulation    As above     Type 2 diabetes mellitus, controlled, with renal complications    Per IM     CKD Stage 3    Per IM     Essential hypertension    Titrate  losartan     Chronic atrial fibrillation    Chronic  Cont eliquis  Plan rate control strategy         VTE Risk Mitigation (From admission, onward)        Ordered     apixaban tablet 5 mg  2 times daily      12/31/18 1853        No further recommendations from CV standpoint.  We will see as needed    Raj Izquierdo MD  Cardiology  [facility] [place]

## 2019-01-04 NOTE — PROGRESS NOTES
"Patient refused to use CPAP machine during evening hours, stating he "can't breathe or talk" when it is on.  "

## 2019-01-04 NOTE — SUBJECTIVE & OBJECTIVE
Interval History:  He denies any chest pain shortness of breath    Telemetry:  AFib rate controlled    Review of Systems   All other systems reviewed and are negative.    Objective:     Vital Signs (Most Recent):  Temp: 97.5 °F (36.4 °C) (01/04/19 0719)  Pulse: 64 (01/04/19 0719)  Resp: 18 (01/04/19 0719)  BP: (!) 140/81 (01/04/19 0719)  SpO2: 96 % (01/04/19 0719) Vital Signs (24h Range):  Temp:  [97.5 °F (36.4 °C)-98.8 °F (37.1 °C)] 97.5 °F (36.4 °C)  Pulse:  [64-89] 64  Resp:  [16-20] 18  SpO2:  [92 %-98 %] 96 %  BP: (118-177)/(68-92) 140/81     Weight: 92.5 kg (203 lb 14.8 oz)  Body mass index is 30.11 kg/m².     SpO2: 96 %  O2 Device (Oxygen Therapy): nasal cannula      Intake/Output Summary (Last 24 hours) at 1/4/2019 1019  Last data filed at 1/3/2019 2030  Gross per 24 hour   Intake 360 ml   Output 670 ml   Net -310 ml       Lines/Drains/Airways     Peripheral Intravenous Line                 Peripheral IV - Single Lumen 12/31/18 1544 Right Hand 3 days                Physical Exam   Constitutional: He is oriented to person, place, and time. No distress.   Cardiovascular: Normal rate. An irregularly irregular rhythm present.   Pulmonary/Chest:   Decreased breath sounds bilaterally   Musculoskeletal: He exhibits no edema.   Neurological: He is alert and oriented to person, place, and time.       Significant Labs:   BMP:   Recent Labs   Lab 01/03/19  0613 01/04/19  0606   * 93    142   K 4.2 4.8   * 111*   CO2 25 25   BUN 28* 27*   CREATININE 1.4 1.4   CALCIUM 10.3 10.1    and CBC No results for input(s): WBC, HGB, HCT, PLT in the last 48 hours.    Significant Imaging: Echocardiogram:   Transthoracic echo (TTE) complete (Cupid Only):   Results for orders placed or performed during the hospital encounter of 11/30/18   Transthoracic echo (TTE) complete (Cupid Only)   Result Value Ref Range    BSA 2.02 m2    LA WIDTH 3.99 cm    AORTIC VALVE CUSP SEPERATION 1.95 cm    PV PEAK VELOCITY 1.09 cm/s     LVIDD 5.12 3.5 - 6.0 cm    IVS 1.52 (A) 0.6 - 1.1 cm    PW 1.33 (A) 0.6 - 1.1 cm    Ao root annulus 3.90 cm    LVIDS 4.21 (A) 2.1 - 4.0 cm    FS 18 28 - 44 %    LA volume 83.85 cm3    Sinus 3.91 cm    STJ 2.73 cm    Ascending aorta 3.10 cm    LV mass 310.14 g    LA size 4.64 cm    RVDD 4.35 cm    TAPSE 1.32 cm    RV S' 5.78 m/s    Left Ventricle Relative Wall Thickness 0.52 cm    AV mean gradient 6.44 mmHg    AV valve area 2.15 cm2    AV index (prosthetic) 0.65     IVRT 0.07 msec    LVOT diameter 2.06 cm    LVOT area 3.33 cm2    LVOT peak VTI 17.07 cm    Ao peak thien 1.78 m/s    Ao VTI 26.42 cm    LVOT stroke volume 56.86 cm3    AV peak gradient 12.67 mmHg    MV Peak E Thien 1.29 m/s    TR Max Thien 4.41 m/s    LV Systolic Volume 79.24 mL    LV Systolic Volume Index 39.2 mL/m2    LV Diastolic Volume 124.66 mL    LV Diastolic Volume Index 61.71 mL/m2    LA Volume Index 41.5 mL/m2    LV Mass Index 153.5 g/m2    RA Major Axis 4.49 cm    Left Atrium Minor Axis 5.69 cm    Left Atrium Major Axis 5.01 cm    Triscuspid Valve Regurgitation Peak Gradient 77.79 mmHg    RA Width 3.99 cm    Right Atrial Pressure (from IVC) 8 mmHg    TV rest pulmonary artery pressure 85.79 mmHg

## 2019-01-04 NOTE — PLAN OF CARE
Problem: Breathing Pattern Ineffective  Goal: Effective Breathing Pattern    Intervention: Promote Improved Breathing Pattern   01/04/19 0400   Prevent Additional Skin Injury   Head of Bed (HOB) HOB at 20-30 degrees         Comments: Patient's oxygen intake was monitored throughout the shift, as patient refused to wear CPAP mask for sleep.

## 2019-01-04 NOTE — ASSESSMENT & PLAN NOTE
Patient with known heart failure and elevated PA pressures - presents after poor diet compliance with likely CHF exacerbation - has been slow to improve despite IV lasix - has urinary retension  Continuous cardiac monitoring and continue to trend CE  ASA 81 mg daily  Diuresis patient - furosemide 40 mg IVP BID  Pulse OX Q4 with vitals  Oxygen supplementation to keep sats above 89%  Add BB - coreg/ IV diuretic/continue home ARB &statin  Lipid panel and TSH for completeness  UA with next void  He refused CPAP over night,he has been consulted.  HR is elevated ,increased coreg,  Monitor UOP with  bladder scan.  Consulted PT,OT   check bladder scan.not require stearns  Leg swelling did not improved yet.elevate legs.  Lasix is increased.

## 2019-01-04 NOTE — PLAN OF CARE
Problem: Physical Therapy Goal  Goal: Physical Therapy Goal  Goals to be met by: 19    Patient will increase functional independence with mobility by performin. Sit to stand transfer with Supervision  2. Gait x200 feet with Supervision using Rolling Walker  3. Lower extremity exercise program x30 reps per handout, with supervision     Outcome: Ongoing (interventions implemented as appropriate)    Patient ambulated 50ft x2 trials with seated rest break between trials, RW, and SBA.

## 2019-01-04 NOTE — PT/OT/SLP PROGRESS
Occupational Therapy   Treatment    Name: Agus Ramos Jr.  MRN: [id number]  Admitting Diagnosis:  Acute on chronic combined systolic and diastolic congestive heart failure       Recommendations:     Discharge Recommendations: (home with family and HH OT)  Discharge Equipment Recommendations:  none  Barriers to discharge:       Subjective   Patient agreeable to therapy.   Pain/Comfort:  · Pain Rating 1: 0/10    Objective:     Communicated with: Nurse Celestin prior to session.  Patient found with all lines intact and peripheral IV, telemetry, oxygen upon OT entry to room.    General Precautions: Standard, fall, respiratory   Orthopedic Precautions:N/A   Braces: N/A     Occupational Performance:    Bed Mobility:    · Patient completed Scooting/Bridging with stand by assistance  · Patient completed Supine to Sit with stand by assistance     Functional Mobility/Transfers:  · Patient completed Sit <> Stand Transfer with contact guard assistance with  rolling walker x 2 trials  · Patient completed Bed <> Chair Transfer using Step Transfer technique with stand by assistance and contact guard assistance with rolling walker  · Functional Mobility: Patient able to ambulate around bed to chair with CGA/RW.     Activities of Daily Living:  · Grooming: stand by assistance to wash hands with wipes after urinal use  · Toileting: stand by assistance to place and use urinal in standing, contact guard asssitance for standing balance during task    [facility] 6 Click ADL: 20    Treatment & Education:  Patient performed bed mobility, functional transfers, and ADL's.  Patient educated on and performed BUE AROM therex x 10 reps in all available planes.  Patient with edematous BUE, encouraged to perform throughout day.  Patient requires increased time and rest breaks due to SOB and fatigue with therapy activities. Patient educated on pursed lip breathing with exertion. Patient verbalizes understanding.     Patient left reclined up in chair with  all lines intact, call button in reach and nurse notified. Patient instructed to call for nursing assist before OOC. Patient verbalizes understanding.   Education:    Assessment:     Agus Ramos Jr. is a 78 y.o. male with a medical diagnosis of Acute on chronic combined systolic and diastolic congestive heart failure.  He presents with the following performance deficits affecting function are weakness, impaired endurance, impaired self care skills, impaired functional mobilty, gait instability, impaired balance, decreased upper extremity function, decreased lower extremity function, decreased safety awareness, edema, impaired cardiopulmonary response to activity. Patient tolerates OT session well. Patient progressing.     Rehab Prognosis:  Good; patient would benefit from acute skilled OT services to address these deficits and reach maximum level of function.       Plan:     Patient to be seen 2 x/week to address the above listed problems via self-care/home management, therapeutic activities, therapeutic exercises  · Plan of Care Expires: 01/16/19  · Plan of Care Reviewed with: patient    This Plan of care has been discussed with the patient who was involved in its development and understands and is in agreement with the identified goals and treatment plan    GOALS:   Multidisciplinary Problems     Occupational Therapy Goals        Problem: Occupational Therapy Goal    Goal Priority Disciplines Outcome Interventions   Occupational Therapy Goal     OT, PT/OT Ongoing (interventions implemented as appropriate)    Description:  Goals to be met by: 1/16/19      Patient will increase functional independence with ADLs by performing:    UE Dressing with Modified [place] and Supervision.  LE Dressing with Stand-by Assistance.  Grooming while standing at sink with Stand-by Assistance.  Supine to sit with Supervision.  Step transfer with Stand-by Assistance  Toilet transfer to the toilet with Stand-by  Assistance.  Upper extremity exercise program x10 reps per handout, with assistance as needed.                      Time Tracking:     OT Date of Treatment: 01/04/19  OT Start Time: 1230  OT Stop Time: 1256  OT Total Time (min): 26 min    Billable Minutes:Therapeutic Activity 13  Therapeutic Exercise 13    RAHUL Fatima  1/4/2019

## 2019-01-04 NOTE — PT/OT/SLP PROGRESS
Physical Therapy Treatment    Patient Name:  Agus Ramos Jr.   MRN:  [id number]    Recommendations:     Discharge Recommendations:  (home with home health PT and family assistance)   Discharge Equipment Recommendations: none   Barriers to discharge: None    Assessment:     Agus Ramos Jr. is a 78 y.o. male admitted with a medical diagnosis of Acute on chronic combined systolic and diastolic congestive heart failure.  He presents with the following impairments/functional limitations:  weakness, impaired endurance, gait instability, impaired balance, decreased lower extremity function, decreased safety awareness, impaired cardiopulmonary response to activity.    Rehab Prognosis: Good; patient would benefit from acute skilled PT services to address these deficits and reach maximum level of function.    Recent Surgery: * No surgery found *      Plan:     During this hospitalization, patient to be seen 2 x/week to address the identified rehab impairments via gait training, therapeutic activities, therapeutic exercises and progress toward the following goals:    · Plan of Care Expires:  01/16/19    Subjective     Chief Complaint: Enjoys sitting in chair today.   Patient/Family Comments/goals: To get back to PLOF.   Pain/Comfort:  · Pain Rating 1: 0/10      Objective:     Communicated with nurse Celestin prior to session.  Patient found reclined in bedside chair with telemetry, peripheral IV, oxygen  upon PT entry to room.     General Precautions: Standard, fall, respiratory   Orthopedic Precautions:N/A   Braces: N/A     Functional Mobility:  · Transfers:     · Sit to Stand:  stand by assistance with rolling walker x3 trials from bedside chair   · Gait:  Patient ambulated 50ft x2 trials with seated rest break between trials, Rolling Walker and SBA using 3-point gait. Patient demonstrated decreased arnold, decreased velocity of limb motion and decreased step length during gait due to impaired balance and decreased  endurance.    AM-PAC 6 CLICK MOBILITY  Turning over in bed (including adjusting bedclothes, sheets and blankets)?: 4  Sitting down on and standing up from a chair with arms (e.g., wheelchair, bedside commode, etc.): 4  Moving from lying on back to sitting on the side of the bed?: 4  Moving to and from a bed to a chair (including a wheelchair)?: 3  Need to walk in hospital room?: 3  Climbing 3-5 steps with a railing?: 1  Basic Mobility Total Score: 19     Therapeutic Activities and Exercises:   Patient performed B LE seated therex x15 reps for A/P, LAQ, hip flex, and pillow squeezes.     Patient left reclined in bedside chair with all lines intact, call button in reach and nurse Sabi notified.    GOALS:   Multidisciplinary Problems     Physical Therapy Goals        Problem: Physical Therapy Goal    Goal Priority Disciplines Outcome Goal Variances Interventions   Physical Therapy Goal     PT, PT/OT Ongoing (interventions implemented as appropriate)     Description:  Goals to be met by: 19    Patient will increase functional independence with mobility by performin. Sit to stand transfer with Supervision  2. Gait x200 feet with Supervision using Rolling Walker  3. Lower extremity exercise program x30 reps per handout, with supervision                      Time Tracking:     PT Received On: 19  PT Start Time: 1505     PT Stop Time: 1528  PT Total Time (min): 23 min     Billable Minutes: Gait Training 13 and Therapeutic Exercise 10    Treatment Type: Treatment  PT/PTA: PT     PTA Visit Number: 0     Katie Coughlin, PT  2019

## 2019-01-04 NOTE — PLAN OF CARE
Problem: Occupational Therapy Goal  Goal: Occupational Therapy Goal  Goals to be met by: 1/16/19      Patient will increase functional independence with ADLs by performing:    UE Dressing with Modified [place] and Supervision.  LE Dressing with Stand-by Assistance.  Grooming while standing at sink with Stand-by Assistance.  Supine to sit with Supervision.  Step transfer with Stand-by Assistance  Toilet transfer to the toilet with Stand-by Assistance.  Upper extremity exercise program x10 reps per handout, with assistance as needed.     Outcome: Ongoing (interventions implemented as appropriate)  Patient tolerated OT session well.  Patient progressing.

## 2019-01-04 NOTE — PROGRESS NOTES
[facility] Medicine  Progress Note    Patient Name: Agus Raoms Jr.  MRN: [id number]  Patient Class: IP- Inpatient   Admission Date: 12/31/2018  Length of Stay: 3 days  Attending Physician: Ace Cisneros MD  Primary Care Provider: Keaton Hardy MD        Subjective:     Principal Problem:Acute on chronic combined systolic and diastolic congestive heart failure    HPI:  78 y.o. male with chronic a fib, HTN, HLD, CKD stage III, DM2, chronic OAC, tobacco abuse, chronic systolic heart failure, anemia, pacemaker in situ, COPD, and CAD s/p CABG presents with a complaint of SOB progressively worsening for the past 5 weeks.  His PCP directed him to the ED today.  Denies fever, chills, cough, chest pain, dizziness, syncope, N/V/D, abdominal pain, bloody or black stools, or dysuria.  Reports adherence to home treatment plan.  In the ED he was found to have elevated BNP of 958 and pulmonary edema on chest xray, troponin also mildly elevated at 0.126.  He received nebulizer treatment, IV lasix, and nitro paste with improvement.  Short runs of V tach noted with exertion on tele.  Discussed with Cardiology in ED and placed in observation for further evaluation and treatment.    Hospital Course:  Patient admits to poor compliance to diet over the holidays - He has LVEF 45% and PA pressure 85.79 - continued 2-3+ pitting edema; activity intolerance, dyspnea with minimal exertion and at rest, and although he uses home oxygen sat 2L he is requiring 3.5L to keep sats above 85%. His sat drops to 82% on his usual 2L at rest. Additionally, the patient has been having low urinary output despite IV lasix 40 mg BID 2/2 urinary retension. Multiple attempts at catheter placement ordered by ED were unsuccessful overnight - Ordered to discontinue attempts at catheter placement - bowel regimen and added Flomax to patient's medication regimen. Urology was consulted and agrees with decreasing attempts at  catheter placement unless the patient becomes uncomfortable due to inability to void - fluid restriction, daily weight, continue IV lasix - bowel regimen with mineral oil enemal - strict I&O's - supplemental oxygen at 3.5L  He refused CPAP over night,he has been consulted.he is more complaint today.  HR is elevated ,increased coreg,improved.  Monitor UOP with  bladder scan.  Consulted PT,OT   Leg swelling did not improved yet.elevate legs,    Interval History: he is stable on NC O 2,    Review of Systems   Constitutional: Positive for activity change, appetite change and fatigue. Negative for chills, diaphoresis and fever.   HENT: Negative for congestion, hearing loss, sore throat, tinnitus and trouble swallowing.    Eyes: Negative for photophobia, discharge, itching and visual disturbance.   Respiratory: Negative for apnea, cough, shortness of breath, wheezing and stridor.         Dusky nailbeds; obvious WOB   Cardiovascular: Positive for leg swelling. Negative for chest pain and palpitations.   Gastrointestinal: Negative for abdominal distention, abdominal pain, blood in stool, constipation, diarrhea and nausea.   Endocrine: Negative for polydipsia, polyphagia and polyuria.   Genitourinary: Negative for difficulty urinating, dysuria, flank pain, frequency, hematuria and penile pain.   Musculoskeletal: Positive for joint swelling. Negative for arthralgias and neck stiffness.   Skin: Positive for pallor. Negative for color change, rash and wound.   Neurological: Positive for weakness. Negative for dizziness, tremors, seizures, light-headedness, numbness and headaches.   Hematological: Negative for adenopathy.   Psychiatric/Behavioral: Negative for hallucinations and self-injury.     Objective:     Vital Signs (Most Recent):  Temp: 97.5 °F (36.4 °C) (01/04/19 0719)  Pulse: 64 (01/04/19 0719)  Resp: 18 (01/04/19 0719)  BP: (!) 140/81 (01/04/19 0719)  SpO2: 96 % (01/04/19 0719) Vital Signs (24h Range):  Temp:  [97.5 °F  (36.4 °C)-98.8 °F (37.1 °C)] 97.5 °F (36.4 °C)  Pulse:  [64-89] 64  Resp:  [16-20] 18  SpO2:  [92 %-98 %] 96 %  BP: (118-177)/(68-92) 140/81     Weight: 92.5 kg (203 lb 14.8 oz)  Body mass index is 30.11 kg/m².    Intake/Output Summary (Last 24 hours) at 1/4/2019 0900  Last data filed at 1/3/2019 2030  Gross per 24 hour   Intake 480 ml   Output 850 ml   Net -370 ml      Physical Exam   Constitutional: He appears well-developed and well-nourished. He is cooperative.   HENT:   Head: Normocephalic and atraumatic.   Eyes: Conjunctivae, EOM and lids are normal. Pupils are equal, round, and reactive to light.   Neck: Normal range of motion and full passive range of motion without pain. Neck supple. JVD present. No edema present. No thyroid mass present.   Cardiovascular: Normal rate, S1 normal, S2 normal and intact distal pulses.   No murmur heard.  Pulmonary/Chest: No respiratory distress. He has no rales.   Abdominal: Soft. Bowel sounds are normal. He exhibits no distension and no abdominal bruit. There is no splenomegaly or hepatomegaly. There is no tenderness. There is no CVA tenderness.   Genitourinary: No penile tenderness.   Genitourinary Comments: Urinary retension   Musculoskeletal: Normal range of motion. He exhibits edema.   Lymphadenopathy:     He has no cervical adenopathy.     He has no axillary adenopathy.   Neurological: He is alert. He has normal reflexes. He displays no tremor. He displays no seizure activity.   Skin: Skin is warm, dry and intact.   Psychiatric: He has a normal mood and affect. His speech is normal. Thought content normal. Cognition and memory are normal.       Significant Labs:   CBC:   No results for input(s): WBC, HGB, HCT, PLT in the last 48 hours.  CMP:   Recent Labs   Lab 01/03/19  0613 01/04/19  0606    142   K 4.2 4.8   * 111*   CO2 25 25   * 93   BUN 28* 27*   CREATININE 1.4 1.4   CALCIUM 10.3 10.1   ANIONGAP 6* 6*   EGFRNONAA 47* 47*     Cardiac Markers:   No  results for input(s): CKMB, MYOGLOBIN, BNP, TROPISTAT in the last 48 hours.  Lipase: No results for input(s): LIPASE in the last 48 hours.  Troponin:   No results for input(s): TROPONINI in the last 48 hours.  TSH:   Recent Labs   Lab 07/09/18  2343   TSH 1.108     Significant Imaging:   Imaging Results          X-Ray Chest AP Portable (Final result)  Result time 12/31/18 15:32:46    Final result by Macarena Woodard MD (12/31/18 15:32:46)                 Impression:      There is no evidence acute pulmonary disease.  The chest appears similar to November 30, 2018.      Electronically signed by: Macarena Woodard MD  Date:    12/31/2018  Time:    15:32             Narrative:    EXAMINATION:  XR CHEST AP PORTABLE    CLINICAL HISTORY:  Shortness of breath    TECHNIQUE:  Single frontal view of the chest was performed.    COMPARISON:  None    FINDINGS:  There is a left-sided pacer defibrillator with leads in the right atrium and right ventricle.  The patient is status post sternotomy.  The cardiac silhouette is enlarged.  There is no pneumothorax.                                  Assessment/Plan:      * Acute on chronic combined systolic and diastolic congestive heart failure    Patient with known heart failure and elevated PA pressures - presents after poor diet compliance with likely CHF exacerbation - has been slow to improve despite IV lasix - has urinary retension  Continuous cardiac monitoring and continue to trend CE  ASA 81 mg daily  Diuresis patient - furosemide 40 mg IVP BID  Pulse OX Q4 with vitals  Oxygen supplementation to keep sats above 89%  Add BB - coreg/ IV diuretic/continue home ARB &statin  Lipid panel and TSH for completeness  UA with next void  He refused CPAP over night,he has been consulted.  HR is elevated ,increased coreg,  Monitor UOP with  bladder scan.  Consulted PT,OT   check bladder scan.not require stearns  Leg swelling did not improved yet.elevate legs.  Lasix is increased.         Chronic  atrial fibrillation    Continue diltiazem and apixaban, monitor on tele,HR is elevated,incrteased coreg.     SJ (obstructive sleep apnea)    No formal diagnosis however, suspect that the patient likely has some contributory symptoms of SJ - Elevated PA systolic pressure = 85.8 with diastolic dysfunction per 2D echo  Hypoxia in the setting of CHF ex + urinary retension  CPAP nightly to keep sats above 90%     Urinary retention    Initial post void residuals>500 multiple attempts at cath placement unsuccessful - patient is voiding ok but continues to have high post void residuals - bowel regimen with enema administered and started on flomax. Urology consulted who agreed with plan and recommended discontinuing attempts at stearns placement considering patient is on chronic long term anticoagulation therapy with apixaban 2/2 afib - I agree with this opinion. His renal functions appear stable will continue to monitor closely if patient becomes uncomfortable will consider reattempt as recommended by urology with couda  Strict I&O's  Post void residuals q 6 with balder scan.  Monitor BP closely       Difficulty voiding    monitor with bladder scan,on flomax.not require stearns.       NSVT (nonsustained ventricular tachycardia)    cardiology is following,increased BB.improved.       Chronic respiratory failure with hypoxia    Continue home supplemental oxygen 2L NC     Centrilobular emphysema    Recently started on Breo, continue nebs     Pacemaker    Biotronic      Iron deficiency anemia    Patient H/H stable and consistent with baseline. No evidence acute bleeding or indication for transfusion at this time.      Chronic anticoagulation    Continue apixaban     Type 2 diabetes mellitus, controlled, with renal complications    Last HgbA1c   Lab Results   Component Value Date    HGBA1C 5.1 11/30/2018     Hold oral antihyperglycemics while inpatient  PRN sliding scale insulin  ACHS glucose monitoring   ADA diet     CKD Stage 3     Stable, at baseline, maintain euvolemic state, strict I/O's, monitor renal function and electrolytes, avoid nephrotoxic agents.     Essential hypertension    Decent controlled, continue home medications and monitor blood pressure, adjust as needed.       VTE Risk Mitigation (From admission, onward)        Ordered     apixaban tablet 5 mg  2 times daily      12/31/18 8781              Ace Cisneros MD  Department of Hospital Medicine   [facility]

## 2019-01-04 NOTE — SUBJECTIVE & OBJECTIVE
Interval History: he is stable on NC O 2,    Review of Systems   Constitutional: Positive for activity change, appetite change and fatigue. Negative for chills, diaphoresis and fever.   HENT: Negative for congestion, hearing loss, sore throat, tinnitus and trouble swallowing.    Eyes: Negative for photophobia, discharge, itching and visual disturbance.   Respiratory: Negative for apnea, cough, shortness of breath, wheezing and stridor.         Dusky nailbeds; obvious WOB   Cardiovascular: Positive for leg swelling. Negative for chest pain and palpitations.   Gastrointestinal: Negative for abdominal distention, abdominal pain, blood in stool, constipation, diarrhea and nausea.   Endocrine: Negative for polydipsia, polyphagia and polyuria.   Genitourinary: Negative for difficulty urinating, dysuria, flank pain, frequency, hematuria and penile pain.   Musculoskeletal: Positive for joint swelling. Negative for arthralgias and neck stiffness.   Skin: Positive for pallor. Negative for color change, rash and wound.   Neurological: Positive for weakness. Negative for dizziness, tremors, seizures, light-headedness, numbness and headaches.   Hematological: Negative for adenopathy.   Psychiatric/Behavioral: Negative for hallucinations and self-injury.     Objective:     Vital Signs (Most Recent):  Temp: 97.5 °F (36.4 °C) (01/04/19 0719)  Pulse: 64 (01/04/19 0719)  Resp: 18 (01/04/19 0719)  BP: (!) 140/81 (01/04/19 0719)  SpO2: 96 % (01/04/19 0719) Vital Signs (24h Range):  Temp:  [97.5 °F (36.4 °C)-98.8 °F (37.1 °C)] 97.5 °F (36.4 °C)  Pulse:  [64-89] 64  Resp:  [16-20] 18  SpO2:  [92 %-98 %] 96 %  BP: (118-177)/(68-92) 140/81     Weight: 92.5 kg (203 lb 14.8 oz)  Body mass index is 30.11 kg/m².    Intake/Output Summary (Last 24 hours) at 1/4/2019 0900  Last data filed at 1/3/2019 2030  Gross per 24 hour   Intake 480 ml   Output 850 ml   Net -370 ml      Physical Exam   Constitutional: He appears well-developed and  well-nourished. He is cooperative.   HENT:   Head: Normocephalic and atraumatic.   Eyes: Conjunctivae, EOM and lids are normal. Pupils are equal, round, and reactive to light.   Neck: Normal range of motion and full passive range of motion without pain. Neck supple. JVD present. No edema present. No thyroid mass present.   Cardiovascular: Normal rate, S1 normal, S2 normal and intact distal pulses.   No murmur heard.  Pulmonary/Chest: No respiratory distress. He has no rales.   Abdominal: Soft. Bowel sounds are normal. He exhibits no distension and no abdominal bruit. There is no splenomegaly or hepatomegaly. There is no tenderness. There is no CVA tenderness.   Genitourinary: No penile tenderness.   Genitourinary Comments: Urinary retension   Musculoskeletal: Normal range of motion. He exhibits edema.   Lymphadenopathy:     He has no cervical adenopathy.     He has no axillary adenopathy.   Neurological: He is alert. He has normal reflexes. He displays no tremor. He displays no seizure activity.   Skin: Skin is warm, dry and intact.   Psychiatric: He has a normal mood and affect. His speech is normal. Thought content normal. Cognition and memory are normal.       Significant Labs:   CBC:   No results for input(s): WBC, HGB, HCT, PLT in the last 48 hours.  CMP:   Recent Labs   Lab 01/03/19  0613 01/04/19  0606    142   K 4.2 4.8   * 111*   CO2 25 25   * 93   BUN 28* 27*   CREATININE 1.4 1.4   CALCIUM 10.3 10.1   ANIONGAP 6* 6*   EGFRNONAA 47* 47*     Cardiac Markers:   No results for input(s): CKMB, MYOGLOBIN, BNP, TROPISTAT in the last 48 hours.  Lipase: No results for input(s): LIPASE in the last 48 hours.  Troponin:   No results for input(s): TROPONINI in the last 48 hours.  TSH:   Recent Labs   Lab 07/09/18  2343   TSH 1.108     Significant Imaging:   Imaging Results          X-Ray Chest AP Portable (Final result)  Result time 12/31/18 15:32:46    Final result by Macarena Woodard MD (12/31/18  15:32:46)                 Impression:      There is no evidence acute pulmonary disease.  The chest appears similar to November 30, 2018.      Electronically signed by: Macarena Woodard MD  Date:    12/31/2018  Time:    15:32             Narrative:    EXAMINATION:  XR CHEST AP PORTABLE    CLINICAL HISTORY:  Shortness of breath    TECHNIQUE:  Single frontal view of the chest was performed.    COMPARISON:  None    FINDINGS:  There is a left-sided pacer defibrillator with leads in the right atrium and right ventricle.  The patient is status post sternotomy.  The cardiac silhouette is enlarged.  There is no pneumothorax.

## 2019-01-05 LAB
POCT GLUCOSE: 160 MG/DL (ref 70–110)
POCT GLUCOSE: 165 MG/DL (ref 70–110)
POCT GLUCOSE: 169 MG/DL (ref 70–110)
POCT GLUCOSE: 97 MG/DL (ref 70–110)

## 2019-01-05 PROCEDURE — 25000003 PHARM REV CODE 250: Performed by: INTERNAL MEDICINE

## 2019-01-05 PROCEDURE — 27000221 HC OXYGEN, UP TO 24 HOURS

## 2019-01-05 PROCEDURE — 99900035 HC TECH TIME PER 15 MIN (STAT)

## 2019-01-05 PROCEDURE — 25000003 PHARM REV CODE 250: Performed by: NURSE PRACTITIONER

## 2019-01-05 PROCEDURE — 25000003 PHARM REV CODE 250: Performed by: EMERGENCY MEDICINE

## 2019-01-05 PROCEDURE — 25000242 PHARM REV CODE 250 ALT 637 W/ HCPCS: Performed by: EMERGENCY MEDICINE

## 2019-01-05 PROCEDURE — 94640 AIRWAY INHALATION TREATMENT: CPT

## 2019-01-05 PROCEDURE — 94761 N-INVAS EAR/PLS OXIMETRY MLT: CPT

## 2019-01-05 PROCEDURE — 25000003 PHARM REV CODE 250: Performed by: HOSPITALIST

## 2019-01-05 PROCEDURE — 21400001 HC TELEMETRY ROOM

## 2019-01-05 RX ADMIN — ATORVASTATIN CALCIUM 20 MG: 10 TABLET, FILM COATED ORAL at 10:01

## 2019-01-05 RX ADMIN — POLYETHYLENE GLYCOL 3350 17 G: 17 POWDER, FOR SOLUTION ORAL at 08:01

## 2019-01-05 RX ADMIN — IPRATROPIUM BROMIDE AND ALBUTEROL SULFATE 3 ML: .5; 3 SOLUTION RESPIRATORY (INHALATION) at 01:01

## 2019-01-05 RX ADMIN — APIXABAN 5 MG: 5 TABLET, FILM COATED ORAL at 10:01

## 2019-01-05 RX ADMIN — TAMSULOSIN HYDROCHLORIDE 0.4 MG: 0.4 CAPSULE ORAL at 08:01

## 2019-01-05 RX ADMIN — APIXABAN 5 MG: 5 TABLET, FILM COATED ORAL at 08:01

## 2019-01-05 RX ADMIN — CARVEDILOL 25 MG: 6.25 TABLET, FILM COATED ORAL at 08:01

## 2019-01-05 RX ADMIN — LOSARTAN POTASSIUM 100 MG: 25 TABLET, FILM COATED ORAL at 08:01

## 2019-01-05 RX ADMIN — FUROSEMIDE 80 MG: 40 TABLET ORAL at 08:01

## 2019-01-05 RX ADMIN — IPRATROPIUM BROMIDE AND ALBUTEROL SULFATE 3 ML: .5; 3 SOLUTION RESPIRATORY (INHALATION) at 07:01

## 2019-01-05 RX ADMIN — CARVEDILOL 25 MG: 6.25 TABLET, FILM COATED ORAL at 10:01

## 2019-01-05 RX ADMIN — FERROUS GLUCONATE TAB 324 MG (37.5 MG ELEMENTAL IRON) 324 MG: 324 (37.5 FE) TAB at 08:01

## 2019-01-05 RX ADMIN — ASPIRIN 81 MG: 81 TABLET, COATED ORAL at 08:01

## 2019-01-05 RX ADMIN — IPRATROPIUM BROMIDE AND ALBUTEROL SULFATE 3 ML: .5; 3 SOLUTION RESPIRATORY (INHALATION) at 12:01

## 2019-01-05 RX ADMIN — FUROSEMIDE 80 MG: 40 TABLET ORAL at 06:01

## 2019-01-05 NOTE — ASSESSMENT & PLAN NOTE
No formal diagnosis however, suspect that the patient likely has some contributory symptoms of SJ - Elevated PA systolic pressure = 85.8 with diastolic dysfunction per 2D echo  Hypoxia in the setting of CHF ex + urinary retension  CPAP nightly to keep sats above 90%  He refused again CPAP over night,

## 2019-01-05 NOTE — PROGRESS NOTES
[facility]  Progress Note    Patient Name: Agus Ramos Jr.  MRN: [id number]  Patient Class: IP- Inpatient   Admission Date: 12/31/2018  Length of Stay: 4 days  Attending Physician: Ace Cisneros MD  Primary Care Provider: Keaton Hardy MD        Subjective:     Principal Problem:Acute on chronic combined systolic and diastolic congestive heart failure    HPI:  78 y.o. male with chronic a fib, HTN, HLD, CKD stage III, DM2, chronic OAC, tobacco abuse, chronic systolic heart failure, anemia, pacemaker in situ, COPD, and CAD s/p CABG presents with a complaint of SOB progressively worsening for the past 5 weeks.  His PCP directed him to the ED today.  Denies fever, chills, cough, chest pain, dizziness, syncope, N/V/D, abdominal pain, bloody or black stools, or dysuria.  Reports adherence to home treatment plan.  In the ED he was found to have elevated BNP of 958 and pulmonary edema on chest xray, troponin also mildly elevated at 0.126.  He received nebulizer treatment, IV lasix, and nitro paste with improvement.  Short runs of V tach noted with exertion on tele.  Discussed with Cardiology in ED and placed in observation for further evaluation and treatment.    Hospital Course:  Patient admits to poor compliance to diet over the holidays - He has LVEF 45% and PA pressure 85.79 - continued 2-3+ pitting edema; activity intolerance, dyspnea with minimal exertion and at rest, and although he uses home oxygen sat 2L he is requiring 3.5L to keep sats above 85%. His sat drops to 82% on his usual 2L at rest. Additionally, the patient has been having low urinary output despite IV lasix 40 mg BID 2/2 urinary retension. Multiple attempts at catheter placement ordered by ED were unsuccessful overnight - Ordered to discontinue attempts at catheter placement - bowel regimen and added Flomax to patient's medication regimen. Urology was consulted and agrees with decreasing attempts at  catheter placement unless the patient becomes uncomfortable due to inability to void - fluid restriction, daily weight, continue IV lasix - bowel regimen with mineral oil enemal - strict I&O's - supplemental oxygen at 3.5L  He refused CPAP over night,he has been consulted.he is more complaint today.  HR is elevated ,increased coreg,improved.  Monitor UOP with  bladder scan.  Consulted PT,OT   Leg swelling did not improved yet.elevate legs,  He refused again CPAP over night,    Interval History: he is stable on NC O 2,    Review of Systems   Constitutional: Positive for activity change, appetite change and fatigue. Negative for chills, diaphoresis and fever.   HENT: Negative for congestion, hearing loss, sore throat, tinnitus and trouble swallowing.    Eyes: Negative for photophobia, discharge, itching and visual disturbance.   Respiratory: Negative for apnea, cough, shortness of breath, wheezing and stridor.         Dusky nailbeds; obvious WOB   Cardiovascular: Positive for leg swelling. Negative for chest pain and palpitations.   Gastrointestinal: Negative for abdominal distention, abdominal pain, blood in stool, constipation, diarrhea and nausea.   Endocrine: Negative for polydipsia, polyphagia and polyuria.   Genitourinary: Negative for difficulty urinating, dysuria, flank pain, frequency, hematuria and penile pain.   Musculoskeletal: Positive for joint swelling. Negative for arthralgias and neck stiffness.   Skin: Positive for pallor. Negative for color change, rash and wound.   Neurological: Positive for weakness. Negative for dizziness, tremors, seizures, light-headedness, numbness and headaches.   Hematological: Negative for adenopathy.   Psychiatric/Behavioral: Negative for hallucinations and self-injury.     Objective:     Vital Signs (Most Recent):  Temp: 97.5 °F (36.4 °C) (01/05/19 0712)  Pulse: 79 (01/05/19 0712)  Resp: 18 (01/05/19 0712)  BP: (!) 164/67 (01/05/19 0712)  SpO2: (!) 93 % (01/05/19 0712)  Vital Signs (24h Range):  Temp:  [97.5 °F (36.4 °C)-98.6 °F (37 °C)] 97.5 °F (36.4 °C)  Pulse:  [] 79  Resp:  [17-20] 18  SpO2:  [91 %-98 %] 93 %  BP: (125-164)/(66-81) 164/67     Weight: 77.6 kg (171 lb 1.2 oz)  Body mass index is 25.26 kg/m².    Intake/Output Summary (Last 24 hours) at 1/5/2019 0728  Last data filed at 1/4/2019 2215  Gross per 24 hour   Intake 480 ml   Output 700 ml   Net -220 ml      Physical Exam   Constitutional: He appears well-developed and well-nourished. He is cooperative.   HENT:   Head: Normocephalic and atraumatic.   Eyes: Conjunctivae, EOM and lids are normal. Pupils are equal, round, and reactive to light.   Neck: Normal range of motion and full passive range of motion without pain. Neck supple. JVD present. No edema present. No thyroid mass present.   Cardiovascular: Normal rate, S1 normal, S2 normal and intact distal pulses.   No murmur heard.  Pulmonary/Chest: No respiratory distress. He has no rales.   Abdominal: Soft. Bowel sounds are normal. He exhibits no distension and no abdominal bruit. There is no splenomegaly or hepatomegaly. There is no tenderness. There is no CVA tenderness.   Genitourinary: No penile tenderness.   Genitourinary Comments: Urinary retension   Musculoskeletal: Normal range of motion. He exhibits edema.   Lymphadenopathy:     He has no cervical adenopathy.     He has no axillary adenopathy.   Neurological: He is alert. He has normal reflexes. He displays no tremor. He displays no seizure activity.   Skin: Skin is warm, dry and intact.   Psychiatric: He has a normal mood and affect. His speech is normal. Thought content normal. Cognition and memory are normal.       Significant Labs:   CBC:   No results for input(s): WBC, HGB, HCT, PLT in the last 48 hours.  CMP:   Recent Labs   Lab 01/04/19  0606      K 4.8   *   CO2 25   GLU 93   BUN 27*   CREATININE 1.4   CALCIUM 10.1   ANIONGAP 6*   EGFRNONAA 47*     Cardiac Markers:   No results for  input(s): CKMB, MYOGLOBIN, BNP, TROPISTAT in the last 48 hours.  Lipase: No results for input(s): LIPASE in the last 48 hours.  Troponin:   No results for input(s): TROPONINI in the last 48 hours.  TSH:   Recent Labs   Lab 07/09/18  2343   TSH 1.108     Significant Imaging:   Imaging Results          X-Ray Chest AP Portable (Final result)  Result time 12/31/18 15:32:46    Final result by Macarena Woodard MD (12/31/18 15:32:46)                 Impression:      There is no evidence acute pulmonary disease.  The chest appears similar to November 30, 2018.      Electronically signed by: Macarena Woodard MD  Date:    12/31/2018  Time:    15:32             Narrative:    EXAMINATION:  XR CHEST AP PORTABLE    CLINICAL HISTORY:  Shortness of breath    TECHNIQUE:  Single frontal view of the chest was performed.    COMPARISON:  None    FINDINGS:  There is a left-sided pacer defibrillator with leads in the right atrium and right ventricle.  The patient is status post sternotomy.  The cardiac silhouette is enlarged.  There is no pneumothorax.                                  Assessment/Plan:      * Acute on chronic combined systolic and diastolic congestive heart failure    Patient with known heart failure and elevated PA pressures - presents after poor diet compliance with likely CHF exacerbation - has been slow to improve despite IV lasix - has urinary retension  Continuous cardiac monitoring and continue to trend CE  ASA 81 mg daily  Diuresis patient - furosemide 40 mg IVP BID  Pulse OX Q4 with vitals  Oxygen supplementation to keep sats above 89%  Add BB - coreg/ IV diuretic/continue home ARB &statin  Lipid panel and TSH for completeness  UA with next void  He refused CPAP over night,he has been consulted.  HR is elevated ,increased coreg,  Monitor UOP with  bladder scan.  Consulted PT,OT   check bladder scan.not require stearns  Leg swelling did not improved yet.elevate legs.  Lasix is increased.         Chronic atrial  fibrillation    Continue diltiazem and apixaban, monitor on tele,HR is elevated,incrteased coreg.     SJ (obstructive sleep apnea)    No formal diagnosis however, suspect that the patient likely has some contributory symptoms of SJ - Elevated PA systolic pressure = 85.8 with diastolic dysfunction per 2D echo  Hypoxia in the setting of CHF ex + urinary retension  CPAP nightly to keep sats above 90%  He refused again CPAP over night,     Urinary retention    Initial post void residuals>500 multiple attempts at cath placement unsuccessful - patient is voiding ok but continues to have high post void residuals - bowel regimen with enema administered and started on flomax. Urology consulted who agreed with plan and recommended discontinuing attempts at stearns placement considering patient is on chronic long term anticoagulation therapy with apixaban 2/2 afib - I agree with this opinion. His renal functions appear stable will continue to monitor closely if patient becomes uncomfortable will consider reattempt as recommended by urology with couda  Strict I&O's  Post void residuals q 6 with balder scan.  Monitor BP closely       Difficulty voiding    monitor with bladder scan,on flomax.not require stearns.       NSVT (nonsustained ventricular tachycardia)    cardiology is following,increased BB.improved.       Chronic respiratory failure with hypoxia    Continue home supplemental oxygen 2L NC     Centrilobular emphysema    Recently started on Breo, continue nebs     Pacemaker    Biotronic      Iron deficiency anemia    Patient H/H stable and consistent with baseline. No evidence acute bleeding or indication for transfusion at this time.      Chronic anticoagulation    Continue apixaban     Type 2 diabetes mellitus, controlled, with renal complications    Last HgbA1c   Lab Results   Component Value Date    HGBA1C 5.1 11/30/2018     Hold oral antihyperglycemics while inpatient  PRN sliding scale insulin  ACHS glucose monitoring    ADA diet     CKD Stage 3    Stable, at baseline, maintain euvolemic state, strict I/O's, monitor renal function and electrolytes, avoid nephrotoxic agents.     Essential hypertension    Decent controlled, continue home medications and monitor blood pressure, adjust as needed.       VTE Risk Mitigation (From admission, onward)        Ordered     apixaban tablet 5 mg  2 times daily      12/31/18 5341              Ace Cisneros MD  Department of Hospital Medicine   [facility]

## 2019-01-05 NOTE — SUBJECTIVE & OBJECTIVE
Interval History: he is stable on NC O 2,    Review of Systems   Constitutional: Positive for activity change, appetite change and fatigue. Negative for chills, diaphoresis and fever.   HENT: Negative for congestion, hearing loss, sore throat, tinnitus and trouble swallowing.    Eyes: Negative for photophobia, discharge, itching and visual disturbance.   Respiratory: Negative for apnea, cough, shortness of breath, wheezing and stridor.         Dusky nailbeds; obvious WOB   Cardiovascular: Positive for leg swelling. Negative for chest pain and palpitations.   Gastrointestinal: Negative for abdominal distention, abdominal pain, blood in stool, constipation, diarrhea and nausea.   Endocrine: Negative for polydipsia, polyphagia and polyuria.   Genitourinary: Negative for difficulty urinating, dysuria, flank pain, frequency, hematuria and penile pain.   Musculoskeletal: Positive for joint swelling. Negative for arthralgias and neck stiffness.   Skin: Positive for pallor. Negative for color change, rash and wound.   Neurological: Positive for weakness. Negative for dizziness, tremors, seizures, light-headedness, numbness and headaches.   Hematological: Negative for adenopathy.   Psychiatric/Behavioral: Negative for hallucinations and self-injury.     Objective:     Vital Signs (Most Recent):  Temp: 97.5 °F (36.4 °C) (01/05/19 0712)  Pulse: 79 (01/05/19 0712)  Resp: 18 (01/05/19 0712)  BP: (!) 164/67 (01/05/19 0712)  SpO2: (!) 93 % (01/05/19 0712) Vital Signs (24h Range):  Temp:  [97.5 °F (36.4 °C)-98.6 °F (37 °C)] 97.5 °F (36.4 °C)  Pulse:  [] 79  Resp:  [17-20] 18  SpO2:  [91 %-98 %] 93 %  BP: (125-164)/(66-81) 164/67     Weight: 77.6 kg (171 lb 1.2 oz)  Body mass index is 25.26 kg/m².    Intake/Output Summary (Last 24 hours) at 1/5/2019 1892  Last data filed at 1/4/2019 2215  Gross per 24 hour   Intake 480 ml   Output 700 ml   Net -220 ml      Physical Exam   Constitutional: He appears well-developed and  well-nourished. He is cooperative.   HENT:   Head: Normocephalic and atraumatic.   Eyes: Conjunctivae, EOM and lids are normal. Pupils are equal, round, and reactive to light.   Neck: Normal range of motion and full passive range of motion without pain. Neck supple. JVD present. No edema present. No thyroid mass present.   Cardiovascular: Normal rate, S1 normal, S2 normal and intact distal pulses.   No murmur heard.  Pulmonary/Chest: No respiratory distress. He has no rales.   Abdominal: Soft. Bowel sounds are normal. He exhibits no distension and no abdominal bruit. There is no splenomegaly or hepatomegaly. There is no tenderness. There is no CVA tenderness.   Genitourinary: No penile tenderness.   Genitourinary Comments: Urinary retension   Musculoskeletal: Normal range of motion. He exhibits edema.   Lymphadenopathy:     He has no cervical adenopathy.     He has no axillary adenopathy.   Neurological: He is alert. He has normal reflexes. He displays no tremor. He displays no seizure activity.   Skin: Skin is warm, dry and intact.   Psychiatric: He has a normal mood and affect. His speech is normal. Thought content normal. Cognition and memory are normal.       Significant Labs:   CBC:   No results for input(s): WBC, HGB, HCT, PLT in the last 48 hours.  CMP:   Recent Labs   Lab 01/04/19  0606      K 4.8   *   CO2 25   GLU 93   BUN 27*   CREATININE 1.4   CALCIUM 10.1   ANIONGAP 6*   EGFRNONAA 47*     Cardiac Markers:   No results for input(s): CKMB, MYOGLOBIN, BNP, TROPISTAT in the last 48 hours.  Lipase: No results for input(s): LIPASE in the last 48 hours.  Troponin:   No results for input(s): TROPONINI in the last 48 hours.  TSH:   Recent Labs   Lab 07/09/18  2343   TSH 1.108     Significant Imaging:   Imaging Results          X-Ray Chest AP Portable (Final result)  Result time 12/31/18 15:32:46    Final result by Macarena Woodard MD (12/31/18 15:32:46)                 Impression:      There is no  evidence acute pulmonary disease.  The chest appears similar to November 30, 2018.      Electronically signed by: Macarena Woodard MD  Date:    12/31/2018  Time:    15:32             Narrative:    EXAMINATION:  XR CHEST AP PORTABLE    CLINICAL HISTORY:  Shortness of breath    TECHNIQUE:  Single frontal view of the chest was performed.    COMPARISON:  None    FINDINGS:  There is a left-sided pacer defibrillator with leads in the right atrium and right ventricle.  The patient is status post sternotomy.  The cardiac silhouette is enlarged.  There is no pneumothorax.

## 2019-01-06 LAB
ANION GAP SERPL CALC-SCNC: 3 MMOL/L
BUN SERPL-MCNC: 28 MG/DL
CALCIUM SERPL-MCNC: 9.9 MG/DL
CHLORIDE SERPL-SCNC: 110 MMOL/L
CO2 SERPL-SCNC: 30 MMOL/L
CREAT SERPL-MCNC: 1.2 MG/DL
EST. GFR  (AFRICAN AMERICAN): >60 ML/MIN/1.73 M^2
EST. GFR  (NON AFRICAN AMERICAN): 57 ML/MIN/1.73 M^2
GLUCOSE SERPL-MCNC: 169 MG/DL
POCT GLUCOSE: 120 MG/DL (ref 70–110)
POCT GLUCOSE: 197 MG/DL (ref 70–110)
POCT GLUCOSE: 216 MG/DL (ref 70–110)
POTASSIUM SERPL-SCNC: 4.4 MMOL/L
SODIUM SERPL-SCNC: 143 MMOL/L

## 2019-01-06 PROCEDURE — 99900035 HC TECH TIME PER 15 MIN (STAT)

## 2019-01-06 PROCEDURE — 25000003 PHARM REV CODE 250: Performed by: INTERNAL MEDICINE

## 2019-01-06 PROCEDURE — 21400001 HC TELEMETRY ROOM

## 2019-01-06 PROCEDURE — 25000003 PHARM REV CODE 250: Performed by: HOSPITALIST

## 2019-01-06 PROCEDURE — 80048 BASIC METABOLIC PNL TOTAL CA: CPT

## 2019-01-06 PROCEDURE — 94761 N-INVAS EAR/PLS OXIMETRY MLT: CPT

## 2019-01-06 PROCEDURE — 25000003 PHARM REV CODE 250: Performed by: NURSE PRACTITIONER

## 2019-01-06 PROCEDURE — 36415 COLL VENOUS BLD VENIPUNCTURE: CPT

## 2019-01-06 PROCEDURE — 94640 AIRWAY INHALATION TREATMENT: CPT

## 2019-01-06 PROCEDURE — 27000221 HC OXYGEN, UP TO 24 HOURS

## 2019-01-06 PROCEDURE — 25000003 PHARM REV CODE 250: Performed by: EMERGENCY MEDICINE

## 2019-01-06 PROCEDURE — 25000242 PHARM REV CODE 250 ALT 637 W/ HCPCS: Performed by: EMERGENCY MEDICINE

## 2019-01-06 RX ADMIN — TAMSULOSIN HYDROCHLORIDE 0.4 MG: 0.4 CAPSULE ORAL at 09:01

## 2019-01-06 RX ADMIN — FERROUS GLUCONATE TAB 324 MG (37.5 MG ELEMENTAL IRON) 324 MG: 324 (37.5 FE) TAB at 10:01

## 2019-01-06 RX ADMIN — ASPIRIN 81 MG: 81 TABLET, COATED ORAL at 09:01

## 2019-01-06 RX ADMIN — IPRATROPIUM BROMIDE AND ALBUTEROL SULFATE 3 ML: .5; 3 SOLUTION RESPIRATORY (INHALATION) at 12:01

## 2019-01-06 RX ADMIN — IPRATROPIUM BROMIDE AND ALBUTEROL SULFATE 3 ML: .5; 3 SOLUTION RESPIRATORY (INHALATION) at 02:01

## 2019-01-06 RX ADMIN — FUROSEMIDE 80 MG: 40 TABLET ORAL at 09:01

## 2019-01-06 RX ADMIN — CARVEDILOL 25 MG: 6.25 TABLET, FILM COATED ORAL at 08:01

## 2019-01-06 RX ADMIN — APIXABAN 5 MG: 5 TABLET, FILM COATED ORAL at 09:01

## 2019-01-06 RX ADMIN — IPRATROPIUM BROMIDE AND ALBUTEROL SULFATE 3 ML: .5; 3 SOLUTION RESPIRATORY (INHALATION) at 07:01

## 2019-01-06 RX ADMIN — LOSARTAN POTASSIUM 100 MG: 25 TABLET, FILM COATED ORAL at 09:01

## 2019-01-06 RX ADMIN — FUROSEMIDE 80 MG: 40 TABLET ORAL at 08:01

## 2019-01-06 RX ADMIN — CARVEDILOL 25 MG: 6.25 TABLET, FILM COATED ORAL at 09:01

## 2019-01-06 RX ADMIN — ATORVASTATIN CALCIUM 20 MG: 10 TABLET, FILM COATED ORAL at 08:01

## 2019-01-06 RX ADMIN — APIXABAN 5 MG: 5 TABLET, FILM COATED ORAL at 08:01

## 2019-01-06 NOTE — PROGRESS NOTES
[facility] [place] [facility]  Progress Note    Patient Name: Agus Ramos Jr.  MRN: [id number]  Patient Class: IP- Inpatient   Admission Date: 12/31/2018  Length of Stay: 5 days  Attending Physician: Ace Cisneros MD  Primary Care Provider: Keaton Hardy MD        Subjective:     Principal Problem:Acute on chronic combined systolic and diastolic congestive heart failure    HPI:  78 y.o. male with chronic a fib, HTN, HLD, CKD stage III, DM2, chronic OAC, tobacco abuse, chronic systolic heart failure, anemia, pacemaker in situ, COPD, and CAD s/p CABG presents with a complaint of SOB progressively worsening for the past 5 weeks.  His PCP directed him to the ED today.  Denies fever, chills, cough, chest pain, dizziness, syncope, N/V/D, abdominal pain, bloody or black stools, or dysuria.  Reports adherence to home treatment plan.  In the ED he was found to have elevated BNP of 958 and pulmonary edema on chest xray, troponin also mildly elevated at 0.126.  He received nebulizer treatment, IV lasix, and nitro paste with improvement.  Short runs of V tach noted with exertion on tele.  Discussed with Cardiology in ED and placed in observation for further evaluation and treatment.    Hospital Course:  Patient admits to poor compliance to diet over the holidays - He has LVEF 45% and PA pressure 85.79 - continued 2-3+ pitting edema; activity intolerance, dyspnea with minimal exertion and at rest, and although he uses home oxygen sat 2L he is requiring 3.5L to keep sats above 85%. His sat drops to 82% on his usual 2L at rest. Additionally, the patient has been having low urinary output despite IV lasix 40 mg BID 2/2 urinary retension. Multiple attempts at catheter placement ordered by ED were unsuccessful overnight - Ordered to discontinue attempts at catheter placement - bowel regimen and added Flomax to patient's medication regimen. Urology was consulted and agrees with decreasing attempts at  catheter placement unless the patient becomes uncomfortable due to inability to void - fluid restriction, daily weight, continue IV lasix - bowel regimen with mineral oil enemal - strict I&O's - supplemental oxygen at 3.5L  He refused CPAP over night,he has been consulted.he is more complaint today.  HR is elevated ,increased coreg,improved.  Monitor UOP with  bladder scan.  Consulted PT,OT   Leg swelling did not improved yet.elevate legs,leg swelling gradually is improving,..  He refused again CPAP over night,    Interval History: he is stable on NC O 2,    Review of Systems   Constitutional: Positive for activity change, appetite change and fatigue. Negative for chills, diaphoresis and fever.   HENT: Negative for congestion, hearing loss, sore throat, tinnitus and trouble swallowing.    Eyes: Negative for photophobia, discharge, itching and visual disturbance.   Respiratory: Negative for apnea, cough, shortness of breath, wheezing and stridor.         Dusky nailbeds; obvious WOB   Cardiovascular: Positive for leg swelling. Negative for chest pain and palpitations.   Gastrointestinal: Negative for abdominal distention, abdominal pain, blood in stool, constipation, diarrhea and nausea.   Endocrine: Negative for polydipsia, polyphagia and polyuria.   Genitourinary: Negative for difficulty urinating, dysuria, flank pain, frequency, hematuria and penile pain.   Musculoskeletal: Positive for joint swelling. Negative for arthralgias and neck stiffness.   Skin: Positive for pallor. Negative for color change, rash and wound.   Neurological: Positive for weakness. Negative for dizziness, tremors, seizures, light-headedness, numbness and headaches.   Hematological: Negative for adenopathy.   Psychiatric/Behavioral: Negative for hallucinations and self-injury.     Objective:     Vital Signs (Most Recent):  Temp: 97.6 °F (36.4 °C) (01/06/19 0535)  Pulse: 78 (01/06/19 0721)  Resp: 18 (01/06/19 0721)  BP: (!) 145/76 (01/06/19  0721)  SpO2: 100 % (01/06/19 0721) Vital Signs (24h Range):  Temp:  [97.3 °F (36.3 °C)-97.9 °F (36.6 °C)] 97.6 °F (36.4 °C)  Pulse:  [61-89] 78  Resp:  [17-26] 18  SpO2:  [95 %-100 %] 100 %  BP: (118-154)/(70-79) 145/76     Weight: 77.6 kg (171 lb 1.2 oz)  Body mass index is 25.26 kg/m².    Intake/Output Summary (Last 24 hours) at 1/6/2019 0946  Last data filed at 1/6/2019 0000  Gross per 24 hour   Intake 240 ml   Output 450 ml   Net -210 ml      Physical Exam   Constitutional: He appears well-developed and well-nourished. He is cooperative.   HENT:   Head: Normocephalic and atraumatic.   Eyes: Conjunctivae, EOM and lids are normal. Pupils are equal, round, and reactive to light.   Neck: Normal range of motion and full passive range of motion without pain. Neck supple. JVD present. No edema present. No thyroid mass present.   Cardiovascular: Normal rate, S1 normal, S2 normal and intact distal pulses.   No murmur heard.  Pulmonary/Chest: No respiratory distress. He has no rales.   Abdominal: Soft. Bowel sounds are normal. He exhibits no distension and no abdominal bruit. There is no splenomegaly or hepatomegaly. There is no tenderness. There is no CVA tenderness.   Genitourinary: No penile tenderness.   Genitourinary Comments: Urinary retension   Musculoskeletal: Normal range of motion. He exhibits edema.   Lymphadenopathy:     He has no cervical adenopathy.     He has no axillary adenopathy.   Neurological: He is alert. He has normal reflexes. He displays no tremor. He displays no seizure activity.   Skin: Skin is warm, dry and intact.   Psychiatric: He has a normal mood and affect. His speech is normal. Thought content normal. Cognition and memory are normal.       Significant Labs:   CBC:   No results for input(s): WBC, HGB, HCT, PLT in the last 48 hours.  CMP:   No results for input(s): NA, K, CL, CO2, GLU, BUN, CREATININE, CALCIUM, PROT, ALBUMIN, BILITOT, ALKPHOS, AST, ALT, ANIONGAP, EGFRNONAA in the last 48  hours.    Invalid input(s): ESTGFAFR[place]  Cardiac Markers:   No results for input(s): CKMB, MYOGLOBIN, BNP, TROPISTAT in the last 48 hours.  Lipase: No results for input(s): LIPASE in the last 48 hours.  Troponin:   No results for input(s): TROPONINI in the last 48 hours.  TSH:   No results for input(s): TSH in the last 4320 hours.  Significant Imaging:   Imaging Results          X-Ray Chest AP Portable (Final result)  Result time 12/31/18 15:32:46    Final result by Macarena Woodard MD (12/31/18 15:32:46)                 Impression:      There is no evidence acute pulmonary disease.  The chest appears similar to November 30, 2018.      Electronically signed by: Macarena Woodard MD  Date:    12/31/2018  Time:    15:32             Narrative:    EXAMINATION:  XR CHEST AP PORTABLE    CLINICAL HISTORY:  Shortness of breath    TECHNIQUE:  Single frontal view of the chest was performed.    COMPARISON:  None    FINDINGS:  There is a left-sided pacer defibrillator with leads in the right atrium and right ventricle.  The patient is status post sternotomy.  The cardiac silhouette is enlarged.  There is no pneumothorax.                                  Assessment/Plan:      * Acute on chronic combined systolic and diastolic congestive heart failure    Patient with known heart failure and elevated PA pressures - presents after poor diet compliance with likely CHF exacerbation - has been slow to improve despite IV lasix - has urinary retension  Continuous cardiac monitoring and continue to trend CE  ASA 81 mg daily  Diuresis patient - furosemide 40 mg IVP BID  Pulse OX Q4 with vitals  Oxygen supplementation to keep sats above 89%  Add BB - coreg/ IV diuretic/continue home ARB &statin  Lipid panel and TSH for completeness  UA with next void  He refused CPAP over night,he has been consulted.  HR is elevated ,increased coreg,  Monitor UOP with  bladder scan.  Consulted PT,OT   check bladder scan.not require stearns  Leg swelling did  not improved yet.elevate legs.  Lasix is increased.leg swelling gradually is improving.         Chronic atrial fibrillation    Continue diltiazem and apixaban, monitor on tele,HR is elevated,incrteased coreg.     SJ (obstructive sleep apnea)    No formal diagnosis however, suspect that the patient likely has some contributory symptoms of SJ - Elevated PA systolic pressure = 85.8 with diastolic dysfunction per 2D echo  Hypoxia in the setting of CHF ex + urinary retension  CPAP nightly to keep sats above 90%  He refused again CPAP over night,     Urinary retention    Initial post void residuals>500 multiple attempts at cath placement unsuccessful - patient is voiding ok but continues to have high post void residuals - bowel regimen with enema administered and started on flomax. Urology consulted who agreed with plan and recommended discontinuing attempts at stearns placement considering patient is on chronic long term anticoagulation therapy with apixaban 2/2 afib - I agree with this opinion. His renal functions appear stable will continue to monitor closely if patient becomes uncomfortable will consider reattempt as recommended by urology with couda  Strict I&O's  Post void residuals q 6 with balder scan.  Monitor BP closely       Difficulty voiding    monitor with bladder scan,on flomax.not require stearns.       NSVT (nonsustained ventricular tachycardia)    cardiology is following,increased BB.improved.       Chronic respiratory failure with hypoxia    Continue home supplemental oxygen 2L NC     Centrilobular emphysema    Recently started on Breo, continue nebs     Pacemaker    Biotronic      Iron deficiency anemia    Patient H/H stable and consistent with baseline. No evidence acute bleeding or indication for transfusion at this time.      Chronic anticoagulation    Continue apixaban     Type 2 diabetes mellitus, controlled, with renal complications    Last HgbA1c   Lab Results   Component Value Date    HGBA1C 5.1  11/30/2018     Hold oral antihyperglycemics while inpatient  PRN sliding scale insulin  ACHS glucose monitoring   ADA diet     CKD Stage 3    Stable, at baseline, maintain euvolemic state, strict I/O's, monitor renal function and electrolytes, avoid nephrotoxic agents.     Essential hypertension    Decent controlled, continue home medications and monitor blood pressure, adjust as needed.       VTE Risk Mitigation (From admission, onward)        Ordered     apixaban tablet 5 mg  2 times daily      12/31/18 4145              Ace Cisenros MD  Department of Hospital Medicine   [facility]

## 2019-01-06 NOTE — ASSESSMENT & PLAN NOTE
Patient with known heart failure and elevated PA pressures - presents after poor diet compliance with likely CHF exacerbation - has been slow to improve despite IV lasix - has urinary retension  Continuous cardiac monitoring and continue to trend CE  ASA 81 mg daily  Diuresis patient - furosemide 40 mg IVP BID  Pulse OX Q4 with vitals  Oxygen supplementation to keep sats above 89%  Add BB - coreg/ IV diuretic/continue home ARB &statin  Lipid panel and TSH for completeness  UA with next void  He refused CPAP over night,he has been consulted.  HR is elevated ,increased coreg,  Monitor UOP with  bladder scan.  Consulted PT,OT   check bladder scan.not require stearns  Leg swelling did not improved yet.elevate legs.  Lasix is increased.leg swelling gradually is improving.

## 2019-01-06 NOTE — NURSING
Report received by HAYDEE Pollack. The pt is lying in bed resting. He is not in any pain or discomfort. Tele monitor in progress with alarms set. Safety precautions maintained and bed locked in lowest position. Side rails up X2. Call bell and personal items within reach.

## 2019-01-06 NOTE — SUBJECTIVE & OBJECTIVE
Interval History: he is stable on NC O 2,    Review of Systems   Constitutional: Positive for activity change, appetite change and fatigue. Negative for chills, diaphoresis and fever.   HENT: Negative for congestion, hearing loss, sore throat, tinnitus and trouble swallowing.    Eyes: Negative for photophobia, discharge, itching and visual disturbance.   Respiratory: Negative for apnea, cough, shortness of breath, wheezing and stridor.         Dusky nailbeds; obvious WOB   Cardiovascular: Positive for leg swelling. Negative for chest pain and palpitations.   Gastrointestinal: Negative for abdominal distention, abdominal pain, blood in stool, constipation, diarrhea and nausea.   Endocrine: Negative for polydipsia, polyphagia and polyuria.   Genitourinary: Negative for difficulty urinating, dysuria, flank pain, frequency, hematuria and penile pain.   Musculoskeletal: Positive for joint swelling. Negative for arthralgias and neck stiffness.   Skin: Positive for pallor. Negative for color change, rash and wound.   Neurological: Positive for weakness. Negative for dizziness, tremors, seizures, light-headedness, numbness and headaches.   Hematological: Negative for adenopathy.   Psychiatric/Behavioral: Negative for hallucinations and self-injury.     Objective:     Vital Signs (Most Recent):  Temp: 97.6 °F (36.4 °C) (01/06/19 0535)  Pulse: 78 (01/06/19 0721)  Resp: 18 (01/06/19 0721)  BP: (!) 145/76 (01/06/19 0721)  SpO2: 100 % (01/06/19 0721) Vital Signs (24h Range):  Temp:  [97.3 °F (36.3 °C)-97.9 °F (36.6 °C)] 97.6 °F (36.4 °C)  Pulse:  [61-89] 78  Resp:  [17-26] 18  SpO2:  [95 %-100 %] 100 %  BP: (118-154)/(70-79) 145/76     Weight: 77.6 kg (171 lb 1.2 oz)  Body mass index is 25.26 kg/m².    Intake/Output Summary (Last 24 hours) at 1/6/2019 0946  Last data filed at 1/6/2019 0000  Gross per 24 hour   Intake 240 ml   Output 450 ml   Net -210 ml      Physical Exam   Constitutional: He appears well-developed and  well-nourished. He is cooperative.   HENT:   Head: Normocephalic and atraumatic.   Eyes: Conjunctivae, EOM and lids are normal. Pupils are equal, round, and reactive to light.   Neck: Normal range of motion and full passive range of motion without pain. Neck supple. JVD present. No edema present. No thyroid mass present.   Cardiovascular: Normal rate, S1 normal, S2 normal and intact distal pulses.   No murmur heard.  Pulmonary/Chest: No respiratory distress. He has no rales.   Abdominal: Soft. Bowel sounds are normal. He exhibits no distension and no abdominal bruit. There is no splenomegaly or hepatomegaly. There is no tenderness. There is no CVA tenderness.   Genitourinary: No penile tenderness.   Genitourinary Comments: Urinary retension   Musculoskeletal: Normal range of motion. He exhibits edema.   Lymphadenopathy:     He has no cervical adenopathy.     He has no axillary adenopathy.   Neurological: He is alert. He has normal reflexes. He displays no tremor. He displays no seizure activity.   Skin: Skin is warm, dry and intact.   Psychiatric: He has a normal mood and affect. His speech is normal. Thought content normal. Cognition and memory are normal.       Significant Labs:   CBC:   No results for input(s): WBC, HGB, HCT, PLT in the last 48 hours.  CMP:   No results for input(s): NA, K, CL, CO2, GLU, BUN, CREATININE, CALCIUM, PROT, ALBUMIN, BILITOT, ALKPHOS, AST, ALT, ANIONGAP, EGFRNONAA in the last 48 hours.    Invalid input(s): ESTGFAFR[place]  Cardiac Markers:   No results for input(s): CKMB, MYOGLOBIN, BNP, TROPISTAT in the last 48 hours.  Lipase: No results for input(s): LIPASE in the last 48 hours.  Troponin:   No results for input(s): TROPONINI in the last 48 hours.  TSH:   No results for input(s): TSH in the last 4320 hours.  Significant Imaging:   Imaging Results          X-Ray Chest AP Portable (Final result)  Result time 12/31/18 15:32:46    Final result by Macarena Woodard MD (12/31/18 15:32:46)                  Impression:      There is no evidence acute pulmonary disease.  The chest appears similar to November 30, 2018.      Electronically signed by: Macarena Woodard MD  Date:    12/31/2018  Time:    15:32             Narrative:    EXAMINATION:  XR CHEST AP PORTABLE    CLINICAL HISTORY:  Shortness of breath    TECHNIQUE:  Single frontal view of the chest was performed.    COMPARISON:  None    FINDINGS:  There is a left-sided pacer defibrillator with leads in the right atrium and right ventricle.  The patient is status post sternotomy.  The cardiac silhouette is enlarged.  There is no pneumothorax.

## 2019-01-06 NOTE — PLAN OF CARE
Problem: Skin Injury Risk Increased  Goal: Skin Health and Integrity  Outcome: Ongoing (interventions implemented as appropriate)  Intervention: Optimize Skin Protection   01/06/19 1705   Prevent Additional Skin Injury   Head of Bed (HOB) HOB at 20 degrees   Pressure Reduction Devices heel offloading device utilized;positioning supports utilized   Pressure Reduction Techniques frequent weight shift encouraged;sit time limited to 2 hours

## 2019-01-06 NOTE — PLAN OF CARE
Problem: Fall Injury Risk  Goal: Absence of Fall and Fall-Related Injury    Intervention: Identify and Manage Contributors to Fall Injury Risk   01/04/19 1919   Manage Acute Allergic Reaction   Medication Review/Management medications reviewed   Identify and Manage Contributors to Fall Injury Risk   Self-Care Promotion independence encouraged;BADL personal objects within reach;BADL personal routines maintained

## 2019-01-07 LAB
ANION GAP SERPL CALC-SCNC: 5 MMOL/L
BASOPHILS # BLD AUTO: 0.02 K/UL
BASOPHILS NFR BLD: 0.4 %
BUN SERPL-MCNC: 25 MG/DL
CALCIUM SERPL-MCNC: 10.1 MG/DL
CHLORIDE SERPL-SCNC: 111 MMOL/L
CO2 SERPL-SCNC: 29 MMOL/L
CREAT SERPL-MCNC: 1.1 MG/DL
DIFFERENTIAL METHOD: ABNORMAL
EOSINOPHIL # BLD AUTO: 0.3 K/UL
EOSINOPHIL NFR BLD: 5.2 %
ERYTHROCYTE [DISTWIDTH] IN BLOOD BY AUTOMATED COUNT: 19.6 %
EST. GFR  (AFRICAN AMERICAN): >60 ML/MIN/1.73 M^2
EST. GFR  (NON AFRICAN AMERICAN): >60 ML/MIN/1.73 M^2
GLUCOSE SERPL-MCNC: 103 MG/DL
HCT VFR BLD AUTO: 34.7 %
HGB BLD-MCNC: 10.4 G/DL
LYMPHOCYTES # BLD AUTO: 1.1 K/UL
LYMPHOCYTES NFR BLD: 20 %
MCH RBC QN AUTO: 25 PG
MCHC RBC AUTO-ENTMCNC: 30 G/DL
MCV RBC AUTO: 83 FL
MONOCYTES # BLD AUTO: 1 K/UL
MONOCYTES NFR BLD: 18.6 %
NEUTROPHILS # BLD AUTO: 3.1 K/UL
NEUTROPHILS NFR BLD: 55.8 %
PLATELET # BLD AUTO: 275 K/UL
PMV BLD AUTO: 10.7 FL
POCT GLUCOSE: 118 MG/DL (ref 70–110)
POCT GLUCOSE: 166 MG/DL (ref 70–110)
POCT GLUCOSE: 197 MG/DL (ref 70–110)
POTASSIUM SERPL-SCNC: 4.3 MMOL/L
RBC # BLD AUTO: 4.16 M/UL
SODIUM SERPL-SCNC: 145 MMOL/L
WBC # BLD AUTO: 5.54 K/UL

## 2019-01-07 PROCEDURE — 97535 SELF CARE MNGMENT TRAINING: CPT

## 2019-01-07 PROCEDURE — 85025 COMPLETE CBC W/AUTO DIFF WBC: CPT

## 2019-01-07 PROCEDURE — 63600175 PHARM REV CODE 636 W HCPCS: Performed by: HOSPITALIST

## 2019-01-07 PROCEDURE — 25000003 PHARM REV CODE 250: Performed by: HOSPITALIST

## 2019-01-07 PROCEDURE — 21400001 HC TELEMETRY ROOM

## 2019-01-07 PROCEDURE — 94640 AIRWAY INHALATION TREATMENT: CPT

## 2019-01-07 PROCEDURE — 97110 THERAPEUTIC EXERCISES: CPT

## 2019-01-07 PROCEDURE — 25000242 PHARM REV CODE 250 ALT 637 W/ HCPCS: Performed by: EMERGENCY MEDICINE

## 2019-01-07 PROCEDURE — 25000003 PHARM REV CODE 250: Performed by: INTERNAL MEDICINE

## 2019-01-07 PROCEDURE — 36415 COLL VENOUS BLD VENIPUNCTURE: CPT

## 2019-01-07 PROCEDURE — 80048 BASIC METABOLIC PNL TOTAL CA: CPT

## 2019-01-07 PROCEDURE — 25000003 PHARM REV CODE 250: Performed by: NURSE PRACTITIONER

## 2019-01-07 PROCEDURE — 25000003 PHARM REV CODE 250: Performed by: EMERGENCY MEDICINE

## 2019-01-07 RX ORDER — FUROSEMIDE 10 MG/ML
40 INJECTION INTRAMUSCULAR; INTRAVENOUS 2 TIMES DAILY
Status: DISCONTINUED | OUTPATIENT
Start: 2019-01-07 | End: 2019-01-08 | Stop reason: HOSPADM

## 2019-01-07 RX ADMIN — CARVEDILOL 25 MG: 6.25 TABLET, FILM COATED ORAL at 10:01

## 2019-01-07 RX ADMIN — TAMSULOSIN HYDROCHLORIDE 0.4 MG: 0.4 CAPSULE ORAL at 10:01

## 2019-01-07 RX ADMIN — IPRATROPIUM BROMIDE AND ALBUTEROL SULFATE 3 ML: .5; 3 SOLUTION RESPIRATORY (INHALATION) at 01:01

## 2019-01-07 RX ADMIN — APIXABAN 5 MG: 5 TABLET, FILM COATED ORAL at 10:01

## 2019-01-07 RX ADMIN — CARVEDILOL 25 MG: 6.25 TABLET, FILM COATED ORAL at 08:01

## 2019-01-07 RX ADMIN — FERROUS GLUCONATE TAB 324 MG (37.5 MG ELEMENTAL IRON) 324 MG: 324 (37.5 FE) TAB at 10:01

## 2019-01-07 RX ADMIN — ATORVASTATIN CALCIUM 20 MG: 10 TABLET, FILM COATED ORAL at 08:01

## 2019-01-07 RX ADMIN — POLYETHYLENE GLYCOL 3350 17 G: 17 POWDER, FOR SOLUTION ORAL at 10:01

## 2019-01-07 RX ADMIN — APIXABAN 5 MG: 5 TABLET, FILM COATED ORAL at 08:01

## 2019-01-07 RX ADMIN — FUROSEMIDE 40 MG: 10 INJECTION, SOLUTION INTRAVENOUS at 10:01

## 2019-01-07 RX ADMIN — ASPIRIN 81 MG: 81 TABLET, COATED ORAL at 10:01

## 2019-01-07 RX ADMIN — LOSARTAN POTASSIUM 100 MG: 25 TABLET, FILM COATED ORAL at 10:01

## 2019-01-07 RX ADMIN — FUROSEMIDE 40 MG: 10 INJECTION, SOLUTION INTRAVENOUS at 05:01

## 2019-01-07 RX ADMIN — IPRATROPIUM BROMIDE AND ALBUTEROL SULFATE 3 ML: .5; 3 SOLUTION RESPIRATORY (INHALATION) at 07:01

## 2019-01-07 NOTE — PT/OT/SLP PROGRESS
Occupational Therapy   Treatment    Name: Agus Ramos Jr.  MRN: [id number]  Admitting Diagnosis:  Acute on chronic combined systolic and diastolic congestive heart failure       Recommendations:     Discharge Recommendations: (home with family and HH OT)  Discharge Equipment Recommendations:  none  Barriers to discharge:  None    Subjective   Patient agreeable to therapy.   Pain/Comfort:  · Pain Rating 1: 0/10    Objective:     Communicated with: Nurse Pollack prior to session.  Patient found with all lines intact and telemetry, peripheral IV, oxygen upon OT entry to room.    General Precautions: Standard, fall, respiratory   Orthopedic Precautions:N/A   Braces: N/A     Occupational Performance:    Bed Mobility:    · Patient completed Scooting/Bridging with stand by assistance  · Patient completed Supine to Sit with contact guard assistance     Functional Mobility/Transfers:  · Patient completed Sit <> Stand Transfer with contact guard assistance  with  rolling walker   · Patient completed Bed <> Chair Transfer using Step Transfer technique with contact guard assistance and stand by assistance with rolling walker  · Functional Mobility: Patient able to ambulate to sink and chair with SBA/RW. Patient required verbal cues for safety awareness. Patient tolerated standing balance for ADL's with SBA.    Activities of Daily Living:  · Grooming: stand by assistance standing at sink to wash face and hands, patient declined to perform oral care  · Upper Body Dressing: set up assistance    · Toileting: supervision and stand by assistance to place and use urinal in standing with RW. Patient declined to ambulate to toilet.    [facility] 6 Click ADL: 20    Treatment & Education:  Patient performed bed mobility, functional transfers, and ADL's as above.  Patient educated on BUE therex with red theraband via verbal/written instruction and demo. Patient required rest breaks and verbal cues for pursed lip breathing technique.   Patient  able to perform x 5 reps B shoulder flex, B shoulder horizontal and, B shoulder abd, B elbow ext, and B elbow flex. Patient provided with handout.  Patient also educated on BUE AROM therex x 10 reps in all available planes of motion. Patient provided with handout.   Patient verbalizes understanding of the above.     Patient left reclined up in chair with all lines intact, call button in reach and nurse notified  Education:    Assessment:     Agus Ramos Jr. is a 78 y.o. male with a medical diagnosis of Acute on chronic combined systolic and diastolic congestive heart failure.  He presents with the following performance deficits affecting function are weakness, impaired endurance, gait instability, impaired functional mobilty, impaired self care skills, impaired balance, decreased upper extremity function, decreased lower extremity function, decreased safety awareness, edema, impaired cardiopulmonary response to activity. Patient progressing.     Rehab Prognosis:  Good; patient would benefit from acute skilled OT services to address these deficits and reach maximum level of function.       Plan:     Patient to be seen 2 x/week to address the above listed problems via self-care/home management, therapeutic activities, therapeutic exercises  · Plan of Care Expires: 01/16/19  · Plan of Care Reviewed with: patient    This Plan of care has been discussed with the patient who was involved in its development and understands and is in agreement with the identified goals and treatment plan    GOALS:   Multidisciplinary Problems     Occupational Therapy Goals        Problem: Occupational Therapy Goal    Goal Priority Disciplines Outcome Interventions   Occupational Therapy Goal     OT, PT/OT Ongoing (interventions implemented as appropriate)    Description:  Goals to be met by: 1/16/19      Patient will increase functional independence with ADLs by performing:    UE Dressing with Modified [place] and Supervision.  MAGGIE  Dressing with Stand-by Assistance.  Grooming while standing at sink with Stand-by Assistance.  Supine to sit with Supervision.  Step transfer with Stand-by Assistance  Toilet transfer to the toilet with Stand-by Assistance.  Upper extremity exercise program x10 reps per handout, with assistance as needed.                      Time Tracking:     OT Date of Treatment: 01/07/19  OT Start Time: 1106  OT Stop Time: 1134  OT Total Time (min): 28 min    Billable Minutes:Self Care/Home Management 14  Therapeutic Exercise 14    RAHUL Fatima  1/7/2019

## 2019-01-07 NOTE — PROGRESS NOTES
[facility] [place] [facility]  Progress Note    Patient Name: Agus Ramos Jr.  MRN: [id number]  Patient Class: IP- Inpatient   Admission Date: 12/31/2018  Length of Stay: 6 days  Attending Physician: Ace Cisneros MD  Primary Care Provider: Keaton Hardy MD        Subjective:     Principal Problem:Acute on chronic combined systolic and diastolic congestive heart failure    HPI:  78 y.o. male with chronic a fib, HTN, HLD, CKD stage III, DM2, chronic OAC, tobacco abuse, chronic systolic heart failure, anemia, pacemaker in situ, COPD, and CAD s/p CABG presents with a complaint of SOB progressively worsening for the past 5 weeks.  His PCP directed him to the ED today.  Denies fever, chills, cough, chest pain, dizziness, syncope, N/V/D, abdominal pain, bloody or black stools, or dysuria.  Reports adherence to home treatment plan.  In the ED he was found to have elevated BNP of 958 and pulmonary edema on chest xray, troponin also mildly elevated at 0.126.  He received nebulizer treatment, IV lasix, and nitro paste with improvement.  Short runs of V tach noted with exertion on tele.  Discussed with Cardiology in ED and placed in observation for further evaluation and treatment.    Hospital Course:  Patient admits to poor compliance to diet over the holidays - He has LVEF 45% and PA pressure 85.79 - continued 2-3+ pitting edema; activity intolerance, dyspnea with minimal exertion and at rest, and although he uses home oxygen sat 2L he is requiring 3.5L to keep sats above 85%. His sat drops to 82% on his usual 2L at rest. Additionally, the patient has been having low urinary output despite IV lasix 40 mg BID 2/2 urinary retension. Multiple attempts at catheter placement ordered by ED were unsuccessful overnight - Ordered to discontinue attempts at catheter placement - bowel regimen and added Flomax to patient's medication regimen. Urology was consulted and agrees with decreasing attempts at  catheter placement unless the patient becomes uncomfortable due to inability to void - fluid restriction, daily weight, continue IV lasix - bowel regimen with mineral oil enemal - strict I&O's - supplemental oxygen at 3.5L  He refused CPAP over night,he has been consulted.he is more complaint today.  HR is elevated ,increased coreg,improved.  Monitor UOP with  bladder scan.  Consulted PT,OT   Leg swelling did not improved yet.elevate legs,leg swelling gradually is improving,..  He refused again CPAP over night,    Interval History: he is stable on NC O 2,    Review of Systems   Constitutional: Positive for activity change, appetite change and fatigue. Negative for chills, diaphoresis and fever.   HENT: Negative for congestion, hearing loss, sore throat, tinnitus and trouble swallowing.    Eyes: Negative for photophobia, discharge, itching and visual disturbance.   Respiratory: Negative for apnea, cough, shortness of breath, wheezing and stridor.         Dusky nailbeds; obvious WOB   Cardiovascular: Positive for leg swelling. Negative for chest pain and palpitations.   Gastrointestinal: Negative for abdominal distention, abdominal pain, blood in stool, constipation, diarrhea and nausea.   Endocrine: Negative for polydipsia, polyphagia and polyuria.   Genitourinary: Negative for difficulty urinating, dysuria, flank pain, frequency, hematuria and penile pain.   Musculoskeletal: Positive for joint swelling. Negative for arthralgias and neck stiffness.   Skin: Positive for pallor. Negative for color change, rash and wound.   Neurological: Positive for weakness. Negative for dizziness, tremors, seizures, light-headedness, numbness and headaches.   Hematological: Negative for adenopathy.   Psychiatric/Behavioral: Negative for hallucinations and self-injury.     Objective:     Vital Signs (Most Recent):  Temp: 98.1 °F (36.7 °C) (01/07/19 0735)  Pulse: 78 (01/07/19 0735)  Resp: 18 (01/07/19 0735)  BP: (!) 144/78 (01/07/19  0735)  SpO2: 100 % (01/07/19 0735) Vital Signs (24h Range):  Temp:  [97.5 °F (36.4 °C)-98.5 °F (36.9 °C)] 98.1 °F (36.7 °C)  Pulse:  [61-87] 78  Resp:  [18-22] 18  SpO2:  [90 %-100 %] 100 %  BP: (113-146)/(69-78) 144/78     Weight: 77.6 kg (171 lb 1.2 oz)  Body mass index is 25.26 kg/m².    Intake/Output Summary (Last 24 hours) at 1/7/2019 0908  Last data filed at 1/7/2019 0545  Gross per 24 hour   Intake 568 ml   Output 1325 ml   Net -757 ml      Physical Exam   Constitutional: He appears well-developed and well-nourished. He is cooperative.   HENT:   Head: Normocephalic and atraumatic.   Eyes: Conjunctivae, EOM and lids are normal. Pupils are equal, round, and reactive to light.   Neck: Normal range of motion and full passive range of motion without pain. Neck supple. JVD present. No edema present. No thyroid mass present.   Cardiovascular: Normal rate, S1 normal, S2 normal and intact distal pulses.   No murmur heard.  Pulmonary/Chest: No respiratory distress. He has no rales.   Abdominal: Soft. Bowel sounds are normal. He exhibits no distension and no abdominal bruit. There is no splenomegaly or hepatomegaly. There is no tenderness. There is no CVA tenderness.   Genitourinary: No penile tenderness.   Genitourinary Comments: Urinary retension   Musculoskeletal: Normal range of motion. He exhibits edema.   Lymphadenopathy:     He has no cervical adenopathy.     He has no axillary adenopathy.   Neurological: He is alert. He has normal reflexes. He displays no tremor. He displays no seizure activity.   Skin: Skin is warm, dry and intact.   Psychiatric: He has a normal mood and affect. His speech is normal. Thought content normal. Cognition and memory are normal.       Significant Labs:   CBC:   No results for input(s): WBC, HGB, HCT, PLT in the last 48 hours.  CMP:   Recent Labs   Lab 01/06/19  0921 01/07/19  0536    145   K 4.4 4.3    111*   CO2 30* 29   * 103   BUN 28* 25*   CREATININE 1.2 1.1    CALCIUM 9.9 10.1   ANIONGAP 3* 5*   EGFRNONAA 57* >60     Cardiac Markers:   No results for input(s): CKMB, MYOGLOBIN, BNP, TROPISTAT in the last 48 hours.  Lipase: No results for input(s): LIPASE in the last 48 hours.  Troponin:   No results for input(s): TROPONINI in the last 48 hours.  TSH:   No results for input(s): TSH in the last 4320 hours.  Significant Imaging:   Imaging Results          X-Ray Chest AP Portable (Final result)  Result time 12/31/18 15:32:46    Final result by Macarena Woodard MD (12/31/18 15:32:46)                 Impression:      There is no evidence acute pulmonary disease.  The chest appears similar to November 30, 2018.      Electronically signed by: Macarena Woodard MD  Date:    12/31/2018  Time:    15:32             Narrative:    EXAMINATION:  XR CHEST AP PORTABLE    CLINICAL HISTORY:  Shortness of breath    TECHNIQUE:  Single frontal view of the chest was performed.    COMPARISON:  None    FINDINGS:  There is a left-sided pacer defibrillator with leads in the right atrium and right ventricle.  The patient is status post sternotomy.  The cardiac silhouette is enlarged.  There is no pneumothorax.                                  Assessment/Plan:      * Acute on chronic combined systolic and diastolic congestive heart failure    Patient with known heart failure and elevated PA pressures - presents after poor diet compliance with likely CHF exacerbation - has been slow to improve despite IV lasix - has urinary retension  Continuous cardiac monitoring and continue to trend CE  ASA 81 mg daily  Diuresis patient - furosemide 40 mg IVP BID  Pulse OX Q4 with vitals  Oxygen supplementation to keep sats above 89%  Add BB - coreg/ IV diuretic/continue home ARB &statin  Lipid panel and TSH for completeness  UA with next void  He refused CPAP over night,he has been consulted.  HR is elevated ,increased coreg,  Monitor UOP with  bladder scan.  Consulted PT,OT   check bladder scan.not require  stearns  Leg swelling did not improved yet.elevate legs.  Lasix is increased.leg swelling gradually is improving.         Chronic atrial fibrillation    Continue diltiazem and apixaban, monitor on tele,HR is elevated,incrteased coreg.     SJ (obstructive sleep apnea)    No formal diagnosis however, suspect that the patient likely has some contributory symptoms of SJ - Elevated PA systolic pressure = 85.8 with diastolic dysfunction per 2D echo  Hypoxia in the setting of CHF ex + urinary retension  CPAP nightly to keep sats above 90%  He refused again CPAP over night,     Urinary retention    Initial post void residuals>500 multiple attempts at cath placement unsuccessful - patient is voiding ok but continues to have high post void residuals - bowel regimen with enema administered and started on flomax. Urology consulted who agreed with plan and recommended discontinuing attempts at stearns placement considering patient is on chronic long term anticoagulation therapy with apixaban 2/2 afib - I agree with this opinion. His renal functions appear stable will continue to monitor closely if patient becomes uncomfortable will consider reattempt as recommended by urology with couda  Strict I&O's  Post void residuals q 6 with balder scan.  Monitor BP closely       Difficulty voiding    monitor with bladder scan,on flomax.not require stearns.       NSVT (nonsustained ventricular tachycardia)    cardiology is following,increased BB.improved.       Chronic respiratory failure with hypoxia    Continue home supplemental oxygen 2L NC     Centrilobular emphysema    Recently started on Breo, continue nebs     Pacemaker    Biotronic      Iron deficiency anemia    Patient H/H stable and consistent with baseline. No evidence acute bleeding or indication for transfusion at this time.      Chronic anticoagulation    Continue apixaban     Type 2 diabetes mellitus, controlled, with renal complications    Last HgbA1c   Lab Results   Component  Value Date    HGBA1C 5.1 11/30/2018     Hold oral antihyperglycemics while inpatient  PRN sliding scale insulin  ACHS glucose monitoring   ADA diet     CKD Stage 3    Stable, at baseline, maintain euvolemic state, strict I/O's, monitor renal function and electrolytes, avoid nephrotoxic agents.     Essential hypertension    Decent controlled, continue home medications and monitor blood pressure, adjust as needed.       VTE Risk Mitigation (From admission, onward)        Ordered     apixaban tablet 5 mg  2 times daily      12/31/18 0409              Ace Cisneros MD  Department of Hospital Medicine   [facility]

## 2019-01-07 NOTE — PLAN OF CARE
Problem: Occupational Therapy Goal  Goal: Occupational Therapy Goal  Goals to be met by: 1/16/19      Patient will increase functional independence with ADLs by performing:    UE Dressing with Modified [place] and Supervision.  LE Dressing with Stand-by Assistance.  Grooming while standing at sink with Stand-by Assistance.  Supine to sit with Supervision.  Step transfer with Stand-by Assistance  Toilet transfer to the toilet with Stand-by Assistance.  Upper extremity exercise program x10 reps per handout, with assistance as needed.     Outcome: Ongoing (interventions implemented as appropriate)  Patient progressing toward OT goals and will benefit from continued OT to address functional deficits.

## 2019-01-07 NOTE — SUBJECTIVE & OBJECTIVE
Interval History: he is stable on NC O 2,    Review of Systems   Constitutional: Positive for activity change, appetite change and fatigue. Negative for chills, diaphoresis and fever.   HENT: Negative for congestion, hearing loss, sore throat, tinnitus and trouble swallowing.    Eyes: Negative for photophobia, discharge, itching and visual disturbance.   Respiratory: Negative for apnea, cough, shortness of breath, wheezing and stridor.         Dusky nailbeds; obvious WOB   Cardiovascular: Positive for leg swelling. Negative for chest pain and palpitations.   Gastrointestinal: Negative for abdominal distention, abdominal pain, blood in stool, constipation, diarrhea and nausea.   Endocrine: Negative for polydipsia, polyphagia and polyuria.   Genitourinary: Negative for difficulty urinating, dysuria, flank pain, frequency, hematuria and penile pain.   Musculoskeletal: Positive for joint swelling. Negative for arthralgias and neck stiffness.   Skin: Positive for pallor. Negative for color change, rash and wound.   Neurological: Positive for weakness. Negative for dizziness, tremors, seizures, light-headedness, numbness and headaches.   Hematological: Negative for adenopathy.   Psychiatric/Behavioral: Negative for hallucinations and self-injury.     Objective:     Vital Signs (Most Recent):  Temp: 98.1 °F (36.7 °C) (01/07/19 0735)  Pulse: 78 (01/07/19 0735)  Resp: 18 (01/07/19 0735)  BP: (!) 144/78 (01/07/19 0735)  SpO2: 100 % (01/07/19 0735) Vital Signs (24h Range):  Temp:  [97.5 °F (36.4 °C)-98.5 °F (36.9 °C)] 98.1 °F (36.7 °C)  Pulse:  [61-87] 78  Resp:  [18-22] 18  SpO2:  [90 %-100 %] 100 %  BP: (113-146)/(69-78) 144/78     Weight: 77.6 kg (171 lb 1.2 oz)  Body mass index is 25.26 kg/m².    Intake/Output Summary (Last 24 hours) at 1/7/2019 0908  Last data filed at 1/7/2019 0545  Gross per 24 hour   Intake 568 ml   Output 1325 ml   Net -757 ml      Physical Exam   Constitutional: He appears well-developed and  well-nourished. He is cooperative.   HENT:   Head: Normocephalic and atraumatic.   Eyes: Conjunctivae, EOM and lids are normal. Pupils are equal, round, and reactive to light.   Neck: Normal range of motion and full passive range of motion without pain. Neck supple. JVD present. No edema present. No thyroid mass present.   Cardiovascular: Normal rate, S1 normal, S2 normal and intact distal pulses.   No murmur heard.  Pulmonary/Chest: No respiratory distress. He has no rales.   Abdominal: Soft. Bowel sounds are normal. He exhibits no distension and no abdominal bruit. There is no splenomegaly or hepatomegaly. There is no tenderness. There is no CVA tenderness.   Genitourinary: No penile tenderness.   Genitourinary Comments: Urinary retension   Musculoskeletal: Normal range of motion. He exhibits edema.   Lymphadenopathy:     He has no cervical adenopathy.     He has no axillary adenopathy.   Neurological: He is alert. He has normal reflexes. He displays no tremor. He displays no seizure activity.   Skin: Skin is warm, dry and intact.   Psychiatric: He has a normal mood and affect. His speech is normal. Thought content normal. Cognition and memory are normal.       Significant Labs:   CBC:   No results for input(s): WBC, HGB, HCT, PLT in the last 48 hours.  CMP:   Recent Labs   Lab 01/06/19  0921 01/07/19  0536    145   K 4.4 4.3    111*   CO2 30* 29   * 103   BUN 28* 25*   CREATININE 1.2 1.1   CALCIUM 9.9 10.1   ANIONGAP 3* 5*   EGFRNONAA 57* >60     Cardiac Markers:   No results for input(s): CKMB, MYOGLOBIN, BNP, TROPISTAT in the last 48 hours.  Lipase: No results for input(s): LIPASE in the last 48 hours.  Troponin:   No results for input(s): TROPONINI in the last 48 hours.  TSH:   No results for input(s): TSH in the last 4320 hours.  Significant Imaging:   Imaging Results          X-Ray Chest AP Portable (Final result)  Result time 12/31/18 15:32:46    Final result by Macarena Woodard MD  (12/31/18 15:32:46)                 Impression:      There is no evidence acute pulmonary disease.  The chest appears similar to November 30, 2018.      Electronically signed by: Macarena Woodard MD  Date:    12/31/2018  Time:    15:32             Narrative:    EXAMINATION:  XR CHEST AP PORTABLE    CLINICAL HISTORY:  Shortness of breath    TECHNIQUE:  Single frontal view of the chest was performed.    COMPARISON:  None    FINDINGS:  There is a left-sided pacer defibrillator with leads in the right atrium and right ventricle.  The patient is status post sternotomy.  The cardiac silhouette is enlarged.  There is no pneumothorax.

## 2019-01-08 VITALS
TEMPERATURE: 98 F | DIASTOLIC BLOOD PRESSURE: 72 MMHG | HEIGHT: 69 IN | OXYGEN SATURATION: 98 % | WEIGHT: 171.06 LBS | RESPIRATION RATE: 18 BRPM | SYSTOLIC BLOOD PRESSURE: 129 MMHG | BODY MASS INDEX: 25.34 KG/M2 | HEART RATE: 80 BPM

## 2019-01-08 LAB
ANION GAP SERPL CALC-SCNC: 6 MMOL/L
BUN SERPL-MCNC: 26 MG/DL
CALCIUM SERPL-MCNC: 10.2 MG/DL
CHLORIDE SERPL-SCNC: 109 MMOL/L
CO2 SERPL-SCNC: 28 MMOL/L
CREAT SERPL-MCNC: 1.1 MG/DL
EST. GFR  (AFRICAN AMERICAN): >60 ML/MIN/1.73 M^2
EST. GFR  (NON AFRICAN AMERICAN): >60 ML/MIN/1.73 M^2
GLUCOSE SERPL-MCNC: 114 MG/DL
POCT GLUCOSE: 124 MG/DL (ref 70–110)
POCT GLUCOSE: 162 MG/DL (ref 70–110)
POTASSIUM SERPL-SCNC: 4.6 MMOL/L
SODIUM SERPL-SCNC: 143 MMOL/L

## 2019-01-08 PROCEDURE — 25000003 PHARM REV CODE 250: Performed by: NURSE PRACTITIONER

## 2019-01-08 PROCEDURE — 97110 THERAPEUTIC EXERCISES: CPT

## 2019-01-08 PROCEDURE — 94761 N-INVAS EAR/PLS OXIMETRY MLT: CPT

## 2019-01-08 PROCEDURE — 36415 COLL VENOUS BLD VENIPUNCTURE: CPT

## 2019-01-08 PROCEDURE — 25000003 PHARM REV CODE 250: Performed by: INTERNAL MEDICINE

## 2019-01-08 PROCEDURE — 97116 GAIT TRAINING THERAPY: CPT

## 2019-01-08 PROCEDURE — 99900035 HC TECH TIME PER 15 MIN (STAT)

## 2019-01-08 PROCEDURE — 25000003 PHARM REV CODE 250: Performed by: HOSPITALIST

## 2019-01-08 PROCEDURE — 63600175 PHARM REV CODE 636 W HCPCS: Performed by: HOSPITALIST

## 2019-01-08 PROCEDURE — 94640 AIRWAY INHALATION TREATMENT: CPT

## 2019-01-08 PROCEDURE — 80048 BASIC METABOLIC PNL TOTAL CA: CPT

## 2019-01-08 PROCEDURE — 25000242 PHARM REV CODE 250 ALT 637 W/ HCPCS: Performed by: EMERGENCY MEDICINE

## 2019-01-08 PROCEDURE — 25000003 PHARM REV CODE 250: Performed by: EMERGENCY MEDICINE

## 2019-01-08 RX ORDER — CARVEDILOL 25 MG/1
25 TABLET ORAL 2 TIMES DAILY
Qty: 60 TABLET | Refills: 0 | Status: SHIPPED | OUTPATIENT
Start: 2019-01-08 | End: 2021-06-18

## 2019-01-08 RX ORDER — ASPIRIN 81 MG/1
81 TABLET ORAL DAILY
Refills: 0
Start: 2019-01-09 | End: 2021-06-18

## 2019-01-08 RX ORDER — TAMSULOSIN HYDROCHLORIDE 0.4 MG/1
0.4 CAPSULE ORAL DAILY
Qty: 30 CAPSULE | Refills: 0 | Status: SHIPPED | OUTPATIENT
Start: 2019-01-09 | End: 2021-06-18

## 2019-01-08 RX ADMIN — FUROSEMIDE 40 MG: 10 INJECTION, SOLUTION INTRAVENOUS at 08:01

## 2019-01-08 RX ADMIN — FERROUS GLUCONATE TAB 324 MG (37.5 MG ELEMENTAL IRON) 324 MG: 324 (37.5 FE) TAB at 09:01

## 2019-01-08 RX ADMIN — CARVEDILOL 25 MG: 6.25 TABLET, FILM COATED ORAL at 09:01

## 2019-01-08 RX ADMIN — IPRATROPIUM BROMIDE AND ALBUTEROL SULFATE 3 ML: .5; 3 SOLUTION RESPIRATORY (INHALATION) at 12:01

## 2019-01-08 RX ADMIN — APIXABAN 5 MG: 5 TABLET, FILM COATED ORAL at 09:01

## 2019-01-08 RX ADMIN — IPRATROPIUM BROMIDE AND ALBUTEROL SULFATE 3 ML: .5; 3 SOLUTION RESPIRATORY (INHALATION) at 07:01

## 2019-01-08 RX ADMIN — TAMSULOSIN HYDROCHLORIDE 0.4 MG: 0.4 CAPSULE ORAL at 09:01

## 2019-01-08 RX ADMIN — LOSARTAN POTASSIUM 100 MG: 25 TABLET, FILM COATED ORAL at 09:01

## 2019-01-08 RX ADMIN — POLYETHYLENE GLYCOL 3350 17 G: 17 POWDER, FOR SOLUTION ORAL at 09:01

## 2019-01-08 RX ADMIN — ASPIRIN 81 MG: 81 TABLET, COATED ORAL at 09:01

## 2019-01-08 NOTE — PLAN OF CARE
Problem: Physical Therapy Goal  Goal: Physical Therapy Goal  Goals to be met by: 19    Patient will increase functional independence with mobility by performin. Sit to stand transfer with Supervision  2. Gait x200 feet with Supervision using Rolling Walker  3. Lower extremity exercise program x30 reps per handout, with supervision     Outcome: Unable to achieve outcome(s) by discharge  Patient ambulated 80ft x2 trials with seated rest break, RW, 3L NCO2, and SBA.

## 2019-01-08 NOTE — NURSING
Received report from night nurse. Pt on 3L NC, has no sign of distress. IV site clean, dry, and intact. Safety precautions are maintained with bed alarm set, bed in lowest position, bed wheels locked, and call light in reach.

## 2019-01-08 NOTE — PLAN OF CARE
01/08/19 1107   Final Note   Assessment Type Final Discharge Note   Anticipated Discharge Disposition Home-Health   Hospital Follow Up  Appt(s) scheduled? Yes   Discharge plans and expectations educations in teach back method with documentation complete? Yes   Right Care Referral Info   Post Acute Recommendation Home-care   Referral Type home health    Facility Name [facility]   pts nurse Mona advised all CM needs have been addressed and she can proceed with nursing d/c instructions and teaching

## 2019-01-08 NOTE — PLAN OF CARE
[facility] [place]    HOME HEALTH ORDERS  FACE TO FACE ENCOUNTER    Patient Name: Agus Ramos Jr.  YOB: 1940    PCP: Keaton Hardy MD   PCP Address: [address] / [place] [address]  PCP Phone Number: [phone]  PCP Fax: [phone]    Encounter Date: 01/08/2019    Admit to Home Health    Diagnoses:  Active Hospital Problems    Diagnosis  POA    *Acute on chronic combined systolic and diastolic congestive heart failure [I50.43]  Yes     Low sodium diet discussed.        Chronic atrial fibrillation [I48.2]  Yes     Priority: High     Chronic    NSVT (nonsustained ventricular tachycardia) [I47.2]  Yes    Coronary artery disease involving coronary bypass graft of native heart without angina pectoris [I25.810]  Yes    Difficulty voiding [R39.198]  Yes    Urinary retention [R33.9]  Yes    SJ (obstructive sleep apnea) [G47.33]  Yes    Chronic respiratory failure with hypoxia [J96.11]  Yes     Chronic    Centrilobular emphysema [J43.2]  Yes     Chronic     Patient is not interested in a controller, does not feel he gets relief from duoneb nebulizer.      Chronic anticoagulation [Z79.01]  Not Applicable     Chronic    Iron deficiency anemia [D50.9]  Yes     Chronic    Pacemaker [Z95.0]  Yes     Chronic    Essential hypertension [I10]  Yes     Chronic    CKD Stage 3 [N18.3]  Yes     Chronic    Type 2 diabetes mellitus, controlled, with renal complications [E11.29]  Yes     Chronic      Resolved Hospital Problems   No resolved problems to display.       Future Appointments   Date Time Provider Department Center   2/7/2019  1:40 PM Millie Bell MD [facility] HEM ONC [facility]     Follow-up Information     W Neil Wiggins MD On 1/7/2019.    Specialty:  Urology  Why:  Appointment scheduled for Monday January 7th at 1:30pm  Contact information:  [address]  [address]  [place] [address]  [phone]             Millie Bell MD On 2/7/2019.     Specialties:  Hematology and Oncology, Hematology  Why:  Appointment scheduled for February 7th at 1:40pm  Contact information:  [address] [facility]  [address]  [place] [address]  [phone]             Keaton Hardy MD.    Specialty:  Internal Medicine  Why:  Please call at time of discharge to schedule a follow up appointment   Contact information:  [address] [facility]  [address]  [place] [address]  [phone]             Keaton Hardy MD In 1 week.    Specialty:  Internal Medicine  Contact information:  [address] [facility]  [address]  [place] [address]  [phone]                     I have seen and examined this patient face to face today. My clinical findings that support the need for the home health skilled services and home bound status are the following:  Weakness/numbness causing balance and gait disturbance due to Heart Failure, Coronary Heart Disease and Weakness/Debility making it taxing to leave home.    Allergies:Review of patient's allergies indicates:  No Known Allergies    Diet: diabetic diet: 1800 calorie and 2 gram sodium diet    Activities: activity as tolerated    Nursing:   SN to complete comprehensive assessment including routine vital signs. Instruct on disease process and s/s of complications to report to MD. Review/verify medication list sent home with the patient at time of discharge  and instruct patient/caregiver as needed. Frequency may be adjusted depending on start of care date.    Notify MD if SBP > 160 or < 90; DBP > 90 or < 50; HR > 120 or < 50; Temp > 101; Other:         CONSULTS:    Physical Therapy to evaluate and treat. Evaluate for home safety and equipment needs; Establish/upgrade home exercise program. Perform / instruct on therapeutic exercises, gait training, transfer training, and Range of Motion.  Occupational Therapy to evaluate and treat. Evaluate home environment for safety and equipment needs. Perform/Instruct on transfers, ADL training, ROM, and therapeutic  exercises.    MISCELLANEOUS CARE:  Routine Skin for Bedridden Patients: Instruct patient/caregiver to apply moisture barrier cream to all skin folds and wet areas in perineal area daily and after baths and all bowel movements.    WOUND CARE ORDERS  n/a      Medications: Review discharge medications with patient and family and provide education.      Current Discharge Medication List      START taking these medications    Details   aspirin (ECOTRIN) 81 MG EC tablet Take 1 tablet (81 mg total) by mouth once daily.  Refills: 0      carvedilol (COREG) 25 MG tablet Take 1 tablet (25 mg total) by mouth 2 (two) times daily.  Qty: 60 tablet, Refills: 0      tamsulosin (FLOMAX) 0.4 mg Cap Take 1 capsule (0.4 mg total) by mouth once daily.  Qty: 30 capsule, Refills: 0         CONTINUE these medications which have NOT CHANGED    Details   albuterol-ipratropium 2.5mg-0.5mg/3mL (DUO-NEB) 0.5 mg-3 mg(2.5 mg base)/3 mL nebulizer solution Take 3 mLs by nebulization every 6 (six) hours. Rescue  Qty: 1 Box, Refills: 0      apixaban (ELIQUIS) 5 mg Tab Take 5 mg by mouth 2 (two) times daily.      atorvastatin (LIPITOR) 20 MG tablet Take 20 mg by mouth every evening.      diltiaZEM (CARDIZEM) 30 MG tablet Take 1 tablet (30 mg total) by mouth every 12 (twelve) hours.  Qty: 60 tablet, Refills: 3      ferrous gluconate (FERGON) 324 MG tablet Take 324 mg by mouth daily with breakfast.      fish oil-omega-3 fatty acids 300-1,000 mg capsule Take 2 g by mouth 2 (two) times daily.       !! furosemide (LASIX) 40 MG tablet       !! furosemide (LASIX) 80 MG tablet Take 40 mg by mouth 2 (two) times daily.       glipiZIDE (GLUCOTROL) 5 MG tablet Take 10 mg by mouth 2 (two) times daily before meals.      !! losartan (COZAAR) 25 MG tablet       !! losartan (COZAAR) 50 MG tablet Take 0.5 tablets (25 mg total) by mouth once daily.      metFORMIN (GLUCOPHAGE-XR) 500 MG 24 hr tablet       nitroGLYCERIN (NITROSTAT) 0.4 MG SL tablet Place 0.4 mg under the  tongue every 5 (five) minutes as needed.      polyethylene glycol (GLYCOLAX) 17 gram PwPk Take by mouth as needed.        !! - Potential duplicate medications found. Please discuss with provider.          I certify that this patient is confined to his home and needs intermittent skilled nursing care, physical therapy and occupational therapy.

## 2019-01-08 NOTE — PLAN OF CARE
01/07/19 1040   Discharge Reassessment   Assessment Type Discharge Planning Reassessment   Do you have any problems affording any of your prescribed medications? No   Discharge Plan A Home with family;Home Health   DME Needed Upon Discharge  glucometer;oxygen   Anticipated Discharge Disposition Home-Health   Can the patient answer the patient profile reliably? Yes, cognitively intact   How does the patient rate their overall health at the present time? Fair   Describe the patient's ability to walk at the present time. Walks with the help of equipment   How often would a person be available to care for the patient? Often   Number of comorbid conditions (as recorded on the chart) Three   During the past month, has the patient often been bothered by feeling down, depressed or hopeless? No   During the past month, has the patient often been bothered by little interest or pleasure in doing things? No   Post-Acute Status   Post-Acute Authorization Home Health/Hospice   Home Health/Hospice Status Referrals Sent

## 2019-01-08 NOTE — PT/OT/SLP PROGRESS
Physical Therapy Treatment    Patient Name:  Agus Ramos Jr.   MRN:  [id number]    Recommendations:     Discharge Recommendations:  (home with home health PT and family assistance as needed)   Discharge Equipment Recommendations: none   Barriers to discharge: None    Assessment:     Agsu Ramos Jr. is a 78 y.o. male admitted with a medical diagnosis of Acute on chronic combined systolic and diastolic congestive heart failure.  He presents with the following impairments/functional limitations:  weakness, impaired endurance, impaired functional mobilty, gait instability, impaired balance, decreased lower extremity function, decreased safety awareness, impaired cardiopulmonary response to activity. Patient increased gait distance today. He is scheduled to discharge home this PM.     Rehab Prognosis: Good; patient would benefit from acute skilled PT services to address these deficits and reach maximum level of function.    Recent Surgery: * No surgery found *      Plan:     During this hospitalization, patient to be seen 2 x/week to address the identified rehab impairments via gait training, therapeutic activities, therapeutic exercises and progress toward the following goals:    · Plan of Care Expires:  01/16/19    Subjective     Chief Complaint: Ready to go home.   Patient/Family Comments/goals: To go home.   Pain/Comfort:  · Pain Rating 1: 0/10      Objective:     Communicated with nurse Dunn prior to session.  Patient found seated in bedside chair with peripheral IV, telemetry, oxygen upon PT entry to room.     General Precautions: Standard, fall, respiratory   Orthopedic Precautions:N/A   Braces: N/A     Functional Mobility:  · Transfers:     · Sit to Stand:  stand by assistance with rolling walker x2 trials from bedside chair   · Gait:  Patient ambulated 80ft x2 trials with seated rest break between trials, Rolling Walker, 3L NC O2, and SBA using 3-point gait. Patient demonstrated decreased arnold,  decreased velocity of limb motion and decreased step length during gait due to impaired balance and decreased endurance.    AM-PAC 6 CLICK MOBILITY  Turning over in bed (including adjusting bedclothes, sheets and blankets)?: 4  Sitting down on and standing up from a chair with arms (e.g., wheelchair, bedside commode, etc.): 4  Moving from lying on back to sitting on the side of the bed?: 4  Moving to and from a bed to a chair (including a wheelchair)?: 3  Need to walk in hospital room?: 3  Climbing 3-5 steps with a railing?: 1  Basic Mobility Total Score: 19     Therapeutic Activities and Exercises:   Patient performed B LE seated therex 2x15 for A/P, LAQ, and hip flex.     Patient left reclined in bedside chair with all lines intact, call button in reach and nurse Mona notified.    GOALS:   Multidisciplinary Problems     Physical Therapy Goals        Problem: Physical Therapy Goal    Goal Priority Disciplines Outcome Goal Variances Interventions   Physical Therapy Goal     PT, PT/OT Unable to achieve outcome(s) by discharge     Description:  Goals to be met by: 19    Patient will increase functional independence with mobility by performin. Sit to stand transfer with Supervision  2. Gait x200 feet with Supervision using Rolling Walker  3. Lower extremity exercise program x30 reps per handout, with supervision                      Time Tracking:     PT Received On: 19  PT Start Time: 1105     PT Stop Time: 1130  PT Total Time (min): 25 min     Billable Minutes: Gait Training  15 and Therapeutic Exercise 10    Treatment Type: Treatment  PT/PTA: PT     PTA Visit Number: 0     Katie Coughlin, PT  2019

## 2019-01-08 NOTE — NURSING
Edema +$ in lower legs. Edema is +4 in back. Patient maintains fluid restriction 33036. Dr. Gonzalez informed new orders given.

## 2019-01-08 NOTE — NURSING
Pt discharge teaching was implemented along with paperwork. D/c IV site, catheter intact. Pt AAOx4 VS were stable. Pt verbalizes understanding along with wife and no questions were asked.

## 2019-01-08 NOTE — PLAN OF CARE
Problem: Adult Inpatient Plan of Care  Goal: Plan of Care Review  Outcome: Ongoing (interventions implemented as appropriate)   01/08/19 1551   Plan of Care Review   Plan of Care Reviewed With patient       Problem: Skin Injury Risk Increased  Goal: Skin Health and Integrity  Outcome: Ongoing (interventions implemented as appropriate)  Intervention: Optimize Skin Protection   01/08/19 1551   Prevent Additional Skin Injury   Head of Bed (HOB) HOB elevated   Pressure Reduction Devices heel offloading device utilized   Pressure Reduction Techniques frequent weight shift encouraged         Problem: Breathing Pattern Ineffective  Goal: Effective Breathing Pattern  Outcome: Ongoing (interventions implemented as appropriate)  Intervention: Promote Improved Breathing Pattern   01/08/19 1551   Promote Airway Secretion Clearance   Breathing Techniques/Airway Clearance deep/controlled cough encouraged;pursed-lip breathing encouraged   Prevent Additional Skin Injury   Head of Bed (HOB) HOB elevated   Promote Anxiety Reduction   Supportive Measures active listening utilized;positive reinforcement provided         Problem: Diabetes Comorbidity  Goal: Blood Glucose Level Within Desired Range  Outcome: Ongoing (interventions implemented as appropriate)  Intervention: Maintain Glycemic Control   01/08/19 1551   Monitor and Manage Ketoacidosis   Glycemic Management blood glucose monitoring         Problem: Fluid Imbalance (Heart Failure)  Goal: Fluid Balance  Outcome: Ongoing (interventions implemented as appropriate)  Intervention: Monitor and Manage Fluid Balance   01/08/19 1551   Monitor and Manage Cardiac Rhythm Effects   Fluid/Electrolyte Management fluids restricted

## 2019-01-08 NOTE — DISCHARGE SUMMARY
[facility] Hospital Medicine  Discharge Summary      Patient Name: Agus Ramos Jr.  MRN: [id number]  Admission Date: 12/31/2018  Hospital Length of Stay: 7 days  Discharge Date and Time:  01/08/2019 1:04 PM  Attending Physician: Ace Cisneros MD   Discharging Provider: Ace Cisneros MD  Primary Care Provider: Keaton Hardy MD      HPI:   78 y.o. male with chronic a fib, HTN, HLD, CKD stage III, DM2, chronic OAC, tobacco abuse, chronic systolic heart failure, anemia, pacemaker in situ, COPD, and CAD s/p CABG presents with a complaint of SOB progressively worsening for the past 5 weeks.  His PCP directed him to the ED today.  Denies fever, chills, cough, chest pain, dizziness, syncope, N/V/D, abdominal pain, bloody or black stools, or dysuria.  Reports adherence to home treatment plan.  In the ED he was found to have elevated BNP of 958 and pulmonary edema on chest xray, troponin also mildly elevated at 0.126.  He received nebulizer treatment, IV lasix, and nitro paste with improvement.  Short runs of V tach noted with exertion on tele.  Discussed with Cardiology in ED and placed in observation for further evaluation and treatment.    * No surgery found *      Hospital Course:   Patient with history of CHF,admits to poor compliance to diet over the holidays - He has LVEF 45% and PA pressure 85.79 - continued 2-3+ pitting edema; activity intolerance, dyspnea with minimal exertion and at rest, and although he uses home oxygen sat 2L he is requiring 3.5L to keep sats above 85%. His sat drops to 82% on his usual 2L at rest. Additionally, the patient has been having low urinary output despite IV lasix 40 mg BID 2/2 urinary retension. Multiple attempts at catheter placement ordered by ED were unsuccessful overnight - Ordered to discontinue attempts at catheter placement - bowel regimen and added Flomax to patient's medication regimen. Urology was consulted and agrees with decreasing  attempts at catheter placement unless the patient becomes uncomfortable due to inability to void - fluid restriction, daily weight, continue IV lasix - bowel regimen with mineral oil enemal - strict I&O's - supplemental oxygen at 3.5L  He refused CPAP over night,he has been consulted.  HR was elevated ,increased coreg,improved.  Monitored UOP with  bladder scan with improvement,did not required stearns,  Consulted PT,OT   Leg swelling did not improved .elevatde legs,leg swelling gradually is improved.  His symptoms much improved,he was stable on NC O 2.PT,IT was following.  Patient has been discharged home with HH, and follow up with PCP in next few days,CHF education has been done.     Consults:   Consults (From admission, onward)        Status Ordering Provider     Inpatient consult to Cardiology  Once     Provider:  Blanco Brunson MD    Completed JULIANN OHARA     Inpatient consult to Urology  Once     Provider:  ROGERIO Wiggins MD    Completed IGOR BEJARANO          No new Assessment & Plan notes have been filed under this hospital service since the last note was generated.  Service: Hospital Medicine    Final Active Diagnoses:    Diagnosis Date Noted POA    PRINCIPAL PROBLEM:  Acute on chronic combined systolic and diastolic congestive heart failure [I50.43] 09/24/2016 Yes    Chronic atrial fibrillation [I48.2] 09/19/2013 Yes     Chronic    NSVT (nonsustained ventricular tachycardia) [I47.2] 01/01/2019 Yes    Coronary artery disease involving coronary bypass graft of native heart without angina pectoris [I25.810] 01/01/2019 Yes    Difficulty voiding [R39.198] 01/01/2019 Yes    Urinary retention [R33.9] 01/01/2019 Yes    SJ (obstructive sleep apnea) [G47.33] 01/01/2019 Yes    Chronic respiratory failure with hypoxia [J96.11] 06/01/2018 Yes     Chronic    Centrilobular emphysema [J43.2] 05/15/2018 Yes     Chronic    Chronic anticoagulation [Z79.01] 05/04/2018 Not Applicable     Chronic     Iron deficiency anemia [D50.9] 05/04/2018 Yes     Chronic    Pacemaker [Z95.0] 05/04/2018 Yes     Chronic    Essential hypertension [I10] 09/24/2016 Yes     Chronic    CKD Stage 3 [N18.3] 09/24/2016 Yes     Chronic    Type 2 diabetes mellitus, controlled, with renal complications [E11.29] 09/24/2016 Yes     Chronic      Problems Resolved During this Admission:       Discharged Condition: stable    Disposition: Home-Health Care [facility]    Follow Up:  Follow-up Information     W Neil Wiggins MD On 1/29/2019.    Specialty:  Urology  Why:  Appointment scheduled for Tuesday January 29th at 3pm  Contact information:  [address]  [address]  [place] [address]  [phone]             Millie Bell MD On 2/7/2019.    Specialties:  Hematology and Oncology, Hematology  Why:  Appointment scheduled for February 7th at 1:40pm  Contact information:  [address]  [address]  [place] [address]  [phone]             Keaton Hardy MD.    Specialty:  Internal Medicine  Why:  Please call at time of discharge to schedule a follow up appointment   Contact information:  [address]  [address]  [place] [address]  [phone]             [facility].    Why:  Home Health- call number provided for questions or concerns regarding home health   Contact information:  [address]  [facility] [address] [phone]                 Patient Instructions:      Activity as tolerated       Significant Diagnostic Studies: Labs:   BMP:   Recent Labs   Lab 01/07/19  0536 01/08/19  0617    114*    143   K 4.3 4.6   * 109   CO2 29 28   BUN 25* 26*   CREATININE 1.1 1.1   CALCIUM 10.1 10.2    and CBC   Recent Labs   Lab 01/07/19  0536   WBC 5.54   HGB 10.4*   HCT 34.7*        Radiology: X-Ray: CXR: X-Ray Chest 1 View (CXR): No results found for this visit on 12/31/18. and X-Ray Chest PA and Lateral (CXR): No results found for this visit on 12/31/18.    Pending Diagnostic Studies:     None          Medications:  Reconciled Home Medications:      Medication List      START taking these medications    aspirin 81 MG EC tablet  Commonly known as:  ECOTRIN  Take 1 tablet (81 mg total) by mouth once daily.  Start taking on:  1/9/2019     carvedilol 25 MG tablet  Commonly known as:  COREG  Take 1 tablet (25 mg total) by mouth 2 (two) times daily.     tamsulosin 0.4 mg Cap  Commonly known as:  FLOMAX  Take 1 capsule (0.4 mg total) by mouth once daily.  Start taking on:  1/9/2019        CONTINUE taking these medications    albuterol-ipratropium 2.5 mg-0.5 mg/3 mL nebulizer solution  Commonly known as:  DUO-NEB  Take 3 mLs by nebulization every 6 (six) hours. Rescue     atorvastatin 20 MG tablet  Commonly known as:  LIPITOR  Take 20 mg by mouth every evening.     diltiaZEM 30 MG tablet  Commonly known as:  CARDIZEM  Take 1 tablet (30 mg total) by mouth every 12 (twelve) hours.     ELIQUIS 5 mg Tab  Generic drug:  apixaban  Take 5 mg by mouth 2 (two) times daily.     ferrous gluconate 324 MG tablet  Commonly known as:  FERGON  Take 324 mg by mouth daily with breakfast.     fish oil-omega-3 fatty acids 300-1,000 mg capsule  Take 2 g by mouth 2 (two) times daily.     * furosemide 80 MG tablet  Commonly known as:  LASIX  Take 40 mg by mouth 2 (two) times daily.     * furosemide 40 MG tablet  Commonly known as:  LASIX     glipiZIDE 5 MG tablet  Commonly known as:  GLUCOTROL  Take 10 mg by mouth 2 (two) times daily before meals.     * losartan 50 MG tablet  Commonly known as:  COZAAR  Take 0.5 tablets (25 mg total) by mouth once daily.     * losartan 25 MG tablet  Commonly known as:  COZAAR     metFORMIN 500 MG 24 hr tablet  Commonly known as:  GLUCOPHAGE-XR     nitroGLYCERIN 0.4 MG SL tablet  Commonly known as:  NITROSTAT  Place 0.4 mg under the tongue every 5 (five) minutes as needed.     polyethylene glycol 17 gram Pwpk  Commonly known as:  GLYCOLAX  Take by mouth as needed.         * This list has 4 medication(s) that  are the same as other medications prescribed for you. Read the directions carefully, and ask your doctor or other care provider to review them with you.                Indwelling Lines/Drains at time of discharge:   Lines/Drains/Airways          None          Time spent on the discharge of patient: over 30  minutes  Patient was seen and examined on the date of discharge and determined to be suitable for discharge.         Ace Cisneros MD  Department of Hospital Medicine  [facility]

## 2019-01-08 NOTE — PLAN OF CARE
01/04/19 1039   Discharge Assessment   Assessment Type Discharge Planning Reassessment   Confirmed/corrected address and phone number on facesheet? Yes   Assessment information obtained from? Patient   Communicated expected length of stay with patient/caregiver yes   Prior to hospitilization cognitive status: Alert/Oriented   Prior to hospitalization functional status: Independent;Assistive Equipment;Needs Assistance   Current cognitive status: Alert/Oriented   Current Functional Status: Independent;Assistive Equipment;Needs Assistance   Facility Arrived From: (home)   Lives With spouse   Able to Return to Prior Arrangements yes   Is patient able to care for self after discharge? Yes   Who are your caregiver(s) and their phone number(s)? Jessikavrgj- [phone]   Patient's perception of discharge disposition home or selfcare;home health   Readmission Within the Last 30 Days no previous admission in last 30 days   Patient currently being followed by outpatient case management? No   Patient currently receives any other outside agency services? No

## 2019-01-09 NOTE — PT/OT/SLP DISCHARGE
Physical Therapy Discharge Summary    Name: Agus Ramos Jr.  MRN: [id number]   Principal Problem: Acute on chronic combined systolic and diastolic congestive heart failure     Patient Discharged from acute Physical Therapy on 19.  Please refer to prior PT noted date on 19 for functional status.     Assessment:     Goals partially met. Patient appropriate for care in another setting.    Objective:     GOALS:   Multidisciplinary Problems     Physical Therapy Goals        Problem: Physical Therapy Goal    Goal Priority Disciplines Outcome Goal Variances Interventions   Physical Therapy Goal     PT, PT/OT Unable to achieve outcome(s) by discharge     Description:  Goals to be met by: 19    Patient will increase functional independence with mobility by performin. Sit to stand transfer with Supervision  2. Gait x200 feet with Supervision using Rolling Walker  3. Lower extremity exercise program x30 reps per handout, with supervision                      Reasons for Discontinuation of Therapy Services  Transfer to alternate level of care.      Plan:     Patient Discharged to: Home with Home Health Service.    Katie Coughlin, PT  2019

## 2019-01-09 NOTE — PT/OT/SLP DISCHARGE
Occupational Therapy Discharge Summary    Agus Ramos Jr.  MRN: [id number]   Principal Problem: Acute on chronic combined systolic and diastolic congestive heart failure      Patient Discharged from acute Occupational Therapy on 1/8/19.  Please refer to prior OT notes for functional status.    Assessment:      Patient was discharged unexpectedly.  Information required to complete an accurate discharge summary is unknown.  Refer to therapy initial evaluation and last progress note for initial and most recent functional status and goal achievement.  Recommendations made may be found in medical record.    Objective:     GOALS:   Multidisciplinary Problems     Occupational Therapy Goals        Problem: Occupational Therapy Goal    Goal Priority Disciplines Outcome Interventions   Occupational Therapy Goal     OT, PT/OT Ongoing (interventions implemented as appropriate)    Description:  Goals to be met by: 1/16/19      Patient will increase functional independence with ADLs by performing:    UE Dressing with Modified [place] and Supervision.  LE Dressing with Stand-by Assistance.  Grooming while standing at sink with Stand-by Assistance.  Supine to sit with Supervision.  Step transfer with Stand-by Assistance  Toilet transfer to the toilet with Stand-by Assistance.  Upper extremity exercise program x10 reps per handout, with assistance as needed.                      Reasons for Discontinuation of Therapy Services  Transfer to alternate level of care.      Plan:     Patient Discharged to: Home with Home Health Service    Anna Dumont OT  1/9/2019

## 2019-01-10 ENCOUNTER — DOCUMENTATION ONLY (OUTPATIENT)
Dept: HEMATOLOGY/ONCOLOGY | Facility: CLINIC | Age: 79
End: 2019-01-10

## 2019-01-10 ENCOUNTER — PATIENT OUTREACH (OUTPATIENT)
Dept: ADMINISTRATIVE | Facility: CLINIC | Age: 79
End: 2019-01-10

## 2019-01-10 DIAGNOSIS — I10 HYPERTENSION, UNSPECIFIED TYPE: ICD-10-CM

## 2019-01-10 DIAGNOSIS — I50.43 CHF (CONGESTIVE HEART FAILURE), NYHA CLASS I, ACUTE ON CHRONIC, COMBINED: Primary | ICD-10-CM

## 2019-01-10 DIAGNOSIS — N18.30 CONTROLLED TYPE 2 DIABETES MELLITUS WITH STAGE 3 CHRONIC KIDNEY DISEASE, WITHOUT LONG-TERM CURRENT USE OF INSULIN: ICD-10-CM

## 2019-01-10 DIAGNOSIS — E11.22 CONTROLLED TYPE 2 DIABETES MELLITUS WITH STAGE 3 CHRONIC KIDNEY DISEASE, WITHOUT LONG-TERM CURRENT USE OF INSULIN: ICD-10-CM

## 2019-01-10 NOTE — PATIENT INSTRUCTIONS
Heart Failure: Making Changes to Your Diet  You have a condition called heart failure. It is also known as congestive heart failure, or CHF. When you have heart failure, excess fluid is more likely to build up in your body. This makes the heart work harder to pump blood. Fluid buildup causes symptoms such as shortness of breath and edema (swelling). Controlling the amount of salt (sodium) you eat may help stop fluid from building up. Your doctor may also tell you to reduce the amount of fluid you drink.  Reading Food Labels  Your healthcare provider will tell you how much sodium you can eat each day. Read food labels to keep track. Keep in mind that certain foods are high in salt. These include canned, frozen, and processed foods. Check the amount of sodium in each serving. Watch out for high-sodium ingredients. These include MSG (monosodium glutamate), baking soda, and sodium phosphate.   Eating Less Salt  Give yourself time to get used to eating less salt. It may take a little while. Here are some tips to help:  Take the saltshaker off the table. Replace it with salt-free herb mixes and spices.  Eat fresh or plain frozen vegetables. These have much less salt than canned vegetables.  Choose low-sodium snacks like sodium-free pretzels, crackers, or air-popped popcorn.  Dont add salt to your food when youre cooking. Instead, season your foods with pepper, lemon, garlic, or onion.  When you eat out, ask that your food be cooked without added salt.   If Youre Told to Limit Fluid  You may need to limit fluid intake to help prevent edema. This includes anything that is liquid at room temperature, such as ice cream and soup. If your doctor tells you to limit fluid, try these tips:  Measure drinks in a measuring cup before you drink them. This will help you meet daily goals.  Chill drinks to make them more refreshing.  Suck on frozen lemon wedges to quench thirst.  Only drink when youre thirsty.  Chew sugarless gum or  suck on hard candy to keep your mouth moist.   When to Call Your Doctor  Call your doctor right away if you have any symptoms of worsening heart failure. These can include:  Sudden weight gain  Increased swelling of your legs or ankles  Trouble breathing when youre resting or at night  © 1996-1392 [employer], [address] [place] [address], [place] [address]. All rights reserved. This information is not intended as a substitute for professional medical care. Always follow your healthcare professional's instructions.

## 2019-01-10 NOTE — PROGRESS NOTES
Called [facility] to obtain colonoscopy report, was informed that pt did have an appt on 18-19 to eval for colonscopy, but he cancelled & did not reschedule.

## 2019-01-10 NOTE — PROGRESS NOTES
C3 nurse spoke with pt's wife and is reporting no contact from [facility].    C3 nurse contacted [facility] and Gabriella reporting she had no orders probably d/t PHN approval.    C3 nurse contacted [place] [facility] RN of above and she promptly resolved issue.

## 2019-01-12 ENCOUNTER — NURSE TRIAGE (OUTPATIENT)
Dept: ADMINISTRATIVE | Facility: CLINIC | Age: 79
End: 2019-01-12

## 2019-01-12 NOTE — TELEPHONE ENCOUNTER
"  Reason for Disposition   Abdomen is more swollen than usual    Answer Assessment - Initial Assessment Questions  1. STOOL PATTERN OR FREQUENCY: "How often do you pass bowel movements (BMs)?"  (Normal range: tid to q 3 days)  "When was the last BM passed?"        Every other day normally. Last bm was 2 days ago.  2. STRAINING: "Do you have to strain to have a BM?"       Yes  3. RECTAL PAIN: "Does your rectum hurt when the stool comes out?" If so, ask: "Do you have hemorrhoids? How bad is the pain?"  (Scale 1-10; or mild, moderate, severe)      No  4. STOOL COMPOSITION: "Are the stools hard?"       Yes  5. BLOOD ON STOOLS: "Has there been any blood on the toilet tissue or on the surface of the BM?" If so, ask: "When was the last time?"       No  6. CHRONIC CONSTIPATION: "Is this a new problem for you?"  If no, ask: How long have you had this problem?" (days, weeks, months)       He has had constipation before. "A while".  7. CHANGES IN DIET: "Have there been any recent changes in your diet?"       Discharged from the hospital 2 days ago. Not very active.  8. MEDICATIONS: "Have you been taking any new medications?"      tamsulosin   9. LAXATIVES: "Have you been using any laxatives or enemas?"  If yes, ask "What, how often, and when was the last time?"      No. Miralax only, started yesterday.  10. CAUSE: "What do you think is causing the constipation?"         unsure  11. OTHER SYMPTOMS: "Do you have any other symptoms?" (e.g., abdominal pain, fever, vomiting)        Abdominal bloating  12. PREGNANCY: "Is there any chance you are pregnant?" "When was your last menstrual period?"        n/a    Protocols used:  CONSTIPATION-A-    "

## 2019-01-14 ENCOUNTER — TELEPHONE (OUTPATIENT)
Dept: HEMATOLOGY/ONCOLOGY | Facility: CLINIC | Age: 79
End: 2019-01-14

## 2019-01-14 NOTE — TELEPHONE ENCOUNTER
Called & spoke with pt's wife regarding phone call over the weekend that pt was constipated. She stated it has resolved. Instructed her to call back if pt has any further problems.

## 2019-01-16 ENCOUNTER — OUTPATIENT CASE MANAGEMENT (OUTPATIENT)
Dept: ADMINISTRATIVE | Facility: OTHER | Age: 79
End: 2019-01-16

## 2019-01-16 NOTE — PROGRESS NOTES
Please note the following patient's information was forwarded to [facility] ([facility]) for case management and/or  on 1/14/19.    Please see the media section of patient's chart for additional details.    Please contact Ext. [phone] with any questions.    Thank you,    Leora Oneill, [facility]  [phone]

## 2019-01-21 ENCOUNTER — NURSE TRIAGE (OUTPATIENT)
Dept: ADMINISTRATIVE | Facility: CLINIC | Age: 79
End: 2019-01-21

## 2019-01-21 NOTE — TELEPHONE ENCOUNTER
"    Reason for Disposition   Caller has medication question about med not prescribed by PCP and triager unable to answer question (e.g., compatibility with other med, storage)    Answer Assessment - Initial Assessment Questions  1. SYMPTOMS: "Do you have any symptoms?"      Stopped up and nasal congestion  2. SEVERITY: If symptoms are present, ask "Are they mild, moderate or severe?"      moderate    Protocols used: ST MEDICATION QUESTION CALL-A-    Patient wife is asking for recommendation on cough medication and nasal stuffiness.  Please respond directly to patient.[phone]  "

## 2019-01-25 ENCOUNTER — TELEPHONE (OUTPATIENT)
Dept: UROLOGY | Facility: CLINIC | Age: 79
End: 2019-01-25

## 2019-01-25 NOTE — TELEPHONE ENCOUNTER
Lt message for pt to contact office regarding appt. Pt is not do until June with an ultrasound. greg

## 2019-01-28 ENCOUNTER — TELEPHONE (OUTPATIENT)
Dept: UROLOGY | Facility: CLINIC | Age: 79
End: 2019-01-28

## 2019-01-28 NOTE — TELEPHONE ENCOUNTER
----- Message from Jillian Garcia sent at 1/28/2019  8:50 AM CST -----  Contact: self - [phone]  Pt returned staff's call.

## 2019-01-29 ENCOUNTER — TELEPHONE (OUTPATIENT)
Dept: UROLOGY | Facility: CLINIC | Age: 79
End: 2019-01-29

## 2019-01-29 NOTE — TELEPHONE ENCOUNTER
----- Message from Arlen Presley sent at 1/29/2019 10:56 AM CST -----  Contact: k   Pts wife is calling to clarify appointment time today. Please call at [phone].

## 2019-03-13 ENCOUNTER — HOSPITAL ENCOUNTER (INPATIENT)
Facility: HOSPITAL | Age: 79
LOS: 6 days | Discharge: HOME HEALTH | DRG: 291 | End: 2019-03-19
Attending: EMERGENCY MEDICINE | Admitting: EMERGENCY MEDICINE
Payer: MEDICARE

## 2019-03-13 DIAGNOSIS — R06.00 DYSPNEA: ICD-10-CM

## 2019-03-13 DIAGNOSIS — I50.9 ACUTE ON CHRONIC CONGESTIVE HEART FAILURE, UNSPECIFIED HEART FAILURE TYPE: Primary | ICD-10-CM

## 2019-03-13 DIAGNOSIS — I50.43 ACUTE ON CHRONIC COMBINED SYSTOLIC AND DIASTOLIC CONGESTIVE HEART FAILURE: ICD-10-CM

## 2019-03-13 DIAGNOSIS — K92.2 GASTROINTESTINAL HEMORRHAGE, UNSPECIFIED GASTROINTESTINAL HEMORRHAGE TYPE: ICD-10-CM

## 2019-03-13 DIAGNOSIS — D64.9 ANEMIA, UNSPECIFIED TYPE: ICD-10-CM

## 2019-03-13 DIAGNOSIS — R06.03 ACUTE RESPIRATORY DISTRESS: ICD-10-CM

## 2019-03-13 PROBLEM — N17.9 ACUTE RENAL FAILURE SUPERIMPOSED ON STAGE 3 CHRONIC KIDNEY DISEASE: Status: ACTIVE | Noted: 2019-03-13

## 2019-03-13 PROBLEM — I73.9 PERIPHERAL ARTERIAL DISEASE: Chronic | Status: ACTIVE | Noted: 2019-03-13

## 2019-03-13 PROBLEM — J96.21 ACUTE ON CHRONIC RESPIRATORY FAILURE WITH HYPOXIA: Status: ACTIVE | Noted: 2019-03-13

## 2019-03-13 PROBLEM — I50.42 CHRONIC COMBINED SYSTOLIC AND DIASTOLIC HEART FAILURE: Chronic | Status: ACTIVE | Noted: 2019-03-13

## 2019-03-13 PROBLEM — N18.30 ACUTE RENAL FAILURE SUPERIMPOSED ON STAGE 3 CHRONIC KIDNEY DISEASE: Status: ACTIVE | Noted: 2019-03-13

## 2019-03-13 LAB
ABO + RH BLD: NORMAL
ALBUMIN SERPL BCP-MCNC: 2.3 G/DL
ALLENS TEST: ABNORMAL
ALP SERPL-CCNC: 97 U/L
ALT SERPL W/O P-5'-P-CCNC: 16 U/L
ANION GAP SERPL CALC-SCNC: 8 MMOL/L
AST SERPL-CCNC: 21 U/L
BACTERIA #/AREA URNS HPF: ABNORMAL /HPF
BASOPHILS NFR BLD: 0 %
BILIRUB SERPL-MCNC: 0.5 MG/DL
BILIRUB UR QL STRIP: NEGATIVE
BLD GP AB SCN CELLS X3 SERPL QL: NORMAL
BLD PROD TYP BPU: NORMAL
BLD PROD TYP BPU: NORMAL
BLOOD UNIT EXPIRATION DATE: NORMAL
BLOOD UNIT EXPIRATION DATE: NORMAL
BLOOD UNIT TYPE CODE: 5100
BLOOD UNIT TYPE CODE: 5100
BLOOD UNIT TYPE: NORMAL
BLOOD UNIT TYPE: NORMAL
BNP SERPL-MCNC: 871 PG/ML
BUN SERPL-MCNC: 32 MG/DL
CALCIUM SERPL-MCNC: 9.7 MG/DL
CHLORIDE SERPL-SCNC: 107 MMOL/L
CLARITY UR: CLEAR
CO2 SERPL-SCNC: 25 MMOL/L
CODING SYSTEM: NORMAL
CODING SYSTEM: NORMAL
COLOR UR: YELLOW
CREAT SERPL-MCNC: 1.5 MG/DL
DELSYS: ABNORMAL
DIFFERENTIAL METHOD: ABNORMAL
DISPENSE STATUS: NORMAL
DISPENSE STATUS: NORMAL
EOSINOPHIL NFR BLD: 3 %
EP: 5
ERYTHROCYTE [DISTWIDTH] IN BLOOD BY AUTOMATED COUNT: 17.3 %
EST. GFR  (AFRICAN AMERICAN): 50 ML/MIN/1.73 M^2
EST. GFR  (NON AFRICAN AMERICAN): 43 ML/MIN/1.73 M^2
FIO2: 100
GLUCOSE SERPL-MCNC: 58 MG/DL (ref 70–110)
GLUCOSE SERPL-MCNC: 64 MG/DL
GLUCOSE UR QL STRIP: NEGATIVE
HCO3 UR-SCNC: 23.8 MMOL/L (ref 24–28)
HCT VFR BLD AUTO: 17.4 %
HGB BLD-MCNC: 4.4 G/DL
HGB UR QL STRIP: ABNORMAL
HYPOCHROMIA BLD QL SMEAR: ABNORMAL
IP: 12
KETONES UR QL STRIP: NEGATIVE
LEUKOCYTE ESTERASE UR QL STRIP: ABNORMAL
LYMPHOCYTES NFR BLD: 19 %
MAGNESIUM SERPL-MCNC: 2.1 MG/DL
MCH RBC QN AUTO: 18.6 PG
MCHC RBC AUTO-ENTMCNC: 25.3 G/DL
MCV RBC AUTO: 74 FL
MICROSCOPIC COMMENT: ABNORMAL
MODE: ABNORMAL
MONOCYTES NFR BLD: 11 %
NEUTROPHILS NFR BLD: 67 %
NITRITE UR QL STRIP: POSITIVE
NRBC BLD-RTO: ABNORMAL /100 WBC
NUM UNITS TRANS PACKED RBC: NORMAL
PCO2 BLDA: 34.7 MMHG (ref 35–45)
PH SMN: 7.45 [PH] (ref 7.35–7.45)
PH UR STRIP: 6 [PH] (ref 5–8)
PLATELET # BLD AUTO: 312 K/UL
PMV BLD AUTO: 9.8 FL
PO2 BLDA: 29 MMHG (ref 80–100)
POC BE: 0 MMOL/L
POC SATURATED O2: 58 % (ref 95–100)
POC TCO2: 25 MMOL/L (ref 23–27)
POCT GLUCOSE: 75 MG/DL (ref 70–110)
POIKILOCYTOSIS BLD QL SMEAR: SLIGHT
POLYCHROMASIA BLD QL SMEAR: ABNORMAL
POTASSIUM SERPL-SCNC: 4.1 MMOL/L
PROT SERPL-MCNC: 4.5 G/DL
PROT UR QL STRIP: NEGATIVE
RBC # BLD AUTO: 2.36 M/UL
RBC #/AREA URNS HPF: 2 /HPF (ref 0–4)
SAMPLE: ABNORMAL
SCHISTOCYTES BLD QL SMEAR: PRESENT
SITE: ABNORMAL
SODIUM SERPL-SCNC: 140 MMOL/L
SP GR UR STRIP: 1.01 (ref 1–1.03)
SP02: 94
SPONT RATE: 23
SQUAMOUS #/AREA URNS HPF: 3 /HPF
TRANS ERYTHROCYTES VOL PATIENT: NORMAL ML
TROPONIN I SERPL DL<=0.01 NG/ML-MCNC: 0.06 NG/ML
URN SPEC COLLECT METH UR: ABNORMAL
UROBILINOGEN UR STRIP-ACNC: ABNORMAL EU/DL
WBC # BLD AUTO: 5.91 K/UL
WBC #/AREA URNS HPF: 6 /HPF (ref 0–5)

## 2019-03-13 PROCEDURE — 63600175 PHARM REV CODE 636 W HCPCS: Performed by: EMERGENCY MEDICINE

## 2019-03-13 PROCEDURE — 85007 BL SMEAR W/DIFF WBC COUNT: CPT

## 2019-03-13 PROCEDURE — 36430 TRANSFUSION BLD/BLD COMPNT: CPT

## 2019-03-13 PROCEDURE — 99285 EMERGENCY DEPT VISIT HI MDM: CPT | Mod: 25

## 2019-03-13 PROCEDURE — 25000003 PHARM REV CODE 250: Performed by: EMERGENCY MEDICINE

## 2019-03-13 PROCEDURE — 86920 COMPATIBILITY TEST SPIN: CPT

## 2019-03-13 PROCEDURE — 96375 TX/PRO/DX INJ NEW DRUG ADDON: CPT

## 2019-03-13 PROCEDURE — 82803 BLOOD GASES ANY COMBINATION: CPT

## 2019-03-13 PROCEDURE — 96374 THER/PROPH/DIAG INJ IV PUSH: CPT

## 2019-03-13 PROCEDURE — 86985 SPLIT BLOOD OR PRODUCTS: CPT

## 2019-03-13 PROCEDURE — 27000221 HC OXYGEN, UP TO 24 HOURS

## 2019-03-13 PROCEDURE — 81000 URINALYSIS NONAUTO W/SCOPE: CPT

## 2019-03-13 PROCEDURE — 93005 ELECTROCARDIOGRAM TRACING: CPT

## 2019-03-13 PROCEDURE — 94660 CPAP INITIATION&MGMT: CPT

## 2019-03-13 PROCEDURE — 84484 ASSAY OF TROPONIN QUANT: CPT

## 2019-03-13 PROCEDURE — C9113 INJ PANTOPRAZOLE SODIUM, VIA: HCPCS | Performed by: EMERGENCY MEDICINE

## 2019-03-13 PROCEDURE — 85027 COMPLETE CBC AUTOMATED: CPT

## 2019-03-13 PROCEDURE — 80053 COMPREHEN METABOLIC PANEL: CPT

## 2019-03-13 PROCEDURE — P9011 BLOOD SPLIT UNIT: HCPCS

## 2019-03-13 PROCEDURE — 83735 ASSAY OF MAGNESIUM: CPT

## 2019-03-13 PROCEDURE — 99900035 HC TECH TIME PER 15 MIN (STAT)

## 2019-03-13 PROCEDURE — 83880 ASSAY OF NATRIURETIC PEPTIDE: CPT

## 2019-03-13 PROCEDURE — 93010 EKG 12-LEAD: ICD-10-PCS | Mod: ,,, | Performed by: INTERNAL MEDICINE

## 2019-03-13 PROCEDURE — 93010 ELECTROCARDIOGRAM REPORT: CPT | Mod: ,,, | Performed by: INTERNAL MEDICINE

## 2019-03-13 PROCEDURE — 83036 HEMOGLOBIN GLYCOSYLATED A1C: CPT

## 2019-03-13 PROCEDURE — P9021 RED BLOOD CELLS UNIT: HCPCS

## 2019-03-13 PROCEDURE — 12000002 HC ACUTE/MED SURGE SEMI-PRIVATE ROOM

## 2019-03-13 PROCEDURE — 82962 GLUCOSE BLOOD TEST: CPT | Mod: 59

## 2019-03-13 PROCEDURE — 86850 RBC ANTIBODY SCREEN: CPT

## 2019-03-13 RX ORDER — FUROSEMIDE 10 MG/ML
40 INJECTION INTRAMUSCULAR; INTRAVENOUS
Status: COMPLETED | OUTPATIENT
Start: 2019-03-13 | End: 2019-03-13

## 2019-03-13 RX ORDER — HYDROCODONE BITARTRATE AND ACETAMINOPHEN 500; 5 MG/1; MG/1
TABLET ORAL
Status: DISCONTINUED | OUTPATIENT
Start: 2019-03-13 | End: 2019-03-19 | Stop reason: HOSPADM

## 2019-03-13 RX ORDER — PANTOPRAZOLE SODIUM 40 MG/10ML
80 INJECTION, POWDER, LYOPHILIZED, FOR SOLUTION INTRAVENOUS
Status: COMPLETED | OUTPATIENT
Start: 2019-03-13 | End: 2019-03-13

## 2019-03-13 RX ORDER — SODIUM CHLORIDE 0.9 % (FLUSH) 0.9 %
5 SYRINGE (ML) INJECTION
Status: CANCELLED | OUTPATIENT
Start: 2019-03-13

## 2019-03-13 RX ORDER — ONDANSETRON 8 MG/1
8 TABLET, ORALLY DISINTEGRATING ORAL EVERY 8 HOURS PRN
Status: CANCELLED | OUTPATIENT
Start: 2019-03-13

## 2019-03-13 RX ADMIN — FUROSEMIDE 40 MG: 10 INJECTION, SOLUTION INTRAVENOUS at 04:03

## 2019-03-13 RX ADMIN — DEXTROSE MONOHYDRATE 25 G: 25 INJECTION, SOLUTION INTRAVENOUS at 04:03

## 2019-03-13 RX ADMIN — PANTOPRAZOLE SODIUM 80 MG: 40 INJECTION, POWDER, FOR SOLUTION INTRAVENOUS at 09:03

## 2019-03-13 RX ADMIN — DEXTROSE 8 MG/HR: 50 INJECTION, SOLUTION INTRAVENOUS at 09:03

## 2019-03-13 NOTE — ED TRIAGE NOTES
Pt arrived to Ed due to SOB that worsened over past two days pt has hx of COPD. Reports being discharged from hospital on January 6th for CHF exacerbation.

## 2019-03-13 NOTE — ED PROVIDER NOTES
Encounter Date: 3/13/2019    SCRIBE #1 NOTE: I, Colby Gan, am scribing for, and in the presence of,  Yuli Reis MD. I have scribed the following portions of the note - Other sections scribed: HPI, ROS and PE.       History     Chief Complaint   Patient presents with    Shortness of Breath     SOB x 2 days, hx of COPD, pt found prone in bed by EMS with glucose of 58 mg/dL, RA O2 sat 85%. Placed on BiPap by EMS. A. Fib on monitor.      CC: Shortness of Breath    HPI: This is a 78 y.o. male PMHx HTN, DM, CHF, CAD, peripheral vascular disease, MI, anemia, COPD, atrial fibrillation, HLD, long-term anticoagulant use and PSHx cardiac pacemaker placement, cardiac catheterization, coronary angioplasty with stent who presents with a 2-day history of worsening SOB, swelling to his face, abdomen, lower extremities. Pt initially had these symptoms after being discharged from the hospital on 01/08. He had been admitted for CHF exacerbation in late Dec. Upon EMS arrival pt's O2 sat was low 80s; this improved after CPAP was initiated en route. CBG was 58 mg/dL. Pt takes Lasix for his swelling. He is compliant.       The history is provided by the patient. No  was used.     Review of patient's allergies indicates:  No Known Allergies  Past Medical History:   Diagnosis Date    Anemia 05/03/2018    pending blood transfusion    Anticoagulant long-term use     apixaban    Atrial fibrillation     CKD (chronic kidney disease)     Congestive heart failure     COPD (chronic obstructive pulmonary disease)     Coronary artery disease     Diabetes mellitus     Encounter for blood transfusion     HLD (hyperlipidemia)     Hypertension     MI (myocardial infarction)     On home oxygen therapy     Pacemaker     left chest    Peripheral vascular disease     Requires assistance with activities of daily living (ADL)     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     S/P femoral-popliteal bypass surgery left    Stented coronary  artery     Tobacco use     Unsteady gait      Past Surgical History:   Procedure Laterality Date    CARDIAC CATHETERIZATION      CARDIAC PACEMAKER PLACEMENT      CARDIAC SURGERY      3 vessel CABG    CARDIOVERSION N/A 2013    [facility]    cardioverted      CORONARY ANGIOPLASTY WITH STENT PLACEMENT      EGD (ESOPHAGOGASTRODUODENOSCOPY) N/A 2018    [facility]    HEMORRHOID SURGERY      TRANSESOPHAGEAL ECHOCARDIOGRAM (ANIA) N/A 2013    [facility]    VASCULAR SURGERY      femoral artery popliteal bypass     Family History   Problem Relation Age of Onset    Diabetes Father     Diabetes Mother      Social History     Tobacco Use    Smoking status: Former Smoker     Packs/day: 1.00     Years: 60.00     Pack years: 60.00     Types: Cigarettes     Last attempt to quit: 2018     Years since quittin.8    Smokeless tobacco: Never Used   Substance Use Topics    Alcohol use: No    Drug use: No     Review of Systems   Constitutional: Negative for chills, diaphoresis and fever.   HENT: Negative for rhinorrhea and sore throat.    Eyes: Negative for visual disturbance.   Respiratory: Positive for shortness of breath. Negative for cough.    Cardiovascular: Negative for chest pain.   Gastrointestinal: Negative for abdominal pain, constipation, diarrhea, nausea and vomiting.   Genitourinary: Negative for dysuria.   Musculoskeletal:        (+) facial, abdominal, lower extremity swelling   Neurological: Negative for headaches.   All other systems reviewed and are negative.      Physical Exam     Initial Vitals   BP Pulse Resp Temp SpO2   19 1607 19 1607 19 1607 19 1729 19 1652   (!) 119/58 (!) 120 (!) 22 97.9 °F (36.6 °C) 99 %      MAP       --                Physical Exam    Nursing note and vitals reviewed.  Constitutional: He is not diaphoretic. No distress.   Pt awake and alert.  Speech is normal.   HENT:   Head: Normocephalic and atraumatic.   Mouth/Throat: Oropharynx is clear and moist.   He has bilateral facial swelling periorally.   Eyes: Conjunctivae and EOM are normal. Pupils are equal, round, and reactive to light. No scleral icterus.   Neck: Normal range of motion. Neck supple. No JVD present.   Cardiovascular: Normal rate, regular rhythm and intact distal pulses.   Pulmonary/Chest: No stridor.   Pt in mild to moderate respiratory distress. He has diminished breath sounds in all lung fields.    Abdominal: Soft. Bowel sounds are normal. He exhibits no distension. There is no tenderness.   Musculoskeletal: Normal range of motion. He exhibits no edema or tenderness.   Pt has 3+ pitting edema to legs, arms and 2+ pitting edema to upper thighs.   Neurological: He is alert and oriented to person, place, and time. He has normal strength. No cranial nerve deficit.   Skin: Skin is warm and dry. No rash noted.   Psychiatric: He has a normal mood and affect.         ED Course   Critical Care  Date/Time: 3/13/2019 10:33 PM  Performed by: Yuli Reis MD  Authorized by: Yuli Reis MD   Direct patient critical care time: 10 minutes  Additional history critical care time: 5 minutes  Ordering / reviewing critical care time: 5 minutes  Documentation critical care time: 5 minutes  Consulting other physicians critical care time: 5 minutes  Total critical care time (exclusive of procedural time) : 30 minutes        Labs Reviewed   CBC W/ AUTO DIFFERENTIAL - Abnormal; Notable for the following components:       Result Value    RBC 2.36 (*)     Hemoglobin 4.4 (*)     Hematocrit 17.4 (*)     MCV 74 (*)     MCH 18.6 (*)     MCHC 25.3 (*)     RDW 17.3 (*)     nRBC 2@L=100 (*)     All other components within normal limits    Narrative:        critical result(s) called and verbal readback obtained from   Randy 03/13/2019 18:03   COMPREHENSIVE METABOLIC PANEL - Abnormal; Notable for the  following components:    Glucose 64 (*)     BUN, Bld 32 (*)     Creatinine 1.5 (*)     Total Protein 4.5 (*)     Albumin 2.3 (*)     eGFR if  50 (*)     eGFR if non  43 (*)     All other components within normal limits   B-TYPE NATRIURETIC PEPTIDE - Abnormal; Notable for the following components:     (*)     All other components within normal limits   TROPONIN I - Abnormal; Notable for the following components:    Troponin I 0.056 (*)     All other components within normal limits   URINALYSIS, REFLEX TO URINE CULTURE - Abnormal; Notable for the following components:    Occult Blood UA 1+ (*)     Nitrite, UA Positive (*)     Urobilinogen, UA 2.0-3.0 (*)     Leukocytes, UA 2+ (*)     All other components within normal limits    Narrative:     Preferred Collection Type->Urine, Clean Catch   URINALYSIS MICROSCOPIC - Abnormal; Notable for the following components:    WBC, UA 6 (*)     Bacteria, UA Many (*)     All other components within normal limits    Narrative:     Preferred Collection Type->Urine, Clean Catch   ISTAT PROCEDURE - Abnormal; Notable for the following components:    POC PCO2 34.7 (*)     POC PO2 29 (*)     POC HCO3 23.8 (*)     POC SATURATED O2 58 (*)     All other components within normal limits   MAGNESIUM   TYPE & SCREEN   POCT GLUCOSE   PREPARE RBC SOFT     EKG Readings: (Independently Interpreted)   Initial Reading: No STEMI. Rhythm: Atrial Fibrillation. Ectopy: Rare.       Imaging Results          X-Ray Chest AP Portable (Final result)  Result time 03/13/19 16:45:57    Final result by Marek Ruff MD (03/13/19 16:45:57)                 Impression:      Cardiac device with findings suggesting pulmonary edema/CHF pattern, small bilateral pleural effusions and probable left basilar atelectasis/infiltrate.  Continued follow-up as warranted.      Electronically signed by: Marek Ruff MD  Date:    03/13/2019  Time:    16:45             Narrative:     EXAMINATION:  XR CHEST AP PORTABLE    CLINICAL HISTORY:  Dyspnea, unspecified    TECHNIQUE:  Single frontal view of the chest was performed.    COMPARISON:  Chest radiograph 12/31/2018    FINDINGS:  Monitoring leads and tubing overlie the chest.  Patient is rotated.  Resolution is limited by body habitus with underpenetration.    Left chest dual lead cardiac device appears stable.  Cardiomediastinal silhouette remains prominent with prominence of the central pulmonary vasculature and bilateral diffuse interstitial coarsening with a perihilar and basilar predominance suggesting pulmonary edema/CHF pattern.  Suspected small bilateral pleural effusions, left greater than right, as well as probable left basilar atelectasis/infiltrate.  No large pneumothorax.  No acute osseous process seen.  Sternotomy wires are unchanged.  PA and lateral views can be obtained.                                 Medical Decision Making:   Clinical Tests:   Lab Tests: Ordered and Reviewed  Radiological Study: Ordered and Reviewed  Medical Tests: Ordered and Reviewed  ED Management:  Mr Ramos was transition to our BiPAP immediately upon arrival.  Previous records reviewed.  He was given Lasix.  He quickly began to look better and states he feels better.  He has been stable during his time here.  He was noted to be heme positive with black stools.  His hemoglobin was noted to be low, 4.  He was consented by me for blood transfusion.  I spoke with him and his wife extensively.  He will be admitted to the ICU.   Discussed with Dr. Yip.  Assigned 1 dose of Lasix in the ER.  Will continue BiPAP upon admission.            Scribe Attestation:   Scribe #1: I performed the above scribed service and the documentation accurately describes the services I performed. I attest to the accuracy of the note.    Attending Attestation:           Physician Attestation for Scribe:  Physician Attestation Statement for Scribe #1: I, Yuli Reis MD,  reviewed documentation, as scribed by Colby Gan in my presence, and it is both accurate and complete.                    Clinical Impression:       ICD-10-CM ICD-9-CM   1. Acute on chronic congestive heart failure, unspecified heart failure type I50.9 428.0   2. Dyspnea R06.00 786.09   3. Acute respiratory distress R06.03 518.82   4. Anemia, unspecified type D64.9 285.9   5. Gastrointestinal hemorrhage, unspecified gastrointestinal hemorrhage type K92.2 578.9                                Yuli Reis MD  03/13/19 2606

## 2019-03-13 NOTE — PROGRESS NOTES
Results for ASHLEY GONZALEZ JR. (MRN [id number]) as of 3/13/2019 17:51   Ref. Range 3/13/2019 17:44   POC PH Latest Ref Range: 7.35 - 7.45  7.445   POC PCO2 Latest Ref Range: 35 - 45 mmHg 34.7 (L)   POC PO2 Latest Ref Range: 80 - 100 mmHg 29 (LL)   POC BE Latest Ref Range: -2 to 2 mmol/L 0   POC HCO3 Latest Ref Range: 24 - 28 mmol/L 23.8 (L)   POC SATURATED O2 Latest Ref Range: 95 - 100 % 58 (L)   POC TCO2 Latest Ref Range: 23 - 27 mmol/L 25   FiO2 Unknown 100   Sample Unknown ARTERIAL   DelSys Unknown CPAP/BiPAP   Allens Test Unknown Pass   Site Unknown RR   Mode Unknown BiPAP   EP Unknown 5   IP Unknown 12   Sp02 Unknown 94   Spont Rate Unknown 23   Unable to obtain ABG only able to get VBG Dr Reis aware

## 2019-03-14 PROBLEM — E87.5 HYPERKALEMIA: Status: ACTIVE | Noted: 2019-03-14

## 2019-03-14 LAB
ALBUMIN SERPL BCP-MCNC: 1.9 G/DL
ALP SERPL-CCNC: 81 U/L
ALT SERPL W/O P-5'-P-CCNC: 15 U/L
ANION GAP SERPL CALC-SCNC: 10 MMOL/L
ANISOCYTOSIS BLD QL SMEAR: SLIGHT
AORTIC ROOT ANNULUS: 3.39 CM
AORTIC VALVE CUSP SEPERATION: 1.91 CM
ASCENDING AORTA: 2.99 CM
AST SERPL-CCNC: 32 U/L
AV INDEX (PROSTH): 0.52
AV MEAN GRADIENT: 4.38 MMHG
AV PEAK GRADIENT: 9.73 MMHG
AV VALVE AREA: 2.37 CM2
AV VELOCITY RATIO: 0.55
BASOPHILS # BLD AUTO: 0.03 K/UL
BASOPHILS NFR BLD: 0.3 %
BILIRUB DIRECT SERPL-MCNC: 0.3 MG/DL
BILIRUB SERPL-MCNC: 2.7 MG/DL
BLD PROD TYP BPU: NORMAL
BLOOD UNIT EXPIRATION DATE: NORMAL
BLOOD UNIT TYPE CODE: 5100
BLOOD UNIT TYPE: NORMAL
BSA FOR ECHO PROCEDURE: 2.07 M2
BUN SERPL-MCNC: 34 MG/DL
CALCIUM SERPL-MCNC: 9.4 MG/DL
CHLORIDE SERPL-SCNC: 112 MMOL/L
CO2 SERPL-SCNC: 20 MMOL/L
CODING SYSTEM: NORMAL
CREAT SERPL-MCNC: 1.7 MG/DL
CV ECHO LV RWT: 0.46 CM
DIFFERENTIAL METHOD: ABNORMAL
DISPENSE STATUS: NORMAL
DOP CALC AO PEAK VEL: 1.56 M/S
DOP CALC AO VTI: 22.33 CM
DOP CALC LVOT AREA: 4.52 CM2
DOP CALC LVOT DIAMETER: 2.4 CM
DOP CALC LVOT PEAK VEL: 0.86 M/S
DOP CALC LVOT STROKE VOLUME: 52.9 CM3
DOP CALCLVOT PEAK VEL VTI: 11.7 CM
ECHO LV POSTERIOR WALL: 1.16 CM (ref 0.6–1.1)
EOSINOPHIL # BLD AUTO: 0.1 K/UL
EOSINOPHIL NFR BLD: 0.5 %
ERYTHROCYTE [DISTWIDTH] IN BLOOD BY AUTOMATED COUNT: 19.3 %
EST. GFR  (AFRICAN AMERICAN): 43 ML/MIN/1.73 M^2
EST. GFR  (NON AFRICAN AMERICAN): 37 ML/MIN/1.73 M^2
ESTIMATED AVG GLUCOSE: 97 MG/DL
FRACTIONAL SHORTENING: 30 % (ref 28–44)
GGT SERPL-CCNC: 26 U/L
GIANT PLATELETS BLD QL SMEAR: PRESENT
GLUCOSE SERPL-MCNC: 70 MG/DL
HBA1C MFR BLD HPLC: 5 %
HCT VFR BLD AUTO: 23.2 %
HGB BLD-MCNC: 6.5 G/DL
HYPOCHROMIA BLD QL SMEAR: ABNORMAL
INTERVENTRICULAR SEPTUM: 1.33 CM (ref 0.6–1.1)
IVRT: 0.07 MSEC
LA MAJOR: 5.77 CM
LA MINOR: 6.21 CM
LA WIDTH: 3.94 CM
LEFT ATRIUM SIZE: 5.44 CM
LEFT ATRIUM VOLUME INDEX: 53.3 ML/M2
LEFT ATRIUM VOLUME: 108.98 CM3
LEFT INTERNAL DIMENSION IN SYSTOLE: 3.52 CM (ref 2.1–4)
LEFT VENTRICLE DIASTOLIC VOLUME INDEX: 58.7 ML/M2
LEFT VENTRICLE DIASTOLIC VOLUME: 119.93 ML
LEFT VENTRICLE MASS INDEX: 121.6 G/M2
LEFT VENTRICLE SYSTOLIC VOLUME INDEX: 25.3 ML/M2
LEFT VENTRICLE SYSTOLIC VOLUME: 51.74 ML
LEFT VENTRICULAR INTERNAL DIMENSION IN DIASTOLE: 5.03 CM (ref 3.5–6)
LEFT VENTRICULAR MASS: 248.53 G
LYMPHOCYTES # BLD AUTO: 0.9 K/UL
LYMPHOCYTES NFR BLD: 7.8 %
MCH RBC QN AUTO: 21.5 PG
MCHC RBC AUTO-ENTMCNC: 28 G/DL
MCV RBC AUTO: 77 FL
MONOCYTES # BLD AUTO: 1.2 K/UL
MONOCYTES NFR BLD: 10.5 %
NEUTROPHILS # BLD AUTO: 9.2 K/UL
NEUTROPHILS NFR BLD: 82.3 %
PISA TR MAX VEL: 3.96 M/S
PLATELET # BLD AUTO: 227 K/UL
PLATELET BLD QL SMEAR: ABNORMAL
PMV BLD AUTO: 10.2 FL
POCT GLUCOSE: 117 MG/DL (ref 70–110)
POCT GLUCOSE: 137 MG/DL (ref 70–110)
POCT GLUCOSE: 70 MG/DL (ref 70–110)
POCT GLUCOSE: 81 MG/DL (ref 70–110)
POLYCHROMASIA BLD QL SMEAR: ABNORMAL
POTASSIUM SERPL-SCNC: 5.9 MMOL/L
PROT SERPL-MCNC: 3.8 G/DL
PV PEAK VELOCITY: 0.81 CM/S
RA MAJOR: 5.68 CM
RA PRESSURE: 3 MMHG
RA WIDTH: 4.36 CM
RBC # BLD AUTO: 3.03 M/UL
RIGHT VENTRICULAR END-DIASTOLIC DIMENSION: 5.45 CM
RV TISSUE DOPPLER FREE WALL SYSTOLIC VELOCITY 1 (APICAL 4 CHAMBER VIEW): 8.91 M/S
SCHISTOCYTES BLD QL SMEAR: ABNORMAL
SINUS: 3.35 CM
SODIUM SERPL-SCNC: 142 MMOL/L
STJ: 3.07 CM
TARGETS BLD QL SMEAR: ABNORMAL
TR MAX PG: 62.73 MMHG
TRANS ERYTHROCYTES VOL PATIENT: NORMAL ML
TRICUSPID ANNULAR PLANE SYSTOLIC EXCURSION: 0.62 CM
TV REST PULMONARY ARTERY PRESSURE: 66 MMHG
WBC # BLD AUTO: 11.38 K/UL

## 2019-03-14 PROCEDURE — 36415 COLL VENOUS BLD VENIPUNCTURE: CPT

## 2019-03-14 PROCEDURE — 99223 PR INITIAL HOSPITAL CARE,LEVL III: ICD-10-PCS | Mod: ,,, | Performed by: INTERNAL MEDICINE

## 2019-03-14 PROCEDURE — 25000003 PHARM REV CODE 250: Performed by: INTERNAL MEDICINE

## 2019-03-14 PROCEDURE — 25000003 PHARM REV CODE 250: Performed by: EMERGENCY MEDICINE

## 2019-03-14 PROCEDURE — 85025 COMPLETE CBC W/AUTO DIFF WBC: CPT

## 2019-03-14 PROCEDURE — 82977 ASSAY OF GGT: CPT

## 2019-03-14 PROCEDURE — 63600175 PHARM REV CODE 636 W HCPCS: Performed by: EMERGENCY MEDICINE

## 2019-03-14 PROCEDURE — 94761 N-INVAS EAR/PLS OXIMETRY MLT: CPT

## 2019-03-14 PROCEDURE — C9113 INJ PANTOPRAZOLE SODIUM, VIA: HCPCS | Performed by: EMERGENCY MEDICINE

## 2019-03-14 PROCEDURE — 99223 1ST HOSP IP/OBS HIGH 75: CPT | Mod: ,,, | Performed by: INTERNAL MEDICINE

## 2019-03-14 PROCEDURE — 82248 BILIRUBIN DIRECT: CPT

## 2019-03-14 PROCEDURE — 20000000 HC ICU ROOM

## 2019-03-14 PROCEDURE — P9021 RED BLOOD CELLS UNIT: HCPCS

## 2019-03-14 PROCEDURE — 36430 TRANSFUSION BLD/BLD COMPNT: CPT

## 2019-03-14 PROCEDURE — 99900035 HC TECH TIME PER 15 MIN (STAT)

## 2019-03-14 PROCEDURE — 80053 COMPREHEN METABOLIC PANEL: CPT

## 2019-03-14 PROCEDURE — 94660 CPAP INITIATION&MGMT: CPT

## 2019-03-14 RX ORDER — ONDANSETRON 2 MG/ML
8 INJECTION INTRAMUSCULAR; INTRAVENOUS EVERY 8 HOURS PRN
Status: DISCONTINUED | OUTPATIENT
Start: 2019-03-14 | End: 2019-03-19 | Stop reason: HOSPADM

## 2019-03-14 RX ORDER — CLONIDINE HYDROCHLORIDE 0.1 MG/1
0.1 TABLET ORAL 3 TIMES DAILY PRN
Status: DISCONTINUED | OUTPATIENT
Start: 2019-03-14 | End: 2019-03-19 | Stop reason: HOSPADM

## 2019-03-14 RX ORDER — FUROSEMIDE 10 MG/ML
40 INJECTION INTRAMUSCULAR; INTRAVENOUS EVERY 12 HOURS
Status: DISCONTINUED | OUTPATIENT
Start: 2019-03-14 | End: 2019-03-15

## 2019-03-14 RX ORDER — RAMELTEON 8 MG/1
8 TABLET ORAL NIGHTLY PRN
Status: DISCONTINUED | OUTPATIENT
Start: 2019-03-14 | End: 2019-03-19 | Stop reason: HOSPADM

## 2019-03-14 RX ORDER — IBUPROFEN 200 MG
16 TABLET ORAL
Status: DISCONTINUED | OUTPATIENT
Start: 2019-03-14 | End: 2019-03-19 | Stop reason: HOSPADM

## 2019-03-14 RX ORDER — IBUPROFEN 200 MG
24 TABLET ORAL
Status: DISCONTINUED | OUTPATIENT
Start: 2019-03-14 | End: 2019-03-19 | Stop reason: HOSPADM

## 2019-03-14 RX ORDER — IPRATROPIUM BROMIDE AND ALBUTEROL SULFATE 2.5; .5 MG/3ML; MG/3ML
3 SOLUTION RESPIRATORY (INHALATION) EVERY 6 HOURS PRN
Status: DISCONTINUED | OUTPATIENT
Start: 2019-03-14 | End: 2019-03-19 | Stop reason: HOSPADM

## 2019-03-14 RX ORDER — AMOXICILLIN 250 MG
1 CAPSULE ORAL 2 TIMES DAILY PRN
Status: DISCONTINUED | OUTPATIENT
Start: 2019-03-14 | End: 2019-03-19 | Stop reason: HOSPADM

## 2019-03-14 RX ORDER — INSULIN ASPART 100 [IU]/ML
0-5 INJECTION, SOLUTION INTRAVENOUS; SUBCUTANEOUS
Status: DISCONTINUED | OUTPATIENT
Start: 2019-03-14 | End: 2019-03-19 | Stop reason: HOSPADM

## 2019-03-14 RX ORDER — ATORVASTATIN CALCIUM 10 MG/1
20 TABLET, FILM COATED ORAL NIGHTLY
Status: DISCONTINUED | OUTPATIENT
Start: 2019-03-14 | End: 2019-03-19 | Stop reason: HOSPADM

## 2019-03-14 RX ORDER — TAMSULOSIN HYDROCHLORIDE 0.4 MG/1
0.4 CAPSULE ORAL DAILY
Status: DISCONTINUED | OUTPATIENT
Start: 2019-03-14 | End: 2019-03-19 | Stop reason: HOSPADM

## 2019-03-14 RX ORDER — ACETAMINOPHEN 500 MG
500 TABLET ORAL EVERY 6 HOURS PRN
Status: DISCONTINUED | OUTPATIENT
Start: 2019-03-14 | End: 2019-03-19 | Stop reason: HOSPADM

## 2019-03-14 RX ORDER — GLUCAGON 1 MG
1 KIT INJECTION
Status: DISCONTINUED | OUTPATIENT
Start: 2019-03-14 | End: 2019-03-19 | Stop reason: HOSPADM

## 2019-03-14 RX ORDER — METOPROLOL TARTRATE 1 MG/ML
2.5 INJECTION, SOLUTION INTRAVENOUS ONCE
Status: COMPLETED | OUTPATIENT
Start: 2019-03-14 | End: 2019-03-14

## 2019-03-14 RX ORDER — HYDROCODONE BITARTRATE AND ACETAMINOPHEN 500; 5 MG/1; MG/1
TABLET ORAL
Status: DISCONTINUED | OUTPATIENT
Start: 2019-03-14 | End: 2019-03-15

## 2019-03-14 RX ADMIN — DEXTROSE 8 MG/HR: 50 INJECTION, SOLUTION INTRAVENOUS at 08:03

## 2019-03-14 RX ADMIN — DEXTROSE 8 MG/HR: 50 INJECTION, SOLUTION INTRAVENOUS at 12:03

## 2019-03-14 RX ADMIN — ATORVASTATIN CALCIUM 20 MG: 10 TABLET, FILM COATED ORAL at 08:03

## 2019-03-14 RX ADMIN — DEXTROSE 8 MG/HR: 50 INJECTION, SOLUTION INTRAVENOUS at 01:03

## 2019-03-14 RX ADMIN — DEXTROSE 8 MG/HR: 50 INJECTION, SOLUTION INTRAVENOUS at 07:03

## 2019-03-14 RX ADMIN — TAMSULOSIN HYDROCHLORIDE 0.4 MG: 0.4 CAPSULE ORAL at 08:03

## 2019-03-14 RX ADMIN — ATORVASTATIN CALCIUM 20 MG: 10 TABLET, FILM COATED ORAL at 01:03

## 2019-03-14 RX ADMIN — METOPROLOL TARTRATE 2.5 MG: 5 INJECTION, SOLUTION INTRAVENOUS at 09:03

## 2019-03-14 RX ADMIN — FUROSEMIDE 40 MG: 10 INJECTION, SOLUTION INTRAVENOUS at 08:03

## 2019-03-14 RX ADMIN — FUROSEMIDE 40 MG: 10 INJECTION, SOLUTION INTRAVENOUS at 04:03

## 2019-03-14 NOTE — CONSULTS
[facility]  Pulmonology  Consult Note    Patient Name: Agus Ramos Jr.  MRN: [id number]  Admission Date: 3/13/2019  Hospital Length of Stay: 1 days  Code Status: Full Code  Attending Physician: Ede Gómez MD  Primary Care Provider: Keaton Hardy MD   Principal Problem: Acute on chronic combined systolic and diastolic congestive heart failure    Inpatient consult to Pulmonology  Consult performed by: Juliet Eastman MD  Consult ordered by: Ede Gómez MD  Reason for consult: Hypoxia        Subjective:     HPI:  78 y.o. male known to me with essential hypertension, type 2 diabetes mellitus (HbA1c 5.1% Nov 2018), CAD sbp CABG, chronic combined systolic and diastolic heart failure (LVEF 45% Dec 2018), CKD Stage 3, peripheral artery disease, chronic atrial fibrillation ([id number]-[id number] score 5) on chronic anticoagulation, COPD, chronic respiratory failure with hypoxia, and anemia of chronic disease who presents to [facility] ED with complaints of dyspnea for four days.  He experiences intermittent dyspnea and this current episode started about four days ago while at rest.  The dyspnea is worse on exertion but not with any orthopnea, PND, chest pain, fevers, chills, nausea, vomiting, palpitations, diaphoresis, nor hemoptysis.  He does note that his legs and arms are more swollen during the same time period.  He denies any sick contacts or recent travel, and hasn't noticed any hematochezia, melena, hematuria, nor any hematemesis.      Pulmonary consulted for hypoxic respiratory failure. Patient is awake and alert on Bipap. Patient states that he wears 2 L NC at home.   Ate breakfast this morning and tolerating time off Bipap.     Past Medical History:   Diagnosis Date    Anemia 05/03/2018    pending blood transfusion    Anticoagulant long-term use     apixaban    Atrial fibrillation     CKD (chronic kidney disease)     Congestive heart failure     COPD (chronic obstructive  pulmonary disease)     Coronary artery disease     Diabetes mellitus     Encounter for blood transfusion     HLD (hyperlipidemia)     Hypertension     MI (myocardial infarction)     On home oxygen therapy     Pacemaker     left chest    Peripheral vascular disease     Requires assistance with activities of daily living (ADL)     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     S/P femoral-popliteal bypass surgery left    Stented coronary artery     Tobacco use     Unsteady gait        Past Surgical History:   Procedure Laterality Date    CARDIAC CATHETERIZATION      CARDIAC PACEMAKER PLACEMENT      CARDIAC SURGERY      3 vessel CABG    CARDIOVERSION N/A 9/19/2013    [facility]    cardioverted      CORONARY ANGIOPLASTY WITH STENT PLACEMENT      EGD (ESOPHAGOGASTRODUODENOSCOPY) N/A 6/1/2018    [facility]    HEMORRHOID SURGERY      TRANSESOPHAGEAL ECHOCARDIOGRAM (ANIA) N/A 9/19/2013    [facility]    VASCULAR SURGERY      femoral artery popliteal bypass       Review of patient's allergies indicates:  No Known Allergies    No current facility-administered medications on file prior to encounter.      Current Outpatient Medications on File Prior to Encounter   Medication Sig    albuterol-ipratropium 2.5mg-0.5mg/3mL (DUO-NEB) 0.5 mg-3 mg(2.5 mg base)/3 mL nebulizer solution Take 3 mLs by nebulization every 6 (six) hours. Rescue    apixaban (ELIQUIS) 5 mg Tab Take 5 mg by mouth 2 (two) times daily.    aspirin (ECOTRIN) 81 MG EC tablet Take 1 tablet (81 mg total) by mouth once daily.    atorvastatin (LIPITOR) 20 MG tablet Take 20 mg by mouth every evening.    diltiaZEM (CARDIZEM) 30 MG tablet Take 1 tablet (30 mg total) by mouth every 12 (twelve) hours.    ferrous gluconate (FERGON) 324 MG tablet Take 324 mg by mouth daily with breakfast.    fish oil-omega-3 fatty acids 300-1,000 mg capsule Take 2 g by mouth 2 (two) times daily.      furosemide (LASIX) 40 MG tablet     furosemide (LASIX) 80 MG tablet Take 40 mg by mouth 2 (two) times daily.     glipiZIDE (GLUCOTROL) 5 MG tablet Take 10 mg by mouth 2 (two) times daily before meals.    losartan (COZAAR) 50 MG tablet Take 0.5 tablets (25 mg total) by mouth once daily.    metFORMIN (GLUCOPHAGE-XR) 500 MG 24 hr tablet     nitroGLYCERIN (NITROSTAT) 0.4 MG SL tablet Place 0.4 mg under the tongue every 5 (five) minutes as needed.    polyethylene glycol (GLYCOLAX) 17 gram PwPk Take by mouth as needed.     carvedilol (COREG) 25 MG tablet Take 1 tablet (25 mg total) by mouth 2 (two) times daily.    losartan (COZAAR) 25 MG tablet Take 25 mg by mouth once daily.     tamsulosin (FLOMAX) 0.4 mg Cap Take 1 capsule (0.4 mg total) by mouth once daily.     Family History     Problem Relation (Age of Onset)    Diabetes Father, Mother        Tobacco Use    Smoking status: Former Smoker     Packs/day: 1.00     Years: 60.00     Pack years: 60.00     Types: Cigarettes     Last attempt to quit: 2018     Years since quittin.8    Smokeless tobacco: Never Used   Substance and Sexual Activity    Alcohol use: No    Drug use: No    Sexual activity: Not on file     Review of Systems   Constitutional: Positive for activity change. Negative for appetite change, chills, diaphoresis, fatigue, fever and unexpected weight change.   HENT: Negative.    Eyes: Negative.    Respiratory: Positive for shortness of breath. Negative for cough, chest tightness and wheezing.    Cardiovascular: Positive for leg swelling. Negative for chest pain and palpitations.   Gastrointestinal: Negative for abdominal distention, abdominal pain, blood in stool, constipation, diarrhea, nausea and vomiting.   Genitourinary: Negative for enuresis and hematuria.   Musculoskeletal: Negative.    Skin: Negative.    Neurological: Negative for dizziness, seizures, syncope, weakness and light-headedness.   Psychiatric/Behavioral: Negative.       Objective:     Vital Signs (Most Recent):  Temp: 98.4 °F (36.9 °C) (03/14/19 1100)  Pulse: (!) 111 (03/14/19 1208)  Resp: (!) 26 (03/14/19 1208)  BP: (!) 173/80 (03/14/19 1208)  SpO2: 96 % (03/14/19 1208) Vital Signs (24h Range):  Temp:  [97.3 °F (36.3 °C)-98.4 °F (36.9 °C)] 98.4 °F (36.9 °C)  Pulse:  [] 111  Resp:  [7-52] 26  SpO2:  [75 %-100 %] 96 %  BP: (107-173)/(57-98) 173/80     Weight: 88.4 kg (194 lb 14.2 oz)  Body mass index is 28.78 kg/m².    Physical Exam   Constitutional: He is oriented to person, place, and time. He appears well-developed and well-nourished. No distress.   HENT:   Head: Normocephalic and atraumatic.   Right Ear: External ear normal.   Left Ear: External ear normal.   Nose: Nose normal.   BPAP in place   Eyes: Right eye exhibits no discharge. Left eye exhibits no discharge.   Neck: Normal range of motion.   Cardiovascular:   Irregularly irregular with a normal rate, no murmurs or gallops   Pulmonary/Chest:   Mildly increased work of breathing with bibasilar crackles and left-sided expiratory wheezing   Abdominal: Soft. Bowel sounds are normal.   Musculoskeletal: Normal range of motion. He exhibits edema (he is with significant 3+ pitting edema up through his thighs, and has 2+ pitting edema in his arms).   Neurological: He is alert and oriented to person, place, and time.   Skin: Skin is warm and dry. He is not diaphoretic. No erythema.   Psychiatric: He has a normal mood and affect. His behavior is normal. Judgment and thought content normal.   Nursing note and vitals reviewed.          Significant Labs: All pertinent labs within the past 24 hours have been reviewed.    Significant Imaging: I have reviewed and interpreted all pertinent imaging results/findings within the past 24 hours.    Assessment/Plan:     * Acute on chronic combined systolic and diastolic congestive heart failure    Se respiratory failure     Acute on CKD Stage 3    Monitor UOP.      SJ (obstructive sleep  apnea)    Bipap at night.      COPD (chronic obstructive pulmonary disease)    Not on maintenance therapy. Patient does not seem to have an exacerbation.      Pleural effusion    Bilateral. No need for thoracentesis. Recommend diuresis.      Acute hypoxemic respiratory failure    Patient with dyspnea for several days. Required Bipap overnight. BNP elevated. CXR with bilateral effusions and airspace opacities.   -Diuresis as tolerated  -Lasix with additional blood  -Wean oxygen as tolerated. Tolerating NC now.   -Bipap PRN and at night.      Type 2 diabetes mellitus, controlled, with renal complications    BG goal 140-180     Chronic atrial fibrillation    On Eliquis. Held secondary to drop in Hgb.        Patient stable on NC today.   Recommend:   Monitor H/H. Hold anticoagulation.   Diuresis as tolerated.  Bipap PRN and at night.   Bronchodilators PRN.  Follow up with Pulmonary as an outpatient. He has seen [facility] in the past.       Thank you for your consult. I will sign off. Please contact us if you have any additional questions.     Juliet Eastman MD  Pulmonology  [facility]

## 2019-03-14 NOTE — ASSESSMENT & PLAN NOTE
Patient with dyspnea for several days. Required Bipap overnight. BNP elevated. CXR with bilateral effusions and airspace opacities.   -Diuresis as tolerated  -Lasix with additional blood  -Wean oxygen as tolerated. Tolerating NC now.   -Bipap PRN and at night.

## 2019-03-14 NOTE — ED NOTES
Per pt's request, pt's wife Jessika notified that pt was admitted to room 265 in ICU, understanding verbalized by wife.

## 2019-03-14 NOTE — ED NOTES
Report received from HAYDEE Gutierrez; pt resting in bed awake with bipap in place; pt's wife at bedside; no acute distress noted; will continue to monitor.

## 2019-03-14 NOTE — PLAN OF CARE
03/14/19 1216   Discharge Assessment   Assessment Type Discharge Planning Assessment   Confirmed/corrected address and phone number on facesheet? Yes   Assessment information obtained from? Caregiver   Prior to hospitilization cognitive status: Unable to Assess   Prior to hospitalization functional status: Needs Assistance   Current cognitive status: Unable to Assess   Current Functional Status: Needs Assistance   Facility Arrived From: home   Lives With spouse   Able to Return to Prior Arrangements yes   Is patient able to care for self after discharge? No   Who are your caregiver(s) and their phone number(s)? Jessika [phone]   Patient's perception of discharge disposition home health;home or selfcare   Readmission Within the Last 30 Days no previous admission in last 30 days   Patient currently being followed by outpatient case management? No   Patient currently receives any other outside agency services? Yes   Name and contact number of agency or person providing outside services [facility]   Equipment Currently Used at Home walker, rolling;oxygen;cane, straight;shower chair;nebulizer   Do you have any problems affording any of your prescribed medications? No   Is the patient taking medications as prescribed? yes   Does the patient have transportation home? Yes   Transportation Anticipated family or friend will provide   Dialysis Name and Scheduled days N/A   Does the patient receive services at the [facility]? No   Discharge Plan A Home with family;Home Health   Discharge Plan B Home with family   DME Needed Upon Discharge  wheelchair   Patient/Family in Agreement with Plan yes         SW met with pt and pt's family at bedside. SW explained her role in Care Management. Pt voiced understanding. SW inquired about HELP AT HOME. Pt stated that he will have Jessika at home to help for support. SW voiced understanding. SW inquired about responsibilities when it comes to  MANAGING HER/HIS HEALTH at home and  "what it entails. Pt inquired about details. SW informed pt of RESPONSIBILITIES of:    1. Follow up appointments  2. Getting Prescriptions filled  3. Taking medications as prescribed.     Pt voiced understanding.  SW explained "My Health Packet" blue folder and the pink and green tabs that are on the folder as well. Discharge Brochure given to pt with Care Team information. Pt voiced understanding.     Pt's pharmacy:   [address] [place]  [address] [facility]  [address] [facility] [address] [address]  Phone: [phone] Fax: [phone]      Pt's preference for appointments:morning       "

## 2019-03-14 NOTE — ASSESSMENT & PLAN NOTE
Patient's hemoglobin dropped from 10.4 grams in Jan 2019 to 4.4 grams today and was noted to he with melenic stools on digital rectal exam.  He has been started on a pantoprazole infusion and will be transfused a couple units of pRBCs with re-assessment thereafter to determine if he needs additional units.  Will hold his home regimen of aspirin and apixaban for now and consult Gastroenterology for further recommendations.  Transfuse another unit of blood today. GI consulted.  Endoscopy when more stable.

## 2019-03-14 NOTE — ASSESSMENT & PLAN NOTE
Stable and rate-controlled; will hold his home regimen of diltiazem due to heart failure and hold apixaban due to suspected GI bleeding.

## 2019-03-14 NOTE — HOSPITAL COURSE
presents to [facility] ED with complaints of dyspnea for four days on 3/13/19. Patient has known systolic and diastolic heart failure with an EF of 45% and was admitted to the ICU on bi-pap support. Started on IV lasix with clinical improvement. Pulmonary was consulted. Also found to be heme + and anemic with a Hgb of 4+. Transfused several units of blood and GI consulted. The patient clinically improved and changed to NC (patient on 2L NC at home). The patient was transferred out of the ICU on 3/15. PT/OT were consulted ,still has leg swelling,continued with IV lasix,SOB is improved,was stable on NC O 2,  GI did not want do endoscopy duo to respiratory status,poor candidate for intervention,no sign of bleeding seen,HH remains stable,was off OAC,  At DC time,hold eliquis,untill seen by GI,he is poor candidate for OAC,he has bene consulted.  Patient has been discharged home with follow up plan with PCP and GI in next few days.

## 2019-03-14 NOTE — ASSESSMENT & PLAN NOTE
Patient was noted to be hypoxic on his home supplemental oxygen therapy at 85%.  He was placed on CPAP en route to this facility.  I cannot personally view the film but the official Radiology interpretation was concerning for pulmonary edema with small bilateral pleural effusions.  Will continue BPAP therapy.  Wears 2L 02 at home.   Wean to NC

## 2019-03-14 NOTE — SUBJECTIVE & OBJECTIVE
Interval History: States he feels better.       Review of Systems   Constitutional: Negative for activity change, chills, diaphoresis, fatigue and fever.   HENT: Negative for congestion.    Respiratory: Positive for shortness of breath. Negative for cough, choking and chest tightness.    Cardiovascular: Negative for chest pain.   Gastrointestinal: Negative for abdominal pain.   Genitourinary: Negative for difficulty urinating.     Objective:     Vital Signs (Most Recent):  Temp: 98.3 °F (36.8 °C) (03/14/19 0715)  Pulse: 95 (03/14/19 1000)  Resp: 17 (03/14/19 1000)  BP: 109/60 (03/14/19 1000)  SpO2: 100 % (03/14/19 1000) Vital Signs (24h Range):  Temp:  [97.3 °F (36.3 °C)-98.3 °F (36.8 °C)] 98.3 °F (36.8 °C)  Pulse:  [] 95  Resp:  [7-52] 17  SpO2:  [75 %-100 %] 100 %  BP: (108-142)/(57-98) 109/60     Weight: 88.4 kg (194 lb 14.2 oz)  Body mass index is 28.78 kg/m².    Intake/Output Summary (Last 24 hours) at 3/14/2019 1027  Last data filed at 3/14/2019 1000  Gross per 24 hour   Intake 2031 ml   Output --   Net 2031 ml      Physical Exam   Constitutional: He is oriented to person, place, and time. He appears well-developed and well-nourished.   HENT:   Head: Normocephalic and atraumatic.   Cardiovascular: Regular rhythm.   Pulmonary/Chest: Effort normal and breath sounds normal. No stridor. He has no wheezes.   Abdominal: There is no tenderness.   Neurological: He is alert and oriented to person, place, and time.   Skin: Skin is warm and dry.   Psychiatric: He has a normal mood and affect. His behavior is normal.   Vitals reviewed.      Significant Labs:   BMP:   Recent Labs   Lab 03/13/19  1610 03/14/19  0305   GLU 64* 70    142   K 4.1 5.9*    112*   CO2 25 20*   BUN 32* 34*   CREATININE 1.5* 1.7*   CALCIUM 9.7 9.4   MG 2.1  --      CBC:   Recent Labs   Lab 03/13/19  1610 03/14/19  0305   WBC 5.91 11.38   HGB 4.4* 6.5*   HCT 17.4* 23.2*    227       Significant Imaging:

## 2019-03-14 NOTE — ASSESSMENT & PLAN NOTE
Patient has been edematous and does have a history of combined heart failure and pulmonary hypertension.  He needed BPAP therapy for his respiratory failure.  He will be started on intravenous diuresis with furosemide with careful monitoring of his urine output.  Will repeat his TTE in the morning.

## 2019-03-14 NOTE — CONSULTS
Gastroenterology Consult    3/14/2019 8:59 AM    Patient Name: Agus Ramos Jr.  MRN: [id number]  Admission Date: 3/13/2019  Hospital Length of Stay: 1 days  Code Status: Full Code   Primary Care Physician: Keaton Hardy MD  Principal Problem:Acute on chronic combined systolic and diastolic congestive heart failure  Consulting Physician: Inpatient consult to Gastroenterology  Consult performed by: Arti Grant MD  Consult ordered by: Yuli Reis MD      Reason for consultation: anemia    HPI:  Agus Ramos Jr. is a 78 y.o. male with a history of HTN, DM2, HLD, afib on anticoagulation, COPD on 2L home O2, CAD s/p CABG, PVD, and CHF who presented with shortness of breath over the last 4 day as well as increased swelling.  He has been compliant with his medications.  He denies N/V, abdominal pain, constipation, diarrhea, melena, hematochezia or hematemesis.  He denies chest pain.  He was admitted several times over the last year with heart failure exacerbations.  He had an EGD last May for anemia which was negative for any source of bleeding.  He was seen in clinic to schedule colonoscopy but this was never done since he has been in and out of the hospital. He denies syncope but has felt weak and lightheaded.    Past Medical History:   Diagnosis Date    Anemia 05/03/2018    pending blood transfusion    Anticoagulant long-term use     apixaban    Atrial fibrillation     CKD (chronic kidney disease)     Congestive heart failure     COPD (chronic obstructive pulmonary disease)     Coronary artery disease     Diabetes mellitus     Encounter for blood transfusion     HLD (hyperlipidemia)     Hypertension     MI (myocardial infarction)     On home oxygen therapy     Pacemaker     left chest    Peripheral vascular disease     Requires assistance with activities of daily living (ADL)     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     S/P femoral-popliteal bypass surgery left    Stented coronary artery     Tobacco use      Unsteady gait        Past Surgical History:   Procedure Laterality Date    CARDIAC CATHETERIZATION      CARDIAC PACEMAKER PLACEMENT      CARDIAC SURGERY      3 vessel CABG    CARDIOVERSION N/A 2013    Performed by [facility]ann Surgeon at [facility] MARYANN    cardioverted      CORONARY ANGIOPLASTY WITH STENT PLACEMENT      EGD (ESOPHAGOGASTRODUODENOSCOPY) N/A 2018    Performed by Ty [facility]man MD at [facility] ENDO    HEMORRHOID SURGERY      TRANSESOPHAGEAL ECHOCARDIOGRAM (ANIA) N/A 2013    [facility] at [facility]    VASCULAR SURGERY      femoral artery popliteal bypass       Social History     Socioeconomic History    Marital status:      Spouse name: None    Number of children: None    Years of education: None    Highest education level: None   Social Needs    Financial resource strain: None    Food insecurity - worry: None    Food insecurity - inability: None    Transportation needs - medical: None    Transportation needs - non-medical: None   Occupational History    None   Tobacco Use    Smoking status: Former Smoker     Packs/day: 1.00     Years: 60.00     Pack years: 60.00     Types: Cigarettes     Last attempt to quit: 2018     Years since quittin.8    Smokeless tobacco: Never Used   Substance and Sexual Activity    Alcohol use: No    Drug use: No    Sexual activity: None   Other Topics Concern    None   Social History Narrative    None       Family History   Problem Relation Age of Onset    Diabetes Father     Diabetes Mother          Review of patient's allergies indicates:  No Known Allergies      Current Facility-Administered Medications:     0.9%  NaCl infusion (for blood administration), , Intravenous, Q24H PRN, Yuli Reis MD    acetaminophen tablet 500 mg, 500 mg, Oral, Q6H PRN, Megan Yip MD    albuterol-ipratropium 2.5 mg-0.5 mg/3 mL nebulizer solution 3 mL, 3 mL, Nebulization, Q6H PRN, Megan Yip MD    atorvastatin tablet 20 mg,  20 mg, Oral, QHS, Megan Yip MD, 20 mg at 03/14/19 0143    cloNIDine tablet 0.1 mg, 0.1 mg, Oral, TID PRN, Megan Yip MD    dextrose 50% injection 12.5 g, 12.5 g, Intravenous, PRN, Megan Yip MD    dextrose 50% injection 25 g, 25 g, Intravenous, PRN, Megan Yip MD    furosemide injection 40 mg, 40 mg, Intravenous, Q12H, Yuli Reis MD, 40 mg at 03/14/19 0417    glucagon (human recombinant) injection 1 mg, 1 mg, Intramuscular, PRN, Megan Yip MD    glucose chewable tablet 16 g, 16 g, Oral, PRN, Megan Yip MD    glucose chewable tablet 24 g, 24 g, Oral, PRN, Megan Yip MD    insulin aspart U-100 pen 0-5 Units, 0-5 Units, Subcutaneous, PRN, Megan Yip MD    ondansetron injection 8 mg, 8 mg, Intravenous, Q8H PRN, Megan Yip MD    pantoprazole 40 mg in dextrose 5 % 100 mL infusion (ready to mix system), 8 mg/hr, Intravenous, Continuous, Yuli Reis MD, Last Rate: 20 mL/hr at 03/14/19 0800, 8 mg/hr at 03/14/19 0800    promethazine (PHENERGAN) 6.25 mg in dextrose 5 % 50 mL IVPB, 6.25 mg, Intravenous, Q6H PRN, Megan Yip MD    ramelteon tablet 8 mg, 8 mg, Oral, Nightly PRN, Megan Yip MD    senna-docusate 8.6-50 mg per tablet 1 tablet, 1 tablet, Oral, BID PRN, Megan Yip MD    tamsulosin 24 hr capsule 0.4 mg, 0.4 mg, Oral, Daily, Megan Yip MD, 0.4 mg at 03/14/19 0840      Review of Systems   Constitutional: Positive for malaise/fatigue. Negative for chills, fever and weight loss.   HENT: Negative.    Eyes: Negative.    Respiratory: Positive for shortness of breath. Negative for cough.    Cardiovascular: Negative.    Gastrointestinal: Negative for abdominal pain, blood in stool, constipation, diarrhea, heartburn, melena, nausea and vomiting.   Genitourinary: Negative.    Musculoskeletal: Negative.    Skin: Negative.    Neurological: Positive for weakness.   Endo/Heme/Allergies: Negative.        /81   Pulse 100   Temp 98.3 °F (36.8 °C) (Axillary)    "Resp 18   Ht 5' 9" (1.753 m)   Wt 88.4 kg (194 lb 14.2 oz)   SpO2 100%   BMI 28.78 kg/m²   Physical Exam   Constitutional: He is oriented to person, place, and time. He appears well-developed and well-nourished. No distress.   Appears comfortable, lying pretty flat in bed   HENT:   Head: Normocephalic and atraumatic.   Mouth/Throat: Oropharynx is clear and moist.   Eyes: EOM are normal. Pupils are equal, round, and reactive to light. No scleral icterus.   Cardiovascular: Normal rate and normal heart sounds.   No murmur heard.  Irregularly irregular   Pulmonary/Chest: Effort normal and breath sounds normal. No respiratory distress.   On BiPAP   Abdominal: Soft. Bowel sounds are normal. He exhibits no distension. There is no tenderness. There is no rebound and no guarding.   Musculoskeletal: Normal range of motion. He exhibits edema (BLE to knee).   Neurological: He is alert and oriented to person, place, and time.   Skin: Skin is warm and dry. No rash noted.   Vitals reviewed.      Labs:  Lab Results   Component Value Date/Time    WBC 11.38 03/14/2019 03:05 AM    HGB 6.5 (L) 03/14/2019 03:05 AM    HCT 23.2 (L) 03/14/2019 03:05 AM     03/14/2019 03:05 AM    MCV 77 (L) 03/14/2019 03:05 AM     03/14/2019 03:05 AM    K 5.9 (H) 03/14/2019 03:05 AM     (H) 03/14/2019 03:05 AM    CO2 20 (L) 03/14/2019 03:05 AM    BUN 34 (H) 03/14/2019 03:05 AM    CREATININE 1.7 (H) 03/14/2019 03:05 AM    GLU 70 03/14/2019 03:05 AM    CALCIUM 9.4 03/14/2019 03:05 AM    MG 2.1 03/13/2019 04:10 PM    PHOS 2.7 06/01/2018 04:39 AM    INR 1.1 11/30/2018 12:34 PM    APTT 25.8 09/19/2013 12:21 PM   ]  Lab Results   Component Value Date/Time    PROT 3.8 (L) 03/14/2019 03:05 AM    ALBUMIN 1.9 (L) 03/14/2019 03:05 AM    BILITOT 2.7 (H) 03/14/2019 03:05 AM    AST 32 03/14/2019 03:05 AM    ALT 15 03/14/2019 03:05 AM    ALKPHOS 81 03/14/2019 03:05 AM   ]    Imaging and Procedures:  I personally reviewed the imaging/procedures " below.  CXR 3/13/19:  Cardiac device with findings suggesting pulmonary edema/CHF pattern, small bilateral pleural effusions and probable left basilar atelectasis/infiltrate.  Continued follow-up as warranted.    EGD 6/1/18:  - Normal esophagus.  - Erythematous mucosa in the antrum. Biopsied.  - Normal examined duodenum.    Last colonoscopy in 2006 with no polyps    Assessment:  Agus Ramos Jr. is a 78 y.o. male with a history of HTN, DM2, HLD, afib on anticoagulation, COPD, CAD s/p CABG, PVD, and CHF who presented with shortness of breath over the last 4 day as well as increased swelling.       Plan:  - prior endoscopy and outside office records reviewed  - currently too unstable from cardiopulmonary standpoint for endoscopy  - continue diuresis and weaning of oxygen/BiPAP  - transfuse for goal hemoglobin > 7  - PPI gtt  - he has been seen in clinic to schedule colonoscopy, but due to repeated admissions and delays due to needing permission from cardiologist to hold anticoagulation, his outpatient colonoscopy has not been done - may be helpful to get this done while inpatient; could also do a push enteroscopy at the same time to evaluate for small bowel AVMs (probably Monday)    Arti Grant MD

## 2019-03-14 NOTE — ASSESSMENT & PLAN NOTE
Patient was noted to be hypoxic on his home supplemental oxygen therapy at 85%.  He was placed on CPAP en route to this facility.  I cannot personally view the film but the official Radiology interpretation was concerning for pulmonary edema with small bilateral pleural effusions.  Will continue BPAP therapy.

## 2019-03-14 NOTE — ASSESSMENT & PLAN NOTE
Well-controlled on a home regimen of metformin and a sulfonylurea; will provide basal-prandial insulin along with insulin sliding scale.

## 2019-03-14 NOTE — HPI
78 y.o. male known to me with essential hypertension, type 2 diabetes mellitus (HbA1c 5.1% Nov 2018), CAD sbp CABG, chronic combined systolic and diastolic heart failure (LVEF 45% Dec 2018), CKD Stage 3, peripheral artery disease, chronic atrial fibrillation ([id number] score 5) on chronic anticoagulation, COPD, chronic respiratory failure with hypoxia, and anemia of chronic disease who presents to [facility] ED with complaints of dyspnea for four days.  He experiences intermittent dyspnea and this current episode started about four days ago while at rest.  The dyspnea is worse on exertion but not with any orthopnea, PND, chest pain, fevers, chills, nausea, vomiting, palpitations, diaphoresis, nor hemoptysis.  He does note that his legs and arms are more swollen during the same time period.  He denies any sick contacts or recent travel, and hasn't noticed any hematochezia, melena, hematuria, nor any hematemesis.      Pulmonary consulted for hypoxic respiratory failure. Patient is awake and alert on Bipap. Patient states that he wears 2 L NC at home.   Ate breakfast this morning and tolerating time off Bipap.

## 2019-03-14 NOTE — PLAN OF CARE
Problem: Adult Inpatient Plan of Care  Goal: Patient-Specific Goal (Individualization)  Outcome: Ongoing (interventions implemented as appropriate)  Pt continues in ICU on BiPAP. 2 units PRBC infused overnight. Pt in Afib(chronic). Urine x 2. Attempt to insert stearns unsuccessful. AAOx4  No new injury or fal noted. Pt shows no signs or symptoms of distress at this time.

## 2019-03-14 NOTE — ASSESSMENT & PLAN NOTE
Patient's urinalysis is significant for a specific gravity of 1.010, 1+ occult blood, positive for nitrites, and 2+ leukocytes.  Urine output has been fair.  Will obtain additional urine studies; defer IV fluid hydration for now due to acute heart failure; monitor the urine output; recheck the renal function in the morning; and avoid nephrotoxins.

## 2019-03-14 NOTE — PROGRESS NOTES
[facility] Hospital Medicine  Progress Note    Patient Name: Agus Ramos Jr.  MRN: [id number]  Patient Class: IP- Inpatient   Admission Date: 3/13/2019  Length of Stay: 1 days  Attending Physician: Ede Gómez MD  Primary Care Provider: Keaton Hardy MD        Subjective:     Principal Problem:Acute on chronic combined systolic and diastolic congestive heart failure    HPI:  Mr. Agus Ramos Jr. is a 78 y.o. male known to me with essential hypertension, type 2 diabetes mellitus (HbA1c 5.1% Nov 2018), CAD sbp CABG, chronic combined systolic and diastolic heart failure (LVEF 45% Dec 2018), CKD Stage 3, peripheral artery disease, chronic atrial fibrillation ([id number] score 5) on chronic anticoagulation, COPD, chronic respiratory failure with hypoxia, and anemia of chronic disease who presents to [facility] ED with complaints of dyspnea for four days.  He experiences intermittent dyspnea and this current episode started about four days ago while at rest.  The dyspnea is worse on exertion but not with any orthopnea, PND, chest pain, fevers, chills, nausea, vomiting, palpitations, diaphoresis, nor hemoptysis.  He does note that his legs and arms are more swollen during the same time period.  He denies any sick contacts or recent travel, and hasn't noticed any hematochezia, melena, hematuria, nor any hematemesis.      Hospital Course:   presents to [facility] ED with complaints of dyspnea for four days on 3/13/19. Patient has known systolic and diastolic heart failure with an EF of 45% and was admitted to the ICU on bi-pap support. Started on IV lasix with clinical improvement. Pulmonary was consulted. Also found to be heme + and anemic with a Hgb of 4+. Transfused several units of blood and GI consulted.     Interval History: States he feels better.       Review of Systems   Constitutional: Negative for activity change, chills, diaphoresis, fatigue and fever.   HENT: Negative for  congestion.    Respiratory: Positive for shortness of breath. Negative for cough, choking and chest tightness.    Cardiovascular: Negative for chest pain.   Gastrointestinal: Negative for abdominal pain.   Genitourinary: Negative for difficulty urinating.     Objective:     Vital Signs (Most Recent):  Temp: 98.3 °F (36.8 °C) (03/14/19 0715)  Pulse: 95 (03/14/19 1000)  Resp: 17 (03/14/19 1000)  BP: 109/60 (03/14/19 1000)  SpO2: 100 % (03/14/19 1000) Vital Signs (24h Range):  Temp:  [97.3 °F (36.3 °C)-98.3 °F (36.8 °C)] 98.3 °F (36.8 °C)  Pulse:  [] 95  Resp:  [7-52] 17  SpO2:  [75 %-100 %] 100 %  BP: (108-142)/(57-98) 109/60     Weight: 88.4 kg (194 lb 14.2 oz)  Body mass index is 28.78 kg/m².    Intake/Output Summary (Last 24 hours) at 3/14/2019 1027  Last data filed at 3/14/2019 1000  Gross per 24 hour   Intake 2031 ml   Output --   Net 2031 ml      Physical Exam   Constitutional: He is oriented to person, place, and time. He appears well-developed and well-nourished.   HENT:   Head: Normocephalic and atraumatic.   Cardiovascular: Regular rhythm.   Pulmonary/Chest: Effort normal and breath sounds normal. No stridor. He has no wheezes.   Abdominal: There is no tenderness.   Neurological: He is alert and oriented to person, place, and time.   Skin: Skin is warm and dry.   Psychiatric: He has a normal mood and affect. His behavior is normal.   Vitals reviewed.      Significant Labs:   BMP:   Recent Labs   Lab 03/13/19  1610 03/14/19  0305   GLU 64* 70    142   K 4.1 5.9*    112*   CO2 25 20*   BUN 32* 34*   CREATININE 1.5* 1.7*   CALCIUM 9.7 9.4   MG 2.1  --      CBC:   Recent Labs   Lab 03/13/19  1610 03/14/19  0305   WBC 5.91 11.38   HGB 4.4* 6.5*   HCT 17.4* 23.2*    227       Significant Imaging:     Assessment/Plan:      * Acute on chronic combined systolic and diastolic congestive heart failure    Patient has been edematous and does have a history of combined heart failure and pulmonary  hypertension.  He needed BPAP therapy for his respiratory failure.  He will be started on intravenous diuresis with furosemide with careful monitoring of his urine output.  Will repeat his TTE in the morning.  EF of 45% 1/18. Continue lasix. Feels better.  Can likely wean to NC. Patient wears 2L of 02 at home.        Hyperkalemia    Should decrease with IV lasix.  Will check another K shortly.        Peripheral artery disease    Stable; will continue his home regimen of atorvastatin but hold aspirin due to suspected GI bleed.     Chronic combined systolic and diastolic heart failure    As addressed above.     Acute on CKD Stage 3    Patient's urinalysis is significant for a specific gravity of 1.010, 1+ occult blood, positive for nitrites, and 2+ leukocytes.  Urine output has been fair.  Will obtain additional urine studies; defer IV fluid hydration for now due to acute heart failure; monitor the urine output; recheck the renal function in the morning; and avoid nephrotoxins.     Acute on chronic respiratory failure with hypoxia    Patient was noted to be hypoxic on his home supplemental oxygen therapy at 85%.  He was placed on CPAP en route to this facility.  I cannot personally view the film but the official Radiology interpretation was concerning for pulmonary edema with small bilateral pleural effusions.  Will continue BPAP therapy.  Wears 2L 02 at home.   Wean to NC     Chronic respiratory failure with hypoxia    As addressed above.     Centrilobular emphysema    Clinically-stable without wheezing.  Will provide as-needed JENAE/LAMA available.     Iron deficiency anemia    As addressed above.     Chronic anticoagulation    As addressed above.     Symptomatic anemia    Patient's hemoglobin dropped from 10.4 grams in Jan 2019 to 4.4 grams today and was noted to he with melenic stools on digital rectal exam.  He has been started on a pantoprazole infusion and will be transfused a couple units of pRBCs with  re-assessment thereafter to determine if he needs additional units.  Will hold his home regimen of aspirin and apixaban for now and consult Gastroenterology for further recommendations.  Transfuse another unit of blood today. GI consulted.  Endoscopy when more stable.      Type 2 diabetes mellitus, controlled, with renal complications    Well-controlled on a home regimen of metformin and a sulfonylurea; will provide basal-prandial insulin along with insulin sliding scale.     CKD Stage 3    As addressed above.     Essential hypertension    Patient's blood pressure is well-controlled; will continue home regimen of tamsulosin, and provide as-needed clonidine.  Will hold his home regimen of furosemide and losartan due to acute renal failure.  Will hold carvedilol and diltiazem due to acute heart failure.     CAD (coronary artery disease)    Stable; will continue his home regimen of atorvastatin.  Will hold aspirin for suspected GI bleeding and losartan for acute renal failure.  Will hold carvedilol due to acute heart failure.     Chronic atrial fibrillation    Stable and rate-controlled; will hold his home regimen of diltiazem due to heart failure and hold apixaban due to suspected GI bleeding.     Left arm swelling- will check ultrasound   VTE Risk Mitigation (From admission, onward)        Ordered     Place DENIS hose  Until discontinued      03/14/19 0111     IP VTE HIGH RISK PATIENT  Once      03/14/19 0111     Reason for No Pharmacological VTE Prophylaxis  Once      03/14/19 0111     Place DENIS hose  Until discontinued      03/14/19 0111     Place sequential compression device  Until discontinued      03/14/19 0111          Critical care time spent on the evaluation and treatment of severe organ dysfunction, review of pertinent labs and imaging studies, discussions with consulting providers and discussions with patient/family:  45 minutes.    Ede Low MD  Department of Hospital Medicine   [facility] [place]

## 2019-03-14 NOTE — ASSESSMENT & PLAN NOTE
Stable; will continue his home regimen of atorvastatin but hold aspirin due to suspected GI bleed.

## 2019-03-14 NOTE — H&P
[facility] Hospital Medicine  History & Physical    Patient Name: Agsu Ramos Jr.  MRN: [id number]  Admission Date: 3/13/2019  Attending Physician: Ede Gómez MD   Primary Care Provider: Keaton Hardy MD         Patient information was obtained from patient.     Subjective:     Principal Problem:Acute on chronic combined systolic and diastolic congestive heart failure    Chief Complaint: Shortness of breath for four days.    HPI: Mr. Agus Ramos Jr. is a 78 y.o. male known to me with essential hypertension, type 2 diabetes mellitus (HbA1c 5.1% Nov 2018), CAD sbp CABG, chronic combined systolic and diastolic heart failure (LVEF 45% Dec 2018), CKD Stage 3, peripheral artery disease, chronic atrial fibrillation ([id number] score 5) on chronic anticoagulation, COPD, chronic respiratory failure with hypoxia, and anemia of chronic disease who presents to [facility] ED with complaints of dyspnea for four days.  He experiences intermittent dyspnea and this current episode started about four days ago while at rest.  The dyspnea is worse on exertion but not with any orthopnea, PND, chest pain, fevers, chills, nausea, vomiting, palpitations, diaphoresis, nor hemoptysis.  He does note that his legs and arms are more swollen during the same time period.  He denies any sick contacts or recent travel, and hasn't noticed any hematochezia, melena, hematuria, nor any hematemesis.      Chart Review:  Previous Hospitalizations  Date Hospital Diagnosis   Dec 2018 [facility] Acute heart failure    Nov 2018 [facility] COPD exacerbation    Jul 2018 [facility] Acute heart failure    May 27, 2018 [facility] Acute heart failure, anemia s/p negative EGD   May 3, 2018 [facility] Symptomatic anemia s/p pRBC transfusion 2 units    Sept 2016 [facility] UTI, hyperkalemia    Sept 2013 [facility] AFib with RVR, ANIA/DCCV      Outpatient Follow-Up  Date of Visit Physician Service   Dec 2018 Millie Bell MD Hematology    Jun 2018 Vane Dove,  MD Pulmonology    Jun 2018 Neil Wiggins MD Urology      Past Medical History:   Diagnosis Date    Anemia 05/03/2018    pending blood transfusion    Anticoagulant long-term use     apixaban    Atrial fibrillation     CKD (chronic kidney disease)     Congestive heart failure     COPD (chronic obstructive pulmonary disease)     Coronary artery disease     Diabetes mellitus     Encounter for blood transfusion     HLD (hyperlipidemia)     Hypertension     MI (myocardial infarction)     On home oxygen therapy     Pacemaker     left chest    Peripheral vascular disease     Requires assistance with activities of daily living (ADL)     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     S/P femoral-popliteal bypass surgery left    Stented coronary artery     Tobacco use     Unsteady gait        Past Surgical History:   Procedure Laterality Date    CARDIAC CATHETERIZATION      CARDIAC PACEMAKER PLACEMENT      CARDIAC SURGERY      3 vessel CABG    CARDIOVERSION N/A 9/19/2013    [facility]    cardioverted      CORONARY ANGIOPLASTY WITH STENT PLACEMENT      EGD (ESOPHAGOGASTRODUODENOSCOPY) N/A 6/1/2018    Performed [facility]O    HEMORRHOID SURGERY      TRANSESOPHAGEAL ECHOCARDIOGRAM (ANIA) N/A 9/19/2013    [facility]    VASCULAR SURGERY      femoral artery popliteal bypass       Review of patient's allergies indicates:  No Known Allergies    No current facility-administered medications on file prior to encounter.      Current Outpatient Medications on File Prior to Encounter   Medication Sig    albuterol-ipratropium 2.5mg-0.5mg/3mL (DUO-NEB) 0.5 mg-3 mg(2.5 mg base)/3 mL nebulizer solution Take 3 mLs by nebulization every 6 (six) hours. Rescue    apixaban (ELIQUIS) 5 mg Tab Take 5 mg by mouth 2 (two) times daily.    aspirin (ECOTRIN) 81 MG EC tablet Take 1 tablet (81 mg total) by mouth once daily.    atorvastatin (LIPITOR) 20 MG tablet Take 20 mg by  mouth every evening.    diltiaZEM (CARDIZEM) 30 MG tablet Take 1 tablet (30 mg total) by mouth every 12 (twelve) hours.    ferrous gluconate (FERGON) 324 MG tablet Take 324 mg by mouth daily with breakfast.    fish oil-omega-3 fatty acids 300-1,000 mg capsule Take 2 g by mouth 2 (two) times daily.     furosemide (LASIX) 40 MG tablet     furosemide (LASIX) 80 MG tablet Take 40 mg by mouth 2 (two) times daily.     glipiZIDE (GLUCOTROL) 5 MG tablet Take 10 mg by mouth 2 (two) times daily before meals.    losartan (COZAAR) 50 MG tablet Take 0.5 tablets (25 mg total) by mouth once daily.    metFORMIN (GLUCOPHAGE-XR) 500 MG 24 hr tablet     nitroGLYCERIN (NITROSTAT) 0.4 MG SL tablet Place 0.4 mg under the tongue every 5 (five) minutes as needed.    polyethylene glycol (GLYCOLAX) 17 gram PwPk Take by mouth as needed.     carvedilol (COREG) 25 MG tablet Take 1 tablet (25 mg total) by mouth 2 (two) times daily.    losartan (COZAAR) 25 MG tablet Take 25 mg by mouth once daily.     tamsulosin (FLOMAX) 0.4 mg Cap Take 1 capsule (0.4 mg total) by mouth once daily.     Family History     Problem Relation (Age of Onset)    Diabetes Father, Mother        Tobacco Use    Smoking status: Former Smoker     Packs/day: 1.00     Years: 60.00     Pack years: 60.00     Types: Cigarettes     Last attempt to quit: 2018     Years since quittin.8    Smokeless tobacco: Never Used   Substance and Sexual Activity    Alcohol use: No    Drug use: No    Sexual activity: Not on file     Review of Systems   Constitutional: Positive for activity change. Negative for appetite change, chills, diaphoresis, fatigue, fever and unexpected weight change.   HENT: Negative.    Eyes: Negative.    Respiratory: Positive for shortness of breath. Negative for cough, chest tightness and wheezing.    Cardiovascular: Positive for leg swelling. Negative for chest pain and palpitations.   Gastrointestinal: Negative for abdominal distention,  abdominal pain, blood in stool, constipation, diarrhea, nausea and vomiting.   Genitourinary: Negative for enuresis and hematuria.   Musculoskeletal: Negative.    Skin: Negative.    Neurological: Negative for dizziness, seizures, syncope, weakness and light-headedness.   Psychiatric/Behavioral: Negative.      Objective:     Vital Signs (Most Recent):  Temp: 97.9 °F (36.6 °C) (03/14/19 0115)  Pulse: 97 (03/14/19 0115)  Resp: 17 (03/14/19 0115)  BP: 123/79 (03/14/19 0115)  SpO2: 98 % (03/14/19 0115) Vital Signs (24h Range):  Temp:  [97.3 °F (36.3 °C)-98.2 °F (36.8 °C)] 97.9 °F (36.6 °C)  Pulse:  [] 97  Resp:  [16-52] 17  SpO2:  [75 %-100 %] 98 %  BP: (108-142)/(57-89) 123/79     Weight: 88.4 kg (194 lb 14.2 oz)  Body mass index is 28.78 kg/m².    Physical Exam   Constitutional: He is oriented to person, place, and time. He appears well-developed and well-nourished. No distress.   HENT:   Head: Normocephalic and atraumatic.   Right Ear: External ear normal.   Left Ear: External ear normal.   Nose: Nose normal.   BPAP in place   Eyes: Right eye exhibits no discharge. Left eye exhibits no discharge.   Neck: Normal range of motion.   Cardiovascular:   Irregularly irregular with a normal rate, no murmurs or gallops   Pulmonary/Chest:   Mildly increased work of breathing with bibasilar crackles and left-sided expiratory wheezing   Abdominal: Soft. Bowel sounds are normal.   Musculoskeletal: Normal range of motion. He exhibits edema (he is with significant 3+ pitting edema up through his thighs, and has 2+ pitting edema in his arms).   Neurological: He is alert and oriented to person, place, and time.   Skin: Skin is warm and dry. He is not diaphoretic. No erythema.   Psychiatric: He has a normal mood and affect. His behavior is normal. Judgment and thought content normal.   Nursing note and vitals reviewed.          Significant Labs: All pertinent labs within the past 24 hours have been reviewed.    Significant  Imaging: I have reviewed and interpreted all pertinent imaging results/findings within the past 24 hours.    Assessment/Plan:     * Acute on chronic combined systolic and diastolic congestive heart failure    Patient has been edematous and does have a history of combined heart failure and pulmonary hypertension.  He needed BPAP therapy for his respiratory failure.  He will be started on intravenous diuresis with furosemide with careful monitoring of his urine output.  Will repeat his TTE in the morning.     Acute on chronic respiratory failure with hypoxia    Patient was noted to be hypoxic on his home supplemental oxygen therapy at 85%.  He was placed on CPAP en route to this facility.  I cannot personally view the film but the official Radiology interpretation was concerning for pulmonary edema with small bilateral pleural effusions.  Will continue BPAP therapy.     Acute on CKD Stage 3    Patient's urinalysis is significant for a specific gravity of 1.010, 1+ occult blood, positive for nitrites, and 2+ leukocytes.  Urine output has been fair.  Will obtain additional urine studies; defer IV fluid hydration for now due to acute heart failure; monitor the urine output; recheck the renal function in the morning; and avoid nephrotoxins.     Symptomatic anemia    Patient's hemoglobin dropped from 10.4 grams in Jan 2019 to 4.4 grams today and was noted to he with melenic stools on digital rectal exam.  He has been started on a pantoprazole infusion and will be transfused a couple units of pRBCs with re-assessment thereafter to determine if he needs additional units.  Will hold his home regimen of aspirin and apixaban for now and consult Gastroenterology for further recommendations.     Essential hypertension    Patient's blood pressure is well-controlled; will continue home regimen of tamsulosin, and provide as-needed clonidine.  Will hold his home regimen of furosemide and losartan due to acute renal failure.  Will  hold carvedilol and diltiazem due to acute heart failure.     Type 2 diabetes mellitus, controlled, with renal complications    Well-controlled on a home regimen of metformin and a sulfonylurea; will provide basal-prandial insulin along with insulin sliding scale.     CAD (coronary artery disease)    Stable; will continue his home regimen of atorvastatin.  Will hold aspirin for suspected GI bleeding and losartan for acute renal failure.  Will hold carvedilol due to acute heart failure.     Chronic combined systolic and diastolic heart failure    As addressed above.     CKD Stage 3    As addressed above.     Peripheral artery disease    Stable; will continue his home regimen of atorvastatin but hold aspirin due to suspected GI bleed.     Chronic atrial fibrillation    Stable and rate-controlled; will hold his home regimen of diltiazem due to heart failure and hold apixaban due to suspected GI bleeding.     Chronic anticoagulation    As addressed above.     Centrilobular emphysema    Clinically-stable without wheezing.  Will provide as-needed JENAE/LAMA available.     Chronic respiratory failure with hypoxia    As addressed above.     Iron deficiency anemia    As addressed above.       VTE Risk Mitigation (From admission, onward)        Ordered     Place DENIS hose  Until discontinued      03/14/19 0111     IP VTE HIGH RISK PATIENT  Once      03/14/19 0111     Reason for No Pharmacological VTE Prophylaxis  Once      03/14/19 0111     Place DENIS hose  Until discontinued      03/14/19 0111     Place sequential compression device  Until discontinued      03/14/19 0111             Critical care time spent on the evaluation and treatment of severe organ dysfunction, review of pertinent labs and imaging studies, discussions with consulting providers and discussions with patient/family: 60 minutes.           Megan Yip M.D.  Staff Nocturnist  Department of Hospital Medicine  [facility]  Pager: [phone]  [phone]

## 2019-03-14 NOTE — SUBJECTIVE & OBJECTIVE
Past Medical History:   Diagnosis Date    Anemia 05/03/2018    pending blood transfusion    Anticoagulant long-term use     apixaban    Atrial fibrillation     CKD (chronic kidney disease)     Congestive heart failure     COPD (chronic obstructive pulmonary disease)     Coronary artery disease     Diabetes mellitus     Encounter for blood transfusion     HLD (hyperlipidemia)     Hypertension     MI (myocardial infarction)     On home oxygen therapy     Pacemaker     left chest    Peripheral vascular disease     Requires assistance with activities of daily living (ADL)     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     S/P femoral-popliteal bypass surgery left    Stented coronary artery     Tobacco use     Unsteady gait        Past Surgical History:   Procedure Laterality Date    CARDIAC CATHETERIZATION      CARDIAC PACEMAKER PLACEMENT      CARDIAC SURGERY      3 vessel CABG    CARDIOVERSION N/A 9/19/2013    [facility]formed by [facility] at [facility]    cardioverted      CORONARY ANGIOPLASTY WITH STENT PLACEMENT      EGD (ESOPHAGOGASTRODUODENOSCOPY) N/A 6/1/2018    [facility] at [facility]    HEMORRHOID SURGERY      TRANSESOPHAGEAL ECHOCARDIOGRAM (ANIA) N/A 9/19/2013    Performed by [facility] at [facility]    VASCULAR SURGERY      femoral artery popliteal bypass       Review of patient's allergies indicates:  No Known Allergies    No current facility-administered medications on file prior to encounter.      Current Outpatient Medications on File Prior to Encounter   Medication Sig    albuterol-ipratropium 2.5mg-0.5mg/3mL (DUO-NEB) 0.5 mg-3 mg(2.5 mg base)/3 mL nebulizer solution Take 3 mLs by nebulization every 6 (six) hours. Rescue    apixaban (ELIQUIS) 5 mg Tab Take 5 mg by mouth 2 (two) times daily.    aspirin (ECOTRIN) 81 MG EC tablet Take 1 tablet (81 mg total) by mouth once daily.    atorvastatin (LIPITOR) 20 MG tablet Take 20 mg by mouth every evening.    diltiaZEM (CARDIZEM) 30 MG  tablet Take 1 tablet (30 mg total) by mouth every 12 (twelve) hours.    ferrous gluconate (FERGON) 324 MG tablet Take 324 mg by mouth daily with breakfast.    fish oil-omega-3 fatty acids 300-1,000 mg capsule Take 2 g by mouth 2 (two) times daily.     furosemide (LASIX) 40 MG tablet     furosemide (LASIX) 80 MG tablet Take 40 mg by mouth 2 (two) times daily.     glipiZIDE (GLUCOTROL) 5 MG tablet Take 10 mg by mouth 2 (two) times daily before meals.    losartan (COZAAR) 50 MG tablet Take 0.5 tablets (25 mg total) by mouth once daily.    metFORMIN (GLUCOPHAGE-XR) 500 MG 24 hr tablet     nitroGLYCERIN (NITROSTAT) 0.4 MG SL tablet Place 0.4 mg under the tongue every 5 (five) minutes as needed.    polyethylene glycol (GLYCOLAX) 17 gram PwPk Take by mouth as needed.     carvedilol (COREG) 25 MG tablet Take 1 tablet (25 mg total) by mouth 2 (two) times daily.    losartan (COZAAR) 25 MG tablet Take 25 mg by mouth once daily.     tamsulosin (FLOMAX) 0.4 mg Cap Take 1 capsule (0.4 mg total) by mouth once daily.     Family History     Problem Relation (Age of Onset)    Diabetes Father, Mother        Tobacco Use    Smoking status: Former Smoker     Packs/day: 1.00     Years: 60.00     Pack years: 60.00     Types: Cigarettes     Last attempt to quit: 2018     Years since quittin.8    Smokeless tobacco: Never Used   Substance and Sexual Activity    Alcohol use: No    Drug use: No    Sexual activity: Not on file     Review of Systems   Constitutional: Positive for activity change. Negative for appetite change, chills, diaphoresis, fatigue, fever and unexpected weight change.   HENT: Negative.    Eyes: Negative.    Respiratory: Positive for shortness of breath. Negative for cough, chest tightness and wheezing.    Cardiovascular: Positive for leg swelling. Negative for chest pain and palpitations.   Gastrointestinal: Negative for abdominal distention, abdominal pain, blood in stool, constipation, diarrhea,  nausea and vomiting.   Genitourinary: Negative for enuresis and hematuria.   Musculoskeletal: Negative.    Skin: Negative.    Neurological: Negative for dizziness, seizures, syncope, weakness and light-headedness.   Psychiatric/Behavioral: Negative.      Objective:     Vital Signs (Most Recent):  Temp: 98.4 °F (36.9 °C) (03/14/19 1100)  Pulse: (!) 111 (03/14/19 1208)  Resp: (!) 26 (03/14/19 1208)  BP: (!) 173/80 (03/14/19 1208)  SpO2: 96 % (03/14/19 1208) Vital Signs (24h Range):  Temp:  [97.3 °F (36.3 °C)-98.4 °F (36.9 °C)] 98.4 °F (36.9 °C)  Pulse:  [] 111  Resp:  [7-52] 26  SpO2:  [75 %-100 %] 96 %  BP: (107-173)/(57-98) 173/80     Weight: 88.4 kg (194 lb 14.2 oz)  Body mass index is 28.78 kg/m².    Physical Exam   Constitutional: He is oriented to person, place, and time. He appears well-developed and well-nourished. No distress.   HENT:   Head: Normocephalic and atraumatic.   Right Ear: External ear normal.   Left Ear: External ear normal.   Nose: Nose normal.   BPAP in place   Eyes: Right eye exhibits no discharge. Left eye exhibits no discharge.   Neck: Normal range of motion.   Cardiovascular:   Irregularly irregular with a normal rate, no murmurs or gallops   Pulmonary/Chest:   Mildly increased work of breathing with bibasilar crackles and left-sided expiratory wheezing   Abdominal: Soft. Bowel sounds are normal.   Musculoskeletal: Normal range of motion. He exhibits edema (he is with significant 3+ pitting edema up through his thighs, and has 2+ pitting edema in his arms).   Neurological: He is alert and oriented to person, place, and time.   Skin: Skin is warm and dry. He is not diaphoretic. No erythema.   Psychiatric: He has a normal mood and affect. His behavior is normal. Judgment and thought content normal.   Nursing note and vitals reviewed.          Significant Labs: All pertinent labs within the past 24 hours have been reviewed.    Significant Imaging: I have reviewed and interpreted all  pertinent imaging results/findings within the past 24 hours.

## 2019-03-14 NOTE — PLAN OF CARE
Problem: Adult Inpatient Plan of Care  Goal: Plan of Care Review  Outcome: Ongoing (interventions implemented as appropriate)  Patient weaned to nasal cannula, O2 sats staying %.  Ultrasound of left arm done due to swelling larger than right arm.  Taking diet without difficulty.  Incontinent of urine, no skin breakdown, barrier cream put on patient.

## 2019-03-14 NOTE — SUBJECTIVE & OBJECTIVE
Past Medical History:   Diagnosis Date    Anemia 05/03/2018    pending blood transfusion    Anticoagulant long-term use     apixaban    Atrial fibrillation     CKD (chronic kidney disease)     Congestive heart failure     COPD (chronic obstructive pulmonary disease)     Coronary artery disease     Diabetes mellitus     Encounter for blood transfusion     HLD (hyperlipidemia)     Hypertension     MI (myocardial infarction)     On home oxygen therapy     Pacemaker     left chest    Peripheral vascular disease     Requires assistance with activities of daily living (ADL)     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     S/P femoral-popliteal bypass surgery left    Stented coronary artery     Tobacco use     Unsteady gait        Past Surgical History:   Procedure Laterality Date    CARDIAC CATHETERIZATION      CARDIAC PACEMAKER PLACEMENT      CARDIAC SURGERY      3 vessel CABG    CARDIOVERSION N/A 9/19/2013    [facility] by [facility] at [facility]    cardioverted      CORONARY ANGIOPLASTY WITH STENT PLACEMENT      EGD (ESOPHAGOGASTRODUODENOSCOPY) N/A 6/1/2018    [facility]    HEMORRHOID SURGERY      TRANSESOPHAGEAL ECHOCARDIOGRAM (ANIA) N/A 9/19/2013    Performed by [facility] at [facility]    VASCULAR SURGERY      femoral artery popliteal bypass       Review of patient's allergies indicates:  No Known Allergies    No current facility-administered medications on file prior to encounter.      Current Outpatient Medications on File Prior to Encounter   Medication Sig    albuterol-ipratropium 2.5mg-0.5mg/3mL (DUO-NEB) 0.5 mg-3 mg(2.5 mg base)/3 mL nebulizer solution Take 3 mLs by nebulization every 6 (six) hours. Rescue    apixaban (ELIQUIS) 5 mg Tab Take 5 mg by mouth 2 (two) times daily.    aspirin (ECOTRIN) 81 MG EC tablet Take 1 tablet (81 mg total) by mouth once daily.    atorvastatin (LIPITOR) 20 MG tablet Take 20 mg by mouth every evening.    diltiaZEM (CARDIZEM) 30 MG  tablet Take 1 tablet (30 mg total) by mouth every 12 (twelve) hours.    ferrous gluconate (FERGON) 324 MG tablet Take 324 mg by mouth daily with breakfast.    fish oil-omega-3 fatty acids 300-1,000 mg capsule Take 2 g by mouth 2 (two) times daily.     furosemide (LASIX) 40 MG tablet     furosemide (LASIX) 80 MG tablet Take 40 mg by mouth 2 (two) times daily.     glipiZIDE (GLUCOTROL) 5 MG tablet Take 10 mg by mouth 2 (two) times daily before meals.    losartan (COZAAR) 50 MG tablet Take 0.5 tablets (25 mg total) by mouth once daily.    metFORMIN (GLUCOPHAGE-XR) 500 MG 24 hr tablet     nitroGLYCERIN (NITROSTAT) 0.4 MG SL tablet Place 0.4 mg under the tongue every 5 (five) minutes as needed.    polyethylene glycol (GLYCOLAX) 17 gram PwPk Take by mouth as needed.     carvedilol (COREG) 25 MG tablet Take 1 tablet (25 mg total) by mouth 2 (two) times daily.    losartan (COZAAR) 25 MG tablet Take 25 mg by mouth once daily.     tamsulosin (FLOMAX) 0.4 mg Cap Take 1 capsule (0.4 mg total) by mouth once daily.     Family History     Problem Relation (Age of Onset)    Diabetes Father, Mother        Tobacco Use    Smoking status: Former Smoker     Packs/day: 1.00     Years: 60.00     Pack years: 60.00     Types: Cigarettes     Last attempt to quit: 2018     Years since quittin.8    Smokeless tobacco: Never Used   Substance and Sexual Activity    Alcohol use: No    Drug use: No    Sexual activity: Not on file     Review of Systems   Constitutional: Positive for activity change. Negative for appetite change, chills, diaphoresis, fatigue, fever and unexpected weight change.   HENT: Negative.    Eyes: Negative.    Respiratory: Positive for shortness of breath. Negative for cough, chest tightness and wheezing.    Cardiovascular: Positive for leg swelling. Negative for chest pain and palpitations.   Gastrointestinal: Negative for abdominal distention, abdominal pain, blood in stool, constipation, diarrhea,  nausea and vomiting.   Genitourinary: Negative for enuresis and hematuria.   Musculoskeletal: Negative.    Skin: Negative.    Neurological: Negative for dizziness, seizures, syncope, weakness and light-headedness.   Psychiatric/Behavioral: Negative.      Objective:     Vital Signs (Most Recent):  Temp: 97.9 °F (36.6 °C) (03/14/19 0115)  Pulse: 97 (03/14/19 0115)  Resp: 17 (03/14/19 0115)  BP: 123/79 (03/14/19 0115)  SpO2: 98 % (03/14/19 0115) Vital Signs (24h Range):  Temp:  [97.3 °F (36.3 °C)-98.2 °F (36.8 °C)] 97.9 °F (36.6 °C)  Pulse:  [] 97  Resp:  [16-52] 17  SpO2:  [75 %-100 %] 98 %  BP: (108-142)/(57-89) 123/79     Weight: 88.4 kg (194 lb 14.2 oz)  Body mass index is 28.78 kg/m².    Physical Exam   Constitutional: He is oriented to person, place, and time. He appears well-developed and well-nourished. No distress.   HENT:   Head: Normocephalic and atraumatic.   Right Ear: External ear normal.   Left Ear: External ear normal.   Nose: Nose normal.   BPAP in place   Eyes: Right eye exhibits no discharge. Left eye exhibits no discharge.   Neck: Normal range of motion.   Cardiovascular:   Irregularly irregular with a normal rate, no murmurs or gallops   Pulmonary/Chest:   Mildly increased work of breathing with bibasilar crackles and left-sided expiratory wheezing   Abdominal: Soft. Bowel sounds are normal.   Musculoskeletal: Normal range of motion. He exhibits edema (he is with significant 3+ pitting edema up through his thighs, and has 2+ pitting edema in his arms).   Neurological: He is alert and oriented to person, place, and time.   Skin: Skin is warm and dry. He is not diaphoretic. No erythema.   Psychiatric: He has a normal mood and affect. His behavior is normal. Judgment and thought content normal.   Nursing note and vitals reviewed.          Significant Labs: All pertinent labs within the past 24 hours have been reviewed.    Significant Imaging: I have reviewed and interpreted all pertinent imaging  results/findings within the past 24 hours.

## 2019-03-14 NOTE — HPI
Mr. Agus Ramos Jr. is a 78 y.o. male known to me with essential hypertension, type 2 diabetes mellitus (HbA1c 5.1% Nov 2018), CAD sbp CABG, chronic combined systolic and diastolic heart failure (LVEF 45% Dec 2018), CKD Stage 3, peripheral artery disease, chronic atrial fibrillation ([id number] score 5) on chronic anticoagulation, COPD, chronic respiratory failure with hypoxia, and anemia of chronic disease who presents to [facility] ED with complaints of dyspnea for four days.  He experiences intermittent dyspnea and this current episode started about four days ago while at rest.  The dyspnea is worse on exertion but not with any orthopnea, PND, chest pain, fevers, chills, nausea, vomiting, palpitations, diaphoresis, nor hemoptysis.  He does note that his legs and arms are more swollen during the same time period.  He denies any sick contacts or recent travel, and hasn't noticed any hematochezia, melena, hematuria, nor any hematemesis.

## 2019-03-14 NOTE — ASSESSMENT & PLAN NOTE
Patient's hemoglobin dropped from 10.4 grams in Jan 2019 to 4.4 grams today and was noted to he with melenic stools on digital rectal exam.  He has been started on a pantoprazole infusion and will be transfused a couple units of pRBCs with re-assessment thereafter to determine if he needs additional units.  Will hold his home regimen of aspirin and apixaban for now and consult Gastroenterology for further recommendations.

## 2019-03-14 NOTE — ASSESSMENT & PLAN NOTE
Stable; will continue his home regimen of atorvastatin.  Will hold aspirin for suspected GI bleeding and losartan for acute renal failure.  Will hold carvedilol due to acute heart failure.

## 2019-03-14 NOTE — ASSESSMENT & PLAN NOTE
Patient's blood pressure is well-controlled; will continue home regimen of tamsulosin, and provide as-needed clonidine.  Will hold his home regimen of furosemide and losartan due to acute renal failure.  Will hold carvedilol and diltiazem due to acute heart failure.

## 2019-03-14 NOTE — ASSESSMENT & PLAN NOTE
Patient has been edematous and does have a history of combined heart failure and pulmonary hypertension.  He needed BPAP therapy for his respiratory failure.  He will be started on intravenous diuresis with furosemide with careful monitoring of his urine output.  Will repeat his TTE in the morning.  EF of 45% 1/18. Continue lasix. Feels better.  Can likely wean to NC. Patient wears 2L of 02 at home.

## 2019-03-15 PROBLEM — I50.9 ACUTE ON CHRONIC CONGESTIVE HEART FAILURE: Status: ACTIVE | Noted: 2019-03-15

## 2019-03-15 LAB
ANION GAP SERPL CALC-SCNC: 6 MMOL/L
ANISOCYTOSIS BLD QL SMEAR: SLIGHT
BASOPHILS # BLD AUTO: 0.02 K/UL
BASOPHILS NFR BLD: 0.2 %
BUN SERPL-MCNC: 32 MG/DL
CALCIUM SERPL-MCNC: 9.4 MG/DL
CHLORIDE SERPL-SCNC: 109 MMOL/L
CO2 SERPL-SCNC: 25 MMOL/L
CREAT SERPL-MCNC: 1.4 MG/DL
DIFFERENTIAL METHOD: ABNORMAL
EOSINOPHIL # BLD AUTO: 0.2 K/UL
EOSINOPHIL NFR BLD: 1.4 %
ERYTHROCYTE [DISTWIDTH] IN BLOOD BY AUTOMATED COUNT: 19.6 %
EST. GFR  (AFRICAN AMERICAN): 54 ML/MIN/1.73 M^2
EST. GFR  (NON AFRICAN AMERICAN): 47 ML/MIN/1.73 M^2
GLUCOSE SERPL-MCNC: 156 MG/DL
HCT VFR BLD AUTO: 29.7 %
HGB BLD-MCNC: 8.5 G/DL
HYPOCHROMIA BLD QL SMEAR: ABNORMAL
LYMPHOCYTES # BLD AUTO: 1.2 K/UL
LYMPHOCYTES NFR BLD: 10.5 %
MAGNESIUM SERPL-MCNC: 2.2 MG/DL
MCH RBC QN AUTO: 23 PG
MCHC RBC AUTO-ENTMCNC: 28.6 G/DL
MCV RBC AUTO: 80 FL
MONOCYTES # BLD AUTO: 1.5 K/UL
MONOCYTES NFR BLD: 13.3 %
NEUTROPHILS # BLD AUTO: 8.4 K/UL
NEUTROPHILS NFR BLD: 75.2 %
PLATELET # BLD AUTO: ABNORMAL K/UL
PLATELET BLD QL SMEAR: ABNORMAL
PMV BLD AUTO: ABNORMAL FL
POCT GLUCOSE: 160 MG/DL (ref 70–110)
POCT GLUCOSE: 162 MG/DL (ref 70–110)
POCT GLUCOSE: 165 MG/DL (ref 70–110)
POCT GLUCOSE: 168 MG/DL (ref 70–110)
POCT GLUCOSE: 179 MG/DL (ref 70–110)
POIKILOCYTOSIS BLD QL SMEAR: SLIGHT
POLYCHROMASIA BLD QL SMEAR: ABNORMAL
POTASSIUM SERPL-SCNC: 4.4 MMOL/L
RBC # BLD AUTO: 3.7 M/UL
SODIUM SERPL-SCNC: 140 MMOL/L
WBC # BLD AUTO: 11.21 K/UL

## 2019-03-15 PROCEDURE — C9113 INJ PANTOPRAZOLE SODIUM, VIA: HCPCS | Performed by: INTERNAL MEDICINE

## 2019-03-15 PROCEDURE — 83735 ASSAY OF MAGNESIUM: CPT

## 2019-03-15 PROCEDURE — 25000003 PHARM REV CODE 250: Performed by: INTERNAL MEDICINE

## 2019-03-15 PROCEDURE — 25000003 PHARM REV CODE 250: Performed by: EMERGENCY MEDICINE

## 2019-03-15 PROCEDURE — 63600175 PHARM REV CODE 636 W HCPCS: Performed by: EMERGENCY MEDICINE

## 2019-03-15 PROCEDURE — 63600175 PHARM REV CODE 636 W HCPCS: Performed by: INTERNAL MEDICINE

## 2019-03-15 PROCEDURE — 27000221 HC OXYGEN, UP TO 24 HOURS

## 2019-03-15 PROCEDURE — 80048 BASIC METABOLIC PNL TOTAL CA: CPT

## 2019-03-15 PROCEDURE — 21400001 HC TELEMETRY ROOM

## 2019-03-15 PROCEDURE — 99900035 HC TECH TIME PER 15 MIN (STAT)

## 2019-03-15 PROCEDURE — 36415 COLL VENOUS BLD VENIPUNCTURE: CPT

## 2019-03-15 PROCEDURE — 85025 COMPLETE CBC W/AUTO DIFF WBC: CPT

## 2019-03-15 PROCEDURE — 94761 N-INVAS EAR/PLS OXIMETRY MLT: CPT

## 2019-03-15 PROCEDURE — C9113 INJ PANTOPRAZOLE SODIUM, VIA: HCPCS | Performed by: EMERGENCY MEDICINE

## 2019-03-15 RX ORDER — DILTIAZEM HYDROCHLORIDE 30 MG/1
30 TABLET, FILM COATED ORAL EVERY 12 HOURS
Status: DISCONTINUED | OUTPATIENT
Start: 2019-03-15 | End: 2019-03-19 | Stop reason: HOSPADM

## 2019-03-15 RX ORDER — PANTOPRAZOLE SODIUM 40 MG/10ML
40 INJECTION, POWDER, LYOPHILIZED, FOR SOLUTION INTRAVENOUS 2 TIMES DAILY
Status: DISCONTINUED | OUTPATIENT
Start: 2019-03-15 | End: 2019-03-16

## 2019-03-15 RX ORDER — FUROSEMIDE 40 MG/1
40 TABLET ORAL DAILY
Status: DISCONTINUED | OUTPATIENT
Start: 2019-03-16 | End: 2019-03-16

## 2019-03-15 RX ORDER — FUROSEMIDE 40 MG/1
40 TABLET ORAL DAILY
Status: DISCONTINUED | OUTPATIENT
Start: 2019-03-15 | End: 2019-03-15

## 2019-03-15 RX ORDER — CARVEDILOL 6.25 MG/1
25 TABLET ORAL 2 TIMES DAILY
Status: DISCONTINUED | OUTPATIENT
Start: 2019-03-15 | End: 2019-03-19 | Stop reason: HOSPADM

## 2019-03-15 RX ADMIN — CARVEDILOL 25 MG: 6.25 TABLET, FILM COATED ORAL at 10:03

## 2019-03-15 RX ADMIN — PANTOPRAZOLE SODIUM 40 MG: 40 INJECTION, POWDER, LYOPHILIZED, FOR SOLUTION INTRAVENOUS at 10:03

## 2019-03-15 RX ADMIN — TAMSULOSIN HYDROCHLORIDE 0.4 MG: 0.4 CAPSULE ORAL at 08:03

## 2019-03-15 RX ADMIN — CARVEDILOL 25 MG: 6.25 TABLET, FILM COATED ORAL at 09:03

## 2019-03-15 RX ADMIN — FUROSEMIDE 40 MG: 10 INJECTION, SOLUTION INTRAVENOUS at 08:03

## 2019-03-15 RX ADMIN — RAMELTEON 8 MG: 8 TABLET, FILM COATED ORAL at 10:03

## 2019-03-15 RX ADMIN — ATORVASTATIN CALCIUM 20 MG: 10 TABLET, FILM COATED ORAL at 10:03

## 2019-03-15 RX ADMIN — DEXTROSE 8 MG/HR: 50 INJECTION, SOLUTION INTRAVENOUS at 05:03

## 2019-03-15 RX ADMIN — DILTIAZEM HYDROCHLORIDE 30 MG: 30 TABLET, FILM COATED ORAL at 10:03

## 2019-03-15 RX ADMIN — DILTIAZEM HYDROCHLORIDE 30 MG: 30 TABLET, FILM COATED ORAL at 09:03

## 2019-03-15 RX ADMIN — DEXTROSE 8 MG/HR: 50 INJECTION, SOLUTION INTRAVENOUS at 01:03

## 2019-03-15 NOTE — PROGRESS NOTES
[facility] Hospital Medicine  Progress Note    Patient Name: Agus Ramos Jr.  MRN: [id number]  Patient Class: IP- Inpatient   Admission Date: 3/13/2019  Length of Stay: 2 days  Attending Physician: Ede Gómez MD  Primary Care Provider: Keaton Hardy MD        Subjective:     Principal Problem:Acute on chronic combined systolic and diastolic congestive heart failure    HPI:  Mr. Agus Ramos Jr. is a 78 y.o. male known to me with essential hypertension, type 2 diabetes mellitus (HbA1c 5.1% Nov 2018), CAD sbp CABG, chronic combined systolic and diastolic heart failure (LVEF 45% Dec 2018), CKD Stage 3, peripheral artery disease, chronic atrial fibrillation ([id number] score 5) on chronic anticoagulation, COPD, chronic respiratory failure with hypoxia, and anemia of chronic disease who presents to [facility] ED with complaints of dyspnea for four days.  He experiences intermittent dyspnea and this current episode started about four days ago while at rest.  The dyspnea is worse on exertion but not with any orthopnea, PND, chest pain, fevers, chills, nausea, vomiting, palpitations, diaphoresis, nor hemoptysis.  He does note that his legs and arms are more swollen during the same time period.  He denies any sick contacts or recent travel, and hasn't noticed any hematochezia, melena, hematuria, nor any hematemesis.      Hospital Course:   presents to [facility] ED with complaints of dyspnea for four days on 3/13/19. Patient has known systolic and diastolic heart failure with an EF of 45% and was admitted to the ICU on bi-pap support. Started on IV lasix with clinical improvement. Pulmonary was consulted. Also found to be heme + and anemic with a Hgb of 4+. Transfused several units of blood and GI consulted. The patient clinically improved and changed to NC (patient on 2L NC at home). The patient was transferred out of the ICU on 3/15. PT/OT were consulted      Interval History: No new issues.  Comfortable on NC.   Feels better.     Review of Systems   Constitutional: Negative for activity change, chills, diaphoresis, fatigue and fever.   HENT: Negative for congestion.    Respiratory: Negative for cough, choking, chest tightness and shortness of breath.    Cardiovascular: Negative for chest pain.   Gastrointestinal: Negative for abdominal pain.   Genitourinary: Negative for difficulty urinating.   Neurological: Positive for weakness.     Objective:     Vital Signs (Most Recent):  Temp: 98.8 °F (37.1 °C) (03/15/19 0715)  Pulse: 99 (03/15/19 0900)  Resp: (!) 21 (03/15/19 0900)  BP: 135/73 (03/15/19 0900)  SpO2: 100 % (03/15/19 0900) Vital Signs (24h Range):  Temp:  [97.8 °F (36.6 °C)-98.8 °F (37.1 °C)] 98.8 °F (37.1 °C)  Pulse:  [] 99  Resp:  [13-36] 21  SpO2:  [89 %-100 %] 100 %  BP: (107-173)/(57-97) 135/73     Weight: 92.5 kg (203 lb 14.8 oz)  Body mass index is 30.11 kg/m².    Intake/Output Summary (Last 24 hours) at 3/15/2019 0922  Last data filed at 3/15/2019 0830  Gross per 24 hour   Intake 1250 ml   Output --   Net 1250 ml      Physical Exam   Constitutional: He is oriented to person, place, and time. He appears well-developed and well-nourished.   HENT:   Head: Normocephalic and atraumatic.   Cardiovascular: Regular rhythm.   Pulmonary/Chest: Effort normal and breath sounds normal. No stridor. He has no wheezes.   Abdominal: There is no tenderness.   Neurological: He is alert and oriented to person, place, and time.   Skin: Skin is warm and dry.   Psychiatric: He has a normal mood and affect. His behavior is normal.   Vitals reviewed.      Significant Labs:   BMP:   Recent Labs   Lab 03/15/19  0234   *      K 4.4      CO2 25   BUN 32*   CREATININE 1.4   CALCIUM 9.4   MG 2.2     CBC:   Recent Labs   Lab 03/13/19  1610 03/14/19  0305 03/15/19  0234   WBC 5.91 11.38 11.21   HGB 4.4* 6.5* 8.5*   HCT 17.4* 23.2* 29.7*    227 SEE COMMENT       Significant Imaging:      Assessment/Plan:      * Acute on chronic combined systolic and diastolic congestive heart failure    Patient has been edematous and does have a history of combined heart failure and pulmonary hypertension.  He needed BPAP therapy for his respiratory failure.  He will be started on intravenous diuresis with furosemide with careful monitoring of his urine output.  Will repeat his TTE in the morning.  EF of 45% 1/18. Continue lasix. Feels better.  Can likely wean to NC. Patient wears 2L of 02 at home.       Repeat echo here with EF of 50% with pulmonary hypertension.  Will change to PO lasix.        Hyperkalemia    Should decrease with IV lasix.  Will check another K shortly.        Peripheral artery disease    Stable; will continue his home regimen of atorvastatin but hold aspirin due to suspected GI bleed.     Chronic combined systolic and diastolic heart failure    As addressed above.     Acute on CKD Stage 3    Patient's urinalysis is significant for a specific gravity of 1.010, 1+ occult blood, positive for nitrites, and 2+ leukocytes.  Urine output has been fair.  Will obtain additional urine studies; defer IV fluid hydration for now due to acute heart failure; monitor the urine output; recheck the renal function in the morning; and avoid nephrotoxins.     Acute on chronic respiratory failure with hypoxia    Patient was noted to be hypoxic on his home supplemental oxygen therapy at 85%.  He was placed on CPAP en route to this facility.  I cannot personally view the film but the official Radiology interpretation was concerning for pulmonary edema with small bilateral pleural effusions.  Will continue BPAP therapy.  Wears 2L 02 at home.   Wean to NC     Chronic respiratory failure with hypoxia    As addressed above.     Centrilobular emphysema    Clinically-stable without wheezing.  Will provide as-needed JENAE/LAMA available.     Acute hypoxemic respiratory failure    Resolved.  From above.        Iron  deficiency anemia    As addressed above.     Chronic anticoagulation    As addressed above.     Symptomatic anemia    Patient's hemoglobin dropped from 10.4 grams in Jan 2019 to 4.4 grams today and was noted to he with melenic stools on digital rectal exam.  He has been started on a pantoprazole infusion and will be transfused a couple units of pRBCs with re-assessment thereafter to determine if he needs additional units.  Will hold his home regimen of aspirin and apixaban for now and consult Gastroenterology for further recommendations.  Transfuse another unit of blood today. GI consulted.  Endoscopy when more stable.      Type 2 diabetes mellitus, controlled, with renal complications    Well-controlled on a home regimen of metformin and a sulfonylurea; will provide basal-prandial insulin along with insulin sliding scale.     CKD Stage 3    As addressed above.     Essential hypertension    Patient's blood pressure is well-controlled; will continue home regimen of tamsulosin, and provide as-needed clonidine.  Will hold his home regimen of furosemide and losartan due to acute renal failure.  Will hold carvedilol and diltiazem due to acute heart failure.     CAD (coronary artery disease)    Stable; will continue his home regimen of atorvastatin.  Will hold aspirin for suspected GI bleeding and losartan for acute renal failure.  Will hold carvedilol due to acute heart failure.     Chronic atrial fibrillation    Stable and rate-controlled; will hold his home regimen of diltiazem due to heart failure and hold apixaban due to suspected GI bleeding.       VTE Risk Mitigation (From admission, onward)        Ordered     Place DENIS hose  Until discontinued      03/14/19 0111     IP VTE HIGH RISK PATIENT  Once      03/14/19 0111     Reason for No Pharmacological VTE Prophylaxis  Once      03/14/19 0111     Place DENIS hose  Until discontinued      03/14/19 0111     Place sequential compression device  Until discontinued       03/14/19 0111          Critical care time spent on the evaluation and treatment of severe organ dysfunction, review of pertinent labs and imaging studies, discussions with consulting providers and discussions with patient/family: 40  Minutes.    Transfer to floor. PT/OT eval. Endoscopy per GI.    Ede Low MD  Department of Hospital Medicine   [facility]

## 2019-03-15 NOTE — PLAN OF CARE
Problem: Adult Inpatient Plan of Care  Goal: Plan of Care Review  Outcome: Ongoing (interventions implemented as appropriate)  Pt continues in the ICU.  Pt is alert and oriented.  Pt rate increased to 150's to 120's with frequent PVC's.  Pt also having runs of V-tach.  Notified Dr Benavides and received new order for Lopressor.  Rate to 80's to 90's after Lopressor.  Pt denies any pain.  No stools this shift.  Protonix at 8mg/hr.  No falls/injuries this shift.

## 2019-03-15 NOTE — NURSING TRANSFER
Nursing Transfer Note      3/15/2019 1755    Transfer to  [address]    Transfer via w/c    Transfer with oxygen/cardiac monitor    Transported by ICU RN    Medicines sent: none    Chart send with patient: yes    Notified:wife--danielle--messages left on cell phone and home phone    Patient reassessed at: 03/15/2019 1700    Upon arrival to floor: cardiac monitor applied, patient oriented to room, call bell in reach and bed in lowest position

## 2019-03-15 NOTE — SUBJECTIVE & OBJECTIVE
Interval History: No new issues. Comfortable on NC.   Feels better.     Review of Systems   Constitutional: Negative for activity change, chills, diaphoresis, fatigue and fever.   HENT: Negative for congestion.    Respiratory: Negative for cough, choking, chest tightness and shortness of breath.    Cardiovascular: Negative for chest pain.   Gastrointestinal: Negative for abdominal pain.   Genitourinary: Negative for difficulty urinating.   Neurological: Positive for weakness.     Objective:     Vital Signs (Most Recent):  Temp: 98.8 °F (37.1 °C) (03/15/19 0715)  Pulse: 99 (03/15/19 0900)  Resp: (!) 21 (03/15/19 0900)  BP: 135/73 (03/15/19 0900)  SpO2: 100 % (03/15/19 0900) Vital Signs (24h Range):  Temp:  [97.8 °F (36.6 °C)-98.8 °F (37.1 °C)] 98.8 °F (37.1 °C)  Pulse:  [] 99  Resp:  [13-36] 21  SpO2:  [89 %-100 %] 100 %  BP: (107-173)/(57-97) 135/73     Weight: 92.5 kg (203 lb 14.8 oz)  Body mass index is 30.11 kg/m².    Intake/Output Summary (Last 24 hours) at 3/15/2019 0922  Last data filed at 3/15/2019 0830  Gross per 24 hour   Intake 1250 ml   Output --   Net 1250 ml      Physical Exam   Constitutional: He is oriented to person, place, and time. He appears well-developed and well-nourished.   HENT:   Head: Normocephalic and atraumatic.   Cardiovascular: Regular rhythm.   Pulmonary/Chest: Effort normal and breath sounds normal. No stridor. He has no wheezes.   Abdominal: There is no tenderness.   Neurological: He is alert and oriented to person, place, and time.   Skin: Skin is warm and dry.   Psychiatric: He has a normal mood and affect. His behavior is normal.   Vitals reviewed.      Significant Labs:   BMP:   Recent Labs   Lab 03/15/19  0234   *      K 4.4      CO2 25   BUN 32*   CREATININE 1.4   CALCIUM 9.4   MG 2.2     CBC:   Recent Labs   Lab 03/13/19  1610 03/14/19  0305 03/15/19  0234   WBC 5.91 11.38 11.21   HGB 4.4* 6.5* 8.5*   HCT 17.4* 23.2* 29.7*    227 SEE COMMENT        Significant Imaging:

## 2019-03-15 NOTE — PROGRESS NOTES
[facility]  Gastroenterology  Progress Note    Patient Name: Agus Ramos Jr.  MRN: [id number]  Admission Date: 3/13/2019  Hospital Length of Stay: 2 days  Code Status: Full Code   Attending Provider: Ede Gómez MD  Primary Care Physician: Keaton Hardy MD  Principal Problem: Acute on chronic combined systolic and diastolic congestive heart failure    Subjective:     CC= Severe anemia, ? dark stools    Interval History: No overt bleeding since admission.  Patient denies abdominal pain, nausea and vomiting.  Tolerating diet.  Discussed with RN.     Review of systems:  General: Negative for fevers, chills.  Cardiovascular:  Negative for chest pain, shortness of breath     Objective:     Vital Signs (Most Recent):  Temp: 98.7 °F (37.1 °C) (03/15/19 1200)  Pulse: 78 (03/15/19 1300)  Resp: 18 (03/15/19 1300)  BP: (!) 140/95 (03/15/19 1300)  SpO2: 98 % (03/15/19 1300) Vital Signs (24h Range):  Temp:  [97.8 °F (36.6 °C)-98.8 °F (37.1 °C)] 98.7 °F (37.1 °C)  Pulse:  [] 78  Resp:  [16-35] 18  SpO2:  [91 %-100 %] 98 %  BP: (110-163)/(57-97) 140/95     Physical examination:  GEN: Elderly AAM in no apparent distress   HENT: Normocephalic, anicteric sclera   Cardiovascular: Regular rate and rhythm. No murmurs appreciated.   Chest: Non-labored respirations. Breath sounds equal   Abdomen: Soft, NTND, normoactive BS  Psych: Appropriate mood and affect.   Extermities: No C/C/E. 2+ dorsalis pedis pulses bilaterally    Recent Labs   Lab 03/13/19  1610 03/14/19  0305 03/15/19  0234   WBC 5.91 11.38 11.21   HGB 4.4* 6.5* 8.5*   HCT 17.4* 23.2* 29.7*    227 SEE COMMENT       Imaging:  Chest x-ray (3/13/19):  Impression:  Cardiac device with findings suggesting pulmonary edema/CHF pattern, small bilateral pleural effusions and probable left basilar atelectasis/infiltrate.  Continued follow-up as warranted.      Assessment:   78 year old male with a history of HTN, DM, HLD, atrial fibrillation  on anticoagulation, COPD on 2L home oxygen, CAD s/p CABG, CHF and PVD presenting with exacerbation of CHF and severe anemia.  H&H 4.4 and 17.4 on presentation, now improved s/p transfusion.  Questionable history of dark stools at home.  No overt bleeding here.  + Elevated indirect bilirubin.  EGD in June 2018 unrevealing.  Eliquis is being held.     Plan:   1.  Consider push enteroscopy and colonoscopy on Monday, once optimized from a cardiopulmonary standpoint.    2.  Monitor CBC over the weekend.  3.  Continue PPI therapy.   4.  Will follow along with you.       Yvonne Quiroga PA-C  Gastroenterology  [facility]

## 2019-03-15 NOTE — CARE UPDATE
Transfer Summary:    Mr. Ramos presents to [facility] ED with complaints of dyspnea for four days on 3/13/19. Patient has known systolic and diastolic heart failure with an EF of 45% and was admitted to the ICU on bi-pap support. Started on IV lasix with clinical improvement. Pulmonary was consulted. Also found to be heme + and anemic with a Hgb of 4+. Transfused several units of blood and GI consulted. The patient clinically improved and changed to NC (patient on 2L NC at home). The patient was transferred out of the ICU on 3/15. PT/OT were consulted      His repeat echo showed EF of 50% with pulmonary hypertension +/- diastolic dys.  GI will scope once stable.

## 2019-03-15 NOTE — PLAN OF CARE
Problem: Adult Inpatient Plan of Care  Goal: Plan of Care Review  Outcome: Ongoing (interventions implemented as appropriate)  Transferred to telemetry unit via w/c.  Ambulated to and from w/c with very little assist.  Tolerating diet without difficulty.  Oxygen continues at 3L/min, some shortness of breath with exertion.  Swelling down from yesterday.  Family notified of patients new room number

## 2019-03-15 NOTE — NURSING
Received patient from ICU via wc with 3L O2 nc. Patient accompanied by Liza ICU RN. Transferred patient to bed, pt mild/slight SOB on transfer to bed. Chart sent with pt. Pt with no personal belongings, states he came to hospital via EMS. Pt no c/o pain

## 2019-03-15 NOTE — ASSESSMENT & PLAN NOTE
Patient has been edematous and does have a history of combined heart failure and pulmonary hypertension.  He needed BPAP therapy for his respiratory failure.  He will be started on intravenous diuresis with furosemide with careful monitoring of his urine output.  Will repeat his TTE in the morning.  EF of 45% 1/18. Continue lasix. Feels better.  Can likely wean to NC. Patient wears 2L of 02 at home.       Repeat echo here with EF of 50% with pulmonary hypertension.  Will change to PO lasix.

## 2019-03-16 PROBLEM — D62 ANEMIA ASSOCIATED WITH ACUTE BLOOD LOSS: Status: ACTIVE | Noted: 2018-05-03

## 2019-03-16 LAB
ALBUMIN SERPL BCP-MCNC: 2.2 G/DL
ALP SERPL-CCNC: 103 U/L
ALT SERPL W/O P-5'-P-CCNC: 16 U/L
ANION GAP SERPL CALC-SCNC: 5 MMOL/L
ANISOCYTOSIS BLD QL SMEAR: ABNORMAL
AST SERPL-CCNC: 13 U/L
BASOPHILS # BLD AUTO: 0.02 K/UL
BASOPHILS NFR BLD: 0.2 %
BILIRUB SERPL-MCNC: 0.8 MG/DL
BUN SERPL-MCNC: 33 MG/DL
CALCIUM SERPL-MCNC: 9.7 MG/DL
CHLORIDE SERPL-SCNC: 106 MMOL/L
CO2 SERPL-SCNC: 28 MMOL/L
CREAT SERPL-MCNC: 1.4 MG/DL
DACRYOCYTES BLD QL SMEAR: ABNORMAL
DIFFERENTIAL METHOD: ABNORMAL
EOSINOPHIL # BLD AUTO: 0.2 K/UL
EOSINOPHIL NFR BLD: 2.1 %
ERYTHROCYTE [DISTWIDTH] IN BLOOD BY AUTOMATED COUNT: 20.2 %
EST. GFR  (AFRICAN AMERICAN): 54 ML/MIN/1.73 M^2
EST. GFR  (NON AFRICAN AMERICAN): 47 ML/MIN/1.73 M^2
GIANT PLATELETS BLD QL SMEAR: PRESENT
GLUCOSE SERPL-MCNC: 136 MG/DL
HCT VFR BLD AUTO: 30.2 %
HGB BLD-MCNC: 8.3 G/DL
HYPOCHROMIA BLD QL SMEAR: ABNORMAL
LYMPHOCYTES # BLD AUTO: 0.9 K/UL
LYMPHOCYTES NFR BLD: 10.9 %
MCH RBC QN AUTO: 23 PG
MCHC RBC AUTO-ENTMCNC: 27.5 G/DL
MCV RBC AUTO: 84 FL
MONOCYTES # BLD AUTO: 0.9 K/UL
MONOCYTES NFR BLD: 11.5 %
NEUTROPHILS # BLD AUTO: 6.1 K/UL
NEUTROPHILS NFR BLD: 75.7 %
OVALOCYTES BLD QL SMEAR: ABNORMAL
PLATELET # BLD AUTO: 163 K/UL
PLATELET BLD QL SMEAR: ABNORMAL
PMV BLD AUTO: 10.1 FL
POCT GLUCOSE: 139 MG/DL (ref 70–110)
POCT GLUCOSE: 145 MG/DL (ref 70–110)
POCT GLUCOSE: 166 MG/DL (ref 70–110)
POCT GLUCOSE: 180 MG/DL (ref 70–110)
POIKILOCYTOSIS BLD QL SMEAR: ABNORMAL
POLYCHROMASIA BLD QL SMEAR: ABNORMAL
POTASSIUM SERPL-SCNC: 4 MMOL/L
PROT SERPL-MCNC: 4.2 G/DL
RBC # BLD AUTO: 3.61 M/UL
SCHISTOCYTES BLD QL SMEAR: ABNORMAL
SODIUM SERPL-SCNC: 139 MMOL/L
TARGETS BLD QL SMEAR: ABNORMAL
WBC # BLD AUTO: 8.1 K/UL

## 2019-03-16 PROCEDURE — 85025 COMPLETE CBC W/AUTO DIFF WBC: CPT

## 2019-03-16 PROCEDURE — 97116 GAIT TRAINING THERAPY: CPT

## 2019-03-16 PROCEDURE — 27000221 HC OXYGEN, UP TO 24 HOURS

## 2019-03-16 PROCEDURE — 21400001 HC TELEMETRY ROOM

## 2019-03-16 PROCEDURE — 94640 AIRWAY INHALATION TREATMENT: CPT

## 2019-03-16 PROCEDURE — 25000242 PHARM REV CODE 250 ALT 637 W/ HCPCS: Performed by: INTERNAL MEDICINE

## 2019-03-16 PROCEDURE — 97165 OT EVAL LOW COMPLEX 30 MIN: CPT

## 2019-03-16 PROCEDURE — 80053 COMPREHEN METABOLIC PANEL: CPT

## 2019-03-16 PROCEDURE — 63600175 PHARM REV CODE 636 W HCPCS: Performed by: HOSPITALIST

## 2019-03-16 PROCEDURE — 25000003 PHARM REV CODE 250: Performed by: HOSPITALIST

## 2019-03-16 PROCEDURE — 94660 CPAP INITIATION&MGMT: CPT

## 2019-03-16 PROCEDURE — 99900035 HC TECH TIME PER 15 MIN (STAT)

## 2019-03-16 PROCEDURE — 25000003 PHARM REV CODE 250: Performed by: INTERNAL MEDICINE

## 2019-03-16 PROCEDURE — 94760 N-INVAS EAR/PLS OXIMETRY 1: CPT

## 2019-03-16 PROCEDURE — 94761 N-INVAS EAR/PLS OXIMETRY MLT: CPT

## 2019-03-16 PROCEDURE — 36415 COLL VENOUS BLD VENIPUNCTURE: CPT

## 2019-03-16 PROCEDURE — 97162 PT EVAL MOD COMPLEX 30 MIN: CPT

## 2019-03-16 RX ORDER — FUROSEMIDE 10 MG/ML
20 INJECTION INTRAMUSCULAR; INTRAVENOUS 2 TIMES DAILY
Status: DISCONTINUED | OUTPATIENT
Start: 2019-03-16 | End: 2019-03-19 | Stop reason: HOSPADM

## 2019-03-16 RX ORDER — POLYETHYLENE GLYCOL 3350 17 G/17G
17 POWDER, FOR SOLUTION ORAL 2 TIMES DAILY
Status: DISCONTINUED | OUTPATIENT
Start: 2019-03-16 | End: 2019-03-19 | Stop reason: HOSPADM

## 2019-03-16 RX ORDER — PANTOPRAZOLE SODIUM 40 MG/1
40 TABLET, DELAYED RELEASE ORAL 2 TIMES DAILY
Status: DISCONTINUED | OUTPATIENT
Start: 2019-03-16 | End: 2019-03-19 | Stop reason: HOSPADM

## 2019-03-16 RX ADMIN — POLYETHYLENE GLYCOL 3350 17 G: 17 POWDER, FOR SOLUTION ORAL at 08:03

## 2019-03-16 RX ADMIN — PANTOPRAZOLE SODIUM 40 MG: 40 TABLET, DELAYED RELEASE ORAL at 08:03

## 2019-03-16 RX ADMIN — POLYETHYLENE GLYCOL 3350 17 G: 17 POWDER, FOR SOLUTION ORAL at 10:03

## 2019-03-16 RX ADMIN — FUROSEMIDE 20 MG: 10 INJECTION, SOLUTION INTRAMUSCULAR; INTRAVENOUS at 05:03

## 2019-03-16 RX ADMIN — DILTIAZEM HYDROCHLORIDE 30 MG: 30 TABLET, FILM COATED ORAL at 10:03

## 2019-03-16 RX ADMIN — IPRATROPIUM BROMIDE AND ALBUTEROL SULFATE 3 ML: .5; 3 SOLUTION RESPIRATORY (INHALATION) at 07:03

## 2019-03-16 RX ADMIN — CARVEDILOL 25 MG: 6.25 TABLET, FILM COATED ORAL at 08:03

## 2019-03-16 RX ADMIN — CARVEDILOL 25 MG: 6.25 TABLET, FILM COATED ORAL at 10:03

## 2019-03-16 RX ADMIN — PANTOPRAZOLE SODIUM 40 MG: 40 TABLET, DELAYED RELEASE ORAL at 10:03

## 2019-03-16 RX ADMIN — TAMSULOSIN HYDROCHLORIDE 0.4 MG: 0.4 CAPSULE ORAL at 10:03

## 2019-03-16 RX ADMIN — ATORVASTATIN CALCIUM 20 MG: 10 TABLET, FILM COATED ORAL at 08:03

## 2019-03-16 RX ADMIN — DILTIAZEM HYDROCHLORIDE 30 MG: 30 TABLET, FILM COATED ORAL at 08:03

## 2019-03-16 NOTE — ASSESSMENT & PLAN NOTE
Patient has been edematous and does have a history of combined heart failure and pulmonary hypertension.  He needed BPAP therapy for his respiratory failure.  He will be started on intravenous diuresis with furosemide with careful monitoring of his urine output.  Will repeat his TTE in the morning.  EF of 45% 1/18. Continue lasix. Feels better.  Can likely wean to NC. Patient wears 2L of 02 at home.       Repeat echo here with EF of 50% with pulmonary hypertension.still has leg swelling,contiue with IV lasix.

## 2019-03-16 NOTE — SUBJECTIVE & OBJECTIVE
Interval History: No new issues. Comfortable on NC.   Feels better.     Review of Systems   Constitutional: Negative for activity change, chills, diaphoresis, fatigue and fever.   HENT: Negative for congestion.    Respiratory: Negative for cough, choking, chest tightness and shortness of breath.    Cardiovascular: Negative for chest pain.   Gastrointestinal: Negative for abdominal pain.   Genitourinary: Negative for difficulty urinating.   Neurological: Positive for weakness.     Objective:     Vital Signs (Most Recent):  Temp: 97.5 °F (36.4 °C) (03/16/19 0743)  Pulse: 78 (03/16/19 0743)  Resp: 16 (03/16/19 0743)  BP: 124/65 (03/16/19 0743)  SpO2: 100 % (03/16/19 0743) Vital Signs (24h Range):  Temp:  [97.2 °F (36.2 °C)-98.7 °F (37.1 °C)] 97.5 °F (36.4 °C)  Pulse:  [72-95] 78  Resp:  [16-33] 16  SpO2:  [94 %-100 %] 100 %  BP: (103-161)/(60-95) 124/65     Weight: 92.5 kg (203 lb 14.8 oz)  Body mass index is 30.11 kg/m².    Intake/Output Summary (Last 24 hours) at 3/16/2019 0914  Last data filed at 3/16/2019 0616  Gross per 24 hour   Intake 530 ml   Output 250 ml   Net 280 ml      Physical Exam   Constitutional: He is oriented to person, place, and time. He appears well-developed and well-nourished.   HENT:   Head: Normocephalic and atraumatic.   Cardiovascular: Regular rhythm.   Pulmonary/Chest: Effort normal and breath sounds normal. No stridor. He has no wheezes.   Abdominal: There is no tenderness.   Neurological: He is alert and oriented to person, place, and time.   Skin: Skin is warm and dry.   Psychiatric: He has a normal mood and affect. His behavior is normal.   Vitals reviewed.      Significant Labs:   BMP:   Recent Labs   Lab 03/15/19  0234   *      K 4.4      CO2 25   BUN 32*   CREATININE 1.4   CALCIUM 9.4   MG 2.2     CBC:   Recent Labs   Lab 03/15/19  0234   WBC 11.21   HGB 8.5*   HCT 29.7*   PLT SEE COMMENT       Significant Imaging:

## 2019-03-16 NOTE — PROGRESS NOTES
[facility]  Gastroenterology  Progress Note    Patient Name: Agus Ramos Jr.  MRN: [id number]  Admission Date: 3/13/2019  Hospital Length of Stay: 3 days  Code Status: Full Code   Attending Provider: Ace Cisneros MD  Primary Care Physician: Keaton Hardy MD  Principal Problem: Acute on chronic combined systolic and diastolic congestive heart failure    Subjective:     CC= can't breathe    Interval History:  78-year-old patient with severe pulmonary hypertension on home O2 presented with dark stool at home no evidence of overt bleeding.  He has had no further overt bleeding.    Review of systems:  General: Negative for fevers, chills.  Cardiovascular:  Negative for chest pain, shortness of breath     Objective:     Vital Signs (Most Recent):  Temp: 97.5 °F (36.4 °C) (03/16/19 0743)  Pulse: 78 (03/16/19 0743)  Resp: 16 (03/16/19 0743)  BP: 124/65 (03/16/19 0743)  SpO2: 100 % (03/16/19 0745) Vital Signs (24h Range):  Temp:  [97.2 °F (36.2 °C)-98.7 °F (37.1 °C)] 97.5 °F (36.4 °C)  Pulse:  [72-93] 78  Resp:  [16-33] 16  SpO2:  [94 %-100 %] 100 %  BP: (103-161)/(60-95) 124/65     Physical examination:  GEN: Well developed, well nourished in moderate to severe distress with shortness of breath  HENT: Normocephalic, anicteric sclera   Cardiovascular: Regular rate and rhythm. No murmurs appreciated.   Chest:  Labored  respirations. Breath sounds are significantly diminished  Abdomen: Soft, NTND, normoactive BS  Psych: Appropriate mood and affect.   Extermities:  3+ edema in upper and lower extremities. 2+ dorsalis pedis pulses bilaterally  Skin: No new visible or palpable lesions.     hemoglobin 8.5 g         Assessment:   78-year-old patient with significant decompensation of his cardiac and pulmonary status.  He is significantly short of breath at rest.  He is on home oxygen.  He is unable to mentally focus in ask questions due to his severe dyspnea. No evidence of an active GI bleed.   Possible evidence of a chronic bleed.    Plan:   From physical exam and interview the patient is not a candidate for sedation for an EGD or a colonoscopy.  There is no physical way this patient can perform a colon preparation, he is severely short of breath simply lying in the bed being bathed.  This patient is not a candidate for GI procedures as an outpatient due to the severity of his disease at this point.  From an inpatient standpoint, he is not at a point where he can be safely sedated.  My recommendation is continue proton pump inhibitor therapy and to get opinions from Cardiology, Pulmonary, and see if palliative care options or reasonable or not.  Please re-consult if the patient is deemed appropriate for intervention endoscopically and has been cleared at some reasonable risk      Ernesto Hussein Ii, MD  Gastroenterology  [facility]

## 2019-03-16 NOTE — PROGRESS NOTES
[facility] Hospital Medicine  Progress Note    Patient Name: Agus Ramos Jr.  MRN: [id number]  Patient Class: IP- Inpatient   Admission Date: 3/13/2019  Length of Stay: 3 days  Attending Physician: Ace Cisneros MD  Primary Care Provider: Keaton Hardy MD        Subjective:     Principal Problem:Acute on chronic combined systolic and diastolic congestive heart failure    HPI:  Mr. Agus Ramos Jr. is a 78 y.o. male known to me with essential hypertension, type 2 diabetes mellitus (HbA1c 5.1% Nov 2018), CAD sbp CABG, chronic combined systolic and diastolic heart failure (LVEF 45% Dec 2018), CKD Stage 3, peripheral artery disease, chronic atrial fibrillation ([id number] score 5) on chronic anticoagulation, COPD, chronic respiratory failure with hypoxia, and anemia of chronic disease who presents to [facility] ED with complaints of dyspnea for four days.  He experiences intermittent dyspnea and this current episode started about four days ago while at rest.  The dyspnea is worse on exertion but not with any orthopnea, PND, chest pain, fevers, chills, nausea, vomiting, palpitations, diaphoresis, nor hemoptysis.  He does note that his legs and arms are more swollen during the same time period.  He denies any sick contacts or recent travel, and hasn't noticed any hematochezia, melena, hematuria, nor any hematemesis.      Hospital Course:   presents to [facility] ED with complaints of dyspnea for four days on 3/13/19. Patient has known systolic and diastolic heart failure with an EF of 45% and was admitted to the ICU on bi-pap support. Started on IV lasix with clinical improvement. Pulmonary was consulted. Also found to be heme + and anemic with a Hgb of 4+. Transfused several units of blood and GI consulted. The patient clinically improved and changed to NC (patient on 2L NC at home). The patient was transferred out of the ICU on 3/15. PT/OT were consulted ,still has leg swelling,continue  with IV lasix,    Interval History: No new issues. Comfortable on NC.   Feels better.     Review of Systems   Constitutional: Negative for activity change, chills, diaphoresis, fatigue and fever.   HENT: Negative for congestion.    Respiratory: Negative for cough, choking, chest tightness and shortness of breath.    Cardiovascular: Negative for chest pain.   Gastrointestinal: Negative for abdominal pain.   Genitourinary: Negative for difficulty urinating.   Neurological: Positive for weakness.     Objective:     Vital Signs (Most Recent):  Temp: 97.5 °F (36.4 °C) (03/16/19 0743)  Pulse: 78 (03/16/19 0743)  Resp: 16 (03/16/19 0743)  BP: 124/65 (03/16/19 0743)  SpO2: 100 % (03/16/19 0743) Vital Signs (24h Range):  Temp:  [97.2 °F (36.2 °C)-98.7 °F (37.1 °C)] 97.5 °F (36.4 °C)  Pulse:  [72-95] 78  Resp:  [16-33] 16  SpO2:  [94 %-100 %] 100 %  BP: (103-161)/(60-95) 124/65     Weight: 92.5 kg (203 lb 14.8 oz)  Body mass index is 30.11 kg/m².    Intake/Output Summary (Last 24 hours) at 3/16/2019 0914  Last data filed at 3/16/2019 0616  Gross per 24 hour   Intake 530 ml   Output 250 ml   Net 280 ml      Physical Exam   Constitutional: He is oriented to person, place, and time. He appears well-developed and well-nourished.   HENT:   Head: Normocephalic and atraumatic.   Cardiovascular: Regular rhythm.   Pulmonary/Chest: Effort normal and breath sounds normal. No stridor. He has no wheezes.   Abdominal: There is no tenderness.   Neurological: He is alert and oriented to person, place, and time.   Skin: Skin is warm and dry.   Psychiatric: He has a normal mood and affect. His behavior is normal.   Vitals reviewed.      Significant Labs:   BMP:   Recent Labs   Lab 03/15/19  0234   *      K 4.4      CO2 25   BUN 32*   CREATININE 1.4   CALCIUM 9.4   MG 2.2     CBC:   Recent Labs   Lab 03/15/19  0234   WBC 11.21   HGB 8.5*   HCT 29.7*   PLT SEE COMMENT       Significant Imaging:     Assessment/Plan:      *  Acute on chronic combined systolic and diastolic congestive heart failure    Patient has been edematous and does have a history of combined heart failure and pulmonary hypertension.  He needed BPAP therapy for his respiratory failure.  He will be started on intravenous diuresis with furosemide with careful monitoring of his urine output.  Will repeat his TTE in the morning.  EF of 45% 1/18. Continue lasix. Feels better.  Can likely wean to NC. Patient wears 2L of 02 at home.       Repeat echo here with EF of 50% with pulmonary hypertension.still has leg swelling,contiue with IV lasix.       Chronic atrial fibrillation    Stable and rate-controlled; will hold his home regimen of diltiazem due to heart failure and hold apixaban due to suspected GI bleeding.     CAD (coronary artery disease)    Stable; will continue his home regimen of atorvastatin.  Will hold aspirin for suspected GI bleeding and losartan for acute renal failure.  Will hold carvedilol due to acute heart failure.     Hyperkalemia    Should decrease with IV lasix.  Will check another K shortly.        Peripheral artery disease    Stable; will continue his home regimen of atorvastatin but hold aspirin due to suspected GI bleed.     Chronic combined systolic and diastolic heart failure    As addressed above.     Acute on CKD Stage 3    Patient's urinalysis is significant for a specific gravity of 1.010, 1+ occult blood, positive for nitrites, and 2+ leukocytes.  Urine output has been fair.  Will obtain additional urine studies; defer IV fluid hydration for now due to acute heart failure; monitor the urine output; recheck the renal function in the morning; and avoid nephrotoxins.     Acute on chronic respiratory failure with hypoxia    Patient was noted to be hypoxic on his home supplemental oxygen therapy at 85%.  He was placed on CPAP en route to this facility.  I cannot personally view the film but the official Radiology interpretation was concerning  for pulmonary edema with small bilateral pleural effusions.  Will continue BPAP therapy.  Wears 2L 02 at home.   Wean to NC     Chronic respiratory failure with hypoxia    As addressed above.     Centrilobular emphysema    Clinically-stable without wheezing.  Will provide as-needed JENAE/LAMA available.     Acute hypoxemic respiratory failure    Resolved.  From above.        Iron deficiency anemia    As addressed above.     Chronic anticoagulation    As addressed above.     Anemia associated with acute blood loss    Patient's hemoglobin dropped from 10.4 grams in Jan 2019 to 4.4 grams today and was noted to he with melenic stools on digital rectal exam.  He has been started on a pantoprazole infusion and will be transfused a couple units of pRBCs with re-assessment thereafter to determine if he needs additional units.  Will hold his home regimen of aspirin and apixaban for now and consult Gastroenterology for further recommendations.  Transfused another unit of blood . GI consulted.  Endoscopy when more stable. Monitor CBC,no sign of active bleeding at this time.     Type 2 diabetes mellitus, controlled, with renal complications    Well-controlled on a home regimen of metformin and a sulfonylurea; will provide basal-prandial insulin along with insulin sliding scale.     CKD Stage 3    As addressed above.     Essential hypertension    Patient's blood pressure is well-controlled; will continue home regimen of tamsulosin, and provide as-needed clonidine.  Will hold his home regimen of furosemide and losartan due to acute renal failure.  Will hold carvedilol and diltiazem due to acute heart failure.       VTE Risk Mitigation (From admission, onward)        Ordered     Place DENIS hose  Until discontinued      03/14/19 0111     IP VTE HIGH RISK PATIENT  Once      03/14/19 0111     Reason for No Pharmacological VTE Prophylaxis  Once      03/14/19 0111     Place sequential compression device  Until discontinued      03/14/19  0111              Ace Cisneros MD  Department of Hospital Medicine   [facility]

## 2019-03-16 NOTE — PT/OT/SLP EVAL
Occupational Therapy   Evaluation    Name: Agus Ramos Jr.  MRN: [id number]  Admitting Diagnosis:  Acute on chronic combined systolic and diastolic congestive heart failure      Recommendations:     Discharge Recommendations: home health OT(with family assist)  Discharge Equipment Recommendations:  none  Barriers to discharge:  None    Assessment:     Agus Ramos Jr. is a 78 y.o. male with a medical diagnosis of Acute on chronic combined systolic and diastolic congestive heart failure.  He presents with self care and functional mobility deficits R/T generalized weakness.. Performance deficits affecting function: impaired endurance, weakness, impaired self care skills, gait instability, impaired functional mobilty, impaired balance, decreased lower extremity function, decreased upper extremity function, decreased safety awareness, pain, impaired cardiopulmonary response to activity.      Rehab Prognosis: Fair; patient would benefit from acute skilled OT services to address these deficits and reach maximum level of function.       Plan:     Patient to be seen 3 x/week to address the above listed problems via self-care/home management, therapeutic activities, therapeutic exercises  · Plan of Care Expires: 03/30/19  · Plan of Care Reviewed with: patient    Subjective     Chief Complaint: headache  Patient/Family Comments/goals: get stronger    Occupational Profile:  Living Environment: The patient lives with his spouse in a house (no details given)  Previous level of function: The patient amb using a RW and dressed himself with occasional assist from his spouse. There patient sponge bathe while sitting on a chair.  Roles and Routines: The patient does not drive.  Equipment Used at Home:  walker, rolling, wheelchair, oxygen  Assistance upon Discharge: spouse    Pain/Comfort:  Pain Rating 1: (c/o headache)  Pain Addressed 1: Distraction    Patients cultural, spiritual, [employer]alism conflicts given the current  situation: no    Objective:     Communicated with: nurse prior to session.  Patient found HOB elevated with bed alarm, telemetry, oxygen upon OT entry to room.    General Precautions: Standard, fall, respiratory, diabetic   Orthopedic Precautions:N/A   Braces: N/A     Occupational Performance:    Bed Mobility:    · Patient completed Rolling/Turning to Right with stand by assistance and with side rail  · Patient completed Scooting/Bridging with stand by assistance  · Patient completed Supine to Sit with stand by assistance, with side rail and HOB elevated  · Patient completed Sit to Supine with contact guard assistance and with leg lift    Functional Mobility/Transfers:  · Patient completed Sit <> Stand Transfer with minimum assistance  with  rolling walker   · Patient completed Bed <> Chair Transfer using Step Transfer technique with contact guard assistance with rolling walker  · Functional Mobility: The patient stepped to the chair with CGA and amb using a RW with CGA and VC and encouragement.    Activities of Daily Living:  · Upper Body Dressing: moderate assistance    · Lower Body Dressing: dependence      Cognitive/Visual Perceptual:  Cognitive/Psychosocial Skills:     -       Oriented to: Person and Place   -       Follows Commands/attention:Follows two-step commands  -       Communication: clear/fluent  -       Memory: Impaired STM  -       Safety awareness/insight to disability: impaired   -       Mood/Affect/Coping skills/emotional control: Appropriate to situation    Physical Exam:  Balance: -       fair+  Postural examination/scapula alignment:    -       Rounded shoulders  -       Forward head  Skin integrity: Visible skin intact  Edema:  Moderate BUE/BLE and    Upper Extremity Range of Motion:     -       Right Upper Extremity: WFL  -       Left Upper Extremity: WFL  Upper Extremity Strength:    -       Right Upper Extremity: WFL  -       Left Upper Extremity: WFL    AMPA 6 Click ADL:  [facility] Total  Score: 18    Treatment & Education:  The patient participated in OT eval with encouragement. The patient was educated re: need to elevate BUE on pillows and perform AROM for edema management.  Education:    Patient left HOB elevated with all lines intact, call button in reach and PCT present    GOALS:   Multidisciplinary Problems     Occupational Therapy Goals        Problem: Occupational Therapy Goal    Goal Priority Disciplines Outcome Interventions   Occupational Therapy Goal     OT, PT/OT Ongoing (interventions implemented as appropriate)    Description:  Goals to be met by: 3/30/19    Patient will increase functional independence with ADLs by performing:    UE Dressing with Stand-by Assistance.  Grooming while standing at sink with Stand-by Assistance.  Toileting from toilet with Set-up Assistance and Stand-by Assistance for hygiene and clothing management.   Supine to sit with Modified [place] and Supervision.  Step transfer with Stand-by Assistance  Toilet transfer to toilet with Stand-by Assistance.  Upper extremity exercise program x10 reps per handout, with assistance as needed.                      History:     Past Medical History:   Diagnosis Date    Anemia 05/03/2018    pending blood transfusion    Anticoagulant long-term use     apixaban    Atrial fibrillation     CKD (chronic kidney disease)     Congestive heart failure     COPD (chronic obstructive pulmonary disease)     Coronary artery disease     Diabetes mellitus     Encounter for blood transfusion     HLD (hyperlipidemia)     Hypertension     MI (myocardial infarction)     On home oxygen therapy     Pacemaker     left chest    Peripheral vascular disease     Requires assistance with activities of daily living (ADL)     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     S/P femoral-popliteal bypass surgery left    Stented coronary artery     Tobacco use     Unsteady gait          Past Surgical History:   Procedure Laterality Date    CARDIAC  CATHETERIZATION      CARDIAC PACEMAKER PLACEMENT      CARDIAC SURGERY      3 vessel CABG    CARDIOVERSION N/A 9/19/2013    Performed by [facility] at [facility]ARY[facility]    cardioverted      CORONARY ANGIOPLASTY WITH STENT PLACEMENT      EGD (ESOPHAGOGASTRODUODENOSCOPY) N/A 6/1/2018    Performed by Ty [facility]man [facility] at [facility] ENDO    HEMORRHOID SURGERY      TRANSESOPHAGEAL ECHOCARDIOGRAM (ANIA) N/A 9/19/2013    [facility]    VASCULAR SURGERY      femoral artery popliteal bypass       Time Tracking:     OT Date of Treatment: 03/16/19  OT Start Time: 0937  OT Stop Time: 0959  OT Total Time (min): 22 min    Billable Minutes:Evaluation 15 (with PT)  Total Time 22    Anna Dumont OT  3/16/2019

## 2019-03-16 NOTE — PLAN OF CARE
Problem: Fall Injury Risk  Goal: Absence of Fall and Fall-Related Injury    Intervention: Identify and Manage Contributors to Fall Injury Risk   03/14/19 0715 03/15/19 0714   Manage Acute Allergic Reaction   Medication Review/Management --  medications reviewed;high risk medications identified   Identify and Manage Contributors to Fall Injury Risk   Self-Care Promotion meal setup provided;independence encouraged --      Intervention: Promote Injury-Free Environment   03/15/19 0714 03/16/19 1300   Optimize [place] and Functional Mobility   Environmental Safety Modification lighting adjusted;clutter free environment maintained --    Optimize Balance and Safe Activity   Safety Promotion/Fall Prevention --  assistive device/personal item within reach;bed alarm set;instructed to call staff for mobility;Fall Risk reviewed with patient/family;side rails raised x 2;medications reviewed

## 2019-03-16 NOTE — PLAN OF CARE
Problem: Physical Therapy Goal  Goal: Physical Therapy Goal  Goals to be met by: 3/30/19     Patient will increase functional independence with mobility by performin. Supine to sit with Supervision  2. Rolling to Left and Right with Supervision  3. Sit to stand transfer with Supervision using RW  4. Bed to chair transfer with Supervision using Rolling Walker  5. Gait  [phone] feet with Supervision using Rolling Walker and O2   6. Lower extremity exercise program 3 sets x10 reps per handout, with supervision    Pt ambulated ~20 ft with min A-CGA using RW and 3L O2 NC.

## 2019-03-16 NOTE — ASSESSMENT & PLAN NOTE
Patient's hemoglobin dropped from 10.4 grams in Jan 2019 to 4.4 grams today and was noted to he with melenic stools on digital rectal exam.  He has been started on a pantoprazole infusion and will be transfused a couple units of pRBCs with re-assessment thereafter to determine if he needs additional units.  Will hold his home regimen of aspirin and apixaban for now and consult Gastroenterology for further recommendations.  Transfused another unit of blood . GI consulted.  Endoscopy when more stable. Monitor CBC,no sign of active bleeding at this time.

## 2019-03-16 NOTE — PROGRESS NOTES
Patient refused wear Bipap informed risks and benefits of wear Bipap but patient still refused notified RN of his refusal

## 2019-03-16 NOTE — PLAN OF CARE
Problem: Occupational Therapy Goal  Goal: Occupational Therapy Goal  Goals to be met by: 3/30/19    Patient will increase functional independence with ADLs by performing:    UE Dressing with Stand-by Assistance.  Grooming while standing at sink with Stand-by Assistance.  Toileting from toilet with Set-up Assistance and Stand-by Assistance for hygiene and clothing management.   Supine to sit with Modified [place] and Supervision.  Step transfer with Stand-by Assistance  Toilet transfer to toilet with Stand-by Assistance.  Upper extremity exercise program x10 reps per handout, with assistance as needed.    Outcome: Ongoing (interventions implemented as appropriate)  Patient will benefit from OT to address functional deficits.    Comments: The patient will benefit from  OT.

## 2019-03-16 NOTE — NURSING
Report received from HAYDEE Thorne. The pt is lying in bed watching tv. He is not in any pain or discomfort. Tele monitor in progress with alarms set. Safety precautions maintained and bed locked in lowest position. Side rails up X2. Call bell and personal items within reach. Will continue to monitor pt for any changes.

## 2019-03-16 NOTE — PLAN OF CARE
Problem: Diabetes Comorbidity  Goal: Blood Glucose Level Within Desired Range    Intervention: Maintain Glycemic Control   03/15/19 0714   Monitor and Manage Ketoacidosis   Glycemic Management blood glucose monitoring

## 2019-03-16 NOTE — PT/OT/SLP EVAL
Physical Therapy Evaluation    Patient Name:  Agus Ramos Jr.   MRN:  [id number]    Recommendations:     Discharge Recommendations:  home health PT(with family supervision and assistance)   Discharge Equipment Recommendations: commode   Barriers to discharge: None    Assessment:     Agus Ramos Jr. is a 78 y.o. male admitted with a medical diagnosis of Acute on chronic combined systolic and diastolic congestive heart failure.  He presents with the following impairments/functional limitations:  weakness, impaired endurance, impaired functional mobilty, gait instability, impaired balance, decreased coordination, decreased upper extremity function, decreased lower extremity function, decreased safety awareness, pain, impaired cardiopulmonary response to activity, edema.    Rehab Prognosis: Fair+; patient would benefit from acute skilled PT services to address these deficits and reach maximum level of function.    Recent Surgery: * No surgery found *      Plan:     During this hospitalization, patient to be seen 5 x/week(M-F) to address the identified rehab impairments via gait training, therapeutic activities, therapeutic exercises and progress toward the following goals:    · Plan of Care Expires:  03/30/19    Subjective     Chief Complaint: dizziness with HA  Patient/Family Comments/goals: Pt did not state.    Pain/Comfort:  · Pain Rating 1: (Pt c/o headache.)      Living Environment:  Pt lives with spouse.  Prior to admission, patients level of function was mod I with ambulation using RW and home O2.  Equipment used at home: walker, rolling, oxygen, shower chair, cane, straight.  Upon discharge, patient will have assistance from spouse.    Objective:     Patient found in bed with all lines intact, call button in reach, bed alarm on and PCT present oxygen 3L, peripheral IV, telemetry, bed alarm upon PT entry to room.    General Precautions: Standard, fall, respiratory, diabetic   Orthopedic Precautions:N/A    Braces: N/A     Exams:  · Cognitive Exam:  Patient was able to follow 2 step commands.   · Gross Motor Coordination:  impaired 2* weakness  · Postural Exam:  Patient presented with the following abnormalities:    · -       Rounded shoulders  · Skin Integrity/Edema:      · -       Skin integrity: Visible skin intact  · -       Edema: Mild BLE, moderate BUE  · BLE ROM: WFL  · BLE Strength: WFL    Functional Mobility:  Pt required extra time and encouragement to complete tasks.    · Bed Mobility:     · Scooting: stand by assistance  · Supine to Sit: stand by assistance with HOB elevated  · Sit to Supine: minimum assistance with BLE  · Transfers:     · Sit to Stand:  contact guard assistance and minimum assistance with rolling walker  · Bed to Chair: contact guard assistance and minimum assistance with  rolling walker  using  Step Transfer  · Gait: Pt ambulated ~4-5 steps and ~20 ft with min A-CGA using RW and 3L O2 NC.  Pt with decreased step length and arnold.    · Balance: Pt with fair dynamic standing balance.       AM-PAC 6 CLICK MOBILITY  Total Score:17     Patient left HOB elevated with all lines intact, call button in reach, bed alarm on and PCT present.    GOALS:   Multidisciplinary Problems     Physical Therapy Goals        Problem: Physical Therapy Goal    Goal Priority Disciplines Outcome Goal Variances Interventions   Physical Therapy Goal     PT, PT/OT      Description:  Goals to be met by: 3/30/19     Patient will increase functional independence with mobility by performin. Supine to sit with Supervision  2. Rolling to Left and Right with Supervision  3. Sit to stand transfer with Supervision using RW  4. Bed to chair transfer with Supervision using Rolling Walker  5. Gait  [id number] feet with Supervision using Rolling Walker and O2   6. Lower extremity exercise program 3 sets x10 reps per handout, with supervision                      History:     Past Medical History:   Diagnosis Date     Anemia 05/03/2018    pending blood transfusion    Anticoagulant long-term use     apixaban    Atrial fibrillation     CKD (chronic kidney disease)     Congestive heart failure     COPD (chronic obstructive pulmonary disease)     Coronary artery disease     Diabetes mellitus     Encounter for blood transfusion     HLD (hyperlipidemia)     Hypertension     MI (myocardial infarction)     On home oxygen therapy     Pacemaker     left chest    Peripheral vascular disease     Requires assistance with activities of daily living (ADL)     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 ENDO    HEMORRHOID SURGERY      TRANSESOPHAGEAL ECHOCARDIOGRAM (ANIA) N/A 9/19/2013    [facility] by [facility] at [facility]    VASCULAR SURGERY      femoral artery popliteal bypass       Time Tracking:     PT Received On: 03/16/19  PT Start Time: 0936     PT Stop Time: 0959  PT Total Time (min): 23 min     Billable Minutes: Evaluation 15 min co-eval with OT and Gait Training 8 min      Carley Montano, PT  03/16/2019

## 2019-03-17 LAB
ALBUMIN SERPL BCP-MCNC: 2.1 G/DL
ALP SERPL-CCNC: 101 U/L
ALT SERPL W/O P-5'-P-CCNC: 12 U/L
ANION GAP SERPL CALC-SCNC: 4 MMOL/L
AST SERPL-CCNC: 16 U/L
BASOPHILS # BLD AUTO: 0.02 K/UL
BASOPHILS NFR BLD: 0.3 %
BILIRUB SERPL-MCNC: 0.7 MG/DL
BUN SERPL-MCNC: 32 MG/DL
CALCIUM SERPL-MCNC: 9.8 MG/DL
CHLORIDE SERPL-SCNC: 110 MMOL/L
CO2 SERPL-SCNC: 26 MMOL/L
CREAT SERPL-MCNC: 1.3 MG/DL
DIFFERENTIAL METHOD: ABNORMAL
EOSINOPHIL # BLD AUTO: 0.2 K/UL
EOSINOPHIL NFR BLD: 3.1 %
ERYTHROCYTE [DISTWIDTH] IN BLOOD BY AUTOMATED COUNT: 20.9 %
EST. GFR  (AFRICAN AMERICAN): 60 ML/MIN/1.73 M^2
EST. GFR  (NON AFRICAN AMERICAN): 52 ML/MIN/1.73 M^2
GIANT PLATELETS BLD QL SMEAR: PRESENT
GLUCOSE SERPL-MCNC: 133 MG/DL
HCT VFR BLD AUTO: 29.5 %
HGB BLD-MCNC: 8.1 G/DL
LYMPHOCYTES # BLD AUTO: 0.8 K/UL
LYMPHOCYTES NFR BLD: 10.3 %
MCH RBC QN AUTO: 23.3 PG
MCHC RBC AUTO-ENTMCNC: 27.5 G/DL
MCV RBC AUTO: 85 FL
MONOCYTES # BLD AUTO: 0.9 K/UL
MONOCYTES NFR BLD: 11.5 %
NEUTROPHILS # BLD AUTO: 5.8 K/UL
NEUTROPHILS NFR BLD: 74.9 %
PLATELET # BLD AUTO: 152 K/UL
PLATELET BLD QL SMEAR: ABNORMAL
PMV BLD AUTO: 10.4 FL
POCT GLUCOSE: 134 MG/DL (ref 70–110)
POCT GLUCOSE: 155 MG/DL (ref 70–110)
POCT GLUCOSE: 163 MG/DL (ref 70–110)
POCT GLUCOSE: 169 MG/DL (ref 70–110)
POTASSIUM SERPL-SCNC: 4.5 MMOL/L
PROT SERPL-MCNC: 4.1 G/DL
RBC # BLD AUTO: 3.48 M/UL
SODIUM SERPL-SCNC: 140 MMOL/L
WBC # BLD AUTO: 7.8 K/UL

## 2019-03-17 PROCEDURE — 85025 COMPLETE CBC W/AUTO DIFF WBC: CPT

## 2019-03-17 PROCEDURE — 27000221 HC OXYGEN, UP TO 24 HOURS

## 2019-03-17 PROCEDURE — 25000003 PHARM REV CODE 250: Performed by: INTERNAL MEDICINE

## 2019-03-17 PROCEDURE — 80053 COMPREHEN METABOLIC PANEL: CPT

## 2019-03-17 PROCEDURE — 21400001 HC TELEMETRY ROOM

## 2019-03-17 PROCEDURE — 36415 COLL VENOUS BLD VENIPUNCTURE: CPT

## 2019-03-17 PROCEDURE — 63600175 PHARM REV CODE 636 W HCPCS: Performed by: HOSPITALIST

## 2019-03-17 PROCEDURE — 25000003 PHARM REV CODE 250: Performed by: HOSPITALIST

## 2019-03-17 PROCEDURE — 94760 N-INVAS EAR/PLS OXIMETRY 1: CPT

## 2019-03-17 RX ADMIN — CARVEDILOL 25 MG: 6.25 TABLET, FILM COATED ORAL at 09:03

## 2019-03-17 RX ADMIN — DILTIAZEM HYDROCHLORIDE 30 MG: 30 TABLET, FILM COATED ORAL at 09:03

## 2019-03-17 RX ADMIN — TAMSULOSIN HYDROCHLORIDE 0.4 MG: 0.4 CAPSULE ORAL at 09:03

## 2019-03-17 RX ADMIN — POLYETHYLENE GLYCOL 3350 17 G: 17 POWDER, FOR SOLUTION ORAL at 09:03

## 2019-03-17 RX ADMIN — CARVEDILOL 25 MG: 6.25 TABLET, FILM COATED ORAL at 08:03

## 2019-03-17 RX ADMIN — POLYETHYLENE GLYCOL 3350 17 G: 17 POWDER, FOR SOLUTION ORAL at 08:03

## 2019-03-17 RX ADMIN — ATORVASTATIN CALCIUM 20 MG: 10 TABLET, FILM COATED ORAL at 08:03

## 2019-03-17 RX ADMIN — FUROSEMIDE 20 MG: 10 INJECTION, SOLUTION INTRAMUSCULAR; INTRAVENOUS at 10:03

## 2019-03-17 RX ADMIN — FUROSEMIDE 20 MG: 10 INJECTION, SOLUTION INTRAMUSCULAR; INTRAVENOUS at 05:03

## 2019-03-17 RX ADMIN — DILTIAZEM HYDROCHLORIDE 30 MG: 30 TABLET, FILM COATED ORAL at 08:03

## 2019-03-17 RX ADMIN — PANTOPRAZOLE SODIUM 40 MG: 40 TABLET, DELAYED RELEASE ORAL at 08:03

## 2019-03-17 RX ADMIN — PANTOPRAZOLE SODIUM 40 MG: 40 TABLET, DELAYED RELEASE ORAL at 09:03

## 2019-03-17 NOTE — SUBJECTIVE & OBJECTIVE
Interval History: No new issues. Comfortable on NC.   Feels better.     Review of Systems   Constitutional: Negative for activity change, chills, diaphoresis, fatigue and fever.   HENT: Negative for congestion.    Respiratory: Negative for cough, choking, chest tightness and shortness of breath.    Cardiovascular: Negative for chest pain.   Gastrointestinal: Negative for abdominal pain.   Genitourinary: Negative for difficulty urinating.   Neurological: Positive for weakness.     Objective:     Vital Signs (Most Recent):  Temp: 97.9 °F (36.6 °C) (03/17/19 0844)  Pulse: 74 (03/17/19 0844)  Resp: 16 (03/17/19 0844)  BP: (!) 154/75 (03/17/19 0844)  SpO2: 100 % (03/17/19 0844) Vital Signs (24h Range):  Temp:  [97.5 °F (36.4 °C)-97.9 °F (36.6 °C)] 97.9 °F (36.6 °C)  Pulse:  [71-90] 74  Resp:  [16-25] 16  SpO2:  [97 %-100 %] 100 %  BP: (109-154)/(60-88) 154/75     Weight: 92.8 kg (204 lb 9.4 oz)  Body mass index is 30.21 kg/m².    Intake/Output Summary (Last 24 hours) at 3/17/2019 1050  Last data filed at 3/17/2019 0200  Gross per 24 hour   Intake 240 ml   Output 300 ml   Net -60 ml      Physical Exam   Constitutional: He is oriented to person, place, and time. He appears well-developed and well-nourished.   HENT:   Head: Normocephalic and atraumatic.   Cardiovascular: Regular rhythm.   Pulmonary/Chest: Effort normal and breath sounds normal. No stridor. He has no wheezes.   Abdominal: There is no tenderness.   Neurological: He is alert and oriented to person, place, and time.   Skin: Skin is warm and dry.   Psychiatric: He has a normal mood and affect. His behavior is normal.   Vitals reviewed.      Significant Labs:   BMP:   Recent Labs   Lab 03/17/19  0454   *      K 4.5      CO2 26   BUN 32*   CREATININE 1.3   CALCIUM 9.8     CBC:   Recent Labs   Lab 03/16/19  0930 03/17/19  0454   WBC 8.10 7.80   HGB 8.3* 8.1*   HCT 30.2* 29.5*    152       Significant Imaging:

## 2019-03-17 NOTE — NURSING
Bedside report received from HAYDEE Dial. Patient is awake and alert. Patient appears to be in no apparent distress. Safety maintained. Will continue to monitor.

## 2019-03-17 NOTE — ASSESSMENT & PLAN NOTE
Patient has been edematous and does have a history of combined heart failure and pulmonary hypertension.  He needed BPAP therapy for his respiratory failure.  He will be started on intravenous diuresis with furosemide with careful monitoring of his urine output.  Will repeat his TTE in the morning.  EF of 45% 1/18. Continue lasix. Feels better.  Can likely wean to NC. Patient wears 2L of 02 at home.       Repeat echo here with EF of 50% with pulmonary hypertension.still has leg swelling,contiue with IV lasix.SOB is improved,stable on NC O 2

## 2019-03-17 NOTE — PROGRESS NOTES
[facility] Hospital Medicine  Progress Note    Patient Name: Agus Ramos Jr.  MRN: [id number]  Patient Class: IP- Inpatient   Admission Date: 3/13/2019  Length of Stay: 4 days  Attending Physician: Ace Cisneros MD  Primary Care Provider: Keaton Hardy MD        Subjective:     Principal Problem:Acute on chronic combined systolic and diastolic congestive heart failure    HPI:  Mr. Agus Ramos Jr. is a 78 y.o. male known to me with essential hypertension, type 2 diabetes mellitus (HbA1c 5.1% Nov 2018), CAD sbp CABG, chronic combined systolic and diastolic heart failure (LVEF 45% Dec 2018), CKD Stage 3, peripheral artery disease, chronic atrial fibrillation (LQD2FU0-CPKe score 5) on chronic anticoagulation, COPD, chronic respiratory failure with hypoxia, and anemia of chronic disease who presents to [facility] ED with complaints of dyspnea for four days.  He experiences intermittent dyspnea and this current episode started about four days ago while at rest.  The dyspnea is worse on exertion but not with any orthopnea, PND, chest pain, fevers, chills, nausea, vomiting, palpitations, diaphoresis, nor hemoptysis.  He does note that his legs and arms are more swollen during the same time period.  He denies any sick contacts or recent travel, and hasn't noticed any hematochezia, melena, hematuria, nor any hematemesis.      Hospital Course:   presents to [facility] ED with complaints of dyspnea for four days on 3/13/19. Patient has known systolic and diastolic heart failure with an EF of 45% and was admitted to the ICU on bi-pap support. Started on IV lasix with clinical improvement. Pulmonary was consulted. Also found to be heme + and anemic with a Hgb of 4+. Transfused several units of blood and GI consulted. The patient clinically improved and changed to NC (patient on 2L NC at home). The patient was transferred out of the ICU on 3/15. PT/OT were consulted ,still has leg swelling,continue  with IV lasix,SOB is improved,stable on NC O 2,    Interval History: No new issues. Comfortable on NC.   Feels better.     Review of Systems   Constitutional: Negative for activity change, chills, diaphoresis, fatigue and fever.   HENT: Negative for congestion.    Respiratory: Negative for cough, choking, chest tightness and shortness of breath.    Cardiovascular: Negative for chest pain.   Gastrointestinal: Negative for abdominal pain.   Genitourinary: Negative for difficulty urinating.   Neurological: Positive for weakness.     Objective:     Vital Signs (Most Recent):  Temp: 97.9 °F (36.6 °C) (03/17/19 0844)  Pulse: 74 (03/17/19 0844)  Resp: 16 (03/17/19 0844)  BP: (!) 154/75 (03/17/19 0844)  SpO2: 100 % (03/17/19 0844) Vital Signs (24h Range):  Temp:  [97.5 °F (36.4 °C)-97.9 °F (36.6 °C)] 97.9 °F (36.6 °C)  Pulse:  [71-90] 74  Resp:  [16-25] 16  SpO2:  [97 %-100 %] 100 %  BP: (109-154)/(60-88) 154/75     Weight: 92.8 kg (204 lb 9.4 oz)  Body mass index is 30.21 kg/m².    Intake/Output Summary (Last 24 hours) at 3/17/2019 1050  Last data filed at 3/17/2019 0200  Gross per 24 hour   Intake 240 ml   Output 300 ml   Net -60 ml      Physical Exam   Constitutional: He is oriented to person, place, and time. He appears well-developed and well-nourished.   HENT:   Head: Normocephalic and atraumatic.   Cardiovascular: Regular rhythm.   Pulmonary/Chest: Effort normal and breath sounds normal. No stridor. He has no wheezes.   Abdominal: There is no tenderness.   Neurological: He is alert and oriented to person, place, and time.   Skin: Skin is warm and dry.   Psychiatric: He has a normal mood and affect. His behavior is normal.   Vitals reviewed.      Significant Labs:   BMP:   Recent Labs   Lab 03/17/19  0454   *      K 4.5      CO2 26   BUN 32*   CREATININE 1.3   CALCIUM 9.8     CBC:   Recent Labs   Lab 03/16/19  0930 03/17/19  0454   WBC 8.10 7.80   HGB 8.3* 8.1*   HCT 30.2* 29.5*    152        Significant Imaging:     Assessment/Plan:      * Acute on chronic combined systolic and diastolic congestive heart failure    Patient has been edematous and does have a history of combined heart failure and pulmonary hypertension.  He needed BPAP therapy for his respiratory failure.  He will be started on intravenous diuresis with furosemide with careful monitoring of his urine output.  Will repeat his TTE in the morning.  EF of 45% 1/18. Continue lasix. Feels better.  Can likely wean to NC. Patient wears 2L of 02 at home.       Repeat echo here with EF of 50% with pulmonary hypertension.still has leg swelling,contiue with IV lasix.SOB is improved,stable on NC O 2       Chronic atrial fibrillation    Stable and rate-controlled; will hold his home regimen of diltiazem due to heart failure and hold apixaban due to suspected GI bleeding.     CAD (coronary artery disease)    Stable; will continue his home regimen of atorvastatin.  Will hold aspirin for suspected GI bleeding and losartan for acute renal failure.  Will hold carvedilol due to acute heart failure.     Hyperkalemia    Should decrease with IV lasix.  Will check another K shortly.        Peripheral artery disease    Stable; will continue his home regimen of atorvastatin but hold aspirin due to suspected GI bleed.     Chronic combined systolic and diastolic heart failure    As addressed above.     Acute on CKD Stage 3    Patient's urinalysis is significant for a specific gravity of 1.010, 1+ occult blood, positive for nitrites, and 2+ leukocytes.  Urine output has been fair.  Will obtain additional urine studies; defer IV fluid hydration for now due to acute heart failure; monitor the urine output; recheck the renal function in the morning; and avoid nephrotoxins.     Acute on chronic respiratory failure with hypoxia    Patient was noted to be hypoxic on his home supplemental oxygen therapy at 85%.  He was placed on CPAP en route to this facility.  I  cannot personally view the film but the official Radiology interpretation was concerning for pulmonary edema with small bilateral pleural effusions.  Will continue BPAP therapy.  Wears 2L 02 at home.   Wean to NC     Chronic respiratory failure with hypoxia    As addressed above.     Centrilobular emphysema    Clinically-stable without wheezing.  Will provide as-needed JENAE/LAMA available.     Acute hypoxemic respiratory failure    Resolved.  From above.        Iron deficiency anemia    As addressed above.     Chronic anticoagulation    As addressed above.     Anemia associated with acute blood loss    Patient's hemoglobin dropped from 10.4 grams in Jan 2019 to 4.4 grams today and was noted to he with melenic stools on digital rectal exam.  He has been started on a pantoprazole infusion and will be transfused a couple units of pRBCs with re-assessment thereafter to determine if he needs additional units.  Will hold his home regimen of aspirin and apixaban for now and consult Gastroenterology for further recommendations.  Transfused another unit of blood . GI consulted.  Endoscopy when more stable. Monitor CBC,no sign of active bleeding at this time.     Type 2 diabetes mellitus, controlled, with renal complications    Well-controlled on a home regimen of metformin and a sulfonylurea; will provide basal-prandial insulin along with insulin sliding scale.     CKD Stage 3    As addressed above.     Essential hypertension    Patient's blood pressure is well-controlled; will continue home regimen of tamsulosin, and provide as-needed clonidine.  Will hold his home regimen of furosemide and losartan due to acute renal failure.  Will hold carvedilol and diltiazem due to acute heart failure.       VTE Risk Mitigation (From admission, onward)        Ordered     Place DENIS hose  Until discontinued      03/14/19 0111     IP VTE HIGH RISK PATIENT  Once      03/14/19 0111     Reason for No Pharmacological VTE Prophylaxis  Once       03/14/19 0111     Place sequential compression device  Until discontinued      03/14/19 0111              Ace Cisneros MD  Department of Hospital Medicine   [facility]

## 2019-03-17 NOTE — NURSING
End of shift bedside report given to HAYDEE Hardin. Patient in no apparent distress.     12 hour chart check complete.

## 2019-03-17 NOTE — PLAN OF CARE
Problem: Fall Injury Risk  Goal: Absence of Fall and Fall-Related Injury    Intervention: Identify and Manage Contributors to Fall Injury Risk   03/15/19 0714 03/16/19 1950   Manage Acute Allergic Reaction   Medication Review/Management medications reviewed;high risk medications identified --    Identify and Manage Contributors to Fall Injury Risk   Self-Care Promotion --  BADL personal objects within reach;BADL personal routines maintained     Intervention: Promote Injury-Free Environment   03/15/19 0714 03/17/19 1500   Optimize [place] and Functional Mobility   Environmental Safety Modification lighting adjusted;clutter free environment maintained --    Optimize Balance and Safe Activity   Safety Promotion/Fall Prevention --  assistive device/personal item within reach;bed alarm set;Fall Risk reviewed with patient/family;instructed to call staff for mobility;side rails raised x 2;medications reviewed

## 2019-03-18 LAB
ALBUMIN SERPL BCP-MCNC: 2 G/DL
ALP SERPL-CCNC: 103 U/L
ALT SERPL W/O P-5'-P-CCNC: 13 U/L
ANION GAP SERPL CALC-SCNC: 6 MMOL/L
ANISOCYTOSIS BLD QL SMEAR: ABNORMAL
AST SERPL-CCNC: 14 U/L
BASOPHILS # BLD AUTO: 0.02 K/UL
BASOPHILS NFR BLD: 0.2 %
BILIRUB SERPL-MCNC: 0.5 MG/DL
BUN SERPL-MCNC: 36 MG/DL
CALCIUM SERPL-MCNC: 9.6 MG/DL
CHLORIDE SERPL-SCNC: 110 MMOL/L
CO2 SERPL-SCNC: 26 MMOL/L
CREAT SERPL-MCNC: 1.4 MG/DL
DACRYOCYTES BLD QL SMEAR: ABNORMAL
DIFFERENTIAL METHOD: ABNORMAL
EOSINOPHIL # BLD AUTO: 0.3 K/UL
EOSINOPHIL NFR BLD: 3.2 %
ERYTHROCYTE [DISTWIDTH] IN BLOOD BY AUTOMATED COUNT: 20.9 %
EST. GFR  (AFRICAN AMERICAN): 54 ML/MIN/1.73 M^2
EST. GFR  (NON AFRICAN AMERICAN): 47 ML/MIN/1.73 M^2
GLUCOSE SERPL-MCNC: 138 MG/DL
HCT VFR BLD AUTO: 28.2 %
HGB BLD-MCNC: 7.8 G/DL
HYPOCHROMIA BLD QL SMEAR: ABNORMAL
LYMPHOCYTES # BLD AUTO: 1 K/UL
LYMPHOCYTES NFR BLD: 11 %
MCH RBC QN AUTO: 22.7 PG
MCHC RBC AUTO-ENTMCNC: 27.7 G/DL
MCV RBC AUTO: 82 FL
MONOCYTES # BLD AUTO: 1.1 K/UL
MONOCYTES NFR BLD: 11.8 %
NEUTROPHILS # BLD AUTO: 6.7 K/UL
NEUTROPHILS NFR BLD: 74.5 %
OVALOCYTES BLD QL SMEAR: ABNORMAL
PLATELET # BLD AUTO: 156 K/UL
PMV BLD AUTO: 10.8 FL
POCT GLUCOSE: 132 MG/DL (ref 70–110)
POCT GLUCOSE: 146 MG/DL (ref 70–110)
POCT GLUCOSE: 165 MG/DL (ref 70–110)
POCT GLUCOSE: 190 MG/DL (ref 70–110)
POIKILOCYTOSIS BLD QL SMEAR: ABNORMAL
POLYCHROMASIA BLD QL SMEAR: ABNORMAL
POTASSIUM SERPL-SCNC: 5.1 MMOL/L
PROT SERPL-MCNC: 4 G/DL
RBC # BLD AUTO: 3.43 M/UL
SCHISTOCYTES BLD QL SMEAR: ABNORMAL
SCHISTOCYTES BLD QL SMEAR: PRESENT
SODIUM SERPL-SCNC: 142 MMOL/L
WBC # BLD AUTO: 9.09 K/UL

## 2019-03-18 PROCEDURE — 25000003 PHARM REV CODE 250: Performed by: HOSPITALIST

## 2019-03-18 PROCEDURE — 97116 GAIT TRAINING THERAPY: CPT

## 2019-03-18 PROCEDURE — 25000003 PHARM REV CODE 250: Performed by: INTERNAL MEDICINE

## 2019-03-18 PROCEDURE — 25000242 PHARM REV CODE 250 ALT 637 W/ HCPCS: Performed by: HOSPITALIST

## 2019-03-18 PROCEDURE — 97110 THERAPEUTIC EXERCISES: CPT

## 2019-03-18 PROCEDURE — 27000221 HC OXYGEN, UP TO 24 HOURS

## 2019-03-18 PROCEDURE — 94660 CPAP INITIATION&MGMT: CPT

## 2019-03-18 PROCEDURE — 80053 COMPREHEN METABOLIC PANEL: CPT

## 2019-03-18 PROCEDURE — 99900035 HC TECH TIME PER 15 MIN (STAT)

## 2019-03-18 PROCEDURE — 21400001 HC TELEMETRY ROOM

## 2019-03-18 PROCEDURE — 85025 COMPLETE CBC W/AUTO DIFF WBC: CPT

## 2019-03-18 PROCEDURE — 94640 AIRWAY INHALATION TREATMENT: CPT

## 2019-03-18 PROCEDURE — 63600175 PHARM REV CODE 636 W HCPCS: Performed by: HOSPITALIST

## 2019-03-18 RX ORDER — IPRATROPIUM BROMIDE AND ALBUTEROL SULFATE 2.5; .5 MG/3ML; MG/3ML
3 SOLUTION RESPIRATORY (INHALATION)
Status: DISCONTINUED | OUTPATIENT
Start: 2019-03-18 | End: 2019-03-19 | Stop reason: HOSPADM

## 2019-03-18 RX ADMIN — IPRATROPIUM BROMIDE AND ALBUTEROL SULFATE 3 ML: .5; 3 SOLUTION RESPIRATORY (INHALATION) at 08:03

## 2019-03-18 RX ADMIN — FUROSEMIDE 20 MG: 10 INJECTION, SOLUTION INTRAMUSCULAR; INTRAVENOUS at 06:03

## 2019-03-18 RX ADMIN — DILTIAZEM HYDROCHLORIDE 30 MG: 30 TABLET, FILM COATED ORAL at 09:03

## 2019-03-18 RX ADMIN — ATORVASTATIN CALCIUM 20 MG: 10 TABLET, FILM COATED ORAL at 09:03

## 2019-03-18 RX ADMIN — IPRATROPIUM BROMIDE AND ALBUTEROL SULFATE 3 ML: .5; 3 SOLUTION RESPIRATORY (INHALATION) at 01:03

## 2019-03-18 RX ADMIN — POLYETHYLENE GLYCOL 3350 17 G: 17 POWDER, FOR SOLUTION ORAL at 09:03

## 2019-03-18 RX ADMIN — PANTOPRAZOLE SODIUM 40 MG: 40 TABLET, DELAYED RELEASE ORAL at 09:03

## 2019-03-18 RX ADMIN — CARVEDILOL 25 MG: 6.25 TABLET, FILM COATED ORAL at 09:03

## 2019-03-18 NOTE — PT/OT/SLP PROGRESS
Physical Therapy Treatment    Patient Name:  Agus Ramos Jr.   MRN:  [id number]    Recommendations:     Discharge Recommendations:  home health PT(with family supervision and assistance)   Discharge Equipment Recommendations: commode   Barriers to discharge: None    Assessment:     Agus Ramos Jr. is a 78 y.o. male admitted with a medical diagnosis of Acute on chronic combined systolic and diastolic congestive heart failure.  He presents with the following impairments/functional limitations:  weakness, impaired endurance, impaired functional mobilty, impaired balance, gait instability, edema, decreased safety awareness, decreased lower extremity function, decreased upper extremity function, impaired cardiopulmonary response to activity .Pt ambulated ~ 30 ft with RW , CGA (chair followed and 5 L O2NC). Pt with decreased safety awareness , required frequent V/T cues for safety and sequence. Pt will benefit from further therapy in order to get back to     Rehab Prognosis: Fair; patient would benefit from acute skilled PT services to address these deficits and reach maximum level of function.    Recent Surgery: * No surgery found *      Plan:     During this hospitalization, patient to be seen 5 x/week(M-F) to address the identified rehab impairments via gait training, therapeutic activities, therapeutic exercises and progress toward the following goals:    · Plan of Care Expires:  03/30/19    Subjective     Chief Complaint: Sleepy   Patient/Family Comments/goals: pt agreeable to treatment   Pain/Comfort:  · Pain Rating 1: 0/10  · Pain Rating Post-Intervention 1: 0/10      Objective:     Communicated with nurse Bustamante prior to session.  Patient found all lines intact, call button in reach, bed alarm on and nurse notified bed alarm, telemetry, oxygen  upon PT entry to room.     General Precautions: Standard, fall, respiratory, diabetic   Orthopedic Precautions:N/A   Braces: N/A     Functional Mobility:  · Bed  Mobility:     · Rolling Left:  stand by assistance  · Scooting: contact guard assistance  · Supine to Sit: minimum assistance, HOB elevated , bedside rail    · Transfers:     · Sit to Stand:  contact guard assistance and minimum assistance with rolling walker. V/T cues for safety technique and walker management.   · Gait: Pt ambulated ~ 30 ft with RW , CGA (chair followed and 5 L O2NC). Pt with decreased safety awareness , required frequent V/T cues for safety and sequence. Noted with decreased arnold, FWD head posture . Pt required frequent V/T cues for safety technique and walker management.     AM-PAC 6 CLICK MOBILITY  Turning over in bed (including adjusting bedclothes, sheets and blankets)?: 4  Sitting down on and standing up from a chair with arms (e.g., wheelchair, bedside commode, etc.): 3  Moving from lying on back to sitting on the side of the bed?: 3  Moving to and from a bed to a chair (including a wheelchair)?: 3  Need to walk in hospital room?: 3  Climbing 3-5 steps with a railing?: 3  Basic Mobility Total Score: 19       Therapeutic Activities and Exercises:   pt performed bed mobility, transfer and gait training as above.  Pt performed seated BLE x 10 reps : AP, LAQ, hip flexion and pillow squeezes . Performed seated BLE LUE x 10 reps: finger/ wrist flexion /extension. V/T cues for proper and sequence. Pt tolerated well.    Educated pt on safety awareness with all OOB mobility, transfer and gait training.     Patient left up in chair , lunch tray table set up with all lines intact, call button in reach and nurse Dianna notified..    GOALS:   Multidisciplinary Problems     Physical Therapy Goals        Problem: Physical Therapy Goal    Goal Priority Disciplines Outcome Goal Variances Interventions   Physical Therapy Goal     PT, PT/OT Ongoing (interventions implemented as appropriate)     Description:  Goals to be met by: 3/30/19     Patient will increase functional independence with mobility by  performin. Supine to sit with Supervision  2. Rolling to Left and Right with Supervision  3. Sit to stand transfer with Supervision using RW  4. Bed to chair transfer with Supervision using Rolling Walker  5. Gait  [id number] feet with Supervision using Rolling Walker and O2   6. Lower extremity exercise program 3 sets x10 reps per handout, with supervision                      Time Tracking:     PT Received On: 19  PT Start Time: 1140     PT Stop Time: 1207  PT Total Time (min): 27 min     Billable Minutes: Gait Training 15 and Therapeutic Exercise 12    Treatment Type: Treatment  PT/PTA: PTA     PTA Visit Number: 1     Tram T Esther, PTA  2019

## 2019-03-18 NOTE — UM SECONDARY REVIEW
[facility]    PA - Medical Necessity Issue  Hospital Course:   presents to [facility] ED with complaints of dyspnea for four days on 3/13/19. Patient has known systolic and diastolic heart failure with an EF of 45% and was admitted to the ICU on bi-pap support. Started on IV lasix with clinical improvement. Pulmonary was consulted. Also found to be heme + and anemic with a Hgb of 4+. Transfused several units of blood and GI consulted. The patient clinically improved and changed to NC (patient on 2L NC at home). The patient was transferred out of the ICU on 3/15. PT/OT were consulted ,still has leg swelling, continue with IV lasix, SOB is improved,stable on NC O 2,  keep NPO past MN,if GI want EGD be done.       Lasix  order 20Mg IV BID  O2 Sats %    Results for ASHLEY GONZALEZ JR. (MRN [id number]) as of 3/18/2019 12:42   Ref. Range 3/18/2019 07:05   Hemoglobin Latest Ref Range: 14.0 - 18.0 g/dL 7.8 (L)   Hematocrit Latest Ref Range: 40.0 - 54.0 % 28.2 (L)       {[facility] Review Outcome:[id number]}

## 2019-03-18 NOTE — ASSESSMENT & PLAN NOTE
Patient has been edematous and does have a history of combined heart failure and pulmonary hypertension.  He needed BPAP therapy for his respiratory failure.  He will be started on intravenous diuresis with furosemide with careful monitoring of his urine output.  Will repeat his TTE in the morning.  EF of 45% 1/18. Continue lasix. Feels better.  Can likely wean to NC. Patient wears 2L of 02 at home.       Repeat echo here with EF of 50% with pulmonary hypertension.still has leg swelling,contiue with IV lasix.SOB is improved,stable on NC O 2,

## 2019-03-18 NOTE — ASSESSMENT & PLAN NOTE
Patient's hemoglobin dropped from 10.4 grams in Jan 2019 to 4.4 grams today and was noted to he with melenic stools on digital rectal exam.  He has been started on a pantoprazole infusion and will be transfused a couple units of pRBCs with re-assessment thereafter to determine if he needs additional units.  Will hold his home regimen of aspirin and apixaban for now and consult Gastroenterology for further recommendations.  Transfused another unit of blood . GI consulted.  Endoscopy when more stable. Monitor CBC,no sign of active bleeding at this time.keep NPO past MN,if GI want EGD be done

## 2019-03-18 NOTE — PLAN OF CARE
Problem: Physical Therapy Goal  Goal: Physical Therapy Goal  Goals to be met by: 3/30/19     Patient will increase functional independence with mobility by performin. Supine to sit with Supervision  2. Rolling to Left and Right with Supervision  3. Sit to stand transfer with Supervision using RW  4. Bed to chair transfer with Supervision using Rolling Walker  5. Gait  [id number] feet with Supervision using Rolling Walker and O2   6. Lower extremity exercise program 3 sets x10 reps per handout, with supervision     Outcome: Ongoing (interventions implemented as appropriate)  Pt ambulated ~ 30 ft with RW , CGA (chair followed and 5 L O2NC). Pt with decreased safety awareness , required frequent V/T cues for safety and sequence. Pt will benefit from further therapy in order to get back to PLOF.

## 2019-03-18 NOTE — PROGRESS NOTES
[facility] Hospital Medicine  Progress Note    Patient Name: Agus Ramos Jr.  MRN: [id number]  Patient Class: IP- Inpatient   Admission Date: 3/13/2019  Length of Stay: 5 days  Attending Physician: Ace Cisneros MD  Primary Care Provider: Keaton Hardy MD        Subjective:     Principal Problem:Acute on chronic combined systolic and diastolic congestive heart failure    HPI:  Mr. Agus Ramos Jr. is a 78 y.o. male known to me with essential hypertension, type 2 diabetes mellitus (HbA1c 5.1% Nov 2018), CAD sbp CABG, chronic combined systolic and diastolic heart failure (LVEF 45% Dec 2018), CKD Stage 3, peripheral artery disease, chronic atrial fibrillation ([id number] score 5) on chronic anticoagulation, COPD, chronic respiratory failure with hypoxia, and anemia of chronic disease who presents to [facility] ED with complaints of dyspnea for four days.  He experiences intermittent dyspnea and this current episode started about four days ago while at rest.  The dyspnea is worse on exertion but not with any orthopnea, PND, chest pain, fevers, chills, nausea, vomiting, palpitations, diaphoresis, nor hemoptysis.  He does note that his legs and arms are more swollen during the same time period.  He denies any sick contacts or recent travel, and hasn't noticed any hematochezia, melena, hematuria, nor any hematemesis.      Hospital Course:   presents to [facility] ED with complaints of dyspnea for four days on 3/13/19. Patient has known systolic and diastolic heart failure with an EF of 45% and was admitted to the ICU on bi-pap support. Started on IV lasix with clinical improvement. Pulmonary was consulted. Also found to be heme + and anemic with a Hgb of 4+. Transfused several units of blood and GI consulted. The patient clinically improved and changed to NC (patient on 2L NC at home). The patient was transferred out of the ICU on 3/15. PT/OT were consulted ,still has leg swelling,continue  with IV lasix,SOB is improved,stable on NC O 2,  keep NPO past MN,if GI want EGD be done.    Interval History: No new issues. Comfortable on NC.   Feels better.     Review of Systems   Constitutional: Negative for activity change, chills, diaphoresis, fatigue and fever.   HENT: Negative for congestion.    Respiratory: Negative for cough, choking, chest tightness and shortness of breath.    Cardiovascular: Negative for chest pain.   Gastrointestinal: Negative for abdominal pain.   Genitourinary: Negative for difficulty urinating.   Neurological: Positive for weakness.     Objective:     Vital Signs (Most Recent):  Temp: 98.5 °F (36.9 °C) (03/18/19 0758)  Pulse: 86 (03/18/19 0758)  Resp: 19 (03/18/19 0758)  BP: 102/61 (03/18/19 0758)  SpO2: 100 % (03/18/19 0805) Vital Signs (24h Range):  Temp:  [97.5 °F (36.4 °C)-98.5 °F (36.9 °C)] 98.5 °F (36.9 °C)  Pulse:  [69-86] 86  Resp:  [16-19] 19  SpO2:  [98 %-100 %] 100 %  BP: (102-137)/(55-89) 102/61     Weight: 92.8 kg (204 lb 9.4 oz)  Body mass index is 30.21 kg/m².    Intake/Output Summary (Last 24 hours) at 3/18/2019 1101  Last data filed at 3/18/2019 0600  Gross per 24 hour   Intake 480 ml   Output 50 ml   Net 430 ml      Physical Exam   Constitutional: He is oriented to person, place, and time. He appears well-developed and well-nourished.   HENT:   Head: Normocephalic and atraumatic.   Cardiovascular: Regular rhythm.   Pulmonary/Chest: Effort normal and breath sounds normal. No stridor. He has no wheezes.   Abdominal: There is no tenderness.   Neurological: He is alert and oriented to person, place, and time.   Skin: Skin is warm and dry.   Psychiatric: He has a normal mood and affect. His behavior is normal.   Vitals reviewed.      Significant Labs:   BMP:   Recent Labs   Lab 03/18/19  0705   *      K 5.1      CO2 26   BUN 36*   CREATININE 1.4   CALCIUM 9.6     CBC:   Recent Labs   Lab 03/17/19  0454 03/18/19  0705   WBC 7.80 9.09   HGB 8.1* 7.8*    HCT 29.5* 28.2*    156       Significant Imaging:     Assessment/Plan:      * Acute on chronic combined systolic and diastolic congestive heart failure    Patient has been edematous and does have a history of combined heart failure and pulmonary hypertension.  He needed BPAP therapy for his respiratory failure.  He will be started on intravenous diuresis with furosemide with careful monitoring of his urine output.  Will repeat his TTE in the morning.  EF of 45% 1/18. Continue lasix. Feels better.  Can likely wean to NC. Patient wears 2L of 02 at home.       Repeat echo here with EF of 50% with pulmonary hypertension.still has leg swelling,contiue with IV lasix.SOB is improved,stable on NC O 2,       Chronic atrial fibrillation    Stable and rate-controlled; will hold his home regimen of diltiazem due to heart failure and hold apixaban due to suspected GI bleeding.     CAD (coronary artery disease)    Stable; will continue his home regimen of atorvastatin.  Will hold aspirin for suspected GI bleeding and losartan for acute renal failure.  Will hold carvedilol due to acute heart failure.     Hyperkalemia    Should decrease with IV lasix.  Will check another K shortly.        Peripheral artery disease    Stable; will continue his home regimen of atorvastatin but hold aspirin due to suspected GI bleed.     Chronic combined systolic and diastolic heart failure    As addressed above.     Acute on CKD Stage 3    Patient's urinalysis is significant for a specific gravity of 1.010, 1+ occult blood, positive for nitrites, and 2+ leukocytes.  Urine output has been fair.  Will obtain additional urine studies; defer IV fluid hydration for now due to acute heart failure; monitor the urine output; recheck the renal function in the morning; and avoid nephrotoxins.     Acute on chronic respiratory failure with hypoxia    Patient was noted to be hypoxic on his home supplemental oxygen therapy at 85%.  He was placed on CPAP  en route to this facility.  I cannot personally view the film but the official Radiology interpretation was concerning for pulmonary edema with small bilateral pleural effusions.  Will continue BPAP therapy.  Wears 2L 02 at home.   Wean to NC     Chronic respiratory failure with hypoxia    As addressed above.     Centrilobular emphysema    Clinically-stable without wheezing.  Will provide as-needed JENAE/LAMA available.     Acute hypoxemic respiratory failure    Resolved.  From above.        Iron deficiency anemia    As addressed above.     Chronic anticoagulation    As addressed above.     Anemia associated with acute blood loss    Patient's hemoglobin dropped from 10.4 grams in Jan 2019 to 4.4 grams today and was noted to he with melenic stools on digital rectal exam.  He has been started on a pantoprazole infusion and will be transfused a couple units of pRBCs with re-assessment thereafter to determine if he needs additional units.  Will hold his home regimen of aspirin and apixaban for now and consult Gastroenterology for further recommendations.  Transfused another unit of blood . GI consulted.  Endoscopy when more stable. Monitor CBC,no sign of active bleeding at this time.keep NPO past MN,if GI want EGD be done     Type 2 diabetes mellitus, controlled, with renal complications    Well-controlled on a home regimen of metformin and a sulfonylurea; will provide basal-prandial insulin along with insulin sliding scale.     CKD Stage 3    As addressed above.     Essential hypertension    Patient's blood pressure is well-controlled; will continue home regimen of tamsulosin, and provide as-needed clonidine.  Will hold his home regimen of furosemide and losartan due to acute renal failure.  Will hold carvedilol and diltiazem due to acute heart failure.       VTE Risk Mitigation (From admission, onward)        Ordered     Place DENIS hose  Until discontinued      03/14/19 0111     IP VTE HIGH RISK PATIENT  Once       03/14/19 0111     Reason for No Pharmacological VTE Prophylaxis  Once      03/14/19 0111     Place sequential compression device  Until discontinued      03/14/19 0111              Ace Cisneros MD  Department of Hospital Medicine   [facility]

## 2019-03-18 NOTE — SUBJECTIVE & OBJECTIVE
Interval History: No new issues. Comfortable on NC.   Feels better.     Review of Systems   Constitutional: Negative for activity change, chills, diaphoresis, fatigue and fever.   HENT: Negative for congestion.    Respiratory: Negative for cough, choking, chest tightness and shortness of breath.    Cardiovascular: Negative for chest pain.   Gastrointestinal: Negative for abdominal pain.   Genitourinary: Negative for difficulty urinating.   Neurological: Positive for weakness.     Objective:     Vital Signs (Most Recent):  Temp: 98.5 °F (36.9 °C) (03/18/19 0758)  Pulse: 86 (03/18/19 0758)  Resp: 19 (03/18/19 0758)  BP: 102/61 (03/18/19 0758)  SpO2: 100 % (03/18/19 0805) Vital Signs (24h Range):  Temp:  [97.5 °F (36.4 °C)-98.5 °F (36.9 °C)] 98.5 °F (36.9 °C)  Pulse:  [69-86] 86  Resp:  [16-19] 19  SpO2:  [98 %-100 %] 100 %  BP: (102-137)/(55-89) 102/61     Weight: 92.8 kg (204 lb 9.4 oz)  Body mass index is 30.21 kg/m².    Intake/Output Summary (Last 24 hours) at 3/18/2019 1101  Last data filed at 3/18/2019 0600  Gross per 24 hour   Intake 480 ml   Output 50 ml   Net 430 ml      Physical Exam   Constitutional: He is oriented to person, place, and time. He appears well-developed and well-nourished.   HENT:   Head: Normocephalic and atraumatic.   Cardiovascular: Regular rhythm.   Pulmonary/Chest: Effort normal and breath sounds normal. No stridor. He has no wheezes.   Abdominal: There is no tenderness.   Neurological: He is alert and oriented to person, place, and time.   Skin: Skin is warm and dry.   Psychiatric: He has a normal mood and affect. His behavior is normal.   Vitals reviewed.      Significant Labs:   BMP:   Recent Labs   Lab 03/18/19  0705   *      K 5.1      CO2 26   BUN 36*   CREATININE 1.4   CALCIUM 9.6     CBC:   Recent Labs   Lab 03/17/19  0454 03/18/19  0705   WBC 7.80 9.09   HGB 8.1* 7.8*   HCT 29.5* 28.2*    156       Significant Imaging:

## 2019-03-18 NOTE — NURSING
End of shift bedside report given to HAYDEE Dial. Patient in no apparent distress.     12 hour chart check complete.

## 2019-03-19 VITALS
DIASTOLIC BLOOD PRESSURE: 68 MMHG | TEMPERATURE: 98 F | SYSTOLIC BLOOD PRESSURE: 133 MMHG | RESPIRATION RATE: 18 BRPM | HEART RATE: 78 BPM | HEIGHT: 69 IN | OXYGEN SATURATION: 100 % | BODY MASS INDEX: 30.3 KG/M2 | WEIGHT: 204.56 LBS

## 2019-03-19 LAB
ALBUMIN SERPL BCP-MCNC: 2.2 G/DL
ALP SERPL-CCNC: 114 U/L
ALT SERPL W/O P-5'-P-CCNC: 12 U/L
ANION GAP SERPL CALC-SCNC: 3 MMOL/L
AST SERPL-CCNC: 13 U/L
BASOPHILS # BLD AUTO: 0.01 K/UL
BASOPHILS NFR BLD: 0.1 %
BILIRUB SERPL-MCNC: 0.6 MG/DL
BUN SERPL-MCNC: 35 MG/DL
CALCIUM SERPL-MCNC: 10.1 MG/DL
CHLORIDE SERPL-SCNC: 108 MMOL/L
CO2 SERPL-SCNC: 31 MMOL/L
CREAT SERPL-MCNC: 1.3 MG/DL
DIFFERENTIAL METHOD: ABNORMAL
EOSINOPHIL # BLD AUTO: 0.3 K/UL
EOSINOPHIL NFR BLD: 3.9 %
ERYTHROCYTE [DISTWIDTH] IN BLOOD BY AUTOMATED COUNT: 21.2 %
EST. GFR  (AFRICAN AMERICAN): 60 ML/MIN/1.73 M^2
EST. GFR  (NON AFRICAN AMERICAN): 52 ML/MIN/1.73 M^2
GLUCOSE SERPL-MCNC: 112 MG/DL
HCT VFR BLD AUTO: 29.7 %
HGB BLD-MCNC: 8.2 G/DL
LYMPHOCYTES # BLD AUTO: 1 K/UL
LYMPHOCYTES NFR BLD: 13.7 %
MCH RBC QN AUTO: 23 PG
MCHC RBC AUTO-ENTMCNC: 27.6 G/DL
MCV RBC AUTO: 83 FL
MONOCYTES # BLD AUTO: 0.8 K/UL
MONOCYTES NFR BLD: 11.5 %
NEUTROPHILS # BLD AUTO: 5 K/UL
NEUTROPHILS NFR BLD: 71.1 %
PLATELET # BLD AUTO: 164 K/UL
PMV BLD AUTO: 11 FL
POCT GLUCOSE: 103 MG/DL (ref 70–110)
POCT GLUCOSE: 121 MG/DL (ref 70–110)
POTASSIUM SERPL-SCNC: 4.5 MMOL/L
PROT SERPL-MCNC: 4.3 G/DL
RBC # BLD AUTO: 3.56 M/UL
SODIUM SERPL-SCNC: 142 MMOL/L
WBC # BLD AUTO: 7.1 K/UL

## 2019-03-19 PROCEDURE — 85025 COMPLETE CBC W/AUTO DIFF WBC: CPT

## 2019-03-19 PROCEDURE — 25000003 PHARM REV CODE 250: Performed by: INTERNAL MEDICINE

## 2019-03-19 PROCEDURE — 25000242 PHARM REV CODE 250 ALT 637 W/ HCPCS: Performed by: HOSPITALIST

## 2019-03-19 PROCEDURE — 36415 COLL VENOUS BLD VENIPUNCTURE: CPT

## 2019-03-19 PROCEDURE — 63600175 PHARM REV CODE 636 W HCPCS: Performed by: HOSPITALIST

## 2019-03-19 PROCEDURE — 94761 N-INVAS EAR/PLS OXIMETRY MLT: CPT

## 2019-03-19 PROCEDURE — 27000221 HC OXYGEN, UP TO 24 HOURS

## 2019-03-19 PROCEDURE — 25000003 PHARM REV CODE 250: Performed by: HOSPITALIST

## 2019-03-19 PROCEDURE — 80053 COMPREHEN METABOLIC PANEL: CPT

## 2019-03-19 PROCEDURE — 94640 AIRWAY INHALATION TREATMENT: CPT

## 2019-03-19 PROCEDURE — 99900035 HC TECH TIME PER 15 MIN (STAT)

## 2019-03-19 RX ORDER — PANTOPRAZOLE SODIUM 40 MG/1
40 TABLET, DELAYED RELEASE ORAL DAILY
Qty: 30 TABLET | Refills: 0 | Status: SHIPPED | OUTPATIENT
Start: 2019-03-19 | End: 2021-06-18

## 2019-03-19 RX ADMIN — TAMSULOSIN HYDROCHLORIDE 0.4 MG: 0.4 CAPSULE ORAL at 08:03

## 2019-03-19 RX ADMIN — DILTIAZEM HYDROCHLORIDE 30 MG: 30 TABLET, FILM COATED ORAL at 08:03

## 2019-03-19 RX ADMIN — CARVEDILOL 25 MG: 6.25 TABLET, FILM COATED ORAL at 08:03

## 2019-03-19 RX ADMIN — POLYETHYLENE GLYCOL 3350 17 G: 17 POWDER, FOR SOLUTION ORAL at 08:03

## 2019-03-19 RX ADMIN — PANTOPRAZOLE SODIUM 40 MG: 40 TABLET, DELAYED RELEASE ORAL at 08:03

## 2019-03-19 RX ADMIN — FUROSEMIDE 20 MG: 10 INJECTION, SOLUTION INTRAMUSCULAR; INTRAVENOUS at 08:03

## 2019-03-19 RX ADMIN — IPRATROPIUM BROMIDE AND ALBUTEROL SULFATE 3 ML: .5; 3 SOLUTION RESPIRATORY (INHALATION) at 01:03

## 2019-03-19 RX ADMIN — IPRATROPIUM BROMIDE AND ALBUTEROL SULFATE 3 ML: .5; 3 SOLUTION RESPIRATORY (INHALATION) at 07:03

## 2019-03-19 NOTE — PROGRESS NOTES
1003 SW faxed Facesheet, Home Health Orders, and H&P to [facility] at [phone] to resume [facility] services.

## 2019-03-19 NOTE — NURSING
Updated report given to Dianna HUBBARD. Pt resting comfortably. No acute distress noted. Safety precautions maintained.     Chart check completed.

## 2019-03-19 NOTE — PROGRESS NOTES
"EDIS contacted pt's spouse, Jessika, to inform pt has discharge orders. Jessika did not answer the phone, SW, left a detailed message.     EDUCATION:  Pt provided with educational information on CHF.  Information reviewed and placed in :My Healthcare Packet" to be brought home for  use as resource after discharge.  Information included:  signs and symptoms to look for at discharge. EDIS instructed pt to call the doctor if experiencing symptoms that may indicate a medical emergency: CALL 911 if signs and symptoms worsen. EDIS also contacted pt's spouse, Jessika, and dicussed appointments, education and notified pt will resume with [facility]. Jessika expressed she is concerned with getting pt back and fourth to medical appointments and asked if pt would qualify for a wheelchair. EDIS notified Dr. Gonzalez, order in, and EDIS [place] [facility] @  [phone] at 11:59AM.  According to Yasmin, mora submit to [employer] now, and they will deliver wheelchair once [facility] receive authorization.   Reminded pt things he will be responsible for to manage his healthcare at home: getting Rx filled, attending follow up appointments, and taking medication as prescribed were discussed.   Teach back method used.  All questions answered.  Patient verbalized understanding of all information.      EDIS provided pt with a copy of follow-up appointments. EDIS explained/highlighted date, time, and location of each appointment. EDIS provided pt with a blue folder and instructed pt to place all medical documents in blue folder. EDIS explained to pt the nurse will provide an AVS with diagnosis and instructed pt to place in blue folder and bring to follow-up appointment. EDIS notified pt's nurse, Dianna, all CM needs have been met.       "

## 2019-03-19 NOTE — PROGRESS NOTES
WRITTEN HEALTHCARE DISCHARGE INFORMATION     Things that YOU are RESPONSIBLE for to Manage Your Care At Home:    1. Getting your prescriptions filled.  2. Taking you medications as directed. DO NOT MISS ANY DOSES!  3. Going to your follow-up doctor appointments. This is important because it allows the doctor to monitor your progress and to determine if any changes need to be made to your treatment plan.    If you are unable to make your follow up appointments, please call the number listed and reschedule this appointment.     ____________HELP AT HOME____________________    Experiencing any SIGNS or SYMPTOMS: YOU CAN    Schedule a same day appopintment with your Primary Care Doctor or  you can call [facility] On Call [facility] Care Line for 24/7 assistance at [phone]    If you are experience any signs or symptoms that have become severe, Call 911 and come to your nearest Emergency Room.    Thank you for choosing [facility] and allowing us to care for you.   From your care management team: María HASTINGS [facility] [place], [place] [phone]    You should receive a call from [facility] Discharge Department within 48-72 hours to help manage your care after discharge. Please try to make sure that you answer your phone for this important phone call.  Follow-up Information     Keaton Hardy MD. Go on 3/22/2019.    Specialty:  Internal Medicine  Why:  9:30 3/22 for hospital follow up  Contact information:  [address]  [address]  [place] [address]  [phone]             Alen Wong MD. Go on 3/28/2019.    Specialty:  Gastroenterology  Why:  9:15am 3/28 for GI Follow up. Contact Number: [phone]  Contact information:  [address] [place] [address]  [address]  [facility]  [place] [address]  [phone]             [facility].    Why:  Home Health  Contact information:  [address]  [place] [address] [phone]

## 2019-03-19 NOTE — PLAN OF CARE
03/19/19 1212   Final Note   Assessment Type Final Discharge Note   Anticipated Discharge Disposition Home-Health   What phone number can be called within the next 1-3 days to see how you are doing after discharge? [phone]   Hospital Follow Up  Appt(s) scheduled? Yes   Discharge plans and expectations educations in teach back method with documentation complete? Yes   Right Care Referral Info   Post Acute Recommendation Home-care   Referral Type Home Care   Facility Name [facility]

## 2019-03-19 NOTE — PLAN OF CARE
[facility]    HOME HEALTH ORDERS  FACE TO FACE ENCOUNTER    Patient Name: Agus Ramos Jr.  YOB: 1940    PCP: Keaton Hardy MD   PCP Address: [address] / [place] [address]  PCP Phone Number: [phone]  PCP Fax: [phone]    Encounter Date: 03/19/2019    Admit to Home Health    Diagnoses:  Active Hospital Problems    Diagnosis  POA    *Acute on chronic combined systolic and diastolic congestive heart failure [I50.43]  Yes     Low sodium diet discussed.        Chronic atrial fibrillation [I48.2]  Yes     Priority: High     Chronic    CAD (coronary artery disease) [I25.10]  Yes     Priority: Low     Chronic    Hyperkalemia [E87.5]  No    Acute on chronic respiratory failure with hypoxia [J96.21]  Yes    Acute on CKD Stage 3 [N17.9, N18.3]  Yes    Chronic combined systolic and diastolic heart failure [I50.42]  Yes     Chronic    Peripheral artery disease [I73.9]  Yes     Chronic    SJ (obstructive sleep apnea) [G47.33]  Yes    COPD (chronic obstructive pulmonary disease) [J44.9]  Yes    Chronic respiratory failure with hypoxia [J96.11]  Yes     Chronic    Pleural effusion [J90]  Yes    Centrilobular emphysema [J43.2]  Yes     Chronic     Patient is not interested in a controller, does not feel he gets relief from duoneb nebulizer.      Acute hypoxemic respiratory failure [J96.01]  Yes     Likely combination of COPD and HFpEF with exacerbation.      Chronic anticoagulation [Z79.01]  Not Applicable     Chronic    Iron deficiency anemia [D50.9]  Yes     Chronic    Anemia associated with acute blood loss [D62]  Yes    Essential hypertension [I10]  Yes     Chronic    Type 2 diabetes mellitus, controlled, with renal complications [E11.29]  Yes     Chronic    CKD Stage 3 [N18.3]  Yes     Chronic      Resolved Hospital Problems    Diagnosis Date Resolved POA    Acute on chronic congestive heart failure [I50.9] 03/13/2019 Yes       Future  Appointments   Date Time Provider Department Center   3/22/2019  3:00 PM Millie Bell MD [facility] HEM ONC [facility]   6/20/2019 10:45 AM [facility] US ROOM 4 [facility] U[facility]   7/9/2019 10:00 AM ROGERIO Wiggins MD [facility] URO [facility]     Follow-up Information     Keaton Hardy MD In 1 week.    Specialty:  Internal Medicine  Contact information:  [address]  [address]  [place] [address]  [phone]             Alen Wong MD In 1 week.    Specialty:  Gastroenterology  Contact information:  [address] [place], [place] [address]  [address]  [facility] [place]  [place] [address] [phone]                     I have seen and examined this patient face to face today. My clinical findings that support the need for the home health skilled services and home bound status are the following:  Weakness/numbness causing balance and gait disturbance due to Heart Failure making it taxing to leave home.    Allergies:Review of patient's allergies indicates:  No Known Allergies    Diet: diabetic diet: 1800 calorie and 2 gram sodium diet    Activities: activity as tolerated    Nursing:   SN to complete comprehensive assessment including routine vital signs. Instruct on disease process and s/s of complications to report to MD. Review/verify medication list sent home with the patient at time of discharge  and instruct patient/caregiver as needed. Frequency may be adjusted depending on start of care date.    Notify MD if SBP > 160 or < 90; DBP > 90 or < 50; HR > 120 or < 50; Temp > 101; Other:         CONSULTS:    Physical Therapy to evaluate and treat. Evaluate for home safety and equipment needs; Establish/upgrade home exercise program. Perform / instruct on therapeutic exercises, gait training, transfer training, and Range of Motion.  Occupational Therapy to evaluate and treat. Evaluate home environment for safety and equipment needs. Perform/Instruct on transfers, ADL training, ROM, and  therapeutic exercises.    MISCELLANEOUS CARE:  Routine Skin for Bedridden Patients: Instruct patient/caregiver to apply moisture barrier cream to all skin folds and wet areas in perineal area daily and after baths and all bowel movements.    WOUND CARE ORDERS  n/a      Medications: Review discharge medications with patient and family and provide education.      Current Discharge Medication List      START taking these medications    Details   pantoprazole (PROTONIX) 40 MG tablet Take 1 tablet (40 mg total) by mouth once daily.  Qty: 30 tablet, Refills: 0         CONTINUE these medications which have NOT CHANGED    Details   albuterol-ipratropium 2.5mg-0.5mg/3mL (DUO-NEB) 0.5 mg-3 mg(2.5 mg base)/3 mL nebulizer solution Take 3 mLs by nebulization every 6 (six) hours. Rescue  Qty: 1 Box, Refills: 0      aspirin (ECOTRIN) 81 MG EC tablet Take 1 tablet (81 mg total) by mouth once daily.  Refills: 0      atorvastatin (LIPITOR) 20 MG tablet Take 20 mg by mouth every evening.      diltiaZEM (CARDIZEM) 30 MG tablet Take 1 tablet (30 mg total) by mouth every 12 (twelve) hours.  Qty: 60 tablet, Refills: 3      ferrous gluconate (FERGON) 324 MG tablet Take 324 mg by mouth daily with breakfast.      fish oil-omega-3 fatty acids 300-1,000 mg capsule Take 2 g by mouth 2 (two) times daily.       !! furosemide (LASIX) 40 MG tablet       !! furosemide (LASIX) 80 MG tablet Take 40 mg by mouth 2 (two) times daily.       glipiZIDE (GLUCOTROL) 5 MG tablet Take 10 mg by mouth 2 (two) times daily before meals.      !! losartan (COZAAR) 50 MG tablet Take 0.5 tablets (25 mg total) by mouth once daily.      metFORMIN (GLUCOPHAGE-XR) 500 MG 24 hr tablet       nitroGLYCERIN (NITROSTAT) 0.4 MG SL tablet Place 0.4 mg under the tongue every 5 (five) minutes as needed.      polyethylene glycol (GLYCOLAX) 17 gram PwPk Take by mouth as needed.       carvedilol (COREG) 25 MG tablet Take 1 tablet (25 mg total) by mouth 2 (two) times daily.  Qty: 60  tablet, Refills: 0      !! losartan (COZAAR) 25 MG tablet Take 25 mg by mouth once daily.       tamsulosin (FLOMAX) 0.4 mg Cap Take 1 capsule (0.4 mg total) by mouth once daily.  Qty: 30 capsule, Refills: 0       !! - Potential duplicate medications found. Please discuss with provider.      STOP taking these medications       apixaban (ELIQUIS) 5 mg Tab Comments:   Reason for Stopping:               I certify that this patient is confined to his home and needs intermittent skilled nursing care, physical therapy and occupational therapy.

## 2019-03-19 NOTE — DISCHARGE SUMMARY
[facility] Hospital Medicine  Discharge Summary      Patient Name: Agus Ramos Jr.  MRN: [id number]  Admission Date: 3/13/2019  Hospital Length of Stay: 6 days  Discharge Date and Time:  03/19/2019 12:38 PM  Attending Physician: Ace Cisneros MD   Discharging Provider: Ace Cisneros MD  Primary Care Provider: Keaton Hardy MD      HPI:   Mr. Agus Ramos Jr. is a 78 y.o. male known to me with essential hypertension, type 2 diabetes mellitus (HbA1c 5.1% Nov 2018), CAD sbp CABG, chronic combined systolic and diastolic heart failure (LVEF 45% Dec 2018), CKD Stage 3, peripheral artery disease, chronic atrial fibrillation ([id number] score 5) on chronic anticoagulation, COPD, chronic respiratory failure with hypoxia, and anemia of chronic disease who presents to [facility] ED with complaints of dyspnea for four days.  He experiences intermittent dyspnea and this current episode started about four days ago while at rest.  The dyspnea is worse on exertion but not with any orthopnea, PND, chest pain, fevers, chills, nausea, vomiting, palpitations, diaphoresis, nor hemoptysis.  He does note that his legs and arms are more swollen during the same time period.  He denies any sick contacts or recent travel, and hasn't noticed any hematochezia, melena, hematuria, nor any hematemesis.      * No surgery found *      Hospital Course:    presents to [facility] ED with complaints of dyspnea for four days on 3/13/19. Patient has known systolic and diastolic heart failure with an EF of 45% and was admitted to the ICU on bi-pap support. Started on IV lasix with clinical improvement. Pulmonary was consulted. Also found to be heme + and anemic with a Hgb of 4+. Transfused several units of blood and GI consulted. The patient clinically improved and changed to NC (patient on 2L NC at home). The patient was transferred out of the ICU on 3/15. PT/OT were consulted ,still has leg swelling,continued with  IV lasix,SOB is improved,was stable on NC O 2,  GI did not want do endoscopy duo to respiratory status,poor candidate for intervention,no sign of bleeding seen,HH remains stable,was off OAC,  At DC time,hold eliquis,untill seen by GI,he is poor candidate for OAC,he has bene consulted.  Patient has been discharged home with follow up plan with PCP and GI in next few days.     Consults:   Consults (From admission, onward)        Status Ordering Provider     Inpatient consult to Gastroenterology  Once     Provider:  (Not yet assigned)    KRISTOPHER Echols     Inpatient consult to Pulmonology  Once     Provider:  Juliet Eastman MD    Completed SUDHIR STRONG     Inpatient consult to Social Work  Once     Provider:  (Not yet assigned)    RADHA Graves          No new Assessment & Plan notes have been filed under this hospital service since the last note was generated.  Service: Hospital Medicine    Final Active Diagnoses:    Diagnosis Date Noted POA    PRINCIPAL PROBLEM:  Acute on chronic combined systolic and diastolic congestive heart failure [I50.43] 09/24/2016 Yes    Chronic atrial fibrillation [I48.2] 09/19/2013 Yes     Chronic    CAD (coronary artery disease) [I25.10] 09/19/2013 Yes     Chronic    Hyperkalemia [E87.5] 03/14/2019 No    Acute on chronic respiratory failure with hypoxia [J96.21] 03/13/2019 Yes    Acute on CKD Stage 3 [N17.9, N18.3] 03/13/2019 Yes    Chronic combined systolic and diastolic heart failure [I50.42] 03/13/2019 Yes     Chronic    Peripheral artery disease [I73.9] 03/13/2019 Yes     Chronic    SJ (obstructive sleep apnea) [G47.33] 01/01/2019 Yes    COPD (chronic obstructive pulmonary disease) [J44.9] 12/07/2018 Yes    Chronic respiratory failure with hypoxia [J96.11] 06/01/2018 Yes     Chronic    Pleural effusion [J90] 05/24/2018 Yes    Centrilobular emphysema [J43.2] 05/15/2018 Yes     Chronic    Acute hypoxemic respiratory failure  "[J96.01] 05/06/2018 Yes    Chronic anticoagulation [Z79.01] 05/04/2018 Not Applicable     Chronic    Iron deficiency anemia [D50.9] 05/04/2018 Yes     Chronic    Anemia associated with acute blood loss [D62] 05/03/2018 Yes    Essential hypertension [I10] 09/24/2016 Yes     Chronic    Type 2 diabetes mellitus, controlled, 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 Go on 3/22/2019.    Specialty:  Internal Medicine  Why:  9:30 3/22 for hospital follow up  Contact information:  [address]  [address]  [facility] [address]  [phone]             Alen Wong MD. Go on 3/28/2019.    Specialty:  Gastroenterology  Why:  9:15am 3/28 for GI Follow up. Contact Number: [phone]  Contact information:  [address] [place], [place] [address]  [address]  [facility]  [facility] [address]  [phone]             [facility].    Why:  Home Health  Contact information:  [address]  [facility] [address]  [phone]                 Patient Instructions:      COMMODE FOR HOME USE     Order Specific Question Answer Comments   Type: Standard    Height: 5' 9" (1.753 m)    Weight: 92.8 kg (204 lb 9.4 oz)    Does patient have medical equipment at home? walker, rolling    Does patient have medical equipment at home? wheelchair    Does patient have medical equipment at home? oxygen    Length of need (1-99 months): 99      WHEELCHAIR FOR HOME USE     Order Specific Question Answer Comments   Hours in W/C per day: 8    Type of Wheelchair: Standard    Size(Width): 18"(STD adult)    Leg Support: STD footrests    Lap Belt: Velcro  " "  Cushion: Basic    Height: 5' 9" (1.753 m)    Weight: 92.8 kg (204 lb 9.4 oz)    Does patient have medical equipment at home? walker, rolling    Does patient have medical equipment at home? wheelchair    Does patient have medical equipment at home? oxygen    Length of need (1-99 months): 99    Please check all that apply: Caregiver is capable and willing to operate wheelchair safely.      Activity as tolerated       Significant Diagnostic Studies: Labs:   BMP:   Recent Labs   Lab 03/18/19  0705 03/19/19  0737   * 112*    142   K 5.1 4.5    108   CO2 26 31*   BUN 36* 35*   CREATININE 1.4 1.3   CALCIUM 9.6 10.1    and CBC   Recent Labs   Lab 03/18/19  0705 03/19/19  0737   WBC 9.09 7.10   HGB 7.8* 8.2*   HCT 28.2* 29.7*    164     Radiology: X-Ray: CXR: X-Ray Chest 1 View (CXR): No results found for this visit on 03/13/19. and X-Ray Chest PA and Lateral (CXR): No results found for this visit on 03/13/19.    Pending Diagnostic Studies:     None         Medications:  Reconciled Home Medications:      Medication List      START taking these medications    pantoprazole 40 MG tablet  Commonly known as:  PROTONIX  Take 1 tablet (40 mg total) by mouth once daily.        CONTINUE taking these medications    albuterol-ipratropium 2.5 mg-0.5 mg/3 mL nebulizer solution  Commonly known as:  DUO-NEB  Take 3 mLs by nebulization every 6 (six) hours. Rescue     aspirin 81 MG EC tablet  Commonly known as:  ECOTRIN  Take 1 tablet (81 mg total) by mouth once daily.     atorvastatin 20 MG tablet  Commonly known as:  LIPITOR  Take 20 mg by mouth every evening.     carvedilol 25 MG tablet  Commonly known as:  COREG  Take 1 tablet (25 mg total) by mouth 2 (two) times daily.     diltiaZEM 30 MG tablet  Commonly known as:  CARDIZEM  Take 1 tablet (30 mg total) by mouth every 12 (twelve) hours.     ferrous gluconate 324 MG tablet  Commonly known as:  FERGON  Take 324 mg by mouth daily with breakfast.     fish " oil-omega-3 fatty acids 300-1,000 mg capsule  Take 2 g by mouth 2 (two) times daily.     * furosemide 80 MG tablet  Commonly known as:  LASIX  Take 40 mg by mouth 2 (two) times daily.     * furosemide 40 MG tablet  Commonly known as:  LASIX     glipiZIDE 5 MG tablet  Commonly known as:  GLUCOTROL  Take 10 mg by mouth 2 (two) times daily before meals.     * losartan 50 MG tablet  Commonly known as:  COZAAR  Take 0.5 tablets (25 mg total) by mouth once daily.     * losartan 25 MG tablet  Commonly known as:  COZAAR  Take 25 mg by mouth once daily.     metFORMIN 500 MG 24 hr tablet  Commonly known as:  GLUCOPHAGE-XR     nitroGLYCERIN 0.4 MG SL tablet  Commonly known as:  NITROSTAT  Place 0.4 mg under the tongue every 5 (five) minutes as needed.     polyethylene glycol 17 gram Pwpk  Commonly known as:  GLYCOLAX  Take by mouth as needed.     tamsulosin 0.4 mg Cap  Commonly known as:  FLOMAX  Take 1 capsule (0.4 mg total) by mouth once daily.         * This list has 4 medication(s) that are the same as other medications prescribed for you. Read the directions carefully, and ask your doctor or other care provider to review them with you.            STOP taking these medications    ELIQUIS 5 mg Tab  Generic drug:  apixaban            Indwelling Lines/Drains at time of discharge:   Lines/Drains/Airways          None          Time spent on the discharge of patient: over 30  minutes  Patient was seen and examined on the date of discharge and determined to be suitable for discharge.         Ace Cisneros MD  Department of Hospital Medicine  [facility]

## 2019-03-19 NOTE — PLAN OF CARE
03/19/19 1023   Medicare Message   Important Message from Medicare regarding Discharge Appeal Rights Given to patient/caregiver;Explained to patient/caregiver;Signed/date by patient/caregiver   Date IMM was signed 03/19/19   Time IMM was signed 1007

## 2019-03-19 NOTE — CONSULTS
[facility]IS scheduled GI Follow up per consult:  Alen Wong MD  Go on 3/28/2019  9:15am 3/28 for GI Follow up. Contact Number: [phone]  [address] [place] [address]  [address]  [facility]  [place] [address] [phone]

## 2019-03-19 NOTE — PLAN OF CARE
Problem: Respiratory Compromise (Heart Failure)  Goal: Effective Oxygenation and Ventilation    Intervention: Promote Airway Secretion Clearance   03/19/19 0432   Promote Airway Secretion Clearance   Breathing Techniques/Airway Clearance pursed-lip breathing encouraged     Intervention: Optimize Oxygenation and Ventilation   03/19/19 0432   Support Asthma Symptom Control   Airway/Ventilation Management airway patency maintained;calming measures promoted         Comments: Bipap in use throughout night shift. Will continue to f/u.

## 2019-03-20 NOTE — PT/OT/SLP DISCHARGE
Occupational Therapy Discharge Summary    Agus Ramos Jr.  MRN: [id number]   Principal Problem: Acute on chronic combined systolic and diastolic congestive heart failure      Patient Discharged from acute Occupational Therapy on 3/19/19.  Please refer to prior OT notes for functional status.    Assessment:      Patient appropriate for care in another setting.    Objective:     GOALS:   Multidisciplinary Problems     Occupational Therapy Goals        Problem: Occupational Therapy Goal    Goal Priority Disciplines Outcome Interventions   Occupational Therapy Goal     OT, PT/OT Ongoing (interventions implemented as appropriate)    Description:  Goals to be met by: 3/30/19    Patient will increase functional independence with ADLs by performing:    UE Dressing with Stand-by Assistance.  Grooming while standing at sink with Stand-by Assistance.  Toileting from toilet with Set-up Assistance and Stand-by Assistance for hygiene and clothing management.   Supine to sit with Modified [place] and Supervision.  Step transfer with Stand-by Assistance  Toilet transfer to toilet with Stand-by Assistance.  Upper extremity exercise program x10 reps per handout, with assistance as needed.                      Reasons for Discontinuation of Therapy Services  Transfer to alternate level of care.      Plan:     Patient Discharged to: Home with Home Health Service    Anna Dumont OT  3/20/2019

## 2019-03-20 NOTE — PT/OT/SLP DISCHARGE
Physical Therapy Discharge Summary    Name: Agus Ramos Jr.  MRN: [id number]   Principal Problem: Acute on chronic combined systolic and diastolic congestive heart failure     Patient Discharged from acute Physical Therapy on 3/19/19.  Please refer to prior PT notes for functional status.     Assessment:     Patient appropriate for care in another setting.    Objective:     GOALS:   Multidisciplinary Problems     Physical Therapy Goals        Problem: Physical Therapy Goal    Goal Priority Disciplines Outcome Goal Variances Interventions   Physical Therapy Goal     PT, PT/OT Ongoing (interventions implemented as appropriate)     Description:  Goals to be met by: 3/30/19     Patient will increase functional independence with mobility by performin. Supine to sit with Supervision  2. Rolling to Left and Right with Supervision  3. Sit to stand transfer with Supervision using RW  4. Bed to chair transfer with Supervision using Rolling Walker  5. Gait  [id number] feet with Supervision using Rolling Walker and O2   6. Lower extremity exercise program 3 sets x10 reps per handout, with supervision                      Reasons for Discontinuation of Therapy Services  Transfer to alternate level of care.      Plan:     Patient Discharged to: Home with Home Health Service.    Carley Montano, PT  3/20/2019

## 2019-03-23 ENCOUNTER — NURSE TRIAGE (OUTPATIENT)
Dept: ADMINISTRATIVE | Facility: CLINIC | Age: 79
End: 2019-03-23

## 2019-03-23 ENCOUNTER — HOSPITAL ENCOUNTER (INPATIENT)
Facility: HOSPITAL | Age: 79
LOS: 24 days | Discharge: NURSING, REHAB OR LONG-TERM CARE | DRG: 637 | End: 2019-04-16
Attending: EMERGENCY MEDICINE | Admitting: EMERGENCY MEDICINE
Payer: MEDICARE

## 2019-03-23 DIAGNOSIS — R41.82 ALTERED MENTAL STATUS, UNSPECIFIED ALTERED MENTAL STATUS TYPE: ICD-10-CM

## 2019-03-23 DIAGNOSIS — E16.2 HYPOGLYCEMIA: Primary | ICD-10-CM

## 2019-03-23 DIAGNOSIS — I49.9 ARRHYTHMIA: ICD-10-CM

## 2019-03-23 DIAGNOSIS — G93.41 ACUTE METABOLIC ENCEPHALOPATHY: ICD-10-CM

## 2019-03-23 DIAGNOSIS — R53.1 WEAKNESS: ICD-10-CM

## 2019-03-23 DIAGNOSIS — Z51.5 PALLIATIVE CARE ENCOUNTER: ICD-10-CM

## 2019-03-23 PROBLEM — R33.9 URINARY RETENTION: Status: RESOLVED | Noted: 2019-01-01 | Resolved: 2019-03-23

## 2019-03-23 PROBLEM — D62 ANEMIA ASSOCIATED WITH ACUTE BLOOD LOSS: Status: RESOLVED | Noted: 2018-05-03 | Resolved: 2019-03-23

## 2019-03-23 PROBLEM — F05 ACUTE CONFUSIONAL STATE: Status: RESOLVED | Noted: 2018-05-08 | Resolved: 2019-03-23

## 2019-03-23 PROBLEM — J44.9 COPD (CHRONIC OBSTRUCTIVE PULMONARY DISEASE): Status: RESOLVED | Noted: 2018-12-07 | Resolved: 2019-03-23

## 2019-03-23 PROBLEM — E83.52 HYPERCALCEMIA: Status: RESOLVED | Noted: 2018-05-10 | Resolved: 2019-03-23

## 2019-03-23 PROBLEM — J90 PLEURAL EFFUSION: Status: RESOLVED | Noted: 2018-05-24 | Resolved: 2019-03-23

## 2019-03-23 PROBLEM — N18.30 ACUTE RENAL FAILURE SUPERIMPOSED ON STAGE 3 CHRONIC KIDNEY DISEASE: Status: RESOLVED | Noted: 2019-03-13 | Resolved: 2019-03-23

## 2019-03-23 PROBLEM — E87.5 HYPERKALEMIA: Status: RESOLVED | Noted: 2019-03-14 | Resolved: 2019-03-23

## 2019-03-23 PROBLEM — D64.9 ANEMIA: Status: RESOLVED | Noted: 2018-05-03 | Resolved: 2019-03-23

## 2019-03-23 PROBLEM — G47.33 OSA (OBSTRUCTIVE SLEEP APNEA): Status: RESOLVED | Noted: 2019-01-01 | Resolved: 2019-03-23

## 2019-03-23 PROBLEM — R39.198 DIFFICULTY VOIDING: Status: RESOLVED | Noted: 2019-01-01 | Resolved: 2019-03-23

## 2019-03-23 PROBLEM — J96.01 ACUTE HYPOXEMIC RESPIRATORY FAILURE: Status: RESOLVED | Noted: 2018-05-06 | Resolved: 2019-03-23

## 2019-03-23 PROBLEM — I50.1 ACUTE PULMONARY EDEMA WITH CONGESTIVE HEART FAILURE: Status: RESOLVED | Noted: 2018-07-09 | Resolved: 2019-03-23

## 2019-03-23 PROBLEM — R82.71 BACTERIURIA: Status: RESOLVED | Noted: 2018-11-30 | Resolved: 2019-03-23

## 2019-03-23 PROBLEM — R79.89 TROPONIN LEVEL ELEVATED: Status: RESOLVED | Noted: 2018-05-04 | Resolved: 2019-03-23

## 2019-03-23 PROBLEM — N17.9 ACUTE RENAL FAILURE SUPERIMPOSED ON STAGE 3 CHRONIC KIDNEY DISEASE: Status: RESOLVED | Noted: 2019-03-13 | Resolved: 2019-03-23

## 2019-03-23 PROBLEM — J96.21 ACUTE ON CHRONIC RESPIRATORY FAILURE WITH HYPOXIA: Status: RESOLVED | Noted: 2019-03-13 | Resolved: 2019-03-23

## 2019-03-23 LAB
ALBUMIN SERPL BCP-MCNC: 2.4 G/DL
ALP SERPL-CCNC: 123 U/L
ALT SERPL W/O P-5'-P-CCNC: 17 U/L
AMORPH CRY URNS QL MICRO: ABNORMAL
ANION GAP SERPL CALC-SCNC: 7 MMOL/L
AST SERPL-CCNC: 18 U/L
BACTERIA #/AREA URNS HPF: ABNORMAL /HPF
BASOPHILS # BLD AUTO: 0.01 K/UL
BASOPHILS NFR BLD: 0.1 %
BILIRUB SERPL-MCNC: 0.5 MG/DL
BILIRUB UR QL STRIP: NEGATIVE
BUN SERPL-MCNC: 30 MG/DL
CALCIUM SERPL-MCNC: 10.1 MG/DL
CHLORIDE SERPL-SCNC: 108 MMOL/L
CLARITY UR: ABNORMAL
CO2 SERPL-SCNC: 28 MMOL/L
COLOR UR: YELLOW
CREAT SERPL-MCNC: 1.2 MG/DL
DIFFERENTIAL METHOD: ABNORMAL
EOSINOPHIL # BLD AUTO: 0.1 K/UL
EOSINOPHIL NFR BLD: 1.3 %
ERYTHROCYTE [DISTWIDTH] IN BLOOD BY AUTOMATED COUNT: 22 %
EST. GFR  (AFRICAN AMERICAN): >60 ML/MIN/1.73 M^2
EST. GFR  (NON AFRICAN AMERICAN): 57 ML/MIN/1.73 M^2
GLUCOSE SERPL-MCNC: 142 MG/DL (ref 70–110)
GLUCOSE SERPL-MCNC: 186 MG/DL (ref 70–110)
GLUCOSE SERPL-MCNC: 30 MG/DL
GLUCOSE SERPL-MCNC: 36 MG/DL (ref 70–110)
GLUCOSE SERPL-MCNC: 44 MG/DL (ref 70–110)
GLUCOSE SERPL-MCNC: 72 MG/DL (ref 70–110)
GLUCOSE SERPL-MCNC: 78 MG/DL (ref 70–110)
GLUCOSE UR QL STRIP: NEGATIVE
HCT VFR BLD AUTO: 32.1 %
HGB BLD-MCNC: 9 G/DL
HGB UR QL STRIP: ABNORMAL
HYALINE CASTS #/AREA URNS LPF: 0 /LPF
KETONES UR QL STRIP: NEGATIVE
LEUKOCYTE ESTERASE UR QL STRIP: ABNORMAL
LYMPHOCYTES # BLD AUTO: 0.8 K/UL
LYMPHOCYTES NFR BLD: 11.8 %
MAGNESIUM SERPL-MCNC: 2.2 MG/DL
MCH RBC QN AUTO: 22.9 PG
MCHC RBC AUTO-ENTMCNC: 28 G/DL
MCV RBC AUTO: 82 FL
MICROSCOPIC COMMENT: ABNORMAL
MONOCYTES # BLD AUTO: 1 K/UL
MONOCYTES NFR BLD: 13.6 %
NEUTROPHILS # BLD AUTO: 5.2 K/UL
NEUTROPHILS NFR BLD: 73.2 %
NITRITE UR QL STRIP: NEGATIVE
PH UR STRIP: 5 [PH] (ref 5–8)
PHOSPHATE SERPL-MCNC: 3.4 MG/DL
PLATELET # BLD AUTO: 285 K/UL
PMV BLD AUTO: 10.2 FL
POCT GLUCOSE: 118 MG/DL (ref 70–110)
POCT GLUCOSE: 119 MG/DL (ref 70–110)
POCT GLUCOSE: 144 MG/DL (ref 70–110)
POCT GLUCOSE: 164 MG/DL (ref 70–110)
POCT GLUCOSE: 81 MG/DL (ref 70–110)
POTASSIUM SERPL-SCNC: 4 MMOL/L
PROT SERPL-MCNC: 4.9 G/DL
PROT UR QL STRIP: ABNORMAL
RBC # BLD AUTO: 3.93 M/UL
RBC #/AREA URNS HPF: 2 /HPF (ref 0–4)
SODIUM SERPL-SCNC: 143 MMOL/L
SP GR UR STRIP: 1.01 (ref 1–1.03)
SQUAMOUS #/AREA URNS HPF: 4 /HPF
URN SPEC COLLECT METH UR: ABNORMAL
UROBILINOGEN UR STRIP-ACNC: NEGATIVE EU/DL
WBC # BLD AUTO: 7.04 K/UL
WBC #/AREA URNS HPF: 4 /HPF (ref 0–5)

## 2019-03-23 PROCEDURE — 27000221 HC OXYGEN, UP TO 24 HOURS

## 2019-03-23 PROCEDURE — 93005 ELECTROCARDIOGRAM TRACING: CPT

## 2019-03-23 PROCEDURE — 20000000 HC ICU ROOM

## 2019-03-23 PROCEDURE — 25000003 PHARM REV CODE 250: Performed by: INTERNAL MEDICINE

## 2019-03-23 PROCEDURE — 99285 EMERGENCY DEPT VISIT HI MDM: CPT | Mod: 25

## 2019-03-23 PROCEDURE — 84100 ASSAY OF PHOSPHORUS: CPT

## 2019-03-23 PROCEDURE — 83735 ASSAY OF MAGNESIUM: CPT

## 2019-03-23 PROCEDURE — 93010 ELECTROCARDIOGRAM REPORT: CPT | Mod: ,,, | Performed by: INTERNAL MEDICINE

## 2019-03-23 PROCEDURE — 85025 COMPLETE CBC W/AUTO DIFF WBC: CPT

## 2019-03-23 PROCEDURE — 25500020 PHARM REV CODE 255: Performed by: EMERGENCY MEDICINE

## 2019-03-23 PROCEDURE — 96374 THER/PROPH/DIAG INJ IV PUSH: CPT

## 2019-03-23 PROCEDURE — 96372 THER/PROPH/DIAG INJ SC/IM: CPT | Mod: 59

## 2019-03-23 PROCEDURE — 25000242 PHARM REV CODE 250 ALT 637 W/ HCPCS: Performed by: EMERGENCY MEDICINE

## 2019-03-23 PROCEDURE — 94640 AIRWAY INHALATION TREATMENT: CPT

## 2019-03-23 PROCEDURE — 93010 EKG 12-LEAD: ICD-10-PCS | Mod: ,,, | Performed by: INTERNAL MEDICINE

## 2019-03-23 PROCEDURE — 80053 COMPREHEN METABOLIC PANEL: CPT

## 2019-03-23 PROCEDURE — 93010 ELECTROCARDIOGRAM REPORT: CPT | Mod: 76,,, | Performed by: INTERNAL MEDICINE

## 2019-03-23 PROCEDURE — 63600175 PHARM REV CODE 636 W HCPCS: Mod: JG | Performed by: EMERGENCY MEDICINE

## 2019-03-23 PROCEDURE — 25000003 PHARM REV CODE 250: Performed by: EMERGENCY MEDICINE

## 2019-03-23 PROCEDURE — 63600175 PHARM REV CODE 636 W HCPCS: Performed by: INTERNAL MEDICINE

## 2019-03-23 PROCEDURE — 81000 URINALYSIS NONAUTO W/SCOPE: CPT

## 2019-03-23 RX ORDER — IBUPROFEN 200 MG
24 TABLET ORAL
Status: DISCONTINUED | OUTPATIENT
Start: 2019-03-23 | End: 2019-04-07

## 2019-03-23 RX ORDER — FUROSEMIDE 40 MG/1
40 TABLET ORAL NIGHTLY
Status: DISCONTINUED | OUTPATIENT
Start: 2019-03-23 | End: 2019-03-26

## 2019-03-23 RX ORDER — IBUPROFEN 200 MG
16 TABLET ORAL
Status: DISCONTINUED | OUTPATIENT
Start: 2019-03-23 | End: 2019-03-23

## 2019-03-23 RX ORDER — DEXTROSE MONOHYDRATE 100 MG/ML
1000 INJECTION, SOLUTION INTRAVENOUS
Status: COMPLETED | OUTPATIENT
Start: 2019-03-23 | End: 2019-03-23

## 2019-03-23 RX ORDER — GLUCAGON 1 MG
1 KIT INJECTION
Status: COMPLETED | OUTPATIENT
Start: 2019-03-23 | End: 2019-03-23

## 2019-03-23 RX ORDER — IPRATROPIUM BROMIDE AND ALBUTEROL SULFATE 2.5; .5 MG/3ML; MG/3ML
3 SOLUTION RESPIRATORY (INHALATION) EVERY 6 HOURS
Status: DISCONTINUED | OUTPATIENT
Start: 2019-03-24 | End: 2019-03-23

## 2019-03-23 RX ORDER — GLUCAGON 1 MG
1 KIT INJECTION
Status: DISCONTINUED | OUTPATIENT
Start: 2019-03-23 | End: 2019-04-07

## 2019-03-23 RX ORDER — IPRATROPIUM BROMIDE AND ALBUTEROL SULFATE 2.5; .5 MG/3ML; MG/3ML
3 SOLUTION RESPIRATORY (INHALATION) EVERY 4 HOURS PRN
Status: DISCONTINUED | OUTPATIENT
Start: 2019-03-23 | End: 2019-04-16 | Stop reason: HOSPADM

## 2019-03-23 RX ORDER — ASPIRIN 81 MG/1
81 TABLET ORAL DAILY
Status: DISCONTINUED | OUTPATIENT
Start: 2019-03-24 | End: 2019-04-16 | Stop reason: HOSPADM

## 2019-03-23 RX ORDER — CARVEDILOL 6.25 MG/1
25 TABLET ORAL 2 TIMES DAILY
Status: DISCONTINUED | OUTPATIENT
Start: 2019-03-23 | End: 2019-04-16 | Stop reason: HOSPADM

## 2019-03-23 RX ORDER — AMOXICILLIN 250 MG
1 CAPSULE ORAL 2 TIMES DAILY PRN
Status: DISCONTINUED | OUTPATIENT
Start: 2019-03-23 | End: 2019-04-16 | Stop reason: HOSPADM

## 2019-03-23 RX ORDER — IBUPROFEN 200 MG
24 TABLET ORAL
Status: DISCONTINUED | OUTPATIENT
Start: 2019-03-23 | End: 2019-03-23

## 2019-03-23 RX ORDER — ONDANSETRON 2 MG/ML
8 INJECTION INTRAMUSCULAR; INTRAVENOUS EVERY 8 HOURS PRN
Status: DISCONTINUED | OUTPATIENT
Start: 2019-03-23 | End: 2019-04-16 | Stop reason: HOSPADM

## 2019-03-23 RX ORDER — ACETAMINOPHEN 500 MG
500 TABLET ORAL EVERY 6 HOURS PRN
Status: DISCONTINUED | OUTPATIENT
Start: 2019-03-23 | End: 2019-03-24

## 2019-03-23 RX ORDER — DEXTROSE MONOHYDRATE 100 MG/ML
1000 INJECTION, SOLUTION INTRAVENOUS CONTINUOUS
Status: DISCONTINUED | OUTPATIENT
Start: 2019-03-23 | End: 2019-03-24

## 2019-03-23 RX ORDER — DEXTROSE MONOHYDRATE 25 G/50ML
25 INJECTION, SOLUTION INTRAVENOUS
Status: COMPLETED | OUTPATIENT
Start: 2019-03-23 | End: 2019-03-23

## 2019-03-23 RX ORDER — ATORVASTATIN CALCIUM 10 MG/1
20 TABLET, FILM COATED ORAL NIGHTLY
Status: DISCONTINUED | OUTPATIENT
Start: 2019-03-23 | End: 2019-04-16 | Stop reason: HOSPADM

## 2019-03-23 RX ORDER — CLONIDINE HYDROCHLORIDE 0.1 MG/1
0.1 TABLET ORAL 3 TIMES DAILY PRN
Status: DISCONTINUED | OUTPATIENT
Start: 2019-03-23 | End: 2019-04-16 | Stop reason: HOSPADM

## 2019-03-23 RX ORDER — PANTOPRAZOLE SODIUM 40 MG/1
40 TABLET, DELAYED RELEASE ORAL DAILY
Status: DISCONTINUED | OUTPATIENT
Start: 2019-03-24 | End: 2019-04-16 | Stop reason: HOSPADM

## 2019-03-23 RX ORDER — IBUPROFEN 200 MG
16 TABLET ORAL
Status: DISCONTINUED | OUTPATIENT
Start: 2019-03-23 | End: 2019-04-07

## 2019-03-23 RX ORDER — TAMSULOSIN HYDROCHLORIDE 0.4 MG/1
0.4 CAPSULE ORAL DAILY
Status: DISCONTINUED | OUTPATIENT
Start: 2019-03-24 | End: 2019-04-16 | Stop reason: HOSPADM

## 2019-03-23 RX ORDER — DILTIAZEM HYDROCHLORIDE 30 MG/1
30 TABLET, FILM COATED ORAL EVERY 12 HOURS
Status: DISCONTINUED | OUTPATIENT
Start: 2019-03-23 | End: 2019-04-16 | Stop reason: HOSPADM

## 2019-03-23 RX ORDER — IPRATROPIUM BROMIDE AND ALBUTEROL SULFATE 2.5; .5 MG/3ML; MG/3ML
3 SOLUTION RESPIRATORY (INHALATION)
Status: COMPLETED | OUTPATIENT
Start: 2019-03-23 | End: 2019-03-23

## 2019-03-23 RX ORDER — GLUCAGON 1 MG
1 KIT INJECTION
Status: DISCONTINUED | OUTPATIENT
Start: 2019-03-23 | End: 2019-03-23

## 2019-03-23 RX ORDER — RAMELTEON 8 MG/1
8 TABLET ORAL NIGHTLY PRN
Status: DISCONTINUED | OUTPATIENT
Start: 2019-03-23 | End: 2019-04-16 | Stop reason: HOSPADM

## 2019-03-23 RX ORDER — SODIUM CHLORIDE 0.9 % (FLUSH) 0.9 %
3 SYRINGE (ML) INJECTION
Status: DISCONTINUED | OUTPATIENT
Start: 2019-03-23 | End: 2019-03-23

## 2019-03-23 RX ADMIN — FUROSEMIDE 40 MG: 40 TABLET ORAL at 08:03

## 2019-03-23 RX ADMIN — DEXTROSE MONOHYDRATE 25 G: 25 INJECTION, SOLUTION INTRAVENOUS at 02:03

## 2019-03-23 RX ADMIN — OCTREOTIDE ACETATE 50 MCG/HR: 500 INJECTION, SOLUTION INTRAVENOUS; SUBCUTANEOUS at 08:03

## 2019-03-23 RX ADMIN — IPRATROPIUM BROMIDE AND ALBUTEROL SULFATE 3 ML: .5; 3 SOLUTION RESPIRATORY (INHALATION) at 04:03

## 2019-03-23 RX ADMIN — CARVEDILOL 25 MG: 6.25 TABLET, FILM COATED ORAL at 08:03

## 2019-03-23 RX ADMIN — DILTIAZEM HYDROCHLORIDE 30 MG: 30 TABLET, FILM COATED ORAL at 08:03

## 2019-03-23 RX ADMIN — DEXTROSE 1000 ML: 10 SOLUTION INTRAVENOUS at 06:03

## 2019-03-23 RX ADMIN — DEXTROSE MONOHYDRATE 25 G: 25 INJECTION, SOLUTION INTRAVENOUS at 06:03

## 2019-03-23 RX ADMIN — GLUCAGON 1 MG: 1 INJECTION, POWDER, LYOPHILIZED, FOR SOLUTION INTRAMUSCULAR; INTRAVENOUS at 02:03

## 2019-03-23 RX ADMIN — IOHEXOL 85 ML: 350 INJECTION, SOLUTION INTRAVENOUS at 05:03

## 2019-03-23 RX ADMIN — ATORVASTATIN CALCIUM 20 MG: 10 TABLET, FILM COATED ORAL at 08:03

## 2019-03-23 NOTE — TELEPHONE ENCOUNTER
Reason for Disposition   Severe difficulty breathing (e.g., struggling for each breath, speaks in single words)    Protocols used: DIZZINESS - [id number]

## 2019-03-23 NOTE — ED TRIAGE NOTES
Pt arrived to ED with c/o hypoglycemia. EMS states CBG 14, gave D50 and  after. EMS reports wife made call for AMS starting this morning. Pt also had a fall at home yesterday but was not seen for it. Pt on 2L NC at home. NAD. Pt connected to continuous cardiac monitor, bp cuff, and pulse ox.

## 2019-03-23 NOTE — ED PROVIDER NOTES
Encounter Date: 3/23/2019       History     Chief Complaint   Patient presents with    Hypoglycemia     EMS states that wife called for AMS that started this morning.  EMS reports initial CBG of 14 and up to 150 after 1 amp d 50.  Pt awake and alert now.  Wife reports weakness times 3-4 days.  Pt had a fall at home yesterday but did not go to drCharo     78-year-old male past medical history of anemia, AFib, chronic kidney disease, CHF, hyperlipidemia, mi, diabetes presenting secondary to altered mental status.  He was found have a blood glucose of 14 and was extremely altered.  Patient was given amp of D50.  He is back to his baseline.  Patient has no complaints.  States that he has not been eating or drinking as much recently.  No chest pain or shortness of breath.  Patient did have a fall yesterday recent his legs gave out from under him.  He said he did hit his head with loss of consciousness but he has not been hurting since the fall.  No unilateral weakness or numbness.  No abdominal pain. No change in his bowel or bladder function. States he has been taking his meds and not eating.  Patient is willing to eat and drink while here in the emergency department.          Review of patient's allergies indicates:  No Known Allergies  Past Medical History:   Diagnosis Date    Anemia 05/03/2018    pending blood transfusion    Anticoagulant long-term use     apixaban    Atrial fibrillation     CKD (chronic kidney disease)     Congestive heart failure     COPD (chronic obstructive pulmonary disease)     Coronary artery disease     Diabetes mellitus     Encounter for blood transfusion     HLD (hyperlipidemia)     Hypertension     MI (myocardial infarction)     On home oxygen therapy     Pacemaker     left chest    Peripheral vascular disease     Requires assistance with activities of daily living (ADL)     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     S/P femoral-popliteal bypass surgery left    Stented coronary artery      Tobacco use     Unsteady gait      Past Surgical History:   Procedure Laterality Date    CARDIAC CATHETERIZATION      CARDIAC PACEMAKER PLACEMENT      CARDIAC SURGERY      3 vessel CABG    CARDIOVERSION N/A 2013    Performed by Maryann Surgeon at [facility] MARYANN    cardioverted      CORONARY ANGIOPLASTY WITH STENT PLACEMENT      EGD (ESOPHAGOGASTRODUODENOSCOPY) N/A 2018    Performed by Ty Hickman MD at [facility] ENDO    HEMORRHOID SURGERY      TRANSESOPHAGEAL ECHOCARDIOGRAM (ANIA) N/A 2013    [facility]    VASCULAR SURGERY      femoral artery popliteal bypass     Family History   Problem Relation Age of Onset    Diabetes Father     Diabetes Mother      Social History     Tobacco Use    Smoking status: Former Smoker     Packs/day: 1.00     Years: 60.00     Pack years: 60.00     Types: Cigarettes     Last attempt to quit: 2018     Years since quittin.8    Smokeless tobacco: Never Used   Substance Use Topics    Alcohol use: No    Drug use: No     Review of Systems   Constitutional: Positive for fatigue. Negative for chills and fever.   HENT: Negative for congestion and rhinorrhea.    Eyes: Negative for pain and visual disturbance.   Respiratory: Negative for choking and shortness of breath.    Cardiovascular: Negative for chest pain and palpitations.   Gastrointestinal: Negative for abdominal pain and vomiting.   Endocrine: Negative for polydipsia and polyuria.   Genitourinary: Negative for dysuria and frequency.   Musculoskeletal: Negative for arthralgias and back pain.   Skin: Negative for rash and wound.   Allergic/Immunologic: Negative for immunocompromised state.   Neurological: Positive for weakness. Negative for numbness and headaches.   Hematological: Negative for adenopathy.   Psychiatric/Behavioral: Negative for agitation and behavioral problems.   All other systems reviewed and are negative.      Physical Exam     Initial Vitals [19 1334]   BP Pulse  Resp Temp SpO2   (!) 162/100 95 20 98.3 °F (36.8 °C) 99 %      MAP       --         Physical Exam    Nursing note and vitals reviewed.  Constitutional: He appears well-developed and well-nourished. No distress.   Elderly male sitting in bed   HENT:   Head: Normocephalic.   Right Ear: External ear normal.   Left Ear: External ear normal.   Mouth/Throat: Oropharynx is clear and moist.   Eyes: EOM are normal. Pupils are equal, round, and reactive to light.   Neck: Normal range of motion.   Cardiovascular: Normal rate.   Irregularly irregular   Pulmonary/Chest: Breath sounds normal. No stridor. No respiratory distress. He has no wheezes.   Abdominal: Soft. Bowel sounds are normal.   Musculoskeletal: Normal range of motion. He exhibits no edema or tenderness.   Neurological: He is alert and oriented to person, place, and time. He has normal strength. GCS score is 15. GCS eye subscore is 4. GCS verbal subscore is 5. GCS motor subscore is 6.   Skin: Skin is warm and dry. Capillary refill takes less than 2 seconds.   Psychiatric: He has a normal mood and affect. Thought content normal.         ED Course   Procedures  Labs Reviewed   CBC W/ AUTO DIFFERENTIAL - Abnormal; Notable for the following components:       Result Value    RBC 3.93 (*)     Hemoglobin 9.0 (*)     Hematocrit 32.1 (*)     MCH 22.9 (*)     MCHC 28.0 (*)     RDW 22.0 (*)     Lymph # 0.8 (*)     Gran% 73.2 (*)     Lymph% 11.8 (*)     All other components within normal limits   COMPREHENSIVE METABOLIC PANEL - Abnormal; Notable for the following components:    Glucose 30 (*)     BUN, Bld 30 (*)     Total Protein 4.9 (*)     Albumin 2.4 (*)     Anion Gap 7 (*)     eGFR if non  57 (*)     All other components within normal limits    Narrative:     GLUCOSE critical result(s) called and verbal readback obtained from   RON BENAVIDEZ , 03/23/2019 14:35   URINALYSIS, REFLEX TO URINE CULTURE - Abnormal; Notable for the following components:     Appearance, UA Hazy (*)     Protein, UA 2+ (*)     Occult Blood UA 1+ (*)     Leukocytes, UA 1+ (*)     All other components within normal limits    Narrative:     Preferred Collection Type->Urine, Clean Catch   URINALYSIS MICROSCOPIC - Abnormal; Notable for the following components:    Bacteria, UA Moderate (*)     Amorphous, UA Many (*)     All other components within normal limits    Narrative:     Preferred Collection Type->Urine, Clean Catch   MAGNESIUM   PHOSPHORUS   POCT GLUCOSE   POCT GLUCOSE MONITORING CONTINUOUS     EKG Readings: (Independently Interpreted)   EKG done at 1:43 p.m. showing atrial fibrillation with premature ventricular complexes the right and 98.  Right bundle branch block.  Flat T-waves diffusely.  Normal axis.   .  Compared to previous EKG in similar.       Imaging Results          CTA Chest Non-Coronary - PE Study (In process)                CT Head Without Contrast (Final result)  Result time 03/23/19 15:37:51    Final result by Adama Velez MD (03/23/19 15:37:51)                 Impression:      1. Motion limited examination, no convincing acute intracranial abnormalities noting sequela of chronic microvascular ischemic change and senescent change.  2. Sinus disease.      Electronically signed by: Adama Velez MD  Date:    03/23/2019  Time:    15:37             Narrative:    EXAMINATION:  CT HEAD WITHOUT CONTRAST    CLINICAL HISTORY:  fall, head trauma;    TECHNIQUE:  Low dose axial images were obtained through the head.  Coronal and sagittal reformations were also performed. Contrast was not administered.    COMPARISON:  09/24/2016    FINDINGS:  Please note, examination is limited secondary to patient motion.    There is generalized cerebral volume loss.  There is hypoattenuation in a periventricular fashion, likely sequela of chronic microvascular ischemic change.  There is a punctate focus of hypoattenuation within the anterior right caudate, stable,  suggesting sequela of remote infarct.  There is no evidence of acute major vascular territory infarct, hemorrhage, or mass.  There is no hydrocephalus.  There are no abnormal extra-axial fluid collections.  There is opacification of the posterior right ethmoid sinuses, otherwise the visualized paranasal sinuses and mastoid air cells are clear, and there is no evidence of calvarial fracture.  The visualized soft tissues are unremarkable.                               X-Ray Chest AP Portable (Final result)  Result time 03/23/19 14:02:25    Final result by Emiliano Rush MD (03/23/19 14:02:25)                 Impression:      Cardiomegaly without definite active pulmonary process.      Electronically signed by: Emiliano Rush MD  Date:    03/23/2019  Time:    14:02             Narrative:    EXAMINATION:  XR CHEST AP PORTABLE    CLINICAL HISTORY:  weakness;    TECHNIQUE:  Single frontal view of the chest was performed.    COMPARISON:  03/13/2019    FINDINGS:  Cardiac silhouette and mediastinal contours are stable.  Cardiac device remains.  Lungs demonstrate no focal opacity.  No definite evidence of failure.  Osseous structures unchanged.                                 Medical Decision Making:   Initial Assessment:   Patient arrived via EMS with AMS. physical exam remarkable for elderly male sitting in bed at baseline and GCS 15.  Most likely cause of altered mental status is hypoglycemia due to blood glucose 14.  Patient also did have head trauma yesterday which could be related but less likely.  Will get imaging of his heads.    Given patient has normal O2 on his 2 L of home oxygen, hypoxia less likely.    DDX still includes, but is not limited to: EtOH, electrolyte abnormality (low/high Na, low/high Mg, hypocalcemia, hypophosphatemia), abnormal thyroid function, infection/sepsis, drug overdose, hypothermia, uremia, trauma, encephalopathy/encephalitis, CVA, SAH or other ICH, postictal state 2/2 seizure,  psych.  Less likely cardiac cause due to no chest pain or shortness of breath at this time.    Labs  Urine  ECG  CXR  Head CT    Patient will be getting acute 30 min blood glucose checks to make sure that he does not become hypoglycemic.  He did tolerate p.o. and have juice while here in the emergency department.    14:21  This on repeat blood glucose check his blood glucose decreased down to 44.  Patient is alert and oriented. He is able to answer questions.  Given patient shot IM glucagon.  Patient was eating a sandwich at the time we checked his blood sugar.  He is also getting another dose of D50.  Family is here.    Chief patient started complaining of shortness of breath.  Duo nebs were ordered for the patient.  He is also tachycardic 1 O2.  Getting a CT PE which will dominguez evaluate his lungs look for any blood clot and/or infection.  His blood sugar is unfortunately continuing to trend down am concerned that he will become hypoglycemic again.    6:00 PM  Patient hypoglycemic again.  Giving patient other D50 and placing patient on D10 drip.  Will admit to the ICU due to the fact patient need q.1 hour blood glucose checks.  Pending CT PE.    CT PE showed not PE on my review of the CT.  Dr. Barrera to review CT read for anything acute and update ICU if anything comes back positive. I spoke to Dr Angulo regarding patient and admitted patient to his service in the ICU for q1hr BG checks.      Please put in 35 minutes of critical care due to patient having a high risk of neurological failure.   Separate from teaching and exclusive of procedure and ekg time  Includes:  Time at bedside  Time reviewing test results  Time discussing case with staff  Time documenting the medical record  Time spent with family members  Time spent with consults  Management      Clinical Tests:   Lab Tests: Ordered and Reviewed  Radiological Study: Reviewed and Ordered  Medical Tests: Ordered and Reviewed                      Clinical  Impression:       ICD-10-CM ICD-9-CM   1. Hypoglycemia E16.2 251.2   2. Weakness R53.1 780.79   3. Altered mental status, unspecified altered mental status type R41.82 780.97                                Arpit Flores MD  03/24/19 0815

## 2019-03-24 PROBLEM — Z79.01 CHRONIC ANTICOAGULATION: Chronic | Status: RESOLVED | Noted: 2018-05-04 | Resolved: 2019-03-24

## 2019-03-24 LAB
ALBUMIN SERPL BCP-MCNC: 2.1 G/DL (ref 3.5–5.2)
ALP SERPL-CCNC: 104 U/L (ref 55–135)
ALT SERPL W/O P-5'-P-CCNC: 15 U/L (ref 10–44)
ANION GAP SERPL CALC-SCNC: 5 MMOL/L (ref 8–16)
ANISOCYTOSIS BLD QL SMEAR: ABNORMAL
AST SERPL-CCNC: 16 U/L (ref 10–40)
BACTERIA #/AREA URNS HPF: ABNORMAL /HPF
BASOPHILS # BLD AUTO: 0.03 K/UL (ref 0–0.2)
BASOPHILS NFR BLD: 0.4 % (ref 0–1.9)
BILIRUB SERPL-MCNC: 0.5 MG/DL (ref 0.1–1)
BILIRUB UR QL STRIP: NEGATIVE
BUN SERPL-MCNC: 28 MG/DL (ref 8–23)
CALCIUM SERPL-MCNC: 9.6 MG/DL (ref 8.7–10.5)
CHLORIDE SERPL-SCNC: 107 MMOL/L (ref 95–110)
CLARITY UR: CLEAR
CO2 SERPL-SCNC: 28 MMOL/L (ref 23–29)
COLOR UR: YELLOW
CREAT SERPL-MCNC: 1.2 MG/DL (ref 0.5–1.4)
DACRYOCYTES BLD QL SMEAR: ABNORMAL
DIFFERENTIAL METHOD: ABNORMAL
EOSINOPHIL # BLD AUTO: 0.1 K/UL (ref 0–0.5)
EOSINOPHIL NFR BLD: 1.8 % (ref 0–8)
ERYTHROCYTE [DISTWIDTH] IN BLOOD BY AUTOMATED COUNT: 22 % (ref 11.5–14.5)
EST. GFR  (AFRICAN AMERICAN): >60 ML/MIN/1.73 M^2
EST. GFR  (NON AFRICAN AMERICAN): 57 ML/MIN/1.73 M^2
GLUCOSE SERPL-MCNC: 185 MG/DL (ref 70–110)
GLUCOSE UR QL STRIP: NEGATIVE
HCT VFR BLD AUTO: 29.7 % (ref 40–54)
HGB BLD-MCNC: 8 G/DL (ref 14–18)
HGB UR QL STRIP: ABNORMAL
HYALINE CASTS #/AREA URNS LPF: 0 /LPF
HYPOCHROMIA BLD QL SMEAR: ABNORMAL
KETONES UR QL STRIP: NEGATIVE
LEUKOCYTE ESTERASE UR QL STRIP: ABNORMAL
LYMPHOCYTES # BLD AUTO: 1.2 K/UL (ref 1–4.8)
LYMPHOCYTES NFR BLD: 15.6 % (ref 18–48)
MAGNESIUM SERPL-MCNC: 1.8 MG/DL (ref 1.6–2.6)
MCH RBC QN AUTO: 21.8 PG (ref 27–31)
MCHC RBC AUTO-ENTMCNC: 26.9 G/DL (ref 32–36)
MCV RBC AUTO: 81 FL (ref 82–98)
MICROSCOPIC COMMENT: ABNORMAL
MONOCYTES # BLD AUTO: 1.2 K/UL (ref 0.3–1)
MONOCYTES NFR BLD: 15.6 % (ref 4–15)
NEUTROPHILS # BLD AUTO: 5.3 K/UL (ref 1.8–7.7)
NEUTROPHILS NFR BLD: 66.9 % (ref 38–73)
NITRITE UR QL STRIP: NEGATIVE
PH UR STRIP: 5 [PH] (ref 5–8)
PHOSPHATE SERPL-MCNC: 3.6 MG/DL (ref 2.7–4.5)
PLATELET # BLD AUTO: 196 K/UL (ref 150–350)
PLATELET BLD QL SMEAR: ABNORMAL
PMV BLD AUTO: 10.3 FL (ref 9.2–12.9)
POCT GLUCOSE: 152 MG/DL (ref 70–110)
POCT GLUCOSE: 177 MG/DL (ref 70–110)
POCT GLUCOSE: 177 MG/DL (ref 70–110)
POCT GLUCOSE: 186 MG/DL (ref 70–110)
POCT GLUCOSE: 187 MG/DL (ref 70–110)
POCT GLUCOSE: 199 MG/DL (ref 70–110)
POCT GLUCOSE: 202 MG/DL (ref 70–110)
POCT GLUCOSE: 205 MG/DL (ref 70–110)
POCT GLUCOSE: 228 MG/DL (ref 70–110)
POCT GLUCOSE: 265 MG/DL (ref 70–110)
POCT GLUCOSE: 307 MG/DL (ref 70–110)
POCT GLUCOSE: 342 MG/DL (ref 70–110)
POLYCHROMASIA BLD QL SMEAR: ABNORMAL
POTASSIUM SERPL-SCNC: 4.1 MMOL/L (ref 3.5–5.1)
PROT SERPL-MCNC: 4.1 G/DL (ref 6–8.4)
PROT UR QL STRIP: ABNORMAL
RBC # BLD AUTO: 3.67 M/UL (ref 4.6–6.2)
RBC #/AREA URNS HPF: 0 /HPF (ref 0–4)
SCHISTOCYTES BLD QL SMEAR: ABNORMAL
SODIUM SERPL-SCNC: 140 MMOL/L (ref 136–145)
SP GR UR STRIP: 1.02 (ref 1–1.03)
SQUAMOUS #/AREA URNS HPF: 2 /HPF
TARGETS BLD QL SMEAR: ABNORMAL
URN SPEC COLLECT METH UR: ABNORMAL
UROBILINOGEN UR STRIP-ACNC: NEGATIVE EU/DL
WBC # BLD AUTO: 7.94 K/UL (ref 3.9–12.7)
WBC #/AREA URNS HPF: 2 /HPF (ref 0–5)

## 2019-03-24 PROCEDURE — 25000003 PHARM REV CODE 250: Performed by: EMERGENCY MEDICINE

## 2019-03-24 PROCEDURE — 25000003 PHARM REV CODE 250: Performed by: HOSPITALIST

## 2019-03-24 PROCEDURE — 83735 ASSAY OF MAGNESIUM: CPT

## 2019-03-24 PROCEDURE — 36569 INSJ PICC 5 YR+ W/O IMAGING: CPT

## 2019-03-24 PROCEDURE — 94640 AIRWAY INHALATION TREATMENT: CPT

## 2019-03-24 PROCEDURE — 84100 ASSAY OF PHOSPHORUS: CPT

## 2019-03-24 PROCEDURE — 80053 COMPREHEN METABOLIC PANEL: CPT

## 2019-03-24 PROCEDURE — 63600175 PHARM REV CODE 636 W HCPCS: Performed by: INTERNAL MEDICINE

## 2019-03-24 PROCEDURE — 25000242 PHARM REV CODE 250 ALT 637 W/ HCPCS: Performed by: HOSPITALIST

## 2019-03-24 PROCEDURE — 25000003 PHARM REV CODE 250: Performed by: INTERNAL MEDICINE

## 2019-03-24 PROCEDURE — 25000242 PHARM REV CODE 250 ALT 637 W/ HCPCS: Performed by: INTERNAL MEDICINE

## 2019-03-24 PROCEDURE — 94761 N-INVAS EAR/PLS OXIMETRY MLT: CPT

## 2019-03-24 PROCEDURE — 85025 COMPLETE CBC W/AUTO DIFF WBC: CPT

## 2019-03-24 PROCEDURE — S5010 5% DEXTROSE AND 0.45% SALINE: HCPCS | Performed by: HOSPITALIST

## 2019-03-24 PROCEDURE — 36415 COLL VENOUS BLD VENIPUNCTURE: CPT

## 2019-03-24 PROCEDURE — 11000001 HC ACUTE MED/SURG PRIVATE ROOM

## 2019-03-24 PROCEDURE — 27000221 HC OXYGEN, UP TO 24 HOURS

## 2019-03-24 PROCEDURE — 81000 URINALYSIS NONAUTO W/SCOPE: CPT

## 2019-03-24 RX ORDER — DEXTROSE MONOHYDRATE 100 MG/ML
1000 INJECTION, SOLUTION INTRAVENOUS CONTINUOUS
Status: DISCONTINUED | OUTPATIENT
Start: 2019-03-24 | End: 2019-03-24

## 2019-03-24 RX ORDER — FERROUS GLUCONATE 324(38)MG
324 TABLET ORAL
Status: DISCONTINUED | OUTPATIENT
Start: 2019-03-24 | End: 2019-04-16 | Stop reason: HOSPADM

## 2019-03-24 RX ORDER — SODIUM CHLORIDE 0.9 % (FLUSH) 0.9 %
10 SYRINGE (ML) INJECTION EVERY 6 HOURS
Status: DISCONTINUED | OUTPATIENT
Start: 2019-03-25 | End: 2019-04-16 | Stop reason: HOSPADM

## 2019-03-24 RX ORDER — SODIUM CHLORIDE 0.9 % (FLUSH) 0.9 %
10 SYRINGE (ML) INJECTION
Status: DISCONTINUED | OUTPATIENT
Start: 2019-03-24 | End: 2019-04-16 | Stop reason: HOSPADM

## 2019-03-24 RX ORDER — DEXTROSE MONOHYDRATE AND SODIUM CHLORIDE 5; .45 G/100ML; G/100ML
INJECTION, SOLUTION INTRAVENOUS CONTINUOUS
Status: DISCONTINUED | OUTPATIENT
Start: 2019-03-24 | End: 2019-03-25

## 2019-03-24 RX ORDER — FUROSEMIDE 10 MG/ML
60 INJECTION INTRAMUSCULAR; INTRAVENOUS ONCE
Status: COMPLETED | OUTPATIENT
Start: 2019-03-24 | End: 2019-03-24

## 2019-03-24 RX ORDER — IPRATROPIUM BROMIDE AND ALBUTEROL SULFATE 2.5; .5 MG/3ML; MG/3ML
3 SOLUTION RESPIRATORY (INHALATION) ONCE
Status: COMPLETED | OUTPATIENT
Start: 2019-03-24 | End: 2019-03-24

## 2019-03-24 RX ORDER — LOSARTAN POTASSIUM 25 MG/1
25 TABLET ORAL DAILY
Status: DISCONTINUED | OUTPATIENT
Start: 2019-03-24 | End: 2019-04-16 | Stop reason: HOSPADM

## 2019-03-24 RX ORDER — ACETAMINOPHEN 325 MG/1
650 TABLET ORAL EVERY 4 HOURS PRN
Status: DISCONTINUED | OUTPATIENT
Start: 2019-03-24 | End: 2019-04-06

## 2019-03-24 RX ORDER — INSULIN ASPART 100 [IU]/ML
0-5 INJECTION, SOLUTION INTRAVENOUS; SUBCUTANEOUS
Status: DISCONTINUED | OUTPATIENT
Start: 2019-03-24 | End: 2019-04-16 | Stop reason: HOSPADM

## 2019-03-24 RX ADMIN — ASPIRIN 81 MG: 81 TABLET, COATED ORAL at 08:03

## 2019-03-24 RX ADMIN — TAMSULOSIN HYDROCHLORIDE 0.4 MG: 0.4 CAPSULE ORAL at 08:03

## 2019-03-24 RX ADMIN — FUROSEMIDE 60 MG: 10 INJECTION, SOLUTION INTRAMUSCULAR; INTRAVENOUS at 06:03

## 2019-03-24 RX ADMIN — IPRATROPIUM BROMIDE AND ALBUTEROL SULFATE 3 ML: .5; 3 SOLUTION RESPIRATORY (INHALATION) at 03:03

## 2019-03-24 RX ADMIN — IPRATROPIUM BROMIDE AND ALBUTEROL SULFATE 3 ML: .5; 3 SOLUTION RESPIRATORY (INHALATION) at 08:03

## 2019-03-24 RX ADMIN — DILTIAZEM HYDROCHLORIDE 30 MG: 30 TABLET, FILM COATED ORAL at 08:03

## 2019-03-24 RX ADMIN — IPRATROPIUM BROMIDE AND ALBUTEROL SULFATE 3 ML: .5; 3 SOLUTION RESPIRATORY (INHALATION) at 12:03

## 2019-03-24 RX ADMIN — PANTOPRAZOLE SODIUM 40 MG: 40 TABLET, DELAYED RELEASE ORAL at 08:03

## 2019-03-24 RX ADMIN — DEXTROSE AND SODIUM CHLORIDE: 5; .45 INJECTION, SOLUTION INTRAVENOUS at 08:03

## 2019-03-24 RX ADMIN — INSULIN ASPART 3 UNITS: 100 INJECTION, SOLUTION INTRAVENOUS; SUBCUTANEOUS at 09:03

## 2019-03-24 RX ADMIN — CARVEDILOL 25 MG: 6.25 TABLET, FILM COATED ORAL at 09:03

## 2019-03-24 RX ADMIN — FERROUS GLUCONATE TAB 324 MG (37.5 MG ELEMENTAL IRON) 324 MG: 324 (37.5 FE) TAB at 10:03

## 2019-03-24 RX ADMIN — DILTIAZEM HYDROCHLORIDE 30 MG: 30 TABLET, FILM COATED ORAL at 09:03

## 2019-03-24 RX ADMIN — DEXTROSE 1000 ML: 10 SOLUTION INTRAVENOUS at 06:03

## 2019-03-24 RX ADMIN — CARVEDILOL 25 MG: 6.25 TABLET, FILM COATED ORAL at 08:03

## 2019-03-24 RX ADMIN — LOSARTAN POTASSIUM 25 MG: 25 TABLET, FILM COATED ORAL at 10:03

## 2019-03-24 RX ADMIN — FUROSEMIDE 40 MG: 40 TABLET ORAL at 09:03

## 2019-03-24 RX ADMIN — IPRATROPIUM BROMIDE AND ALBUTEROL SULFATE 3 ML: .5; 3 SOLUTION RESPIRATORY (INHALATION) at 05:03

## 2019-03-24 RX ADMIN — OCTREOTIDE ACETATE 50 MCG/HR: 500 INJECTION, SOLUTION INTRAVENOUS; SUBCUTANEOUS at 02:03

## 2019-03-24 RX ADMIN — ATORVASTATIN CALCIUM 20 MG: 10 TABLET, FILM COATED ORAL at 09:03

## 2019-03-24 NOTE — PLAN OF CARE
Problem: Adult Inpatient Plan of Care  Goal: Plan of Care Review  Outcome: Ongoing (interventions implemented as appropriate)     03/24/19 5712   Plan of Care Review   Plan of Care Reviewed With patient   Patient came from ICU, blood sugar 307 at this time, patient to receive iv fluids, patient to obtain a midline

## 2019-03-24 NOTE — HPI
Mr. Agus Ramos Jr. is a 78 y.o. male known to me with essential hypertension, type 2 diabetes mellitus (HbA1c 5.0% Mar 2019), CAD s/p CABG, chronic combined systolic and diastolic heart failure (LVEF 50% Mar 2019), CKD Stage 3, peripheral artery disease, chronic atrial fibrillation (CSI1KO0-TMKu score 5) on chronic anticoagulation, COPD, chronic respiratory failure with hypoxia, and anemia of chronic disease who presents to [facility] ED with complaints of a fall this morning.  He had much difficulty standing back up.  EMS was activated and they found that his capillary glucose was 14 mg/dL after which he was given an ampule of 50% dextrose.  His mentation returned to baseline thereafter.  He denies any tremors nor diaphoresis.  He says that he's been feeling weak and dizzy today but denies any loss of consciousness, nor antecedent chest pain, shortness of breath, palpitations, fevers, chills, nausea, vomiting, abdominal pain, or any diarrhea.  He did not trip or slip or anything.  He has had a good appetite but cannot say for sure if he has been taking too much of his diabetes medications.  He denies any recent changes to his medications.  He otherwise has been in his usual state of health.

## 2019-03-24 NOTE — PROGRESS NOTES
[facility] Hospital Medicine  Progress Note    Patient Name: Agus Ramos Jr.  MRN: [id number]  Patient Class: IP- Inpatient   Admission Date: 3/23/2019  Length of Stay: 1 days  Attending Physician: Jose De Jesus Angulo MD  Primary Care Provider: Keaton Hardy MD        Subjective:     Principal Problem:Hypoglycemia    HPI:  Mr. Agus Ramos Jr. is a 78 y.o. male known to me with essential hypertension, type 2 diabetes mellitus (HbA1c 5.0% Mar 2019), CAD s/p CABG, chronic combined systolic and diastolic heart failure (LVEF 50% Mar 2019), CKD Stage 3, peripheral artery disease, chronic atrial fibrillation (FYH0UN3-KROn score 5) on chronic anticoagulation, COPD, chronic respiratory failure with hypoxia, and anemia of chronic disease who presents to [facility] ED with complaints of a fall this morning.  He had much difficulty standing back up.  EMS was activated and they found that his capillary glucose was 14 mg/dL after which he was given an ampule of 50% dextrose.  His mentation returned to baseline thereafter.  He denies any tremors nor diaphoresis.  He says that he's been feeling weak and dizzy today but denies any loss of consciousness, nor antecedent chest pain, shortness of breath, palpitations, fevers, chills, nausea, vomiting, abdominal pain, or any diarrhea.  He did not trip or slip or anything.  He has had a good appetite but cannot say for sure if he has been taking too much of his diabetes medications.  He denies any recent changes to his medications.  He otherwise has been in his usual state of health.    Hospital Course:  79 y/o male presented with weakness.  Hx of DM on Glipizide and Metformin.  Patient noted to be hypoglycemic.  Initially responded to D50, but then became persistently hypoglycemic.  Admitted to ICU on D10 drip and hourly glucose monitoring.  Also started on Octreotide drip.      Interval History: Feeling well with no complaints.    Review of Systems   HENT: Negative  for ear discharge and ear pain.    Eyes: Negative for pain and itching.   Cardiovascular: Negative for chest pain and palpitations.   Neurological: Negative for seizures and syncope.     Objective:     Vital Signs (Most Recent):  Temp: 97.4 °F (36.3 °C) (03/24/19 0315)  Pulse: 92 (03/24/19 0900)  Resp: (!) 28 (03/24/19 0900)  BP: (!) 141/79 (03/24/19 0900)  SpO2: 98 % (03/24/19 0900) Vital Signs (24h Range):  Temp:  [96.6 °F (35.9 °C)-98.5 °F (36.9 °C)] 97.4 °F (36.3 °C)  Pulse:  [] 92  Resp:  [14-38] 28  SpO2:  [93 %-100 %] 98 %  BP: (117-171)/() 141/79     Weight: 89.6 kg (197 lb 8.5 oz)  Body mass index is 29.17 kg/m².    Intake/Output Summary (Last 24 hours) at 3/24/2019 0941  Last data filed at 3/24/2019 0900  Gross per 24 hour   Intake 1530.91 ml   Output 550 ml   Net 980.91 ml      Physical Exam   Constitutional: He is oriented to person, place, and time. He appears well-developed and well-nourished. No distress.   HENT:   Head: Normocephalic and atraumatic.   Right Ear: External ear normal.   Left Ear: External ear normal.   Nose: Nose normal.   Eyes: Right eye exhibits no discharge. Left eye exhibits no discharge.   Neck: Normal range of motion.   Cardiovascular:   Irregularly irregular, no murmurs or gallops   Pulmonary/Chest: Effort normal. No respiratory distress.   Abdominal: Soft. Bowel sounds are normal. He exhibits no distension. There is no tenderness. There is no rebound and no guarding.   Musculoskeletal: Normal range of motion. He exhibits no edema.   Neurological: He is alert and oriented to person, place, and time.   Skin: Skin is warm and dry. He is not diaphoretic. No erythema.   Psychiatric: He has a normal mood and affect. His behavior is normal. Judgment and thought content normal.   Nursing note and vitals reviewed.      Significant Labs:   BMP:   Recent Labs   Lab 03/24/19  0259   *      K 4.1      CO2 28   BUN 28*   CREATININE 1.2   CALCIUM 9.6   MG 1.8      CBC:   Recent Labs   Lab 03/23/19  1352 03/24/19  0259   WBC 7.04 7.94   HGB 9.0* 8.0*   HCT 32.1* 29.7*    196     POCT Glucose:   Recent Labs   Lab 03/24/19  0600 03/24/19  0657 03/24/19  0802   POCTGLUCOSE 202* 186* 199*     Assessment/Plan:      * Hypoglycemia  Patient was noted to have a capillary glucose of 14 mg/dL in the field for which he was given an ampule of 50% dextrose with improvement of his mentation.  His initial serum glucose here was 30 mg/dL but later was still 44 mg/dL after treatment.  He improved into the 180's but continued to drop to 81 mg/dL then to 36 mg/dL.    Admitted to ICU on D10 drip and hourly glucose checks.  Also started on Octreotide drip.  Glucose increasing with no further episodes of hypoglycemia.  Will change D10 to D5 and monitor glucose every 4 hours.  The etiology of his hypoglycemia is unclear but he is on a sulfonylurea at home.  He does have 1+ leukocytes and moderate bacteria on urine, but many amorphous cells.  Will repeat UA with UCx.  Afebrile.  Will hold off on ABx's for now.      Peripheral artery disease  Stable; will continue his home regimen of aspirin and atorvastatin.    Chronic combined systolic and diastolic heart failure  Stable without evidence of acute heart failure; will continue his home regimen of carvedilol and furosemide.    Chronic respiratory failure with hypoxia  Stable; will continue his home supplemental oxygen therapy.    Centrilobular emphysema  Clinically-stable without wheezing.  Will provide as-needed JENAE/LAMA available.    Iron deficiency anemia  The patient's H/H is stable and consistent with previous laboratory measurements, and the patient exhibits no signs or symptoms of acute bleeding; there is no indication for transfusion.  Will continue to monitor.    Type 2 diabetes mellitus, controlled, with renal complications  As addressed above.    CKD Stage 3  His renal function appears to be at his baseline.    Essential  hypertension  Continue home regimen of carvedilol, diltiazem, furosemide, and tamsulosin, and provide as-needed clonidine.    CAD (coronary artery disease)  Stable; will continue his home regimen of aspirin, atorvastatin, and carvedilol.    Chronic atrial fibrillation  Patient is in atrial fibrillation at this time which is baseline for him.  He was taken off of anticoagulation during his last admission a week ago due to anemia and was instructed not to take it until outpatient follow-up with gastroenterology.  Will continue to monitor.      VTE Risk Mitigation (From admission, onward)        Ordered     IP VTE HIGH RISK PATIENT  Once      03/23/19 1936     Reason for No Pharmacological VTE Prophylaxis  Once      03/23/19 1936     Place sequential compression device  Until discontinued      03/23/19 1934     Place DENIS hose  Until discontinued      03/23/19 1934          Jose De Jesus Angulo MD  Department of Hospital Medicine   [facility]

## 2019-03-24 NOTE — ASSESSMENT & PLAN NOTE
Continue home regimen of carvedilol, diltiazem, furosemide, and tamsulosin, and provide as-needed clonidine.

## 2019-03-24 NOTE — ASSESSMENT & PLAN NOTE
Patient was noted to have a capillary glucose of 14 mg/dL in the field for which he was given an ampule of 50% dextrose with improvement of his mentation.  His initial serum glucose here was 30 mg/dL but later was still 44 mg/dL after treatment.  He improved into the 180's but continued to drop to 81 mg/dL then to 36 mg/dL.    Admitted to ICU on D10 drip and hourly glucose checks.  Also started on Octreotide drip.  Glucose increasing with no further episodes of hypoglycemia.  Will change D10 to D5 and monitor glucose every 4 hours.  The etiology of his hypoglycemia is unclear but he is on a sulfonylurea at home.  He does have 1+ leukocytes and moderate bacteria on urine, but many amorphous cells.  Will repeat UA with UCx.  Afebrile.  Will hold off on ABx's for now.

## 2019-03-24 NOTE — NURSING TRANSFER
Nursing Transfer Note      3/24/2019 1315    Transfer to  400    Transfer via w/c    Transfer with cardiac monitor/oxygen    Transported by ICU RN/transporter    Medicines sent: none    Chart send with patient: yes    Notified: wife notified    Patient reassessed at: 03/24/2019  1300    Upon arrival to floor: cardiac monitor applied, patient oriented to room, call bell in reach and bed in lowest position

## 2019-03-24 NOTE — ASSESSMENT & PLAN NOTE
Patient is in atrial fibrillation at this time which is baseline for him.  He was taken off of anticoagulation during his last admission a week ago due to anemia and was instructed not to take it until outpatient follow-up with gastroenterology.  Will continue to monitor.

## 2019-03-24 NOTE — H&P
[facility] Medicine  History & Physical    Patient Name: Agus Ramos Jr.  MRN: [id number]  Admission Date: 3/23/2019  Attending Physician: Jose De Jesus Angulo MD   Primary Care Provider: Keaton Hardy MD         Patient information was obtained from patient.     Subjective:     Principal Problem:Hypoglycemia    Chief Complaint: Fall today.    HPI: Mr. Agus Ramos Jr. is a 78 y.o. male known to me with essential hypertension, type 2 diabetes mellitus (HbA1c 5.0% Mar 2019), CAD s/p CABG, chronic combined systolic and diastolic heart failure (LVEF 50% Mar 2019), CKD Stage 3, peripheral artery disease, chronic atrial fibrillation ([id number] score 5) on chronic anticoagulation, COPD, chronic respiratory failure with hypoxia, and anemia of chronic disease who presents to [facility] ED with complaints of a fall this morning.  He had much difficulty standing back up.  EMS was activated and they found that his capillary glucose was 14 mg/dL after which he was given an ampule of 50% dextrose.  His mentation returned to baseline thereafter.  He denies any tremors nor diaphoresis.  He says that he's been feeling weak and dizzy today but denies any loss of consciousness, nor antecedent chest pain, shortness of breath, palpitations, fevers, chills, nausea, vomiting, abdominal pain, or any diarrhea.  He did not trip or slip or anything.  He has had a good appetite but cannot say for sure if he has been taking too much of his diabetes medications.  He denies any recent changes to his medications.  He otherwise has been in his usual state of health.    Chart Review:  Previous Hospitalizations  Date Hospital Diagnosis   Mar 14, 2019 [facility]- Acute heart failure, acute renal failure, anemia s/p pRBC transfusion    Dec 2018 [facility]- Acute heart failure    Nov 2018 [facility]- COPD exacerbation    Jul 2018 [facility]- Acute heart failure    May 27, 2018 [facility]- Acute heart failure, anemia s/p negative EGD   May 3,  2018 [facility] Symptomatic anemia s/p pRBC transfusion 2 units    Sept 2016 [facility] UTI, hyperkalemia    Sept 2013 [facility] AFib with RVR, ANIA/DCCV      Outpatient Follow-Up  Date of Visit Physician Service   Dec 2018 Millie Bell MD Hematology    Jun 2018 Vane Dove MD Pulmonology    Jun 2018 Neil Wiggins MD Urology      Past Medical History:   Diagnosis Date    Anemia 05/03/2018    pending blood transfusion    Anticoagulant long-term use     apixaban    Atrial fibrillation     CKD (chronic kidney disease)     Congestive heart failure     COPD (chronic obstructive pulmonary disease)     Coronary artery disease     Diabetes mellitus     Encounter for blood transfusion     HLD (hyperlipidemia)     Hypertension     MI (myocardial infarction)     On home oxygen therapy     Pacemaker     left chest    Peripheral vascular disease     Requires assistance with activities of daily living (ADL)     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     S/P femoral-popliteal bypass surgery left    Stented coronary artery     Tobacco use     Unsteady gait        Past Surgical History:   Procedure Laterality Date    CARDIAC CATHETERIZATION      CARDIAC PACEMAKER PLACEMENT      CARDIAC SURGERY      3 vessel CABG    CARDIOVERSION N/A 9/19/2013    Performed by Maryann Surgeon at [facility] MARYANN    cardioverted      CORONARY ANGIOPLASTY WITH STENT PLACEMENT      EGD (ESOPHAGOGASTRODUODENOSCOPY) N/A 6/1/2018    Performed by Ty Hickman MD at [facility] ENDO    HEMORRHOID SURGERY      TRANSESOPHAGEAL ECHOCARDIOGRAM (ANIA) N/A 9/19/2013    Performed by [facility] at [facility]    VASCULAR SURGERY      femoral artery popliteal bypass       Review of patient's allergies indicates:  No Known Allergies    No current facility-administered medications on file prior to encounter.      Current Outpatient Medications on File Prior to Encounter   Medication Sig    albuterol-ipratropium 2.5mg-0.5mg/3mL (DUO-NEB) 0.5 mg-3 mg(2.5 mg base)/3 mL nebulizer solution  Take 3 mLs by nebulization every 6 (six) hours. Rescue    aspirin (ECOTRIN) 81 MG EC tablet Take 1 tablet (81 mg total) by mouth once daily.    atorvastatin (LIPITOR) 20 MG tablet Take 20 mg by mouth every evening.    carvedilol (COREG) 25 MG tablet Take 1 tablet (25 mg total) by mouth 2 (two) times daily.    diltiaZEM (CARDIZEM) 30 MG tablet Take 1 tablet (30 mg total) by mouth every 12 (twelve) hours.    ferrous gluconate (FERGON) 324 MG tablet Take 324 mg by mouth daily with breakfast.    fish oil-omega-3 fatty acids 300-1,000 mg capsule Take 2 g by mouth 2 (two) times daily.     furosemide (LASIX) 40 MG tablet     furosemide (LASIX) 80 MG tablet Take 40 mg by mouth 2 (two) times daily.     glipiZIDE (GLUCOTROL) 5 MG tablet Take 10 mg by mouth 2 (two) times daily before meals.    losartan (COZAAR) 25 MG tablet Take 25 mg by mouth once daily.     losartan (COZAAR) 50 MG tablet Take 0.5 tablets (25 mg total) by mouth once daily.    metFORMIN (GLUCOPHAGE-XR) 500 MG 24 hr tablet     nitroGLYCERIN (NITROSTAT) 0.4 MG SL tablet Place 0.4 mg under the tongue every 5 (five) minutes as needed.    pantoprazole (PROTONIX) 40 MG tablet Take 1 tablet (40 mg total) by mouth once daily.    polyethylene glycol (GLYCOLAX) 17 gram PwPk Take by mouth as needed.     tamsulosin (FLOMAX) 0.4 mg Cap Take 1 capsule (0.4 mg total) by mouth once daily.     Family History     Problem Relation (Age of Onset)    Diabetes Father, Mother        Tobacco Use    Smoking status: Former Smoker     Packs/day: 1.00     Years: 60.00     Pack years: 60.00     Types: Cigarettes     Last attempt to quit: 2018     Years since quittin.8    Smokeless tobacco: Never Used   Substance and Sexual Activity    Alcohol use: No    Drug use: No    Sexual activity: Not on file     Review of Systems   Constitutional: Positive for fatigue. Negative for activity change, appetite change, chills, diaphoresis, fever and unexpected weight change.    HENT: Negative.    Eyes: Negative.    Respiratory: Negative for cough, chest tightness, shortness of breath and wheezing.    Cardiovascular: Negative for chest pain, palpitations and leg swelling.   Gastrointestinal: Negative for abdominal distention, abdominal pain, blood in stool, constipation, diarrhea, nausea and vomiting.   Genitourinary: Negative for dysuria and hematuria.   Musculoskeletal: Negative.    Skin: Negative.    Neurological: Positive for dizziness and weakness. Negative for seizures, syncope and light-headedness.   Psychiatric/Behavioral: Negative.      Objective:     Vital Signs (Most Recent):  Temp: 96.6 °F (35.9 °C) (03/23/19 1948)  Pulse: 108 (03/23/19 2000)  Resp: (!) 32 (03/23/19 2000)  BP: (!) 156/79 (03/23/19 2000)  SpO2: 99 % (03/23/19 1948) Vital Signs (24h Range):  Temp:  [96.6 °F (35.9 °C)-98.5 °F (36.9 °C)] 96.6 °F (35.9 °C)  Pulse:  [] 108  Resp:  [15-32] 32  SpO2:  [96 %-100 %] 99 %  BP: (128-171)/() 156/79     Weight: 89.6 kg (197 lb 8.5 oz)  Body mass index is 29.17 kg/m².    Physical Exam   Constitutional: He is oriented to person, place, and time. He appears well-developed and well-nourished. No distress.   HENT:   Head: Normocephalic and atraumatic.   Right Ear: External ear normal.   Left Ear: External ear normal.   Nose: Nose normal.   Eyes: Right eye exhibits no discharge. Left eye exhibits no discharge.   Neck: Normal range of motion.   Cardiovascular:   Irregularly irregular, no murmurs or gallops   Pulmonary/Chest:   Mildly increased work of breathing with minimal end-expiratory wheezing bilaterally   Abdominal: Soft. Bowel sounds are normal. He exhibits no distension. There is no tenderness. There is no rebound and no guarding.   Musculoskeletal: Normal range of motion. He exhibits no edema.   Neurological: He is alert and oriented to person, place, and time.   Skin: Skin is warm and dry. He is not diaphoretic. No erythema.   Psychiatric: He has a normal  mood and affect. His behavior is normal. Judgment and thought content normal.   Nursing note and vitals reviewed.          Significant Labs: All pertinent labs within the past 24 hours have been reviewed.    Significant Imaging: I have reviewed and interpreted all pertinent imaging results/findings within the past 24 hours.    Assessment/Plan:     * Hypoglycemia    Patient was noted to have a capillary glucose of 14 mg/dL in the field for which he was given an ampule of 50% dextrose with improvement of his mentation.  His initial serum glucose here was 30 mg/dL but later was still 44 mg/dL after treatment.  He improved into the 180's but continued to drop to 81 mg/dL then to 36 mg/dL.  He has been started on a continuous 10% dextrose infusion.  The etiology of his hypoglycemia is unclear but he is on a sulfonylurea at home.  I think it's appropriate to start an octreotide infusion at this time and admit the patient to the ICU with frequent capillary glucoses.  Will start him on a diet and stop his sulfonylurea.     Essential hypertension    Patient's blood pressure is well-controlled; will continue home regimen of carvedilol, diltiazem, furosemide, and tamsulosin, and provide as-needed clonidine.     Type 2 diabetes mellitus, controlled, with renal complications    As addressed above.     CAD (coronary artery disease)    Stable; will continue his home regimen of aspirin, atorvastatin, and carvedilol.     Chronic combined systolic and diastolic heart failure    Stable without evidence of acute heart failure; will continue his home regimen of carvedilol and furosemide.     CKD Stage 3    His renal function appears to be at his baseline.     Peripheral artery disease    Stable; will continue his home regimen of aspirin and atorvastatin.     Chronic atrial fibrillation    Patient is in atrial fibrillation at this time which is baseline for him.  He was taken off of anticoagulation during his last admission a week ago  due to anemia and was instructed not to take it until outpatient follow-up with gastroenterology.  Will continue to monitor.     Chronic anticoagulation    As addressed above.     Centrilobular emphysema    Clinically-stable without wheezing.  Will provide as-needed JENAE/LAMA available.     Chronic respiratory failure with hypoxia    Stable; will continue his home supplemental oxygen therapy.     Iron deficiency anemia    The patient's H/H is stable and consistent with previous laboratory measurements, and the patient exhibits no signs or symptoms of acute bleeding; there is no indication for transfusion.  Will continue to monitor.       VTE Risk Mitigation (From admission, onward)        Ordered     IP VTE HIGH RISK PATIENT  Once      03/23/19 1936     Reason for No Pharmacological VTE Prophylaxis  Once      03/23/19 1936     Place sequential compression device  Until discontinued      03/23/19 1934     Place DENIS hose  Until discontinued      03/23/19 1934        Critical care time spent on the evaluation and treatment of severe organ dysfunction, review of pertinent labs and imaging studies, discussions with consulting providers and discussions with patient/family: 60 minutes.         Megan Yip M.D.  [facility]  Department of Hospital Medicine  [facility]  Pager: [phone]

## 2019-03-24 NOTE — ASSESSMENT & PLAN NOTE
Patient's blood pressure is well-controlled; will continue home regimen of carvedilol, diltiazem, furosemide, and tamsulosin, and provide as-needed clonidine.

## 2019-03-24 NOTE — PLAN OF CARE
03/24/19 0846   Discharge Assessment   Assessment Type Discharge Planning Assessment   Confirmed/corrected address and phone number on facesheet? Yes   Assessment information obtained from? Patient;Medical Record   Communicated expected length of stay with patient/caregiver yes   Prior to hospitilization cognitive status: Alert/Oriented   Prior to hospitalization functional status: Independent   Current cognitive status: Alert/Oriented   Current Functional Status: Partially Dependent   Lives With spouse   Able to Return to Prior Arrangements yes   Is patient able to care for self after discharge? Unable to determine at this time (comments)   Who are your caregiver(s) and their phone number(s)? Jessika Richard [phone]   Patient's perception of discharge disposition admitted as an inpatient   Patient currently being followed by outpatient case management? No   Patient currently receives any other outside agency services? Yes   How many hours a day does the patient receive services? 24   Is it the patient/care giver preference to resume care with the current outside agency? Yes   Equipment Currently Used at Home none;glucometer   Do you have any problems affording any of your prescribed medications? No   Is the patient taking medications as prescribed? yes   Does the patient have transportation home? Yes   Transportation Anticipated family or friend will provide   Does the patient receive services at the [facility]? No   Discharge Plan A Home with family   Discharge Plan B Home with family   DME Needed Upon Discharge  glucometer   Patient/Family in Agreement with Plan yes

## 2019-03-24 NOTE — ASSESSMENT & PLAN NOTE
Patient was noted to have a capillary glucose of 14 mg/dL in the field for which he was given an ampule of 50% dextrose with improvement of his mentation.  His initial serum glucose here was 30 mg/dL but later was still 44 mg/dL after treatment.  He improved into the 180's but continued to drop to 81 mg/dL then to 36 mg/dL.  He has been started on a continuous 10% dextrose infusion.  The etiology of his hypoglycemia is unclear but he is on a sulfonylurea at home.  I think it's appropriate to start an octreotide infusion at this time and admit the patient to the ICU with frequent capillary glucoses.  Will start him on a diet and stop his sulfonylurea.

## 2019-03-24 NOTE — EICU
eICU Note : New Admit :notified by the [facility]:     Brief HPI: Patient arrived in ED because of hypoglycemia, EMS states blood glucose was 19, received D50 and thereafter it was 150,   Problem List:  2019-03: Hypoglycemia  2019-03: Hyperkalemia  2019-03: Acute on chronic congestive heart failure  2019-03: Acute on chronic respiratory failure with hypoxia  2019-03: Acute on CKD Stage 3  2019-03: Chronic combined systolic and diastolic heart failure  2019-03: Peripheral artery disease  Vital Signs :  Vitals:    03/23/19 1948   BP: (!) 162/88   Pulse: 95   Resp: 16   Temp: 96.6 °F (35.9 °C)         Camera Assessment :Patient lying in bed in no distress, nurse at bedside    Data:  WBC 7.04, hemoglobin 9.0, hematocrit 32.1, platelets 285.  Sodium 143, potassium 4.0, chloride 108, CO2 28, anion gap 7 about BUN 30, creatinine 1.2  Glucose 30, calcium 10.1, alkaline phosphatase 123, total protein 4.9, albumin 2.4, AST 18, ALT 17  Urine: Bacteria moderate  CTA:Cardiomegaly with reflux contrast into the hepatic veins, bilateral pleural effusions, upper abdominal ascites, generalized wall edema secondary to CHF and increased airspace opacities in bilateral lower lobes concerning for pneumonia.  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 Acute CHF with bilateral pleural effusions: Diuretics   3. Low Albumin with ascites and edema, urine 2+proteinuria , check for Nephrotic syndrome .  4peptic ulcer disease, DVT prophylaxis     Jacqui Belle M.D  eICU Physician

## 2019-03-24 NOTE — SUBJECTIVE & OBJECTIVE
Interval History: Feeling well with no complaints.    Review of Systems   HENT: Negative for ear discharge and ear pain.    Eyes: Negative for pain and itching.   Cardiovascular: Negative for chest pain and palpitations.   Neurological: Negative for seizures and syncope.     Objective:     Vital Signs (Most Recent):  Temp: 97.4 °F (36.3 °C) (03/24/19 0315)  Pulse: 92 (03/24/19 0900)  Resp: (!) 28 (03/24/19 0900)  BP: (!) 141/79 (03/24/19 0900)  SpO2: 98 % (03/24/19 0900) Vital Signs (24h Range):  Temp:  [96.6 °F (35.9 °C)-98.5 °F (36.9 °C)] 97.4 °F (36.3 °C)  Pulse:  [] 92  Resp:  [14-38] 28  SpO2:  [93 %-100 %] 98 %  BP: (117-171)/() 141/79     Weight: 89.6 kg (197 lb 8.5 oz)  Body mass index is 29.17 kg/m².    Intake/Output Summary (Last 24 hours) at 3/24/2019 0941  Last data filed at 3/24/2019 0900  Gross per 24 hour   Intake 1530.91 ml   Output 550 ml   Net 980.91 ml      Physical Exam   Constitutional: He is oriented to person, place, and time. He appears well-developed and well-nourished. No distress.   HENT:   Head: Normocephalic and atraumatic.   Right Ear: External ear normal.   Left Ear: External ear normal.   Nose: Nose normal.   Eyes: Right eye exhibits no discharge. Left eye exhibits no discharge.   Neck: Normal range of motion.   Cardiovascular:   Irregularly irregular, no murmurs or gallops   Pulmonary/Chest: Effort normal. No respiratory distress.   Abdominal: Soft. Bowel sounds are normal. He exhibits no distension. There is no tenderness. There is no rebound and no guarding.   Musculoskeletal: Normal range of motion. He exhibits no edema.   Neurological: He is alert and oriented to person, place, and time.   Skin: Skin is warm and dry. He is not diaphoretic. No erythema.   Psychiatric: He has a normal mood and affect. His behavior is normal. Judgment and thought content normal.   Nursing note and vitals reviewed.      Significant Labs:   BMP:   Recent Labs   Lab 03/24/19  0259   GLU  185*      K 4.1      CO2 28   BUN 28*   CREATININE 1.2   CALCIUM 9.6   MG 1.8     CBC:   Recent Labs   Lab 03/23/19  1352 03/24/19  0259   WBC 7.04 7.94   HGB 9.0* 8.0*   HCT 32.1* 29.7*    196     POCT Glucose:   Recent Labs   Lab 03/24/19  0600 03/24/19  0657 03/24/19  0802   POCTGLUCOSE 202* 186* 199*

## 2019-03-24 NOTE — PROVIDER TRANSFER
Transfer Note    79 y/o male presented with weakness.  Hx of DM on Glipizide and Metformin.  Patient noted to be hypoglycemic.  Initially responded to D50, but then became persistently hypoglycemic.  Admitted to ICU on D10 drip and hourly glucose monitoring.  Also started on Octreotide drip.  Glucose increasing and changing D10 to D5.  Monitoring glucose every 4 hours for now.  Stopping Octreotide drip.  UA with 1+ leukocytes and mod bacteria, but many amorphous cells.  Repeating UA with UCx.  Ok to transfer out of ICU.

## 2019-03-24 NOTE — PLAN OF CARE
Problem: Adult Inpatient Plan of Care  Goal: Plan of Care Review  Outcome: Ongoing (interventions implemented as appropriate)  Admitted to ICU w/hypoglycemia suspected accidental extra ingestion of Glipizide. D10 currently infusing at 25 ml/hour, Sandostatin at 50 mcg/hour. Patient has difficulty using the urinal and is unable to be cathed due to a stricture. Diaper applied. C/O SOB w/ 0500 glucometer check, PCXR, Duoneb and Additional 60mg Lasix IV ordered per Dr. Yip. Patient in no distess however hx of CHF w/ anasarca. Remains free of falls and injuries. Q1 hour accuchecks performed.

## 2019-03-24 NOTE — HOSPITAL COURSE
Mr Ramos presented with weakness and encephalopathy secondary to profound hypoglycemia (14 in the field). This was suspected to be due to use of sulfonylurea at home. Hypoglycemic agents held and admitted to ICU for dextrose infusion. Did well and weaned from dextrose eventually. Was also on octreotide. Stepped down to floor. PT/OT recommended SNF. Patient did, however, experience significant anasarca and urinary retention from scrotal edema. Further workup revealed proteinuria. Also with left ventricular diastolic dysfunction (indeterminate degree) and enlarge right ventricle with unknown ventricular diastolic function. Nephrology and urology consulted. Initiated on diuresis. Renal function tolerated and diuresed to where SOB got better and anasarca improved (but did not resolve). Urology placed stearns. Failed voiding trial. Is to keep stearns in for voiding trial as outpatient. Scrotal US revealed left cyst. Urology recommends repeat US in 6 months. Palliative care was also consulted for code status/goals of care. Patient poor understanding of current condition and lacked capacity to make own medical decisions however he voiced to palliative care team that he wouldn't want CPR nor intubation. Dr Joshi discussed code status with patient's wife but she preferred discussing with her family first. I readdressed same topic next day but did not get an answer. Patient still full code. Patient discharged to [facility] with nasal cannula and stearns in place. Is on three oral diuretics to maintain stability however patient likes food high in salt. Negative effects of salty diet discussed with patient and wife (over the phone). All questions answered to satisfaction. Hold sulfonylurea and metformin for now. Activity as tolerated, with assistance. Follow up as shown below.

## 2019-03-25 LAB
POCT GLUCOSE: 128 MG/DL (ref 70–110)
POCT GLUCOSE: 145 MG/DL (ref 70–110)
POCT GLUCOSE: 216 MG/DL (ref 70–110)
POCT GLUCOSE: 326 MG/DL (ref 70–110)

## 2019-03-25 PROCEDURE — 25000003 PHARM REV CODE 250: Performed by: HOSPITALIST

## 2019-03-25 PROCEDURE — 94761 N-INVAS EAR/PLS OXIMETRY MLT: CPT

## 2019-03-25 PROCEDURE — 97162 PT EVAL MOD COMPLEX 30 MIN: CPT

## 2019-03-25 PROCEDURE — 25000242 PHARM REV CODE 250 ALT 637 W/ HCPCS: Performed by: HOSPITALIST

## 2019-03-25 PROCEDURE — 99900035 HC TECH TIME PER 15 MIN (STAT)

## 2019-03-25 PROCEDURE — 94640 AIRWAY INHALATION TREATMENT: CPT

## 2019-03-25 PROCEDURE — 27000221 HC OXYGEN, UP TO 24 HOURS

## 2019-03-25 PROCEDURE — A4216 STERILE WATER/SALINE, 10 ML: HCPCS | Performed by: HOSPITALIST

## 2019-03-25 PROCEDURE — 11000001 HC ACUTE MED/SURG PRIVATE ROOM

## 2019-03-25 PROCEDURE — S5010 5% DEXTROSE AND 0.45% SALINE: HCPCS | Performed by: HOSPITALIST

## 2019-03-25 RX ADMIN — INSULIN ASPART 4 UNITS: 100 INJECTION, SOLUTION INTRAVENOUS; SUBCUTANEOUS at 08:03

## 2019-03-25 RX ADMIN — IPRATROPIUM BROMIDE AND ALBUTEROL SULFATE 3 ML: .5; 3 SOLUTION RESPIRATORY (INHALATION) at 07:03

## 2019-03-25 RX ADMIN — Medication 10 ML: at 12:03

## 2019-03-25 RX ADMIN — IPRATROPIUM BROMIDE AND ALBUTEROL SULFATE 3 ML: .5; 3 SOLUTION RESPIRATORY (INHALATION) at 12:03

## 2019-03-25 RX ADMIN — INSULIN ASPART 2 UNITS: 100 INJECTION, SOLUTION INTRAVENOUS; SUBCUTANEOUS at 12:03

## 2019-03-25 RX ADMIN — FERROUS GLUCONATE TAB 324 MG (37.5 MG ELEMENTAL IRON) 324 MG: 324 (37.5 FE) TAB at 08:03

## 2019-03-25 RX ADMIN — Medication 10 ML: at 08:03

## 2019-03-25 RX ADMIN — PANTOPRAZOLE SODIUM 40 MG: 40 TABLET, DELAYED RELEASE ORAL at 08:03

## 2019-03-25 RX ADMIN — TAMSULOSIN HYDROCHLORIDE 0.4 MG: 0.4 CAPSULE ORAL at 08:03

## 2019-03-25 RX ADMIN — ASPIRIN 81 MG: 81 TABLET, COATED ORAL at 08:03

## 2019-03-25 RX ADMIN — DEXTROSE AND SODIUM CHLORIDE: 5; .45 INJECTION, SOLUTION INTRAVENOUS at 08:03

## 2019-03-25 RX ADMIN — CARVEDILOL 25 MG: 6.25 TABLET, FILM COATED ORAL at 08:03

## 2019-03-25 RX ADMIN — ATORVASTATIN CALCIUM 20 MG: 10 TABLET, FILM COATED ORAL at 08:03

## 2019-03-25 RX ADMIN — Medication 10 ML: at 05:03

## 2019-03-25 RX ADMIN — DILTIAZEM HYDROCHLORIDE 30 MG: 30 TABLET, FILM COATED ORAL at 08:03

## 2019-03-25 RX ADMIN — LOSARTAN POTASSIUM 25 MG: 25 TABLET, FILM COATED ORAL at 08:03

## 2019-03-25 RX ADMIN — FUROSEMIDE 40 MG: 40 TABLET ORAL at 08:03

## 2019-03-25 NOTE — PROGRESS NOTES
[facility] Hospital Medicine  Progress Note    Patient Name: Agus Ramos Jr.  MRN: [id number]  Patient Class: IP- Inpatient   Admission Date: 3/23/2019  Length of Stay: 2 days  Attending Physician: Jose De Jesus Angulo MD  Primary Care Provider: Keaton Hardy MD        Subjective:     Principal Problem:Hypoglycemia    HPI:  Mr. Agus Ramos Jr. is a 78 y.o. male known to me with essential hypertension, type 2 diabetes mellitus (HbA1c 5.0% Mar 2019), CAD s/p CABG, chronic combined systolic and diastolic heart failure (LVEF 50% Mar 2019), CKD Stage 3, peripheral artery disease, chronic atrial fibrillation ([id number] score 5) on chronic anticoagulation, COPD, chronic respiratory failure with hypoxia, and anemia of chronic disease who presents to [facility] ED with complaints of a fall this morning.  He had much difficulty standing back up.  EMS was activated and they found that his capillary glucose was 14 mg/dL after which he was given an ampule of 50% dextrose.  His mentation returned to baseline thereafter.  He denies any tremors nor diaphoresis.  He says that he's been feeling weak and dizzy today but denies any loss of consciousness, nor antecedent chest pain, shortness of breath, palpitations, fevers, chills, nausea, vomiting, abdominal pain, or any diarrhea.  He did not trip or slip or anything.  He has had a good appetite but cannot say for sure if he has been taking too much of his diabetes medications.  He denies any recent changes to his medications.  He otherwise has been in his usual state of health.    Hospital Course:  79 y/o male presented with weakness.  Hx of DM on Glipizide and Metformin.  Patient noted to be hypoglycemic.  Initially responded to D50, but then became persistently hypoglycemic.  Admitted to ICU on D10 drip and hourly glucose monitoring.  Also started on Octreotide drip.  No further episodes of hypoglycemia and D10 switched to D5.  Octreotide drip stopped.  Now  getting more hyperglycemic.  Stopping D5 drip and monitor off dextrose.  PT/OT evaluation.  Home on 3/26 if no further issues with hypoglycemia.    Interval History: More hyperglycemia. No complaints.    Review of Systems   HENT: Negative for ear discharge and ear pain.    Eyes: Negative for pain and itching.   Cardiovascular: Negative for chest pain and palpitations.   Neurological: Negative for seizures and syncope.     Objective:     Vital Signs (Most Recent):  Temp: 97.6 °F (36.4 °C) (03/25/19 0721)  Pulse: 67 (03/25/19 0721)  Resp: 18 (03/25/19 0721)  BP: 132/80 (03/25/19 0721)  SpO2: 98 % (03/25/19 0721) Vital Signs (24h Range):  Temp:  [97.6 °F (36.4 °C)-98.6 °F (37 °C)] 97.6 °F (36.4 °C)  Pulse:  [67-96] 67  Resp:  [16-43] 18  SpO2:  [90 %-99 %] 98 %  BP: (130-176)/(74-86) 132/80     Weight: 89.6 kg (197 lb 8.5 oz)  Body mass index is 29.17 kg/m².    Intake/Output Summary (Last 24 hours) at 3/25/2019 0945  Last data filed at 3/25/2019 0000  Gross per 24 hour   Intake 400 ml   Output 200 ml   Net 200 ml      Physical Exam   Constitutional: He is oriented to person, place, and time. He appears well-developed and well-nourished. No distress.   HENT:   Head: Normocephalic and atraumatic.   Right Ear: External ear normal.   Left Ear: External ear normal.   Nose: Nose normal.   Eyes: Right eye exhibits no discharge. Left eye exhibits no discharge.   Neck: Normal range of motion.   Cardiovascular:   Irregularly irregular, no murmurs or gallops   Pulmonary/Chest: Effort normal. No respiratory distress.   Abdominal: Soft. Bowel sounds are normal. He exhibits no distension. There is no tenderness. There is no rebound and no guarding.   Musculoskeletal: Normal range of motion. He exhibits no edema.   Neurological: He is alert and oriented to person, place, and time.   Skin: Skin is warm and dry. He is not diaphoretic. No erythema.   Psychiatric: He has a normal mood and affect. His behavior is normal. Judgment and  thought content normal.   Nursing note and vitals reviewed.      Significant Labs:   BMP:   Recent Labs   Lab 03/24/19  0259   *      K 4.1      CO2 28   BUN 28*   CREATININE 1.2   CALCIUM 9.6   MG 1.8     CBC:   Recent Labs   Lab 03/23/19  1352 03/24/19  0259   WBC 7.04 7.94   HGB 9.0* 8.0*   HCT 32.1* 29.7*    196     POCT Glucose:   Recent Labs   Lab 03/24/19  1645 03/24/19 2005 03/25/19  0722   POCTGLUCOSE 307* 342* 326*     Assessment/Plan:      * Hypoglycemia  Patient was noted to have a capillary glucose of 14 mg/dL in the field for which he was given an ampule of 50% dextrose with improvement of his mentation.  His initial serum glucose here was 30 mg/dL but later was still 44 mg/dL after treatment.  He improved into the 180's but continued to drop to 81 mg/dL then to 36 mg/dL.    Admitted to ICU on D10 drip and hourly glucose checks.  Also started on Octreotide drip.  Glucose increasing with no further episodes of hypoglycemia.  Will change D10 to D5 and monitor glucose every 4 hours.  The etiology of his hypoglycemia is unclear but he is on a sulfonylurea at home.  He does have 1+ leukocytes and moderate bacteria on urine, but many amorphous cells.  Will repeat UA with UCx.  Afebrile.  Will hold off on ABx's for now.  Getting more hyperglycemic.  Stop D5 and monitor off dextrose.    PT/OT eval.  Home on 3/26 if no further issues with hypoglycemia.  Probably stop Metformin on discharge.      Peripheral artery disease  Stable; will continue his home regimen of aspirin and atorvastatin.    Chronic combined systolic and diastolic heart failure  Stable without evidence of acute heart failure; will continue his home regimen of carvedilol and furosemide.    Chronic respiratory failure with hypoxia  Stable; will continue his home supplemental oxygen therapy.    Centrilobular emphysema  Clinically-stable without wheezing.  Will provide as-needed JENAE/LAMA available.    Iron deficiency  anemia  The patient's H/H is stable and consistent with previous laboratory measurements, and the patient exhibits no signs or symptoms of acute bleeding; there is no indication for transfusion.  Will continue to monitor.    Type 2 diabetes mellitus, controlled, with renal complications  As addressed above.    CKD Stage 3  His renal function appears to be at his baseline.    Essential hypertension  Continue home regimen of carvedilol, diltiazem, furosemide, and tamsulosin, and provide as-needed clonidine.    CAD (coronary artery disease)  Stable; will continue his home regimen of aspirin, atorvastatin, and carvedilol.    Chronic atrial fibrillation  Patient is in atrial fibrillation at this time which is baseline for him.  He was taken off of anticoagulation during his last admission a week ago due to anemia and was instructed not to take it until outpatient follow-up with gastroenterology.  Will continue to monitor.      VTE Risk Mitigation (From admission, onward)        Ordered     IP VTE HIGH RISK PATIENT  Once      03/23/19 1936     Reason for No Pharmacological VTE Prophylaxis  Once      03/23/19 1936     Place sequential compression device  Until discontinued      03/23/19 1934     Place DENIS hose  Until discontinued      03/23/19 1934              Jose De Jesus Angulo MD  Department of Hospital Medicine   [facility]

## 2019-03-25 NOTE — PT/OT/SLP EVAL
Physical Therapy Evaluation    Patient Name:  Agus Ramos Jr.   MRN:  [id number]    Recommendations:     Discharge Recommendations:  nursing facility, skilled   Discharge Equipment Recommendations: none   Barriers to discharge: re-admission into the hospital     Assessment:     Agus Ramos Jr. is a 78 y.o. male admitted with a medical diagnosis of Hypoglycemia.  He presents with the following impairments/functional limitations:  weakness, impaired endurance, impaired functional mobilty, gait instability, impaired balance, decreased lower extremity function, decreased safety awareness, impaired cardiopulmonary response to activity. Patient just recently discharged from the hospital. He was max A to transfer from bed to bedside commode and back. He would benefit from SNF at discharge.     Rehab Prognosis: Fair; patient would benefit from acute skilled PT services to address these deficits and reach maximum level of function.    Recent Surgery: * No surgery found *      Plan:     During this hospitalization, patient to be seen 6 x/week to address the identified rehab impairments via gait training, therapeutic activities, therapeutic exercises and progress toward the following goals:    · Plan of Care Expires:  04/08/19    Subjective     Chief Complaint: Needs to go to the bathroom.   Patient/Family Comments/goals: To walk to the bathroom.   Pain/Comfort:  · Pain Rating 1: 0/10    Living Environment: Patient appears to be a poor historian and no family present to confirm PLOF.   Patient lives with spouse in a [facility]. Prior to admission, patients level of function was ambulatory with rolling walker and oxygen.  Equipment used at home: oxygen, walker, rolling, cane, straight, shower chair, bedside commode. Upon discharge, patient will have assistance from spouse.    Objective:     Communicated with nurse Cmumings prior to session.  Patient found supine with peripheral IV, telemetry, oxygen, SCD  upon PT entry to  room.    General Precautions: Standard, fall, respiratory   Orthopedic Precautions:N/A   Braces: N/A     Exams:  · Cognitive Exam:  Patient is oriented to Person and Place  · Gross Motor Coordination:  limited due to weakness  · Postural Exam:  Patient presented with the following abnormalities:    · -       Rounded shoulders  · Skin Integrity/Edema:      · -       Edema: Mild B LE's and moderate to B UE's  · RLE ROM: WFL  · RLE Strength: 4-/5  · LLE ROM: WFL  · LLE Strength: 4-/5    Functional Mobility:  · Bed Mobility:     · Supine to Sit: moderate assistance  · Transfers:     · Sit to Stand:  maximal assistance with no AD  · Gait:  ~5 steps from bed to bedside commode and back to bed with handrail and maximal assistance.     AM-PAC 6 CLICK MOBILITY  Total Score:16     Patient left seated on edge of bed with all lines intact, call button in reach, nurse Zoila notified and OT present. Nurse and PCT educated that patient okay for bedside commode transfers at this time but should not be ambulating to the bathroom. Both verbalized understanding.     GOALS:   Multidisciplinary Problems     Physical Therapy Goals        Problem: Physical Therapy Goal    Goal Priority Disciplines Outcome Goal Variances Interventions   Physical Therapy Goal     PT, PT/OT      Description:  Goals to be met by: 19    Patient will increase functional independence with mobility by performin. Sit to stand transfer with Stand-by Assistance  2. Gait x150 feet with stand-by Assistance using Rolling Walker  3. Lower extremity exercise program x30 reps per handout, with supervision                      History:     Past Medical History:   Diagnosis Date    Anemia 2018    pending blood transfusion    Anticoagulant long-term use     apixaban    Atrial fibrillation     CKD (chronic kidney disease)     Congestive heart failure     COPD (chronic obstructive pulmonary disease)     Coronary artery disease     Diabetes mellitus      Encounter for blood transfusion     HLD (hyperlipidemia)     Hypertension     MI (myocardial infarction)     On home oxygen therapy     Pacemaker     left chest    Peripheral vascular disease     Requires assistance with activities of daily living (ADL)     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     S/P femoral-popliteal bypass surgery left    Stented coronary artery     Tobacco use     Unsteady gait        Past Surgical History:   Procedure Laterality Date    CARDIAC CATHETERIZATION      CARDIAC PACEMAKER PLACEMENT      CARDIAC SURGERY      3 vessel CABG    CARDIOVERSION N/A 9/19/2013    Performed by [facility]    cardioverted      CORONARY ANGIOPLASTY WITH STENT PLACEMENT      EGD (ESOPHAGOGASTRODUODENOSCOPY) N/A 6/1/2018    Performed [facility] at [facility]O    HEMORRHOID SURGERY      TRANSESOPHAGEAL ECHOCARDIOGRAM (ANIA) N/A 9/19/2013    Performed by [facility] at [facility]    VASCULAR SURGERY      femoral artery popliteal bypass       Time Tracking:     PT Received On: 03/25/19  PT Start Time: 1443     PT Stop Time: 1504  PT Total Time (min): 21 min     Billable Minutes: Evaluation 15 with OT     Katie Coughlin, PT  03/25/2019

## 2019-03-25 NOTE — PROGRESS NOTES
SW went to pt's room to introduce self and update Discharge Planning. SW put contact information and Discharge Plan on pt's white board in room. His help at home is Jessika (wife) [phone]. SW will continue to plan for discharge when medically appropriate.

## 2019-03-25 NOTE — ASSESSMENT & PLAN NOTE
Patient was noted to have a capillary glucose of 14 mg/dL in the field for which he was given an ampule of 50% dextrose with improvement of his mentation.  His initial serum glucose here was 30 mg/dL but later was still 44 mg/dL after treatment.  He improved into the 180's but continued to drop to 81 mg/dL then to 36 mg/dL.    Admitted to ICU on D10 drip and hourly glucose checks.  Also started on Octreotide drip.  Glucose increasing with no further episodes of hypoglycemia.  Will change D10 to D5 and monitor glucose every 4 hours.  The etiology of his hypoglycemia is unclear but he is on a sulfonylurea at home.  He does have 1+ leukocytes and moderate bacteria on urine, but many amorphous cells.  Will repeat UA with UCx.  Afebrile.  Will hold off on ABx's for now.  Getting more hyperglycemic.  Stop D5 and monitor off dextrose.    PT/OT eval.  Home on 3/26 if no further issues with hypoglycemia.  Probably stop Metformin on discharge.

## 2019-03-25 NOTE — PT/OT/SLP EVAL
Occupational Therapy   Evaluation    Name: Agus Ramos Jr.  MRN: [id number]  Admitting Diagnosis:  Hypoglycemia      Recommendations:     Discharge Recommendations: nursing facility, skilled  Discharge Equipment Recommendations:  walker, rolling  Barriers to discharge:       Assessment:     Agus Ramos Jr. is a 78 y.o. male with a medical diagnosis of Hypoglycemia.  He presents with limited mobility. Performance deficits affecting function: weakness, impaired endurance, gait instability, impaired functional mobilty, impaired self care skills, impaired balance, decreased coordination, decreased upper extremity function, decreased lower extremity function, impaired muscle length, impaired joint extensibility.      Rehab Prognosis: Good patient would benefit from acute skilled OT services to address these deficits and reach maximum level of function.       Plan:     Patient to be seen 5 x/week to address the above listed problems via self-care/home management, therapeutic activities, therapeutic exercises  · Plan of Care Expires:    · Plan of Care Reviewed with: patient    Subjective     Chief Complaint: swelling  Patient/Family Comments/goals: to get on bedside commode    Occupational Profile:  Living Environment: pt lives with is wife Oxygen  Previous level of function: Pt states that he uses the furniture to assist in walking  Equipment Used at Home:  cane, straight, bedside commode(Pt states that he does not use this equipment but uses)  Assistance upon Discharge: Spouse but pt may need a continue therapy prior to returning home    Pain/Comfort:  · Pain Rating 1: 0/10    Patients cultural, spiritual, Catholic conflicts given the current situation:      Objective:     Communicated with: nurse prior to session.  Patient found supine with SCD, peripheral IV, telemetry upon OT entry to room.    General Precautions: Standard, fall   Orthopedic Precautions:N/A   Braces: N/A     Occupational Performance:    Bed  Mobility:    · Patient completed Scooting/Bridging with minimum assistance  · Patient completed Supine to Sit with moderate assistance    Functional Mobility/Transfers:  · Patient completed Sit <> Stand Transfer with maximal assistance  with  assist of 2 therapist   · Patient completed Toilet Transfer Stand Pivot and Squat Pivot technique with maximal assistance and of 2 persons with  hand-held assist  · Functional Mobility: sitting on bedside commode    Activities of Daily Living:  · Grooming: minimum assistance    · Toileting: total assistance sitting on bedside commode    Cognitive/Visual Perceptual:  Cognitive/Psychosocial Skills:     -       Oriented to: Person, Place, Time and Situation   -       Follows Commands/attention:Follows two-step commands  -       Communication: clear/fluent  -       Memory: No Deficits noted  -       Safety awareness/insight to disability: intact   -       Mood/Affect/Coping skills/emotional control: Appropriate to situation  Visual/Perceptual:      -Intact      Physical Exam:  Upper Extremity Range of Motion:     -       Right Upper Extremity: limitd throughout with edema  -       Left Upper Extremity: limited throughout with edema  Upper Extremity Strength:    -       Right Upper Extremity: fair  -       Left Upper Extremity: fair    AMPAC 6 Click ADL:  AMPAC Total Score: 12    Patient left supine with all lines intact, call button in reach and bed alarm on    GOALS:   Multidisciplinary Problems     Occupational Therapy Goals        Problem: Occupational Therapy Goal    Goal Priority Disciplines Outcome Interventions   Occupational Therapy Goal     OT, PT/OT Ongoing (interventions implemented as appropriate)    Description:  Goals to be met by: 4/15/19     Patient will increase functional independence with ADLs by performing:    UE Dressing with Set-up Assistance.  LE Dressing with Minimal Assistance.  Grooming while seated with Supervision.  Toileting from bedside commode with  Moderate Assistance for hygiene and clothing management.   Bathing from  edge of bed with Moderate Assistance.  Sitting at edge of bed x20 minutes with Stand-by Assistance.  Rolling to Bilateral with Set-up Assistance.   Supine to sit with Supervision.  Stand pivot transfers with Minimal Assistance.  Toilet transfer to bedside commode with Minimal Assistance.  Upper extremity exercise program x10 reps per handout, with assistance as needed.                      History:     Past Medical History:   Diagnosis Date    Anemia 05/03/2018    pending blood transfusion    Anticoagulant long-term use     apixaban    Atrial fibrillation     CKD (chronic kidney disease)     Congestive heart failure     COPD (chronic obstructive pulmonary disease)     Coronary artery disease     Diabetes mellitus     Encounter for blood transfusion     HLD (hyperlipidemia)     Hypertension     MI (myocardial infarction)     On home oxygen therapy     Pacemaker     left chest    Peripheral vascular disease     Requires assistance with activities of daily living (ADL)     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     S/P femoral-popliteal bypass surgery left    Stented coronary artery     Tobacco use     Unsteady gait        Past Surgical History:   Procedure Laterality Date    CARDIAC CATHETERIZATION      CARDIAC PACEMAKER PLACEMENT      CARDIAC SURGERY      3 vessel CABG    CARDIOVERSION N/A 9/19/2013    Performed by [facility] Surgeon at [facility]    cardioverted      CORONARY ANGIOPLASTY WITH STENT PLACEMENT      EGD (ESOPHAGOGASTRODUODENOSCOPY) N/A 6/1/2018    Performed by Ty Hickman MD at [facility] ENDO    HEMORRHOID SURGERY      TRANSESOPHAGEAL ECHOCARDIOGRAM (ANIA) N/A 9/19/2013    Performed by [facility] at [facility]    VASCULAR SURGERY      femoral artery popliteal bypass       Time Tracking:     OT Date of Treatment: 03/25/19  OT Start Time: 1445  OT Stop Time: 1510  OT Total Time (min): 25 min    Billable Minutes:Evaluation 15  Self  Care/Home Management 10    Nazia Urban, OT  3/25/2019

## 2019-03-25 NOTE — NURSING
Pt aaox4 no falls or injuries. Pitting edema noted to upper and lower extremities. Arms  elevated on pillows. Picc line dressing changed. Pt had three brown creamy stools. Insulin given as needed. Pt able to feed himself, foam dressing applied to sacrum for protection. scds on. Oxygen NC @2lpm, no signs of distress noted. Call light w/i reach

## 2019-03-25 NOTE — PLAN OF CARE
I left a voice message w/[employer] for PICC line placement. I'll attempt to call again in 15 minutes.

## 2019-03-25 NOTE — PROCEDURES
"Agus Ramos Jr. is a 78 y.o. male patient.    Temp: 98.6 °F (37 °C) (03/24/19 2007)  Pulse: 65 (03/24/19 2031)  Resp: 18 (03/24/19 2031)  BP: 135/86 (03/24/19 2007)  SpO2: 99 % (03/24/19 2031)  Weight: 89.6 kg (197 lb 8.5 oz) (03/23/19 1948)  Height: 5' 9" (175.3 cm) (03/23/19 1948)    PICC  Date/Time: 3/24/2019 10:40 PM  Consent Done: Yes  Time out: Immediately prior to procedure a time out was called to verify the correct patient, procedure, equipment, support staff and site/side marked as required  Indications: med administration and vascular access  Anesthesia: local infiltration  Local anesthetic: lidocaine 1% without epinephrine  Anesthetic Total (mL): 2  Preparation: skin prepped with ChloraPrep  Skin prep agent dried: skin prep agent completely dried prior to procedure  Sterile barriers: all five maximum sterile barriers used - cap, mask, sterile gown, sterile gloves, and large sterile sheet  Hand hygiene: hand hygiene performed prior to central venous catheter insertion  Location details: right basilic  Catheter type: double lumen  Catheter size: 5 Fr  Catheter Length: 43 (1 cm out)cm    Ultrasound guidance: yes  Vessel Caliber: medium and patent, compressibility normal  Vascular Doppler: not done  Needle advanced into vessel with real time Ultrasound guidance.  Sterile sheath used.  no esophageal manometryNumber of attempts: 1  Post-procedure: blood return through all ports, chlorhexidine patch and sterile dressing applied  Estimated blood loss (mL): 2  Specimens: No  Implants: No  Assessment: placement verified by x-ray  Complications: none  Comments: LUE swollen and R ac region swollen and bruised. Recent U/S lue neg.          Hardik Buchanan  3/24/2019    "

## 2019-03-25 NOTE — PLAN OF CARE
Problem: Occupational Therapy Goal  Goal: Occupational Therapy Goal  Goals to be met by: 4/15/19     Patient will increase functional independence with ADLs by performing:    UE Dressing with Set-up Assistance.  LE Dressing with Minimal Assistance.  Grooming while seated with Supervision.  Toileting from bedside commode with Moderate Assistance for hygiene and clothing management.   Bathing from  edge of bed with Moderate Assistance.  Sitting at edge of bed x20 minutes with Stand-by Assistance.  Rolling to Bilateral with Set-up Assistance.   Supine to sit with Supervision.  Stand pivot transfers with Minimal Assistance.  Toilet transfer to bedside commode with Minimal Assistance.  Upper extremity exercise program x10 reps per handout, with assistance as needed.    Outcome: Ongoing (interventions implemented as appropriate)  Pt would benefit from SNF after discharge

## 2019-03-25 NOTE — PLAN OF CARE
Problem: Physical Therapy Goal  Goal: Physical Therapy Goal  Goals to be met by: 19    Patient will increase functional independence with mobility by performin. Sit to stand transfer with Stand-by Assistance  2. Gait x150 feet with stand-by Assistance using Rolling Walker  3. Lower extremity exercise program x30 reps per handout, with supervision      Patient would benefit from SNF at discharge.

## 2019-03-25 NOTE — SUBJECTIVE & OBJECTIVE
Interval History: More hyperglycemia. No complaints.    Review of Systems   HENT: Negative for ear discharge and ear pain.    Eyes: Negative for pain and itching.   Cardiovascular: Negative for chest pain and palpitations.   Neurological: Negative for seizures and syncope.     Objective:     Vital Signs (Most Recent):  Temp: 97.6 °F (36.4 °C) (03/25/19 0721)  Pulse: 67 (03/25/19 0721)  Resp: 18 (03/25/19 0721)  BP: 132/80 (03/25/19 0721)  SpO2: 98 % (03/25/19 0721) Vital Signs (24h Range):  Temp:  [97.6 °F (36.4 °C)-98.6 °F (37 °C)] 97.6 °F (36.4 °C)  Pulse:  [67-96] 67  Resp:  [16-43] 18  SpO2:  [90 %-99 %] 98 %  BP: (130-176)/(74-86) 132/80     Weight: 89.6 kg (197 lb 8.5 oz)  Body mass index is 29.17 kg/m².    Intake/Output Summary (Last 24 hours) at 3/25/2019 0945  Last data filed at 3/25/2019 0000  Gross per 24 hour   Intake 400 ml   Output 200 ml   Net 200 ml      Physical Exam   Constitutional: He is oriented to person, place, and time. He appears well-developed and well-nourished. No distress.   HENT:   Head: Normocephalic and atraumatic.   Right Ear: External ear normal.   Left Ear: External ear normal.   Nose: Nose normal.   Eyes: Right eye exhibits no discharge. Left eye exhibits no discharge.   Neck: Normal range of motion.   Cardiovascular:   Irregularly irregular, no murmurs or gallops   Pulmonary/Chest: Effort normal. No respiratory distress.   Abdominal: Soft. Bowel sounds are normal. He exhibits no distension. There is no tenderness. There is no rebound and no guarding.   Musculoskeletal: Normal range of motion. He exhibits no edema.   Neurological: He is alert and oriented to person, place, and time.   Skin: Skin is warm and dry. He is not diaphoretic. No erythema.   Psychiatric: He has a normal mood and affect. His behavior is normal. Judgment and thought content normal.   Nursing note and vitals reviewed.      Significant Labs:   BMP:   Recent Labs   Lab 03/24/19  0259   *      K 4.1       CO2 28   BUN 28*   CREATININE 1.2   CALCIUM 9.6   MG 1.8     CBC:   Recent Labs   Lab 03/23/19  1352 03/24/19  0259   WBC 7.04 7.94   HGB 9.0* 8.0*   HCT 32.1* 29.7*    196     POCT Glucose:   Recent Labs   Lab 03/24/19  1645 03/24/19 2005 03/25/19  0722   POCTGLUCOSE 307* 342* 326*

## 2019-03-26 PROBLEM — G93.41 ACUTE METABOLIC ENCEPHALOPATHY: Status: ACTIVE | Noted: 2019-03-23

## 2019-03-26 LAB
ACANTHOCYTES BLD QL SMEAR: PRESENT
ANION GAP SERPL CALC-SCNC: 4 MMOL/L (ref 8–16)
ANISOCYTOSIS BLD QL SMEAR: ABNORMAL
BASOPHILS # BLD AUTO: 0.02 K/UL (ref 0–0.2)
BASOPHILS NFR BLD: 0.3 % (ref 0–1.9)
BUN SERPL-MCNC: 32 MG/DL (ref 8–23)
BURR CELLS BLD QL SMEAR: ABNORMAL
CALCIUM SERPL-MCNC: 9.3 MG/DL (ref 8.7–10.5)
CHLORIDE SERPL-SCNC: 108 MMOL/L (ref 95–110)
CO2 SERPL-SCNC: 29 MMOL/L (ref 23–29)
CREAT SERPL-MCNC: 1.4 MG/DL (ref 0.5–1.4)
DACRYOCYTES BLD QL SMEAR: ABNORMAL
DIFFERENTIAL METHOD: ABNORMAL
EOSINOPHIL # BLD AUTO: 0.2 K/UL (ref 0–0.5)
EOSINOPHIL NFR BLD: 3.6 % (ref 0–8)
ERYTHROCYTE [DISTWIDTH] IN BLOOD BY AUTOMATED COUNT: 21.7 % (ref 11.5–14.5)
EST. GFR  (AFRICAN AMERICAN): 54 ML/MIN/1.73 M^2
EST. GFR  (NON AFRICAN AMERICAN): 47 ML/MIN/1.73 M^2
GLUCOSE SERPL-MCNC: 153 MG/DL (ref 70–110)
HCT VFR BLD AUTO: 29.1 % (ref 40–54)
HGB BLD-MCNC: 7.9 G/DL (ref 14–18)
HYPOCHROMIA BLD QL SMEAR: ABNORMAL
LYMPHOCYTES # BLD AUTO: 0.9 K/UL (ref 1–4.8)
LYMPHOCYTES NFR BLD: 15.1 % (ref 18–48)
MCH RBC QN AUTO: 22.4 PG (ref 27–31)
MCHC RBC AUTO-ENTMCNC: 27.1 G/DL (ref 32–36)
MCV RBC AUTO: 83 FL (ref 82–98)
MONOCYTES # BLD AUTO: 1 K/UL (ref 0.3–1)
MONOCYTES NFR BLD: 16.4 % (ref 4–15)
NEUTROPHILS # BLD AUTO: 4 K/UL (ref 1.8–7.7)
NEUTROPHILS NFR BLD: 64.8 % (ref 38–73)
OVALOCYTES BLD QL SMEAR: ABNORMAL
PLATELET # BLD AUTO: 274 K/UL (ref 150–350)
PLATELET BLD QL SMEAR: ABNORMAL
PMV BLD AUTO: 9.7 FL (ref 9.2–12.9)
POCT GLUCOSE: 152 MG/DL (ref 70–110)
POCT GLUCOSE: 157 MG/DL (ref 70–110)
POCT GLUCOSE: 167 MG/DL (ref 70–110)
POCT GLUCOSE: 179 MG/DL (ref 70–110)
POIKILOCYTOSIS BLD QL SMEAR: ABNORMAL
POLYCHROMASIA BLD QL SMEAR: ABNORMAL
POTASSIUM SERPL-SCNC: 4.2 MMOL/L (ref 3.5–5.1)
RBC # BLD AUTO: 3.52 M/UL (ref 4.6–6.2)
SCHISTOCYTES BLD QL SMEAR: ABNORMAL
SCHISTOCYTES BLD QL SMEAR: PRESENT
SODIUM SERPL-SCNC: 141 MMOL/L (ref 136–145)
TARGETS BLD QL SMEAR: ABNORMAL
WBC # BLD AUTO: 6.17 K/UL (ref 3.9–12.7)

## 2019-03-26 PROCEDURE — 86580 TB INTRADERMAL TEST: CPT | Performed by: HOSPITALIST

## 2019-03-26 PROCEDURE — 97116 GAIT TRAINING THERAPY: CPT

## 2019-03-26 PROCEDURE — 25000003 PHARM REV CODE 250: Performed by: HOSPITALIST

## 2019-03-26 PROCEDURE — 97530 THERAPEUTIC ACTIVITIES: CPT

## 2019-03-26 PROCEDURE — 94640 AIRWAY INHALATION TREATMENT: CPT

## 2019-03-26 PROCEDURE — 97535 SELF CARE MNGMENT TRAINING: CPT

## 2019-03-26 PROCEDURE — 36415 COLL VENOUS BLD VENIPUNCTURE: CPT

## 2019-03-26 PROCEDURE — 25000242 PHARM REV CODE 250 ALT 637 W/ HCPCS: Performed by: HOSPITALIST

## 2019-03-26 PROCEDURE — 80048 BASIC METABOLIC PNL TOTAL CA: CPT

## 2019-03-26 PROCEDURE — 97110 THERAPEUTIC EXERCISES: CPT

## 2019-03-26 PROCEDURE — A4216 STERILE WATER/SALINE, 10 ML: HCPCS | Performed by: HOSPITALIST

## 2019-03-26 PROCEDURE — 63600175 PHARM REV CODE 636 W HCPCS: Performed by: HOSPITALIST

## 2019-03-26 PROCEDURE — 11000001 HC ACUTE MED/SURG PRIVATE ROOM

## 2019-03-26 PROCEDURE — 85025 COMPLETE CBC W/AUTO DIFF WBC: CPT

## 2019-03-26 RX ORDER — FUROSEMIDE 10 MG/ML
40 INJECTION INTRAMUSCULAR; INTRAVENOUS 2 TIMES DAILY
Status: DISCONTINUED | OUTPATIENT
Start: 2019-03-26 | End: 2019-03-28

## 2019-03-26 RX ADMIN — DILTIAZEM HYDROCHLORIDE 30 MG: 30 TABLET, FILM COATED ORAL at 10:03

## 2019-03-26 RX ADMIN — Medication 10 ML: at 09:03

## 2019-03-26 RX ADMIN — FERROUS GLUCONATE TAB 324 MG (37.5 MG ELEMENTAL IRON) 324 MG: 324 (37.5 FE) TAB at 10:03

## 2019-03-26 RX ADMIN — ATORVASTATIN CALCIUM 20 MG: 10 TABLET, FILM COATED ORAL at 09:03

## 2019-03-26 RX ADMIN — IPRATROPIUM BROMIDE AND ALBUTEROL SULFATE 3 ML: .5; 3 SOLUTION RESPIRATORY (INHALATION) at 10:03

## 2019-03-26 RX ADMIN — TAMSULOSIN HYDROCHLORIDE 0.4 MG: 0.4 CAPSULE ORAL at 10:03

## 2019-03-26 RX ADMIN — CARVEDILOL 25 MG: 6.25 TABLET, FILM COATED ORAL at 10:03

## 2019-03-26 RX ADMIN — PANTOPRAZOLE SODIUM 40 MG: 40 TABLET, DELAYED RELEASE ORAL at 10:03

## 2019-03-26 RX ADMIN — Medication 10 ML: at 04:03

## 2019-03-26 RX ADMIN — Medication 10 ML: at 05:03

## 2019-03-26 RX ADMIN — Medication 5 UNITS: at 11:03

## 2019-03-26 RX ADMIN — ASPIRIN 81 MG: 81 TABLET, COATED ORAL at 10:03

## 2019-03-26 RX ADMIN — DILTIAZEM HYDROCHLORIDE 30 MG: 30 TABLET, FILM COATED ORAL at 09:03

## 2019-03-26 RX ADMIN — Medication 10 ML: at 12:03

## 2019-03-26 RX ADMIN — FUROSEMIDE 40 MG: 10 INJECTION, SOLUTION INTRAVENOUS at 04:03

## 2019-03-26 RX ADMIN — CARVEDILOL 25 MG: 6.25 TABLET, FILM COATED ORAL at 09:03

## 2019-03-26 RX ADMIN — LOSARTAN POTASSIUM 25 MG: 25 TABLET, FILM COATED ORAL at 10:03

## 2019-03-26 NOTE — PT/OT/SLP PROGRESS
"Physical Therapy Treatment    Patient Name:  Agus Ramos Jr.   MRN:  [id number]    Recommendations:     Discharge Recommendations:  nursing facility, skilled   Discharge Equipment Recommendations: none   Barriers to discharge: re-admission to hospital    Assessment:     Agus Ramos Jr. is a 78 y.o. male admitted with a medical diagnosis of Acute metabolic encephalopathy.  He presents with the following impairments/functional limitations:  weakness, impaired endurance, gait instability, impaired functional mobilty, impaired balance, decreased lower extremity function, decreased safety awareness, impaired joint extensibility, decreased coordination. Pt repeating questions and demonstrated confusion, but was able to be redirected for treatment. Pt required VC for safe technique during sit<>stand transfer. Pt ambulated ~25 ft with RW, CGA/min A, and chair followed and 2L O2 NC. Pt required max VC for walker management. Pt tolerated BLE therex x 10 reps within a pain free range. Pt required frequent rest breaks during therex 2* SOB, despite remaining on 2L O2 NC. Pt will benefit from further skilled therapy to return to PLOF.     Rehab Prognosis: Fair; patient would benefit from acute skilled PT services to address these deficits and reach maximum level of function.    Recent Surgery: * No surgery found *      Plan:     During this hospitalization, patient to be seen 6 x/week to address the identified rehab impairments via gait training, therapeutic activities, therapeutic exercises and progress toward the following goals:    · Plan of Care Expires:  04/08/19    Subjective     Chief Complaint: SOB  Patient/Family Comments/goals: PT to come daily "would be best" to get stronger.   Pain/Comfort:  · Pain Rating 1: 0/10  · Pain Rating Post-Intervention 1: 0/10      Objective:     Communicated with nurse Aguilar prior to session.  Patient found sitting up on EOB with PICC line, telemetry, oxygen, and wife and PARRIS Silverman " present upon PT entry to room.     General Precautions: Standard, fall   Orthopedic Precautions:N/A   Braces: N/A     Functional Mobility:  · Transfers:     · Sit to Stand:  minimum assistance with rolling walker. Pt required VC for safety technique.   · Gait: Pt ambulated ~25ft with RW, CGA/min A, chair followed and 2L O2 NC. Pt ambulated with decreased ranold, decreased step length, decreased foot clearance during swing, and forward flexed posture. Pt required max VC for walker management and safety awareness.       AM-PAC 6 CLICK MOBILITY  Turning over in bed (including adjusting bedclothes, sheets and blankets)?: 3  Sitting down on and standing up from a chair with arms (e.g., wheelchair, bedside commode, etc.): 3  Moving from lying on back to sitting on the side of the bed?: 3  Moving to and from a bed to a chair (including a wheelchair)?: 3  Need to walk in hospital room?: 2  Climbing 3-5 steps with a railing?: 2  Basic Mobility Total Score: 16       Therapeutic Activities and Exercises:  Pt performed transfers and gait training as above.  Pt performed BLE seated therex x10 reps: LAQ (2 x 10), HS, hip ABD/ADD, AP, and alternating hip flexion. Pt required VC and demonstration for therex sequencing and proper technique.  Pt educated on importance of safety in OOB mobility and to call the nurse if he needed to stand up.       Patient left in reclined chair with BLE elevated, with all lines intact, call button in reach, nurse notified and family member present.    GOALS:   Multidisciplinary Problems     Physical Therapy Goals        Problem: Physical Therapy Goal    Goal Priority Disciplines Outcome Goal Variances Interventions   Physical Therapy Goal     PT, PT/OT      Description:  Goals to be met by: 19    Patient will increase functional independence with mobility by performin. Sit to stand transfer with Stand-by Assistance  2. Gait x150 feet with stand-by Assistance using Rolling Walker  3. Lower  extremity exercise program x30 reps per handout, with supervision                      Time Tracking:     PT Received On: 03/26/19  PT Start Time: 1356     PT Stop Time: 1420  PT Total Time (min): 24 min     Billable Minutes: Gait Training 13 and Therapeutic Exercise 11    Treatment Type: Treatment  PT/PTA: PTA     PTA Visit Number: 1     KASSIDY Shore     I certify that I was present in the room directing the student in service delivery and guiding them using my skilled judgment. As the co-signing therapist I have reviewed the students documentation and am responsible for the treatment, assessment, and plan.     Katie Coughlin, PT  03/26/2019

## 2019-03-26 NOTE — PT/OT/SLP PROGRESS
"Occupational Therapy   Treatment    Name: Agus Ramos Jr.  MRN: [id number]  Admitting Diagnosis:  Acute metabolic encephalopathy       Recommendations:     Discharge Recommendations: nursing facility, skilled  Discharge Equipment Recommendations:  walker, rolling  Barriers to discharge:       Assessment:     Agus Ramos Jr. is a 78 y.o. male with a medical diagnosis of Acute metabolic encephalopathy.  He presents with the following performance deficits affecting function: weakness, impaired endurance, impaired self care skills, impaired functional mobilty, gait instability, decreased upper extremity function, decreased lower extremity function, decreased coordination, impaired cardiopulmonary response to activity, edema, decreased safety awareness, impaired balance.  . Patient noted to be minimally confused, required redirection and step by step commands during session.  Patient required decreased assistance for all OOB mobility today and able to transfer to [facility] with MIN A. Patient progressing.    Rehab Prognosis:  Fair+; patient would benefit from acute skilled OT services to address these deficits and reach maximum level of function.       Plan:     Patient to be seen 5 x/week to address the above listed problems via self-care/home management, therapeutic activities, therapeutic exercises  · Plan of Care Expires:    · Plan of Care Reviewed with: patient    Subjective   Patient agreeable to therapy.   " I am so cold."  Pain/Comfort:  · Pain Rating 1: 0/10    Objective:     Communicated with: Nurse Aguilar prior to session.  Patient found left sidelying with SCD's telemetry, PICC line, oxygen, and spouse present upon OT entry to room.    General Precautions: Standard, fall   Orthopedic Precautions:N/A   Braces: N/A     Occupational Performance:     Bed Mobility:    · Patient completed Rolling/Turning to Right with contact guard assistance  · Patient completed Scooting/Bridging with contact guard assistance to " EOB  · Patient completed Supine to Sit with moderate assistance     Functional Mobility/Transfers:  · Patient completed Sit <> Stand Transfer with minimum assistance and moderate assistance  with  no assistive device   · Patient completed  Bed<>BSC Stand Pivot technique with minimum assistance with  no AD. Patient required verbal/tactile cues for safety and technique.    Activities of Daily Living:  · Grooming: set up assisance to wash face with washcloth seated EOB    · Upper Body Dressing: moderate assistance seated EOB  · Toileting: total assistance for pericare and clothing management on BSC after BM. Patient able to maintain standing balance with CGA/Min A and UE support on bed rail and BSC for pericare.    [facility] 6 Click ADL: 12    Treatment & Education:  Patient performed bed mobility, functional transfers, and ADL's as above.   Patient performed BUE ROM therex x 10 reps elbow flex/ext, wrist flex/ext, and finger flex/ext. Patient with edema noted to BUE, encouraged patient to perform therex during day to promote edema reduction.     Patient left seated EOB with PT & PTA student present with all lines intact, call button in reach, nurse notified.    GOALS:   Multidisciplinary Problems     Occupational Therapy Goals        Problem: Occupational Therapy Goal    Goal Priority Disciplines Outcome Interventions   Occupational Therapy Goal     OT, PT/OT Ongoing (interventions implemented as appropriate)    Description:  Goals to be met by: 4/15/19     Patient will increase functional independence with ADLs by performing:    UE Dressing with Set-up Assistance.  LE Dressing with Minimal Assistance.  Grooming while seated with Supervision.  Toileting from bedside commode with Moderate Assistance for hygiene and clothing management.   Bathing from  edge of bed with Moderate Assistance.  Sitting at edge of bed x20 minutes with Stand-by Assistance.  Rolling to Bilateral with Set-up Assistance.   Supine to sit with  Supervision.  Stand pivot transfers with Minimal Assistance.  Toilet transfer to bedside commode with Minimal Assistance.  Upper extremity exercise program x10 reps per handout, with assistance as needed.                      Time Tracking:     OT Date of Treatment: 03/26/19  OT Start Time: 1322  OT Stop Time: 1355  OT Total Time (min): 33 min    Billable Minutes:Self Care/Home Management 22  Therapeutic Activity 11    RAHUL Fatima  3/26/2019

## 2019-03-26 NOTE — NURSING
Received report from off going nurse Zoila. Patient received AAO X 3. Resp even and unlabored. O2 2l/nc in use. RONNY picc line noted with drsg clean dry and intact. ScDs to BLE noted. Tele box #[id number] in use and is visible on the screen.

## 2019-03-26 NOTE — PLAN OF CARE
Problem: Physical Therapy Goal  Goal: Physical Therapy Goal  Goals to be met by: 19    Patient will increase functional independence with mobility by performin. Sit to stand transfer with Stand-by Assistance  2. Gait x150 feet with stand-by Assistance using Rolling Walker  3. Lower extremity exercise program x30 reps per handout, with supervision     Outcome: Ongoing (interventions implemented as appropriate)    Patient increased ambulation distance today with PT.

## 2019-03-26 NOTE — ASSESSMENT & PLAN NOTE
Patient was noted to have a capillary glucose of 14 mg/dL in the field for which he was given an ampule of 50% dextrose with improvement of his mentation.  His initial serum glucose here was 30 mg/dL but later was still 44 mg/dL after treatment.  He improved into the 180's but continued to drop to 81 mg/dL then to 36 mg/dL.    Admitted to ICU on D10 drip and hourly glucose checks.  Also started on Octreotide drip.  Glucose increasing with no further episodes of hypoglycemia.  Will change D10 to D5 and monitor glucose every 4 hours.  The etiology of his hypoglycemia is unclear but he is on a sulfonylurea at home.  He does have 1+ leukocytes and moderate bacteria on urine, but many amorphous cells.  Will repeat UA with UCx.  Afebrile.  Will hold off on ABx's for now.  Getting more hyperglycemic.  Stop D5 and monitor off dextrose.    PT/OT eval.  Last HbA1C 2 weeks ago 5.0,likely no need any more for hypoglycemic agents,diet should  be fine.  SNF recommended.

## 2019-03-26 NOTE — PROGRESS NOTES
TB skin test administered to right forearm as per order. Tolerated well. Skin test form filled out and placed in pts blue folder.

## 2019-03-26 NOTE — SUBJECTIVE & OBJECTIVE
Interval History: . No complaints.    Review of Systems   HENT: Negative for ear discharge and ear pain.    Eyes: Negative for pain and itching.   Cardiovascular: Negative for chest pain and palpitations.   Neurological: Negative for seizures and syncope.     Objective:     Vital Signs (Most Recent):  Temp: 97.7 °F (36.5 °C) (03/26/19 0746)  Pulse: 81 (03/26/19 0746)  Resp: 18 (03/26/19 0746)  BP: 118/70 (03/26/19 0746)  SpO2: 99 % (03/26/19 0746) Vital Signs (24h Range):  Temp:  [97.2 °F (36.2 °C)-97.9 °F (36.6 °C)] 97.7 °F (36.5 °C)  Pulse:  [77-92] 81  Resp:  [18-20] 18  SpO2:  [95 %-99 %] 99 %  BP: (118-130)/(59-90) 118/70     Weight: 93 kg (205 lb 0.4 oz)  Body mass index is 30.28 kg/m².    Intake/Output Summary (Last 24 hours) at 3/26/2019 0810  Last data filed at 3/26/2019 0004  Gross per 24 hour   Intake 480 ml   Output 150 ml   Net 330 ml      Physical Exam   Constitutional: He is oriented to person, place, and time. He appears well-developed and well-nourished. No distress.   HENT:   Head: Normocephalic and atraumatic.   Right Ear: External ear normal.   Left Ear: External ear normal.   Nose: Nose normal.   Eyes: Right eye exhibits no discharge. Left eye exhibits no discharge.   Neck: Normal range of motion.   Cardiovascular:   Irregularly irregular, no murmurs or gallops   Pulmonary/Chest: Effort normal. No respiratory distress.   Abdominal: Soft. Bowel sounds are normal. He exhibits no distension. There is no tenderness. There is no rebound and no guarding.   Musculoskeletal: Normal range of motion. He exhibits no edema.   Neurological: He is alert and oriented to person, place, and time.   Skin: Skin is warm and dry. He is not diaphoretic. No erythema.   Psychiatric: He has a normal mood and affect. His behavior is normal. Judgment and thought content normal.   Nursing note and vitals reviewed.      Significant Labs:   BMP:   No results for input(s): GLU, NA, K, CL, CO2, BUN, CREATININE, CALCIUM, MG in  the last 48 hours.  CBC:   No results for input(s): WBC, HGB, HCT, PLT in the last 48 hours.  POCT Glucose:   Recent Labs   Lab 03/25/19  1620 03/25/19  1951 03/26/19  0746   POCTGLUCOSE 128* 145* 157*

## 2019-03-26 NOTE — PLAN OF CARE
03/26/19 1747   Post-Acute Status   Post-Acute Authorization Home Health/Hospice   To patient's room to discuss patient managing his care at home.  Met with patient and wife at bedside.    TN Role Explained.  Patient identified by using 2 identifiers:  Name and date of birth    Wife stated that she WILL HELP AT HOME WITH his RECOVERY.  Patient and wife refused SNF.    TN name and contact info placed on the communication board

## 2019-03-26 NOTE — PROGRESS NOTES
[facility] Hospital Medicine  Progress Note    Patient Name: Agus Ramos Jr.  MRN: [id number]  Patient Class: IP- Inpatient   Admission Date: 3/23/2019  Length of Stay: 3 days  Attending Physician: Ace Cisneros MD  Primary Care Provider: Keaton Hardy MD        Subjective:     Principal Problem:Acute metabolic encephalopathy    HPI:  Mr. Agus Ramos Jr. is a 78 y.o. male known to me with essential hypertension, type 2 diabetes mellitus (HbA1c 5.0% Mar 2019), CAD s/p CABG, chronic combined systolic and diastolic heart failure (LVEF 50% Mar 2019), CKD Stage 3, peripheral artery disease, chronic atrial fibrillation ([id number]-KICs score 5) on chronic anticoagulation, COPD, chronic respiratory failure with hypoxia, and anemia of chronic disease who presents to [facility] ED with complaints of a fall this morning.  He had much difficulty standing back up.  EMS was activated and they found that his capillary glucose was 14 mg/dL after which he was given an ampule of 50% dextrose.  His mentation returned to baseline thereafter.  He denies any tremors nor diaphoresis.  He says that he's been feeling weak and dizzy today but denies any loss of consciousness, nor antecedent chest pain, shortness of breath, palpitations, fevers, chills, nausea, vomiting, abdominal pain, or any diarrhea.  He did not trip or slip or anything.  He has had a good appetite but cannot say for sure if he has been taking too much of his diabetes medications.  He denies any recent changes to his medications.  He otherwise has been in his usual state of health.    Hospital Course:  79 y/o male presented with weakness.  Hx of DM on Glipizide and Metformin.  Patient noted to be hypoglycemic.  Initially responded to D50, but then became persistently hypoglycemic.  Admitted to ICU on D10 drip and hourly glucose monitoring.  Also started on Octreotide drip.  No further episodes of hypoglycemia and D10 switched to D5.   Octreotide drip stopped.  Now getting more hyperglycemic.  Stopping D5 drip and monitor off dextrose.  PT/OT evaluation.  Recommended SNF,consulted SW.  Last HbA1C 2 weeks ago 5.0,likely no need any more for hypoglycemic agents,diet should  be fine.    Interval History: . No complaints.    Review of Systems   HENT: Negative for ear discharge and ear pain.    Eyes: Negative for pain and itching.   Cardiovascular: Negative for chest pain and palpitations.   Neurological: Negative for seizures and syncope.     Objective:     Vital Signs (Most Recent):  Temp: 97.7 °F (36.5 °C) (03/26/19 0746)  Pulse: 81 (03/26/19 0746)  Resp: 18 (03/26/19 0746)  BP: 118/70 (03/26/19 0746)  SpO2: 99 % (03/26/19 0746) Vital Signs (24h Range):  Temp:  [97.2 °F (36.2 °C)-97.9 °F (36.6 °C)] 97.7 °F (36.5 °C)  Pulse:  [77-92] 81  Resp:  [18-20] 18  SpO2:  [95 %-99 %] 99 %  BP: (118-130)/(59-90) 118/70     Weight: 93 kg (205 lb 0.4 oz)  Body mass index is 30.28 kg/m².    Intake/Output Summary (Last 24 hours) at 3/26/2019 0810  Last data filed at 3/26/2019 0004  Gross per 24 hour   Intake 480 ml   Output 150 ml   Net 330 ml      Physical Exam   Constitutional: He is oriented to person, place, and time. He appears well-developed and well-nourished. No distress.   HENT:   Head: Normocephalic and atraumatic.   Right Ear: External ear normal.   Left Ear: External ear normal.   Nose: Nose normal.   Eyes: Right eye exhibits no discharge. Left eye exhibits no discharge.   Neck: Normal range of motion.   Cardiovascular:   Irregularly irregular, no murmurs or gallops   Pulmonary/Chest: Effort normal. No respiratory distress.   Abdominal: Soft. Bowel sounds are normal. He exhibits no distension. There is no tenderness. There is no rebound and no guarding.   Musculoskeletal: Normal range of motion. He exhibits no edema.   Neurological: He is alert and oriented to person, place, and time.   Skin: Skin is warm and dry. He is not diaphoretic. No erythema.    Psychiatric: He has a normal mood and affect. His behavior is normal. Judgment and thought content normal.   Nursing note and vitals reviewed.      Significant Labs:   BMP:   No results for input(s): GLU, NA, K, CL, CO2, BUN, CREATININE, CALCIUM, MG in the last 48 hours.  CBC:   No results for input(s): WBC, HGB, HCT, PLT in the last 48 hours.  POCT Glucose:   Recent Labs   Lab 03/25/19  1620 03/25/19  1951 03/26/19  0746   POCTGLUCOSE 128* 145* 157*     Assessment/Plan:      * Acute metabolic encephalopathy  Patient was noted to have a capillary glucose of 14 mg/dL in the field for which he was given an ampule of 50% dextrose with improvement of his mentation.  His initial serum glucose here was 30 mg/dL but later was still 44 mg/dL after treatment.  He improved into the 180's but continued to drop to 81 mg/dL then to 36 mg/dL.    Admitted to ICU on D10 drip and hourly glucose checks.  Also started on Octreotide drip.  Glucose increasing with no further episodes of hypoglycemia.  Will change D10 to D5 and monitor glucose every 4 hours.  The etiology of his hypoglycemia is unclear but he is on a sulfonylurea at home.  He does have 1+ leukocytes and moderate bacteria on urine, but many amorphous cells.  Will repeat UA with UCx.  Afebrile.  Will hold off on ABx's for now.  Getting more hyperglycemic.  Stop D5 and monitor off dextrose.    PT/OT eval.  Last HbA1C 2 weeks ago 5.0,likely no need any more for hypoglycemic agents,diet should  be fine.  SNF recommended.      Chronic atrial fibrillation  Patient is in atrial fibrillation at this time which is baseline for him.  He was taken off of anticoagulation during his last admission a week ago due to anemia and was instructed not to take it until outpatient follow-up with gastroenterology.  Will continue to monitor.    CAD (coronary artery disease)  Stable; will continue his home regimen of aspirin, atorvastatin, and carvedilol.    Peripheral artery disease  Stable;  will continue his home regimen of aspirin and atorvastatin.    Chronic combined systolic and diastolic heart failure  Stable without evidence of acute heart failure; will continue his home regimen of carvedilol and furosemide.    Chronic respiratory failure with hypoxia  Stable; will continue his home supplemental oxygen therapy.    Centrilobular emphysema  Clinically-stable without wheezing.  Will provide as-needed JENAE/LAMA available.    Iron deficiency anemia  The patient's H/H is stable and consistent with previous laboratory measurements, and the patient exhibits no signs or symptoms of acute bleeding; there is no indication for transfusion.  Will continue to monitor.    Type 2 diabetes mellitus, controlled, with renal complications  As addressed above.    CKD Stage 3  His renal function appears to be at his baseline.    Essential hypertension  Continue home regimen of carvedilol, diltiazem, furosemide, and tamsulosin, and provide as-needed clonidine.      VTE Risk Mitigation (From admission, onward)        Ordered     IP VTE HIGH RISK PATIENT  Once      03/23/19 1936     Reason for No Pharmacological VTE Prophylaxis  Once      03/23/19 1936     Place sequential compression device  Until discontinued      03/23/19 1934     Place DENIS hose  Until discontinued      03/23/19 1934              Ace Cisneros MD  Department of Hospital Medicine   [facility]

## 2019-03-26 NOTE — PLAN OF CARE
Problem: Occupational Therapy Goal  Goal: Occupational Therapy Goal  Goals to be met by: 4/15/19     Patient will increase functional independence with ADLs by performing:    UE Dressing with Set-up Assistance.  LE Dressing with Minimal Assistance.  Grooming while seated with Supervision.  Toileting from bedside commode with Moderate Assistance for hygiene and clothing management.   Bathing from  edge of bed with Moderate Assistance.  Sitting at edge of bed x20 minutes with Stand-by Assistance.  Rolling to Bilateral with Set-up Assistance.   Supine to sit with Supervision.  Stand pivot transfers with Minimal Assistance.  Toilet transfer to bedside commode with Minimal Assistance.  Upper extremity exercise program x10 reps per handout, with assistance as needed.     Outcome: Ongoing (interventions implemented as appropriate)  Patient noted to be minimally confused, required redirection and step by step commands during session.  Patient required decreased assistance for all OOB mobility today and able to transfer to [facility] with MIN A. Patient progressing and will benefit from continued OT to address functional deficits.

## 2019-03-26 NOTE — PLAN OF CARE
Problem: Adult Inpatient Plan of Care  Goal: Plan of Care Review     03/26/19 5071   Plan of Care Review   Plan of Care Reviewed With patient   Pt remains free from falls ben medications well,able to move all extremities well,able to make needs known,requires extensive assist with adl's, rt upper arm picc p/i flushes well,clamped off,appitite fair,no confusion noted.continue monitoring.

## 2019-03-27 PROBLEM — N50.89 SCROTAL SWELLING: Status: ACTIVE | Noted: 2019-03-27

## 2019-03-27 PROBLEM — I50.43 ACUTE ON CHRONIC COMBINED SYSTOLIC AND DIASTOLIC HEART FAILURE: Status: ACTIVE | Noted: 2019-03-13

## 2019-03-27 LAB
ANION GAP SERPL CALC-SCNC: 3 MMOL/L (ref 8–16)
BASOPHILS # BLD AUTO: 0.01 K/UL (ref 0–0.2)
BASOPHILS NFR BLD: 0.1 % (ref 0–1.9)
BUN SERPL-MCNC: 33 MG/DL (ref 8–23)
CALCIUM SERPL-MCNC: 9.4 MG/DL (ref 8.7–10.5)
CHLORIDE SERPL-SCNC: 108 MMOL/L (ref 95–110)
CO2 SERPL-SCNC: 30 MMOL/L (ref 23–29)
CREAT SERPL-MCNC: 1.4 MG/DL (ref 0.5–1.4)
DIFFERENTIAL METHOD: ABNORMAL
EOSINOPHIL # BLD AUTO: 0.2 K/UL (ref 0–0.5)
EOSINOPHIL NFR BLD: 3.1 % (ref 0–8)
ERYTHROCYTE [DISTWIDTH] IN BLOOD BY AUTOMATED COUNT: 21.9 % (ref 11.5–14.5)
EST. GFR  (AFRICAN AMERICAN): 54 ML/MIN/1.73 M^2
EST. GFR  (NON AFRICAN AMERICAN): 47 ML/MIN/1.73 M^2
GLUCOSE SERPL-MCNC: 149 MG/DL (ref 70–110)
HCT VFR BLD AUTO: 29.7 % (ref 40–54)
HGB BLD-MCNC: 8 G/DL (ref 14–18)
LYMPHOCYTES # BLD AUTO: 1.1 K/UL (ref 1–4.8)
LYMPHOCYTES NFR BLD: 15.8 % (ref 18–48)
MCH RBC QN AUTO: 22.3 PG (ref 27–31)
MCHC RBC AUTO-ENTMCNC: 26.9 G/DL (ref 32–36)
MCV RBC AUTO: 83 FL (ref 82–98)
MONOCYTES # BLD AUTO: 0.9 K/UL (ref 0.3–1)
MONOCYTES NFR BLD: 13 % (ref 4–15)
NEUTROPHILS # BLD AUTO: 4.6 K/UL (ref 1.8–7.7)
NEUTROPHILS NFR BLD: 68.3 % (ref 38–73)
PLATELET # BLD AUTO: 289 K/UL (ref 150–350)
PMV BLD AUTO: 10.2 FL (ref 9.2–12.9)
POCT GLUCOSE: 151 MG/DL (ref 70–110)
POCT GLUCOSE: 184 MG/DL (ref 70–110)
POCT GLUCOSE: 193 MG/DL (ref 70–110)
POCT GLUCOSE: 204 MG/DL (ref 70–110)
POTASSIUM SERPL-SCNC: 4.4 MMOL/L (ref 3.5–5.1)
RBC # BLD AUTO: 3.58 M/UL (ref 4.6–6.2)
SODIUM SERPL-SCNC: 141 MMOL/L (ref 136–145)
WBC # BLD AUTO: 6.83 K/UL (ref 3.9–12.7)

## 2019-03-27 PROCEDURE — 25000242 PHARM REV CODE 250 ALT 637 W/ HCPCS: Performed by: HOSPITALIST

## 2019-03-27 PROCEDURE — 25000003 PHARM REV CODE 250: Performed by: HOSPITALIST

## 2019-03-27 PROCEDURE — 85025 COMPLETE CBC W/AUTO DIFF WBC: CPT

## 2019-03-27 PROCEDURE — 94761 N-INVAS EAR/PLS OXIMETRY MLT: CPT

## 2019-03-27 PROCEDURE — 97535 SELF CARE MNGMENT TRAINING: CPT

## 2019-03-27 PROCEDURE — 97110 THERAPEUTIC EXERCISES: CPT

## 2019-03-27 PROCEDURE — 94640 AIRWAY INHALATION TREATMENT: CPT

## 2019-03-27 PROCEDURE — A4216 STERILE WATER/SALINE, 10 ML: HCPCS | Performed by: HOSPITALIST

## 2019-03-27 PROCEDURE — 99900035 HC TECH TIME PER 15 MIN (STAT)

## 2019-03-27 PROCEDURE — 63600175 PHARM REV CODE 636 W HCPCS: Performed by: HOSPITALIST

## 2019-03-27 PROCEDURE — 11000001 HC ACUTE MED/SURG PRIVATE ROOM

## 2019-03-27 PROCEDURE — 36415 COLL VENOUS BLD VENIPUNCTURE: CPT

## 2019-03-27 PROCEDURE — 97116 GAIT TRAINING THERAPY: CPT

## 2019-03-27 PROCEDURE — 80048 BASIC METABOLIC PNL TOTAL CA: CPT

## 2019-03-27 RX ADMIN — ATORVASTATIN CALCIUM 20 MG: 10 TABLET, FILM COATED ORAL at 09:03

## 2019-03-27 RX ADMIN — ASPIRIN 81 MG: 81 TABLET, COATED ORAL at 10:03

## 2019-03-27 RX ADMIN — Medication 10 ML: at 06:03

## 2019-03-27 RX ADMIN — IPRATROPIUM BROMIDE AND ALBUTEROL SULFATE 3 ML: .5; 3 SOLUTION RESPIRATORY (INHALATION) at 04:03

## 2019-03-27 RX ADMIN — Medication 10 ML: at 05:03

## 2019-03-27 RX ADMIN — CARVEDILOL 25 MG: 6.25 TABLET, FILM COATED ORAL at 09:03

## 2019-03-27 RX ADMIN — TAMSULOSIN HYDROCHLORIDE 0.4 MG: 0.4 CAPSULE ORAL at 10:03

## 2019-03-27 RX ADMIN — INSULIN ASPART 2 UNITS: 100 INJECTION, SOLUTION INTRAVENOUS; SUBCUTANEOUS at 12:03

## 2019-03-27 RX ADMIN — FUROSEMIDE 40 MG: 10 INJECTION, SOLUTION INTRAVENOUS at 10:03

## 2019-03-27 RX ADMIN — RAMELTEON 8 MG: 8 TABLET, FILM COATED ORAL at 09:03

## 2019-03-27 RX ADMIN — DILTIAZEM HYDROCHLORIDE 30 MG: 30 TABLET, FILM COATED ORAL at 09:03

## 2019-03-27 RX ADMIN — FERROUS GLUCONATE TAB 324 MG (37.5 MG ELEMENTAL IRON) 324 MG: 324 (37.5 FE) TAB at 10:03

## 2019-03-27 RX ADMIN — Medication 10 ML: at 12:03

## 2019-03-27 RX ADMIN — PANTOPRAZOLE SODIUM 40 MG: 40 TABLET, DELAYED RELEASE ORAL at 10:03

## 2019-03-27 RX ADMIN — FUROSEMIDE 40 MG: 10 INJECTION, SOLUTION INTRAVENOUS at 06:03

## 2019-03-27 NOTE — PT/OT/SLP PROGRESS
Occupational Therapy   Treatment    Name: Agus Ramos Jr.  MRN: [id number]  Admitting Diagnosis:  Acute metabolic encephalopathy       Recommendations:     Discharge Recommendations: nursing facility, skilled  Discharge Equipment Recommendations:  walker, rolling  Barriers to discharge:       Assessment:     Agus Ramos Jr. is a 78 y.o. male with a medical diagnosis of Acute metabolic encephalopathy.  He presents with confusion during session. Performance deficits affecting function are weakness, impaired endurance, impaired functional mobilty, gait instability, impaired cognition, impaired balance, decreased coordination, decreased lower extremity function, impaired coordination, decreased ROM, impaired muscle length, impaired joint extensibility.     Rehab Prognosis:  Fair; patient would benefit from acute skilled OT services to address these deficits and reach maximum level of function.       Plan:     Patient to be seen 5 x/week to address the above listed problems via self-care/home management, therapeutic activities, therapeutic exercises  · Plan of Care Expires:    · Plan of Care Reviewed with: patient    Subjective     Pain/Comfort:  · Pain Rating 1: 0/10  · Pain Rating Post-Intervention 1: 0/10    Objective:     Communicated with: nurse prior to session.  Patient found supine with telemetry, PICC line, oxygen upon OT entry to room.    General Precautions: Standard, fall   Orthopedic Precautions:N/A   Braces: N/A     Occupational Performance:     Bed Mobility:    · Patient completed Scooting/Bridging with minimum assistance  · Patient completed Supine to Sit with minimum assistance     Functional Mobility/Transfers:  · Functional Mobility: sitting edge of bed    Activities of Daily Living:  · Feeding:  supervision    · Grooming: supervision    · Upper Body Dressing: minimum assistance        AMPAC 6 Click ADL: 12    Treatment & Education:  Edge of bed performing exercise    Patient left edge of bed with  all lines intact, call button in reach and nurse aware notifiedEducation:      GOALS:   Multidisciplinary Problems     Occupational Therapy Goals        Problem: Occupational Therapy Goal    Goal Priority Disciplines Outcome Interventions   Occupational Therapy Goal     OT, PT/OT Ongoing (interventions implemented as appropriate)    Description:  Goals to be met by: 4/15/19     Patient will increase functional independence with ADLs by performing:    UE Dressing with Set-up Assistance.  LE Dressing with Minimal Assistance.  Grooming while seated with Supervision.  Toileting from bedside commode with Moderate Assistance for hygiene and clothing management.   Bathing from  edge of bed with Moderate Assistance.  Sitting at edge of bed x20 minutes with Stand-by Assistance.  Rolling to Bilateral with Set-up Assistance.   Supine to sit with Supervision.  Stand pivot transfers with Minimal Assistance.  Toilet transfer to bedside commode with Minimal Assistance.  Upper extremity exercise program x10 reps per handout, with assistance as needed.                      Time Tracking:     OT Date of Treatment: 03/27/19  OT Start Time: 1520  OT Stop Time: 1535  OT Total Time (min): 15 min    Billable Minutes:Self Care/Home Management 15    Nazia Urban OT  3/27/2019

## 2019-03-27 NOTE — PLAN OF CARE
Problem: Skin Injury Risk Increased  Goal: Skin Health and Integrity  Outcome: Ongoing (interventions implemented as appropriate)  Intervention: Optimize Skin Protection     03/26/19 1933 03/27/19 0050   Monitor and Manage Hypervolemia   Skin Protection  --  incontinence pads utilized;skin sealant/moisture barrier applied;tubing/devices free from skin contact   Prevent Additional Skin Injury   Head of Bed (HOB)  --  HOB at 60 degrees   Pressure Reduction Devices pressure-redistributing mattress utilized  --    Pressure Reduction Techniques frequent weight shift encouraged;weight shift assistance provided;heels elevated off bed  --          Problem: Fluid Volume Excess  Goal: Fluid Balance  Outcome: Ongoing (interventions implemented as appropriate)  Intervention: Monitor and Manage Hypervolemia     03/27/19 0050   Monitor and Manage Cardiac Rhythm Effects   Fluid/Electrolyte Management fluids restricted   Monitor and Manage Hypervolemia   Skin Protection incontinence pads utilized;skin sealant/moisture barrier applied;tubing/devices free from skin contact

## 2019-03-27 NOTE — PROGRESS NOTES
[facility] Hospital Medicine  Progress Note    Patient Name: Agus Ramos Jr.  MRN: [id number]  Patient Class: IP- Inpatient   Admission Date: 3/23/2019  Length of Stay: 4 days  Attending Physician: Ace Cisneros MD  Primary Care Provider: Keaton Hardy MD        Subjective:     Principal Problem:Acute metabolic encephalopathy    HPI:  Mr. Agus Ramos Jr. is a 78 y.o. male known to me with essential hypertension, type 2 diabetes mellitus (HbA1c 5.0% Mar 2019), CAD s/p CABG, chronic combined systolic and diastolic heart failure (LVEF 50% Mar 2019), CKD Stage 3, peripheral artery disease, chronic atrial fibrillation ([id number]-CQTe score 5) on chronic anticoagulation, COPD, chronic respiratory failure with hypoxia, and anemia of chronic disease who presents to [facility] ED with complaints of a fall this morning.  He had much difficulty standing back up.  EMS was activated and they found that his capillary glucose was 14 mg/dL after which he was given an ampule of 50% dextrose.  His mentation returned to baseline thereafter.  He denies any tremors nor diaphoresis.  He says that he's been feeling weak and dizzy today but denies any loss of consciousness, nor antecedent chest pain, shortness of breath, palpitations, fevers, chills, nausea, vomiting, abdominal pain, or any diarrhea.  He did not trip or slip or anything.  He has had a good appetite but cannot say for sure if he has been taking too much of his diabetes medications.  He denies any recent changes to his medications.  He otherwise has been in his usual state of health.    Hospital Course:  77 y/o male presented with weakness.  Hx of DM on Glipizide and Metformin.  Patient noted to be hypoglycemic.  Initially responded to D50, but then became persistently hypoglycemic.  Admitted to ICU on D10 drip and hourly glucose monitoring.  Also started on Octreotide drip.  No further episodes of hypoglycemia and D10 switched to D5.   Octreotide drip stopped.  Now getting more hyperglycemic.  Stopping D5 drip and monitor off dextrose.  PT/OT evaluation.  Recommended SNF,consulted SW.  Last HbA1C 2 weeks ago 5.0,likely no need any more for hypoglycemic agents,diet should  be fine.  Has scrotal swelling,on IV lasix,lifting scrotum.    Interval History: . No complaints.    Review of Systems   HENT: Negative for ear discharge and ear pain.    Eyes: Negative for pain and itching.   Cardiovascular: Negative for chest pain and palpitations.   Genitourinary: Positive for scrotal swelling.   Neurological: Negative for seizures and syncope.     Objective:     Vital Signs (Most Recent):  Temp: 97.4 °F (36.3 °C) (03/27/19 0356)  Pulse: 71 (03/27/19 0356)  Resp: 18 (03/27/19 0356)  BP: 106/61 (03/27/19 0356)  SpO2: 95 % (03/27/19 0356) Vital Signs (24h Range):  Temp:  [97.4 °F (36.3 °C)-98.1 °F (36.7 °C)] 97.4 °F (36.3 °C)  Pulse:  [69-81] 71  Resp:  [18] 18  SpO2:  [95 %-99 %] 95 %  BP: (106-142)/(56-77) 106/61     Weight: 95.9 kg (211 lb 6.7 oz)  Body mass index is 31.22 kg/m².    Intake/Output Summary (Last 24 hours) at 3/27/2019 0637  Last data filed at 3/27/2019 0527  Gross per 24 hour   Intake 370 ml   Output 150 ml   Net 220 ml      Physical Exam   Constitutional: He is oriented to person, place, and time. He appears well-developed and well-nourished. No distress.   HENT:   Head: Normocephalic and atraumatic.   Right Ear: External ear normal.   Left Ear: External ear normal.   Nose: Nose normal.   Eyes: Right eye exhibits no discharge. Left eye exhibits no discharge.   Neck: Normal range of motion.   Cardiovascular:   Irregularly irregular, no murmurs or gallops   Pulmonary/Chest: Effort normal. No respiratory distress.   Abdominal: Soft. Bowel sounds are normal. He exhibits no distension. There is no tenderness. There is no rebound and no guarding.   Genitourinary:   Genitourinary Comments: Scrotal swelling.   Musculoskeletal: Normal range of motion.  He exhibits no edema.   Neurological: He is alert and oriented to person, place, and time.   Skin: Skin is warm and dry. He is not diaphoretic. No erythema.   Psychiatric: He has a normal mood and affect. His behavior is normal. Judgment and thought content normal.   Nursing note and vitals reviewed.      Significant Labs:   BMP:   Recent Labs   Lab 03/27/19  0509   *      K 4.4      CO2 30*   BUN 33*   CREATININE 1.4   CALCIUM 9.4     CBC:   Recent Labs   Lab 03/26/19  0818 03/27/19  0509   WBC 6.17 6.83   HGB 7.9* 8.0*   HCT 29.1* 29.7*    289     POCT Glucose:   Recent Labs   Lab 03/26/19  1136 03/26/19  1633 03/26/19  1944   POCTGLUCOSE 152* 167* 179*     Assessment/Plan:      * Acute metabolic encephalopathy  Patient was noted to have a capillary glucose of 14 mg/dL in the field for which he was given an ampule of 50% dextrose with improvement of his mentation.  His initial serum glucose here was 30 mg/dL but later was still 44 mg/dL after treatment.  He improved into the 180's but continued to drop to 81 mg/dL then to 36 mg/dL.    Admitted to ICU on D10 drip and hourly glucose checks.  Also started on Octreotide drip.  Glucose increasing with no further episodes of hypoglycemia.  Will change D10 to D5 and monitor glucose every 4 hours.  The etiology of his hypoglycemia is unclear but he is on a sulfonylurea at home.  He does have 1+ leukocytes and moderate bacteria on urine, but many amorphous cells.  Will repeat UA with UCx.  Afebrile.  Will hold off on ABx's for now.  Getting more hyperglycemic.  Stop D5 and monitor off dextrose.    PT/OT eval.  Last HbA1C 2 weeks ago 5.0,likely no need any more for hypoglycemic agents,diet should  be fine.  SNF recommended.      Chronic atrial fibrillation  Patient is in atrial fibrillation at this time which is baseline for him.  He was taken off of anticoagulation during his last admission a week ago due to anemia and was instructed not to take  it until outpatient follow-up with gastroenterology.  Will continue to monitor.    CAD (coronary artery disease)  Stable; will continue his home regimen of aspirin, atorvastatin, and carvedilol.    Scrotal swelling    Has scrotal swelling,on IV lasix,lifting scrotum.    Peripheral artery disease  Stable; will continue his home regimen of aspirin and atorvastatin.    Acute on chronic combined systolic and diastolic heart failure  Will continue with IV lasix,he is edematous.    Chronic respiratory failure with hypoxia  Stable; will continue his home supplemental oxygen therapy.    Centrilobular emphysema  Clinically-stable without wheezing.  Will provide as-needed JENAE/LAMA available.    Iron deficiency anemia  The patient's H/H is stable and consistent with previous laboratory measurements, and the patient exhibits no signs or symptoms of acute bleeding; there is no indication for transfusion.  Will continue to monitor.    Type 2 diabetes mellitus, controlled, with renal complications  As addressed above.    CKD Stage 3  His renal function appears to be at his baseline.    Essential hypertension  Continue home regimen of carvedilol, diltiazem, furosemide, and tamsulosin, and provide as-needed clonidine.      VTE Risk Mitigation (From admission, onward)        Ordered     IP VTE HIGH RISK PATIENT  Once      03/23/19 1936     Reason for No Pharmacological VTE Prophylaxis  Once      03/23/19 1936     Place sequential compression device  Until discontinued      03/23/19 1934     Place DENIS hose  Until discontinued      03/23/19 1934              Ace Cisneros MD  Department of Hospital Medicine   [facility]

## 2019-03-27 NOTE — SUBJECTIVE & OBJECTIVE
Interval History: . No complaints.    Review of Systems   HENT: Negative for ear discharge and ear pain.    Eyes: Negative for pain and itching.   Cardiovascular: Negative for chest pain and palpitations.   Genitourinary: Positive for scrotal swelling.   Neurological: Negative for seizures and syncope.     Objective:     Vital Signs (Most Recent):  Temp: 97.4 °F (36.3 °C) (03/27/19 0356)  Pulse: 71 (03/27/19 0356)  Resp: 18 (03/27/19 0356)  BP: 106/61 (03/27/19 0356)  SpO2: 95 % (03/27/19 0356) Vital Signs (24h Range):  Temp:  [97.4 °F (36.3 °C)-98.1 °F (36.7 °C)] 97.4 °F (36.3 °C)  Pulse:  [69-81] 71  Resp:  [18] 18  SpO2:  [95 %-99 %] 95 %  BP: (106-142)/(56-77) 106/61     Weight: 95.9 kg (211 lb 6.7 oz)  Body mass index is 31.22 kg/m².    Intake/Output Summary (Last 24 hours) at 3/27/2019 0637  Last data filed at 3/27/2019 0527  Gross per 24 hour   Intake 370 ml   Output 150 ml   Net 220 ml      Physical Exam   Constitutional: He is oriented to person, place, and time. He appears well-developed and well-nourished. No distress.   HENT:   Head: Normocephalic and atraumatic.   Right Ear: External ear normal.   Left Ear: External ear normal.   Nose: Nose normal.   Eyes: Right eye exhibits no discharge. Left eye exhibits no discharge.   Neck: Normal range of motion.   Cardiovascular:   Irregularly irregular, no murmurs or gallops   Pulmonary/Chest: Effort normal. No respiratory distress.   Abdominal: Soft. Bowel sounds are normal. He exhibits no distension. There is no tenderness. There is no rebound and no guarding.   Genitourinary:   Genitourinary Comments: Scrotal swelling.   Musculoskeletal: Normal range of motion. He exhibits no edema.   Neurological: He is alert and oriented to person, place, and time.   Skin: Skin is warm and dry. He is not diaphoretic. No erythema.   Psychiatric: He has a normal mood and affect. His behavior is normal. Judgment and thought content normal.   Nursing note and vitals  reviewed.      Significant Labs:   BMP:   Recent Labs   Lab 03/27/19  0509   *      K 4.4      CO2 30*   BUN 33*   CREATININE 1.4   CALCIUM 9.4     CBC:   Recent Labs   Lab 03/26/19  0818 03/27/19  0509   WBC 6.17 6.83   HGB 7.9* 8.0*   HCT 29.1* 29.7*    289     POCT Glucose:   Recent Labs   Lab 03/26/19  1136 03/26/19  1633 03/26/19  1944   POCTGLUCOSE 152* 167* 179*

## 2019-03-27 NOTE — NURSING
Dr. Gonzalez informed of 6 run beat of v-tach at 0553. Pt asymptomatic, /68, HR 75. No new orders received. Pt watching TV. NAD noted.

## 2019-03-27 NOTE — PT/OT/SLP PROGRESS
Physical Therapy Treatment    Patient Name:  Agus Ramos Jr.   MRN:  [id number]    Recommendations:     Discharge Recommendations:  nursing facility, skilled   Discharge Equipment Recommendations: none   Barriers to discharge: re-admission to hospital    Assessment:     Agus Ramos Jr. is a 78 y.o. male admitted with a medical diagnosis of Acute metabolic encephalopathy.  He presents with the following impairments/functional limitations:  weakness, impaired endurance, gait instability, impaired balance, impaired functional mobilty, decreased upper extremity function, decreased lower extremity function, pain, decreased ROM, decreased safety awareness, impaired cardiopulmonary response to activity, impaired joint extensibility, edema, decreased coordination. Pt demonstrated confusion and required redirection to remain on task. Pt required encouragement to complete therapeutic exercise. Pt required frequent rest breaks during therex 2* SOB. Pt ambulated ~40 ft with RW, CGA, with 2L O2 NC and chair following. Pt required V/T cues for walker management and safe technique. Pt c/o SOB with ambulation, despite being on 2L O2 NC, and was instructed on pursed lip breathing technique. Pt will benefit from further skilled therapy in order to return to PLOF.     Rehab Prognosis: Fair; patient would benefit from acute skilled PT services to address these deficits and reach maximum level of function.    Recent Surgery: * No surgery found *      Plan:     During this hospitalization, patient to be seen 6 x/week to address the identified rehab impairments via gait training, therapeutic activities, therapeutic exercises and progress toward the following goals:    · Plan of Care Expires:  04/08/19    Subjective     Chief Complaint: SOB  Patient/Family Comments/goals: pt did not state  Pain/Comfort:  · Pain Rating 1: 0/10  · Pain Rating Post-Intervention 1: 0/10      Objective:     Communicated with nurse Hall prior to session.   Patient found sitting up on EOB with PICC line, telemetry, oxygen upon PT entry to room.     General Precautions: Standard, fall   Orthopedic Precautions:N/A   Braces: N/A     Functional Mobility:  · Transfers:     · Sit to Stand:  minimum assistance with rolling walker x 2 trials from EOB. Pt required V/T cues for safe technique.   · Gait: Pt ambulated ~40 ft with RW, CGA, with oxygen and chair following. Pt demonstrated reciprocal gait pattern, noted with decreased foot clearance during swing, B hip ER, decreased weight shifting, forward flexed posture, and decreased safety awareness. Pt required frequent V/T cues for walker management and safe technique. Pt c/o SOB with ambulation despite ambulating with 2L O2 NC. Pt instructed on pursed lip breathing technique.       AM-PAC 6 CLICK MOBILITY  Turning over in bed (including adjusting bedclothes, sheets and blankets)?: 3  Sitting down on and standing up from a chair with arms (e.g., wheelchair, bedside commode, etc.): 3  Moving from lying on back to sitting on the side of the bed?: 3  Moving to and from a bed to a chair (including a wheelchair)?: 3  Need to walk in hospital room?: 3  Climbing 3-5 steps with a railing?: 2  Basic Mobility Total Score: 17       Therapeutic Activities and Exercises:   Pt performed transfers and gait training as above.    Pt performed BLE seated therex 2x10 reps: LAQ, HS, and alternating hip flexion.    Pt performed BLE seated therex with BLE elevated 2x10 reps: AP, GS, and QS.   Pt required max encouragement to perform therex. Pt required VC and demonstration for therex sequencing and technique.   Pt educated on importance of performing seated therex in chair during the day.     Patient left sitting in reclined chair with BLE elevated, all lines intact, call button in reach, daughter and PCT present, and nurse notified.     GOALS:   Multidisciplinary Problems     Physical Therapy Goals        Problem: Physical Therapy Goal    Goal  Priority Disciplines Outcome Goal Variances Interventions   Physical Therapy Goal     PT, PT/OT Ongoing (interventions implemented as appropriate)     Description:  Goals to be met by: 19    Patient will increase functional independence with mobility by performin. Sit to stand transfer with Stand-by Assistance  2. Gait x150 feet with stand-by Assistance using Rolling Walker  3. Lower extremity exercise program x30 reps per handout, with supervision                      Time Tracking:     PT Received On: 19  PT Start Time: 1544     PT Stop Time: 1611  PT Total Time (min): 27 min     Billable Minutes: Gait Training 14 and Therapeutic Exercise 13    Treatment Type: Treatment  PT/PTA: PTA     PTA Visit Number: 2     KASSIDY Shore     I certify that I was present in the room directing the student in service delivery and guiding them using my skilled judgment. As the co-signing therapist I have reviewed the students documentation and am responsible for the treatment, assessment, and plan.     ALETA SAM, PTA  2019

## 2019-03-27 NOTE — PLAN OF CARE
Problem: Physical Therapy Goal  Goal: Physical Therapy Goal  Goals to be met by: 19    Patient will increase functional independence with mobility by performin. Sit to stand transfer with Stand-by Assistance  2. Gait x150 feet with stand-by Assistance using Rolling Walker  3. Lower extremity exercise program x30 reps per handout, with supervision     Outcome: Ongoing (interventions implemented as appropriate)  Pt will benefit from further skilled therapy in order to get back to PLOF.

## 2019-03-27 NOTE — PLAN OF CARE
03/26/19 0315   Medicare Message   Important Message from Medicare regarding Discharge Appeal Rights Given to patient/caregiver;Explained to patient/caregiver;Signed/date by patient/caregiver

## 2019-03-28 LAB
ANION GAP SERPL CALC-SCNC: 4 MMOL/L (ref 8–16)
BASOPHILS # BLD AUTO: 0.02 K/UL (ref 0–0.2)
BASOPHILS NFR BLD: 0.3 % (ref 0–1.9)
BUN SERPL-MCNC: 34 MG/DL (ref 8–23)
CALCIUM SERPL-MCNC: 9.7 MG/DL (ref 8.7–10.5)
CHLORIDE SERPL-SCNC: 110 MMOL/L (ref 95–110)
CO2 SERPL-SCNC: 27 MMOL/L (ref 23–29)
CREAT SERPL-MCNC: 1.4 MG/DL (ref 0.5–1.4)
DIFFERENTIAL METHOD: ABNORMAL
EOSINOPHIL # BLD AUTO: 0.2 K/UL (ref 0–0.5)
EOSINOPHIL NFR BLD: 3.4 % (ref 0–8)
ERYTHROCYTE [DISTWIDTH] IN BLOOD BY AUTOMATED COUNT: 22.2 % (ref 11.5–14.5)
EST. GFR  (AFRICAN AMERICAN): 54 ML/MIN/1.73 M^2
EST. GFR  (NON AFRICAN AMERICAN): 47 ML/MIN/1.73 M^2
GLUCOSE SERPL-MCNC: 126 MG/DL (ref 70–110)
HCT VFR BLD AUTO: 30.2 % (ref 40–54)
HGB BLD-MCNC: 8.4 G/DL (ref 14–18)
LYMPHOCYTES # BLD AUTO: 1.1 K/UL (ref 1–4.8)
LYMPHOCYTES NFR BLD: 15.4 % (ref 18–48)
MCH RBC QN AUTO: 22.8 PG (ref 27–31)
MCHC RBC AUTO-ENTMCNC: 27.8 G/DL (ref 32–36)
MCV RBC AUTO: 82 FL (ref 82–98)
MONOCYTES # BLD AUTO: 1 K/UL (ref 0.3–1)
MONOCYTES NFR BLD: 13.8 % (ref 4–15)
NEUTROPHILS # BLD AUTO: 4.7 K/UL (ref 1.8–7.7)
NEUTROPHILS NFR BLD: 67.1 % (ref 38–73)
PLATELET # BLD AUTO: 269 K/UL (ref 150–350)
PMV BLD AUTO: 9.4 FL (ref 9.2–12.9)
POCT GLUCOSE: 144 MG/DL (ref 70–110)
POCT GLUCOSE: 196 MG/DL (ref 70–110)
POCT GLUCOSE: 202 MG/DL (ref 70–110)
POCT GLUCOSE: 204 MG/DL (ref 70–110)
POTASSIUM SERPL-SCNC: 4.5 MMOL/L (ref 3.5–5.1)
RBC # BLD AUTO: 3.69 M/UL (ref 4.6–6.2)
SODIUM SERPL-SCNC: 141 MMOL/L (ref 136–145)
WBC # BLD AUTO: 7.02 K/UL (ref 3.9–12.7)

## 2019-03-28 PROCEDURE — A4216 STERILE WATER/SALINE, 10 ML: HCPCS | Performed by: HOSPITALIST

## 2019-03-28 PROCEDURE — 97110 THERAPEUTIC EXERCISES: CPT

## 2019-03-28 PROCEDURE — 63600175 PHARM REV CODE 636 W HCPCS: Performed by: HOSPITALIST

## 2019-03-28 PROCEDURE — 25000003 PHARM REV CODE 250: Performed by: HOSPITALIST

## 2019-03-28 PROCEDURE — 97535 SELF CARE MNGMENT TRAINING: CPT

## 2019-03-28 PROCEDURE — 85025 COMPLETE CBC W/AUTO DIFF WBC: CPT

## 2019-03-28 PROCEDURE — 94660 CPAP INITIATION&MGMT: CPT

## 2019-03-28 PROCEDURE — 94761 N-INVAS EAR/PLS OXIMETRY MLT: CPT

## 2019-03-28 PROCEDURE — 36415 COLL VENOUS BLD VENIPUNCTURE: CPT

## 2019-03-28 PROCEDURE — 99900035 HC TECH TIME PER 15 MIN (STAT)

## 2019-03-28 PROCEDURE — 11000001 HC ACUTE MED/SURG PRIVATE ROOM

## 2019-03-28 PROCEDURE — 80048 BASIC METABOLIC PNL TOTAL CA: CPT

## 2019-03-28 PROCEDURE — 36569 INSJ PICC 5 YR+ W/O IMAGING: CPT

## 2019-03-28 PROCEDURE — 97116 GAIT TRAINING THERAPY: CPT

## 2019-03-28 PROCEDURE — 27000190 HC CPAP FULL FACE MASK W/VALVE

## 2019-03-28 RX ORDER — FUROSEMIDE 10 MG/ML
60 INJECTION INTRAMUSCULAR; INTRAVENOUS 2 TIMES DAILY
Status: DISCONTINUED | OUTPATIENT
Start: 2019-03-28 | End: 2019-03-29

## 2019-03-28 RX ORDER — FUROSEMIDE 10 MG/ML
60 INJECTION INTRAMUSCULAR; INTRAVENOUS ONCE
Status: COMPLETED | OUTPATIENT
Start: 2019-03-28 | End: 2019-03-28

## 2019-03-28 RX ADMIN — Medication 10 ML: at 11:03

## 2019-03-28 RX ADMIN — TAMSULOSIN HYDROCHLORIDE 0.4 MG: 0.4 CAPSULE ORAL at 08:03

## 2019-03-28 RX ADMIN — FUROSEMIDE 60 MG: 10 INJECTION, SOLUTION INTRAMUSCULAR; INTRAVENOUS at 08:03

## 2019-03-28 RX ADMIN — ACETAMINOPHEN 650 MG: 325 TABLET, FILM COATED ORAL at 09:03

## 2019-03-28 RX ADMIN — Medication 10 ML: at 12:03

## 2019-03-28 RX ADMIN — CARVEDILOL 25 MG: 6.25 TABLET, FILM COATED ORAL at 08:03

## 2019-03-28 RX ADMIN — FERROUS GLUCONATE TAB 324 MG (37.5 MG ELEMENTAL IRON) 324 MG: 324 (37.5 FE) TAB at 08:03

## 2019-03-28 RX ADMIN — FUROSEMIDE 60 MG: 10 INJECTION, SOLUTION INTRAMUSCULAR; INTRAVENOUS at 05:03

## 2019-03-28 RX ADMIN — Medication 10 ML: at 06:03

## 2019-03-28 RX ADMIN — DILTIAZEM HYDROCHLORIDE 30 MG: 30 TABLET, FILM COATED ORAL at 08:03

## 2019-03-28 RX ADMIN — CARVEDILOL 25 MG: 6.25 TABLET, FILM COATED ORAL at 09:03

## 2019-03-28 RX ADMIN — INSULIN ASPART 1 UNITS: 100 INJECTION, SOLUTION INTRAVENOUS; SUBCUTANEOUS at 09:03

## 2019-03-28 RX ADMIN — DILTIAZEM HYDROCHLORIDE 30 MG: 30 TABLET, FILM COATED ORAL at 09:03

## 2019-03-28 RX ADMIN — RAMELTEON 8 MG: 8 TABLET, FILM COATED ORAL at 09:03

## 2019-03-28 RX ADMIN — LOSARTAN POTASSIUM 25 MG: 25 TABLET, FILM COATED ORAL at 08:03

## 2019-03-28 RX ADMIN — ASPIRIN 81 MG: 81 TABLET, COATED ORAL at 08:03

## 2019-03-28 RX ADMIN — PANTOPRAZOLE SODIUM 40 MG: 40 TABLET, DELAYED RELEASE ORAL at 08:03

## 2019-03-28 RX ADMIN — FUROSEMIDE 40 MG: 10 INJECTION, SOLUTION INTRAVENOUS at 08:03

## 2019-03-28 RX ADMIN — INSULIN ASPART 2 UNITS: 100 INJECTION, SOLUTION INTRAVENOUS; SUBCUTANEOUS at 05:03

## 2019-03-28 RX ADMIN — ATORVASTATIN CALCIUM 20 MG: 10 TABLET, FILM COATED ORAL at 09:03

## 2019-03-28 RX ADMIN — Medication 10 ML: at 05:03

## 2019-03-28 NOTE — PT/OT/SLP PROGRESS
"Physical Therapy Treatment    Patient Name:  Agus Ramos Jr.   MRN:  [id number]    Recommendations:     Discharge Recommendations:  nursing facility, skilled   Discharge Equipment Recommendations: none   Barriers to discharge: re-admission to hospital    Assessment:     Agus Ramos Jr. is a 78 y.o. male admitted with a medical diagnosis of Acute metabolic encephalopathy.  He presents with the following impairments/functional limitations:  weakness, decreased safety awareness, impaired cardiopulmonary response to activity, edema, decreased ROM, impaired joint extensibility, impaired balance, impaired endurance, impaired functional mobilty, gait instability, impaired cognition, decreased lower extremity function, decreased upper extremity function, decreased coordination. Pt required frequent V/T cues to redirect patient for safety and stay on task throughout therapy treatmemt 2* confusion. Pt required frequent rest breaks 2* SOB with activity, despite pt remaining on 2L O2 NC. Pt ambulated ~30 ft with RW, min A, with oxygen and chair following. Pt limited in ambulation 2* SOB, fatigue and weakness with ambulation. Pt required max VC for walker management and safety awareness. Pt will benefit from further skilled therapy in order to return to OF.      Rehab Prognosis: Fair; patient would benefit from acute skilled PT services to address these deficits and reach maximum level of function.    Recent Surgery: * No surgery found *      Plan:     During this hospitalization, patient to be seen 6 x/week to address the identified rehab impairments via gait training, therapeutic activities, therapeutic exercises and progress toward the following goals:    · Plan of Care Expires:  04/08/19    Subjective     Chief Complaint: SOB; fatigue  Patient/Family Comments/goals: Pt stated "I feel tired", but was agreeable to therapy.   Pain/Comfort:  · Pain Rating 1: 0/10  · Pain Rating Post-Intervention 1: 0/10      Objective: "     Communicated with nurse Muñoz prior to session.  Patient found sitting in reclined chair with BLE and BUE elevated on pillows, telemetry, oxygen, and PICC line upon PT entry to room.     General Precautions: Standard, respiratory, fall   Orthopedic Precautions:N/A   Braces: N/A     Functional Mobility:  · Transfers:     · Sit to Stand:  minimum assistance with rolling walker. Pt required V/T cues for safe technique.  · Gait: Pt ambulated ~30 ft with RW, min A, and chair and oxygen following. Pt demonstrated decreased arnold, B hip ER, decreased foot clearance in swing, and forward flexed posture. Pt required max VC for walker management and safety awareness. Pt limited in ambulation 2* SOB, fatigue, and weakness.       AM-PAC 6 CLICK MOBILITY  Turning over in bed (including adjusting bedclothes, sheets and blankets)?: 3  Sitting down on and standing up from a chair with arms (e.g., wheelchair, bedside commode, etc.): 3  Moving from lying on back to sitting on the side of the bed?: 3  Moving to and from a bed to a chair (including a wheelchair)?: 3  Need to walk in hospital room?: 2  Climbing 3-5 steps with a railing?: 2  Basic Mobility Total Score: 16       Therapeutic Activities and Exercises:  Pt performed transfers and gait training as above.  Pt performed BUE therex 2x10 reps: bicep curls and finger flex/ext.  Pt performed BLE seated therex 2x10 reps: LAQ, ankle DF/PF, and alternating hip flexion.  Pt performed BLE seated therex with BLE elevated 2x10 reps: AP, QS, and GS.  Pt educated on importance of performing therex while sitting up in the chair during the day. Pt verbalized understanding.  Encouraged pt to perform BLE AP and BUE finger flex/ext throughout the day. Pt verbalized understanding.     Patient left up in reclined chair on cushion with BLE and BUE elevated on pillows, all lines intact, call button in reach, nurse notified and sister present.    GOALS:   Multidisciplinary Problems     Physical  Therapy Goals        Problem: Physical Therapy Goal    Goal Priority Disciplines Outcome Goal Variances Interventions   Physical Therapy Goal     PT, PT/OT Ongoing (interventions implemented as appropriate)     Description:  Goals to be met by: 19    Patient will increase functional independence with mobility by performin. Sit to stand transfer with Stand-by Assistance  2. Gait x150 feet with stand-by Assistance using Rolling Walker  3. Lower extremity exercise program x30 reps per handout, with supervision                      Time Tracking:     PT Received On: 19  PT Start Time: 1518     PT Stop Time: 1546  PT Total Time (min): 28 min     Billable Minutes: Gait Training 13 and Therapeutic Exercise 15    Treatment Type: Treatment  PT/PTA: PTA     PTA Visit Number: 3     KASSIDY Shore     I certify that I was present in the room directing the student in service delivery and guiding them using my skilled judgment. As the co-signing therapist I have reviewed the students documentation and am responsible for the treatment, assessment, and plan.     ALETA SAM, ELIZABETH  2019

## 2019-03-28 NOTE — PROGRESS NOTES
[facility] Hospital Medicine  Progress Note    Patient Name: Agus Ramos Jr.  MRN: [id number]  Patient Class: IP- Inpatient   Admission Date: 3/23/2019  Length of Stay: 5 days  Attending Physician: Ace Cisneros MD  Primary Care Provider: Keaton Hardy MD        Subjective:     Principal Problem:Acute metabolic encephalopathy    HPI:  Mr. Agus Ramos Jr. is a 78 y.o. male known to me with essential hypertension, type 2 diabetes mellitus (HbA1c 5.0% Mar 2019), CAD s/p CABG, chronic combined systolic and diastolic heart failure (LVEF 50% Mar 2019), CKD Stage 3, peripheral artery disease, chronic atrial fibrillation (TPM6RC9-EESc score 5) on chronic anticoagulation, COPD, chronic respiratory failure with hypoxia, and anemia of chronic disease who presents to [facility] ED with complaints of a fall this morning.  He had much difficulty standing back up.  EMS was activated and they found that his capillary glucose was 14 mg/dL after which he was given an ampule of 50% dextrose.  His mentation returned to baseline thereafter.  He denies any tremors nor diaphoresis.  He says that he's been feeling weak and dizzy today but denies any loss of consciousness, nor antecedent chest pain, shortness of breath, palpitations, fevers, chills, nausea, vomiting, abdominal pain, or any diarrhea.  He did not trip or slip or anything.  He has had a good appetite but cannot say for sure if he has been taking too much of his diabetes medications.  He denies any recent changes to his medications.  He otherwise has been in his usual state of health.    Hospital Course:  77 y/o male presented with weakness.  Hx of DM on Glipizide and Metformin.  Patient noted to be hypoglycemic.  Initially responded to D50, but then became persistently hypoglycemic.  Admitted to ICU on D10 drip and hourly glucose monitoring.  Also started on Octreotide drip.  No further episodes of hypoglycemia and D10 switched to D5.   Octreotide drip stopped.  Now getting more hyperglycemic.  Stopping D5 drip and monitor off dextrose.  PT/OT evaluation.  Recommended SNF,consulted SW.  Last HbA1C 2 weeks ago 5.0,likely no need any more for hypoglycemic agents,diet should  be fine.  Has scrotal swelling,on IV lasix,lifting scrotum.  Has anasarca,all over body,increased lasix,    Interval History: has swelling all body.    Review of Systems   HENT: Negative for ear discharge and ear pain.    Eyes: Negative for pain and itching.   Cardiovascular: Negative for chest pain and palpitations.   Genitourinary: Positive for scrotal swelling.   Neurological: Negative for seizures and syncope.     Objective:     Vital Signs (Most Recent):  Temp: 97.3 °F (36.3 °C) (03/28/19 0725)  Pulse: 84 (03/28/19 0744)  Resp: 18 (03/28/19 0744)  BP: 123/64 (03/28/19 0725)  SpO2: 97 % (03/28/19 0744) Vital Signs (24h Range):  Temp:  [97.3 °F (36.3 °C)-98.4 °F (36.9 °C)] 97.3 °F (36.3 °C)  Pulse:  [63-97] 84  Resp:  [18-20] 18  SpO2:  [93 %-100 %] 97 %  BP: (117-147)/(64-89) 123/64     Weight: 95.9 kg (211 lb 6.7 oz)  Body mass index is 31.22 kg/m².    Intake/Output Summary (Last 24 hours) at 3/28/2019 0831  Last data filed at 3/28/2019 0604  Gross per 24 hour   Intake 240 ml   Output 525 ml   Net -285 ml      Physical Exam   Constitutional: He is oriented to person, place, and time. He appears well-developed and well-nourished. No distress.   HENT:   Head: Normocephalic and atraumatic.   Right Ear: External ear normal.   Left Ear: External ear normal.   Nose: Nose normal.   Eyes: Right eye exhibits no discharge. Left eye exhibits no discharge.   Neck: Normal range of motion.   Cardiovascular:   Irregularly irregular, no murmurs or gallops   Pulmonary/Chest: Effort normal. No respiratory distress.   Abdominal: Soft. Bowel sounds are normal. He exhibits distension. There is no tenderness. There is no rebound and no guarding.   Genitourinary:   Genitourinary Comments: Scrotal  swelling.   Musculoskeletal: Normal range of motion. He exhibits edema.   Neurological: He is alert and oriented to person, place, and time.   Skin: Skin is warm and dry. He is not diaphoretic. No erythema.   Psychiatric: He has a normal mood and affect. His behavior is normal. Judgment and thought content normal.   Nursing note and vitals reviewed.      Significant Labs:   BMP:   Recent Labs   Lab 03/28/19  0523   *      K 4.5      CO2 27   BUN 34*   CREATININE 1.4   CALCIUM 9.7     CBC:   Recent Labs   Lab 03/27/19  0509 03/28/19  0523   WBC 6.83 7.02   HGB 8.0* 8.4*   HCT 29.7* 30.2*    269     POCT Glucose:   Recent Labs   Lab 03/27/19  1655 03/27/19  1950 03/28/19  0724   POCTGLUCOSE 184* 193* 144*     Assessment/Plan:      * Acute metabolic encephalopathy  Patient was noted to have a capillary glucose of 14 mg/dL in the field for which he was given an ampule of 50% dextrose with improvement of his mentation.  His initial serum glucose here was 30 mg/dL but later was still 44 mg/dL after treatment.  He improved into the 180's but continued to drop to 81 mg/dL then to 36 mg/dL.    Admitted to ICU on D10 drip and hourly glucose checks.  Also started on Octreotide drip.  Glucose increasing with no further episodes of hypoglycemia.  Will change D10 to D5 and monitor glucose every 4 hours.  The etiology of his hypoglycemia is unclear but he is on a sulfonylurea at home.  He does have 1+ leukocytes and moderate bacteria on urine, but many amorphous cells.  Will repeat UA with UCx.  Afebrile.  Will hold off on ABx's for now.  Getting more hyperglycemic.  Stop D5 and monitor off dextrose.    PT/OT eval.  Last HbA1C 2 weeks ago 5.0,likely no need any more for hypoglycemic agents,diet should  be fine.  SNF recommended.      Chronic atrial fibrillation  Patient is in atrial fibrillation at this time which is baseline for him.  He was taken off of anticoagulation during his last admission a week  ago due to anemia and was instructed not to take it until outpatient follow-up with gastroenterology.  Will continue to monitor.    CAD (coronary artery disease)  Stable; will continue his home regimen of aspirin, atorvastatin, and carvedilol.    Scrotal swelling    Has scrotal swelling,on IV lasix,lifting scrotum.Has anasarca,all over body,increased lasix,    Peripheral artery disease  Stable; will continue his home regimen of aspirin and atorvastatin.    Acute on chronic combined systolic and diastolic heart failure  Will continue with IV lasix,he is edematous.Has anasarca,all over body,increased lasix,    Chronic respiratory failure with hypoxia  Stable; will continue his home supplemental oxygen therapy.    Centrilobular emphysema  Clinically-stable without wheezing.  Will provide as-needed JENAE/LAMA available.    Iron deficiency anemia  The patient's H/H is stable and consistent with previous laboratory measurements, and the patient exhibits no signs or symptoms of acute bleeding; there is no indication for transfusion.  Will continue to monitor.    Type 2 diabetes mellitus, controlled, with renal complications  As addressed above.    CKD Stage 3  His renal function appears to be at his baseline.    Essential hypertension  Continue home regimen of carvedilol, diltiazem, furosemide, and tamsulosin, and provide as-needed clonidine.      VTE Risk Mitigation (From admission, onward)        Ordered     IP VTE HIGH RISK PATIENT  Once      03/23/19 1936     Reason for No Pharmacological VTE Prophylaxis  Once      03/23/19 1936     Place sequential compression device  Until discontinued      03/23/19 1934     Place DENIS hose  Until discontinued      03/23/19 1934              Ace Cisneros MD  Department of Hospital Medicine   [facility]

## 2019-03-28 NOTE — PLAN OF CARE
Problem: Adult Inpatient Plan of Care  Goal: Plan of Care Review  Pt continues to have generalize edema. No changes to penile and scrotal edema. Pt AOx4. No BM during shift. Frequent voids no s/s of hyper ot hypoglycemia.

## 2019-03-28 NOTE — NURSING
Pt aaox3 no falls or injuries during shift. Able to stand with 1 person assist to bedside commode. Pt had two bowel movements. Edema noted to upper and lower extremities as well as scrotum. extremities elevated on pillows and scrotum with towels. Pt denies pain, n/v. Pt able to sit at edge of bed minimal assistance. Call light w/i reach, bed alarm on. with

## 2019-03-28 NOTE — PLAN OF CARE
Problem: Occupational Therapy Goal  Goal: Occupational Therapy Goal  Goals to be met by: 4/15/19     Patient will increase functional independence with ADLs by performing:    UE Dressing with Set-up Assistance.  LE Dressing with Minimal Assistance.  Grooming while seated with Supervision.  Toileting from bedside commode with Moderate Assistance for hygiene and clothing management.   Bathing from  edge of bed with Moderate Assistance.  Sitting at edge of bed x20 minutes with Stand-by Assistance.  Rolling to Bilateral with Set-up Assistance.   Supine to sit with Supervision.  Stand pivot transfers with Minimal Assistance.  Toilet transfer to bedside commode with Minimal Assistance.  Upper extremity exercise program x10 reps per handout, with assistance as needed.     Outcome: Ongoing (interventions implemented as appropriate)   Patient tolerated UE therex (increased edema to extremities) and tolerated OOB>chair with CGA/RW. Patient progressing.        No

## 2019-03-28 NOTE — PLAN OF CARE
Problem: Physical Therapy Goal  Goal: Physical Therapy Goal  Goals to be met by: 19    Patient will increase functional independence with mobility by performin. Sit to stand transfer with Stand-by Assistance  2. Gait x150 feet with stand-by Assistance using Rolling Walker  3. Lower extremity exercise program x30 reps per handout, with supervision     Outcome: Ongoing (interventions implemented as appropriate)  Pt ambulated ~ 30 ft with RW, MIN A (chair followed and 2 L O2NC ) . Noted with min/mod swaying and  unsteadiness gait. V/T cues for safety awareness with transfer and gait training using RW ( pt with confusions ).  Limited with gait training today 2* pt c/o fatigue and weakness . Pt will benefit from further skilled therapy in order to get back to PLOF.

## 2019-03-28 NOTE — PT/OT/SLP PROGRESS
Occupational Therapy   Treatment    Name: Agus Ramos Jr.  MRN: [id number]  Admitting Diagnosis:  Acute metabolic encephalopathy       Recommendations:     Discharge Recommendations: nursing facility, skilled  Discharge Equipment Recommendations:  walker, rolling  Barriers to discharge:       Assessment:     Agus Ramos Jr. is a 78 y.o. male with a medical diagnosis of Acute metabolic encephalopathy.  He presents with the following performance deficits affecting function: weakness, impaired endurance, impaired self care skills, impaired functional mobilty, gait instability, impaired balance, decreased coordination, decreased upper extremity function, decreased lower extremity function, decreased safety awareness, decreased ROM, impaired coordination, impaired skin, edema, impaired cardiopulmonary response to activity. Patient tolerated UE therex (increased edema to extremities) and tolerated OOB>chair with CGA/RW. Patient progressing.     Rehab Prognosis:  Fair+; patient would benefit from acute skilled OT services to address these deficits and reach maximum level of function.       Plan:     Patient to be seen 5 x/week to address the above listed problems via self-care/home management, therapeutic activities, therapeutic exercises  · Plan of Care Expires:    · Plan of Care Reviewed with: patient    Subjective   Patient agreeable to therapy.   Pain/Comfort:  · Pain Rating 1: 0/10    Objective:     Communicated with: Nurse Muñoz prior to session.  Patient found seated EOB with spouse present with telemetry, PICC line, oxygen upon OT entry to room.    General Precautions: Standard, fall, respiratory   Orthopedic Precautions:N/A   Braces: N/A     Occupational Performance:     Bed Mobility:    · Patient found seated EOB.    Functional Mobility/Transfers:  · Patient completed Sit <> Stand Transfer with minimum assistance  with  rolling walker   · Patient completed Bed > Chair Transfer with contact guard assistance  with rolling walker  · Functional Mobility: patient able to take steps bed>chair with CGA/RW and verbal cues for safety awareness.     Activities of Daily Living:  · Grooming: able to wash face EOB with SBA    · Upper Body Dressing: minimum assistance    · Toileting: maximal assistance to use urinal in standing (patient with scrotal edema)     [facility] 6 Click ADL: 14    Treatment & Education:  Patient performed functional transfers and ADL's as above.  Patient with MAX BUE edema, educated on elevation of extremities while in bed and ROM to promote edema reduction.  Patient educated on BUE ROM and therex with red theraband via demonstration, verbal instruction, and provided with handouts.   Patient able to perform x 10 reps BUE ROM therex in all available planes.  Patient able to perform x 10 B shoulder horizontal abd, B elbow flex, and x 5 reps B shoulder flex with red theraband. Patient required frequent rest breaks secondary to weakness, fatigue, and SOB. Encouraged patient to perform pursed lip breathing technique upon exertion- needs reinforcement.     Patient left up in chair with all lines intact, call button in reach, nurse notified and spouse presentEducation:      GOALS:   Multidisciplinary Problems     Occupational Therapy Goals        Problem: Occupational Therapy Goal    Goal Priority Disciplines Outcome Interventions   Occupational Therapy Goal     OT, PT/OT Ongoing (interventions implemented as appropriate)    Description:  Goals to be met by: 4/15/19     Patient will increase functional independence with ADLs by performing:    UE Dressing with Set-up Assistance.  LE Dressing with Minimal Assistance.  Grooming while seated with Supervision.  Toileting from bedside commode with Moderate Assistance for hygiene and clothing management.   Bathing from  edge of bed with Moderate Assistance.  Sitting at edge of bed x20 minutes with Stand-by Assistance.  Rolling to Bilateral with Set-up Assistance.   Supine to  sit with Supervision.  Stand pivot transfers with Minimal Assistance.  Toilet transfer to bedside commode with Minimal Assistance.  Upper extremity exercise program x10 reps per handout, with assistance as needed.                      Time Tracking:     OT Date of Treatment: 03/28/19  OT Start Time: 1345  OT Stop Time: 1414  OT Total Time (min): 29 min    Billable Minutes:Self Care/Home Management 12  Therapeutic Exercise 17    RAHUL Fatima  3/28/2019

## 2019-03-28 NOTE — SUBJECTIVE & OBJECTIVE
Interval History: has swelling all body.    Review of Systems   HENT: Negative for ear discharge and ear pain.    Eyes: Negative for pain and itching.   Cardiovascular: Negative for chest pain and palpitations.   Genitourinary: Positive for scrotal swelling.   Neurological: Negative for seizures and syncope.     Objective:     Vital Signs (Most Recent):  Temp: 97.3 °F (36.3 °C) (03/28/19 0725)  Pulse: 84 (03/28/19 0744)  Resp: 18 (03/28/19 0744)  BP: 123/64 (03/28/19 0725)  SpO2: 97 % (03/28/19 0744) Vital Signs (24h Range):  Temp:  [97.3 °F (36.3 °C)-98.4 °F (36.9 °C)] 97.3 °F (36.3 °C)  Pulse:  [63-97] 84  Resp:  [18-20] 18  SpO2:  [93 %-100 %] 97 %  BP: (117-147)/(64-89) 123/64     Weight: 95.9 kg (211 lb 6.7 oz)  Body mass index is 31.22 kg/m².    Intake/Output Summary (Last 24 hours) at 3/28/2019 0831  Last data filed at 3/28/2019 0604  Gross per 24 hour   Intake 240 ml   Output 525 ml   Net -285 ml      Physical Exam   Constitutional: He is oriented to person, place, and time. He appears well-developed and well-nourished. No distress.   HENT:   Head: Normocephalic and atraumatic.   Right Ear: External ear normal.   Left Ear: External ear normal.   Nose: Nose normal.   Eyes: Right eye exhibits no discharge. Left eye exhibits no discharge.   Neck: Normal range of motion.   Cardiovascular:   Irregularly irregular, no murmurs or gallops   Pulmonary/Chest: Effort normal. No respiratory distress.   Abdominal: Soft. Bowel sounds are normal. He exhibits distension. There is no tenderness. There is no rebound and no guarding.   Genitourinary:   Genitourinary Comments: Scrotal swelling.   Musculoskeletal: Normal range of motion. He exhibits edema.   Neurological: He is alert and oriented to person, place, and time.   Skin: Skin is warm and dry. He is not diaphoretic. No erythema.   Psychiatric: He has a normal mood and affect. His behavior is normal. Judgment and thought content normal.   Nursing note and vitals  reviewed.      Significant Labs:   BMP:   Recent Labs   Lab 03/28/19  0523   *      K 4.5      CO2 27   BUN 34*   CREATININE 1.4   CALCIUM 9.7     CBC:   Recent Labs   Lab 03/27/19  0509 03/28/19  0523   WBC 6.83 7.02   HGB 8.0* 8.4*   HCT 29.7* 30.2*    269     POCT Glucose:   Recent Labs   Lab 03/27/19  1655 03/27/19  1950 03/28/19  0724   POCTGLUCOSE 184* 193* 144*

## 2019-03-28 NOTE — PLAN OF CARE
Problem: Adult Inpatient Plan of Care  Goal: Plan of Care Review  Outcome: Ongoing (interventions implemented as appropriate)     03/28/19 1627   Plan of Care Review   Plan of Care Reviewed With patient   Progress improving       Comments: Plan of care reviewed with patient. He is alert and oriented, able to make needs known, remains free of injury during shift. Vssaf. accucheck done as ordered, insulin given per sliding scale. Edema remains at 2+ over bilat limbs. Ppd read and charted in chart. O2 at 2l nc. picc flushed as ordered, remains wrapped. Fluid restriction initiated, patient verbalizing understanding. Tele monitor on, alarms audible. Patient up at side of bed thru much of shift. Bed in low locked position, call light in reach. Will continue to monitor for safety.

## 2019-03-29 PROBLEM — R80.9 PROTEINURIA: Status: ACTIVE | Noted: 2019-03-29

## 2019-03-29 LAB
ANION GAP SERPL CALC-SCNC: 3 MMOL/L (ref 8–16)
BASOPHILS # BLD AUTO: 0.04 K/UL (ref 0–0.2)
BASOPHILS NFR BLD: 0.8 % (ref 0–1.9)
BUN SERPL-MCNC: 33 MG/DL (ref 8–23)
CALCIUM SERPL-MCNC: 9.8 MG/DL (ref 8.7–10.5)
CHLORIDE SERPL-SCNC: 109 MMOL/L (ref 95–110)
CO2 SERPL-SCNC: 31 MMOL/L (ref 23–29)
CREAT SERPL-MCNC: 1.3 MG/DL (ref 0.5–1.4)
CREAT UR-MCNC: 75.4 MG/DL (ref 23–375)
DIFFERENTIAL METHOD: ABNORMAL
EOSINOPHIL # BLD AUTO: 0.3 K/UL (ref 0–0.5)
EOSINOPHIL NFR BLD: 5.5 % (ref 0–8)
ERYTHROCYTE [DISTWIDTH] IN BLOOD BY AUTOMATED COUNT: 23 % (ref 11.5–14.5)
EST. GFR  (AFRICAN AMERICAN): 60 ML/MIN/1.73 M^2
EST. GFR  (NON AFRICAN AMERICAN): 52 ML/MIN/1.73 M^2
GLUCOSE SERPL-MCNC: 133 MG/DL (ref 70–110)
HCT VFR BLD AUTO: 29.1 % (ref 40–54)
HGB BLD-MCNC: 8 G/DL (ref 14–18)
LYMPHOCYTES # BLD AUTO: 0.9 K/UL (ref 1–4.8)
LYMPHOCYTES NFR BLD: 16 % (ref 18–48)
MCH RBC QN AUTO: 22.6 PG (ref 27–31)
MCHC RBC AUTO-ENTMCNC: 27.5 G/DL (ref 32–36)
MCV RBC AUTO: 82 FL (ref 82–98)
MONOCYTES # BLD AUTO: 0.9 K/UL (ref 0.3–1)
MONOCYTES NFR BLD: 16.9 % (ref 4–15)
NEUTROPHILS # BLD AUTO: 3.2 K/UL (ref 1.8–7.7)
NEUTROPHILS NFR BLD: 60.8 % (ref 38–73)
PLATELET # BLD AUTO: 290 K/UL (ref 150–350)
PMV BLD AUTO: 9.7 FL (ref 9.2–12.9)
POCT GLUCOSE: 147 MG/DL (ref 70–110)
POCT GLUCOSE: 157 MG/DL (ref 70–110)
POCT GLUCOSE: 164 MG/DL (ref 70–110)
POCT GLUCOSE: 215 MG/DL (ref 70–110)
POTASSIUM SERPL-SCNC: 4 MMOL/L (ref 3.5–5.1)
PROT UR-MCNC: 79 MG/DL
PROT/CREAT UR: 1.05 MG/G{CREAT} (ref 0–0.2)
RBC # BLD AUTO: 3.54 M/UL (ref 4.6–6.2)
SODIUM SERPL-SCNC: 143 MMOL/L (ref 136–145)
WBC # BLD AUTO: 5.31 K/UL (ref 3.9–12.7)

## 2019-03-29 PROCEDURE — 63600175 PHARM REV CODE 636 W HCPCS: Performed by: HOSPITALIST

## 2019-03-29 PROCEDURE — 99900035 HC TECH TIME PER 15 MIN (STAT)

## 2019-03-29 PROCEDURE — 25000003 PHARM REV CODE 250: Performed by: HOSPITALIST

## 2019-03-29 PROCEDURE — 99223 1ST HOSP IP/OBS HIGH 75: CPT | Mod: ,,, | Performed by: UROLOGY

## 2019-03-29 PROCEDURE — 84156 ASSAY OF PROTEIN URINE: CPT

## 2019-03-29 PROCEDURE — A4216 STERILE WATER/SALINE, 10 ML: HCPCS | Performed by: HOSPITALIST

## 2019-03-29 PROCEDURE — 99223 PR INITIAL HOSPITAL CARE,LEVL III: ICD-10-PCS | Mod: ,,, | Performed by: UROLOGY

## 2019-03-29 PROCEDURE — 94660 CPAP INITIATION&MGMT: CPT

## 2019-03-29 PROCEDURE — 80048 BASIC METABOLIC PNL TOTAL CA: CPT

## 2019-03-29 PROCEDURE — 85025 COMPLETE CBC W/AUTO DIFF WBC: CPT

## 2019-03-29 PROCEDURE — 51798 US URINE CAPACITY MEASURE: CPT

## 2019-03-29 PROCEDURE — 11000001 HC ACUTE MED/SURG PRIVATE ROOM

## 2019-03-29 PROCEDURE — 97110 THERAPEUTIC EXERCISES: CPT

## 2019-03-29 PROCEDURE — 36415 COLL VENOUS BLD VENIPUNCTURE: CPT

## 2019-03-29 PROCEDURE — 97535 SELF CARE MNGMENT TRAINING: CPT

## 2019-03-29 RX ORDER — FUROSEMIDE 10 MG/ML
80 INJECTION INTRAMUSCULAR; INTRAVENOUS 2 TIMES DAILY
Status: DISCONTINUED | OUTPATIENT
Start: 2019-03-29 | End: 2019-04-06

## 2019-03-29 RX ADMIN — Medication 10 ML: at 08:03

## 2019-03-29 RX ADMIN — RAMELTEON 8 MG: 8 TABLET, FILM COATED ORAL at 08:03

## 2019-03-29 RX ADMIN — FUROSEMIDE 80 MG: 10 INJECTION, SOLUTION INTRAMUSCULAR; INTRAVENOUS at 05:03

## 2019-03-29 RX ADMIN — ASPIRIN 81 MG: 81 TABLET, COATED ORAL at 08:03

## 2019-03-29 RX ADMIN — INSULIN ASPART 2 UNITS: 100 INJECTION, SOLUTION INTRAVENOUS; SUBCUTANEOUS at 05:03

## 2019-03-29 RX ADMIN — DILTIAZEM HYDROCHLORIDE 30 MG: 30 TABLET, FILM COATED ORAL at 08:03

## 2019-03-29 RX ADMIN — FUROSEMIDE 60 MG: 10 INJECTION, SOLUTION INTRAMUSCULAR; INTRAVENOUS at 08:03

## 2019-03-29 RX ADMIN — TAMSULOSIN HYDROCHLORIDE 0.4 MG: 0.4 CAPSULE ORAL at 08:03

## 2019-03-29 RX ADMIN — Medication 10 ML: at 12:03

## 2019-03-29 RX ADMIN — Medication 10 ML: at 05:03

## 2019-03-29 RX ADMIN — ATORVASTATIN CALCIUM 20 MG: 10 TABLET, FILM COATED ORAL at 08:03

## 2019-03-29 RX ADMIN — LOSARTAN POTASSIUM 25 MG: 25 TABLET, FILM COATED ORAL at 08:03

## 2019-03-29 RX ADMIN — CARVEDILOL 25 MG: 6.25 TABLET, FILM COATED ORAL at 08:03

## 2019-03-29 RX ADMIN — PANTOPRAZOLE SODIUM 40 MG: 40 TABLET, DELAYED RELEASE ORAL at 08:03

## 2019-03-29 NOTE — PLAN OF CARE
Problem: Occupational Therapy Goal  Goal: Occupational Therapy Goal  Goals to be met by: 4/15/19     Patient will increase functional independence with ADLs by performing:    UE Dressing with Set-up Assistance.  LE Dressing with Minimal Assistance.  Grooming while seated with Supervision.  Toileting from bedside commode with Moderate Assistance for hygiene and clothing management.   Bathing from  edge of bed with Moderate Assistance.  Sitting at edge of bed x20 minutes with Stand-by Assistance.  Rolling to Bilateral with Set-up Assistance.   Supine to sit with Supervision.  Stand pivot transfers with Minimal Assistance.  Toilet transfer to bedside commode with Minimal Assistance.  Upper extremity exercise program x10 reps per handout, with assistance as needed.     Outcome: Ongoing (interventions implemented as appropriate)  Patient confused and max edema all over body.  Patient tolerated ADL's and UE therex at bed level today. Patient limited with OOB mobility secondary to increased SOB requiring rapid response this AM per nursing.

## 2019-03-29 NOTE — CONSULTS
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

## 2019-03-29 NOTE — PLAN OF CARE
Problem: Adult Inpatient Plan of Care  Goal: Plan of Care Review  Outcome: Ongoing (interventions implemented as appropriate)     03/29/19 6550   Plan of Care Review   Plan of Care Reviewed With patient       Comments: Plan of care reviewed with patient. He is alert and oriented, able to make needs known, remains free of injury during shift. Patient denies sob, vssaf. accucheck done as ordered, insulin given per sliding scale. 2+ Anasarca noted, scrotal edema noted to be decreasing. Catheter in place, draining yellow urine, ua taken to lab. o2 at 4l nc. picc line flushing with good blood return. Bed in low locked position, call light in reach. Will continue to monitor for safety.

## 2019-03-29 NOTE — CODE DOCUMENTATION
Dr. Hardy at bedside  Ordered C-pap  And also GI consult  Labs ordered      Picc line team notifed for new line: picc line not intact  Ilda Meredith starting PIV using ultra sound.

## 2019-03-29 NOTE — SUBJECTIVE & OBJECTIVE
Interval History: has swelling all body.    Review of Systems   HENT: Negative for ear discharge and ear pain.    Eyes: Negative for pain and itching.   Cardiovascular: Negative for chest pain and palpitations.   Genitourinary: Positive for scrotal swelling.   Neurological: Negative for seizures and syncope.     Objective:     Vital Signs (Most Recent):  Temp: 98.2 °F (36.8 °C) (03/29/19 1112)  Pulse: 74 (03/29/19 1112)  Resp: 18 (03/29/19 1112)  BP: (!) 141/74 (03/29/19 1112)  SpO2: 99 % (03/29/19 1112) Vital Signs (24h Range):  Temp:  [96.8 °F (36 °C)-98.2 °F (36.8 °C)] 98.2 °F (36.8 °C)  Pulse:  [60-90] 74  Resp:  [18-19] 18  SpO2:  [96 %-100 %] 99 %  BP: (120-141)/(70-82) 141/74     Weight: 95.9 kg (211 lb 6.7 oz)  Body mass index is 31.22 kg/m².    Intake/Output Summary (Last 24 hours) at 3/29/2019 1311  Last data filed at 3/29/2019 1224  Gross per 24 hour   Intake 480 ml   Output 1575 ml   Net -1095 ml      Physical Exam   Constitutional: He is oriented to person, place, and time. He appears well-developed and well-nourished. No distress.   HENT:   Head: Normocephalic and atraumatic.   Right Ear: External ear normal.   Left Ear: External ear normal.   Nose: Nose normal.   Eyes: Right eye exhibits no discharge. Left eye exhibits no discharge.   Neck: Normal range of motion.   Cardiovascular:   Irregularly irregular, no murmurs or gallops   Pulmonary/Chest: Effort normal. No respiratory distress.   Abdominal: Soft. Bowel sounds are normal. He exhibits distension. There is no tenderness. There is no rebound and no guarding.   Genitourinary:   Genitourinary Comments: Scrotal swelling.   Musculoskeletal: Normal range of motion. He exhibits edema.   Neurological: He is alert and oriented to person, place, and time.   Skin: Skin is warm and dry. He is not diaphoretic. No erythema.   Psychiatric: He has a normal mood and affect. His behavior is normal. Judgment and thought content normal.   Nursing note and vitals  reviewed.      Significant Labs:   BMP:   Recent Labs   Lab 03/29/19  0505   *      K 4.0      CO2 31*   BUN 33*   CREATININE 1.3   CALCIUM 9.8     CBC:   Recent Labs   Lab 03/28/19  0523 03/29/19  0505   WBC 7.02 5.31   HGB 8.4* 8.0*   HCT 30.2* 29.1*    290     POCT Glucose:   Recent Labs   Lab 03/28/19  2023 03/29/19  0731 03/29/19  1111   POCTGLUCOSE 204* 147* 157*

## 2019-03-29 NOTE — ASSESSMENT & PLAN NOTE
Continue Boucher  Continue Flomax    Check renal ultrasound to see if he as ascites which may explain the discrepancy between bladder scan and output.  Check for hydro, may need CT.

## 2019-03-29 NOTE — NURSING
On walking rounds pt was found with picc line  Pulled almost completely out of arm. picc line was taped down good and secured but only three cm of the line remained inside the pt. Xray could not confirm placement and it would not draw blood. Pt was having a hard time breathing and breath sounds were very junky. At this time I asked the charge nurse to page the MD for me and I grabbed a VS machine. The house supervisor was on the floor and told the charge to just call a rapid at this time. Nursing supervisor obtained a working IV in the left forearm where 60 mg IV lasix was given. Pt was placed on a cpap per MD order to try and help increase his volume. pts reported his breathing getting easier shortly after the cpap was placed. Pt had a smear of bleeding that appeared to be coming from his rectum. Day shift reported a large BM. MD aware, a stool for occult blood has been ordered but not yet obtained. Pt has pitting edema all over. A 12 fr coude catheter was placed due to pts inability to void. Will continue to monitor.

## 2019-03-29 NOTE — PROGRESS NOTES
[facility] Hospital Medicine  Progress Note    Patient Name: Agus Ramos Jr.  MRN: [id number]  Patient Class: IP- Inpatient   Admission Date: 3/23/2019  Length of Stay: 6 days  Attending Physician: Ace Cisneros MD  Primary Care Provider: Keaton Hardy MD        Subjective:     Principal Problem:Acute metabolic encephalopathy    HPI:  Mr. Agus Ramos Jr. is a 78 y.o. male known to me with essential hypertension, type 2 diabetes mellitus (HbA1c 5.0% Mar 2019), CAD s/p CABG, chronic combined systolic and diastolic heart failure (LVEF 50% Mar 2019), CKD Stage 3, peripheral artery disease, chronic atrial fibrillation (DPG2ZP3-GBWt score 5) on chronic anticoagulation, COPD, chronic respiratory failure with hypoxia, and anemia of chronic disease who presents to [facility] ED with complaints of a fall this morning.  He had much difficulty standing back up.  EMS was activated and they found that his capillary glucose was 14 mg/dL after which he was given an ampule of 50% dextrose.  His mentation returned to baseline thereafter.  He denies any tremors nor diaphoresis.  He says that he's been feeling weak and dizzy today but denies any loss of consciousness, nor antecedent chest pain, shortness of breath, palpitations, fevers, chills, nausea, vomiting, abdominal pain, or any diarrhea.  He did not trip or slip or anything.  He has had a good appetite but cannot say for sure if he has been taking too much of his diabetes medications.  He denies any recent changes to his medications.  He otherwise has been in his usual state of health.    Hospital Course:  79 y/o male presented with weakness.  Hx of DM on Glipizide and Metformin.  Patient noted to be hypoglycemic.  Initially responded to D50, but then became persistently hypoglycemic.  Admitted to ICU on D10 drip and hourly glucose monitoring.  Also started on Octreotide drip.  No further episodes of hypoglycemia and D10 switched to D5.   Octreotide drip stopped.  Now getting more hyperglycemic.  Stopping D5 drip and monitor off dextrose.  PT/OT evaluation.  Recommended SNF,consulted SW.  Last HbA1C 2 weeks ago 5.0,likely no need any more for hypoglycemic agents,diet should  be fine.  Has scrotal swelling,on IV lasix,lifting scrotum.  Has anasarca,all over body,increased lasix ,appear to have nephrotic syndrome,proteinuria,check protein/Cr. Ratio,consulted nephrology.  Urology following for scrotal swelling,    Interval History: has swelling all body.    Review of Systems   HENT: Negative for ear discharge and ear pain.    Eyes: Negative for pain and itching.   Cardiovascular: Negative for chest pain and palpitations.   Genitourinary: Positive for scrotal swelling.   Neurological: Negative for seizures and syncope.     Objective:     Vital Signs (Most Recent):  Temp: 98.2 °F (36.8 °C) (03/29/19 1112)  Pulse: 74 (03/29/19 1112)  Resp: 18 (03/29/19 1112)  BP: (!) 141/74 (03/29/19 1112)  SpO2: 99 % (03/29/19 1112) Vital Signs (24h Range):  Temp:  [96.8 °F (36 °C)-98.2 °F (36.8 °C)] 98.2 °F (36.8 °C)  Pulse:  [60-90] 74  Resp:  [18-19] 18  SpO2:  [96 %-100 %] 99 %  BP: (120-141)/(70-82) 141/74     Weight: 95.9 kg (211 lb 6.7 oz)  Body mass index is 31.22 kg/m².    Intake/Output Summary (Last 24 hours) at 3/29/2019 1311  Last data filed at 3/29/2019 1224  Gross per 24 hour   Intake 480 ml   Output 1575 ml   Net -1095 ml      Physical Exam   Constitutional: He is oriented to person, place, and time. He appears well-developed and well-nourished. No distress.   HENT:   Head: Normocephalic and atraumatic.   Right Ear: External ear normal.   Left Ear: External ear normal.   Nose: Nose normal.   Eyes: Right eye exhibits no discharge. Left eye exhibits no discharge.   Neck: Normal range of motion.   Cardiovascular:   Irregularly irregular, no murmurs or gallops   Pulmonary/Chest: Effort normal. No respiratory distress.   Abdominal: Soft. Bowel sounds are normal.  He exhibits distension. There is no tenderness. There is no rebound and no guarding.   Genitourinary:   Genitourinary Comments: Scrotal swelling.   Musculoskeletal: Normal range of motion. He exhibits edema.   Neurological: He is alert and oriented to person, place, and time.   Skin: Skin is warm and dry. He is not diaphoretic. No erythema.   Psychiatric: He has a normal mood and affect. His behavior is normal. Judgment and thought content normal.   Nursing note and vitals reviewed.      Significant Labs:   BMP:   Recent Labs   Lab 03/29/19  0505   *      K 4.0      CO2 31*   BUN 33*   CREATININE 1.3   CALCIUM 9.8     CBC:   Recent Labs   Lab 03/28/19  0523 03/29/19  0505   WBC 7.02 5.31   HGB 8.4* 8.0*   HCT 30.2* 29.1*    290     POCT Glucose:   Recent Labs   Lab 03/28/19  2023 03/29/19  0731 03/29/19  1111   POCTGLUCOSE 204* 147* 157*     Assessment/Plan:      * Acute metabolic encephalopathy  Patient was noted to have a capillary glucose of 14 mg/dL in the field for which he was given an ampule of 50% dextrose with improvement of his mentation.  His initial serum glucose here was 30 mg/dL but later was still 44 mg/dL after treatment.  He improved into the 180's but continued to drop to 81 mg/dL then to 36 mg/dL.    Admitted to ICU on D10 drip and hourly glucose checks.  Also started on Octreotide drip.  Glucose increasing with no further episodes of hypoglycemia.  Will change D10 to D5 and monitor glucose every 4 hours.  The etiology of his hypoglycemia is unclear but he is on a sulfonylurea at home.  He does have 1+ leukocytes and moderate bacteria on urine, but many amorphous cells.  Will repeat UA with UCx.  Afebrile.  Will hold off on ABx's for now.  Getting more hyperglycemic.  Stop D5 and monitor off dextrose.    PT/OT eval.  Last HbA1C 2 weeks ago 5.0,likely no need any more for hypoglycemic agents,diet should  be fine.  SNF recommended.      Chronic atrial fibrillation  Patient  is in atrial fibrillation at this time which is baseline for him.  He was taken off of anticoagulation during his last admission a week ago due to anemia and was instructed not to take it until outpatient follow-up with gastroenterology.  Will continue to monitor.    CAD (coronary artery disease)  Stable; will continue his home regimen of aspirin, atorvastatin, and carvedilol.    Proteinuria  appear to have nephrotic syndrome,proteinuria,check protein/Cr. Ratio,consulted nephrology.        Scrotal swelling    Has scrotal swelling,on IV lasix,lifting scrotum.Has anasarca,all over body,increased lasix,    Peripheral artery disease  Stable; will continue his home regimen of aspirin and atorvastatin.    Acute on chronic combined systolic and diastolic heart failure  Will continue with IV lasix,he is edematous.Has anasarca,all over body,increased lasix,    Chronic respiratory failure with hypoxia  Stable; will continue his home supplemental oxygen therapy.    Centrilobular emphysema  Clinically-stable without wheezing.  Will provide as-needed JENAE/LAMA available.    Iron deficiency anemia  The patient's H/H is stable and consistent with previous laboratory measurements, and the patient exhibits no signs or symptoms of acute bleeding; there is no indication for transfusion.  Will continue to monitor.    Type 2 diabetes mellitus, controlled, with renal complications  As addressed above.    CKD Stage 3  His renal function appears to be at his baseline.    Essential hypertension  Continue home regimen of carvedilol, diltiazem, furosemide, and tamsulosin, and provide as-needed clonidine.      VTE Risk Mitigation (From admission, onward)        Ordered     IP VTE HIGH RISK PATIENT  Once      03/23/19 1936     Reason for No Pharmacological VTE Prophylaxis  Once      03/23/19 1936     Place sequential compression device  Until discontinued      03/23/19 1934     Place DENIS hose  Until discontinued      03/23/19 1934               Ace Cisneros MD  Department of Hospital Medicine   [facility]

## 2019-03-29 NOTE — HPI
Urinary Retention  Patient complains of urinary retention. Onset of retention was 1 day ago and was gradual in onset. Patient currently does have a urinary catheter in place.  300 ml of urine were drained when catheter was placed. Prior to this event voiding symptoms consisted of slow stream, intermittency. Prior treatments include watchful waiting. Recent medications that may have affected his voiding include none.  He is admitted after a fall for hypoglycemia.  He had difficulty voiding while admitted.  A few attempts were made to place a Boucher.  Finally a 12 Fr Coude was placed.  Bladder Scan showed 700 mL but he has not drained this much in several hours.

## 2019-03-29 NOTE — PT/OT/SLP PROGRESS
Physical Therapy      Patient Name:  Agus Ramos Jr.   MRN:  [id number]    Patient not seen today secondary to Unavailable (pt is off the unit for testing ) . Will follow-up as able later hour/day .    Telma Santana, PTA

## 2019-03-29 NOTE — NURSING
Bladder scan done at 0230 with 750 ml showing. Pt denies the urge to urinate. MD paged and aware. Coude catheter ordered. Waiting for delivery. Will consult urology if unsuccessful. Attempted x 2 for stearns insertion. First with a 16 fr stearns, second with an in out stearns. Both attempts failed with less than 3 cm of cath tip inserted. Pt is having some bloody drainage from his penis and I am not sure he will allow another try.

## 2019-03-29 NOTE — CONSULTS
REASON FOR CONSULTATION:  Proteinuria, renal failure.    HISTORY OF PRESENT ILLNESS:  The patient is an 78-year-old -American man   with past medical history significant for hypertension, diabetes type 2,   coronary artery disease, status post CABG, chronic combined congestive heart   failure, chronic kidney disease stage III, chronic atrial fibrillation, COPD, oxygen dependent,  who presented to the hospital complaining of having problem of falling and the patient was also  found to be confused with low blood glucose and  the patient's mentation was improving   after giving D50W infusion.  The patient was evaluated in the Emergency Room and  was admitted to the hospital and we were consulted to see the patient for   evaluation of renal failure and proteinuria.  At this time, the patient reported  feeling better.  He denied any chest pain, shortness of breath, fever or chill.  The patient reported having lower extremity swelling.    PAST MEDICAL HISTORY:  As above.    MEDICATIONS:  The patient is currently receiving aspirin 81 mg p.o. daily,   Lipitor 20 mg p.o. at bedtime, Coreg 25 mg p.o. b.i.d., Cardizem 30 mg p.o. q.   12 hours, ferrous gluconate 324 mg p.o. daily, Lasix 80 mg IV b.i.d., losartan   25 mg p.o. daily, Protonix 40 mg p.o. daily and Flomax 0.4 mg p.o. daily.    FAMILY HISTORY:  Noncontributory.    SOCIAL HISTORY:  The patient denied any recent history of tobacco use, alcohol   abuse or IV drug use.    PHYSICAL EXAMINATION:  GENERAL:  The patient is awake, alert and in no apparent distress.  VITAL SIGNS:  Temperature is 98.2 with a blood pressure of 141/74 with a pulse   of 74, respirations of 18.  HEENT:  Pupils equally round and reactive to light.  EOMI.  Oral mucosa moist.    Oropharynx clear.  NECK:  No JVD.  HEART:  Irregularly irregular rhythm and rate.  LUNGS:  Clear to auscultation and percussion bilaterally.  ABDOMEN:  Soft, positive bowel sounds, nondistended,  nontender.  EXTREMITIES:  Positive for edema.    LABORATORY DATA:  WBC is 5.31, hemoglobin 8.0, hematocrit 9.1 and platelet count   290.  Sodium 143, potassium is 4.0, chloride 109, CO2 31, BUN is 33, creatinine   1.3, and glucose 133.  Urine protein is 79, urine creatinine is 75.4 and   protein-creatinine ratio is 1.05.    IMPRESSION:  1.  Chronic kidney disease, stage III, most likely a combination of diabetic   nephropathy and hypertensive nephrosclerosis.  2.  Proteinuria, non-nephrotic, most likely related to diabetic nephropathy.  3.  Metabolic encephalopathy secondary to hypoglycemia.  4.  Hypertension.  5.  Diabetes type 2.  6.  Acute-on-chronic combined congestive heart failure.  7.  Urinary retention.  8.  COPD, oxygen dependent.  9.  Anemia of chronic disease.    DISCUSSION AND RECOMMENDATION:  At this time, we will continue the patient at   current management.  We will obtain a renal ultrasound if the ultrasound if not   done and we will also send urine for hepatitis panel, ANAYA as well as SPEP and we   will continue to follow the patient with you.    Thank you for the courtesy of the consultation and allowing us to participate in   the patient's care.      KAI/IN  dd: 03/29/2019 13:47:57 (CDT)  td: 03/29/2019 16:34:23 (CDT)  Doc ID   #[id number]  Job ID #[id number]    CC:

## 2019-03-29 NOTE — PT/OT/SLP PROGRESS
Occupational Therapy   Treatment    Name: Agus Ramos Jr.  MRN: [id number]  Admitting Diagnosis:  Acute metabolic encephalopathy       Recommendations:     Discharge Recommendations: nursing facility, skilled  Discharge Equipment Recommendations:  walker, rolling  Barriers to discharge:       Assessment:     Agus Ramos Jr. is a 78 y.o. male with a medical diagnosis of Acute metabolic encephalopathy.  He presents with the following performance deficits affecting function: weakness, impaired endurance, impaired self care skills, impaired functional mobilty, gait instability, impaired balance, impaired cognition, decreased coordination, decreased upper extremity function, decreased lower extremity function, decreased safety awareness, decreased ROM, impaired fine motor, impaired skin, edema, impaired cardiopulmonary response to activity. Patient confused and max edema all over body.  Patient tolerated ADL's and UE therex at bed level today. Patient limited with OOB mobility secondary to increased SOB requiring rapid response this AM per nursing.     Rehab Prognosis:  Fair; patient would benefit from acute skilled OT services to address these deficits and reach maximum level of function.       Plan:     Patient to be seen 5 x/week to address the above listed problems via self-care/home management, therapeutic activities, therapeutic exercises  · Plan of Care Expires:    · Plan of Care Reviewed with: patient    Subjective   Patient agreeable to therapy.   Pain/Comfort:  · Pain Rating 1: 0/10    Objective:     Communicated with: Nurse Muñoz prior to session.  Patient found HOB elevated with PICC line, stearns catheter, bed alarm, oxygen upon OT entry to room. Per nurse, patient had an episode this AM with increased SOB and required increased O2 on CPAP. Patient okay for activity at bed level only.     General Precautions: Standard, fall, respiratory   Orthopedic Precautions:N/A   Braces: N/A     Occupational  Performance:     Activities of Daily Living:  · Feeding:  set up assistance (bed in chair position)    · Grooming: minimum assistance to perform oral care (bed level)  · Toileting: Patient with new stearns catheter      [facility] 14    Treatment & Education:  Patient performed BUE AAROM therex x 15 reps in all available planes of motion. Patient continues to present with MAX edema to BUE, educated on elevation of extremities when in bed.     Patient left with bed in chair position eating lunch, with all lines intact, call button in reach, bed alarm on, nurse notified and spouse presentEducation:      GOALS:   Multidisciplinary Problems     Occupational Therapy Goals        Problem: Occupational Therapy Goal    Goal Priority Disciplines Outcome Interventions   Occupational Therapy Goal     OT, PT/OT Ongoing (interventions implemented as appropriate)    Description:  Goals to be met by: 4/15/19     Patient will increase functional independence with ADLs by performing:    UE Dressing with Set-up Assistance.  LE Dressing with Minimal Assistance.  Grooming while seated with Supervision.  Toileting from bedside commode with Moderate Assistance for hygiene and clothing management.   Bathing from  edge of bed with Moderate Assistance.  Sitting at edge of bed x20 minutes with Stand-by Assistance.  Rolling to Bilateral with Set-up Assistance.   Supine to sit with Supervision.  Stand pivot transfers with Minimal Assistance.  Toilet transfer to bedside commode with Minimal Assistance.  Upper extremity exercise program x10 reps per handout, with assistance as needed.                      Time Tracking:     OT Date of Treatment: 03/29/19  OT Start Time: 1200  OT Stop Time: 1230  OT Total Time (min): 30 min    Billable Minutes:Self Care/Home Management 15  Therapeutic Exercise 15    RAHUL Fatima  3/29/2019

## 2019-03-29 NOTE — CONSULTS
[facility]  Urology  Consult Note    Patient Name: Agus Ramos Jr.  MRN: [id number]  Admission Date: 3/23/2019  Hospital Length of Stay: 6   Code Status: Full Code   Attending Provider: Ace Cisneros MD   Consulting Provider: AISHA Wiggins MD  Primary Care Physician: Keaton Hardy MD  Principal Problem:Acute metabolic encephalopathy    Inpatient consult to Urology  Consult performed by: ROGERIO Wiggins MD  Consult ordered by: Ace Cisneros MD          Subjective:     HPI:  Urinary Retention  Patient complains of urinary retention. Onset of retention was 1 day ago and was gradual in onset. Patient currently does have a urinary catheter in place.  300 ml of urine were drained when catheter was placed. Prior to this event voiding symptoms consisted of slow stream, intermittency. Prior treatments include watchful waiting. Recent medications that may have affected his voiding include none.  He is admitted after a fall for hypoglycemia.  He had difficulty voiding while admitted.  A few attempts were made to place a Boucher.  Finally a 12 Fr Coude was placed.  Bladder Scan showed 700 mL but he has not drained this much in several hours.        Past Medical History:   Diagnosis Date    Anemia 05/03/2018    pending blood transfusion    Anticoagulant long-term use     apixaban    Atrial fibrillation     CKD (chronic kidney disease)     Congestive heart failure     COPD (chronic obstructive pulmonary disease)     Coronary artery disease     Diabetes mellitus     Encounter for blood transfusion     HLD (hyperlipidemia)     Hypertension     MI (myocardial infarction)     On home oxygen therapy     Pacemaker     left chest    Peripheral vascular disease     Requires assistance with activities of daily living (ADL)     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     S/P femoral-popliteal bypass surgery left    Stented coronary artery     Tobacco use     Unsteady gait        Past Surgical  History:   Procedure Laterality Date    CARDIAC CATHETERIZATION      CARDIAC PACEMAKER PLACEMENT      CARDIAC SURGERY      3 vessel CABG    CARDIOVERSION N/A 2013    Performed by [facility] at [facility]    cardioverted      CORONARY ANGIOPLASTY WITH STENT PLACEMENT      EGD (ESOPHAGOGASTRODUODENOSCOPY) N/A 2018    Performed by Ty H[facility]man MD at [facility] ENDO    HEMORRHOID SURGERY      TRANSESOPHAGEAL ECHOCARDIOGRAM (ANIA) N/A 2013    [facility]    VASCULAR SURGERY      femoral artery popliteal bypass       Review of patient's allergies indicates:  No Known Allergies    Family History     Problem Relation (Age of Onset)    Diabetes Father, Mother          Tobacco Use    Smoking status: Former Smoker     Packs/day: 1.00     Years: 60.00     Pack years: 60.00     Types: Cigarettes     Last attempt to quit: 2018     Years since quittin.8    Smokeless tobacco: Never Used   Substance and Sexual Activity    Alcohol use: No    Drug use: No    Sexual activity: Not on file       Review of Systems   Constitutional: Negative.  Negative for fever.   HENT: Negative.    Eyes: Negative.    Respiratory: Positive for shortness of breath. Negative for cough and chest tightness.    Cardiovascular: Negative for chest pain.   Gastrointestinal: Negative.  Negative for constipation, diarrhea and nausea.   Genitourinary: Positive for difficulty urinating.   Musculoskeletal: Negative.    Neurological: Negative.    Psychiatric/Behavioral: Negative.        Objective:     Temp:  [96.8 °F (36 °C)-98.1 °F (36.7 °C)] 98.1 °F (36.7 °C)  Pulse:  [60-90] 83  Resp:  [18-19] 18  SpO2:  [95 %-100 %] 99 %  BP: (120-139)/(70-82) 120/77     Body mass index is 31.22 kg/m².      Bladder Scan Volume (mL): 23 mL (19 0831)  Post Void Cath Residual Output (mL): 0 mL (19)    Drains     Drain                 Urethral Catheter 19 0342 Coude 12 Fr. less than 1 day                 Physical Exam   Nursing note and vitals reviewed.  Constitutional: He is oriented to person, place, and time. He appears well-developed and well-nourished.   HENT:   Head: Normocephalic.   Eyes: Conjunctivae are normal.   Neck: Normal range of motion. Neck supple. No tracheal deviation present. No thyromegaly present.   Cardiovascular: Normal rate and normal heart sounds.    Pulmonary/Chest: Effort normal and breath sounds normal. No respiratory distress. He has no wheezes.   Abdominal: Soft. Bowel sounds are normal. There is no hepatosplenomegaly. There is no tenderness. There is no rebound and no CVA tenderness. No hernia.   Genitourinary: Uncircumcised.   Genitourinary Comments: Penile and Scrotal Edema  No tenderness    12 Fr coude Boucher in place with yellow urine   Musculoskeletal: Normal range of motion. He exhibits no edema or tenderness.   2+ edema   Lymphadenopathy:     He has no cervical adenopathy.   Neurological: He is alert and oriented to person, place, and time.   Skin: Skin is warm and dry. No rash noted. No erythema.     Psychiatric: He has a normal mood and affect. His behavior is normal. Judgment and thought content normal.       Significant Labs:    BMP:  Recent Labs   Lab 03/27/19  0509 03/28/19  0523 03/29/19  0505    141 143   K 4.4 4.5 4.0    110 109   CO2 30* 27 31*   BUN 33* 34* 33*   CREATININE 1.4 1.4 1.3   CALCIUM 9.4 9.7 9.8       CBC:  Recent Labs   Lab 03/27/19  0509 03/28/19  0523 03/29/19  0505   WBC 6.83 7.02 5.31   HGB 8.0* 8.4* 8.0*   HCT 29.7* 30.2* 29.1*    269 290       Blood Culture: No results for input(s): LABBLOO in the last 168 hours.  Urine Culture: No results for input(s): LABURIN in the last 168 hours.    Significant Imaging:                      Assessment and Plan:     Scrotal swelling  Check Scrotal US  Scrotal Edema    Urinary retention  Continue Boucher  Continue Flomax    Check renal ultrasound to see if he as ascites which may explain the  discrepancy between bladder scan and output.  Check for hydro, may need CT.         VTE Risk Mitigation (From admission, onward)        Ordered     IP VTE HIGH RISK PATIENT  Once      03/23/19 1936     Reason for No Pharmacological VTE Prophylaxis  Once      03/23/19 1936     Place sequential compression device  Until discontinued      03/23/19 1934     Place DENIS hose  Until discontinued      03/23/19 1934          Thank you for your consult. I will follow-up with patient. Please contact us if you have any additional questions.    AISHA Wiggins MD  Urology  [facility]

## 2019-03-29 NOTE — SUBJECTIVE & OBJECTIVE
Past Medical History:   Diagnosis Date    Anemia 2018    pending blood transfusion    Anticoagulant long-term use     apixaban    Atrial fibrillation     CKD (chronic kidney disease)     Congestive heart failure     COPD (chronic obstructive pulmonary disease)     Coronary artery disease     Diabetes mellitus     Encounter for blood transfusion     HLD (hyperlipidemia)     Hypertension     MI (myocardial infarction)     On home oxygen therapy     Pacemaker     left chest    Peripheral vascular disease     Requires assistance with activities of daily living (ADL)     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     S/P femoral-popliteal bypass surgery left    Stented coronary artery     Tobacco use     Unsteady gait        Past Surgical History:   Procedure Laterality Date    CARDIAC CATHETERIZATION      CARDIAC PACEMAKER PLACEMENT      CARDIAC SURGERY      3 vessel CABG    CARDIOVERSION N/A 2013    [facility]    cardioverted      CORONARY ANGIOPLASTY WITH STENT PLACEMENT      EGD (ESOPHAGOGASTRODUODENOSCOPY) N/A 2018    Performed [facility] at [facility]    HEMORRHOID SURGERY      TRANSESOPHAGEAL ECHOCARDIOGRAM (ANIA) N/A 2013    [facility] at [facility]    VASCULAR SURGERY      femoral artery popliteal bypass       Review of patient's allergies indicates:  No Known Allergies    Family History     Problem Relation (Age of Onset)    Diabetes Father, Mother          Tobacco Use    Smoking status: Former Smoker     Packs/day: 1.00     Years: 60.00     Pack years: 60.00     Types: Cigarettes     Last attempt to quit: 2018     Years since quittin.8    Smokeless tobacco: Never Used   Substance and Sexual Activity    Alcohol use: No    Drug use: No    Sexual activity: Not on file       Review of Systems   Constitutional: Negative.  Negative for fever.   HENT: Negative.    Eyes: Negative.    Respiratory: Positive for shortness of breath.  Negative for cough and chest tightness.    Cardiovascular: Negative for chest pain.   Gastrointestinal: Negative.  Negative for constipation, diarrhea and nausea.   Genitourinary: Positive for difficulty urinating.   Musculoskeletal: Negative.    Neurological: Negative.    Psychiatric/Behavioral: Negative.        Objective:     Temp:  [96.8 °F (36 °C)-98.1 °F (36.7 °C)] 98.1 °F (36.7 °C)  Pulse:  [60-90] 83  Resp:  [18-19] 18  SpO2:  [95 %-100 %] 99 %  BP: (120-139)/(70-82) 120/77     Body mass index is 31.22 kg/m².      Bladder Scan Volume (mL): 23 mL (03/28/19 0831)  Post Void Cath Residual Output (mL): 0 mL (03/28/19 0831)    Drains     Drain                 Urethral Catheter 03/29/19 0342 Coude 12 Fr. less than 1 day                Physical Exam   Nursing note and vitals reviewed.  Constitutional: He is oriented to person, place, and time. He appears well-developed and well-nourished.   HENT:   Head: Normocephalic.   Eyes: Conjunctivae are normal.   Neck: Normal range of motion. Neck supple. No tracheal deviation present. No thyromegaly present.   Cardiovascular: Normal rate and normal heart sounds.    Pulmonary/Chest: Effort normal and breath sounds normal. No respiratory distress. He has no wheezes.   Abdominal: Soft. Bowel sounds are normal. There is no hepatosplenomegaly. There is no tenderness. There is no rebound and no CVA tenderness. No hernia.   Genitourinary: Uncircumcised.   Genitourinary Comments: Penile and Scrotal Edema  No tenderness    12 Fr coude Boucher in place with yellow urine   Musculoskeletal: Normal range of motion. He exhibits no edema or tenderness.   2+ edema   Lymphadenopathy:     He has no cervical adenopathy.   Neurological: He is alert and oriented to person, place, and time.   Skin: Skin is warm and dry. No rash noted. No erythema.     Psychiatric: He has a normal mood and affect. His behavior is normal. Judgment and thought content normal.       Significant Labs:    BMP:  Recent  Labs   Lab 03/27/19  0509 03/28/19  0523 03/29/19  0505    141 143   K 4.4 4.5 4.0    110 109   CO2 30* 27 31*   BUN 33* 34* 33*   CREATININE 1.4 1.4 1.3   CALCIUM 9.4 9.7 9.8       CBC:  Recent Labs   Lab 03/27/19  0509 03/28/19  0523 03/29/19  0505   WBC 6.83 7.02 5.31   HGB 8.0* 8.4* 8.0*   HCT 29.7* 30.2* 29.1*    269 290       Blood Culture: No results for input(s): LABBLOO in the last 168 hours.  Urine Culture: No results for input(s): LABURIN in the last 168 hours.    Significant Imaging:

## 2019-03-29 NOTE — PROCEDURES
"Agus Ramos Jr. is a 78 y.o. male patient.    Temp: 97.6 °F (36.4 °C) (03/28/19 1944)  Pulse: 80 (03/28/19 2012)  Resp: 19 (03/28/19 2012)  BP: 132/82 (03/28/19 2012)  SpO2: 100 % (03/28/19 2012)  Weight: 95.9 kg (211 lb 6.7 oz) (03/27/19 0300)  Height: 5' 9" (175.3 cm) (03/23/19 1948)    PICC  Date/Time: 3/28/2019 9:15 PM  Consent Done: Yes  Time out: Immediately prior to procedure a time out was called to verify the correct patient, procedure, equipment, support staff and site/side marked as required  Indications: med administration and vascular access  Anesthesia: local infiltration  Local anesthetic: lidocaine 1% without epinephrine  Anesthetic Total (mL): 2  Preparation: skin prepped with ChloraPrep  Skin prep agent dried: skin prep agent completely dried prior to procedure  Sterile barriers: all five maximum sterile barriers used - cap, mask, sterile gown, sterile gloves, and large sterile sheet  Hand hygiene: hand hygiene performed prior to central venous catheter insertion  Location details: right basilic  Catheter type: double lumen  Catheter size: 5 Fr  Catheter Length: 37cm    Ultrasound guidance: yes  Needle advanced into vessel with real time Ultrasound guidance.  Guidewire confirmed in vessel.  Sterile sheath used.  Number of attempts: 1  Post-procedure: blood return through all ports, chlorhexidine patch and sterile dressing applied    Assessment: placement verified by x-ray  Complications: none  Comments: Old picc removed to [facility] with tip intact at 43 cm.  picc was accidentally pulled out 40 cm prior to me arriving and new order to replace          Robin Call  3/28/2019    "

## 2019-03-30 LAB
ALBUMIN SERPL BCP-MCNC: 2.3 G/DL (ref 3.5–5.2)
ALP SERPL-CCNC: 109 U/L (ref 55–135)
ALT SERPL W/O P-5'-P-CCNC: 10 U/L (ref 10–44)
ANION GAP SERPL CALC-SCNC: 5 MMOL/L (ref 8–16)
ANION GAP SERPL CALC-SCNC: 5 MMOL/L (ref 8–16)
AST SERPL-CCNC: 11 U/L (ref 10–40)
BASOPHILS # BLD AUTO: 0.03 K/UL (ref 0–0.2)
BASOPHILS NFR BLD: 0.3 % (ref 0–1.9)
BILIRUB SERPL-MCNC: 0.4 MG/DL (ref 0.1–1)
BUN SERPL-MCNC: 32 MG/DL (ref 8–23)
BUN SERPL-MCNC: 32 MG/DL (ref 8–23)
CALCIUM SERPL-MCNC: 10.1 MG/DL (ref 8.7–10.5)
CALCIUM SERPL-MCNC: 10.1 MG/DL (ref 8.7–10.5)
CHLORIDE SERPL-SCNC: 109 MMOL/L (ref 95–110)
CHLORIDE SERPL-SCNC: 109 MMOL/L (ref 95–110)
CO2 SERPL-SCNC: 29 MMOL/L (ref 23–29)
CO2 SERPL-SCNC: 29 MMOL/L (ref 23–29)
CREAT SERPL-MCNC: 1.2 MG/DL (ref 0.5–1.4)
CREAT SERPL-MCNC: 1.2 MG/DL (ref 0.5–1.4)
DIFFERENTIAL METHOD: ABNORMAL
EOSINOPHIL # BLD AUTO: 0.1 K/UL (ref 0–0.5)
EOSINOPHIL NFR BLD: 1.3 % (ref 0–8)
ERYTHROCYTE [DISTWIDTH] IN BLOOD BY AUTOMATED COUNT: 23.1 % (ref 11.5–14.5)
EST. GFR  (AFRICAN AMERICAN): >60 ML/MIN/1.73 M^2
EST. GFR  (AFRICAN AMERICAN): >60 ML/MIN/1.73 M^2
EST. GFR  (NON AFRICAN AMERICAN): 57 ML/MIN/1.73 M^2
EST. GFR  (NON AFRICAN AMERICAN): 57 ML/MIN/1.73 M^2
GLUCOSE SERPL-MCNC: 162 MG/DL (ref 70–110)
GLUCOSE SERPL-MCNC: 162 MG/DL (ref 70–110)
HCT VFR BLD AUTO: 29.8 % (ref 40–54)
HGB BLD-MCNC: 8.1 G/DL (ref 14–18)
LYMPHOCYTES # BLD AUTO: 1.1 K/UL (ref 1–4.8)
LYMPHOCYTES NFR BLD: 12 % (ref 18–48)
MCH RBC QN AUTO: 22.6 PG (ref 27–31)
MCHC RBC AUTO-ENTMCNC: 27.2 G/DL (ref 32–36)
MCV RBC AUTO: 83 FL (ref 82–98)
MONOCYTES # BLD AUTO: 1.1 K/UL (ref 0.3–1)
MONOCYTES NFR BLD: 11.5 % (ref 4–15)
NEUTROPHILS # BLD AUTO: 6.9 K/UL (ref 1.8–7.7)
NEUTROPHILS NFR BLD: 75.2 % (ref 38–73)
PLATELET # BLD AUTO: 315 K/UL (ref 150–350)
PMV BLD AUTO: 10.6 FL (ref 9.2–12.9)
POCT GLUCOSE: 211 MG/DL (ref 70–110)
POCT GLUCOSE: 226 MG/DL (ref 70–110)
POCT GLUCOSE: 243 MG/DL (ref 70–110)
POCT GLUCOSE: 253 MG/DL (ref 70–110)
POTASSIUM SERPL-SCNC: 4.4 MMOL/L (ref 3.5–5.1)
POTASSIUM SERPL-SCNC: 4.4 MMOL/L (ref 3.5–5.1)
PROT SERPL-MCNC: 4.5 G/DL (ref 6–8.4)
RBC # BLD AUTO: 3.58 M/UL (ref 4.6–6.2)
SODIUM SERPL-SCNC: 143 MMOL/L (ref 136–145)
SODIUM SERPL-SCNC: 143 MMOL/L (ref 136–145)
WBC # BLD AUTO: 9.27 K/UL (ref 3.9–12.7)

## 2019-03-30 PROCEDURE — 86038 ANTINUCLEAR ANTIBODIES: CPT

## 2019-03-30 PROCEDURE — 84165 PROTEIN E-PHORESIS SERUM: CPT | Mod: 26,,, | Performed by: PATHOLOGY

## 2019-03-30 PROCEDURE — 25000003 PHARM REV CODE 250: Performed by: HOSPITALIST

## 2019-03-30 PROCEDURE — 94761 N-INVAS EAR/PLS OXIMETRY MLT: CPT

## 2019-03-30 PROCEDURE — 11000001 HC ACUTE MED/SURG PRIVATE ROOM

## 2019-03-30 PROCEDURE — 84165 PROTEIN E-PHORESIS SERUM: CPT

## 2019-03-30 PROCEDURE — 80053 COMPREHEN METABOLIC PANEL: CPT

## 2019-03-30 PROCEDURE — 84165 PATHOLOGIST INTERPRETATION SPE: ICD-10-PCS | Mod: 26,,, | Performed by: PATHOLOGY

## 2019-03-30 PROCEDURE — 85025 COMPLETE CBC W/AUTO DIFF WBC: CPT

## 2019-03-30 PROCEDURE — 99900035 HC TECH TIME PER 15 MIN (STAT)

## 2019-03-30 PROCEDURE — 97110 THERAPEUTIC EXERCISES: CPT

## 2019-03-30 PROCEDURE — 99232 SBSQ HOSP IP/OBS MODERATE 35: CPT | Mod: ,,, | Performed by: UROLOGY

## 2019-03-30 PROCEDURE — 63600175 PHARM REV CODE 636 W HCPCS: Performed by: HOSPITALIST

## 2019-03-30 PROCEDURE — 99232 PR SUBSEQUENT HOSPITAL CARE,LEVL II: ICD-10-PCS | Mod: ,,, | Performed by: UROLOGY

## 2019-03-30 PROCEDURE — 27000221 HC OXYGEN, UP TO 24 HOURS

## 2019-03-30 PROCEDURE — A4216 STERILE WATER/SALINE, 10 ML: HCPCS | Performed by: HOSPITALIST

## 2019-03-30 PROCEDURE — 80074 ACUTE HEPATITIS PANEL: CPT

## 2019-03-30 PROCEDURE — 97530 THERAPEUTIC ACTIVITIES: CPT

## 2019-03-30 RX ORDER — SPIRONOLACTONE 25 MG/1
25 TABLET ORAL DAILY
Status: DISCONTINUED | OUTPATIENT
Start: 2019-03-30 | End: 2019-04-16 | Stop reason: HOSPADM

## 2019-03-30 RX ADMIN — INSULIN ASPART 1 UNITS: 100 INJECTION, SOLUTION INTRAVENOUS; SUBCUTANEOUS at 09:03

## 2019-03-30 RX ADMIN — RAMELTEON 8 MG: 8 TABLET, FILM COATED ORAL at 09:03

## 2019-03-30 RX ADMIN — CARVEDILOL 25 MG: 6.25 TABLET, FILM COATED ORAL at 08:03

## 2019-03-30 RX ADMIN — Medication 10 ML: at 05:03

## 2019-03-30 RX ADMIN — FUROSEMIDE 80 MG: 10 INJECTION, SOLUTION INTRAMUSCULAR; INTRAVENOUS at 08:03

## 2019-03-30 RX ADMIN — PANTOPRAZOLE SODIUM 40 MG: 40 TABLET, DELAYED RELEASE ORAL at 08:03

## 2019-03-30 RX ADMIN — ATORVASTATIN CALCIUM 20 MG: 10 TABLET, FILM COATED ORAL at 09:03

## 2019-03-30 RX ADMIN — INSULIN ASPART 3 UNITS: 100 INJECTION, SOLUTION INTRAVENOUS; SUBCUTANEOUS at 12:03

## 2019-03-30 RX ADMIN — Medication 10 ML: at 12:03

## 2019-03-30 RX ADMIN — DILTIAZEM HYDROCHLORIDE 30 MG: 30 TABLET, FILM COATED ORAL at 08:03

## 2019-03-30 RX ADMIN — SPIRONOLACTONE 25 MG: 25 TABLET ORAL at 09:03

## 2019-03-30 RX ADMIN — INSULIN ASPART 2 UNITS: 100 INJECTION, SOLUTION INTRAVENOUS; SUBCUTANEOUS at 08:03

## 2019-03-30 RX ADMIN — LOSARTAN POTASSIUM 25 MG: 25 TABLET, FILM COATED ORAL at 08:03

## 2019-03-30 RX ADMIN — FUROSEMIDE 80 MG: 10 INJECTION, SOLUTION INTRAMUSCULAR; INTRAVENOUS at 05:03

## 2019-03-30 RX ADMIN — INSULIN ASPART 2 UNITS: 100 INJECTION, SOLUTION INTRAVENOUS; SUBCUTANEOUS at 05:03

## 2019-03-30 RX ADMIN — CARVEDILOL 25 MG: 6.25 TABLET, FILM COATED ORAL at 09:03

## 2019-03-30 RX ADMIN — ASPIRIN 81 MG: 81 TABLET, COATED ORAL at 08:03

## 2019-03-30 RX ADMIN — TAMSULOSIN HYDROCHLORIDE 0.4 MG: 0.4 CAPSULE ORAL at 08:03

## 2019-03-30 RX ADMIN — Medication 10 ML: at 06:03

## 2019-03-30 RX ADMIN — FERROUS GLUCONATE TAB 324 MG (37.5 MG ELEMENTAL IRON) 324 MG: 324 (37.5 FE) TAB at 08:03

## 2019-03-30 RX ADMIN — DILTIAZEM HYDROCHLORIDE 30 MG: 30 TABLET, FILM COATED ORAL at 09:03

## 2019-03-30 NOTE — PROGRESS NOTES
Renal wise pt is stable but sono findings could be pointing at undiagnosed issues      FINDINGS:  Right kidney: The right kidney measures 11.2 cm. No cortical thinning. No loss of corticomedullary distinction. Resistive index measures 0.63.  Decreased perfusion.  No mass. No renal stone. No hydronephrosis.    Left kidney: The left kidney measures 8.1 cm. No cortical thinning. No loss of corticomedullary distinction. Resistive index unable to be measured.  Decreased perfusion.  Exophytic hypoechoic structure in the inferior pole measuring 1.1 x 0.9 x 0.9 cm.  No renal stone. No hydronephrosis.    Urinary bladder is visualized with a Boucher catheter.    Ascites in the pelvis.      Impression       Decreased perfusion bilaterally consistent with medical renal disease.    1.1 cm exophytic hypoechoic structure in the inferior pole likely representing a cyst.  Repeat ultrasound examination is recommended in 6 months to ensure stability.    Ascites.     L Kidney smaller than right might complicate a kidney bx if we ever want one.

## 2019-03-30 NOTE — NURSING
Pt aaox4 no falls or injuries during shift. Boucher cath draining small amount of urine. Total output during am shift 80ml. 500ml fluid restriction maintained. Edema noted mostly to upper and lower extremities and elevated on pillows. Scrotum elevated on towels. Call light w/i reach, bed alarm on.

## 2019-03-30 NOTE — ASSESSMENT & PLAN NOTE
Continue Stearns  Continue Flomax  IRASEMA confirms stearns in appropriate position with decompressed bladder. Ascites in pelvis will make bladder scanner inaccurate.

## 2019-03-30 NOTE — PROGRESS NOTES
Pt. Found off CPAP machine and is currently on 3LPM NC. SpO2 100%.  Pt. Resting comfortably and is not in any distress.Pt. States that he does not want to wear CPAP machine. Pt. Explained the importance of CPAP therapy however he still refused.  Pt.s RN notified.

## 2019-03-30 NOTE — SUBJECTIVE & OBJECTIVE
Interval History: IRASEMA confirms bladder is decompressed around stearns. Scrotal/penile swelling persists.     Review of Systems   Constitutional: Negative for chills and fever.   HENT: Negative for hearing loss, sore throat and trouble swallowing.    Eyes: Negative.    Respiratory: Negative for cough and shortness of breath.    Cardiovascular: Negative for chest pain.   Gastrointestinal: Positive for abdominal distention. Negative for abdominal pain, constipation, diarrhea, nausea and vomiting.   Genitourinary: Positive for difficulty urinating, hematuria, penile swelling and scrotal swelling. Negative for frequency.   Musculoskeletal: Negative for arthralgias and neck pain.   Skin: Negative for color change, pallor and rash.   Neurological: Negative for dizziness and seizures.   Hematological: Negative for adenopathy.   Psychiatric/Behavioral: Negative for confusion.   All other systems reviewed and are negative.    Objective:     Temp:  [97.3 °F (36.3 °C)-98.4 °F (36.9 °C)] 97.6 °F (36.4 °C)  Pulse:  [71-85] 71  Resp:  [16-18] 18  SpO2:  [97 %-100 %] 99 %  BP: (110-141)/(64-75) 133/69     Body mass index is 31.06 kg/m².    Date 03/30/19 0700 - 03/31/19 0659   Shift 7608-7552 4990-8527 6895-8852 24 Hour Total   INTAKE   P.O. 150   150   Shift Total(mL/kg) 150(1.6)   150(1.6)   OUTPUT   Shift Total(mL/kg)       Weight (kg) 95.4 95.4 95.4 95.4     Bladder Scan Volume (mL): 23 mL (03/28/19 0831)  Post Void Cath Residual Output (mL): 0 mL (03/28/19 0831)    Drains     Drain                 Urethral Catheter 03/29/19 0342 Coude 12 Fr. 1 day                Physical Exam   Vitals reviewed.  Constitutional: He is oriented to person, place, and time. He appears well-developed and well-nourished. No distress.   HENT:   Head: Normocephalic and atraumatic.   Eyes: Right eye exhibits no discharge. Left eye exhibits no discharge. No scleral icterus.   Neck: Normal range of motion. Neck supple.   Cardiovascular: Normal rate and  regular rhythm.    Pulmonary/Chest: Effort normal and breath sounds normal. No respiratory distress.   Abdominal: Soft. Bowel sounds are normal. He exhibits distension. There is no tenderness. There is no rebound and no guarding.   Genitourinary:   Genitourinary Comments: Small clot in tubing of stearns, draining well otherwise  Significant penile/scrotal swelling   Musculoskeletal: Normal range of motion.   Neurological: He is alert and oriented to person, place, and time.   Skin: Skin is warm and dry. He is not diaphoretic. No erythema.         Significant Labs:    BMP:  Recent Labs   Lab 03/28/19  0523 03/29/19  0505 03/30/19  0742    143 143  143   K 4.5 4.0 4.4  4.4    109 109  109   CO2 27 31* 29  29   BUN 34* 33* 32*  32*   CREATININE 1.4 1.3 1.2  1.2   CALCIUM 9.7 9.8 10.1  10.1       CBC:   Recent Labs   Lab 03/28/19  0523 03/29/19  0505 03/30/19  0742   WBC 7.02 5.31 9.27   HGB 8.4* 8.0* 8.1*   HCT 30.2* 29.1* 29.8*    290 315       Blood Culture: No results for input(s): LABBLOO in the last 168 hours.  Urine Culture: No results for input(s): LABURIN in the last 168 hours.  Urine Studies:   Recent Labs   Lab 03/23/19  1403 03/24/19  1201   COLORU Yellow Yellow   APPEARANCEUA Hazy* Clear   PHUR 5.0 5.0   SPECGRAV 1.010 1.020   PROTEINUA 2+* 2+*   GLUCUA Negative Negative   KETONESU Negative Negative   BILIRUBINUA Negative Negative   OCCULTUA 1+* 1+*   NITRITE Negative Negative   UROBILINOGEN Negative Negative   LEUKOCYTESUR 1+* Trace*   RBCUA 2 0   WBCUA 4 2   BACTERIA Moderate* Moderate*   SQUAMEPITHEL 4 2   HYALINECASTS 0 0       Significant Imaging:  All pertinent imaging results/findings from the past 24 hours have been reviewed.

## 2019-03-30 NOTE — PROGRESS NOTES
[facility] Hospital Medicine  Progress Note    Patient Name: Agus Ramos Jr.  MRN: [id number]  Patient Class: IP- Inpatient   Admission Date: 3/23/2019  Length of Stay: 7 days  Attending Physician: Ace Cisneros MD  Primary Care Provider: Keaton Hardy MD        Subjective:     Principal Problem:Acute metabolic encephalopathy    HPI:  Mr. Agus Ramos Jr. is a 78 y.o. male known to me with essential hypertension, type 2 diabetes mellitus (HbA1c 5.0% Mar 2019), CAD s/p CABG, chronic combined systolic and diastolic heart failure (LVEF 50% Mar 2019), CKD Stage 3, peripheral artery disease, chronic atrial fibrillation (ZAI2GI3-JOVw score 5) on chronic anticoagulation, COPD, chronic respiratory failure with hypoxia, and anemia of chronic disease who presents to [facility] ED with complaints of a fall this morning.  He had much difficulty standing back up.  EMS was activated and they found that his capillary glucose was 14 mg/dL after which he was given an ampule of 50% dextrose.  His mentation returned to baseline thereafter.  He denies any tremors nor diaphoresis.  He says that he's been feeling weak and dizzy today but denies any loss of consciousness, nor antecedent chest pain, shortness of breath, palpitations, fevers, chills, nausea, vomiting, abdominal pain, or any diarrhea.  He did not trip or slip or anything.  He has had a good appetite but cannot say for sure if he has been taking too much of his diabetes medications.  He denies any recent changes to his medications.  He otherwise has been in his usual state of health.    Hospital Course:  77 y/o male presented with weakness.  Hx of DM on Glipizide and Metformin.  Patient noted to be hypoglycemic.  Initially responded to D50, but then became persistently hypoglycemic.  Admitted to ICU on D10 drip and hourly glucose monitoring.  Also started on Octreotide drip.  No further episodes of hypoglycemia and D10 switched to D5.   Octreotide drip stopped.  Now getting more hyperglycemic.  Stopping D5 drip and monitor off dextrose.  PT/OT evaluation.  Recommended SNF,consulted SW.  Last HbA1C 2 weeks ago 5.0,likely no need any more for hypoglycemic agents,diet should  be fine.  Has scrotal swelling,on IV lasix,lifting scrotum.  Has anasarca,all over body,increased lasix ,have ,proteinuria,check protein/Cr. Ratio,elevated,consulted nephrology.  Urology following for scrotal swelling,improved.    Interval History: has swelling all body.    Review of Systems   HENT: Negative for ear discharge and ear pain.    Eyes: Negative for pain and itching.   Cardiovascular: Negative for chest pain and palpitations.   Genitourinary: Positive for scrotal swelling.   Neurological: Negative for seizures and syncope.     Objective:     Vital Signs (Most Recent):  Temp: 97.6 °F (36.4 °C) (03/30/19 0731)  Pulse: 71 (03/30/19 0731)  Resp: 18 (03/30/19 0731)  BP: 133/69 (03/30/19 0731)  SpO2: 99 % (03/30/19 0731) Vital Signs (24h Range):  Temp:  [97.3 °F (36.3 °C)-98.4 °F (36.9 °C)] 97.6 °F (36.4 °C)  Pulse:  [71-85] 71  Resp:  [16-18] 18  SpO2:  [97 %-100 %] 99 %  BP: (110-141)/(64-75) 133/69     Weight: 95.4 kg (210 lb 5.1 oz)  Body mass index is 31.06 kg/m².    Intake/Output Summary (Last 24 hours) at 3/30/2019 0842  Last data filed at 3/30/2019 0800  Gross per 24 hour   Intake 270 ml   Output 1200 ml   Net -930 ml      Physical Exam   Constitutional: He is oriented to person, place, and time. He appears well-developed and well-nourished. No distress.   HENT:   Head: Normocephalic and atraumatic.   Right Ear: External ear normal.   Left Ear: External ear normal.   Nose: Nose normal.   Eyes: Right eye exhibits no discharge. Left eye exhibits no discharge.   Neck: Normal range of motion.   Cardiovascular:   Irregularly irregular, no murmurs or gallops   Pulmonary/Chest: Effort normal. No respiratory distress.   Abdominal: Soft. Bowel sounds are normal. He exhibits  distension. There is no tenderness. There is no rebound and no guarding.   Genitourinary:   Genitourinary Comments: Scrotal swelling.   Musculoskeletal: Normal range of motion. He exhibits edema.   Neurological: He is alert and oriented to person, place, and time.   Skin: Skin is warm and dry. He is not diaphoretic. No erythema.   Psychiatric: He has a normal mood and affect. His behavior is normal. Judgment and thought content normal.   Nursing note and vitals reviewed.      Significant Labs:   BMP:   Recent Labs   Lab 03/30/19  0742   *  162*     143   K 4.4  4.4     109   CO2 29  29   BUN 32*  32*   CREATININE 1.2  1.2   CALCIUM 10.1  10.1     CBC:   Recent Labs   Lab 03/29/19  0505 03/30/19  0742   WBC 5.31 9.27   HGB 8.0* 8.1*   HCT 29.1* 29.8*    315     POCT Glucose:   Recent Labs   Lab 03/29/19  1627 03/29/19 2008 03/30/19  0734   POCTGLUCOSE 215* 164* 226*     Assessment/Plan:      * Acute metabolic encephalopathy  Patient was noted to have a capillary glucose of 14 mg/dL in the field for which he was given an ampule of 50% dextrose with improvement of his mentation.  His initial serum glucose here was 30 mg/dL but later was still 44 mg/dL after treatment.  He improved into the 180's but continued to drop to 81 mg/dL then to 36 mg/dL.    Admitted to ICU on D10 drip and hourly glucose checks.  Also started on Octreotide drip.  Glucose increasing with no further episodes of hypoglycemia.  Will change D10 to D5 and monitor glucose every 4 hours.  The etiology of his hypoglycemia is unclear but he is on a sulfonylurea at home.  He does have 1+ leukocytes and moderate bacteria on urine, but many amorphous cells.  Will repeat UA with UCx.  Afebrile.  Will hold off on ABx's for now.  Getting more hyperglycemic.  Stop D5 and monitor off dextrose.    PT/OT eval.  Last HbA1C 2 weeks ago 5.0,likely no need any more for hypoglycemic agents,diet should  be fine.  SNF  recommended.      Chronic atrial fibrillation  Patient is in atrial fibrillation at this time which is baseline for him.  He was taken off of anticoagulation during his last admission a week ago due to anemia and was instructed not to take it until outpatient follow-up with gastroenterology.  Will continue to monitor.    CAD (coronary artery disease)  Stable; will continue his home regimen of aspirin, atorvastatin, and carvedilol.    Proteinuria  Has proteinuria,check protein/Cr. Ratio,elevated,consulted nephrology.        Scrotal swelling    Has scrotal swelling,on IV lasix,lifting scrotum.Has anasarca,all over body,increased lasix,    Peripheral artery disease  Stable; will continue his home regimen of aspirin and atorvastatin.    Acute on chronic combined systolic and diastolic heart failure  Will continue with IV lasix,he is edematous.Has anasarca,all over body,increased lasix,    Chronic respiratory failure with hypoxia  Stable; will continue his home supplemental oxygen therapy.    Centrilobular emphysema  Clinically-stable without wheezing.  Will provide as-needed JENAE/LAMA available.    Iron deficiency anemia  The patient's H/H is stable and consistent with previous laboratory measurements, and the patient exhibits no signs or symptoms of acute bleeding; there is no indication for transfusion.  Will continue to monitor.    Type 2 diabetes mellitus, controlled, with renal complications  As addressed above.    CKD Stage 3  His renal function appears to be at his baseline.    Essential hypertension  Continue home regimen of carvedilol, diltiazem, furosemide, and tamsulosin, and provide as-needed clonidine.      VTE Risk Mitigation (From admission, onward)        Ordered     IP VTE HIGH RISK PATIENT  Once      03/23/19 1936     Reason for No Pharmacological VTE Prophylaxis  Once      03/23/19 1936     Place sequential compression device  Until discontinued      03/23/19 1934     Place DENIS hose  Until  discontinued      03/23/19 1934              Ace Cisneros MD  Department of Hospital Medicine   [facility]

## 2019-03-30 NOTE — NURSING
Pt has remained free from falls injuries or distress this shift. Pts reports feeling his breathing is a little better tonught but lungs sounds are still junky. Pt removed his CPAP at 130 and his NC was applied. Pt explained the importance of CPAP but states he wants a break that its is drying him out. Pt still very edematous. Pt has denied pain or needs.. Will continue to monitor.

## 2019-03-30 NOTE — SUBJECTIVE & OBJECTIVE
Interval History: has swelling all body.    Review of Systems   HENT: Negative for ear discharge and ear pain.    Eyes: Negative for pain and itching.   Cardiovascular: Negative for chest pain and palpitations.   Genitourinary: Positive for scrotal swelling.   Neurological: Negative for seizures and syncope.     Objective:     Vital Signs (Most Recent):  Temp: 97.6 °F (36.4 °C) (03/30/19 0731)  Pulse: 71 (03/30/19 0731)  Resp: 18 (03/30/19 0731)  BP: 133/69 (03/30/19 0731)  SpO2: 99 % (03/30/19 0731) Vital Signs (24h Range):  Temp:  [97.3 °F (36.3 °C)-98.4 °F (36.9 °C)] 97.6 °F (36.4 °C)  Pulse:  [71-85] 71  Resp:  [16-18] 18  SpO2:  [97 %-100 %] 99 %  BP: (110-141)/(64-75) 133/69     Weight: 95.4 kg (210 lb 5.1 oz)  Body mass index is 31.06 kg/m².    Intake/Output Summary (Last 24 hours) at 3/30/2019 0842  Last data filed at 3/30/2019 0800  Gross per 24 hour   Intake 270 ml   Output 1200 ml   Net -930 ml      Physical Exam   Constitutional: He is oriented to person, place, and time. He appears well-developed and well-nourished. No distress.   HENT:   Head: Normocephalic and atraumatic.   Right Ear: External ear normal.   Left Ear: External ear normal.   Nose: Nose normal.   Eyes: Right eye exhibits no discharge. Left eye exhibits no discharge.   Neck: Normal range of motion.   Cardiovascular:   Irregularly irregular, no murmurs or gallops   Pulmonary/Chest: Effort normal. No respiratory distress.   Abdominal: Soft. Bowel sounds are normal. He exhibits distension. There is no tenderness. There is no rebound and no guarding.   Genitourinary:   Genitourinary Comments: Scrotal swelling.   Musculoskeletal: Normal range of motion. He exhibits edema.   Neurological: He is alert and oriented to person, place, and time.   Skin: Skin is warm and dry. He is not diaphoretic. No erythema.   Psychiatric: He has a normal mood and affect. His behavior is normal. Judgment and thought content normal.   Nursing note and vitals  reviewed.      Significant Labs:   BMP:   Recent Labs   Lab 03/30/19  0742   *  162*     143   K 4.4  4.4     109   CO2 29  29   BUN 32*  32*   CREATININE 1.2  1.2   CALCIUM 10.1  10.1     CBC:   Recent Labs   Lab 03/29/19  0505 03/30/19  0742   WBC 5.31 9.27   HGB 8.0* 8.1*   HCT 29.1* 29.8*    315     POCT Glucose:   Recent Labs   Lab 03/29/19  1627 03/29/19 2008 03/30/19  0734   POCTGLUCOSE 215* 164* 226*

## 2019-03-30 NOTE — PROGRESS NOTES
[facility]  Urology  Progress Note    Patient Name: Agus Ramos Jr.  MRN: [id number]  Admission Date: 3/23/2019  Hospital Length of Stay: 7 days  Code Status: Full Code   Attending Provider: Ace Cisneros MD   Primary Care Physician: Keaton Hardy MD    Subjective:     HPI:  Urinary Retention  Patient complains of urinary retention. Onset of retention was 1 day ago and was gradual in onset. Patient currently does have a urinary catheter in place.  300 ml of urine were drained when catheter was placed. Prior to this event voiding symptoms consisted of slow stream, intermittency. Prior treatments include watchful waiting. Recent medications that may have affected his voiding include none.  He is admitted after a fall for hypoglycemia.  He had difficulty voiding while admitted.  A few attempts were made to place a Stearns.  Finally a 12 Fr Coude was placed.  Bladder Scan showed 700 mL but he has not drained this much in several hours.        Interval History: IRASEMA confirms bladder is decompressed around stearns. Scrotal/penile swelling persists.     Review of Systems   Constitutional: Negative for chills and fever.   HENT: Negative for hearing loss, sore throat and trouble swallowing.    Eyes: Negative.    Respiratory: Negative for cough and shortness of breath.    Cardiovascular: Negative for chest pain.   Gastrointestinal: Positive for abdominal distention. Negative for abdominal pain, constipation, diarrhea, nausea and vomiting.   Genitourinary: Positive for difficulty urinating, hematuria, penile swelling and scrotal swelling. Negative for frequency.   Musculoskeletal: Negative for arthralgias and neck pain.   Skin: Negative for color change, pallor and rash.   Neurological: Negative for dizziness and seizures.   Hematological: Negative for adenopathy.   Psychiatric/Behavioral: Negative for confusion.   All other systems reviewed and are negative.    Objective:     Temp:  [97.3 °F (36.3  °C)-98.4 °F (36.9 °C)] 97.6 °F (36.4 °C)  Pulse:  [71-85] 71  Resp:  [16-18] 18  SpO2:  [97 %-100 %] 99 %  BP: (110-141)/(64-75) 133/69     Body mass index is 31.06 kg/m².    Date 03/30/19 0700 - 03/31/19 0659   Shift 1616-1242 3323-7871 0156-7338 24 Hour Total   INTAKE   P.O. 150   150   Shift Total(mL/kg) 150(1.6)   150(1.6)   OUTPUT   Shift Total(mL/kg)       Weight (kg) 95.4 95.4 95.4 95.4     Bladder Scan Volume (mL): 23 mL (03/28/19 0831)  Post Void Cath Residual Output (mL): 0 mL (03/28/19 0831)    Drains     Drain                 Urethral Catheter 03/29/19 0342 Coude 12 Fr. 1 day                Physical Exam   Vitals reviewed.  Constitutional: He is oriented to person, place, and time. He appears well-developed and well-nourished. No distress.   HENT:   Head: Normocephalic and atraumatic.   Eyes: Right eye exhibits no discharge. Left eye exhibits no discharge. No scleral icterus.   Neck: Normal range of motion. Neck supple.   Cardiovascular: Normal rate and regular rhythm.    Pulmonary/Chest: Effort normal and breath sounds normal. No respiratory distress.   Abdominal: Soft. Bowel sounds are normal. He exhibits distension. There is no tenderness. There is no rebound and no guarding.   Genitourinary:   Genitourinary Comments: Small clot in tubing of stearns, draining well otherwise  Significant penile/scrotal swelling   Musculoskeletal: Normal range of motion.   Neurological: He is alert and oriented to person, place, and time.   Skin: Skin is warm and dry. He is not diaphoretic. No erythema.         Significant Labs:    BMP:  Recent Labs   Lab 03/28/19  0523 03/29/19  0505 03/30/19  0742    143 143  143   K 4.5 4.0 4.4  4.4    109 109  109   CO2 27 31* 29  29   BUN 34* 33* 32*  32*   CREATININE 1.4 1.3 1.2  1.2   CALCIUM 9.7 9.8 10.1  10.1       CBC:   Recent Labs   Lab 03/28/19  0523 03/29/19  0505 03/30/19  0742   WBC 7.02 5.31 9.27   HGB 8.4* 8.0* 8.1*   HCT 30.2* 29.1* 29.8*     290 315       Blood Culture: No results for input(s): LABBLOO in the last 168 hours.  Urine Culture: No results for input(s): LABURIN in the last 168 hours.  Urine Studies:   Recent Labs   Lab 03/23/19  1403 03/24/19  1201   COLORU Yellow Yellow   APPEARANCEUA Hazy* Clear   PHUR 5.0 5.0   SPECGRAV 1.010 1.020   PROTEINUA 2+* 2+*   GLUCUA Negative Negative   KETONESU Negative Negative   BILIRUBINUA Negative Negative   OCCULTUA 1+* 1+*   NITRITE Negative Negative   UROBILINOGEN Negative Negative   LEUKOCYTESUR 1+* Trace*   RBCUA 2 0   WBCUA 4 2   BACTERIA Moderate* Moderate*   SQUAMEPITHEL 4 2   HYALINECASTS 0 0       Significant Imaging:  All pertinent imaging results/findings from the past 24 hours have been reviewed.                  Assessment/Plan:     Scrotal swelling   - JOSE LUIS reviewed     - Scrotal elevation at all times   - Diuresis    Urinary retention  Continue Stearns  Continue Flomax  IRASEMA confirms stearns in appropriate position with decompressed bladder. Ascites in pelvis will make bladder scanner inaccurate.         VTE Risk Mitigation (From admission, onward)        Ordered     IP VTE HIGH RISK PATIENT  Once      03/23/19 1936     Reason for No Pharmacological VTE Prophylaxis  Once      03/23/19 1936     Place sequential compression device  Until discontinued      03/23/19 1934     Place DENIS hose  Until discontinued      03/23/19 1934          Bruna Zayas MD  Urology  [facility]

## 2019-03-30 NOTE — PT/OT/SLP PROGRESS
Physical Therapy Treatment    Patient Name:  Agus Ramos Jr.   MRN:  [id number]    Recommendations:     Discharge Recommendations:  nursing facility, skilled   Discharge Equipment Recommendations: none   Barriers to discharge: pt with decreased mobility     Assessment:     Agus Ramos Jr. is a 78 y.o. male admitted with a medical diagnosis of Acute metabolic encephalopathy.  He presents with the following impairments/functional limitations:  weakness, impaired endurance, impaired self care skills, impaired functional mobilty, impaired balance, gait instability, decreased lower extremity function, edema, impaired cognition, decreased safety awareness, decreased ROM, decreased upper extremity function, impaired cardiopulmonary response to activity . Pt required encouragement to participate  in therapy today. Limited with gait training today 2* pt c/o scrotum pain and stated his catheter is pulling and uncomfortable to walk. Nurse notified.      Rehab Prognosis: Fair; patient would benefit from acute skilled PT services to address these deficits and reach maximum level of function.    Recent Surgery: * No surgery found *      Plan:     During this hospitalization, patient to be seen 6 x/week to address the identified rehab impairments via gait training, therapeutic activities, therapeutic exercises and progress toward the following goals:    · Plan of Care Expires:  04/08/19    Subjective     Chief Complaint: swollen everywhere   Patient/Family Comments/goals: pt agreeable to treatment   Pain/Comfort:  · Location 1: scrotum  · Pain Addressed 1: Pre-medicate for activity      Objective:     Communicated with nurse Cummings prior to session.  Patient found seated EOB  with telemetry, oxygen upon PT entry to room.     General Precautions: Standard, fall, respiratory   Orthopedic Precautions:N/A   Braces: N/A     Functional Mobility:  · Transfers:     · Sit to Stand:  contact guard assistance with rolling walker  · Bed  <> Chair: contact guard assistance and minimum assistance with  rolling walker  using  Step Transfer  · Gait:  Pt ambulate ~ 4 ft and ~ 5-6 steps x 2 trials from bed<>chair with RW, CGA/MIN A. Noted with decreased arnold, decreased step length, B hip ER. V/T cues for safety technique and walker management.       AM-PAC 6 CLICK MOBILITY  Turning over in bed (including adjusting bedclothes, sheets and blankets)?: 4  Sitting down on and standing up from a chair with arms (e.g., wheelchair, bedside commode, etc.): 3  Moving from lying on back to sitting on the side of the bed?: 3  Moving to and from a bed to a chair (including a wheelchair)?: 3  Need to walk in hospital room?: 3  Climbing 3-5 steps with a railing?: 2  Basic Mobility Total Score: 18       Therapeutic Activities and Exercises:   pt performed seated BLE x 15 reps : AP, LAQ, HS, hip flexion and pillow squeezes . V/T cues for proper and sequence. Pt tolerated well.     Patient left seated EOB  with all lines intact, call button in reach, bed alarm on, nurse notified and spouse present..    GOALS:   Multidisciplinary Problems     Physical Therapy Goals        Problem: Physical Therapy Goal    Goal Priority Disciplines Outcome Goal Variances Interventions   Physical Therapy Goal     PT, PT/OT Ongoing (interventions implemented as appropriate)     Description:  Goals to be met by: 19    Patient will increase functional independence with mobility by performin. Sit to stand transfer with Stand-by Assistance  2. Gait x150 feet with stand-by Assistance using Rolling Walker  3. Lower extremity exercise program x30 reps per handout, with supervision                      Time Tracking:     PT Received On: 19  PT Start Time: 1340     PT Stop Time: 1406  PT Total Time (min): 26 min     Billable Minutes: Therapeutic Activity 15 and Therapeutic Exercise 11    Treatment Type: Treatment  PT/PTA: PTA     PTA Visit Number: 4     Telma Santana  PTA  03/30/2019

## 2019-03-31 PROBLEM — N28.1 RENAL CYST, LEFT: Status: ACTIVE | Noted: 2019-03-31

## 2019-03-31 LAB
ANION GAP SERPL CALC-SCNC: 6 MMOL/L (ref 8–16)
ANISOCYTOSIS BLD QL SMEAR: ABNORMAL
BASOPHILS # BLD AUTO: 0.03 K/UL (ref 0–0.2)
BASOPHILS NFR BLD: 0.3 % (ref 0–1.9)
BUN SERPL-MCNC: 33 MG/DL (ref 8–23)
BURR CELLS BLD QL SMEAR: ABNORMAL
CALCIUM SERPL-MCNC: 10.5 MG/DL (ref 8.7–10.5)
CHLORIDE SERPL-SCNC: 111 MMOL/L (ref 95–110)
CO2 SERPL-SCNC: 29 MMOL/L (ref 23–29)
CREAT SERPL-MCNC: 1.3 MG/DL (ref 0.5–1.4)
DACRYOCYTES BLD QL SMEAR: ABNORMAL
DIFFERENTIAL METHOD: ABNORMAL
EOSINOPHIL # BLD AUTO: 0.1 K/UL (ref 0–0.5)
EOSINOPHIL NFR BLD: 1.2 % (ref 0–8)
ERYTHROCYTE [DISTWIDTH] IN BLOOD BY AUTOMATED COUNT: 24.2 % (ref 11.5–14.5)
EST. GFR  (AFRICAN AMERICAN): 60 ML/MIN/1.73 M^2
EST. GFR  (NON AFRICAN AMERICAN): 52 ML/MIN/1.73 M^2
GLUCOSE SERPL-MCNC: 127 MG/DL (ref 70–110)
HCT VFR BLD AUTO: 29 % (ref 40–54)
HGB BLD-MCNC: 7.7 G/DL (ref 14–18)
HYPOCHROMIA BLD QL SMEAR: ABNORMAL
LYMPHOCYTES # BLD AUTO: 0.9 K/UL (ref 1–4.8)
LYMPHOCYTES NFR BLD: 10.1 % (ref 18–48)
MCH RBC QN AUTO: 22.2 PG (ref 27–31)
MCHC RBC AUTO-ENTMCNC: 26.6 G/DL (ref 32–36)
MCV RBC AUTO: 84 FL (ref 82–98)
MONOCYTES # BLD AUTO: 1.3 K/UL (ref 0.3–1)
MONOCYTES NFR BLD: 14.6 % (ref 4–15)
NEUTROPHILS # BLD AUTO: 6.7 K/UL (ref 1.8–7.7)
NEUTROPHILS NFR BLD: 73.8 % (ref 38–73)
OVALOCYTES BLD QL SMEAR: ABNORMAL
PLATELET # BLD AUTO: 277 K/UL (ref 150–350)
PLATELET BLD QL SMEAR: ABNORMAL
PMV BLD AUTO: 11 FL (ref 9.2–12.9)
POCT GLUCOSE: 138 MG/DL (ref 70–110)
POCT GLUCOSE: 158 MG/DL (ref 70–110)
POCT GLUCOSE: 208 MG/DL (ref 70–110)
POCT GLUCOSE: 211 MG/DL (ref 70–110)
POIKILOCYTOSIS BLD QL SMEAR: ABNORMAL
POLYCHROMASIA BLD QL SMEAR: ABNORMAL
POTASSIUM SERPL-SCNC: 4.1 MMOL/L (ref 3.5–5.1)
RBC # BLD AUTO: 3.47 M/UL (ref 4.6–6.2)
SODIUM SERPL-SCNC: 146 MMOL/L (ref 136–145)
TARGETS BLD QL SMEAR: ABNORMAL
WBC # BLD AUTO: 9.13 K/UL (ref 3.9–12.7)

## 2019-03-31 PROCEDURE — 25000003 PHARM REV CODE 250: Performed by: HOSPITALIST

## 2019-03-31 PROCEDURE — 94760 N-INVAS EAR/PLS OXIMETRY 1: CPT

## 2019-03-31 PROCEDURE — 63600175 PHARM REV CODE 636 W HCPCS: Performed by: HOSPITALIST

## 2019-03-31 PROCEDURE — 85025 COMPLETE CBC W/AUTO DIFF WBC: CPT

## 2019-03-31 PROCEDURE — 11000001 HC ACUTE MED/SURG PRIVATE ROOM

## 2019-03-31 PROCEDURE — 27000221 HC OXYGEN, UP TO 24 HOURS

## 2019-03-31 PROCEDURE — A4216 STERILE WATER/SALINE, 10 ML: HCPCS | Performed by: HOSPITALIST

## 2019-03-31 PROCEDURE — 80048 BASIC METABOLIC PNL TOTAL CA: CPT

## 2019-03-31 PROCEDURE — 99900035 HC TECH TIME PER 15 MIN (STAT)

## 2019-03-31 PROCEDURE — 99232 PR SUBSEQUENT HOSPITAL CARE,LEVL II: ICD-10-PCS | Mod: ,,, | Performed by: UROLOGY

## 2019-03-31 PROCEDURE — 99232 SBSQ HOSP IP/OBS MODERATE 35: CPT | Mod: ,,, | Performed by: UROLOGY

## 2019-03-31 RX ORDER — DOCUSATE SODIUM 100 MG/1
100 CAPSULE, LIQUID FILLED ORAL 2 TIMES DAILY
Status: DISCONTINUED | OUTPATIENT
Start: 2019-03-31 | End: 2019-04-16 | Stop reason: HOSPADM

## 2019-03-31 RX ORDER — POLYETHYLENE GLYCOL 3350 17 G/17G
17 POWDER, FOR SOLUTION ORAL 2 TIMES DAILY
Status: DISCONTINUED | OUTPATIENT
Start: 2019-03-31 | End: 2019-04-16 | Stop reason: HOSPADM

## 2019-03-31 RX ADMIN — DOCUSATE SODIUM 100 MG: 100 CAPSULE, LIQUID FILLED ORAL at 12:03

## 2019-03-31 RX ADMIN — POLYETHYLENE GLYCOL 3350 17 G: 17 POWDER, FOR SOLUTION ORAL at 09:03

## 2019-03-31 RX ADMIN — DOCUSATE SODIUM AND SENNOSIDES 1 TABLET: 8.6; 5 TABLET, FILM COATED ORAL at 08:03

## 2019-03-31 RX ADMIN — ASPIRIN 81 MG: 81 TABLET, COATED ORAL at 08:03

## 2019-03-31 RX ADMIN — Medication 10 ML: at 06:03

## 2019-03-31 RX ADMIN — CARVEDILOL 25 MG: 6.25 TABLET, FILM COATED ORAL at 09:03

## 2019-03-31 RX ADMIN — ATORVASTATIN CALCIUM 20 MG: 10 TABLET, FILM COATED ORAL at 09:03

## 2019-03-31 RX ADMIN — INSULIN ASPART 2 UNITS: 100 INJECTION, SOLUTION INTRAVENOUS; SUBCUTANEOUS at 05:03

## 2019-03-31 RX ADMIN — LOSARTAN POTASSIUM 25 MG: 25 TABLET, FILM COATED ORAL at 08:03

## 2019-03-31 RX ADMIN — Medication 10 ML: at 12:03

## 2019-03-31 RX ADMIN — INSULIN ASPART 1 UNITS: 100 INJECTION, SOLUTION INTRAVENOUS; SUBCUTANEOUS at 09:03

## 2019-03-31 RX ADMIN — DILTIAZEM HYDROCHLORIDE 30 MG: 30 TABLET, FILM COATED ORAL at 09:03

## 2019-03-31 RX ADMIN — SPIRONOLACTONE 25 MG: 25 TABLET ORAL at 08:03

## 2019-03-31 RX ADMIN — RAMELTEON 8 MG: 8 TABLET, FILM COATED ORAL at 09:03

## 2019-03-31 RX ADMIN — FUROSEMIDE 80 MG: 10 INJECTION, SOLUTION INTRAMUSCULAR; INTRAVENOUS at 05:03

## 2019-03-31 RX ADMIN — FUROSEMIDE 80 MG: 10 INJECTION, SOLUTION INTRAMUSCULAR; INTRAVENOUS at 08:03

## 2019-03-31 RX ADMIN — DILTIAZEM HYDROCHLORIDE 30 MG: 30 TABLET, FILM COATED ORAL at 08:03

## 2019-03-31 RX ADMIN — DOCUSATE SODIUM 100 MG: 100 CAPSULE, LIQUID FILLED ORAL at 09:03

## 2019-03-31 RX ADMIN — POLYETHYLENE GLYCOL 3350 17 G: 17 POWDER, FOR SOLUTION ORAL at 12:03

## 2019-03-31 RX ADMIN — TAMSULOSIN HYDROCHLORIDE 0.4 MG: 0.4 CAPSULE ORAL at 08:03

## 2019-03-31 RX ADMIN — Medication 10 ML: at 05:03

## 2019-03-31 RX ADMIN — CARVEDILOL 25 MG: 6.25 TABLET, FILM COATED ORAL at 08:03

## 2019-03-31 RX ADMIN — FERROUS GLUCONATE TAB 324 MG (37.5 MG ELEMENTAL IRON) 324 MG: 324 (37.5 FE) TAB at 08:03

## 2019-03-31 RX ADMIN — PANTOPRAZOLE SODIUM 40 MG: 40 TABLET, DELAYED RELEASE ORAL at 08:03

## 2019-03-31 NOTE — ASSESSMENT & PLAN NOTE
Continue Stearns  Continue Flomax  IRASEMA confirms stearns in appropriate position with decompressed bladder. Ascites in pelvis will make bladder scanner inaccurate.   Less suspicious of post-renal causes of low UOP

## 2019-03-31 NOTE — SUBJECTIVE & OBJECTIVE
Interval History: Asking for water again today. Low UOP yesterday.     Review of Systems   Constitutional: Negative for chills and fever.   HENT: Negative for hearing loss, sore throat and trouble swallowing.    Eyes: Negative.    Respiratory: Negative for cough and shortness of breath.    Cardiovascular: Negative for chest pain.   Gastrointestinal: Positive for abdominal distention. Negative for abdominal pain, constipation, diarrhea, nausea and vomiting.   Genitourinary: Positive for difficulty urinating, hematuria, penile swelling and scrotal swelling. Negative for frequency.   Musculoskeletal: Negative for arthralgias and neck pain.   Skin: Negative for color change, pallor and rash.   Neurological: Negative for dizziness and seizures.   Hematological: Negative for adenopathy.   Psychiatric/Behavioral: Negative for confusion.   All other systems reviewed and are negative.    Objective:     Temp:  [97.5 °F (36.4 °C)-98.2 °F (36.8 °C)] 97.5 °F (36.4 °C)  Pulse:  [70-99] 70  Resp:  [18] 18  SpO2:  [98 %-100 %] 99 %  BP: (100-139)/(63-81) 119/75     Body mass index is 31.06 kg/m².    Date 03/31/19 0700 - 04/01/19 0659   Shift 6136-6635 4205-8413 6209-6920 24 Hour Total   INTAKE   P.O. 120   120   Shift Total(mL/kg) 120(1.3)   120(1.3)   OUTPUT   Urine(mL/kg/hr) 300   300   Shift Total(mL/kg) 300(3.1)   300(3.1)   Weight (kg) 95.4 95.4 95.4 95.4     Bladder Scan Volume (mL): 23 mL (03/28/19 0831)  Post Void Cath Residual Output (mL): 0 mL (03/28/19 0831)    Drains     Drain                 Urethral Catheter 03/29/19 0342 Coude 12 Fr. 2 days                Physical Exam   Vitals reviewed.  Constitutional: He is oriented to person, place, and time. He appears well-developed and well-nourished. No distress.   HENT:   Head: Normocephalic and atraumatic.   Eyes: Right eye exhibits no discharge. Left eye exhibits no discharge. No scleral icterus.   Neck: Normal range of motion. Neck supple.   Cardiovascular: Normal rate  and regular rhythm.    Pulmonary/Chest: Effort normal and breath sounds normal. No respiratory distress.   Abdominal: Soft. Bowel sounds are normal. He exhibits distension. There is no tenderness. There is no rebound and no guarding.   Genitourinary:   Genitourinary Comments: Small clot in tubing of stearns, draining well otherwise  Significant penile/scrotal swelling   Musculoskeletal: Normal range of motion.   Neurological: He is alert and oriented to person, place, and time.   Skin: Skin is warm and dry. He is not diaphoretic. No erythema.         Significant Labs:    BMP:  Recent Labs   Lab 03/29/19  0505 03/30/19  0742 03/31/19  0623    143  143 146*   K 4.0 4.4  4.4 4.1    109  109 111*   CO2 31* 29  29 29   BUN 33* 32*  32* 33*   CREATININE 1.3 1.2  1.2 1.3   CALCIUM 9.8 10.1  10.1 10.5       CBC:   Recent Labs   Lab 03/29/19  0505 03/30/19  0742 03/31/19  0623   WBC 5.31 9.27 9.13   HGB 8.0* 8.1* 7.7*   HCT 29.1* 29.8* 29.0*    315 277       Blood Culture: No results for input(s): LABBLOO in the last 168 hours.  Urine Culture: No results for input(s): LABURIN in the last 168 hours.  Urine Studies: No results for input(s): COLORU, APPEARANCEUA, PHUR, SPECGRAV, PROTEINUA, GLUCUA, KETONESU, BILIRUBINUA, OCCULTUA, NITRITE, UROBILINOGEN, LEUKOCYTESUR, RBCUA, WBCUA, BACTERIA, SQUAMEPITHEL, HYALINECASTS in the last 168 hours.    Invalid input(s): WRIGHTSUR    Significant Imaging:  All pertinent imaging results/findings from the past 24 hours have been reviewed.

## 2019-03-31 NOTE — NURSING
Pt aoox4 free of falls or injuries. Able to stand to bedside commode. Pt had one large bm movement. Oxygen NC @ 3lpm. SSI given as needed. Scrotum elevate on towels. Hands and legs elevated on pillows. Boucher catheter draining ayesha yellow urine. SSI given as needed. Call light w/i reach, bed in lowest position, bed alarm on.

## 2019-03-31 NOTE — PROGRESS NOTES
Pt refused to wear CPAP. HAYDEE Fiore notified2/                                                                                                                                                                                                                                                                                              BNNNNNNNNNNNNNNNNNNNNNNNNNNNNNNNNNNNNNNNNNNNNNNNNNNNNNNNNNNNNNNNNNNNNNNNNNNNNNNNNNNNNNNNNNNNNNNNNNNNNNNNNNNNNNNNNNNNNNNNNNNNNNNNNNNNNNNNNNNNNNNNNNNNNNNNNNNNNNNNNNNNNNNNNNNNNNNNNNNNNNNNNNN

## 2019-03-31 NOTE — PROGRESS NOTES
Pt report received. PICC review/ assessed during report. Catheter exposed.On coming RN asked AM NurseZoila about PICC Line catheter. Nurse Zoila informed on coming RN that PICC Nurse charted and was aware, along with MD. RN Informed Charge Nurse, Dav. Charge Nurse reviewed and conformed catheter distance and reviewed PICC nurse notes and stated no further action was needed and to monitor. RN secured line. No swelling or discomfort at site.Line flushes with no blood return.RN will continue to to monitor.

## 2019-03-31 NOTE — NURSING
Pt needs some miralax. Was assisted to BSCC x 2 with urge. One small hard ball passed. pts scrotal and penile edema worsening. pts lungs sounds are still very junky.

## 2019-03-31 NOTE — SUBJECTIVE & OBJECTIVE
Interval History: has swelling all body.    Review of Systems   HENT: Negative for ear discharge and ear pain.    Eyes: Negative for pain and itching.   Cardiovascular: Negative for chest pain and palpitations.   Genitourinary: Positive for scrotal swelling.   Neurological: Negative for seizures and syncope.     Objective:     Vital Signs (Most Recent):  Temp: 98.2 °F (36.8 °C) (03/31/19 0743)  Pulse: 99 (03/31/19 0743)  Resp: 18 (03/31/19 0743)  BP: 139/69 (03/31/19 0743)  SpO2: 98 % (03/31/19 0743) Vital Signs (24h Range):  Temp:  [97.7 °F (36.5 °C)-98.2 °F (36.8 °C)] 98.2 °F (36.8 °C)  Pulse:  [67-99] 99  Resp:  [18] 18  SpO2:  [98 %-100 %] 98 %  BP: (100-139)/(63-81) 139/69     Weight: 95.4 kg (210 lb 5.1 oz)  Body mass index is 31.06 kg/m².    Intake/Output Summary (Last 24 hours) at 3/31/2019 0745  Last data filed at 3/31/2019 0600  Gross per 24 hour   Intake 390 ml   Output 880 ml   Net -490 ml      Physical Exam   Constitutional: He is oriented to person, place, and time. He appears well-developed and well-nourished. No distress.   HENT:   Head: Normocephalic and atraumatic.   Right Ear: External ear normal.   Left Ear: External ear normal.   Nose: Nose normal.   Eyes: Right eye exhibits no discharge. Left eye exhibits no discharge.   Neck: Normal range of motion.   Cardiovascular:   Irregularly irregular, no murmurs or gallops   Pulmonary/Chest: Effort normal. No respiratory distress.   Abdominal: Soft. Bowel sounds are normal. He exhibits distension. There is no tenderness. There is no rebound and no guarding.   Genitourinary:   Genitourinary Comments: Scrotal swelling.   Musculoskeletal: Normal range of motion. He exhibits edema.   Neurological: He is alert and oriented to person, place, and time.   Skin: Skin is warm and dry. He is not diaphoretic. No erythema.   Psychiatric: He has a normal mood and affect. His behavior is normal. Judgment and thought content normal.   Nursing note and vitals  reviewed.      Significant Labs:   BMP:   Recent Labs   Lab 03/30/19  0742   *  162*     143   K 4.4  4.4     109   CO2 29  29   BUN 32*  32*   CREATININE 1.2  1.2   CALCIUM 10.1  10.1     CBC:   Recent Labs   Lab 03/30/19  0742   WBC 9.27   HGB 8.1*   HCT 29.8*        POCT Glucose:   Recent Labs   Lab 03/30/19  1122 03/30/19  1611 03/30/19 2002   POCTGLUCOSE 253* 211* 243*

## 2019-03-31 NOTE — PROGRESS NOTES
[facility]  Urology  Progress Note    Patient Name: Agus Ramos Jr.  MRN: [id number]  Admission Date: 3/23/2019  Hospital Length of Stay: 8 days  Code Status: Full Code   Attending Provider: Ace Cisneros MD   Primary Care Physician: Keaton Hardy MD    Subjective:     HPI:  Urinary Retention  Patient complains of urinary retention. Onset of retention was 1 day ago and was gradual in onset. Patient currently does have a urinary catheter in place.  300 ml of urine were drained when catheter was placed. Prior to this event voiding symptoms consisted of slow stream, intermittency. Prior treatments include watchful waiting. Recent medications that may have affected his voiding include none.  He is admitted after a fall for hypoglycemia.  He had difficulty voiding while admitted.  A few attempts were made to place a Boucher.  Finally a 12 Fr Coude was placed.  Bladder Scan showed 700 mL but he has not drained this much in several hours.        Interval History: Asking for water again today. Low UOP yesterday.     Review of Systems   Constitutional: Negative for chills and fever.   HENT: Negative for hearing loss, sore throat and trouble swallowing.    Eyes: Negative.    Respiratory: Negative for cough and shortness of breath.    Cardiovascular: Negative for chest pain.   Gastrointestinal: Positive for abdominal distention. Negative for abdominal pain, constipation, diarrhea, nausea and vomiting.   Genitourinary: Positive for difficulty urinating, hematuria, penile swelling and scrotal swelling. Negative for frequency.   Musculoskeletal: Negative for arthralgias and neck pain.   Skin: Negative for color change, pallor and rash.   Neurological: Negative for dizziness and seizures.   Hematological: Negative for adenopathy.   Psychiatric/Behavioral: Negative for confusion.   All other systems reviewed and are negative.    Objective:     Temp:  [97.5 °F (36.4 °C)-98.2 °F (36.8 °C)] 97.5 °F (36.4  °C)  Pulse:  [70-99] 70  Resp:  [18] 18  SpO2:  [98 %-100 %] 99 %  BP: (100-139)/(63-81) 119/75     Body mass index is 31.06 kg/m².    Date 03/31/19 0700 - 04/01/19 0659   Shift 8786-7362 6601-0986 0972-0781 24 Hour Total   INTAKE   P.O. 120   120   Shift Total(mL/kg) 120(1.3)   120(1.3)   OUTPUT   Urine(mL/kg/hr) 300   300   Shift Total(mL/kg) 300(3.1)   300(3.1)   Weight (kg) 95.4 95.4 95.4 95.4     Bladder Scan Volume (mL): 23 mL (03/28/19 0831)  Post Void Cath Residual Output (mL): 0 mL (03/28/19 0831)    Drains     Drain                 Urethral Catheter 03/29/19 0342 Coude 12 Fr. 2 days                Physical Exam   Vitals reviewed.  Constitutional: He is oriented to person, place, and time. He appears well-developed and well-nourished. No distress.   HENT:   Head: Normocephalic and atraumatic.   Eyes: Right eye exhibits no discharge. Left eye exhibits no discharge. No scleral icterus.   Neck: Normal range of motion. Neck supple.   Cardiovascular: Normal rate and regular rhythm.    Pulmonary/Chest: Effort normal and breath sounds normal. No respiratory distress.   Abdominal: Soft. Bowel sounds are normal. He exhibits distension. There is no tenderness. There is no rebound and no guarding.   Genitourinary:   Genitourinary Comments: Small clot in tubing of stearns, draining well otherwise  Significant penile/scrotal swelling   Musculoskeletal: Normal range of motion.   Neurological: He is alert and oriented to person, place, and time.   Skin: Skin is warm and dry. He is not diaphoretic. No erythema.         Significant Labs:    BMP:  Recent Labs   Lab 03/29/19  0505 03/30/19  0742 03/31/19  0623    143  143 146*   K 4.0 4.4  4.4 4.1    109  109 111*   CO2 31* 29  29 29   BUN 33* 32*  32* 33*   CREATININE 1.3 1.2  1.2 1.3   CALCIUM 9.8 10.1  10.1 10.5       CBC:   Recent Labs   Lab 03/29/19  0505 03/30/19  0742 03/31/19  0623   WBC 5.31 9.27 9.13   HGB 8.0* 8.1* 7.7*   HCT 29.1* 29.8* 29.0*     315 277       Blood Culture: No results for input(s): LABBLOO in the last 168 hours.  Urine Culture: No results for input(s): LABURIN in the last 168 hours.  Urine Studies: No results for input(s): COLORU, APPEARANCEUA, PHUR, SPECGRAV, PROTEINUA, GLUCUA, KETONESU, BILIRUBINUA, OCCULTUA, NITRITE, UROBILINOGEN, LEUKOCYTESUR, RBCUA, WBCUA, BACTERIA, SQUAMEPITHEL, HYALINECASTS in the last 168 hours.    Invalid input(s): WRIGHTSUR    Significant Imaging:  All pertinent imaging results/findings from the past 24 hours have been reviewed.                  Assessment/Plan:     Scrotal swelling   - JOSE LUIS reviewed     - Scrotal elevation at all times   - Diuresis    Urinary retention  Continue Stearns  Continue Flomax  IRASEMA confirms stearns in appropriate position with decompressed bladder. Ascites in pelvis will make bladder scanner inaccurate.   Less suspicious of post-renal causes of low UOP        VTE Risk Mitigation (From admission, onward)        Ordered     IP VTE HIGH RISK PATIENT  Once      03/23/19 1936     Reason for No Pharmacological VTE Prophylaxis  Once      03/23/19 1936     Place sequential compression device  Until discontinued      03/23/19 1934     Place DENIS hose  Until discontinued      03/23/19 1934          Bruna Zayas MD  Urology  [facility]

## 2019-03-31 NOTE — PROGRESS NOTES
[facility] Hospital Medicine  Progress Note    Patient Name: Agus Ramos Jr.  MRN: [id number]  Patient Class: IP- Inpatient   Admission Date: 3/23/2019  Length of Stay: 8 days  Attending Physician: Ace Cisneros MD  Primary Care Provider: Keaton Hardy MD        Subjective:     Principal Problem:Acute metabolic encephalopathy    HPI:  Mr. Agus Ramos Jr. is a 78 y.o. male known to me with essential hypertension, type 2 diabetes mellitus (HbA1c 5.0% Mar 2019), CAD s/p CABG, chronic combined systolic and diastolic heart failure (LVEF 50% Mar 2019), CKD Stage 3, peripheral artery disease, chronic atrial fibrillation ([id number] score 5) on chronic anticoagulation, COPD, chronic respiratory failure with hypoxia, and anemia of chronic disease who presents to [facility] ED with complaints of a fall this morning.  He had much difficulty standing back up.  EMS was activated and they found that his capillary glucose was 14 mg/dL after which he was given an ampule of 50% dextrose.  His mentation returned to baseline thereafter.  He denies any tremors nor diaphoresis.  He says that he's been feeling weak and dizzy today but denies any loss of consciousness, nor antecedent chest pain, shortness of breath, palpitations, fevers, chills, nausea, vomiting, abdominal pain, or any diarrhea.  He did not trip or slip or anything.  He has had a good appetite but cannot say for sure if he has been taking too much of his diabetes medications.  He denies any recent changes to his medications.  He otherwise has been in his usual state of health.    Hospital Course:  79 y/o male presented with weakness.  Hx of DM on Glipizide and Metformin.  Patient noted to be hypoglycemic.  Initially responded to D50, but then became persistently hypoglycemic.  Admitted to ICU on D10 drip and hourly glucose monitoring.  Also started on Octreotide drip.  No further episodes of hypoglycemia and D10 switched to D5.   Octreotide drip stopped.  Now getting more hyperglycemic.  Stopping D5 drip and monitor off dextrose.  PT/OT evaluation.  Recommended SNF,consulted SW.  Last HbA1C 2 weeks ago 5.0,likely no need any more for hypoglycemic agents,diet should  be fine.  Has scrotal swelling,on IV lasix,lifting scrotum.  Has anasarca,all over body,increased lasix ,have ,proteinuria,check protein/Cr. Ratio,elevated,consulted nephrology.  Urology following for scrotal swelling,improved.  Has left renal cyst,on US,need follow up with repeat US  in 6 weeks,    Interval History: has swelling all body.    Review of Systems   HENT: Negative for ear discharge and ear pain.    Eyes: Negative for pain and itching.   Cardiovascular: Negative for chest pain and palpitations.   Genitourinary: Positive for scrotal swelling.   Neurological: Negative for seizures and syncope.     Objective:     Vital Signs (Most Recent):  Temp: 98.2 °F (36.8 °C) (03/31/19 0743)  Pulse: 99 (03/31/19 0743)  Resp: 18 (03/31/19 0743)  BP: 139/69 (03/31/19 0743)  SpO2: 98 % (03/31/19 0743) Vital Signs (24h Range):  Temp:  [97.7 °F (36.5 °C)-98.2 °F (36.8 °C)] 98.2 °F (36.8 °C)  Pulse:  [67-99] 99  Resp:  [18] 18  SpO2:  [98 %-100 %] 98 %  BP: (100-139)/(63-81) 139/69     Weight: 95.4 kg (210 lb 5.1 oz)  Body mass index is 31.06 kg/m².    Intake/Output Summary (Last 24 hours) at 3/31/2019 0745  Last data filed at 3/31/2019 0600  Gross per 24 hour   Intake 390 ml   Output 880 ml   Net -490 ml      Physical Exam   Constitutional: He is oriented to person, place, and time. He appears well-developed and well-nourished. No distress.   HENT:   Head: Normocephalic and atraumatic.   Right Ear: External ear normal.   Left Ear: External ear normal.   Nose: Nose normal.   Eyes: Right eye exhibits no discharge. Left eye exhibits no discharge.   Neck: Normal range of motion.   Cardiovascular:   Irregularly irregular, no murmurs or gallops   Pulmonary/Chest: Effort normal. No respiratory  distress.   Abdominal: Soft. Bowel sounds are normal. He exhibits distension. There is no tenderness. There is no rebound and no guarding.   Genitourinary:   Genitourinary Comments: Scrotal swelling.   Musculoskeletal: Normal range of motion. He exhibits edema.   Neurological: He is alert and oriented to person, place, and time.   Skin: Skin is warm and dry. He is not diaphoretic. No erythema.   Psychiatric: He has a normal mood and affect. His behavior is normal. Judgment and thought content normal.   Nursing note and vitals reviewed.      Significant Labs:   BMP:   Recent Labs   Lab 03/30/19  0742   *  162*     143   K 4.4  4.4     109   CO2 29  29   BUN 32*  32*   CREATININE 1.2  1.2   CALCIUM 10.1  10.1     CBC:   Recent Labs   Lab 03/30/19  0742   WBC 9.27   HGB 8.1*   HCT 29.8*        POCT Glucose:   Recent Labs   Lab 03/30/19  1122 03/30/19  1611 03/30/19 2002   POCTGLUCOSE 253* 211* 243*     Assessment/Plan:      * Acute metabolic encephalopathy  Patient was noted to have a capillary glucose of 14 mg/dL in the field for which he was given an ampule of 50% dextrose with improvement of his mentation.  His initial serum glucose here was 30 mg/dL but later was still 44 mg/dL after treatment.  He improved into the 180's but continued to drop to 81 mg/dL then to 36 mg/dL.    Admitted to ICU on D10 drip and hourly glucose checks.  Also started on Octreotide drip.  Glucose increasing with no further episodes of hypoglycemia.  Will change D10 to D5 and monitor glucose every 4 hours.  The etiology of his hypoglycemia is unclear but he is on a sulfonylurea at home.  He does have 1+ leukocytes and moderate bacteria on urine, but many amorphous cells.  Will repeat UA with UCx.  Afebrile.  Will hold off on ABx's for now.  Getting more hyperglycemic.  Stop D5 and monitor off dextrose.    PT/OT eval.  Last HbA1C 2 weeks ago 5.0,likely no need any more for hypoglycemic agents,diet should   be fine.  SNF recommended.      Chronic atrial fibrillation  Patient is in atrial fibrillation at this time which is baseline for him.  He was taken off of anticoagulation during his last admission a week ago due to anemia and was instructed not to take it until outpatient follow-up with gastroenterology.  Will continue to monitor.    CAD (coronary artery disease)  Stable; will continue his home regimen of aspirin, atorvastatin, and carvedilol.    Renal cyst, left  Has left renal cyst,on US,need follow up with repeat US  in 6 weeks,      Proteinuria  Has proteinuria,check protein/Cr. Ratio,elevated,consulted nephrology.        Scrotal swelling    Has scrotal swelling,on IV lasix,lifting scrotum.Has anasarca,all over body,increased lasix,    Peripheral artery disease  Stable; will continue his home regimen of aspirin and atorvastatin.    Acute on chronic combined systolic and diastolic heart failure  Will continue with IV lasix,he is edematous.Has anasarca,all over body,increased lasix,    Chronic respiratory failure with hypoxia  Stable; will continue his home supplemental oxygen therapy.    Centrilobular emphysema  Clinically-stable without wheezing.  Will provide as-needed JENAE/LAMA available.    Iron deficiency anemia  The patient's H/H is stable and consistent with previous laboratory measurements, and the patient exhibits no signs or symptoms of acute bleeding; there is no indication for transfusion.  Will continue to monitor.    Type 2 diabetes mellitus, controlled, with renal complications  As addressed above.    CKD Stage 3  His renal function appears to be at his baseline.    Essential hypertension  Continue home regimen of carvedilol, diltiazem, furosemide, and tamsulosin, and provide as-needed clonidine.      VTE Risk Mitigation (From admission, onward)        Ordered     IP VTE HIGH RISK PATIENT  Once      03/23/19 1936     Reason for No Pharmacological VTE Prophylaxis  Once      03/23/19 1936     Place  sequential compression device  Until discontinued      03/23/19 1934     Place DENIS hose  Until discontinued      03/23/19 1934              Ace Cisneros MD  Department of Hospital Medicine   [facility]

## 2019-04-01 LAB
ALBUMIN SERPL ELPH-MCNC: 2.18 G/DL (ref 3.35–5.55)
ALPHA1 GLOB SERPL ELPH-MCNC: 0.36 G/DL (ref 0.17–0.41)
ALPHA2 GLOB SERPL ELPH-MCNC: 0.56 G/DL (ref 0.43–0.99)
ANA SER QL IF: NORMAL
ANION GAP SERPL CALC-SCNC: 4 MMOL/L (ref 8–16)
B-GLOBULIN SERPL ELPH-MCNC: 0.58 G/DL (ref 0.5–1.1)
BASOPHILS # BLD AUTO: 0.04 K/UL (ref 0–0.2)
BASOPHILS NFR BLD: 0.3 % (ref 0–1.9)
BUN SERPL-MCNC: 32 MG/DL (ref 8–23)
CALCIUM SERPL-MCNC: 10.4 MG/DL (ref 8.7–10.5)
CHLORIDE SERPL-SCNC: 112 MMOL/L (ref 95–110)
CO2 SERPL-SCNC: 31 MMOL/L (ref 23–29)
CREAT SERPL-MCNC: 1.3 MG/DL (ref 0.5–1.4)
DIFFERENTIAL METHOD: ABNORMAL
EOSINOPHIL # BLD AUTO: 0.1 K/UL (ref 0–0.5)
EOSINOPHIL NFR BLD: 0.9 % (ref 0–8)
ERYTHROCYTE [DISTWIDTH] IN BLOOD BY AUTOMATED COUNT: 23.4 % (ref 11.5–14.5)
EST. GFR  (AFRICAN AMERICAN): 60 ML/MIN/1.73 M^2
EST. GFR  (NON AFRICAN AMERICAN): 52 ML/MIN/1.73 M^2
GAMMA GLOB SERPL ELPH-MCNC: 0.53 G/DL (ref 0.67–1.58)
GLUCOSE SERPL-MCNC: 155 MG/DL (ref 70–110)
HAV IGM SERPL QL IA: NEGATIVE
HBV CORE IGM SERPL QL IA: NEGATIVE
HBV SURFACE AG SERPL QL IA: NEGATIVE
HCT VFR BLD AUTO: 29.9 % (ref 40–54)
HCV AB SERPL QL IA: NEGATIVE
HGB BLD-MCNC: 8.1 G/DL (ref 14–18)
LYMPHOCYTES # BLD AUTO: 1 K/UL (ref 1–4.8)
LYMPHOCYTES NFR BLD: 8.8 % (ref 18–48)
MCH RBC QN AUTO: 22.4 PG (ref 27–31)
MCHC RBC AUTO-ENTMCNC: 27.1 G/DL (ref 32–36)
MCV RBC AUTO: 83 FL (ref 82–98)
MONOCYTES # BLD AUTO: 1.6 K/UL (ref 0.3–1)
MONOCYTES NFR BLD: 14.3 % (ref 4–15)
NEUTROPHILS # BLD AUTO: 8.7 K/UL (ref 1.8–7.7)
NEUTROPHILS NFR BLD: 76 % (ref 38–73)
PATHOLOGIST INTERPRETATION SPE: NORMAL
PLATELET # BLD AUTO: 286 K/UL (ref 150–350)
PMV BLD AUTO: 9.9 FL (ref 9.2–12.9)
POCT GLUCOSE: 168 MG/DL (ref 70–110)
POCT GLUCOSE: 179 MG/DL (ref 70–110)
POCT GLUCOSE: 212 MG/DL (ref 70–110)
POCT GLUCOSE: 391 MG/DL (ref 70–110)
POTASSIUM SERPL-SCNC: 4.3 MMOL/L (ref 3.5–5.1)
PROT SERPL-MCNC: 4.2 G/DL (ref 6–8.4)
RBC # BLD AUTO: 3.61 M/UL (ref 4.6–6.2)
SODIUM SERPL-SCNC: 147 MMOL/L (ref 136–145)
WBC # BLD AUTO: 11.46 K/UL (ref 3.9–12.7)

## 2019-04-01 PROCEDURE — 80048 BASIC METABOLIC PNL TOTAL CA: CPT

## 2019-04-01 PROCEDURE — 97110 THERAPEUTIC EXERCISES: CPT

## 2019-04-01 PROCEDURE — 99232 PR SUBSEQUENT HOSPITAL CARE,LEVL II: ICD-10-PCS | Mod: ,,, | Performed by: UROLOGY

## 2019-04-01 PROCEDURE — 25000003 PHARM REV CODE 250: Performed by: HOSPITALIST

## 2019-04-01 PROCEDURE — 27000221 HC OXYGEN, UP TO 24 HOURS

## 2019-04-01 PROCEDURE — 99232 SBSQ HOSP IP/OBS MODERATE 35: CPT | Mod: ,,, | Performed by: UROLOGY

## 2019-04-01 PROCEDURE — 97530 THERAPEUTIC ACTIVITIES: CPT

## 2019-04-01 PROCEDURE — A4216 STERILE WATER/SALINE, 10 ML: HCPCS | Performed by: HOSPITALIST

## 2019-04-01 PROCEDURE — 63600175 PHARM REV CODE 636 W HCPCS: Performed by: HOSPITALIST

## 2019-04-01 PROCEDURE — 99900035 HC TECH TIME PER 15 MIN (STAT)

## 2019-04-01 PROCEDURE — 36415 COLL VENOUS BLD VENIPUNCTURE: CPT

## 2019-04-01 PROCEDURE — 94761 N-INVAS EAR/PLS OXIMETRY MLT: CPT

## 2019-04-01 PROCEDURE — 11000001 HC ACUTE MED/SURG PRIVATE ROOM

## 2019-04-01 PROCEDURE — 85025 COMPLETE CBC W/AUTO DIFF WBC: CPT

## 2019-04-01 RX ADMIN — INSULIN ASPART 5 UNITS: 100 INJECTION, SOLUTION INTRAVENOUS; SUBCUTANEOUS at 11:04

## 2019-04-01 RX ADMIN — DOCUSATE SODIUM 100 MG: 100 CAPSULE, LIQUID FILLED ORAL at 09:04

## 2019-04-01 RX ADMIN — Medication 10 ML: at 12:04

## 2019-04-01 RX ADMIN — ATORVASTATIN CALCIUM 20 MG: 10 TABLET, FILM COATED ORAL at 09:04

## 2019-04-01 RX ADMIN — CARVEDILOL 25 MG: 6.25 TABLET, FILM COATED ORAL at 09:04

## 2019-04-01 RX ADMIN — Medication 10 ML: at 09:04

## 2019-04-01 RX ADMIN — LOSARTAN POTASSIUM 25 MG: 25 TABLET, FILM COATED ORAL at 09:04

## 2019-04-01 RX ADMIN — INSULIN ASPART 2 UNITS: 100 INJECTION, SOLUTION INTRAVENOUS; SUBCUTANEOUS at 05:04

## 2019-04-01 RX ADMIN — FERROUS GLUCONATE TAB 324 MG (37.5 MG ELEMENTAL IRON) 324 MG: 324 (37.5 FE) TAB at 09:04

## 2019-04-01 RX ADMIN — FUROSEMIDE 80 MG: 10 INJECTION, SOLUTION INTRAMUSCULAR; INTRAVENOUS at 05:04

## 2019-04-01 RX ADMIN — FUROSEMIDE 80 MG: 10 INJECTION, SOLUTION INTRAMUSCULAR; INTRAVENOUS at 09:04

## 2019-04-01 RX ADMIN — TAMSULOSIN HYDROCHLORIDE 0.4 MG: 0.4 CAPSULE ORAL at 09:04

## 2019-04-01 RX ADMIN — PANTOPRAZOLE SODIUM 40 MG: 40 TABLET, DELAYED RELEASE ORAL at 09:04

## 2019-04-01 RX ADMIN — SPIRONOLACTONE 25 MG: 25 TABLET ORAL at 09:04

## 2019-04-01 RX ADMIN — DILTIAZEM HYDROCHLORIDE 30 MG: 30 TABLET, FILM COATED ORAL at 09:04

## 2019-04-01 RX ADMIN — POLYETHYLENE GLYCOL 3350 17 G: 17 POWDER, FOR SOLUTION ORAL at 09:04

## 2019-04-01 RX ADMIN — ASPIRIN 81 MG: 81 TABLET, COATED ORAL at 09:04

## 2019-04-01 RX ADMIN — Medication 10 ML: at 05:04

## 2019-04-01 NOTE — PLAN OF CARE
Problem: Occupational Therapy Goal  Goal: Occupational Therapy Goal  Goals to be met by: 4/15/19     Patient will increase functional independence with ADLs by performing:    UE Dressing with Set-up Assistance.  LE Dressing with Minimal Assistance.  Grooming while seated with Supervision.  Toileting from bedside commode with Moderate Assistance for hygiene and clothing management.   Bathing from  edge of bed with Moderate Assistance.  Sitting at edge of bed x20 minutes with Stand-by Assistance.  Rolling to Bilateral with Set-up Assistance.   Supine to sit with Supervision.  Stand pivot transfers with Minimal Assistance.  Toilet transfer to bedside commode with Minimal Assistance.  Upper extremity exercise program x10 reps per handout, with assistance as needed.     Outcome: Ongoing (interventions implemented as appropriate)  Patient appeared lethargic and weak today, did not converse much during session. Patient OOB>chair with nursing and reports he has been up ~2 hours.  Patient with edematous BUE, tolerated ROM therex in all planes.  Patient will benefit from continued OT to address functional deficits.

## 2019-04-01 NOTE — SUBJECTIVE & OBJECTIVE
Interval History: Reported problem with catheter drainage overnight, not draining when pt in certain positions. Draining well this AM.     Review of Systems   Constitutional: Negative for chills and fever.   HENT: Negative for hearing loss, sore throat and trouble swallowing.    Eyes: Negative.    Respiratory: Negative for cough and shortness of breath.    Cardiovascular: Negative for chest pain.   Gastrointestinal: Positive for abdominal distention. Negative for abdominal pain, constipation, diarrhea, nausea and vomiting.   Genitourinary: Positive for difficulty urinating, hematuria, penile swelling and scrotal swelling. Negative for frequency.   Musculoskeletal: Negative for arthralgias and neck pain.   Skin: Negative for color change, pallor and rash.   Neurological: Negative for dizziness and seizures.   Hematological: Negative for adenopathy.   Psychiatric/Behavioral: Negative for confusion.   All other systems reviewed and are negative.    Objective:     Temp:  [97.5 °F (36.4 °C)-98.4 °F (36.9 °C)] 97.8 °F (36.6 °C)  Pulse:  [70-84] 84  Resp:  [18] 18  SpO2:  [96 %-100 %] 96 %  BP: (105-170)/(64-80) 117/75     Body mass index is 31.06 kg/m².      Bladder Scan Volume (mL): 23 mL (03/28/19 0831)  Post Void Cath Residual Output (mL): 0 mL (03/28/19 0831)    Drains     Drain                 Urethral Catheter 03/29/19 0342 Coude 12 Fr. 3 days                Physical Exam   Vitals reviewed.  Constitutional: He is oriented to person, place, and time. He appears well-developed and well-nourished. No distress.   HENT:   Head: Normocephalic and atraumatic.   Eyes: Right eye exhibits no discharge. Left eye exhibits no discharge. No scleral icterus.   Neck: Normal range of motion. Neck supple.   Cardiovascular: Normal rate and regular rhythm.    Pulmonary/Chest: Effort normal and breath sounds normal. No respiratory distress.   Abdominal: Soft. Bowel sounds are normal. He exhibits distension. There is no tenderness. There  is no rebound and no guarding.   Genitourinary:   Genitourinary Comments: Clear yellow urine in stearns tubing  Significant penile/scrotal swelling   Musculoskeletal: Normal range of motion.   Neurological: He is alert and oriented to person, place, and time.   Skin: Skin is warm and dry. He is not diaphoretic. No erythema.         Significant Labs:    BMP:  Recent Labs   Lab 03/30/19  0742 03/31/19  0623 04/01/19  0529     143 146* 147*   K 4.4  4.4 4.1 4.3     109 111* 112*   CO2 29  29 29 31*   BUN 32*  32* 33* 32*   CREATININE 1.2  1.2 1.3 1.3   CALCIUM 10.1  10.1 10.5 10.4       CBC:   Recent Labs   Lab 03/30/19  0742 03/31/19  0623 04/01/19  0529   WBC 9.27 9.13 11.46   HGB 8.1* 7.7* 8.1*   HCT 29.8* 29.0* 29.9*    277 286       Blood Culture: No results for input(s): LABBLOO in the last 168 hours.  Urine Culture: No results for input(s): LABURIN in the last 168 hours.  Urine Studies: No results for input(s): COLORU, APPEARANCEUA, PHUR, SPECGRAV, PROTEINUA, GLUCUA, KETONESU, BILIRUBINUA, OCCULTUA, NITRITE, UROBILINOGEN, LEUKOCYTESUR, RBCUA, WBCUA, BACTERIA, SQUAMEPITHEL, HYALINECASTS in the last 168 hours.    Invalid input(s): WRIGHTSUR    Significant Imaging:  All pertinent imaging results/findings from the past 24 hours have been reviewed.

## 2019-04-01 NOTE — PLAN OF CARE
Problem: Adjustment to Illness COPD (Chronic Obstructive Pulmonary Disease)  Goal: Optimal Chronic Illness Coping  Outcome: Ongoing (interventions implemented as appropriate)  No shortness of breath noted    Problem: Functional Ability Impaired COPD (Chronic Obstructive Pulmonary Disease)  Goal: Optimal Level of Functional [place]  Outcome: Ongoing (interventions implemented as appropriate)  No shortness of breath noted      Problem: Infection COPD (Chronic Obstructive Pulmonary Disease)  Goal: Absence of Infection Signs/Symptoms  Outcome: Ongoing (interventions implemented as appropriate)  No shortness of breath noted      Problem: Respiratory Compromise COPD (Chronic Obstructive Pulmonary Disease)  Goal: Effective Oxygenation and Ventilation  Outcome: Ongoing (interventions implemented as appropriate)  No shortness of breath noted

## 2019-04-01 NOTE — NURSING
Pt has remained free from falls this shift. Pt has sat up on bedside. Pt denies pain or needs. Call light within reach side rails up x 2. Will continue to monitor. Pt refusing bipap.

## 2019-04-01 NOTE — PLAN OF CARE
"Problem: Physical Therapy Goal  Goal: Physical Therapy Goal  Goals to be met by: 19    Patient will increase functional independence with mobility by performin. Sit to stand transfer with Stand-by Assistance  2. Gait x150 feet with stand-by Assistance using Rolling Walker  3. Lower extremity exercise program x30 reps per handout, with supervision     Outcome: Ongoing (interventions implemented as appropriate)    Patient declined standing and ambulating today due to "crook in his neck".       "

## 2019-04-01 NOTE — PT/OT/SLP PROGRESS
"Physical Therapy Treatment    Patient Name:  Agus Ramos Jr.   MRN:  [id number]    Recommendations:     Discharge Recommendations:  nursing facility, skilled   Discharge Equipment Recommendations: none   Barriers to discharge: requires increased assistance for functional mobility     Assessment:     Agus Ramos Jr. is a 78 y.o. male admitted with a medical diagnosis of Acute metabolic encephalopathy.  He presents with the following impairments/functional limitations:  weakness, impaired endurance, impaired functional mobilty, gait instability, impaired balance, decreased lower extremity function, decreased safety awareness, edema, decreased ROM, impaired coordination, impaired cardiopulmonary response to activity. Patient presented with increased edema to B UE and LE's. Patient stood x1 trial from chair with mod A then complained of a "crook in his neck" and sat back and chair and declined to try again.     Rehab Prognosis: Fair; patient would benefit from acute skilled PT services to address these deficits and reach maximum level of function.    Recent Surgery: * No surgery found *      Plan:     During this hospitalization, patient to be seen 5 x/week to address the identified rehab impairments via gait training, therapeutic activities, therapeutic exercises and progress toward the following goals:    · Plan of Care Expires:  04/08/19    Subjective     Chief Complaint: Swelling all over.   Patient/Family Comments/goals: To get stronger.   Pain/Comfort:  · Pain Rating 1: 0/10      Objective:     Communicated with nurse Muñoz prior to session.  Patient found up in chair with telemetry, stearns catheter, oxygen upon PT entry to room.     General Precautions: Standard, fall, respiratory   Orthopedic Precautions:N/A   Braces: N/A     Functional Mobility:  · Transfers:     · Sit to Stand:  moderate assistance with rolling walker x1 from bedside chair with verbal/tactile cues for safe technique     AM-PAC 6 CLICK " MOBILITY  Turning over in bed (including adjusting bedclothes, sheets and blankets)?: 4  Sitting down on and standing up from a chair with arms (e.g., wheelchair, bedside commode, etc.): 2  Moving from lying on back to sitting on the side of the bed?: 3  Moving to and from a bed to a chair (including a wheelchair)?: 3  Need to walk in hospital room?: 3  Climbing 3-5 steps with a railing?: 2  Basic Mobility Total Score: 17     Therapeutic Activities and Exercises:  Patient educated in and performed B LE seated therex x20 reps for A/P and x15 reps for LAQ, hip flex, and pillow squeezes.     Patient left up in chair with all lines intact, call button in reach and nurse Wanda notified.    GOALS:   Multidisciplinary Problems     Physical Therapy Goals        Problem: Physical Therapy Goal    Goal Priority Disciplines Outcome Goal Variances Interventions   Physical Therapy Goal     PT, PT/OT Ongoing (interventions implemented as appropriate)     Description:  Goals to be met by: 19    Patient will increase functional independence with mobility by performin. Sit to stand transfer with Stand-by Assistance  2. Gait x150 feet with stand-by Assistance using Rolling Walker  3. Lower extremity exercise program x30 reps per handout, with supervision                      Time Tracking:     PT Received On: 19  PT Start Time: 1548     PT Stop Time: 1612  PT Total Time (min): 24 min     Billable Minutes: Therapeutic Activity  12 and Therapeutic Exercise 12    Treatment Type: Treatment  PT/PTA: PT     PTA Visit Number: 0     Katie Coughlin, PT  2019

## 2019-04-01 NOTE — PT/OT/SLP PROGRESS
Occupational Therapy   Treatment    Name: Agus Ramos Jr.  MRN: [id number]  Admitting Diagnosis:  Acute metabolic encephalopathy       Recommendations:     Discharge Recommendations: nursing facility, skilled  Discharge Equipment Recommendations:  walker, rolling  Barriers to discharge:       Assessment:     Agus Ramos Jr. is a 78 y.o. male with a medical diagnosis of Acute metabolic encephalopathy.  He presents with the following performance deficits affecting function: weakness, impaired endurance, impaired self care skills, impaired functional mobilty, gait instability, impaired balance, decreased coordination, decreased lower extremity function, decreased upper extremity function, impaired cognition, decreased safety awareness, decreased ROM, impaired coordination, edema, impaired fine motor, impaired cardiopulmonary response to activity. Patient appeared lethargic and weak today, did not converse much during session. Patient OOB>chair with nursing and reports he has been up ~2 hours.  Patient with edematous UE, tolerated ROM therex in all planes.      Rehab Prognosis:  Fair+; patient would benefit from acute skilled OT services to address these deficits and reach maximum level of function.       Plan:     Patient to be seen 5 x/week to address the above listed problems via self-care/home management, therapeutic activities, therapeutic exercises  · Plan of Care Expires:    · Plan of Care Reviewed with: patient    Subjective     Pain/Comfort:  · Pain Rating 1: 0/10    Objective:     Communicated with: Nurse Muñoz prior to session.  Patient found up in chair with telemetry, stearns catheter, oxygen upon OT entry to room.    General Precautions: Standard, fall, respiratory   Orthopedic Precautions:N/A   Braces: N/A     Occupational Performance:     Bed Mobility:    Patient found up in chair, requesting to stay in chair.    Activities of Daily Living:  · Upper Body Dressing: minimum assistance to don back gown    · Patient declined grooming tasks at sink.    [facility] 6 Click ADL: 14    Treatment & Education:  Patient tolerated seated BUE AAROM therex in all available planes of motion x 15 reps. Patient very weak and max edema to BUE. Encouraged patient to perform therex during day and to elevate BUE on pillows to promote edema reduction.    Patient left up in chair, BUE elevated on pillows, with all lines intact, call button in reach and nurse notifiedEducation:      GOALS:   Multidisciplinary Problems     Occupational Therapy Goals        Problem: Occupational Therapy Goal    Goal Priority Disciplines Outcome Interventions   Occupational Therapy Goal     OT, PT/OT Ongoing (interventions implemented as appropriate)    Description:  Goals to be met by: 4/15/19     Patient will increase functional independence with ADLs by performing:    UE Dressing with Set-up Assistance.  LE Dressing with Minimal Assistance.  Grooming while seated with Supervision.  Toileting from bedside commode with Moderate Assistance for hygiene and clothing management.   Bathing from  edge of bed with Moderate Assistance.  Sitting at edge of bed x20 minutes with Stand-by Assistance.  Rolling to Bilateral with Set-up Assistance.   Supine to sit with Supervision.  Stand pivot transfers with Minimal Assistance.  Toilet transfer to bedside commode with Minimal Assistance.  Upper extremity exercise program x10 reps per handout, with assistance as needed.                      Time Tracking:     OT Date of Treatment: 04/01/19  OT Start Time: 1404  OT Stop Time: 1419  OT Total Time (min): 15 min    Billable Minutes:Therapeutic Exercise 15    RAHUL Fatima  4/1/2019

## 2019-04-01 NOTE — PLAN OF CARE
Problem: Adult Inpatient Plan of Care  Goal: Plan of Care Review  Outcome: Ongoing (interventions implemented as appropriate)     04/01/19 6086   Plan of Care Review   Plan of Care Reviewed With patient;spouse   Progress improving       Comments: Plan of care reviewed with patient. He is alert and oriented, able to make needs known, remains free of injury during shift. Vssaf. accucheck done as ordered, insulin given per sliding scale. picc flushed per order. Boucher care given. o2 at 2l. Tele monitor on. Patient denying pain during shift. Patient stating he is feeling better, ambulating in room with assist. Bed in low locked position, call light in reach. Will continue to monitor for safety.

## 2019-04-01 NOTE — ASSESSMENT & PLAN NOTE
Has proteinuria,check protein/Cr. Ratio,elevated,consulted nephrology.on IV lasix.and po aldactone.

## 2019-04-01 NOTE — ASSESSMENT & PLAN NOTE
Continue Stearns - consider void trial in next few days  Continue Flomax  IRASEMA confirms stearns in appropriate position with decompressed bladder. Ascites in pelvis will make bladder scanner inaccurate.   Less suspicious of post-renal causes of low UOP

## 2019-04-01 NOTE — SUBJECTIVE & OBJECTIVE
Interval History: has swelling all body.    Review of Systems   HENT: Negative for ear discharge and ear pain.    Eyes: Negative for pain and itching.   Cardiovascular: Negative for chest pain and palpitations.   Genitourinary: Positive for scrotal swelling.   Neurological: Negative for seizures and syncope.     Objective:     Vital Signs (Most Recent):  Temp: 97.8 °F (36.6 °C) (04/01/19 0708)  Pulse: 84 (04/01/19 0708)  Resp: 18 (04/01/19 0708)  BP: 117/75 (04/01/19 0708)  SpO2: 96 % (04/01/19 0708) Vital Signs (24h Range):  Temp:  [97.5 °F (36.4 °C)-98.4 °F (36.9 °C)] 97.8 °F (36.6 °C)  Pulse:  [70-84] 84  Resp:  [18] 18  SpO2:  [96 %-100 %] 96 %  BP: (105-170)/(64-80) 117/75     Weight: 95.4 kg (210 lb 5.1 oz)  Body mass index is 31.06 kg/m².    Intake/Output Summary (Last 24 hours) at 4/1/2019 0836  Last data filed at 4/1/2019 0600  Gross per 24 hour   Intake 240 ml   Output 1600 ml   Net -1360 ml      Physical Exam   Constitutional: He is oriented to person, place, and time. He appears well-developed and well-nourished. No distress.   HENT:   Head: Normocephalic and atraumatic.   Right Ear: External ear normal.   Left Ear: External ear normal.   Nose: Nose normal.   Eyes: Right eye exhibits no discharge. Left eye exhibits no discharge.   Neck: Normal range of motion.   Cardiovascular:   Irregularly irregular, no murmurs or gallops   Pulmonary/Chest: Effort normal. No respiratory distress.   Abdominal: Soft. Bowel sounds are normal. He exhibits distension. There is no tenderness. There is no rebound and no guarding.   Genitourinary:   Genitourinary Comments: Scrotal swelling.   Musculoskeletal: Normal range of motion. He exhibits edema.   Neurological: He is alert and oriented to person, place, and time.   Skin: Skin is warm and dry. He is not diaphoretic. No erythema.   Psychiatric: He has a normal mood and affect. His behavior is normal. Judgment and thought content normal.   Nursing note and vitals  reviewed.      Significant Labs:   BMP:   Recent Labs   Lab 04/01/19  0529   *   *   K 4.3   *   CO2 31*   BUN 32*   CREATININE 1.3   CALCIUM 10.4     CBC:   Recent Labs   Lab 03/31/19  0623 04/01/19  0529   WBC 9.13 11.46   HGB 7.7* 8.1*   HCT 29.0* 29.9*    286     POCT Glucose:   Recent Labs   Lab 03/31/19  1618 03/31/19 2018 04/01/19  0708   POCTGLUCOSE 211* 208* 168*

## 2019-04-01 NOTE — PROGRESS NOTES
[facility] Hospital Medicine  Progress Note    Patient Name: Agus Ramos Jr.  MRN: [id number]  Patient Class: IP- Inpatient   Admission Date: 3/23/2019  Length of Stay: 9 days  Attending Physician: Ace Cisneros MD  Primary Care Provider: Keaton Hardy MD        Subjective:     Principal Problem:Acute metabolic encephalopathy    HPI:  Mr. Agus Ramos Jr. is a 78 y.o. male known to me with essential hypertension, type 2 diabetes mellitus (HbA1c 5.0% Mar 2019), CAD s/p CABG, chronic combined systolic and diastolic heart failure (LVEF 50% Mar 2019), CKD Stage 3, peripheral artery disease, chronic atrial fibrillation (NPT2GY0-NRDl score 5) on chronic anticoagulation, COPD, chronic respiratory failure with hypoxia, and anemia of chronic disease who presents to [facility] ED with complaints of a fall this morning.  He had much difficulty standing back up.  EMS was activated and they found that his capillary glucose was 14 mg/dL after which he was given an ampule of 50% dextrose.  His mentation returned to baseline thereafter.  He denies any tremors nor diaphoresis.  He says that he's been feeling weak and dizzy today but denies any loss of consciousness, nor antecedent chest pain, shortness of breath, palpitations, fevers, chills, nausea, vomiting, abdominal pain, or any diarrhea.  He did not trip or slip or anything.  He has had a good appetite but cannot say for sure if he has been taking too much of his diabetes medications.  He denies any recent changes to his medications.  He otherwise has been in his usual state of health.    Hospital Course:  79 y/o male presented with weakness.  Hx of DM on Glipizide and Metformin.  Patient noted to be hypoglycemic.  Initially responded to D50, but then became persistently hypoglycemic.  Admitted to ICU on D10 drip and hourly glucose monitoring.  Also started on Octreotide drip.  No further episodes of hypoglycemia and D10 switched to D5.   Octreotide drip stopped.  Now getting more hyperglycemic.  Stopping D5 drip and monitor off dextrose.  PT/OT evaluation.  Recommended SNF,consulted SW.  Last HbA1C 2 weeks ago 5.0,likely no need any more for hypoglycemic agents,diet should  be fine.  Has scrotal swelling,on IV lasix,lifting scrotum.  Has anasarca,all over body,increased lasix ,have ,proteinuria,check protein/Cr. Ratio,elevated,consulted nephrology.still has massive anasarca.  Urology following for scrotal swelling,improved.  Has left renal cyst,on US,need follow up with repeat US  in 6 weeks,    Interval History: has swelling all body.    Review of Systems   HENT: Negative for ear discharge and ear pain.    Eyes: Negative for pain and itching.   Cardiovascular: Negative for chest pain and palpitations.   Genitourinary: Positive for scrotal swelling.   Neurological: Negative for seizures and syncope.     Objective:     Vital Signs (Most Recent):  Temp: 97.8 °F (36.6 °C) (04/01/19 0708)  Pulse: 84 (04/01/19 0708)  Resp: 18 (04/01/19 0708)  BP: 117/75 (04/01/19 0708)  SpO2: 96 % (04/01/19 0708) Vital Signs (24h Range):  Temp:  [97.5 °F (36.4 °C)-98.4 °F (36.9 °C)] 97.8 °F (36.6 °C)  Pulse:  [70-84] 84  Resp:  [18] 18  SpO2:  [96 %-100 %] 96 %  BP: (105-170)/(64-80) 117/75     Weight: 95.4 kg (210 lb 5.1 oz)  Body mass index is 31.06 kg/m².    Intake/Output Summary (Last 24 hours) at 4/1/2019 0836  Last data filed at 4/1/2019 0600  Gross per 24 hour   Intake 240 ml   Output 1600 ml   Net -1360 ml      Physical Exam   Constitutional: He is oriented to person, place, and time. He appears well-developed and well-nourished. No distress.   HENT:   Head: Normocephalic and atraumatic.   Right Ear: External ear normal.   Left Ear: External ear normal.   Nose: Nose normal.   Eyes: Right eye exhibits no discharge. Left eye exhibits no discharge.   Neck: Normal range of motion.   Cardiovascular:   Irregularly irregular, no murmurs or gallops   Pulmonary/Chest:  Effort normal. No respiratory distress.   Abdominal: Soft. Bowel sounds are normal. He exhibits distension. There is no tenderness. There is no rebound and no guarding.   Genitourinary:   Genitourinary Comments: Scrotal swelling.   Musculoskeletal: Normal range of motion. He exhibits edema.   Neurological: He is alert and oriented to person, place, and time.   Skin: Skin is warm and dry. He is not diaphoretic. No erythema.   Psychiatric: He has a normal mood and affect. His behavior is normal. Judgment and thought content normal.   Nursing note and vitals reviewed.      Significant Labs:   BMP:   Recent Labs   Lab 04/01/19  0529   *   *   K 4.3   *   CO2 31*   BUN 32*   CREATININE 1.3   CALCIUM 10.4     CBC:   Recent Labs   Lab 03/31/19  0623 04/01/19  0529   WBC 9.13 11.46   HGB 7.7* 8.1*   HCT 29.0* 29.9*    286     POCT Glucose:   Recent Labs   Lab 03/31/19  1618 03/31/19 2018 04/01/19  0708   POCTGLUCOSE 211* 208* 168*     Assessment/Plan:      * Acute metabolic encephalopathy  Patient was noted to have a capillary glucose of 14 mg/dL in the field for which he was given an ampule of 50% dextrose with improvement of his mentation.  His initial serum glucose here was 30 mg/dL but later was still 44 mg/dL after treatment.  He improved into the 180's but continued to drop to 81 mg/dL then to 36 mg/dL.    Admitted to ICU on D10 drip and hourly glucose checks.  Also started on Octreotide drip.  Glucose increasing with no further episodes of hypoglycemia.  Will change D10 to D5 and monitor glucose every 4 hours.  The etiology of his hypoglycemia is unclear but he is on a sulfonylurea at home.  He does have 1+ leukocytes and moderate bacteria on urine, but many amorphous cells.  Will repeat UA with UCx.  Afebrile.  Will hold off on ABx's for now.  Getting more hyperglycemic.  Stop D5 and monitor off dextrose.    PT/OT eval.  Last HbA1C 2 weeks ago 5.0,likely no need any more for hypoglycemic  agents,diet should  be fine.  SNF recommended.      Chronic atrial fibrillation  Patient is in atrial fibrillation at this time which is baseline for him.  He was taken off of anticoagulation during his last admission a week ago due to anemia and was instructed not to take it until outpatient follow-up with gastroenterology.  Will continue to monitor.    CAD (coronary artery disease)  Stable; will continue his home regimen of aspirin, atorvastatin, and carvedilol.    Renal cyst, left  Has left renal cyst,on US,need follow up with repeat US  in 6 weeks,      Proteinuria  Has proteinuria,check protein/Cr. Ratio,elevated,consulted nephrology.on IV lasix.and po aldactone.        Scrotal swelling    Has scrotal swelling,on IV lasix,lifting scrotum.Has anasarca,all over body,increased lasix,    Peripheral artery disease  Stable; will continue his home regimen of aspirin and atorvastatin.    Acute on chronic combined systolic and diastolic heart failure  Will continue with IV lasix,he is edematous.Has anasarca,all over body,increased lasix,still has massive anasarca.    Chronic respiratory failure with hypoxia  Stable; will continue his home supplemental oxygen therapy.    Centrilobular emphysema  Clinically-stable without wheezing.  Will provide as-needed JENAE/LAMA available.    Iron deficiency anemia  The patient's H/H is stable and consistent with previous laboratory measurements, and the patient exhibits no signs or symptoms of acute bleeding; there is no indication for transfusion.  Will continue to monitor.    Type 2 diabetes mellitus, controlled, with renal complications  As addressed above.    CKD Stage 3  His renal function appears to be at his baseline.    Essential hypertension  Continue home regimen of carvedilol, diltiazem, furosemide, and tamsulosin, and provide as-needed clonidine.      VTE Risk Mitigation (From admission, onward)        Ordered     IP VTE HIGH RISK PATIENT  Once      03/23/19 1936      Reason for No Pharmacological VTE Prophylaxis  Once      03/23/19 1936     Place sequential compression device  Until discontinued      03/23/19 1934     Place DENIS hose  Until discontinued      03/23/19 1934              Ace Cisneros MD  Department of Hospital Medicine   [facility]

## 2019-04-01 NOTE — PROGRESS NOTES
[facility]  Urology  Progress Note    Patient Name: Agus Ramos Jr.  MRN: [id number]  Admission Date: 3/23/2019  Hospital Length of Stay: 9 days  Code Status: Full Code   Attending Provider: Ace Cisneros MD   Primary Care Physician: Keaton Hardy MD    Subjective:     HPI:  Urinary Retention  Patient complains of urinary retention. Onset of retention was 1 day ago and was gradual in onset. Patient currently does have a urinary catheter in place.  300 ml of urine were drained when catheter was placed. Prior to this event voiding symptoms consisted of slow stream, intermittency. Prior treatments include watchful waiting. Recent medications that may have affected his voiding include none.  He is admitted after a fall for hypoglycemia.  He had difficulty voiding while admitted.  A few attempts were made to place a Boucher.  Finally a 12 Fr Coude was placed.  Bladder Scan showed 700 mL but he has not drained this much in several hours.        Interval History: Reported problem with catheter drainage overnight, not draining when pt in certain positions. Draining well this AM.     Review of Systems   Constitutional: Negative for chills and fever.   HENT: Negative for hearing loss, sore throat and trouble swallowing.    Eyes: Negative.    Respiratory: Negative for cough and shortness of breath.    Cardiovascular: Negative for chest pain.   Gastrointestinal: Positive for abdominal distention. Negative for abdominal pain, constipation, diarrhea, nausea and vomiting.   Genitourinary: Positive for difficulty urinating, hematuria, penile swelling and scrotal swelling. Negative for frequency.   Musculoskeletal: Negative for arthralgias and neck pain.   Skin: Negative for color change, pallor and rash.   Neurological: Negative for dizziness and seizures.   Hematological: Negative for adenopathy.   Psychiatric/Behavioral: Negative for confusion.   All other systems reviewed and are  negative.    Objective:     Temp:  [97.5 °F (36.4 °C)-98.4 °F (36.9 °C)] 97.8 °F (36.6 °C)  Pulse:  [70-84] 84  Resp:  [18] 18  SpO2:  [96 %-100 %] 96 %  BP: (105-170)/(64-80) 117/75     Body mass index is 31.06 kg/m².      Bladder Scan Volume (mL): 23 mL (03/28/19 0831)  Post Void Cath Residual Output (mL): 0 mL (03/28/19 0831)    Drains     Drain                 Urethral Catheter 03/29/19 0342 Coude 12 Fr. 3 days                Physical Exam   Vitals reviewed.  Constitutional: He is oriented to person, place, and time. He appears well-developed and well-nourished. No distress.   HENT:   Head: Normocephalic and atraumatic.   Eyes: Right eye exhibits no discharge. Left eye exhibits no discharge. No scleral icterus.   Neck: Normal range of motion. Neck supple.   Cardiovascular: Normal rate and regular rhythm.    Pulmonary/Chest: Effort normal and breath sounds normal. No respiratory distress.   Abdominal: Soft. Bowel sounds are normal. He exhibits distension. There is no tenderness. There is no rebound and no guarding.   Genitourinary:   Genitourinary Comments: Clear yellow urine in stearns tubing  Significant penile/scrotal swelling   Musculoskeletal: Normal range of motion.   Neurological: He is alert and oriented to person, place, and time.   Skin: Skin is warm and dry. He is not diaphoretic. No erythema.         Significant Labs:    BMP:  Recent Labs   Lab 03/30/19  0742 03/31/19  0623 04/01/19  0529     143 146* 147*   K 4.4  4.4 4.1 4.3     109 111* 112*   CO2 29  29 29 31*   BUN 32*  32* 33* 32*   CREATININE 1.2  1.2 1.3 1.3   CALCIUM 10.1  10.1 10.5 10.4       CBC:   Recent Labs   Lab 03/30/19  0742 03/31/19  0623 04/01/19  0529   WBC 9.27 9.13 11.46   HGB 8.1* 7.7* 8.1*   HCT 29.8* 29.0* 29.9*    277 286       Blood Culture: No results for input(s): LABBLOO in the last 168 hours.  Urine Culture: No results for input(s): LABURIN in the last 168 hours.  Urine Studies: No results for  input(s): COLORU, APPEARANCEUA, PHUR, SPECGRAV, PROTEINUA, GLUCUA, KETONESU, BILIRUBINUA, OCCULTUA, NITRITE, UROBILINOGEN, LEUKOCYTESUR, RBCUA, WBCUA, BACTERIA, SQUAMEPITHEL, HYALINECASTS in the last 168 hours.    Invalid input(s): WRIGHTSUR    Significant Imaging:  All pertinent imaging results/findings from the past 24 hours have been reviewed.                  Assessment/Plan:     Renal cyst, left   - Recommended for repeat IRASEMA in 6 mths    Scrotal swelling   - JOSE LUIS reviewed     - Scrotal elevation at all times   - Diuresis    Urinary retention  Continue Stearns - consider void trial in next few days  Continue Flomax  IRASEMA confirms stearns in appropriate position with decompressed bladder. Ascites in pelvis will make bladder scanner inaccurate.   Less suspicious of post-renal causes of low UOP        VTE Risk Mitigation (From admission, onward)        Ordered     IP VTE HIGH RISK PATIENT  Once      03/23/19 1936     Reason for No Pharmacological VTE Prophylaxis  Once      03/23/19 1936     Place sequential compression device  Until discontinued      03/23/19 1934     Place DENIS hose  Until discontinued      03/23/19 1934          Bruna Zayas MD  Urology  [facility]

## 2019-04-01 NOTE — PROGRESS NOTES
Not very talkative this am    Ros unable to obtain an adequate one  Past Medical History:   Diagnosis Date    Anemia 05/03/2018    pending blood transfusion    Anticoagulant long-term use     apixaban    Atrial fibrillation     CKD (chronic kidney disease)     Congestive heart failure     COPD (chronic obstructive pulmonary disease)     Coronary artery disease     Diabetes mellitus     Encounter for blood transfusion     HLD (hyperlipidemia)     Hypertension     MI (myocardial infarction)     On home oxygen therapy     Pacemaker     left chest    Peripheral vascular disease     Requires assistance with activities of daily living (ADL)     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     S/P femoral-popliteal bypass surgery left    Stented coronary artery     Tobacco use     Unsteady gait      Review of patient's allergies indicates:  No Known Allergies    Current Facility-Administered Medications   Medication    acetaminophen tablet 650 mg    albuterol-ipratropium 2.5 mg-0.5 mg/3 mL nebulizer solution 3 mL    aspirin EC tablet 81 mg    atorvastatin tablet 20 mg    carvedilol tablet 25 mg    cloNIDine tablet 0.1 mg    dextrose 50% injection 12.5 g    dextrose 50% injection 25 g    diltiaZEM tablet 30 mg    docusate sodium capsule 100 mg    ferrous gluconate tablet 324 mg    furosemide injection 80 mg    glucagon (human recombinant) injection 1 mg    glucose chewable tablet 16 g    glucose chewable tablet 24 g    influenza (FLUZONE HIGH-DOSE) vaccine 0.5 mL    insulin aspart U-100 pen 0-5 Units    losartan tablet 25 mg    ondansetron injection 8 mg    pantoprazole EC tablet 40 mg    pneumoc 13-alan conj-dip cr(PF) (PREVNAR 13 (PF)) 0.5 mL    polyethylene glycol packet 17 g    promethazine (PHENERGAN) 6.25 mg in dextrose 5 % 50 mL IVPB    ramelteon tablet 8 mg    senna-docusate 8.6-50 mg per tablet 1 tablet    sodium chloride 0.9% flush 10 mL    And    sodium chloride 0.9% flush 10 mL     spironolactone tablet 25 mg    tamsulosin 24 hr capsule 0.4 mg       LABS    Recent Results (from the past 24 hour(s))   POCT glucose    Collection Time: 03/31/19  4:18 PM   Result Value Ref Range    POCT Glucose 211 (H) 70 - 110 mg/dL   POCT glucose    Collection Time: 03/31/19  8:18 PM   Result Value Ref Range    POCT Glucose 208 (H) 70 - 110 mg/dL   CBC auto differential    Collection Time: 04/01/19  5:29 AM   Result Value Ref Range    WBC 11.46 3.90 - 12.70 K/uL    RBC 3.61 (L) 4.60 - 6.20 M/uL    Hemoglobin 8.1 (L) 14.0 - 18.0 g/dL    Hematocrit 29.9 (L) 40.0 - 54.0 %    MCV 83 82 - 98 fL    MCH 22.4 (L) 27.0 - 31.0 pg    MCHC 27.1 (L) 32.0 - 36.0 g/dL    RDW 23.4 (H) 11.5 - 14.5 %    Platelets 286 150 - 350 K/uL    MPV 9.9 9.2 - 12.9 fL    Gran # (ANC) 8.7 (H) 1.8 - 7.7 K/uL    Lymph # 1.0 1.0 - 4.8 K/uL    Mono # 1.6 (H) 0.3 - 1.0 K/uL    Eos # 0.1 0.0 - 0.5 K/uL    Baso # 0.04 0.00 - 0.20 K/uL    Gran% 76.0 (H) 38.0 - 73.0 %    Lymph% 8.8 (L) 18.0 - 48.0 %    Mono% 14.3 4.0 - 15.0 %    Eosinophil% 0.9 0.0 - 8.0 %    Basophil% 0.3 0.0 - 1.9 %    Differential Method Automated    Basic metabolic panel    Collection Time: 04/01/19  5:29 AM   Result Value Ref Range    Sodium 147 (H) 136 - 145 mmol/L    Potassium 4.3 3.5 - 5.1 mmol/L    Chloride 112 (H) 95 - 110 mmol/L    CO2 31 (H) 23 - 29 mmol/L    Glucose 155 (H) 70 - 110 mg/dL    BUN, Bld 32 (H) 8 - 23 mg/dL    Creatinine 1.3 0.5 - 1.4 mg/dL    Calcium 10.4 8.7 - 10.5 mg/dL    Anion Gap 4 (L) 8 - 16 mmol/L    eGFR if African American 60 >60 mL/min/1.73 m^2    eGFR if non African American 52 (A) >60 mL/min/1.73 m^2   POCT glucose    Collection Time: 04/01/19  7:08 AM   Result Value Ref Range    POCT Glucose 168 (H) 70 - 110 mg/dL   POCT glucose    Collection Time: 04/01/19 11:27 AM   Result Value Ref Range    POCT Glucose 391 (H) 70 - 110 mg/dL   ]    I/O last 3 completed shifts:  In: 390 [P.O.:390]  Out: 2400 [Urine:2400]    Vitals:    04/01/19 0414 04/01/19  0708 04/01/19 0710 04/01/19 1128   BP: 105/64 117/75  127/71   Pulse: 74 84  89   Resp: 18 18  18   Temp: 98.4 °F (36.9 °C) 97.8 °F (36.6 °C)  97.6 °F (36.4 °C)   TempSrc: Oral Oral  Oral   SpO2: 97% 96% (!) 94% 98%   Weight:       Height:           No Jvd, Thyromegaly or Lymphadenopathy  Lungs: Fairly clear anteriorly and laterally  Cor: RRR no G or rubs  Abd: Soft benign good bowel sounds non tender  Ext: Pos edema    A)  CKD3   Proteinuria   Low alb possible nephrotic snd  T2DM  CHF  COPD  Urinary retention Followed by Dr Zayas  High [place] free chains in JOSE but Normal K/L ratio ? MGUS  CAD  CHF  AICD    P)    24 hour urine to ro Neph snd  Consider heme-onc consult

## 2019-04-01 NOTE — ASSESSMENT & PLAN NOTE
Will continue with IV lasix,he is edematous.Has anasarca,all over body,increased lasix,still has massive anasarca.

## 2019-04-02 PROBLEM — N18.9 ANEMIA DUE TO CHRONIC KIDNEY DISEASE: Status: ACTIVE | Noted: 2019-04-02

## 2019-04-02 PROBLEM — D63.1 ANEMIA DUE TO CHRONIC KIDNEY DISEASE: Status: ACTIVE | Noted: 2019-04-02

## 2019-04-02 PROBLEM — R76.8 ELEVATED SERUM IMMUNOGLOBULIN FREE LIGHT CHAIN LEVEL: Status: ACTIVE | Noted: 2019-04-02

## 2019-04-02 LAB
ANION GAP SERPL CALC-SCNC: 2 MMOL/L (ref 8–16)
BASOPHILS # BLD AUTO: 0.03 K/UL (ref 0–0.2)
BASOPHILS NFR BLD: 0.3 % (ref 0–1.9)
BUN SERPL-MCNC: 32 MG/DL (ref 8–23)
CALCIUM SERPL-MCNC: 10.6 MG/DL (ref 8.7–10.5)
CHLORIDE SERPL-SCNC: 112 MMOL/L (ref 95–110)
CO2 SERPL-SCNC: 33 MMOL/L (ref 23–29)
CREAT SERPL-MCNC: 1.2 MG/DL (ref 0.5–1.4)
DIFFERENTIAL METHOD: ABNORMAL
EOSINOPHIL # BLD AUTO: 0.1 K/UL (ref 0–0.5)
EOSINOPHIL NFR BLD: 1.1 % (ref 0–8)
ERYTHROCYTE [DISTWIDTH] IN BLOOD BY AUTOMATED COUNT: 23.8 % (ref 11.5–14.5)
EST. GFR  (AFRICAN AMERICAN): >60 ML/MIN/1.73 M^2
EST. GFR  (NON AFRICAN AMERICAN): 57 ML/MIN/1.73 M^2
FERRITIN SERPL-MCNC: 42 NG/ML (ref 20–300)
GLUCOSE SERPL-MCNC: 89 MG/DL (ref 70–110)
HCT VFR BLD AUTO: 30.7 % (ref 40–54)
HGB BLD-MCNC: 8.5 G/DL (ref 14–18)
IRON SERPL-MCNC: 14 UG/DL (ref 45–160)
LYMPHOCYTES # BLD AUTO: 1 K/UL (ref 1–4.8)
LYMPHOCYTES NFR BLD: 9.4 % (ref 18–48)
MCH RBC QN AUTO: 22.8 PG (ref 27–31)
MCHC RBC AUTO-ENTMCNC: 27.7 G/DL (ref 32–36)
MCV RBC AUTO: 83 FL (ref 82–98)
MONOCYTES # BLD AUTO: 1.1 K/UL (ref 0.3–1)
MONOCYTES NFR BLD: 10.4 % (ref 4–15)
NEUTROPHILS # BLD AUTO: 8 K/UL (ref 1.8–7.7)
NEUTROPHILS NFR BLD: 78.8 % (ref 38–73)
PLATELET # BLD AUTO: 322 K/UL (ref 150–350)
PMV BLD AUTO: 10 FL (ref 9.2–12.9)
POCT GLUCOSE: 119 MG/DL (ref 70–110)
POCT GLUCOSE: 134 MG/DL (ref 70–110)
POCT GLUCOSE: 144 MG/DL (ref 70–110)
POTASSIUM SERPL-SCNC: 4.2 MMOL/L (ref 3.5–5.1)
RBC # BLD AUTO: 3.72 M/UL (ref 4.6–6.2)
RETICS/RBC NFR AUTO: 1 % (ref 0.4–2)
SATURATED IRON: 4 % (ref 20–50)
SODIUM SERPL-SCNC: 147 MMOL/L (ref 136–145)
TOTAL IRON BINDING CAPACITY: 330 UG/DL (ref 250–450)
TRANSFERRIN SERPL-MCNC: 223 MG/DL (ref 200–375)
WBC # BLD AUTO: 10.11 K/UL (ref 3.9–12.7)

## 2019-04-02 PROCEDURE — 83520 IMMUNOASSAY QUANT NOS NONAB: CPT | Mod: 59

## 2019-04-02 PROCEDURE — 86334 IMMUNOFIX E-PHORESIS SERUM: CPT

## 2019-04-02 PROCEDURE — 85045 AUTOMATED RETICULOCYTE COUNT: CPT

## 2019-04-02 PROCEDURE — 36415 COLL VENOUS BLD VENIPUNCTURE: CPT

## 2019-04-02 PROCEDURE — 80048 BASIC METABOLIC PNL TOTAL CA: CPT

## 2019-04-02 PROCEDURE — 27000221 HC OXYGEN, UP TO 24 HOURS

## 2019-04-02 PROCEDURE — 99232 SBSQ HOSP IP/OBS MODERATE 35: CPT | Mod: ,,, | Performed by: UROLOGY

## 2019-04-02 PROCEDURE — A4216 STERILE WATER/SALINE, 10 ML: HCPCS | Performed by: HOSPITALIST

## 2019-04-02 PROCEDURE — 25000003 PHARM REV CODE 250: Performed by: INTERNAL MEDICINE

## 2019-04-02 PROCEDURE — 99223 PR INITIAL HOSPITAL CARE,LEVL III: ICD-10-PCS | Mod: ,,, | Performed by: INTERNAL MEDICINE

## 2019-04-02 PROCEDURE — 86334 PATHOLOGIST INTERPRETATION IFE: ICD-10-PCS | Mod: 26,,, | Performed by: PATHOLOGY

## 2019-04-02 PROCEDURE — 25000003 PHARM REV CODE 250: Performed by: HOSPITALIST

## 2019-04-02 PROCEDURE — 86334 IMMUNOFIX E-PHORESIS SERUM: CPT | Mod: 26,,, | Performed by: PATHOLOGY

## 2019-04-02 PROCEDURE — 94660 CPAP INITIATION&MGMT: CPT

## 2019-04-02 PROCEDURE — 83540 ASSAY OF IRON: CPT

## 2019-04-02 PROCEDURE — 63600175 PHARM REV CODE 636 W HCPCS: Performed by: HOSPITALIST

## 2019-04-02 PROCEDURE — 99900035 HC TECH TIME PER 15 MIN (STAT)

## 2019-04-02 PROCEDURE — 97535 SELF CARE MNGMENT TRAINING: CPT

## 2019-04-02 PROCEDURE — 11000001 HC ACUTE MED/SURG PRIVATE ROOM

## 2019-04-02 PROCEDURE — 99232 PR SUBSEQUENT HOSPITAL CARE,LEVL II: ICD-10-PCS | Mod: ,,, | Performed by: UROLOGY

## 2019-04-02 PROCEDURE — 85025 COMPLETE CBC W/AUTO DIFF WBC: CPT

## 2019-04-02 PROCEDURE — 99223 1ST HOSP IP/OBS HIGH 75: CPT | Mod: ,,, | Performed by: INTERNAL MEDICINE

## 2019-04-02 PROCEDURE — 82728 ASSAY OF FERRITIN: CPT

## 2019-04-02 PROCEDURE — 97530 THERAPEUTIC ACTIVITIES: CPT

## 2019-04-02 RX ADMIN — CARVEDILOL 25 MG: 6.25 TABLET, FILM COATED ORAL at 09:04

## 2019-04-02 RX ADMIN — SPIRONOLACTONE 25 MG: 25 TABLET ORAL at 09:04

## 2019-04-02 RX ADMIN — LOSARTAN POTASSIUM 25 MG: 25 TABLET, FILM COATED ORAL at 09:04

## 2019-04-02 RX ADMIN — INSULIN ASPART 1 UNITS: 100 INJECTION, SOLUTION INTRAVENOUS; SUBCUTANEOUS at 09:04

## 2019-04-02 RX ADMIN — FUROSEMIDE 80 MG: 10 INJECTION, SOLUTION INTRAMUSCULAR; INTRAVENOUS at 09:04

## 2019-04-02 RX ADMIN — FUROSEMIDE 80 MG: 10 INJECTION, SOLUTION INTRAMUSCULAR; INTRAVENOUS at 06:04

## 2019-04-02 RX ADMIN — DILTIAZEM HYDROCHLORIDE 30 MG: 30 TABLET, FILM COATED ORAL at 09:04

## 2019-04-02 RX ADMIN — PANTOPRAZOLE SODIUM 40 MG: 40 TABLET, DELAYED RELEASE ORAL at 09:04

## 2019-04-02 RX ADMIN — DOCUSATE SODIUM 100 MG: 100 CAPSULE, LIQUID FILLED ORAL at 09:04

## 2019-04-02 RX ADMIN — Medication 10 ML: at 06:04

## 2019-04-02 RX ADMIN — POLYETHYLENE GLYCOL 3350 17 G: 17 POWDER, FOR SOLUTION ORAL at 09:04

## 2019-04-02 RX ADMIN — Medication 10 ML: at 12:04

## 2019-04-02 RX ADMIN — ATORVASTATIN CALCIUM 20 MG: 10 TABLET, FILM COATED ORAL at 09:04

## 2019-04-02 RX ADMIN — ASPIRIN 81 MG: 81 TABLET, COATED ORAL at 09:04

## 2019-04-02 RX ADMIN — RAMELTEON 8 MG: 8 TABLET, FILM COATED ORAL at 09:04

## 2019-04-02 RX ADMIN — FERROUS GLUCONATE TAB 324 MG (37.5 MG ELEMENTAL IRON) 324 MG: 324 (37.5 FE) TAB at 09:04

## 2019-04-02 RX ADMIN — TAMSULOSIN HYDROCHLORIDE 0.4 MG: 0.4 CAPSULE ORAL at 09:04

## 2019-04-02 NOTE — ASSESSMENT & PLAN NOTE
Has proteinuria,check protein/Cr. Ratio,elevated,consulted nephrology.on IV lasix.and po aldactone.  24 urine protein collection in process,to R/O nephrotic syndrome.  Consulted oncology for abnormal UPEP,

## 2019-04-02 NOTE — PROGRESS NOTES
[facility]  Urology  Progress Note    Patient Name: Agus Ramos Jr.  MRN: [id number]  Admission Date: 3/23/2019  Hospital Length of Stay: 10 days  Code Status: Full Code   Attending Provider: Ace Cisneros MD   Primary Care Physician: Keaton Hardy MD    Subjective:     HPI:  Urinary Retention  Patient complains of urinary retention. Onset of retention was 1 day ago and was gradual in onset. Patient currently does have a urinary catheter in place.  300 ml of urine were drained when catheter was placed. Prior to this event voiding symptoms consisted of slow stream, intermittency. Prior treatments include watchful waiting. Recent medications that may have affected his voiding include none.  He is admitted after a fall for hypoglycemia.  He had difficulty voiding while admitted.  A few attempts were made to place a Boucher.  Finally a 12 Fr Coude was placed.  Bladder Scan showed 700 mL but he has not drained this much in several hours.        Interval History: no specific complaints today.  24 hour urine study in progress    Review of Systems   Constitutional: Negative.    HENT: Negative.    Eyes: Negative.    Respiratory: Negative for cough, chest tightness and shortness of breath.    Cardiovascular: Negative for chest pain.   Gastrointestinal: Negative.  Negative for constipation, diarrhea and nausea.   Genitourinary: Positive for difficulty urinating, penile swelling and scrotal swelling.   Musculoskeletal: Negative.    Neurological: Negative.    Psychiatric/Behavioral: Negative.      Objective:     Temp:  [97.6 °F (36.4 °C)-98.3 °F (36.8 °C)] 97.6 °F (36.4 °C)  Pulse:  [62-89] 62  Resp:  [18-21] 18  SpO2:  [95 %-100 %] 98 %  BP: (118-150)/(70-80) 122/75     Body mass index is 31.06 kg/m².      Bladder Scan Volume (mL): 23 mL (03/28/19 0831)  Post Void Cath Residual Output (mL): 0 mL (03/28/19 0831)    Drains     Drain                 Urethral Catheter 03/29/19 0342 Coude 12 Fr. 4 days                 Physical Exam   Nursing note and vitals reviewed.  Constitutional: He is oriented to person, place, and time. He appears well-developed and well-nourished.   HENT:   Head: Normocephalic.   Eyes: Conjunctivae are normal.   Neck: Normal range of motion. Neck supple. No tracheal deviation present. No thyromegaly present.   Cardiovascular: Normal rate and normal heart sounds.    Pulmonary/Chest: Effort normal and breath sounds normal. No respiratory distress. He has no wheezes.   Abdominal: Soft. Bowel sounds are normal. There is no hepatosplenomegaly. There is no tenderness. There is no rebound and no CVA tenderness. No hernia.   Genitourinary: Right testis shows swelling.   Genitourinary Comments: Penile and scrotal edema  Boucher in place with yellow urine   Musculoskeletal: Normal range of motion. He exhibits no edema or tenderness.   Lymphadenopathy:     He has no cervical adenopathy.   Neurological: He is alert and oriented to person, place, and time.   Skin: Skin is warm and dry. No rash noted. No erythema.     Psychiatric: He has a normal mood and affect. His behavior is normal. Judgment and thought content normal.       Significant Labs:    BMP:  Recent Labs   Lab 03/31/19  0623 04/01/19  0529 04/02/19  0503   * 147* 147*   K 4.1 4.3 4.2   * 112* 112*   CO2 29 31* 33*   BUN 33* 32* 32*   CREATININE 1.3 1.3 1.2   CALCIUM 10.5 10.4 10.6*       CBC:   Recent Labs   Lab 03/31/19  0623 04/01/19  0529 04/02/19  0503   WBC 9.13 11.46 10.11   HGB 7.7* 8.1* 8.5*   HCT 29.0* 29.9* 30.7*    286 322       Blood Culture: No results for input(s): LABBLOO in the last 168 hours.  Urine Culture: No results for input(s): LABURIN in the last 168 hours.    Significant Imaging:                    Assessment/Plan:     Renal cyst, left   - Recommended for repeat IRASEMA in 6 mths    Scrotal swelling   - JOSE LUIS reviewed     - Scrotal elevation at all times   - Diuresis    Urinary retention  Continue Boucher -  consider void trial after 24 hour urine collection completed  Continue Flomax  IRASEMA confirms stearns in appropriate position with decompressed bladder. Ascites in pelvis will make bladder scanner inaccurate.   Less suspicious of post-renal causes of low UOP        VTE Risk Mitigation (From admission, onward)        Ordered     IP VTE HIGH RISK PATIENT  Once      03/23/19 1936     Reason for No Pharmacological VTE Prophylaxis  Once      03/23/19 1936     Place sequential compression device  Until discontinued      03/23/19 1934     Place DENIS hose  Until discontinued      03/23/19 1934          AISHA Wiggins MD  Urology  [facility] [place]

## 2019-04-02 NOTE — ASSESSMENT & PLAN NOTE
Continue Stearns - consider void trial after 24 hour urine collection completed  Continue Flomax  IRASEMA confirms stearns in appropriate position with decompressed bladder. Ascites in pelvis will make bladder scanner inaccurate.   Less suspicious of post-renal causes of low UOP

## 2019-04-02 NOTE — ASSESSMENT & PLAN NOTE
Patient with mildly elevated kappa free light chains from 2018 in setting of normal lambda free light chains and Jaki normal kappa lambda free light chain ratio.  Significance unclear elevated kappa free light chains could be secondary to inflammatory state and/or CKD  SPEP normal  Plan testing including serum immunofixation and repeat serum free light chains  Patient undergoing 24 hr urine studies   Await results of testing

## 2019-04-02 NOTE — PROGRESS NOTES
"[facility]  Adult Nutrition  Progress Note    SUMMARY       Recommendations    1. Order Boost Glu Control TID if meal intake is consistently <50%     2. RD to offer diet educ at f/u eval once pt's AMS is completely resolved   Goals: Maintain meal intake >50% daily  Nutrition Goal Status: new  Communication of RD Recs: (plan of care)    Reason for Assessment    Reason For Assessment: length of stay  Diagnosis: (acute metabolic encephalopathy)  Relevant Medical History: DM, COPD, CHF, CAD s/p CABG x 3, CKD, afib  General Information Comments: Pt seen this AM while working w/ therapy. Pt able to report his appetite is improved. Per MD notes, pt's AMS is much-improved. AMS @ admit thought to be 2/2 hypoglycemia. Pt's A1C is 5.0 & MD notes indicate he no longer needs oral meds. Pt denies wt loss pta & wt hx in Epic also shows this. Nephrology notes reviewed re: CKD. Pt w/ mild generalized edema (documented in RN flowsheet); NFPE performed today & it appears pt has adequate fat stores & muscle mass for his advanced age. Meal intake over the past few days has been 50-75%, which is adequate for this pt.     Nutrition Discharge Planning: Adequate intake of meals to meet nutr needs.    Nutrition Risk Screen    Nutrition Risk Screen: no indicators present    Nutrition/Diet History    Spiritual, Cultural Beliefs, [employer] Practices, Values that Affect Care: no  Food Allergies: NKFA  Factors Affecting Nutritional Intake: None identified at this time    Anthropometrics    Temp: 97.7 °F (36.5 °C)  Height Method: Stated  Height: 5' 9" (175.3 cm)  Height (inches): 69 in  Weight Method: Bed Scale  Weight: 95.4 kg (210 lb 5.1 oz)  Weight (lb): 210.32 lb  Ideal Body Weight (IBW), Male: 160 lb  % Ideal Body Weight, Male (lb): 131.45 lb  BMI (Calculated): 31.1  BMI Grade: 30 - 34.9- obesity - grade I       Lab/Procedures/Meds    Pertinent Labs Reviewed: reviewed  Pertinent Labs Comments: Na 147, Cl 112, CO2 33, BUN " 32, POCT glu 119-391, A1C 5.0  Pertinent Medications Reviewed: reviewed  Pertinent Medications Comments: statin, docusate, Fe, lasix, pantoprazole    Estimated/Assessed Needs    Weight Used For Calorie Calculations: 95.4 kg (210 lb 5.1 oz)  Energy Calorie Requirements (kcal): 1654  Energy Need Method: [place]-St Jeor     Protein Requirements: 76-96 g (.8-1 g/kg)  Weight Used For Protein Calculations: 95.4 kg (210 lb 5.1 oz)     Estimated Fluid Requirement Method: RDA Method  RDA Method (mL): 1654  CHO Requirement: 200 g daily      Nutrition Prescription Ordered    Current Diet Order: 2000 vee diabetic/low Na    Evaluation of Received Nutrient/Fluid Intake    I/O: reviewed  Energy Calories Required: meeting needs  Protein Required: meeting needs  Fluid Required: meeting needs  Comments: LBM 4/1  Tolerance: tolerating  % Intake of Estimated Energy Needs: 75 - 100 % as pt only needs to eat 75% of meals to meet nutr needs  % Meal Intake: 50 - 75 %    Nutrition Risk    Level of Risk/Frequency of Follow-up: (F/u 1 x weekly)     Assessment and Plan    No nutr dx @ this time.       Monitor and Evaluation    Food and Nutrient Intake: energy intake, food and beverage intake  Food and Nutrient Adminstration: diet order  Physical Activity and Function: nutrition-related ADLs and IADLs  Anthropometric Measurements: weight, weight change  Biochemical Data, Medical Tests and Procedures: electrolyte and renal panel, glucose/endocrine profile  Nutrition-Focused Physical Findings: overall appearance     Malnutrition Assessment       Edema (Fluid Accumulation): 2-->mild   Subcutaneous Fat Loss (Final Summary): well nourished  Muscle Loss Evaluation (Final Summary): well nourished         Nutrition Follow-Up    RD Follow-up?: Yes

## 2019-04-02 NOTE — HPI
78-year-old male with  Congestive heart failure, Coronary artery disease.Diabetes mellitus, AFib Hypertension; MI (myocardial infarction),  Pacemaker; Peripheral vascular disease; S/P CABG x 3;  Stented coronary artery, Tobacco use, CKD stage 3 Anemia in CKD, chronic respiratory failure with hypoxia presents to [facility] ED after he suffered a fall. He was also confused. He was admitted with renal failure Pt's capillary glucose level 14mg/dl. Pt had been weak and dizzy. No fevers/nausea/vomiting. Pt with chronic fatigue. Pt known to me and followed for anemia. Pt did not complete planned GI evaluation. EGD 6/1/2018 - no acute finding, bleeding source. He is followed by Nephrology during hospitalization for proteinuria.  Serum free light chains reveals a kappa free light chain of 2.68 mg/dL lambda free light chain 2.62 mg/dL and a kappa/lambda free light chain ratio of 0.95 SPEP reveals decreased total proteins.  24 hr urine studies pending  Consulted for elevated urine chain

## 2019-04-02 NOTE — PLAN OF CARE
Problem: Adult Inpatient Plan of Care  Goal: Plan of Care Review  Recommendations     1. Order Boost Glu Control TID if meal intake is consistently <50%     2. RD to offer diet educ at f/u eval once pt's AMS is completely resolved   Goals: Maintain meal intake >50% daily  Nutrition Goal Status: new

## 2019-04-02 NOTE — PROGRESS NOTES
Awake alert much more cooperative today    Denies CNS ENT CP GI  RHEUM OR DERM SX  Past Medical History:   Diagnosis Date    Anemia 05/03/2018    pending blood transfusion    Anticoagulant long-term use     apixaban    Atrial fibrillation     CKD (chronic kidney disease)     Congestive heart failure     COPD (chronic obstructive pulmonary disease)     Coronary artery disease     Diabetes mellitus     Encounter for blood transfusion     HLD (hyperlipidemia)     Hypertension     MI (myocardial infarction)     On home oxygen therapy     Pacemaker     left chest    Peripheral vascular disease     Requires assistance with activities of daily living (ADL)     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     S/P femoral-popliteal bypass surgery left    Stented coronary artery     Tobacco use     Unsteady gait      Review of patient's allergies indicates:  No Known Allergies    Current Facility-Administered Medications   Medication    acetaminophen tablet 650 mg    albuterol-ipratropium 2.5 mg-0.5 mg/3 mL nebulizer solution 3 mL    aspirin EC tablet 81 mg    atorvastatin tablet 20 mg    carvedilol tablet 25 mg    cloNIDine tablet 0.1 mg    dextrose 50% injection 12.5 g    dextrose 50% injection 25 g    diltiaZEM tablet 30 mg    docusate sodium capsule 100 mg    ferrous gluconate tablet 324 mg    furosemide injection 80 mg    glucagon (human recombinant) injection 1 mg    glucose chewable tablet 16 g    glucose chewable tablet 24 g    influenza (FLUZONE HIGH-DOSE) vaccine 0.5 mL    insulin aspart U-100 pen 0-5 Units    losartan tablet 25 mg    ondansetron injection 8 mg    pantoprazole EC tablet 40 mg    pneumoc 13-alan conj-dip cr(PF) (PREVNAR 13 (PF)) 0.5 mL    polyethylene glycol packet 17 g    promethazine (PHENERGAN) 6.25 mg in dextrose 5 % 50 mL IVPB    ramelteon tablet 8 mg    senna-docusate 8.6-50 mg per tablet 1 tablet    sodium chloride 0.9% flush 10 mL    And    sodium chloride 0.9% flush 10 mL     spironolactone tablet 25 mg    tamsulosin 24 hr capsule 0.4 mg       LABS    Recent Results (from the past 24 hour(s))   POCT glucose    Collection Time: 04/01/19 11:27 AM   Result Value Ref Range    POCT Glucose 391 (H) 70 - 110 mg/dL   POCT glucose    Collection Time: 04/01/19  4:37 PM   Result Value Ref Range    POCT Glucose 212 (H) 70 - 110 mg/dL   POCT glucose    Collection Time: 04/01/19  7:47 PM   Result Value Ref Range    POCT Glucose 179 (H) 70 - 110 mg/dL   CBC auto differential    Collection Time: 04/02/19  5:03 AM   Result Value Ref Range    WBC 10.11 3.90 - 12.70 K/uL    RBC 3.72 (L) 4.60 - 6.20 M/uL    Hemoglobin 8.5 (L) 14.0 - 18.0 g/dL    Hematocrit 30.7 (L) 40.0 - 54.0 %    MCV 83 82 - 98 fL    MCH 22.8 (L) 27.0 - 31.0 pg    MCHC 27.7 (L) 32.0 - 36.0 g/dL    RDW 23.8 (H) 11.5 - 14.5 %    Platelets 322 150 - 350 K/uL    MPV 10.0 9.2 - 12.9 fL    Gran # (ANC) 8.0 (H) 1.8 - 7.7 K/uL    Lymph # 1.0 1.0 - 4.8 K/uL    Mono # 1.1 (H) 0.3 - 1.0 K/uL    Eos # 0.1 0.0 - 0.5 K/uL    Baso # 0.03 0.00 - 0.20 K/uL    Gran% 78.8 (H) 38.0 - 73.0 %    Lymph% 9.4 (L) 18.0 - 48.0 %    Mono% 10.4 4.0 - 15.0 %    Eosinophil% 1.1 0.0 - 8.0 %    Basophil% 0.3 0.0 - 1.9 %    Differential Method Automated    Basic metabolic panel    Collection Time: 04/02/19  5:03 AM   Result Value Ref Range    Sodium 147 (H) 136 - 145 mmol/L    Potassium 4.2 3.5 - 5.1 mmol/L    Chloride 112 (H) 95 - 110 mmol/L    CO2 33 (H) 23 - 29 mmol/L    Glucose 89 70 - 110 mg/dL    BUN, Bld 32 (H) 8 - 23 mg/dL    Creatinine 1.2 0.5 - 1.4 mg/dL    Calcium 10.6 (H) 8.7 - 10.5 mg/dL    Anion Gap 2 (L) 8 - 16 mmol/L    eGFR if African American >60 >60 mL/min/1.73 m^2    eGFR if non African American 57 (A) >60 mL/min/1.73 m^2   POCT glucose    Collection Time: 04/02/19  7:25 AM   Result Value Ref Range    POCT Glucose 119 (H) 70 - 110 mg/dL   ]    I/O last 3 completed shifts:  In: 360 [P.O.:360]  Out: 1100 [Urine:1100]    Vitals:    04/02/19 0049  04/02/19 0428 04/02/19 0430 04/02/19 0729   BP:  135/71  122/75   Pulse:  79  62   Resp:  (!) 21  18   Temp:  97.6 °F (36.4 °C)  97.6 °F (36.4 °C)   TempSrc:  Oral  Oral   SpO2: 96% 98% 96% 98%   Weight:       Height:           No Jvd, Thyromegaly or Lymphadenopathy  Lungs: Fairly clear anteriorly and laterally  Cor: RRR no G or rubs  Abd: Soft benign good bowel sounds non tender  Ext: pos edema    A)    CKD3   Proteinuria   Low alb possible nephrotic snd  T2DM  CHF  COPD  Urinary retention Followed by Dr Zayas  High [place] free chains in JOSE but Normal K/L ratio ? MGUS  CAD  CHF  AICD     P)     24 hour urine being collected  Consider heme-onc consult

## 2019-04-02 NOTE — NURSING
Pt has sat up in chair all shift. Pt refused to get in bed. Pt did sleep with the CPAP on. He has denied pain this shift. Call light within reach. Will continue to monitor.

## 2019-04-02 NOTE — CONSULTS
[facility]  Hematology/Oncology  Consult Note    Patient Name: Agus Ramos Jr.  MRN: [id number]  Admission Date: 3/23/2019  Hospital Length of Stay: 10 days  Code Status: Full Code   Attending Provider: Ace Cisneros MD  Consulting Provider: Millie Bell MD  Primary Care Physician: Keaton Hadry MD  Principal Problem:Acute metabolic encephalopathy    Inpatient consult to Hematology/Oncology - [facility]  Consult performed by: Millie Bell MD  Consult ordered by: Ace Cisneros MD        Subjective:   REASON FOR CONSULTATION: URINE LIGHT CHAINS  HPI:  78-year-old male with  Congestive heart failure, Coronary artery disease.Diabetes mellitus, AFib Hypertension; MI (myocardial infarction),  Pacemaker; Peripheral vascular disease; S/P CABG x 3;  Stented coronary artery, Tobacco use, CKD stage 3 Anemia in CKD, chronic respiratory failure with hypoxia presents to [facility] ED after he suffered a fall. He was also confused. He was admitted with renal failure Pt's capillary glucose level 14mg/dl. Pt had been weak and dizzy. No fevers/nausea/vomiting. Pt with chronic fatigue. Pt known to me and followed for anemia. Pt did not complete planned GI evaluation. EGD 6/1/2018 - no acute finding, bleeding source. He is followed by Nephrology during hospitalization for proteinuria.  Serum free light chains reveals a kappa free light chain of 2.68 mg/dL lambda free light chain 2.62 mg/dL and a kappa/lambda free light chain ratio of 0.95 SPEP reveals decreased total proteins.  24 hr urine studies pending  Consulted for elevated urine light chain    Oncology Treatment Plan:   [No treatment plan]    Medications:  Continuous Infusions:  Scheduled Meds:   aspirin  81 mg Oral Daily    atorvastatin  20 mg Oral QHS    carvedilol  25 mg Oral BID    diltiaZEM  30 mg Oral Q12H    docusate sodium  100 mg Oral BID    ferrous gluconate  324 mg Oral Daily with breakfast    furosemide  80 mg  Intravenous BID    losartan  25 mg Oral Daily    pantoprazole  40 mg Oral Daily    polyethylene glycol  17 g Oral BID    sodium chloride 0.9%  10 mL Intravenous Q6H    spironolactone  25 mg Oral Daily    tamsulosin  0.4 mg Oral Daily     PRN Meds:acetaminophen, albuterol-ipratropium, cloNIDine, dextrose 50%, dextrose 50%, glucagon (human recombinant), glucose, glucose, influenza, insulin aspart U-100, ondansetron, pneumoc 13-alan conj-dip cr(PF), promethazine (PHENERGAN) IVPB, ramelteon, senna-docusate 8.6-50 mg, [place] PICC Protocol **AND** sodium chloride 0.9% **AND** sodium chloride 0.9%     Review of patient's allergies indicates:  No Known Allergies     Past Medical History:   Diagnosis Date    Anemia 05/03/2018    pending blood transfusion    Anticoagulant long-term use     apixaban    Atrial fibrillation     CKD (chronic kidney disease)     Congestive heart failure     COPD (chronic obstructive pulmonary disease)     Coronary artery disease     Diabetes mellitus     Encounter for blood transfusion     HLD (hyperlipidemia)     Hypertension     MI (myocardial infarction)     On home oxygen therapy     Pacemaker     left chest    Peripheral vascular disease     Requires assistance with activities of daily living (ADL)     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     S/P femoral-popliteal bypass surgery left    Stented coronary artery     Tobacco use     Unsteady gait      Past Surgical History:   Procedure Laterality Date    CARDIAC CATHETERIZATION      CARDIAC PACEMAKER PLACEMENT      CARDIAC SURGERY      3 vessel CABG    CARDIOVERSION N/A 9/19/2013    Performed by Maryann Surgeon at [facility] MARYANN    cardioverted      CORONARY ANGIOPLASTY WITH STENT PLACEMENT      EGD (ESOPHAGOGASTRODUODENOSCOPY) N/A 6/1/2018    Performed by Ty Hickman MD at [facility] ENDO    HEMORRHOID SURGERY      TRANSESOPHAGEAL ECHOCARDIOGRAM (ANIA) N/A 9/19/2013    Performed by [facility] at [facility]    VASCULAR SURGERY      femoral  artery popliteal bypass     Family History     Problem Relation (Age of Onset)    Diabetes Father, Mother        Tobacco Use    Smoking status: Former Smoker     Packs/day: 1.00     Years: 60.00     Pack years: 60.00     Types: Cigarettes     Last attempt to quit: 2018     Years since quittin.9    Smokeless tobacco: Never Used   Substance and Sexual Activity    Alcohol use: No    Drug use: No    Sexual activity: Not on file       Review of Systems   Unable to perform ROS: Other   Pt confused  Objective:     Vital Signs (Most Recent):  Temp: 97.7 °F (36.5 °C) (19 1122)  Pulse: 74 (19 1122)  Resp: 18 (19 1122)  BP: 108/65 (19 1122)  SpO2: (!) 94 % (19 1122) Vital Signs (24h Range):  Temp:  [97.6 °F (36.4 °C)-98.3 °F (36.8 °C)] 97.7 °F (36.5 °C)  Pulse:  [62-87] 74  Resp:  [18-21] 18  SpO2:  [94 %-100 %] 94 %  BP: (108-150)/(65-80) 108/65     Weight: 95.4 kg (210 lb 5.1 oz)  Body mass index is 31.06 kg/m².  Body surface area is 2.16 meters squared.      Intake/Output Summary (Last 24 hours) at 2019 1630  Last data filed at 2019 1249  Gross per 24 hour   Intake 360 ml   Output 900 ml   Net -540 ml       Physical Exam   Constitutional: He appears well-developed and well-nourished.   HENT:   Head: Normocephalic.   Eyes: Conjunctivae are normal. No scleral icterus.   Neck: Normal range of motion. Neck supple. No thyromegaly present.   Cardiovascular: An irregularly irregular rhythm present.   Pulmonary/Chest: Effort normal and breath sounds normal. He has no wheezes. He has no rales.   Abdominal: Soft. Bowel sounds are normal. He exhibits no distension and no mass. There is no hepatosplenomegaly. There is no tenderness. There is no rebound and no guarding.   Musculoskeletal: He exhibits edema.   Neurological: He is alert.   confused   Skin: No rash noted. No erythema.       Significant Labs:   All pertinent labs within the past 24 hours have been reviewed    Diagnostic  Results:  I have reviewed and interpreted all pertinent imaging results/findings within the past 24 hours    Assessment/Plan:     Anemia due to chronic kidney disease  Patient has history of anemia of chronic disease and component of iron deficiency  Patient lost to follow up with GI  Continue to monitor counts    Elevated serum immunoglobulin free light chain level  Patient with mildly elevated kappa free light chains from 2018 in setting of normal lambda free light chains and Jaki normal kappa lambda free light chain ratio.  Significance unclear elevated kappa free light chains could be secondary to inflammatory state and/or CKD  SPEP normal  Plan testing including serum immunofixation and repeat serum free light chains  Patient undergoing 24 hr urine studies   Await results of testing        Thank you for your consult.   Millie Bell MD  Hematology/Oncology  [facility]

## 2019-04-02 NOTE — ASSESSMENT & PLAN NOTE
Patient has history of anemia of chronic disease and component of iron deficiency  Patient lost to follow up with GI  Continue to monitor counts

## 2019-04-02 NOTE — PROGRESS NOTES
[facility] Hospital Medicine  Progress Note    Patient Name: Agus Ramos Jr.  MRN: [id number]  Patient Class: IP- Inpatient   Admission Date: 3/23/2019  Length of Stay: 10 days  Attending Physician: Ace Cisneros MD  Primary Care Provider: Keaton Hardy MD        Subjective:     Principal Problem:Acute metabolic encephalopathy    HPI:  Mr. Agus Ramos Jr. is a 78 y.o. male known to me with essential hypertension, type 2 diabetes mellitus (HbA1c 5.0% Mar 2019), CAD s/p CABG, chronic combined systolic and diastolic heart failure (LVEF 50% Mar 2019), CKD Stage 3, peripheral artery disease, chronic atrial fibrillation ([id number]-LVOz score 5) on chronic anticoagulation, COPD, chronic respiratory failure with hypoxia, and anemia of chronic disease who presents to [facility] ED with complaints of a fall this morning.  He had much difficulty standing back up.  EMS was activated and they found that his capillary glucose was 14 mg/dL after which he was given an ampule of 50% dextrose.  His mentation returned to baseline thereafter.  He denies any tremors nor diaphoresis.  He says that he's been feeling weak and dizzy today but denies any loss of consciousness, nor antecedent chest pain, shortness of breath, palpitations, fevers, chills, nausea, vomiting, abdominal pain, or any diarrhea.  He did not trip or slip or anything.  He has had a good appetite but cannot say for sure if he has been taking too much of his diabetes medications.  He denies any recent changes to his medications.  He otherwise has been in his usual state of health.    Hospital Course:  77 y/o male presented with weakness.  Hx of DM on Glipizide and Metformin.  Patient noted to be hypoglycemic.  Initially responded to D50, but then became persistently hypoglycemic.  Admitted to ICU on D10 drip and hourly glucose monitoring.  Also started on Octreotide drip.  No further episodes of hypoglycemia and D10 switched to D5.   Octreotide drip stopped.  Now getting more hyperglycemic.  Stopping D5 drip and monitor off dextrose.  PT/OT evaluation.  Recommended SNF,consulted SW.  Last HbA1C 2 weeks ago 5.0,likely no need any more for hypoglycemic agents,diet should  be fine.  Has scrotal swelling,on IV lasix,lifting scrotum.  Has anasarca,all over body,increased lasix ,have ,proteinuria,check protein/Cr. Ratio,elevated,consulted nephrology.still has massive anasarca.  Urology following for scrotal swelling,improved.  Has left renal cyst,on US,need follow up with repeat US  in 6 months.  24 urine collection is in Process to R/O nephrotic syndrome.  Consulted oncology for abnormal UPEP.    Interval History: has swelling all body.    Review of Systems   HENT: Negative for ear discharge and ear pain.    Eyes: Negative for pain and itching.   Cardiovascular: Negative for chest pain and palpitations.   Genitourinary: Positive for scrotal swelling.   Neurological: Negative for seizures and syncope.     Objective:     Vital Signs (Most Recent):  Temp: 97.6 °F (36.4 °C) (04/02/19 0729)  Pulse: 62 (04/02/19 0729)  Resp: 18 (04/02/19 0729)  BP: 122/75 (04/02/19 0729)  SpO2: 98 % (04/02/19 0729) Vital Signs (24h Range):  Temp:  [97.6 °F (36.4 °C)-98.3 °F (36.8 °C)] 97.6 °F (36.4 °C)  Pulse:  [62-89] 62  Resp:  [18-21] 18  SpO2:  [95 %-100 %] 98 %  BP: (118-150)/(70-80) 122/75     Weight: 95.4 kg (210 lb 5.1 oz)  Body mass index is 31.06 kg/m².    Intake/Output Summary (Last 24 hours) at 4/2/2019 1012  Last data filed at 4/2/2019 0600  Gross per 24 hour   Intake 240 ml   Output 700 ml   Net -460 ml      Physical Exam   Constitutional: He is oriented to person, place, and time. He appears well-developed and well-nourished. No distress.   HENT:   Head: Normocephalic and atraumatic.   Right Ear: External ear normal.   Left Ear: External ear normal.   Nose: Nose normal.   Eyes: Right eye exhibits no discharge. Left eye exhibits no discharge.   Neck: Normal  range of motion.   Cardiovascular:   Irregularly irregular, no murmurs or gallops   Pulmonary/Chest: Effort normal. No respiratory distress.   Abdominal: Soft. Bowel sounds are normal. He exhibits distension. There is no tenderness. There is no rebound and no guarding.   Genitourinary:   Genitourinary Comments: Scrotal swelling.   Musculoskeletal: Normal range of motion. He exhibits edema.   Neurological: He is alert and oriented to person, place, and time.   Skin: Skin is warm and dry. He is not diaphoretic. No erythema.   Psychiatric: He has a normal mood and affect. His behavior is normal. Judgment and thought content normal.   Nursing note and vitals reviewed.      Significant Labs:   BMP:   Recent Labs   Lab 04/02/19  0503   GLU 89   *   K 4.2   *   CO2 33*   BUN 32*   CREATININE 1.2   CALCIUM 10.6*     CBC:   Recent Labs   Lab 04/01/19  0529 04/02/19  0503   WBC 11.46 10.11   HGB 8.1* 8.5*   HCT 29.9* 30.7*    322     POCT Glucose:   Recent Labs   Lab 04/01/19  1637 04/01/19  1947 04/02/19  0725   POCTGLUCOSE 212* 179* 119*     Assessment/Plan:      * Acute metabolic encephalopathy  Patient was noted to have a capillary glucose of 14 mg/dL in the field for which he was given an ampule of 50% dextrose with improvement of his mentation.  His initial serum glucose here was 30 mg/dL but later was still 44 mg/dL after treatment.  He improved into the 180's but continued to drop to 81 mg/dL then to 36 mg/dL.    Admitted to ICU on D10 drip and hourly glucose checks.  Also started on Octreotide drip.  Glucose increasing with no further episodes of hypoglycemia.  Will change D10 to D5 and monitor glucose every 4 hours.  The etiology of his hypoglycemia is unclear but he is on a sulfonylurea at home.  He does have 1+ leukocytes and moderate bacteria on urine, but many amorphous cells.  Will repeat UA with UCx.  Afebrile.  Will hold off on ABx's for now.  Getting more hyperglycemic.  Stop D5 and  monitor off dextrose.    PT/OT eval.  Last HbA1C 2 weeks ago 5.0,likely no need any more for hypoglycemic agents,diet should  be fine.  SNF recommended.      Chronic atrial fibrillation  Patient is in atrial fibrillation at this time which is baseline for him.  He was taken off of anticoagulation during his last admission a week ago due to anemia and was instructed not to take it until outpatient follow-up with gastroenterology.  Will continue to monitor.    CAD (coronary artery disease)  Stable; will continue his home regimen of aspirin, atorvastatin, and carvedilol.    Renal cyst, left  Has left renal cyst,on US,need follow up with repeat US  in 6 weeks,      Proteinuria  Has proteinuria,check protein/Cr. Ratio,elevated,consulted nephrology.on IV lasix.and po aldactone.  24 urine protein collection in process,to R/O nephrotic syndrome.  Consulted oncology for abnormal UPEP,        Scrotal swelling    Has scrotal swelling,on IV lasix,lifting scrotum.Has anasarca,all over body,increased lasix,    Peripheral artery disease  Stable; will continue his home regimen of aspirin and atorvastatin.    Acute on chronic combined systolic and diastolic heart failure  Will continue with IV lasix,he is edematous.Has anasarca,all over body,increased lasix,still has massive anasarca.    Chronic respiratory failure with hypoxia  Stable; will continue his home supplemental oxygen therapy.    Centrilobular emphysema  Clinically-stable without wheezing.  Will provide as-needed JENAE/LAMA available.    Iron deficiency anemia  The patient's H/H is stable and consistent with previous laboratory measurements, and the patient exhibits no signs or symptoms of acute bleeding; there is no indication for transfusion.  Will continue to monitor.    Type 2 diabetes mellitus, controlled, with renal complications  As addressed above.    CKD Stage 3  His renal function appears to be at his baseline.    Essential hypertension  Continue home regimen of  carvedilol, diltiazem, furosemide, and tamsulosin, and provide as-needed clonidine.      VTE Risk Mitigation (From admission, onward)        Ordered     IP VTE HIGH RISK PATIENT  Once      03/23/19 1936     Reason for No Pharmacological VTE Prophylaxis  Once      03/23/19 1936     Place sequential compression device  Until discontinued      03/23/19 1934     Place DENIS hose  Until discontinued      03/23/19 1934              Ace Cisneros MD  Department of Hospital Medicine   [facility]

## 2019-04-02 NOTE — SUBJECTIVE & OBJECTIVE
Interval History: no specific complaints today.  24 hour urine study in progress    Review of Systems   Constitutional: Negative.    HENT: Negative.    Eyes: Negative.    Respiratory: Negative for cough, chest tightness and shortness of breath.    Cardiovascular: Negative for chest pain.   Gastrointestinal: Negative.  Negative for constipation, diarrhea and nausea.   Genitourinary: Positive for difficulty urinating, penile swelling and scrotal swelling.   Musculoskeletal: Negative.    Neurological: Negative.    Psychiatric/Behavioral: Negative.      Objective:     Temp:  [97.6 °F (36.4 °C)-98.3 °F (36.8 °C)] 97.6 °F (36.4 °C)  Pulse:  [62-89] 62  Resp:  [18-21] 18  SpO2:  [95 %-100 %] 98 %  BP: (118-150)/(70-80) 122/75     Body mass index is 31.06 kg/m².      Bladder Scan Volume (mL): 23 mL (03/28/19 0831)  Post Void Cath Residual Output (mL): 0 mL (03/28/19 0831)    Drains     Drain                 Urethral Catheter 03/29/19 0342 Coude 12 Fr. 4 days                Physical Exam   Nursing note and vitals reviewed.  Constitutional: He is oriented to person, place, and time. He appears well-developed and well-nourished.   HENT:   Head: Normocephalic.   Eyes: Conjunctivae are normal.   Neck: Normal range of motion. Neck supple. No tracheal deviation present. No thyromegaly present.   Cardiovascular: Normal rate and normal heart sounds.    Pulmonary/Chest: Effort normal and breath sounds normal. No respiratory distress. He has no wheezes.   Abdominal: Soft. Bowel sounds are normal. There is no hepatosplenomegaly. There is no tenderness. There is no rebound and no CVA tenderness. No hernia.   Genitourinary: Right testis shows swelling.   Genitourinary Comments: Penile and scrotal edema  Boucher in place with yellow urine   Musculoskeletal: Normal range of motion. He exhibits no edema or tenderness.   Lymphadenopathy:     He has no cervical adenopathy.   Neurological: He is alert and oriented to person, place, and time.    Skin: Skin is warm and dry. No rash noted. No erythema.     Psychiatric: He has a normal mood and affect. His behavior is normal. Judgment and thought content normal.       Significant Labs:    BMP:  Recent Labs   Lab 03/31/19 0623 04/01/19  0529 04/02/19  0503   * 147* 147*   K 4.1 4.3 4.2   * 112* 112*   CO2 29 31* 33*   BUN 33* 32* 32*   CREATININE 1.3 1.3 1.2   CALCIUM 10.5 10.4 10.6*       CBC:   Recent Labs   Lab 03/31/19 0623 04/01/19  0529 04/02/19  0503   WBC 9.13 11.46 10.11   HGB 7.7* 8.1* 8.5*   HCT 29.0* 29.9* 30.7*    286 322       Blood Culture: No results for input(s): LABBLOO in the last 168 hours.  Urine Culture: No results for input(s): LABURIN in the last 168 hours.    Significant Imaging:

## 2019-04-02 NOTE — PT/OT/SLP PROGRESS
Physical Therapy Treatment    Patient Name:  Agus Ramos Jr.   MRN:  [id number]    Recommendations:     Discharge Recommendations:  nursing facility, skilled   Discharge Equipment Recommendations: none   Barriers to discharge: pt with decreased mobility     Assessment:     Agus Ramos Jr. is a 78 y.o. male admitted with a medical diagnosis of Acute metabolic encephalopathy.  He presents with the following impairments/functional limitations:  weakness, impaired endurance, impaired self care skills, gait instability, impaired functional mobilty, impaired balance, decreased upper extremity function, decreased lower extremity function, decreased safety awareness, pain, impaired cardiopulmonary response to activity, edema, impaired cognition . Limited with gait training today 2* pt c/o fatigue ( pt stated he slept in the reclined chair last night to now ). Pt required frequent V/T cues for safety and sequence.     Rehab Prognosis: Fair; patient would benefit from acute skilled PT services to address these deficits and reach maximum level of function.    Recent Surgery: * No surgery found *      Plan:     During this hospitalization, patient to be seen 5 x/week to address the identified rehab impairments via gait training, therapeutic activities, therapeutic exercises and progress toward the following goals:    · Plan of Care Expires:  04/08/19    Subjective     Chief Complaint: weakness and fatigue   Patient/Family Comments/goals: to get in bed   Pain/Comfort:  · Location 1: scrotum  · Pain Addressed 1: Pre-medicate for activity, Nurse notified      Objective:     Communicated with nurse Arellano  prior to session.  Patient found up in chair with telemetry, oxygen, stearns catheter upon PT entry to room.     General Precautions: Standard, fall, respiratory   Orthopedic Precautions:N/A   Braces: N/A     Functional Mobility:  · Bed Mobility:     · Rolling Left:  stand by assistance  · Scooting: contact guard  assistance  · Sit to Supine: minimum assistance and moderate assistance with BLE   · Transfers:     · Sit to Stand:  moderate assistance and of 2 persons with rolling walker  · Bedside to bed: minimum assistance with  rolling walker  using  Step Transfer  · Gait:  Pt ambulated ~ 5 -6 steps from chair to bed with RW, MIN A. Noted with decreased arnold, decreased step length, decreased swing to stance phase . V/T cues for safety technique and walker management.        AM-PAC 6 CLICK MOBILITY  Turning over in bed (including adjusting bedclothes, sheets and blankets)?: 4  Sitting down on and standing up from a chair with arms (e.g., wheelchair, bedside commode, etc.): 2  Moving from lying on back to sitting on the side of the bed?: 3  Moving to and from a bed to a chair (including a wheelchair)?: 3  Need to walk in hospital room?: 3  Climbing 3-5 steps with a railing?: 2  Basic Mobility Total Score: 17       Therapeutic Activities and Exercises:   pt performed seated BLE x 20 reps : ankle DF/PF.   Educated pt on safety awareness with all OOB mobility , transfer and gait training. Pt verbalize understanding,     Patient left right sidelying with foam wedge , SCD placed, B heels offload on pillow with all lines intact, call button in reach, bed alarm on and nurse Adan  notified..    GOALS:   Multidisciplinary Problems     Physical Therapy Goals        Problem: Physical Therapy Goal    Goal Priority Disciplines Outcome Goal Variances Interventions   Physical Therapy Goal     PT, PT/OT Ongoing (interventions implemented as appropriate)     Description:  Goals to be met by: 19    Patient will increase functional independence with mobility by performin. Sit to stand transfer with Stand-by Assistance  2. Gait x150 feet with stand-by Assistance using Rolling Walker  3. Lower extremity exercise program x30 reps per handout, with supervision                      Time Tracking:     PT Received On: 19  PT  Start Time: 1017     PT Stop Time: 1048  PT Total Time (min): 31 min     Billable Minutes: Therapeutic Activity 15 and Total Time 31 co-treat with OT     Treatment Type: Treatment  PT/PTA: PTA     PTA Visit Number: 1     Telma Santana PTA  04/02/2019

## 2019-04-02 NOTE — PLAN OF CARE
Problem: Occupational Therapy Goal  Goal: Occupational Therapy Goal  Goals to be met by: 4/15/19     Patient will increase functional independence with ADLs by performing:    UE Dressing with Set-up Assistance.  LE Dressing with Minimal Assistance.  Grooming while seated with Supervision.  Toileting from bedside commode with Moderate Assistance for hygiene and clothing management.   Bathing from  edge of bed with Moderate Assistance.  Sitting at edge of bed x20 minutes with Stand-by Assistance.  Rolling to Bilateral with Set-up Assistance.   Supine to sit with Supervision.  Stand pivot transfers with Minimal Assistance.  Toilet transfer to bedside commode with Minimal Assistance.  Upper extremity exercise program x10 reps per handout, with assistance as needed.     Outcome: Ongoing (interventions implemented as appropriate)  Patient with increased fatigue and confusion today. Patient slept in chair last night. Patient will benefit from continued OT to address functional deficits.

## 2019-04-02 NOTE — SUBJECTIVE & OBJECTIVE
Oncology Treatment Plan:   [No treatment plan]    Medications:  Continuous Infusions:  Scheduled Meds:   aspirin  81 mg Oral Daily    atorvastatin  20 mg Oral QHS    carvedilol  25 mg Oral BID    diltiaZEM  30 mg Oral Q12H    docusate sodium  100 mg Oral BID    ferrous gluconate  324 mg Oral Daily with breakfast    furosemide  80 mg Intravenous BID    losartan  25 mg Oral Daily    pantoprazole  40 mg Oral Daily    polyethylene glycol  17 g Oral BID    sodium chloride 0.9%  10 mL Intravenous Q6H    spironolactone  25 mg Oral Daily    tamsulosin  0.4 mg Oral Daily     PRN Meds:acetaminophen, albuterol-ipratropium, cloNIDine, dextrose 50%, dextrose 50%, glucagon (human recombinant), glucose, glucose, influenza, insulin aspart U-100, ondansetron, pneumoc 13-alan conj-dip cr(PF), promethazine (PHENERGAN) IVPB, ramelteon, senna-docusate 8.6-50 mg, Flushing PICC Protocol **AND** sodium chloride 0.9% **AND** sodium chloride 0.9%     Review of patient's allergies indicates:  No Known Allergies     Past Medical History:   Diagnosis Date    Anemia 05/03/2018    pending blood transfusion    Anticoagulant long-term use     apixaban    Atrial fibrillation     CKD (chronic kidney disease)     Congestive heart failure     COPD (chronic obstructive pulmonary disease)     Coronary artery disease     Diabetes mellitus     Encounter for blood transfusion     HLD (hyperlipidemia)     Hypertension     MI (myocardial infarction)     On home oxygen therapy     Pacemaker     left chest    Peripheral vascular disease     Requires assistance with activities of daily living (ADL)     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     S/P femoral-popliteal bypass surgery left    Stented coronary artery     Tobacco use     Unsteady gait      Past Surgical History:   Procedure Laterality Date    CARDIAC CATHETERIZATION      CARDIAC PACEMAKER PLACEMENT      CARDIAC SURGERY      3 vessel CABG    CARDIOVERSION N/A 9/19/2013    Performed by  Maryann Surgeon at [facility] MARYANN    cardioverted      CORONARY ANGIOPLASTY WITH STENT PLACEMENT      EGD (ESOPHAGOGASTRODUODENOSCOPY) N/A 2018    Performed by Ty Hickman MD at [facility] ENDO    HEMORRHOID SURGERY      TRANSESOPHAGEAL ECHOCARDIOGRAM (ANIA) N/A 2013    [facility] by [facility] at [facility]    VASCULAR SURGERY      femoral artery popliteal bypass     Family History     Problem Relation (Age of Onset)    Diabetes Father, Mother        Tobacco Use    Smoking status: Former Smoker     Packs/day: 1.00     Years: 60.00     Pack years: 60.00     Types: Cigarettes     Last attempt to quit: 2018     Years since quittin.9    Smokeless tobacco: Never Used   Substance and Sexual Activity    Alcohol use: No    Drug use: No    Sexual activity: Not on file       Review of Systems   Unable to perform ROS: Other   Pt confused  Objective:     Vital Signs (Most Recent):  Temp: 97.7 °F (36.5 °C) (19 1122)  Pulse: 74 (19 1122)  Resp: 18 (19 1122)  BP: 108/65 (19 1122)  SpO2: (!) 94 % (19 1122) Vital Signs (24h Range):  Temp:  [97.6 °F (36.4 °C)-98.3 °F (36.8 °C)] 97.7 °F (36.5 °C)  Pulse:  [62-87] 74  Resp:  [18-21] 18  SpO2:  [94 %-100 %] 94 %  BP: (108-150)/(65-80) 108/65     Weight: 95.4 kg (210 lb 5.1 oz)  Body mass index is 31.06 kg/m².  Body surface area is 2.16 meters squared.      Intake/Output Summary (Last 24 hours) at 2019 1630  Last data filed at 2019 1249  Gross per 24 hour   Intake 360 ml   Output 900 ml   Net -540 ml       Physical Exam   Constitutional: He appears well-developed and well-nourished.   HENT:   Head: Normocephalic.   Eyes: Conjunctivae are normal. No scleral icterus.   Neck: Normal range of motion. Neck supple. No thyromegaly present.   Cardiovascular: An irregularly irregular rhythm present.   Pulmonary/Chest: Effort normal and breath sounds normal. He has no wheezes. He has no rales.   Abdominal: Soft. Bowel sounds are normal. He  exhibits no distension and no mass. There is no hepatosplenomegaly. There is no tenderness. There is no rebound and no guarding.   Musculoskeletal: He exhibits edema.   Neurological: He is alert.   confused   Skin: No rash noted. No erythema.       Significant Labs:   All pertinent labs within the past 24 hours have been reviewed    Diagnostic Results:  I have reviewed and interpreted all pertinent imaging results/findings within the past 24 hours

## 2019-04-02 NOTE — SUBJECTIVE & OBJECTIVE
Interval History: has swelling all body.    Review of Systems   HENT: Negative for ear discharge and ear pain.    Eyes: Negative for pain and itching.   Cardiovascular: Negative for chest pain and palpitations.   Genitourinary: Positive for scrotal swelling.   Neurological: Negative for seizures and syncope.     Objective:     Vital Signs (Most Recent):  Temp: 97.6 °F (36.4 °C) (04/02/19 0729)  Pulse: 62 (04/02/19 0729)  Resp: 18 (04/02/19 0729)  BP: 122/75 (04/02/19 0729)  SpO2: 98 % (04/02/19 0729) Vital Signs (24h Range):  Temp:  [97.6 °F (36.4 °C)-98.3 °F (36.8 °C)] 97.6 °F (36.4 °C)  Pulse:  [62-89] 62  Resp:  [18-21] 18  SpO2:  [95 %-100 %] 98 %  BP: (118-150)/(70-80) 122/75     Weight: 95.4 kg (210 lb 5.1 oz)  Body mass index is 31.06 kg/m².    Intake/Output Summary (Last 24 hours) at 4/2/2019 1012  Last data filed at 4/2/2019 0600  Gross per 24 hour   Intake 240 ml   Output 700 ml   Net -460 ml      Physical Exam   Constitutional: He is oriented to person, place, and time. He appears well-developed and well-nourished. No distress.   HENT:   Head: Normocephalic and atraumatic.   Right Ear: External ear normal.   Left Ear: External ear normal.   Nose: Nose normal.   Eyes: Right eye exhibits no discharge. Left eye exhibits no discharge.   Neck: Normal range of motion.   Cardiovascular:   Irregularly irregular, no murmurs or gallops   Pulmonary/Chest: Effort normal. No respiratory distress.   Abdominal: Soft. Bowel sounds are normal. He exhibits distension. There is no tenderness. There is no rebound and no guarding.   Genitourinary:   Genitourinary Comments: Scrotal swelling.   Musculoskeletal: Normal range of motion. He exhibits edema.   Neurological: He is alert and oriented to person, place, and time.   Skin: Skin is warm and dry. He is not diaphoretic. No erythema.   Psychiatric: He has a normal mood and affect. His behavior is normal. Judgment and thought content normal.   Nursing note and vitals  reviewed.      Significant Labs:   BMP:   Recent Labs   Lab 04/02/19  0503   GLU 89   *   K 4.2   *   CO2 33*   BUN 32*   CREATININE 1.2   CALCIUM 10.6*     CBC:   Recent Labs   Lab 04/01/19  0529 04/02/19  0503   WBC 11.46 10.11   HGB 8.1* 8.5*   HCT 29.9* 30.7*    322     POCT Glucose:   Recent Labs   Lab 04/01/19  1637 04/01/19  1947 04/02/19  0725   POCTGLUCOSE 212* 179* 119*

## 2019-04-03 LAB
ANION GAP SERPL CALC-SCNC: 2 MMOL/L (ref 8–16)
BASOPHILS # BLD AUTO: 0.03 K/UL (ref 0–0.2)
BASOPHILS NFR BLD: 0.3 % (ref 0–1.9)
BUN SERPL-MCNC: 34 MG/DL (ref 8–23)
CALCIUM SERPL-MCNC: 10.5 MG/DL (ref 8.7–10.5)
CHLORIDE SERPL-SCNC: 110 MMOL/L (ref 95–110)
CO2 SERPL-SCNC: 32 MMOL/L (ref 23–29)
CREAT SERPL-MCNC: 1.3 MG/DL (ref 0.5–1.4)
DIFFERENTIAL METHOD: ABNORMAL
EOSINOPHIL # BLD AUTO: 0.1 K/UL (ref 0–0.5)
EOSINOPHIL NFR BLD: 1 % (ref 0–8)
ERYTHROCYTE [DISTWIDTH] IN BLOOD BY AUTOMATED COUNT: 24 % (ref 11.5–14.5)
EST. GFR  (AFRICAN AMERICAN): 60 ML/MIN/1.73 M^2
EST. GFR  (NON AFRICAN AMERICAN): 52 ML/MIN/1.73 M^2
GLUCOSE SERPL-MCNC: 146 MG/DL (ref 70–110)
HCT VFR BLD AUTO: 30.9 % (ref 40–54)
HGB BLD-MCNC: 8.4 G/DL (ref 14–18)
LYMPHOCYTES # BLD AUTO: 1 K/UL (ref 1–4.8)
LYMPHOCYTES NFR BLD: 9.9 % (ref 18–48)
MCH RBC QN AUTO: 22.6 PG (ref 27–31)
MCHC RBC AUTO-ENTMCNC: 27.2 G/DL (ref 32–36)
MCV RBC AUTO: 83 FL (ref 82–98)
MONOCYTES # BLD AUTO: 1.1 K/UL (ref 0.3–1)
MONOCYTES NFR BLD: 11.2 % (ref 4–15)
NEUTROPHILS # BLD AUTO: 7.7 K/UL (ref 1.8–7.7)
NEUTROPHILS NFR BLD: 78 % (ref 38–73)
PLATELET # BLD AUTO: 350 K/UL (ref 150–350)
PMV BLD AUTO: 10.5 FL (ref 9.2–12.9)
POCT GLUCOSE: 186 MG/DL (ref 70–110)
POCT GLUCOSE: 217 MG/DL (ref 70–110)
POCT GLUCOSE: 236 MG/DL (ref 70–110)
POCT GLUCOSE: 240 MG/DL (ref 70–110)
POCT GLUCOSE: 282 MG/DL (ref 70–110)
POTASSIUM SERPL-SCNC: 4.4 MMOL/L (ref 3.5–5.1)
PROT 24H UR-MRATE: 449 MG/SPEC (ref 0–100)
PROT UR-MCNC: 69 MG/DL (ref 0–15)
RBC # BLD AUTO: 3.71 M/UL (ref 4.6–6.2)
SODIUM SERPL-SCNC: 144 MMOL/L (ref 136–145)
URINE COLLECTION DURATION: 24 HR
URINE VOLUME: 650 ML
WBC # BLD AUTO: 9.97 K/UL (ref 3.9–12.7)

## 2019-04-03 PROCEDURE — 99232 SBSQ HOSP IP/OBS MODERATE 35: CPT | Mod: ,,, | Performed by: UROLOGY

## 2019-04-03 PROCEDURE — 80048 BASIC METABOLIC PNL TOTAL CA: CPT

## 2019-04-03 PROCEDURE — A4216 STERILE WATER/SALINE, 10 ML: HCPCS | Performed by: HOSPITALIST

## 2019-04-03 PROCEDURE — 94761 N-INVAS EAR/PLS OXIMETRY MLT: CPT

## 2019-04-03 PROCEDURE — 97530 THERAPEUTIC ACTIVITIES: CPT

## 2019-04-03 PROCEDURE — 99232 PR SUBSEQUENT HOSPITAL CARE,LEVL II: ICD-10-PCS | Mod: ,,, | Performed by: UROLOGY

## 2019-04-03 PROCEDURE — 99900035 HC TECH TIME PER 15 MIN (STAT)

## 2019-04-03 PROCEDURE — 84156 ASSAY OF PROTEIN URINE: CPT

## 2019-04-03 PROCEDURE — 86335 PATHOLOGIST INTERPRETATION UIFE: ICD-10-PCS | Mod: 26,,, | Performed by: PATHOLOGY

## 2019-04-03 PROCEDURE — 11000001 HC ACUTE MED/SURG PRIVATE ROOM

## 2019-04-03 PROCEDURE — 86335 IMMUNFIX E-PHORSIS/URINE/CSF: CPT

## 2019-04-03 PROCEDURE — 25000003 PHARM REV CODE 250: Performed by: HOSPITALIST

## 2019-04-03 PROCEDURE — 63600175 PHARM REV CODE 636 W HCPCS: Performed by: HOSPITALIST

## 2019-04-03 PROCEDURE — 97535 SELF CARE MNGMENT TRAINING: CPT

## 2019-04-03 PROCEDURE — 99233 SBSQ HOSP IP/OBS HIGH 50: CPT | Mod: ,,, | Performed by: INTERNAL MEDICINE

## 2019-04-03 PROCEDURE — 27000221 HC OXYGEN, UP TO 24 HOURS

## 2019-04-03 PROCEDURE — 25000003 PHARM REV CODE 250: Performed by: INTERNAL MEDICINE

## 2019-04-03 PROCEDURE — 99233 PR SUBSEQUENT HOSPITAL CARE,LEVL III: ICD-10-PCS | Mod: ,,, | Performed by: INTERNAL MEDICINE

## 2019-04-03 PROCEDURE — 86335 IMMUNFIX E-PHORSIS/URINE/CSF: CPT | Mod: 26,,, | Performed by: PATHOLOGY

## 2019-04-03 PROCEDURE — 85025 COMPLETE CBC W/AUTO DIFF WBC: CPT

## 2019-04-03 PROCEDURE — 97116 GAIT TRAINING THERAPY: CPT

## 2019-04-03 RX ADMIN — FERROUS GLUCONATE TAB 324 MG (37.5 MG ELEMENTAL IRON) 324 MG: 324 (37.5 FE) TAB at 08:04

## 2019-04-03 RX ADMIN — CARVEDILOL 25 MG: 6.25 TABLET, FILM COATED ORAL at 08:04

## 2019-04-03 RX ADMIN — SPIRONOLACTONE 25 MG: 25 TABLET ORAL at 08:04

## 2019-04-03 RX ADMIN — CARVEDILOL 25 MG: 6.25 TABLET, FILM COATED ORAL at 09:04

## 2019-04-03 RX ADMIN — FUROSEMIDE 80 MG: 10 INJECTION, SOLUTION INTRAMUSCULAR; INTRAVENOUS at 08:04

## 2019-04-03 RX ADMIN — Medication 10 ML: at 06:04

## 2019-04-03 RX ADMIN — TAMSULOSIN HYDROCHLORIDE 0.4 MG: 0.4 CAPSULE ORAL at 08:04

## 2019-04-03 RX ADMIN — DOCUSATE SODIUM 100 MG: 100 CAPSULE, LIQUID FILLED ORAL at 09:04

## 2019-04-03 RX ADMIN — INSULIN ASPART 2 UNITS: 100 INJECTION, SOLUTION INTRAVENOUS; SUBCUTANEOUS at 12:04

## 2019-04-03 RX ADMIN — Medication 10 ML: at 12:04

## 2019-04-03 RX ADMIN — LOSARTAN POTASSIUM 25 MG: 25 TABLET, FILM COATED ORAL at 08:04

## 2019-04-03 RX ADMIN — PANTOPRAZOLE SODIUM 40 MG: 40 TABLET, DELAYED RELEASE ORAL at 08:04

## 2019-04-03 RX ADMIN — POLYETHYLENE GLYCOL 3350 17 G: 17 POWDER, FOR SOLUTION ORAL at 09:04

## 2019-04-03 RX ADMIN — FUROSEMIDE 80 MG: 10 INJECTION, SOLUTION INTRAMUSCULAR; INTRAVENOUS at 05:04

## 2019-04-03 RX ADMIN — INSULIN ASPART 2 UNITS: 100 INJECTION, SOLUTION INTRAVENOUS; SUBCUTANEOUS at 04:04

## 2019-04-03 RX ADMIN — DOCUSATE SODIUM 100 MG: 100 CAPSULE, LIQUID FILLED ORAL at 08:04

## 2019-04-03 RX ADMIN — DILTIAZEM HYDROCHLORIDE 30 MG: 30 TABLET, FILM COATED ORAL at 09:04

## 2019-04-03 RX ADMIN — ASPIRIN 81 MG: 81 TABLET, COATED ORAL at 08:04

## 2019-04-03 RX ADMIN — ATORVASTATIN CALCIUM 20 MG: 10 TABLET, FILM COATED ORAL at 09:04

## 2019-04-03 RX ADMIN — DILTIAZEM HYDROCHLORIDE 30 MG: 30 TABLET, FILM COATED ORAL at 08:04

## 2019-04-03 NOTE — ASSESSMENT & PLAN NOTE
Patient with mildly elevated kappa free light chains from 2018 in setting of normal lambda free light chains and Jaki normal kappa lambda free light chain ratio.  Significance unclear elevated kappa free light chains could be secondary to inflammatory state and/or CKD  SPEP normal  Plan testing including serum immunofixation and repeat serum free light chains  24 hr urine studies pending  Await results of testing

## 2019-04-03 NOTE — PT/OT/SLP PROGRESS
Occupational Therapy   Treatment    Name: Agus Ramos Jr.  MRN: [id number]  Admitting Diagnosis:  Acute metabolic encephalopathy       Recommendations:     Discharge Recommendations: nursing facility, skilled  Discharge Equipment Recommendations:  walker, rolling  Barriers to discharge:       Assessment:     Agus Ramos Jr. is a 78 y.o. male with a medical diagnosis of Acute metabolic encephalopathy.  He presents with the following performance deficits affecting function: weakness, impaired endurance, impaired self care skills, impaired functional mobilty, gait instability, impaired balance, impaired cognition, decreased coordination, decreased upper extremity function, decreased lower extremity function, decreased safety awareness, pain, decreased ROM, edema, impaired cardiopulmonary response to activity. Patient confused and difficult to redirect today.  Patient required max increased time and max education to perform all therapy tasks.     Rehab Prognosis:  Fair; patient would benefit from acute skilled OT services to address these deficits and reach maximum level of function.       Plan:     Patient to be seen 5 x/week to address the above listed problems via self-care/home management, therapeutic activities, therapeutic exercises  · Plan of Care Expires:    · Plan of Care Reviewed with: patient    Subjective     Pain/Comfort:  · Pain Rating 1: (yes, unable to rate)  · Location 1: (headache)   · Patient's nurse notified.    Objective:     Communicated with: Nurse Gonsales prior to session.  Patient found up in chair with PICC line, stearns catheter, telemetry, oxygen and spouse present upon OT entry to room.    General Precautions: Standard, fall, respiratory   Orthopedic Precautions:N/A   Braces: N/A     Occupational Performance:     Bed Mobility:    · Patient completed Scooting/Bridging with minimum assistance  · Patient completed Sit to Supine with moderate assistance     Functional  Mobility/Transfers:  · Patient completed Sit <> Stand Transfer with maximal assistance  with  rolling walker from bedside chair  · Patient completed Bed <> Chair Transfer using Step Transfer technique with minimum assistance with rolling walker  · Functional Mobility: patient able to take steps bed>chair with MIN A/RW and max verbal/tactile cues for safety awareness and proper technique.     Activities of Daily Living:  · Grooming: Patient refused grooming tasks    · Upper Body Dressing: minimum assistance to doff back gown seated EOB  · Toileting: total assistance for pericare at bed level due to bowel accident while in chair    [facility] 6 Click ADL: 13    Treatment & Education:  Patient performed bed mobility, functional transfers, and ADL's as above.  Patient educated on safety awareness with all OOB mobility, needs reinforcement.  Patient re-educated on BUE ROM in all planes. Needs reinforcement.     Patient left HOB elevated, SCDS placed, B heels offloaded, and BUE elevated on pillows, with all lines intact, call button in reach, bed alarm on, nurse notified and spouse presentEducation:      GOALS:   Multidisciplinary Problems     Occupational Therapy Goals        Problem: Occupational Therapy Goal    Goal Priority Disciplines Outcome Interventions   Occupational Therapy Goal     OT, PT/OT Ongoing (interventions implemented as appropriate)    Description:  Goals to be met by: 4/15/19     Patient will increase functional independence with ADLs by performing:    UE Dressing with Set-up Assistance.  LE Dressing with Minimal Assistance.  Grooming while seated with Supervision.  Toileting from bedside commode with Moderate Assistance for hygiene and clothing management.   Bathing from  edge of bed with Moderate Assistance.  Sitting at edge of bed x20 minutes with Stand-by Assistance.  Rolling to Bilateral with Set-up Assistance.   Supine to sit with Supervision.  Stand pivot transfers with Minimal Assistance.  Toilet  transfer to bedside commode with Minimal Assistance.  Upper extremity exercise program x10 reps per handout, with assistance as needed.                      Time Tracking:     OT Date of Treatment: 04/03/19  OT Start Time: 1405  OT Stop Time: 1430  OT Total Time (min): 25 min    Billable Minutes:Self Care/Home Management 8  Therapeutic Activity 17    RAHUL Fatima  4/3/2019

## 2019-04-03 NOTE — SUBJECTIVE & OBJECTIVE
Interval History: He feels okay today.  24 hour urine collection continues    Review of Systems   Constitutional: Negative.    HENT: Negative.    Eyes: Negative.    Respiratory: Negative for cough, chest tightness and shortness of breath.    Cardiovascular: Negative for chest pain.   Gastrointestinal: Negative.  Negative for constipation, diarrhea and nausea.   Genitourinary: Positive for penile swelling and scrotal swelling. Negative for flank pain and hematuria.   Musculoskeletal: Negative.    Neurological: Negative.    Psychiatric/Behavioral: Negative.      Objective:     Temp:  [96.8 °F (36 °C)-98.3 °F (36.8 °C)] 96.8 °F (36 °C)  Pulse:  [61-86] 61  Resp:  [14-18] 18  SpO2:  [94 %-98 %] 98 %  BP: (108-139)/(63-80) 137/80     Body mass index is 31.06 kg/m².      Bladder Scan Volume (mL): 23 mL (03/28/19 0831)  Post Void Cath Residual Output (mL): 0 mL (03/28/19 0831)    Drains     Drain                 Urethral Catheter 03/29/19 0342 Coude 12 Fr. 5 days                Physical Exam   Nursing note and vitals reviewed.  Constitutional: He is oriented to person, place, and time. He appears well-developed.   HENT:   Head: Normocephalic.   Eyes: Conjunctivae are normal.   Neck: Normal range of motion. Neck supple. No tracheal deviation present. No thyromegaly present.   Cardiovascular: Normal rate and normal heart sounds.    Pulmonary/Chest: Effort normal and breath sounds normal. No respiratory distress. He has no wheezes.   Abdominal: Soft. Bowel sounds are normal. There is no hepatosplenomegaly. There is no tenderness. There is no rebound and no CVA tenderness. No hernia.   Musculoskeletal: Normal range of motion. He exhibits no edema or tenderness.   Lymphadenopathy:     He has no cervical adenopathy.   Neurological: He is alert and oriented to person, place, and time.   Skin: Skin is warm and dry. No rash noted. No erythema.     Psychiatric: He has a normal mood and affect. His behavior is normal. Judgment and  thought content normal.       Significant Labs:    BMP:  Recent Labs   Lab 04/01/19  0529 04/02/19  0503 04/03/19  0330   * 147* 144   K 4.3 4.2 4.4   * 112* 110   CO2 31* 33* 32*   BUN 32* 32* 34*   CREATININE 1.3 1.2 1.3   CALCIUM 10.4 10.6* 10.5       CBC:   Recent Labs   Lab 04/01/19 0529 04/02/19  0503 04/03/19  0330   WBC 11.46 10.11 9.97   HGB 8.1* 8.5* 8.4*   HCT 29.9* 30.7* 30.9*    322 350       Blood Culture: No results for input(s): LABBLOO in the last 168 hours.  Urine Culture: No results for input(s): LABURIN in the last 168 hours.    Significant Imaging:

## 2019-04-03 NOTE — PLAN OF CARE
Problem: Physical Therapy Goal  Goal: Physical Therapy Goal  Goals to be met by: 19    Patient will increase functional independence with mobility by performin. Sit to stand transfer with Stand-by Assistance  2. Gait x150 feet with stand-by Assistance using Rolling Walker  3. Lower extremity exercise program x30 reps per handout, with supervision     Outcome: Ongoing (interventions implemented as appropriate)  Pt with decreased safety awareness, required max V/T cues for safety technique and walker management. Pt will benefit from further skilled therapy in order to get back to PLOF.

## 2019-04-03 NOTE — SUBJECTIVE & OBJECTIVE
Interval History: No new issues overnight.    Review of Systems   HENT: Negative for ear discharge and ear pain.    Eyes: Negative for pain and itching.   Endocrine: Negative for polyphagia and polyuria.   Neurological: Negative for seizures and syncope.     Objective:     Vital Signs (Most Recent):  Temp: 97.5 °F (36.4 °C) (04/03/19 1216)  Pulse: 78 (04/03/19 1216)  Resp: 18 (04/03/19 1216)  BP: 128/60 (04/03/19 1218)  SpO2: 100 % (04/03/19 1216) Vital Signs (24h Range):  Temp:  [96.8 °F (36 °C)-98.3 °F (36.8 °C)] 97.5 °F (36.4 °C)  Pulse:  [61-86] 78  Resp:  [14-18] 18  SpO2:  [97 %-100 %] 100 %  BP: (117-139)/(60-80) 128/60     Weight: 95.4 kg (210 lb 5.1 oz)  Body mass index is 31.06 kg/m².    Intake/Output Summary (Last 24 hours) at 4/3/2019 1608  Last data filed at 4/3/2019 1245  Gross per 24 hour   Intake 360 ml   Output 750 ml   Net -390 ml      Physical Exam   Constitutional: He is oriented to person, place, and time. He appears well-developed and well-nourished. No distress.   HENT:   Head: Normocephalic and atraumatic.   Right Ear: External ear normal.   Left Ear: External ear normal.   Nose: Nose normal.   Eyes: Right eye exhibits no discharge. Left eye exhibits no discharge.   Neck: Normal range of motion.   Cardiovascular:   Irregularly irregular, no murmurs or gallops   Pulmonary/Chest: Effort normal. No respiratory distress.   Abdominal: Soft. Bowel sounds are normal. He exhibits distension. There is no tenderness. There is no rebound and no guarding.   Genitourinary:   Genitourinary Comments: Scrotal swelling.   Musculoskeletal: Normal range of motion. He exhibits edema.   Neurological: He is alert and oriented to person, place, and time.   Skin: Skin is warm and dry. He is not diaphoretic. No erythema.   Psychiatric: He has a normal mood and affect. His behavior is normal. Judgment and thought content normal.   Nursing note and vitals reviewed.      Significant Labs:   BMP:   Recent Labs   Lab  04/03/19  0330   *      K 4.4      CO2 32*   BUN 34*   CREATININE 1.3   CALCIUM 10.5     CBC:   Recent Labs   Lab 04/02/19  0503 04/03/19  0330   WBC 10.11 9.97   HGB 8.5* 8.4*   HCT 30.7* 30.9*    350

## 2019-04-03 NOTE — SUBJECTIVE & OBJECTIVE
Interval History: Pt OOB in chair eating lunch. No CP/N/V    Oncology Treatment Plan:   [No treatment plan]    Medications:  Continuous Infusions:  Scheduled Meds:   aspirin  81 mg Oral Daily    atorvastatin  20 mg Oral QHS    carvedilol  25 mg Oral BID    diltiaZEM  30 mg Oral Q12H    docusate sodium  100 mg Oral BID    ferrous gluconate  324 mg Oral Daily with breakfast    furosemide  80 mg Intravenous BID    losartan  25 mg Oral Daily    pantoprazole  40 mg Oral Daily    polyethylene glycol  17 g Oral BID    sodium chloride 0.9%  10 mL Intravenous Q6H    spironolactone  25 mg Oral Daily    tamsulosin  0.4 mg Oral Daily     PRN Meds:acetaminophen, albuterol-ipratropium, cloNIDine, dextrose 50%, dextrose 50%, glucagon (human recombinant), glucose, glucose, influenza, insulin aspart U-100, ondansetron, pneumoc 13-alan conj-dip cr(PF), promethazine (PHENERGAN) IVPB, ramelteon, senna-docusate 8.6-50 mg, Flushing PICC Protocol **AND** sodium chloride 0.9% **AND** sodium chloride 0.9%     Review of Systems   Constitutional: Positive for fatigue. Negative for appetite change, fever and unexpected weight change.   HENT: Negative for mouth sores.    Eyes: Negative for visual disturbance.   Respiratory: Positive for shortness of breath. Negative for cough.    Cardiovascular: Negative for chest pain.   Gastrointestinal: Negative for abdominal pain and diarrhea.   Genitourinary: Negative for frequency.   Musculoskeletal: Negative for back pain.   Skin: Negative for rash.   Neurological: Negative for headaches.   Hematological: Negative for adenopathy.   Psychiatric/Behavioral: The patient is not nervous/anxious.      Objective:     Vital Signs (Most Recent):  Temp: 97.5 °F (36.4 °C) (04/03/19 1216)  Pulse: 78 (04/03/19 1216)  Resp: 18 (04/03/19 1216)  BP: 128/60 (04/03/19 1218)  SpO2: 100 % (04/03/19 1216) Vital Signs (24h Range):  Temp:  [96.8 °F (36 °C)-98.3 °F (36.8 °C)] 97.5 °F (36.4 °C)  Pulse:  [61-86]  78  Resp:  [14-18] 18  SpO2:  [97 %-100 %] 100 %  BP: (117-139)/(60-80) 128/60     Weight: 95.4 kg (210 lb 5.1 oz)  Body mass index is 31.06 kg/m².  Body surface area is 2.16 meters squared.      Intake/Output Summary (Last 24 hours) at 4/3/2019 1425  Last data filed at 4/3/2019 1101  Gross per 24 hour   Intake 240 ml   Output 750 ml   Net -510 ml       Physical Exam   Constitutional: He appears well-developed and well-nourished.   HENT:   Head: Normocephalic.   Eyes: Conjunctivae are normal. No scleral icterus.   Neck: Normal range of motion. Neck supple. No thyromegaly present.   Cardiovascular: An irregularly irregular rhythm present.   Pulmonary/Chest: Effort normal and breath sounds normal. He has no wheezes. He has no rales.   Abdominal: Soft. Bowel sounds are normal. He exhibits no distension and no mass. There is no hepatosplenomegaly. There is no tenderness. There is no rebound and no guarding.   Musculoskeletal: He exhibits edema.   Neurological: He is alert.   confused   Skin: No rash noted. No erythema.       Significant Labs:   All pertinent labs within the past 24 hours have been reviewed    Diagnostic Results:  I have reviewed and interpreted all pertinent imaging results/findings within the past 24 hours

## 2019-04-03 NOTE — NURSING
Pt remained free from falls or injuries this shift. Pt was turned and positioned every two hours until 0415 when pt was assisted to recliner. Pt was bathed and a foam dressing was placed on his sacrum. Pt has no breakdown but does have a spot that seems fluid filled on his lower right side of spine. No discoloration, does not appear new. Pt denies needs. Will continue to monitor.

## 2019-04-03 NOTE — ASSESSMENT & PLAN NOTE
Patient is in atrial fibrillation at this time which is baseline for him.  He was taken off of anticoagulation during his last admission due to anemia and was instructed not to take it until outpatient follow-up with gastroenterology.  Will continue to monitor.

## 2019-04-03 NOTE — PLAN OF CARE
Problem: Adult Inpatient Plan of Care  Goal: Plan of Care Review  Outcome: Ongoing (interventions implemented as appropriate)  Patient alert with AMS noted,able to make some of his needs known, up in chair, accu check completed per order, insulin as needed, no s/s of diabetic distress noted, RONNY PICC intact flushes well,Boucher catheter intact draining urine to  w/o difficulties, 24 hrs urine in progress, generalized edema to all extremities & body, safety measures in place.

## 2019-04-03 NOTE — PLAN OF CARE
Problem: Occupational Therapy Goal  Goal: Occupational Therapy Goal  Goals to be met by: 4/15/19     Patient will increase functional independence with ADLs by performing:    UE Dressing with Set-up Assistance.  LE Dressing with Minimal Assistance.  Grooming while seated with Supervision.  Toileting from bedside commode with Moderate Assistance for hygiene and clothing management.   Bathing from  edge of bed with Moderate Assistance.  Sitting at edge of bed x20 minutes with Stand-by Assistance.  Rolling to Bilateral with Set-up Assistance.   Supine to sit with Supervision.  Stand pivot transfers with Minimal Assistance.  Toilet transfer to bedside commode with Minimal Assistance.  Upper extremity exercise program x10 reps per handout, with assistance as needed.     Outcome: Ongoing (interventions implemented as appropriate)  Patient confused and difficult to redirect today.  Patient required max increased time and max education to perform all therapy tasks. Patient will benefit from continued OT to address functional deficits.

## 2019-04-03 NOTE — PROGRESS NOTES
Awake alert oriented NAD    Denies CNS ENT CP GI  RHEUM OR DERM SX  Past Medical History:   Diagnosis Date    Anemia 05/03/2018    pending blood transfusion    Anticoagulant long-term use     apixaban    Atrial fibrillation     CKD (chronic kidney disease)     Congestive heart failure     COPD (chronic obstructive pulmonary disease)     Coronary artery disease     Diabetes mellitus     Encounter for blood transfusion     HLD (hyperlipidemia)     Hypertension     MI (myocardial infarction)     On home oxygen therapy     Pacemaker     left chest    Peripheral vascular disease     Requires assistance with activities of daily living (ADL)     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     S/P femoral-popliteal bypass surgery left    Stented coronary artery     Tobacco use     Unsteady gait      Review of patient's allergies indicates:  No Known Allergies    Current Facility-Administered Medications   Medication    acetaminophen tablet 650 mg    albuterol-ipratropium 2.5 mg-0.5 mg/3 mL nebulizer solution 3 mL    aspirin EC tablet 81 mg    atorvastatin tablet 20 mg    carvedilol tablet 25 mg    cloNIDine tablet 0.1 mg    dextrose 50% injection 12.5 g    dextrose 50% injection 25 g    diltiaZEM tablet 30 mg    docusate sodium capsule 100 mg    ferrous gluconate tablet 324 mg    furosemide injection 80 mg    glucagon (human recombinant) injection 1 mg    glucose chewable tablet 16 g    glucose chewable tablet 24 g    influenza (FLUZONE HIGH-DOSE) vaccine 0.5 mL    insulin aspart U-100 pen 0-5 Units    losartan tablet 25 mg    ondansetron injection 8 mg    pantoprazole EC tablet 40 mg    pneumoc 13-alan conj-dip cr(PF) (PREVNAR 13 (PF)) 0.5 mL    polyethylene glycol packet 17 g    promethazine (PHENERGAN) 6.25 mg in dextrose 5 % 50 mL IVPB    ramelteon tablet 8 mg    senna-docusate 8.6-50 mg per tablet 1 tablet    sodium chloride 0.9% flush 10 mL    And    sodium chloride 0.9% flush 10 mL     spironolactone tablet 25 mg    tamsulosin 24 hr capsule 0.4 mg       LABS    Recent Results (from the past 24 hour(s))   POCT glucose    Collection Time: 04/02/19 11:21 AM   Result Value Ref Range    POCT Glucose 134 (H) 70 - 110 mg/dL   POCT glucose    Collection Time: 04/02/19  4:33 PM   Result Value Ref Range    POCT Glucose 144 (H) 70 - 110 mg/dL   Ferritin    Collection Time: 04/02/19  6:14 PM   Result Value Ref Range    Ferritin 42 20.0 - 300.0 ng/mL   Iron and TIBC    Collection Time: 04/02/19  6:14 PM   Result Value Ref Range    Iron 14 (L) 45 - 160 ug/dL    Transferrin 223 200 - 375 mg/dL    TIBC 330 250 - 450 ug/dL    Saturated Iron 4 (L) 20 - 50 %   Reticulocytes    Collection Time: 04/02/19  6:14 PM   Result Value Ref Range    Retic 1.0 0.4 - 2.0 %   POCT glucose    Collection Time: 04/02/19  9:21 PM   Result Value Ref Range    POCT Glucose 217 (H) 70 - 110 mg/dL   POCT glucose    Collection Time: 04/03/19 12:21 AM   Result Value Ref Range    POCT Glucose 186 (H) 70 - 110 mg/dL   CBC auto differential    Collection Time: 04/03/19  3:30 AM   Result Value Ref Range    WBC 9.97 3.90 - 12.70 K/uL    RBC 3.71 (L) 4.60 - 6.20 M/uL    Hemoglobin 8.4 (L) 14.0 - 18.0 g/dL    Hematocrit 30.9 (L) 40.0 - 54.0 %    MCV 83 82 - 98 fL    MCH 22.6 (L) 27.0 - 31.0 pg    MCHC 27.2 (L) 32.0 - 36.0 g/dL    RDW 24.0 (H) 11.5 - 14.5 %    Platelets 350 150 - 350 K/uL    MPV 10.5 9.2 - 12.9 fL    Gran # (ANC) 7.7 1.8 - 7.7 K/uL    Lymph # 1.0 1.0 - 4.8 K/uL    Mono # 1.1 (H) 0.3 - 1.0 K/uL    Eos # 0.1 0.0 - 0.5 K/uL    Baso # 0.03 0.00 - 0.20 K/uL    Gran% 78.0 (H) 38.0 - 73.0 %    Lymph% 9.9 (L) 18.0 - 48.0 %    Mono% 11.2 4.0 - 15.0 %    Eosinophil% 1.0 0.0 - 8.0 %    Basophil% 0.3 0.0 - 1.9 %    Differential Method Automated    Basic metabolic panel    Collection Time: 04/03/19  3:30 AM   Result Value Ref Range    Sodium 144 136 - 145 mmol/L    Potassium 4.4 3.5 - 5.1 mmol/L    Chloride 110 95 - 110 mmol/L    CO2 32 (H) 23  - 29 mmol/L    Glucose 146 (H) 70 - 110 mg/dL    BUN, Bld 34 (H) 8 - 23 mg/dL    Creatinine 1.3 0.5 - 1.4 mg/dL    Calcium 10.5 8.7 - 10.5 mg/dL    Anion Gap 2 (L) 8 - 16 mmol/L    eGFR if African American 60 >60 mL/min/1.73 m^2    eGFR if non African American 52 (A) >60 mL/min/1.73 m^2   ]    I/O last 3 completed shifts:  In: 360 [P.O.:360]  Out: 1100 [Urine:1100]    Vitals:    04/02/19 2001 04/02/19 2315 04/03/19 0019 04/03/19 0741   BP: 139/63  121/72 137/80   Pulse: 86 82 79 61   Resp: 16 14 16 18   Temp: 97.7 °F (36.5 °C)  98.3 °F (36.8 °C) 96.8 °F (36 °C)   TempSrc: Oral  Axillary Oral   SpO2: 98% 98% 98% 98%   Weight:       Height:           No Jvd, Thyromegaly or Lymphadenopathy  Lungs: Fairly clear anteriorly and laterally  Cor: RRR no G or rubs  Abd: Soft benign good bowel sounds non tender  Ext: Pos edema unchanged    A)    CKD3   Proteinuria   Low alb possible nephrotic snd  T2DM  CHF  COPD  Urinary retention Followed by Dr Zayas  High [place] free chains in JOSE but Normal K/L ratio ? MGUS  CAD  CHF  AICD  Pt seem By Dr Bell   Anemia     P)     24 hour urine being collected

## 2019-04-03 NOTE — PT/OT/SLP PROGRESS
Physical Therapy Treatment    Patient Name:  Agus Ramos Jr.   MRN:  [id number]    Recommendations:     Discharge Recommendations:  nursing facility, skilled   Discharge Equipment Recommendations: none   Barriers to discharge: Pt with decreased safety awareness and decreaesd mobility     Assessment:     Agus Ramos Jr. is a 78 y.o. male admitted with a medical diagnosis of Acute metabolic encephalopathy.  He presents with the following impairments/functional limitations:  weakness, impaired endurance, impaired self care skills, gait instability, impaired balance, decreased upper extremity function, pain, impaired cognition, decreased safety awareness, impaired functional mobilty, decreased ROM, edema, decreased lower extremity function, impaired cardiopulmonary response to activity .Patient confused and difficult to redirect today for safety  .  Patient required extra time and max education and demonstration to perform all therapy tasks. Pt will benefit from further skilled therapy in order to get back to OF.     Rehab Prognosis: Fair; patient would benefit from acute skilled PT services to address these deficits and reach maximum level of function.    Recent Surgery: * No surgery found *      Plan:     During this hospitalization, patient to be seen 5 x/week to address the identified rehab impairments via gait training, therapeutic activities, therapeutic exercises and progress toward the following goals:    · Plan of Care Expires:  04/08/19    Subjective     Chief Complaint: THIRSTY, Pt is on 500 ml water restriction , per nurse patient already has 240 ml this AM , pt agitated when therapist did not provide water for him .     Patient/Family Comments/goals: pt did not state   Pain/Comfort:  · Pain Rating 1: 0/10  · Pain Rating Post-Intervention 1: 0/10      Objective:     Communicated with nurse MESSINA  prior to session.  Patient found up in chair with oxygen, telemetry, stearns catheter upon PT entry to room.      General Precautions: Standard, fall, respiratory   Orthopedic Precautions:N/A   Braces: N/A     Functional Mobility:  · Transfers:     · Sit to Stand:  moderate assistance with rolling walker. Pt required max V/T cues for safety technique and walker management.    · Gait:  Pt ambulate ~ 6ft  RW, CGA/MIN A. Noted with decreased arnold, decreased step length, B hip ER. V/T cues for safety technique and walker management. Pt declined further ambulation despite max encouragement.        AM-PAC 6 CLICK MOBILITY  Turning over in bed (including adjusting bedclothes, sheets and blankets)?: 4  Sitting down on and standing up from a chair with arms (e.g., wheelchair, bedside commode, etc.): 2  Moving from lying on back to sitting on the side of the bed?: 3  Moving to and from a bed to a chair (including a wheelchair)?: 3  Need to walk in hospital room?: 3  Climbing 3-5 steps with a railing?: 2  Basic Mobility Total Score: 17       Therapeutic Activities and Exercises:   pt performed seated BLE x 20 reps : AP, LAQ.   Educated pt on safety awareness with all OOB mobility, transfer and gait training. Pt needs reinforcement .     Patient left up in chair on green air cushion ( pt refused to return to bed) , lunch table set up  with all lines intact, call button in reach and nurse Ilda  notified..    GOALS:   Multidisciplinary Problems     Physical Therapy Goals        Problem: Physical Therapy Goal    Goal Priority Disciplines Outcome Goal Variances Interventions   Physical Therapy Goal     PT, PT/OT Ongoing (interventions implemented as appropriate)     Description:  Goals to be met by: 19    Patient will increase functional independence with mobility by performin. Sit to stand transfer with Stand-by Assistance  2. Gait x150 feet with stand-by Assistance using Rolling Walker  3. Lower extremity exercise program x30 reps per handout, with supervision                      Time Tracking:     PT Received On:  04/03/19  PT Start Time: 1140     PT Stop Time: 1205  PT Total Time (min): 25 min     Billable Minutes: Gait Training 8 and Therapeutic Activity 17    Treatment Type: Treatment  PT/PTA: PTA     PTA Visit Number: 2     Telma Santana PTA  04/03/2019

## 2019-04-03 NOTE — PROGRESS NOTES
[facility] Hospital Medicine  Progress Note    Patient Name: Agus Ramos Jr.  MRN: [id number]  Patient Class: IP- Inpatient   Admission Date: 3/23/2019  Length of Stay: 11 days  Attending Physician: Jose De Jesus Angulo MD  Primary Care Provider: Keaton Hardy MD        Subjective:     Principal Problem:Acute metabolic encephalopathy    HPI:  Mr. Agus Ramos Jr. is a 78 y.o. male known to me with essential hypertension, type 2 diabetes mellitus (HbA1c 5.0% Mar 2019), CAD s/p CABG, chronic combined systolic and diastolic heart failure (LVEF 50% Mar 2019), CKD Stage 3, peripheral artery disease, chronic atrial fibrillation ([id number] score 5) on chronic anticoagulation, COPD, chronic respiratory failure with hypoxia, and anemia of chronic disease who presents to [facility] ED with complaints of a fall this morning.  He had much difficulty standing back up.  EMS was activated and they found that his capillary glucose was 14 mg/dL after which he was given an ampule of 50% dextrose.  His mentation returned to baseline thereafter.  He denies any tremors nor diaphoresis.  He says that he's been feeling weak and dizzy today but denies any loss of consciousness, nor antecedent chest pain, shortness of breath, palpitations, fevers, chills, nausea, vomiting, abdominal pain, or any diarrhea.  He did not trip or slip or anything.  He has had a good appetite but cannot say for sure if he has been taking too much of his diabetes medications.  He denies any recent changes to his medications.  He otherwise has been in his usual state of health.    Hospital Course:  77 y/o male presented with weakness.  Hx of DM on Glipizide and Metformin.  Patient noted to be hypoglycemic.  Initially responded to D50, but then became persistently hypoglycemic.  Admitted to ICU on D10 drip and hourly glucose monitoring.  Also started on Octreotide drip.  No further episodes of hypoglycemia and D10 switched to D5.  Octreotide  drip stopped.  Now getting more hyperglycemic.  Stopping D5 drip and monitor off dextrose.  PT/OT evaluation.  Recommended SNF,consulted SW.  Last HbA1C 2 weeks ago 5.0,likely no need any more for hypoglycemic agents,diet should  be fine.  Has scrotal swelling,on IV lasix,lifting scrotum.  Has anasarca,all over body,increased lasix ,have ,proteinuria,check protein/Cr. Ratio,elevated,consulted nephrology.still has massive anasarca.  Urology following for scrotal swelling,improved.  Has left renal cyst,on US,need follow up with repeat US  in 6 months.  24 urine collection is in Process to R/O nephrotic syndrome.  Consulted oncology for abnormal UPEP.    Interval History: No new issues overnight.    Review of Systems   HENT: Negative for ear discharge and ear pain.    Eyes: Negative for pain and itching.   Endocrine: Negative for polyphagia and polyuria.   Neurological: Negative for seizures and syncope.     Objective:     Vital Signs (Most Recent):  Temp: 97.5 °F (36.4 °C) (04/03/19 1216)  Pulse: 78 (04/03/19 1216)  Resp: 18 (04/03/19 1216)  BP: 128/60 (04/03/19 1218)  SpO2: 100 % (04/03/19 1216) Vital Signs (24h Range):  Temp:  [96.8 °F (36 °C)-98.3 °F (36.8 °C)] 97.5 °F (36.4 °C)  Pulse:  [61-86] 78  Resp:  [14-18] 18  SpO2:  [97 %-100 %] 100 %  BP: (117-139)/(60-80) 128/60     Weight: 95.4 kg (210 lb 5.1 oz)  Body mass index is 31.06 kg/m².    Intake/Output Summary (Last 24 hours) at 4/3/2019 1608  Last data filed at 4/3/2019 1245  Gross per 24 hour   Intake 360 ml   Output 750 ml   Net -390 ml      Physical Exam   Constitutional: He is oriented to person, place, and time. He appears well-developed and well-nourished. No distress.   HENT:   Head: Normocephalic and atraumatic.   Right Ear: External ear normal.   Left Ear: External ear normal.   Nose: Nose normal.   Eyes: Right eye exhibits no discharge. Left eye exhibits no discharge.   Neck: Normal range of motion.   Cardiovascular:   Irregularly irregular, no  murmurs or gallops   Pulmonary/Chest: Effort normal. No respiratory distress.   Abdominal: Soft. Bowel sounds are normal. He exhibits distension. There is no tenderness. There is no rebound and no guarding.   Genitourinary:   Genitourinary Comments: Scrotal swelling.   Musculoskeletal: Normal range of motion. He exhibits edema.   Neurological: He is alert and oriented to person, place, and time.   Skin: Skin is warm and dry. He is not diaphoretic. No erythema.   Psychiatric: He has a normal mood and affect. His behavior is normal. Judgment and thought content normal.   Nursing note and vitals reviewed.      Significant Labs:   BMP:   Recent Labs   Lab 04/03/19  0330   *      K 4.4      CO2 32*   BUN 34*   CREATININE 1.3   CALCIUM 10.5     CBC:   Recent Labs   Lab 04/02/19  0503 04/03/19  0330   WBC 10.11 9.97   HGB 8.5* 8.4*   HCT 30.7* 30.9*    350     Assessment/Plan:      * Acute metabolic encephalopathy  Patient was noted to have a capillary glucose of 14 mg/dL in the field for which he was given an ampule of 50% dextrose with improvement of his mentation.  His initial serum glucose here was 30 mg/dL but later was still 44 mg/dL after treatment.  He improved into the 180's but continued to drop to 81 mg/dL then to 36 mg/dL.    Admitted to ICU on D10 drip and hourly glucose checks.  Also started on Octreotide drip.  Glucose increasing with no further episodes of hypoglycemia.  Will change D10 to D5 and monitor glucose every 4 hours.  The etiology of his hypoglycemia is unclear but he is on a sulfonylurea at home.  He does have 1+ leukocytes and moderate bacteria on urine, but many amorphous cells.  Will repeat UA with UCx.  Afebrile.  Will hold off on ABx's for now.  Getting more hyperglycemic.  Stop D5 and monitor off dextrose.    PT/OT eval.  Last HbA1C 2 weeks ago 5.0,likely no need any more for hypoglycemic agents,diet should  be fine.  SNF recommended.      Anemia due to chronic  kidney disease        Renal cyst, left  Has left renal cyst,on US,need follow up with repeat US  in 6 weeks,      Proteinuria  Has proteinuria with Anasarca.  On IV Lasix.  24 urine protein collection in process.  Consulted oncology for abnormal UPEP        Scrotal swelling    Has scrotal swelling,on IV lasix,lifting scrotum.Has anasarca,all over body,increased lasix,    Peripheral artery disease  Stable; will continue his home regimen of aspirin and atorvastatin.    Acute on chronic combined systolic and diastolic heart failure  Will continue with IV lasix    Urinary retention  Appreciate Urology input.      Chronic respiratory failure with hypoxia  Stable; will continue his home supplemental oxygen therapy.    Centrilobular emphysema  Clinically-stable without wheezing.  Will provide as-needed JENAE/LAMA available.    Iron deficiency anemia  The patient's H/H is stable and consistent with previous laboratory measurements, and the patient exhibits no signs or symptoms of acute bleeding; there is no indication for transfusion.  Will continue to monitor.    Type 2 diabetes mellitus, controlled, with renal complications  As addressed above.    CKD Stage 3  His renal function appears to be at his baseline.    Essential hypertension  Continue home regimen of carvedilol, diltiazem, furosemide, and tamsulosin, and provide as-needed clonidine.    CAD (coronary artery disease)  Stable; will continue his home regimen of aspirin, atorvastatin, and carvedilol.    Chronic atrial fibrillation  Patient is in atrial fibrillation at this time which is baseline for him.  He was taken off of anticoagulation during his last admission due to anemia and was instructed not to take it until outpatient follow-up with gastroenterology.  Will continue to monitor.      VTE Risk Mitigation (From admission, onward)        Ordered     IP VTE HIGH RISK PATIENT  Once      03/23/19 1936     Reason for No Pharmacological VTE Prophylaxis  Once       03/23/19 1936     Place sequential compression device  Until discontinued      03/23/19 1934     Place DENIS hose  Until discontinued      03/23/19 1934              Jose De Jesus Angulo MD  Department of Hospital Medicine   [facility]

## 2019-04-03 NOTE — PLAN OF CARE
EDIS met with patient and spouse Jessika at bedside to confirm discharge plan. EDIS informed patient and spouse PT/OT recommends SNF however patient want to go home with home health. Ms. Rosen confirmed patient is planned to come home with home health. Ms. Rosen stated patient had home health before he admitted into the hospital and would like to resume with them. Ms. Rosen informed SW was approved for a standard wheelchair however patient approved for wrong type; Ms. Rosen requesting for a transport wheelchair so patient can get back and forth to doctor appointments. Ms. Rosen reported they use a cab for transportation and a standard wheelchair will not fit. Patient prefers morning doctor appointments.         04/03/19 1534   Discharge Reassessment   Assessment Type Discharge Planning Reassessment   Provided patient/caregiver education on the expected discharge date and the discharge plan No   Do you have any problems affording any of your prescribed medications? No   Discharge Plan A Home with family;Home Health   Discharge Plan B Home with family  (PCP F/U)   DME Needed Upon Discharge    (TBD)   Patient choice form signed by patient/caregiver N/A   Anticipated Discharge Disposition Home-Health   Can the patient answer the patient profile reliably? Yes, cognitively intact   How does the patient rate their overall health at the present time? Fair   Describe the patient's ability to walk at the present time. Major restrictions/daily assistance from another person   How often would a person be available to care for the patient? Whenever needed   Post-Acute Status   Post-Acute Authorization Home Health/Hospice   Home Health/Hospice Status Awaiting Internal Medical Clearance

## 2019-04-03 NOTE — PLAN OF CARE
04/02/19 7204   Patient Assessment/Suction   Level of Consciousness (AVPU) alert   Respiratory Effort Normal;Unlabored   Expansion/Accessory Muscles/Retractions no use of accessory muscles   Rhythm/Pattern, Respiratory depth regular;pattern regular;unlabored   Cough Frequency no cough   PRE-TX-O2   O2 Device (Oxygen Therapy) nasal cannula   $ Is the patient on Low Flow Oxygen? Yes   Flow (L/min) 3   SpO2 97 %   Pulse 85   Resp 18   Aerosol Therapy   $ Aerosol Therapy Charges PRN treatment not required   Respiratory Treatment Status (SVN) PRN treatment not required   Wound Care   $ Wound Care Tech Time 15 min   Area of Concern Left;Right;Cheek;Bridge of nose;Chin   Skin Color/Characteristics without discoloration   Skin Temperature warm   Preset CPAP/BiPAP Settings   Mode Of Delivery Standby   PT tolerates NC well. No PRN treatment required at this time. Will encourage BiPAP use tonight.

## 2019-04-03 NOTE — PROGRESS NOTES
[facility]  Urology  Progress Note    Patient Name: Agus Ramos Jr.  MRN: [id number]  Admission Date: 3/23/2019  Hospital Length of Stay: 11 days  Code Status: Full Code   Attending Provider: Jose De Jesus Angulo MD   Primary Care Physician: Keaton Hardy MD    Subjective:     HPI:  Urinary Retention  Patient complains of urinary retention. Onset of retention was 1 day ago and was gradual in onset. Patient currently does have a urinary catheter in place.  300 ml of urine were drained when catheter was placed. Prior to this event voiding symptoms consisted of slow stream, intermittency. Prior treatments include watchful waiting. Recent medications that may have affected his voiding include none.  He is admitted after a fall for hypoglycemia.  He had difficulty voiding while admitted.  A few attempts were made to place a Boucher.  Finally a 12 Fr Coude was placed.  Bladder Scan showed 700 mL but he has not drained this much in several hours.        Interval History: He feels okay today.  24 hour urine collection continues    Review of Systems   Constitutional: Negative.    HENT: Negative.    Eyes: Negative.    Respiratory: Negative for cough, chest tightness and shortness of breath.    Cardiovascular: Negative for chest pain.   Gastrointestinal: Negative.  Negative for constipation, diarrhea and nausea.   Genitourinary: Positive for penile swelling and scrotal swelling. Negative for flank pain and hematuria.   Musculoskeletal: Negative.    Neurological: Negative.    Psychiatric/Behavioral: Negative.      Objective:     Temp:  [96.8 °F (36 °C)-98.3 °F (36.8 °C)] 96.8 °F (36 °C)  Pulse:  [61-86] 61  Resp:  [14-18] 18  SpO2:  [94 %-98 %] 98 %  BP: (108-139)/(63-80) 137/80     Body mass index is 31.06 kg/m².      Bladder Scan Volume (mL): 23 mL (03/28/19 0831)  Post Void Cath Residual Output (mL): 0 mL (03/28/19 0831)    Drains     Drain                 Urethral Catheter 03/29/19 0342 Coude 12 Fr. 5 days                 Physical Exam   Nursing note and vitals reviewed.  Constitutional: He is oriented to person, place, and time. He appears well-developed.   HENT:   Head: Normocephalic.   Eyes: Conjunctivae are normal.   Neck: Normal range of motion. Neck supple. No tracheal deviation present. No thyromegaly present.   Cardiovascular: Normal rate and normal heart sounds.    Pulmonary/Chest: Effort normal and breath sounds normal. No respiratory distress. He has no wheezes.   Abdominal: Soft. Bowel sounds are normal. There is no hepatosplenomegaly. There is no tenderness. There is no rebound and no CVA tenderness. No hernia.   Musculoskeletal: Normal range of motion. He exhibits no edema or tenderness.   Lymphadenopathy:     He has no cervical adenopathy.   Neurological: He is alert and oriented to person, place, and time.   Skin: Skin is warm and dry. No rash noted. No erythema.     Psychiatric: He has a normal mood and affect. His behavior is normal. Judgment and thought content normal.       Significant Labs:    BMP:  Recent Labs   Lab 04/01/19  0529 04/02/19  0503 04/03/19  0330   * 147* 144   K 4.3 4.2 4.4   * 112* 110   CO2 31* 33* 32*   BUN 32* 32* 34*   CREATININE 1.3 1.2 1.3   CALCIUM 10.4 10.6* 10.5       CBC:   Recent Labs   Lab 04/01/19  0529 04/02/19  0503 04/03/19  0330   WBC 11.46 10.11 9.97   HGB 8.1* 8.5* 8.4*   HCT 29.9* 30.7* 30.9*    322 350       Blood Culture: No results for input(s): LABBLOO in the last 168 hours.  Urine Culture: No results for input(s): LABURIN in the last 168 hours.    Significant Imaging:                    Assessment/Plan:     Renal cyst, left   - Recommended for repeat IRASEMA in 6 months    Scrotal swelling   - JOSE LUIS reviewed     - Scrotal elevation at all times   - Diuresis    Urinary retention  Continue Stearns - consider void trial after 24 hour urine collection completed  Continue Flomax  IRASEMA confirms stearns in appropriate position with decompressed bladder.  Ascites in pelvis will make bladder scanner inaccurate.   Less suspicious of post-renal causes of low UOP        VTE Risk Mitigation (From admission, onward)        Ordered     IP VTE HIGH RISK PATIENT  Once      03/23/19 1936     Reason for No Pharmacological VTE Prophylaxis  Once      03/23/19 1936     Place sequential compression device  Until discontinued      03/23/19 1934     Place DENIS hose  Until discontinued      03/23/19 1934          AISHA Wiggins MD  Urology  [facility] [place]

## 2019-04-03 NOTE — UM SECONDARY REVIEW
Medical Affairs    IP Extended Stay > 10  Hospital Day # 12  Hospital Course:  79 y/o male presented with weakness.  Hx of DM on Glipizide and Metformin.  Patient noted to be hypoglycemic.  Initially responded to D50, but then became persistently hypoglycemic.  Admitted to ICU on D10 drip and hourly glucose monitoring.  Also started on Octreotide drip.  No further episodes of hypoglycemia and D10 switched to D5.  Octreotide drip stopped.  Now getting more hyperglycemic.  Stopping D5 drip and monitor off dextrose.  PT/OT evaluation.  Recommended SNF,consulted SW.  Has scrotal swelling,on IV lasix,lifting scrotum.  Has anasarca,all over body,increased lasix ,have ,proteinuria,check protein/Cr. Ratio,elevated,consulted nephrology.still has massive anasarca.  Urology following for scrotal swelling,improved.  Has left renal cyst,on US,need follow up with repeat US  in 6 months.  24 urine collection is in Process to R/O nephrotic syndrome.  Consulted oncology for abnormal UPEP.      Oncology Note:  Assessment/Plan:      Anemia due to chronic kidney disease  Patient has history of anemia of chronic disease and component of iron deficiency  Patient lost to follow up with GI  Continue to monitor counts     Elevated serum immunoglobulin free light chain level  Patient with mildly elevated kappa free light chains from 2018 in setting of normal lambda free light chains and Jaki normal kappa lambda free light chain ratio.  Significance unclear elevated kappa free light chains could be secondary to inflammatory state and/or CKD  SPEP normal  Plan testing including serum immunofixation and repeat serum free light chains  Patient undergoing 24 hr urine studies   Await results of testing      Continues on Lasix 80mg IV BID            {[facility] CM Review Outcome:[id number]}

## 2019-04-03 NOTE — PROGRESS NOTES
[facility]  Hematology/Oncology  Progress Note    Patient Name: Agus Ramos Jr.  Admission Date: 3/23/2019  Hospital Length of Stay: 11 days  Code Status: Full Code     Subjective:     HPI:  78-year-old male with  Congestive heart failure, Coronary artery disease.Diabetes mellitus, AFib Hypertension; MI (myocardial infarction),  Pacemaker; Peripheral vascular disease; S/P CABG x 3;  Stented coronary artery, Tobacco use, CKD stage 3 Anemia in CKD, chronic respiratory failure with hypoxia presents to [facility] ED after he suffered a fall. He was also confused. He was admitted with renal failure Pt's capillary glucose level 14mg/dl. Pt had been weak and dizzy. No fevers/nausea/vomiting. Pt with chronic fatigue. Pt known to me and followed for anemia. Pt did not complete planned GI evaluation. EGD 6/1/2018 - no acute finding, bleeding source. He is followed by Nephrology during hospitalization for proteinuria.  Serum free light chains reveals a kappa free light chain of 2.68 mg/dL lambda free light chain 2.62 mg/dL and a kappa/lambda free light chain ratio of 0.95 SPEP reveals decreased total proteins.  24 hr urine studies pending  Consulted for elevated urine light chain    Interval History: Pt OOB in chair eating lunch. No CP/N/V    Oncology Treatment Plan:   [No treatment plan]    Medications:  Continuous Infusions:  Scheduled Meds:   aspirin  81 mg Oral Daily    atorvastatin  20 mg Oral QHS    carvedilol  25 mg Oral BID    diltiaZEM  30 mg Oral Q12H    docusate sodium  100 mg Oral BID    ferrous gluconate  324 mg Oral Daily with breakfast    furosemide  80 mg Intravenous BID    losartan  25 mg Oral Daily    pantoprazole  40 mg Oral Daily    polyethylene glycol  17 g Oral BID    sodium chloride 0.9%  10 mL Intravenous Q6H    spironolactone  25 mg Oral Daily    tamsulosin  0.4 mg Oral Daily     PRN Meds:acetaminophen, albuterol-ipratropium, cloNIDine, dextrose 50%, dextrose 50%,  glucagon (human recombinant), glucose, glucose, influenza, insulin aspart U-100, ondansetron, pneumoc 13-alan conj-dip cr(PF), promethazine (PHENERGAN) IVPB, ramelteon, senna-docusate 8.6-50 mg, Flushing PICC Protocol **AND** sodium chloride 0.9% **AND** sodium chloride 0.9%     Review of Systems   Constitutional: Positive for fatigue. Negative for appetite change, fever and unexpected weight change.   HENT: Negative for mouth sores.    Eyes: Negative for visual disturbance.   Respiratory: Positive for shortness of breath. Negative for cough.    Cardiovascular: Negative for chest pain.   Gastrointestinal: Negative for abdominal pain and diarrhea.   Genitourinary: Negative for frequency.   Musculoskeletal: Negative for back pain.   Skin: Negative for rash.   Neurological: Negative for headaches.   Hematological: Negative for adenopathy.   Psychiatric/Behavioral: The patient is not nervous/anxious.      Objective:     Vital Signs (Most Recent):  Temp: 97.5 °F (36.4 °C) (04/03/19 1216)  Pulse: 78 (04/03/19 1216)  Resp: 18 (04/03/19 1216)  BP: 128/60 (04/03/19 1218)  SpO2: 100 % (04/03/19 1216) Vital Signs (24h Range):  Temp:  [96.8 °F (36 °C)-98.3 °F (36.8 °C)] 97.5 °F (36.4 °C)  Pulse:  [61-86] 78  Resp:  [14-18] 18  SpO2:  [97 %-100 %] 100 %  BP: (117-139)/(60-80) 128/60     Weight: 95.4 kg (210 lb 5.1 oz)  Body mass index is 31.06 kg/m².  Body surface area is 2.16 meters squared.      Intake/Output Summary (Last 24 hours) at 4/3/2019 1425  Last data filed at 4/3/2019 1101  Gross per 24 hour   Intake 240 ml   Output 750 ml   Net -510 ml       Physical Exam   Constitutional: He appears well-developed and well-nourished.   HENT:   Head: Normocephalic.   Eyes: Conjunctivae are normal. No scleral icterus.   Neck: Normal range of motion. Neck supple. No thyromegaly present.   Cardiovascular: An irregularly irregular rhythm present.   Pulmonary/Chest: Effort normal and breath sounds normal. He has no wheezes. He has no  rales.   Abdominal: Soft. Bowel sounds are normal. He exhibits no distension and no mass. There is no hepatosplenomegaly. There is no tenderness. There is no rebound and no guarding.   Musculoskeletal: He exhibits edema.   Neurological: He is alert.   confused   Skin: No rash noted. No erythema.       Significant Labs:   All pertinent labs within the past 24 hours have been reviewed    Diagnostic Results:  I have reviewed and interpreted all pertinent imaging results/findings within the past 24 hours    Assessment/Plan:     Anemia due to chronic kidney disease  Patient has history of anemia of chronic disease and component of iron deficiency  Patient lost to follow up with GI  Continue to monitor counts    Elevated serum immunoglobulin free light chain level  Patient with mildly elevated kappa free light chains from 2018 in setting of normal lambda free light chains and Jaki normal kappa lambda free light chain ratio.  Significance unclear elevated kappa free light chains could be secondary to inflammatory state and/or CKD  SPEP normal  Plan testing including serum immunofixation and repeat serum free light chains  24 hr urine studies pending  Await results of testing          Millie Bell MD  Hematology/Oncology  [facility]

## 2019-04-03 NOTE — ASSESSMENT & PLAN NOTE
Has proteinuria with Anasarca.  On IV Lasix.  24 urine protein collection in process.  Consulted oncology for abnormal UPEP

## 2019-04-04 LAB
ACANTHOCYTES BLD QL SMEAR: PRESENT
ANION GAP SERPL CALC-SCNC: 3 MMOL/L (ref 8–16)
ANISOCYTOSIS BLD QL SMEAR: ABNORMAL
BASOPHILS # BLD AUTO: 0.02 K/UL (ref 0–0.2)
BASOPHILS NFR BLD: 0.2 % (ref 0–1.9)
BUN SERPL-MCNC: 34 MG/DL (ref 8–23)
BURR CELLS BLD QL SMEAR: ABNORMAL
CALCIUM SERPL-MCNC: 10.4 MG/DL (ref 8.7–10.5)
CHLORIDE SERPL-SCNC: 112 MMOL/L (ref 95–110)
CO2 SERPL-SCNC: 33 MMOL/L (ref 23–29)
CREAT SERPL-MCNC: 1.3 MG/DL (ref 0.5–1.4)
DACRYOCYTES BLD QL SMEAR: ABNORMAL
DIFFERENTIAL METHOD: ABNORMAL
EOSINOPHIL # BLD AUTO: 0.1 K/UL (ref 0–0.5)
EOSINOPHIL NFR BLD: 1.3 % (ref 0–8)
ERYTHROCYTE [DISTWIDTH] IN BLOOD BY AUTOMATED COUNT: 23.9 % (ref 11.5–14.5)
EST. GFR  (AFRICAN AMERICAN): 60 ML/MIN/1.73 M^2
EST. GFR  (NON AFRICAN AMERICAN): 52 ML/MIN/1.73 M^2
GIANT PLATELETS BLD QL SMEAR: PRESENT
GLUCOSE SERPL-MCNC: 184 MG/DL (ref 70–110)
HCT VFR BLD AUTO: 29.2 % (ref 40–54)
HGB BLD-MCNC: 8 G/DL (ref 14–18)
HYPOCHROMIA BLD QL SMEAR: ABNORMAL
INTERPRETATION SERPL IFE-IMP: NORMAL
KAPPA LC SER QL IA: 4.68 MG/DL (ref 0.33–1.94)
KAPPA LC/LAMBDA SER IA: 0.69 (ref 0.26–1.65)
LAMBDA LC SER QL IA: 6.79 MG/DL (ref 0.57–2.63)
LYMPHOCYTES # BLD AUTO: 0.9 K/UL (ref 1–4.8)
LYMPHOCYTES NFR BLD: 11 % (ref 18–48)
MCH RBC QN AUTO: 22.3 PG (ref 27–31)
MCHC RBC AUTO-ENTMCNC: 27.4 G/DL (ref 32–36)
MCV RBC AUTO: 81 FL (ref 82–98)
MONOCYTES # BLD AUTO: 1 K/UL (ref 0.3–1)
MONOCYTES NFR BLD: 11.4 % (ref 4–15)
NEUTROPHILS # BLD AUTO: 6.4 K/UL (ref 1.8–7.7)
NEUTROPHILS NFR BLD: 76.6 % (ref 38–73)
OVALOCYTES BLD QL SMEAR: ABNORMAL
PATHOLOGIST INTERPRETATION IFE: NORMAL
PLATELET # BLD AUTO: 264 K/UL (ref 150–350)
PLATELET BLD QL SMEAR: ABNORMAL
PMV BLD AUTO: 11.1 FL (ref 9.2–12.9)
POCT GLUCOSE: 141 MG/DL (ref 70–110)
POCT GLUCOSE: 188 MG/DL (ref 70–110)
POCT GLUCOSE: 216 MG/DL (ref 70–110)
POCT GLUCOSE: 220 MG/DL (ref 70–110)
POIKILOCYTOSIS BLD QL SMEAR: ABNORMAL
POLYCHROMASIA BLD QL SMEAR: ABNORMAL
POTASSIUM SERPL-SCNC: 4.2 MMOL/L (ref 3.5–5.1)
RBC # BLD AUTO: 3.59 M/UL (ref 4.6–6.2)
SCHISTOCYTES BLD QL SMEAR: ABNORMAL
SCHISTOCYTES BLD QL SMEAR: PRESENT
SODIUM SERPL-SCNC: 148 MMOL/L (ref 136–145)
TARGETS BLD QL SMEAR: ABNORMAL
WBC # BLD AUTO: 8.35 K/UL (ref 3.9–12.7)

## 2019-04-04 PROCEDURE — 99232 PR SUBSEQUENT HOSPITAL CARE,LEVL II: ICD-10-PCS | Mod: ,,, | Performed by: UROLOGY

## 2019-04-04 PROCEDURE — 97530 THERAPEUTIC ACTIVITIES: CPT

## 2019-04-04 PROCEDURE — 25000003 PHARM REV CODE 250: Performed by: HOSPITALIST

## 2019-04-04 PROCEDURE — A4216 STERILE WATER/SALINE, 10 ML: HCPCS | Performed by: HOSPITALIST

## 2019-04-04 PROCEDURE — 80048 BASIC METABOLIC PNL TOTAL CA: CPT

## 2019-04-04 PROCEDURE — 25000003 PHARM REV CODE 250: Performed by: INTERNAL MEDICINE

## 2019-04-04 PROCEDURE — 99233 SBSQ HOSP IP/OBS HIGH 50: CPT | Mod: ,,, | Performed by: INTERNAL MEDICINE

## 2019-04-04 PROCEDURE — 85025 COMPLETE CBC W/AUTO DIFF WBC: CPT

## 2019-04-04 PROCEDURE — 99233 PR SUBSEQUENT HOSPITAL CARE,LEVL III: ICD-10-PCS | Mod: ,,, | Performed by: INTERNAL MEDICINE

## 2019-04-04 PROCEDURE — 99900035 HC TECH TIME PER 15 MIN (STAT)

## 2019-04-04 PROCEDURE — 99232 SBSQ HOSP IP/OBS MODERATE 35: CPT | Mod: ,,, | Performed by: UROLOGY

## 2019-04-04 PROCEDURE — 97110 THERAPEUTIC EXERCISES: CPT

## 2019-04-04 PROCEDURE — 63600175 PHARM REV CODE 636 W HCPCS: Performed by: HOSPITALIST

## 2019-04-04 PROCEDURE — 11000001 HC ACUTE MED/SURG PRIVATE ROOM

## 2019-04-04 RX ADMIN — CARVEDILOL 25 MG: 6.25 TABLET, FILM COATED ORAL at 10:04

## 2019-04-04 RX ADMIN — Medication 10 ML: at 02:04

## 2019-04-04 RX ADMIN — Medication 10 ML: at 12:04

## 2019-04-04 RX ADMIN — ATORVASTATIN CALCIUM 20 MG: 10 TABLET, FILM COATED ORAL at 10:04

## 2019-04-04 RX ADMIN — INSULIN ASPART 2 UNITS: 100 INJECTION, SOLUTION INTRAVENOUS; SUBCUTANEOUS at 12:04

## 2019-04-04 RX ADMIN — Medication 10 ML: at 05:04

## 2019-04-04 RX ADMIN — LOSARTAN POTASSIUM 25 MG: 25 TABLET, FILM COATED ORAL at 09:04

## 2019-04-04 RX ADMIN — SPIRONOLACTONE 25 MG: 25 TABLET ORAL at 09:04

## 2019-04-04 RX ADMIN — DILTIAZEM HYDROCHLORIDE 30 MG: 30 TABLET, FILM COATED ORAL at 10:04

## 2019-04-04 RX ADMIN — TAMSULOSIN HYDROCHLORIDE 0.4 MG: 0.4 CAPSULE ORAL at 09:04

## 2019-04-04 RX ADMIN — ASPIRIN 81 MG: 81 TABLET, COATED ORAL at 09:04

## 2019-04-04 RX ADMIN — FERROUS GLUCONATE TAB 324 MG (37.5 MG ELEMENTAL IRON) 324 MG: 324 (37.5 FE) TAB at 09:04

## 2019-04-04 RX ADMIN — DOCUSATE SODIUM 100 MG: 100 CAPSULE, LIQUID FILLED ORAL at 10:04

## 2019-04-04 RX ADMIN — FUROSEMIDE 80 MG: 10 INJECTION, SOLUTION INTRAMUSCULAR; INTRAVENOUS at 09:04

## 2019-04-04 RX ADMIN — INSULIN ASPART 2 UNITS: 100 INJECTION, SOLUTION INTRAVENOUS; SUBCUTANEOUS at 05:04

## 2019-04-04 RX ADMIN — DOCUSATE SODIUM 100 MG: 100 CAPSULE, LIQUID FILLED ORAL at 09:04

## 2019-04-04 RX ADMIN — CARVEDILOL 25 MG: 6.25 TABLET, FILM COATED ORAL at 09:04

## 2019-04-04 RX ADMIN — DILTIAZEM HYDROCHLORIDE 30 MG: 30 TABLET, FILM COATED ORAL at 09:04

## 2019-04-04 RX ADMIN — POLYETHYLENE GLYCOL 3350 17 G: 17 POWDER, FOR SOLUTION ORAL at 10:04

## 2019-04-04 RX ADMIN — FUROSEMIDE 80 MG: 10 INJECTION, SOLUTION INTRAMUSCULAR; INTRAVENOUS at 05:04

## 2019-04-04 RX ADMIN — POLYETHYLENE GLYCOL 3350 17 G: 17 POWDER, FOR SOLUTION ORAL at 09:04

## 2019-04-04 RX ADMIN — RAMELTEON 8 MG: 8 TABLET, FILM COATED ORAL at 10:04

## 2019-04-04 RX ADMIN — Medication 10 ML: at 09:04

## 2019-04-04 RX ADMIN — PANTOPRAZOLE SODIUM 40 MG: 40 TABLET, DELAYED RELEASE ORAL at 09:04

## 2019-04-04 NOTE — SUBJECTIVE & OBJECTIVE
Interval History: Pt with no new issues. Pt OOB in chair     Oncology Treatment Plan:   [No treatment plan]    Medications:  Continuous Infusions:  Scheduled Meds:   aspirin  81 mg Oral Daily    atorvastatin  20 mg Oral QHS    carvedilol  25 mg Oral BID    diltiaZEM  30 mg Oral Q12H    docusate sodium  100 mg Oral BID    ferrous gluconate  324 mg Oral Daily with breakfast    furosemide  80 mg Intravenous BID    losartan  25 mg Oral Daily    pantoprazole  40 mg Oral Daily    polyethylene glycol  17 g Oral BID    sodium chloride 0.9%  10 mL Intravenous Q6H    spironolactone  25 mg Oral Daily    tamsulosin  0.4 mg Oral Daily     PRN Meds:acetaminophen, albuterol-ipratropium, cloNIDine, dextrose 50%, dextrose 50%, glucagon (human recombinant), glucose, glucose, influenza, insulin aspart U-100, ondansetron, pneumoc 13-alan conj-dip cr(PF), promethazine (PHENERGAN) IVPB, ramelteon, senna-docusate 8.6-50 mg, Flushing PICC Protocol **AND** sodium chloride 0.9% **AND** sodium chloride 0.9%     Review of Systems   Constitutional: Positive for fatigue.   Respiratory: Positive for shortness of breath. Negative for cough.    Cardiovascular: Positive for leg swelling. Negative for chest pain.   Gastrointestinal: Negative for abdominal pain and diarrhea.   Musculoskeletal: Negative for back pain.   Neurological: Negative for headaches.   Psychiatric/Behavioral: Positive for confusion.     Objective:     Vital Signs (Most Recent):  Temp: 97.7 °F (36.5 °C) (04/04/19 1109)  Pulse: 75 (04/04/19 1109)  Resp: 18 (04/04/19 1109)  BP: 116/62 (04/04/19 1109)  SpO2: 98 % (04/04/19 1109) Vital Signs (24h Range):  Temp:  [97.4 °F (36.3 °C)-97.8 °F (36.6 °C)] 97.7 °F (36.5 °C)  Pulse:  [62-77] 75  Resp:  [14-19] 18  SpO2:  [97 %-100 %] 98 %  BP: (116-146)/(62-79) 116/62     Weight: 95.4 kg (210 lb 5.1 oz)  Body mass index is 31.06 kg/m².  Body surface area is 2.16 meters squared.      Intake/Output Summary (Last 24 hours) at  4/4/2019 1610  Last data filed at 4/4/2019 0923  Gross per 24 hour   Intake 415 ml   Output 775 ml   Net -360 ml       Physical Exam   Constitutional: He appears well-developed and well-nourished.   HENT:   Head: Normocephalic.   Eyes: Conjunctivae are normal. No scleral icterus.   Neck: Normal range of motion. Neck supple. No thyromegaly present.   Cardiovascular: An irregularly irregular rhythm present.   Pulmonary/Chest: Effort normal and breath sounds normal. He has no wheezes. He has no rales.   Abdominal: Soft. Bowel sounds are normal. He exhibits no distension and no mass. There is no hepatosplenomegaly. There is no tenderness. There is no rebound and no guarding.   Musculoskeletal: He exhibits edema.   Neurological: He is alert.   confused   Skin: No rash noted. No erythema.       Significant Labs:   All pertinent labs within the past 24 hours have been reviewed    Diagnostic Results:  I have reviewed and interpreted all pertinent imaging results/findings within the past 24 hours

## 2019-04-04 NOTE — PROGRESS NOTES
[facility] Hospital Medicine  Progress Note    Patient Name: Agus Ramos Jr.  MRN: [id number]  Patient Class: IP- Inpatient   Admission Date: 3/23/2019  Length of Stay: 12 days  Attending Physician: Jose De Jesus Angulo MD  Primary Care Provider: Keaton Hardy MD        Subjective:     Principal Problem:Acute metabolic encephalopathy    HPI:  Mr. Agus Ramos Jr. is a 78 y.o. male known to me with essential hypertension, type 2 diabetes mellitus (HbA1c 5.0% Mar 2019), CAD s/p CABG, chronic combined systolic and diastolic heart failure (LVEF 50% Mar 2019), CKD Stage 3, peripheral artery disease, chronic atrial fibrillation ([id number]-KAKd score 5) on chronic anticoagulation, COPD, chronic respiratory failure with hypoxia, and anemia of chronic disease who presents to [facility] ED with complaints of a fall this morning.  He had much difficulty standing back up.  EMS was activated and they found that his capillary glucose was 14 mg/dL after which he was given an ampule of 50% dextrose.  His mentation returned to baseline thereafter.  He denies any tremors nor diaphoresis.  He says that he's been feeling weak and dizzy today but denies any loss of consciousness, nor antecedent chest pain, shortness of breath, palpitations, fevers, chills, nausea, vomiting, abdominal pain, or any diarrhea.  He did not trip or slip or anything.  He has had a good appetite but cannot say for sure if he has been taking too much of his diabetes medications.  He denies any recent changes to his medications.  He otherwise has been in his usual state of health.    Hospital Course:  79 y/o male presented with weakness.  Hx of DM on Glipizide and Metformin.  Patient noted to be hypoglycemic.  Initially responded to D50, but then became persistently hypoglycemic.  Admitted to ICU on D10 drip and hourly glucose monitoring.  Also started on Octreotide drip.  No further episodes of hypoglycemia and D10 switched to D5.  Octreotide  drip stopped.  Now getting more hyperglycemic.  Stopping D5 drip and monitor off dextrose.  PT/OT evaluation.  Recommended SNF,consulted SW.  Last HbA1C 2 weeks ago 5.0,likely no need any more for hypoglycemic agents,diet should  be fine.  Has scrotal swelling,on IV lasix,lifting scrotum.  Has anasarca,all over body,increased lasix ,have ,proteinuria,check protein/Cr. Ratio,elevated,consulted nephrology.still has massive anasarca.  Urology following for scrotal swelling,improved.  Has left renal cyst,on US,need follow up with repeat US  in 6 months.  24 urine collection is in Process to R/O nephrotic syndrome.  Consulted oncology for abnormal UPEP.    Interval History: Feeling better.    Review of Systems   HENT: Negative for ear discharge and ear pain.    Eyes: Negative for pain and itching.   Endocrine: Negative for polyphagia and polyuria.   Neurological: Negative for seizures and syncope.     Objective:     Vital Signs (Most Recent):  Temp: 97.7 °F (36.5 °C) (04/04/19 1109)  Pulse: 75 (04/04/19 1109)  Resp: 18 (04/04/19 1109)  BP: 116/62 (04/04/19 1109)  SpO2: 98 % (04/04/19 1109) Vital Signs (24h Range):  Temp:  [97.4 °F (36.3 °C)-97.8 °F (36.6 °C)] 97.7 °F (36.5 °C)  Pulse:  [62-77] 75  Resp:  [14-19] 18  SpO2:  [97 %-100 %] 98 %  BP: (116-146)/(62-79) 116/62     Weight: 95.4 kg (210 lb 5.1 oz)  Body mass index is 31.06 kg/m².    Intake/Output Summary (Last 24 hours) at 4/4/2019 1354  Last data filed at 4/4/2019 0541  Gross per 24 hour   Intake 175 ml   Output 775 ml   Net -600 ml      Physical Exam   Constitutional: He is oriented to person, place, and time. He appears well-developed and well-nourished. No distress.   HENT:   Head: Normocephalic and atraumatic.   Right Ear: External ear normal.   Left Ear: External ear normal.   Nose: Nose normal.   Eyes: Right eye exhibits no discharge. Left eye exhibits no discharge.   Neck: Normal range of motion.   Cardiovascular:   Irregularly irregular, no murmurs or  gallops   Pulmonary/Chest: Effort normal. No respiratory distress.   Abdominal: Soft. Bowel sounds are normal. He exhibits distension. There is no tenderness. There is no rebound and no guarding.   Genitourinary:   Genitourinary Comments: Scrotal swelling.   Musculoskeletal: Normal range of motion. He exhibits edema.   Neurological: He is alert and oriented to person, place, and time.   Skin: Skin is warm and dry. He is not diaphoretic. No erythema.   Psychiatric: He has a normal mood and affect. His behavior is normal. Judgment and thought content normal.   Nursing note and vitals reviewed.      Significant Labs:   BMP:   Recent Labs   Lab 04/04/19  0440   *   *   K 4.2   *   CO2 33*   BUN 34*   CREATININE 1.3   CALCIUM 10.4     CBC:   Recent Labs   Lab 04/03/19  0330 04/04/19  0440   WBC 9.97 8.35   HGB 8.4* 8.0*   HCT 30.9* 29.2*    264     Assessment/Plan:      * Acute metabolic encephalopathy  Patient was noted to have a capillary glucose of 14 mg/dL in the field for which he was given an ampule of 50% dextrose with improvement of his mentation.  His initial serum glucose here was 30 mg/dL but later was still 44 mg/dL after treatment.  He improved into the 180's but continued to drop to 81 mg/dL then to 36 mg/dL.    Admitted to ICU on D10 drip and hourly glucose checks.  Also started on Octreotide drip.  Glucose increasing with no further episodes of hypoglycemia.  Will change D10 to D5 and monitor glucose every 4 hours.  The etiology of his hypoglycemia is unclear but he is on a sulfonylurea at home.  He does have 1+ leukocytes and moderate bacteria on urine, but many amorphous cells.  Will repeat UA with UCx.  Afebrile.  Will hold off on ABx's for now.  Getting more hyperglycemic.  Stop D5 and monitor off dextrose.    PT/OT eval.  Last HbA1C 2 weeks ago 5.0,likely no need any more for hypoglycemic agents,diet should  be fine.  SNF recommended.      Proteinuria  Has proteinuria with  Anasarca.  On IV Lasix.  24 urine protein collection in process.  Consulted oncology for abnormal UPEP  Appreciate Nephrology and Oncology input.  Continue with diuresis.        Anemia due to chronic kidney disease        Renal cyst, left  Has left renal cyst,on US,need follow up with repeat US  in 6 weeks,      Scrotal swelling    Has scrotal swelling,on IV lasix,lifting scrotum.Has anasarca,all over body,increased lasix,    Peripheral artery disease  Stable; will continue his home regimen of aspirin and atorvastatin.    Acute on chronic combined systolic and diastolic heart failure  Will continue with IV lasix    Urinary retention  Appreciate Urology input.      Chronic respiratory failure with hypoxia  Stable; will continue his home supplemental oxygen therapy.    Centrilobular emphysema  Clinically-stable without wheezing.  Will provide as-needed JENAE/LAMA available.    Iron deficiency anemia  The patient's H/H is stable and consistent with previous laboratory measurements, and the patient exhibits no signs or symptoms of acute bleeding; there is no indication for transfusion.  Will continue to monitor.    Type 2 diabetes mellitus, controlled, with renal complications  As addressed above.    CKD Stage 3  His renal function appears to be at his baseline.    Essential hypertension  Continue home regimen of carvedilol, diltiazem, furosemide, and tamsulosin, and provide as-needed clonidine.    CAD (coronary artery disease)  Stable; will continue his home regimen of aspirin, atorvastatin, and carvedilol.    Chronic atrial fibrillation  Patient is in atrial fibrillation at this time which is baseline for him.  He was taken off of anticoagulation during his last admission due to anemia and was instructed not to take it until outpatient follow-up with gastroenterology.  Will continue to monitor.      VTE Risk Mitigation (From admission, onward)        Ordered     IP VTE HIGH RISK PATIENT  Once      03/23/19 1936      Reason for No Pharmacological VTE Prophylaxis  Once      03/23/19 1936     Place sequential compression device  Until discontinued      03/23/19 1934     Place DENIS hose  Until discontinued      03/23/19 1934              Jose De Jesus Angulo MD  Department of Hospital Medicine   [facility]

## 2019-04-04 NOTE — PROGRESS NOTES
CPAP on standby at this time.  Pt. Is resting comfortably on NC 3LPM; SpO2 99%. Pts RN at bedside.

## 2019-04-04 NOTE — SUBJECTIVE & OBJECTIVE
Interval History: Boucher draining well. Pt getting up to use bathroom with assistance.     Review of Systems   Constitutional: Negative for chills and fever.   HENT: Negative for hearing loss, sore throat and trouble swallowing.    Eyes: Negative.    Respiratory: Negative for cough and shortness of breath.    Cardiovascular: Negative for chest pain.   Gastrointestinal: Positive for abdominal distention. Negative for abdominal pain, constipation, diarrhea, nausea and vomiting.   Genitourinary: Positive for difficulty urinating, hematuria, penile swelling and scrotal swelling. Negative for frequency.   Musculoskeletal: Negative for arthralgias and neck pain.   Skin: Negative for color change, pallor and rash.   Neurological: Negative for dizziness and seizures.   Hematological: Negative for adenopathy.   Psychiatric/Behavioral: Negative for confusion.   All other systems reviewed and are negative.    Objective:     Temp:  [97.4 °F (36.3 °C)-97.8 °F (36.6 °C)] 97.7 °F (36.5 °C)  Pulse:  [62-78] 77  Resp:  [14-19] 18  SpO2:  [97 %-100 %] 100 %  BP: (123-146)/(60-79) 146/75     Body mass index is 31.06 kg/m².      Bladder Scan Volume (mL): 23 mL (03/28/19 0831)  Post Void Cath Residual Output (mL): 0 mL (03/28/19 0831)    Drains     Drain                 Urethral Catheter 03/29/19 0342 Coude 12 Fr. 6 days                Physical Exam   Vitals reviewed.  Constitutional: He is oriented to person, place, and time. He appears well-developed and well-nourished. No distress.   HENT:   Head: Normocephalic and atraumatic.   Eyes: Right eye exhibits no discharge. Left eye exhibits no discharge. No scleral icterus.   Neck: Normal range of motion. Neck supple.   Cardiovascular: Normal rate and regular rhythm.    Pulmonary/Chest: Effort normal and breath sounds normal. No respiratory distress.   Abdominal: Soft. Bowel sounds are normal. He exhibits distension. There is no tenderness. There is no rebound and no guarding.    Genitourinary:   Genitourinary Comments: Clear yellow urine in stearns tubing  Significant penile/scrotal swelling   Musculoskeletal: Normal range of motion.   Neurological: He is alert and oriented to person, place, and time.   Skin: Skin is warm and dry. He is not diaphoretic. No erythema.         Significant Labs:    BMP:  Recent Labs   Lab 04/02/19  0503 04/03/19  0330 04/04/19  0440   * 144 148*   K 4.2 4.4 4.2   * 110 112*   CO2 33* 32* 33*   BUN 32* 34* 34*   CREATININE 1.2 1.3 1.3   CALCIUM 10.6* 10.5 10.4       CBC:   Recent Labs   Lab 04/02/19  0503 04/03/19  0330 04/04/19  0440   WBC 10.11 9.97 8.35   HGB 8.5* 8.4* 8.0*   HCT 30.7* 30.9* 29.2*    350 264       Blood Culture: No results for input(s): LABBLOO in the last 168 hours.  Urine Culture: No results for input(s): LABURIN in the last 168 hours.  Urine Studies: No results for input(s): COLORU, APPEARANCEUA, PHUR, SPECGRAV, PROTEINUA, GLUCUA, KETONESU, BILIRUBINUA, OCCULTUA, NITRITE, UROBILINOGEN, LEUKOCYTESUR, RBCUA, WBCUA, BACTERIA, SQUAMEPITHEL, HYALINECASTS in the last 168 hours.    Invalid input(s): WRIGHTSUR    Significant Imaging:  All pertinent imaging results/findings from the past 24 hours have been reviewed.

## 2019-04-04 NOTE — SUBJECTIVE & OBJECTIVE
Interval History: Feeling better.    Review of Systems   HENT: Negative for ear discharge and ear pain.    Eyes: Negative for pain and itching.   Endocrine: Negative for polyphagia and polyuria.   Neurological: Negative for seizures and syncope.     Objective:     Vital Signs (Most Recent):  Temp: 97.7 °F (36.5 °C) (04/04/19 1109)  Pulse: 75 (04/04/19 1109)  Resp: 18 (04/04/19 1109)  BP: 116/62 (04/04/19 1109)  SpO2: 98 % (04/04/19 1109) Vital Signs (24h Range):  Temp:  [97.4 °F (36.3 °C)-97.8 °F (36.6 °C)] 97.7 °F (36.5 °C)  Pulse:  [62-77] 75  Resp:  [14-19] 18  SpO2:  [97 %-100 %] 98 %  BP: (116-146)/(62-79) 116/62     Weight: 95.4 kg (210 lb 5.1 oz)  Body mass index is 31.06 kg/m².    Intake/Output Summary (Last 24 hours) at 4/4/2019 1354  Last data filed at 4/4/2019 0541  Gross per 24 hour   Intake 175 ml   Output 775 ml   Net -600 ml      Physical Exam   Constitutional: He is oriented to person, place, and time. He appears well-developed and well-nourished. No distress.   HENT:   Head: Normocephalic and atraumatic.   Right Ear: External ear normal.   Left Ear: External ear normal.   Nose: Nose normal.   Eyes: Right eye exhibits no discharge. Left eye exhibits no discharge.   Neck: Normal range of motion.   Cardiovascular:   Irregularly irregular, no murmurs or gallops   Pulmonary/Chest: Effort normal. No respiratory distress.   Abdominal: Soft. Bowel sounds are normal. He exhibits distension. There is no tenderness. There is no rebound and no guarding.   Genitourinary:   Genitourinary Comments: Scrotal swelling.   Musculoskeletal: Normal range of motion. He exhibits edema.   Neurological: He is alert and oriented to person, place, and time.   Skin: Skin is warm and dry. He is not diaphoretic. No erythema.   Psychiatric: He has a normal mood and affect. His behavior is normal. Judgment and thought content normal.   Nursing note and vitals reviewed.      Significant Labs:   BMP:   Recent Labs   Lab  04/04/19  0440   *   *   K 4.2   *   CO2 33*   BUN 34*   CREATININE 1.3   CALCIUM 10.4     CBC:   Recent Labs   Lab 04/03/19  0330 04/04/19  0440   WBC 9.97 8.35   HGB 8.4* 8.0*   HCT 30.9* 29.2*    264

## 2019-04-04 NOTE — PROGRESS NOTES
Awake alert oriented NAD    Denies CNS ENT CP GI  RHEUM OR DERM SX  Past Medical History:   Diagnosis Date    Anemia 05/03/2018    pending blood transfusion    Anticoagulant long-term use     apixaban    Atrial fibrillation     CKD (chronic kidney disease)     Congestive heart failure     COPD (chronic obstructive pulmonary disease)     Coronary artery disease     Diabetes mellitus     Encounter for blood transfusion     HLD (hyperlipidemia)     Hypertension     MI (myocardial infarction)     On home oxygen therapy     Pacemaker     left chest    Peripheral vascular disease     Requires assistance with activities of daily living (ADL)     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     S/P femoral-popliteal bypass surgery left    Stented coronary artery     Tobacco use     Unsteady gait      Review of patient's allergies indicates:  No Known Allergies    Current Facility-Administered Medications   Medication    acetaminophen tablet 650 mg    albuterol-ipratropium 2.5 mg-0.5 mg/3 mL nebulizer solution 3 mL    aspirin EC tablet 81 mg    atorvastatin tablet 20 mg    carvedilol tablet 25 mg    cloNIDine tablet 0.1 mg    dextrose 50% injection 12.5 g    dextrose 50% injection 25 g    diltiaZEM tablet 30 mg    docusate sodium capsule 100 mg    ferrous gluconate tablet 324 mg    furosemide injection 80 mg    glucagon (human recombinant) injection 1 mg    glucose chewable tablet 16 g    glucose chewable tablet 24 g    influenza (FLUZONE HIGH-DOSE) vaccine 0.5 mL    insulin aspart U-100 pen 0-5 Units    losartan tablet 25 mg    ondansetron injection 8 mg    pantoprazole EC tablet 40 mg    pneumoc 13-alan conj-dip cr(PF) (PREVNAR 13 (PF)) 0.5 mL    polyethylene glycol packet 17 g    promethazine (PHENERGAN) 6.25 mg in dextrose 5 % 50 mL IVPB    ramelteon tablet 8 mg    senna-docusate 8.6-50 mg per tablet 1 tablet    sodium chloride 0.9% flush 10 mL    And    sodium chloride 0.9% flush 10 mL     spironolactone tablet 25 mg    tamsulosin 24 hr capsule 0.4 mg       LABS    Recent Results (from the past 24 hour(s))   POCT glucose    Collection Time: 04/03/19 12:11 PM   Result Value Ref Range    POCT Glucose 236 (H) 70 - 110 mg/dL   POCT glucose    Collection Time: 04/03/19  4:27 PM   Result Value Ref Range    POCT Glucose 240 (H) 70 - 110 mg/dL   Protein, urine, timed    Collection Time: 04/03/19  7:00 PM   Result Value Ref Range    Urine Volume 650 mL    Urine Collection Duration 24 Hr    Protein, Urine 69 (H) 0 - 15 mg/dL    Urine Protein, Timed 449 (H) 0 - 100 mg/Spec   POCT glucose    Collection Time: 04/03/19  8:11 PM   Result Value Ref Range    POCT Glucose 282 (H) 70 - 110 mg/dL   CBC auto differential    Collection Time: 04/04/19  4:40 AM   Result Value Ref Range    WBC 8.35 3.90 - 12.70 K/uL    RBC 3.59 (L) 4.60 - 6.20 M/uL    Hemoglobin 8.0 (L) 14.0 - 18.0 g/dL    Hematocrit 29.2 (L) 40.0 - 54.0 %    MCV 81 (L) 82 - 98 fL    MCH 22.3 (L) 27.0 - 31.0 pg    MCHC 27.4 (L) 32.0 - 36.0 g/dL    RDW 23.9 (H) 11.5 - 14.5 %    Platelets 264 150 - 350 K/uL    MPV 11.1 9.2 - 12.9 fL    Gran # (ANC) 6.4 1.8 - 7.7 K/uL    Lymph # 0.9 (L) 1.0 - 4.8 K/uL    Mono # 1.0 0.3 - 1.0 K/uL    Eos # 0.1 0.0 - 0.5 K/uL    Baso # 0.02 0.00 - 0.20 K/uL    Gran% 76.6 (H) 38.0 - 73.0 %    Lymph% 11.0 (L) 18.0 - 48.0 %    Mono% 11.4 4.0 - 15.0 %    Eosinophil% 1.3 0.0 - 8.0 %    Basophil% 0.2 0.0 - 1.9 %    Platelet Estimate Appears normal     Aniso Moderate     Poik Moderate     Poly Occasional     Hypo Moderate     Ovalocytes Occasional     Target Cells Occasional     Tear Drop Cells Occasional     Batsheva Cells Occasional     Acanthocytes Present     Schistocytes Present     Large/Giant Platelets Present     Fragmented Cells Occasional     Differential Method Automated    Basic metabolic panel    Collection Time: 04/04/19  4:40 AM   Result Value Ref Range    Sodium 148 (H) 136 - 145 mmol/L    Potassium 4.2 3.5 - 5.1 mmol/L     Chloride 112 (H) 95 - 110 mmol/L    CO2 33 (H) 23 - 29 mmol/L    Glucose 184 (H) 70 - 110 mg/dL    BUN, Bld 34 (H) 8 - 23 mg/dL    Creatinine 1.3 0.5 - 1.4 mg/dL    Calcium 10.4 8.7 - 10.5 mg/dL    Anion Gap 3 (L) 8 - 16 mmol/L    eGFR if African American 60 >60 mL/min/1.73 m^2    eGFR if non African American 52 (A) >60 mL/min/1.73 m^2   POCT glucose    Collection Time: 04/04/19  7:26 AM   Result Value Ref Range    POCT Glucose 188 (H) 70 - 110 mg/dL   ]    I/O last 3 completed shifts:  In: 415 [P.O.:405; I.V.:10]  Out: 1525 [Urine:1525]    Vitals:    04/03/19 2350 04/04/19 0450 04/04/19 0514 04/04/19 0724   BP: 134/69   (!) 146/75   Pulse: 74  72 77   Resp: 16  16 18   Temp: 97.5 °F (36.4 °C)  97.8 °F (36.6 °C) 97.7 °F (36.5 °C)   TempSrc: Axillary  Oral Oral   SpO2: 100% 99% 100% 100%   Weight:       Height:           No Jvd, Thyromegaly or Lymphadenopathy  Lungs: Fairly clear anteriorly and laterally  Cor: RRR no G or rubs  Abd: Soft benign good bowel sounds non tender  Ext: Pos edema     A)     CKD3   NOT NEPHROTIC Proteinuria   Low alb possible nephrotic snd  T2DM  CHF  COPD  Urinary retention Followed by Dr Zayas  High [place] free chains in JOSE but Normal K/L ratio ? MGUS Pt seem By Dr Bell   CAD  CHF  AICD  Anemia    P)    Cont pressing with diuretics and diet  It is unclear why the serum albumin is so low, Pt is  Not nephrotic, considerations might include poor po intake, poor absorption of protein, enteric losses of protein or poor liver fx     Might want also try metolazone 2.5-5 mg po qd x 1 week

## 2019-04-04 NOTE — PLAN OF CARE
Problem: Occupational Therapy Goal  Goal: Occupational Therapy Goal  Goals to be met by: 4/15/19     Patient will increase functional independence with ADLs by performing:    UE Dressing with Set-up Assistance.  LE Dressing with Minimal Assistance.  Grooming while seated with Supervision.  Toileting from bedside commode with Moderate Assistance for hygiene and clothing management.   Bathing from  edge of bed with Moderate Assistance.  Sitting at edge of bed x20 minutes with Stand-by Assistance.  Rolling to Bilateral with Set-up Assistance.   Supine to sit with Supervision.  Stand pivot transfers with Minimal Assistance.  Toilet transfer to bedside commode with Minimal Assistance.  Upper extremity exercise program x10 reps per handout, with assistance as needed.     Outcome: Ongoing (interventions implemented as appropriate)  The patient participated in OT with encouragement and frequent redirection.

## 2019-04-04 NOTE — PROGRESS NOTES
[facility]  Urology  Progress Note    Patient Name: Agus Ramos Jr.  MRN: [id number]  Admission Date: 3/23/2019  Hospital Length of Stay: 12 days  Code Status: Full Code   Attending Provider: Jose De Jesus Angulo MD   Primary Care Physician: Keaton Hardy MD    Subjective:     HPI:  Urinary Retention  Patient complains of urinary retention. Onset of retention was 1 day ago and was gradual in onset. Patient currently does have a urinary catheter in place.  300 ml of urine were drained when catheter was placed. Prior to this event voiding symptoms consisted of slow stream, intermittency. Prior treatments include watchful waiting. Recent medications that may have affected his voiding include none.  He is admitted after a fall for hypoglycemia.  He had difficulty voiding while admitted.  A few attempts were made to place a Boucher.  Finally a 12 Fr Coude was placed.  Bladder Scan showed 700 mL but he has not drained this much in several hours.        Interval History: Boucher draining well. Pt getting up to use bathroom with assistance.     Review of Systems   Constitutional: Negative for chills and fever.   HENT: Negative for hearing loss, sore throat and trouble swallowing.    Eyes: Negative.    Respiratory: Negative for cough and shortness of breath.    Cardiovascular: Negative for chest pain.   Gastrointestinal: Positive for abdominal distention. Negative for abdominal pain, constipation, diarrhea, nausea and vomiting.   Genitourinary: Positive for difficulty urinating, hematuria, penile swelling and scrotal swelling. Negative for frequency.   Musculoskeletal: Negative for arthralgias and neck pain.   Skin: Negative for color change, pallor and rash.   Neurological: Negative for dizziness and seizures.   Hematological: Negative for adenopathy.   Psychiatric/Behavioral: Negative for confusion.   All other systems reviewed and are negative.    Objective:     Temp:  [97.4 °F (36.3 °C)-97.8 °F (36.6 °C)]  97.7 °F (36.5 °C)  Pulse:  [62-78] 77  Resp:  [14-19] 18  SpO2:  [97 %-100 %] 100 %  BP: (123-146)/(60-79) 146/75     Body mass index is 31.06 kg/m².      Bladder Scan Volume (mL): 23 mL (03/28/19 0831)  Post Void Cath Residual Output (mL): 0 mL (03/28/19 0831)    Drains     Drain                 Urethral Catheter 03/29/19 0342 Coude 12 Fr. 6 days                Physical Exam   Vitals reviewed.  Constitutional: He is oriented to person, place, and time. He appears well-developed and well-nourished. No distress.   HENT:   Head: Normocephalic and atraumatic.   Eyes: Right eye exhibits no discharge. Left eye exhibits no discharge. No scleral icterus.   Neck: Normal range of motion. Neck supple.   Cardiovascular: Normal rate and regular rhythm.    Pulmonary/Chest: Effort normal and breath sounds normal. No respiratory distress.   Abdominal: Soft. Bowel sounds are normal. He exhibits distension. There is no tenderness. There is no rebound and no guarding.   Genitourinary:   Genitourinary Comments: Clear yellow urine in stearns tubing  Significant penile/scrotal swelling   Musculoskeletal: Normal range of motion.   Neurological: He is alert and oriented to person, place, and time.   Skin: Skin is warm and dry. He is not diaphoretic. No erythema.         Significant Labs:    BMP:  Recent Labs   Lab 04/02/19  0503 04/03/19  0330 04/04/19  0440   * 144 148*   K 4.2 4.4 4.2   * 110 112*   CO2 33* 32* 33*   BUN 32* 34* 34*   CREATININE 1.2 1.3 1.3   CALCIUM 10.6* 10.5 10.4       CBC:   Recent Labs   Lab 04/02/19  0503 04/03/19  0330 04/04/19  0440   WBC 10.11 9.97 8.35   HGB 8.5* 8.4* 8.0*   HCT 30.7* 30.9* 29.2*    350 264       Blood Culture: No results for input(s): LABBLOO in the last 168 hours.  Urine Culture: No results for input(s): LABURIN in the last 168 hours.  Urine Studies: No results for input(s): COLORU, APPEARANCEUA, PHUR, SPECGRAV, PROTEINUA, GLUCUA, KETONESU, BILIRUBINUA, OCCULTUA, NITRITE,  UROBILINOGEN, LEUKOCYTESUR, RBCUA, WBCUA, BACTERIA, SQUAMEPITHEL, HYALINECASTS in the last 168 hours.    Invalid input(s): SUHASR    Significant Imaging:  All pertinent imaging results/findings from the past 24 hours have been reviewed.                  Assessment/Plan:     Renal cyst, left   - Recommended for repeat IRASEMA in 6 months    Scrotal swelling   - JOSE LUIS reviewed     - Scrotal elevation at all times   - Diuresis    Urinary retention  Consider void trial after 24 hour urine collection if completed  Continue Flomax  IRASEMA confirms stearns in appropriate position with decompressed bladder. Ascites in pelvis will make bladder scanner inaccurate.   Less suspicious of post-renal causes of low UOP        VTE Risk Mitigation (From admission, onward)        Ordered     IP VTE HIGH RISK PATIENT  Once      03/23/19 1936     Reason for No Pharmacological VTE Prophylaxis  Once      03/23/19 1936     Place sequential compression device  Until discontinued      03/23/19 1934     Place DENIS hose  Until discontinued      03/23/19 1934          Bruna Zayas MD  Urology  [facility] [place]

## 2019-04-04 NOTE — ASSESSMENT & PLAN NOTE
Has proteinuria with Anasarca.  On IV Lasix.  24 urine protein collection in process.  Consulted oncology for abnormal UPEP  Appreciate Nephrology and Oncology input.  Continue with diuresis.

## 2019-04-04 NOTE — CONSULTS
[facility]  Hematology/Oncology  Progress Note    Patient Name: Agus Ramos Jr.  MRN: [id number]  Admission Date: 3/23/2019  Hospital Length of Stay: 12 days  Code Status: Full Code   Attending Provider: Jose De Jesus Angulo MD  Consulting Provider: Millie Bell MD  Primary Care Physician: Keaton Hardy MD  Principal Problem:Acute metabolic encephalopathy      Subjective:     HPI:  78-year-old male with  Congestive heart failure, Coronary artery disease.Diabetes mellitus, AFib Hypertension; MI (myocardial infarction),  Pacemaker; Peripheral vascular disease; S/P CABG x 3;  Stented coronary artery, Tobacco use, CKD stage 3 Anemia in CKD, chronic respiratory failure with hypoxia presents to [facility] ED after he suffered a fall. He was also confused. He was admitted with renal failure Pt's capillary glucose level 14mg/dl. Pt had been weak and dizzy. No fevers/nausea/vomiting. Pt with chronic fatigue. Pt known to me and followed for anemia. Pt did not complete planned GI evaluation. EGD 6/1/2018 - no acute finding, bleeding source. He is followed by Nephrology during hospitalization for proteinuria.  Serum free light chains reveals a kappa free light chain of 2.68 mg/dL lambda free light chain 2.62 mg/dL and a kappa/lambda free light chain ratio of 0.95 SPEP reveals decreased total proteins.  24 hr urine studies pending  Consulted for elevated urine chain    Interval History: Pt with no new issues. Pt OOB in chair     Oncology Treatment Plan:   [No treatment plan]    Medications:  Continuous Infusions:  Scheduled Meds:   aspirin  81 mg Oral Daily    atorvastatin  20 mg Oral QHS    carvedilol  25 mg Oral BID    diltiaZEM  30 mg Oral Q12H    docusate sodium  100 mg Oral BID    ferrous gluconate  324 mg Oral Daily with breakfast    furosemide  80 mg Intravenous BID    losartan  25 mg Oral Daily    pantoprazole  40 mg Oral Daily    polyethylene glycol  17 g Oral BID    sodium chloride 0.9%   10 mL Intravenous Q6H    spironolactone  25 mg Oral Daily    tamsulosin  0.4 mg Oral Daily     PRN Meds:acetaminophen, albuterol-ipratropium, cloNIDine, dextrose 50%, dextrose 50%, glucagon (human recombinant), glucose, glucose, influenza, insulin aspart U-100, ondansetron, pneumoc 13-alan conj-dip cr(PF), promethazine (PHENERGAN) IVPB, ramelteon, senna-docusate 8.6-50 mg, [place] PICC Protocol **AND** sodium chloride 0.9% **AND** sodium chloride 0.9%     Review of Systems   Constitutional: Positive for fatigue.   Respiratory: Positive for shortness of breath. Negative for cough.    Cardiovascular: Positive for leg swelling. Negative for chest pain.   Gastrointestinal: Negative for abdominal pain and diarrhea.   Musculoskeletal: Negative for back pain.   Neurological: Negative for headaches.   Psychiatric/Behavioral: Positive for confusion.     Objective:     Vital Signs (Most Recent):  Temp: 97.7 °F (36.5 °C) (04/04/19 1109)  Pulse: 75 (04/04/19 1109)  Resp: 18 (04/04/19 1109)  BP: 116/62 (04/04/19 1109)  SpO2: 98 % (04/04/19 1109) Vital Signs (24h Range):  Temp:  [97.4 °F (36.3 °C)-97.8 °F (36.6 °C)] 97.7 °F (36.5 °C)  Pulse:  [62-77] 75  Resp:  [14-19] 18  SpO2:  [97 %-100 %] 98 %  BP: (116-146)/(62-79) 116/62     Weight: 95.4 kg (210 lb 5.1 oz)  Body mass index is 31.06 kg/m².  Body surface area is 2.16 meters squared.      Intake/Output Summary (Last 24 hours) at 4/4/2019 1610  Last data filed at 4/4/2019 0923  Gross per 24 hour   Intake 415 ml   Output 775 ml   Net -360 ml       Physical Exam   Constitutional: He appears well-developed and well-nourished.   HENT:   Head: Normocephalic.   Eyes: Conjunctivae are normal. No scleral icterus.   Neck: Normal range of motion. Neck supple. No thyromegaly present.   Cardiovascular: An irregularly irregular rhythm present.   Pulmonary/Chest: Effort normal and breath sounds normal. He has no wheezes. He has no rales.   Abdominal: Soft. Bowel sounds are normal. He  exhibits no distension and no mass. There is no hepatosplenomegaly. There is no tenderness. There is no rebound and no guarding.   Musculoskeletal: He exhibits edema.   Neurological: He is alert.   confused   Skin: No rash noted. No erythema.       Significant Labs:   All pertinent labs within the past 24 hours have been reviewed    Diagnostic Results:  I have reviewed and interpreted all pertinent imaging results/findings within the past 24 hours    Assessment/Plan:     Anemia due to chronic kidney disease  Patient has history of anemia of chronic disease and component of iron deficiency  Patient lost to follow up with GI  Continue to monitor counts    Elevated serum immunoglobulin free light chain level  Patient with mildly elevated kappa free light chains from 2018 in setting of normal lambda free light chains and Jaki normal kappa lambda free light chain ratio.  Recent testing reveals elevated kappa free light chain and elevated Lambda FLC with nl K/L FLCR  could be secondary to renal dysfuntion  SIFE pending  24 hr urine studies pending  Await results of testing          Millie Bell MD  Hematology/Oncology  [facility]

## 2019-04-04 NOTE — ASSESSMENT & PLAN NOTE
Patient with mildly elevated kappa free light chains from 2018 in setting of normal lambda free light chains and Jaki normal kappa lambda free light chain ratio.  Recent testing reveals elevated kappa free light chain and elevated Lambda FLC with nl K/L FLCR  could be secondary to renal dysfuntion  SIFE pending  24 hr urine studies pending  Await results of testing

## 2019-04-04 NOTE — PT/OT/SLP PROGRESS
Physical Therapy Treatment    Patient Name:  Agus Ramos Jr.   MRN:  [id number]    Recommendations:     Discharge Recommendations:  nursing facility, skilled   Discharge Equipment Recommendations: none   Barriers to discharge: pt with decreased mobility     Assessment:     Agus Ramos Jr. is a 78 y.o. male admitted with a medical diagnosis of Acute metabolic encephalopathy.  He presents with the following impairments/functional limitations:  weakness, impaired endurance, impaired self care skills, impaired functional mobilty, gait instability, impaired balance, decreased lower extremity function, decreased upper extremity function, pain, decreased safety awareness, decreased ROM, edema, impaired cardiopulmonary response to activity, impaired cognition . Pt with decreased safety awareness, required max V/T cues for safety technique and sequence . Pt with confusions , required max V/T cues to re-direct pt for safety. Pt will benefit from further skilled therapy in order to get back to PLOF.      Rehab Prognosis: Fair; patient would benefit from acute skilled PT services to address these deficits and reach maximum level of function.    Recent Surgery: * No surgery found *      Plan:     During this hospitalization, patient to be seen 5 x/week to address the identified rehab impairments via gait training, therapeutic activities, therapeutic exercises and progress toward the following goals:    · Plan of Care Expires:  04/08/19    Subjective     Chief Complaint: swollen allover   Patient/Family Comments/goals: to return to bed   Pain/Comfort:  · Location - Orientation 1: generalized  · Pain Addressed 1: Nurse notified      Objective:     Communicated with nurse  prior to session.  Patient found up in chair with telemetry, oxygen, stearns catheter upon PT entry to room.     General Precautions: Standard, fall, respiratory   Orthopedic Precautions:N/A   Braces: N/A     Functional Mobility:  · Transfers:     · Sit to  Stand:  moderate assistance with rolling walker. V/T cues for safety technique and walker management.   · Bedside chair to bed : minimum assistance with  rolling walker  using  Step Transfer  · Gait: pt ambulated ~ 4 ft ( chair to bed)  with RW, MIN A. Pt declined further ambulation 2* fatigue.        AM-PAC 6 CLICK MOBILITY  Turning over in bed (including adjusting bedclothes, sheets and blankets)?: 4  Sitting down on and standing up from a chair with arms (e.g., wheelchair, bedside commode, etc.): 2  Moving from lying on back to sitting on the side of the bed?: 3  Moving to and from a bed to a chair (including a wheelchair)?: 3  Need to walk in hospital room?: 3  Climbing 3-5 steps with a railing?: 2  Basic Mobility Total Score: 17       Therapeutic Activities and Exercises:   pt performed seated BLE x 10 reps : AP,   Encouraged pt to perform BLE AP while in chair or bed throughout the day. Pt verbalize understanding.     Patient left seated EOB with all lines intact, call button in reach and OT present..    GOALS:   Multidisciplinary Problems     Physical Therapy Goals        Problem: Physical Therapy Goal    Goal Priority Disciplines Outcome Goal Variances Interventions   Physical Therapy Goal     PT, PT/OT Ongoing (interventions implemented as appropriate)     Description:  Goals to be met by: 19    Patient will increase functional independence with mobility by performin. Sit to stand transfer with Stand-by Assistance  2. Gait x150 feet with stand-by Assistance using Rolling Walker  3. Lower extremity exercise program x30 reps per handout, with supervision                      Time Tracking:     PT Received On: 19  PT Start Time: 1205     PT Stop Time: 1218  PT Total Time (min): 13 min     Billable Minutes: Therapeutic Activity 13    Treatment Type: Treatment  PT/PTA: PTA     PTA Visit Number: 3     Telma Santana, PTA  2019

## 2019-04-04 NOTE — ASSESSMENT & PLAN NOTE
Consider void trial after 24 hour urine collection if completed  Continue Flomax  IRASEMA confirms stearns in appropriate position with decompressed bladder. Ascites in pelvis will make bladder scanner inaccurate.   Less suspicious of post-renal causes of low UOP

## 2019-04-04 NOTE — PT/OT/SLP PROGRESS
Occupational Therapy   Treatment    Name: Agus Ramos Jr.  MRN: [id number]  Admitting Diagnosis:  Acute metabolic encephalopathy       Recommendations:     Discharge Recommendations: nursing facility, skilled  Discharge Equipment Recommendations:  walker, rolling  Barriers to discharge:  None    Assessment:     Agus Ramos Jr. is a 78 y.o. male with a medical diagnosis of Acute metabolic encephalopathy.  The patient participated in AAROM to BUE while seated on the EOB. The patient required frequent redirection to perform all tasks. Performance deficits affecting function are weakness, impaired endurance, impaired self care skills, gait instability, impaired functional mobilty, impaired balance, decreased lower extremity function, decreased upper extremity function, decreased safety awareness, pain, edema, impaired cardiopulmonary response to activity.     Rehab Prognosis:  Fair; patient would benefit from acute skilled OT services to address these deficits and reach maximum level of function.       Plan:     Patient to be seen 5 x/week to address the above listed problems via self-care/home management, therapeutic activities, therapeutic exercises  · Plan of Care Expires:  4/15/19  · Plan of Care Reviewed with: patient    Subjective     Pain/Comfort:  · Pain Rating 1: 0/10    Objective:     Communicated with: Carmita navarro prior to session.  Patient found seated on the EOB with PTA with telemetry, oxygen, stearns catheter upon OT entry to room.    General Precautions: Standard, fall, respiratory   Orthopedic Precautions:N/A   Braces: N/A     Occupational Performance:     Bed Mobility:    · Patient completed Scooting/Bridging with stand by assistance  · Patient completed Sit to Supine with maximal assistance and with leg lift     Functional Mobility/Transfers:  · Functional Mobility: The patient performed scooting along the EOB.    Activities of Daily Living:  · Feeding:  set up assist    · Upper Body Dressing:  maximal assistance to doff back gown  · Lower Body Dressing: dependence        [facility] 6 Click ADL: 15    Treatment & Education:  The patient perormed AAROM to B shldr and B elbow x10 reps while seated on the EOB. The patient required rest between reps 2* c/o he gets SOB and needs time to recover.    Patient left right sidelying, with HOB elevated with all lines intact, call button in reach, bed alarm on and nurse, [facility] presentEducation:      GOALS:   Multidisciplinary Problems     Occupational Therapy Goals        Problem: Occupational Therapy Goal    Goal Priority Disciplines Outcome Interventions   Occupational Therapy Goal     OT, PT/OT Ongoing (interventions implemented as appropriate)    Description:  Goals to be met by: 4/15/19     Patient will increase functional independence with ADLs by performing:    UE Dressing with Set-up Assistance.  LE Dressing with Minimal Assistance.  Grooming while seated with Supervision.  Toileting from bedside commode with Moderate Assistance for hygiene and clothing management.   Bathing from  edge of bed with Moderate Assistance.  Sitting at edge of bed x20 minutes with Stand-by Assistance.  Rolling to Bilateral with Set-up Assistance.   Supine to sit with Supervision.  Stand pivot transfers with Minimal Assistance.  Toilet transfer to bedside commode with Minimal Assistance.  Upper extremity exercise program x10 reps per handout, with assistance as needed.                      Time Tracking:     OT Date of Treatment: 04/04/19  OT Start Time: 1217  OT Stop Time: 1230  OT Total Time (min): 13 min    Billable Minutes:Therapeutic Exercise 13    Anna Dumont OT  4/4/2019

## 2019-04-05 LAB
ANION GAP SERPL CALC-SCNC: 5 MMOL/L (ref 8–16)
BASOPHILS # BLD AUTO: 0.03 K/UL (ref 0–0.2)
BASOPHILS NFR BLD: 0.3 % (ref 0–1.9)
BUN SERPL-MCNC: 36 MG/DL (ref 8–23)
CALCIUM SERPL-MCNC: 10.7 MG/DL (ref 8.7–10.5)
CHLORIDE SERPL-SCNC: 111 MMOL/L (ref 95–110)
CO2 SERPL-SCNC: 32 MMOL/L (ref 23–29)
CREAT SERPL-MCNC: 1.3 MG/DL (ref 0.5–1.4)
DIFFERENTIAL METHOD: ABNORMAL
EOSINOPHIL # BLD AUTO: 0.1 K/UL (ref 0–0.5)
EOSINOPHIL NFR BLD: 1.5 % (ref 0–8)
ERYTHROCYTE [DISTWIDTH] IN BLOOD BY AUTOMATED COUNT: 24.8 % (ref 11.5–14.5)
EST. GFR  (AFRICAN AMERICAN): 60 ML/MIN/1.73 M^2
EST. GFR  (NON AFRICAN AMERICAN): 52 ML/MIN/1.73 M^2
GLUCOSE SERPL-MCNC: 101 MG/DL (ref 70–110)
HCT VFR BLD AUTO: 29.7 % (ref 40–54)
HGB BLD-MCNC: 8.2 G/DL (ref 14–18)
INTERPRETATION UR IFE-IMP: NORMAL
LYMPHOCYTES # BLD AUTO: 1.1 K/UL (ref 1–4.8)
LYMPHOCYTES NFR BLD: 12.9 % (ref 18–48)
MCH RBC QN AUTO: 22.4 PG (ref 27–31)
MCHC RBC AUTO-ENTMCNC: 27.6 G/DL (ref 32–36)
MCV RBC AUTO: 81 FL (ref 82–98)
MONOCYTES # BLD AUTO: 1 K/UL (ref 0.3–1)
MONOCYTES NFR BLD: 11.7 % (ref 4–15)
NEUTROPHILS # BLD AUTO: 6.4 K/UL (ref 1.8–7.7)
NEUTROPHILS NFR BLD: 73.6 % (ref 38–73)
PLATELET # BLD AUTO: 322 K/UL (ref 150–350)
PMV BLD AUTO: 11 FL (ref 9.2–12.9)
POCT GLUCOSE: 112 MG/DL (ref 70–110)
POCT GLUCOSE: 161 MG/DL (ref 70–110)
POCT GLUCOSE: 196 MG/DL (ref 70–110)
POCT GLUCOSE: 219 MG/DL (ref 70–110)
POTASSIUM SERPL-SCNC: 4 MMOL/L (ref 3.5–5.1)
RBC # BLD AUTO: 3.66 M/UL (ref 4.6–6.2)
SODIUM SERPL-SCNC: 148 MMOL/L (ref 136–145)
WBC # BLD AUTO: 8.69 K/UL (ref 3.9–12.7)

## 2019-04-05 PROCEDURE — 85025 COMPLETE CBC W/AUTO DIFF WBC: CPT

## 2019-04-05 PROCEDURE — 11000001 HC ACUTE MED/SURG PRIVATE ROOM

## 2019-04-05 PROCEDURE — 97110 THERAPEUTIC EXERCISES: CPT

## 2019-04-05 PROCEDURE — 25000003 PHARM REV CODE 250: Performed by: HOSPITALIST

## 2019-04-05 PROCEDURE — 99900035 HC TECH TIME PER 15 MIN (STAT)

## 2019-04-05 PROCEDURE — 36415 COLL VENOUS BLD VENIPUNCTURE: CPT

## 2019-04-05 PROCEDURE — 99232 SBSQ HOSP IP/OBS MODERATE 35: CPT | Mod: ,,, | Performed by: UROLOGY

## 2019-04-05 PROCEDURE — 97535 SELF CARE MNGMENT TRAINING: CPT

## 2019-04-05 PROCEDURE — 99232 SBSQ HOSP IP/OBS MODERATE 35: CPT | Mod: ,,, | Performed by: INTERNAL MEDICINE

## 2019-04-05 PROCEDURE — 25000003 PHARM REV CODE 250: Performed by: INTERNAL MEDICINE

## 2019-04-05 PROCEDURE — 99232 PR SUBSEQUENT HOSPITAL CARE,LEVL II: ICD-10-PCS | Mod: ,,, | Performed by: UROLOGY

## 2019-04-05 PROCEDURE — 63600175 PHARM REV CODE 636 W HCPCS: Performed by: HOSPITALIST

## 2019-04-05 PROCEDURE — 80048 BASIC METABOLIC PNL TOTAL CA: CPT

## 2019-04-05 PROCEDURE — 97530 THERAPEUTIC ACTIVITIES: CPT

## 2019-04-05 PROCEDURE — 94660 CPAP INITIATION&MGMT: CPT

## 2019-04-05 PROCEDURE — 99232 PR SUBSEQUENT HOSPITAL CARE,LEVL II: ICD-10-PCS | Mod: ,,, | Performed by: INTERNAL MEDICINE

## 2019-04-05 PROCEDURE — A4216 STERILE WATER/SALINE, 10 ML: HCPCS | Performed by: HOSPITALIST

## 2019-04-05 RX ADMIN — Medication 10 ML: at 11:04

## 2019-04-05 RX ADMIN — POLYETHYLENE GLYCOL 3350 17 G: 17 POWDER, FOR SOLUTION ORAL at 09:04

## 2019-04-05 RX ADMIN — FERROUS GLUCONATE TAB 324 MG (37.5 MG ELEMENTAL IRON) 324 MG: 324 (37.5 FE) TAB at 09:04

## 2019-04-05 RX ADMIN — LOSARTAN POTASSIUM 25 MG: 25 TABLET, FILM COATED ORAL at 09:04

## 2019-04-05 RX ADMIN — PANTOPRAZOLE SODIUM 40 MG: 40 TABLET, DELAYED RELEASE ORAL at 09:04

## 2019-04-05 RX ADMIN — DOCUSATE SODIUM 100 MG: 100 CAPSULE, LIQUID FILLED ORAL at 09:04

## 2019-04-05 RX ADMIN — INSULIN ASPART 2 UNITS: 100 INJECTION, SOLUTION INTRAVENOUS; SUBCUTANEOUS at 05:04

## 2019-04-05 RX ADMIN — TAMSULOSIN HYDROCHLORIDE 0.4 MG: 0.4 CAPSULE ORAL at 09:04

## 2019-04-05 RX ADMIN — ASPIRIN 81 MG: 81 TABLET, COATED ORAL at 09:04

## 2019-04-05 RX ADMIN — Medication 10 ML: at 05:04

## 2019-04-05 RX ADMIN — Medication 10 ML: at 12:04

## 2019-04-05 RX ADMIN — CARVEDILOL 25 MG: 6.25 TABLET, FILM COATED ORAL at 08:04

## 2019-04-05 RX ADMIN — CARVEDILOL 25 MG: 6.25 TABLET, FILM COATED ORAL at 09:04

## 2019-04-05 RX ADMIN — Medication 10 ML: at 09:04

## 2019-04-05 RX ADMIN — ATORVASTATIN CALCIUM 20 MG: 10 TABLET, FILM COATED ORAL at 08:04

## 2019-04-05 RX ADMIN — POLYETHYLENE GLYCOL 3350 17 G: 17 POWDER, FOR SOLUTION ORAL at 08:04

## 2019-04-05 RX ADMIN — FUROSEMIDE 80 MG: 10 INJECTION, SOLUTION INTRAMUSCULAR; INTRAVENOUS at 09:04

## 2019-04-05 RX ADMIN — DILTIAZEM HYDROCHLORIDE 30 MG: 30 TABLET, FILM COATED ORAL at 09:04

## 2019-04-05 RX ADMIN — SPIRONOLACTONE 25 MG: 25 TABLET ORAL at 09:04

## 2019-04-05 RX ADMIN — DOCUSATE SODIUM 100 MG: 100 CAPSULE, LIQUID FILLED ORAL at 08:04

## 2019-04-05 RX ADMIN — FUROSEMIDE 80 MG: 10 INJECTION, SOLUTION INTRAMUSCULAR; INTRAVENOUS at 05:04

## 2019-04-05 RX ADMIN — Medication 10 ML: at 06:04

## 2019-04-05 RX ADMIN — DILTIAZEM HYDROCHLORIDE 30 MG: 30 TABLET, FILM COATED ORAL at 08:04

## 2019-04-05 NOTE — PROGRESS NOTES
[facility]  Urology  Progress Note    Patient Name: Agus Ramos Jr.  MRN: [id number]  Admission Date: 3/23/2019  Hospital Length of Stay: 13 days  Code Status: Full Code   Attending Provider: Jose De Jesus Angulo MD   Primary Care Physician: Keaton Hardy MD    Subjective:     HPI:  Urinary Retention  Patient complains of urinary retention. Onset of retention was 1 day ago and was gradual in onset. Patient currently does have a urinary catheter in place.  300 ml of urine were drained when catheter was placed. Prior to this event voiding symptoms consisted of slow stream, intermittency. Prior treatments include watchful waiting. Recent medications that may have affected his voiding include none.  He is admitted after a fall for hypoglycemia.  He had difficulty voiding while admitted.  A few attempts were made to place a Boucher.  Finally a 12 Fr Coude was placed.  Bladder Scan showed 700 mL but he has not drained this much in several hours.        Interval History: no new complaints    Review of Systems   Constitutional: Negative.    HENT: Negative.    Eyes: Negative.    Respiratory: Negative for cough, chest tightness and shortness of breath.    Cardiovascular: Negative for chest pain.   Gastrointestinal: Negative.  Negative for constipation, diarrhea and nausea.   Genitourinary: Positive for penile swelling and scrotal swelling.   Musculoskeletal: Negative.    Neurological: Negative.    Psychiatric/Behavioral: Negative.      Objective:     Temp:  [97.4 °F (36.3 °C)-97.7 °F (36.5 °C)] 97.5 °F (36.4 °C)  Pulse:  [] 100  Resp:  [18-20] 20  SpO2:  [96 %-100 %] 99 %  BP: (116-127)/(62-83) 124/82     Body mass index is 31.06 kg/m².      Bladder Scan Volume (mL): 23 mL (03/28/19 0831)  Post Void Cath Residual Output (mL): 0 mL (03/28/19 0831)    Drains     Drain                 Urethral Catheter 03/29/19 0342 Coude 12 Fr. 7 days                Physical Exam    Significant Labs:    BMP:  Recent Labs    Lab 04/03/19  0330 04/04/19  0440 04/05/19  0422    148* 148*   K 4.4 4.2 4.0    112* 111*   CO2 32* 33* 32*   BUN 34* 34* 36*   CREATININE 1.3 1.3 1.3   CALCIUM 10.5 10.4 10.7*       CBC:   Recent Labs   Lab 04/03/19  0330 04/04/19  0440 04/05/19  0422   WBC 9.97 8.35 8.69   HGB 8.4* 8.0* 8.2*   HCT 30.9* 29.2* 29.7*    264 322       Blood Culture: No results for input(s): LABBLOO in the last 168 hours.  Urine Culture: No results for input(s): LABURIN in the last 168 hours.    Significant Imaging:                    Assessment/Plan:     Renal cyst, left   - Recommended for repeat IRASEMA in 6 months    Scrotal swelling   - JOSE LUIS reviewed     - Scrotal elevation at all times   - Diuresis    Urinary retention  Consider void trial after 24 hour urine collection if completed  Continue Flomax  IRASEMA confirms stearns in appropriate position with decompressed bladder. Ascites in pelvis will make bladder scanner inaccurate.   Less suspicious of post-renal causes of low UOP    Voiding Trial tody        VTE Risk Mitigation (From admission, onward)        Ordered     IP VTE HIGH RISK PATIENT  Once      03/23/19 1936     Reason for No Pharmacological VTE Prophylaxis  Once      03/23/19 1936     Place sequential compression device  Until discontinued      03/23/19 1934     Place DENIS hose  Until discontinued      03/23/19 1934          AISHA Wiggins MD  Urology  [facility]

## 2019-04-05 NOTE — PLAN OF CARE
Problem: Adult Inpatient Plan of Care  Goal: Plan of Care Review  Outcome: Ongoing (interventions implemented as appropriate)     04/04/19 1800   Plan of Care Review   Plan of Care Reviewed With patient   Patient is awake alert oriented to self, answers simple questions appropriately, needs reorienting to time, situation and limitations. Denies pain, SOB; nc oxygen in place. Patient tried to get OOB without assistance, free of falls, bed alarm on , call light in reach, hourly rounds, needed reinforcement for safety; placed tele sitter in room. Tolerating po intake, cardiac monitoring, maintained fluid restriction; 360 ml for my shift. Blood glucose monitoring. Sat in chair today, frequent turns, incontinence care. Boucher to gravity. Continue with plan of care as ordered. No distress noted.

## 2019-04-05 NOTE — PROGRESS NOTES
[facility] Hospital Medicine  Progress Note    Patient Name: Agus Ramos Jr.  MRN: [id number]  Patient Class: IP- Inpatient   Admission Date: 3/23/2019  Length of Stay: 13 days  Attending Physician: Jose De Jesus Angulo MD  Primary Care Provider: Keaton Hardy MD        Subjective:     Principal Problem:Acute metabolic encephalopathy    HPI:  Mr. Agus Ramos Jr. is a 78 y.o. male known to me with essential hypertension, type 2 diabetes mellitus (HbA1c 5.0% Mar 2019), CAD s/p CABG, chronic combined systolic and diastolic heart failure (LVEF 50% Mar 2019), CKD Stage 3, peripheral artery disease, chronic atrial fibrillation (DLG5QG9-GVQh score 5) on chronic anticoagulation, COPD, chronic respiratory failure with hypoxia, and anemia of chronic disease who presents to [facility] ED with complaints of a fall this morning.  He had much difficulty standing back up.  EMS was activated and they found that his capillary glucose was 14 mg/dL after which he was given an ampule of 50% dextrose.  His mentation returned to baseline thereafter.  He denies any tremors nor diaphoresis.  He says that he's been feeling weak and dizzy today but denies any loss of consciousness, nor antecedent chest pain, shortness of breath, palpitations, fevers, chills, nausea, vomiting, abdominal pain, or any diarrhea.  He did not trip or slip or anything.  He has had a good appetite but cannot say for sure if he has been taking too much of his diabetes medications.  He denies any recent changes to his medications.  He otherwise has been in his usual state of health.    Hospital Course:  79 y/o male presented with weakness.  Hx of DM on Glipizide and Metformin.  Patient noted to be hypoglycemic.  Initially responded to D50, but then became persistently hypoglycemic.  Admitted to ICU on D10 drip and hourly glucose monitoring.  Also started on Octreotide drip.  No further episodes of hypoglycemia and D10 switched to D5.  Octreotide  drip stopped.  Now getting more hyperglycemic.  Stopping D5 drip and monitor off dextrose.  PT/OT evaluation.  Recommended SNF,consulted SW.  Last HbA1C 2 weeks ago 5.0,likely no need any more for hypoglycemic agents,diet should  be fine.  Has scrotal swelling,on IV lasix,lifting scrotum.  Has anasarca,all over body,increased lasix ,have ,proteinuria,check protein/Cr. Ratio,elevated,consulted nephrology.still has massive anasarca.  Urology following for scrotal swelling,improved.  Has left renal cyst,on US,need follow up with repeat US  in 6 months.  24 urine collection is in Process to R/O nephrotic syndrome.  Consulted oncology for abnormal UPEP.    Interval History: No new issues.    Review of Systems   HENT: Negative for ear discharge and ear pain.    Eyes: Negative for pain and itching.   Endocrine: Negative for polyphagia and polyuria.   Neurological: Negative for seizures and syncope.     Objective:     Vital Signs (Most Recent):  Temp: 97.9 °F (36.6 °C) (04/05/19 1140)  Pulse: 73 (04/05/19 1140)  Resp: 18 (04/05/19 1140)  BP: 107/67 (04/05/19 1140)  SpO2: 99 % (04/05/19 1140) Vital Signs (24h Range):  Temp:  [97.4 °F (36.3 °C)-97.9 °F (36.6 °C)] 97.9 °F (36.6 °C)  Pulse:  [] 73  Resp:  [18-20] 18  SpO2:  [96 %-100 %] 99 %  BP: (107-127)/(64-83) 107/67     Weight: 95.4 kg (210 lb 5.1 oz)  Body mass index is 31.06 kg/m².    Intake/Output Summary (Last 24 hours) at 4/5/2019 1440  Last data filed at 4/5/2019 0959  Gross per 24 hour   Intake 500 ml   Output 900 ml   Net -400 ml      Physical Exam   Constitutional: He is oriented to person, place, and time. He appears well-developed and well-nourished. No distress.   HENT:   Head: Normocephalic and atraumatic.   Right Ear: External ear normal.   Left Ear: External ear normal.   Nose: Nose normal.   Eyes: Right eye exhibits no discharge. Left eye exhibits no discharge.   Neck: Normal range of motion.   Cardiovascular:   Irregularly irregular, no murmurs or  gallops   Pulmonary/Chest: Effort normal. No respiratory distress.   Abdominal: Soft. Bowel sounds are normal. He exhibits distension. There is no tenderness. There is no rebound and no guarding.   Genitourinary:   Genitourinary Comments: Scrotal swelling.   Musculoskeletal: Normal range of motion. He exhibits edema.   Neurological: He is alert and oriented to person, place, and time.   Skin: Skin is warm and dry. He is not diaphoretic. No erythema.   Psychiatric: He has a normal mood and affect. His behavior is normal. Judgment and thought content normal.   Nursing note and vitals reviewed.      Significant Labs:   BMP:   Recent Labs   Lab 04/05/19  0422      *   K 4.0   *   CO2 32*   BUN 36*   CREATININE 1.3   CALCIUM 10.7*     CBC:   Recent Labs   Lab 04/04/19 0440 04/05/19 0422   WBC 8.35 8.69   HGB 8.0* 8.2*   HCT 29.2* 29.7*    322     Assessment/Plan:      * Acute metabolic encephalopathy  Patient was noted to have a capillary glucose of 14 mg/dL in the field for which he was given an ampule of 50% dextrose with improvement of his mentation.  His initial serum glucose here was 30 mg/dL but later was still 44 mg/dL after treatment.  He improved into the 180's but continued to drop to 81 mg/dL then to 36 mg/dL.    Admitted to ICU on D10 drip and hourly glucose checks.  Also started on Octreotide drip.  Glucose increasing with no further episodes of hypoglycemia.  Will change D10 to D5 and monitor glucose every 4 hours.  The etiology of his hypoglycemia is unclear but he is on a sulfonylurea at home.  He does have 1+ leukocytes and moderate bacteria on urine, but many amorphous cells.  Will repeat UA with UCx.  Afebrile.  Will hold off on ABx's for now.  Getting more hyperglycemic.  Stop D5 and monitor off dextrose.    PT/OT eval.  Last HbA1C 2 weeks ago 5.0,likely no need any more for hypoglycemic agents,diet should  be fine.  SNF recommended.      Proteinuria  Has proteinuria with  Anasarca.  On IV Lasix.  24 urine protein collection in process.  Consulted oncology for abnormal UPEP  Appreciate Nephrology and Oncology input.  Continue with diuresis.        Urinary retention  Appreciate Urology input.  Voiding trial today.      Anemia due to chronic kidney disease        Renal cyst, left  Has left renal cyst,on US,need follow up with repeat US  in 6 weeks,      Scrotal swelling    Has scrotal swelling,on IV lasix,lifting scrotum.Has anasarca,all over body,increased lasix,    Peripheral artery disease  Stable; will continue his home regimen of aspirin and atorvastatin.    Acute on chronic combined systolic and diastolic heart failure  Will continue with IV lasix    Chronic respiratory failure with hypoxia  Stable; will continue his home supplemental oxygen therapy.    Centrilobular emphysema  Clinically-stable without wheezing.  Will provide as-needed JENAE/LAMA available.    Iron deficiency anemia  The patient's H/H is stable and consistent with previous laboratory measurements, and the patient exhibits no signs or symptoms of acute bleeding; there is no indication for transfusion.  Will continue to monitor.    Type 2 diabetes mellitus, controlled, with renal complications  As addressed above.    CKD Stage 3  His renal function appears to be at his baseline.    Essential hypertension  Continue home regimen of carvedilol, diltiazem, furosemide, and tamsulosin, and provide as-needed clonidine.    CAD (coronary artery disease)  Stable; will continue his home regimen of aspirin, atorvastatin, and carvedilol.    Chronic atrial fibrillation  Patient is in atrial fibrillation at this time which is baseline for him.  He was taken off of anticoagulation during his last admission due to anemia and was instructed not to take it until outpatient follow-up with gastroenterology.  Will continue to monitor.      VTE Risk Mitigation (From admission, onward)        Ordered     IP VTE HIGH RISK PATIENT  Once       03/23/19 1936     Reason for No Pharmacological VTE Prophylaxis  Once      03/23/19 1936     Place sequential compression device  Until discontinued      03/23/19 1934     Place DENIS hose  Until discontinued      03/23/19 1934              Jose De Jesus Angulo MD  Department of Hospital Medicine   [facility]

## 2019-04-05 NOTE — PLAN OF CARE
Problem: Physical Therapy Goal  Goal: Physical Therapy Goal  Goals to be met by: 19    Patient will increase functional independence with mobility by performin. Sit to stand transfer with Stand-by Assistance  2. Gait x150 feet with stand-by Assistance using Rolling Walker  3. Lower extremity exercise program x30 reps per handout, with supervision     Outcome: Ongoing (interventions implemented as appropriate)    Patient required minimal assistance to transfer into bedside chair with RW.

## 2019-04-05 NOTE — SUBJECTIVE & OBJECTIVE
Interval History: No new issues.    Review of Systems   HENT: Negative for ear discharge and ear pain.    Eyes: Negative for pain and itching.   Endocrine: Negative for polyphagia and polyuria.   Neurological: Negative for seizures and syncope.     Objective:     Vital Signs (Most Recent):  Temp: 97.9 °F (36.6 °C) (04/05/19 1140)  Pulse: 73 (04/05/19 1140)  Resp: 18 (04/05/19 1140)  BP: 107/67 (04/05/19 1140)  SpO2: 99 % (04/05/19 1140) Vital Signs (24h Range):  Temp:  [97.4 °F (36.3 °C)-97.9 °F (36.6 °C)] 97.9 °F (36.6 °C)  Pulse:  [] 73  Resp:  [18-20] 18  SpO2:  [96 %-100 %] 99 %  BP: (107-127)/(64-83) 107/67     Weight: 95.4 kg (210 lb 5.1 oz)  Body mass index is 31.06 kg/m².    Intake/Output Summary (Last 24 hours) at 4/5/2019 1440  Last data filed at 4/5/2019 0959  Gross per 24 hour   Intake 500 ml   Output 900 ml   Net -400 ml      Physical Exam   Constitutional: He is oriented to person, place, and time. He appears well-developed and well-nourished. No distress.   HENT:   Head: Normocephalic and atraumatic.   Right Ear: External ear normal.   Left Ear: External ear normal.   Nose: Nose normal.   Eyes: Right eye exhibits no discharge. Left eye exhibits no discharge.   Neck: Normal range of motion.   Cardiovascular:   Irregularly irregular, no murmurs or gallops   Pulmonary/Chest: Effort normal. No respiratory distress.   Abdominal: Soft. Bowel sounds are normal. He exhibits distension. There is no tenderness. There is no rebound and no guarding.   Genitourinary:   Genitourinary Comments: Scrotal swelling.   Musculoskeletal: Normal range of motion. He exhibits edema.   Neurological: He is alert and oriented to person, place, and time.   Skin: Skin is warm and dry. He is not diaphoretic. No erythema.   Psychiatric: He has a normal mood and affect. His behavior is normal. Judgment and thought content normal.   Nursing note and vitals reviewed.      Significant Labs:   BMP:   Recent Labs   Lab  04/05/19 0422      *   K 4.0   *   CO2 32*   BUN 36*   CREATININE 1.3   CALCIUM 10.7*     CBC:   Recent Labs   Lab 04/04/19 0440 04/05/19 0422   WBC 8.35 8.69   HGB 8.0* 8.2*   HCT 29.2* 29.7*    322

## 2019-04-05 NOTE — PLAN OF CARE
Problem: Adult Inpatient Plan of Care  Goal: Plan of Care Review  Outcome: Ongoing (interventions implemented as appropriate)     04/05/19 4617   Plan of Care Review   Plan of Care Reviewed With patient   Patient is awake alert oriented, answers simple questions appropriately, forgetful at times. Blood glucose and cardiac monitoring.  IV lasix for diuresis as ordered. Incontinence care, worked with therapy, Turns frequently with weight shift assistance, sat in chair. Hourly rounds, bed alarm and tele sitter. Free of falls.. Continue with plan of care as ordered.  No distress noted

## 2019-04-05 NOTE — SUBJECTIVE & OBJECTIVE
Interval History: Pt OOB in chair. No new complaints.     Oncology Treatment Plan:   [No treatment plan]    Medications:  Continuous Infusions:  Scheduled Meds:   aspirin  81 mg Oral Daily    atorvastatin  20 mg Oral QHS    carvedilol  25 mg Oral BID    diltiaZEM  30 mg Oral Q12H    docusate sodium  100 mg Oral BID    ferrous gluconate  324 mg Oral Daily with breakfast    furosemide  80 mg Intravenous BID    losartan  25 mg Oral Daily    pantoprazole  40 mg Oral Daily    polyethylene glycol  17 g Oral BID    sodium chloride 0.9%  10 mL Intravenous Q6H    spironolactone  25 mg Oral Daily    tamsulosin  0.4 mg Oral Daily     PRN Meds:acetaminophen, albuterol-ipratropium, cloNIDine, dextrose 50%, dextrose 50%, glucagon (human recombinant), glucose, glucose, influenza, insulin aspart U-100, ondansetron, pneumoc 13-alan conj-dip cr(PF), promethazine (PHENERGAN) IVPB, ramelteon, senna-docusate 8.6-50 mg, Fl[place] PICC Protocol **AND** sodium chloride 0.9% **AND** sodium chloride 0.9%     Review of Systems   Constitutional: Positive for fatigue.   Respiratory: Positive for shortness of breath. Negative for cough.    Cardiovascular: Positive for leg swelling. Negative for chest pain.   Gastrointestinal: Negative for abdominal pain and diarrhea.   Musculoskeletal: Negative for back pain.   Neurological: Negative for headaches.   Psychiatric/Behavioral: Positive for confusion.     Objective:     Vital Signs (Most Recent):  Temp: 97.9 °F (36.6 °C) (04/05/19 1140)  Pulse: 73 (04/05/19 1140)  Resp: 18 (04/05/19 1140)  BP: 107/67 (04/05/19 1140)  SpO2: 99 % (04/05/19 1140) Vital Signs (24h Range):  Temp:  [97.4 °F (36.3 °C)-97.9 °F (36.6 °C)] 97.9 °F (36.6 °C)  Pulse:  [] 73  Resp:  [18-20] 18  SpO2:  [96 %-100 %] 99 %  BP: (107-127)/(64-83) 107/67     Weight: 95.4 kg (210 lb 5.1 oz)  Body mass index is 31.06 kg/m².  Body surface area is 2.16 meters squared.      Intake/Output Summary (Last 24 hours) at 4/5/2019  1425  Last data filed at 4/5/2019 0959  Gross per 24 hour   Intake 500 ml   Output 900 ml   Net -400 ml       Physical Exam   Constitutional: He appears well-developed and well-nourished.   HENT:   Head: Normocephalic.   Eyes: Conjunctivae are normal. No scleral icterus.   Neck: Normal range of motion. Neck supple. No thyromegaly present.   Cardiovascular: An irregularly irregular rhythm present.   Pulmonary/Chest: Effort normal and breath sounds normal. He has no wheezes. He has no rales.   Abdominal: Soft. Bowel sounds are normal. He exhibits no distension and no mass. There is no hepatosplenomegaly. There is no tenderness. There is no rebound and no guarding.   Musculoskeletal: He exhibits edema.   Neurological: He is alert.   confused   Skin: No rash noted. No erythema.       Significant Labs:   All pertinent labs within the past 24 hours have been reviewed    Diagnostic Results:  I have reviewed and interpreted all pertinent imaging results/findings within the past 24 hours

## 2019-04-05 NOTE — PT/OT/SLP PROGRESS
Physical Therapy Treatment    Patient Name:  Agus Ramos Jr.   MRN:  [id number]    Recommendations:     Discharge Recommendations:  nursing facility, skilled   Discharge Equipment Recommendations: walker, rolling   Barriers to discharge: limited functional mobility     Assessment:     Agus Ramos Jr. is a 78 y.o. male admitted with a medical diagnosis of Acute metabolic encephalopathy.  He presents with the following impairments/functional limitations:  weakness, impaired endurance, impaired functional mobilty, gait instability, impaired balance, decreased lower extremity function, decreased safety awareness, edema, impaired cardiopulmonary response to activity, impaired self care skills. Patient with slow progression in PT. He is only willing to get in bedside chair and declines ambulation in hallway. He is able to get in bedside chair with nursing staff assistance and RW. PT will decrease patient to 3x's/week while in the hospital.     Rehab Prognosis: Fair; patient would benefit from acute skilled PT services to address these deficits and reach maximum level of function.    Recent Surgery: * No surgery found *      Plan:     During this hospitalization, patient to be seen 3 x/week to address the identified rehab impairments via gait training, therapeutic activities, therapeutic exercises and progress toward the following goals:    · Plan of Care Expires:  04/08/19    Subjective     Chief Complaint: Patient agreeable to get in bedside chair.   Patient/Family Comments/goals: To get in chair.   Pain/Comfort:  · Pain Rating 1: 0/10      Objective:     Communicated with nurse Vizcarra prior to session.  Patient found supine with telemetry, stearns catheter, oxygen upon PT entry to room.     General Precautions: Standard, fall, respiratory   Orthopedic Precautions:N/A   Braces:   n/a    Functional Mobility:  · Bed Mobility:     · Supine to Sit: minimum assistance  · Transfers:     · Sit to Stand:  minimum assistance  with rolling walker  · Gait: Patient ambulated ~6 steps with Rolling Walker and Minimum assistance using 3-point gait. Patient demonstrated decreased arnold, decreased velocity of limb motion and decreased step length during gait due to impaired balance, decreased strength and decreased endurance.    AM-PAC 6 CLICK MOBILITY  Turning over in bed (including adjusting bedclothes, sheets and blankets)?: 4  Sitting down on and standing up from a chair with arms (e.g., wheelchair, bedside commode, etc.): 3  Moving from lying on back to sitting on the side of the bed?: 3  Moving to and from a bed to a chair (including a wheelchair)?: 3  Need to walk in hospital room?: 3  Climbing 3-5 steps with a railing?: 2  Basic Mobility Total Score: 18     Therapeutic Activities and Exercises:  Patient performed B LE therex while reclined in bedside chair x15 reps A/P and x5 reps for SLR and SAQ over pillow.     Once seated in bedside chair patient stated that he needed to urinate. No urinal present in room after patient's stearns removed this AM. Patient urinated all over the floor. Nurse Carmita notified that patient urinated on the floor, PT cleaned patient up, and patient provided with urinal. She verbalized understanding.     Patient left reclined in bedside chair with all lines intact, call button in reach, chair alarm on and nurse Carmita notified.    GOALS:   Multidisciplinary Problems     Physical Therapy Goals        Problem: Physical Therapy Goal    Goal Priority Disciplines Outcome Goal Variances Interventions   Physical Therapy Goal     PT, PT/OT Ongoing (interventions implemented as appropriate)     Description:  Goals to be met by: 19    Patient will increase functional independence with mobility by performin. Sit to stand transfer with Stand-by Assistance  2. Gait x150 feet with stand-by Assistance using Rolling Walker  3. Lower extremity exercise program x30 reps per handout, with supervision                       Time Tracking:     PT Received On: 04/05/19  PT Start Time: 1133     PT Stop Time: 1200  PT Total Time (min): 27 min     Billable Minutes: Therapeutic Activity 17 and Therapeutic Exercise 10    Treatment Type: Treatment  PT/PTA: PT     PTA Visit Number: 0     Katie Coughlin, PT  04/05/2019

## 2019-04-05 NOTE — ASSESSMENT & PLAN NOTE
Patient with mildly elevated kappa free light chains from 2018 in setting of normal lambda free light chains and Jaki normal kappa lambda free light chain ratio.  Recent testing reveals elevated kappa free light chain and elevated Lambda FLC with nl K/L FLCR  could be secondary to renal dysfuntion  SIFE pending  24 hr urine studies pending

## 2019-04-05 NOTE — PLAN OF CARE
Problem: Occupational Therapy Goal  Goal: Occupational Therapy Goal  Goals to be met by: 4/15/19     Patient will increase functional independence with ADLs by performing:    UE Dressing with Set-up Assistance.  LE Dressing with Minimal Assistance.  Grooming while seated with Supervision.  Toileting from bedside commode with Moderate Assistance for hygiene and clothing management.   Bathing from  edge of bed with Moderate Assistance.  Sitting at edge of bed x20 minutes with Stand-by Assistance.  Rolling to Bilateral with Set-up Assistance.   Supine to sit with Supervision.  Stand pivot transfers with Minimal Assistance. Met 4/5/19  Toilet transfer to bedside commode with Minimal Assistance.  Upper extremity exercise program x10 reps per handout, with assistance as needed.     Outcome: Ongoing (interventions implemented as appropriate)  The patient is progressing slowly with improved functional mobility. The patient was found incontinent of B & B while seated in the chair. The patient was educated re: need to call for assistance to use a urinal or transfer to the BSC.    Comments: Patient will benefit from continued OT to address functional deficits.

## 2019-04-05 NOTE — SUBJECTIVE & OBJECTIVE
Interval History: no new complaints    Review of Systems   Constitutional: Negative.    HENT: Negative.    Eyes: Negative.    Respiratory: Negative for cough, chest tightness and shortness of breath.    Cardiovascular: Negative for chest pain.   Gastrointestinal: Negative.  Negative for constipation, diarrhea and nausea.   Genitourinary: Positive for penile swelling and scrotal swelling.   Musculoskeletal: Negative.    Neurological: Negative.    Psychiatric/Behavioral: Negative.      Objective:     Temp:  [97.4 °F (36.3 °C)-97.7 °F (36.5 °C)] 97.5 °F (36.4 °C)  Pulse:  [] 100  Resp:  [18-20] 20  SpO2:  [96 %-100 %] 99 %  BP: (116-127)/(62-83) 124/82     Body mass index is 31.06 kg/m².      Bladder Scan Volume (mL): 23 mL (03/28/19 0831)  Post Void Cath Residual Output (mL): 0 mL (03/28/19 0831)    Drains     Drain                 Urethral Catheter 03/29/19 0342 Coude 12 Fr. 7 days                Physical Exam    Significant Labs:    BMP:  Recent Labs   Lab 04/03/19  0330 04/04/19  0440 04/05/19  0422    148* 148*   K 4.4 4.2 4.0    112* 111*   CO2 32* 33* 32*   BUN 34* 34* 36*   CREATININE 1.3 1.3 1.3   CALCIUM 10.5 10.4 10.7*       CBC:   Recent Labs   Lab 04/03/19  0330 04/04/19  0440 04/05/19  0422   WBC 9.97 8.35 8.69   HGB 8.4* 8.0* 8.2*   HCT 30.9* 29.2* 29.7*    264 322       Blood Culture: No results for input(s): LABBLOO in the last 168 hours.  Urine Culture: No results for input(s): LABURIN in the last 168 hours.    Significant Imaging:

## 2019-04-05 NOTE — ASSESSMENT & PLAN NOTE
Consider void trial after 24 hour urine collection if completed  Continue Flomax  IRASEMA confirms stearns in appropriate position with decompressed bladder. Ascites in pelvis will make bladder scanner inaccurate.   Less suspicious of post-renal causes of low UOP    Voiding Trial tody

## 2019-04-05 NOTE — PROGRESS NOTES
Awake alert oriented NAD    Denies CNS ENT CP GI  RHEUM OR DERM SX  Past Medical History:   Diagnosis Date    Anemia 05/03/2018    pending blood transfusion    Anticoagulant long-term use     apixaban    Atrial fibrillation     CKD (chronic kidney disease)     Congestive heart failure     COPD (chronic obstructive pulmonary disease)     Coronary artery disease     Diabetes mellitus     Encounter for blood transfusion     HLD (hyperlipidemia)     Hypertension     MI (myocardial infarction)     On home oxygen therapy     Pacemaker     left chest    Peripheral vascular disease     Requires assistance with activities of daily living (ADL)     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     S/P femoral-popliteal bypass surgery left    Stented coronary artery     Tobacco use     Unsteady gait      Review of patient's allergies indicates:  No Known Allergies    Current Facility-Administered Medications   Medication    acetaminophen tablet 650 mg    albuterol-ipratropium 2.5 mg-0.5 mg/3 mL nebulizer solution 3 mL    aspirin EC tablet 81 mg    atorvastatin tablet 20 mg    carvedilol tablet 25 mg    cloNIDine tablet 0.1 mg    dextrose 50% injection 12.5 g    dextrose 50% injection 25 g    diltiaZEM tablet 30 mg    docusate sodium capsule 100 mg    ferrous gluconate tablet 324 mg    furosemide injection 80 mg    glucagon (human recombinant) injection 1 mg    glucose chewable tablet 16 g    glucose chewable tablet 24 g    influenza (FLUZONE HIGH-DOSE) vaccine 0.5 mL    insulin aspart U-100 pen 0-5 Units    losartan tablet 25 mg    ondansetron injection 8 mg    pantoprazole EC tablet 40 mg    pneumoc 13-alan conj-dip cr(PF) (PREVNAR 13 (PF)) 0.5 mL    polyethylene glycol packet 17 g    promethazine (PHENERGAN) 6.25 mg in dextrose 5 % 50 mL IVPB    ramelteon tablet 8 mg    senna-docusate 8.6-50 mg per tablet 1 tablet    sodium chloride 0.9% flush 10 mL    And    sodium chloride 0.9% flush 10 mL     spironolactone tablet 25 mg    tamsulosin 24 hr capsule 0.4 mg       LABS    Recent Results (from the past 24 hour(s))   POCT glucose    Collection Time: 04/04/19 11:07 AM   Result Value Ref Range    POCT Glucose 220 (H) 70 - 110 mg/dL   POCT glucose    Collection Time: 04/04/19  4:29 PM   Result Value Ref Range    POCT Glucose 216 (H) 70 - 110 mg/dL   POCT glucose    Collection Time: 04/04/19  7:34 PM   Result Value Ref Range    POCT Glucose 141 (H) 70 - 110 mg/dL   CBC auto differential    Collection Time: 04/05/19  4:22 AM   Result Value Ref Range    WBC 8.69 3.90 - 12.70 K/uL    RBC 3.66 (L) 4.60 - 6.20 M/uL    Hemoglobin 8.2 (L) 14.0 - 18.0 g/dL    Hematocrit 29.7 (L) 40.0 - 54.0 %    MCV 81 (L) 82 - 98 fL    MCH 22.4 (L) 27.0 - 31.0 pg    MCHC 27.6 (L) 32.0 - 36.0 g/dL    RDW 24.8 (H) 11.5 - 14.5 %    Platelets 322 150 - 350 K/uL    MPV 11.0 9.2 - 12.9 fL    Gran # (ANC) 6.4 1.8 - 7.7 K/uL    Lymph # 1.1 1.0 - 4.8 K/uL    Mono # 1.0 0.3 - 1.0 K/uL    Eos # 0.1 0.0 - 0.5 K/uL    Baso # 0.03 0.00 - 0.20 K/uL    Gran% 73.6 (H) 38.0 - 73.0 %    Lymph% 12.9 (L) 18.0 - 48.0 %    Mono% 11.7 4.0 - 15.0 %    Eosinophil% 1.5 0.0 - 8.0 %    Basophil% 0.3 0.0 - 1.9 %    Differential Method Automated    Basic metabolic panel    Collection Time: 04/05/19  4:22 AM   Result Value Ref Range    Sodium 148 (H) 136 - 145 mmol/L    Potassium 4.0 3.5 - 5.1 mmol/L    Chloride 111 (H) 95 - 110 mmol/L    CO2 32 (H) 23 - 29 mmol/L    Glucose 101 70 - 110 mg/dL    BUN, Bld 36 (H) 8 - 23 mg/dL    Creatinine 1.3 0.5 - 1.4 mg/dL    Calcium 10.7 (H) 8.7 - 10.5 mg/dL    Anion Gap 5 (L) 8 - 16 mmol/L    eGFR if African American 60 >60 mL/min/1.73 m^2    eGFR if non African American 52 (A) >60 mL/min/1.73 m^2   POCT glucose    Collection Time: 04/05/19  7:30 AM   Result Value Ref Range    POCT Glucose 112 (H) 70 - 110 mg/dL   ]    I/O last 3 completed shifts:  In: 555 [P.O.:545; I.V.:10]  Out: 1150 [Urine:1150]    Vitals:    04/04/19 2315  04/05/19 0335 04/05/19 0438 04/05/19 0731   BP: 126/78 127/83  124/82   Pulse: 86 80  100   Resp: 18 20     Temp: 97.4 °F (36.3 °C) 97.5 °F (36.4 °C)     TempSrc: Axillary Axillary  Oral   SpO2: 100% 100% 99% 99%   Weight:       Height:           No Jvd, Thyromegaly or Lymphadenopathy  Lungs: Fairly clear anteriorly and laterally  Cor: RRR no G or rubs  Abd: Soft benign good bowel sounds non tender  Ext: Pos edema    A)    CKD3   NON NEPHROTIC Proteinuria   Low alb possible nephrotic snd  T2DM  CHF  COPD  Urinary retention Followed by Dr Zayas  High [place] free chains in JOSE but Normal K/L ratio ? MGUS Pt seem By Dr Bell   CAD  CHF  AICD  Anemia     P)     Cont pressing with diuretics and diet  It is unclear why the serum albumin is so low, Pt is  Not nephrotic, considerations might include poor po intake, poor absorption of protein, enteric losses of protein or poor liver fx      Might want also try metolazone 2.5-5 mg po qd x 1 week

## 2019-04-05 NOTE — PT/OT/SLP PROGRESS
Occupational Therapy   Treatment    Name: Agus Ramos Jr.  MRN: [id number]  Admitting Diagnosis:  Acute metabolic encephalopathy       Recommendations:     Discharge Recommendations: nursing facility, skilled  Discharge Equipment Recommendations:  walker, rolling  Barriers to discharge:  None(The patient requires assist with self care and functional mobility)    Assessment:     Agus Ramos Jr. is a 78 y.o. male with a medical diagnosis of Acute metabolic encephalopathy.  The patient is progressing slowly in OT. The patient appears to be self limiting. The patient requires assist with all self care and functional mobility and will benefit from SNF.   Performance deficits affecting function are weakness, impaired self care skills, impaired endurance, gait instability, impaired functional mobilty, impaired balance, decreased upper extremity function, decreased lower extremity function, decreased safety awareness, edema, impaired cardiopulmonary response to activity.     Rehab Prognosis:  Fair; patient would benefit from acute skilled OT services to address these deficits and reach maximum level of function.       Plan:     Patient to be seen 5 x/week to address the above listed problems via self-care/home management, therapeutic exercises, therapeutic activities  · Plan of Care Expires: 04/15/194/15/19  · Plan of Care Reviewed with: patient    Subjective     Pain/Comfort:  Pain Rating 1: 0/10    Objective:     Communicated with: Carmita navarro prior to session.  Patient found up in chair, reclined with telemetry upon OT entry to room.    General Precautions: Standard, respiratory, fall   Orthopedic Precautions:N/A   Braces: N/A     Occupational Performance:     Bed Mobility:    · Patient completed Scooting/Bridging with contact guard assistance  · Patient completed Sit to Supine with moderate assistance and with leg lift     Functional Mobility/Transfers:  · Patient completed Sit <> Stand Transfer with moderate  assistance  with  rolling walker   · Functional Mobility: The patient tolerated standing with a RW for bathing and to don diaper. The patient stood ~5 min with good balance. The patient was able to step from the chair to the bed with CGA and verbal cues.    Activities of Daily Living:  · Bathing: The patient was able wipe his groin and top of legs while seated in the chair. The patient required VC and encouragement.  The patient was dependent to bath other body parts 2* bowel accident.  · Upper Body Dressing: moderate assistance to don/doff gown while seated in the chair  · Toileting: incontinent of B & B.        [facility] 6 Click ADL: 16    Treatment & Education:  The patient was educated re: the need to request nursing assist to use a urinal or transfer to a [facility] to have a BM. The patient verbalized understanding.    Patient left HOB elevated with all lines intact, call button in reach, bed alarm on, nurse, Carmita notified, spouse present and [employer] MonitorEducation:      GOALS:   Multidisciplinary Problems     Occupational Therapy Goals        Problem: Occupational Therapy Goal    Goal Priority Disciplines Outcome Interventions   Occupational Therapy Goal     OT, PT/OT Ongoing (interventions implemented as appropriate)    Description:  Goals to be met by: 4/15/19     Patient will increase functional independence with ADLs by performing:    UE Dressing with Set-up Assistance.  LE Dressing with Minimal Assistance.  Grooming while seated with Supervision.  Toileting from bedside commode with Moderate Assistance for hygiene and clothing management.   Bathing from  edge of bed with Moderate Assistance.  Sitting at edge of bed x20 minutes with Stand-by Assistance.  Rolling to Bilateral with Set-up Assistance.   Supine to sit with Supervision.  Stand pivot transfers with Minimal Assistance. Met 4/5/19  Toilet transfer to bedside commode with Minimal Assistance.  Upper extremity exercise program x10 reps per handout, with  assistance as needed.                       Time Tracking:     OT Date of Treatment: 04/05/19  OT Start Time: 1332  OT Stop Time: 1402  OT Total Time (min): 30 min    Billable Minutes:Self Care/Home Management 30 Jankvng Dumont OT  4/5/2019

## 2019-04-05 NOTE — PROGRESS NOTES
[facility]  Hematology/Oncology  Progress Note    Patient Name: Agus Ramos Jr.  Admission Date: 3/23/2019  Hospital Length of Stay: 13 days  Code Status: Full Code     Subjective:     HPI:  78-year-old male with  Congestive heart failure, Coronary artery disease.Diabetes mellitus, AFib Hypertension; MI (myocardial infarction),  Pacemaker; Peripheral vascular disease; S/P CABG x 3;  Stented coronary artery, Tobacco use, CKD stage 3 Anemia in CKD, chronic respiratory failure with hypoxia presents to [facility] ED after he suffered a fall. He was also confused. He was admitted with renal failure Pt's capillary glucose level 14mg/dl. Pt had been weak and dizzy. No fevers/nausea/vomiting. Pt with chronic fatigue. Pt known to me and followed for anemia. Pt did not complete planned GI evaluation. EGD 6/1/2018 - no acute finding, bleeding source. He is followed by Nephrology during hospitalization for proteinuria.  Serum free light chains reveals a kappa free light chain of 2.68 mg/dL lambda free light chain 2.62 mg/dL and a kappa/lambda free light chain ratio of 0.95 SPEP reveals decreased total proteins.  24 hr urine studies pending  Consulted for elevated urine chain    Interval History: Pt OOB in chair. No new complaints.     Oncology Treatment Plan:   [No treatment plan]    Medications:  Continuous Infusions:  Scheduled Meds:   aspirin  81 mg Oral Daily    atorvastatin  20 mg Oral QHS    carvedilol  25 mg Oral BID    diltiaZEM  30 mg Oral Q12H    docusate sodium  100 mg Oral BID    ferrous gluconate  324 mg Oral Daily with breakfast    furosemide  80 mg Intravenous BID    losartan  25 mg Oral Daily    pantoprazole  40 mg Oral Daily    polyethylene glycol  17 g Oral BID    sodium chloride 0.9%  10 mL Intravenous Q6H    spironolactone  25 mg Oral Daily    tamsulosin  0.4 mg Oral Daily     PRN Meds:acetaminophen, albuterol-ipratropium, cloNIDine, dextrose 50%, dextrose 50%, glucagon (human  recombinant), glucose, glucose, influenza, insulin aspart U-100, ondansetron, pneumoc 13-alan conj-dip cr(PF), promethazine (PHENERGAN) IVPB, ramelteon, senna-docusate 8.6-50 mg, Flushing PICC Protocol **AND** sodium chloride 0.9% **AND** sodium chloride 0.9%     Review of Systems   Constitutional: Positive for fatigue.   Respiratory: Positive for shortness of breath. Negative for cough.    Cardiovascular: Positive for leg swelling. Negative for chest pain.   Gastrointestinal: Negative for abdominal pain and diarrhea.   Musculoskeletal: Negative for back pain.   Neurological: Negative for headaches.   Psychiatric/Behavioral: Positive for confusion.     Objective:     Vital Signs (Most Recent):  Temp: 97.9 °F (36.6 °C) (04/05/19 1140)  Pulse: 73 (04/05/19 1140)  Resp: 18 (04/05/19 1140)  BP: 107/67 (04/05/19 1140)  SpO2: 99 % (04/05/19 1140) Vital Signs (24h Range):  Temp:  [97.4 °F (36.3 °C)-97.9 °F (36.6 °C)] 97.9 °F (36.6 °C)  Pulse:  [] 73  Resp:  [18-20] 18  SpO2:  [96 %-100 %] 99 %  BP: (107-127)/(64-83) 107/67     Weight: 95.4 kg (210 lb 5.1 oz)  Body mass index is 31.06 kg/m².  Body surface area is 2.16 meters squared.      Intake/Output Summary (Last 24 hours) at 4/5/2019 1425  Last data filed at 4/5/2019 0959  Gross per 24 hour   Intake 500 ml   Output 900 ml   Net -400 ml       Physical Exam   Constitutional: He appears well-developed and well-nourished.   HENT:   Head: Normocephalic.   Eyes: Conjunctivae are normal. No scleral icterus.   Neck: Normal range of motion. Neck supple. No thyromegaly present.   Cardiovascular: An irregularly irregular rhythm present.   Pulmonary/Chest: Effort normal and breath sounds normal. He has no wheezes. He has no rales.   Abdominal: Soft. Bowel sounds are normal. He exhibits no distension and no mass. There is no hepatosplenomegaly. There is no tenderness. There is no rebound and no guarding.   Musculoskeletal: He exhibits edema.   Neurological: He is alert.    confused   Skin: No rash noted. No erythema.       Significant Labs:   All pertinent labs within the past 24 hours have been reviewed    Diagnostic Results:  I have reviewed and interpreted all pertinent imaging results/findings within the past 24 hours    Assessment/Plan:     Anemia due to chronic kidney disease  Patient has history of anemia of chronic disease and component of iron deficiency  Patient lost to follow up with GI  Continue to monitor counts    Elevated serum immunoglobulin free light chain level  Patient with mildly elevated kappa free light chains from 2018 in setting of normal lambda free light chains and Jaki normal kappa lambda free light chain ratio.  Recent testing reveals elevated kappa free light chain and elevated Lambda FLC with nl K/L FLCR  could be secondary to renal dysfuntion  SIFE pending  24 hr urine studies pending            Millie Bell MD  Hematology/Oncology  [facility] [place]

## 2019-04-05 NOTE — PLAN OF CARE
Problem: Adult Inpatient Plan of Care  Goal: Plan of Care Review  Outcome: Ongoing (interventions implemented as appropriate)       04/04-4/05/ 2019   Plan of Care Review   Plan of Care Reviewed With Patient; improving     AAOx3 Patient reached 360cc of 500 fluid restriction on 4/4/19. Patient has had nothing PO fluid intake as of 0000 4/5/19. No falls throughout shift, no skin breakdown. Patient is still very edematous. PICC intact and flushed PER orders. +Blood return.  Coude cath in place. Bed low and locked and call light within reach

## 2019-04-06 LAB
ACANTHOCYTES BLD QL SMEAR: PRESENT
ANION GAP SERPL CALC-SCNC: 3 MMOL/L (ref 8–16)
ANISOCYTOSIS BLD QL SMEAR: ABNORMAL
BASOPHILS # BLD AUTO: 0.03 K/UL (ref 0–0.2)
BASOPHILS NFR BLD: 0.4 % (ref 0–1.9)
BUN SERPL-MCNC: 38 MG/DL (ref 8–23)
CALCIUM SERPL-MCNC: 11.1 MG/DL (ref 8.7–10.5)
CHLORIDE SERPL-SCNC: 114 MMOL/L (ref 95–110)
CO2 SERPL-SCNC: 35 MMOL/L (ref 23–29)
CREAT SERPL-MCNC: 1.3 MG/DL (ref 0.5–1.4)
DACRYOCYTES BLD QL SMEAR: ABNORMAL
DIFFERENTIAL METHOD: ABNORMAL
EOSINOPHIL # BLD AUTO: 0.2 K/UL (ref 0–0.5)
EOSINOPHIL NFR BLD: 2.1 % (ref 0–8)
ERYTHROCYTE [DISTWIDTH] IN BLOOD BY AUTOMATED COUNT: 24.3 % (ref 11.5–14.5)
EST. GFR  (AFRICAN AMERICAN): 60 ML/MIN/1.73 M^2
EST. GFR  (NON AFRICAN AMERICAN): 52 ML/MIN/1.73 M^2
GLUCOSE SERPL-MCNC: 147 MG/DL (ref 70–110)
HCT VFR BLD AUTO: 30.1 % (ref 40–54)
HGB BLD-MCNC: 8.3 G/DL (ref 14–18)
HYPOCHROMIA BLD QL SMEAR: ABNORMAL
LYMPHOCYTES # BLD AUTO: 1 K/UL (ref 1–4.8)
LYMPHOCYTES NFR BLD: 12.9 % (ref 18–48)
MCH RBC QN AUTO: 22.5 PG (ref 27–31)
MCHC RBC AUTO-ENTMCNC: 27.6 G/DL (ref 32–36)
MCV RBC AUTO: 82 FL (ref 82–98)
MONOCYTES # BLD AUTO: 0.9 K/UL (ref 0.3–1)
MONOCYTES NFR BLD: 11.3 % (ref 4–15)
NEUTROPHILS # BLD AUTO: 5.7 K/UL (ref 1.8–7.7)
NEUTROPHILS NFR BLD: 74.1 % (ref 38–73)
OVALOCYTES BLD QL SMEAR: ABNORMAL
PLATELET # BLD AUTO: 335 K/UL (ref 150–350)
PLATELET BLD QL SMEAR: ABNORMAL
PMV BLD AUTO: 10.6 FL (ref 9.2–12.9)
POCT GLUCOSE: 164 MG/DL (ref 70–110)
POCT GLUCOSE: 195 MG/DL (ref 70–110)
POCT GLUCOSE: 242 MG/DL (ref 70–110)
POCT GLUCOSE: 250 MG/DL (ref 70–110)
POIKILOCYTOSIS BLD QL SMEAR: ABNORMAL
POLYCHROMASIA BLD QL SMEAR: ABNORMAL
POTASSIUM SERPL-SCNC: 4.2 MMOL/L (ref 3.5–5.1)
RBC # BLD AUTO: 3.69 M/UL (ref 4.6–6.2)
SCHISTOCYTES BLD QL SMEAR: PRESENT
SODIUM SERPL-SCNC: 152 MMOL/L (ref 136–145)
TARGETS BLD QL SMEAR: ABNORMAL
WBC # BLD AUTO: 7.76 K/UL (ref 3.9–12.7)

## 2019-04-06 PROCEDURE — 99232 SBSQ HOSP IP/OBS MODERATE 35: CPT | Mod: ,,, | Performed by: UROLOGY

## 2019-04-06 PROCEDURE — 25000003 PHARM REV CODE 250: Performed by: INTERNAL MEDICINE

## 2019-04-06 PROCEDURE — 99232 PR SUBSEQUENT HOSPITAL CARE,LEVL II: ICD-10-PCS | Mod: ,,, | Performed by: UROLOGY

## 2019-04-06 PROCEDURE — 25000242 PHARM REV CODE 250 ALT 637 W/ HCPCS: Performed by: HOSPITALIST

## 2019-04-06 PROCEDURE — 63600175 PHARM REV CODE 636 W HCPCS: Performed by: HOSPITALIST

## 2019-04-06 PROCEDURE — 94640 AIRWAY INHALATION TREATMENT: CPT

## 2019-04-06 PROCEDURE — 25000003 PHARM REV CODE 250: Performed by: HOSPITALIST

## 2019-04-06 PROCEDURE — 85025 COMPLETE CBC W/AUTO DIFF WBC: CPT

## 2019-04-06 PROCEDURE — 94761 N-INVAS EAR/PLS OXIMETRY MLT: CPT

## 2019-04-06 PROCEDURE — 11000001 HC ACUTE MED/SURG PRIVATE ROOM

## 2019-04-06 PROCEDURE — 94660 CPAP INITIATION&MGMT: CPT

## 2019-04-06 PROCEDURE — A4216 STERILE WATER/SALINE, 10 ML: HCPCS | Performed by: HOSPITALIST

## 2019-04-06 PROCEDURE — 80048 BASIC METABOLIC PNL TOTAL CA: CPT

## 2019-04-06 PROCEDURE — 99900035 HC TECH TIME PER 15 MIN (STAT)

## 2019-04-06 RX ORDER — DEXTROSE MONOHYDRATE 50 MG/ML
INJECTION, SOLUTION INTRAVENOUS CONTINUOUS
Status: ACTIVE | OUTPATIENT
Start: 2019-04-06 | End: 2019-04-07

## 2019-04-06 RX ORDER — ACETAMINOPHEN 500 MG
500 TABLET ORAL EVERY 6 HOURS PRN
Status: DISCONTINUED | OUTPATIENT
Start: 2019-04-06 | End: 2019-04-16 | Stop reason: HOSPADM

## 2019-04-06 RX ADMIN — DOCUSATE SODIUM 100 MG: 100 CAPSULE, LIQUID FILLED ORAL at 09:04

## 2019-04-06 RX ADMIN — DEXTROSE: 5 SOLUTION INTRAVENOUS at 11:04

## 2019-04-06 RX ADMIN — IPRATROPIUM BROMIDE AND ALBUTEROL SULFATE 3 ML: .5; 3 SOLUTION RESPIRATORY (INHALATION) at 08:04

## 2019-04-06 RX ADMIN — CARVEDILOL 25 MG: 6.25 TABLET, FILM COATED ORAL at 08:04

## 2019-04-06 RX ADMIN — CARVEDILOL 25 MG: 6.25 TABLET, FILM COATED ORAL at 09:04

## 2019-04-06 RX ADMIN — DILTIAZEM HYDROCHLORIDE 30 MG: 30 TABLET, FILM COATED ORAL at 09:04

## 2019-04-06 RX ADMIN — POLYETHYLENE GLYCOL 3350 17 G: 17 POWDER, FOR SOLUTION ORAL at 09:04

## 2019-04-06 RX ADMIN — PANTOPRAZOLE SODIUM 40 MG: 40 TABLET, DELAYED RELEASE ORAL at 08:04

## 2019-04-06 RX ADMIN — DILTIAZEM HYDROCHLORIDE 30 MG: 30 TABLET, FILM COATED ORAL at 08:04

## 2019-04-06 RX ADMIN — Medication 10 ML: at 06:04

## 2019-04-06 RX ADMIN — DEXTROSE: 5 SOLUTION INTRAVENOUS at 09:04

## 2019-04-06 RX ADMIN — INSULIN ASPART 1 UNITS: 100 INJECTION, SOLUTION INTRAVENOUS; SUBCUTANEOUS at 09:04

## 2019-04-06 RX ADMIN — FUROSEMIDE 80 MG: 10 INJECTION, SOLUTION INTRAMUSCULAR; INTRAVENOUS at 08:04

## 2019-04-06 RX ADMIN — TAMSULOSIN HYDROCHLORIDE 0.4 MG: 0.4 CAPSULE ORAL at 08:04

## 2019-04-06 RX ADMIN — ATORVASTATIN CALCIUM 20 MG: 10 TABLET, FILM COATED ORAL at 09:04

## 2019-04-06 RX ADMIN — INSULIN ASPART 2 UNITS: 100 INJECTION, SOLUTION INTRAVENOUS; SUBCUTANEOUS at 05:04

## 2019-04-06 RX ADMIN — POLYETHYLENE GLYCOL 3350 17 G: 17 POWDER, FOR SOLUTION ORAL at 08:04

## 2019-04-06 RX ADMIN — Medication 10 ML: at 11:04

## 2019-04-06 RX ADMIN — ASPIRIN 81 MG: 81 TABLET, COATED ORAL at 08:04

## 2019-04-06 RX ADMIN — DOCUSATE SODIUM 100 MG: 100 CAPSULE, LIQUID FILLED ORAL at 08:04

## 2019-04-06 RX ADMIN — SPIRONOLACTONE 25 MG: 25 TABLET ORAL at 09:04

## 2019-04-06 RX ADMIN — FERROUS GLUCONATE TAB 324 MG (37.5 MG ELEMENTAL IRON) 324 MG: 324 (37.5 FE) TAB at 08:04

## 2019-04-06 RX ADMIN — LOSARTAN POTASSIUM 25 MG: 25 TABLET, FILM COATED ORAL at 08:04

## 2019-04-06 NOTE — PLAN OF CARE
Problem: Adult Inpatient Plan of Care  Goal: Plan of Care Review  Outcome: Ongoing (interventions implemented as appropriate)  Patient is alert and oriented to self and place, still slightly confused about time and situation.  Patient remains free from falls, is afebrile, states no pain. Generalized edema still present, improving since prior shift.  Patient tolerating IV and PO fluids well, appetite remains poor, patient able to feed self.  Patient helps reposition in bed, pillow and purple wedged for Q2 hours, prophylactic foam dressing to sacrum.

## 2019-04-06 NOTE — NURSING
Placed External cath/Condom cath and attached stearns bag attached to gravity for incontinence & urine collection; PCA Emelia Christian notified, verbalized understanding.

## 2019-04-06 NOTE — PROGRESS NOTES
[facility] Hospital Medicine  Progress Note    Patient Name: Agus Ramos Jr.  MRN: [id number]  Patient Class: IP- Inpatient   Admission Date: 3/23/2019  Length of Stay: 14 days  Attending Physician: Jose De Jesus Angulo MD  Primary Care Provider: Keaton Hardy MD        Subjective:     Principal Problem:Acute metabolic encephalopathy    HPI:  Mr. Agus Ramos Jr. is a 78 y.o. male known to me with essential hypertension, type 2 diabetes mellitus (HbA1c 5.0% Mar 2019), CAD s/p CABG, chronic combined systolic and diastolic heart failure (LVEF 50% Mar 2019), CKD Stage 3, peripheral artery disease, chronic atrial fibrillation ([id number] score 5) on chronic anticoagulation, COPD, chronic respiratory failure with hypoxia, and anemia of chronic disease who presents to [facility] ED with complaints of a fall this morning.  He had much difficulty standing back up.  EMS was activated and they found that his capillary glucose was 14 mg/dL after which he was given an ampule of 50% dextrose.  His mentation returned to baseline thereafter.  He denies any tremors nor diaphoresis.  He says that he's been feeling weak and dizzy today but denies any loss of consciousness, nor antecedent chest pain, shortness of breath, palpitations, fevers, chills, nausea, vomiting, abdominal pain, or any diarrhea.  He did not trip or slip or anything.  He has had a good appetite but cannot say for sure if he has been taking too much of his diabetes medications.  He denies any recent changes to his medications.  He otherwise has been in his usual state of health.    Hospital Course:  79 y/o male presented with weakness.  Hx of DM on Glipizide and Metformin.  Patient noted to be hypoglycemic.  Initially responded to D50, but then became persistently hypoglycemic.  Admitted to ICU on D10 drip and hourly glucose monitoring.  Also started on Octreotide drip.  No further episodes of hypoglycemia and D10 switched to D5.  Octreotide  drip stopped.  Now getting more hyperglycemic.  Stopping D5 drip and monitor off dextrose.  PT/OT evaluation.  Recommended SNF,consulted SW.  Last HbA1C 2 weeks ago 5.0,likely no need any more for hypoglycemic agents,diet should  be fine.  Has scrotal swelling,on IV lasix,lifting scrotum.  Has anasarca,all over body,increased lasix ,have ,proteinuria,check protein/Cr. Ratio,elevated,consulted nephrology.still has massive anasarca.  Urology following for scrotal swelling,improved.  Has left renal cyst,on US,need follow up with repeat US  in 6 months.  24 urine collection is in Process to R/O nephrotic syndrome.  Consulted oncology for abnormal UPEP.  Boucher removed.    Interval History: Thirsty    Review of Systems   HENT: Negative for ear discharge and ear pain.    Eyes: Negative for pain and itching.   Endocrine: Negative for polyphagia and polyuria.   Neurological: Negative for seizures and syncope.     Objective:     Vital Signs (Most Recent):  Temp: 97.7 °F (36.5 °C) (04/06/19 1121)  Pulse: 86 (04/06/19 1121)  Resp: 18 (04/06/19 1121)  BP: (!) 156/90 (04/06/19 1121)  SpO2: 100 % (04/06/19 1121) Vital Signs (24h Range):  Temp:  [96.6 °F (35.9 °C)-97.9 °F (36.6 °C)] 97.7 °F (36.5 °C)  Pulse:  [63-94] 86  Resp:  [14-20] 18  SpO2:  [79 %-100 %] 100 %  BP: (116-165)/(58-90) 156/90     Weight: 93.3 kg (205 lb 11 oz)  Body mass index is 30.38 kg/m².    Intake/Output Summary (Last 24 hours) at 4/6/2019 1332  Last data filed at 4/6/2019 1247  Gross per 24 hour   Intake 840 ml   Output 120 ml   Net 720 ml      Physical Exam   Constitutional: He is oriented to person, place, and time. He appears well-developed and well-nourished. No distress.   HENT:   Head: Normocephalic and atraumatic.   Right Ear: External ear normal.   Left Ear: External ear normal.   Nose: Nose normal.   Eyes: Right eye exhibits no discharge. Left eye exhibits no discharge.   Neck: Normal range of motion.   Cardiovascular:   Irregularly irregular, no  murmurs or gallops   Pulmonary/Chest: Effort normal. No respiratory distress.   Abdominal: Soft. Bowel sounds are normal. There is no tenderness. There is no rebound and no guarding.   Genitourinary:   Genitourinary Comments: Scrotal swelling.   Musculoskeletal: Normal range of motion. He exhibits edema.   Neurological: He is alert and oriented to person, place, and time.   Skin: Skin is warm and dry. He is not diaphoretic. No erythema.   Psychiatric: He has a normal mood and affect. His behavior is normal. Judgment and thought content normal.   Nursing note and vitals reviewed.      Significant Labs:   BMP:   Recent Labs   Lab 04/06/19  0504   *   *   K 4.2   *   CO2 35*   BUN 38*   CREATININE 1.3   CALCIUM 11.1*     CBC:   Recent Labs   Lab 04/05/19  0422 04/06/19  0504   WBC 8.69 7.76   HGB 8.2* 8.3*   HCT 29.7* 30.1*    335     Assessment/Plan:      * Acute metabolic encephalopathy  Patient was noted to have a capillary glucose of 14 mg/dL in the field for which he was given an ampule of 50% dextrose with improvement of his mentation.  His initial serum glucose here was 30 mg/dL but later was still 44 mg/dL after treatment.  He improved into the 180's but continued to drop to 81 mg/dL then to 36 mg/dL.    Admitted to ICU on D10 drip and hourly glucose checks.  Also started on Octreotide drip.  Glucose increasing with no further episodes of hypoglycemia.  Will change D10 to D5 and monitor glucose every 4 hours.  The etiology of his hypoglycemia is unclear but he is on a sulfonylurea at home.  He does have 1+ leukocytes and moderate bacteria on urine, but many amorphous cells.  Will repeat UA with UCx.  Afebrile.  Will hold off on ABx's for now.  Getting more hyperglycemic.  Stop D5 and monitor off dextrose.    PT/OT eval.  Last HbA1C 2 weeks ago 5.0,likely no need any more for hypoglycemic agents,diet should  be fine.  SNF recommended.      Proteinuria  Has proteinuria with Anasarca.  On  IV Lasix.  24 urine protein collection in process.  Consulted oncology for abnormal UPEP  Appreciate Nephrology and Oncology input.  Continue with diuresis.        Urinary retention  Appreciate Urology input.  Boucher removed and patient urinating.      Anemia due to chronic kidney disease        Renal cyst, left  Has left renal cyst,on US,need follow up with repeat US  in 6 weeks,      Scrotal swelling    Has scrotal swelling,on IV lasix,lifting scrotum.Has anasarca,all over body,increased lasix,    Peripheral artery disease  Stable; will continue his home regimen of aspirin and atorvastatin.    Acute on chronic combined systolic and diastolic heart failure  Will continue with IV lasix    Chronic respiratory failure with hypoxia  Stable; will continue his home supplemental oxygen therapy.    Centrilobular emphysema  Clinically-stable without wheezing.  Will provide as-needed JENAE/LAMA available.    Iron deficiency anemia  The patient's H/H is stable and consistent with previous laboratory measurements, and the patient exhibits no signs or symptoms of acute bleeding; there is no indication for transfusion.  Will continue to monitor.    Type 2 diabetes mellitus, controlled, with renal complications  As addressed above.    CKD Stage 3  His renal function appears to be at his baseline.    Essential hypertension  Continue home regimen of carvedilol, diltiazem, furosemide, and tamsulosin, and provide as-needed clonidine.    CAD (coronary artery disease)  Stable; will continue his home regimen of aspirin, atorvastatin, and carvedilol.    Chronic atrial fibrillation  Patient is in atrial fibrillation at this time which is baseline for him.  He was taken off of anticoagulation during his last admission due to anemia and was instructed not to take it until outpatient follow-up with gastroenterology.  Will continue to monitor.      VTE Risk Mitigation (From admission, onward)        Ordered     IP VTE HIGH RISK PATIENT  Once       03/23/19 1936     Reason for No Pharmacological VTE Prophylaxis  Once      03/23/19 1936     Place sequential compression device  Until discontinued      03/23/19 1934     Place DENIS hose  Until discontinued      03/23/19 1934              Jose De Jesus Angulo MD  Department of Hospital Medicine   [facility]

## 2019-04-06 NOTE — SUBJECTIVE & OBJECTIVE
Interval History:  Boucher removed yesterday.  Patient has voided multiple times in the interim.  Currently has condom catheter in place due to incontinence.    Review of Systems  Objective:     Temp:  [96.6 °F (35.9 °C)-97.9 °F (36.6 °C)] 96.6 °F (35.9 °C)  Pulse:  [63-94] 80  Resp:  [14-20] 20  SpO2:  [79 %-100 %] 98 %  BP: (107-165)/(58-89) 154/89     Body mass index is 30.38 kg/m².    Date 04/06/19 0700 - 04/07/19 0659   Shift 0937-9494 [id number] 24 Hour Total   INTAKE   P.O. 360   360   Shift Total(mL/kg) 360(3.9)   360(3.9)   OUTPUT   Shift Total(mL/kg)       Weight (kg) 93.3 93.3 93.3 93.3     Bladder Scan Volume (mL): 23 mL (03/28/19 0831)  Post Void Cath Residual Output (mL): 0 mL (03/28/19 0831)    Drains          None          Physical Exam   Constitutional: He is oriented to person, place, and time. He appears well-developed and well-nourished. No distress.   HENT:   Head: Normocephalic and atraumatic.   Eyes: Pupils are equal, round, and reactive to light.   Neck: Normal range of motion. Neck supple.   Pulmonary/Chest: Effort normal. No respiratory distress.   Abdominal: He exhibits no distension.   Neurological: He is alert and oriented to person, place, and time.   Psychiatric: He has a normal mood and affect. His behavior is normal.       Significant Labs:    BMP:  Recent Labs   Lab 04/04/19 0440 04/05/19 0422 04/06/19  0504   * 148* 152*   K 4.2 4.0 4.2   * 111* 114*   CO2 33* 32* 35*   BUN 34* 36* 38*   CREATININE 1.3 1.3 1.3   CALCIUM 10.4 10.7* 11.1*       CBC:   Recent Labs   Lab 04/04/19 0440 04/05/19  0422 04/06/19  0504   WBC 8.35 8.69 7.76   HGB 8.0* 8.2* 8.3*   HCT 29.2* 29.7* 30.1*    607 255

## 2019-04-06 NOTE — SUBJECTIVE & OBJECTIVE
Interval History: Thirsty    Review of Systems   HENT: Negative for ear discharge and ear pain.    Eyes: Negative for pain and itching.   Endocrine: Negative for polyphagia and polyuria.   Neurological: Negative for seizures and syncope.     Objective:     Vital Signs (Most Recent):  Temp: 97.7 °F (36.5 °C) (04/06/19 1121)  Pulse: 86 (04/06/19 1121)  Resp: 18 (04/06/19 1121)  BP: (!) 156/90 (04/06/19 1121)  SpO2: 100 % (04/06/19 1121) Vital Signs (24h Range):  Temp:  [96.6 °F (35.9 °C)-97.9 °F (36.6 °C)] 97.7 °F (36.5 °C)  Pulse:  [63-94] 86  Resp:  [14-20] 18  SpO2:  [79 %-100 %] 100 %  BP: (116-165)/(58-90) 156/90     Weight: 93.3 kg (205 lb 11 oz)  Body mass index is 30.38 kg/m².    Intake/Output Summary (Last 24 hours) at 4/6/2019 1332  Last data filed at 4/6/2019 1247  Gross per 24 hour   Intake 840 ml   Output 120 ml   Net 720 ml      Physical Exam   Constitutional: He is oriented to person, place, and time. He appears well-developed and well-nourished. No distress.   HENT:   Head: Normocephalic and atraumatic.   Right Ear: External ear normal.   Left Ear: External ear normal.   Nose: Nose normal.   Eyes: Right eye exhibits no discharge. Left eye exhibits no discharge.   Neck: Normal range of motion.   Cardiovascular:   Irregularly irregular, no murmurs or gallops   Pulmonary/Chest: Effort normal. No respiratory distress.   Abdominal: Soft. Bowel sounds are normal. There is no tenderness. There is no rebound and no guarding.   Genitourinary:   Genitourinary Comments: Scrotal swelling.   Musculoskeletal: Normal range of motion. He exhibits edema.   Neurological: He is alert and oriented to person, place, and time.   Skin: Skin is warm and dry. He is not diaphoretic. No erythema.   Psychiatric: He has a normal mood and affect. His behavior is normal. Judgment and thought content normal.   Nursing note and vitals reviewed.      Significant Labs:   BMP:   Recent Labs   Lab 04/06/19  0504   *   *   K  4.2   *   CO2 35*   BUN 38*   CREATININE 1.3   CALCIUM 11.1*     CBC:   Recent Labs   Lab 04/05/19  0422 04/06/19  0504   WBC 8.69 7.76   HGB 8.2* 8.3*   HCT 29.7* 30.1*    335

## 2019-04-06 NOTE — PLAN OF CARE
Problem: Adult Inpatient Plan of Care  Goal: Plan of Care Review  Outcome: Ongoing (interventions implemented as appropriate)    Pt. AAOx3; calm and cooperative. Pt. slept through most of the night. No falls no injuries. Weight shift assistance provided. Right arm PICC C/D/I. Continuous cardiac monitoring; tele box 1490. Glucose monitored. Scheduled meds given without difficulty. Pt. instructed to call if assistance is needed. Bed in lowest position; side rails x3. Call light in reach. Will continue with plan of care.      04/06/19 6554   Plan of Care Review   Plan of Care Reviewed With patient

## 2019-04-06 NOTE — NURSING
Pt. AAOx4. No pain/discomfort. No complaints of SOB; O2 @3L. Tele box #: 5312. PICC to right arm C/D/I. Bed in lowest position. Call light in reach. Will continue to monitor.

## 2019-04-06 NOTE — PROGRESS NOTES
Renal Progress Note    Date of Admission:  3/23/2019  1:32 PM    Length of Stay: 14  Days    Subjective: n/a    Objective:    Current Facility-Administered Medications   Medication    acetaminophen tablet 650 mg    albuterol-ipratropium 2.5 mg-0.5 mg/3 mL nebulizer solution 3 mL    aspirin EC tablet 81 mg    atorvastatin tablet 20 mg    carvedilol tablet 25 mg    cloNIDine tablet 0.1 mg    dextrose 5 % infusion    dextrose 50% injection 12.5 g    dextrose 50% injection 25 g    diltiaZEM tablet 30 mg    docusate sodium capsule 100 mg    ferrous gluconate tablet 324 mg    glucagon (human recombinant) injection 1 mg    glucose chewable tablet 16 g    glucose chewable tablet 24 g    influenza (FLUZONE HIGH-DOSE) vaccine 0.5 mL    insulin aspart U-100 pen 0-5 Units    losartan tablet 25 mg    ondansetron injection 8 mg    pantoprazole EC tablet 40 mg    pneumoc 13-alan conj-dip cr(PF) (PREVNAR 13 (PF)) 0.5 mL    polyethylene glycol packet 17 g    promethazine (PHENERGAN) 6.25 mg in dextrose 5 % 50 mL IVPB    ramelteon tablet 8 mg    senna-docusate 8.6-50 mg per tablet 1 tablet    sodium chloride 0.9% flush 10 mL    And    sodium chloride 0.9% flush 10 mL    spironolactone tablet 25 mg    tamsulosin 24 hr capsule 0.4 mg       Vitals:    04/06/19 0731 04/06/19 0855 04/06/19 0856 04/06/19 1121   BP: (!) 154/89   (!) 156/90   BP Location: Right arm   Left arm   Patient Position: Lying   Lying   Pulse: 94  80 86   Resp: 18  20 18   Temp: 96.6 °F (35.9 °C)   97.7 °F (36.5 °C)   TempSrc: Axillary   Oral   SpO2: 100% (!) 79%  Comment: placed pt back on 3 L/M NC 98% 100%   Weight: 93.3 kg (205 lb 11 oz)      Height:           I/O last 3 completed shifts:  In: 630 [P.O.:630]  Out: 820 [Urine:820]  I/O this shift:  In: 600 [P.O.:600]  Out: -       Physical Exam:    Elderly male, weak, NAD  Neck: supple  Heart: RRR  Lungs: Unlabored breathing  Abdomen: n/a  : n/a  Extremities:   n/a  Neurologic: Awake, confused      Laboratories:    Recent Labs   Lab 04/06/19  0504   WBC 7.76   RBC 3.69*   HGB 8.3*   HCT 30.1*      MCV 82   MCH 22.5*   MCHC 27.6*       Recent Labs   Lab 04/06/19  0504   CALCIUM 11.1*   *   K 4.2   CO2 35*   *   BUN 38*   CREATININE 1.3       No results for input(s): COLORU, CLARITYU, SPECGRAV, PHUR, PROTEINUA, GLUCOSEU, BLOODU, WBCU, RBCU, BACTERIA, MUCUS in the last 24 hours.    Invalid input(s):  BILIRUBINCON    Microbiology Results (last 7 days)     ** No results found for the last 168 hours. **            Diagnostic Tests:      Assessment:  79 y/o AAM admitted with:    - Symptomatic hypoglycemia  - DM-2 w/ renal manifestations  - Chronic combined HF  - CKD-3  - A-fib on OACs  - Hx. Emphysema  - Fe++ def anemia  - New onset hypernatremia likely dehydration (decreased free H20 intake)  - Mild hyperchloremia  - Mild metabolic alkalosis  - Hypercalcemia et?              Plan:    - Encourage pte. To drink fluids p.o.  - Stop diuretics  - IV D5W x 24h  - f/u BMP  - Other problems per admitting

## 2019-04-06 NOTE — PROGRESS NOTES
[facility]  Urology  Progress Note    Patient Name: Agus Ramos Jr.  MRN: [id number]  Admission Date: 3/23/2019  Hospital Length of Stay: 14 days  Code Status: Full Code   Attending Provider: Jose De Jesus Angulo MD   Primary Care Physician: Keaton Hardy MD    Subjective:     HPI:  Urinary Retention  Patient complains of urinary retention. Onset of retention was 1 day ago and was gradual in onset. Patient currently does have a urinary catheter in place.  300 ml of urine were drained when catheter was placed. Prior to this event voiding symptoms consisted of slow stream, intermittency. Prior treatments include watchful waiting. Recent medications that may have affected his voiding include none.  He is admitted after a fall for hypoglycemia.  He had difficulty voiding while admitted.  A few attempts were made to place a Boucher.  Finally a 12 Fr Coude was placed.  Bladder Scan showed 700 mL but he has not drained this much in several hours.        Interval History:  Boucher removed yesterday.  Patient has voided multiple times in the interim.  Currently has condom catheter in place due to incontinence.    Review of Systems  Objective:     Temp:  [96.6 °F (35.9 °C)-97.9 °F (36.6 °C)] 96.6 °F (35.9 °C)  Pulse:  [63-94] 80  Resp:  [14-20] 20  SpO2:  [79 %-100 %] 98 %  BP: (107-165)/(58-89) 154/89     Body mass index is 30.38 kg/m².    Date 04/06/19 0700 - 04/07/19 0659   Shift 5014-8775 1908-4219 4280-2646 24 Hour Total   INTAKE   P.O. 360   360   Shift Total(mL/kg) 360(3.9)   360(3.9)   OUTPUT   Shift Total(mL/kg)       Weight (kg) 93.3 93.3 93.3 93.3     Bladder Scan Volume (mL): 23 mL (03/28/19 0831)  Post Void Cath Residual Output (mL): 0 mL (03/28/19 0831)    Drains          None          Physical Exam   Constitutional: He is oriented to person, place, and time. He appears well-developed and well-nourished. No distress.   HENT:   Head: Normocephalic and atraumatic.   Eyes: Pupils are equal, round,  and reactive to light.   Neck: Normal range of motion. Neck supple.   Pulmonary/Chest: Effort normal. No respiratory distress.   Abdominal: He exhibits no distension.   Neurological: He is alert and oriented to person, place, and time.   Psychiatric: He has a normal mood and affect. His behavior is normal.       Significant Labs:    BMP:  Recent Labs   Lab 04/04/19 0440 04/05/19  0422 04/06/19  0504   * 148* 152*   K 4.2 4.0 4.2   * 111* 114*   CO2 33* 32* 35*   BUN 34* 36* 38*   CREATININE 1.3 1.3 1.3   CALCIUM 10.4 10.7* 11.1*       CBC:   Recent Labs   Lab 04/04/19 0440 04/05/19  0422 04/06/19  0504   WBC 8.35 8.69 7.76   HGB 8.0* 8.2* 8.3*   HCT 29.2* 29.7* 30.1*    322 335       Assessment/Plan:     Renal cyst, left   - Recommended for repeat IRASEMA in 6 months    Scrotal swelling   - JOSE LUIS reviewed     - Scrotal elevation at all times   - Diuresis    Urinary retention  Continue Flomax  Bladder scan unreliable in setting of ascites and should not be utilized  Appears to be voiding adequately with stable creatinine  Continue to monitor urine output        VTE Risk Mitigation (From admission, onward)        Ordered     IP VTE HIGH RISK PATIENT  Once      03/23/19 1936     Reason for No Pharmacological VTE Prophylaxis  Once      03/23/19 1936     Place sequential compression device  Until discontinued      03/23/19 1934     Place DENIS hose  Until discontinued      03/23/19 1934          Frank Ibrahim MD  Urology  [facility]

## 2019-04-06 NOTE — ASSESSMENT & PLAN NOTE
Continue Flomax  Bladder scan unreliable in setting of ascites and should not be utilized  Appears to be voiding adequately with stable creatinine  Continue to monitor urine output

## 2019-04-06 NOTE — NURSING
Notified Dr Angulo pt voided 3 times post stearns cath removal, currently voided large amount in diaper, performed bladder scan 400cc, should I perform in and out. Verbalized understanding states as long as patient is voiding do not do in and out however continue to monitor. Notified oncoming nurse Christian of the above. Verbalized understanding.

## 2019-04-07 LAB
ACANTHOCYTES BLD QL SMEAR: PRESENT
ALLENS TEST: ABNORMAL
ANION GAP SERPL CALC-SCNC: 2 MMOL/L (ref 8–16)
ANISOCYTOSIS BLD QL SMEAR: ABNORMAL
BASOPHILS # BLD AUTO: 0.04 K/UL (ref 0–0.2)
BASOPHILS NFR BLD: 0.6 % (ref 0–1.9)
BUN SERPL-MCNC: 33 MG/DL (ref 8–23)
BURR CELLS BLD QL SMEAR: ABNORMAL
CALCIUM SERPL-MCNC: 10 MG/DL (ref 8.7–10.5)
CHLORIDE SERPL-SCNC: 107 MMOL/L (ref 95–110)
CO2 SERPL-SCNC: 33 MMOL/L (ref 23–29)
CREAT SERPL-MCNC: 1.3 MG/DL (ref 0.5–1.4)
DACRYOCYTES BLD QL SMEAR: ABNORMAL
DELSYS: ABNORMAL
DIFFERENTIAL METHOD: ABNORMAL
EOSINOPHIL # BLD AUTO: 0.2 K/UL (ref 0–0.5)
EOSINOPHIL NFR BLD: 2.6 % (ref 0–8)
ERYTHROCYTE [DISTWIDTH] IN BLOOD BY AUTOMATED COUNT: 23.8 % (ref 11.5–14.5)
EST. GFR  (AFRICAN AMERICAN): 60 ML/MIN/1.73 M^2
EST. GFR  (NON AFRICAN AMERICAN): 52 ML/MIN/1.73 M^2
FIO2: 35
GLUCOSE SERPL-MCNC: 191 MG/DL (ref 70–110)
HCO3 UR-SCNC: 27.9 MMOL/L (ref 24–28)
HCT VFR BLD AUTO: 29.6 % (ref 40–54)
HGB BLD-MCNC: 8.2 G/DL (ref 14–18)
HYPOCHROMIA BLD QL SMEAR: ABNORMAL
LYMPHOCYTES # BLD AUTO: 1 K/UL (ref 1–4.8)
LYMPHOCYTES NFR BLD: 14.3 % (ref 18–48)
MCH RBC QN AUTO: 22.3 PG (ref 27–31)
MCHC RBC AUTO-ENTMCNC: 27.7 G/DL (ref 32–36)
MCV RBC AUTO: 81 FL (ref 82–98)
MODE: ABNORMAL
MONOCYTES # BLD AUTO: 0.9 K/UL (ref 0.3–1)
MONOCYTES NFR BLD: 12.3 % (ref 4–15)
NEUTROPHILS # BLD AUTO: 4.9 K/UL (ref 1.8–7.7)
NEUTROPHILS NFR BLD: 70.8 % (ref 38–73)
OVALOCYTES BLD QL SMEAR: ABNORMAL
PCO2 BLDA: 43.8 MMHG (ref 35–45)
PH SMN: 7.41 [PH] (ref 7.35–7.45)
PLATELET # BLD AUTO: 332 K/UL (ref 150–350)
PLATELET BLD QL SMEAR: ABNORMAL
PMV BLD AUTO: 11.1 FL (ref 9.2–12.9)
PO2 BLDA: 80 MMHG (ref 80–100)
POC BE: 3 MMOL/L
POC SATURATED O2: 96 % (ref 95–100)
POC TCO2: 29 MMOL/L (ref 23–27)
POCT GLUCOSE: 177 MG/DL (ref 70–110)
POCT GLUCOSE: 215 MG/DL (ref 70–110)
POCT GLUCOSE: 217 MG/DL (ref 70–110)
POCT GLUCOSE: 236 MG/DL (ref 70–110)
POIKILOCYTOSIS BLD QL SMEAR: ABNORMAL
POLYCHROMASIA BLD QL SMEAR: ABNORMAL
POTASSIUM SERPL-SCNC: 4.2 MMOL/L (ref 3.5–5.1)
RBC # BLD AUTO: 3.67 M/UL (ref 4.6–6.2)
SAMPLE: ABNORMAL
SITE: ABNORMAL
SODIUM SERPL-SCNC: 142 MMOL/L (ref 136–145)
TARGETS BLD QL SMEAR: ABNORMAL
WBC # BLD AUTO: 6.91 K/UL (ref 3.9–12.7)

## 2019-04-07 PROCEDURE — 25000003 PHARM REV CODE 250: Performed by: INTERNAL MEDICINE

## 2019-04-07 PROCEDURE — 80048 BASIC METABOLIC PNL TOTAL CA: CPT

## 2019-04-07 PROCEDURE — 99232 PR SUBSEQUENT HOSPITAL CARE,LEVL II: ICD-10-PCS | Mod: ,,, | Performed by: UROLOGY

## 2019-04-07 PROCEDURE — A4216 STERILE WATER/SALINE, 10 ML: HCPCS | Performed by: HOSPITALIST

## 2019-04-07 PROCEDURE — 25000003 PHARM REV CODE 250: Performed by: HOSPITALIST

## 2019-04-07 PROCEDURE — 11000001 HC ACUTE MED/SURG PRIVATE ROOM

## 2019-04-07 PROCEDURE — 85025 COMPLETE CBC W/AUTO DIFF WBC: CPT

## 2019-04-07 PROCEDURE — 25000242 PHARM REV CODE 250 ALT 637 W/ HCPCS: Performed by: HOSPITALIST

## 2019-04-07 PROCEDURE — 27000221 HC OXYGEN, UP TO 24 HOURS

## 2019-04-07 PROCEDURE — 94760 N-INVAS EAR/PLS OXIMETRY 1: CPT

## 2019-04-07 PROCEDURE — 82803 BLOOD GASES ANY COMBINATION: CPT

## 2019-04-07 PROCEDURE — 99232 SBSQ HOSP IP/OBS MODERATE 35: CPT | Mod: ,,, | Performed by: UROLOGY

## 2019-04-07 PROCEDURE — 25000242 PHARM REV CODE 250 ALT 637 W/ HCPCS: Performed by: INTERNAL MEDICINE

## 2019-04-07 PROCEDURE — 94640 AIRWAY INHALATION TREATMENT: CPT

## 2019-04-07 PROCEDURE — 99900035 HC TECH TIME PER 15 MIN (STAT)

## 2019-04-07 PROCEDURE — 94644 CONT INHLJ TX 1ST HOUR: CPT

## 2019-04-07 RX ORDER — ALBUTEROL SULFATE 2.5 MG/.5ML
15 SOLUTION RESPIRATORY (INHALATION) ONCE
Status: COMPLETED | OUTPATIENT
Start: 2019-04-07 | End: 2019-04-07

## 2019-04-07 RX ORDER — IPRATROPIUM BROMIDE 0.5 MG/2.5ML
1 SOLUTION RESPIRATORY (INHALATION) ONCE
Status: COMPLETED | OUTPATIENT
Start: 2019-04-07 | End: 2019-04-07

## 2019-04-07 RX ADMIN — DOCUSATE SODIUM 100 MG: 100 CAPSULE, LIQUID FILLED ORAL at 08:04

## 2019-04-07 RX ADMIN — INSULIN ASPART 2 UNITS: 100 INJECTION, SOLUTION INTRAVENOUS; SUBCUTANEOUS at 09:04

## 2019-04-07 RX ADMIN — PANTOPRAZOLE SODIUM 40 MG: 40 TABLET, DELAYED RELEASE ORAL at 09:04

## 2019-04-07 RX ADMIN — CLONIDINE HYDROCHLORIDE 0.1 MG: 0.1 TABLET ORAL at 08:04

## 2019-04-07 RX ADMIN — ASPIRIN 81 MG: 81 TABLET, COATED ORAL at 09:04

## 2019-04-07 RX ADMIN — IPRATROPIUM BROMIDE 1 MG: 0.5 SOLUTION RESPIRATORY (INHALATION) at 08:04

## 2019-04-07 RX ADMIN — DOCUSATE SODIUM 100 MG: 100 CAPSULE, LIQUID FILLED ORAL at 09:04

## 2019-04-07 RX ADMIN — SPIRONOLACTONE 25 MG: 25 TABLET ORAL at 09:04

## 2019-04-07 RX ADMIN — FERROUS GLUCONATE TAB 324 MG (37.5 MG ELEMENTAL IRON) 324 MG: 324 (37.5 FE) TAB at 09:04

## 2019-04-07 RX ADMIN — INSULIN ASPART 2 UNITS: 100 INJECTION, SOLUTION INTRAVENOUS; SUBCUTANEOUS at 05:04

## 2019-04-07 RX ADMIN — RAMELTEON 8 MG: 8 TABLET, FILM COATED ORAL at 08:04

## 2019-04-07 RX ADMIN — LOSARTAN POTASSIUM 25 MG: 25 TABLET, FILM COATED ORAL at 09:04

## 2019-04-07 RX ADMIN — DILTIAZEM HYDROCHLORIDE 30 MG: 30 TABLET, FILM COATED ORAL at 09:04

## 2019-04-07 RX ADMIN — POLYETHYLENE GLYCOL 3350 17 G: 17 POWDER, FOR SOLUTION ORAL at 08:04

## 2019-04-07 RX ADMIN — CARVEDILOL 25 MG: 6.25 TABLET, FILM COATED ORAL at 09:04

## 2019-04-07 RX ADMIN — Medication 10 ML: at 12:04

## 2019-04-07 RX ADMIN — DILTIAZEM HYDROCHLORIDE 30 MG: 30 TABLET, FILM COATED ORAL at 08:04

## 2019-04-07 RX ADMIN — CARVEDILOL 25 MG: 6.25 TABLET, FILM COATED ORAL at 08:04

## 2019-04-07 RX ADMIN — Medication 10 ML: at 05:04

## 2019-04-07 RX ADMIN — IPRATROPIUM BROMIDE AND ALBUTEROL SULFATE 3 ML: .5; 3 SOLUTION RESPIRATORY (INHALATION) at 07:04

## 2019-04-07 RX ADMIN — TAMSULOSIN HYDROCHLORIDE 0.4 MG: 0.4 CAPSULE ORAL at 09:04

## 2019-04-07 RX ADMIN — ALBUTEROL SULFATE 15 MG: 2.5 SOLUTION RESPIRATORY (INHALATION) at 08:04

## 2019-04-07 RX ADMIN — ATORVASTATIN CALCIUM 20 MG: 10 TABLET, FILM COATED ORAL at 08:04

## 2019-04-07 NOTE — PROGRESS NOTES
[facility] Hospital Medicine  Progress Note    Patient Name: Agus Ramos Jr.  MRN: [id number]  Patient Class: IP- Inpatient   Admission Date: 3/23/2019  Length of Stay: 15 days  Attending Physician: Jose De Jesus Angulo MD  Primary Care Provider: Keaton Hardy MD        Subjective:     Principal Problem:Acute metabolic encephalopathy    HPI:  Mr. Agus Ramos Jr. is a 78 y.o. male known to me with essential hypertension, type 2 diabetes mellitus (HbA1c 5.0% Mar 2019), CAD s/p CABG, chronic combined systolic and diastolic heart failure (LVEF 50% Mar 2019), CKD Stage 3, peripheral artery disease, chronic atrial fibrillation ([id number]-DDPv score 5) on chronic anticoagulation, COPD, chronic respiratory failure with hypoxia, and anemia of chronic disease who presents to [facility] ED with complaints of a fall this morning.  He had much difficulty standing back up.  EMS was activated and they found that his capillary glucose was 14 mg/dL after which he was given an ampule of 50% dextrose.  His mentation returned to baseline thereafter.  He denies any tremors nor diaphoresis.  He says that he's been feeling weak and dizzy today but denies any loss of consciousness, nor antecedent chest pain, shortness of breath, palpitations, fevers, chills, nausea, vomiting, abdominal pain, or any diarrhea.  He did not trip or slip or anything.  He has had a good appetite but cannot say for sure if he has been taking too much of his diabetes medications.  He denies any recent changes to his medications.  He otherwise has been in his usual state of health.    Hospital Course:  79 y/o male presented with weakness.  Hx of DM on Glipizide and Metformin.  Patient noted to be hypoglycemic.  Initially responded to D50, but then became persistently hypoglycemic.  Admitted to ICU on D10 drip and hourly glucose monitoring.  Also started on Octreotide drip.  No further episodes of hypoglycemia and D10 switched to D5.  Octreotide  drip stopped.  Now getting more hyperglycemic.  Stopping D5 drip and monitor off dextrose.  PT/OT evaluation.  Recommended SNF,consulted SW.  Last HbA1C 2 weeks ago 5.0,likely no need any more for hypoglycemic agents,diet should  be fine.  Has scrotal swelling,on IV lasix,lifting scrotum.  Has anasarca,all over body,increased lasix ,have ,proteinuria,check protein/Cr. Ratio,elevated,consulted nephrology.still has massive anasarca.  Urology following for scrotal swelling,improved.  Has left renal cyst,on US,need follow up with repeat US  in 6 months.  24 urine collection is in Process to R/O nephrotic syndrome.  Consulted oncology for abnormal UPEP.  Boucher removed.  Patient voiding without issues.  Diuresis stopped by Nephrology.    Interval History: Feeling well.    Review of Systems   HENT: Negative for ear discharge and ear pain.    Eyes: Negative for pain and itching.   Endocrine: Negative for polyphagia and polyuria.   Neurological: Negative for seizures and syncope.     Objective:     Vital Signs (Most Recent):  Temp: 98.1 °F (36.7 °C) (04/07/19 0755)  Pulse: 78 (04/07/19 0755)  Resp: 18 (04/07/19 0755)  BP: 129/76 (04/07/19 0755)  SpO2: 98 % (04/07/19 0908) Vital Signs (24h Range):  Temp:  [97.1 °F (36.2 °C)-98.7 °F (37.1 °C)] 98.1 °F (36.7 °C)  Pulse:  [70-88] 78  Resp:  [18-21] 18  SpO2:  [95 %-100 %] 98 %  BP: (124-156)/(76-92) 129/76     Weight: 93.3 kg (205 lb 11 oz)  Body mass index is 30.38 kg/m².    Intake/Output Summary (Last 24 hours) at 4/7/2019 0932  Last data filed at 4/7/2019 0500  Gross per 24 hour   Intake 1995 ml   Output --   Net 1995 ml      Physical Exam   Constitutional: He is oriented to person, place, and time. He appears well-developed and well-nourished. No distress.   HENT:   Head: Normocephalic and atraumatic.   Right Ear: External ear normal.   Left Ear: External ear normal.   Nose: Nose normal.   Eyes: Right eye exhibits no discharge. Left eye exhibits no discharge.   Neck: Normal  range of motion.   Cardiovascular:   Irregularly irregular, no murmurs or gallops   Pulmonary/Chest: Effort normal. No respiratory distress.   Abdominal: Soft. Bowel sounds are normal. There is no tenderness. There is no rebound and no guarding.   Musculoskeletal: Normal range of motion. He exhibits edema (Much improved).   Neurological: He is alert and oriented to person, place, and time.   Skin: Skin is warm and dry. He is not diaphoretic. No erythema.   Psychiatric: He has a normal mood and affect. His behavior is normal. Judgment and thought content normal.   Nursing note and vitals reviewed.      Significant Labs:   BMP:   Recent Labs   Lab 04/07/19 0527   *      K 4.2      CO2 33*   BUN 33*   CREATININE 1.3   CALCIUM 10.0     CBC:   Recent Labs   Lab 04/06/19  0504 04/07/19 0527   WBC 7.76 6.91   HGB 8.3* 8.2*   HCT 30.1* 29.6*    332     Assessment/Plan:      * Acute metabolic encephalopathy  Patient was noted to have a capillary glucose of 14 mg/dL in the field for which he was given an ampule of 50% dextrose with improvement of his mentation.  His initial serum glucose here was 30 mg/dL but later was still 44 mg/dL after treatment.  He improved into the 180's but continued to drop to 81 mg/dL then to 36 mg/dL.    Admitted to ICU on D10 drip and hourly glucose checks.  Also started on Octreotide drip.  Glucose increasing with no further episodes of hypoglycemia.  Will change D10 to D5 and monitor glucose every 4 hours.  The etiology of his hypoglycemia is unclear but he is on a sulfonylurea at home.  He does have 1+ leukocytes and moderate bacteria on urine, but many amorphous cells.  Will repeat UA with UCx.  Afebrile.  Will hold off on ABx's for now.  Getting more hyperglycemic.  Stop D5 and monitor off dextrose.    PT/OT eval.  Last HbA1C 2 weeks ago 5.0,likely no need any more for hypoglycemic agents,diet should  be fine.  SNF recommended.  Patient has refused SNF.  Citlaly  removed and patient voiding.  Lasix stopped.  Home with  tomorrow if keeps refusing SNF.      Proteinuria  Has proteinuria with Anasarca.  On IV Lasix.  24 urine protein collection in process.  Consulted oncology for abnormal UPEP  Appreciate Nephrology and Oncology input.        Urinary retention  Appreciate Urology input.  Boucher removed and patient urinating.      Anemia due to chronic kidney disease        Renal cyst, left  Has left renal cyst,on US,need follow up with repeat US  in 6 weeks,      Scrotal swelling    Has scrotal swelling,on IV lasix,lifting scrotum.Has anasarca,all over body,increased lasix,    Peripheral artery disease  Stable; will continue his home regimen of aspirin and atorvastatin.    Acute on chronic combined systolic and diastolic heart failure  Will continue with IV lasix    Chronic respiratory failure with hypoxia  Stable; will continue his home supplemental oxygen therapy.    Centrilobular emphysema  Clinically-stable without wheezing.  Will provide as-needed JENAE/LAMA available.    Iron deficiency anemia  The patient's H/H is stable and consistent with previous laboratory measurements, and the patient exhibits no signs or symptoms of acute bleeding; there is no indication for transfusion.  Will continue to monitor.    Type 2 diabetes mellitus, controlled, with renal complications  As addressed above.    CKD Stage 3  His renal function appears to be at his baseline.    Essential hypertension  Continue home regimen of carvedilol, diltiazem, furosemide, and tamsulosin, and provide as-needed clonidine.    CAD (coronary artery disease)  Stable; will continue his home regimen of aspirin, atorvastatin, and carvedilol.    Chronic atrial fibrillation  Patient is in atrial fibrillation at this time which is baseline for him.  He was taken off of anticoagulation during his last admission due to anemia and was instructed not to take it until outpatient follow-up with gastroenterology.  Will  continue to monitor.      VTE Risk Mitigation (From admission, onward)        Ordered     IP VTE HIGH RISK PATIENT  Once      03/23/19 1936     Reason for No Pharmacological VTE Prophylaxis  Once      03/23/19 1936     Place sequential compression device  Until discontinued      03/23/19 1934     Place DENIS hose  Until discontinued      03/23/19 1934              Jose De Jesus Angulo MD  Department of Hospital Medicine   [facility]

## 2019-04-07 NOTE — PLAN OF CARE
Problem: Adult Inpatient Plan of Care  Goal: Plan of Care Review  Outcome: Ongoing (interventions implemented as appropriate)  Patient is alert and oriented to self and place, still slightly confused about time and situation.  Patient remains free from falls, is afebrile, states no pain. Generalized edema still present, improving since prior shift.  Patient tolerating IV and PO fluids well, appetite remains poor, patient able to feed self.  Patient helps reposition in bed, pillow and purple wedged for Q2 hours, prophylactic foam dressing to sacrum.  Blood glucose monitored, insulin given PRN sliding scale and continuous fluids D/C'd per MD order.

## 2019-04-07 NOTE — SUBJECTIVE & OBJECTIVE
Interval History:  Continues to void and condom catheter without concern for retention    Review of Systems    Objective:     Temp:  [97.1 °F (36.2 °C)-98.7 °F (37.1 °C)] 97.4 °F (36.3 °C)  Pulse:  [70-88] 83  Resp:  [18-21] 18  SpO2:  [95 %-100 %] 99 %  BP: (117-153)/(73-92) 117/73     Body mass index is 30.38 kg/m².    Date 04/07/19 0700 - 04/08/19 0659   Shift 3305-6220 9996-2275 2956-7915 24 Hour Total   INTAKE   P.O. 120   120   Shift Total(mL/kg) 120(1.3)   120(1.3)   OUTPUT   Shift Total(mL/kg)       Weight (kg) 93.3 93.3 93.3 93.3     Bladder Scan Volume (mL): 23 mL (03/28/19 0831)  Post Void Cath Residual Output (mL): 0 mL (03/28/19 0831)    Drains          None          Physical Exam   Constitutional: He is oriented to person, place, and time. He appears well-developed and well-nourished. No distress.   HENT:   Head: Normocephalic and atraumatic.   Eyes: Pupils are equal, round, and reactive to light.   Neck: Normal range of motion. Neck supple.   Pulmonary/Chest: Effort normal. No respiratory distress.   Abdominal: He exhibits no distension.   Neurological: He is alert and oriented to person, place, and time.   Psychiatric: He has a normal mood and affect. His behavior is normal.       Significant Labs:    BMP:  Recent Labs   Lab 04/05/19 0422 04/06/19 0504 04/07/19 0527   * 152* 142   K 4.0 4.2 4.2   * 114* 107   CO2 32* 35* 33*   BUN 36* 38* 33*   CREATININE 1.3 1.3 1.3   CALCIUM 10.7* 11.1* 10.0       CBC:   Recent Labs   Lab 04/05/19 0422 04/06/19 0504 04/07/19 0527   WBC 8.69 7.76 6.91   HGB 8.2* 8.3* 8.2*   HCT 29.7* 30.1* 29.6*    335 332

## 2019-04-07 NOTE — PLAN OF CARE
Problem: Adult Inpatient Plan of Care  Goal: Plan of Care Review  Outcome: Ongoing (interventions implemented as appropriate)     Pt. AAOx3; calm and cooperative. Pt. slept through most of the night. No falls no injuries. Weight shift assistance provided. Right arm PICC C/D/I. Continuous cardiac monitoring; tele box 4735. Glucose monitored. Scheduled meds given without difficulty. Pt. instructed to call if assistance is needed. Bed in lowest position; side rails x3. Call light in reach. Will continue with plan of care.      04/07/19 9647   Plan of Care Review   Plan of Care Reviewed With patient

## 2019-04-07 NOTE — SUBJECTIVE & OBJECTIVE
Interval History: Feeling well.    Review of Systems   HENT: Negative for ear discharge and ear pain.    Eyes: Negative for pain and itching.   Endocrine: Negative for polyphagia and polyuria.   Neurological: Negative for seizures and syncope.     Objective:     Vital Signs (Most Recent):  Temp: 98.1 °F (36.7 °C) (04/07/19 0755)  Pulse: 78 (04/07/19 0755)  Resp: 18 (04/07/19 0755)  BP: 129/76 (04/07/19 0755)  SpO2: 98 % (04/07/19 0908) Vital Signs (24h Range):  Temp:  [97.1 °F (36.2 °C)-98.7 °F (37.1 °C)] 98.1 °F (36.7 °C)  Pulse:  [70-88] 78  Resp:  [18-21] 18  SpO2:  [95 %-100 %] 98 %  BP: (124-156)/(76-92) 129/76     Weight: 93.3 kg (205 lb 11 oz)  Body mass index is 30.38 kg/m².    Intake/Output Summary (Last 24 hours) at 4/7/2019 0932  Last data filed at 4/7/2019 0500  Gross per 24 hour   Intake 1995 ml   Output --   Net 1995 ml      Physical Exam   Constitutional: He is oriented to person, place, and time. He appears well-developed and well-nourished. No distress.   HENT:   Head: Normocephalic and atraumatic.   Right Ear: External ear normal.   Left Ear: External ear normal.   Nose: Nose normal.   Eyes: Right eye exhibits no discharge. Left eye exhibits no discharge.   Neck: Normal range of motion.   Cardiovascular:   Irregularly irregular, no murmurs or gallops   Pulmonary/Chest: Effort normal. No respiratory distress.   Abdominal: Soft. Bowel sounds are normal. There is no tenderness. There is no rebound and no guarding.   Musculoskeletal: Normal range of motion. He exhibits edema (Much improved).   Neurological: He is alert and oriented to person, place, and time.   Skin: Skin is warm and dry. He is not diaphoretic. No erythema.   Psychiatric: He has a normal mood and affect. His behavior is normal. Judgment and thought content normal.   Nursing note and vitals reviewed.      Significant Labs:   BMP:   Recent Labs   Lab 04/07/19  0527   *      K 4.2      CO2 33*   BUN 33*   CREATININE 1.3    CALCIUM 10.0     CBC:   Recent Labs   Lab 04/06/19  0504 04/07/19  0527   WBC 7.76 6.91   HGB 8.3* 8.2*   HCT 30.1* 29.6*    332

## 2019-04-07 NOTE — ASSESSMENT & PLAN NOTE
Has proteinuria with Anasarca.  On IV Lasix.  24 urine protein collection in process.  Consulted oncology for abnormal UPEP  Appreciate Nephrology and Oncology input.

## 2019-04-07 NOTE — ASSESSMENT & PLAN NOTE
Patient was noted to have a capillary glucose of 14 mg/dL in the field for which he was given an ampule of 50% dextrose with improvement of his mentation.  His initial serum glucose here was 30 mg/dL but later was still 44 mg/dL after treatment.  He improved into the 180's but continued to drop to 81 mg/dL then to 36 mg/dL.    Admitted to ICU on D10 drip and hourly glucose checks.  Also started on Octreotide drip.  Glucose increasing with no further episodes of hypoglycemia.  Will change D10 to D5 and monitor glucose every 4 hours.  The etiology of his hypoglycemia is unclear but he is on a sulfonylurea at home.  He does have 1+ leukocytes and moderate bacteria on urine, but many amorphous cells.  Will repeat UA with UCx.  Afebrile.  Will hold off on ABx's for now.  Getting more hyperglycemic.  Stop D5 and monitor off dextrose.    PT/OT eval.  Last HbA1C 2 weeks ago 5.0,likely no need any more for hypoglycemic agents,diet should  be fine.  SNF recommended.  Patient has refused SNF.  Boucher removed and patient voiding.  Lasix stopped.  Home with HH tomorrow if keeps refusing SNF.

## 2019-04-07 NOTE — PROGRESS NOTES
Renal Progress Note    Date of Admission:  3/23/2019  1:32 PM    Length of Stay: 15  Days    Subjective: n/a    Objective:    Current Facility-Administered Medications   Medication    acetaminophen tablet 500 mg    albuterol-ipratropium 2.5 mg-0.5 mg/3 mL nebulizer solution 3 mL    aspirin EC tablet 81 mg    atorvastatin tablet 20 mg    carvedilol tablet 25 mg    cloNIDine tablet 0.1 mg    diltiaZEM tablet 30 mg    docusate sodium capsule 100 mg    ferrous gluconate tablet 324 mg    influenza (FLUZONE HIGH-DOSE) vaccine 0.5 mL    insulin aspart U-100 pen 0-5 Units    losartan tablet 25 mg    ondansetron injection 8 mg    pantoprazole EC tablet 40 mg    pneumoc 13-alan conj-dip cr(PF) (PREVNAR 13 (PF)) 0.5 mL    polyethylene glycol packet 17 g    promethazine (PHENERGAN) 6.25 mg in dextrose 5 % 50 mL IVPB    ramelteon tablet 8 mg    senna-docusate 8.6-50 mg per tablet 1 tablet    sodium chloride 0.9% flush 10 mL    And    sodium chloride 0.9% flush 10 mL    spironolactone tablet 25 mg    tamsulosin 24 hr capsule 0.4 mg       Vitals:    04/07/19 0407 04/07/19 0755 04/07/19 0908 04/07/19 1140   BP: (!) 153/92 129/76  117/73   BP Location: Right arm Left arm  Left arm   Patient Position: Lying Lying  Lying   Pulse: 72 78  83   Resp: 20 18  18   Temp: 97.5 °F (36.4 °C) 98.1 °F (36.7 °C)  97.4 °F (36.3 °C)   TempSrc: Oral Oral  Oral   SpO2: 95% 100% 98% 99%   Weight:       Height:           I/O last 3 completed shifts:  In: 2475 [P.O.:960; I.V.:1515]  Out: 120 [Urine:120]  I/O this shift:  In: 480 [P.O.:480]  Out: -       Physical Exam:    Elderly male, weak, NAD  Neck: supple  Heart: RRR  Lungs: Unlabored breathing  Abdomen: n/a  : n/a  Extremities:  n/a  Neurologic: Awake, confused      Laboratories:    Recent Labs   Lab 04/07/19  0527   WBC 6.91   RBC 3.67*   HGB 8.2*   HCT 29.6*      MCV 81*   MCH 22.3*   MCHC 27.7*       Recent Labs   Lab 04/07/19  0523    CALCIUM 10.0      K 4.2   CO2 33*      BUN 33*   CREATININE 1.3       No results for input(s): COLORU, CLARITYU, SPECGRAV, PHUR, PROTEINUA, GLUCOSEU, BLOODU, WBCU, RBCU, BACTERIA, MUCUS in the last 24 hours.    Invalid input(s):  BILIRUBINCON    Microbiology Results (last 7 days)     ** No results found for the last 168 hours. **            Diagnostic Tests:      Assessment:  77 y/o AAM admitted with:    - Symptomatic hypoglycemia  - DM-2 w/ renal manifestations  - Chronic combined HF  - CKD-3  - A-fib on OACs  - Hx. Emphysema  - Fe++ def anemia  - rESOLVED hypernatremia  - Mild metabolic alkalosis  - Mild Hypercalcemia et? Improving some              Plan:    - Encourage pte. To drink fluids p.o.  - Stop diuretics  - f/u BMP  - Other problems per admitting

## 2019-04-07 NOTE — PROGRESS NOTES
[facility]  Urology  Progress Note    Patient Name: Agus Ramos Jr.  MRN: [id number]  Admission Date: 3/23/2019  Hospital Length of Stay: 15 days  Code Status: Full Code   Attending Provider: Jose De Jesus Angulo MD   Primary Care Physician: Keaton Hardy MD    Subjective:     HPI:  Urinary Retention  Patient complains of urinary retention. Onset of retention was 1 day ago and was gradual in onset. Patient currently does have a urinary catheter in place.  300 ml of urine were drained when catheter was placed. Prior to this event voiding symptoms consisted of slow stream, intermittency. Prior treatments include watchful waiting. Recent medications that may have affected his voiding include none.  He is admitted after a fall for hypoglycemia.  He had difficulty voiding while admitted.  A few attempts were made to place a Boucher.  Finally a 12 Fr Coude was placed.  Bladder Scan showed 700 mL but he has not drained this much in several hours.        Interval History:  Continues to void and condom catheter without concern for retention    Review of Systems    Objective:     Temp:  [97.1 °F (36.2 °C)-98.7 °F (37.1 °C)] 97.4 °F (36.3 °C)  Pulse:  [70-88] 83  Resp:  [18-21] 18  SpO2:  [95 %-100 %] 99 %  BP: (117-153)/(73-92) 117/73     Body mass index is 30.38 kg/m².    Date 04/07/19 0700 - 04/08/19 0659   Shift 1092-2457 7009-4489 8580-4304 24 Hour Total   INTAKE   P.O. 120   120   Shift Total(mL/kg) 120(1.3)   120(1.3)   OUTPUT   Shift Total(mL/kg)       Weight (kg) 93.3 93.3 93.3 93.3     Bladder Scan Volume (mL): 23 mL (03/28/19 0831)  Post Void Cath Residual Output (mL): 0 mL (03/28/19 0831)    Drains          None          Physical Exam   Constitutional: He is oriented to person, place, and time. He appears well-developed and well-nourished. No distress.   HENT:   Head: Normocephalic and atraumatic.   Eyes: Pupils are equal, round, and reactive to light.   Neck: Normal range of motion. Neck supple.    Pulmonary/Chest: Effort normal. No respiratory distress.   Abdominal: He exhibits no distension.   Neurological: He is alert and oriented to person, place, and time.   Psychiatric: He has a normal mood and affect. His behavior is normal.       Significant Labs:    BMP:  Recent Labs   Lab 04/05/19 0422 04/06/19 0504 04/07/19 0527   * 152* 142   K 4.0 4.2 4.2   * 114* 107   CO2 32* 35* 33*   BUN 36* 38* 33*   CREATININE 1.3 1.3 1.3   CALCIUM 10.7* 11.1* 10.0       CBC:   Recent Labs   Lab 04/05/19 0422 04/06/19 0504 04/07/19 0527   WBC 8.69 7.76 6.91   HGB 8.2* 8.3* 8.2*   HCT 29.7* 30.1* 29.6*    335 332       Assessment/Plan:     Renal cyst, left   - Recommended for repeat IRASEMA in 6 months    Scrotal swelling   - JOSE LUIS reviewed     - Scrotal elevation at all times   - Diuresis    Urinary retention  Continue Flomax  Bladder scan unreliable in setting of ascites and should not be utilized  Appears to be voiding adequately with stable creatinine  Continue to monitor urine output      Will sign off.  Please call with any questions or concerns.     Frank Ibrahim MD  Urology  [facility]

## 2019-04-08 LAB
ALBUMIN SERPL BCP-MCNC: 2.1 G/DL (ref 3.5–5.2)
ALP SERPL-CCNC: 134 U/L (ref 55–135)
ALT SERPL W/O P-5'-P-CCNC: 15 U/L (ref 10–44)
ANION GAP SERPL CALC-SCNC: 2 MMOL/L (ref 8–16)
ANION GAP SERPL CALC-SCNC: 3 MMOL/L (ref 8–16)
AST SERPL-CCNC: 18 U/L (ref 10–40)
BILIRUB SERPL-MCNC: 0.4 MG/DL (ref 0.1–1)
BUN SERPL-MCNC: 33 MG/DL (ref 8–23)
BUN SERPL-MCNC: 34 MG/DL (ref 8–23)
CALCIUM SERPL-MCNC: 10 MG/DL (ref 8.7–10.5)
CALCIUM SERPL-MCNC: 9.8 MG/DL (ref 8.7–10.5)
CHLORIDE SERPL-SCNC: 107 MMOL/L (ref 95–110)
CHLORIDE SERPL-SCNC: 107 MMOL/L (ref 95–110)
CO2 SERPL-SCNC: 32 MMOL/L (ref 23–29)
CO2 SERPL-SCNC: 33 MMOL/L (ref 23–29)
CREAT SERPL-MCNC: 1.1 MG/DL (ref 0.5–1.4)
CREAT SERPL-MCNC: 1.3 MG/DL (ref 0.5–1.4)
EST. GFR  (AFRICAN AMERICAN): 60 ML/MIN/1.73 M^2
EST. GFR  (AFRICAN AMERICAN): >60 ML/MIN/1.73 M^2
EST. GFR  (NON AFRICAN AMERICAN): 52 ML/MIN/1.73 M^2
EST. GFR  (NON AFRICAN AMERICAN): >60 ML/MIN/1.73 M^2
GLUCOSE SERPL-MCNC: 133 MG/DL (ref 70–110)
GLUCOSE SERPL-MCNC: 170 MG/DL (ref 70–110)
PATHOLOGIST INTERPRETATION UIFE: NORMAL
POCT GLUCOSE: 156 MG/DL (ref 70–110)
POCT GLUCOSE: 163 MG/DL (ref 70–110)
POCT GLUCOSE: 186 MG/DL (ref 70–110)
POCT GLUCOSE: 215 MG/DL (ref 70–110)
POTASSIUM SERPL-SCNC: 4.1 MMOL/L (ref 3.5–5.1)
POTASSIUM SERPL-SCNC: 4.1 MMOL/L (ref 3.5–5.1)
PROT SERPL-MCNC: 4.5 G/DL (ref 6–8.4)
PTH-INTACT SERPL-MCNC: 227.6 PG/ML (ref 9–77)
SODIUM SERPL-SCNC: 142 MMOL/L (ref 136–145)
SODIUM SERPL-SCNC: 142 MMOL/L (ref 136–145)
T4 FREE SERPL-MCNC: 0.92 NG/DL (ref 0.71–1.51)
TSH SERPL DL<=0.005 MIU/L-ACNC: 5.53 UIU/ML (ref 0.4–4)

## 2019-04-08 PROCEDURE — 36415 COLL VENOUS BLD VENIPUNCTURE: CPT

## 2019-04-08 PROCEDURE — 25000003 PHARM REV CODE 250: Performed by: HOSPITALIST

## 2019-04-08 PROCEDURE — 97535 SELF CARE MNGMENT TRAINING: CPT

## 2019-04-08 PROCEDURE — 25000242 PHARM REV CODE 250 ALT 637 W/ HCPCS: Performed by: HOSPITALIST

## 2019-04-08 PROCEDURE — 82397 CHEMILUMINESCENT ASSAY: CPT

## 2019-04-08 PROCEDURE — 80053 COMPREHEN METABOLIC PANEL: CPT

## 2019-04-08 PROCEDURE — 94761 N-INVAS EAR/PLS OXIMETRY MLT: CPT

## 2019-04-08 PROCEDURE — 27000221 HC OXYGEN, UP TO 24 HOURS

## 2019-04-08 PROCEDURE — 25000003 PHARM REV CODE 250: Performed by: INTERNAL MEDICINE

## 2019-04-08 PROCEDURE — 80048 BASIC METABOLIC PNL TOTAL CA: CPT

## 2019-04-08 PROCEDURE — 84443 ASSAY THYROID STIM HORMONE: CPT

## 2019-04-08 PROCEDURE — 83970 ASSAY OF PARATHORMONE: CPT

## 2019-04-08 PROCEDURE — 94640 AIRWAY INHALATION TREATMENT: CPT

## 2019-04-08 PROCEDURE — A4216 STERILE WATER/SALINE, 10 ML: HCPCS | Performed by: HOSPITALIST

## 2019-04-08 PROCEDURE — 97110 THERAPEUTIC EXERCISES: CPT

## 2019-04-08 PROCEDURE — 84439 ASSAY OF FREE THYROXINE: CPT

## 2019-04-08 PROCEDURE — 11000001 HC ACUTE MED/SURG PRIVATE ROOM

## 2019-04-08 PROCEDURE — 97116 GAIT TRAINING THERAPY: CPT

## 2019-04-08 RX ORDER — BUMETANIDE 1 MG/1
1 TABLET ORAL
Status: DISCONTINUED | OUTPATIENT
Start: 2019-04-08 | End: 2019-04-09

## 2019-04-08 RX ORDER — IPRATROPIUM BROMIDE AND ALBUTEROL SULFATE 2.5; .5 MG/3ML; MG/3ML
3 SOLUTION RESPIRATORY (INHALATION)
Status: DISCONTINUED | OUTPATIENT
Start: 2019-04-08 | End: 2019-04-16 | Stop reason: HOSPADM

## 2019-04-08 RX ADMIN — BUMETANIDE 1 MG: 1 TABLET ORAL at 11:04

## 2019-04-08 RX ADMIN — Medication 10 ML: at 06:04

## 2019-04-08 RX ADMIN — POLYETHYLENE GLYCOL 3350 17 G: 17 POWDER, FOR SOLUTION ORAL at 08:04

## 2019-04-08 RX ADMIN — PANTOPRAZOLE SODIUM 40 MG: 40 TABLET, DELAYED RELEASE ORAL at 08:04

## 2019-04-08 RX ADMIN — Medication 10 ML: at 05:04

## 2019-04-08 RX ADMIN — DOCUSATE SODIUM 100 MG: 100 CAPSULE, LIQUID FILLED ORAL at 09:04

## 2019-04-08 RX ADMIN — DOCUSATE SODIUM 100 MG: 100 CAPSULE, LIQUID FILLED ORAL at 08:04

## 2019-04-08 RX ADMIN — IPRATROPIUM BROMIDE AND ALBUTEROL SULFATE 3 ML: .5; 3 SOLUTION RESPIRATORY (INHALATION) at 01:04

## 2019-04-08 RX ADMIN — DILTIAZEM HYDROCHLORIDE 30 MG: 30 TABLET, FILM COATED ORAL at 09:04

## 2019-04-08 RX ADMIN — IPRATROPIUM BROMIDE AND ALBUTEROL SULFATE 3 ML: .5; 3 SOLUTION RESPIRATORY (INHALATION) at 04:04

## 2019-04-08 RX ADMIN — LOSARTAN POTASSIUM 25 MG: 25 TABLET, FILM COATED ORAL at 08:04

## 2019-04-08 RX ADMIN — Medication 10 ML: at 11:04

## 2019-04-08 RX ADMIN — ASPIRIN 81 MG: 81 TABLET, COATED ORAL at 08:04

## 2019-04-08 RX ADMIN — IPRATROPIUM BROMIDE AND ALBUTEROL SULFATE 3 ML: .5; 3 SOLUTION RESPIRATORY (INHALATION) at 07:04

## 2019-04-08 RX ADMIN — DILTIAZEM HYDROCHLORIDE 30 MG: 30 TABLET, FILM COATED ORAL at 08:04

## 2019-04-08 RX ADMIN — TAMSULOSIN HYDROCHLORIDE 0.4 MG: 0.4 CAPSULE ORAL at 08:04

## 2019-04-08 RX ADMIN — CARVEDILOL 25 MG: 6.25 TABLET, FILM COATED ORAL at 08:04

## 2019-04-08 RX ADMIN — Medication 10 ML: at 12:04

## 2019-04-08 RX ADMIN — POLYETHYLENE GLYCOL 3350 17 G: 17 POWDER, FOR SOLUTION ORAL at 09:04

## 2019-04-08 RX ADMIN — SPIRONOLACTONE 25 MG: 25 TABLET ORAL at 08:04

## 2019-04-08 RX ADMIN — FERROUS GLUCONATE TAB 324 MG (37.5 MG ELEMENTAL IRON) 324 MG: 324 (37.5 FE) TAB at 08:04

## 2019-04-08 RX ADMIN — CARVEDILOL 25 MG: 6.25 TABLET, FILM COATED ORAL at 09:04

## 2019-04-08 RX ADMIN — RAMELTEON 8 MG: 8 TABLET, FILM COATED ORAL at 09:04

## 2019-04-08 RX ADMIN — CLONIDINE HYDROCHLORIDE 0.1 MG: 0.1 TABLET ORAL at 04:04

## 2019-04-08 RX ADMIN — ATORVASTATIN CALCIUM 20 MG: 10 TABLET, FILM COATED ORAL at 09:04

## 2019-04-08 RX ADMIN — IPRATROPIUM BROMIDE AND ALBUTEROL SULFATE 3 ML: .5; 3 SOLUTION RESPIRATORY (INHALATION) at 09:04

## 2019-04-08 RX ADMIN — INSULIN ASPART 2 UNITS: 100 INJECTION, SOLUTION INTRAVENOUS; SUBCUTANEOUS at 12:04

## 2019-04-08 NOTE — PLAN OF CARE
Problem: Adjustment to Illness COPD (Chronic Obstructive Pulmonary Disease)  Goal: Optimal Chronic Illness Coping  Outcome: Ongoing (interventions implemented as appropriate)  o2 3 l maintained. SOB noted on exertion. Bipap at bedside for night use.

## 2019-04-08 NOTE — PLAN OF CARE
Problem: Physical Therapy Goal  Goal: Physical Therapy Goal  Goals to be met by: 19    Patient will increase functional independence with mobility by performin. Sit to stand transfer with Stand-by Assistance  2. Gait x150 feet with stand-by Assistance using Rolling Walker  3. Lower extremity exercise program x30 reps per handout, with supervision     Outcome: Ongoing (interventions implemented as appropriate)    Patient ambulated ~12ft with RW and minimal assistance.

## 2019-04-08 NOTE — PLAN OF CARE
Problem: Fall Injury Risk  Goal: Absence of Fall and Fall-Related Injury  Outcome: Ongoing (interventions implemented as appropriate)  Bed in low position with bed alarm set. Telesitter maintained. Side rails up x3

## 2019-04-08 NOTE — SUBJECTIVE & OBJECTIVE
Interval History: Episode of shortness of breath overnight.  Feeling better this morning.    Review of Systems   HENT: Negative for ear discharge and ear pain.    Eyes: Negative for pain and itching.   Endocrine: Negative for polyphagia and polyuria.   Neurological: Negative for seizures and syncope.     Objective:     Vital Signs (Most Recent):  Temp: 97.4 °F (36.3 °C) (04/08/19 1120)  Pulse: 74 (04/08/19 1335)  Resp: 18 (04/08/19 1335)  BP: 113/60 (04/08/19 1120)  SpO2: 95 % (04/08/19 1120) Vital Signs (24h Range):  Temp:  [97.3 °F (36.3 °C)-97.8 °F (36.6 °C)] 97.4 °F (36.3 °C)  Pulse:  [52-95] 74  Resp:  [17-22] 18  SpO2:  [95 %-100 %] 95 %  BP: (113-188)/() 113/60     Weight: 95.4 kg (210 lb 5.1 oz)  Body mass index is 31.06 kg/m².    Intake/Output Summary (Last 24 hours) at 4/8/2019 1437  Last data filed at 4/7/2019 1800  Gross per 24 hour   Intake 360 ml   Output --   Net 360 ml      Physical Exam   Constitutional: He is oriented to person, place, and time. He appears well-developed and well-nourished. No distress.   HENT:   Head: Normocephalic and atraumatic.   Right Ear: External ear normal.   Left Ear: External ear normal.   Nose: Nose normal.   Eyes: Right eye exhibits no discharge. Left eye exhibits no discharge.   Neck: Normal range of motion.   Cardiovascular:   Irregularly irregular, no murmurs or gallops   Pulmonary/Chest: Effort normal. No respiratory distress.   Abdominal: Soft. Bowel sounds are normal. There is no tenderness. There is no rebound and no guarding.   Musculoskeletal: Normal range of motion. He exhibits edema (Much improved).   LUE edema   Neurological: He is alert and oriented to person, place, and time.   Skin: Skin is warm and dry. He is not diaphoretic. No erythema.   Psychiatric: He has a normal mood and affect. His behavior is normal. Judgment and thought content normal.   Nursing note and vitals reviewed.      Significant Labs:   BMP:   Recent Labs   Lab 04/08/19  1225    *      K 4.1      CO2 32*   BUN 33*   CREATININE 1.3   CALCIUM 9.8     CBC:   Recent Labs   Lab 04/07/19  0527   WBC 6.91   HGB 8.2*   HCT 29.6*

## 2019-04-08 NOTE — CARE UPDATE
Cross-Cover Progress Note    I was called to Mr. Agus Ramos Jr.'s bedside by his nurse to evaluate him for worsening respiratory distress. He was admitted about 2 weeks ago for metabolic encephalopathy and noted to be hypoglycemic but has improved from that standpoint.  He does have a history of COPD and has been dyspneic tonight.  He was noted by staff to have significant wheezing for which I ordered an hour long continuous nebulization with albuterol and ipratropium.    On my evaluation, he was with mildly increased respiratory effort within expiratory wheezing bilaterally. His heart rate was irregularly irregular but with controlled rate.  He was on CPAP and has good volumes.  He does appear confused and asked nonsensical questions.    We did do a STAT chest x-ray which reviewed improved aeration of the lungs bilaterally. We attempted an ABG and the left radial artery which was complicated by edema. I performed an ABG on his right radial artery which was significant for 7.411  43.8  80  27.9 on CPAP 10  35%.  His respiratory status does appear improved.    I will continue monitor the patient's progress throughout the night.          Critical Care Time: 45 minutes.          Megan Yip M.D.  Staff Nocturnist  Department of Hospital Medicine  [facility]  Pager: [phone]

## 2019-04-08 NOTE — ASSESSMENT & PLAN NOTE
Has proteinuria with Anasarca.  Consulted oncology for abnormal UPEP  Appreciate Nephrology and Oncology input.

## 2019-04-08 NOTE — UM SECONDARY REVIEW
[facility]    PA - Medical Necessity Issue  Hospital Course:  77 y/o male presented with weakness.  Hx of DM on Glipizide and Metformin.  Patient noted to be hypoglycemic.  Initially responded to D50, but then became persistently hypoglycemic.  Admitted to ICU on D10 drip and hourly glucose monitoring.  Also started on Octreotide drip.  No further episodes of hypoglycemia and D10 switched to D5.  Octreotide drip stopped.  Now getting more hyperglycemic.  Stopping D5 drip and monitor off dextrose.  PT/OT evaluation.  Recommended SNF,consulted SW.  Last HbA1C 2 weeks ago 5.0,likely no need any more for hypoglycemic agents,diet should  be fine.  Has scrotal swelling,on IV lasix,lifting scrotum.  Has anasarca,all over body,increased lasix ,have ,proteinuria,check protein/Cr. Ratio,elevated,consulted nephrology.still has massive anasarca.  Urology following for scrotal swelling,improved.  Has left renal cyst,on US,need follow up with repeat US  in 6 months.  24 urine collection is in Process to R/O nephrotic syndrome.  Consulted oncology for abnormal UPEP.  Boucher removed.  Patient voiding without issues.  Diuresis stopped by Nephrology.       Patient was to be discharged today but Became SOB Last night  Cross-Cover Progress Note     I was called to  Agus Richard Lowe's bedside by his nurse to evaluate him for worsening respiratory distress. He was admitted about 2 weeks ago for metabolic encephalopathy and noted to be hypoglycemic but has improved from that standpoint.  He does have a history of COPD and has been dyspneic tonight.  He was noted by staff to have significant wheezing for which I ordered an hour long continuous nebulization with albuterol and ipratropium.     On my evaluation, he was with mildly increased respiratory effort within expiratory wheezing bilaterally. His heart rate was irregularly irregular but with controlled rate.  He was on CPAP and has good volumes.  He does appear confused and  asked nonsensical questions.     We did do a STAT chest x-ray which reviewed improved aeration of the lungs bilaterally. We attempted an ABG and the left radial artery which was complicated by edema. I performed an ABG on his right radial artery which was significant for 7.411  43.8  80  27.9 on CPAP 10  35%.  His respiratory status does appear improved.       Today Nephrology Placing back on Diuretic  A/P:  1ckd3. Creatinine ok. Following.  2.acute on combined hf.  More sob overnight. Resume some more diuretics. Add bumex MWF.  3.hypercalemia. spep negative. Obed level. Ck tsh,pth.    4.urinary retention. UOP ?. Following with urology.  5.anemia. Obed cbc.        {[facility]  Review Outcome:12851}

## 2019-04-08 NOTE — PT/OT/SLP PROGRESS
Physical Therapy Treatment    Patient Name:  Agus Ramos Jr.   MRN:  [id number]    Recommendations:     Discharge Recommendations:  nursing facility, skilled   Discharge Equipment Recommendations: walker, rolling   Barriers to discharge: requires increased assistance for functional mobility     Assessment:     Agus Ramos Jr. is a 78 y.o. male admitted with a medical diagnosis of Acute metabolic encephalopathy.  He presents with the following impairments/functional limitations:  weakness, impaired endurance, impaired functional mobilty, gait instability, impaired balance, decreased lower extremity function, decreased safety awareness, impaired cardiopulmonary response to activity, edema, decreased upper extremity function. Today he required two person moderate assist to stand from low bedside chair with RW. Patient ambulated ~12ft with RW and minimal assistance. Plan of care  today so PT extended plan of care due to patient with slow progression in PT due to medical issues while in the hospital. PT will continue to follow patient.     Rehab Prognosis: Fair; patient would benefit from acute skilled PT services to address these deficits and reach maximum level of function.    Recent Surgery: * No surgery found *      Plan:     During this hospitalization, patient to be seen 3 x/week to address the identified rehab impairments via therapeutic activities, therapeutic exercises, gait training and progress toward the following goals:    · Plan of Care Expires:  04/15/19    Subjective     Chief Complaint: Patient agreeable to attempt ambulation today.   Patient/Family Comments/goals: To go home.   Pain/Comfort:  · Pain Rating 1: 0/10      Objective:     Communicated with nurse Trujillo prior to session.  Patient found reclined in bedside chair with Avasys,telemetry, PICC line, oxygen upon PT entry to room.     General Precautions: Standard, fall, respiratory   Orthopedic Precautions:N/A   Braces: N/A      Functional Mobility:  · Transfers:     · Sit to Stand:  moderate assistance of 2 persons with rolling walker x2 trials from low bedside chair with verbal/tactile cues for technique and safety   · Gait:  Patient ambulated 12ft with Rolling Walker and minimal assistance using 3-point gait. Patient demonstrated decreased arnold, increased time in double stance, decreased velocity of limb motion and decreased step length during gait due to impaired balance, decreased strength and decreased endurance.    AM-PAC 6 CLICK MOBILITY  Turning over in bed (including adjusting bedclothes, sheets and blankets)?: 4  Sitting down on and standing up from a chair with arms (e.g., wheelchair, bedside commode, etc.): 3  Moving from lying on back to sitting on the side of the bed?: 3  Moving to and from a bed to a chair (including a wheelchair)?: 3  Need to walk in hospital room?: 3  Climbing 3-5 steps with a railing?: 2  Basic Mobility Total Score: 18     Therapeutic Activities and Exercises:  Patient performed B LE seated therex x10 reps for LAQ and hip flex. Patient performed B LE therex reclined in bedside chair x10 reps for A/P, SLR (AAROM), and hip abd/add (AAROM).    Patient left reclined in bedside chair with all 4 extremities elevated on pillows, Avasys, all lines intact, call button in reach, green cushion found in other chair in room and placed under patient once standing, and nurse Cassandra notified.      GOALS:   Multidisciplinary Problems     Physical Therapy Goals        Problem: Physical Therapy Goal    Goal Priority Disciplines Outcome Goal Variances Interventions   Physical Therapy Goal     PT, PT/OT Ongoing (interventions implemented as appropriate)     Description:  Goals to be met by: 19    Patient will increase functional independence with mobility by performin. Sit to stand transfer with Stand-by Assistance  2. Gait x150 feet with stand-by Assistance using Rolling Walker  3. Lower extremity exercise  program x30 reps per handout, with supervision                      Time Tracking:     PT Received On: 04/08/19  PT Start Time: 1109     PT Stop Time: 1136  PT Total Time (min): 27 min     Billable Minutes: Gait Training 14 and Therapeutic Exercise 13    Treatment Type: Treatment  PT/PTA: PT     PTA Visit Number: 0     Katie Coughlin, PT  04/08/2019

## 2019-04-08 NOTE — PLAN OF CARE
04/08/19 1655   Discharge Reassessment   Assessment Type Discharge Planning Reassessment   Provided patient/caregiver education on the expected discharge date and the discharge plan No   Do you have any problems affording any of your prescribed medications? No   Discharge Plan A Home with family   Discharge Plan B Home Health   DME Needed Upon Discharge  none   Patient choice form signed by patient/caregiver No   Anticipated Discharge Disposition Home   Can the patient answer the patient profile reliably? Yes, cognitively intact   How does the patient rate their overall health at the present time? Fair   Describe the patient's ability to walk at the present time. Major restrictions/daily assistance from another person   How often would a person be available to care for the patient? Whenever needed   Number of comorbid conditions (as recorded on the chart) Five or more   During the past month, has the patient often been bothered by feeling down, depressed or hopeless? No   During the past month, has the patient often been bothered by little interest or pleasure in doing things? No   Post-Acute Status   Post-Acute Authorization Home Health/Hospice  (Pt declined SNF.)   Home Health/Hospice Status Awaiting Orders for

## 2019-04-08 NOTE — PLAN OF CARE
Problem: Diabetes Comorbidity  Goal: Blood Glucose Level Within Desired Range  Outcome: Ongoing (interventions implemented as appropriate)  accu checks monitored ac and hs

## 2019-04-08 NOTE — PROGRESS NOTES
[facility] Hospital Medicine  Progress Note    Patient Name: Agus Ramos Jr.  MRN: [id number]  Patient Class: IP- Inpatient   Admission Date: 3/23/2019  Length of Stay: 16 days  Attending Physician: Jose De Jesus Angulo MD  Primary Care Provider: Keaton Hardy MD        Subjective:     Principal Problem:Acute metabolic encephalopathy    HPI:  Mr. Agus Ramos Jr. is a 78 y.o. male known to me with essential hypertension, type 2 diabetes mellitus (HbA1c 5.0% Mar 2019), CAD s/p CABG, chronic combined systolic and diastolic heart failure (LVEF 50% Mar 2019), CKD Stage 3, peripheral artery disease, chronic atrial fibrillation ([id number]-VDVq score 5) on chronic anticoagulation, COPD, chronic respiratory failure with hypoxia, and anemia of chronic disease who presents to [facility] ED with complaints of a fall this morning.  He had much difficulty standing back up.  EMS was activated and they found that his capillary glucose was 14 mg/dL after which he was given an ampule of 50% dextrose.  His mentation returned to baseline thereafter.  He denies any tremors nor diaphoresis.  He says that he's been feeling weak and dizzy today but denies any loss of consciousness, nor antecedent chest pain, shortness of breath, palpitations, fevers, chills, nausea, vomiting, abdominal pain, or any diarrhea.  He did not trip or slip or anything.  He has had a good appetite but cannot say for sure if he has been taking too much of his diabetes medications.  He denies any recent changes to his medications.  He otherwise has been in his usual state of health.    Hospital Course:  79 y/o male presented with weakness.  Hx of DM on Glipizide and Metformin.  Patient noted to be hypoglycemic.  Initially responded to D50, but then became persistently hypoglycemic.  Admitted to ICU on D10 drip and hourly glucose monitoring.  Also started on Octreotide drip.  No further episodes of hypoglycemia and D10 switched to D5.  Octreotide  drip stopped.  Now getting more hyperglycemic.  Stopping D5 drip and monitor off dextrose.  PT/OT evaluation.  Recommended SNF,consulted SW.  Last HbA1C 2 weeks ago 5.0,likely no need any more for hypoglycemic agents,diet should  be fine.  Has scrotal swelling,on IV lasix,lifting scrotum.  Has anasarca,all over body,increased lasix ,have ,proteinuria,check protein/Cr. Ratio,elevated,consulted nephrology.still has massive anasarca.  Urology following for scrotal swelling,improved.  Has left renal cyst,on US,need follow up with repeat US  in 6 months.  24 urine collection is in Process to R/O nephrotic syndrome.  Consulted oncology for abnormal UPEP.  Boucher removed.  Patient voiding without issues.  Diuresis stopped by Nephrology.  Shortness of breath overnight on 4/8.  Started on oral Bumex.    Interval History: Episode of shortness of breath overnight.  Feeling better this morning.    Review of Systems   HENT: Negative for ear discharge and ear pain.    Eyes: Negative for pain and itching.   Endocrine: Negative for polyphagia and polyuria.   Neurological: Negative for seizures and syncope.     Objective:     Vital Signs (Most Recent):  Temp: 97.4 °F (36.3 °C) (04/08/19 1120)  Pulse: 74 (04/08/19 1335)  Resp: 18 (04/08/19 1335)  BP: 113/60 (04/08/19 1120)  SpO2: 95 % (04/08/19 1120) Vital Signs (24h Range):  Temp:  [97.3 °F (36.3 °C)-97.8 °F (36.6 °C)] 97.4 °F (36.3 °C)  Pulse:  [52-95] 74  Resp:  [17-22] 18  SpO2:  [95 %-100 %] 95 %  BP: (113-188)/() 113/60     Weight: 95.4 kg (210 lb 5.1 oz)  Body mass index is 31.06 kg/m².    Intake/Output Summary (Last 24 hours) at 4/8/2019 4061  Last data filed at 4/7/2019 1800  Gross per 24 hour   Intake 360 ml   Output --   Net 360 ml      Physical Exam   Constitutional: He is oriented to person, place, and time. He appears well-developed and well-nourished. No distress.   HENT:   Head: Normocephalic and atraumatic.   Right Ear: External ear normal.   Left Ear: External  ear normal.   Nose: Nose normal.   Eyes: Right eye exhibits no discharge. Left eye exhibits no discharge.   Neck: Normal range of motion.   Cardiovascular:   Irregularly irregular, no murmurs or gallops   Pulmonary/Chest: Effort normal. No respiratory distress.   Abdominal: Soft. Bowel sounds are normal. There is no tenderness. There is no rebound and no guarding.   Musculoskeletal: Normal range of motion. He exhibits edema (Much improved).   LUE edema   Neurological: He is alert and oriented to person, place, and time.   Skin: Skin is warm and dry. He is not diaphoretic. No erythema.   Psychiatric: He has a normal mood and affect. His behavior is normal. Judgment and thought content normal.   Nursing note and vitals reviewed.      Significant Labs:   BMP:   Recent Labs   Lab 04/08/19  1228   *      K 4.1      CO2 32*   BUN 33*   CREATININE 1.3   CALCIUM 9.8     CBC:   Recent Labs   Lab 04/07/19  0527   WBC 6.91   HGB 8.2*   HCT 29.6*        Assessment/Plan:      * Acute metabolic encephalopathy  Patient was noted to have a capillary glucose of 14 mg/dL in the field for which he was given an ampule of 50% dextrose with improvement of his mentation.  His initial serum glucose here was 30 mg/dL but later was still 44 mg/dL after treatment.  He improved into the 180's but continued to drop to 81 mg/dL then to 36 mg/dL.    Admitted to ICU on D10 drip and hourly glucose checks.  Also started on Octreotide drip.  Glucose increasing with no further episodes of hypoglycemia.  Will change D10 to D5 and monitor glucose every 4 hours.  The etiology of his hypoglycemia is unclear but he is on a sulfonylurea at home.  He does have 1+ leukocytes and moderate bacteria on urine, but many amorphous cells.  Will repeat UA with UCx.  Afebrile.  Will hold off on ABx's for now.  Getting more hyperglycemic.  Stop D5 and monitor off dextrose.    PT/OT eval.  Last HbA1C 2 weeks ago 5.0,likely no need any more for  hypoglycemic agents,diet should  be fine.  SNF recommended.  Patient has refused SNF.  Boucher removed and patient voiding.  Lasix stopped.   SOB overnight.  Started on oral Bumex.  Possibly home with HH tomorrow if doing ok.      Proteinuria  Has proteinuria with Anasarca.  Consulted oncology for abnormal UPEP  Appreciate Nephrology and Oncology input.        Urinary retention  Appreciate Urology input.  Boucher removed and patient urinating.      Anemia due to chronic kidney disease        Renal cyst, left  Has left renal cyst,on US,need follow up with repeat US  in 6 weeks,      Scrotal swelling    Has scrotal swelling,on IV lasix,lifting scrotum.Has anasarca,all over body,increased lasix,    Peripheral artery disease  Stable; will continue his home regimen of aspirin and atorvastatin.    Acute on chronic combined systolic and diastolic heart failure  Will continue with IV lasix    Chronic respiratory failure with hypoxia  Stable; will continue his home supplemental oxygen therapy.    Centrilobular emphysema  Clinically-stable without wheezing.  Will provide as-needed JENAE/LAMA available.    Iron deficiency anemia  The patient's H/H is stable and consistent with previous laboratory measurements, and the patient exhibits no signs or symptoms of acute bleeding; there is no indication for transfusion.  Will continue to monitor.    Type 2 diabetes mellitus, controlled, with renal complications  As addressed above.    CKD Stage 3  His renal function appears to be at his baseline.    Essential hypertension  Continue home regimen of carvedilol, diltiazem, furosemide, and tamsulosin, and provide as-needed clonidine.    CAD (coronary artery disease)  Stable; will continue his home regimen of aspirin, atorvastatin, and carvedilol.    Chronic atrial fibrillation  Patient is in atrial fibrillation at this time which is baseline for him.  He was taken off of anticoagulation during his last admission due to anemia and was  instructed not to take it until outpatient follow-up with gastroenterology.  Will continue to monitor.      VTE Risk Mitigation (From admission, onward)        Ordered     Reason for No Pharmacological VTE Prophylaxis  Once      03/23/19 1936     Place sequential compression device  Until discontinued      03/23/19 1934     Place DENIS hose  Until discontinued      03/23/19 1934              Jose De Jesus Angulo MD  Department of Hospital Medicine   [facility]

## 2019-04-08 NOTE — PROGRESS NOTES
Date of Admission:3/23/2019    SUBJECTIVE: notes breathing ok    Current Facility-Administered Medications   Medication    acetaminophen tablet 500 mg    albuterol-ipratropium 2.5 mg-0.5 mg/3 mL nebulizer solution 3 mL    albuterol-ipratropium 2.5 mg-0.5 mg/3 mL nebulizer solution 3 mL    aspirin EC tablet 81 mg    atorvastatin tablet 20 mg    bumetanide tablet 1 mg    carvedilol tablet 25 mg    cloNIDine tablet 0.1 mg    diltiaZEM tablet 30 mg    docusate sodium capsule 100 mg    ferrous gluconate tablet 324 mg    influenza (FLUZONE HIGH-DOSE) vaccine 0.5 mL    insulin aspart U-100 pen 0-5 Units    losartan tablet 25 mg    ondansetron injection 8 mg    pantoprazole EC tablet 40 mg    pneumoc 13-alan conj-dip cr(PF) (PREVNAR 13 (PF)) 0.5 mL    polyethylene glycol packet 17 g    promethazine (PHENERGAN) 6.25 mg in dextrose 5 % 50 mL IVPB    ramelteon tablet 8 mg    senna-docusate 8.6-50 mg per tablet 1 tablet    sodium chloride 0.9% flush 10 mL    And    sodium chloride 0.9% flush 10 mL    spironolactone tablet 25 mg    tamsulosin 24 hr capsule 0.4 mg       Wt Readings from Last 3 Encounters:   04/08/19 95.4 kg (210 lb 5.1 oz)   03/17/19 92.8 kg (204 lb 9.4 oz)   01/05/19 77.6 kg (171 lb 1.2 oz)     Temp Readings from Last 3 Encounters:   04/08/19 97.4 °F (36.3 °C) (Oral)   03/19/19 97.7 °F (36.5 °C)   01/08/19 97.6 °F (36.4 °C) (Oral)     BP Readings from Last 3 Encounters:   04/08/19 113/60   03/19/19 133/68   01/08/19 129/72     Pulse Readings from Last 3 Encounters:   04/08/19 95   03/19/19 78   01/08/19 80       Intake/Output Summary (Last 24 hours) at 4/8/2019 1131  Last data filed at 4/7/2019 1800  Gross per 24 hour   Intake 600 ml   Output --   Net 600 ml       PE:  GEN:wd male in nad  HEENT:ncat,eomi,mm  CVS:chiki 2/6  PULM:diminished bases  ABD:+bs,soft,nd  EXT:2+edema legs  NEURO:awake    Recent Labs   Lab 04/08/19  0442   *      K 4.1      CO2 33*   BUN 34*    CREATININE 1.1   CALCIUM 10.0       Lab Results   Component Value Date    .6 (H) 07/10/2018    CALCIUM 10.0 04/08/2019    CAION 1.42 05/30/2018    PHOS 3.6 03/24/2019       No results for input(s): WBC, RBC, HGB, HCT, PLT, MCV, MCH, MCHC in the last 24 hours.      A/P:  1ckd3. Creatinine ok. Following.  2.acute on combined hf.  More sob overnight. Resume some more diuretics. Add bumex MWF.  3.hypercalemia. spep negative. Obed level. Ck tsh,pth.    4.urinary retention. UOP ?. Following with urology.  5.anemia. Obed cbc.

## 2019-04-08 NOTE — ASSESSMENT & PLAN NOTE
Patient was noted to have a capillary glucose of 14 mg/dL in the field for which he was given an ampule of 50% dextrose with improvement of his mentation.  His initial serum glucose here was 30 mg/dL but later was still 44 mg/dL after treatment.  He improved into the 180's but continued to drop to 81 mg/dL then to 36 mg/dL.    Admitted to ICU on D10 drip and hourly glucose checks.  Also started on Octreotide drip.  Glucose increasing with no further episodes of hypoglycemia.  Will change D10 to D5 and monitor glucose every 4 hours.  The etiology of his hypoglycemia is unclear but he is on a sulfonylurea at home.  He does have 1+ leukocytes and moderate bacteria on urine, but many amorphous cells.  Will repeat UA with UCx.  Afebrile.  Will hold off on ABx's for now.  Getting more hyperglycemic.  Stop D5 and monitor off dextrose.    PT/OT eval.  Last HbA1C 2 weeks ago 5.0,likely no need any more for hypoglycemic agents,diet should  be fine.  SNF recommended.  Patient has refused SNF.  Boucher removed and patient voiding.  Lasix stopped.   SOB overnight.  Started on oral Bumex.  Possibly home with HH tomorrow if doing ok.

## 2019-04-09 LAB
ALBUMIN SERPL BCP-MCNC: 2.1 G/DL (ref 3.5–5.2)
ALP SERPL-CCNC: 126 U/L (ref 55–135)
ALT SERPL W/O P-5'-P-CCNC: 15 U/L (ref 10–44)
ANION GAP SERPL CALC-SCNC: 6 MMOL/L (ref 8–16)
ANION GAP SERPL CALC-SCNC: 6 MMOL/L (ref 8–16)
ANISOCYTOSIS BLD QL SMEAR: ABNORMAL
AST SERPL-CCNC: 13 U/L (ref 10–40)
BASOPHILS # BLD AUTO: 0.01 K/UL (ref 0–0.2)
BASOPHILS NFR BLD: 0.1 % (ref 0–1.9)
BILIRUB SERPL-MCNC: 0.5 MG/DL (ref 0.1–1)
BUN SERPL-MCNC: 33 MG/DL (ref 8–23)
BUN SERPL-MCNC: 33 MG/DL (ref 8–23)
CALCIUM SERPL-MCNC: 9.8 MG/DL (ref 8.7–10.5)
CALCIUM SERPL-MCNC: 9.8 MG/DL (ref 8.7–10.5)
CHLORIDE SERPL-SCNC: 107 MMOL/L (ref 95–110)
CHLORIDE SERPL-SCNC: 107 MMOL/L (ref 95–110)
CO2 SERPL-SCNC: 30 MMOL/L (ref 23–29)
CO2 SERPL-SCNC: 30 MMOL/L (ref 23–29)
CREAT SERPL-MCNC: 1.2 MG/DL (ref 0.5–1.4)
CREAT SERPL-MCNC: 1.2 MG/DL (ref 0.5–1.4)
DIFFERENTIAL METHOD: ABNORMAL
EOSINOPHIL # BLD AUTO: 0.2 K/UL (ref 0–0.5)
EOSINOPHIL NFR BLD: 2.2 % (ref 0–8)
ERYTHROCYTE [DISTWIDTH] IN BLOOD BY AUTOMATED COUNT: 23.7 % (ref 11.5–14.5)
EST. GFR  (AFRICAN AMERICAN): >60 ML/MIN/1.73 M^2
EST. GFR  (AFRICAN AMERICAN): >60 ML/MIN/1.73 M^2
EST. GFR  (NON AFRICAN AMERICAN): 57 ML/MIN/1.73 M^2
EST. GFR  (NON AFRICAN AMERICAN): 57 ML/MIN/1.73 M^2
GIANT PLATELETS BLD QL SMEAR: PRESENT
GLUCOSE SERPL-MCNC: 113 MG/DL (ref 70–110)
GLUCOSE SERPL-MCNC: 113 MG/DL (ref 70–110)
HCT VFR BLD AUTO: 28.5 % (ref 40–54)
HGB BLD-MCNC: 7.9 G/DL (ref 14–18)
HYPOCHROMIA BLD QL SMEAR: ABNORMAL
LYMPHOCYTES # BLD AUTO: 1.1 K/UL (ref 1–4.8)
LYMPHOCYTES NFR BLD: 16.3 % (ref 18–48)
MCH RBC QN AUTO: 21.9 PG (ref 27–31)
MCHC RBC AUTO-ENTMCNC: 27.7 G/DL (ref 32–36)
MCV RBC AUTO: 79 FL (ref 82–98)
MONOCYTES # BLD AUTO: 0.9 K/UL (ref 0.3–1)
MONOCYTES NFR BLD: 12.8 % (ref 4–15)
NEUTROPHILS # BLD AUTO: 4.7 K/UL (ref 1.8–7.7)
NEUTROPHILS NFR BLD: 69.2 % (ref 38–73)
PHOSPHATE SERPL-MCNC: 2.5 MG/DL (ref 2.7–4.5)
PLATELET # BLD AUTO: 234 K/UL (ref 150–350)
PMV BLD AUTO: 10.5 FL (ref 9.2–12.9)
POCT GLUCOSE: 119 MG/DL (ref 70–110)
POCT GLUCOSE: 169 MG/DL (ref 70–110)
POCT GLUCOSE: 201 MG/DL (ref 70–110)
POCT GLUCOSE: 224 MG/DL (ref 70–110)
POIKILOCYTOSIS BLD QL SMEAR: ABNORMAL
POLYCHROMASIA BLD QL SMEAR: ABNORMAL
POTASSIUM SERPL-SCNC: 4.2 MMOL/L (ref 3.5–5.1)
POTASSIUM SERPL-SCNC: 4.2 MMOL/L (ref 3.5–5.1)
PROT SERPL-MCNC: 4.4 G/DL (ref 6–8.4)
RBC # BLD AUTO: 3.61 M/UL (ref 4.6–6.2)
SCHISTOCYTES BLD QL SMEAR: ABNORMAL
SODIUM SERPL-SCNC: 143 MMOL/L (ref 136–145)
SODIUM SERPL-SCNC: 143 MMOL/L (ref 136–145)
TARGETS BLD QL SMEAR: ABNORMAL
WBC # BLD AUTO: 6.79 K/UL (ref 3.9–12.7)

## 2019-04-09 PROCEDURE — 97116 GAIT TRAINING THERAPY: CPT

## 2019-04-09 PROCEDURE — 25000003 PHARM REV CODE 250: Performed by: HOSPITALIST

## 2019-04-09 PROCEDURE — 25000003 PHARM REV CODE 250: Performed by: INTERNAL MEDICINE

## 2019-04-09 PROCEDURE — 11000001 HC ACUTE MED/SURG PRIVATE ROOM

## 2019-04-09 PROCEDURE — 94761 N-INVAS EAR/PLS OXIMETRY MLT: CPT

## 2019-04-09 PROCEDURE — 85025 COMPLETE CBC W/AUTO DIFF WBC: CPT

## 2019-04-09 PROCEDURE — 99900035 HC TECH TIME PER 15 MIN (STAT)

## 2019-04-09 PROCEDURE — 97530 THERAPEUTIC ACTIVITIES: CPT

## 2019-04-09 PROCEDURE — 84100 ASSAY OF PHOSPHORUS: CPT

## 2019-04-09 PROCEDURE — 94640 AIRWAY INHALATION TREATMENT: CPT

## 2019-04-09 PROCEDURE — 27000221 HC OXYGEN, UP TO 24 HOURS

## 2019-04-09 PROCEDURE — 36415 COLL VENOUS BLD VENIPUNCTURE: CPT

## 2019-04-09 PROCEDURE — 25000242 PHARM REV CODE 250 ALT 637 W/ HCPCS: Performed by: HOSPITALIST

## 2019-04-09 PROCEDURE — 80053 COMPREHEN METABOLIC PANEL: CPT

## 2019-04-09 PROCEDURE — 94660 CPAP INITIATION&MGMT: CPT

## 2019-04-09 PROCEDURE — A4216 STERILE WATER/SALINE, 10 ML: HCPCS | Performed by: HOSPITALIST

## 2019-04-09 RX ORDER — BUMETANIDE 1 MG/1
1 TABLET ORAL DAILY
Status: DISCONTINUED | OUTPATIENT
Start: 2019-04-10 | End: 2019-04-10

## 2019-04-09 RX ORDER — CINACALCET 30 MG/1
30 TABLET, FILM COATED ORAL
Status: DISCONTINUED | OUTPATIENT
Start: 2019-04-10 | End: 2019-04-16 | Stop reason: HOSPADM

## 2019-04-09 RX ORDER — LEVOTHYROXINE SODIUM 25 UG/1
25 TABLET ORAL
Status: DISCONTINUED | OUTPATIENT
Start: 2019-04-10 | End: 2019-04-16 | Stop reason: HOSPADM

## 2019-04-09 RX ADMIN — ASPIRIN 81 MG: 81 TABLET, COATED ORAL at 11:04

## 2019-04-09 RX ADMIN — INSULIN ASPART 1 UNITS: 100 INJECTION, SOLUTION INTRAVENOUS; SUBCUTANEOUS at 08:04

## 2019-04-09 RX ADMIN — Medication 10 ML: at 05:04

## 2019-04-09 RX ADMIN — Medication 10 ML: at 02:04

## 2019-04-09 RX ADMIN — INSULIN ASPART 2 UNITS: 100 INJECTION, SOLUTION INTRAVENOUS; SUBCUTANEOUS at 02:04

## 2019-04-09 RX ADMIN — FERROUS GLUCONATE TAB 324 MG (37.5 MG ELEMENTAL IRON) 324 MG: 324 (37.5 FE) TAB at 08:04

## 2019-04-09 RX ADMIN — DOCUSATE SODIUM 100 MG: 100 CAPSULE, LIQUID FILLED ORAL at 11:04

## 2019-04-09 RX ADMIN — SPIRONOLACTONE 25 MG: 25 TABLET ORAL at 12:04

## 2019-04-09 RX ADMIN — DILTIAZEM HYDROCHLORIDE 30 MG: 30 TABLET, FILM COATED ORAL at 11:04

## 2019-04-09 RX ADMIN — POLYETHYLENE GLYCOL 3350 17 G: 17 POWDER, FOR SOLUTION ORAL at 11:04

## 2019-04-09 RX ADMIN — CARVEDILOL 25 MG: 6.25 TABLET, FILM COATED ORAL at 11:04

## 2019-04-09 RX ADMIN — DILTIAZEM HYDROCHLORIDE 30 MG: 30 TABLET, FILM COATED ORAL at 08:04

## 2019-04-09 RX ADMIN — TAMSULOSIN HYDROCHLORIDE 0.4 MG: 0.4 CAPSULE ORAL at 12:04

## 2019-04-09 RX ADMIN — PANTOPRAZOLE SODIUM 40 MG: 40 TABLET, DELAYED RELEASE ORAL at 12:04

## 2019-04-09 RX ADMIN — ATORVASTATIN CALCIUM 20 MG: 10 TABLET, FILM COATED ORAL at 08:04

## 2019-04-09 RX ADMIN — Medication 10 ML: at 06:04

## 2019-04-09 RX ADMIN — Medication 10 ML: at 12:04

## 2019-04-09 RX ADMIN — IPRATROPIUM BROMIDE AND ALBUTEROL SULFATE 3 ML: .5; 3 SOLUTION RESPIRATORY (INHALATION) at 08:04

## 2019-04-09 RX ADMIN — DOCUSATE SODIUM 100 MG: 100 CAPSULE, LIQUID FILLED ORAL at 08:04

## 2019-04-09 RX ADMIN — IPRATROPIUM BROMIDE AND ALBUTEROL SULFATE 3 ML: .5; 3 SOLUTION RESPIRATORY (INHALATION) at 03:04

## 2019-04-09 RX ADMIN — LOSARTAN POTASSIUM 25 MG: 25 TABLET, FILM COATED ORAL at 11:04

## 2019-04-09 RX ADMIN — IPRATROPIUM BROMIDE AND ALBUTEROL SULFATE 3 ML: .5; 3 SOLUTION RESPIRATORY (INHALATION) at 07:04

## 2019-04-09 RX ADMIN — IPRATROPIUM BROMIDE AND ALBUTEROL SULFATE 3 ML: .5; 3 SOLUTION RESPIRATORY (INHALATION) at 10:04

## 2019-04-09 NOTE — PT/OT/SLP PROGRESS
"Physical Therapy Treatment    Patient Name:  Agus Ramos Jr.   MRN:  [id number]    Recommendations:     Discharge Recommendations:  nursing facility, skilled   Discharge Equipment Recommendations: walker, rolling   Barriers to discharge: increased assistance for functional mobility    Assessment:     Agus Ramos Jr. is a 78 y.o. male admitted with a medical diagnosis of Acute metabolic encephalopathy.  He presents with the following impairments/functional limitations:  weakness, impaired endurance, impaired self care skills, gait instability, impaired functional mobilty, impaired balance, decreased safety awareness, impaired coordination, impaired skin, impaired cardiopulmonary response to activity, edema, decreased ROM, decreased lower extremity function, decreased coordination.  Pt ambulated ~12ft with RW, CGA with max decreased arnold.  Pt tolerated static standing balance for ~4 minutes for doffing/donning of brief and pericare.    Rehab Prognosis: Fair; patient would benefit from acute skilled PT services to address these deficits and reach maximum level of function.    Recent Surgery: * No surgery found *      Plan:     During this hospitalization, patient to be seen 3 x/week to address the identified rehab impairments via therapeutic activities, therapeutic exercises, gait training and progress toward the following goals:    · Plan of Care Expires:  04/15/19    Subjective     Chief Complaint: weakness  Patient/Family Comments/goals: Pt state " I will try."  Pain/Comfort:  · Pain Rating 1: 0/10      Objective:     Communicated with pt's nurse, Lauren, prior to session.  Patient found seated in BSchair with SCD, telemetry, oxygen, PICC line upon PT entry to room.     General Precautions: Standard, fall, respiratory   Orthopedic Precautions:N/A   Braces: N/A     Functional Mobility:  · Transfers:x2 trials from BSchair     · Sit to Stand:  minimum assistance and moderate assistance with rolling " walker  · Gait: Pt ambulated ~12ft with RW, CGA on 3LO2 NC with fatigue and decreased endurance noted.  Pt with WBOS, max decreased step length, velocity of limb, postural control, weight shifting ability and safety awareness.  · Balance: good sitting and fair standing      AM-PAC 6 CLICK MOBILITY  Turning over in bed (including adjusting bedclothes, sheets and blankets)?: 4  Sitting down on and standing up from a chair with arms (e.g., wheelchair, bedside commode, etc.): 2  Moving from lying on back to sitting on the side of the bed?: 3  Moving to and from a bed to a chair (including a wheelchair)?: 3  Need to walk in hospital room?: 3  Climbing 3-5 steps with a railing?: 2  Basic Mobility Total Score: 17       Therapeutic Activities and Exercises:   Pt performed therex to BLEs x15 reps reclined in BSchair AROM including: heel slides, gravity eliminated hip abduction/adduction and ankle pumps    Pt tolerated static standing with RW, CGA ~4 minutes for doffing/donning of brief and pericare.  Pt with BLE weakness noted.    Patient left reclined in BSchair with SCDs, BLEs elevated and scrotum elevated with all lines intact, call button in reach and pt's nurse, Lauren, notified..    GOALS:   Multidisciplinary Problems     Physical Therapy Goals        Problem: Physical Therapy Goal    Goal Priority Disciplines Outcome Goal Variances Interventions   Physical Therapy Goal     PT, PT/OT Ongoing (interventions implemented as appropriate)     Description:  Goals to be met by: 19    Patient will increase functional independence with mobility by performin. Sit to stand transfer with Stand-by Assistance  2. Gait x150 feet with stand-by Assistance using Rolling Walker  3. Lower extremity exercise program x30 reps per handout, with supervision                      Time Tracking:     PT Received On: 19  PT Start Time: 1415     PT Stop Time: 1444  PT Total Time (min): 29 min     Billable Minutes: Gait Training 15  and Therapeutic Activity 14    Treatment Type: Treatment  PT/PTA: PTA     PTA Visit Number: 1     Kelli Chaudhry, PTA  04/09/2019

## 2019-04-09 NOTE — PLAN OF CARE
Problem: Occupational Therapy Goal  Goal: Occupational Therapy Goal  Goals to be met by: 4/15/19     Patient will increase functional independence with ADLs by performing:    UE Dressing with Set-up Assistance.  LE Dressing with Minimal Assistance.  Grooming while seated with Supervision.  Toileting from bedside commode with Moderate Assistance for hygiene and clothing management.   Bathing from  edge of bed with Moderate Assistance.  Sitting at edge of bed x20 minutes with Stand-by Assistance.  Rolling to Bilateral with Set-up Assistance.   Supine to sit with Supervision.  Stand pivot transfers with Minimal Assistance. Met 4/5/19  Toilet transfer to bedside commode with Minimal Assistance.  Upper extremity exercise program x10 reps per handout, with assistance as needed.      Outcome: Ongoing (interventions implemented as appropriate)  The patient is progressing in OT with improved functional mobility. The patient was able to amb using a RW with CGA from the chair to the bed ~8'.     Comments: The patient demo decreased safety and does not recall previous amb with PT. If D/C to home, the patient will require 24* (S) and assist with self care and functional mobility.

## 2019-04-09 NOTE — SUBJECTIVE & OBJECTIVE
Interval History: pt reports breathing better x 24 hours, still quite edematous     Review of Systems   HENT: Negative for ear discharge and ear pain.    Eyes: Negative for pain and itching.   Endocrine: Negative for polyphagia and polyuria.   Neurological: Negative for seizures and syncope.     Objective:     Vital Signs (Most Recent):  Temp: 97.5 °F (36.4 °C) (04/09/19 1113)  Pulse: 76 (04/09/19 1113)  Resp: 17 (04/09/19 1113)  BP: 137/85 (04/09/19 1113)  SpO2: 96 % (04/09/19 1113) Vital Signs (24h Range):  Temp:  [97 °F (36.1 °C)-98.1 °F (36.7 °C)] 97.5 °F (36.4 °C)  Pulse:  [50-98] 76  Resp:  [17-20] 17  SpO2:  [94 %-98 %] 96 %  BP: (127-167)/() 137/85     Weight: 100.6 kg (221 lb 12.5 oz)  Body mass index is 32.75 kg/m².    Intake/Output Summary (Last 24 hours) at 4/9/2019 1303  Last data filed at 4/8/2019 1700  Gross per 24 hour   Intake 120 ml   Output --   Net 120 ml      Physical Exam   Constitutional: He is oriented to person, place, and time. He appears well-developed and well-nourished. No distress.   HENT:   Head: Normocephalic and atraumatic.   Right Ear: External ear normal.   Left Ear: External ear normal.   Nose: Nose normal.   Eyes: Right eye exhibits no discharge. Left eye exhibits no discharge.   Neck: Normal range of motion.   Cardiovascular:   Irregularly irregular, no murmurs or gallops   Pulmonary/Chest: Effort normal. No respiratory distress.   Abdominal: Soft. Bowel sounds are normal. There is no tenderness. There is no rebound and no guarding.   Musculoskeletal: Normal range of motion. He exhibits edema (Much improved).   LUE edema   Neurological: He is alert and oriented to person, place, and time.   Skin: Skin is warm and dry. He is not diaphoretic. No erythema.   Psychiatric: He has a normal mood and affect. His behavior is normal. Judgment and thought content normal.   Nursing note and vitals reviewed.      Significant Labs:   BMP:   Recent Labs   Lab 04/09/19  0446   *   113*     143   K 4.2  4.2     107   CO2 30*  30*   BUN 33*  33*   CREATININE 1.2  1.2   CALCIUM 9.8  9.8     CBC:   Recent Labs   Lab 04/09/19  0446   WBC 6.79   HGB 7.9*   HCT 28.5*        TSH:   Recent Labs   Lab 04/08/19  1228   TSH 5.525*       Significant Imaging: I have reviewed and interpreted all pertinent imaging results/findings within the past 24 hours.

## 2019-04-09 NOTE — PLAN OF CARE
Problem: Adult Inpatient Plan of Care  Goal: Plan of Care Review  Outcome: Ongoing (interventions implemented as appropriate)  Lavinia Luevano RN   Registered Nurse   Med/Surg   Nursing   Signed                           []Hide copied text    []Hover for details      Pt has been pleasantly confused this shift. Day shift stated he was cognitively intact until the last hour or so of day shift. Pt has denied pain or needs this shift. He has remained free from falls or injuries this shift. Will continue to monitor.

## 2019-04-09 NOTE — PROGRESS NOTES
"[facility] [place]  Adult Nutrition  Progress Note    SUMMARY       Recommendations    1. Encourage adequate intake of meals/oral nutr supplement daily    Goals: Maintain meal intake >50% daily  Nutrition Goal Status: progressing toward goal  Communication of RD Recs: (plan of care)    Reason for Assessment    Reason For Assessment: RD follow-up  Diagnosis: (acute metabolic encephalopathy)  Relevant Medical History: DM, COPD, CHF, CAD s/p CABG x 3, CKD, afib  General Information Comments: Pt seen this afternoon [facility] while eating lunch (food from outside the facility) w/ family member present. Pt's mentation seems improved from when I spoke to him last week. Able to state his appetite is ok. Drinking at least one Boost Glu Control daily. Nephrology notes reviewed. NFPE performed 4/2; pt well-nourished. Does still have some edema. Pt to possible d/c tomorrow w/ HH per MD notes.   Pt remains inappropriate for diet educ 2/2 confusion.     Nutrition Discharge Planning: Adequate intake of meals to meet nutr needs.    Nutrition Risk Screen    Nutrition Risk Screen: no indicators present    Nutrition/Diet History    Spiritual, Cultural Beliefs, Sabianist Practices, Values that Affect Care: no  Food Allergies: NKFA  Factors Affecting Nutritional Intake: None identified at this time    Anthropometrics    Temp: 97.5 °F (36.4 °C)  Height Method: Stated  Height: 5' 9" (175.3 cm)  Height (inches): 69 in  Weight Method: Bed Scale  Weight: 100.6 kg (221 lb 12.5 oz)  Weight (lb): 221.78 lb  Ideal Body Weight (IBW), Male: 160 lb  % Ideal Body Weight, Male (lb): 131.45 lb  BMI (Calculated): 31.1  BMI Grade: 30 - 34.9- obesity - grade I       Lab/Procedures/Meds    Pertinent Labs Reviewed: reviewed  Pertinent Labs Comments: CO2 30, BUN 33, Glu 113, POCT glu 163-186, Alb 2.1  Pertinent Medications Reviewed: reviewed  Pertinent Medications Comments: statin, bumetanide, docusate, Fe, pantoprazole    Estimated/Assessed " "Needs    Weight Used For Calorie Calculations: 100.6 kg (221 lb 12.5 oz)  Energy Calorie Requirements (kcal): 1706  Energy Need Method: [place]     Protein Requirements:  g (.8-1 g/kg)  Weight Used For Protein Calculations: 100.6 kg (221 lb 12.5 oz)     Estimated Fluid Requirement Method: RDA Method  RDA Method (mL): 1706  CHO Requirement: 200 g daily      Nutrition Prescription Ordered    Current Diet Order: 2000 vee diabetic/low Na   Oral Nutrition Supplement: Boost Glucose Control TID    Evaluation of Received Nutrient/Fluid Intake    I/O: reviewed  Energy Calories Required: not meeting needs  Protein Required: not meeting needs  Fluid Required: meeting needs  Comments: LBM 4/7  Tolerance: tolerating  % Intake of Estimated Energy Needs: 50 - 75 % b/w intake of meals/oral supplement  % Meal Intake: 25 - 50 %    Nutrition Risk    Level of Risk/Frequency of Follow-up: (F/u 1 x weekly)     Assessment and Plan    Nutrition Problem  Inadequate energy intake    Related to (etiology):   "OK" appetite    Signs and Symptoms (as evidenced by):   <85% of EEN/EPN is being met    Interventions/Recommendations (treatment strategy):  Collaboration w/ other providers    Nutrition Diagnosis Status:   New        Monitor and Evaluation    Food and Nutrient Intake: energy intake, food and beverage intake  Food and Nutrient Adminstration: diet order  Physical Activity and Function: nutrition-related ADLs and IADLs  Anthropometric Measurements: weight, weight change  Biochemical Data, Medical Tests and Procedures: electrolyte and renal panel, glucose/endocrine profile  Nutrition-Focused Physical Findings: overall appearance     Malnutrition Assessment       Edema (Fluid Accumulation): 2-->mild   Subcutaneous Fat Loss (Final Summary): well nourished  Muscle Loss Evaluation (Final Summary): well nourished         Nutrition Follow-Up    RD Follow-up?: Yes    "

## 2019-04-09 NOTE — PLAN OF CARE
Problem: Adult Inpatient Plan of Care  Goal: Plan of Care Review  Recommendations     1. Encourage adequate intake of meals/oral nutr supplement daily    Goals: Maintain meal intake >50% daily  Nutrition Goal Status: progressing toward goal

## 2019-04-09 NOTE — NURSING
Pt has been pleasantly confused this shift. Day shift stated he was cognitively intact until the last hour or so of day shift. Pt has denied pain or needs this shift. He has remained free from falls or injuries this shift. Will continue to monitor.

## 2019-04-09 NOTE — PROGRESS NOTES
[facility] Medicine  Progress Note    Patient Name: Agus Ramos Jr.  MRN: [id number]  Patient Class: IP- Inpatient   Admission Date: 3/23/2019  Length of Stay: 17 days  Attending Physician: An Joshi MD  Primary Care Provider: Keaton Hardy MD        Subjective:     Principal Problem:Acute metabolic encephalopathy    HPI:  Mr. Agus Ramos Jr. is a 78 y.o. male known to me with essential hypertension, type 2 diabetes mellitus (HbA1c 5.0% Mar 2019), CAD s/p CABG, chronic combined systolic and diastolic heart failure (LVEF 50% Mar 2019), CKD Stage 3, peripheral artery disease, chronic atrial fibrillation (GLB4TA7-NPVe score 5) on chronic anticoagulation, COPD, chronic respiratory failure with hypoxia, and anemia of chronic disease who presents to [facility] ED with complaints of a fall this morning.  He had much difficulty standing back up.  EMS was activated and they found that his capillary glucose was 14 mg/dL after which he was given an ampule of 50% dextrose.  His mentation returned to baseline thereafter.  He denies any tremors nor diaphoresis.  He says that he's been feeling weak and dizzy today but denies any loss of consciousness, nor antecedent chest pain, shortness of breath, palpitations, fevers, chills, nausea, vomiting, abdominal pain, or any diarrhea.  He did not trip or slip or anything.  He has had a good appetite but cannot say for sure if he has been taking too much of his diabetes medications.  He denies any recent changes to his medications.  He otherwise has been in his usual state of health.    Hospital Course:  77 y/o male presented with weakness.  Hx of DM on Glipizide and Metformin.  Patient noted to be hypoglycemic.  Initially responded to D50, but then became persistently hypoglycemic.  Admitted to ICU on D10 drip and hourly glucose monitoring.  Also started on Octreotide drip.  No further episodes of hypoglycemia and D10 switched to D5.  Octreotide  drip stopped.  Now getting more hyperglycemic.  Stopping D5 drip and monitor off dextrose.  PT/OT evaluation.  Recommended SNF,consulted SW.  Last HbA1C 2 weeks ago 5.0,likely no need any more for hypoglycemic agents,diet should  be fine.  Has scrotal swelling,on IV lasix,lifting scrotum.  Has anasarca,all over body,increased lasix ,have ,proteinuria,check protein/Cr. Ratio,elevated,consulted nephrology.still has massive anasarca.  Urology following for scrotal swelling,improved.  Has left renal cyst,on US,need follow up with repeat US  in 6 months.  24 urine collection is in Process to R/O nephrotic syndrome.  Consulted oncology for abnormal UPEP.  Boucher removed.  Patient voiding without issues.  Diuresis stopped by Nephrology.  Shortness of breath overnight on 4/8.  Started on oral Bumex.    4/9- d/w nephrology, TSh elevated, start synthroid; not great UOP, increased diuretic to daily     Interval History: pt reports breathing better x 24 hours, still quite edematous     Review of Systems   HENT: Negative for ear discharge and ear pain.    Eyes: Negative for pain and itching.   Endocrine: Negative for polyphagia and polyuria.   Neurological: Negative for seizures and syncope.     Objective:     Vital Signs (Most Recent):  Temp: 97.5 °F (36.4 °C) (04/09/19 1113)  Pulse: 76 (04/09/19 1113)  Resp: 17 (04/09/19 1113)  BP: 137/85 (04/09/19 1113)  SpO2: 96 % (04/09/19 1113) Vital Signs (24h Range):  Temp:  [97 °F (36.1 °C)-98.1 °F (36.7 °C)] 97.5 °F (36.4 °C)  Pulse:  [50-98] 76  Resp:  [17-20] 17  SpO2:  [94 %-98 %] 96 %  BP: (127-167)/() 137/85     Weight: 100.6 kg (221 lb 12.5 oz)  Body mass index is 32.75 kg/m².    Intake/Output Summary (Last 24 hours) at 4/9/2019 1303  Last data filed at 4/8/2019 1700  Gross per 24 hour   Intake 120 ml   Output --   Net 120 ml      Physical Exam   Constitutional: He is oriented to person, place, and time. He appears well-developed and well-nourished. No distress.   HENT:    Head: Normocephalic and atraumatic.   Right Ear: External ear normal.   Left Ear: External ear normal.   Nose: Nose normal.   Eyes: Right eye exhibits no discharge. Left eye exhibits no discharge.   Neck: Normal range of motion.   Cardiovascular:   Irregularly irregular, no murmurs or gallops   Pulmonary/Chest: Effort normal. No respiratory distress.   Abdominal: Soft. Bowel sounds are normal. There is no tenderness. There is no rebound and no guarding.   Musculoskeletal: Normal range of motion. He exhibits edema (Much improved).   LUE edema   Neurological: He is alert and oriented to person, place, and time.   Skin: Skin is warm and dry. He is not diaphoretic. No erythema.   Psychiatric: He has a normal mood and affect. His behavior is normal. Judgment and thought content normal.   Nursing note and vitals reviewed.      Significant Labs:   BMP:   Recent Labs   Lab 04/09/19  0446   *  113*     143   K 4.2  4.2     107   CO2 30*  30*   BUN 33*  33*   CREATININE 1.2  1.2   CALCIUM 9.8  9.8     CBC:   Recent Labs   Lab 04/09/19  0446   WBC 6.79   HGB 7.9*   HCT 28.5*        TSH:   Recent Labs   Lab 04/08/19  1228   TSH 5.525*       Significant Imaging: I have reviewed and interpreted all pertinent imaging results/findings within the past 24 hours.    Assessment/Plan:      * Acute metabolic encephalopathy  Patient was noted to have a capillary glucose of 14 mg/dL in the field for which he was given an ampule of 50% dextrose with improvement of his mentation.  His initial serum glucose here was 30 mg/dL but later was still 44 mg/dL after treatment.  He improved into the 180's but continued to drop to 81 mg/dL then to 36 mg/dL.    Admitted to ICU on D10 drip and hourly glucose checks.  Also started on Octreotide drip.  Glucose increasing with no further episodes of hypoglycemia.  Will change D10 to D5 and monitor glucose every 4 hours.  The etiology of his hypoglycemia is unclear but he  is on a sulfonylurea at home.  He does have 1+ leukocytes and moderate bacteria on urine, but many amorphous cells.  Will repeat UA with UCx.  Afebrile.  Will hold off on ABx's for now.  Getting more hyperglycemic.  Stop D5 and monitor off dextrose.    PT/OT eval.  Last HbA1C 2 weeks ago 5.0,likely no need any more for hypoglycemic agents,diet should  be fine.  SNF recommended.  Patient has refused SNF.  Boucher removed and patient voiding.  Lasix stopped.   SOB overnight.  Started on oral Bumex.  Possibly home with HH tomorrow if doing ok.      Anemia due to chronic kidney disease        Renal cyst, left  Has left renal cyst,on US,need follow up with repeat US  in 6 weeks,      Proteinuria  Has proteinuria with Anasarca.  Consulted oncology for abnormal UPEP  Appreciate Nephrology and Oncology input.        Scrotal swelling    Has scrotal swelling,on IV lasix,lifting scrotum.Has anasarca,all over body,increased lasix,    Peripheral artery disease  Stable; will continue his home regimen of aspirin and atorvastatin.    Acute on chronic combined systolic and diastolic heart failure  Bumex changed to daily  Continue spironolactone  Monitor UOP        Urinary retention  Appreciate Urology input.  Boucher removed and patient urinating.    flomax daily       Chronic respiratory failure with hypoxia  Stable; will continue his home supplemental oxygen therapy.    Centrilobular emphysema  Clinically-stable without wheezing.  Will provide as-needed JENAE/LAMA available.    Iron deficiency anemia  The patient's H/H is stable and consistent with previous laboratory measurements, and the patient exhibits no signs or symptoms of acute bleeding; there is no indication for transfusion.  Will continue to monitor.    Type 2 diabetes mellitus, controlled, with renal complications  As addressed above.    CKD Stage 3  His renal function appears to be at his baseline.    Essential hypertension  Continue home regimen of carvedilol, diltiazem,  furosemide, and tamsulosin, and provide as-needed clonidine.    CAD (coronary artery disease)  Stable; will continue his home regimen of aspirin, atorvastatin, and carvedilol.    Chronic atrial fibrillation  Patient is in atrial fibrillation at this time which is baseline for him.  He was taken off of anticoagulation during his last admission due to anemia and was instructed not to take it until outpatient follow-up with gastroenterology.  Will continue to monitor.      VTE Risk Mitigation (From admission, onward)        Ordered     Reason for No Pharmacological VTE Prophylaxis  Once      03/23/19 1936     Place sequential compression device  Until discontinued      03/23/19 1934     Place DENIS hose  Until discontinued      03/23/19 1934              An Joshi MD  Department of Hospital Medicine   [facility]

## 2019-04-09 NOTE — PLAN OF CARE
Problem: Adult Inpatient Plan of Care  Goal: Plan of Care Review     04/09/19 7922   Plan of Care Review   Plan of Care Reviewed With patient   Pt remains free from falls,ben medication well,able to move all extremities,has generalize edema,medication in progress,appitite fair,rt upper arm picc line p/i flushes well,wa;led with therapy this shift,pain management effective,no s/s of hypo or hyper glycemia noted,has o2 @3l per nc in use has SOB on exertion,has incontinent episodes ob B/B,anjum care provided wears adult briefs,continue monitoring.

## 2019-04-09 NOTE — PLAN OF CARE
Problem: Physical Therapy Goal  Goal: Physical Therapy Goal  Goals to be met by: 19    Patient will increase functional independence with mobility by performin. Sit to stand transfer with Stand-by Assistance  2. Gait x150 feet with stand-by Assistance using Rolling Walker  3. Lower extremity exercise program x30 reps per handout, with supervision     Outcome: Ongoing (interventions implemented as appropriate)   Pt ambulated ~12ft with RW, CGA with max decreased arnold.  Pt tolerated static standing balance for ~4 minutes for doffing/donning of brief and pericare.

## 2019-04-09 NOTE — PROGRESS NOTES
Date of Admission:3/23/2019    SUBJECTIVE: notes breathing ok    Current Facility-Administered Medications   Medication    acetaminophen tablet 500 mg    albuterol-ipratropium 2.5 mg-0.5 mg/3 mL nebulizer solution 3 mL    albuterol-ipratropium 2.5 mg-0.5 mg/3 mL nebulizer solution 3 mL    aspirin EC tablet 81 mg    atorvastatin tablet 20 mg    [START ON 4/10/2019] bumetanide tablet 1 mg    carvedilol tablet 25 mg    cloNIDine tablet 0.1 mg    diltiaZEM tablet 30 mg    docusate sodium capsule 100 mg    ferrous gluconate tablet 324 mg    influenza (FLUZONE HIGH-DOSE) vaccine 0.5 mL    insulin aspart U-100 pen 0-5 Units    losartan tablet 25 mg    ondansetron injection 8 mg    pantoprazole EC tablet 40 mg    pneumoc 13-alan conj-dip cr(PF) (PREVNAR 13 (PF)) 0.5 mL    polyethylene glycol packet 17 g    promethazine (PHENERGAN) 6.25 mg in dextrose 5 % 50 mL IVPB    ramelteon tablet 8 mg    senna-docusate 8.6-50 mg per tablet 1 tablet    sodium chloride 0.9% flush 10 mL    And    sodium chloride 0.9% flush 10 mL    spironolactone tablet 25 mg    tamsulosin 24 hr capsule 0.4 mg       Wt Readings from Last 3 Encounters:   04/09/19 100.6 kg (221 lb 12.5 oz)   03/17/19 92.8 kg (204 lb 9.4 oz)   01/05/19 77.6 kg (171 lb 1.2 oz)     Temp Readings from Last 3 Encounters:   04/09/19 97.5 °F (36.4 °C) (Oral)   03/19/19 97.7 °F (36.5 °C)   01/08/19 97.6 °F (36.4 °C) (Oral)     BP Readings from Last 3 Encounters:   04/09/19 137/85   03/19/19 133/68   01/08/19 129/72     Pulse Readings from Last 3 Encounters:   04/09/19 76   03/19/19 78   01/08/19 80       Intake/Output Summary (Last 24 hours) at 4/9/2019 1250  Last data filed at 4/8/2019 1700  Gross per 24 hour   Intake 120 ml   Output --   Net 120 ml       PE:  GEN:wd male in nad  HEENT:ncat,eomi,mm  CVS:chiki 2/6  PULM:no rales  ABD:+bs,soft,nd  EXT:2+edema legs  NEURO:awake    Recent Labs   Lab 04/09/19  0446   *  113*     143   K 4.2  4.2      107   CO2 30*  30*   BUN 33*  33*   CREATININE 1.2  1.2   CALCIUM 9.8  9.8       Lab Results   Component Value Date    .6 (H) 04/08/2019    CALCIUM 9.8 04/09/2019    CALCIUM 9.8 04/09/2019    CAION 1.42 05/30/2018    PHOS 2.5 (L) 04/09/2019       Recent Labs   Lab 04/09/19  0446   WBC 6.79   RBC 3.61*   HGB 7.9*   HCT 28.5*      MCV 79*   MCH 21.9*   MCHC 27.7*         A/P:  1ckd3. Creatinine ok. Following.  2.acute on combined hf.  More sob overnight. Increase diuretics to daily.  3.hypercalemia. spep negative. pth up. tsh up. Add sensipar. Need tsh tx. Suspect primary hyperparathyroidism.recommend endo refer.  4.urinary retention. UOP ?. Following with urology.  5.anemia. Obed cbc low. Cont po iron.

## 2019-04-09 NOTE — PT/OT/SLP PROGRESS
Occupational Therapy   Treatment    Name: Agus Ramos Jr.  MRN: [id number]  Admitting Diagnosis:  Acute metabolic encephalopathy       Recommendations:     Discharge Recommendations: nursing facility, skilled  Discharge Equipment Recommendations:  walker, rolling  Barriers to discharge:  Other (Comment)(The patient will requires 24* (S)/assist with all self care and functional mobility)    Assessment:     Agus Ramos Jr. is a 78 y.o. male with a medical diagnosis of Acute metabolic encephalopathy.  He presents with improved functional mobility with decreased safety awareness. The patient requires verbal cues re: need to request a BSC or urinal 2* incontinence. Performance deficits affecting function are weakness, impaired endurance, impaired self care skills, gait instability, impaired functional mobilty, impaired balance, decreased lower extremity function, decreased upper extremity function, decreased safety awareness, impaired skin, impaired cardiopulmonary response to activity, edema.     Rehab Prognosis:  Good; patient would benefit from acute skilled OT services to address these deficits and reach maximum level of function.       Plan:     Patient to be seen 3 x/week to address the above listed problems via self-care/home management, therapeutic activities, therapeutic exercises  · Plan of Care Expires: 04/15/19  · Plan of Care Reviewed with: patient    Subjective     Pain/Comfort:  · Pain Rating 1: 0/10    Objective:     Communicated with: nurse prior to session.  Patient found up in chair with PICC line, oxygen, telemetry upon OT entry to room.    General Precautions: Standard, fall, respiratory   Orthopedic Precautions:N/A   Braces:       Occupational Performance:     Bed Mobility:    · Patient completed Scooting/Bridging with stand by assistance  · Patient completed Sit to Supine with minimum assistance and with leg lift     Functional Mobility/Transfers:  · Patient completed Sit <> Stand Transfer  with contact guard assistance  with  rolling walker   · Functional Mobility: The patient amb using a RW with CGA ~8' from the chair to bed. The patient tolerated standing for diaper change and pericare.    Activities of Daily Living:  · Upper Body Dressing: moderate assistance to doff back gown while seated on the EOB  · Toileting: dependence to don/doff diaper and perform pericare after incontinence      [facility] 6 Click ADL: 17    Treatment & Education:  The patient participated in functional mobility and self care tasks as above. The patient was educated re: need to use the urinal or request assist to to use the BSC.    Patient left right sidelying with all lines intact, call button in reach, bed alarm on and nurse notifiedEducation:      GOALS:   Multidisciplinary Problems     Occupational Therapy Goals        Problem: Occupational Therapy Goal    Goal Priority Disciplines Outcome Interventions   Occupational Therapy Goal     OT, PT/OT Ongoing (interventions implemented as appropriate)    Description:  Goals to be met by: 4/15/19     Patient will increase functional independence with ADLs by performing:    UE Dressing with Set-up Assistance.  LE Dressing with Minimal Assistance.  Grooming while seated with Supervision.  Toileting from bedside commode with Moderate Assistance for hygiene and clothing management.   Bathing from  edge of bed with Moderate Assistance.  Sitting at edge of bed x20 minutes with Stand-by Assistance.  Rolling to Bilateral with Set-up Assistance.   Supine to sit with Supervision.  Stand pivot transfers with Minimal Assistance. Met 4/5/19  Toilet transfer to bedside commode with Minimal Assistance.  Upper extremity exercise program x10 reps per handout, with assistance as needed.                       Time Tracking:     OT Date of Treatment: 04/08/19  OT Start Time: 1352  OT Stop Time: 1416  OT Total Time (min): 24 min    Billable Minutes:Self Care/Home Management 24    Anna Dumont  OT  4/9/2019

## 2019-04-10 LAB
AMORPH CRY URNS QL MICRO: ABNORMAL
ANION GAP SERPL CALC-SCNC: 4 MMOL/L (ref 8–16)
BACTERIA #/AREA URNS HPF: ABNORMAL /HPF
BILIRUB UR QL STRIP: NEGATIVE
BUN SERPL-MCNC: 34 MG/DL (ref 8–23)
CALCIUM SERPL-MCNC: 9.9 MG/DL (ref 8.7–10.5)
CHLORIDE SERPL-SCNC: 106 MMOL/L (ref 95–110)
CLARITY UR: ABNORMAL
CO2 SERPL-SCNC: 31 MMOL/L (ref 23–29)
COLOR UR: YELLOW
CREAT SERPL-MCNC: 1.1 MG/DL (ref 0.5–1.4)
EST. GFR  (AFRICAN AMERICAN): >60 ML/MIN/1.73 M^2
EST. GFR  (NON AFRICAN AMERICAN): >60 ML/MIN/1.73 M^2
GLUCOSE SERPL-MCNC: 140 MG/DL (ref 70–110)
GLUCOSE UR QL STRIP: NEGATIVE
HGB UR QL STRIP: ABNORMAL
HYALINE CASTS #/AREA URNS LPF: 0 /LPF
KETONES UR QL STRIP: NEGATIVE
LEUKOCYTE ESTERASE UR QL STRIP: ABNORMAL
MICROSCOPIC COMMENT: ABNORMAL
NITRITE UR QL STRIP: NEGATIVE
PH UR STRIP: 7 [PH] (ref 5–8)
POCT GLUCOSE: 160 MG/DL (ref 70–110)
POCT GLUCOSE: 172 MG/DL (ref 70–110)
POCT GLUCOSE: 178 MG/DL (ref 70–110)
POCT GLUCOSE: 187 MG/DL (ref 70–110)
POTASSIUM SERPL-SCNC: 4.4 MMOL/L (ref 3.5–5.1)
PROT UR QL STRIP: ABNORMAL
RBC #/AREA URNS HPF: 0 /HPF (ref 0–4)
SODIUM SERPL-SCNC: 141 MMOL/L (ref 136–145)
SP GR UR STRIP: 1.01 (ref 1–1.03)
TRI-PHOS CRY URNS QL MICRO: ABNORMAL
URN SPEC COLLECT METH UR: ABNORMAL
UROBILINOGEN UR STRIP-ACNC: NEGATIVE EU/DL
WBC #/AREA URNS HPF: 4 /HPF (ref 0–5)

## 2019-04-10 PROCEDURE — 27000221 HC OXYGEN, UP TO 24 HOURS

## 2019-04-10 PROCEDURE — 99900035 HC TECH TIME PER 15 MIN (STAT)

## 2019-04-10 PROCEDURE — 94640 AIRWAY INHALATION TREATMENT: CPT

## 2019-04-10 PROCEDURE — 25000003 PHARM REV CODE 250: Performed by: HOSPITALIST

## 2019-04-10 PROCEDURE — 97530 THERAPEUTIC ACTIVITIES: CPT

## 2019-04-10 PROCEDURE — 94761 N-INVAS EAR/PLS OXIMETRY MLT: CPT

## 2019-04-10 PROCEDURE — 25000003 PHARM REV CODE 250: Performed by: INTERNAL MEDICINE

## 2019-04-10 PROCEDURE — 81000 URINALYSIS NONAUTO W/SCOPE: CPT

## 2019-04-10 PROCEDURE — 25000242 PHARM REV CODE 250 ALT 637 W/ HCPCS: Performed by: HOSPITALIST

## 2019-04-10 PROCEDURE — S0171 BUMETANIDE 0.5 MG: HCPCS | Performed by: INTERNAL MEDICINE

## 2019-04-10 PROCEDURE — A4216 STERILE WATER/SALINE, 10 ML: HCPCS | Performed by: HOSPITALIST

## 2019-04-10 PROCEDURE — 94660 CPAP INITIATION&MGMT: CPT

## 2019-04-10 PROCEDURE — 11000001 HC ACUTE MED/SURG PRIVATE ROOM

## 2019-04-10 PROCEDURE — 80048 BASIC METABOLIC PNL TOTAL CA: CPT

## 2019-04-10 PROCEDURE — 97110 THERAPEUTIC EXERCISES: CPT

## 2019-04-10 RX ORDER — BUMETANIDE 1 MG/1
1 TABLET ORAL 2 TIMES DAILY
Status: DISCONTINUED | OUTPATIENT
Start: 2019-04-10 | End: 2019-04-10

## 2019-04-10 RX ORDER — BUMETANIDE 0.25 MG/ML
2 INJECTION INTRAMUSCULAR; INTRAVENOUS DAILY
Status: DISCONTINUED | OUTPATIENT
Start: 2019-04-10 | End: 2019-04-11

## 2019-04-10 RX ADMIN — BUMETANIDE 2 MG: 0.25 INJECTION INTRAMUSCULAR; INTRAVENOUS at 04:04

## 2019-04-10 RX ADMIN — DILTIAZEM HYDROCHLORIDE 30 MG: 30 TABLET, FILM COATED ORAL at 09:04

## 2019-04-10 RX ADMIN — IPRATROPIUM BROMIDE AND ALBUTEROL SULFATE 3 ML: .5; 3 SOLUTION RESPIRATORY (INHALATION) at 07:04

## 2019-04-10 RX ADMIN — SPIRONOLACTONE 25 MG: 25 TABLET ORAL at 08:04

## 2019-04-10 RX ADMIN — CINACALCET HYDROCHLORIDE 30 MG: 30 TABLET, COATED ORAL at 08:04

## 2019-04-10 RX ADMIN — PANTOPRAZOLE SODIUM 40 MG: 40 TABLET, DELAYED RELEASE ORAL at 09:04

## 2019-04-10 RX ADMIN — Medication 10 ML: at 12:04

## 2019-04-10 RX ADMIN — TAMSULOSIN HYDROCHLORIDE 0.4 MG: 0.4 CAPSULE ORAL at 09:04

## 2019-04-10 RX ADMIN — Medication 10 ML: at 05:04

## 2019-04-10 RX ADMIN — IPRATROPIUM BROMIDE AND ALBUTEROL SULFATE 3 ML: .5; 3 SOLUTION RESPIRATORY (INHALATION) at 08:04

## 2019-04-10 RX ADMIN — POLYETHYLENE GLYCOL 3350 17 G: 17 POWDER, FOR SOLUTION ORAL at 09:04

## 2019-04-10 RX ADMIN — CARVEDILOL 25 MG: 6.25 TABLET, FILM COATED ORAL at 09:04

## 2019-04-10 RX ADMIN — CLONIDINE HYDROCHLORIDE 0.1 MG: 0.1 TABLET ORAL at 08:04

## 2019-04-10 RX ADMIN — IPRATROPIUM BROMIDE AND ALBUTEROL SULFATE 3 ML: .5; 3 SOLUTION RESPIRATORY (INHALATION) at 11:04

## 2019-04-10 RX ADMIN — FERROUS GLUCONATE TAB 324 MG (37.5 MG ELEMENTAL IRON) 324 MG: 324 (37.5 FE) TAB at 08:04

## 2019-04-10 RX ADMIN — LOSARTAN POTASSIUM 25 MG: 25 TABLET, FILM COATED ORAL at 08:04

## 2019-04-10 RX ADMIN — IPRATROPIUM BROMIDE AND ALBUTEROL SULFATE 3 ML: .5; 3 SOLUTION RESPIRATORY (INHALATION) at 03:04

## 2019-04-10 RX ADMIN — BUMETANIDE 1 MG: 1 TABLET ORAL at 08:04

## 2019-04-10 RX ADMIN — LEVOTHYROXINE SODIUM 25 MCG: 25 TABLET ORAL at 05:04

## 2019-04-10 RX ADMIN — Medication 10 ML: at 06:04

## 2019-04-10 RX ADMIN — ASPIRIN 81 MG: 81 TABLET, COATED ORAL at 09:04

## 2019-04-10 RX ADMIN — CARVEDILOL 25 MG: 6.25 TABLET, FILM COATED ORAL at 08:04

## 2019-04-10 RX ADMIN — ATORVASTATIN CALCIUM 20 MG: 10 TABLET, FILM COATED ORAL at 09:04

## 2019-04-10 RX ADMIN — DOCUSATE SODIUM 100 MG: 100 CAPSULE, LIQUID FILLED ORAL at 08:04

## 2019-04-10 RX ADMIN — DOCUSATE SODIUM 100 MG: 100 CAPSULE, LIQUID FILLED ORAL at 09:04

## 2019-04-10 NOTE — PLAN OF CARE
Problem: Adult Inpatient Plan of Care  Goal: Plan of Care Review  Outcome: Ongoing (interventions implemented as appropriate)  Lavinia Luevano RN   Registered Nurse   Med/Surg   Nursing   Signed                           []Hide copied text    []Hover for details      Pt has remained free from falls or injuries this shift. Pt still having runs of Vtach which is chronic. Pt still having moderate swelling in all extremities as well as penis and scrotum. Pt has strict I/O ordered but is incontinent. Pt does use call light and has rested well this shift. Pt still has some confusion intermittently. Bed locked and low with call light within reach.

## 2019-04-10 NOTE — PLAN OF CARE
Problem: Occupational Therapy Goal  Goal: Occupational Therapy Goal  Goals to be met by: 4/15/19     Patient will increase functional independence with ADLs by performing:    UE Dressing with Set-up Assistance.  LE Dressing with Minimal Assistance.  Grooming while seated with Supervision.  Toileting from bedside commode with Moderate Assistance for hygiene and clothing management.   Bathing from  edge of bed with Moderate Assistance.  Sitting at edge of bed x20 minutes with Stand-by Assistance.  Rolling to Bilateral with Set-up Assistance.   Supine to sit with Supervision.  Stand pivot transfers with Minimal Assistance. Met 4/5/19  Toilet transfer to bedside commode with Minimal Assistance.  Upper extremity exercise program x10 reps per handout, with assistance as needed.      Outcome: Ongoing (interventions implemented as appropriate)  The patient was agreeable to participate in OT and was able to to perform AROM to BUE x10 while seated on the EOB. The was limited by incontinence and gross edema in BUE.

## 2019-04-10 NOTE — PT/OT/SLP PROGRESS
Occupational Therapy   Treatment    Name: Agus Ramos Jr.  MRN: [id number]  Admitting Diagnosis:  Acute metabolic encephalopathy       Recommendations:     Discharge Recommendations: nursing facility, skilled  Discharge Equipment Recommendations:  walker, rolling  Barriers to discharge:  Other (Comment)(The patient requires assist for all self care and functional mobility. The patient is not safe for D/C to home.)    Assessment:     Agus Ramos Jr. is a 78 y.o. male with a medical diagnosis of Acute metabolic encephalopathy.  He presents with increased edema in BUE with seepage present in the left forearm. Performance deficits affecting function are weakness, impaired endurance, impaired self care skills, gait instability, impaired functional mobilty, impaired balance, decreased lower extremity function, decreased upper extremity function, decreased coordination, decreased ROM, impaired fine motor, edema, impaired skin, impaired cardiopulmonary response to activity, decreased safety awareness.     Rehab Prognosis:  Fair; patient would benefit from acute skilled OT services to address these deficits and reach maximum level of function.       Plan:     Patient to be seen 3 x/week to address the above listed problems via self-care/home management, therapeutic activities, therapeutic exercises  · Plan of Care Expires: 04/15/19  · Plan of Care Reviewed with: patient    Subjective     Pain/Comfort:  · Pain Rating 1: 0/10    Objective:     Communicated with: nurse, prior to session.  Patient found HOB elevated with telemetry, SCD, bed alarm, oxygen, PICC line([employer] Safety Monitor) upon OT entry to room.    General Precautions: Standard, fall, respiratory   Orthopedic Precautions:N/A   Braces: N/A     Occupational Performance:     Bed Mobility:    · Patient completed Rolling/Turning to Right with minimum assistance  · Patient completed Scooting/Bridging with contact guard assistance  · Patient completed Supine to Sit  with contact guard assistance and with HOB elevated  · Patient completed Sit to Supine with minimum assistance and with leg lift     Functional Mobility/Transfers:  · Patient completed Sit <> Stand Transfer with contact guard assistance and minimum assistance  with  rolling walker and x2 attempts from an elevated surface   · Functional Mobility: The patient required CGA on the 1st attempt and min assist and VC to stand on 2nd attempt. The patient was able to side step x3-4 with CGA.    Activities of Daily Living:  · Upper Body Dressing: maximal assistance to don/doff back gown  · Lower Body Dressing: dependence    · Toileting: dependence incontinence of BM      [facility] 6 Click ADL: 15    Treatment & Education:  The patient tolerated sitting on the EOB ~20 with SBA/(S). The patient was able to perform AROM to B shldr flex nd B elbow flexion/ext x10 reps. The patient was limited by gross edema present in BUE.     Patient left HOB elevated and BUE elevated on pillows with all lines intact, call button in reach, bed alarm on, nurse=Lauren notified and Pavan Moniter presentEducation:   The patient's nurse was notified of UE edema.    GOALS:   Multidisciplinary Problems     Occupational Therapy Goals        Problem: Occupational Therapy Goal    Goal Priority Disciplines Outcome Interventions   Occupational Therapy Goal     OT, PT/OT Ongoing (interventions implemented as appropriate)    Description:  Goals to be met by: 4/15/19     Patient will increase functional independence with ADLs by performing:    UE Dressing with Set-up Assistance.  LE Dressing with Minimal Assistance.  Grooming while seated with Supervision.  Toileting from bedside commode with Moderate Assistance for hygiene and clothing management.   Bathing from  edge of bed with Moderate Assistance.  Sitting at edge of bed x20 minutes with Stand-by Assistance.  Rolling to Bilateral with Set-up Assistance.   Supine to sit with Supervision.  Stand pivot  transfers with Minimal Assistance. Met 4/5/19  Toilet transfer to bedside commode with Minimal Assistance.  Upper extremity exercise program x10 reps per handout, with assistance as needed.                       Time Tracking:     OT Date of Treatment: 04/10/19  OT Start Time: 1128  OT Stop Time: 1156  OT Total Time (min): 28 min    Billable Minutes:Therapeutic Activity 15  Therapeutic Exercise 13  Total Time 28    Anna Dumont OT  4/10/2019

## 2019-04-10 NOTE — PLAN OF CARE
Problem: Adult Inpatient Plan of Care  Goal: Plan of Care Review     04/10/19 5057   Plan of Care Review   Plan of Care Reviewed With patient   Pt remains free from falls able to make needs known, no c/o pain this shift,ben all medications well,able to move all extremities,rt upper arm picc p/i both ports,flushes well,clamped off,site clear,dressing,clean,dry,intact,has generalize edema has medication in progress for edema,required in and out cath for 499 ml on bladder scan,incontinent of b/b.continue monitoring.

## 2019-04-10 NOTE — ASSESSMENT & PLAN NOTE
Continue home regimen of carvedilol, diltiazem, furosemide, and tamsulosin, and provide as-needed clonidine.    Uncontrolled, await repeat BP and adjust meds accordingly

## 2019-04-10 NOTE — PROGRESS NOTES
[facility] Hospital Medicine  Progress Note    Patient Name: Agus Ramos Jr.  MRN: [id number]  Patient Class: IP- Inpatient   Admission Date: 3/23/2019  Length of Stay: 18 days  Attending Physician: An Joshi MD  Primary Care Provider: Keaton Hardy MD        Subjective:     Principal Problem:Acute metabolic encephalopathy    HPI:  Mr. Agus Ramos Jr. is a 78 y.o. male known to me with essential hypertension, type 2 diabetes mellitus (HbA1c 5.0% Mar 2019), CAD s/p CABG, chronic combined systolic and diastolic heart failure (LVEF 50% Mar 2019), CKD Stage 3, peripheral artery disease, chronic atrial fibrillation (SWB2LZ5-PBSp score 5) on chronic anticoagulation, COPD, chronic respiratory failure with hypoxia, and anemia of chronic disease who presents to [facility] ED with complaints of a fall this morning.  He had much difficulty standing back up.  EMS was activated and they found that his capillary glucose was 14 mg/dL after which he was given an ampule of 50% dextrose.  His mentation returned to baseline thereafter.  He denies any tremors nor diaphoresis.  He says that he's been feeling weak and dizzy today but denies any loss of consciousness, nor antecedent chest pain, shortness of breath, palpitations, fevers, chills, nausea, vomiting, abdominal pain, or any diarrhea.  He did not trip or slip or anything.  He has had a good appetite but cannot say for sure if he has been taking too much of his diabetes medications.  He denies any recent changes to his medications.  He otherwise has been in his usual state of health.    Hospital Course:  77 y/o male presented with weakness.  Hx of DM on Glipizide and Metformin.  Patient noted to be hypoglycemic.  Initially responded to D50, but then became persistently hypoglycemic.  Admitted to ICU on D10 drip and hourly glucose monitoring.  Also started on Octreotide drip.  No further episodes of hypoglycemia and D10 switched to D5.  Octreotide  drip stopped.  Now getting more hyperglycemic.  Stopping D5 drip and monitor off dextrose.  PT/OT evaluation.  Recommended SNF,consulted SW.  Last HbA1C 2 weeks ago 5.0,likely no need any more for hypoglycemic agents,diet should  be fine.  Has scrotal swelling,on IV lasix,lifting scrotum.  Has anasarca,all over body,increased lasix ,have ,proteinuria,check protein/Cr. Ratio,elevated,consulted nephrology.still has massive anasarca.  Urology following for scrotal swelling,improved.  Has left renal cyst,on US,need follow up with repeat US  in 6 months.  24 urine collection is in Process to R/O nephrotic syndrome.  Consulted oncology for abnormal UPEP.  Boucher removed.  Patient voiding without issues.  Diuresis stopped by Nephrology.  Shortness of breath overnight on 4/8.  Started on oral Bumex.    4/9- d/w nephrology, TSh elevated, start synthroid; not great UOP, increased diuretic to daily   4/10- still quite edematous, increased bumex, family wants to take home and likely to come quickly come back in current state     Interval History: pt reports SOB and discomfort in LE and scrotum     Review of Systems   HENT: Negative for ear discharge and ear pain.    Eyes: Negative for pain and itching.   Endocrine: Negative for polyphagia and polyuria.   Neurological: Negative for seizures and syncope.     Objective:     Vital Signs (Most Recent):  Temp: 97.7 °F (36.5 °C) (04/10/19 0752)  Pulse: 70 (04/10/19 0752)  Resp: 20 (04/10/19 0752)  BP: (!) 198/102 (04/10/19 0752)  SpO2: 96 % (04/10/19 0752) Vital Signs (24h Range):  Temp:  [97.4 °F (36.3 °C)-97.8 °F (36.6 °C)] 97.7 °F (36.5 °C)  Pulse:  [68-76] 70  Resp:  [17-20] 20  SpO2:  [95 %-99 %] 96 %  BP: (122-198)/() 198/102     Weight: 99.5 kg (219 lb 5.7 oz)  Body mass index is 32.39 kg/m².    Intake/Output Summary (Last 24 hours) at 4/10/2019 1040  Last data filed at 4/9/2019 1720  Gross per 24 hour   Intake 120 ml   Output --   Net 120 ml      Physical Exam    Constitutional: He is oriented to person, place, and time. He appears well-developed and well-nourished. No distress.   HENT:   Head: Normocephalic and atraumatic.   Right Ear: External ear normal.   Left Ear: External ear normal.   Nose: Nose normal.   Eyes: Right eye exhibits no discharge. Left eye exhibits no discharge.   Neck: Normal range of motion.   Cardiovascular:   Irregularly irregular, no murmurs or gallops   Pulmonary/Chest: Effort normal. No respiratory distress.   Abdominal: Soft. Bowel sounds are normal. There is no tenderness. There is no rebound and no guarding.   Musculoskeletal: Normal range of motion. He exhibits edema (Much improved).   LUE edema   Neurological: He is alert and oriented to person, place, and time.   Skin: Skin is warm and dry. He is not diaphoretic. No erythema.   Psychiatric: He has a normal mood and affect. His behavior is normal. Judgment and thought content normal.   Nursing note and vitals reviewed.      Significant Labs:   BMP:   Recent Labs   Lab 04/10/19  0535   *      K 4.4      CO2 31*   BUN 34*   CREATININE 1.1   CALCIUM 9.9     CBC:   Recent Labs   Lab 04/09/19  0446   WBC 6.79   HGB 7.9*   HCT 28.5*          Significant Imaging: I have reviewed all pertinent imaging results/findings within the past 24 hours.    Assessment/Plan:      * Acute metabolic encephalopathy  Patient was noted to have a capillary glucose of 14 mg/dL in the field for which he was given an ampule of 50% dextrose with improvement of his mentation.  His initial serum glucose here was 30 mg/dL but later was still 44 mg/dL after treatment.  He improved into the 180's but continued to drop to 81 mg/dL then to 36 mg/dL.    Admitted to ICU on D10 drip and hourly glucose checks.  Also started on Octreotide drip.  Glucose increasing with no further episodes of hypoglycemia.  Will change D10 to D5 and monitor glucose every 4 hours.  The etiology of his hypoglycemia is unclear  but he is on a sulfonylurea at home.  He does have 1+ leukocytes and moderate bacteria on urine, but many amorphous cells.  Will repeat UA with UCx.  Afebrile.  Will hold off on ABx's for now.  Getting more hyperglycemic.  Stop D5 and monitor off dextrose.    PT/OT eval.  Last HbA1C 2 weeks ago 5.0,likely no need any more for hypoglycemic agents,diet should  be fine.  SNF recommended.  Patient has refused SNF.  Boucher removed and patient voiding.  Lasix stopped.   SOB overnight.  Started on oral Bumex.  Possibly home with HH tomorrow if doing ok.      Anemia due to chronic kidney disease        Renal cyst, left  Has left renal cyst,on US,need follow up with repeat US  in 6 weeks,      Proteinuria  Has proteinuria with Anasarca.  Consulted oncology for abnormal UPEP  Appreciate Nephrology and Oncology input.        Scrotal swelling    Has scrotal swelling,on IV lasix,lifting scrotum.Has anasarca,all over body,increased lasix,    Peripheral artery disease  Stable; will continue his home regimen of aspirin and atorvastatin.    Acute on chronic combined systolic and diastolic heart failure  Bumex changed to daily - increased   Continue spironolactone  Monitor UOP        Urinary retention  Appreciate Urology input.  Boucher removed and patient urinating.    flomax daily       Chronic respiratory failure with hypoxia  Stable; will continue his home supplemental oxygen therapy.    Centrilobular emphysema  Clinically-stable without wheezing.  Will provide as-needed JENAE/LAMA available.    Iron deficiency anemia  The patient's H/H is stable and consistent with previous laboratory measurements, and the patient exhibits no signs or symptoms of acute bleeding; there is no indication for transfusion.  Will continue to monitor.    Type 2 diabetes mellitus, controlled, with renal complications  As addressed above.    CKD Stage 3  His renal function appears to be at his baseline.    Essential hypertension  Continue home regimen of  carvedilol, diltiazem, furosemide, and tamsulosin, and provide as-needed clonidine.    Uncontrolled, await repeat BP and adjust meds accordingly     CAD (coronary artery disease)  Stable; will continue his home regimen of aspirin, atorvastatin, and carvedilol.    Chronic atrial fibrillation  Patient is in atrial fibrillation at this time which is baseline for him.  He was taken off of anticoagulation during his last admission due to anemia and was instructed not to take it until outpatient follow-up with gastroenterology.  Will continue to monitor.      VTE Risk Mitigation (From admission, onward)        Ordered     Reason for No Pharmacological VTE Prophylaxis  Once      03/23/19 1936     Place sequential compression device  Until discontinued      03/23/19 1934     Place DENIS hose  Until discontinued      03/23/19 1934              An Joshi MD  Department of Hospital Medicine   [facility]

## 2019-04-10 NOTE — UM SECONDARY REVIEW
Medical Affairs    IP Extended Stay > 10  Hospital Day # 19  Hospital Course:  77 y/o male presented with weakness.  Hx of DM on Glipizide and Metformin.  Patient noted to be hypoglycemic.  Initially responded to D50, but then became persistently hypoglycemic.  Admitted to ICU on D10 drip and hourly glucose monitoring.  Also started on Octreotide drip.  No further episodes of hypoglycemia and D10 switched to D5.  Octreotide drip stopped.  Now getting more hyperglycemic.  Stopping D5 drip and monitor off dextrose.  PT/OT evaluation.  Recommended SNF,consulted SW.  Last HbA1C 2 weeks ago 5.0,likely no need any more for hypoglycemic agents,diet should  be fine.  Has scrotal swelling,on IV lasix,lifting scrotum.  Has anasarca,all over body,increased lasix ,have ,proteinuria,check protein/Cr. Ratio,elevated,consulted nephrology.still has massive anasarca.  Urology following for scrotal swelling,improved.  Has left renal cyst,on US,need follow up with repeat US  in 6 months.  24 urine collection is in Process to R/O nephrotic syndrome.  Consulted oncology for abnormal UPEP.  Boucher removed.  Patient voiding without issues.  Diuresis stopped by Nephrology.  Shortness of breath overnight on 4/8.  Started on oral Bumex.     4/9- d/w nephrology, TSh elevated, start synthroid; not great UOP, increased diuretic to daily   4/10- still quite edematous, increased bumex, family wants to take home and likely to come quickly come back in current state      Interval History: pt reports SOB and discomfort in LE and scrotum          LOS: approved with recommendations for    Have we considered palliative care consult, that should be done prior to discharge.

## 2019-04-10 NOTE — NURSING
Pt has remained free from falls or injuries this shift. Pt still having runs of Vtach which is chronic. Pt still having moderate swelling in all extremities as well as penis and scrotum. Pt has strict I/O ordered but is incontinent. Pt does use call light and has rested well this shift. Pt still has some confusion intermittently. Bed locked and low with call light within reach.

## 2019-04-10 NOTE — SUBJECTIVE & OBJECTIVE
Interval History: pt reports SOB and discomfort in LE and scrotum     Review of Systems   HENT: Negative for ear discharge and ear pain.    Eyes: Negative for pain and itching.   Endocrine: Negative for polyphagia and polyuria.   Neurological: Negative for seizures and syncope.     Objective:     Vital Signs (Most Recent):  Temp: 97.7 °F (36.5 °C) (04/10/19 0752)  Pulse: 70 (04/10/19 0752)  Resp: 20 (04/10/19 0752)  BP: (!) 198/102 (04/10/19 0752)  SpO2: 96 % (04/10/19 0752) Vital Signs (24h Range):  Temp:  [97.4 °F (36.3 °C)-97.8 °F (36.6 °C)] 97.7 °F (36.5 °C)  Pulse:  [68-76] 70  Resp:  [17-20] 20  SpO2:  [95 %-99 %] 96 %  BP: (122-198)/() 198/102     Weight: 99.5 kg (219 lb 5.7 oz)  Body mass index is 32.39 kg/m².    Intake/Output Summary (Last 24 hours) at 4/10/2019 1040  Last data filed at 4/9/2019 1720  Gross per 24 hour   Intake 120 ml   Output --   Net 120 ml      Physical Exam   Constitutional: He is oriented to person, place, and time. He appears well-developed and well-nourished. No distress.   HENT:   Head: Normocephalic and atraumatic.   Right Ear: External ear normal.   Left Ear: External ear normal.   Nose: Nose normal.   Eyes: Right eye exhibits no discharge. Left eye exhibits no discharge.   Neck: Normal range of motion.   Cardiovascular:   Irregularly irregular, no murmurs or gallops   Pulmonary/Chest: Effort normal. No respiratory distress.   Abdominal: Soft. Bowel sounds are normal. There is no tenderness. There is no rebound and no guarding.   Musculoskeletal: Normal range of motion. He exhibits edema (Much improved).   LUE edema   Neurological: He is alert and oriented to person, place, and time.   Skin: Skin is warm and dry. He is not diaphoretic. No erythema.   Psychiatric: He has a normal mood and affect. His behavior is normal. Judgment and thought content normal.   Nursing note and vitals reviewed.      Significant Labs:   BMP:   Recent Labs   Lab 04/10/19  0535   *       K 4.4      CO2 31*   BUN 34*   CREATININE 1.1   CALCIUM 9.9     CBC:   Recent Labs   Lab 04/09/19  0446   WBC 6.79   HGB 7.9*   HCT 28.5*          Significant Imaging: I have reviewed all pertinent imaging results/findings within the past 24 hours.

## 2019-04-11 LAB
ANION GAP SERPL CALC-SCNC: 4 MMOL/L (ref 8–16)
BUN SERPL-MCNC: 30 MG/DL (ref 8–23)
CALCIUM SERPL-MCNC: 9.7 MG/DL (ref 8.7–10.5)
CHLORIDE SERPL-SCNC: 105 MMOL/L (ref 95–110)
CO2 SERPL-SCNC: 32 MMOL/L (ref 23–29)
CREAT SERPL-MCNC: 1.1 MG/DL (ref 0.5–1.4)
EST. GFR  (AFRICAN AMERICAN): >60 ML/MIN/1.73 M^2
EST. GFR  (NON AFRICAN AMERICAN): >60 ML/MIN/1.73 M^2
GLUCOSE SERPL-MCNC: 123 MG/DL (ref 70–110)
POCT GLUCOSE: 152 MG/DL (ref 70–110)
POCT GLUCOSE: 156 MG/DL (ref 70–110)
POCT GLUCOSE: 157 MG/DL (ref 70–110)
POCT GLUCOSE: 197 MG/DL (ref 70–110)
POTASSIUM SERPL-SCNC: 4.4 MMOL/L (ref 3.5–5.1)
SODIUM SERPL-SCNC: 141 MMOL/L (ref 136–145)

## 2019-04-11 PROCEDURE — 25000003 PHARM REV CODE 250: Performed by: INTERNAL MEDICINE

## 2019-04-11 PROCEDURE — 11000001 HC ACUTE MED/SURG PRIVATE ROOM

## 2019-04-11 PROCEDURE — 25000003 PHARM REV CODE 250: Performed by: HOSPITALIST

## 2019-04-11 PROCEDURE — 27000221 HC OXYGEN, UP TO 24 HOURS

## 2019-04-11 PROCEDURE — 99900035 HC TECH TIME PER 15 MIN (STAT)

## 2019-04-11 PROCEDURE — A4216 STERILE WATER/SALINE, 10 ML: HCPCS | Performed by: HOSPITALIST

## 2019-04-11 PROCEDURE — 99232 SBSQ HOSP IP/OBS MODERATE 35: CPT | Mod: ,,, | Performed by: UROLOGY

## 2019-04-11 PROCEDURE — 94640 AIRWAY INHALATION TREATMENT: CPT

## 2019-04-11 PROCEDURE — 99232 PR SUBSEQUENT HOSPITAL CARE,LEVL II: ICD-10-PCS | Mod: ,,, | Performed by: UROLOGY

## 2019-04-11 PROCEDURE — 80048 BASIC METABOLIC PNL TOTAL CA: CPT

## 2019-04-11 PROCEDURE — 97535 SELF CARE MNGMENT TRAINING: CPT

## 2019-04-11 PROCEDURE — 97110 THERAPEUTIC EXERCISES: CPT

## 2019-04-11 PROCEDURE — 36415 COLL VENOUS BLD VENIPUNCTURE: CPT

## 2019-04-11 PROCEDURE — S0171 BUMETANIDE 0.5 MG: HCPCS | Performed by: INTERNAL MEDICINE

## 2019-04-11 PROCEDURE — 94761 N-INVAS EAR/PLS OXIMETRY MLT: CPT

## 2019-04-11 PROCEDURE — 97530 THERAPEUTIC ACTIVITIES: CPT

## 2019-04-11 PROCEDURE — 99232 PR SUBSEQUENT HOSPITAL CARE,LEVL II: ICD-10-PCS | Mod: ,,, | Performed by: INTERNAL MEDICINE

## 2019-04-11 PROCEDURE — 99232 SBSQ HOSP IP/OBS MODERATE 35: CPT | Mod: ,,, | Performed by: INTERNAL MEDICINE

## 2019-04-11 PROCEDURE — 25000242 PHARM REV CODE 250 ALT 637 W/ HCPCS: Performed by: HOSPITALIST

## 2019-04-11 RX ORDER — BUMETANIDE 0.25 MG/ML
2 INJECTION INTRAMUSCULAR; INTRAVENOUS EVERY 12 HOURS
Status: DISCONTINUED | OUTPATIENT
Start: 2019-04-11 | End: 2019-04-16

## 2019-04-11 RX ADMIN — Medication 10 ML: at 07:04

## 2019-04-11 RX ADMIN — DOCUSATE SODIUM 100 MG: 100 CAPSULE, LIQUID FILLED ORAL at 09:04

## 2019-04-11 RX ADMIN — DOCUSATE SODIUM 100 MG: 100 CAPSULE, LIQUID FILLED ORAL at 08:04

## 2019-04-11 RX ADMIN — IPRATROPIUM BROMIDE AND ALBUTEROL SULFATE 3 ML: .5; 3 SOLUTION RESPIRATORY (INHALATION) at 03:04

## 2019-04-11 RX ADMIN — CINACALCET HYDROCHLORIDE 30 MG: 30 TABLET, COATED ORAL at 08:04

## 2019-04-11 RX ADMIN — IPRATROPIUM BROMIDE AND ALBUTEROL SULFATE 3 ML: .5; 3 SOLUTION RESPIRATORY (INHALATION) at 07:04

## 2019-04-11 RX ADMIN — BUMETANIDE 2 MG: 0.25 INJECTION INTRAMUSCULAR; INTRAVENOUS at 09:04

## 2019-04-11 RX ADMIN — LOSARTAN POTASSIUM 25 MG: 25 TABLET, FILM COATED ORAL at 08:04

## 2019-04-11 RX ADMIN — PANTOPRAZOLE SODIUM 40 MG: 40 TABLET, DELAYED RELEASE ORAL at 08:04

## 2019-04-11 RX ADMIN — POLYETHYLENE GLYCOL 3350 17 G: 17 POWDER, FOR SOLUTION ORAL at 09:04

## 2019-04-11 RX ADMIN — IPRATROPIUM BROMIDE AND ALBUTEROL SULFATE 3 ML: .5; 3 SOLUTION RESPIRATORY (INHALATION) at 08:04

## 2019-04-11 RX ADMIN — CARVEDILOL 25 MG: 6.25 TABLET, FILM COATED ORAL at 08:04

## 2019-04-11 RX ADMIN — ATORVASTATIN CALCIUM 20 MG: 10 TABLET, FILM COATED ORAL at 09:04

## 2019-04-11 RX ADMIN — CARVEDILOL 25 MG: 6.25 TABLET, FILM COATED ORAL at 09:04

## 2019-04-11 RX ADMIN — IPRATROPIUM BROMIDE AND ALBUTEROL SULFATE 3 ML: .5; 3 SOLUTION RESPIRATORY (INHALATION) at 10:04

## 2019-04-11 RX ADMIN — FERROUS GLUCONATE TAB 324 MG (37.5 MG ELEMENTAL IRON) 324 MG: 324 (37.5 FE) TAB at 08:04

## 2019-04-11 RX ADMIN — ASPIRIN 81 MG: 81 TABLET, COATED ORAL at 08:04

## 2019-04-11 RX ADMIN — DILTIAZEM HYDROCHLORIDE 30 MG: 30 TABLET, FILM COATED ORAL at 08:04

## 2019-04-11 RX ADMIN — DILTIAZEM HYDROCHLORIDE 30 MG: 30 TABLET, FILM COATED ORAL at 09:04

## 2019-04-11 RX ADMIN — BUMETANIDE 2 MG: 0.25 INJECTION INTRAMUSCULAR; INTRAVENOUS at 08:04

## 2019-04-11 RX ADMIN — TAMSULOSIN HYDROCHLORIDE 0.4 MG: 0.4 CAPSULE ORAL at 08:04

## 2019-04-11 RX ADMIN — POLYETHYLENE GLYCOL 3350 17 G: 17 POWDER, FOR SOLUTION ORAL at 08:04

## 2019-04-11 RX ADMIN — Medication 10 ML: at 12:04

## 2019-04-11 RX ADMIN — LEVOTHYROXINE SODIUM 25 MCG: 25 TABLET ORAL at 07:04

## 2019-04-11 RX ADMIN — Medication 10 ML: at 05:04

## 2019-04-11 RX ADMIN — SPIRONOLACTONE 25 MG: 25 TABLET ORAL at 08:04

## 2019-04-11 NOTE — PROGRESS NOTES
"[facility]  Urology  Progress Note    Patient Name: Agus Ramos Jr.  MRN: [id number]  Admission Date: 3/23/2019  Hospital Length of Stay: 19 days  Code Status: Full Code   Attending Provider: An Joshi MD   Primary Care Physician: Keaton Hardy MD    Subjective:     HPI:  Urinary Retention  Patient complains of urinary retention. Onset of retention was 1 day ago and was gradual in onset. Patient currently does have a urinary catheter in place.  300 ml of urine were drained when catheter was placed. Prior to this event voiding symptoms consisted of slow stream, intermittency. Prior treatments include watchful waiting. Recent medications that may have affected his voiding include none.  He is admitted after a fall for hypoglycemia.  He had difficulty voiding while admitted.  A few attempts were made to place a Boucher.  Finally a 12 Fr Coude was placed.  Bladder Scan showed 700 mL but he has not drained this much in several hours.        Interval History: Reconsulted for repeat UR. Review of notes mention bladder scan of 499cc and I&O cath being performed but this is not recorded in UOP. Unsure if I&O cath done or what volume UOP was noted. Nurse reports "soaking" diapers throughout night.     Review of Systems   Constitutional: Negative for chills and fever.   HENT: Negative for hearing loss, sore throat and trouble swallowing.    Eyes: Negative.    Respiratory: Negative for cough and shortness of breath.    Cardiovascular: Negative for chest pain.   Gastrointestinal: Positive for abdominal distention. Negative for abdominal pain, constipation, diarrhea, nausea and vomiting.   Genitourinary: Positive for difficulty urinating, penile swelling and scrotal swelling. Negative for frequency and hematuria.   Musculoskeletal: Negative for arthralgias and neck pain.   Skin: Negative for color change, pallor and rash.   Neurological: Negative for dizziness and seizures.   Hematological: Negative " for adenopathy.   Psychiatric/Behavioral: Negative for confusion.   All other systems reviewed and are negative.    Objective:     Temp:  [97.3 °F (36.3 °C)-97.9 °F (36.6 °C)] 97.6 °F (36.4 °C)  Pulse:  [66-75] 66  Resp:  [16-24] 20  SpO2:  [94 %-100 %] 100 %  BP: (127-182)/(71-84) 131/80     Body mass index is 32.39 kg/m².      Bladder Scan Volume (mL): 23 mL (03/28/19 0831)  Post Void Cath Residual Output (mL): 0 mL (03/28/19 0831)    Drains          None          Physical Exam   Vitals reviewed.  Constitutional: He is oriented to person, place, and time. He appears well-developed and well-nourished. No distress.   HENT:   Head: Normocephalic and atraumatic.   Eyes: Right eye exhibits no discharge. Left eye exhibits no discharge. No scleral icterus.   Neck: Normal range of motion. Neck supple.   Cardiovascular: Normal rate and regular rhythm.    Pulmonary/Chest: Effort normal and breath sounds normal. No respiratory distress.   Abdominal: Soft. Bowel sounds are normal. He exhibits distension. There is no tenderness. There is no rebound and no guarding.   Genitourinary:   Genitourinary Comments: Significant penile/scrotal swelling   Musculoskeletal: Normal range of motion. He exhibits edema.   Neurological: He is alert and oriented to person, place, and time.   Skin: Skin is warm and dry. He is not diaphoretic. No erythema.         Significant Labs:    BMP:  Recent Labs   Lab 04/09/19  0446 04/10/19  0535 04/11/19  0457     143 141 141   K 4.2  4.2 4.4 4.4     107 106 105   CO2 30*  30* 31* 32*   BUN 33*  33* 34* 30*   CREATININE 1.2  1.2 1.1 1.1   CALCIUM 9.8  9.8 9.9 9.7       CBC:   Recent Labs   Lab 04/06/19  0504 04/07/19  0527 04/09/19  0446   WBC 7.76 6.91 6.79   HGB 8.3* 8.2* 7.9*   HCT 30.1* 29.6* 28.5*    332 234       Blood Culture: No results for input(s): LABBLOO in the last 168 hours.  Urine Culture: No results for input(s): LABURIN in the last 168 hours.  Urine Studies:    Recent Labs   Lab 04/10/19  1540   COLORU Yellow   APPEARANCEUA Cloudy*   PHUR 7.0   SPECGRAV 1.010   PROTEINUA 2+*   GLUCUA Negative   KETONESU Negative   BILIRUBINUA Negative   OCCULTUA 1+*   NITRITE Negative   UROBILINOGEN Negative   LEUKOCYTESUR 2+*   RBCUA 0   WBCUA 4   BACTERIA None   HYALINECASTS 0       Significant Imaging:  All pertinent imaging results/findings from the past 24 hours have been reviewed.          Assessment/Plan:     Renal cyst, left   - Recommended for repeat IRASEMA in 6 months    Scrotal swelling   - JOSE LUIS reviewed     - Scrotal elevation at all times   - Diuresis    Urinary retention  Continue Flomax  Concern for bladder scan unreliabilty in setting of ascites  Monitor urine output, please record I&O cath UOP  I&O cath PRN. If needed, okay to place stearns.        VTE Risk Mitigation (From admission, onward)        Ordered     Reason for No Pharmacological VTE Prophylaxis  Once      03/23/19 1936     Place sequential compression device  Until discontinued      03/23/19 1934     Place DENIS hose  Until discontinued      03/23/19 1934          Bruna Zayas MD  Urology  [facility]

## 2019-04-11 NOTE — PLAN OF CARE
Problem: Adult Inpatient Plan of Care  Goal: Plan of Care Review  Outcome: Ongoing (interventions implemented as appropriate)  Pt's free of accidental injury during shift. No s/s of distress noted. Denies pain at present. Pt remains extremely edematous. Boucher to gravity draining clear yellow urine. VSS. Safety measures maintained. Call bell within reach. Will continue to monitor

## 2019-04-11 NOTE — NURSING
Pt Agus Ramos MRN [id number] with tele box #[id number] confirmed and verified on tele box and monitor with another RN.

## 2019-04-11 NOTE — NURSING
11 beats of vtach notified from monitor tech. VSS. Pt in no distress. Dr. Yip notified. Will cont to monitor.

## 2019-04-11 NOTE — ASSESSMENT & PLAN NOTE
Continue Flomax  Concern for bladder scan unreliabilty in setting of ascites  Monitor urine output, please record I&O cath UOP  I&O cath PRN. If needed, okay to place stearns.

## 2019-04-11 NOTE — ASSESSMENT & PLAN NOTE
Patient with mildly elevated kappa free light chains from 2018 in setting of normal lambda free light chains and Jaki normal kappa lambda free light chain ratio.    Recent testing reveals elevated kappa free light chain and elevated Lambda FLC with nl K/L FLCR  Likely  secondary to renal dysfuntion    Further evaluation with urine studies -UPEP/UIFE - no monoclonal peaks    SPEP - no monoclonal peaks    Pt has no evidence of MGUS

## 2019-04-11 NOTE — PLAN OF CARE
Problem: Occupational Therapy Goal  Goal: Occupational Therapy Goal  Goals to be met by: 4/15/19     Patient will increase functional independence with ADLs by performing:    UE Dressing with Set-up Assistance.  LE Dressing with Minimal Assistance.  Grooming while seated with Supervision.  Toileting from bedside commode with Moderate Assistance for hygiene and clothing management.   Bathing from  edge of bed with Moderate Assistance.  Sitting at edge of bed x20 minutes with Stand-by Assistance.  Rolling to Bilateral with Set-up Assistance.   Supine to sit with Supervision.  Stand pivot transfers with Minimal Assistance. Met 4/5/19  Toilet transfer to bedside commode with Minimal Assistance.  Upper extremity exercise program x10 reps per handout, with assistance as needed.      Outcome: Ongoing (interventions implemented as appropriate)  Patient easily fatigued and requires increased time to perform all therapy activities. Patient tolerated ~25 mins of EOB activity, unable to take steps to chair, and required MAX A/RW to sit<>stand today. Patient will benefit from continued OT to address functional deficits.

## 2019-04-11 NOTE — PROGRESS NOTES
[id number] [place] contacted pt's pharmacy, [facility] at [phone] to call in Rx, Senispar 30 mg one tablet by mouth daily # 30 for cost. [place] spoke with pharmacist, Son, who stated with insurance, cost is $229.00/month.

## 2019-04-11 NOTE — ASSESSMENT & PLAN NOTE
Patient has history of anemia of chronic disease and component of iron deficiency  Patient lost to follow up with GI  Continue to monitor counts  Cont po Fe

## 2019-04-11 NOTE — PT/OT/SLP PROGRESS
"Occupational Therapy   Treatment    Name: Agus Ramos Jr.  MRN: [id number]  Admitting Diagnosis:  Acute metabolic encephalopathy       Recommendations:     Discharge Recommendations: nursing facility, skilled  Discharge Equipment Recommendations:  walker, rolling  Barriers to discharge:       Assessment:     Agus Ramos Jr. is a 78 y.o. male with a medical diagnosis of Acute metabolic encephalopathy.  He presents with the following performance deficits affecting function: weakness, impaired endurance, impaired self care skills, impaired functional mobilty, gait instability, impaired balance, impaired cognition, decreased coordination, decreased lower extremity function, decreased upper extremity function, decreased safety awareness, decreased ROM, impaired skin, impaired cardiopulmonary response to activity, edema. Patient demonstrates decreased endurance and generalized weakness. Patient easily fatigued and requires increased time to perform all therapy activities. Patient tolerated ~25 mins of EOB activity, unable to take steps to chair, and required MAX A/RW to sit<>stand today.    Rehab Prognosis:  Fair; patient would benefit from acute skilled OT services to address these deficits and reach maximum level of function.       Plan:     Patient to be seen 3 x/week to address the above listed problems via self-care/home management, therapeutic activities, therapeutic exercises  · Plan of Care Expires: 04/15/19  · Plan of Care Reviewed with: patient    Subjective   Patient agreeable to therapy.   " I will try"  Pain/Comfort:  · Pain Rating 1: 0/10    Objective:     Communicated with: Nurse Chamorro prior to session.  Patient found HOB elevated with telemetry, bed alarm, oxygen, stearns catheter, PICC line, and spouse present upon OT entry to room.    General Precautions: Standard, fall, respiratory   Orthopedic Precautions:N/A   Braces: N/A     Occupational Performance:     Bed Mobility:  Max verbal cues for hand " placement and technique  · Patient completed Rolling/Turning to Right with minimum assistance  · Patient completed Scooting/Bridging with minimum assistance  · Patient completed Supine to Sit with moderate assistance, HOB elevated  · Patient completed Sit to Supine with minumum assistance with BLE     Functional Mobility/Transfers:  · Patient completed Sit <> Stand Transfer with maximal assistance  with  rolling walker  from EOB  · Functional Mobility: Patient unable to take steps or transfer to chair today.     Activities of Daily Living:  · Feeding:  set up assistance    · Grooming: set up assistance to wash face seated EOB    · Upper Body Dressing: maximal assistance to don gown seated EOB    [facility] 6 Click ADL: 15    Treatment & Education:  Patient performed bed mobility, functional transfers, and ADL's as above.   Patient tolerated sitting EOB ~25 mins with SBA. Patient able to perform BUE AROM X 10-15 reps B shoulder flex/ext, B elbow flex/ext, B wrist flex/ext, and B finger flex/ext between. Patient with max edema to BUE, encouraged ROM therex and elevation with pillows while in bed.  Patient required frequent rest breaks between all activities secondary to increased fatigue, weakness, and SOB.     Patient left with bed in chair position, dinner tray set up, with all lines intact, call button in reach, bed alarm on and nurse Ashtyn notifiedEducation:      GOALS:   Multidisciplinary Problems     Occupational Therapy Goals        Problem: Occupational Therapy Goal    Goal Priority Disciplines Outcome Interventions   Occupational Therapy Goal     OT, PT/OT Ongoing (interventions implemented as appropriate)    Description:  Goals to be met by: 4/15/19     Patient will increase functional independence with ADLs by performing:    UE Dressing with Set-up Assistance.  LE Dressing with Minimal Assistance.  Grooming while seated with Supervision.  Toileting from bedside commode with Moderate Assistance for hygiene and  clothing management.   Bathing from  edge of bed with Moderate Assistance.  Sitting at edge of bed x20 minutes with Stand-by Assistance.  Rolling to Bilateral with Set-up Assistance.   Supine to sit with Supervision.  Stand pivot transfers with Minimal Assistance. Met 4/5/19  Toilet transfer to bedside commode with Minimal Assistance.  Upper extremity exercise program x10 reps per handout, with assistance as needed.                       Time Tracking:     OT Date of Treatment: 04/10/19  OT Start Time: 1616  OT Stop Time: 1655  OT Total Time (min): 39 min    Billable Minutes:Self Care/Home Management 10  Therapeutic Activity 15  Therapeutic Exercise 14    RAHUL Fatima  4/11/2019

## 2019-04-11 NOTE — SUBJECTIVE & OBJECTIVE
Interval History: Pt confused    Oncology Treatment Plan:   [No treatment plan]    Medications:  Continuous Infusions:  Scheduled Meds:   albuterol-ipratropium  3 mL Nebulization Q4H WAKE    aspirin  81 mg Oral Daily    atorvastatin  20 mg Oral QHS    bumetanide  2 mg Intravenous Q12H    carvedilol  25 mg Oral BID    cinacalcet  30 mg Oral Daily with breakfast    diltiaZEM  30 mg Oral Q12H    docusate sodium  100 mg Oral BID    ferrous gluconate  324 mg Oral Daily with breakfast    levothyroxine  25 mcg Oral Before breakfast    losartan  25 mg Oral Daily    pantoprazole  40 mg Oral Daily    polyethylene glycol  17 g Oral BID    sodium chloride 0.9%  10 mL Intravenous Q6H    spironolactone  25 mg Oral Daily    tamsulosin  0.4 mg Oral Daily     PRN Meds:acetaminophen, albuterol-ipratropium, cloNIDine, influenza, insulin aspart U-100, ondansetron, pneumoc 13-alan conj-dip cr(PF), promethazine (PHENERGAN) IVPB, ramelteon, senna-docusate 8.6-50 mg, Flushing PICC Protocol **AND** sodium chloride 0.9% **AND** sodium chloride 0.9%     Review of Systems   Constitutional: Positive for fatigue.   Respiratory: Negative for cough and shortness of breath.    Cardiovascular: Positive for leg swelling. Negative for chest pain.   Gastrointestinal: Negative for abdominal pain and diarrhea.   Musculoskeletal: Negative for back pain.   Neurological: Negative for headaches.   Psychiatric/Behavioral: Positive for confusion.     Objective:     Vital Signs (Most Recent):  Temp: 97.6 °F (36.4 °C) (04/11/19 1525)  Pulse: 71 (04/11/19 1531)  Resp: 20 (04/11/19 1531)  BP: 133/76 (04/11/19 1525)  SpO2: 96 % (04/11/19 1531) Vital Signs (24h Range):  Temp:  [97.3 °F (36.3 °C)-97.9 °F (36.6 °C)] 97.6 °F (36.4 °C)  Pulse:  [66-75] 71  Resp:  [16-24] 20  SpO2:  [94 %-100 %] 96 %  BP: (127-182)/(67-84) 133/76     Weight: 99.5 kg (219 lb 5.7 oz)  Body mass index is 32.39 kg/m².  Body surface area is 2.2 meters  squared.      Intake/Output Summary (Last 24 hours) at 4/11/2019 1618  Last data filed at 4/11/2019 1557  Gross per 24 hour   Intake 840 ml   Output 800 ml   Net 40 ml       Physical Exam   Constitutional: He appears well-developed and well-nourished.   HENT:   Head: Normocephalic.   Eyes: Conjunctivae are normal. No scleral icterus.   Neck: Normal range of motion. Neck supple. No thyromegaly present.   Cardiovascular: An irregularly irregular rhythm present.   Pulmonary/Chest: Effort normal and breath sounds normal. He has no wheezes. He has no rales.   Abdominal: Soft. Bowel sounds are normal. He exhibits no distension and no mass. There is no hepatosplenomegaly. There is no tenderness. There is no rebound and no guarding.   Musculoskeletal: He exhibits edema.   Neurological: He is alert.   confused   Skin: No rash noted. No erythema.       Significant Labs:   All pertinent labs within the past 24 hours have been reviewed    Diagnostic Results:  I have reviewed and interpreted all pertinent imaging results/findings within the past 24 hours

## 2019-04-11 NOTE — PROGRESS NOTES
[facility]  Hematology/Oncology  Progress Note    Patient Name: Agus Ramos Jr.  Admission Date: 3/23/2019  Hospital Length of Stay: 19 days  Code Status: Full Code     Subjective:     HPI:  78-year-old male with  Congestive heart failure, Coronary artery disease.Diabetes mellitus, AFib Hypertension; MI (myocardial infarction),  Pacemaker; Peripheral vascular disease; S/P CABG x 3;  Stented coronary artery, Tobacco use, CKD stage 3 Anemia in CKD, chronic respiratory failure with hypoxia presents to [facility] ED after he suffered a fall. He was also confused. He was admitted with renal failure Pt's capillary glucose level 14mg/dl. Pt had been weak and dizzy. No fevers/nausea/vomiting. Pt with chronic fatigue. Pt known to me and followed for anemia. Pt did not complete planned GI evaluation. EGD 6/1/2018 - no acute finding, bleeding source. He is followed by Nephrology during hospitalization for proteinuria.  Serum free light chains reveals a kappa free light chain of 2.68 mg/dL lambda free light chain 2.62 mg/dL and a kappa/lambda free light chain ratio of 0.95 SPEP reveals decreased total proteins.  24 hr urine studies pending  Consulted for elevated urine chain    Interval History: Pt confused    Oncology Treatment Plan:   [No treatment plan]    Medications:  Continuous Infusions:  Scheduled Meds:   albuterol-ipratropium  3 mL Nebulization Q4H WAKE    aspirin  81 mg Oral Daily    atorvastatin  20 mg Oral QHS    bumetanide  2 mg Intravenous Q12H    carvedilol  25 mg Oral BID    cinacalcet  30 mg Oral Daily with breakfast    diltiaZEM  30 mg Oral Q12H    docusate sodium  100 mg Oral BID    ferrous gluconate  324 mg Oral Daily with breakfast    levothyroxine  25 mcg Oral Before breakfast    losartan  25 mg Oral Daily    pantoprazole  40 mg Oral Daily    polyethylene glycol  17 g Oral BID    sodium chloride 0.9%  10 mL Intravenous Q6H    spironolactone  25 mg Oral Daily    tamsulosin   0.4 mg Oral Daily     PRN Meds:acetaminophen, albuterol-ipratropium, cloNIDine, influenza, insulin aspart U-100, ondansetron, pneumoc 13-alan conj-dip cr(PF), promethazine (PHENERGAN) IVPB, ramelteon, senna-docusate 8.6-50 mg, Flushing PICC Protocol **AND** sodium chloride 0.9% **AND** sodium chloride 0.9%     Review of Systems   Constitutional: Positive for fatigue.   Respiratory: Negative for cough and shortness of breath.    Cardiovascular: Positive for leg swelling. Negative for chest pain.   Gastrointestinal: Negative for abdominal pain and diarrhea.   Musculoskeletal: Negative for back pain.   Neurological: Negative for headaches.   Psychiatric/Behavioral: Positive for confusion.     Objective:     Vital Signs (Most Recent):  Temp: 97.6 °F (36.4 °C) (04/11/19 1525)  Pulse: 71 (04/11/19 1531)  Resp: 20 (04/11/19 1531)  BP: 133/76 (04/11/19 1525)  SpO2: 96 % (04/11/19 1531) Vital Signs (24h Range):  Temp:  [97.3 °F (36.3 °C)-97.9 °F (36.6 °C)] 97.6 °F (36.4 °C)  Pulse:  [66-75] 71  Resp:  [16-24] 20  SpO2:  [94 %-100 %] 96 %  BP: (127-182)/(67-84) 133/76     Weight: 99.5 kg (219 lb 5.7 oz)  Body mass index is 32.39 kg/m².  Body surface area is 2.2 meters squared.      Intake/Output Summary (Last 24 hours) at 4/11/2019 1618  Last data filed at 4/11/2019 1557  Gross per 24 hour   Intake 840 ml   Output 800 ml   Net 40 ml       Physical Exam   Constitutional: He appears well-developed and well-nourished.   HENT:   Head: Normocephalic.   Eyes: Conjunctivae are normal. No scleral icterus.   Neck: Normal range of motion. Neck supple. No thyromegaly present.   Cardiovascular: An irregularly irregular rhythm present.   Pulmonary/Chest: Effort normal and breath sounds normal. He has no wheezes. He has no rales.   Abdominal: Soft. Bowel sounds are normal. He exhibits no distension and no mass. There is no hepatosplenomegaly. There is no tenderness. There is no rebound and no guarding.   Musculoskeletal: He exhibits edema.    Neurological: He is alert.   confused   Skin: No rash noted. No erythema.       Significant Labs:   All pertinent labs within the past 24 hours have been reviewed    Diagnostic Results:  I have reviewed and interpreted all pertinent imaging results/findings within the past 24 hours    Assessment/Plan:     Anemia due to chronic kidney disease  Patient has history of anemia of chronic disease and component of iron deficiency  Patient lost to follow up with GI  Continue to monitor counts  Cont po Fe    Elevated serum immunoglobulin free light chain level  Patient with mildly elevated kappa free light chains from 2018 in setting of normal lambda free light chains and Jaki normal kappa lambda free light chain ratio.    Recent testing reveals elevated kappa free light chain and elevated Lambda FLC with nl K/L FLCR  Likely  secondary to renal dysfuntion    Further evaluation with urine studies -UPEP/UIFE - no monoclonal peaks    SPEP - no monoclonal peaks    Pt has no evidence of MGUS        Will sign off  Please re consult prkimberly Bell MD  Hematology/Oncology  [facility] [place]

## 2019-04-11 NOTE — PROGRESS NOTES
Date of Admission:3/23/2019    SUBJECTIVE: notes feeling ok    Current Facility-Administered Medications   Medication    acetaminophen tablet 500 mg    albuterol-ipratropium 2.5 mg-0.5 mg/3 mL nebulizer solution 3 mL    albuterol-ipratropium 2.5 mg-0.5 mg/3 mL nebulizer solution 3 mL    aspirin EC tablet 81 mg    atorvastatin tablet 20 mg    bumetanide injection 2 mg    carvedilol tablet 25 mg    cinacalcet tablet 30 mg    cloNIDine tablet 0.1 mg    diltiaZEM tablet 30 mg    docusate sodium capsule 100 mg    ferrous gluconate tablet 324 mg    influenza (FLUZONE HIGH-DOSE) vaccine 0.5 mL    insulin aspart U-100 pen 0-5 Units    levothyroxine tablet 25 mcg    losartan tablet 25 mg    ondansetron injection 8 mg    pantoprazole EC tablet 40 mg    pneumoc 13-alan conj-dip cr(PF) (PREVNAR 13 (PF)) 0.5 mL    polyethylene glycol packet 17 g    promethazine (PHENERGAN) 6.25 mg in dextrose 5 % 50 mL IVPB    ramelteon tablet 8 mg    senna-docusate 8.6-50 mg per tablet 1 tablet    sodium chloride 0.9% flush 10 mL    And    sodium chloride 0.9% flush 10 mL    spironolactone tablet 25 mg    tamsulosin 24 hr capsule 0.4 mg       Wt Readings from Last 3 Encounters:   04/10/19 99.5 kg (219 lb 5.7 oz)   03/17/19 92.8 kg (204 lb 9.4 oz)   01/05/19 77.6 kg (171 lb 1.2 oz)     Temp Readings from Last 3 Encounters:   04/11/19 97.6 °F (36.4 °C) (Oral)   03/19/19 97.7 °F (36.5 °C)   01/08/19 97.6 °F (36.4 °C) (Oral)     BP Readings from Last 3 Encounters:   04/11/19 131/80   03/19/19 133/68   01/08/19 129/72     Pulse Readings from Last 3 Encounters:   04/11/19 70   03/19/19 78   01/08/19 80       Intake/Output Summary (Last 24 hours) at 4/11/2019 1123  Last data filed at 4/11/2019 0800  Gross per 24 hour   Intake 720 ml   Output --   Net 720 ml       PE:  GEN:wd male in nad  HEENT:ncat,eomi,mm  CVS:chiki 2/6  PULM:no rales  ABD:+bs,soft,nd  EXT:2+edema legs  NEURO:awake    Recent Labs   Lab 04/11/19  4318   GLU  123*      K 4.4      CO2 32*   BUN 30*   CREATININE 1.1   CALCIUM 9.7       Lab Results   Component Value Date    .6 (H) 04/08/2019    CALCIUM 9.7 04/11/2019    CAION 1.42 05/30/2018    PHOS 2.5 (L) 04/09/2019       No results for input(s): WBC, RBC, HGB, HCT, PLT, MCV, MCH, MCHC in the last 24 hours.      A/P:  1ckd3. Creatinine ok. Following.  2.acute on combined hf.  Wt stil up. Try push  Bumex.   3.hypercalemia. spep negative. pth up. tsh up. Cont sensipar. Need tsh tx. Suspect primary hyperparathyroidism.recommend endo refer. Consult sw for sensipar. Do not think pt can do parathyroidectomy.  4.urinary retention. Poor uop today. Try condom cath. If no go, stearns. Needs I/o accurate with diuresis pushing.  5.anemia.cont po iron.

## 2019-04-11 NOTE — PLAN OF CARE
Problem: Adult Inpatient Plan of Care  Goal: Plan of Care Review  Outcome: Ongoing (interventions implemented as appropriate)     04/11/19 7774   Plan of Care Review   Plan of Care Reviewed With patient   Pt remains free of falls and injuries. Bed alarm on for safety. Avasys at bedside. Pt turned frequently to prevent skin breakdown. New foam mepilex dressing applied to sacrum for preventive measures. With severe generalized edema to BLE and BUE and scrotum/penis, elevated at all times. Denies any pain. Tolerating resp tx. Refused CPAP overnight, no resp distress noted. Incontinence care provided as needed, unable to measure urine, but multiple urine occurrences and diaper is noted to be soaked. TEDs and SCDs to BLE, heels elevated. BG WNL. Remains on tele #[id number], with episodes of vtach overnight, seems like a chronic issue. VSS.

## 2019-04-11 NOTE — SUBJECTIVE & OBJECTIVE
"Interval History: Reconsulted for repeat UR. Review of notes mention bladder scan of 499cc and I&O cath being performed but this is not recorded in UOP. Unsure if I&O cath done or what volume UOP was noted. Nurse reports "soaking" diapers throughout night.     Review of Systems   Constitutional: Negative for chills and fever.   HENT: Negative for hearing loss, sore throat and trouble swallowing.    Eyes: Negative.    Respiratory: Negative for cough and shortness of breath.    Cardiovascular: Negative for chest pain.   Gastrointestinal: Positive for abdominal distention. Negative for abdominal pain, constipation, diarrhea, nausea and vomiting.   Genitourinary: Positive for difficulty urinating, penile swelling and scrotal swelling. Negative for frequency and hematuria.   Musculoskeletal: Negative for arthralgias and neck pain.   Skin: Negative for color change, pallor and rash.   Neurological: Negative for dizziness and seizures.   Hematological: Negative for adenopathy.   Psychiatric/Behavioral: Negative for confusion.   All other systems reviewed and are negative.    Objective:     Temp:  [97.3 °F (36.3 °C)-97.9 °F (36.6 °C)] 97.6 °F (36.4 °C)  Pulse:  [66-75] 66  Resp:  [16-24] 20  SpO2:  [94 %-100 %] 100 %  BP: (127-182)/(71-84) 131/80     Body mass index is 32.39 kg/m².      Bladder Scan Volume (mL): 23 mL (03/28/19 0831)  Post Void Cath Residual Output (mL): 0 mL (03/28/19 0831)    Drains          None          Physical Exam   Vitals reviewed.  Constitutional: He is oriented to person, place, and time. He appears well-developed and well-nourished. No distress.   HENT:   Head: Normocephalic and atraumatic.   Eyes: Right eye exhibits no discharge. Left eye exhibits no discharge. No scleral icterus.   Neck: Normal range of motion. Neck supple.   Cardiovascular: Normal rate and regular rhythm.    Pulmonary/Chest: Effort normal and breath sounds normal. No respiratory distress.   Abdominal: Soft. Bowel sounds are " normal. He exhibits distension. There is no tenderness. There is no rebound and no guarding.   Genitourinary:   Genitourinary Comments: Significant penile/scrotal swelling   Musculoskeletal: Normal range of motion. He exhibits edema.   Neurological: He is alert and oriented to person, place, and time.   Skin: Skin is warm and dry. He is not diaphoretic. No erythema.         Significant Labs:    BMP:  Recent Labs   Lab 04/09/19  0446 04/10/19  0535 04/11/19  0457     143 141 141   K 4.2  4.2 4.4 4.4     107 106 105   CO2 30*  30* 31* 32*   BUN 33*  33* 34* 30*   CREATININE 1.2  1.2 1.1 1.1   CALCIUM 9.8  9.8 9.9 9.7       CBC:   Recent Labs   Lab 04/06/19  0504 04/07/19  0527 04/09/19  0446   WBC 7.76 6.91 6.79   HGB 8.3* 8.2* 7.9*   HCT 30.1* 29.6* 28.5*    332 234       Blood Culture: No results for input(s): LABBLOO in the last 168 hours.  Urine Culture: No results for input(s): LABURIN in the last 168 hours.  Urine Studies:   Recent Labs   Lab 04/10/19  1540   COLORU Yellow   APPEARANCEUA Cloudy*   PHUR 7.0   SPECGRAV 1.010   PROTEINUA 2+*   GLUCUA Negative   KETONESU Negative   BILIRUBINUA Negative   OCCULTUA 1+*   NITRITE Negative   UROBILINOGEN Negative   LEUKOCYTESUR 2+*   RBCUA 0   WBCUA 4   BACTERIA None   HYALINECASTS 0       Significant Imaging:  All pertinent imaging results/findings from the past 24 hours have been reviewed.

## 2019-04-12 LAB
ANION GAP SERPL CALC-SCNC: 4 MMOL/L (ref 8–16)
BUN SERPL-MCNC: 29 MG/DL (ref 8–23)
CALCIUM SERPL-MCNC: 9.5 MG/DL (ref 8.7–10.5)
CHLORIDE SERPL-SCNC: 104 MMOL/L (ref 95–110)
CO2 SERPL-SCNC: 33 MMOL/L (ref 23–29)
CREAT SERPL-MCNC: 1.3 MG/DL (ref 0.5–1.4)
EST. GFR  (AFRICAN AMERICAN): 60 ML/MIN/1.73 M^2
EST. GFR  (NON AFRICAN AMERICAN): 52 ML/MIN/1.73 M^2
GLUCOSE SERPL-MCNC: 131 MG/DL (ref 70–110)
MAGNESIUM SERPL-MCNC: 1.6 MG/DL (ref 1.6–2.6)
POCT GLUCOSE: 114 MG/DL (ref 70–110)
POCT GLUCOSE: 130 MG/DL (ref 70–110)
POCT GLUCOSE: 179 MG/DL (ref 70–110)
POCT GLUCOSE: 194 MG/DL (ref 70–110)
POCT GLUCOSE: 330 MG/DL (ref 70–110)
POTASSIUM SERPL-SCNC: 4.4 MMOL/L (ref 3.5–5.1)
PTH RELATED PROT SERPL-SCNC: <0.4 PMOL/L
SODIUM SERPL-SCNC: 141 MMOL/L (ref 136–145)

## 2019-04-12 PROCEDURE — A4216 STERILE WATER/SALINE, 10 ML: HCPCS | Performed by: HOSPITALIST

## 2019-04-12 PROCEDURE — 25000003 PHARM REV CODE 250: Performed by: HOSPITALIST

## 2019-04-12 PROCEDURE — 25000242 PHARM REV CODE 250 ALT 637 W/ HCPCS: Performed by: HOSPITALIST

## 2019-04-12 PROCEDURE — 97110 THERAPEUTIC EXERCISES: CPT

## 2019-04-12 PROCEDURE — 27000221 HC OXYGEN, UP TO 24 HOURS

## 2019-04-12 PROCEDURE — 63600175 PHARM REV CODE 636 W HCPCS: Performed by: HOSPITALIST

## 2019-04-12 PROCEDURE — S0171 BUMETANIDE 0.5 MG: HCPCS | Performed by: INTERNAL MEDICINE

## 2019-04-12 PROCEDURE — 94640 AIRWAY INHALATION TREATMENT: CPT

## 2019-04-12 PROCEDURE — 97530 THERAPEUTIC ACTIVITIES: CPT

## 2019-04-12 PROCEDURE — 80048 BASIC METABOLIC PNL TOTAL CA: CPT

## 2019-04-12 PROCEDURE — 83735 ASSAY OF MAGNESIUM: CPT

## 2019-04-12 PROCEDURE — 86580 TB INTRADERMAL TEST: CPT | Performed by: HOSPITALIST

## 2019-04-12 PROCEDURE — 99232 SBSQ HOSP IP/OBS MODERATE 35: CPT | Mod: ,,, | Performed by: UROLOGY

## 2019-04-12 PROCEDURE — 25000003 PHARM REV CODE 250: Performed by: INTERNAL MEDICINE

## 2019-04-12 PROCEDURE — 63600175 PHARM REV CODE 636 W HCPCS: Performed by: INTERNAL MEDICINE

## 2019-04-12 PROCEDURE — 11000001 HC ACUTE MED/SURG PRIVATE ROOM

## 2019-04-12 PROCEDURE — 94761 N-INVAS EAR/PLS OXIMETRY MLT: CPT

## 2019-04-12 PROCEDURE — 99232 PR SUBSEQUENT HOSPITAL CARE,LEVL II: ICD-10-PCS | Mod: ,,, | Performed by: UROLOGY

## 2019-04-12 RX ORDER — METOLAZONE 2.5 MG/1
5 TABLET ORAL DAILY
Status: DISCONTINUED | OUTPATIENT
Start: 2019-04-12 | End: 2019-04-15

## 2019-04-12 RX ADMIN — DILTIAZEM HYDROCHLORIDE 30 MG: 30 TABLET, FILM COATED ORAL at 09:04

## 2019-04-12 RX ADMIN — PANTOPRAZOLE SODIUM 40 MG: 40 TABLET, DELAYED RELEASE ORAL at 08:04

## 2019-04-12 RX ADMIN — IPRATROPIUM BROMIDE AND ALBUTEROL SULFATE 3 ML: .5; 3 SOLUTION RESPIRATORY (INHALATION) at 01:04

## 2019-04-12 RX ADMIN — METOLAZONE 5 MG: 2.5 TABLET ORAL at 10:04

## 2019-04-12 RX ADMIN — LEVOTHYROXINE SODIUM 25 MCG: 25 TABLET ORAL at 05:04

## 2019-04-12 RX ADMIN — SPIRONOLACTONE 25 MG: 25 TABLET ORAL at 08:04

## 2019-04-12 RX ADMIN — FERROUS GLUCONATE TAB 324 MG (37.5 MG ELEMENTAL IRON) 324 MG: 324 (37.5 FE) TAB at 08:04

## 2019-04-12 RX ADMIN — CARVEDILOL 25 MG: 6.25 TABLET, FILM COATED ORAL at 08:04

## 2019-04-12 RX ADMIN — LOSARTAN POTASSIUM 25 MG: 25 TABLET, FILM COATED ORAL at 08:04

## 2019-04-12 RX ADMIN — DOCUSATE SODIUM 100 MG: 100 CAPSULE, LIQUID FILLED ORAL at 08:04

## 2019-04-12 RX ADMIN — IPRATROPIUM BROMIDE AND ALBUTEROL SULFATE 3 ML: .5; 3 SOLUTION RESPIRATORY (INHALATION) at 07:04

## 2019-04-12 RX ADMIN — IPRATROPIUM BROMIDE AND ALBUTEROL SULFATE 3 ML: .5; 3 SOLUTION RESPIRATORY (INHALATION) at 04:04

## 2019-04-12 RX ADMIN — TAMSULOSIN HYDROCHLORIDE 0.4 MG: 0.4 CAPSULE ORAL at 08:04

## 2019-04-12 RX ADMIN — POLYETHYLENE GLYCOL 3350 17 G: 17 POWDER, FOR SOLUTION ORAL at 08:04

## 2019-04-12 RX ADMIN — ATORVASTATIN CALCIUM 20 MG: 10 TABLET, FILM COATED ORAL at 09:04

## 2019-04-12 RX ADMIN — CINACALCET HYDROCHLORIDE 30 MG: 30 TABLET, COATED ORAL at 08:04

## 2019-04-12 RX ADMIN — Medication 10 ML: at 01:04

## 2019-04-12 RX ADMIN — TUBERCULIN PURIFIED PROTEIN DERIVATIVE 5 UNITS: 5 INJECTION, SOLUTION INTRADERMAL at 02:04

## 2019-04-12 RX ADMIN — IPRATROPIUM BROMIDE AND ALBUTEROL SULFATE 3 ML: .5; 3 SOLUTION RESPIRATORY (INHALATION) at 08:04

## 2019-04-12 RX ADMIN — Medication 10 ML: at 09:04

## 2019-04-12 RX ADMIN — INSULIN ASPART 4 UNITS: 100 INJECTION, SOLUTION INTRAVENOUS; SUBCUTANEOUS at 05:04

## 2019-04-12 RX ADMIN — BUMETANIDE 2 MG: 0.25 INJECTION INTRAMUSCULAR; INTRAVENOUS at 09:04

## 2019-04-12 RX ADMIN — Medication 10 ML: at 05:04

## 2019-04-12 RX ADMIN — ASPIRIN 81 MG: 81 TABLET, COATED ORAL at 08:04

## 2019-04-12 RX ADMIN — PAMIDRONATE DISODIUM 30 MG: 3 INJECTION, SOLUTION INTRAVENOUS at 02:04

## 2019-04-12 RX ADMIN — DILTIAZEM HYDROCHLORIDE 30 MG: 30 TABLET, FILM COATED ORAL at 08:04

## 2019-04-12 RX ADMIN — BUMETANIDE 2 MG: 0.25 INJECTION INTRAMUSCULAR; INTRAVENOUS at 08:04

## 2019-04-12 NOTE — PLAN OF CARE
Problem: Physical Therapy Goal  Goal: Physical Therapy Goal  Goals to be met by: 19    Patient will increase functional independence with mobility by performin. Sit to stand transfer with Stand-by Assistance  2. Gait x150 feet with stand-by Assistance using Rolling Walker  3. Lower extremity exercise program x30 reps per handout, with supervision     Outcome: Ongoing (interventions implemented as appropriate)  Pt required more assistance with all mobility . Pt is fatigue easily , required extra time and frequent rest breaks throughout therapy. Patient confused and difficult to redirect  for safety. Pt will benefit from further skilled therapy in order to get back to PLOF.

## 2019-04-12 NOTE — NURSING
Pt became very weak after sitting up in chair for 4 hrs. Pt noted with bm in chair. Cleaned with the help of nursing assistance and physical therapist. Pt assisted back to chair and was very SOB. O2 increased back to 4L.  Sats 97%. Will adjust O2 when pt becomes more comforable.

## 2019-04-12 NOTE — PLAN OF CARE
Problem: Respiratory Compromise COPD (Chronic Obstructive Pulmonary Disease)  Goal: Effective Oxygenation and Ventilation    Intervention: Optimize Oxygenation and Ventilation  Liter flow weaned to 2 as patient's sats were 99% on 3 lpm.

## 2019-04-12 NOTE — PROGRESS NOTES
Date of Admission:3/23/2019    SUBJECTIVE: notes feeling tired, weak from sitting in chair  Current Facility-Administered Medications   Medication    acetaminophen tablet 500 mg    albuterol-ipratropium 2.5 mg-0.5 mg/3 mL nebulizer solution 3 mL    albuterol-ipratropium 2.5 mg-0.5 mg/3 mL nebulizer solution 3 mL    aspirin EC tablet 81 mg    atorvastatin tablet 20 mg    bumetanide injection 2 mg    carvedilol tablet 25 mg    cinacalcet tablet 30 mg    cloNIDine tablet 0.1 mg    diltiaZEM tablet 30 mg    docusate sodium capsule 100 mg    ferrous gluconate tablet 324 mg    influenza (FLUZONE HIGH-DOSE) vaccine 0.5 mL    insulin aspart U-100 pen 0-5 Units    levothyroxine tablet 25 mcg    losartan tablet 25 mg    metOLazone tablet 5 mg    ondansetron injection 8 mg    pamidronate (AREDIA) 30 mg in sodium chloride 0.9% 250 mL infusion    pantoprazole EC tablet 40 mg    pneumoc 13-alan conj-dip cr(PF) (PREVNAR 13 (PF)) 0.5 mL    polyethylene glycol packet 17 g    promethazine (PHENERGAN) 6.25 mg in dextrose 5 % 50 mL IVPB    ramelteon tablet 8 mg    senna-docusate 8.6-50 mg per tablet 1 tablet    sodium chloride 0.9% flush 10 mL    And    sodium chloride 0.9% flush 10 mL    spironolactone tablet 25 mg    tamsulosin 24 hr capsule 0.4 mg       Wt Readings from Last 3 Encounters:   04/12/19 98.3 kg (216 lb 11.4 oz)   03/17/19 92.8 kg (204 lb 9.4 oz)   01/05/19 77.6 kg (171 lb 1.2 oz)     Temp Readings from Last 3 Encounters:   04/12/19 97.9 °F (36.6 °C) (Oral)   03/19/19 97.7 °F (36.5 °C)   01/08/19 97.6 °F (36.4 °C) (Oral)     BP Readings from Last 3 Encounters:   04/12/19 137/83   03/19/19 133/68   01/08/19 129/72     Pulse Readings from Last 3 Encounters:   04/12/19 100   03/19/19 78   01/08/19 80       Intake/Output Summary (Last 24 hours) at 4/12/2019 1219  Last data filed at 4/12/2019 0822  Gross per 24 hour   Intake 240 ml   Output 1200 ml   Net -960 ml       PE:  GEN:wd male in  nad  HEENT:ncat,eomi,mm  CVS:chiki 2/6  PULM:decrease bases  ABD:+bs,soft,nd  EXT:2+edema legs  NEURO:awake    Recent Labs   Lab 04/12/19  0553   *      K 4.4      CO2 33*   BUN 29*   CREATININE 1.3   CALCIUM 9.5   MG 1.6       Lab Results   Component Value Date    .6 (H) 04/08/2019    CALCIUM 9.5 04/12/2019    CAION 1.42 05/30/2018    PHOS 2.5 (L) 04/09/2019       No results for input(s): WBC, RBC, HGB, HCT, PLT, MCV, MCH, MCHC in the last 24 hours.      A/P:  1ckd3. Creatinine ok. Following.  2.acute on combined hf.  Add metolazone..  3.hypercalemia. Cannot afford sensipar outpt. Will give aredia low dose 30mg now.  Cont sensipar for now. Cannot give ivfs with edema. Doubt mm.  4.urinary retention. Cont stearns.  5.anemia.cont po iron.

## 2019-04-12 NOTE — SUBJECTIVE & OBJECTIVE
Interval History: pt edematous and poor UOP    Review of Systems   HENT: Negative for ear discharge and ear pain.    Eyes: Negative for pain and itching.   Endocrine: Negative for polyphagia and polyuria.   Neurological: Negative for seizures and syncope.     Objective:     Vital Signs (Most Recent):  Temp: 97.7 °F (36.5 °C) (04/11/19 2009)  Pulse: 107 (04/11/19 2016)  Resp: 20 (04/11/19 2016)  BP: 117/71 (04/11/19 2009)  SpO2: 98 % (04/11/19 2016) Vital Signs (24h Range):  Temp:  [97.4 °F (36.3 °C)-97.9 °F (36.6 °C)] 97.7 °F (36.5 °C)  Pulse:  [] 107  Resp:  [16-20] 20  SpO2:  [94 %-100 %] 98 %  BP: (117-166)/(67-84) 117/71     Weight: 99.5 kg (219 lb 5.7 oz)  Body mass index is 32.39 kg/m².    Intake/Output Summary (Last 24 hours) at 4/11/2019 2126  Last data filed at 4/11/2019 1557  Gross per 24 hour   Intake 480 ml   Output 800 ml   Net -320 ml      Physical Exam   Constitutional: He is oriented to person, place, and time. He appears well-developed and well-nourished. No distress.   HENT:   Head: Normocephalic and atraumatic.   Right Ear: External ear normal.   Left Ear: External ear normal.   Nose: Nose normal.   Eyes: Right eye exhibits no discharge. Left eye exhibits no discharge.   Neck: Normal range of motion.   Cardiovascular:   Irregularly irregular, no murmurs or gallops   Pulmonary/Chest: Effort normal. No respiratory distress.   Abdominal: Soft. Bowel sounds are normal. There is no tenderness. There is no rebound and no guarding.   Musculoskeletal: Normal range of motion. He exhibits edema (Much improved).   LUE edema   Neurological: He is alert and oriented to person, place, and time.   Skin: Skin is warm and dry. He is not diaphoretic. No erythema.   Psychiatric: He has a normal mood and affect. His behavior is normal. Judgment and thought content normal.   Nursing note and vitals reviewed.      Significant Labs:   BMP:   Recent Labs   Lab 04/11/19  0457   *      K 4.4       CO2 32*   BUN 30*   CREATININE 1.1   CALCIUM 9.7     CBC: No results for input(s): WBC, HGB, HCT, PLT in the last 48 hours.    Significant Imaging: I have reviewed and interpreted all pertinent imaging results/findings within the past 24 hours.

## 2019-04-12 NOTE — PROGRESS NOTES
PALLIATIVE CARE PROGRESS NOTE:    Discussed with Dr. Gonsales earlier today.  Palliative Care will see patient Monday 4/15/19.    Deanna Milligan, BSN, RN, CCRN, CHPN   Palliative Care Nurse Coordinator   [facility]  [phone]

## 2019-04-12 NOTE — SUBJECTIVE & OBJECTIVE
Interval History: Boucher replaced.  tolerating    Review of Systems   Constitutional: Negative.    HENT: Negative.    Eyes: Negative.    Respiratory: Negative for cough, chest tightness and shortness of breath.    Cardiovascular: Negative for chest pain.   Gastrointestinal: Negative.  Negative for constipation, diarrhea and nausea.   Musculoskeletal: Negative.    Neurological: Negative.    Psychiatric/Behavioral: Negative.      Objective:     Temp:  [97.7 °F (36.5 °C)-98.6 °F (37 °C)] 97.9 °F (36.6 °C)  Pulse:  [] 88  Resp:  [17-20] 18  SpO2:  [95 %-100 %] 95 %  BP: (117-163)/(56-83) 137/83     Body mass index is 32 kg/m².    Date 04/12/19 0700 - 04/13/19 0659   Shift 1144-0530 2461-0798 1114-6940 24 Hour Total   INTAKE   P.O. 120   120   Shift Total(mL/kg) 120(1.2)   120(1.2)   OUTPUT   Shift Total(mL/kg)       Weight (kg) 98.3 98.3 98.3 98.3     Bladder Scan Volume (mL): 23 mL (03/28/19 0831)  Post Void Cath Residual Output (mL): 0 mL (03/28/19 0831)    Drains          None          Physical Exam   Nursing note and vitals reviewed.  Constitutional: He is oriented to person, place, and time. He appears well-developed and well-nourished.   HENT:   Head: Normocephalic.   Eyes: Conjunctivae are normal.   Neck: Normal range of motion. Neck supple. No tracheal deviation present. No thyromegaly present.   Cardiovascular: Normal rate and normal heart sounds.    Pulmonary/Chest: Effort normal and breath sounds normal. No respiratory distress. He has no wheezes.   Abdominal: Soft. Bowel sounds are normal. There is no hepatosplenomegaly. There is no tenderness. There is no rebound and no CVA tenderness. No hernia.   Genitourinary:   Genitourinary Comments: 16 Fr Boucher in place with yellow urine   Musculoskeletal: Normal range of motion. He exhibits no edema or tenderness.   Lymphadenopathy:     He has no cervical adenopathy.   Neurological: He is alert and oriented to person, place, and time.   Skin: Skin is warm and  dry. No rash noted. No erythema.     Psychiatric: He has a normal mood and affect. His behavior is normal. Judgment and thought content normal.       Significant Labs:    BMP:  Recent Labs   Lab 04/10/19  0535 04/11/19  0457 04/12/19  0553    141 141   K 4.4 4.4 4.4    105 104   CO2 31* 32* 33*   BUN 34* 30* 29*   CREATININE 1.1 1.1 1.3   CALCIUM 9.9 9.7 9.5       CBC:   Recent Labs   Lab 04/06/19  0504 04/07/19  0527 04/09/19  0446   WBC 7.76 6.91 6.79   HGB 8.3* 8.2* 7.9*   HCT 30.1* 29.6* 28.5*    332 234       Blood Culture: No results for input(s): LABBLOO in the last 168 hours.  Urine Culture: No results for input(s): LABURIN in the last 168 hours.    Significant Imaging:

## 2019-04-12 NOTE — PROGRESS NOTES
[facility] Hospital Medicine  Progress Note    Patient Name: Agus Ramos Jr.  MRN: [id number]  Patient Class: IP- Inpatient   Admission Date: 3/23/2019  Length of Stay: 19 days  Attending Physician: An Joshi MD  Primary Care Provider: Keaton Hardy MD        Subjective:     Principal Problem:Acute metabolic encephalopathy    HPI:  Mr. Agus Ramos Jr. is a 78 y.o. male known to me with essential hypertension, type 2 diabetes mellitus (HbA1c 5.0% Mar 2019), CAD s/p CABG, chronic combined systolic and diastolic heart failure (LVEF 50% Mar 2019), CKD Stage 3, peripheral artery disease, chronic atrial fibrillation ([id number]-WQJh score 5) on chronic anticoagulation, COPD, chronic respiratory failure with hypoxia, and anemia of chronic disease who presents to [facility] ED with complaints of a fall this morning.  He had much difficulty standing back up.  EMS was activated and they found that his capillary glucose was 14 mg/dL after which he was given an ampule of 50% dextrose.  His mentation returned to baseline thereafter.  He denies any tremors nor diaphoresis.  He says that he's been feeling weak and dizzy today but denies any loss of consciousness, nor antecedent chest pain, shortness of breath, palpitations, fevers, chills, nausea, vomiting, abdominal pain, or any diarrhea.  He did not trip or slip or anything.  He has had a good appetite but cannot say for sure if he has been taking too much of his diabetes medications.  He denies any recent changes to his medications.  He otherwise has been in his usual state of health.    Hospital Course:  77 y/o male presented with weakness.  Hx of DM on Glipizide and Metformin.  Patient noted to be hypoglycemic.  Initially responded to D50, but then became persistently hypoglycemic.  Admitted to ICU on D10 drip and hourly glucose monitoring.  Also started on Octreotide drip.  No further episodes of hypoglycemia and D10 switched to D5.  Octreotide  drip stopped.  Now getting more hyperglycemic.  Stopping D5 drip and monitor off dextrose.  PT/OT evaluation.  Recommended SNF,consulted SW.  Last HbA1C 2 weeks ago 5.0,likely no need any more for hypoglycemic agents,diet should  be fine.  Has scrotal swelling,on IV lasix,lifting scrotum.  Has anasarca,all over body,increased lasix ,have ,proteinuria,check protein/Cr. Ratio,elevated,consulted nephrology.still has massive anasarca.  Urology following for scrotal swelling,improved.  Has left renal cyst,on US,need follow up with repeat US  in 6 months.  24 urine collection is in Process to R/O nephrotic syndrome.  Consulted oncology for abnormal UPEP.  Stearns removed.  Patient voiding without issues.  Diuresis stopped by Nephrology.  Shortness of breath overnight on 4/8.  Started on oral Bumex.    4/9- d/w nephrology, TSh elevated, start synthroid; not great UOP, increased diuretic to daily   4/10- still quite edematous, increased bumex, family wants to take home and likely to come quickly come back in current state   4/11- stearns cath attempt, poor UOP on increased bumex?    Interval History: pt edematous and poor UOP    Review of Systems   HENT: Negative for ear discharge and ear pain.    Eyes: Negative for pain and itching.   Endocrine: Negative for polyphagia and polyuria.   Neurological: Negative for seizures and syncope.     Objective:     Vital Signs (Most Recent):  Temp: 97.7 °F (36.5 °C) (04/11/19 2009)  Pulse: 107 (04/11/19 2016)  Resp: 20 (04/11/19 2016)  BP: 117/71 (04/11/19 2009)  SpO2: 98 % (04/11/19 2016) Vital Signs (24h Range):  Temp:  [97.4 °F (36.3 °C)-97.9 °F (36.6 °C)] 97.7 °F (36.5 °C)  Pulse:  [] 107  Resp:  [16-20] 20  SpO2:  [94 %-100 %] 98 %  BP: (117-166)/(67-84) 117/71     Weight: 99.5 kg (219 lb 5.7 oz)  Body mass index is 32.39 kg/m².    Intake/Output Summary (Last 24 hours) at 4/11/2019 2126  Last data filed at 4/11/2019 1557  Gross per 24 hour   Intake 480 ml   Output 800 ml   Net  -320 ml      Physical Exam   Constitutional: He is oriented to person, place, and time. He appears well-developed and well-nourished. No distress.   HENT:   Head: Normocephalic and atraumatic.   Right Ear: External ear normal.   Left Ear: External ear normal.   Nose: Nose normal.   Eyes: Right eye exhibits no discharge. Left eye exhibits no discharge.   Neck: Normal range of motion.   Cardiovascular:   Irregularly irregular, no murmurs or gallops   Pulmonary/Chest: Effort normal. No respiratory distress.   Abdominal: Soft. Bowel sounds are normal. There is no tenderness. There is no rebound and no guarding.   Musculoskeletal: Normal range of motion. He exhibits edema (Much improved).   LUE edema   Neurological: He is alert and oriented to person, place, and time.   Skin: Skin is warm and dry. He is not diaphoretic. No erythema.   Psychiatric: He has a normal mood and affect. His behavior is normal. Judgment and thought content normal.   Nursing note and vitals reviewed.      Significant Labs:   BMP:   Recent Labs   Lab 04/11/19  0457   *      K 4.4      CO2 32*   BUN 30*   CREATININE 1.1   CALCIUM 9.7     CBC: No results for input(s): WBC, HGB, HCT, PLT in the last 48 hours.    Significant Imaging: I have reviewed and interpreted all pertinent imaging results/findings within the past 24 hours.    Assessment/Plan:      * Acute metabolic encephalopathy  Patient was noted to have a capillary glucose of 14 mg/dL in the field for which he was given an ampule of 50% dextrose with improvement of his mentation.  His initial serum glucose here was 30 mg/dL but later was still 44 mg/dL after treatment.  He improved into the 180's but continued to drop to 81 mg/dL then to 36 mg/dL.    Admitted to ICU on D10 drip and hourly glucose checks.  Also started on Octreotide drip.  Glucose increasing with no further episodes of hypoglycemia.  Will change D10 to D5 and monitor glucose every 4 hours.  The etiology of  his hypoglycemia is unclear but he is on a sulfonylurea at home.  He does have 1+ leukocytes and moderate bacteria on urine, but many amorphous cells.  Will repeat UA with UCx.  Afebrile.  Will hold off on ABx's for now.  Getting more hyperglycemic.  Stop D5 and monitor off dextrose.    PT/OT eval.  Last HbA1C 2 weeks ago 5.0,likely no need any more for hypoglycemic agents,diet should  be fine.  SNF recommended.  Patient has refused SNF.  Boucher removed and patient voiding.  Lasix stopped.   SOB overnight.  Started on oral Bumex.  Possibly home with HH tomorrow if doing ok.      Anemia due to chronic kidney disease        Renal cyst, left  Has left renal cyst,on US,need follow up with repeat US  in 6 weeks,      Proteinuria  Has proteinuria with Anasarca.  Consulted oncology for abnormal UPEP  Appreciate Nephrology and Oncology input.        Scrotal swelling    Has scrotal swelling,on IV lasix,lifting scrotum.Has anasarca,all over body,increased lasix,    Peripheral artery disease  Stable; will continue his home regimen of aspirin and atorvastatin.    Acute on chronic combined systolic and diastolic heart failure  Bumex changed to daily - increased   Continue spironolactone  Monitor UOP        Urinary retention  Appreciate Urology input.  Boucher removed and patient urinating.    flomax daily       Chronic respiratory failure with hypoxia  Stable; will continue his home supplemental oxygen therapy.    Centrilobular emphysema  Clinically-stable without wheezing.  Will provide as-needed JENAE/LAMA available.    Iron deficiency anemia  The patient's H/H is stable and consistent with previous laboratory measurements, and the patient exhibits no signs or symptoms of acute bleeding; there is no indication for transfusion.  Will continue to monitor.    Type 2 diabetes mellitus, controlled, with renal complications  As addressed above.    CKD Stage 3  His renal function appears to be at his baseline.    Essential  hypertension  Continue home regimen of carvedilol, diltiazem, furosemide, and tamsulosin, and provide as-needed clonidine.    Uncontrolled, await repeat BP and adjust meds accordingly     CAD (coronary artery disease)  Stable; will continue his home regimen of aspirin, atorvastatin, and carvedilol.    Chronic atrial fibrillation  Patient is in atrial fibrillation at this time which is baseline for him.  He was taken off of anticoagulation during his last admission due to anemia and was instructed not to take it until outpatient follow-up with gastroenterology.  Will continue to monitor.      VTE Risk Mitigation (From admission, onward)        Ordered     Reason for No Pharmacological VTE Prophylaxis  Once      03/23/19 1936     Place sequential compression device  Until discontinued      03/23/19 1934     Place DENIS hose  Until discontinued      03/23/19 1934              An Joshi MD  Department of Hospital Medicine   [facility]

## 2019-04-12 NOTE — NURSING
Notified by tele monitor tech pt had 9 runs of Vtach; notified Dr Hardy; new orders were given; will cont to monitor

## 2019-04-12 NOTE — NURSING
Pt resting in bed no distress noted no complaint of pain; safety measures maintained will cont to monitor

## 2019-04-12 NOTE — PROGRESS NOTES
[facility]  Urology  Progress Note    Patient Name: Agus Ramos Jr.  MRN: [id number]  Admission Date: 3/23/2019  Hospital Length of Stay: 20 days  Code Status: Full Code   Attending Provider: An Joshi MD   Primary Care Physician: Keaton Hardy MD    Subjective:     HPI:  Urinary Retention  Patient complains of urinary retention. Onset of retention was 1 day ago and was gradual in onset. Patient currently does have a urinary catheter in place.  300 ml of urine were drained when catheter was placed. Prior to this event voiding symptoms consisted of slow stream, intermittency. Prior treatments include watchful waiting. Recent medications that may have affected his voiding include none.  He is admitted after a fall for hypoglycemia.  He had difficulty voiding while admitted.  A few attempts were made to place a Boucher.  Finally a 12 Fr Coude was placed.  Bladder Scan showed 700 mL but he has not drained this much in several hours.        Interval History: Boucher replaced.  tolerating    Review of Systems   Constitutional: Negative.    HENT: Negative.    Eyes: Negative.    Respiratory: Negative for cough, chest tightness and shortness of breath.    Cardiovascular: Negative for chest pain.   Gastrointestinal: Negative.  Negative for constipation, diarrhea and nausea.   Musculoskeletal: Negative.    Neurological: Negative.    Psychiatric/Behavioral: Negative.      Objective:     Temp:  [97.7 °F (36.5 °C)-98.6 °F (37 °C)] 97.9 °F (36.6 °C)  Pulse:  [] 88  Resp:  [17-20] 18  SpO2:  [95 %-100 %] 95 %  BP: (117-163)/(56-83) 137/83     Body mass index is 32 kg/m².    Date 04/12/19 0700 - 04/13/19 0659   Shift 1654-5663 1005-5467 9760-6446 24 Hour Total   INTAKE   P.O. 120   120   Shift Total(mL/kg) 120(1.2)   120(1.2)   OUTPUT   Shift Total(mL/kg)       Weight (kg) 98.3 98.3 98.3 98.3     Bladder Scan Volume (mL): 23 mL (03/28/19 0831)  Post Void Cath Residual Output (mL): 0 mL (03/28/19  0831)    Drains          None          Physical Exam   Nursing note and vitals reviewed.  Constitutional: He is oriented to person, place, and time. He appears well-developed and well-nourished.   HENT:   Head: Normocephalic.   Eyes: Conjunctivae are normal.   Neck: Normal range of motion. Neck supple. No tracheal deviation present. No thyromegaly present.   Cardiovascular: Normal rate and normal heart sounds.    Pulmonary/Chest: Effort normal and breath sounds normal. No respiratory distress. He has no wheezes.   Abdominal: Soft. Bowel sounds are normal. There is no hepatosplenomegaly. There is no tenderness. There is no rebound and no CVA tenderness. No hernia.   Genitourinary:   Genitourinary Comments: 16 Fr Boucher in place with yellow urine   Musculoskeletal: Normal range of motion. He exhibits no edema or tenderness.   Lymphadenopathy:     He has no cervical adenopathy.   Neurological: He is alert and oriented to person, place, and time.   Skin: Skin is warm and dry. No rash noted. No erythema.     Psychiatric: He has a normal mood and affect. His behavior is normal. Judgment and thought content normal.       Significant Labs:    BMP:  Recent Labs   Lab 04/10/19  0535 04/11/19  0457 04/12/19  0553    141 141   K 4.4 4.4 4.4    105 104   CO2 31* 32* 33*   BUN 34* 30* 29*   CREATININE 1.1 1.1 1.3   CALCIUM 9.9 9.7 9.5       CBC:   Recent Labs   Lab 04/06/19  0504 04/07/19  0527 04/09/19  0446   WBC 7.76 6.91 6.79   HGB 8.3* 8.2* 7.9*   HCT 30.1* 29.6* 28.5*    332 234       Blood Culture: No results for input(s): LABBLOO in the last 168 hours.  Urine Culture: No results for input(s): LABURIN in the last 168 hours.    Significant Imaging:                    Assessment/Plan:     Renal cyst, left   - Recommended for repeat IRASEMA in 6 months    Scrotal swelling   - JOSE LUIS reviewed     - Scrotal elevation at all times   - Diuresis    Urinary retention  Continue Flomax  Concern for bladder scan  unreliabilty in setting of ascites    Boucher back in  Plan for outpatient voiding trial       Will sign off, call with questions.      VTE Risk Mitigation (From admission, onward)        Ordered     Reason for No Pharmacological VTE Prophylaxis  Once      03/23/19 1936     Place sequential compression device  Until discontinued      03/23/19 1934     Place DENIS hose  Until discontinued      03/23/19 1934          AISHA Wiggins MD  Urology  [facility]

## 2019-04-12 NOTE — PT/OT/SLP PROGRESS
Physical Therapy Treatment    Patient Name:  Agus Ramos Jr.   MRN:  [id number]    Recommendations:     Discharge Recommendations:  nursing facility, skilled   Discharge Equipment Recommendations: walker, rolling   Barriers to discharge: Decreased caregiver support and pt with decreased mobility     Assessment:     Agus Ramos Jr. is a 78 y.o. male admitted with a medical diagnosis of Acute metabolic encephalopathy.  He presents with the following impairments/functional limitations:  weakness, impaired endurance, impaired self care skills, gait instability, impaired balance, decreased upper extremity function, decreased lower extremity function, decreased ROM, edema, impaired functional mobilty, impaired cognition, decreased safety awareness, pain, impaired cardiopulmonary response to activity .Pt required more assistance with all mobility . Pt is fatigue easily , required extra time and frequent rest breaks throughout therapy. Patient confused and difficult to redirect  for safety. Pt will benefit from further skilled therapy in order to get back to PLOF.      Rehab Prognosis: Fair; patient would benefit from acute skilled PT services to address these deficits and reach maximum level of function.    Recent Surgery: * No surgery found *      Plan:     During this hospitalization, patient to be seen 3 x/week to address the identified rehab impairments via therapeutic activities, therapeutic exercises, gait training and progress toward the following goals:    · Plan of Care Expires:  04/15/19    Subjective     Chief Complaint: fear of falling   Patient/Family Comments/goals: pt agreeable to treatment   Pain/Comfort:  · Pain Rating 1: 0/10  · Pain Rating Post-Intervention 1: 0/10      Objective:     Communicated with nurse Chamorro  prior to session.  Patient found up in chair with bed alarm, oxygen, telemetry, stearns catheter upon PT entry to room.     General Precautions: Standard, fall, respiratory   Orthopedic  Precautions:N/A   Braces: N/A     Functional Mobility:  · Bed Mobility:     · Scooting: total assistance and of 2 persons to HOB   · Sit to Supine: maximal assistance and of 2 persons   · Transfers:     · Sit to Stand: x 3 trials from chair and 1 trial from bed  maximal assistance and of 2 persons with rolling walker. V/T cues for safety technique and walker management.   · Bed to Chair: maximal assistance with  rolling walker  using  Step Transfer  · Gait: pt ambulated ~ 4-5 steps from chair to bed with RW, MAX A . Noted with decreased arnold, decreased step length, increased B hip ER. Pt with decreased safety awareness , required max V/T cues for safety technique and walker management.   · Balance: pt with poor balance today        AM-PAC 6 CLICK MOBILITY  Turning over in bed (including adjusting bedclothes, sheets and blankets)?: 2  Sitting down on and standing up from a chair with arms (e.g., wheelchair, bedside commode, etc.): 2  Moving from lying on back to sitting on the side of the bed?: 2  Moving to and from a bed to a chair (including a wheelchair)?: 2  Need to walk in hospital room?: 2  Climbing 3-5 steps with a railing?: 1  Basic Mobility Total Score: 11       Therapeutic Activities and Exercises:   pt performed seated BLE x 10 reps : AP, LAQ, hip ABD/ADD and hip flexion (AAROM BLE) . V/T cues for technique and sequence.   Pt tolerated static standing ( 2 trials for pericare)  with RW, min /mod A ~ 4-5  Minutes each trial  for doffing/donning of brief and pericare ( pt has a BM accident).    educated pt on safety awareness with all OOB mobility, transfer and gait training.     Patient left HOB elevated BLE &BUE elevated on pillow ,SCD placed BLE  with all lines intact, call button in reach, bed alarm on, nurse Ashtyn and family  present..    GOALS:   Multidisciplinary Problems     Physical Therapy Goals        Problem: Physical Therapy Goal    Goal Priority Disciplines Outcome Goal Variances  Interventions   Physical Therapy Goal     PT, PT/OT Ongoing (interventions implemented as appropriate)     Description:  Goals to be met by: 19    Patient will increase functional independence with mobility by performin. Sit to stand transfer with Stand-by Assistance  2. Gait x150 feet with stand-by Assistance using Rolling Walker  3. Lower extremity exercise program x30 reps per handout, with supervision                      Time Tracking:     PT Received On: 19  PT Start Time: 1146     PT Stop Time: 1240  PT Total Time (min): 54 min     Billable Minutes: Therapeutic Activity 38 and Therapeutic Exercise 16    Treatment Type: Treatment  PT/PTA: PTA     PTA Visit Number: 1     Tram T Esther, PTA  2019

## 2019-04-12 NOTE — NURSING
Pt repositioned in bed no distress noted no complaint of pain; scheduled meds given w/out difficulty; stearns to gravity; tele monitor #[id number]; PICC RONNY saline lock; telesitter at bedside; accu checks monitor; safety measures maintained will cont to monitor

## 2019-04-12 NOTE — PLAN OF CARE
EDIS met with patient's spouse per her request to discuss SNF placement. Spouse informed SW she's unable to care for patient at home without getting rehabilitation first. SW explained to spouse SW will send referrals to obtain an accepting facility. SW informed spouse insurance will have to authorize SNF before patient can discharge to a facility. EDIS reviewed with patient [facility] SNF in network list, spouse request SW refer patient to [facility], [facility], and [facility]. EDIS contacted HAYDEE Murillo with [facility] @ [phone] to report SNF being pursued as discharge plan. EDIS uploaded clinicals via Health system to refer patient for SNF. EDIS provided update to attending MD and requested order for SNF and PPD.         04/12/19 5074   Post-Acute Status   Post-Acute Authorization Placement  (SNF)   Post-Acute Placement Status Referrals Sent   Discharge Delays (!) Patient and Family Barriers

## 2019-04-12 NOTE — ASSESSMENT & PLAN NOTE
Continue Flomax  Concern for bladder scan unreliabilty in setting of ascites    Boucher back in  Plan for outpatient voiding trial

## 2019-04-13 LAB
ANION GAP SERPL CALC-SCNC: 6 MMOL/L (ref 8–16)
BUN SERPL-MCNC: 31 MG/DL (ref 8–23)
CALCIUM SERPL-MCNC: 9.4 MG/DL (ref 8.7–10.5)
CHLORIDE SERPL-SCNC: 103 MMOL/L (ref 95–110)
CO2 SERPL-SCNC: 32 MMOL/L (ref 23–29)
CREAT SERPL-MCNC: 1.4 MG/DL (ref 0.5–1.4)
EST. GFR  (AFRICAN AMERICAN): 54 ML/MIN/1.73 M^2
EST. GFR  (NON AFRICAN AMERICAN): 47 ML/MIN/1.73 M^2
GLUCOSE SERPL-MCNC: 65 MG/DL (ref 70–110)
POCT GLUCOSE: 108 MG/DL (ref 70–110)
POCT GLUCOSE: 112 MG/DL (ref 70–110)
POCT GLUCOSE: 71 MG/DL (ref 70–110)
POTASSIUM SERPL-SCNC: 4.4 MMOL/L (ref 3.5–5.1)
SODIUM SERPL-SCNC: 141 MMOL/L (ref 136–145)

## 2019-04-13 PROCEDURE — 25000242 PHARM REV CODE 250 ALT 637 W/ HCPCS: Performed by: HOSPITALIST

## 2019-04-13 PROCEDURE — 25000003 PHARM REV CODE 250: Performed by: INTERNAL MEDICINE

## 2019-04-13 PROCEDURE — 25000003 PHARM REV CODE 250: Performed by: HOSPITALIST

## 2019-04-13 PROCEDURE — S0171 BUMETANIDE 0.5 MG: HCPCS | Performed by: INTERNAL MEDICINE

## 2019-04-13 PROCEDURE — 11000001 HC ACUTE MED/SURG PRIVATE ROOM

## 2019-04-13 PROCEDURE — 94760 N-INVAS EAR/PLS OXIMETRY 1: CPT

## 2019-04-13 PROCEDURE — 99900035 HC TECH TIME PER 15 MIN (STAT)

## 2019-04-13 PROCEDURE — 94660 CPAP INITIATION&MGMT: CPT

## 2019-04-13 PROCEDURE — 36415 COLL VENOUS BLD VENIPUNCTURE: CPT

## 2019-04-13 PROCEDURE — 27000221 HC OXYGEN, UP TO 24 HOURS

## 2019-04-13 PROCEDURE — A4216 STERILE WATER/SALINE, 10 ML: HCPCS | Performed by: HOSPITALIST

## 2019-04-13 PROCEDURE — 94640 AIRWAY INHALATION TREATMENT: CPT

## 2019-04-13 PROCEDURE — 80048 BASIC METABOLIC PNL TOTAL CA: CPT

## 2019-04-13 RX ADMIN — POLYETHYLENE GLYCOL 3350 17 G: 17 POWDER, FOR SOLUTION ORAL at 08:04

## 2019-04-13 RX ADMIN — Medication 10 ML: at 11:04

## 2019-04-13 RX ADMIN — ASPIRIN 81 MG: 81 TABLET, COATED ORAL at 09:04

## 2019-04-13 RX ADMIN — SPIRONOLACTONE 25 MG: 25 TABLET ORAL at 09:04

## 2019-04-13 RX ADMIN — BUMETANIDE 2 MG: 0.25 INJECTION INTRAMUSCULAR; INTRAVENOUS at 09:04

## 2019-04-13 RX ADMIN — LOSARTAN POTASSIUM 25 MG: 25 TABLET, FILM COATED ORAL at 09:04

## 2019-04-13 RX ADMIN — DILTIAZEM HYDROCHLORIDE 30 MG: 30 TABLET, FILM COATED ORAL at 09:04

## 2019-04-13 RX ADMIN — DILTIAZEM HYDROCHLORIDE 30 MG: 30 TABLET, FILM COATED ORAL at 08:04

## 2019-04-13 RX ADMIN — LEVOTHYROXINE SODIUM 25 MCG: 25 TABLET ORAL at 07:04

## 2019-04-13 RX ADMIN — DOCUSATE SODIUM 100 MG: 100 CAPSULE, LIQUID FILLED ORAL at 08:04

## 2019-04-13 RX ADMIN — CARVEDILOL 25 MG: 6.25 TABLET, FILM COATED ORAL at 09:04

## 2019-04-13 RX ADMIN — IPRATROPIUM BROMIDE AND ALBUTEROL SULFATE 3 ML: .5; 3 SOLUTION RESPIRATORY (INHALATION) at 07:04

## 2019-04-13 RX ADMIN — Medication 10 ML: at 12:04

## 2019-04-13 RX ADMIN — DOCUSATE SODIUM 100 MG: 100 CAPSULE, LIQUID FILLED ORAL at 09:04

## 2019-04-13 RX ADMIN — CARVEDILOL 25 MG: 6.25 TABLET, FILM COATED ORAL at 08:04

## 2019-04-13 RX ADMIN — TAMSULOSIN HYDROCHLORIDE 0.4 MG: 0.4 CAPSULE ORAL at 09:04

## 2019-04-13 RX ADMIN — POLYETHYLENE GLYCOL 3350 17 G: 17 POWDER, FOR SOLUTION ORAL at 09:04

## 2019-04-13 RX ADMIN — Medication 10 ML: at 07:04

## 2019-04-13 RX ADMIN — IPRATROPIUM BROMIDE AND ALBUTEROL SULFATE 3 ML: .5; 3 SOLUTION RESPIRATORY (INHALATION) at 11:04

## 2019-04-13 RX ADMIN — CINACALCET HYDROCHLORIDE 30 MG: 30 TABLET, COATED ORAL at 09:04

## 2019-04-13 RX ADMIN — FERROUS GLUCONATE TAB 324 MG (37.5 MG ELEMENTAL IRON) 324 MG: 324 (37.5 FE) TAB at 09:04

## 2019-04-13 RX ADMIN — METOLAZONE 5 MG: 2.5 TABLET ORAL at 09:04

## 2019-04-13 RX ADMIN — PANTOPRAZOLE SODIUM 40 MG: 40 TABLET, DELAYED RELEASE ORAL at 09:04

## 2019-04-13 RX ADMIN — ATORVASTATIN CALCIUM 20 MG: 10 TABLET, FILM COATED ORAL at 08:04

## 2019-04-13 RX ADMIN — Medication 10 ML: at 05:04

## 2019-04-13 RX ADMIN — BUMETANIDE 2 MG: 0.25 INJECTION INTRAMUSCULAR; INTRAVENOUS at 08:04

## 2019-04-13 RX ADMIN — IPRATROPIUM BROMIDE AND ALBUTEROL SULFATE 3 ML: .5; 3 SOLUTION RESPIRATORY (INHALATION) at 04:04

## 2019-04-13 NOTE — SUBJECTIVE & OBJECTIVE
Interval History: pt has no new complaints     Review of Systems   HENT: Negative for ear discharge and ear pain.    Eyes: Negative for pain and itching.   Endocrine: Negative for polyphagia and polyuria.   Neurological: Negative for seizures and syncope.     Objective:     Vital Signs (Most Recent):  Temp: 98.3 °F (36.8 °C) (04/13/19 0800)  Pulse: 78 (04/13/19 0800)  Resp: 18 (04/13/19 0800)  BP: 135/70 (04/13/19 0800)  SpO2: 98 % (04/13/19 0800) Vital Signs (24h Range):  Temp:  [97.8 °F (36.6 °C)-98.7 °F (37.1 °C)] 98.3 °F (36.8 °C)  Pulse:  [] 78  Resp:  [17-20] 18  SpO2:  [94 %-100 %] 98 %  BP: (121-137)/(70-97) 135/70     Weight: 98.3 kg (216 lb 11.4 oz)  Body mass index is 32 kg/m².    Intake/Output Summary (Last 24 hours) at 4/13/2019 1014  Last data filed at 4/12/2019 1725  Gross per 24 hour   Intake 120 ml   Output 1000 ml   Net -880 ml      Physical Exam   Constitutional: He is oriented to person, place, and time. He appears well-developed and well-nourished. No distress.   HENT:   Head: Normocephalic and atraumatic.   Right Ear: External ear normal.   Left Ear: External ear normal.   Nose: Nose normal.   Eyes: Right eye exhibits no discharge. Left eye exhibits no discharge.   Neck: Normal range of motion.   Cardiovascular:   Irregularly irregular, no murmurs or gallops   Pulmonary/Chest: Effort normal. No respiratory distress.   Abdominal: Soft. Bowel sounds are normal. There is no tenderness. There is no rebound and no guarding.   Musculoskeletal: Normal range of motion. He exhibits edema (Much improved).   LUE edema   Neurological: He is alert and oriented to person, place, and time.   Skin: Skin is warm and dry. He is not diaphoretic. No erythema.   Psychiatric: He has a normal mood and affect. His behavior is normal. Judgment and thought content normal.   Nursing note and vitals reviewed.      Significant Labs: All pertinent labs within the past 24 hours have been reviewed.    Significant  Imaging: I have reviewed and interpreted all pertinent imaging results/findings within the past 24 hours.

## 2019-04-13 NOTE — NURSING
Pt on tele box # [id number] with frequent bouts of VTach according to monitor. Placed call to monitor tech for clarification. tam Cantu stated pt is not in VTach, however when his pacemaker fires, it looks like VTach. 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 Dr. Joshi is already aware of rhythm. Will cont to monitor.

## 2019-04-13 NOTE — PROGRESS NOTES
[facility] Hospital Medicine  Progress Note    Patient Name: Agus Ramos Jr.  MRN: [id number]  Patient Class: IP- Inpatient   Admission Date: 3/23/2019  Length of Stay: 21 days  Attending Physician: An Joshi MD  Primary Care Provider: Keaton Hardy MD        Subjective:     Principal Problem:Acute metabolic encephalopathy    HPI:  Mr. Agus Ramos Jr. is a 78 y.o. male known to me with essential hypertension, type 2 diabetes mellitus (HbA1c 5.0% Mar 2019), CAD s/p CABG, chronic combined systolic and diastolic heart failure (LVEF 50% Mar 2019), CKD Stage 3, peripheral artery disease, chronic atrial fibrillation ([id number] score 5) on chronic anticoagulation, COPD, chronic respiratory failure with hypoxia, and anemia of chronic disease who presents to [facility] ED with complaints of a fall this morning.  He had much difficulty standing back up.  EMS was activated and they found that his capillary glucose was 14 mg/dL after which he was given an ampule of 50% dextrose.  His mentation returned to baseline thereafter.  He denies any tremors nor diaphoresis.  He says that he's been feeling weak and dizzy today but denies any loss of consciousness, nor antecedent chest pain, shortness of breath, palpitations, fevers, chills, nausea, vomiting, abdominal pain, or any diarrhea.  He did not trip or slip or anything.  He has had a good appetite but cannot say for sure if he has been taking too much of his diabetes medications.  He denies any recent changes to his medications.  He otherwise has been in his usual state of health.    Hospital Course:  77 y/o male presented with weakness.  Hx of DM on Glipizide and Metformin.  Patient noted to be hypoglycemic.  Initially responded to D50, but then became persistently hypoglycemic.  Admitted to ICU on D10 drip and hourly glucose monitoring.  Also started on Octreotide drip.  No further episodes of hypoglycemia and D10 switched to D5.  Octreotide  drip stopped.  Now getting more hyperglycemic.  Stopping D5 drip and monitor off dextrose.  PT/OT evaluation.  Recommended SNF,consulted EDIS.  Last HbA1C 2 weeks ago 5.0,likely no need any more for hypoglycemic agents,diet should  be fine.  Has scrotal swelling,on IV lasix,lifting scrotum.  Has anasarca,all over body,increased lasix ,have ,proteinuria,check protein/Cr. Ratio,elevated,consulted nephrology.still has massive anasarca.  Urology following for scrotal swelling,improved.  Has left renal cyst,on US,need follow up with repeat US  in 6 months.  24 urine collection is in Process to R/O nephrotic syndrome.  Consulted oncology for abnormal UPEP.  Stearns removed.  Patient voiding without issues.  Diuresis stopped by Nephrology.  Shortness of breath overnight on 4/8.  Started on oral Bumex.    4/9- d/w nephrology, TSh elevated, start synthroid; not great UOP, increased diuretic to daily   4/10- still quite edematous, increased bumex, family wants to take home and likely to come quickly come back in current state   4/11- stearns cath attempt, poor UOP on increased bumex?  4/12- family now decided on SNF    Interval History: pt has no new complaints     Review of Systems   HENT: Negative for ear discharge and ear pain.    Eyes: Negative for pain and itching.   Endocrine: Negative for polyphagia and polyuria.   Neurological: Negative for seizures and syncope.     Objective:     Vital Signs (Most Recent):  Temp: 98.3 °F (36.8 °C) (04/13/19 0800)  Pulse: 78 (04/13/19 0800)  Resp: 18 (04/13/19 0800)  BP: 135/70 (04/13/19 0800)  SpO2: 98 % (04/13/19 0800) Vital Signs (24h Range):  Temp:  [97.8 °F (36.6 °C)-98.7 °F (37.1 °C)] 98.3 °F (36.8 °C)  Pulse:  [] 78  Resp:  [17-20] 18  SpO2:  [94 %-100 %] 98 %  BP: (121-137)/(70-97) 135/70     Weight: 98.3 kg (216 lb 11.4 oz)  Body mass index is 32 kg/m².    Intake/Output Summary (Last 24 hours) at 4/13/2019 1014  Last data filed at 4/12/2019 1725  Gross per 24 hour   Intake  120 ml   Output 1000 ml   Net -880 ml      Physical Exam   Constitutional: He is oriented to person, place, and time. He appears well-developed and well-nourished. No distress.   HENT:   Head: Normocephalic and atraumatic.   Right Ear: External ear normal.   Left Ear: External ear normal.   Nose: Nose normal.   Eyes: Right eye exhibits no discharge. Left eye exhibits no discharge.   Neck: Normal range of motion.   Cardiovascular:   Irregularly irregular, no murmurs or gallops   Pulmonary/Chest: Effort normal. No respiratory distress.   Abdominal: Soft. Bowel sounds are normal. There is no tenderness. There is no rebound and no guarding.   Musculoskeletal: Normal range of motion. He exhibits edema (Much improved).   LUE edema   Neurological: He is alert and oriented to person, place, and time.   Skin: Skin is warm and dry. He is not diaphoretic. No erythema.   Psychiatric: He has a normal mood and affect. His behavior is normal. Judgment and thought content normal.   Nursing note and vitals reviewed.      Significant Labs: All pertinent labs within the past 24 hours have been reviewed.    Significant Imaging: I have reviewed and interpreted all pertinent imaging results/findings within the past 24 hours.    Assessment/Plan:      * Acute metabolic encephalopathy  Patient was noted to have a capillary glucose of 14 mg/dL in the field for which he was given an ampule of 50% dextrose with improvement of his mentation.  His initial serum glucose here was 30 mg/dL but later was still 44 mg/dL after treatment.  He improved into the 180's but continued to drop to 81 mg/dL then to 36 mg/dL.    Admitted to ICU on D10 drip and hourly glucose checks.  Also started on Octreotide drip.  Glucose increasing with no further episodes of hypoglycemia.  Will change D10 to D5 and monitor glucose every 4 hours.  The etiology of his hypoglycemia is unclear but he is on a sulfonylurea at home.  He does have 1+ leukocytes and moderate  bacteria on urine, but many amorphous cells.  Will repeat UA with UCx.  Afebrile.  Will hold off on ABx's for now.  Getting more hyperglycemic.  Stop D5 and monitor off dextrose.    PT/OT eval.  Last HbA1C 2 weeks ago 5.0,likely no need any more for hypoglycemic agents,diet should  be fine.  SNF recommended.  Patient has refused SNF.  Stearns removed and patient voiding.  Lasix stopped.   SOB overnight.  Started on oral Bumex - poor UOP    Changed to IV bumex  Proceed with SNF      Anemia due to chronic kidney disease        Renal cyst, left  Has left renal cyst,on US,need follow up with repeat US  in 6 weeks,      Proteinuria  Has proteinuria with Anasarca.  Consulted oncology for abnormal UPEP  Appreciate Nephrology and Oncology input.        Scrotal swelling    Has scrotal swelling,on IV lasix,lifting scrotum.Has anasarca,all over body,increased lasix,    Peripheral artery disease  Stable; will continue his home regimen of aspirin and atorvastatin.    Acute on chronic combined systolic and diastolic heart failure  Bumex changed to daily - increased   Continue spironolactone  Monitor UOP        Urinary retention  Appreciate Urology input.  Stearns removed and patient urinating.    flomax daily   Replace stearns       Chronic respiratory failure with hypoxia  Stable; will continue his home supplemental oxygen therapy.    Centrilobular emphysema  Clinically-stable without wheezing.  Will provide as-needed JENAE/LAMA available.    Iron deficiency anemia  The patient's H/H is stable and consistent with previous laboratory measurements, and the patient exhibits no signs or symptoms of acute bleeding; there is no indication for transfusion.  Will continue to monitor.    Type 2 diabetes mellitus, controlled, with renal complications  As addressed above.    CKD Stage 3  His renal function appears to be at his baseline.    Essential hypertension  Continue home regimen of carvedilol, diltiazem, furosemide, and tamsulosin, and  provide as-needed clonidine.    Uncontrolled, await repeat BP and adjust meds accordingly     CAD (coronary artery disease)  Stable; will continue his home regimen of aspirin, atorvastatin, and carvedilol.    Chronic atrial fibrillation  Patient is in atrial fibrillation at this time which is baseline for him.  He was taken off of anticoagulation during his last admission due to anemia and was instructed not to take it until outpatient follow-up with gastroenterology.  Will continue to monitor.      VTE Risk Mitigation (From admission, onward)        Ordered     Reason for No Pharmacological VTE Prophylaxis  Once      03/23/19 1936     Place sequential compression device  Until discontinued      03/23/19 1934     Place DENIS hose  Until discontinued      03/23/19 1934              An Joshi MD  Department of Hospital Medicine   [facility]

## 2019-04-13 NOTE — PROGRESS NOTES
Agus Ramos Jr. is a 78 y.o. male patient.    Follow for CKD stg 3    No new c/o, comfortable    Scheduled Meds:   albuterol-ipratropium  3 mL Nebulization Q4H WAKE    aspirin  81 mg Oral Daily    atorvastatin  20 mg Oral QHS    bumetanide  2 mg Intravenous Q12H    carvedilol  25 mg Oral BID    cinacalcet  30 mg Oral Daily with breakfast    diltiaZEM  30 mg Oral Q12H    docusate sodium  100 mg Oral BID    ferrous gluconate  324 mg Oral Daily with breakfast    levothyroxine  25 mcg Oral Before breakfast    losartan  25 mg Oral Daily    metOLazone  5 mg Oral Daily    pantoprazole  40 mg Oral Daily    polyethylene glycol  17 g Oral BID    sodium chloride 0.9%  10 mL Intravenous Q6H    spironolactone  25 mg Oral Daily    tamsulosin  0.4 mg Oral Daily       Review of patient's allergies indicates:  No Known Allergies      Vital Signs Range (Last 24H):  Temp:  [97.8 °F (36.6 °C)-98.7 °F (37.1 °C)]   Pulse:  []   Resp:  [17-20]   BP: (121-137)/(70-97)   SpO2:  [94 %-100 %]     I & O (Last 24H):    Intake/Output Summary (Last 24 hours) at 4/13/2019 1056  Last data filed at 4/12/2019 1725  Gross per 24 hour   Intake 120 ml   Output 1000 ml   Net -880 ml           Physical Exam:  General appearance: well developed, cachectic, no distress  Lungs:  clear to auscultation bilaterally and normal respiratory effort  Heart: regular rate and rhythm  Abdomen: soft, non-tender non-distented; bowel sounds normal; no masses,  no organomegaly  Extremities: edema (+)    Laboratory:  CBC:   Recent Labs   Lab 04/09/19 0446   WBC 6.79   RBC 3.61*   HGB 7.9*   HCT 28.5*      MCV 79*   MCH 21.9*   MCHC 27.7*     CMP:   Recent Labs   Lab 04/09/19  0446  04/13/19  0425   *  113*   < > 65*   CALCIUM 9.8  9.8   < > 9.4   ALBUMIN 2.1*  --   --    PROT 4.4*  --   --      143   < > 141   K 4.2  4.2   < > 4.4   CO2 30*  30*   < > 32*     107   < > 103   BUN 33*  33*   < > 31*    CREATININE 1.2  1.2   < > 1.4   ALKPHOS 126  --   --    ALT 15  --   --    AST 13  --   --    BILITOT 0.5  --   --     < > = values in this interval not displayed.       Imp/Plan    CKD stg 3 - creatinine stable  Proteinuria  Metabolic encephalopathy - improving  Acute on chronic combined CHF - continue diuretics  Urinary retention - urology following  HTN  DM type 2  COPD  Hypercalcemia  Anemia of chronic disease    Continue present Rx  Watch renal function closely while diuresing  CMP in am        Nikky Simon  4/13/2019

## 2019-04-13 NOTE — ASSESSMENT & PLAN NOTE
Patient was noted to have a capillary glucose of 14 mg/dL in the field for which he was given an ampule of 50% dextrose with improvement of his mentation.  His initial serum glucose here was 30 mg/dL but later was still 44 mg/dL after treatment.  He improved into the 180's but continued to drop to 81 mg/dL then to 36 mg/dL.    Admitted to ICU on D10 drip and hourly glucose checks.  Also started on Octreotide drip.  Glucose increasing with no further episodes of hypoglycemia.  Will change D10 to D5 and monitor glucose every 4 hours.  The etiology of his hypoglycemia is unclear but he is on a sulfonylurea at home.  He does have 1+ leukocytes and moderate bacteria on urine, but many amorphous cells.  Will repeat UA with UCx.  Afebrile.  Will hold off on ABx's for now.  Getting more hyperglycemic.  Stop D5 and monitor off dextrose.    PT/OT eval.  Last HbA1C 2 weeks ago 5.0,likely no need any more for hypoglycemic agents,diet should  be fine.  SNF recommended.  Patient has refused SNF.  Boucher removed and patient voiding.  Lasix stopped.   SOB overnight.  Started on oral Bumex - poor UOP    Changed to IV bumex  Proceed with SNF

## 2019-04-13 NOTE — PLAN OF CARE
Problem: Adult Inpatient Plan of Care  Goal: Plan of Care Review  Outcome: Ongoing (interventions implemented as appropriate)  Pt free from falls, injury or any further trauma throughout shift. Pt awake and alert. Oriented to self only. No complaints of pain throughout shift. Continued medications as ordered. Pt up in chair during shift. Pt in no distress, will cont to monitor.    04/13/19 2276   Plan of Care Review   Plan of Care Reviewed With patient

## 2019-04-13 NOTE — ASSESSMENT & PLAN NOTE
Appreciate Urology input.  Stearns removed and patient urinating.    flomax daily   Replace stearns

## 2019-04-14 LAB
ALBUMIN SERPL BCP-MCNC: 2 G/DL (ref 3.5–5.2)
ALP SERPL-CCNC: 118 U/L (ref 55–135)
ALT SERPL W/O P-5'-P-CCNC: 17 U/L (ref 10–44)
ANION GAP SERPL CALC-SCNC: 7 MMOL/L (ref 8–16)
ANION GAP SERPL CALC-SCNC: 7 MMOL/L (ref 8–16)
AST SERPL-CCNC: 18 U/L (ref 10–40)
BILIRUB SERPL-MCNC: 0.5 MG/DL (ref 0.1–1)
BUN SERPL-MCNC: 32 MG/DL (ref 8–23)
BUN SERPL-MCNC: 32 MG/DL (ref 8–23)
CALCIUM SERPL-MCNC: 9.2 MG/DL (ref 8.7–10.5)
CALCIUM SERPL-MCNC: 9.2 MG/DL (ref 8.7–10.5)
CHLORIDE SERPL-SCNC: 102 MMOL/L (ref 95–110)
CHLORIDE SERPL-SCNC: 102 MMOL/L (ref 95–110)
CO2 SERPL-SCNC: 31 MMOL/L (ref 23–29)
CO2 SERPL-SCNC: 31 MMOL/L (ref 23–29)
CREAT SERPL-MCNC: 1.4 MG/DL (ref 0.5–1.4)
CREAT SERPL-MCNC: 1.4 MG/DL (ref 0.5–1.4)
EST. GFR  (AFRICAN AMERICAN): 54 ML/MIN/1.73 M^2
EST. GFR  (AFRICAN AMERICAN): 54 ML/MIN/1.73 M^2
EST. GFR  (NON AFRICAN AMERICAN): 47 ML/MIN/1.73 M^2
EST. GFR  (NON AFRICAN AMERICAN): 47 ML/MIN/1.73 M^2
GLUCOSE SERPL-MCNC: 79 MG/DL (ref 70–110)
GLUCOSE SERPL-MCNC: 79 MG/DL (ref 70–110)
POCT GLUCOSE: 109 MG/DL (ref 70–110)
POCT GLUCOSE: 122 MG/DL (ref 70–110)
POCT GLUCOSE: 135 MG/DL (ref 70–110)
POCT GLUCOSE: 193 MG/DL (ref 70–110)
POCT GLUCOSE: 96 MG/DL (ref 70–110)
POTASSIUM SERPL-SCNC: 4.3 MMOL/L (ref 3.5–5.1)
POTASSIUM SERPL-SCNC: 4.3 MMOL/L (ref 3.5–5.1)
PROT SERPL-MCNC: 4.3 G/DL (ref 6–8.4)
SODIUM SERPL-SCNC: 140 MMOL/L (ref 136–145)
SODIUM SERPL-SCNC: 140 MMOL/L (ref 136–145)

## 2019-04-14 PROCEDURE — 25000003 PHARM REV CODE 250: Performed by: HOSPITALIST

## 2019-04-14 PROCEDURE — 25000003 PHARM REV CODE 250: Performed by: INTERNAL MEDICINE

## 2019-04-14 PROCEDURE — 94660 CPAP INITIATION&MGMT: CPT

## 2019-04-14 PROCEDURE — 36415 COLL VENOUS BLD VENIPUNCTURE: CPT

## 2019-04-14 PROCEDURE — S0171 BUMETANIDE 0.5 MG: HCPCS | Performed by: INTERNAL MEDICINE

## 2019-04-14 PROCEDURE — 25000242 PHARM REV CODE 250 ALT 637 W/ HCPCS: Performed by: HOSPITALIST

## 2019-04-14 PROCEDURE — 11000001 HC ACUTE MED/SURG PRIVATE ROOM

## 2019-04-14 PROCEDURE — 94640 AIRWAY INHALATION TREATMENT: CPT

## 2019-04-14 PROCEDURE — 80053 COMPREHEN METABOLIC PANEL: CPT

## 2019-04-14 PROCEDURE — A4216 STERILE WATER/SALINE, 10 ML: HCPCS | Performed by: HOSPITALIST

## 2019-04-14 PROCEDURE — 99900035 HC TECH TIME PER 15 MIN (STAT)

## 2019-04-14 RX ADMIN — DOCUSATE SODIUM 100 MG: 100 CAPSULE, LIQUID FILLED ORAL at 09:04

## 2019-04-14 RX ADMIN — Medication 10 ML: at 12:04

## 2019-04-14 RX ADMIN — DILTIAZEM HYDROCHLORIDE 30 MG: 30 TABLET, FILM COATED ORAL at 09:04

## 2019-04-14 RX ADMIN — CARVEDILOL 25 MG: 6.25 TABLET, FILM COATED ORAL at 09:04

## 2019-04-14 RX ADMIN — IPRATROPIUM BROMIDE AND ALBUTEROL SULFATE 3 ML: .5; 3 SOLUTION RESPIRATORY (INHALATION) at 04:04

## 2019-04-14 RX ADMIN — BUMETANIDE 2 MG: 0.25 INJECTION INTRAMUSCULAR; INTRAVENOUS at 09:04

## 2019-04-14 RX ADMIN — LEVOTHYROXINE SODIUM 25 MCG: 25 TABLET ORAL at 06:04

## 2019-04-14 RX ADMIN — PANTOPRAZOLE SODIUM 40 MG: 40 TABLET, DELAYED RELEASE ORAL at 09:04

## 2019-04-14 RX ADMIN — IPRATROPIUM BROMIDE AND ALBUTEROL SULFATE 3 ML: .5; 3 SOLUTION RESPIRATORY (INHALATION) at 08:04

## 2019-04-14 RX ADMIN — FERROUS GLUCONATE TAB 324 MG (37.5 MG ELEMENTAL IRON) 324 MG: 324 (37.5 FE) TAB at 07:04

## 2019-04-14 RX ADMIN — SPIRONOLACTONE 25 MG: 25 TABLET ORAL at 09:04

## 2019-04-14 RX ADMIN — POLYETHYLENE GLYCOL 3350 17 G: 17 POWDER, FOR SOLUTION ORAL at 09:04

## 2019-04-14 RX ADMIN — Medication 10 ML: at 06:04

## 2019-04-14 RX ADMIN — LOSARTAN POTASSIUM 25 MG: 25 TABLET, FILM COATED ORAL at 09:04

## 2019-04-14 RX ADMIN — IPRATROPIUM BROMIDE AND ALBUTEROL SULFATE 3 ML: .5; 3 SOLUTION RESPIRATORY (INHALATION) at 09:04

## 2019-04-14 RX ADMIN — IPRATROPIUM BROMIDE AND ALBUTEROL SULFATE 3 ML: .5; 3 SOLUTION RESPIRATORY (INHALATION) at 12:04

## 2019-04-14 RX ADMIN — METOLAZONE 5 MG: 2.5 TABLET ORAL at 09:04

## 2019-04-14 RX ADMIN — Medication 10 ML: at 11:04

## 2019-04-14 RX ADMIN — RAMELTEON 8 MG: 8 TABLET, FILM COATED ORAL at 09:04

## 2019-04-14 RX ADMIN — ASPIRIN 81 MG: 81 TABLET, COATED ORAL at 09:04

## 2019-04-14 RX ADMIN — TAMSULOSIN HYDROCHLORIDE 0.4 MG: 0.4 CAPSULE ORAL at 09:04

## 2019-04-14 RX ADMIN — CINACALCET HYDROCHLORIDE 30 MG: 30 TABLET, COATED ORAL at 07:04

## 2019-04-14 RX ADMIN — ATORVASTATIN CALCIUM 20 MG: 10 TABLET, FILM COATED ORAL at 09:04

## 2019-04-14 NOTE — PROGRESS NOTES
[facility] Hospital Medicine  Progress Note    Patient Name: Agus Ramos Jr.  MRN: [id number]  Patient Class: IP- Inpatient   Admission Date: 3/23/2019  Length of Stay: 22 days  Attending Physician: An Joshi MD  Primary Care Provider: Keaton Hardy MD        Subjective:     Principal Problem:Acute metabolic encephalopathy    HPI:  Mr. Agus Ramos Jr. is a 78 y.o. male known to me with essential hypertension, type 2 diabetes mellitus (HbA1c 5.0% Mar 2019), CAD s/p CABG, chronic combined systolic and diastolic heart failure (LVEF 50% Mar 2019), CKD Stage 3, peripheral artery disease, chronic atrial fibrillation ([id number]-FHIi score 5) on chronic anticoagulation, COPD, chronic respiratory failure with hypoxia, and anemia of chronic disease who presents to [facility] ED with complaints of a fall this morning.  He had much difficulty standing back up.  EMS was activated and they found that his capillary glucose was 14 mg/dL after which he was given an ampule of 50% dextrose.  His mentation returned to baseline thereafter.  He denies any tremors nor diaphoresis.  He says that he's been feeling weak and dizzy today but denies any loss of consciousness, nor antecedent chest pain, shortness of breath, palpitations, fevers, chills, nausea, vomiting, abdominal pain, or any diarrhea.  He did not trip or slip or anything.  He has had a good appetite but cannot say for sure if he has been taking too much of his diabetes medications.  He denies any recent changes to his medications.  He otherwise has been in his usual state of health.    Hospital Course:  77 y/o male presented with weakness.  Hx of DM on Glipizide and Metformin.  Patient noted to be hypoglycemic.  Initially responded to D50, but then became persistently hypoglycemic.  Admitted to ICU on D10 drip and hourly glucose monitoring.  Also started on Octreotide drip.  No further episodes of hypoglycemia and D10 switched to D5.  Octreotide  drip stopped.  Now getting more hyperglycemic.  Stopping D5 drip and monitor off dextrose.  PT/OT evaluation.  Recommended SNF,consulted SW.  Last HbA1C 2 weeks ago 5.0,likely no need any more for hypoglycemic agents,diet should  be fine.  Has scrotal swelling,on IV lasix,lifting scrotum.  Has anasarca,all over body,increased lasix ,have ,proteinuria,check protein/Cr. Ratio,elevated,consulted nephrology.still has massive anasarca.  Urology following for scrotal swelling,improved.  Has left renal cyst,on US,need follow up with repeat US  in 6 months.  24 urine collection is in Process to R/O nephrotic syndrome.  Consulted oncology for abnormal UPEP.  Stearns removed.  Patient voiding without issues.  Diuresis stopped by Nephrology.  Shortness of breath overnight on 4/8.  Started on oral Bumex.    4/9- d/w nephrology, TSh elevated, start synthroid; not great UOP, increased diuretic to daily   4/10- still quite edematous, increased bumex, family wants to take home and likely to come quickly come back in current state   4/11- stearns cath attempt, poor UOP on increased bumex?  4/12- family now decided on SNF    No new subjective & objective note has been filed under this hospital service since the last note was generated.    Assessment/Plan:      * Acute metabolic encephalopathy  Patient was noted to have a capillary glucose of 14 mg/dL in the field for which he was given an ampule of 50% dextrose with improvement of his mentation.  His initial serum glucose here was 30 mg/dL but later was still 44 mg/dL after treatment.  He improved into the 180's but continued to drop to 81 mg/dL then to 36 mg/dL.    Admitted to ICU on D10 drip and hourly glucose checks.  Also started on Octreotide drip.  Glucose increasing with no further episodes of hypoglycemia.  Will change D10 to D5 and monitor glucose every 4 hours.  The etiology of his hypoglycemia is unclear but he is on a sulfonylurea at home.  He does have 1+ leukocytes and  moderate bacteria on urine, but many amorphous cells.  Will repeat UA with UCx.  Afebrile.  Will hold off on ABx's for now.  Getting more hyperglycemic.  Stop D5 and monitor off dextrose.    PT/OT eval.  Last HbA1C 2 weeks ago 5.0,likely no need any more for hypoglycemic agents,diet should  be fine.  SNF recommended.  Patient has refused SNF.  Stearns removed and patient voiding.  Lasix stopped.   SOB overnight.  Started on oral Bumex - poor UOP    Changed to IV bumex  Proceed with SNF      Anemia due to chronic kidney disease        Renal cyst, left  Has left renal cyst,on US,need follow up with repeat US  in 6 weeks,      Proteinuria  Has proteinuria with Anasarca.  Consulted oncology for abnormal UPEP  Appreciate Nephrology and Oncology input.        Scrotal swelling    Has scrotal swelling,on IV lasix,lifting scrotum.Has anasarca,all over body,increased lasix,    Peripheral artery disease  Stable; will continue his home regimen of aspirin and atorvastatin.    Acute on chronic combined systolic and diastolic heart failure  Bumex changed to daily - increased   Continue spironolactone  Monitor UOP        Urinary retention  Appreciate Urology input.  Stearns removed and patient urinating.    flomax daily   Replace stearns       Chronic respiratory failure with hypoxia  Stable; will continue his home supplemental oxygen therapy.    Centrilobular emphysema  Clinically-stable without wheezing.  Will provide as-needed JENAE/LAMA available.    Iron deficiency anemia  The patient's H/H is stable and consistent with previous laboratory measurements, and the patient exhibits no signs or symptoms of acute bleeding; there is no indication for transfusion.  Will continue to monitor.    Type 2 diabetes mellitus, controlled, with renal complications  As addressed above.    CKD Stage 3  His renal function appears to be at his baseline.    Essential hypertension  Continue home regimen of carvedilol, diltiazem, furosemide, and tamsulosin,  and provide as-needed clonidine.    Uncontrolled, await repeat BP and adjust meds accordingly     CAD (coronary artery disease)  Stable; will continue his home regimen of aspirin, atorvastatin, and carvedilol.    Chronic atrial fibrillation  Patient is in atrial fibrillation at this time which is baseline for him.  He was taken off of anticoagulation during his last admission due to anemia and was instructed not to take it until outpatient follow-up with gastroenterology.  Will continue to monitor.    VTE Risk Mitigation (From admission, onward)        Ordered     Reason for No Pharmacological VTE Prophylaxis  Once      03/23/19 1936     Place sequential compression device  Until discontinued      03/23/19 1934     Place DNEIS hose  Until discontinued      03/23/19 1934              An Josih MD  Department of Hospital Medicine   [facility]

## 2019-04-14 NOTE — PROGRESS NOTES
[facility] Hospital Medicine  Progress Note    Patient Name: Agus Ramos Jr.  MRN: [id number]  Patient Class: IP- Inpatient   Admission Date: 3/23/2019  Length of Stay: 22 days  Attending Physician: An Joshi MD  Primary Care Provider: Keaton Hardy MD        Subjective:     Principal Problem:Acute metabolic encephalopathy    HPI:  Mr. Agus Ramos Jr. is a 78 y.o. male known to me with essential hypertension, type 2 diabetes mellitus (HbA1c 5.0% Mar 2019), CAD s/p CABG, chronic combined systolic and diastolic heart failure (LVEF 50% Mar 2019), CKD Stage 3, peripheral artery disease, chronic atrial fibrillation (ACF5PF7-YSSo score 5) on chronic anticoagulation, COPD, chronic respiratory failure with hypoxia, and anemia of chronic disease who presents to [facility] ED with complaints of a fall this morning.  He had much difficulty standing back up.  EMS was activated and they found that his capillary glucose was 14 mg/dL after which he was given an ampule of 50% dextrose.  His mentation returned to baseline thereafter.  He denies any tremors nor diaphoresis.  He says that he's been feeling weak and dizzy today but denies any loss of consciousness, nor antecedent chest pain, shortness of breath, palpitations, fevers, chills, nausea, vomiting, abdominal pain, or any diarrhea.  He did not trip or slip or anything.  He has had a good appetite but cannot say for sure if he has been taking too much of his diabetes medications.  He denies any recent changes to his medications.  He otherwise has been in his usual state of health.    Hospital Course:  79 y/o male presented with weakness.  Hx of DM on Glipizide and Metformin.  Patient noted to be hypoglycemic.  Initially responded to D50, but then became persistently hypoglycemic.  Admitted to ICU on D10 drip and hourly glucose monitoring.  Also started on Octreotide drip.  No further episodes of hypoglycemia and D10 switched to D5.  Octreotide  drip stopped.  Now getting more hyperglycemic.  Stopping D5 drip and monitor off dextrose.  PT/OT evaluation.  Recommended SNF,consulted SW.  Last HbA1C 2 weeks ago 5.0,likely no need any more for hypoglycemic agents,diet should  be fine.  Has scrotal swelling,on IV lasix,lifting scrotum.  Has anasarca,all over body,increased lasix ,have ,proteinuria,check protein/Cr. Ratio,elevated,consulted nephrology.still has massive anasarca.  Urology following for scrotal swelling,improved.  Has left renal cyst,on US,need follow up with repeat US  in 6 months.  24 urine collection is in Process to R/O nephrotic syndrome.  Consulted oncology for abnormal UPEP.  Stearns removed.  Patient voiding without issues.  Diuresis stopped by Nephrology.  Shortness of breath overnight on 4/8.  Started on oral Bumex.    4/9- d/w nephrology, TSh elevated, start synthroid; not great UOP, increased diuretic to daily   4/10- still quite edematous, increased bumex, family wants to take home and likely to come quickly come back in current state   4/11- stearns cath attempt, poor UOP on increased bumex?  4/12- family now decided on SNF    No new subjective & objective note has been filed under this hospital service since the last note was generated.    Assessment/Plan:      * Acute metabolic encephalopathy  Patient was noted to have a capillary glucose of 14 mg/dL in the field for which he was given an ampule of 50% dextrose with improvement of his mentation.  His initial serum glucose here was 30 mg/dL but later was still 44 mg/dL after treatment.  He improved into the 180's but continued to drop to 81 mg/dL then to 36 mg/dL.    Admitted to ICU on D10 drip and hourly glucose checks.  Also started on Octreotide drip.  Glucose increasing with no further episodes of hypoglycemia.  Will change D10 to D5 and monitor glucose every 4 hours.  The etiology of his hypoglycemia is unclear but he is on a sulfonylurea at home.  He does have 1+ leukocytes and  moderate bacteria on urine, but many amorphous cells.  Will repeat UA with UCx.  Afebrile.  Will hold off on ABx's for now.  Getting more hyperglycemic.  Stop D5 and monitor off dextrose.    PT/OT eval.  Last HbA1C 2 weeks ago 5.0,likely no need any more for hypoglycemic agents,diet should  be fine.  SNF recommended.  Patient has refused SNF.  Stearns removed and patient voiding.  Lasix stopped.   SOB overnight.  Started on oral Bumex - poor UOP    Changed to IV bumex  Proceed with SNF      Anemia due to chronic kidney disease        Renal cyst, left  Has left renal cyst,on US,need follow up with repeat US  in 6 weeks,      Proteinuria  Has proteinuria with Anasarca.  Consulted oncology for abnormal UPEP  Appreciate Nephrology and Oncology input.        Scrotal swelling    Has scrotal swelling,on IV lasix,lifting scrotum.Has anasarca,all over body,increased lasix,    Peripheral artery disease  Stable; will continue his home regimen of aspirin and atorvastatin.    Acute on chronic combined systolic and diastolic heart failure  Bumex changed to daily - increased   Continue spironolactone  Monitor UOP        Urinary retention  Appreciate Urology input.  Stearns removed and patient urinating.    flomax daily   Replace stearns       Chronic respiratory failure with hypoxia  Stable; will continue his home supplemental oxygen therapy.    Centrilobular emphysema  Clinically-stable without wheezing.  Will provide as-needed JENAE/LAMA available.    Iron deficiency anemia  The patient's H/H is stable and consistent with previous laboratory measurements, and the patient exhibits no signs or symptoms of acute bleeding; there is no indication for transfusion.  Will continue to monitor.    Type 2 diabetes mellitus, controlled, with renal complications  As addressed above.    CKD Stage 3  His renal function appears to be at his baseline.    Essential hypertension  Continue home regimen of carvedilol, diltiazem, furosemide, and tamsulosin,  and provide as-needed clonidine.    Uncontrolled, await repeat BP and adjust meds accordingly     CAD (coronary artery disease)  Stable; will continue his home regimen of aspirin, atorvastatin, and carvedilol.    Chronic atrial fibrillation  Patient is in atrial fibrillation at this time which is baseline for him.  He was taken off of anticoagulation during his last admission due to anemia and was instructed not to take it until outpatient follow-up with gastroenterology.  Will continue to monitor.    VTE Risk Mitigation (From admission, onward)        Ordered     Reason for No Pharmacological VTE Prophylaxis  Once      03/23/19 1936     Place sequential compression device  Until discontinued      03/23/19 1934     Place DENIS hose  Until discontinued      03/23/19 1934              An Joshi MD  Department of Hospital Medicine   [facility]

## 2019-04-15 PROBLEM — Z71.89 ADVANCE CARE PLANNING: Status: ACTIVE | Noted: 2019-04-15

## 2019-04-15 PROBLEM — Z51.5 PALLIATIVE CARE ENCOUNTER: Status: ACTIVE | Noted: 2019-04-15

## 2019-04-15 LAB
ACANTHOCYTES BLD QL SMEAR: PRESENT
ALBUMIN SERPL BCP-MCNC: 1.9 G/DL (ref 3.5–5.2)
ALP SERPL-CCNC: 132 U/L (ref 55–135)
ALT SERPL W/O P-5'-P-CCNC: 12 U/L (ref 10–44)
ANION GAP SERPL CALC-SCNC: 4 MMOL/L (ref 8–16)
ANION GAP SERPL CALC-SCNC: 4 MMOL/L (ref 8–16)
ANISOCYTOSIS BLD QL SMEAR: ABNORMAL
AST SERPL-CCNC: 16 U/L (ref 10–40)
BASOPHILS # BLD AUTO: 0.01 K/UL (ref 0–0.2)
BASOPHILS NFR BLD: 0.1 % (ref 0–1.9)
BILIRUB SERPL-MCNC: 0.3 MG/DL (ref 0.1–1)
BUN SERPL-MCNC: 36 MG/DL (ref 8–23)
BUN SERPL-MCNC: 36 MG/DL (ref 8–23)
BURR CELLS BLD QL SMEAR: ABNORMAL
CALCIUM SERPL-MCNC: 8.9 MG/DL (ref 8.7–10.5)
CALCIUM SERPL-MCNC: 8.9 MG/DL (ref 8.7–10.5)
CHLORIDE SERPL-SCNC: 102 MMOL/L (ref 95–110)
CHLORIDE SERPL-SCNC: 102 MMOL/L (ref 95–110)
CO2 SERPL-SCNC: 34 MMOL/L (ref 23–29)
CO2 SERPL-SCNC: 34 MMOL/L (ref 23–29)
CREAT SERPL-MCNC: 1.6 MG/DL (ref 0.5–1.4)
CREAT SERPL-MCNC: 1.6 MG/DL (ref 0.5–1.4)
DIFFERENTIAL METHOD: ABNORMAL
EOSINOPHIL # BLD AUTO: 0.1 K/UL (ref 0–0.5)
EOSINOPHIL NFR BLD: 0.9 % (ref 0–8)
ERYTHROCYTE [DISTWIDTH] IN BLOOD BY AUTOMATED COUNT: 24.8 % (ref 11.5–14.5)
EST. GFR  (AFRICAN AMERICAN): 46 ML/MIN/1.73 M^2
EST. GFR  (AFRICAN AMERICAN): 46 ML/MIN/1.73 M^2
EST. GFR  (NON AFRICAN AMERICAN): 40 ML/MIN/1.73 M^2
EST. GFR  (NON AFRICAN AMERICAN): 40 ML/MIN/1.73 M^2
GIANT PLATELETS BLD QL SMEAR: PRESENT
GLUCOSE SERPL-MCNC: 205 MG/DL (ref 70–110)
GLUCOSE SERPL-MCNC: 205 MG/DL (ref 70–110)
HCT VFR BLD AUTO: 27.7 % (ref 40–54)
HGB BLD-MCNC: 7.9 G/DL (ref 14–18)
HYPOCHROMIA BLD QL SMEAR: ABNORMAL
LYMPHOCYTES # BLD AUTO: 0.9 K/UL (ref 1–4.8)
LYMPHOCYTES NFR BLD: 6.7 % (ref 18–48)
MCH RBC QN AUTO: 22.5 PG (ref 27–31)
MCHC RBC AUTO-ENTMCNC: 28.5 G/DL (ref 32–36)
MCV RBC AUTO: 79 FL (ref 82–98)
MONOCYTES # BLD AUTO: 1.3 K/UL (ref 0.3–1)
MONOCYTES NFR BLD: 9.8 % (ref 4–15)
NEUTROPHILS # BLD AUTO: 10.6 K/UL (ref 1.8–7.7)
NEUTROPHILS NFR BLD: 83 % (ref 38–73)
OVALOCYTES BLD QL SMEAR: ABNORMAL
PLATELET # BLD AUTO: 218 K/UL (ref 150–350)
PLATELET BLD QL SMEAR: ABNORMAL
PMV BLD AUTO: 11.1 FL (ref 9.2–12.9)
POCT GLUCOSE: 175 MG/DL (ref 70–110)
POCT GLUCOSE: 198 MG/DL (ref 70–110)
POCT GLUCOSE: 209 MG/DL (ref 70–110)
POCT GLUCOSE: 230 MG/DL (ref 70–110)
POIKILOCYTOSIS BLD QL SMEAR: ABNORMAL
POLYCHROMASIA BLD QL SMEAR: ABNORMAL
POTASSIUM SERPL-SCNC: 4.4 MMOL/L (ref 3.5–5.1)
POTASSIUM SERPL-SCNC: 4.4 MMOL/L (ref 3.5–5.1)
PROT SERPL-MCNC: 4.2 G/DL (ref 6–8.4)
RBC # BLD AUTO: 3.51 M/UL (ref 4.6–6.2)
SCHISTOCYTES BLD QL SMEAR: ABNORMAL
SODIUM SERPL-SCNC: 140 MMOL/L (ref 136–145)
SODIUM SERPL-SCNC: 140 MMOL/L (ref 136–145)
TARGETS BLD QL SMEAR: ABNORMAL
WBC # BLD AUTO: 12.89 K/UL (ref 3.9–12.7)

## 2019-04-15 PROCEDURE — 80053 COMPREHEN METABOLIC PANEL: CPT

## 2019-04-15 PROCEDURE — 25000003 PHARM REV CODE 250: Performed by: INTERNAL MEDICINE

## 2019-04-15 PROCEDURE — S0171 BUMETANIDE 0.5 MG: HCPCS | Performed by: INTERNAL MEDICINE

## 2019-04-15 PROCEDURE — A4216 STERILE WATER/SALINE, 10 ML: HCPCS | Performed by: HOSPITALIST

## 2019-04-15 PROCEDURE — 94761 N-INVAS EAR/PLS OXIMETRY MLT: CPT

## 2019-04-15 PROCEDURE — 97110 THERAPEUTIC EXERCISES: CPT

## 2019-04-15 PROCEDURE — 11000001 HC ACUTE MED/SURG PRIVATE ROOM

## 2019-04-15 PROCEDURE — 85025 COMPLETE CBC W/AUTO DIFF WBC: CPT

## 2019-04-15 PROCEDURE — 25000003 PHARM REV CODE 250: Performed by: HOSPITALIST

## 2019-04-15 PROCEDURE — 97530 THERAPEUTIC ACTIVITIES: CPT

## 2019-04-15 PROCEDURE — 36415 COLL VENOUS BLD VENIPUNCTURE: CPT

## 2019-04-15 PROCEDURE — 25000242 PHARM REV CODE 250 ALT 637 W/ HCPCS: Performed by: HOSPITALIST

## 2019-04-15 PROCEDURE — 27000221 HC OXYGEN, UP TO 24 HOURS

## 2019-04-15 PROCEDURE — 94640 AIRWAY INHALATION TREATMENT: CPT

## 2019-04-15 RX ORDER — METOLAZONE 2.5 MG/1
10 TABLET ORAL DAILY
Status: DISCONTINUED | OUTPATIENT
Start: 2019-04-16 | End: 2019-04-16 | Stop reason: HOSPADM

## 2019-04-15 RX ADMIN — Medication 10 ML: at 12:04

## 2019-04-15 RX ADMIN — IPRATROPIUM BROMIDE AND ALBUTEROL SULFATE 3 ML: .5; 3 SOLUTION RESPIRATORY (INHALATION) at 07:04

## 2019-04-15 RX ADMIN — ATORVASTATIN CALCIUM 20 MG: 10 TABLET, FILM COATED ORAL at 09:04

## 2019-04-15 RX ADMIN — INSULIN ASPART 2 UNITS: 100 INJECTION, SOLUTION INTRAVENOUS; SUBCUTANEOUS at 08:04

## 2019-04-15 RX ADMIN — POLYETHYLENE GLYCOL 3350 17 G: 17 POWDER, FOR SOLUTION ORAL at 09:04

## 2019-04-15 RX ADMIN — DILTIAZEM HYDROCHLORIDE 30 MG: 30 TABLET, FILM COATED ORAL at 09:04

## 2019-04-15 RX ADMIN — Medication 10 ML: at 11:04

## 2019-04-15 RX ADMIN — CINACALCET HYDROCHLORIDE 30 MG: 30 TABLET, COATED ORAL at 09:04

## 2019-04-15 RX ADMIN — ASPIRIN 81 MG: 81 TABLET, COATED ORAL at 08:04

## 2019-04-15 RX ADMIN — LEVOTHYROXINE SODIUM 25 MCG: 25 TABLET ORAL at 05:04

## 2019-04-15 RX ADMIN — SPIRONOLACTONE 25 MG: 25 TABLET ORAL at 08:04

## 2019-04-15 RX ADMIN — IPRATROPIUM BROMIDE AND ALBUTEROL SULFATE 3 ML: .5; 3 SOLUTION RESPIRATORY (INHALATION) at 08:04

## 2019-04-15 RX ADMIN — METOLAZONE 5 MG: 2.5 TABLET ORAL at 08:04

## 2019-04-15 RX ADMIN — DOCUSATE SODIUM 100 MG: 100 CAPSULE, LIQUID FILLED ORAL at 08:04

## 2019-04-15 RX ADMIN — Medication 10 ML: at 05:04

## 2019-04-15 RX ADMIN — BUMETANIDE 2 MG: 0.25 INJECTION INTRAMUSCULAR; INTRAVENOUS at 09:04

## 2019-04-15 RX ADMIN — IPRATROPIUM BROMIDE AND ALBUTEROL SULFATE 3 ML: .5; 3 SOLUTION RESPIRATORY (INHALATION) at 11:04

## 2019-04-15 RX ADMIN — DOCUSATE SODIUM 100 MG: 100 CAPSULE, LIQUID FILLED ORAL at 09:04

## 2019-04-15 RX ADMIN — INSULIN ASPART 2 UNITS: 100 INJECTION, SOLUTION INTRAVENOUS; SUBCUTANEOUS at 12:04

## 2019-04-15 RX ADMIN — LOSARTAN POTASSIUM 25 MG: 25 TABLET, FILM COATED ORAL at 08:04

## 2019-04-15 RX ADMIN — FERROUS GLUCONATE TAB 324 MG (37.5 MG ELEMENTAL IRON) 324 MG: 324 (37.5 FE) TAB at 08:04

## 2019-04-15 RX ADMIN — TAMSULOSIN HYDROCHLORIDE 0.4 MG: 0.4 CAPSULE ORAL at 08:04

## 2019-04-15 RX ADMIN — IPRATROPIUM BROMIDE AND ALBUTEROL SULFATE 3 ML: .5; 3 SOLUTION RESPIRATORY (INHALATION) at 03:04

## 2019-04-15 RX ADMIN — PANTOPRAZOLE SODIUM 40 MG: 40 TABLET, DELAYED RELEASE ORAL at 08:04

## 2019-04-15 RX ADMIN — CARVEDILOL 25 MG: 6.25 TABLET, FILM COATED ORAL at 09:04

## 2019-04-15 NOTE — SUBJECTIVE & OBJECTIVE
Past Medical History:   Diagnosis Date    Anemia 05/03/2018    pending blood transfusion    Anticoagulant long-term use     apixaban    Atrial fibrillation     CKD (chronic kidney disease)     Congestive heart failure     COPD (chronic obstructive pulmonary disease)     Coronary artery disease     Diabetes mellitus     Encounter for blood transfusion     HLD (hyperlipidemia)     Hypertension     MI (myocardial infarction)     On home oxygen therapy     Pacemaker     left chest    Peripheral vascular disease     Requires assistance with activities of daily living (ADL)     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     S/P femoral-popliteal bypass surgery left    Stented coronary artery     Tobacco use     Unsteady gait        Past Surgical History:   Procedure Laterality Date    CARDIAC CATHETERIZATION      CARDIAC PACEMAKER PLACEMENT      CARDIAC SURGERY      3 vessel CABG    CARDIOVERSION N/A 9/19/2013    [facility]    cardioverted      CORONARY ANGIOPLASTY WITH STENT PLACEMENT      EGD (ESOPHAGOGASTRODUODENOSCOPY) N/A 6/1/2018    [facility] ENDO    HEMORRHOID SURGERY      TRANSESOPHAGEAL ECHOCARDIOGRAM (ANIA) N/A 9/19/2013    Performed by [facility]    VASCULAR SURGERY      femoral artery popliteal bypass       Review of patient's allergies indicates:  No Known Allergies    Medications:  Continuous Infusions:  Scheduled Meds:   albuterol-ipratropium  3 mL Nebulization Q4H WAKE    aspirin  81 mg Oral Daily    atorvastatin  20 mg Oral QHS    bumetanide  2 mg Intravenous Q12H    carvedilol  25 mg Oral BID    cinacalcet  30 mg Oral Daily with breakfast    diltiaZEM  30 mg Oral Q12H    docusate sodium  100 mg Oral BID    ferrous gluconate  324 mg Oral Daily with breakfast    levothyroxine  25 mcg Oral Before breakfast    losartan  25 mg Oral Daily    metOLazone  5 mg Oral Daily    pantoprazole  40 mg Oral Daily    polyethylene  glycol  17 g Oral BID    sodium chloride 0.9%  10 mL Intravenous Q6H    spironolactone  25 mg Oral Daily    tamsulosin  0.4 mg Oral Daily     PRN Meds:acetaminophen, albuterol-ipratropium, cloNIDine, influenza, insulin aspart U-100, ondansetron, pneumoc 13-alan conj-dip cr(PF), promethazine (PHENERGAN) IVPB, ramelteon, senna-docusate 8.6-50 mg, Flushing PICC Protocol **AND** sodium chloride 0.9% **AND** sodium chloride 0.9%    Family History     Problem Relation (Age of Onset)    Diabetes Father, Mother        Tobacco Use    Smoking status: Former Smoker     Packs/day: 1.00     Years: 60.00     Pack years: 60.00     Types: Cigarettes     Last attempt to quit: 2018     Years since quittin.9    Smokeless tobacco: Never Used   Substance and Sexual Activity    Alcohol use: No    Drug use: No    Sexual activity: Not on file       Review of Systems   Constitutional: Positive for activity change.   Neurological: Positive for dizziness and weakness.     Objective:     Vital Signs (Most Recent):  Temp: 97.6 °F (36.4 °C) (04/15/19 111)  Pulse: 92 (04/15/19 111)  Resp: 18 (04/15/19 111)  BP: 122/66 (04/15/19 111)  SpO2: 98 % (04/15/19 1119) Vital Signs (24h Range):  Temp:  [97.6 °F (36.4 °C)-98.7 °F (37.1 °C)] 97.6 °F (36.4 °C)  Pulse:  [] 92  Resp:  [18-21] 18  SpO2:  [95 %-100 %] 98 %  BP: (109-127)/(66-82) 122/66     Weight: 98.3 kg (216 lb 11.4 oz)  Body mass index is 32 kg/m².    Review of Symptoms  Symptom Assessment (ESAS 0-10 scale)   ESAS 0 1 2 3 4 5 6 7 8 9 10   Pain x             Dyspnea x             Anxiety x             Nausea x             Depression  x             Anorexia x             Fatigue              Insomnia x             Restlessness  x             Agitation x             CAM / Delirium __ --  ___+     Physical Exam   Constitutional: He is oriented to person, place, and time. He appears well-developed and well-nourished.   Neck: Neck supple.   Cardiovascular:   irregular  "  Pulmonary/Chest:   labored   Abdominal: Soft. Bowel sounds are normal.   Musculoskeletal: He exhibits edema (extremities).   Neurological: He is alert and oriented to person, place, and time.   Skin: Skin is warm and dry.   Nursing note and vitals reviewed.      Significant Labs: All pertinent labs within the past 24 hours have been reviewed.  CBC:   Recent Labs   Lab 04/15/19  0420   WBC 12.89*   HGB 7.9*   HCT 27.7*   MCV 79*        BMP:  Recent Labs   Lab 04/15/19  0420   *  205*     140   K 4.4  4.4     102   CO2 34*  34*   BUN 36*  36*   CREATININE 1.6*  1.6*   CALCIUM 8.9  8.9     LFT:  Lab Results   Component Value Date    AST 16 04/15/2019    GGT 26 03/14/2019    ALKPHOS 132 04/15/2019    BILITOT 0.3 04/15/2019     Albumin:   Albumin   Date Value Ref Range Status   04/15/2019 1.9 (L) 3.5 - 5.2 g/dL Final     Protein:   Total Protein   Date Value Ref Range Status   04/15/2019 4.2 (L) 6.0 - 8.4 g/dL Final     Lactic acid:   Lab Results   Component Value Date    LACTATE 2.0 09/24/2016       Significant Imaging: I have reviewed all pertinent imaging results/findings within the past 24 hours.    Advance Care Planning   Advanced Directives::  Living Will: No  LaPOST: No  Do Not Resuscitate Status: No  Medical Power of : No    Decision-Making Capacity: Patient answered questions       Living Arrangements: Lives with spouse    ASSESSMENT/PLAN:    Palliative encounter:    Patient up in chair just finishing his nebulizer treatment. Oxygen on and tachypnea. Patient c/o dizziness and states he is dizzy often. He denies pain. We discussed his current condition and about how sick he has been this hospitalization. He does not remember being in the [facility] but states he knows he was only home for 3 days before he came back to hospital. I asked him how he feels he is doing and he states " not so good". We discussed going to SNF and the high possibility of him retuning to the hospital " "again with the same problems. CPR pros and cons discussed and patient states he does not want to ever be on life support and he would not want things done to him to try and restart his heart once he dies.   I let him know we need to discuss this with his family when they are here so they will know his wishes.   Discussed with Dr Joshi who will discuss with patient also.         Addendum: 3:00    Went to room with Dr Joshi to discuss with patient again his wishes. He was having some confusion and nurse reports he gets this way in the afternoon. Patient wanting us to call his wife because he was suppose to get a roast beef sandwich from her. Dr Joshi tried to call both numbers for patient's contact while in room with no answer to either. Pt did remember me and some of the conversation we had regarding life support but did not recall specifics. Dr Joshi asked him if we couldn't get him better and he had to go on a breathing machine to breath for him would this be something he would want and he thought for a few minutes and then said" no, I don't believe I would".   Spoke with nurse who reports spouse in room earlier but left and she has also tried to call regarding patient's request for roast beef sandwhich.  Nurse to notify if family arrives again.    -continue current treatment  -patient want to be a DNR (niotifed Dr Joshi) will discuss with family too  -Palliative to continue to follow  "

## 2019-04-15 NOTE — PT/OT/SLP PROGRESS
Physical Therapy Treatment    Patient Name:  Agus Ramos Jr.   MRN:  [id number]    Recommendations:     Discharge Recommendations:  nursing facility, skilled   Discharge Equipment Recommendations: walker, rolling   Barriers to discharge: Decreased caregiver support and pt with decreased mobility     Assessment:     Agus Ramos Jr. is a 78 y.o. male admitted with a medical diagnosis of Acute metabolic encephalopathy.  He presents with the following impairments/functional limitations:  weakness, impaired endurance, impaired self care skills, decreased lower extremity function, decreased upper extremity function, gait instability, decreased safety awareness, impaired functional mobilty, decreased ROM, edema, impaired cardiopulmonary response to activity, impaired cognition, decreased coordination, pain, impaired balance . Pt is fatigue easily , required extra time and frequent rest breaks throughout therapy treatment . Patient with decreased safety awareness , required frequent V/T cues to redirect  for safety. Pt will benefit from further skilled therapy in order to get back to [facility].      Rehab Prognosis: Fair; patient would benefit from acute skilled PT services to address these deficits and reach maximum level of function.    Recent Surgery: * No surgery found *      Plan:     During this hospitalization, patient to be seen 3 x/week to address the identified rehab impairments via therapeutic activities, therapeutic exercises, gait training and progress toward the following goals:    · Plan of Care Expires:  04/15/19    Subjective     Chief Complaint: weakness   Patient/Family Comments/goals: pt agreeable to treatment   Pain/Comfort:  · Pain Rating 1: 0/10  · Pain Rating Post-Intervention 1: 0/10      Objective:     Communicated with nurse Quiroz  prior to session.  Patient found up in chair with bed alarm, telemetry, stearns catheter, oxygen upon PT entry to room.     General Precautions: Standard, fall,  respiratory   Orthopedic Precautions:N/A   Braces: N/A     Functional Mobility:  · Bed Mobility:     · Rolling Right: stand by assistance  · Scooting: contact guard assistance  · Sit to Supine: moderate assistance and maximal assistance with BLE   · Transfers:     · Sit to Stand:  moderate assistance with rolling walker. V/t cues for safety and sequence.   · Chair to bed : minimum assistance with  rolling walker  using  Step Transfer. V/T cues for safety technique and walker management.   · Gait:   Patient ambulated ~ 4 ft  on level tile with Rolling Walker with Minimal Assistance. Pt declined further ambulation 2* fatigue.   Pt with demonstarting a  3-point gait with decreased arnold, increased time in double stance, decreased velocity of limb motion, decreased step length, decreased swing-to-stance ratio, decreased toe-to-floor clearance and decreased weight-shifting ability.Impairments contributing to gait deviations include impaired balance, decreased flexibility, impaired postural control, decreased ROM and decreased strength. V/T cues for safety technique and walker management .        AM-PAC 6 CLICK MOBILITY  Turning over in bed (including adjusting bedclothes, sheets and blankets)?: 3  Sitting down on and standing up from a chair with arms (e.g., wheelchair, bedside commode, etc.): 2  Moving from lying on back to sitting on the side of the bed?: 3  Moving to and from a bed to a chair (including a wheelchair)?: 3  Need to walk in hospital room?: 3  Climbing 3-5 steps with a railing?: 2  Basic Mobility Total Score: 16       Therapeutic Activities and Exercises:   pt performed bed mobility, transfer and gait training.   Pt performed seated BLE x 15 reps : AP, LAQ, hip flexion, pillow squeezes and BUE x 20 reps x 2 trials B finger/wrist flexion /extension. VC's for proper and sequence.     Encourage pt to perform BLE AP and finger/wrist flex/ext while in bed or chair throughout the day. Pt verbalize  understanding.   Educated on safety awareness with all OOB mobility, transfer and gait training, pt needs reinforcement.      Patient left right sidelying BUE & BLE elevated on pillow , pillow between knees , B heels offload , SCD placed BLE with all lines intact, call button in reach, bed alarm on, nurse notified and [employer] present..    GOALS:   Multidisciplinary Problems     Physical Therapy Goals        Problem: Physical Therapy Goal    Goal Priority Disciplines Outcome Goal Variances Interventions   Physical Therapy Goal     PT, PT/OT Ongoing (interventions implemented as appropriate)     Description:  Goals to be met by: 19    Patient will increase functional independence with mobility by performin. Sit to stand transfer with Stand-by Assistance  2. Gait x150 feet with stand-by Assistance using Rolling Walker  3. Lower extremity exercise program x30 reps per handout, with supervision                      Time Tracking:     PT Received On: 04/15/19  PT Start Time: 1603     PT Stop Time: 1642  PT Total Time (min): 39 min     Billable Minutes: Therapeutic Activity 24 and Therapeutic Exercise 15    Treatment Type: Treatment  PT/PTA: PTA     PTA Visit Number: 3     Telma Santana PTA  04/15/2019

## 2019-04-15 NOTE — SUBJECTIVE & OBJECTIVE
Interval History: pt sitting up in chair, focused oon a roast beef sandwich and need constant redirection in conversation, forgetful - d/w spouse these concerns     Review of Systems   HENT: Negative for ear discharge and ear pain.    Eyes: Negative for pain and itching.   Endocrine: Negative for polyphagia and polyuria.   Neurological: Negative for seizures and syncope.     Objective:     Vital Signs (Most Recent):  Temp: 98.1 °F (36.7 °C) (04/15/19 1641)  Pulse: 69 (04/15/19 1641)  Resp: 18 (04/15/19 1641)  BP: 119/64 (04/15/19 1641)  SpO2: 98 % (04/15/19 1641) Vital Signs (24h Range):  Temp:  [97.6 °F (36.4 °C)-98.7 °F (37.1 °C)] 98.1 °F (36.7 °C)  Pulse:  [] 69  Resp:  [18-21] 18  SpO2:  [94 %-100 %] 98 %  BP: (109-127)/(64-82) 119/64     Weight: 98.3 kg (216 lb 11.4 oz)  Body mass index is 32 kg/m².    Intake/Output Summary (Last 24 hours) at 4/15/2019 1802  Last data filed at 4/15/2019 1728  Gross per 24 hour   Intake 240 ml   Output 1800 ml   Net -1560 ml      Physical Exam   Constitutional: He is oriented to person, place, and time. He appears well-developed and well-nourished. No distress.   HENT:   Head: Normocephalic and atraumatic.   Right Ear: External ear normal.   Left Ear: External ear normal.   Nose: Nose normal.   Eyes: Right eye exhibits no discharge. Left eye exhibits no discharge.   Neck: Normal range of motion.   Cardiovascular:   Irregularly irregular, no murmurs or gallops   Pulmonary/Chest: Effort normal. No respiratory distress.   Abdominal: Soft. Bowel sounds are normal. There is no tenderness. There is no rebound and no guarding.   Musculoskeletal: Normal range of motion. He exhibits edema (Much improved).   LUE edema   Neurological: He is alert and oriented to person, place, and time.   Skin: Skin is warm and dry. He is not diaphoretic. No erythema.   Psychiatric: He has a normal mood and affect. His behavior is normal. Judgment and thought content normal.   Nursing note and vitals  reviewed.      Significant Labs:   BMP:   Recent Labs   Lab 04/15/19  0420   *  205*     140   K 4.4  4.4     102   CO2 34*  34*   BUN 36*  36*   CREATININE 1.6*  1.6*   CALCIUM 8.9  8.9     CBC:   Recent Labs   Lab 04/15/19  0420   WBC 12.89*   HGB 7.9*   HCT 27.7*          Significant Imaging: I have reviewed and interpreted all pertinent imaging results/findings within the past 24 hours.

## 2019-04-15 NOTE — PROGRESS NOTES
Date of Admission:3/23/2019    SUBJECTIVE: notes breathing ok sitting up in chair  Current Facility-Administered Medications   Medication    acetaminophen tablet 500 mg    albuterol-ipratropium 2.5 mg-0.5 mg/3 mL nebulizer solution 3 mL    albuterol-ipratropium 2.5 mg-0.5 mg/3 mL nebulizer solution 3 mL    aspirin EC tablet 81 mg    atorvastatin tablet 20 mg    bumetanide injection 2 mg    carvedilol tablet 25 mg    cinacalcet tablet 30 mg    cloNIDine tablet 0.1 mg    diltiaZEM tablet 30 mg    docusate sodium capsule 100 mg    ferrous gluconate tablet 324 mg    influenza (FLUZONE HIGH-DOSE) vaccine 0.5 mL    insulin aspart U-100 pen 0-5 Units    levothyroxine tablet 25 mcg    losartan tablet 25 mg    [START ON 4/16/2019] metOLazone tablet 10 mg    ondansetron injection 8 mg    pantoprazole EC tablet 40 mg    pneumoc 13-alan conj-dip cr(PF) (PREVNAR 13 (PF)) 0.5 mL    polyethylene glycol packet 17 g    promethazine (PHENERGAN) 6.25 mg in dextrose 5 % 50 mL IVPB    ramelteon tablet 8 mg    senna-docusate 8.6-50 mg per tablet 1 tablet    sodium chloride 0.9% flush 10 mL    And    sodium chloride 0.9% flush 10 mL    spironolactone tablet 25 mg    tamsulosin 24 hr capsule 0.4 mg       Wt Readings from Last 3 Encounters:   04/12/19 98.3 kg (216 lb 11.4 oz)   03/17/19 92.8 kg (204 lb 9.4 oz)   01/05/19 77.6 kg (171 lb 1.2 oz)     Temp Readings from Last 3 Encounters:   04/15/19 97.6 °F (36.4 °C) (Oral)   03/19/19 97.7 °F (36.5 °C)   01/08/19 97.6 °F (36.4 °C) (Oral)     BP Readings from Last 3 Encounters:   04/15/19 122/66   03/19/19 133/68   01/08/19 129/72     Pulse Readings from Last 3 Encounters:   04/15/19 92   03/19/19 78   01/08/19 80       Intake/Output Summary (Last 24 hours) at 4/15/2019 6353  Last data filed at 4/15/2019 0834  Gross per 24 hour   Intake 1191 ml   Output 2000 ml   Net -809 ml       PE:  GEN:wd male in nad  HEENT:ncat,eomi,mm  CVS:chiki 2/6  PULM: +rales  some  ABD:+bs,soft,nd  EXT:2+edema legs  NEURO:pleasant, awake    Recent Labs   Lab 04/15/19  0420   *  205*     140   K 4.4  4.4     102   CO2 34*  34*   BUN 36*  36*   CREATININE 1.6*  1.6*   CALCIUM 8.9  8.9       Lab Results   Component Value Date    .6 (H) 04/08/2019    CALCIUM 8.9 04/15/2019    CALCIUM 8.9 04/15/2019    CAION 1.42 05/30/2018    PHOS 2.5 (L) 04/09/2019       Recent Labs   Lab 04/15/19  0420   WBC 12.89*   RBC 3.51*   HGB 7.9*   HCT 27.7*      MCV 79*   MCH 22.5*   MCHC 28.5*         A/P:  1ckd3. Creatinine ok. Following.  2.acute on combined hf.  Cont diuretics.  3.hypercalemia. Cannot afford sensipar outpt. Gave aredia. Cont sensipar for now. Cannot give ivfs with edema. Doubt mm.  4.urinary retention. Cont stearns.  5.anemia.cont po iron.

## 2019-04-15 NOTE — HPI
HPI:  Mr. Agus Ramos Jr. is a 78 y.o. male known to me with essential hypertension, type 2 diabetes mellitus (HbA1c 5.0% Mar 2019), CAD s/p CABG, chronic combined systolic and diastolic heart failure (LVEF 50% Mar 2019), CKD Stage 3, peripheral artery disease, chronic atrial fibrillation ([id number] score 5) on chronic anticoagulation, COPD, chronic respiratory failure with hypoxia, and anemia of chronic disease who presents to [facility] ED with complaints of a fall this morning.  He had much difficulty standing back up.  EMS was activated and they found that his capillary glucose was 14 mg/dL after which he was given an ampule of 50% dextrose.  His mentation returned to baseline thereafter.  He denies any tremors nor diaphoresis.  He says that he's been feeling weak and dizzy today but denies any loss of consciousness, nor antecedent chest pain, shortness of breath, palpitations, fevers, chills, nausea, vomiting, abdominal pain, or any diarrhea.  He did not trip or slip or anything.  He has had a good appetite but cannot say for sure if he has been taking too much of his diabetes medications.  He denies any recent changes to his medications.  He otherwise has been in his usual state of health.     Hospital Course:  77 y/o male presented with weakness.  Hx of DM on Glipizide and Metformin.  Patient noted to be hypoglycemic.  Initially responded to D50, but then became persistently hypoglycemic.  Admitted to ICU on D10 drip and hourly glucose monitoring.  Also started on Octreotide drip.  No further episodes of hypoglycemia and D10 switched to D5.  Octreotide drip stopped.  Now getting more hyperglycemic.  Stopping D5 drip and monitor off dextrose.  PT/OT evaluation.  Recommended SNF,consulted SW.  Last HbA1C 2 weeks ago 5.0,likely no need any more for hypoglycemic agents,diet should  be fine.  Has scrotal swelling,on IV lasix,lifting scrotum.  Has anasarca,all over body,increased lasix ,have ,proteinuria,check  protein/Cr. Ratio,elevated,consulted nephrology.still has massive anasarca.  Urology following for scrotal swelling,improved.  Has left renal cyst,on US,need follow up with repeat US  in 6 months.  24 urine collection is in Process to R/O nephrotic syndrome.  Consulted oncology for abnormal UPEP.  Boucher removed.  Patient voiding without issues.  Diuresis stopped by Nephrology.  Shortness of breath overnight on 4/8.  Started on oral Bumex.

## 2019-04-15 NOTE — PLAN OF CARE
Airam (admissions nurse) with [facility] contacted [facility] to report patient accepted for SNF placement. [facility]IS contacted patient's spouse Jessika to report [facility] accepted patient for admission. EDIS contacted [facility] @ [phone] to complete LOCET, EDIS spoke to [facility] Lynn faxed PASRR to [facility] @ [phone].         04/15/19 3748   Discharge Reassessment   Assessment Type Discharge Planning Reassessment   Provided patient/caregiver education on the expected discharge date and the discharge plan No   Do you have any problems affording any of your prescribed medications? No   Discharge Plan A Skilled Nursing Facility   Discharge Plan B Home with family;Home Health   DME Needed Upon Discharge    (TBD)   Patient choice form signed by patient/caregiver N/A   Anticipated Discharge Disposition SNF   Can the patient answer the patient profile reliably? No, cognitively impaired   How does the patient rate their overall health at the present time? Fair   Describe the patient's ability to walk at the present time. Major restrictions/daily assistance from another person   How often would a person be available to care for the patient? Whenever needed   Post-Acute Status   Post-Acute Authorization Placement  (SNF)   Post-Acute Placement Status Pending Payor Medical Review

## 2019-04-15 NOTE — UM SECONDARY REVIEW
[facility] Medical Affairs    Placement Issue  Working on SNF Placement  Approved Inpatient   Approved Via  Committee LLOS/ Approved Criteria

## 2019-04-15 NOTE — PROGRESS NOTES
[facility] Hospital Medicine  Progress Note    Patient Name: Agus Ramos Jr.  MRN: [id number]  Patient Class: IP- Inpatient   Admission Date: 3/23/2019  Length of Stay: 23 days  Attending Physician: An Joshi MD  Primary Care Provider: Keaton Hardy MD        Subjective:     Principal Problem:Acute metabolic encephalopathy    HPI:  Mr. Agus Ramos Jr. is a 78 y.o. male known to me with essential hypertension, type 2 diabetes mellitus (HbA1c 5.0% Mar 2019), CAD s/p CABG, chronic combined systolic and diastolic heart failure (LVEF 50% Mar 2019), CKD Stage 3, peripheral artery disease, chronic atrial fibrillation ([id number]-EVNa score 5) on chronic anticoagulation, COPD, chronic respiratory failure with hypoxia, and anemia of chronic disease who presents to [facility] ED with complaints of a fall this morning.  He had much difficulty standing back up.  EMS was activated and they found that his capillary glucose was 14 mg/dL after which he was given an ampule of 50% dextrose.  His mentation returned to baseline thereafter.  He denies any tremors nor diaphoresis.  He says that he's been feeling weak and dizzy today but denies any loss of consciousness, nor antecedent chest pain, shortness of breath, palpitations, fevers, chills, nausea, vomiting, abdominal pain, or any diarrhea.  He did not trip or slip or anything.  He has had a good appetite but cannot say for sure if he has been taking too much of his diabetes medications.  He denies any recent changes to his medications.  He otherwise has been in his usual state of health.    Hospital Course:  79 y/o male presented with weakness.  Hx of DM on Glipizide and Metformin.  Patient noted to be hypoglycemic.  Initially responded to D50, but then became persistently hypoglycemic.  Admitted to ICU on D10 drip and hourly glucose monitoring.  Also started on Octreotide drip.  No further episodes of hypoglycemia and D10 switched to D5.  Octreotide  drip stopped.  Now getting more hyperglycemic.  Stopping D5 drip and monitor off dextrose.  PT/OT evaluation.  Recommended SNF,consulted SW.  Last HbA1C 2 weeks ago 5.0,likely no need any more for hypoglycemic agents,diet should  be fine.  Has scrotal swelling,on IV lasix,lifting scrotum.  Has anasarca,all over body,increased lasix ,have ,proteinuria,check protein/Cr. Ratio,elevated,consulted nephrology.still has massive anasarca.  Urology following for scrotal swelling,improved.  Has left renal cyst,on US,need follow up with repeat US  in 6 months.  24 urine collection is in Process to R/O nephrotic syndrome.  Consulted oncology for abnormal UPEP.  Boucher removed.  Shortness of breath overnight on 4/8.  Started on oral Bumex in addition to metolazone    Boucher had to be replaced and unlikely to be removed.    Bumex IV BID  Pt non-compliant with recommended diet, family brings in outside food  Palliative care consulted and attempt was made to establish goals of care. Spoke with wife, Jessika on phone and she wants to talk with kids and decide on code status and let us know tomorrow.   SNF placement pending        Interval History: pt sitting up in chair, focused oon a roast beef sandwich and need constant redirection in conversation, forgetful - d/w spouse these concerns     Review of Systems   HENT: Negative for ear discharge and ear pain.    Eyes: Negative for pain and itching.   Endocrine: Negative for polyphagia and polyuria.   Neurological: Negative for seizures and syncope.     Objective:     Vital Signs (Most Recent):  Temp: 98.1 °F (36.7 °C) (04/15/19 1641)  Pulse: 69 (04/15/19 1641)  Resp: 18 (04/15/19 1641)  BP: 119/64 (04/15/19 1641)  SpO2: 98 % (04/15/19 1641) Vital Signs (24h Range):  Temp:  [97.6 °F (36.4 °C)-98.7 °F (37.1 °C)] 98.1 °F (36.7 °C)  Pulse:  [] 69  Resp:  [18-21] 18  SpO2:  [94 %-100 %] 98 %  BP: (109-127)/(64-82) 119/64     Weight: 98.3 kg (216 lb 11.4 oz)  Body mass index is 32  kg/m².    Intake/Output Summary (Last 24 hours) at 4/15/2019 1802  Last data filed at 4/15/2019 1728  Gross per 24 hour   Intake 240 ml   Output 1800 ml   Net -1560 ml      Physical Exam   Constitutional: He is oriented to person, place, and time. He appears well-developed and well-nourished. No distress.   HENT:   Head: Normocephalic and atraumatic.   Right Ear: External ear normal.   Left Ear: External ear normal.   Nose: Nose normal.   Eyes: Right eye exhibits no discharge. Left eye exhibits no discharge.   Neck: Normal range of motion.   Cardiovascular:   Irregularly irregular, no murmurs or gallops   Pulmonary/Chest: Effort normal. No respiratory distress.   Abdominal: Soft. Bowel sounds are normal. There is no tenderness. There is no rebound and no guarding.   Musculoskeletal: Normal range of motion. He exhibits edema (Much improved).   LUE edema   Neurological: He is alert and oriented to person, place, and time.   Skin: Skin is warm and dry. He is not diaphoretic. No erythema.   Psychiatric: He has a normal mood and affect. His behavior is normal. Judgment and thought content normal.   Nursing note and vitals reviewed.      Significant Labs:   BMP:   Recent Labs   Lab 04/15/19  0420   *  205*     140   K 4.4  4.4     102   CO2 34*  34*   BUN 36*  36*   CREATININE 1.6*  1.6*   CALCIUM 8.9  8.9     CBC:   Recent Labs   Lab 04/15/19  0420   WBC 12.89*   HGB 7.9*   HCT 27.7*          Significant Imaging: I have reviewed and interpreted all pertinent imaging results/findings within the past 24 hours.    Assessment/Plan:      * Acute metabolic encephalopathy  Patient was noted to have a capillary glucose of 14 mg/dL in the field for which he was given an ampule of 50% dextrose with improvement of his mentation.  His initial serum glucose here was 30 mg/dL but later was still 44 mg/dL after treatment.  He improved into the 180's but continued to drop to 81 mg/dL then to 36 mg/dL.     Admitted to ICU on D10 drip and hourly glucose checks.  Also started on Octreotide drip.  Glucose increasing with no further episodes of hypoglycemia.  Will change D10 to D5 and monitor glucose every 4 hours.  The etiology of his hypoglycemia is unclear but he is on a sulfonylurea at home.  He does have 1+ leukocytes and moderate bacteria on urine, but many amorphous cells.  Will repeat UA with UCx.  Afebrile.  Will hold off on ABx's for now.  Getting more hyperglycemic.  Stop D5 and monitor off dextrose.    PT/OT oswaldo.  Last HbA1C 2 weeks ago 5.0,likely no need any more for hypoglycemic agents,diet should  be fine.  SNF recommended.  Patient has refused SNF.  Stearns removed and patient voiding.  Lasix stopped.   SOB overnight.  Started on oral Bumex - poor UOP    Changed to IV bumex  Proceed with SNF      Advance care planning  Met with patient and palliative care NP in room  Pt cannot give consistent answer to questions  D/w spouse over phone my concerns and she wants to meet with her kids and plans to come tomorrow to discuss code status and goals of care further  >20 minutes       Anemia due to chronic kidney disease    Stable  Monitor     Renal cyst, left  Has left renal cyst,on US,need follow up with repeat US  in 6 weeks,      Proteinuria  Has proteinuria with Anasarca.  Consulted oncology for abnormal UPEP  Appreciate Nephrology and Oncology input.        Scrotal swelling    Has scrotal swelling,on IV lasix,lifting scrotum.Has anasarca,all over body,increased lasix,    Peripheral artery disease  Stable; will continue his home regimen of aspirin and atorvastatin.    Acute on chronic combined systolic and diastolic heart failure  Bumex changed to daily - increased   Continue spironolactone  Monitor UOP        Urinary retention  Appreciate Urology input.  Stearns removed and patient urinating, then retention     flomax daily   Replace stearns       Chronic respiratory failure with hypoxia  Stable; will continue  his home supplemental oxygen therapy.    Centrilobular emphysema  Clinically-stable without wheezing.  Will provide as-needed JENAE/LAMA available.    Iron deficiency anemia  The patient's H/H is stable and consistent with previous laboratory measurements, and the patient exhibits no signs or symptoms of acute bleeding; there is no indication for transfusion.  Will continue to monitor.    Type 2 diabetes mellitus, controlled, with renal complications  As addressed above.    CKD Stage 3  His renal function appears to be at his baseline.    Essential hypertension  Continue home regimen of carvedilol, diltiazem, furosemide, and tamsulosin, and provide as-needed clonidine.    Uncontrolled, await repeat BP and adjust meds accordingly     CAD (coronary artery disease)  Stable; will continue his home regimen of aspirin, atorvastatin, and carvedilol.    Chronic atrial fibrillation  Patient is in atrial fibrillation at this time which is baseline for him.  He was taken off of anticoagulation during his last admission due to anemia and was instructed not to take it until outpatient follow-up with gastroenterology.  Will continue to monitor.      VTE Risk Mitigation (From admission, onward)        Ordered     Reason for No Pharmacological VTE Prophylaxis  Once      03/23/19 1936     Place sequential compression device  Until discontinued      03/23/19 1934     Place DENIS hose  Until discontinued      03/23/19 1934              An Joshi MD  Department of Hospital Medicine   [facility]

## 2019-04-15 NOTE — ASSESSMENT & PLAN NOTE
Met with patient and palliative care NP in room  Pt cannot give consistent answer to questions  D/w spouse over phone my concerns and she wants to meet with her kids and plans to come tomorrow to discuss code status and goals of care further  >20 minutes

## 2019-04-15 NOTE — ASSESSMENT & PLAN NOTE
Appreciate Urology input.  Stearns removed and patient urinating, then retention     flomax daily   Replace stearns

## 2019-04-15 NOTE — PLAN OF CARE
Problem: Adult Inpatient Plan of Care  Goal: Plan of Care Review  Outcome: Ongoing (interventions implemented as appropriate)  Pt has rested well all night. Pt did not wear his bipap. Pt still does not have a lot of volume to his lungs. Pt has denied pain this shift, picc line dressing change complete.

## 2019-04-15 NOTE — NURSING
Pt has rested well all night. Pt did not wear his bipap. Pt still does not have a lot of volume to his lungs. Pt has denied pain this shift, picc line dressing change complete.

## 2019-04-16 LAB
ANION GAP SERPL CALC-SCNC: 5 MMOL/L (ref 8–16)
BUN SERPL-MCNC: 33 MG/DL (ref 8–23)
CALCIUM SERPL-MCNC: 8.8 MG/DL (ref 8.7–10.5)
CHLORIDE SERPL-SCNC: 99 MMOL/L (ref 95–110)
CO2 SERPL-SCNC: 36 MMOL/L (ref 23–29)
CREAT SERPL-MCNC: 1.5 MG/DL (ref 0.5–1.4)
EST. GFR  (AFRICAN AMERICAN): 50 ML/MIN/1.73 M^2
EST. GFR  (NON AFRICAN AMERICAN): 43 ML/MIN/1.73 M^2
GLUCOSE SERPL-MCNC: 142 MG/DL (ref 70–110)
POCT GLUCOSE: 176 MG/DL (ref 70–110)
POCT GLUCOSE: 187 MG/DL (ref 70–110)
POCT GLUCOSE: 233 MG/DL (ref 70–110)
POTASSIUM SERPL-SCNC: 4.1 MMOL/L (ref 3.5–5.1)
SODIUM SERPL-SCNC: 140 MMOL/L (ref 136–145)

## 2019-04-16 PROCEDURE — 80048 BASIC METABOLIC PNL TOTAL CA: CPT

## 2019-04-16 PROCEDURE — 25000003 PHARM REV CODE 250: Performed by: HOSPITALIST

## 2019-04-16 PROCEDURE — 94761 N-INVAS EAR/PLS OXIMETRY MLT: CPT

## 2019-04-16 PROCEDURE — 99223 1ST HOSP IP/OBS HIGH 75: CPT | Mod: ,,, | Performed by: NURSE PRACTITIONER

## 2019-04-16 PROCEDURE — 25000003 PHARM REV CODE 250: Performed by: INTERNAL MEDICINE

## 2019-04-16 PROCEDURE — 94660 CPAP INITIATION&MGMT: CPT

## 2019-04-16 PROCEDURE — 63600175 PHARM REV CODE 636 W HCPCS: Performed by: INTERNAL MEDICINE

## 2019-04-16 PROCEDURE — 94640 AIRWAY INHALATION TREATMENT: CPT

## 2019-04-16 PROCEDURE — 99900035 HC TECH TIME PER 15 MIN (STAT)

## 2019-04-16 PROCEDURE — 97535 SELF CARE MNGMENT TRAINING: CPT

## 2019-04-16 PROCEDURE — 97110 THERAPEUTIC EXERCISES: CPT

## 2019-04-16 PROCEDURE — 99223 PR INITIAL HOSPITAL CARE,LEVL III: ICD-10-PCS | Mod: ,,, | Performed by: NURSE PRACTITIONER

## 2019-04-16 PROCEDURE — S0171 BUMETANIDE 0.5 MG: HCPCS | Performed by: INTERNAL MEDICINE

## 2019-04-16 PROCEDURE — 25000242 PHARM REV CODE 250 ALT 637 W/ HCPCS: Performed by: HOSPITALIST

## 2019-04-16 PROCEDURE — A4216 STERILE WATER/SALINE, 10 ML: HCPCS | Performed by: HOSPITALIST

## 2019-04-16 PROCEDURE — 27000221 HC OXYGEN, UP TO 24 HOURS

## 2019-04-16 RX ORDER — BUMETANIDE 2 MG/1
2 TABLET ORAL DAILY
Qty: 90 TABLET | Refills: 3 | Status: SHIPPED | OUTPATIENT
Start: 2019-04-16 | End: 2020-04-15

## 2019-04-16 RX ORDER — LEVOTHYROXINE SODIUM 25 UG/1
25 TABLET ORAL
Qty: 30 TABLET | Refills: 11 | Status: SHIPPED | OUTPATIENT
Start: 2019-04-17 | End: 2021-06-18

## 2019-04-16 RX ORDER — CINACALCET 30 MG/1
30 TABLET, FILM COATED ORAL
Qty: 30 TABLET | Refills: 11 | Status: SHIPPED | OUTPATIENT
Start: 2019-04-17 | End: 2020-04-16

## 2019-04-16 RX ORDER — SPIRONOLACTONE 25 MG/1
25 TABLET ORAL DAILY
Qty: 30 TABLET | Refills: 11 | Status: SHIPPED | OUTPATIENT
Start: 2019-04-17 | End: 2021-06-13

## 2019-04-16 RX ORDER — BUMETANIDE 1 MG/1
2 TABLET ORAL DAILY
Status: DISCONTINUED | OUTPATIENT
Start: 2019-04-16 | End: 2019-04-16 | Stop reason: HOSPADM

## 2019-04-16 RX ORDER — METOLAZONE 10 MG/1
10 TABLET ORAL DAILY
Qty: 30 TABLET | Refills: 11 | Status: SHIPPED | OUTPATIENT
Start: 2019-04-17 | End: 2020-04-16

## 2019-04-16 RX ADMIN — CARVEDILOL 25 MG: 6.25 TABLET, FILM COATED ORAL at 09:04

## 2019-04-16 RX ADMIN — PANTOPRAZOLE SODIUM 40 MG: 40 TABLET, DELAYED RELEASE ORAL at 09:04

## 2019-04-16 RX ADMIN — LOSARTAN POTASSIUM 25 MG: 25 TABLET, FILM COATED ORAL at 09:04

## 2019-04-16 RX ADMIN — BUMETANIDE 2 MG: 1 TABLET ORAL at 01:04

## 2019-04-16 RX ADMIN — METOLAZONE 10 MG: 2.5 TABLET ORAL at 09:04

## 2019-04-16 RX ADMIN — CINACALCET HYDROCHLORIDE 30 MG: 30 TABLET, COATED ORAL at 08:04

## 2019-04-16 RX ADMIN — ASPIRIN 81 MG: 81 TABLET, COATED ORAL at 09:04

## 2019-04-16 RX ADMIN — BUMETANIDE 2 MG: 0.25 INJECTION INTRAMUSCULAR; INTRAVENOUS at 09:04

## 2019-04-16 RX ADMIN — INSULIN ASPART 2 UNITS: 100 INJECTION, SOLUTION INTRAVENOUS; SUBCUTANEOUS at 12:04

## 2019-04-16 RX ADMIN — DOCUSATE SODIUM 100 MG: 100 CAPSULE, LIQUID FILLED ORAL at 09:04

## 2019-04-16 RX ADMIN — Medication 10 ML: at 12:04

## 2019-04-16 RX ADMIN — IPRATROPIUM BROMIDE AND ALBUTEROL SULFATE 3 ML: .5; 3 SOLUTION RESPIRATORY (INHALATION) at 11:04

## 2019-04-16 RX ADMIN — Medication 10 ML: at 05:04

## 2019-04-16 RX ADMIN — TAMSULOSIN HYDROCHLORIDE 0.4 MG: 0.4 CAPSULE ORAL at 09:04

## 2019-04-16 RX ADMIN — SPIRONOLACTONE 25 MG: 25 TABLET ORAL at 09:04

## 2019-04-16 RX ADMIN — FERROUS GLUCONATE TAB 324 MG (37.5 MG ELEMENTAL IRON) 324 MG: 324 (37.5 FE) TAB at 08:04

## 2019-04-16 RX ADMIN — LEVOTHYROXINE SODIUM 25 MCG: 25 TABLET ORAL at 05:04

## 2019-04-16 RX ADMIN — IPRATROPIUM BROMIDE AND ALBUTEROL SULFATE 3 ML: .5; 3 SOLUTION RESPIRATORY (INHALATION) at 04:04

## 2019-04-16 RX ADMIN — IPRATROPIUM BROMIDE AND ALBUTEROL SULFATE 3 ML: .5; 3 SOLUTION RESPIRATORY (INHALATION) at 07:04

## 2019-04-16 RX ADMIN — POLYETHYLENE GLYCOL 3350 17 G: 17 POWDER, FOR SOLUTION ORAL at 09:04

## 2019-04-16 RX ADMIN — DILTIAZEM HYDROCHLORIDE 30 MG: 30 TABLET, FILM COATED ORAL at 09:04

## 2019-04-16 NOTE — PLAN OF CARE
Problem: Occupational Therapy Goal  Goal: Occupational Therapy Goal  Goals to be met by: 4/29/19     Patient will increase functional independence with ADLs by performing:    UE Dressing with Set-up Assistance.  LE Dressing with Minimal Assistance.  Grooming while seated with Supervision.  Toileting from bedside commode with Moderate Assistance for hygiene and clothing management.   Bathing from  edge of bed with Moderate Assistance.  Sitting at edge of bed x20 minutes with Stand-by Assistance.  Rolling to Bilateral with Set-up Assistance.   Supine to sit with Supervision.  Stand pivot transfers with Minimal Assistance. Met 4/5/19  Toilet transfer to bedside commode with Minimal Assistance.  Upper extremity exercise program x10 reps per handout, with assistance as needed.      Outcome: Ongoing (interventions implemented as appropriate)  The patient is progressing in OT. The patient will benefit from continued OT to address functional deficits.

## 2019-04-16 NOTE — PLAN OF CARE
04/16/19 1612   Final Note   Assessment Type Final Discharge Note   Anticipated Discharge Disposition SNF  ([facility])   Right Care Referral Info   Post Acute Recommendation SNF / Sub-Acute Rehab   Referral Type SNF   Facility Name [facility] [address] [place], [place] [address]

## 2019-04-16 NOTE — PLAN OF CARE
[facility]     Department of Hospital Medicine     [address] [place] [address]     [phone] after hours  [phone] fax       NURSING HOME ORDERS    Patient Name: Agus Ramos Jr.  YOB: [birth year]    Admit to Nursing Home: Skilled Bed                                             Diagnoses:  Active Hospital Problems    Diagnosis  POA    *Acute metabolic encephalopathy [G93.41]  Yes    Chronic respiratory failure with hypoxia [J96.11]  Yes     Priority: 2      Chronic    Iron deficiency anemia [D50.9]  Yes     Priority: 3      Chronic    Palliative care encounter [Z51.5]  Not Applicable    Advance care planning [Z71.89]  Not Applicable    Elevated serum immunoglobulin free light chain level [R76.8]  Yes    Anemia due to chronic kidney disease [N18.9, D63.1]  Yes    Renal cyst, left [N28.1]  Not Applicable    Proteinuria [R80.9]  Yes    Scrotal swelling [N50.89]  Yes    Acute on chronic combined systolic and diastolic heart failure [I50.43]  Yes    Peripheral artery disease [I73.9]  Yes     Chronic    Urinary retention [R33.9]  No    Centrilobular emphysema [J43.2]  Yes     Chronic     Patient is not interested in a controller, does not feel he gets relief from duoneb nebulizer.      Essential hypertension [I10]  Yes     Chronic    Type 2 diabetes mellitus, controlled, with renal complications [E11.29]  Yes     Chronic    CKD Stage 3 [N18.3]  Yes     Chronic    CAD (coronary artery disease) [I25.10]  Yes     Chronic    Chronic atrial fibrillation [I48.2]  Yes     Chronic      Resolved Hospital Problems   No resolved problems to display.       Patient is homebound due to:  Acute metabolic encephalopathy    Allergies:Review of patient's allergies indicates:  No Known Allergies    Vitals:      Every shift (Skilled Nursing patients)    Diet: cardiac diet, diabetic diet: 1800 calorie and renal diet               Supplement:  1 can every three times a day with meals                         Type:  Glucerna     Acitivities:   - Up in a chair each morning as tolerated    LABS:  Per facility protocol    Oxygen: nasal cannula as 2L as needed for shortness of breath and/or O2 sats < 92%.    Nursing Precautions:    - Aspiration precautions:             -  Upright 90 degrees befor during and after meals       - Fall precautions per nursing home protocol    CONSULTS:     Physical Therapy to evaluate and treat     Occupational Therapy to evaluate and treat     Nutrition to evaluate and recommend diet        to evaluate for hospice, and clarify code status with family/                                       healthcare power of     MISCELLANEOUS CARE:      Boucher Care: Empty Boucher bag every shift.  Change Boucher every month               DIABETES CARE:        Check blood sugar:      Fingerstick blood sugar AC and HS      Report CBG < 60 or > 400 to physician.                                          Insulin Sliding Scale          Glucose  Novolog Insulin Subcutaneous        0 - 60   Orange juice or glucose tablet, hold insulin      No insulin   201-250  2 units   251-300  4 units   301-350  6 units   351-400  8 units   >400   10 units then call physician      Medications: Discontinue all previous medication orders, if any. See new list below.     Agus Ramos Jr.   Home Medication Instructions RUBIO:[id number]    Printed on:04/16/19 1230   Medication Information                      albuterol-ipratropium 2.5mg-0.5mg/3mL (DUO-NEB) 0.5 mg-3 mg(2.5 mg base)/3 mL nebulizer solution  Take 3 mLs by nebulization every 6 (six) hours as needed for wheezing and/or SOB.             aspirin (ECOTRIN) 81 MG EC tablet  Take 1 tablet (81 mg total) by mouth once daily.             atorvastatin (LIPITOR) 20 MG tablet  Take 20 mg by mouth every evening.             bumetanide (BUMEX) 2 MG tablet  Take 1 tablet (2 mg total)  by mouth once daily.             carvedilol (COREG) 25 MG tablet  Take 1 tablet (25 mg total) by mouth 2 (two) times daily.             cinacalcet (SENSIPAR) 30 MG Tab  Take 1 tablet (30 mg total) by mouth daily with breakfast.             diltiaZEM (CARDIZEM) 30 MG tablet  Take 1 tablet (30 mg total) by mouth every 12 (twelve) hours.             ferrous gluconate (FERGON) 324 MG tablet  Take 324 mg by mouth daily with breakfast.             fish oil-omega-3 fatty acids 300-1,000 mg capsule  Take 2 g by mouth 2 (two) times daily.              levothyroxine (SYNTHROID) 25 MCG tablet  Take 1 tablet (25 mcg total) by mouth before breakfast.             losartan (COZAAR) 25 MG tablet  Take 25 mg by mouth once daily.              metOLazone (ZAROXOLYN) 10 MG tablet  Take 1 tablet (10 mg total) by mouth once daily.             nitroGLYCERIN (NITROSTAT) 0.4 MG SL tablet  Place 0.4 mg under the tongue every 5 (five) minutes as needed.             pantoprazole (PROTONIX) 40 MG tablet  Take 1 tablet (40 mg total) by mouth once daily.             polyethylene glycol (GLYCOLAX) 17 gram PwPk  Take by mouth as needed.              spironolactone (ALDACTONE) 25 MG tablet  Take 1 tablet (25 mg total) by mouth once daily.             tamsulosin (FLOMAX) 0.4 mg Cap  Take 1 capsule (0.4 mg total) by mouth once daily.               Electronically signed  _________________________________  Eileen Domingo MD  04/16/2019

## 2019-04-16 NOTE — PLAN OF CARE
Lidia RN with [employer] contacted [employer] to provide SNF authorization (case# [id number], SNF auth# [id number]). [employer] contacted [facility] @ [phone] and spoke to Airam (admissions nurse) to provide SNF authorization. Airam provided [employer] with call report information (room# 140A, 1st floor nurses station). [employer] provided call report information to nurse Muñoz and reported ETA for wheelchair van will be 5:00pm.      ADT 30 order placed for transportation.  ETA: 5:00pm  If transportation does not arrive at ETA time nurse will be instructed to follow protocol for transportation below:  How can I get in touch directly with dispatch, if needed?                 Non-emergent dispatch: [phone]                                    Emergent dispatch: [phone]  Non-emergent (stretcher): [phone]  Escalation Needs (PFC Lead): [phone]           04/16/19 1609   Post-Acute Status   Post-Acute Authorization Placement  (SNF)   Post-Acute Placement Status Set-up Complete   Discharge Delays (!) Patient Transportation

## 2019-04-16 NOTE — PROGRESS NOTES
Nutr f/u eval due today.  Plans noted for pt to discharge today to SNF.   I spoke to pt's PCA this AM; she reports pt does drink Boost Glu Control consistently but meal intake continues to vary daily; it has been mostly 100% over the past few days though.  No nutr recommendations at this time.  Should pt remain hospitalized, RD to f/u per protocol.

## 2019-04-16 NOTE — PROGRESS NOTES
[facility] Hospital Medicine  Progress Note    Patient Name: Agus Ramos Jr.  MRN: [id number]  Patient Class: IP- Inpatient   Admission Date: 3/23/2019  Length of Stay: 24 days  Attending Physician: Eileen Kinsey MD  Primary Care Provider: Keaton Hardy MD        Subjective:     Principal Problem:Acute metabolic encephalopathy    HPI:  Mr. Agus Ramos Jr. is a 78 y.o. male known to me with essential hypertension, type 2 diabetes mellitus (HbA1c 5.0% Mar 2019), CAD s/p CABG, chronic combined systolic and diastolic heart failure (LVEF 50% Mar 2019), CKD Stage 3, peripheral artery disease, chronic atrial fibrillation ([id number] score 5) on chronic anticoagulation, COPD, chronic respiratory failure with hypoxia, and anemia of chronic disease who presents to [facility] ED with complaints of a fall this morning.  He had much difficulty standing back up.  EMS was activated and they found that his capillary glucose was 14 mg/dL after which he was given an ampule of 50% dextrose.  His mentation returned to baseline thereafter.  He denies any tremors nor diaphoresis.  He says that he's been feeling weak and dizzy today but denies any loss of consciousness, nor antecedent chest pain, shortness of breath, palpitations, fevers, chills, nausea, vomiting, abdominal pain, or any diarrhea.  He did not trip or slip or anything.  He has had a good appetite but cannot say for sure if he has been taking too much of his diabetes medications.  He denies any recent changes to his medications.  He otherwise has been in his usual state of health.    Hospital Course:  Mr Ramos presented with weakness and encephalopathy secondary to profound hypoglycemia (14 in the field). This was suspected to be due to use of sulfonylurea at home. Hypoglycemic agents held and admitted to ICU for dextrose infusion. Did well and weaned from dextrose eventually. Was also on octreotide. Stepped down to floor. PT/OT recommended  SNF. Patient did, however, experience significant anasarca and urinary retention from scrotal edema. Further workup revealed proteinuria. Also with left ventricular diastolic dysfunction (indeterminate degree) and enlarge right ventricle with unknown ventricular diastolic function. Nephrology and urology consulted. Initiated on diuresis. Renal function tolerated and diuresed to where SOB got better and anasarca improved (but did not resolve). Urology placed stearns. Failed voiding trial. Is to keep stearns in for voiding trial as outpatient. Scrotal US revealed left cyst. Urology recommends repeat US in 6 months. Palliative care was also consulted for code status/goals of care.         Interval History: states feeling tired but well. Showed me left thumb with brittle nail and dried blood.     Review of Systems   Constitutional: Negative.    Respiratory: Positive for shortness of breath (better). Negative for cough.    Cardiovascular: Positive for leg swelling. Negative for chest pain and palpitations.   Gastrointestinal: Negative.  Negative for constipation.   Neurological: Positive for weakness.   Psychiatric/Behavioral: Negative.      Objective:     Vital Signs (Most Recent):  Temp: 97.5 °F (36.4 °C) (04/16/19 0711)  Pulse: 72 (04/16/19 0747)  Resp: 16 (04/16/19 0747)  BP: 133/60 (04/16/19 0711)  SpO2: 95 % (04/16/19 0747) Vital Signs (24h Range):  Temp:  [97.5 °F (36.4 °C)-98.7 °F (37.1 °C)] 97.5 °F (36.4 °C)  Pulse:  [65-92] 72  Resp:  [16-23] 16  SpO2:  [93 %-98 %] 95 %  BP: (119-151)/(60-85) 133/60     Weight: 98.3 kg (216 lb 11.4 oz)  Body mass index is 32 kg/m².    Intake/Output Summary (Last 24 hours) at 4/16/2019 0821  Last data filed at 4/16/2019 0600  Gross per 24 hour   Intake 240 ml   Output 2300 ml   Net -2060 ml      Physical Exam   Constitutional: He is oriented to person, place, and time. He appears well-developed. No distress.   Cardiovascular: Normal rate and regular rhythm.   Pulmonary/Chest: Effort  normal and breath sounds normal. He has no wheezes. He has no rales.   Breathing comfortably on low flow NC   Abdominal: Soft. Bowel sounds are normal.   Genitourinary:   Genitourinary Comments: Boucher in place  Clear urine in bag   Musculoskeletal: Normal range of motion. He exhibits edema.   Left thumb has brittle nail with dried blood  No tenderness  Full ROM   Neurological: He is alert and oriented to person, place, and time.   Skin: He is not diaphoretic.   Psychiatric: He has a normal mood and affect. His behavior is normal. Judgment and thought content normal.   Nursing note and vitals reviewed.      Significant Labs: All pertinent labs within the past 24 hours have been reviewed.    Significant Imaging: I have reviewed all pertinent imaging results/findings within the past 24 hours.  I have reviewed and interpreted all pertinent imaging results/findings within the past 24 hours.    Assessment/Plan:      * Acute metabolic encephalopathy  2/2 hypoglycemia  Resolved.   Rarely needs low dose short acting insulin for hypoglycemia  Continue accuchecks AC HS      Chronic respiratory failure with hypoxia  Stable; will continue his home supplemental oxygen therapy.    Iron deficiency anemia  Continue daily iron supplementation  Is having [facility]    Advance care planning  Met with patient and palliative care NP in room  Pt cannot give consistent answer to questions  Dr Joshi d/w spouse over phone my concerns and she wants to meet with her kids and plans to come to discuss code status and goals of care further  >20 minutes   Palliative care on board      Anemia due to chronic kidney disease  Low but stable. Iron supplementation  Has no evidence of active bleeding    Renal cyst, left  Has left renal cyst,on US,need follow up with repeat US  in 6 weeks,      Proteinuria  Has proteinuria with Anasarca.  Consulted oncology for abnormal UPEP  Per oncology, further studies do not suggest MGUS nor MM        Scrotal  swelling  2/2 anasarca    Peripheral artery disease  Stable; will continue his home regimen of aspirin and atorvastatin.    Acute on chronic combined systolic and diastolic heart failure  Improved with diuretic regimen as per nephrology  Stable on low flow NC        Urinary retention  Failed voiding trial  Re-attempt as outpatient  Current stearns working well      Centrilobular emphysema  Clinically-stable without wheezing.  As-needed JNEAE/LAMA available.    Type 2 diabetes mellitus, controlled, with renal complications  No hypoglycemic agents for now  Has rarely required short acting insulin for hyperglycemia    CKD Stage 3  His renal function appears to be at his baseline.    Essential hypertension  Stable on current regimen    CAD (coronary artery disease)  Stable; will continue his home regimen of aspirin, atorvastatin, and carvedilol.    Chronic atrial fibrillation  Patient was atrial fibrillation at this time which is baseline for him.  Rate controlled. He was taken off of anticoagulation during his last admission due to anemia and was instructed not to take it until outpatient follow-up with gastroenterology.  Will continue to monitor.      VTE Risk Mitigation (From admission, onward)        Ordered     Reason for No Pharmacological VTE Prophylaxis  Once      03/23/19 1936     Place sequential compression device  Until discontinued      03/23/19 1934     Place DENIS hose  Until discontinued      03/23/19 1934          Dispo: [facility]. Accepted. Insurance pending? Will discuss during huddle. Will discuss diuretics with nephrology and fam discussion with palliative care. Left thumb XRay pending to r/o fracture    Eileen Domingo MD  Department of Hospital Medicine   [facility]

## 2019-04-16 NOTE — NURSING
1810 patient discharged to nursing home in wheelchair with spd. Patient denying pain at this time. 2 person assist to chair. Patient did not have belongings. Verbalizing readiness to get to nursing home.

## 2019-04-16 NOTE — ASSESSMENT & PLAN NOTE
Has proteinuria with Anasarca.  Consulted oncology for abnormal UPEP  Per oncology, further studies do not suggest MGUS nor MM

## 2019-04-16 NOTE — NURSING
Pt has rested well this shift. Pt has worn his CPAP tonight. Pt has had 2 11 beat runs of VTACH this shift. Pt denies pain. Pt has increased output.. Pt has remained free from falls injuries or signs of infection this shift. pts left lung volume seems to be decreasing.

## 2019-04-16 NOTE — PLAN OF CARE
Problem: Adult Inpatient Plan of Care  Goal: Plan of Care Review  Outcome: Ongoing (interventions implemented as appropriate)     04/16/19 1550   Plan of Care Review   Plan of Care Reviewed With patient       Comments: Plan of care reviewed with patient. He is alert and oriented, able to make needs known, remains free of injury during shift. He transfers to chair with assist. accucheck done as ordered, insulin given per sliding scale. Edema noted at 2+. Boucher draining dark yellow urine. picc flushed per protocol. Vssaf. avasys at bedside. o2 at 2l. Xray to hand done as patient picked fingernail off, he denies pain during shift stating his thumb feels better. Tele monitor on patient. He is able to position self. Bed in low locked position, call light in reach. Will continue to monitor for safety.

## 2019-04-16 NOTE — ASSESSMENT & PLAN NOTE
2/2 hypoglycemia  Resolved.   Rarely needs low dose short acting insulin for hypoglycemia  Continue accuchecks AC HS

## 2019-04-16 NOTE — PLAN OF CARE
IMM completed with spouse Jessika.       04/16/19 1522   Medicare Message   Important Message from Medicare regarding Discharge Appeal Rights Given to patient/caregiver;Explained to patient/caregiver;Signed/date by patient/caregiver

## 2019-04-16 NOTE — PLAN OF CARE
Problem: Adult Inpatient Plan of Care  Goal: Plan of Care Review  Outcome: Ongoing (interventions implemented as appropriate)  Pt tolerating txs without any problems. Remains on 2Lnc.

## 2019-04-16 NOTE — ASSESSMENT & PLAN NOTE
Patient was atrial fibrillation at this time which is baseline for him.  Rate controlled. He was taken off of anticoagulation during his last admission due to anemia and was instructed not to take it until outpatient follow-up with gastroenterology.  Will continue to monitor.

## 2019-04-16 NOTE — PROGRESS NOTES
Date of Admission:3/23/2019    SUBJECTIVE: notes feeling ok sitting up    Current Facility-Administered Medications   Medication    acetaminophen tablet 500 mg    albuterol-ipratropium 2.5 mg-0.5 mg/3 mL nebulizer solution 3 mL    albuterol-ipratropium 2.5 mg-0.5 mg/3 mL nebulizer solution 3 mL    aspirin EC tablet 81 mg    atorvastatin tablet 20 mg    bumetanide tablet 2 mg    carvedilol tablet 25 mg    cinacalcet tablet 30 mg    cloNIDine tablet 0.1 mg    diltiaZEM tablet 30 mg    docusate sodium capsule 100 mg    ferrous gluconate tablet 324 mg    influenza (FLUZONE HIGH-DOSE) vaccine 0.5 mL    insulin aspart U-100 pen 0-5 Units    levothyroxine tablet 25 mcg    losartan tablet 25 mg    metOLazone tablet 10 mg    ondansetron injection 8 mg    pantoprazole EC tablet 40 mg    pneumoc 13-alan conj-dip cr(PF) (PREVNAR 13 (PF)) 0.5 mL    polyethylene glycol packet 17 g    promethazine (PHENERGAN) 6.25 mg in dextrose 5 % 50 mL IVPB    ramelteon tablet 8 mg    senna-docusate 8.6-50 mg per tablet 1 tablet    sodium chloride 0.9% flush 10 mL    And    sodium chloride 0.9% flush 10 mL    spironolactone tablet 25 mg    tamsulosin 24 hr capsule 0.4 mg       Wt Readings from Last 3 Encounters:   04/12/19 98.3 kg (216 lb 11.4 oz)   03/17/19 92.8 kg (204 lb 9.4 oz)   01/05/19 77.6 kg (171 lb 1.2 oz)     Temp Readings from Last 3 Encounters:   04/16/19 97.7 °F (36.5 °C) (Oral)   03/19/19 97.7 °F (36.5 °C)   01/08/19 97.6 °F (36.4 °C) (Oral)     BP Readings from Last 3 Encounters:   04/16/19 134/66   03/19/19 133/68   01/08/19 129/72     Pulse Readings from Last 3 Encounters:   04/16/19 73   03/19/19 78   01/08/19 80       Intake/Output Summary (Last 24 hours) at 4/16/2019 1340  Last data filed at 4/16/2019 1236  Gross per 24 hour   Intake 480 ml   Output 2850 ml   Net -2370 ml       PE:  GEN:wd male in nad  HEENT:ncat,eomi,mm  CVS:chiki 2/6  PULM: diminished bases  ABD:+bs,soft,nd  EXT:2+edema  legs  NEURO:pleasant, awake    Recent Labs   Lab 04/16/19  0620   *      K 4.1   CL 99   CO2 36*   BUN 33*   CREATININE 1.5*   CALCIUM 8.8       Lab Results   Component Value Date    .6 (H) 04/08/2019    CALCIUM 8.8 04/16/2019    CAION 1.42 05/30/2018    PHOS 2.5 (L) 04/09/2019       No results for input(s): WBC, RBC, HGB, HCT, PLT, MCV, MCH, MCHC in the last 24 hours.      A/P:  1ckd3. Creatinine ok. Following.  2.acute on combined hf. Adjust to oral diuretics. Good uop.  3.hypercalemia. Cannot afford sensipar outpt. Gave aredia. Cont sensipar for now. Will need endo refer outpt. Cannot give ivfs with edema. Doubt mm.  4.urinary retention. Cont stearns.  5.anemia.cont po iron.

## 2019-04-16 NOTE — CONSULTS
[facility]  Palliative Medicine  Consult Note    Patient Name: Agus Ramos Jr.  MRN: [id number]  Admission Date: 3/23/2019  Hospital Length of Stay: 24 days  Code Status: Full Code   Attending Provider: Eileen Kinsey MD  Consulting Provider: Gabriella Siddiqui NP  Primary Care Physician: Keaton Hardy MD  Principal Problem:Acute metabolic encephalopathy    Patient information was obtained from patient, past medical records and ER records.          Thank you for your consult. I will follow-up with patient. Please contact us if you have any additional questions.    Subjective:        HPI:  Mr. Agus Ramos Jr. is a 78 y.o. male known to me with essential hypertension, type 2 diabetes mellitus (HbA1c 5.0% Mar 2019), CAD s/p CABG, chronic combined systolic and diastolic heart failure (LVEF 50% Mar 2019), CKD Stage 3, peripheral artery disease, chronic atrial fibrillation (OEX3DF4-CEUt score 5) on chronic anticoagulation, COPD, chronic respiratory failure with hypoxia, and anemia of chronic disease who presents to [facility] ED with complaints of a fall this morning.  He had much difficulty standing back up.  EMS was activated and they found that his capillary glucose was 14 mg/dL after which he was given an ampule of 50% dextrose.  His mentation returned to baseline thereafter.  He denies any tremors nor diaphoresis.  He says that he's been feeling weak and dizzy today but denies any loss of consciousness, nor antecedent chest pain, shortness of breath, palpitations, fevers, chills, nausea, vomiting, abdominal pain, or any diarrhea.  He did not trip or slip or anything.  He has had a good appetite but cannot say for sure if he has been taking too much of his diabetes medications.  He denies any recent changes to his medications.  He otherwise has been in his usual state of health.     Hospital Course:  77 y/o male presented with weakness.  Hx of DM on Glipizide and Metformin.  Patient noted to  be hypoglycemic.  Initially responded to D50, but then became persistently hypoglycemic.  Admitted to ICU on D10 drip and hourly glucose monitoring.  Also started on Octreotide drip.  No further episodes of hypoglycemia and D10 switched to D5.  Octreotide drip stopped.  Now getting more hyperglycemic.  Stopping D5 drip and monitor off dextrose.  PT/OT evaluation.  Recommended SNF,consulted SW.  Last HbA1C 2 weeks ago 5.0,likely no need any more for hypoglycemic agents,diet should  be fine.  Has scrotal swelling,on IV lasix,lifting scrotum.  Has anasarca,all over body,increased lasix ,have ,proteinuria,check protein/Cr. Ratio,elevated,consulted nephrology.still has massive anasarca.  Urology following for scrotal swelling,improved.  Has left renal cyst,on US,need follow up with repeat US  in 6 months.  24 urine collection is in Process to R/O nephrotic syndrome.  Consulted oncology for abnormal UPEP.  Boucher removed.  Patient voiding without issues.  Diuresis stopped by Nephrology.  Shortness of breath overnight on 4/8.  Started on oral Bumex.        Hospital Course:  No notes on file        Past Medical History:   Diagnosis Date    Anemia 05/03/2018    pending blood transfusion    Anticoagulant long-term use     apixaban    Atrial fibrillation     CKD (chronic kidney disease)     Congestive heart failure     COPD (chronic obstructive pulmonary disease)     Coronary artery disease     Diabetes mellitus     Encounter for blood transfusion     HLD (hyperlipidemia)     Hypertension     MI (myocardial infarction)     On home oxygen therapy     Pacemaker     left chest    Peripheral vascular disease     Requires assistance with activities of daily living (ADL)     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     S/P femoral-popliteal bypass surgery left    Stented coronary artery     Tobacco use     Unsteady gait        Past Surgical History:   Procedure Laterality Date    CARDIAC CATHETERIZATION      CARDIAC PACEMAKER PLACEMENT       CARDIAC SURGERY      3 vessel CABG    CARDIOVERSION N/A 2013    Performed by Maryann Surgeon at [facility] MARYANN    cardioverted      CORONARY ANGIOPLASTY WITH STENT PLACEMENT      EGD (ESOPHAGOGASTRODUODENOSCOPY) N/A 2018    Performed by Ty Hickman MD at [facility] ENDO    HEMORRHOID SURGERY      TRANSESOPHAGEAL ECHOCARDIOGRAM (ANIA) N/A 2013    [facility]    VASCULAR SURGERY      femoral artery popliteal bypass       Review of patient's allergies indicates:  No Known Allergies    Medications:  Continuous Infusions:  Scheduled Meds:   albuterol-ipratropium  3 mL Nebulization Q4H WAKE    aspirin  81 mg Oral Daily    atorvastatin  20 mg Oral QHS    bumetanide  2 mg Intravenous Q12H    carvedilol  25 mg Oral BID    cinacalcet  30 mg Oral Daily with breakfast    diltiaZEM  30 mg Oral Q12H    docusate sodium  100 mg Oral BID    ferrous gluconate  324 mg Oral Daily with breakfast    levothyroxine  25 mcg Oral Before breakfast    losartan  25 mg Oral Daily    metOLazone  5 mg Oral Daily    pantoprazole  40 mg Oral Daily    polyethylene glycol  17 g Oral BID    sodium chloride 0.9%  10 mL Intravenous Q6H    spironolactone  25 mg Oral Daily    tamsulosin  0.4 mg Oral Daily     PRN Meds:acetaminophen, albuterol-ipratropium, cloNIDine, influenza, insulin aspart U-100, ondansetron, pneumoc 13-alan conj-dip cr(PF), promethazine (PHENERGAN) IVPB, ramelteon, senna-docusate 8.6-50 mg, Fl[place] PICC Protocol **AND** sodium chloride 0.9% **AND** sodium chloride 0.9%    Family History     Problem Relation (Age of Onset)    Diabetes Father, Mother        Tobacco Use    Smoking status: Former Smoker     Packs/day: 1.00     Years: 60.00     Pack years: 60.00     Types: Cigarettes     Last attempt to quit: 2018     Years since quittin.9    Smokeless tobacco: Never Used   Substance and Sexual Activity    Alcohol use: No    Drug use: No    Sexual activity: Not on file        Review of Systems   Constitutional: Positive for activity change.   Neurological: Positive for dizziness and weakness.     Objective:     Vital Signs (Most Recent):  Temp: 97.6 °F (36.4 °C) (04/15/19 1119)  Pulse: 92 (04/15/19 1119)  Resp: 18 (04/15/19 1119)  BP: 122/66 (04/15/19 1119)  SpO2: 98 % (04/15/19 1119) Vital Signs (24h Range):  Temp:  [97.6 °F (36.4 °C)-98.7 °F (37.1 °C)] 97.6 °F (36.4 °C)  Pulse:  [] 92  Resp:  [18-21] 18  SpO2:  [95 %-100 %] 98 %  BP: (109-127)/(66-82) 122/66     Weight: 98.3 kg (216 lb 11.4 oz)  Body mass index is 32 kg/m².    Review of Symptoms  Symptom Assessment (ESAS 0-10 scale)   ESAS 0 1 2 3 4 5 6 7 8 9 10   Pain x             Dyspnea x             Anxiety x             Nausea x             Depression  x             Anorexia x             Fatigue              Insomnia x             Restlessness  x             Agitation x             CAM / Delirium __ --  ___+     Physical Exam   Constitutional: He is oriented to person, place, and time. He appears well-developed and well-nourished.   Neck: Neck supple.   Cardiovascular:   irregular   Pulmonary/Chest:   labored   Abdominal: Soft. Bowel sounds are normal.   Musculoskeletal: He exhibits edema (extremities).   Neurological: He is alert and oriented to person, place, and time.   Skin: Skin is warm and dry.   Nursing note and vitals reviewed.      Significant Labs: All pertinent labs within the past 24 hours have been reviewed.  CBC:   Recent Labs   Lab 04/15/19  0420   WBC 12.89*   HGB 7.9*   HCT 27.7*   MCV 79*        BMP:  Recent Labs   Lab 04/15/19  0420   *  205*     140   K 4.4  4.4     102   CO2 34*  34*   BUN 36*  36*   CREATININE 1.6*  1.6*   CALCIUM 8.9  8.9     LFT:  Lab Results   Component Value Date    AST 16 04/15/2019    GGT 26 03/14/2019    ALKPHOS 132 04/15/2019    BILITOT 0.3 04/15/2019     Albumin:   Albumin   Date Value Ref Range Status   04/15/2019 1.9 (L) 3.5 - 5.2  "g/dL Final     Protein:   Total Protein   Date Value Ref Range Status   04/15/2019 4.2 (L) 6.0 - 8.4 g/dL Final     Lactic acid:   Lab Results   Component Value Date    LACTATE 2.0 09/24/2016       Significant Imaging: I have reviewed all pertinent imaging results/findings within the past 24 hours.    Advance Care Planning   Advanced Directives::  Living Will: No  LaPOST: No  Do Not Resuscitate Status: No  Medical Power of : No    Decision-Making Capacity: Patient answered questions       Living Arrangements: Lives with spouse    ASSESSMENT/PLAN:    Palliative encounter:    Patient up in chair just finishing his nebulizer treatment. Oxygen on and tachypnea. Patient c/o dizziness and states he is dizzy often. He denies pain. We discussed his current condition and about how sick he has been this hospitalization. He does not remember being in the Icu but states he knows he was only home for 3 days before he came back to hospital. I asked him how he feels he is doing and he states " not so good". We discussed going to SNF and the high possibility of him retuning to the hospital again with the same problems. CPR pros and cons discussed and patient states he does not want to ever be on life support and he would not want things done to him to try and restart his heart once he dies.   I let him know we need to discuss this with his family when they are here so they will know his wishes.   Discussed with Dr Joshi who will discuss with patient also.         Addendum: 3:00    Went to room with Dr Joshi to discuss with patient again his wishes. He was having some confusion and nurse reports he gets this way in the afternoon. Patient wanting us to call his wife because he was suppose to get a roast beef sandwich from her. Dr Joshi tried to call both numbers for patient's contact while in room with no answer to either. Pt did remember me and some of the conversation we had regarding life support but did not " "recall specifics. Dr Joshi asked him if we couldn't get him better and he had to go on a breathing machine to breath for him would this be something he would want and he thought for a few minutes and then said" no, I don't believe I would".   Spoke with nurse who reports spouse in room earlier but left and she has also tried to call regarding patient's request for roast beef sandwhich.  Nurse to notify if family arrives again.    -continue current treatment  -patient want to be a DNR (niotifed Dr Joshi) will discuss with family too  -Palliative to continue to follow      > 50% of 90 min visit spent in chart review, face to face discussion of goals of care,  symptom assessment, coordination of care and emotional support.    Gabriella Siddiqui, NP  Palliative Medicine  [facility]            "

## 2019-04-16 NOTE — PT/OT/SLP PROGRESS
Occupational Therapy   Treatment    Name: Agus Ramos Jr.  MRN: [id number]  Admitting Diagnosis:  Acute metabolic encephalopathy       Recommendations:     Discharge Recommendations: nursing facility, skilled  Discharge Equipment Recommendations:  none  Barriers to discharge:  Other (Comment)(The patient will benefit from SNF)    Assessment:     Agus Ramos Jr. is a 78 y.o. male with a medical diagnosis of Acute metabolic encephalopathy.  He presents with slow progress in OT. The patient will benefit from continued OT to address functional deficits. Performance deficits affecting function are weakness, impaired endurance, impaired self care skills, gait instability, impaired functional mobilty, impaired balance, decreased lower extremity function, decreased upper extremity function, decreased coordination, decreased ROM, impaired fine motor, edema, impaired skin, impaired cardiopulmonary response to activity, decreased safety awareness.     Rehab Prognosis:  Fair; patient would benefit from acute skilled OT services to address these deficits and reach maximum level of function.       Plan:     Patient to be seen 3 x/week to address the above listed problems via self-care/home management, therapeutic activities, therapeutic exercises  · Plan of Care Expires: 04/30/19  · Plan of Care Reviewed with: patient    Subjective     Pain/Comfort:  · Pain Rating 1: 0/10    Objective:     Communicated with: nurse prior to session.  Patient found HOB elevated with bed alarm, telemetry, stearns catheter, oxygen, SCD upon OT entry to room.    General Precautions: Standard, fall, respiratory   Orthopedic Precautions:N/A   Braces: N/A     Occupational Performance:     Bed Mobility:    · Patient completed Scooting/Bridging with stand by assistance  · Patient completed Supine to Sit with contact guard assistance     Functional Mobility/Transfers:  · Patient completed Sit <> Stand Transfer with contact guard assistance  with  rolling  walker to stand from an elevated surface  · Functional Mobility: The patient amb using a RW with CGA and verbal cues/min assist for RW use.    Activities of Daily Living:  · Upper Body Dressing: moderate assistance to don back gown      [facility] 6 Click ADL: 16    Treatment & Education:  The patient participated in AAROM to B shldr x10 and AROM to elbow wrist and hand x10 reps while seated in the chair. The patient was educated re: need to perform ROM to BUE throughout the day for edema management.    Patient left up in chair, reclined with BUE elevated on pillows with all lines intact, call button in reach, nurseWanda notified and Pavan and student nurse presentEducation:      GOALS:   Multidisciplinary Problems     Occupational Therapy Goals        Problem: Occupational Therapy Goal    Goal Priority Disciplines Outcome Interventions   Occupational Therapy Goal     OT, PT/OT Ongoing (interventions implemented as appropriate)    Description:  Goals to be met by: 4/29/19     Patient will increase functional independence with ADLs by performing:    UE Dressing with Set-up Assistance.  LE Dressing with Minimal Assistance.  Grooming while seated with Supervision.  Toileting from bedside commode with Moderate Assistance for hygiene and clothing management.   Bathing from  edge of bed with Moderate Assistance.  Sitting at edge of bed x20 minutes with Stand-by Assistance.  Rolling to Bilateral with Set-up Assistance.   Supine to sit with Supervision.  Stand pivot transfers with Minimal Assistance. Met 4/5/19  Toilet transfer to bedside commode with Minimal Assistance.  Upper extremity exercise program x10 reps per handout, with assistance as needed.                        Time Tracking:     OT Date of Treatment: 04/16/19  OT Start Time: 0932  OT Stop Time: 0958  OT Total Time (min): 26 min    Billable Minutes:Self Care/Home Management 16  Therapeutic Exercise 10  Total Time 26    Annakvng Dumont OT  4/16/2019

## 2019-04-16 NOTE — ASSESSMENT & PLAN NOTE
Met with patient and palliative care NP in room  Pt cannot give consistent answer to questions  Dr Joshi d/w spouse over phone my concerns and she wants to meet with her kids and plans to come to discuss code status and goals of care further  >20 minutes   Palliative care on board

## 2019-04-16 NOTE — SUBJECTIVE & OBJECTIVE
Interval History: states feeling tired but well. Showed me left thumb with brittle nail and dried blood.     Review of Systems   Constitutional: Negative.    Respiratory: Positive for shortness of breath (better). Negative for cough.    Cardiovascular: Positive for leg swelling. Negative for chest pain and palpitations.   Gastrointestinal: Negative.  Negative for constipation.   Neurological: Positive for weakness.   Psychiatric/Behavioral: Negative.      Objective:     Vital Signs (Most Recent):  Temp: 97.5 °F (36.4 °C) (04/16/19 0711)  Pulse: 72 (04/16/19 0747)  Resp: 16 (04/16/19 0747)  BP: 133/60 (04/16/19 0711)  SpO2: 95 % (04/16/19 0747) Vital Signs (24h Range):  Temp:  [97.5 °F (36.4 °C)-98.7 °F (37.1 °C)] 97.5 °F (36.4 °C)  Pulse:  [65-92] 72  Resp:  [16-23] 16  SpO2:  [93 %-98 %] 95 %  BP: (119-151)/(60-85) 133/60     Weight: 98.3 kg (216 lb 11.4 oz)  Body mass index is 32 kg/m².    Intake/Output Summary (Last 24 hours) at 4/16/2019 0821  Last data filed at 4/16/2019 0600  Gross per 24 hour   Intake 240 ml   Output 2300 ml   Net -2060 ml      Physical Exam   Constitutional: He is oriented to person, place, and time. He appears well-developed. No distress.   Cardiovascular: Normal rate and regular rhythm.   Pulmonary/Chest: Effort normal and breath sounds normal. He has no wheezes. He has no rales.   Breathing comfortably on low flow NC   Abdominal: Soft. Bowel sounds are normal.   Genitourinary:   Genitourinary Comments: Boucher in place  Clear urine in bag   Musculoskeletal: Normal range of motion. He exhibits edema.   Left thumb has brittle nail with dried blood  No tenderness  Full ROM   Neurological: He is alert and oriented to person, place, and time.   Skin: He is not diaphoretic.   Psychiatric: He has a normal mood and affect. His behavior is normal. Judgment and thought content normal.   Nursing note and vitals reviewed.      Significant Labs: All pertinent labs within the past 24 hours have been  reviewed.    Significant Imaging: I have reviewed all pertinent imaging results/findings within the past 24 hours.  I have reviewed and interpreted all pertinent imaging results/findings within the past 24 hours.

## 2019-04-17 NOTE — PT/OT/SLP DISCHARGE
Occupational Therapy Discharge Summary    Augs Ramos Jr.  MRN: [id number]   Principal Problem: Acute metabolic encephalopathy      Patient Discharged from acute Occupational Therapy on 4/16/19.  Please refer to prior OT notes for functional status.    Assessment:      Patient appropriate for care in another setting.    Objective:     GOALS:   Multidisciplinary Problems     Occupational Therapy Goals     Not on file          Multidisciplinary Problems (Resolved)        Problem: Occupational Therapy Goal    Goal Priority Disciplines Outcome Interventions   Occupational Therapy Goal   (Resolved)     OT, PT/OT Outcome(s) achieved    Description:  Goals to be met by: 4/29/19     Patient will increase functional independence with ADLs by performing:    UE Dressing with Set-up Assistance.  LE Dressing with Minimal Assistance.  Grooming while seated with Supervision.  Toileting from bedside commode with Moderate Assistance for hygiene and clothing management.   Bathing from  edge of bed with Moderate Assistance.  Sitting at edge of bed x20 minutes with Stand-by Assistance.  Rolling to Bilateral with Set-up Assistance.   Supine to sit with Supervision.  Stand pivot transfers with Minimal Assistance. Met 4/5/19  Toilet transfer to bedside commode with Minimal Assistance.  Upper extremity exercise program x10 reps per handout, with assistance as needed.                        Reasons for Discontinuation of Therapy Services  Transfer to alternate level of care.      Plan:     Patient Discharged to: Skilled Nursing Facility    Anna Dumont OT  4/17/2019

## 2019-04-18 VITALS
DIASTOLIC BLOOD PRESSURE: 72 MMHG | TEMPERATURE: 98 F | WEIGHT: 216.69 LBS | HEIGHT: 69 IN | SYSTOLIC BLOOD PRESSURE: 147 MMHG | HEART RATE: 77 BPM | RESPIRATION RATE: 16 BRPM | BODY MASS INDEX: 32.09 KG/M2 | OXYGEN SATURATION: 94 %

## 2019-04-18 NOTE — PT/OT/SLP DISCHARGE
Physical Therapy Discharge Summary    Name: Agus Ramos Jr.  MRN: [id number]   Principal Problem: Acute metabolic encephalopathy     Patient Discharged from acute Physical Therapy on 19.  Please refer to prior PT noted date on 4/15/19 for functional status.     Assessment:     Goals partially met. Patient appropriate for care in another setting.    Objective:     GOALS:   Multidisciplinary Problems     Physical Therapy Goals     Not on file          Multidisciplinary Problems (Resolved)        Problem: Physical Therapy Goal    Goal Priority Disciplines Outcome Goal Variances Interventions   Physical Therapy Goal   (Resolved)     PT, PT/OT Outcome(s) achieved     Description:  Goals to be met by: 19    Patient will increase functional independence with mobility by performin. Sit to stand transfer with Stand-by Assistance  2. Gait x150 feet with stand-by Assistance using Rolling Walker  3. Lower extremity exercise program x30 reps per handout, with supervision                      Reasons for Discontinuation of Therapy Services  Transfer to alternate level of care.      Plan:     Patient Discharged to: Skilled Nursing Facility.    Katie Coughlin, PT  2019

## 2019-05-06 ENCOUNTER — HOSPITAL ENCOUNTER (OUTPATIENT)
Facility: HOSPITAL | Age: 79
Discharge: HOME HEALTH | End: 2019-05-07
Attending: EMERGENCY MEDICINE | Admitting: EMERGENCY MEDICINE
Payer: MEDICARE

## 2019-05-06 DIAGNOSIS — N18.30 CKD (CHRONIC KIDNEY DISEASE) STAGE 3, GFR 30-59 ML/MIN: Chronic | ICD-10-CM

## 2019-05-06 DIAGNOSIS — I50.43 ACUTE ON CHRONIC COMBINED SYSTOLIC AND DIASTOLIC HEART FAILURE: ICD-10-CM

## 2019-05-06 DIAGNOSIS — I48.20 CHRONIC ATRIAL FIBRILLATION: Chronic | ICD-10-CM

## 2019-05-06 DIAGNOSIS — D63.8 ANEMIA OF CHRONIC DISEASE: Chronic | ICD-10-CM

## 2019-05-06 DIAGNOSIS — Z51.5 PALLIATIVE CARE ENCOUNTER: ICD-10-CM

## 2019-05-06 DIAGNOSIS — R06.02 SOB (SHORTNESS OF BREATH): ICD-10-CM

## 2019-05-06 DIAGNOSIS — J96.11 CHRONIC RESPIRATORY FAILURE WITH HYPOXIA: Chronic | ICD-10-CM

## 2019-05-06 DIAGNOSIS — I10 ESSENTIAL HYPERTENSION: Chronic | ICD-10-CM

## 2019-05-06 DIAGNOSIS — I50.9 ACUTE ON CHRONIC CONGESTIVE HEART FAILURE, UNSPECIFIED HEART FAILURE TYPE: Primary | ICD-10-CM

## 2019-05-06 DIAGNOSIS — E11.21 CONTROLLED TYPE 2 DIABETES MELLITUS WITH DIABETIC NEPHROPATHY, UNSPECIFIED WHETHER LONG TERM INSULIN USE: Chronic | ICD-10-CM

## 2019-05-06 DIAGNOSIS — N39.0 URINARY TRACT INFECTION WITHOUT HEMATURIA, SITE UNSPECIFIED: ICD-10-CM

## 2019-05-06 DIAGNOSIS — I25.10 CORONARY ARTERY DISEASE, ANGINA PRESENCE UNSPECIFIED, UNSPECIFIED VESSEL OR LESION TYPE, UNSPECIFIED WHETHER NATIVE OR TRANSPLANTED HEART: Chronic | ICD-10-CM

## 2019-05-06 PROCEDURE — 99285 EMERGENCY DEPT VISIT HI MDM: CPT | Mod: 25

## 2019-05-06 PROCEDURE — 87804 INFLUENZA ASSAY W/OPTIC: CPT

## 2019-05-06 PROCEDURE — 96375 TX/PRO/DX INJ NEW DRUG ADDON: CPT

## 2019-05-06 PROCEDURE — 96374 THER/PROPH/DIAG INJ IV PUSH: CPT

## 2019-05-07 VITALS
OXYGEN SATURATION: 100 % | HEART RATE: 71 BPM | SYSTOLIC BLOOD PRESSURE: 130 MMHG | DIASTOLIC BLOOD PRESSURE: 79 MMHG | BODY MASS INDEX: 27.43 KG/M2 | WEIGHT: 185.19 LBS | RESPIRATION RATE: 17 BRPM | TEMPERATURE: 97 F | HEIGHT: 69 IN

## 2019-05-07 PROBLEM — E03.9 HYPOTHYROIDISM: Chronic | Status: ACTIVE | Noted: 2019-05-07

## 2019-05-07 PROBLEM — E83.42 HYPOMAGNESEMIA: Status: ACTIVE | Noted: 2019-05-07

## 2019-05-07 PROBLEM — I50.9 ACUTE ON CHRONIC CONGESTIVE HEART FAILURE: Status: ACTIVE | Noted: 2019-05-07

## 2019-05-07 PROBLEM — R54 AGE-RELATED PHYSICAL DEBILITY: Status: ACTIVE | Noted: 2019-05-07

## 2019-05-07 PROBLEM — I50.9 ACUTE ON CHRONIC CONGESTIVE HEART FAILURE: Status: RESOLVED | Noted: 2019-05-07 | Resolved: 2019-05-07

## 2019-05-07 PROBLEM — Z97.8 CHRONIC INDWELLING FOLEY CATHETER: Chronic | Status: ACTIVE | Noted: 2019-05-07

## 2019-05-07 PROBLEM — I50.42 CHRONIC COMBINED SYSTOLIC AND DIASTOLIC HEART FAILURE: Chronic | Status: ACTIVE | Noted: 2019-05-07

## 2019-05-07 LAB
ABO + RH BLD: NORMAL
ALBUMIN SERPL BCP-MCNC: 2 G/DL (ref 3.5–5.2)
ALBUMIN SERPL BCP-MCNC: 2.5 G/DL (ref 3.5–5.2)
ALP SERPL-CCNC: 179 U/L (ref 55–135)
ALP SERPL-CCNC: 231 U/L (ref 55–135)
ALT SERPL W/O P-5'-P-CCNC: 11 U/L (ref 10–44)
ALT SERPL W/O P-5'-P-CCNC: 17 U/L (ref 10–44)
ANION GAP SERPL CALC-SCNC: 10 MMOL/L (ref 8–16)
ANION GAP SERPL CALC-SCNC: 5 MMOL/L (ref 8–16)
AST SERPL-CCNC: 20 U/L (ref 10–40)
AST SERPL-CCNC: 25 U/L (ref 10–40)
BACTERIA #/AREA URNS HPF: ABNORMAL /HPF
BASOPHILS # BLD AUTO: 0.03 K/UL (ref 0–0.2)
BASOPHILS # BLD AUTO: 0.04 K/UL (ref 0–0.2)
BASOPHILS NFR BLD: 0.4 % (ref 0–1.9)
BASOPHILS NFR BLD: 0.5 % (ref 0–1.9)
BILIRUB SERPL-MCNC: 0.2 MG/DL (ref 0.1–1)
BILIRUB SERPL-MCNC: 0.3 MG/DL (ref 0.1–1)
BILIRUB UR QL STRIP: NEGATIVE
BLD GP AB SCN CELLS X3 SERPL QL: NORMAL
BNP SERPL-MCNC: 1443 PG/ML (ref 0–99)
BUN SERPL-MCNC: 50 MG/DL (ref 8–23)
BUN SERPL-MCNC: 51 MG/DL (ref 8–23)
CALCIUM SERPL-MCNC: 10.1 MG/DL (ref 8.7–10.5)
CALCIUM SERPL-MCNC: 9.2 MG/DL (ref 8.7–10.5)
CHLORIDE SERPL-SCNC: 103 MMOL/L (ref 95–110)
CHLORIDE SERPL-SCNC: 103 MMOL/L (ref 95–110)
CLARITY UR: CLEAR
CO2 SERPL-SCNC: 27 MMOL/L (ref 23–29)
CO2 SERPL-SCNC: 31 MMOL/L (ref 23–29)
COLOR UR: YELLOW
CREAT SERPL-MCNC: 1.5 MG/DL (ref 0.5–1.4)
CREAT SERPL-MCNC: 1.6 MG/DL (ref 0.5–1.4)
CTP QC/QA: YES
DIFFERENTIAL METHOD: ABNORMAL
DIFFERENTIAL METHOD: ABNORMAL
EOSINOPHIL # BLD AUTO: 0.2 K/UL (ref 0–0.5)
EOSINOPHIL # BLD AUTO: 0.2 K/UL (ref 0–0.5)
EOSINOPHIL NFR BLD: 2.7 % (ref 0–8)
EOSINOPHIL NFR BLD: 2.8 % (ref 0–8)
ERYTHROCYTE [DISTWIDTH] IN BLOOD BY AUTOMATED COUNT: 23.5 % (ref 11.5–14.5)
ERYTHROCYTE [DISTWIDTH] IN BLOOD BY AUTOMATED COUNT: 23.8 % (ref 11.5–14.5)
EST. GFR  (AFRICAN AMERICAN): 46 ML/MIN/1.73 M^2
EST. GFR  (AFRICAN AMERICAN): 50 ML/MIN/1.73 M^2
EST. GFR  (NON AFRICAN AMERICAN): 40 ML/MIN/1.73 M^2
EST. GFR  (NON AFRICAN AMERICAN): 43 ML/MIN/1.73 M^2
FLUAV AG NPH QL: NEGATIVE
FLUBV AG NPH QL: NEGATIVE
GLUCOSE SERPL-MCNC: 102 MG/DL (ref 70–110)
GLUCOSE SERPL-MCNC: 88 MG/DL (ref 70–110)
GLUCOSE UR QL STRIP: NEGATIVE
HCT VFR BLD AUTO: 24.2 % (ref 40–54)
HCT VFR BLD AUTO: 25.9 % (ref 40–54)
HCT VFR BLD AUTO: 29.7 % (ref 40–54)
HGB BLD-MCNC: 6.9 G/DL (ref 14–18)
HGB BLD-MCNC: 7.3 G/DL (ref 14–18)
HGB BLD-MCNC: 8.5 G/DL (ref 14–18)
HGB UR QL STRIP: ABNORMAL
HYALINE CASTS #/AREA URNS LPF: 0 /LPF
KETONES UR QL STRIP: NEGATIVE
LACTATE SERPL-SCNC: 1.8 MMOL/L (ref 0.5–2.2)
LEUKOCYTE ESTERASE UR QL STRIP: ABNORMAL
LIPASE SERPL-CCNC: 58 U/L (ref 4–60)
LYMPHOCYTES # BLD AUTO: 1.2 K/UL (ref 1–4.8)
LYMPHOCYTES # BLD AUTO: 1.4 K/UL (ref 1–4.8)
LYMPHOCYTES NFR BLD: 14.9 % (ref 18–48)
LYMPHOCYTES NFR BLD: 17.3 % (ref 18–48)
MAGNESIUM SERPL-MCNC: 1.5 MG/DL (ref 1.6–2.6)
MCH RBC QN AUTO: 23.1 PG (ref 27–31)
MCH RBC QN AUTO: 23.2 PG (ref 27–31)
MCHC RBC AUTO-ENTMCNC: 28.5 G/DL (ref 32–36)
MCHC RBC AUTO-ENTMCNC: 28.6 G/DL (ref 32–36)
MCV RBC AUTO: 81 FL (ref 82–98)
MCV RBC AUTO: 81 FL (ref 82–98)
MICROSCOPIC COMMENT: ABNORMAL
MONOCYTES # BLD AUTO: 1.1 K/UL (ref 0.3–1)
MONOCYTES # BLD AUTO: 1.3 K/UL (ref 0.3–1)
MONOCYTES NFR BLD: 13 % (ref 4–15)
MONOCYTES NFR BLD: 16.6 % (ref 4–15)
NEUTROPHILS # BLD AUTO: 5 K/UL (ref 1.8–7.7)
NEUTROPHILS # BLD AUTO: 5.4 K/UL (ref 1.8–7.7)
NEUTROPHILS NFR BLD: 65.3 % (ref 38–73)
NEUTROPHILS NFR BLD: 66.5 % (ref 38–73)
NITRITE UR QL STRIP: NEGATIVE
PH UR STRIP: 5 [PH] (ref 5–8)
PLATELET # BLD AUTO: 366 K/UL (ref 150–350)
PLATELET # BLD AUTO: 435 K/UL (ref 150–350)
PMV BLD AUTO: 9.1 FL (ref 9.2–12.9)
PMV BLD AUTO: 9.4 FL (ref 9.2–12.9)
POCT GLUCOSE: 111 MG/DL (ref 70–110)
POCT GLUCOSE: 134 MG/DL (ref 70–110)
POCT GLUCOSE: 92 MG/DL (ref 70–110)
POTASSIUM SERPL-SCNC: 4.3 MMOL/L (ref 3.5–5.1)
POTASSIUM SERPL-SCNC: 4.5 MMOL/L (ref 3.5–5.1)
PROT SERPL-MCNC: 4.9 G/DL (ref 6–8.4)
PROT SERPL-MCNC: 6.1 G/DL (ref 6–8.4)
PROT UR QL STRIP: ABNORMAL
RBC # BLD AUTO: 2.99 M/UL (ref 4.6–6.2)
RBC # BLD AUTO: 3.67 M/UL (ref 4.6–6.2)
RBC #/AREA URNS HPF: 8 /HPF (ref 0–4)
SODIUM SERPL-SCNC: 139 MMOL/L (ref 136–145)
SODIUM SERPL-SCNC: 140 MMOL/L (ref 136–145)
SP GR UR STRIP: 1.01 (ref 1–1.03)
TROPONIN I SERPL DL<=0.01 NG/ML-MCNC: 0.07 NG/ML (ref 0–0.03)
TROPONIN I SERPL DL<=0.01 NG/ML-MCNC: 0.08 NG/ML (ref 0–0.03)
TROPONIN I SERPL DL<=0.01 NG/ML-MCNC: 0.08 NG/ML (ref 0–0.03)
URN SPEC COLLECT METH UR: ABNORMAL
UROBILINOGEN UR STRIP-ACNC: NEGATIVE EU/DL
WBC # BLD AUTO: 7.71 K/UL (ref 3.9–12.7)
WBC # BLD AUTO: 8.15 K/UL (ref 3.9–12.7)
WBC #/AREA URNS HPF: 15 /HPF (ref 0–5)

## 2019-05-07 PROCEDURE — 83735 ASSAY OF MAGNESIUM: CPT

## 2019-05-07 PROCEDURE — 83690 ASSAY OF LIPASE: CPT

## 2019-05-07 PROCEDURE — 25000003 PHARM REV CODE 250: Performed by: INTERNAL MEDICINE

## 2019-05-07 PROCEDURE — G0378 HOSPITAL OBSERVATION PER HR: HCPCS

## 2019-05-07 PROCEDURE — 99215 PR OFFICE/OUTPT VISIT, EST, LEVL V, 40-54 MIN: ICD-10-PCS | Mod: ,,, | Performed by: NURSE PRACTITIONER

## 2019-05-07 PROCEDURE — 63600175 PHARM REV CODE 636 W HCPCS: Performed by: EMERGENCY MEDICINE

## 2019-05-07 PROCEDURE — 93010 EKG 12-LEAD: ICD-10-PCS | Mod: ,,, | Performed by: INTERNAL MEDICINE

## 2019-05-07 PROCEDURE — 96376 TX/PRO/DX INJ SAME DRUG ADON: CPT | Performed by: EMERGENCY MEDICINE

## 2019-05-07 PROCEDURE — 97166 OT EVAL MOD COMPLEX 45 MIN: CPT

## 2019-05-07 PROCEDURE — 87040 BLOOD CULTURE FOR BACTERIA: CPT | Mod: 59

## 2019-05-07 PROCEDURE — 93005 ELECTROCARDIOGRAM TRACING: CPT

## 2019-05-07 PROCEDURE — 84484 ASSAY OF TROPONIN QUANT: CPT

## 2019-05-07 PROCEDURE — 93010 ELECTROCARDIOGRAM REPORT: CPT | Mod: ,,, | Performed by: INTERNAL MEDICINE

## 2019-05-07 PROCEDURE — 85025 COMPLETE CBC W/AUTO DIFF WBC: CPT

## 2019-05-07 PROCEDURE — 85018 HEMOGLOBIN: CPT | Mod: 91

## 2019-05-07 PROCEDURE — 87077 CULTURE AEROBIC IDENTIFY: CPT

## 2019-05-07 PROCEDURE — 96372 THER/PROPH/DIAG INJ SC/IM: CPT | Mod: 59 | Performed by: EMERGENCY MEDICINE

## 2019-05-07 PROCEDURE — 80053 COMPREHEN METABOLIC PANEL: CPT

## 2019-05-07 PROCEDURE — 83605 ASSAY OF LACTIC ACID: CPT

## 2019-05-07 PROCEDURE — 86920 COMPATIBILITY TEST SPIN: CPT

## 2019-05-07 PROCEDURE — 96375 TX/PRO/DX INJ NEW DRUG ADDON: CPT | Performed by: EMERGENCY MEDICINE

## 2019-05-07 PROCEDURE — 99215 OFFICE O/P EST HI 40 MIN: CPT | Mod: ,,, | Performed by: NURSE PRACTITIONER

## 2019-05-07 PROCEDURE — 84484 ASSAY OF TROPONIN QUANT: CPT | Mod: 91

## 2019-05-07 PROCEDURE — 36415 COLL VENOUS BLD VENIPUNCTURE: CPT

## 2019-05-07 PROCEDURE — 85014 HEMATOCRIT: CPT | Mod: 91

## 2019-05-07 PROCEDURE — 25000003 PHARM REV CODE 250: Performed by: EMERGENCY MEDICINE

## 2019-05-07 PROCEDURE — 82962 GLUCOSE BLOOD TEST: CPT

## 2019-05-07 PROCEDURE — 87086 URINE CULTURE/COLONY COUNT: CPT

## 2019-05-07 PROCEDURE — 87088 URINE BACTERIA CULTURE: CPT

## 2019-05-07 PROCEDURE — 87186 SC STD MICRODIL/AGAR DIL: CPT

## 2019-05-07 PROCEDURE — 81000 URINALYSIS NONAUTO W/SCOPE: CPT

## 2019-05-07 PROCEDURE — 83880 ASSAY OF NATRIURETIC PEPTIDE: CPT

## 2019-05-07 PROCEDURE — 97162 PT EVAL MOD COMPLEX 30 MIN: CPT

## 2019-05-07 PROCEDURE — 80053 COMPREHEN METABOLIC PANEL: CPT | Mod: 91

## 2019-05-07 PROCEDURE — 63600175 PHARM REV CODE 636 W HCPCS: Performed by: INTERNAL MEDICINE

## 2019-05-07 PROCEDURE — 86901 BLOOD TYPING SEROLOGIC RH(D): CPT

## 2019-05-07 RX ORDER — FUROSEMIDE 10 MG/ML
40 INJECTION INTRAMUSCULAR; INTRAVENOUS
Status: COMPLETED | OUTPATIENT
Start: 2019-05-07 | End: 2019-05-07

## 2019-05-07 RX ORDER — ACETAMINOPHEN 500 MG
500 TABLET ORAL EVERY 6 HOURS PRN
Status: DISCONTINUED | OUTPATIENT
Start: 2019-05-07 | End: 2019-05-07 | Stop reason: HOSPADM

## 2019-05-07 RX ORDER — CARVEDILOL 6.25 MG/1
25 TABLET ORAL 2 TIMES DAILY
Status: DISCONTINUED | OUTPATIENT
Start: 2019-05-07 | End: 2019-05-07

## 2019-05-07 RX ORDER — DILTIAZEM HYDROCHLORIDE 30 MG/1
30 TABLET, FILM COATED ORAL EVERY 12 HOURS
Status: DISCONTINUED | OUTPATIENT
Start: 2019-05-07 | End: 2019-05-07

## 2019-05-07 RX ORDER — ONDANSETRON 2 MG/ML
8 INJECTION INTRAMUSCULAR; INTRAVENOUS EVERY 8 HOURS PRN
Status: DISCONTINUED | OUTPATIENT
Start: 2019-05-07 | End: 2019-05-07 | Stop reason: HOSPADM

## 2019-05-07 RX ORDER — AMOXICILLIN 250 MG
1 CAPSULE ORAL 2 TIMES DAILY PRN
Status: DISCONTINUED | OUTPATIENT
Start: 2019-05-07 | End: 2019-05-07 | Stop reason: HOSPADM

## 2019-05-07 RX ORDER — HYDROCODONE BITARTRATE AND ACETAMINOPHEN 500; 5 MG/1; MG/1
TABLET ORAL
Status: DISCONTINUED | OUTPATIENT
Start: 2019-05-07 | End: 2019-05-07

## 2019-05-07 RX ORDER — INSULIN ASPART 100 [IU]/ML
3 INJECTION, SOLUTION INTRAVENOUS; SUBCUTANEOUS
Status: DISCONTINUED | OUTPATIENT
Start: 2019-05-07 | End: 2019-05-07 | Stop reason: HOSPADM

## 2019-05-07 RX ORDER — CARVEDILOL 6.25 MG/1
25 TABLET ORAL 2 TIMES DAILY
Status: DISCONTINUED | OUTPATIENT
Start: 2019-05-07 | End: 2019-05-07 | Stop reason: HOSPADM

## 2019-05-07 RX ORDER — TAMSULOSIN HYDROCHLORIDE 0.4 MG/1
0.4 CAPSULE ORAL DAILY
Status: DISCONTINUED | OUTPATIENT
Start: 2019-05-08 | End: 2019-05-07 | Stop reason: HOSPADM

## 2019-05-07 RX ORDER — RAMELTEON 8 MG/1
8 TABLET ORAL NIGHTLY PRN
Status: DISCONTINUED | OUTPATIENT
Start: 2019-05-07 | End: 2019-05-07 | Stop reason: HOSPADM

## 2019-05-07 RX ORDER — HEPARIN SODIUM 5000 [USP'U]/ML
5000 INJECTION, SOLUTION INTRAVENOUS; SUBCUTANEOUS EVERY 8 HOURS
Status: DISCONTINUED | OUTPATIENT
Start: 2019-05-07 | End: 2019-05-07

## 2019-05-07 RX ORDER — PANTOPRAZOLE SODIUM 40 MG/1
40 TABLET, DELAYED RELEASE ORAL DAILY
Status: DISCONTINUED | OUTPATIENT
Start: 2019-05-07 | End: 2019-05-07 | Stop reason: HOSPADM

## 2019-05-07 RX ORDER — TAMSULOSIN HYDROCHLORIDE 0.4 MG/1
0.4 CAPSULE ORAL DAILY
Status: DISCONTINUED | OUTPATIENT
Start: 2019-05-07 | End: 2019-05-07

## 2019-05-07 RX ORDER — FUROSEMIDE 10 MG/ML
20 INJECTION INTRAMUSCULAR; INTRAVENOUS ONCE
Status: COMPLETED | OUTPATIENT
Start: 2019-05-07 | End: 2019-05-07

## 2019-05-07 RX ORDER — GLUCAGON 1 MG
1 KIT INJECTION
Status: DISCONTINUED | OUTPATIENT
Start: 2019-05-07 | End: 2019-05-07 | Stop reason: HOSPADM

## 2019-05-07 RX ORDER — BUMETANIDE 1 MG/1
2 TABLET ORAL DAILY
Status: DISCONTINUED | OUTPATIENT
Start: 2019-05-08 | End: 2019-05-07 | Stop reason: HOSPADM

## 2019-05-07 RX ORDER — IPRATROPIUM BROMIDE AND ALBUTEROL SULFATE 2.5; .5 MG/3ML; MG/3ML
3 SOLUTION RESPIRATORY (INHALATION) EVERY 6 HOURS PRN
Status: DISCONTINUED | OUTPATIENT
Start: 2019-05-07 | End: 2019-05-07 | Stop reason: HOSPADM

## 2019-05-07 RX ORDER — CLONIDINE HYDROCHLORIDE 0.1 MG/1
0.1 TABLET ORAL 3 TIMES DAILY PRN
Status: DISCONTINUED | OUTPATIENT
Start: 2019-05-07 | End: 2019-05-07 | Stop reason: HOSPADM

## 2019-05-07 RX ORDER — HEPARIN SODIUM 5000 [USP'U]/ML
5000 INJECTION, SOLUTION INTRAVENOUS; SUBCUTANEOUS EVERY 12 HOURS
Status: DISCONTINUED | OUTPATIENT
Start: 2019-05-07 | End: 2019-05-07

## 2019-05-07 RX ORDER — MAGNESIUM SULFATE HEPTAHYDRATE 40 MG/ML
2 INJECTION, SOLUTION INTRAVENOUS ONCE
Status: COMPLETED | OUTPATIENT
Start: 2019-05-07 | End: 2019-05-07

## 2019-05-07 RX ORDER — ATORVASTATIN CALCIUM 10 MG/1
20 TABLET, FILM COATED ORAL NIGHTLY
Status: DISCONTINUED | OUTPATIENT
Start: 2019-05-07 | End: 2019-05-07 | Stop reason: HOSPADM

## 2019-05-07 RX ORDER — INSULIN ASPART 100 [IU]/ML
INJECTION, SOLUTION INTRAVENOUS; SUBCUTANEOUS
COMMUNITY

## 2019-05-07 RX ORDER — LEVOTHYROXINE SODIUM 25 UG/1
25 TABLET ORAL
Status: DISCONTINUED | OUTPATIENT
Start: 2019-05-07 | End: 2019-05-07 | Stop reason: HOSPADM

## 2019-05-07 RX ORDER — SODIUM CHLORIDE 0.9 % (FLUSH) 0.9 %
10 SYRINGE (ML) INJECTION
Status: DISCONTINUED | OUTPATIENT
Start: 2019-05-07 | End: 2019-05-07

## 2019-05-07 RX ORDER — INSULIN ASPART 100 [IU]/ML
0-5 INJECTION, SOLUTION INTRAVENOUS; SUBCUTANEOUS
Status: DISCONTINUED | OUTPATIENT
Start: 2019-05-07 | End: 2019-05-07 | Stop reason: HOSPADM

## 2019-05-07 RX ORDER — ASPIRIN 81 MG/1
81 TABLET ORAL DAILY
Status: DISCONTINUED | OUTPATIENT
Start: 2019-05-07 | End: 2019-05-07 | Stop reason: HOSPADM

## 2019-05-07 RX ORDER — SPIRONOLACTONE 25 MG/1
25 TABLET ORAL DAILY
Status: DISCONTINUED | OUTPATIENT
Start: 2019-05-07 | End: 2019-05-07

## 2019-05-07 RX ORDER — LOSARTAN POTASSIUM 25 MG/1
25 TABLET ORAL DAILY
Status: DISCONTINUED | OUTPATIENT
Start: 2019-05-07 | End: 2019-05-07

## 2019-05-07 RX ORDER — IBUPROFEN 200 MG
16 TABLET ORAL
Status: DISCONTINUED | OUTPATIENT
Start: 2019-05-07 | End: 2019-05-07 | Stop reason: HOSPADM

## 2019-05-07 RX ORDER — ENOXAPARIN SODIUM 100 MG/ML
30 INJECTION SUBCUTANEOUS EVERY 24 HOURS
Status: DISCONTINUED | OUTPATIENT
Start: 2019-05-07 | End: 2019-05-07

## 2019-05-07 RX ORDER — NAPROXEN SODIUM 220 MG/1
162 TABLET, FILM COATED ORAL
Status: COMPLETED | OUTPATIENT
Start: 2019-05-07 | End: 2019-05-07

## 2019-05-07 RX ORDER — IBUPROFEN 200 MG
24 TABLET ORAL
Status: DISCONTINUED | OUTPATIENT
Start: 2019-05-07 | End: 2019-05-07 | Stop reason: HOSPADM

## 2019-05-07 RX ORDER — IPRATROPIUM BROMIDE AND ALBUTEROL SULFATE 2.5; .5 MG/3ML; MG/3ML
3 SOLUTION RESPIRATORY (INHALATION) EVERY 6 HOURS
Status: DISCONTINUED | OUTPATIENT
Start: 2019-05-07 | End: 2019-05-07

## 2019-05-07 RX ORDER — BUMETANIDE 1 MG/1
2 TABLET ORAL DAILY
Status: DISCONTINUED | OUTPATIENT
Start: 2019-05-07 | End: 2019-05-07

## 2019-05-07 RX ADMIN — HEPARIN SODIUM 5000 UNITS: 5000 INJECTION, SOLUTION INTRAVENOUS; SUBCUTANEOUS at 08:05

## 2019-05-07 RX ADMIN — ASPIRIN 81 MG 162 MG: 81 TABLET ORAL at 02:05

## 2019-05-07 RX ADMIN — ASPIRIN 81 MG: 81 TABLET, COATED ORAL at 08:05

## 2019-05-07 RX ADMIN — TAMSULOSIN HYDROCHLORIDE 0.4 MG: 0.4 CAPSULE ORAL at 08:05

## 2019-05-07 RX ADMIN — PANTOPRAZOLE SODIUM 40 MG: 40 TABLET, DELAYED RELEASE ORAL at 08:05

## 2019-05-07 RX ADMIN — CEFTRIAXONE 1 G: 1 INJECTION, SOLUTION INTRAVENOUS at 02:05

## 2019-05-07 RX ADMIN — LEVOTHYROXINE SODIUM 25 MCG: 25 TABLET ORAL at 05:05

## 2019-05-07 RX ADMIN — FUROSEMIDE 40 MG: 10 INJECTION, SOLUTION INTRAVENOUS at 01:05

## 2019-05-07 RX ADMIN — MAGNESIUM SULFATE 2 G: 2 INJECTION INTRAVENOUS at 06:05

## 2019-05-07 RX ADMIN — INSULIN ASPART 3 UNITS: 100 INJECTION, SOLUTION INTRAVENOUS; SUBCUTANEOUS at 08:05

## 2019-05-07 RX ADMIN — FUROSEMIDE 20 MG: 10 INJECTION, SOLUTION INTRAMUSCULAR; INTRAVENOUS at 06:05

## 2019-05-07 NOTE — PROGRESS NOTES
ADT 30 order placed for Transportation.      ETA: 6:30PM, Patient will travel via wheelchair with oxygen.  If transportation does not arrive at ETA time nurse will be instructed to follow protocol for transportation below:  How can I get in touch directly with dispatch, if needed?                 Non-emergent dispatch: [phone]                                    Emergent dispatch: [phone]  Non-emergent (stretcher): [phone]  Escalation Needs (PFC Lead): [phone]        Nurse Melvi informed that patient can discharge from  standpoint.     9458- Patient's wife Jessika informed that transportation will arrive at 6:30pm.

## 2019-05-07 NOTE — HPI
Principal Problem:Acute on chronic combined systolic and diastolic heart failure     Chief Complaint:  Shortness of breath for one day.     HPI: Mr. Agus Ramos Jr. is a 78 y.o. male known to me with essential hypertension, type 2 diabetes mellitus (HbA1c 5.0% Mar 2019), CAD s/p CABG, chronic combined systolic and diastolic heart failure (LVEF 50% Mar 2019), CKD Stage 3, peripheral artery disease, chronic atrial fibrillation (CSS3KE1-ZISt score 5) not on chronic anticoagulation, COPD, chronic respiratory failure with hypoxia, hypothyroidism (TSH 5.525 Mar 2019), chronic indwelling urinary catheter, and anemia of chronic disease who presents to [facility] ED with complaints of dyspnea today.  He was recently discharged from [facility] two days ago and was feeling well.  Today however he felt dyspneic and fatigued.  EMS was activated and upon their arrival found that he was with an oxygen saturation of 92%.  He also noted that he was using a 30 ft oxygen tubing and felt that that could be contributing to his hypoxia.  He denies any fevers, chills, nausea, vomiting, chest pain, palpitation, diaphoresis, hemoptysis, coughing, nor any wheezing.  He does have chronic edema to his legs which are not any worse than usual.  He denies any orthopnea nor any PND.  He otherwise has been in his usual state of health.

## 2019-05-07 NOTE — PROGRESS NOTES
1123-Call received from MaddiRN Case Manager at [facility]. Maddi informed TN that Palliative Care was discussed with patient's wife Jessika during a previous hospital admission; however, she doesn't believe the visits were arranged.  TN informed Maddi that it will be discussed with ANTONIO Chacon and patient's wife Jessika.     1130-TN contacted patient's wife Jessika to inquire of her past discussion about palliative care and to inquire if they are interested. Jessika stated she doesn't recall the conversation.

## 2019-05-07 NOTE — NURSING
Pt arrived on unit from the ED dept via stretcher accompanied per transport and family. Pt AAOx3 with no c/o pain. Telemetry monitor in place. Saline lock in place to site is clear. Pt  Admission assessment in progress, pt informed of md orders, oriented to environment and safety maintained with bed low side rails up x2 with nurse call bell within reach

## 2019-05-07 NOTE — SUBJECTIVE & OBJECTIVE
Past Medical History:   Diagnosis Date    Anemia 05/03/2018    pending blood transfusion    Anticoagulant long-term use     apixaban    Atrial fibrillation     CKD (chronic kidney disease)     Congestive heart failure     COPD (chronic obstructive pulmonary disease)     Coronary artery disease     Diabetes mellitus     Encounter for blood transfusion     HLD (hyperlipidemia)     Hypertension     MI (myocardial infarction)     On home oxygen therapy     Pacemaker     left chest    Peripheral vascular disease     Requires assistance with activities of daily living (ADL)     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     S/P femoral-popliteal bypass surgery left    Stented coronary artery     Tobacco use     Unsteady gait        Past Surgical History:   Procedure Laterality Date    CARDIAC CATHETERIZATION      CARDIAC PACEMAKER PLACEMENT      CARDIAC SURGERY      3 vessel CABG    CARDIOVERSION N/A 9/19/2013    [facility]    cardioverted      CORONARY ANGIOPLASTY WITH STENT PLACEMENT      EGD (ESOPHAGOGASTRODUODENOSCOPY) N/A 6/1/2018    [facility]    HEMORRHOID SURGERY      TRANSESOPHAGEAL ECHOCARDIOGRAM (ANIA) N/A 9/19/2013    [facility]    VASCULAR SURGERY      femoral artery popliteal bypass       Review of patient's allergies indicates:  No Known Allergies    Medications:  Continuous Infusions:  Scheduled Meds:   aspirin  81 mg Oral Daily    atorvastatin  20 mg Oral QHS    [START ON 5/8/2019] bumetanide  2 mg Oral Daily    carvedilol  25 mg Oral BID    insulin aspart U-100  3 Units Subcutaneous TIDWM    levothyroxine  25 mcg Oral Before breakfast    pantoprazole  40 mg Oral Daily    [START ON 5/8/2019] tamsulosin  0.4 mg Oral Daily     PRN Meds:acetaminophen, albuterol-ipratropium, cloNIDine, dextrose 50%, dextrose 50%, glucagon (human recombinant), glucose, glucose, insulin aspart U-100, ondansetron, promethazine  (PHENERGAN) IVPB, ramelteon, senna-docusate 8.6-50 mg    Family History     Problem Relation (Age of Onset)    Diabetes Father, Mother        Tobacco Use    Smoking status: Former Smoker     Packs/day: 1.00     Years: 60.00     Pack years: 60.00     Types: Cigarettes     Last attempt to quit: 2018     Years since quittin.9    Smokeless tobacco: Never Used   Substance and Sexual Activity    Alcohol use: No    Drug use: No    Sexual activity: Not on file       Review of Systems   Constitutional: Positive for activity change and fatigue.     Objective:     Vital Signs (Most Recent):  Temp: 97 °F (36.1 °C) (19 1113)  Pulse: 70 (19 1113)  Resp: 18 (19 1113)  BP: (!) 100/52 (19 1113)  SpO2: 100 % (19 1113) Vital Signs (24h Range):  Temp:  [97 °F (36.1 °C)-98.7 °F (37.1 °C)] 97 °F (36.1 °C)  Pulse:  [70-96] 70  Resp:  [16-20] 18  SpO2:  [95 %-100 %] 100 %  BP: ()/(50-82) 100/52     Weight: 84 kg (185 lb 3 oz)  Body mass index is 27.35 kg/m².    Review of Symptoms  Symptom Assessment (ESAS 0-10 scale)   ESAS 0 1 2 3 4 5 6 7 8 9 10   Pain              Dyspnea              Anxiety              Nausea              Depression               Anorexia              Fatigue              Insomnia              Restlessness               Agitation              CAM / Delirium __ --  ___+   Constipation     __ --  ___+   Diarrhea           __ --  ___+  Bowel Management Plan (BMP): {YES,NO:43067}    Comments: ***    Pain Assessment: {Desc; pain:21947}    OME in 24 hours: ***    Performance Status: {PPS:43665}    ECOG Performance Status Grade: {ECO}    Physical Exam    Significant Labs: {Results:81407}  CBC:   Recent Labs   Lab 19  0438 19  0943   WBC 7.71  --    HGB 6.9* 7.3*   HCT 24.2* 25.9*   MCV 81*  --    *  --      BMP:  Recent Labs   Lab 19  0020 19  0438   GLU 88 102    139   K 4.5 4.3    103   CO2 27 31*   BUN 51* 50*   CREATININE  1.6* 1.5*   CALCIUM 10.1 9.2   MG 1.5*  --      LFT:  Lab Results   Component Value Date    AST 20 2019    GGT 26 2019    ALKPHOS 179 (H) 2019    BILITOT 0.2 2019     Albumin:   Albumin   Date Value Ref Range Status   2019 2.0 (L) 3.5 - 5.2 g/dL Final     Protein:   Total Protein   Date Value Ref Range Status   2019 4.9 (L) 6.0 - 8.4 g/dL Final     Lactic acid:   Lab Results   Component Value Date    LACTATE 1.8 2019    LACTATE 2.0 2016       Significant Imaging: {Imaging Review:15804}    Advance Care Planning   Advanced Directives::  Living Will: {DESC; LIVING WILL OHS:18253}  LaPOST: {:89650}  Do Not Resuscitate Status: {:58363}  Medical Power of : {POA:14683}    Decision-Making Capacity: {Decision Makin}       Living Arrangements: {Living Arrangements:93160}    Psychosocial/Cultural:  Patient's most important priorities:  ***    Patient's biggest concerns/fears:  ***    Previous death/end of life care history:  ***    Patient's goals/hopes:  ***    Spiritual:     F- Kate and Belief: ***    I - Importance: ***  .  C - Community: ***    A - Address in Care: ***

## 2019-05-07 NOTE — PLAN OF CARE
"TN met with patient and spoke with patient's wife Jessika to complete discharge needs assessment. TN explained duties of case management to patient.  TN reviewed  "[facility]", "Discharge Planning Begins on Admission"and discussed "Help at Home". Patient lives at home with his wife. Patient will discharge back home with [facility] (PT/OT) when medically stable. Patient discharged [facility].  TN also discussed his responsibilities to manage his health at home. Patient was informed to leave folder at bedside during hospital stay. Contact information added to white board.    Patient Preferred Pharmacy:    [id number]  [place] - [address] [facility]  [address]  [place] [address]  Phone: [phone] Fax: [phone]      Appointment Time Preference: mornings       05/07/19 1110   Discharge Assessment   Assessment Type Discharge Planning Assessment   Confirmed/corrected address and phone number on facesheet? Yes   Assessment information obtained from? Patient   Prior to hospitilization cognitive status: Alert/Oriented   Prior to hospitalization functional status: Independent;Assistive Equipment   Current cognitive status: Alert/Oriented   Current Functional Status: Independent;Assistive Equipment   Lives With spouse   Able to Return to Prior Arrangements yes   Is patient able to care for self after discharge? Yes   Who are your caregiver(s) and their phone number(s)? Jessika-spouse [phone]   Readmission Within the Last 30 Days no previous admission in last 30 days   Patient currently being followed by outpatient case management? No   Patient currently receives any other outside agency services? Yes   Name and contact number of agency or person providing outside services   ([facility] - PT/OT )   Equipment Currently Used at Home walker, rolling;oxygen;cane, straight;crutches   Do you have any problems affording any of your prescribed medications? " No   Is the patient taking medications as prescribed? yes   Does the patient have transportation home? Yes   Transportation Anticipated family or friend will provide   Does the patient receive services at the [facility]? No   Discharge Plan A Home;Home with family;Home Health   Discharge Plan B Home with family;Home Health;Home   Patient/Family in Agreement with Plan yes

## 2019-05-07 NOTE — PT/OT/SLP EVAL
Occupational Therapy   Evaluation    Name: Agus Ramos Jr.  MRN: [id number]  Admitting Diagnosis:  Acute on chronic combined systolic and diastolic heart failure      Recommendations:     Discharge Recommendations: home health OT(with 24* care)  Discharge Equipment Recommendations:  none  Barriers to discharge:  None    Assessment:     Agus Ramos Jr. is a 78 y.o. male with a medical diagnosis of Acute on chronic combined systolic and diastolic heart failure.  He presents with self care and functional mobility defictes R/T generalized weakness. Performance deficits affecting function: weakness, impaired endurance, impaired self care skills, decreased upper extremity function, edema, impaired functional mobilty, gait instability, impaired balance, decreased safety awareness, impaired cardiopulmonary response to activity, pain.      Rehab Prognosis: Fair; patient would benefit from acute skilled OT services to address these deficits and reach maximum level of function.       Plan:     Patient to be seen 5 x/week to address the above listed problems via self-care/home management, therapeutic activities, therapeutic exercises  · Plan of Care Expires: 05/21/19  · Plan of Care Reviewed with: patient    Subjective     Chief Complaint: needs to urinate  Patient/Family Comments/goals: none stated    Occupational Profile:  Living Environment: The patient was recently discharged from SNF. The patient was unable to provide details  Previous level of function: The patient was able to amb using a RW short distances with assist  Roles and Routines: unable to to state and family was not present  Equipment Used at Home:  bedside commode, oxygen, walker, rolling, shower chair, cane, straight  Assistance upon Discharge: spouse    Pain/Comfort:  · Pain Rating 1: (c/o discomfort at stearns site)    Patients cultural, spiritual, Caodaism conflicts given the current situation: no    Objective:     Communicated with: nurseMelvi  prior to session.  Patient found HOB elevated with oxygen, peripheral IV, telemetry upon OT entry to room.    General Precautions: Standard, fall, diabetic, respiratory   Orthopedic Precautions:N/A   Braces: N/A     Occupational Performance:    Bed Mobility:    · Patient completed Scooting/Bridging with stand by assistance  · Patient completed Supine to Sit with stand by assistance and increased time     Functional Mobility/Transfers:  · Patient completed Sit <> Stand Transfer with contact guard assistance  with  rolling walker   · Functional Mobility: The patient amb using a RW with CGA and VC and encouragement    Activities of Daily Living:  · Upper Body Dressing: moderate assistance    · Lower Body Dressing: dependence      Cognitive/Visual Perceptual:  Cognitive/Psychosocial Skills:     -       Oriented to: Person and Place   -       Follows Commands/attention:Follows two-step commands  -       Communication: clear/fluent  -       Memory: Impaired STM  -       Safety awareness/insight to disability: impaired   -       Mood/Affect/Coping skills/emotional control: Appropriate to situation    Physical Exam:  Balance: -       fair+  Postural examination/scapula alignment:    -       Rounded shoulders  -       Forward head  Skin integrity: Visible skin intact  Edema:  minimal in BUE  Upper Extremity Range of Motion:  -       Right Upper Extremity: WFL except shldr  -       Left Upper Extremity: WFL except shldr  Upper Extremity Strength:    -       Right Upper Extremity: WFL  -       Left Upper Extremity: WFL    AMPAC 6 Click ADL:  AMPAC Total Score: 17    Treatment & Education:  The patient participated in OT eval. The patient was educated re: OT role and POC.  Education:    Patient left up in chair with all lines intact, call button in reach and nurseMelvi notified    GOALS:   Multidisciplinary Problems     Occupational Therapy Goals        Problem: Occupational Therapy Goal    Goal Priority Disciplines  Outcome Interventions   Occupational Therapy Goal     OT, PT/OT     Description:  Goals to be met by: 5/21/19     Patient will increase functional independence with ADLs by performing:    UE Dressing with Stand-by Assistance.  Grooming while seated with Modified [place].  Rolling to Bilateral with Modified [place] and Supervision.   Supine to sit with Stand-by Assistance.  Step transfer with Stand-by Assistance  Toilet transfer to bedside commode with Stand-by Assistance.  Upper extremity exercise program x10 reps per handout, with assistance as needed.                      History:     Past Medical History:   Diagnosis Date    Anemia 05/03/2018    pending blood transfusion    Anticoagulant long-term use     apixaban    Atrial fibrillation     CKD (chronic kidney disease)     Congestive heart failure     COPD (chronic obstructive pulmonary disease)     Coronary artery disease     Diabetes mellitus     Encounter for blood transfusion     HLD (hyperlipidemia)     Hypertension     MI (myocardial infarction)     On home oxygen therapy     Pacemaker     left chest    Peripheral vascular disease     Requires assistance with activities of daily living (ADL)     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     S/P femoral-popliteal bypass surgery left    Stented coronary artery     Tobacco use     Unsteady gait        Past Surgical History:   Procedure Laterality Date    CARDIAC CATHETERIZATION      CARDIAC PACEMAKER PLACEMENT      CARDIAC SURGERY      3 vessel CABG    CARDIOVERSION N/A 9/19/2013    [facility]    cardioverted      CORONARY ANGIOPLASTY WITH STENT PLACEMENT      EGD (ESOPHAGOGASTRODUODENOSCOPY) N/A 6/1/2018    Per[facility] Ty [facility]    HEMORRHOID SURGERY      TRANSESOPHAGEAL ECHOCARDIOGRAM (ANIA) N/A 9/19/2013    [facility]    VASCULAR SURGERY      femoral artery popliteal bypass       Time Tracking:     OT Date of  Treatment: 05/07/19  OT Start Time: 1141  OT Stop Time: 1159  OT Total Time (min): 18 min    Billable Minutes:Evaluation 18 (with PT)    Anna Dumont OT  5/7/2019

## 2019-05-07 NOTE — ED TRIAGE NOTES
Pt here via EMS with c/o SOB. EMS reports pt sating 92% on his 2L NC. Pt placed on 3L NC sating 96%, EMS reports clear bilateral lung sounds. Pt has hx of COPD, CHF.

## 2019-05-07 NOTE — PLAN OF CARE
Problem: Physical Therapy Goal  Goal: Physical Therapy Goal  Goals to be met by: 19     Patient will increase functional independence with mobility by performin. Supine to sit with Stand-by Assistance  2. Rolling to Left and Right with Stand-by Assistance  3. Sit to stand transfer with Stand-by Assistance using RW  4. Bed to chair transfer with Stand-by Assistance using Rolling Walker  5. Gait  x50-100 feet with Stand-by Assistance using Rolling Walker and O2  6. Lower extremity exercise program 2 sets  x10 reps per handout, with SBA    Pt ambulated ~30 ft and ~20 ft with CGA using RW, 2L O2 NC, and chair to follow.

## 2019-05-07 NOTE — ASSESSMENT & PLAN NOTE
Patient does appear volume overloaded and has an elevated BNP 1443.  His troponin level is 0.076 which is consistent with previous measurements.  I have personally reviewed his plain chest radiograph in is significant for mild pulmonary edema. I have also reviewed his EKG which was significant for normal sinus rhythm with T-wave inversions in the inferolateral leads which all appear to be chronic.  Will admit for intravenous diuresis with furosemide and careful monitoring of his intake/output.  He recently underwent TTE in March 2019, will defer repeat.

## 2019-05-07 NOTE — DISCHARGE SUMMARY
[facility] Hospital Medicine  Discharge Summary      Patient Name: Agus Ramos Jr.  MRN: [id number]  Admission Date: 5/6/2019  Hospital Length of Stay: 0 days  Discharge Date and Time:  05/07/2019 4:24 PM  Attending Physician: Ace Cisneros MD   Discharging Provider: LEANNE Solano  Primary Care Provider: Keaton Hardy MD      HPI:   Mr. Agus Ramos Jr. is a 78 y.o. male known to me with essential hypertension, type 2 diabetes mellitus (HbA1c 5.0% Mar 2019), CAD s/p CABG, chronic combined systolic and diastolic heart failure (LVEF 50% Mar 2019), CKD Stage 3, peripheral artery disease, chronic atrial fibrillation (KVC8PH2-OCSt score 5) not on chronic anticoagulation, COPD, chronic respiratory failure with hypoxia, hypothyroidism (TSH 5.525 Mar 2019), chronic indwelling urinary catheter, and anemia of chronic disease who presents to [facility] ED with complaints of dyspnea today.  He was recently discharged from [facility] two days ago and was feeling well.  Today however he felt dyspneic and fatigued.  EMS was activated and upon their arrival found that he was with an oxygen saturation of 92%.  He also noted that he was using a 30 ft oxygen tubing and felt that that could be contributing to his hypoxia.  He denies any fevers, chills, nausea, vomiting, chest pain, palpitation, diaphoresis, hemoptysis, coughing, nor any wheezing.  He does have chronic edema to his legs which are not any worse than usual.  He denies any orthopnea nor any PND.  He otherwise has been in his usual state of health.    * No surgery found *      Hospital Course:   Placed in observation for evaluation of FVO. Patient was treated with supplemental oxygen therapy, IV diuretics with good response. He had low H/H, was low about 1 month ago and was transfused 2 units PRBC. He was seen by GI at that time. Patient again appears anemic but refuses blood transfusion. Patient in his usual state of health is  sorely debilitated. During a previous hospitalization the patient had palliative care consult but family was not receptive at that time. Today, palliative care was consulted and spoke to the patient's wife and the patient who agreed for palliative care home visits. He will be discharged to home with palliative care. Vitals stable.     Final Active Diagnoses:    Diagnosis Date Noted POA    PRINCIPAL PROBLEM:  Acute on chronic combined systolic and diastolic heart failure [I50.43] 03/13/2019 Yes    Age-related physical debility [R54] 05/07/2019 Yes    Urinary retention [R33.9] 01/01/2019 Yes    Essential hypertension [I10] 09/24/2016 Yes     Chronic    Type 2 diabetes mellitus, controlled, with renal complications [E11.29] 09/24/2016 Yes     Chronic    Chronic atrial fibrillation [I48.2] 09/19/2013 Yes     Chronic    Hypomagnesemia [E83.42] 05/07/2019 Yes    Chronic combined systolic and diastolic heart failure [I50.42] 05/07/2019 Yes     Chronic    Chronic indwelling urinary catheter [Z92.89] 05/07/2019 Not Applicable     Chronic    Hypothyroidism [E03.9] 05/07/2019 Yes     Chronic    Peripheral artery disease [I73.9] 03/13/2019 Yes     Chronic    Chronic respiratory failure with hypoxia [J96.11] 06/01/2018 Yes     Chronic    Centrilobular emphysema [J43.2] 05/15/2018 Yes     Chronic    Anemia of chronic disease [D63.8] 05/04/2018 Yes     Chronic    CKD Stage 3 [N18.3] 09/24/2016 Yes     Chronic    CAD (coronary artery disease) [I25.10] 09/19/2013 Yes     Chronic      Problems Resolved During this Admission:    Diagnosis Date Noted Date Resolved POA    Acute on chronic congestive heart failure [I50.9] 05/07/2019 05/07/2019 Yes       Discharged Condition: stable    Disposition: Home-Health Care c    Follow Up:  Follow-up Information     Mere Luque MD On 5/14/2019.    Specialties:  Cardiology, INTERVENTIONAL CARDIOLOGY  Why:  Cardiology appt On Tuesday @ 11:00am, outpatient services  Contact  information:  [address]  [address]  [place] [address]  [phone]             [facility].    Why:  Nurse will call you to schedule your home visits  Contact information:  [address] [place] [address] [phone]             [facility].    Specialty:  Hospice Services  Why:  Nurse will call you to schedule your home visits   Contact information:  [address]  [address]  [place] [id number] [phone]                 Patient Instructions:      Diet Cardiac     Activity as tolerated       Significant Diagnostic Studies: Labs:   CMP   Recent Labs   Lab 05/07/19  0020 05/07/19  0438    139   K 4.5 4.3    103   CO2 27 31*   GLU 88 102   BUN 51* 50*   CREATININE 1.6* 1.5*   CALCIUM 10.1 9.2   PROT 6.1 4.9*   ALBUMIN 2.5* 2.0*   BILITOT 0.3 0.2   ALKPHOS 231* 179*   AST 25 20   ALT 17 11   ANIONGAP 10 5*   ESTGFRAFRICA 46* 50*   EGFRNONAA 40* 43*   , CBC   Recent Labs   Lab 05/07/19  0020 05/07/19  0438 05/07/19  0943   WBC 8.15 7.71  --    HGB 8.5* 6.9* 7.3*   HCT 29.7* 24.2* 25.9*   * 366*  --    , INR   Lab Results   Component Value Date    INR 1.1 11/30/2018    INR 1.1 07/09/2018    INR 1.0 05/27/2018   , Lipid Panel   Lab Results   Component Value Date    CHOL 103 (L) 07/10/2018    HDL 37 (L) 07/10/2018    LDLCALC 52.0 (L) 07/10/2018    TRIG 70 07/10/2018    CHOLHDL 35.9 07/10/2018   , Troponin   Recent Labs   Lab 05/07/19  0943   TROPONINI 0.071*    and A1C:   Recent Labs   Lab 11/30/18  1803 03/13/19  1610   HGBA1C 5.1 5.0       Pending Diagnostic Studies:     None         Medications:  Reconciled Home Medications:      Medication List      CONTINUE taking these medications    albuterol-ipratropium 2.5 mg-0.5 mg/3 mL nebulizer solution  Commonly known as:  DUO-NEB  Take 3 mLs by nebulization every 6 (six) hours. Rescue     aspirin 81 MG EC tablet  Commonly known as:  ECOTRIN  Take 1 tablet (81 mg total) by mouth once daily.     atorvastatin 20 MG  tablet  Commonly known as:  LIPITOR  Take 20 mg by mouth every evening.     bumetanide 2 MG tablet  Commonly known as:  BUMEX  Take 1 tablet (2 mg total) by mouth once daily.     carvedilol 25 MG tablet  Commonly known as:  COREG  Take 1 tablet (25 mg total) by mouth 2 (two) times daily.     cinacalcet 30 MG Tab  Commonly known as:  SENSIPAR  Take 1 tablet (30 mg total) by mouth daily with breakfast.     diltiaZEM 30 MG tablet  Commonly known as:  CARDIZEM  Take 1 tablet (30 mg total) by mouth every 12 (twelve) hours.     ferrous gluconate 324 MG tablet  Commonly known as:  FERGON  Take 324 mg by mouth daily with breakfast.     fish oil-omega-3 fatty acids 300-1,000 mg capsule  Take 2 g by mouth 2 (two) times daily.     insulin aspart U-100 100 unit/mL injection  Commonly known as:  NOVOLOG  Inject into the skin as needed for High Blood Sugar (Blood glucose >200).     levothyroxine 25 MCG tablet  Commonly known as:  SYNTHROID  Take 1 tablet (25 mcg total) by mouth before breakfast.     losartan 25 MG tablet  Commonly known as:  COZAAR  Take 25 mg by mouth once daily.     metOLazone 10 MG tablet  Commonly known as:  ZAROXOLYN  Take 1 tablet (10 mg total) by mouth once daily.     nitroGLYCERIN 0.4 MG SL tablet  Commonly known as:  NITROSTAT  Place 0.4 mg under the tongue every 5 (five) minutes as needed.     pantoprazole 40 MG tablet  Commonly known as:  PROTONIX  Take 1 tablet (40 mg total) by mouth once daily.     polyethylene glycol 17 gram Pwpk  Commonly known as:  GLYCOLAX  Take by mouth as needed.     spironolactone 25 MG tablet  Commonly known as:  ALDACTONE  Take 1 tablet (25 mg total) by mouth once daily.     tamsulosin 0.4 mg Cap  Commonly known as:  FLOMAX  Take 1 capsule (0.4 mg total) by mouth once daily.            Indwelling Lines/Drains at time of discharge:   Lines/Drains/Airways     Drain                 Urethral Catheter 05/06/19 1500 Straight-tip 1 day                Time spent on the discharge of  patient: 35 minutes  Patient was seen and examined on the date of discharge and determined to be suitable for discharge.         ANDRE Flower, FNP-C  Hospitalist - Department of Hospital Medicine  [address] [place] [address]  Office [phone]; Pager [phone]

## 2019-05-07 NOTE — PT/OT/SLP EVAL
Physical Therapy Evaluation    Patient Name:  Agus Ramos Jr.   MRN:  [id number]    Recommendations:     Discharge Recommendations:  home health PT(with 24 hour supervision/assistance)   Discharge Equipment Recommendations: none   Barriers to discharge: None    Assessment:     Agus Ramos Jr. is a 78 y.o. male admitted with a medical diagnosis of Acute on chronic combined systolic and diastolic heart failure.  He presents with the following impairments/functional limitations:  weakness, impaired endurance, impaired functional mobilty, gait instability, impaired balance, impaired cognition, decreased lower extremity function, decreased safety awareness, impaired cardiopulmonary response to activity, edema.    Rehab Prognosis: Fair+; patient would benefit from acute skilled PT services to address these deficits and reach maximum level of function.    Recent Surgery: * No surgery found *      Plan:     During this hospitalization, patient to be seen 5 x/week(M-F) to address the identified rehab impairments via gait training, therapeutic activities, therapeutic exercises and progress toward the following goals:    · Plan of Care Expires:  05/21/19    Subjective     Chief Complaint: N/A  Patient/Family Comments/goals: Pt stated that he will see what he can do.   Pain/Comfort:  · Pain Rating 1: (Pt c/o discomfort with stearns catheter.)    Living Environment:  Pt lives with spouse in a [facility] with no concerns.   Prior to admission, patients level of function was ambulatory with RW and home O2.  Equipment used at home: walker, rolling, shower chair, cane, straight, bedside commode, oxygen.   Upon discharge, patient will have assistance from family.    Objective:     Communicated with nurse Ogden prior to session.  Patient found HOB elevated with peripheral IV, telemetry, oxygen 2L upon PT entry to room.    General Precautions: Standard, fall, diabetic, respiratory   Orthopedic Precautions:N/A   Braces: N/A      Exams:  · Cognitive Exam:  Patient able to follow simple commands, required repetitions.  · Gross Motor Coordination:  WFL  · Postural Exam:  Patient presented with the following abnormalities:    · -       Rounded shoulders  · -       Forward head  · -       Trunk flexion  · Sensation:    · -       Intact  light/touch BLE  · Skin Integrity/Edema:      · -       Skin integrity: Visible skin intact  · -       Edema: Mild BLE  · BLE ROM: WFL  · BLE Strength: WFL    Functional Mobility:  · Bed Mobility:     · Scooting: contact guard assistance  · Supine to Sit: minimum assistance with LE and HOB elevated   · Transfers:     · Sit to Stand:  contact guard assistance and minimum assistance with rolling walker  · Bed to Chair: contact guard assistance with  rolling walker  using  Step Transfer  · Gait: Pt ambulated ~30 ft and ~20 ft with CGA using RW, 2L O2 NC, and chair to follow.  Pt with minimal forward flexed posture, decreased step length and arnold.  Pt with decreased safety awareness.    · Balance: Pt with fair dynamic standing balance.       Therapeutic Activities and Exercises:  Pt able to perform LAQ and AP.      AM-PAC 6 CLICK MOBILITY  Total Score:18     Patient left up in chair reclined with BLE elevated on pillows with all lines intact, call button in reach and nurse Melvi notified.    GOALS:   Multidisciplinary Problems     Physical Therapy Goals        Problem: Physical Therapy Goal    Goal Priority Disciplines Outcome Goal Variances Interventions   Physical Therapy Goal     PT, PT/OT      Description:  Goals to be met by: 19     Patient will increase functional independence with mobility by performin. Supine to sit with Stand-by Assistance  2. Rolling to Left and Right with Stand-by Assistance  3. Sit to stand transfer with Stand-by Assistance using RW  4. Bed to chair transfer with Stand-by Assistance using Rolling Walker  5. Gait  x50-100 feet with Stand-by Assistance using Rolling  Walker and O2  6. Lower extremity exercise program 2 sets  x10 reps per handout, with SBA                      History:     Past Medical History:   Diagnosis Date    Anemia 05/03/2018    pending blood transfusion    Anticoagulant long-term use     apixaban    Atrial fibrillation     CKD (chronic kidney disease)     Congestive heart failure     COPD (chronic obstructive pulmonary disease)     Coronary artery disease     Diabetes mellitus     Encounter for blood transfusion     HLD (hyperlipidemia)     Hypertension     MI (myocardial infarction)     On home oxygen therapy     Pacemaker     left chest    Peripheral vascular disease     Requires assistance with activities of daily living (ADL)     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     S/P femoral-popliteal bypass surgery left    Stented coronary artery     Tobacco use     Unsteady gait        Past Surgical History:   Procedure Laterality Date    CARDIAC CATHETERIZATION      CARDIAC PACEMAKER PLACEMENT      CARDIAC SURGERY      3 vessel CABG    CARDIOVERSION N/A 9/19/2013    [facility]    cardioverted      CORONARY ANGIOPLASTY WITH STENT PLACEMENT      EGD (ESOPHAGOGASTRODUODENOSCOPY) N/A 6/1/2018    [facility]    HEMORRHOID SURGERY      TRANSESOPHAGEAL ECHOCARDIOGRAM (ANIA) N/A 9/19/2013    [facility]    VASCULAR SURGERY      femoral artery popliteal bypass       Time Tracking:     PT Received On: 05/07/19  PT Start Time: 1141     PT Stop Time: 1158  PT Total Time (min): 17 min     Billable Minutes: Evaluation 17 min co-eval with YONAS Montano, PT  05/07/2019

## 2019-05-07 NOTE — HPI
Mr. Agus Ramos Jr. is a 78 y.o. male known to me with essential hypertension, type 2 diabetes mellitus (HbA1c 5.0% Mar 2019), CAD s/p CABG, chronic combined systolic and diastolic heart failure (LVEF 50% Mar 2019), CKD Stage 3, peripheral artery disease, chronic atrial fibrillation ([id number]-DVHb score 5) not on chronic anticoagulation, COPD, chronic respiratory failure with hypoxia, hypothyroidism (TSH 5.525 Mar 2019), chronic indwelling urinary catheter, and anemia of chronic disease who presents to [facility] ED with complaints of dyspnea today.  He was recently discharged from [facility] two days ago and was feeling well.  Today however he felt dyspneic and fatigued.  EMS was activated and upon their arrival found that he was with an oxygen saturation of 92%.  He also noted that he was using a 30 ft oxygen tubing and felt that that could be contributing to his hypoxia.  He denies any fevers, chills, nausea, vomiting, chest pain, palpitation, diaphoresis, hemoptysis, coughing, nor any wheezing.  He does have chronic edema to his legs which are not any worse than usual.  He denies any orthopnea nor any PND.  He otherwise has been in his usual state of health.

## 2019-05-07 NOTE — PROGRESS NOTES
HOW TO MANAGE YOUR CARE  AT HOME:  TN taught Symptoms and Problems for CHF home care with pt and   with teach back:  1. Swelling of feet and or ankles, 2. SOB, 3. Low SALT. TN placed education sheet in [employer]..       TN taught patient about things he is responsible for when discharged TO HELP WITH his RECOVERY:  How to Manage his Care At Home:  1. Getting his prescriptions filled.  2. Taking his medications as directed. DO NOT MISS ANY DOSES!  3. Going to his follow-up doctor appointments.   .  Jillian Perez RN, BSN, STN CCM

## 2019-05-07 NOTE — ED PROVIDER NOTES
Encounter Date: 5/6/2019    SCRIBE #1 NOTE: I, Marek Kurtz, am scribing for, and in the presence of,  Arpit BLAS). I have scribed the following portions of the note - Other sections scribed: HPI, ROS, PE, ED Management.       History     Chief Complaint   Patient presents with    Shortness of Breath     Pt here via EMS with c/o SOB. EMS reports pt sating 92% on his 2L NC. Pt placed on 3L NC sating 96%, EMS reports clear bilateral lung sounds. Pt has hx of COPD, CHF.      Patient is an 78 year old male with a PMHx of COPD and CHF who presents to the ED via EMS with complaints of shortness of breath. EMS reports that the patient was on 2 L NC but a 20 foot cord. During that time his O2 Saturation was at 92%.EMS informs that after being placed on a shorter cord, his O2 Saturation on 3 L increased 95%. Thinks he has worsened. Feels lightheaded and tired. Did not fall. Has been taking his medication. Urinating and BM have been fine. Denies fever.    Patient has been seen in the past for similar complaints. He was admitted to this facility March 23rd, 2019. Patient's wife assists with history and reports that the patient has been eating much better.    3/19/19  ECHO  · Low normal left ventricular systolic function. The estimated ejection fraction is 50%  · Concentric left ventricular hypertrophy.  · Severe left atrial enlargement.  · Indeterminate left ventricular diastolic function.  · Moderate right ventricular enlargement.  · Mild-to-moderate mitral regurgitation.  · Pulmonary hypertension present.  · The estimated PA systolic pressure is 66 mm Hg  · Small pericardial effusion.    5/4/18  Stress test  EKG Conclusions:    1. The EKG portion of this study is uninterpretable for ischemia at a peak heart rate of 91 bpm (65% of predicted).   2. Blood pressure remained stable throughout the protocol  (Presenting BP: 153/96 Peak BP: 123/90).   3. No significant arrhythmias were present.   4. There were no symptoms of  chest discomfort or significant dyspnea throughout the protocol.         Review of patient's allergies indicates:  No Known Allergies  Past Medical History:   Diagnosis Date    Anemia 2018    pending blood transfusion    Anticoagulant long-term use     apixaban    Atrial fibrillation     CKD (chronic kidney disease)     Congestive heart failure     COPD (chronic obstructive pulmonary disease)     Coronary artery disease     Diabetes mellitus     Encounter for blood transfusion     HLD (hyperlipidemia)     Hypertension     MI (myocardial infarction)     On home oxygen therapy     Pacemaker     left chest    Peripheral vascular disease     Requires assistance with activities of daily living (ADL)     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     S/P femoral-popliteal bypass surgery left    Stented coronary artery     Tobacco use     Unsteady gait      Past Surgical History:   Procedure Laterality Date    CARDIAC CATHETERIZATION      CARDIAC PACEMAKER PLACEMENT      CARDIAC SURGERY      3 vessel CABG    CARDIOVERSION N/A 2013    [facility]    cardioverted      CORONARY ANGIOPLASTY WITH STENT PLACEMENT      EGD (ESOPHAGOGASTRODUODENOSCOPY) N/A 2018    [facility] ENDO    HEMORRHOID SURGERY      TRANSESOPHAGEAL ECHOCARDIOGRAM (ANIA) N/A 2013    [facility]    VASCULAR SURGERY      femoral artery popliteal bypass     Family History   Problem Relation Age of Onset    Diabetes Father     Diabetes Mother      Social History     Tobacco Use    Smoking status: Former Smoker     Packs/day: 1.00     Years: 60.00     Pack years: 60.00     Types: Cigarettes     Last attempt to quit: 2018     Years since quittin.9    Smokeless tobacco: Never Used   Substance Use Topics    Alcohol use: No    Drug use: No     Review of Systems   Constitutional: Positive for fatigue. Negative for chills and fever.   Respiratory:  Positive for shortness of breath.    Cardiovascular: Negative for chest pain.   Gastrointestinal: Negative for abdominal pain, diarrhea, nausea and vomiting.   Skin: Negative for rash and wound.   Neurological: Positive for weakness.   All other systems reviewed and are negative.      Physical Exam     Initial Vitals [05/06/19 2339]   BP Pulse Resp Temp SpO2   110/82 75 18 98.3 °F (36.8 °C) 96 %      MAP       --         Physical Exam    Nursing note and vitals reviewed.  Constitutional: He appears well-developed. He is Obese .   Elderly male   HENT:   Head: Normocephalic and atraumatic.   Mouth/Throat: Oropharynx is clear and moist.   Eyes: EOM are normal. Pupils are equal, round, and reactive to light.   Neck: Normal range of motion. JVD (present) present.   Cardiovascular: Normal rate and regular rhythm.   Pulmonary/Chest: Breath sounds normal. No stridor. No respiratory distress. He has no wheezes.   Abdominal: Soft. Bowel sounds are normal. He exhibits no distension. There is no tenderness.   Genitourinary:   Genitourinary Comments: Boucher Catheter in place.     Penis and scrotum edematous   Musculoskeletal: Normal range of motion. He exhibits no tenderness.   1+ edema to thighs bilaterally which is baseline   Neurological: He is alert and oriented to person, place, and time. He has normal strength. GCS score is 15. GCS eye subscore is 4. GCS verbal subscore is 5. GCS motor subscore is 6.   Skin: Skin is warm and dry. Capillary refill takes less than 2 seconds.   Psychiatric: He has a normal mood and affect. Thought content normal.         ED Course   Procedures  Labs Reviewed   B-TYPE NATRIURETIC PEPTIDE - Abnormal; Notable for the following components:       Result Value    BNP 1,443 (*)     All other components within normal limits   CBC W/ AUTO DIFFERENTIAL - Abnormal; Notable for the following components:    RBC 3.67 (*)     Hemoglobin 8.5 (*)     Hematocrit 29.7 (*)     Mean Corpuscular Volume 81 (*)      Mean Corpuscular Hemoglobin 23.2 (*)     Mean Corpuscular Hemoglobin Conc 28.6 (*)     RDW 23.5 (*)     Platelets 435 (*)     Mono # 1.1 (*)     Lymph% 17.3 (*)     All other components within normal limits   COMPREHENSIVE METABOLIC PANEL - Abnormal; Notable for the following components:    BUN, Bld 51 (*)     Creatinine 1.6 (*)     Albumin 2.5 (*)     Alkaline Phosphatase 231 (*)     eGFR if  46 (*)     eGFR if non  40 (*)     All other components within normal limits   MAGNESIUM - Abnormal; Notable for the following components:    Magnesium 1.5 (*)     All other components within normal limits   TROPONIN I - Abnormal; Notable for the following components:    Troponin I 0.076 (*)     All other components within normal limits   URINALYSIS, REFLEX TO URINE CULTURE - Abnormal; Notable for the following components:    Protein, UA 1+ (*)     Occult Blood UA 2+ (*)     Leukocytes, UA 3+ (*)     All other components within normal limits    Narrative:     Preferred Collection Type->Urine, Clean Catch   URINALYSIS MICROSCOPIC - Abnormal; Notable for the following components:    RBC, UA 8 (*)     WBC, UA 15 (*)     Bacteria Few (*)     All other components within normal limits    Narrative:     Preferred Collection Type->Urine, Clean Catch   CULTURE, BLOOD   CULTURE, BLOOD   CULTURE, URINE   LIPASE   LACTIC ACID, PLASMA   POCT INFLUENZA A/B     EKG Readings: (Independently Interpreted)   EKG done 1:04 a.m. showing sinus with a first-degree AV block.  Rate of 74.  Bundle branch block.  Flipped T-waves inferiorly and laterally.  Normal axis.  Compared to previous EKGs and improved due to normal sinus rhythm as post AFib with RVR.       Imaging Results          X-Ray Chest AP Portable (Final result)  Result time 05/07/19 01:08:26    Final result by Twila Garrett MD (05/07/19 01:08:26)                 Impression:      Pulmonary vascular congestion.  Pleural effusions.  Findings may suggest  CHF.    Broken sternal wires.      Electronically signed by: wTila Garrett  Date:    05/07/2019  Time:    01:08             Narrative:    EXAMINATION:  AP PORTABLE CHEST    CLINICAL HISTORY:  sob;    TECHNIQUE:  AP portable chest radiograph was submitted.    COMPARISON:  12/27/2012 report    FINDINGS:  Median sternotomy changes are present.  A left subclavian pacer is present.  There is several broken sternal wires.  There is a left subclavian pacer.  AP portable chest radiograph demonstrates a cardiac silhouette, which is difficult to measure given the degree of inspiratory effort.  Dense vascular calcification is seen at the aortic knob.  There is mild asymmetrical elevation the right hemidiaphragm.  There is pulmonary vascular congestion and bilateral pleural effusions.  There are overlying cardiac leads.                                 Medical Decision Making:   History:   Old Medical Records: I decided to obtain old medical records.  Initial Assessment:   48-year-old male coming in secondary to fatigue and feeling slightly lightheaded and some shortness of breath while he was on a 20 ft tubing for his oxygen.  Patient told EMS he felt better after being placed on the 3 L of oxygen while here.  Patient clinically looks fluid overloaded however patient always look fluid overloaded on his exam.  Will look for any major types of infection on the patient.  I think likely due to his decreased oxygen saturation is the reason why he was feeling the way he was.  Will also look for any major infections.  Patient has indwelling Boucher and so UTI is a consideration.  Patient's vitals do not show sepsis the patient.  Will had cultures for completeness.  Point of care glucose 92.    [email or web address]    Also considered but less likely:     PE: normal rate, O2 saturation normal on his baseline oxygen requirement  Pneumonia: chest xray negative. No fever or leukoscytosis. No cough and  lungs non consistent with pna  Tamponade: unlikely due to chest xray and ekg  STEMI: No STEMI on ekg  Dissection: equal pulses bilaterally and no ripping chest pain to the back  Esophageal rupture: no dysphagia or vomiting and chest xray negative for mediastinal air  Arrhythmia: no arrhythmia on ekg  Pneumothorax: bilateral breath sounds and no signs of pneumothorax on chest xray   Asthma:  No wheezing bilaterally  COPD:  No wheezing bilaterally  Patient's abdominal exam is also reassuring so less likely obstruction or ileitis or gallbladder or pancreatitic disease.    Troponin is lower than what it normally is but actually higher than the previous.  His BNP is also elevated from his baseline and his chest x-ray shows cardiomegaly and pleural effusion and pulmonary vascular congestion.  IV Lasix ordered for the patient.  Due to his age and multiple medical conditions and shortness of breath I am admitting patient for observation further fluid removal.  Patient and family agreeable with this.  Pending urinalysis.  Patient was given aspirin.  Chronic kidney disease at baseline.    UA concerning for UTI.  IV ceftriaxone ordered for patient.    Differential Diagnosis:   CHF exacerbation   Clinical Tests:   Lab Tests: Ordered and Reviewed  Radiological Study: Ordered and Reviewed  Medical Tests: Ordered and Reviewed  ED Management:  2:20: Spoke with Dr. Yip, hospitalist on call. Discussed patients case in detail. History, vital signs, and treatment plan were discussed. Dr. Yip agrees with plan of action: Admission.                            Clinical Impression:       ICD-10-CM ICD-9-CM   1. Acute on chronic congestive heart failure, unspecified heart failure type I50.9 428.0   2. SOB (shortness of breath) R06.02 786.05   3. Urinary tract infection without hematuria, site unspecified N39.0 599.0                                Arpit Flores MD  05/07/19 [id number]

## 2019-05-07 NOTE — SUBJECTIVE & OBJECTIVE
Past Medical History:   Diagnosis Date    Anemia 05/03/2018    pending blood transfusion    Anticoagulant long-term use     apixaban    Atrial fibrillation     CKD (chronic kidney disease)     Congestive heart failure     COPD (chronic obstructive pulmonary disease)     Coronary artery disease     Diabetes mellitus     Encounter for blood transfusion     HLD (hyperlipidemia)     Hypertension     MI (myocardial infarction)     On home oxygen therapy     Pacemaker     left chest    Peripheral vascular disease     Requires assistance with activities of daily living (ADL)     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     S/P femoral-popliteal bypass surgery left    Stented coronary artery     Tobacco use     Unsteady gait        Past Surgical History:   Procedure Laterality Date    CARDIAC CATHETERIZATION      CARDIAC PACEMAKER PLACEMENT      CARDIAC SURGERY      3 vessel CABG    CARDIOVERSION N/A 9/19/2013    [facility]    cardioverted      CORONARY ANGIOPLASTY WITH STENT PLACEMENT      EGD (ESOPHAGOGASTRODUODENOSCOPY) N/A 6/1/2018    [facility]    HEMORRHOID SURGERY      TRANSESOPHAGEAL ECHOCARDIOGRAM (ANIA) N/A 9/19/2013    [facility]    VASCULAR SURGERY      femoral artery popliteal bypass       Review of patient's allergies indicates:  No Known Allergies    No current facility-administered medications on file prior to encounter.      Current Outpatient Medications on File Prior to Encounter   Medication Sig    albuterol-ipratropium 2.5mg-0.5mg/3mL (DUO-NEB) 0.5 mg-3 mg(2.5 mg base)/3 mL nebulizer solution Take 3 mLs by nebulization every 6 (six) hours. Rescue    aspirin (ECOTRIN) 81 MG EC tablet Take 1 tablet (81 mg total) by mouth once daily.    atorvastatin (LIPITOR) 20 MG tablet Take 20 mg by mouth every evening.    bumetanide (BUMEX) 2 MG tablet Take 1 tablet (2 mg total) by mouth once daily.    cinacalcet (SENSIPAR)  30 MG Tab Take 1 tablet (30 mg total) by mouth daily with breakfast.    diltiaZEM (CARDIZEM) 30 MG tablet Take 1 tablet (30 mg total) by mouth every 12 (twelve) hours.    ferrous gluconate (FERGON) 324 MG tablet Take 324 mg by mouth daily with breakfast.    fish oil-omega-3 fatty acids 300-1,000 mg capsule Take 2 g by mouth 2 (two) times daily.     insulin aspart U-100 (NOVOLOG) 100 unit/mL injection Inject into the skin as needed for High Blood Sugar (Blood glucose >200).    levothyroxine (SYNTHROID) 25 MCG tablet Take 1 tablet (25 mcg total) by mouth before breakfast.    losartan (COZAAR) 25 MG tablet Take 25 mg by mouth once daily.     metOLazone (ZAROXOLYN) 10 MG tablet Take 1 tablet (10 mg total) by mouth once daily.    spironolactone (ALDACTONE) 25 MG tablet Take 1 tablet (25 mg total) by mouth once daily.    carvedilol (COREG) 25 MG tablet Take 1 tablet (25 mg total) by mouth 2 (two) times daily.    nitroGLYCERIN (NITROSTAT) 0.4 MG SL tablet Place 0.4 mg under the tongue every 5 (five) minutes as needed.    pantoprazole (PROTONIX) 40 MG tablet Take 1 tablet (40 mg total) by mouth once daily.    polyethylene glycol (GLYCOLAX) 17 gram PwPk Take by mouth as needed.     tamsulosin (FLOMAX) 0.4 mg Cap Take 1 capsule (0.4 mg total) by mouth once daily.     Family History     Problem Relation (Age of Onset)    Diabetes Father, Mother        Tobacco Use    Smoking status: Former Smoker     Packs/day: 1.00     Years: 60.00     Pack years: 60.00     Types: Cigarettes     Last attempt to quit: 2018     Years since quittin.9    Smokeless tobacco: Never Used   Substance and Sexual Activity    Alcohol use: No    Drug use: No    Sexual activity: Not on file     Review of Systems   Constitutional: Positive for fatigue. Negative for activity change, appetite change, chills, diaphoresis, fever and unexpected weight change.   HENT: Negative.    Eyes: Negative.    Respiratory: Positive for shortness of  breath. Negative for cough, chest tightness and wheezing.    Cardiovascular: Positive for leg swelling. Negative for chest pain and palpitations.   Gastrointestinal: Negative for abdominal distention, abdominal pain, blood in stool, constipation, diarrhea, nausea and vomiting.   Genitourinary: Negative for dysuria and hematuria.   Musculoskeletal: Negative.    Skin: Negative.    Neurological: Negative for dizziness, seizures, syncope, weakness and light-headedness.   Psychiatric/Behavioral: Negative.      Objective:     Vital Signs (Most Recent):  Temp: 98.7 °F (37.1 °C) (05/07/19 0417)  Pulse: 78 (05/07/19 0417)  Resp: 17 (05/07/19 0417)  BP: (!) 107/53 (05/07/19 0417)  SpO2: 100 % (05/07/19 0417) Vital Signs (24h Range):  Temp:  [98.2 °F (36.8 °C)-98.7 °F (37.1 °C)] 98.7 °F (37.1 °C)  Pulse:  [75-82] 78  Resp:  [16-20] 17  SpO2:  [95 %-100 %] 100 %  BP: (103-117)/(52-82) 107/53     Weight: 84 kg (185 lb 3 oz)  Body mass index is 27.35 kg/m².    Physical Exam   Constitutional: He is oriented to person, place, and time. He appears well-developed and well-nourished. No distress.   HENT:   Head: Normocephalic and atraumatic.   Right Ear: External ear normal.   Left Ear: External ear normal.   Nose: Nose normal.   Eyes: Right eye exhibits no discharge. Left eye exhibits no discharge.   Neck: Normal range of motion.   Cardiovascular:   Regular rate and rhythm with a Grade II/VI holosystolic murmur, no gallop   Pulmonary/Chest:   Mildly increased respiratory effort with bibasilar crackles   Abdominal: Soft. Bowel sounds are normal. He exhibits no distension. There is no tenderness. There is no rebound and no guarding.   Musculoskeletal: Normal range of motion. He exhibits edema (There is significant 2+ pitting edema up to the hips bilaterally and symmetrically).   Neurological: He is alert and oriented to person, place, and time.   Skin: Skin is warm and dry. He is not diaphoretic. No erythema.   Psychiatric: He has a  normal mood and affect. His behavior is normal. Judgment and thought content normal.   Nursing note and vitals reviewed.          Significant Labs: All pertinent labs within the past 24 hours have been reviewed.    Significant Imaging: I have reviewed and interpreted all pertinent imaging results/findings within the past 24 hours.

## 2019-05-07 NOTE — PLAN OF CARE
[facility]    PALLIATIVE CARE ORDERS  FACE TO FACE ENCOUNTER    Patient Name: Agus Ramos Jr.  YOB: 1940    PCP: Keaton Hardy MD   PCP Address: [address] / [place] [address]  PCP Phone Number: [phone]  PCP Fax: [phone]       Encounter Date: 05/07/2019    Admit to Palliative Care    Diagnoses:  Active Hospital Problems    Diagnosis  POA    *Acute on chronic combined systolic and diastolic heart failure [I50.43]  Yes    Essential hypertension [I10]  Yes     Priority: 3      Chronic    Type 2 diabetes mellitus, controlled, with renal complications [E11.29]  Yes     Priority: 3      Chronic    Chronic atrial fibrillation [I48.2]  Yes     Priority: 6      Chronic    Hypomagnesemia [E83.42]  Yes    Chronic combined systolic and diastolic heart failure [I50.42]  Yes     Chronic    Chronic indwelling urinary catheter [Z92.89]  Not Applicable     Chronic    Hypothyroidism [E03.9]  Yes     Chronic    Peripheral artery disease [I73.9]  Yes     Chronic    Chronic respiratory failure with hypoxia [J96.11]  Yes     Chronic    Centrilobular emphysema [J43.2]  Yes     Chronic     Patient is not interested in a controller, does not feel he gets relief from duoneb nebulizer.      Anemia of chronic disease [D63.8]  Yes     Chronic    CKD Stage 3 [N18.3]  Yes     Chronic    CAD (coronary artery disease) [I25.10]  Yes     Chronic      Resolved Hospital Problems    Diagnosis Date Resolved POA    Acute on chronic congestive heart failure [I50.9] 05/07/2019 Yes       Future Appointments   Date Time Provider Department Center   5/14/2019 11:00 AM Mere Luque MD [facility] CARDIO [facility] Hos   6/5/2019  3:45 PM ROGERIO Wiggins MD [facility] URO [facility] Cli   6/20/2019 10:45 AM [facility] US ROOM 4 [facility] ULSOUND [facility]   7/9/2019 10:00 AM ROGERIO Wiggins MD [facility] URO [facility]     Follow-up Information     Mere Luque MD On 5/14/2019.    Specialties:   Cardiology, INTERVENTIONAL CARDIOLOGY  Why:  Cardiology appt On Tuesday @ 11:00am, outpatient services  Contact information:  [address]  [address]  [place] [address]  [phone]             [facility]    Why:  Nurse will call you to schedule your home visits  Contact information:  [address] [place] [address]  [phone]             [facility] [place].    Specialty:  Hospice Services  Why:  Nurse will call you to schedule your home visits   Contact information:  [address]  [address]  [place] [address]  [phone]                     I have seen and examined this patient face to face today. My clinical findings that support the need for the home health skilled services and home bound status are the following:  Weakness/numbness causing balance and gait disturbance due to Coronary Heart Disease, COPD Exacerbation and Weakness/Debility making it taxing to leave home.  Requiring assistive device to leave home due to unsteady gait caused by  Heart Failure and Weakness/Debility.  Patient with medication mismanagement issues requiring home bound status as evidenced by  Unstable vital signs (blood pressure, heart rate), Poor understanding of medication regimen/dosage, Poor adherence to medication regimen/dosage and Uncontrolled hyperglycemic/hypoglycemic events.  Medical restrictions requiring assistance of another human to leave home due to  Dyspnea on exertion (SOB), Fluid/volume overload, Unstable ambulation, Decreased range of motions in extremities and Home oxygen requirement.    Allergies:Review of patient's allergies indicates:  No Known Allergies    Diet: cardiac diet and 2 gram sodium diet    Activities: activity as tolerated    Nursing:   SN to complete comprehensive assessment including routine vital signs. Instruct on disease process and s/s of complications to report to MD. Review/verify medication list sent home with the patient at time of discharge  and instruct  patient/caregiver as needed. Frequency may be adjusted depending on start of care date.    Notify MD if SBP > 160 or < 90; DBP > 90 or < 50; HR > 120 or < 50; Temp > 101; Other:       CONSULTS:    Physical Therapy to evaluate and treat. Evaluate for home safety and equipment needs; Establish/upgrade home exercise program. Perform / instruct on therapeutic exercises, gait training, transfer training, and Range of Motion.  Occupational Therapy to evaluate and treat. Evaluate home environment for safety and equipment needs. Perform/Instruct on transfers, ADL training, ROM, and therapeutic exercises.   to evaluate for community resources/long-range planning.  Aide to provide assistance with personal care, ADLs, and vital signs.    MISCELLANEOUS CARE:  N/A    WOUND CARE ORDERS  n/a      Medications: Review discharge medications with patient and family and provide education.      Current Discharge Medication List      CONTINUE these medications which have NOT CHANGED    Details   albuterol-ipratropium 2.5mg-0.5mg/3mL (DUO-NEB) 0.5 mg-3 mg(2.5 mg base)/3 mL nebulizer solution Take 3 mLs by nebulization every 6 (six) hours. Rescue  Qty: 1 Box, Refills: 0      aspirin (ECOTRIN) 81 MG EC tablet Take 1 tablet (81 mg total) by mouth once daily.  Refills: 0      atorvastatin (LIPITOR) 20 MG tablet Take 20 mg by mouth every evening.      bumetanide (BUMEX) 2 MG tablet Take 1 tablet (2 mg total) by mouth once daily.  Qty: 90 tablet, Refills: 3      cinacalcet (SENSIPAR) 30 MG Tab Take 1 tablet (30 mg total) by mouth daily with breakfast.  Qty: 30 tablet, Refills: 11      diltiaZEM (CARDIZEM) 30 MG tablet Take 1 tablet (30 mg total) by mouth every 12 (twelve) hours.  Qty: 60 tablet, Refills: 3      ferrous gluconate (FERGON) 324 MG tablet Take 324 mg by mouth daily with breakfast.      fish oil-omega-3 fatty acids 300-1,000 mg capsule Take 2 g by mouth 2 (two) times daily.       insulin aspart U-100 (NOVOLOG) 100  unit/mL injection Inject into the skin as needed for High Blood Sugar (Blood glucose >200).      levothyroxine (SYNTHROID) 25 MCG tablet Take 1 tablet (25 mcg total) by mouth before breakfast.  Qty: 30 tablet, Refills: 11      losartan (COZAAR) 25 MG tablet Take 25 mg by mouth once daily.       metOLazone (ZAROXOLYN) 10 MG tablet Take 1 tablet (10 mg total) by mouth once daily.  Qty: 30 tablet, Refills: 11      spironolactone (ALDACTONE) 25 MG tablet Take 1 tablet (25 mg total) by mouth once daily.  Qty: 30 tablet, Refills: 11      carvedilol (COREG) 25 MG tablet Take 1 tablet (25 mg total) by mouth 2 (two) times daily.  Qty: 60 tablet, Refills: 0      nitroGLYCERIN (NITROSTAT) 0.4 MG SL tablet Place 0.4 mg under the tongue every 5 (five) minutes as needed.      pantoprazole (PROTONIX) 40 MG tablet Take 1 tablet (40 mg total) by mouth once daily.  Qty: 30 tablet, Refills: 0      polyethylene glycol (GLYCOLAX) 17 gram PwPk Take by mouth as needed.       tamsulosin (FLOMAX) 0.4 mg Cap Take 1 capsule (0.4 mg total) by mouth once daily.  Qty: 30 capsule, Refills: 0             I certify that this patient is confined to his home and needs intermittent skilled nursing care aid and     ANDRE Flower, FNP-C  Hospitalist - Department of Hospital Medicine  [address], [place] [address]  Office [phone]; Pager [phone]  .

## 2019-05-07 NOTE — PROGRESS NOTES
[facility] CASE MANAGEMENT                  WRITTEN DISCHARGE INFORMATION      APPOINTMENTS AND RESOURCES TO HELP YOU MANAGE YOUR CARE AT HOME BASED ON YOUR PREFERENCES:  (If an appointment is not scheduled for you when you leave the hospital, call your doctor to schedule a follow up visit within a week)    Follow-up Information     Mere Luque MD On 5/14/2019.    Specialties:  Cardiology, INTERVENTIONAL CARDIOLOGY  Why:  Cardiology appt On Tuesday @ 11:00am, outpatient services  Contact information:  [address]  [address] [facility]  [place] [address]  [phone]                       Healthy Living Instructions to HELP MANAGE YOUR CARE AT HOME:  Things You are responsible for:  1.    Getting your prescriptions filled   2.    Taking your medications as directed, DO NOT MISS ANY DOSES!  3.    Following the diet and exercise recommended by your doctor  4.    Going to your follow-up doctor appointment. This is important because it allows the doctor to monitor your progress and determine if any changes need to made to your treatment plan.  5. If you have any questions about MANAGING YOUR CARE AT HOME Call the Nurse Care Line for 24/7 Assistance [phone]       Please answer any calls you may receive from [facility]. We want to continue to support you as you manage your healthcare needs. [facility] is happy to have the opportunity to serve you.      Thank you for choosing [facility] for your healthcare needs!  Your [facility] Case Management Team,

## 2019-05-07 NOTE — ASSESSMENT & PLAN NOTE
Well controlled on prandial insulin therapy; provide prandial insulin therapy along with insulin sliding scale.

## 2019-05-07 NOTE — PLAN OF CARE
"TN reviewed follow up appointment information as well as  "CHF discharge instructions" handout with patient using teach back. Patient stated he will notify the doctor if he has chest pain and trouble breathing. Patient is in agreement and verbalized an understanding. Placed discharge information in blue discharge folder. TN also reviewed patient responsibility checklist with him using teach back. Patient was able to verbalize his responsibilities after discharge to manage his care at home bein. Going to follow up appointments   2. Picking up rx from the pharmacy when discharged  3. Taking his medications as prescribed     Patient will resume home health services with [employer] when ready for discharge.       19 1116   Final Note   Assessment Type Final Discharge Note   Anticipated Discharge Disposition Home-Health   What phone number can be called within the next 1-3 days to see how you are doing after discharge?   ([phone])   Hospital Follow Up  Appt(s) scheduled? Yes   Discharge plans and expectations educations in teach back method with documentation complete? Yes   Right Care Referral Info   Post Acute Recommendation Home-care     "

## 2019-05-07 NOTE — CONSULTS
.[facility]  Gastroenterology  Consult Note    Patient Name: Agus Ramos Jr.  MRN: [id number]  Admission Date: 5/6/2019  Hospital Length of Stay: 0 days  Code Status: Full Code   Primary Care Physician: Keaton Hardy MD  Principal Problem:Acute on chronic combined systolic and diastolic heart failure    Consults  Subjective:     Chief complaint: Anemia     HPI: The patient is an 78 year old male with a history of HTN, DM, HLD, CAD s/p CABG, PCI, atrial fibrillation, CHF, COPD, PVD and CKD presenting with shortness of breath.  He reports acute onset, persistent shortness of breath beginning yesterday.  No associated chest pain.  GI consulted for anemia.  Patient denies black or bloody stools.  His most recent BM was yesterday and was brown in color.  He denies abdominal pain, nausea and vomiting.  He was previously on blood thinners, but it appears he has only been on aspirin more recently.  He underwent an EGD in June 2018 which showed only gastritis.  His most recent colonoscopy was in 2006 and no polyps were found.     Past medical history:  Hypertension.  Diabetes mellitus.  Hyperlipidemia.  Coronary artery disease.  Atrial fibrillation.  Congestive heart failure.  Chronic obstructive pulmonary disease.  Peripheral vascular disease.  Chronic kidney disease.    Past surgical history:  Coronary artery bypass graft.  Femoral-popliteal bypass.  Hemorrhoid surgery.    Social history:  No tobacco, alcohol or illicit drug use.    Family history:  Negative for colon cancer.    Medications:  Medications Prior to Admission   Medication Sig Dispense Refill Last Dose    albuterol-ipratropium 2.5mg-0.5mg/3mL (DUO-NEB) 0.5 mg-3 mg(2.5 mg base)/3 mL nebulizer solution Take 3 mLs by nebulization every 6 (six) hours. Rescue 1 Box 0 5/7/2019    aspirin (ECOTRIN) 81 MG EC tablet Take 1 tablet (81 mg total) by mouth once daily.  0 5/7/2019    atorvastatin (LIPITOR) 20 MG tablet Take 20 mg by mouth every  evening.   5/7/2019    bumetanide (BUMEX) 2 MG tablet Take 1 tablet (2 mg total) by mouth once daily. 90 tablet 3 5/7/2019    cinacalcet (SENSIPAR) 30 MG Tab Take 1 tablet (30 mg total) by mouth daily with breakfast. 30 tablet 11 5/7/2019    diltiaZEM (CARDIZEM) 30 MG tablet Take 1 tablet (30 mg total) by mouth every 12 (twelve) hours. 60 tablet 3 5/7/2019    ferrous gluconate (FERGON) 324 MG tablet Take 324 mg by mouth daily with breakfast.   5/7/2019    fish oil-omega-3 fatty acids 300-1,000 mg capsule Take 2 g by mouth 2 (two) times daily.    5/7/2019    insulin aspart U-100 (NOVOLOG) 100 unit/mL injection Inject into the skin as needed for High Blood Sugar (Blood glucose >200).       levothyroxine (SYNTHROID) 25 MCG tablet Take 1 tablet (25 mcg total) by mouth before breakfast. 30 tablet 11 5/7/2019    losartan (COZAAR) 25 MG tablet Take 25 mg by mouth once daily.    5/7/2019    metOLazone (ZAROXOLYN) 10 MG tablet Take 1 tablet (10 mg total) by mouth once daily. 30 tablet 11 5/7/2019    spironolactone (ALDACTONE) 25 MG tablet Take 1 tablet (25 mg total) by mouth once daily. 30 tablet 11 5/7/2019    carvedilol (COREG) 25 MG tablet Take 1 tablet (25 mg total) by mouth 2 (two) times daily. 60 tablet 0 Taking    nitroGLYCERIN (NITROSTAT) 0.4 MG SL tablet Place 0.4 mg under the tongue every 5 (five) minutes as needed.   Unknown    pantoprazole (PROTONIX) 40 MG tablet Take 1 tablet (40 mg total) by mouth once daily. 30 tablet 0     polyethylene glycol (GLYCOLAX) 17 gram PwPk Take by mouth as needed.    More than a month    tamsulosin (FLOMAX) 0.4 mg Cap Take 1 capsule (0.4 mg total) by mouth once daily. 30 capsule 0 Taking     Allergies:  No known drug allergies.     Review of systems:  CONSTITUTIONAL: Negative for fever, chills, weakness, weight loss, weight gain.  HEENT: Negative for blurred vision, hearing loss, nasal congestion, dry mouth, sore throat.  CARDIOVASCULAR: Negative for chest pain or  palpitations.  RESPIRATORY: Positive for shortness of breath. Negative for cough.  GASTROINTESTINAL: See HPI  GENITOURINARY: Negative for dysuria or hematuria.  MUSCULOSKELETAL: Negative for osteoarthritis or muscle pain.  SKIN: Negative for rashes/lesions.  NEUROLOGIC: Negative for headaches, numbness/tingling.  ENDOCRINE: Positive for diabetes, thyroid abnormalities.  HEMATOLOGIC: Positive for chronic anemia.  No known blood dyscrasias.    Objective:     Vital Signs (Most Recent):  Temp: 97 °F (36.1 °C) (05/07/19 1113)  Pulse: 70 (05/07/19 1113)  Resp: 18 (05/07/19 1113)  BP: (!) 100/52 (05/07/19 1113)  SpO2: 100 % (05/07/19 1113) Vital Signs (24h Range):  Temp:  [97 °F (36.1 °C)-98.7 °F (37.1 °C)] 97 °F (36.1 °C)  Pulse:  [70-96] 70  Resp:  [16-20] 18  SpO2:  [95 %-100 %] 100 %  BP: ()/(50-82) 100/52     Physical examination:  General: Elderly, chronically-ill appearing AAM in no apparent distress  HENT: NCAT, atraumatic, hearing grossly intact, nasal cannula in place.   Eyes: PERRL, EOMI, anicteric sclera  Cardiovascular: Regular rate and rhythm.  Systolic murmur noted.  +LE edema.   Lungs: Non-labored respirations. Breath sounds equal.   Abdomen: soft, NTND, normoactive BS  Extremities: No C/C, 2+ dorsalis pedis pulses bilaterally  Neuro: AA&O x 3, no asterixes or tremors  Psych: Appropriate mood and affect. No SI.  Skin: No jaundice, rashes or lesions  Musculoskeletal: 5/5 strength bilaterally    Recent Labs   Lab 05/07/19  0020 05/07/19  0438 05/07/19  0943   WBC 8.15 7.71  --    HGB 8.5* 6.9* 7.3*   HCT 29.7* 24.2* 25.9*   * 366*  --      Recent Labs   Lab 05/07/19  0438      CALCIUM 9.2   ALBUMIN 2.0*   PROT 4.9*      K 4.3   CO2 31*      BUN 50*   CREATININE 1.5*   ALKPHOS 179*   ALT 11   AST 20   BILITOT 0.2       Assessment:   78 year old male with a history of HTN, DM, HLD, CAD s/p CABG, PCI, atrial fibrillation, CHF, COPD, PVD and CKD presenting with acute CHF  exacerbation.  GI consulted for anemia - suspect multifactorial.  No overt bleeding.  H&H stable.  EGD last year unrevealing.     Plan:   1.  Repeat CBC in am, transfuse pRBCs as needed.  2.  Outpatient colonoscopy pending overall goals of care.  3.  Will arrange clinic follow-up.  Please call with any questions.   4.  Continue to treat other medical issues.       Thank you for your consult.     Yvonne Quiroga PA-C  Gastroenterology  [facility]

## 2019-05-07 NOTE — HOSPITAL COURSE
Placed in observation for evaluation of FVO. Patient was treated with supplemental oxygen therapy, IV diuretics with good response. He had low H/H, was low about 1 month ago and was transfused 2 units PRBC. He was seen by GI at that time. Patient again appears anemic but refuses blood transfusion. Patient in his usual state of health is sorely debilitated. During a previous hospitalization the patient had palliative care consult but family was not receptive at that time. Today, palliative care was consulted and spoke to the patient's wife and the patient who agreed for palliative care home visits. He will be discharged to home with palliative care. Vitals stable.

## 2019-05-07 NOTE — ASSESSMENT & PLAN NOTE
Poorly controlled; will continue his home regimen of levothyroxine and defer dosage adjustments to his PCP.

## 2019-05-08 LAB
BLD PROD TYP BPU: NORMAL
BLD PROD TYP BPU: NORMAL
BLOOD UNIT EXPIRATION DATE: NORMAL
BLOOD UNIT EXPIRATION DATE: NORMAL
BLOOD UNIT TYPE CODE: 5100
BLOOD UNIT TYPE CODE: 5100
BLOOD UNIT TYPE: NORMAL
BLOOD UNIT TYPE: NORMAL
CODING SYSTEM: NORMAL
CODING SYSTEM: NORMAL
DISPENSE STATUS: NORMAL
DISPENSE STATUS: NORMAL
NUM UNITS TRANS PACKED RBC: NORMAL
NUM UNITS TRANS PACKED RBC: NORMAL
POCT GLUCOSE: 93 MG/DL (ref 70–110)

## 2019-05-08 NOTE — PT/OT/SLP DISCHARGE
Occupational Therapy Discharge Summary    Agus Ramos Jr.  MRN: [id number]   Principal Problem: Acute on chronic combined systolic and diastolic heart failure      Patient Discharged from acute Occupational Therapy on 5/7/19.  Please refer to prior OT note dated 5/7/19 for functional status.    Assessment:      Patient appropriate for care in another setting.    Objective:     GOALS:   Multidisciplinary Problems     Occupational Therapy Goals     Not on file          Multidisciplinary Problems (Resolved)        Problem: Occupational Therapy Goal    Goal Priority Disciplines Outcome Interventions   Occupational Therapy Goal   (Resolved)     OT, PT/OT Outcome(s) achieved    Description:  Goals to be met by: 5/21/19     Patient will increase functional independence with ADLs by performing:    UE Dressing with Stand-by Assistance.  Grooming while seated with Modified [place].  Rolling to Bilateral with Modified [place] and Supervision.   Supine to sit with Stand-by Assistance.  Step transfer with Stand-by Assistance  Toilet transfer to bedside commode with Stand-by Assistance.  Upper extremity exercise program x10 reps per handout, with assistance as needed.                      Reasons for Discontinuation of Therapy Services  Transfer to alternate level of care.      Plan:     Patient Discharged to: Home with Home Health Service    Anna Dumont OT  5/8/2019

## 2019-05-08 NOTE — CONSULTS
Order [id number])         Patient is known to Palliative care from previous admission. He is here in OBS today for SOB. Patient is sitting up in chair with oxygen on and breathing is mildly labored. He was discharged from SNF 2 days ago and then returned to the hospital. We discussed my previous visit with him on last admission and he remembered some of our conversation about code status. However, he seems to have more cognitive impairment than last time. He did say when his heart stops he's done and let nature take its course. This is the same thing he old me last time but patient was transferred to SNF before a meeting was set up with his spouse to discuss patient's wishes. Called spouse to see if she was available to meet and she said she was not coming to the hospital today. Encouraged her to allow Palliative NP with [facility] meet with her and family in home to discuss Palliative program. She agreed and I let her know someone would call her to set up an appt. time. Patient is appropriate for hospice but I think it will be an easier transition for family if he is bridged. Called and spoke to Elda with [facility] and she will call patient's wife immediately.        > 50% of 50 min visit spent in chart review, face to face discussion of goals of care,  symptom assessment, coordination of care and emotional support.

## 2019-05-08 NOTE — PT/OT/SLP DISCHARGE
Physical Therapy Discharge Summary    Name: Agus Ramos Jr.  MRN: [id number]   Principal Problem: Acute on chronic combined systolic and diastolic heart failure     Patient Discharged from acute Physical Therapy on 19.  Please refer to prior PT notes for functional status.     Assessment:     Patient appropriate for care in another setting.    Objective:     GOALS:   Multidisciplinary Problems     Physical Therapy Goals     Not on file          Multidisciplinary Problems (Resolved)        Problem: Physical Therapy Goal    Goal Priority Disciplines Outcome Goal Variances Interventions   Physical Therapy Goal   (Resolved)     PT, PT/OT Outcome(s) achieved     Description:  Goals to be met by: 19     Patient will increase functional independence with mobility by performin. Supine to sit with Stand-by Assistance  2. Rolling to Left and Right with Stand-by Assistance  3. Sit to stand transfer with Stand-by Assistance using RW  4. Bed to chair transfer with Stand-by Assistance using Rolling Walker  5. Gait  x50-100 feet with Stand-by Assistance using Rolling Walker and O2  6. Lower extremity exercise program 2 sets  x10 reps per handout, with SBA                      Reasons for Discontinuation of Therapy Services  Transfer to alternate level of care.      Plan:     Patient Discharged to: Home with Home Health Service.    Carley Montano, PT  2019

## 2019-05-08 NOTE — NURSING
Pt received D/C instructions and IV and tele removed. Transport arrived w/ w/c. Pt in left via w/c w/ nad noted.

## 2019-05-08 NOTE — NURSING
Received report from SHAYY Mora RN. Pt AAOx3 with no c/o pain. IV and telemetry monitor removed. Pt  aware of D/C awaiting transport to arrive. Pt safety maintained with with nurse call bell within reach

## 2019-05-09 LAB — BACTERIA UR CULT: NORMAL

## 2019-05-09 NOTE — PROGRESS NOTES
TN contacted [facility] to inquire of patient's home mary beth and palliative care provider. TN informed that patient has [facility].

## 2019-05-12 LAB
BACTERIA BLD CULT: NORMAL
BACTERIA BLD CULT: NORMAL

## 2019-05-29 NOTE — PROGRESS NOTES
05/29/18 0722   Patient Assessment/Suction   Level of Consciousness (AVPU) alert   Respiratory Effort Normal;Unlabored   All Lung Fields Breath Sounds coarse;crackles   PRE-TX-O2-ETCO2   O2 Device (Oxygen Therapy) nasal cannula   $ Is the patient on Low Flow Oxygen? Yes   Flow (L/min) 2   Oxygen Concentration (%) 28   SpO2 95 %   Pulse Oximetry Type Intermittent   $ Pulse Oximetry - Multiple Charge Pulse Oximetry - Multiple   Pulse 77   Resp 20   Aerosol Therapy   $ Aerosol Therapy Charges Aerosol Treatment   Respiratory Treatment Status given   SVN/Inhaler Treatment Route mask   Position During Treatment HOB at 30 degrees;Right side   Patient Tolerance good   Post-Treatment   Post-treatment Heart Rate (beats/min) 78   Post-treatment Resp Rate (breaths/min) 20   All Fields Breath Sounds aeration increased   Ready to Wean/Extubation Screen   FIO2<=50 (chart decimal) 0.28      Patient calls today to ask about the timeline of his upcoming surgery for intracranial monitoring. He would like to get a puppy but does not want to do that if he will be spending a significant amount of time in the hospital right away. I advised that surgery is not yet scheduled. He needs to come to see Dr. Araujo on 6/18/19 as previously scheduled to discuss his progress and get clearance from MD to proceed, assuming his third UA tox screen comes back clean. He verbalizes understanding.     I will also reach out to Dr. Funes's nurse and our clinic supervisor, Juan, to see if they have more information for him.

## 2019-09-09 NOTE — SUBJECTIVE & OBJECTIVE
Oncology Treatment Plan:   [No treatment plan]    Medications:  Continuous Infusions:  Scheduled Meds:   albuterol-ipratropium  3 mL Nebulization Q6H    aspirin  81 mg Oral Daily    carvedilol  50 mg Oral BID    cinacalcet  30 mg Oral BID WM    enoxaparin  40 mg Subcutaneous Daily    ferrous sulfate  325 mg Oral Daily    furosemide  40 mg Oral BID    losartan  100 mg Oral Daily     PRN Meds:dextromethorphan-guaifenesin  mg/5 ml, dextrose 50%, dextrose 50%, glucagon (human recombinant), glucose, glucose, insulin aspart U-100, pneumoc 13-alan conj-dip cr(PF)     Review of patient's allergies indicates:  No Known Allergies     Past Medical History:   Diagnosis Date    Anemia 05/03/2018    pending blood transfusion    Anticoagulant long-term use     Congestive heart failure     Coronary artery disease     Diabetes mellitus     Encounter for blood transfusion     Hypertension     MI (myocardial infarction)     Pacemaker     left chest    Peripheral vascular disease     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     S/P femoral-popliteal bypass surgery left    Stented coronary artery     Tobacco use      Past Surgical History:   Procedure Laterality Date    CARDIAC CATHETERIZATION      CARDIAC PACEMAKER PLACEMENT      CARDIAC SURGERY      HEMORRHOID SURGERY       Family History     Problem Relation (Age of Onset)    Diabetes Father, Mother        Social History Main Topics    Smoking status: Former Smoker     Packs/day: 0.25     Years: 60.00     Types: Cigarettes     Quit date: 5/8/2018    Smokeless tobacco: Never Used    Alcohol use No    Drug use: No    Sexual activity: Not Currently     Partners: Female       Review of Systems   Constitutional: Positive for fatigue. Negative for appetite change, fever and unexpected weight change.   HENT: Negative for mouth sores.    Eyes: Negative for visual disturbance.   Respiratory: Positive for shortness of breath. Negative for cough.    Cardiovascular: Positive for leg  swelling. Negative for chest pain.   Gastrointestinal: Negative for abdominal pain and diarrhea.   Genitourinary: Negative for frequency.   Musculoskeletal: Negative for back pain.   Skin: Negative for rash.   Neurological: Negative for headaches.   Hematological: Negative for adenopathy.   Psychiatric/Behavioral: The patient is not nervous/anxious.      Objective:     Vital Signs (Most Recent):  Temp: 98.8 °F (37.1 °C) (05/30/18 0801)  Pulse: 83 (05/30/18 0801)  Resp: 18 (05/30/18 0801)  BP: (!) 140/65 (05/30/18 0801)  SpO2: 98 % (05/30/18 0801) Vital Signs (24h Range):  Temp:  [96.2 °F (35.7 °C)-98.8 °F (37.1 °C)] 98.8 °F (37.1 °C)  Pulse:  [73-98] 83  Resp:  [18-20] 18  SpO2:  [94 %-100 %] 98 %  BP: (123-140)/(63-97) 140/65     Weight: 84.5 kg (186 lb 4.6 oz)  Body mass index is 27.51 kg/m².  Body surface area is 2.03 meters squared.      Intake/Output Summary (Last 24 hours) at 05/30/18 1259  Last data filed at 05/30/18 0800   Gross per 24 hour   Intake              476 ml   Output             2550 ml   Net            -2074 ml       Physical Exam   Constitutional: He is oriented to person, place, and time. He appears well-developed and well-nourished.   HENT:   Head: Normocephalic.   Mouth/Throat: Oropharynx is clear and moist. No oropharyngeal exudate.   Eyes: Conjunctivae are normal. Pupils are equal, round, and reactive to light. No scleral icterus.   Neck: Normal range of motion. Neck supple. No thyromegaly present.   Cardiovascular: Normal rate, regular rhythm and normal heart sounds.    No murmur heard.  Pulmonary/Chest: Effort normal. He has no wheezes. He has rales.   Abdominal: Soft. Bowel sounds are normal. He exhibits no distension and no mass. There is no hepatosplenomegaly. There is no tenderness. There is no rebound and no guarding.   Musculoskeletal: Normal range of motion. He exhibits edema.   Neurological: He is alert and oriented to person, place, and time. No cranial nerve deficit.   Skin: No  rash noted. No erythema.   Psychiatric: He has a normal mood and affect.       Significant Labs:   All pertinent labs within the past 24 hours have been reviewed    Diagnostic Results:  I have reviewed and interpreted all pertinent imaging results/findings within the past 24 hours   Statement Selected

## 2019-10-26 NOTE — ASSESSMENT & PLAN NOTE
Chief Complaint   Patient presents with   • Sore Throat   • Cough     Subjective   Carlos Thorpe is a 20 y.o. male who presents to the clinic today with complaints of:    Sore Throat    This is a new problem. Episode onset: about a week ago. The problem has been waxing and waning (worse in the morning). There has been no fever. Associated symptoms include coughing and headaches. Pertinent negatives include no abdominal pain, ear pain, shortness of breath, trouble swallowing or vomiting. He has had no exposure to strep. He has tried acetaminophen for the symptoms. The treatment provided mild relief.   Cough   This is a new problem. The current episode started in the past 7 days. The problem has been waxing and waning. The cough is non-productive. Associated symptoms include headaches and a sore throat. Pertinent negatives include no chest pain, ear pain, fever, postnasal drip, shortness of breath or wheezing. His past medical history is significant for environmental allergies.     Current Outpatient Medications:   •  ARIPiprazole (ABILIFY) 10 MG tablet, Take 10 mg by mouth., Disp: , Rfl:     Allergies:  Patient has no known allergies.    Past Medical History:   Diagnosis Date   • ADD (attention deficit disorder)      History reviewed. No pertinent surgical history.  Family History   Problem Relation Age of Onset   • Diabetes Mother      Social History     Tobacco Use   • Smoking status: Current Every Day Smoker     Packs/day: 0.50   Substance Use Topics   • Alcohol use: No   • Drug use: No       Review of Systems  Review of Systems   Constitutional: Negative for fever.   HENT: Positive for sneezing and sore throat. Negative for ear pain, postnasal drip and trouble swallowing.    Respiratory: Positive for cough. Negative for shortness of breath and wheezing.    Cardiovascular: Negative for chest pain.   Gastrointestinal: Negative for abdominal pain and vomiting.   Allergic/Immunologic: Positive for environmental  Cont Statin/ARB  Add coreg  Start ASA 81mg qd   "allergies.   Neurological: Positive for headaches.       Objective   /85   Pulse 87   Temp 98 °F (36.7 °C)   Resp 16   Ht 175.3 cm (69\")   Wt 103 kg (226 lb)   SpO2 97%   BMI 33.37 kg/m²       Physical Exam   Constitutional: He is oriented to person, place, and time. He appears well-developed and well-nourished. He is cooperative.   HENT:   Head: Normocephalic and atraumatic.   Right Ear: Tympanic membrane, external ear and ear canal normal.   Left Ear: Tympanic membrane, external ear and ear canal normal.   Nose: Nose normal. No sinus tenderness.   Mouth/Throat: Uvula is midline and mucous membranes are normal. Posterior oropharyngeal erythema (cobblestoning, large amount PND) present. Tonsils are 1+ on the right. Tonsils are 1+ on the left.   Eyes: Conjunctivae, EOM and lids are normal. Pupils are equal, round, and reactive to light.   Neck: Trachea normal and normal range of motion. Neck supple.   Cardiovascular: Normal rate, regular rhythm, S1 normal, S2 normal and normal heart sounds.   Pulmonary/Chest: Effort normal and breath sounds normal. He has no wheezes. He has no rhonchi. He has no rales.   Abdominal: Soft. Normal appearance and bowel sounds are normal. There is no tenderness.   Lymphadenopathy:     He has no cervical adenopathy.   Neurological: He is alert and oriented to person, place, and time. Coordination and gait normal.   Skin: Skin is warm, dry and intact.   Psychiatric: He has a normal mood and affect. His speech is normal and behavior is normal.   Vitals reviewed.      Assessment/Plan     Carlos was seen today for sore throat and cough.    Diagnoses and all orders for this visit:    Acute URI    Allergic rhinitis with postnasal drip  -     fluticasone (FLONASE) 50 MCG/ACT nasal spray; 2 sprays into the nostril(s) as directed by provider Daily.    Other orders  -     brompheniramine-pseudoephedrine-DM 30-2-10 MG/5ML syrup; Take 10 mL by mouth 4 (Four) Times a Day As Needed for " Congestion or Cough.    Increase fluid intake and rest.     Gargle warm salt water if needed to soothe your throat.     Take Tylenol or Ibuprofen if needed for fever or pain.     Use caution when taking Bromfed DM as it can make you drowsy. Do not drive, use machinery, or do anything that needs mental alertness until you know how this medicine affects you.     See Dr. Fischer if no improvement after 4-5 days. See him sooner if worse.

## 2020-03-11 NOTE — SUBJECTIVE & OBJECTIVE
"Subjective:       Patient ID: Azucena Morrison is a 66 y.o. female.    Chief Complaint: Non-healing Wound (abdomen)    6/29/17: Pt re-admit for distal abdominal wound. Pt denies any fevers or chills but states she feels nauseous at times. Returned to [place] June 28, from [place], reports much trouble with abdominal wound while in [place].  7/6/17-- Patient scheduled for surgical drainage of wound Tuesday 7/11/17DB  7/19/17-- Patient post op clinic visit.  8/16/17: CT of abdomen and pelvis done 8/9/17 due to suspected fistula  8/23/17-- U/S of abdomen done today.  12/13/17 Wound vac with 20cc drainage in clinic today.  1/10/18: on PO abx  1/24/18: Fistulagram ordered per Dr. Horton. Patient still on PO abx  02/21/18 Patient is not on antibiotics at this time. BS fasting 135 per patient report this am.  03/07/18 Culture of Anterior Abdominal Wound is positive. No antibiotics at this time.  fasting per patient report.    03/21/18 Patient c/o nausea along with abdominal tenderness near abd midline wound. Patient states that pain level is 6 and that she passed two sero-sanguineous clots from wound. Patient is afebrile this am.  3/28/18: patient continue antibiotics and reports that pain is less than last weeks clinic visit. Drainage has decreased as well  04/04/18 Patient states that abdominal pain is "pressure" sensation and that when she pushes down on her abdomen on either side of abdominal wound she feels like she has to urinate. Patient reports pain level of 3 this am.  fasting this am per patient report.   5/2/18: Patient had Abdomina Toribio today before wound care. She had discontinued Bactrim PO per Dr. Horton's recommendation and culture results and is now taking Amoxicillin PO  6/20/18: Pt reports itching to wound and periwound   6/27/18: patient reports no new complaints with regards to her wound or abdomen, wound continues to decrease in size and drainage  8/1/18: Pt reports increased pain to " Past Medical History:   Diagnosis Date    Anemia 05/03/2018    pending blood transfusion    Anticoagulant long-term use     apixaban    Atrial fibrillation     CKD (chronic kidney disease)     Congestive heart failure     COPD (chronic obstructive pulmonary disease)     Coronary artery disease     Diabetes mellitus     Encounter for blood transfusion     HLD (hyperlipidemia)     Hypertension     MI (myocardial infarction)     On home oxygen therapy     Pacemaker     left chest    Peripheral vascular disease     Requires assistance with activities of daily living (ADL)     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     S/P femoral-popliteal bypass surgery left    Stented coronary artery     Tobacco use     Unsteady gait        Past Surgical History:   Procedure Laterality Date    CARDIAC CATHETERIZATION      CARDIAC PACEMAKER PLACEMENT      CARDIAC SURGERY      3 vessel CABG    CARDIOVERSION N/A 9/19/2013    [facility]    cardioverted      CORONARY ANGIOPLASTY WITH STENT PLACEMENT      EGD (ESOPHAGOGASTRODUODENOSCOPY) N/A 6/1/2018    [facility]    HEMORRHOID SURGERY      TRANSESOPHAGEAL ECHOCARDIOGRAM (ANIA) N/A 9/19/2013    [facility]    VASCULAR SURGERY      femoral artery popliteal bypass       Review of patient's allergies indicates:  No Known Allergies    No current facility-administered medications on file prior to encounter.      Current Outpatient Medications on File Prior to Encounter   Medication Sig    albuterol-ipratropium 2.5mg-0.5mg/3mL (DUO-NEB) 0.5 mg-3 mg(2.5 mg base)/3 mL nebulizer solution Take 3 mLs by nebulization every 6 (six) hours. Rescue    aspirin (ECOTRIN) 81 MG EC tablet Take 1 tablet (81 mg total) by mouth once daily.    atorvastatin (LIPITOR) 20 MG tablet Take 20 mg by mouth every evening.    carvedilol (COREG) 25 MG tablet Take 1 tablet (25 mg total) by mouth 2 (two) times daily.    diltiaZEM  (CARDIZEM) 30 MG tablet Take 1 tablet (30 mg total) by mouth every 12 (twelve) hours.    ferrous gluconate (FERGON) 324 MG tablet Take 324 mg by mouth daily with breakfast.    fish oil-omega-3 fatty acids 300-1,000 mg capsule Take 2 g by mouth 2 (two) times daily.     furosemide (LASIX) 40 MG tablet     furosemide (LASIX) 80 MG tablet Take 40 mg by mouth 2 (two) times daily.     glipiZIDE (GLUCOTROL) 5 MG tablet Take 10 mg by mouth 2 (two) times daily before meals.    losartan (COZAAR) 25 MG tablet Take 25 mg by mouth once daily.     losartan (COZAAR) 50 MG tablet Take 0.5 tablets (25 mg total) by mouth once daily.    metFORMIN (GLUCOPHAGE-XR) 500 MG 24 hr tablet     nitroGLYCERIN (NITROSTAT) 0.4 MG SL tablet Place 0.4 mg under the tongue every 5 (five) minutes as needed.    pantoprazole (PROTONIX) 40 MG tablet Take 1 tablet (40 mg total) by mouth once daily.    polyethylene glycol (GLYCOLAX) 17 gram PwPk Take by mouth as needed.     tamsulosin (FLOMAX) 0.4 mg Cap Take 1 capsule (0.4 mg total) by mouth once daily.     Family History     Problem Relation (Age of Onset)    Diabetes Father, Mother        Tobacco Use    Smoking status: Former Smoker     Packs/day: 1.00     Years: 60.00     Pack years: 60.00     Types: Cigarettes     Last attempt to quit: 2018     Years since quittin.8    Smokeless tobacco: Never Used   Substance and Sexual Activity    Alcohol use: No    Drug use: No    Sexual activity: Not on file     Review of Systems   Constitutional: Positive for fatigue. Negative for activity change, appetite change, chills, diaphoresis, fever and unexpected weight change.   HENT: Negative.    Eyes: Negative.    Respiratory: Negative for cough, chest tightness, shortness of breath and wheezing.    Cardiovascular: Negative for chest pain, palpitations and leg swelling.   Gastrointestinal: Negative for abdominal distention, abdominal pain, blood in stool, constipation, diarrhea, nausea and  abdomen with nausea and emesis since thursday 7/26/18, denies any fevers, patient did no go to ER as instructed by home health nurse on Sat. 7/28/18.  8/29/18: Patient admitted to hospital 8/2 for abdominal wound complications. Surgery for abdominal abscess per Dr. Horton on 8/3. Patient placed on IV antibiotics and discharged home on 8/18 with [facility]  and PICC line to LUE. Patient currently on IV ertapenem. IV medication sent to patients home per [facility].   9/5/18: IV Ertapenem to continue 1 week. Culture sent to lab. [facility]  sent orders. PICC line intact to E.  9/12/18 Antibiotics completed. Culture results pending. 1 deep stitch remaining.  9/12/18: Culture positive for E. Coli, patient to continue Ertapenem IV antibiotics x 2weeks. [facility] notified  9/26/18: F/U abdominal wound, wound continues to improve. Patient will complete IV antibiotics today  10/3/18: patient c/o diarrhea for 2 days and nausea with some vomiting. Labs and stool culture ordered. IV antibiotics discontinued today  10/10/2018 patient will start on IV antibiotic Invaz IV once a day, pending PICC line placement, order placed today  for PICC per Dr. Horton.  10/24/18: patient on IV antibiotics, tolerating well. Continue for 1 week  11/7/2018: IV Antibiotic discontinued.  11/8/2018: PICC line to left upper arm discontinued by Home Health.  11/21/2018 F/u for abdominal wall fistula , c/o nausea  No vomiting , no fever  12/5/2018: F/u for abdominal wall fisula, c/o gas, Dr. Horton will order Bentyl. Surgery schedule for January 2019.  12/12/2018: F/u for abdominal wall fistula, c/o abdominal pain. Dr. Horton ordered Norco 5/325mg tab 1 PO every 4 hours as needed for pain and referred patient for x-ray of abdomen after clinic today.  12/19/2018: F/u for abdominal wall fisula, c/o nausea, Dr. Horton re ordered Ondansetron (Zofran) 8mg one tab by mouth every eight hours as needed for nausea. No changes in wound  condition from last visit noted in clinic today.  12/26/2018: F/u abdominal wall fistula drainage, no c/o at this time. Denies any abdominal pain or nausea. Dr. Horton recommends surgery first week of January 2019 for abdominal fistula treatment and possible colostomy placement, patient agree.  01/02/2019: F/u abdominal wall fistula. Dressing with large amount of drainage, malodorous, Dr. Horton is scheduling surgery for third week of January, 2019.  1/16/19: F/U Dr. Horton today in clinic, surgery scheduled for next Friday 1/25/19 with Dr. Horton.     01/23/2019: Presents to wound care clinic for f/u abdominal wound care tx. Surgery scheduled with Dr. Horton for Friday 01/25/2019. Dr. Horton explained Mrs. Morrison surgery procedure including possible complications, Nurse  ([place]) present, patient verbalizes understanding of procedure including possible complications, all questions from patient were answered by Dr. Horton including blood sugar and blood pressure medications per patient's concerns. Consent for surgery and blood transfusion signed by patient, Dr. Horton and nurse witnessed.  Abdominal wound cultures collected per Dr. Horton, cultures sent to lab/micro pending results. Dr. Horton prescribed the following orders: fleet enema for Thursday 1/24/2019, clear liquid diet and not to eat after midnight all for 1/24/2019. Start taking Neomycin and erythromycin by mouth three times a day (1/24/2019) and dulcolax one tab by mouth twice a day, Mrs. Morrison voices understanding.     01/30/2019: F/U wound care treatment with Dr. Horton. Per pt, primary physician Dr. Pj Monae ordered for her to take potassium pills for three days, last day today and scheduled for lab work at primary physician clinic on 1/31/2019. Per pt. Feeling better, no more abdominal pain (went to ER 1/24/2019 and discharged 1/25/2019 c/o abd pain.). Dr. Horton awaiting on primary clearance to re-schedule surgery, per  vomiting.   Genitourinary: Negative for dysuria and hematuria.   Musculoskeletal: Negative.    Skin: Negative.    Neurological: Positive for dizziness and weakness. Negative for seizures, syncope and light-headedness.   Psychiatric/Behavioral: Negative.      Objective:     Vital Signs (Most Recent):  Temp: 96.6 °F (35.9 °C) (03/23/19 1948)  Pulse: 108 (03/23/19 2000)  Resp: (!) 32 (03/23/19 2000)  BP: (!) 156/79 (03/23/19 2000)  SpO2: 99 % (03/23/19 1948) Vital Signs (24h Range):  Temp:  [96.6 °F (35.9 °C)-98.5 °F (36.9 °C)] 96.6 °F (35.9 °C)  Pulse:  [] 108  Resp:  [15-32] 32  SpO2:  [96 %-100 %] 99 %  BP: (128-171)/() 156/79     Weight: 89.6 kg (197 lb 8.5 oz)  Body mass index is 29.17 kg/m².    Physical Exam   Constitutional: He is oriented to person, place, and time. He appears well-developed and well-nourished. No distress.   HENT:   Head: Normocephalic and atraumatic.   Right Ear: External ear normal.   Left Ear: External ear normal.   Nose: Nose normal.   Eyes: Right eye exhibits no discharge. Left eye exhibits no discharge.   Neck: Normal range of motion.   Cardiovascular:   Irregularly irregular, no murmurs or gallops   Pulmonary/Chest:   Mildly increased work of breathing with minimal end-expiratory wheezing bilaterally   Abdominal: Soft. Bowel sounds are normal. He exhibits no distension. There is no tenderness. There is no rebound and no guarding.   Musculoskeletal: Normal range of motion. He exhibits no edema.   Neurological: He is alert and oriented to person, place, and time.   Skin: Skin is warm and dry. He is not diaphoretic. No erythema.   Psychiatric: He has a normal mood and affect. His behavior is normal. Judgment and thought content normal.   Nursing note and vitals reviewed.          Significant Labs: All pertinent labs within the past 24 hours have been reviewed.    Significant Imaging: I have reviewed and interpreted all pertinent imaging results/findings within the past 24  "pt. She will call wound care clinic and Dr. Horton to make aware of clearance day. Continue with same orders for dressing change for Dr. Horton. Mrs. Morrison requesting gentamicin refill.     02/06/2019: F/u with Dr. Horton for wound care treatment. Mrs. Morrison brought to clinic a clearance for surgery by Dr. Pj Sanches dated 02/06/2019 "Hyperkalemia-Resolved K 4.8 2/1/2019, labs , Cr. 1.05, H/H 10/30. No contraindication to surgery, tight control of BS, Hydration." A copy of EKG (1/24/2019) and lab work from [employer] collected 1/31/2019, reported results 2/1/2019. Dr. Horton scheduled surgery for 02/22/2019, consent were signed on 01/23/2019, all questions were answered by Dr. Horton, this nurse  ([place]) present. Education provided to patient on the importance of having a clear liquid diet the day before surgery 02/21/2019 as per Dr. Horton, new medications and preop preparation before surgery, verbalizes understanding. Dr. Horton ordered Norco 5/325 mg PO for pain, Dulcolax two tabs by mouth to take on 02/21/2019, Soap suds enema (SSE) on Thursday 02/21/2019, Golytely by mouth 2 liters on 2/21/2019, Erythromycin 500 mg one tab by mouth three times a day and Neomycin 1 gm by mouth three times a day.     2/13/2019: Presents to wound care clinic for a f/u with Dr. Horton for wound care treatment. No new orders in wound dressing change, reviewed preop orders with Mrs. Morrison per Dr. Horton, all questions answered. Surgery scheduled for 02/22/2019.  2/20/19: Continues wound care a previously ordered.  Depth measured per Dr. Horton 8cm.  Preop orders reviewed with patient who verbalized understanding.  Surgery scheduled for Fri 2/22/19.  Rx given to patient for Norco and Zofran.    03/06/19: Patient admitted to [facility] on 02/22/19 for exploratory laparotomy of abdomen per Dr. Horton. Patient discharged on 02/28/19 with home health and PICC line with IV antibiotics. " hours.   "Staples removed today in clinic. Patient denies any fever, chills, n&v; however, she states that she has "low energy." Continued with wound care as ordered.     3/13/2019: F/U with Dr. Horton for wound care treatment. Continue with same wound dressing change orders as per Dr. Horton, orders followed. Home Health to continue wound dressing change and lab draw for CBC weekly; continue IV Ertapenem/Invaz 1 gm IV daily as ordered.     3/20/2019: Clinic visit with Dr. Horton for abdominal abscess treatment. Presents with moderate draining from abdominal wound; wound size decreasing per measurement. Per Dr. Horton, apply one-piece system ostomy bag to abdominal wound for drain collection, may drain bag when it fills and may change every other day, continue applying gentamicin to wound bed before applying ostomy bag. Continue IV Invaz until completion. If ostomy bag not effective for draining collection, may return to previous orders for wound dressing change. Orders followed. Education provided to Mrs. Morrison on handwashing and ostomy care techniques, voices and demonstrates understanding.   3/27/19: Follow up with Dr. Horton today in clinic for abdominal abscess, moderated tanish yellow drainge present on dressing.  Wound slowly improving, patient refused one-piece ostomy bag stated " no it's too messy." requested to return to previous dressing prior to ostomy bag placement- iodoform gauze, aquacel extra, sm abd, and mefix tape.  Silver nitrate x1 per Dr. Horton today in clinic. Rx given to patient for PO Zofran.     04/03/2019: F/u clinic visit with Dr. Horton. Abdominal wound improving in size, picture on file. Wound cultures from abdominal abscess collected and sent to lab/micro, pending results. Mrs. Morrison states going out of the country ([place]) at the end of April and plans to stay there for approximately two months. Dr. Horton ordered IV antibiotic for two more weeks and new wound care dressing, " orders followed.     4/9/2019: Clinic visit with Dr. Horton.  Per Dr. Horton, wound deep measurement increased, draining present. Wound cultures from abdominal wound taken and sent to lab/micro, pending results. C/o abdominal pain and itching around wound, Rx for betamethasone cream 01.% and Norco 5/325mg give in clinic by Dr. Horton. Wound care dressing orders followed. Continue IV antibiotic as previously ordered.  04/17/19: F/u for non-healing wound to abdomen. Culture report negative. Dr. Horton ordered 1 more week of IV antibiotics. Continue with topical wound care as ordered.     4/24/2019: Clinic visit with Dr. Horton for abdominal wound treatment. Wound improvement present, pictures on file. Last IV antibiotic today 4/24/2019, [facility] to discontinue PICC line from left upper arm on 4/25/2019. Per Lyubov Prince, going to [place] on Friday 4/26/2019 and out of the country ([place]) on Monday 4/29/2019. This nurse spoke to Shaunna at [employer] (IV antibiotic delivery company) and informed last dose of antibiotic is today. Dr. Horton ordered Hydrocodone-acetaminophen (Norco) 5-325 mg (32 tabs), Bentyl 10 mg (120 capsules) and Gentamycin ointment. Education provided on the importance of eating food that contains iron (to prevent anemia) as well as eating meat and taking her blood pressure medications (blood pressure medication) on time, voices understanding. All questions answered by Dr. Horton. [facility] outpatient confirmed Betamethasone cream ordered on 4/10/2019 is ready for . Instructions and education provided to Mrs. Morrison on abdominal wound dressing changes, hand washing techniques and medication use, effects and side effects, voices and demonstrates understanding. Discharged home on stable conditions, [facility] will give wound care clinic a call when she is back from [place] in few months from now.     7/17/19: Readmit today to wound care clinic.  Patient was recently out  of the country visiting family in [place].  Patient complains of pain and nausea as on prior visits.  Dr. Horton seen patient today discussed with patient again placing colostomy patient refused.  Culture of wound taken, Dr. Horton restarted patient on zofran po for nausea, and Norco for pain. [facility] restarted today in clinic on Mondays and Fridays and prn. Orders given to gently pack wound with aquacel ag rope, cover with aquacel extra, 4x4 gauze, small abd pad and mefix tape change dressing ever other day by [place] [facility] and Prn per patient.     7/24/2019: Clinic visit with Dr. Horton. Continue with dressing change as ordered. Wound cultures reviewed with patient, lab work ordered by Dr. Horton, no fever present or reported at this time. C/o decrease appetite, medication periactin prescribed. Requesting needles for insulin pen, order prescribed by Dr. Horton.      7/31/2019: F/U clinic visit with Dr. Horton. C/o of excruciated back pain 10/10, not feeling well, lot of gas and left upper quadrant discomfort. New orders prescribed: Augmentin 875-125 mg by mouth twice a day. Bentyl 10 mg one tab by mouth 4 times a day. Tramadol 50 mg one tab by mouth every 6 hours as needed for pain. Abdomen ultrasound. Wound care dressing completed as ordered.     8/7/2019: Clinic visit with Dr. Horton, denies any pain at this time. Wound care dressing done as ordered, no changes in wound size from last visit noted. Continue taking oral antiiotic as ordered, pending abdominal ultrasound, scheduled for 8/13/2019.     8/14/2019: F/U clinic visit with Dr. Horton. Admitted and discharged from emergency department this morning with abdominal pain, CT and ultrasound in filed. Dr. Horton performed a small drainage from the abdominal wound, moderate amount of creamy, serosanguineous fluid from abdominal wound present. Iodoform gauze packed into her wound, dressing changed as ordered. Wound cultures collected and sent  to lab/micro, pending results. Rx for Norco 5/325 mg one tab PO every 4 hours PRN as needed # 24 given to pt by Dr. Horton.      8/21/2019: Clinic visit with Dr. Horton. Wound dressing changed as ordered.      11/6/19: Follow up appt with Dr Horton. Wounds improving slowly. Continues to take antibiotics (Augmentin 875-125mg). No complaints voiced. Follow up in 1 week.  11/13/19:  F/U clinic visit with Dr. Horton.  Wound depth per Dr. Horton is 8.5 cm.  Continue PT Augmentin.  Patient is co left sided abdominal pain.  Abdominal US and labs ordered.  Wound care tolerated well.  Fu with Dr. Horton in 1 week.     11/20/19:Dr Horton assessed patient. Silver nitrate applied to wound. Continuing present plan of care. Patient is scheduled to have abdominal ultra sound Friday 11/22/19. Continues to take Augmentin.  F/U in 1 week.    11/27/19: Follow up in clinic with Dr. Horton. Wound depth 8.5cm, silver nitrate x 3 per Dr. Horton, patient continues taking po Augmentin, c/o left sided abdominal pain, ultrasound results discussed with patient, Dr. Horton will watch for now.  Follow up 1 week 12/4/19.     12/04/19: F/u Dr. Horton. Continue with current wound care treatment. Rx given for Bentyl 10 mg. Patient to follow up in  1 week at wound clinic  12/11/19: F/u with . Continue with current wound care treatment. Rx given for Lotrisone cream to be applied daily per patient to right lower leg rash. Patient to follow up in  1 week at wound clinic    12/18/19: F/u with Dr. Horton. Patient c/o pain 9 out of 10  to abdomen LLQ. Area of pain is warm to touch, pink, and a small nodule can be felt. Patient states that symptoms began about 4 days ago. Stat CT scan ordered. Patient will have CT done tomorrow due to not fasting this morning. Instructed patient to fast before CT scan. Continue with current wound care treatment     01/08/2020: F/u with Dr. Horton. Patient had I&D on 12/22/19 for abscess to abdomen.  New wound care orders given. Patient continues on po abx and home health for wound care. Follow up in 1 week at wound care clinic    01/15/20: F/u with Dr. Horton. Patient c/o mild discomfort at wound to left abdomen. Patient denies any fever, chills, n&v, diarrhea, and/or constipation. Will continue to monitor. Continue with current wound care treatment and follow up in  Wound clinic in 1 week.  1/29/2020: Fu today in wound care clinic with .  Wounds improving slowly.  Silver nitrate x 1 to each wound per Dr. Horton patient tolerated well.  RX give to patient today for Amoxicillin po, Bentyl Po and Norco 5/325mg Po today in clinic.  Fu 1 week 2/5/2020 with Dr. Horton.  2/5/20: F/U with Dr. Horton. LUQ site resolved. Midline site same. Cont. POC.  2/12/20: F/U with Dr. Horton. LUQ site and midline probed by Dr. Horton. Cont. Augmentin. Rx for Norco provided. Cont. POC.  2/19/20: F/U with Dr. Horton. Afebrile. No s/s of infection. Culture done of midline today. Rx for Augmentin and Zofran sent electronically to pharmacy. Return in 1 week.  2/26/2020: FU MD visit. Cultures reviewed per MD, po abx changed from augmentin to tetracycline sent to pharmacy, patient verbalized understanding. Patient states she will be traveling to [place] on March 29 for 3 months.     03/04/2020: F/u with Dr. Horton. Patient c/o pain at wound site located on left abdomen, lethargy, and no appetite for the past 3 days . Culture taken and patient instructed to stop po tetracycline while awaiting culture results. New wound care orders given for left abdominal wound. Orders routed to home health. Patient to follow up in 1 week at wound clinic.  3/11/20: F/U with Dr. Horton. No c/o for pain today. Stop tetracycline. Rx for Augmentin sent to pharmacy. Cont. POC. Return in 1 week.  orders routed.    Review of Systems   Constitutional: Negative.    HENT: Negative.    Eyes: Negative.    Respiratory: Negative.    Cardiovascular:  Negative.    Gastrointestinal: Negative.    Genitourinary: Negative.    Musculoskeletal: Negative.    Skin: Negative.    Neurological: Negative.    Psychiatric/Behavioral: Negative.        Objective:      Physical Exam   Constitutional: She is oriented to person, place, and time. She appears well-developed and well-nourished.   HENT:   Head: Normocephalic.   Eyes: Pupils are equal, round, and reactive to light. Conjunctivae and EOM are normal.   Neck: Normal range of motion. Neck supple.   Cardiovascular: Normal rate, regular rhythm, normal heart sounds and intact distal pulses.   Pulmonary/Chest: Effort normal and breath sounds normal.   Abdominal: Soft. Bowel sounds are normal.   Musculoskeletal: Normal range of motion.   Neurological: She is alert and oriented to person, place, and time. She has normal reflexes.   Skin: Skin is warm and dry.       Assessment:       1. Unspecified local infection of skin and subcutaneous tissue    2. Type 2 diabetes mellitus without complication, without long-term current use of insulin    3. Suture granuloma, subsequent encounter    4. S/P exploratory laparotomy    5. Postprocedural intraabdominal abscess    6. LLQ pain    7. Abscess of abdominal wall    8. Abdominal wall abscess           Wound 02/01/20 1353 Ulceration midline Abdomen #1 (Active)   02/01/20 1353    Pre-existing: Yes   Primary Wound Type: Ulceration   Side:    Orientation: midline   Location: Abdomen   Wound/PI Number (optional): #1   Ankle-Brachial Index:    Pulses:    Removal Indication and Assessment:    Wound Outcome:    (Retired) Wound Type:    (Retired) Wound Length (cm):    (Retired) Wound Width (cm):    (Retired) Depth (cm):    Wound Description (Comments):    Removal Indications:    Dressing Appearance Moist drainage 3/11/2020  2:02 PM   Drainage Amount Moderate 3/11/2020  2:02 PM   Drainage Characteristics/Odor Serosanguineous 3/11/2020  2:02 PM   Appearance Red;Moist 3/11/2020  2:02 PM   Red (%), Wound  Tissue Color 100 % 3/11/2020  2:02 PM   Periwound Area Intact 3/11/2020  2:02 PM   Wound Edges Open 3/11/2020  2:02 PM   Wound Length (cm) 0.5 cm 3/11/2020  2:02 PM   Wound Width (cm) 0.5 cm 3/11/2020  2:02 PM   Wound Depth (cm) 5.5 cm 3/11/2020  2:02 PM   Wound Volume (cm^3) 1.38 cm^3 3/11/2020  2:02 PM   Wound Surface Area (cm^2) 0.25 cm^2 3/11/2020  2:02 PM   Care Cleansed with:;Sterile normal saline 3/11/2020  2:02 PM   Dressing Changed;Gauze;Abd pad 3/11/2020  2:02 PM   Periwound Care Moisture barrier applied 3/11/2020  2:02 PM   Dressing Change Due 03/13/20 3/11/2020  2:02 PM            Wound 03/04/20 1023 Abdomen #2 (Active)   03/04/20 1023    Pre-existing:    Primary Wound Type:    Side: Left   Orientation:    Location: Abdomen   Wound/PI Number (optional): #2   Ankle-Brachial Index:    Pulses:    Removal Indication and Assessment:    Wound Outcome:    (Retired) Wound Type:    (Retired) Wound Length (cm):    (Retired) Wound Width (cm):    (Retired) Depth (cm):    Wound Description (Comments):    Removal Indications:    Dressing Appearance Intact 3/11/2020  2:00 PM   Drainage Amount Moderate 3/11/2020  2:00 PM   Drainage Characteristics/Odor Yellow 3/11/2020  2:00 PM   Appearance Red;Moist 3/11/2020  2:00 PM   Tissue loss description Full thickness 3/11/2020  2:00 PM   Red (%), Wound Tissue Color 100 % 3/11/2020  2:00 PM   Periwound Area Intact 3/11/2020  2:00 PM   Wound Edges Defined 3/11/2020  2:00 PM   Wound Length (cm) 0.6 cm 3/11/2020  2:00 PM   Wound Width (cm) 1 cm 3/11/2020  2:00 PM   Wound Depth (cm) 11 cm 3/11/2020  2:00 PM   Wound Volume (cm^3) 6.6 cm^3 3/11/2020  2:00 PM   Wound Surface Area (cm^2) 0.6 cm^2 3/11/2020  2:00 PM   Care Cleansed with:;Sterile normal saline 3/11/2020  2:00 PM   Dressing Changed;Gauze;Abd pad 3/11/2020  2:00 PM   Periwound Care Moisture barrier applied 3/11/2020  2:00 PM   Dressing Change Due 03/13/20 3/11/2020  2:00 PM     Abdominal wound midline   Clean wound with  normal saline  Lidocaine 2% gel or 4 % Topical solution: PRN  Chauncey wound:  Maintain dry chauncey wound, Cavilon under tape, betamethasone PRN itching  Primary dressing: Apply gentamycin to wound bed   Secondary dressing: 4 x 4 gauze, small ABD, mefix tape     LUQ abdomen   Cleanse with normal saline  Lidocaine 2% gel or 4 % Topical solution: PRN  Periwound: cavilon  Primary dressing: Apply gentamicin ointment to plain packing and gently pack to wound depth  Secondary dressing: 4x4 gauze and 5x5 aquacel border    Frequency: Monday, Wednesday, Friday    Other: Begin Augmentin.          Plan:                Follow up with Dr. Horton in 1 week on Wednesday 3/18/2020

## 2020-11-19 NOTE — PLAN OF CARE
Problem: Adult Inpatient Plan of Care  Goal: Plan of Care Review  Outcome: Ongoing (interventions implemented as appropriate)  Pt is AA, episodes of disorientation, able to make all needs known, ambulates to chair x 1 assist and walker. Pt has good appetite, reeducation on Low Na diet to patient and family, RONNY picc line flushed and has blood return, #[id number] telebox in place, 2L NC, NAD or pain noted, FC in place with care provided and good UOP throughout shift. Safety maintained, will cont to monitor.        within normal limits

## 2020-12-02 NOTE — PROGRESS NOTES
No new issues. Feeling less nauseous.    MSAFP is declined.    Anatomy US is scheduled.   Pt refusing BIPAP at this time. Currently on 4L NC. No SOB noted.

## 2021-05-06 NOTE — MR AVS SNAPSHOT
[facility]  [address] [facility] [address]  [place] [address]  Phone: [phone]                  Agus Ramos Jr.   2017 9:45 AM   Office Visit    Description:  Male : 1940   Provider:  ROGERIO Wiggins MD   Department:  [facility]           Reason for Visit     Benign Prostatic Hypertrophy     Hematuria           Diagnoses this Visit        Comments    Kidney stones    -  Primary     BPH with obstruction/lower urinary tract symptoms         Nocturia more than twice per night                To Do List           Goals (5 Years of Data)     None      Follow-Up and Disposition     Return in about 2 weeks (around 2017) for Follow up, Review X-ray.      [facility]     [facility] Nurse Care Line -  Assistance  Unless otherwise directed by your provider, please contact [facility], our nurse care line that is available for  assistance.     Registered nurses in the [facility] provide: appointment scheduling, clinical advisement, health education, and other advisory services.  Call: [phone] (toll free)               Medications           Message regarding Medications     Verify the changes and/or additions to your medication regime listed below are the same as discussed with your clinician today.  If any of these changes or additions are incorrect, please notify your healthcare provider.        STOP taking these medications     dabigatran etexilate (PRADAXA) 75 mg Cap Take 1 capsule (75 mg total) by mouth 2 (two) times daily.           Verify that the below list of medications is an accurate representation of the medications you are currently taking.  If none reported, the list may be blank. If incorrect, please contact your healthcare provider. Carry this list with you in case of emergency.           Current Medications     amlodipine (NORVASC) 10 MG tablet Take 10 mg by mouth once daily.    ascorbic acid, vitamin C, (VITAMIN C) 500 MG  Covid testing today, self isolate till results are available. Steam showers. Arco pot with distilled water.   Rest lay on your belly while resting  Nasal fluticasone (Flonase) 2 sprays each nostril daily for a week then 1 spray each nostril daily for an "tablet Take 500 mg by mouth once daily.    aspirin 325 MG tablet Take 81 mg by mouth once daily.     atenolol (TENORMIN) 100 MG tablet Take 50 mg by mouth 2 (two) times daily.     ELIQUIS 5 mg Tab     fish oil-omega-3 fatty acids 300-1,000 mg capsule Take 2 g by mouth 2 (two) times daily.     furosemide (LASIX) 40 MG tablet Take 1 tablet (40 mg total) by mouth once daily.    glipiZIDE (GLUCOTROL) 5 MG tablet Take 10 mg by mouth 2 (two) times daily before meals.    linagliptin-metformin (JENTADUETO) 2.5-500 mg Tab Take by mouth 2 (two) times daily.    losartan (COZAAR) 50 MG tablet Take 50 mg by mouth once daily.    nitroGLYCERIN (NITROSTAT) 0.4 MG SL tablet Place 0.4 mg under the tongue every 5 (five) minutes as needed.    polyethylene glycol (GLYCOLAX) 17 gram PwPk Take by mouth as needed.     simvastatin (ZOCOR) 40 MG tablet Take 40 mg by mouth once daily.           Clinical Reference Information           Your Vitals Were     BP Pulse Height Weight BMI    122/82 70 5' 9" (1.753 m) 86.7 kg (191 lb 2.2 oz) 28.23 kg/m2      Blood Pressure          Most Recent Value    BP  122/82      Allergies as of 4/11/2017     No Known Allergies      Immunizations Administered on Date of Encounter - 4/11/2017     None      Orders Placed During Today's Visit     Future Labs/Procedures Expected by Expires    US Retroperitoneal Complete (Kidney and  4/11/2017 4/11/2018      Smoking Cessation     If you would like to quit smoking:   You may be eligible for free services if you are a [place] resident and started smoking cigarettes before September 1, 1988.  Call the Smoking Cessation Trust (SCT) toll free at (823) 929-8743 or (038) 067-8287.   Call 1-800-QUIT-NOW if you do not meet the above criteria.   Contact us via email: [email or web address]   View our website for more information: [email or web address]        Language Assistance Services     ATTENTION: Language assistance services are available, free of charge. " Please call [phone].      ATENCIÓN: Si habla español, tiene a dowell disposición servicios gratuitos de asistencia lingüística. Llame al [phone].     CHÚ Ý: N?u b?n nói Ti?ng Vi?t, có các d?ch v? h? tr? ngôn ng? mi?n phí dành cho b?n. G?i s? [phone].         [facility] complies with applicable Federal civil rights laws and does not discriminate on the basis of race, color, national origin, age, disability, or sex.

## 2021-06-13 ENCOUNTER — HOSPITAL ENCOUNTER (INPATIENT)
Facility: HOSPITAL | Age: 81
LOS: 2 days | Discharge: HOME HEALTH | DRG: 291 | End: 2021-06-15
Attending: EMERGENCY MEDICINE | Admitting: INTERNAL MEDICINE
Payer: MEDICARE

## 2021-06-13 DIAGNOSIS — R09.89 PULMONARY VASCULAR CONGESTION: ICD-10-CM

## 2021-06-13 DIAGNOSIS — R06.02 SOB (SHORTNESS OF BREATH): ICD-10-CM

## 2021-06-13 DIAGNOSIS — J44.1 COPD WITH ACUTE EXACERBATION: Primary | ICD-10-CM

## 2021-06-13 DIAGNOSIS — Z86.79 HISTORY OF CONGESTIVE HEART FAILURE: ICD-10-CM

## 2021-06-13 PROBLEM — R79.89 ELEVATED TROPONIN: Status: ACTIVE | Noted: 2021-06-13

## 2021-06-13 LAB
ALBUMIN SERPL BCP-MCNC: 3.4 G/DL (ref 3.5–5.2)
ALLENS TEST: ABNORMAL
ALP SERPL-CCNC: 119 U/L (ref 55–135)
ALT SERPL W/O P-5'-P-CCNC: 6 U/L (ref 10–44)
ANION GAP SERPL CALC-SCNC: 8 MMOL/L (ref 8–16)
AST SERPL-CCNC: 10 U/L (ref 10–40)
BASOPHILS # BLD AUTO: 0.05 K/UL (ref 0–0.2)
BASOPHILS NFR BLD: 0.7 % (ref 0–1.9)
BILIRUB SERPL-MCNC: 0.6 MG/DL (ref 0.1–1)
BNP SERPL-MCNC: 298 PG/ML (ref 0–99)
BUN SERPL-MCNC: 13 MG/DL (ref 8–23)
CALCIUM SERPL-MCNC: 10.4 MG/DL (ref 8.7–10.5)
CHLORIDE SERPL-SCNC: 114 MMOL/L (ref 95–110)
CO2 SERPL-SCNC: 20 MMOL/L (ref 23–29)
CREAT SERPL-MCNC: 1.5 MG/DL (ref 0.5–1.4)
CTP QC/QA: YES
DELSYS: ABNORMAL
DIFFERENTIAL METHOD: ABNORMAL
EOSINOPHIL # BLD AUTO: 0.5 K/UL (ref 0–0.5)
EOSINOPHIL NFR BLD: 7.3 % (ref 0–8)
ERYTHROCYTE [DISTWIDTH] IN BLOOD BY AUTOMATED COUNT: 17.7 % (ref 11.5–14.5)
EST. GFR  (AFRICAN AMERICAN): 49 ML/MIN/1.73 M^2
EST. GFR  (NON AFRICAN AMERICAN): 42 ML/MIN/1.73 M^2
GLUCOSE SERPL-MCNC: 99 MG/DL (ref 70–110)
HCO3 UR-SCNC: 21.5 MMOL/L (ref 24–28)
HCT VFR BLD AUTO: 38 % (ref 40–54)
HGB BLD-MCNC: 11.7 G/DL (ref 14–18)
IMM GRANULOCYTES # BLD AUTO: 0.07 K/UL (ref 0–0.04)
IMM GRANULOCYTES NFR BLD AUTO: 1 % (ref 0–0.5)
LYMPHOCYTES # BLD AUTO: 1.3 K/UL (ref 1–4.8)
LYMPHOCYTES NFR BLD: 19.6 % (ref 18–48)
MAGNESIUM SERPL-MCNC: 1.3 MG/DL (ref 1.6–2.6)
MCH RBC QN AUTO: 27 PG (ref 27–31)
MCHC RBC AUTO-ENTMCNC: 30.8 G/DL (ref 32–36)
MCV RBC AUTO: 88 FL (ref 82–98)
MONOCYTES # BLD AUTO: 0.7 K/UL (ref 0.3–1)
MONOCYTES NFR BLD: 10.4 % (ref 4–15)
NEUTROPHILS # BLD AUTO: 4.1 K/UL (ref 1.8–7.7)
NEUTROPHILS NFR BLD: 61 % (ref 38–73)
NRBC BLD-RTO: 0 /100 WBC
PCO2 BLDA: 39.1 MMHG (ref 35–45)
PH SMN: 7.35 [PH] (ref 7.35–7.45)
PLATELET # BLD AUTO: 256 K/UL (ref 150–450)
PMV BLD AUTO: 9.9 FL (ref 9.2–12.9)
PO2 BLDA: 51 MMHG (ref 80–100)
POC BE: -4 MMOL/L
POC SATURATED O2: 84 % (ref 95–100)
POC TCO2: 23 MMOL/L (ref 23–27)
POCT GLUCOSE: 108 MG/DL (ref 70–110)
POCT GLUCOSE: 84 MG/DL (ref 70–110)
POCT GLUCOSE: 90 MG/DL (ref 70–110)
POTASSIUM SERPL-SCNC: 4.6 MMOL/L (ref 3.5–5.1)
PROT SERPL-MCNC: 6.7 G/DL (ref 6–8.4)
RBC # BLD AUTO: 4.33 M/UL (ref 4.6–6.2)
SAMPLE: ABNORMAL
SARS-COV-2 RDRP RESP QL NAA+PROBE: NEGATIVE
SITE: ABNORMAL
SODIUM SERPL-SCNC: 142 MMOL/L (ref 136–145)
TROPONIN I SERPL DL<=0.01 NG/ML-MCNC: 0.07 NG/ML (ref 0–0.03)
TROPONIN I SERPL DL<=0.01 NG/ML-MCNC: 0.08 NG/ML (ref 0–0.03)
TROPONIN I SERPL DL<=0.01 NG/ML-MCNC: 0.08 NG/ML (ref 0–0.03)
TROPONIN I SERPL DL<=0.01 NG/ML-MCNC: 0.09 NG/ML (ref 0–0.03)
TSH SERPL DL<=0.005 MIU/L-ACNC: 0.81 UIU/ML (ref 0.4–4)
WBC # BLD AUTO: 6.7 K/UL (ref 3.9–12.7)

## 2021-06-13 PROCEDURE — 96374 THER/PROPH/DIAG INJ IV PUSH: CPT

## 2021-06-13 PROCEDURE — 25000242 PHARM REV CODE 250 ALT 637 W/ HCPCS: Performed by: EMERGENCY MEDICINE

## 2021-06-13 PROCEDURE — 93010 ELECTROCARDIOGRAM REPORT: CPT | Mod: ,,, | Performed by: INTERNAL MEDICINE

## 2021-06-13 PROCEDURE — 80053 COMPREHEN METABOLIC PANEL: CPT | Performed by: EMERGENCY MEDICINE

## 2021-06-13 PROCEDURE — 25000003 PHARM REV CODE 250: Performed by: INTERNAL MEDICINE

## 2021-06-13 PROCEDURE — 83036 HEMOGLOBIN GLYCOSYLATED A1C: CPT | Performed by: INTERNAL MEDICINE

## 2021-06-13 PROCEDURE — 82803 BLOOD GASES ANY COMBINATION: CPT

## 2021-06-13 PROCEDURE — 36600 WITHDRAWAL OF ARTERIAL BLOOD: CPT

## 2021-06-13 PROCEDURE — 93010 EKG 12-LEAD: ICD-10-PCS | Mod: ,,, | Performed by: INTERNAL MEDICINE

## 2021-06-13 PROCEDURE — 96375 TX/PRO/DX INJ NEW DRUG ADDON: CPT

## 2021-06-13 PROCEDURE — 85025 COMPLETE CBC W/AUTO DIFF WBC: CPT | Performed by: EMERGENCY MEDICINE

## 2021-06-13 PROCEDURE — 11000001 HC ACUTE MED/SURG PRIVATE ROOM

## 2021-06-13 PROCEDURE — 25000003 PHARM REV CODE 250: Performed by: EMERGENCY MEDICINE

## 2021-06-13 PROCEDURE — 84484 ASSAY OF TROPONIN QUANT: CPT | Mod: 91 | Performed by: NURSE PRACTITIONER

## 2021-06-13 PROCEDURE — 99285 EMERGENCY DEPT VISIT HI MDM: CPT | Mod: 25

## 2021-06-13 PROCEDURE — 84484 ASSAY OF TROPONIN QUANT: CPT | Mod: 91 | Performed by: INTERNAL MEDICINE

## 2021-06-13 PROCEDURE — 94640 AIRWAY INHALATION TREATMENT: CPT

## 2021-06-13 PROCEDURE — 36415 COLL VENOUS BLD VENIPUNCTURE: CPT | Performed by: INTERNAL MEDICINE

## 2021-06-13 PROCEDURE — 63600175 PHARM REV CODE 636 W HCPCS: Performed by: INTERNAL MEDICINE

## 2021-06-13 PROCEDURE — 94761 N-INVAS EAR/PLS OXIMETRY MLT: CPT

## 2021-06-13 PROCEDURE — 99900035 HC TECH TIME PER 15 MIN (STAT)

## 2021-06-13 PROCEDURE — 83880 ASSAY OF NATRIURETIC PEPTIDE: CPT | Performed by: EMERGENCY MEDICINE

## 2021-06-13 PROCEDURE — 84443 ASSAY THYROID STIM HORMONE: CPT | Performed by: INTERNAL MEDICINE

## 2021-06-13 PROCEDURE — 27000221 HC OXYGEN, UP TO 24 HOURS

## 2021-06-13 PROCEDURE — 83735 ASSAY OF MAGNESIUM: CPT | Performed by: EMERGENCY MEDICINE

## 2021-06-13 PROCEDURE — 25000242 PHARM REV CODE 250 ALT 637 W/ HCPCS: Performed by: INTERNAL MEDICINE

## 2021-06-13 PROCEDURE — S0171 BUMETANIDE 0.5 MG: HCPCS | Performed by: EMERGENCY MEDICINE

## 2021-06-13 PROCEDURE — 84484 ASSAY OF TROPONIN QUANT: CPT | Performed by: EMERGENCY MEDICINE

## 2021-06-13 PROCEDURE — 82962 GLUCOSE BLOOD TEST: CPT

## 2021-06-13 PROCEDURE — 96376 TX/PRO/DX INJ SAME DRUG ADON: CPT

## 2021-06-13 PROCEDURE — 93005 ELECTROCARDIOGRAM TRACING: CPT

## 2021-06-13 PROCEDURE — U0002 COVID-19 LAB TEST NON-CDC: HCPCS | Performed by: EMERGENCY MEDICINE

## 2021-06-13 RX ORDER — LOSARTAN POTASSIUM 25 MG/1
25 TABLET ORAL DAILY
Status: DISCONTINUED | OUTPATIENT
Start: 2021-06-13 | End: 2021-06-15 | Stop reason: HOSPADM

## 2021-06-13 RX ORDER — DILTIAZEM HYDROCHLORIDE 30 MG/1
30 TABLET, FILM COATED ORAL EVERY 12 HOURS
Status: DISCONTINUED | OUTPATIENT
Start: 2021-06-13 | End: 2021-06-15 | Stop reason: HOSPADM

## 2021-06-13 RX ORDER — IBUPROFEN 200 MG
16 TABLET ORAL
Status: DISCONTINUED | OUTPATIENT
Start: 2021-06-13 | End: 2021-06-15 | Stop reason: HOSPADM

## 2021-06-13 RX ORDER — BUMETANIDE 0.25 MG/ML
1 INJECTION INTRAMUSCULAR; INTRAVENOUS
Status: COMPLETED | OUTPATIENT
Start: 2021-06-13 | End: 2021-06-13

## 2021-06-13 RX ORDER — SPIRONOLACTONE 25 MG/1
25 TABLET ORAL DAILY
Status: DISCONTINUED | OUTPATIENT
Start: 2021-06-13 | End: 2021-06-15 | Stop reason: HOSPADM

## 2021-06-13 RX ORDER — FUROSEMIDE 10 MG/ML
60 INJECTION INTRAMUSCULAR; INTRAVENOUS
Status: DISCONTINUED | OUTPATIENT
Start: 2021-06-13 | End: 2021-06-15 | Stop reason: HOSPADM

## 2021-06-13 RX ORDER — MAGNESIUM SULFATE HEPTAHYDRATE 40 MG/ML
2 INJECTION, SOLUTION INTRAVENOUS ONCE
Status: COMPLETED | OUTPATIENT
Start: 2021-06-13 | End: 2021-06-13

## 2021-06-13 RX ORDER — ALBUTEROL SULFATE 2.5 MG/.5ML
2.5 SOLUTION RESPIRATORY (INHALATION)
Status: COMPLETED | OUTPATIENT
Start: 2021-06-13 | End: 2021-06-13

## 2021-06-13 RX ORDER — MUPIROCIN 20 MG/G
OINTMENT TOPICAL 2 TIMES DAILY
Status: DISCONTINUED | OUTPATIENT
Start: 2021-06-13 | End: 2021-06-15 | Stop reason: HOSPADM

## 2021-06-13 RX ORDER — ATORVASTATIN CALCIUM 10 MG/1
20 TABLET, FILM COATED ORAL NIGHTLY
Status: DISCONTINUED | OUTPATIENT
Start: 2021-06-13 | End: 2021-06-15 | Stop reason: HOSPADM

## 2021-06-13 RX ORDER — GLUCAGON 1 MG
1 KIT INJECTION
Status: DISCONTINUED | OUTPATIENT
Start: 2021-06-13 | End: 2021-06-15 | Stop reason: HOSPADM

## 2021-06-13 RX ORDER — ASPIRIN 81 MG/1
81 TABLET ORAL DAILY
Status: DISCONTINUED | OUTPATIENT
Start: 2021-06-13 | End: 2021-06-15 | Stop reason: HOSPADM

## 2021-06-13 RX ORDER — IPRATROPIUM BROMIDE AND ALBUTEROL SULFATE 2.5; .5 MG/3ML; MG/3ML
3 SOLUTION RESPIRATORY (INHALATION) EVERY 6 HOURS
Status: DISCONTINUED | OUTPATIENT
Start: 2021-06-13 | End: 2021-06-13

## 2021-06-13 RX ORDER — TAMSULOSIN HYDROCHLORIDE 0.4 MG/1
0.4 CAPSULE ORAL DAILY
Status: DISCONTINUED | OUTPATIENT
Start: 2021-06-13 | End: 2021-06-15 | Stop reason: HOSPADM

## 2021-06-13 RX ORDER — IPRATROPIUM BROMIDE AND ALBUTEROL SULFATE 2.5; .5 MG/3ML; MG/3ML
3 SOLUTION RESPIRATORY (INHALATION) EVERY 6 HOURS
Status: DISCONTINUED | OUTPATIENT
Start: 2021-06-13 | End: 2021-06-15 | Stop reason: HOSPADM

## 2021-06-13 RX ORDER — ENOXAPARIN SODIUM 100 MG/ML
40 INJECTION SUBCUTANEOUS EVERY 24 HOURS
Status: DISCONTINUED | OUTPATIENT
Start: 2021-06-13 | End: 2021-06-13

## 2021-06-13 RX ORDER — IBUPROFEN 200 MG
24 TABLET ORAL
Status: DISCONTINUED | OUTPATIENT
Start: 2021-06-13 | End: 2021-06-15 | Stop reason: HOSPADM

## 2021-06-13 RX ORDER — FERROUS GLUCONATE 324(37.5)
324 TABLET ORAL
Status: DISCONTINUED | OUTPATIENT
Start: 2021-06-14 | End: 2021-06-15 | Stop reason: HOSPADM

## 2021-06-13 RX ORDER — LEVOTHYROXINE SODIUM 25 UG/1
25 TABLET ORAL
Status: DISCONTINUED | OUTPATIENT
Start: 2021-06-14 | End: 2021-06-15 | Stop reason: HOSPADM

## 2021-06-13 RX ORDER — INSULIN ASPART 100 [IU]/ML
0-5 INJECTION, SOLUTION INTRAVENOUS; SUBCUTANEOUS
Status: DISCONTINUED | OUTPATIENT
Start: 2021-06-13 | End: 2021-06-15 | Stop reason: HOSPADM

## 2021-06-13 RX ADMIN — SPIRONOLACTONE 25 MG: 25 TABLET ORAL at 12:06

## 2021-06-13 RX ADMIN — DILTIAZEM HYDROCHLORIDE 30 MG: 30 TABLET, FILM COATED ORAL at 11:06

## 2021-06-13 RX ADMIN — IPRATROPIUM BROMIDE AND ALBUTEROL SULFATE 3 ML: .5; 3 SOLUTION RESPIRATORY (INHALATION) at 07:06

## 2021-06-13 RX ADMIN — MUPIROCIN: 20 OINTMENT TOPICAL at 10:06

## 2021-06-13 RX ADMIN — DILTIAZEM HYDROCHLORIDE 30 MG: 30 TABLET, FILM COATED ORAL at 10:06

## 2021-06-13 RX ADMIN — BUMETANIDE 1 MG: 0.25 INJECTION INTRAMUSCULAR; INTRAVENOUS at 09:06

## 2021-06-13 RX ADMIN — FUROSEMIDE 60 MG: 10 INJECTION, SOLUTION INTRAVENOUS at 11:06

## 2021-06-13 RX ADMIN — RIVAROXABAN 15 MG: 15 TABLET, FILM COATED ORAL at 05:06

## 2021-06-13 RX ADMIN — LOSARTAN POTASSIUM 25 MG: 25 TABLET, FILM COATED ORAL at 11:06

## 2021-06-13 RX ADMIN — TAMSULOSIN HYDROCHLORIDE 0.4 MG: 0.4 CAPSULE ORAL at 11:06

## 2021-06-13 RX ADMIN — ALBUTEROL SULFATE 2.5 MG: 2.5 SOLUTION RESPIRATORY (INHALATION) at 04:06

## 2021-06-13 RX ADMIN — ATORVASTATIN CALCIUM 20 MG: 10 TABLET, FILM COATED ORAL at 10:06

## 2021-06-13 RX ADMIN — MUPIROCIN: 20 OINTMENT TOPICAL at 11:06

## 2021-06-13 RX ADMIN — NITROGLYCERIN 1 INCH: 20 OINTMENT TOPICAL at 05:06

## 2021-06-13 RX ADMIN — BUMETANIDE 1 MG: 0.25 INJECTION INTRAMUSCULAR; INTRAVENOUS at 05:06

## 2021-06-13 RX ADMIN — ASPIRIN 81 MG: 81 TABLET, COATED ORAL at 11:06

## 2021-06-13 RX ADMIN — IPRATROPIUM BROMIDE AND ALBUTEROL SULFATE 3 ML: .5; 3 SOLUTION RESPIRATORY (INHALATION) at 01:06

## 2021-06-13 RX ADMIN — MAGNESIUM SULFATE 2 G: 2 INJECTION INTRAVENOUS at 12:06

## 2021-06-14 LAB
ANION GAP SERPL CALC-SCNC: 9 MMOL/L (ref 8–16)
AORTIC ROOT ANNULUS: 3.71 CM
AORTIC VALVE CUSP SEPERATION: 1.76 CM
ASCENDING AORTA: 2.79 CM
AV INDEX (PROSTH): 0.55
AV MEAN GRADIENT: 9 MMHG
AV PEAK GRADIENT: 12 MMHG
AV VALVE AREA: 2.14 CM2
AV VELOCITY RATIO: 0.54
BSA FOR ECHO PROCEDURE: 2.01 M2
BUN SERPL-MCNC: 20 MG/DL (ref 8–23)
CALCIUM SERPL-MCNC: 10.9 MG/DL (ref 8.7–10.5)
CHLORIDE SERPL-SCNC: 111 MMOL/L (ref 95–110)
CO2 SERPL-SCNC: 21 MMOL/L (ref 23–29)
CREAT SERPL-MCNC: 1.7 MG/DL (ref 0.5–1.4)
CV ECHO LV RWT: 1.06 CM
DOP CALC AO PEAK VEL: 1.76 M/S
DOP CALC AO VTI: 33.38 CM
DOP CALC LVOT AREA: 3.9 CM2
DOP CALC LVOT DIAMETER: 2.23 CM
DOP CALC LVOT PEAK VEL: 0.95 M/S
DOP CALC LVOT STROKE VOLUME: 71.59 CM3
DOP CALCLVOT PEAK VEL VTI: 18.34 CM
E WAVE DECELERATION TIME: 176.69 MSEC
E/A RATIO: 3.3
E/E' RATIO: 18.77 M/S
ECHO LV POSTERIOR WALL: 2.01 CM (ref 0.6–1.1)
EJECTION FRACTION: 60 %
EST. GFR  (AFRICAN AMERICAN): 42 ML/MIN/1.73 M^2
EST. GFR  (NON AFRICAN AMERICAN): 36 ML/MIN/1.73 M^2
ESTIMATED AVG GLUCOSE: 146 MG/DL (ref 68–131)
FRACTIONAL SHORTENING: 25 % (ref 28–44)
GLUCOSE SERPL-MCNC: 73 MG/DL (ref 70–110)
HBA1C MFR BLD: 6.7 % (ref 4–5.6)
INTERVENTRICULAR SEPTUM: 1.97 CM (ref 0.6–1.1)
IVRT: 128.03 MSEC
LA MAJOR: 6.17 CM
LA MINOR: 6.8 CM
LA WIDTH: 4.78 CM
LEFT ATRIUM SIZE: 4.53 CM
LEFT ATRIUM VOLUME INDEX: 60.1 ML/M2
LEFT ATRIUM VOLUME: 119.08 CM3
LEFT INTERNAL DIMENSION IN SYSTOLE: 2.83 CM (ref 2.1–4)
LEFT VENTRICLE DIASTOLIC VOLUME INDEX: 30.84 ML/M2
LEFT VENTRICLE DIASTOLIC VOLUME: 61.07 ML
LEFT VENTRICLE MASS INDEX: 174 G/M2
LEFT VENTRICLE SYSTOLIC VOLUME INDEX: 15.3 ML/M2
LEFT VENTRICLE SYSTOLIC VOLUME: 30.23 ML
LEFT VENTRICULAR INTERNAL DIMENSION IN DIASTOLE: 3.78 CM (ref 3.5–6)
LEFT VENTRICULAR MASS: 344.45 G
LV LATERAL E/E' RATIO: 13.56 M/S
LV SEPTAL E/E' RATIO: 30.5 M/S
MAGNESIUM SERPL-MCNC: 1.5 MG/DL (ref 1.6–2.6)
MV PEAK A VEL: 0.37 M/S
MV PEAK E VEL: 1.22 M/S
MV STENOSIS PRESSURE HALF TIME: 51.24 MS
MV VALVE AREA P 1/2 METHOD: 4.29 CM2
PISA TR MAX VEL: 4.02 M/S
POCT GLUCOSE: 103 MG/DL (ref 70–110)
POCT GLUCOSE: 106 MG/DL (ref 70–110)
POCT GLUCOSE: 117 MG/DL (ref 70–110)
POCT GLUCOSE: 121 MG/DL (ref 70–110)
POTASSIUM SERPL-SCNC: 4.9 MMOL/L (ref 3.5–5.1)
PULM VEIN S/D RATIO: 1.76
PV PEAK D VEL: 0.25 M/S
PV PEAK S VEL: 0.44 M/S
PV PEAK VELOCITY: 0.75 CM/S
RA MAJOR: 5.73 CM
RA PRESSURE: 8 MMHG
RA WIDTH: 4.42 CM
RIGHT VENTRICULAR END-DIASTOLIC DIMENSION: 3.89 CM
RV TISSUE DOPPLER FREE WALL SYSTOLIC VELOCITY 1 (APICAL 4 CHAMBER VIEW): 8.99 CM/S
SINUS: 3.65 CM
SODIUM SERPL-SCNC: 141 MMOL/L (ref 136–145)
STJ: 2.87 CM
TDI LATERAL: 0.09 M/S
TDI SEPTAL: 0.04 M/S
TDI: 0.07 M/S
TR MAX PG: 65 MMHG
TRICUSPID ANNULAR PLANE SYSTOLIC EXCURSION: 1.74 CM
TROPONIN I SERPL DL<=0.01 NG/ML-MCNC: 0.08 NG/ML (ref 0–0.03)
TROPONIN I SERPL DL<=0.01 NG/ML-MCNC: 0.09 NG/ML (ref 0–0.03)
TV REST PULMONARY ARTERY PRESSURE: 73 MMHG

## 2021-06-14 PROCEDURE — 83735 ASSAY OF MAGNESIUM: CPT | Performed by: INTERNAL MEDICINE

## 2021-06-14 PROCEDURE — 84484 ASSAY OF TROPONIN QUANT: CPT | Performed by: INTERNAL MEDICINE

## 2021-06-14 PROCEDURE — 25000003 PHARM REV CODE 250: Performed by: INTERNAL MEDICINE

## 2021-06-14 PROCEDURE — 25000242 PHARM REV CODE 250 ALT 637 W/ HCPCS: Performed by: INTERNAL MEDICINE

## 2021-06-14 PROCEDURE — 94640 AIRWAY INHALATION TREATMENT: CPT

## 2021-06-14 PROCEDURE — 27000221 HC OXYGEN, UP TO 24 HOURS

## 2021-06-14 PROCEDURE — 25000003 PHARM REV CODE 250: Performed by: NURSE PRACTITIONER

## 2021-06-14 PROCEDURE — 36415 COLL VENOUS BLD VENIPUNCTURE: CPT | Performed by: INTERNAL MEDICINE

## 2021-06-14 PROCEDURE — 94761 N-INVAS EAR/PLS OXIMETRY MLT: CPT

## 2021-06-14 PROCEDURE — 80048 BASIC METABOLIC PNL TOTAL CA: CPT | Performed by: INTERNAL MEDICINE

## 2021-06-14 PROCEDURE — 11000001 HC ACUTE MED/SURG PRIVATE ROOM

## 2021-06-14 PROCEDURE — 63600175 PHARM REV CODE 636 W HCPCS: Performed by: INTERNAL MEDICINE

## 2021-06-14 RX ORDER — BENZONATATE 100 MG/1
100 CAPSULE ORAL 3 TIMES DAILY PRN
Status: DISCONTINUED | OUTPATIENT
Start: 2021-06-14 | End: 2021-06-15 | Stop reason: HOSPADM

## 2021-06-14 RX ADMIN — IPRATROPIUM BROMIDE AND ALBUTEROL SULFATE 3 ML: .5; 3 SOLUTION RESPIRATORY (INHALATION) at 12:06

## 2021-06-14 RX ADMIN — BENZONATATE 100 MG: 100 CAPSULE ORAL at 04:06

## 2021-06-14 RX ADMIN — FUROSEMIDE 60 MG: 10 INJECTION, SOLUTION INTRAVENOUS at 10:06

## 2021-06-14 RX ADMIN — ATORVASTATIN CALCIUM 20 MG: 10 TABLET, FILM COATED ORAL at 08:06

## 2021-06-14 RX ADMIN — FERROUS GLUCONATE TAB 324 MG (37.5 MG ELEMENTAL IRON) 324 MG: 324 (37.5 FE) TAB at 09:06

## 2021-06-14 RX ADMIN — RIVAROXABAN 15 MG: 15 TABLET, FILM COATED ORAL at 04:06

## 2021-06-14 RX ADMIN — LOSARTAN POTASSIUM 25 MG: 25 TABLET, FILM COATED ORAL at 09:06

## 2021-06-14 RX ADMIN — IPRATROPIUM BROMIDE AND ALBUTEROL SULFATE 3 ML: .5; 3 SOLUTION RESPIRATORY (INHALATION) at 07:06

## 2021-06-14 RX ADMIN — SPIRONOLACTONE 25 MG: 25 TABLET ORAL at 09:06

## 2021-06-14 RX ADMIN — FUROSEMIDE 60 MG: 10 INJECTION, SOLUTION INTRAVENOUS at 12:06

## 2021-06-14 RX ADMIN — ASPIRIN 81 MG: 81 TABLET, COATED ORAL at 09:06

## 2021-06-14 RX ADMIN — FUROSEMIDE 60 MG: 10 INJECTION, SOLUTION INTRAVENOUS at 02:06

## 2021-06-14 RX ADMIN — BENZONATATE 100 MG: 100 CAPSULE ORAL at 01:06

## 2021-06-14 RX ADMIN — DILTIAZEM HYDROCHLORIDE 30 MG: 30 TABLET, FILM COATED ORAL at 09:06

## 2021-06-14 RX ADMIN — LEVOTHYROXINE SODIUM 25 MCG: 25 TABLET ORAL at 06:06

## 2021-06-14 RX ADMIN — IPRATROPIUM BROMIDE AND ALBUTEROL SULFATE 3 ML: .5; 3 SOLUTION RESPIRATORY (INHALATION) at 08:06

## 2021-06-14 RX ADMIN — MUPIROCIN: 20 OINTMENT TOPICAL at 08:06

## 2021-06-14 RX ADMIN — DILTIAZEM HYDROCHLORIDE 30 MG: 30 TABLET, FILM COATED ORAL at 08:06

## 2021-06-14 RX ADMIN — TAMSULOSIN HYDROCHLORIDE 0.4 MG: 0.4 CAPSULE ORAL at 09:06

## 2021-06-14 RX ADMIN — MUPIROCIN: 20 OINTMENT TOPICAL at 09:06

## 2021-06-15 VITALS
RESPIRATION RATE: 18 BRPM | TEMPERATURE: 98 F | WEIGHT: 185 LBS | HEIGHT: 68 IN | BODY MASS INDEX: 28.04 KG/M2 | DIASTOLIC BLOOD PRESSURE: 63 MMHG | SYSTOLIC BLOOD PRESSURE: 133 MMHG | HEART RATE: 87 BPM | OXYGEN SATURATION: 96 %

## 2021-06-15 LAB
ANION GAP SERPL CALC-SCNC: 9 MMOL/L (ref 8–16)
BUN SERPL-MCNC: 22 MG/DL (ref 8–23)
CALCIUM SERPL-MCNC: 11.1 MG/DL (ref 8.7–10.5)
CHLORIDE SERPL-SCNC: 109 MMOL/L (ref 95–110)
CO2 SERPL-SCNC: 23 MMOL/L (ref 23–29)
CREAT SERPL-MCNC: 1.7 MG/DL (ref 0.5–1.4)
EST. GFR  (AFRICAN AMERICAN): 42 ML/MIN/1.73 M^2
EST. GFR  (NON AFRICAN AMERICAN): 36 ML/MIN/1.73 M^2
GLUCOSE SERPL-MCNC: 86 MG/DL (ref 70–110)
MAGNESIUM SERPL-MCNC: 1.3 MG/DL (ref 1.6–2.6)
POCT GLUCOSE: 126 MG/DL (ref 70–110)
POCT GLUCOSE: 94 MG/DL (ref 70–110)
POTASSIUM SERPL-SCNC: 4.8 MMOL/L (ref 3.5–5.1)
SODIUM SERPL-SCNC: 141 MMOL/L (ref 136–145)

## 2021-06-15 PROCEDURE — 63600175 PHARM REV CODE 636 W HCPCS: Performed by: INTERNAL MEDICINE

## 2021-06-15 PROCEDURE — 94640 AIRWAY INHALATION TREATMENT: CPT

## 2021-06-15 PROCEDURE — 25000242 PHARM REV CODE 250 ALT 637 W/ HCPCS: Performed by: INTERNAL MEDICINE

## 2021-06-15 PROCEDURE — 36415 COLL VENOUS BLD VENIPUNCTURE: CPT | Performed by: INTERNAL MEDICINE

## 2021-06-15 PROCEDURE — 94761 N-INVAS EAR/PLS OXIMETRY MLT: CPT

## 2021-06-15 PROCEDURE — 83735 ASSAY OF MAGNESIUM: CPT | Performed by: INTERNAL MEDICINE

## 2021-06-15 PROCEDURE — 25000003 PHARM REV CODE 250: Performed by: INTERNAL MEDICINE

## 2021-06-15 PROCEDURE — 80048 BASIC METABOLIC PNL TOTAL CA: CPT | Performed by: INTERNAL MEDICINE

## 2021-06-15 RX ORDER — AMLODIPINE BESYLATE 5 MG/1
5 TABLET ORAL DAILY
Qty: 30 TABLET | Refills: 0 | Status: SHIPPED | OUTPATIENT
Start: 2021-06-15 | End: 2021-06-15 | Stop reason: HOSPADM

## 2021-06-15 RX ORDER — HYDRALAZINE HYDROCHLORIDE 25 MG/1
25 TABLET, FILM COATED ORAL 3 TIMES DAILY
Qty: 90 TABLET | Refills: 0 | Status: SHIPPED | OUTPATIENT
Start: 2021-06-15 | End: 2021-07-15

## 2021-06-15 RX ADMIN — DILTIAZEM HYDROCHLORIDE 30 MG: 30 TABLET, FILM COATED ORAL at 08:06

## 2021-06-15 RX ADMIN — MUPIROCIN: 20 OINTMENT TOPICAL at 08:06

## 2021-06-15 RX ADMIN — TAMSULOSIN HYDROCHLORIDE 0.4 MG: 0.4 CAPSULE ORAL at 08:06

## 2021-06-15 RX ADMIN — FUROSEMIDE 60 MG: 10 INJECTION, SOLUTION INTRAVENOUS at 11:06

## 2021-06-15 RX ADMIN — LEVOTHYROXINE SODIUM 25 MCG: 25 TABLET ORAL at 05:06

## 2021-06-15 RX ADMIN — ASPIRIN 81 MG: 81 TABLET, COATED ORAL at 08:06

## 2021-06-15 RX ADMIN — IPRATROPIUM BROMIDE AND ALBUTEROL SULFATE 3 ML: .5; 3 SOLUTION RESPIRATORY (INHALATION) at 12:06

## 2021-06-15 RX ADMIN — FERROUS GLUCONATE TAB 324 MG (37.5 MG ELEMENTAL IRON) 324 MG: 324 (37.5 FE) TAB at 08:06

## 2021-06-15 RX ADMIN — SPIRONOLACTONE 25 MG: 25 TABLET ORAL at 08:06

## 2021-06-15 RX ADMIN — LOSARTAN POTASSIUM 25 MG: 25 TABLET, FILM COATED ORAL at 08:06

## 2021-06-15 RX ADMIN — IPRATROPIUM BROMIDE AND ALBUTEROL SULFATE 3 ML: .5; 3 SOLUTION RESPIRATORY (INHALATION) at 01:06

## 2021-06-15 RX ADMIN — IPRATROPIUM BROMIDE AND ALBUTEROL SULFATE 3 ML: .5; 3 SOLUTION RESPIRATORY (INHALATION) at 07:06

## 2021-06-18 ENCOUNTER — PATIENT OUTREACH (OUTPATIENT)
Dept: ADMINISTRATIVE | Facility: CLINIC | Age: 81
End: 2021-06-18

## 2021-07-01 ENCOUNTER — PATIENT MESSAGE (OUTPATIENT)
Dept: ADMINISTRATIVE | Facility: OTHER | Age: 81
End: 2021-07-01

## 2021-07-06 ENCOUNTER — DOCUMENT SCAN (OUTPATIENT)
Dept: HOME HEALTH SERVICES | Facility: HOSPITAL | Age: 81
End: 2021-07-06
Payer: MEDICARE

## 2021-10-28 NOTE — ED PROVIDER NOTES
Encounter Date: 12/31/2018  This is a SORT/MSE of a 78 y.o. male with CHF, chronic afib, COPD on 2L oxygen at home presenting to the ED sent by PCP for decompensated CHF, fluid retention, SOB. Care will be transferred to an alternate provider when patient is roomed for a full evaluation and final disposition. Patient is aware that he/she is awaiting a room in the emergency department, where another provider will review results, evaluate and treat as needed. VENKATA Gleason DNP     History     Chief Complaint   Patient presents with    Shortness of Breath     x 5 wks, hx of CHF and COPD, wears 2L home O2     CC: SOB and worsening YOO.  Patient was sent from his primary care physician's office.  There was some concern for increased hypoxia and oxygen demands.  Patient uses home oxygen.  Patient has been retaining fluid over the past several weeks.  He has gained some weight.  Denies any fever or chills.  Denies any productive sputum.  Patient and family member state compliance with medications.          Review of patient's allergies indicates:  No Known Allergies  Past Medical History:   Diagnosis Date    Anemia 05/03/2018    pending blood transfusion    Anticoagulant long-term use     apixaban    Atrial fibrillation     CKD (chronic kidney disease)     Congestive heart failure     COPD (chronic obstructive pulmonary disease)     Coronary artery disease     Diabetes mellitus     Encounter for blood transfusion     HLD (hyperlipidemia)     Hypertension     MI (myocardial infarction)     On home oxygen therapy     Pacemaker     left chest    Peripheral vascular disease     Requires assistance with activities of daily living (ADL)     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 vessel CABG    CARDIOVERSION N/A 2013    Performed by [facility]ann Surgeon at [facility] MARYANN    cardioverted      CORONARY ANGIOPLASTY WITH STENT PLACEMENT      EGD (ESOPHAGOGASTRODUODENOSCOPY) N/A 2018    Performed by Ty Hickman MD at [facility] ENDO    HEMORRHOID SURGERY      TRANSESOPHAGEAL ECHOCARDIOGRAM (ANIA) N/A 2013    [facility] by [facility] at [facility]    VASCULAR SURGERY      femoral artery popliteal bypass     Family History   Problem Relation Age of Onset    Diabetes Father     Diabetes Mother      Social History     Tobacco Use    Smoking status: Former Smoker     Packs/day: 1.00     Years: 60.00     Pack years: 60.00     Types: Cigarettes     Last attempt to quit: 2018     Years since quittin.6    Smokeless tobacco: Never Used   Substance Use Topics    Alcohol use: No    Drug use: No     Review of Systems   Constitutional: Positive for unexpected weight change. Negative for diaphoresis, fatigue and fever.   HENT: Negative for sore throat.    Respiratory: Positive for shortness of breath.    Cardiovascular: Positive for leg swelling. Negative for chest pain and palpitations.   Gastrointestinal: Negative for abdominal pain and nausea.   Genitourinary: Negative for dysuria.   Musculoskeletal: Negative for back pain.   Skin: Negative for rash.   Neurological: Negative for weakness.   Hematological: Does not bruise/bleed easily.       Physical Exam     Initial Vitals [18 1452]   BP Pulse Resp Temp SpO2   (!) 172/101 87 20 97.6 °F (36.4 °C) (!) 90 %      MAP       --         Physical Exam    Vitals reviewed.  Constitutional: He appears well-developed and well-nourished.   HENT:   Head: Normocephalic and atraumatic.   Eyes: EOM are normal. Pupils are equal, round, and reactive to light.   Neck: Normal range of motion. Neck supple.   Cardiovascular: Normal rate, regular rhythm, normal heart sounds and intact distal pulses.   Pulmonary/Chest: No respiratory distress. He has decreased  breath sounds. He has no wheezes. He has no rhonchi. He has no rales.   Abdominal: Soft. Bowel sounds are normal. He exhibits no ascites and no mass. There is no tenderness. There is no rebound and no guarding.   Musculoskeletal: Normal range of motion.        Right lower leg: He exhibits edema.        Left lower leg: He exhibits edema.   Neurological: He is alert and oriented to person, place, and time.   Skin: Skin is warm and dry.   Psychiatric: He has a normal mood and affect.         ED Course   Procedures  Labs Reviewed   CBC W/ AUTO DIFFERENTIAL - Abnormal; Notable for the following components:       Result Value    RBC 4.51 (*)     Hemoglobin 11.1 (*)     Hematocrit 36.6 (*)     MCV 81 (*)     MCH 24.6 (*)     MCHC 30.3 (*)     RDW 20.9 (*)     Gran% 73.5 (*)     Lymph% 12.2 (*)     All other components within normal limits   COMPREHENSIVE METABOLIC PANEL - Abnormal; Notable for the following components:    Chloride 111 (*)     Total Protein 5.4 (*)     Albumin 2.3 (*)     Alkaline Phosphatase 138 (*)     eGFR if  54 (*)     eGFR if non  47 (*)     All other components within normal limits   TROPONIN I - Abnormal; Notable for the following components:    Troponin I 0.126 (*)     All other components within normal limits   B-TYPE NATRIURETIC PEPTIDE - Abnormal; Notable for the following components:     (*)     All other components within normal limits     EKG Readings: (Independently Interpreted)   Initial Reading: No STEMI. Rhythm: Atrial Fibrillation. Clinical Impression: Atrial Fibrillation with RVR       Imaging Results          X-Ray Chest AP Portable (Final result)  Result time 12/31/18 15:32:46    Final result by Macarena Woodard MD (12/31/18 15:32:46)                 Impression:      There is no evidence acute pulmonary disease.  The chest appears similar to November 30, 2018.      Electronically signed by: Macarena Woodard MD  Date:    12/31/2018  Time:    15:32              Narrative:    EXAMINATION:  XR CHEST AP PORTABLE    CLINICAL HISTORY:  Shortness of breath    TECHNIQUE:  Single frontal view of the chest was performed.    COMPARISON:  None    FINDINGS:  There is a left-sided pacer defibrillator with leads in the right atrium and right ventricle.  The patient is status post sternotomy.  The cardiac silhouette is enlarged.  There is no pneumothorax.                              X-Rays:   Independently Interpreted Readings:   Chest X-Ray: Increased vascular markings consistent with CHF are present.     Medical Decision Making:   History:   Old Medical Records: I decided to obtain old medical records.  Initial Assessment:   Medical decision-making:    The patient received a medical screening exam. If performed, the EKG was independently evaluated by me and is pending final cardiology evaluation.  If performed, all radiographic studies were independently evaluated by me and are pending final radiology evaluation. If labs were ordered, they were reviewed. Vital signs are independently assessed by me.  If performed, the pulse oximetry was independently evaluated by me.  I decided to obtain the patient's past medical record.  If available, I reviewed the patient's past medical record, including most recent labs and radiology reports.    ED Management:  Patient presents emergency department for evaluation of shortness of breath.  Likely mixed picture between decompensated heart failure and COPD.  Patient arrived with AFib with RVR.  This was treated with Cardizem.  Patient needs aggressive diuresis.  However when the patient moves around he has momentary runs of V-tach.  These last from anywhere to 10 sec to 45 sec.  Chest x-ray shows chronic edema. No fever or leukocytosis.  Good renal function. No sign of anemia or pneumonia.   Patient was hypertensive.  Nitroglycerin paste helped his hypertensive urgency.  Case was discussed with cardiology who agrees with aggressive  diuresis and continued monitoring.  Will hold off on amiodarone drip at this time.  Temporary admission orders placed.    I discussed the patient's presentation and workup with the hospitalist who agrees with placing the patient in the hospital.  I have placed orders for the hospitalist.   The results and physical exam findings were reviewed with the patient. Pt agrees with assessment, disposition and treatment plan and has no further questions or complaints at this time.    RABIA Loyd M.D. 6:16 PM 12/31/2018                          Clinical Impression:   The primary encounter diagnosis was Acute on chronic congestive heart failure, unspecified heart failure type. A diagnosis of Shortness of breath was also pertinent to this visit.                             Nils Loyd MD  12/31/18 3153

## 2021-11-11 NOTE — PLAN OF CARE
Problem: Physical Therapy Goal  Goal: Physical Therapy Goal  Goals to be met by: 18    Patient will increase functional independence with mobility by performin. Sit to stand transfer with Supervision  2. Gait  x 200 feet with Supervision using Rolling Walker if needed  3. Lower extremity exercise program x30 reps per handout, with supervision      Patient would continue to benefit from PT while in the hospital.        Status[de-identified] INPATIENT [101]   Type of Bed: Telemetry [19]   Cardiac Monitoring Required?: Yes   Inpatient Hospitalization Certified Necessary for the Following Reasons: 3.  Patient receiving treatment that can only be provided in an inpatient setting (further clarification in H&P documentation)   Admitting Diagnosis: Acute respiratory failure with hypoxia [facility]) [[id number]]   Admitting Physician: Celi Reese   Attending Physician: Celi Reese   Estimated Length of Stay: 3-4 Midnights   Discharge Plan[de-identified] Home with Office Follow-up Allergic/Immunologic: Negative. Negative for environmental allergies and food allergies. Neurological: Negative. Hematological: Negative. Psychiatric/Behavioral:        Fussy        PAST MEDICAL HISTORY    has no past medical history on file. SURGICAL HISTORY      has no past surgical history on file. CURRENT MEDICATIONS       Discharge Medication List as of 8/27/2018  1:40 PM      CONTINUE these medications which have NOT CHANGED    Details   polyethylene glycol (MIRALAX) packet Take 9 g by mouth daily as needed for Constipation, Disp-527 g, R-0Print      sulfamethoxazole-trimethoprim (BACTRIM;SEPTRA) 200-40 MG/5ML suspension 3.5  mL 3 times a day for 10 days, Disp-1 Bottle, R-0Print      Nebulizers (NEBULIZER COMPRESSOR) MISC Disp-1 each, R-0, PrintAlbuterol 1 unit dose every 6 hours for wheezing and chest congestion      albuterol (ACCUNEB) 1.25 MG/3ML nebulizer solution Inhale 3 mLs into the lungs every 6 hours as needed for Wheezing or Shortness of Breath, Disp-30 vial, R-0             ALLERGIES     has No Known Allergies. FAMILY HISTORY     has no family status information on file. family history is not on file. SOCIAL HISTORY      reports that she has never smoked. She has never used smokeless tobacco. She reports that she does not drink alcohol or use drugs. PHYSICAL EXAM     INITIAL VITALS:  weight is 24 lb 12.8 oz (11.2 kg). Her axillary temperature is 98.6 °F (37 °C). Her pulse is 161. Her respiration is 28 and oxygen saturation is 97%. Physical Exam   Constitutional: She appears well-developed and well-nourished. She is active. No distress. HENT:   Head: Atraumatic. Right Ear: Tympanic membrane normal.   Left Ear: Tympanic membrane normal.   Mouth/Throat: Mucous membranes are moist. Oropharynx is clear. Eyes: Pupils are equal, round, and reactive to light. Conjunctivae and EOM are normal.   Neck: Normal range of motion. Neck supple. Cardiovascular: Regular rhythm. No murmur heard. Pulmonary/Chest: Breath sounds normal. No nasal flaring. No respiratory distress. She exhibits no retraction. Abdominal: Soft. Bowel sounds are normal. She exhibits no distension. There is no tenderness. There is no rebound and no guarding. Musculoskeletal: Normal range of motion. Neurological: She is alert. Skin: Skin is warm and dry. Lesion noted. No abscess noted. Rash is not pustular, not vesicular and not crusting. There is erythema. 2 non-purulent, non vesicular singular lesions noted on left forearm and 1 on base of left thumb  Erythema and swelling surrounding these lesions   One lesion on forearm with excoriations from scratching   Nursing note and vitals reviewed. DIFFERENTIAL DIAGNOSIS:   Insect bite, insect sting, contact dermatitis   DIAGNOSTIC RESULTS     EKG: All EKG's are interpreted by the Emergency Department Physician who either signs or Co-signs this chart in the absence of a cardiologist.    None     RADIOLOGY: non-plain film images(s) such as CT, Ultrasound and MRI are read by the radiologist.    None     LABS:   Labs Reviewed - No data to display    EMERGENCY DEPARTMENT COURSE:   Vitals:    Vitals:    08/27/18 1315   Pulse: 161   Resp: 28   Temp: 98.6 °F (37 °C)   TempSrc: Axillary   SpO2: 97%   Weight: 24 lb 12.8 oz (11.2 kg)     Patient was seen history physical exam was performed. See disposition below    MDM:    Vitals were reviewed and were all within normal limits. Mom denies the patient having any systemic symptoms. She will be started on an antibiotic. Anticipatory guidance given and patient is comfortable with plan of care. They will follow up with PCP for follow up or further evaluation if symptoms continue. They will return to the ED with worsening of symptoms and we discussed reasons for returning. CRITICAL CARE:   None    CONSULTS:  None    PROCEDURES:  None    FINAL IMPRESSION      1.  Nonvenomous bug bite of shoulder or upper arm,

## 2021-11-18 ENCOUNTER — HOSPITAL ENCOUNTER (INPATIENT)
Facility: HOSPITAL | Age: 81
LOS: 15 days | DRG: 698 | End: 2021-12-03
Attending: EMERGENCY MEDICINE | Admitting: HOSPITALIST
Payer: MEDICARE

## 2021-11-18 DIAGNOSIS — R62.7 FAILURE TO THRIVE IN ADULT: ICD-10-CM

## 2021-11-18 DIAGNOSIS — I50.30 (HFPEF) HEART FAILURE WITH PRESERVED EJECTION FRACTION: ICD-10-CM

## 2021-11-18 DIAGNOSIS — N17.9 SEPSIS WITH ACUTE RENAL FAILURE WITHOUT SEPTIC SHOCK: ICD-10-CM

## 2021-11-18 DIAGNOSIS — R65.20 SEPSIS WITH ACUTE RENAL FAILURE WITHOUT SEPTIC SHOCK: ICD-10-CM

## 2021-11-18 DIAGNOSIS — A41.9 SEPSIS WITH ACUTE RENAL FAILURE WITHOUT SEPTIC SHOCK: ICD-10-CM

## 2021-11-18 DIAGNOSIS — N17.9 ACUTE RENAL FAILURE (ARF): ICD-10-CM

## 2021-11-18 PROBLEM — N18.30 ACUTE RENAL FAILURE SUPERIMPOSED ON STAGE 3 CHRONIC KIDNEY DISEASE: Status: ACTIVE | Noted: 2021-11-18

## 2021-11-18 LAB
ALBUMIN SERPL BCP-MCNC: 3 G/DL (ref 3.5–5.2)
ALP SERPL-CCNC: 83 U/L (ref 55–135)
ALT SERPL W/O P-5'-P-CCNC: <5 U/L (ref 10–44)
ANION GAP SERPL CALC-SCNC: 27 MMOL/L (ref 8–16)
APTT BLDCRRT: 30.8 SEC (ref 21–32)
AST SERPL-CCNC: 12 U/L (ref 10–40)
B-OH-BUTYR BLD STRIP-SCNC: 1.3 MMOL/L (ref 0–0.5)
BACTERIA #/AREA URNS HPF: ABNORMAL /HPF
BASOPHILS # BLD AUTO: 0.02 K/UL (ref 0–0.2)
BASOPHILS NFR BLD: 0.2 % (ref 0–1.9)
BILIRUB SERPL-MCNC: 0.4 MG/DL (ref 0.1–1)
BILIRUB UR QL STRIP: NEGATIVE
BNP SERPL-MCNC: 175 PG/ML (ref 0–99)
BUN SERPL-MCNC: 216 MG/DL (ref 8–23)
CALCIUM SERPL-MCNC: 9.4 MG/DL (ref 8.7–10.5)
CHLORIDE SERPL-SCNC: 89 MMOL/L (ref 95–110)
CLARITY UR: ABNORMAL
CO2 SERPL-SCNC: 26 MMOL/L (ref 23–29)
COLOR UR: ABNORMAL
CREAT SERPL-MCNC: 7.5 MG/DL (ref 0.5–1.4)
CREAT UR-MCNC: 274.9 MG/DL (ref 23–375)
CTP QC/QA: YES
DIFFERENTIAL METHOD: ABNORMAL
EOSINOPHIL # BLD AUTO: 0 K/UL (ref 0–0.5)
EOSINOPHIL NFR BLD: 0.1 % (ref 0–8)
ERYTHROCYTE [DISTWIDTH] IN BLOOD BY AUTOMATED COUNT: 17.1 % (ref 11.5–14.5)
EST. GFR  (AFRICAN AMERICAN): 7 ML/MIN/1.73 M^2
EST. GFR  (NON AFRICAN AMERICAN): 6 ML/MIN/1.73 M^2
GLUCOSE SERPL-MCNC: 141 MG/DL (ref 70–110)
GLUCOSE SERPL-MCNC: 143 MG/DL (ref 70–110)
GLUCOSE UR QL STRIP: NEGATIVE
HCT VFR BLD AUTO: 40.9 % (ref 40–54)
HGB BLD-MCNC: 13.2 G/DL (ref 14–18)
HGB UR QL STRIP: ABNORMAL
HYALINE CASTS #/AREA URNS LPF: 0 /LPF
IMM GRANULOCYTES # BLD AUTO: 0.31 K/UL (ref 0–0.04)
IMM GRANULOCYTES NFR BLD AUTO: 2.4 % (ref 0–0.5)
INR PPP: 1.2 (ref 0.8–1.2)
KETONES UR QL STRIP: ABNORMAL
LACTATE SERPL-SCNC: 1.6 MMOL/L (ref 0.5–2.2)
LACTATE SERPL-SCNC: 1.7 MMOL/L (ref 0.5–2.2)
LEUKOCYTE ESTERASE UR QL STRIP: ABNORMAL
LYMPHOCYTES # BLD AUTO: 1 K/UL (ref 1–4.8)
LYMPHOCYTES NFR BLD: 7.5 % (ref 18–48)
MCH RBC QN AUTO: 24.6 PG (ref 27–31)
MCHC RBC AUTO-ENTMCNC: 32.3 G/DL (ref 32–36)
MCV RBC AUTO: 76 FL (ref 82–98)
MICROSCOPIC COMMENT: ABNORMAL
MONOCYTES # BLD AUTO: 0.9 K/UL (ref 0.3–1)
MONOCYTES NFR BLD: 7.1 % (ref 4–15)
NEUTROPHILS # BLD AUTO: 10.7 K/UL (ref 1.8–7.7)
NEUTROPHILS NFR BLD: 82.7 % (ref 38–73)
NITRITE UR QL STRIP: NEGATIVE
NRBC BLD-RTO: 0 /100 WBC
PH UR STRIP: 7 [PH] (ref 5–8)
PLATELET # BLD AUTO: 316 K/UL (ref 150–450)
PMV BLD AUTO: 10.6 FL (ref 9.2–12.9)
POCT GLUCOSE: 143 MG/DL (ref 70–110)
POCT GLUCOSE: 230 MG/DL (ref 70–110)
POTASSIUM SERPL-SCNC: 3.3 MMOL/L (ref 3.5–5.1)
PROT SERPL-MCNC: 6.9 G/DL (ref 6–8.4)
PROT UR QL STRIP: ABNORMAL
PROTHROMBIN TIME: 13.1 SEC (ref 9–12.5)
RBC # BLD AUTO: 5.36 M/UL (ref 4.6–6.2)
RBC #/AREA URNS HPF: 13 /HPF (ref 0–4)
SARS-COV-2 RDRP RESP QL NAA+PROBE: NEGATIVE
SODIUM SERPL-SCNC: 142 MMOL/L (ref 136–145)
SODIUM UR-SCNC: 20 MMOL/L (ref 20–250)
SP GR UR STRIP: 1.02 (ref 1–1.03)
SQUAMOUS #/AREA URNS HPF: 5 /HPF
TROPONIN I SERPL DL<=0.01 NG/ML-MCNC: 0.2 NG/ML (ref 0–0.03)
URN SPEC COLLECT METH UR: ABNORMAL
UROBILINOGEN UR STRIP-ACNC: NEGATIVE EU/DL
WBC # BLD AUTO: 12.94 K/UL (ref 3.9–12.7)
WBC #/AREA URNS HPF: >100 /HPF (ref 0–5)
WBC CLUMPS URNS QL MICRO: ABNORMAL

## 2021-11-18 PROCEDURE — 85610 PROTHROMBIN TIME: CPT | Performed by: HOSPITALIST

## 2021-11-18 PROCEDURE — 87077 CULTURE AEROBIC IDENTIFY: CPT | Performed by: EMERGENCY MEDICINE

## 2021-11-18 PROCEDURE — 25000003 PHARM REV CODE 250: Performed by: EMERGENCY MEDICINE

## 2021-11-18 PROCEDURE — 84484 ASSAY OF TROPONIN QUANT: CPT | Performed by: EMERGENCY MEDICINE

## 2021-11-18 PROCEDURE — 93010 ELECTROCARDIOGRAM REPORT: CPT | Mod: ,,, | Performed by: INTERNAL MEDICINE

## 2021-11-18 PROCEDURE — 36415 COLL VENOUS BLD VENIPUNCTURE: CPT | Performed by: HOSPITALIST

## 2021-11-18 PROCEDURE — 11000001 HC ACUTE MED/SURG PRIVATE ROOM

## 2021-11-18 PROCEDURE — 85730 THROMBOPLASTIN TIME PARTIAL: CPT | Performed by: HOSPITALIST

## 2021-11-18 PROCEDURE — 87088 URINE BACTERIA CULTURE: CPT | Performed by: EMERGENCY MEDICINE

## 2021-11-18 PROCEDURE — 87086 URINE CULTURE/COLONY COUNT: CPT | Performed by: EMERGENCY MEDICINE

## 2021-11-18 PROCEDURE — 63600175 PHARM REV CODE 636 W HCPCS: Performed by: EMERGENCY MEDICINE

## 2021-11-18 PROCEDURE — 93010 EKG 12-LEAD: ICD-10-PCS | Mod: ,,, | Performed by: INTERNAL MEDICINE

## 2021-11-18 PROCEDURE — 82962 GLUCOSE BLOOD TEST: CPT

## 2021-11-18 PROCEDURE — 82570 ASSAY OF URINE CREATININE: CPT | Performed by: EMERGENCY MEDICINE

## 2021-11-18 PROCEDURE — U0002 COVID-19 LAB TEST NON-CDC: HCPCS | Performed by: EMERGENCY MEDICINE

## 2021-11-18 PROCEDURE — 82010 KETONE BODYS QUAN: CPT | Performed by: EMERGENCY MEDICINE

## 2021-11-18 PROCEDURE — 96365 THER/PROPH/DIAG IV INF INIT: CPT

## 2021-11-18 PROCEDURE — 99285 EMERGENCY DEPT VISIT HI MDM: CPT | Mod: 25

## 2021-11-18 PROCEDURE — 83880 ASSAY OF NATRIURETIC PEPTIDE: CPT | Performed by: EMERGENCY MEDICINE

## 2021-11-18 PROCEDURE — 63600175 PHARM REV CODE 636 W HCPCS: Performed by: HOSPITALIST

## 2021-11-18 PROCEDURE — 85025 COMPLETE CBC W/AUTO DIFF WBC: CPT | Performed by: EMERGENCY MEDICINE

## 2021-11-18 PROCEDURE — 80053 COMPREHEN METABOLIC PANEL: CPT | Performed by: EMERGENCY MEDICINE

## 2021-11-18 PROCEDURE — 87186 SC STD MICRODIL/AGAR DIL: CPT | Performed by: EMERGENCY MEDICINE

## 2021-11-18 PROCEDURE — 83605 ASSAY OF LACTIC ACID: CPT | Performed by: HOSPITALIST

## 2021-11-18 PROCEDURE — 84300 ASSAY OF URINE SODIUM: CPT | Performed by: EMERGENCY MEDICINE

## 2021-11-18 PROCEDURE — 96367 TX/PROPH/DG ADDL SEQ IV INF: CPT

## 2021-11-18 PROCEDURE — 25000003 PHARM REV CODE 250: Performed by: HOSPITALIST

## 2021-11-18 PROCEDURE — 96361 HYDRATE IV INFUSION ADD-ON: CPT

## 2021-11-18 PROCEDURE — 83036 HEMOGLOBIN GLYCOSYLATED A1C: CPT | Performed by: HOSPITALIST

## 2021-11-18 PROCEDURE — 93005 ELECTROCARDIOGRAM TRACING: CPT

## 2021-11-18 PROCEDURE — 81000 URINALYSIS NONAUTO W/SCOPE: CPT | Performed by: EMERGENCY MEDICINE

## 2021-11-18 PROCEDURE — 83605 ASSAY OF LACTIC ACID: CPT | Mod: 91 | Performed by: HOSPITALIST

## 2021-11-18 RX ORDER — ATORVASTATIN CALCIUM 10 MG/1
20 TABLET, FILM COATED ORAL DAILY
Status: DISCONTINUED | OUTPATIENT
Start: 2021-11-19 | End: 2021-12-04 | Stop reason: HOSPADM

## 2021-11-18 RX ORDER — IBUPROFEN 200 MG
16 TABLET ORAL
Status: DISCONTINUED | OUTPATIENT
Start: 2021-11-18 | End: 2021-12-04 | Stop reason: HOSPADM

## 2021-11-18 RX ORDER — LEVOTHYROXINE SODIUM 25 UG/1
25 TABLET ORAL
Status: DISCONTINUED | OUTPATIENT
Start: 2021-11-19 | End: 2021-12-04 | Stop reason: HOSPADM

## 2021-11-18 RX ORDER — IBUPROFEN 200 MG
24 TABLET ORAL
Status: DISCONTINUED | OUTPATIENT
Start: 2021-11-18 | End: 2021-12-04 | Stop reason: HOSPADM

## 2021-11-18 RX ORDER — INSULIN ASPART 100 [IU]/ML
0-5 INJECTION, SOLUTION INTRAVENOUS; SUBCUTANEOUS
Status: DISCONTINUED | OUTPATIENT
Start: 2021-11-18 | End: 2021-12-04 | Stop reason: HOSPADM

## 2021-11-18 RX ORDER — SODIUM CHLORIDE 9 MG/ML
INJECTION, SOLUTION INTRAVENOUS
Status: COMPLETED | OUTPATIENT
Start: 2021-11-18 | End: 2021-11-18

## 2021-11-18 RX ORDER — POTASSIUM CHLORIDE 7.45 MG/ML
10 INJECTION INTRAVENOUS
Status: COMPLETED | OUTPATIENT
Start: 2021-11-18 | End: 2021-11-18

## 2021-11-18 RX ORDER — TAMSULOSIN HYDROCHLORIDE 0.4 MG/1
0.4 CAPSULE ORAL DAILY
Status: DISCONTINUED | OUTPATIENT
Start: 2021-11-18 | End: 2021-12-04 | Stop reason: HOSPADM

## 2021-11-18 RX ORDER — LIDOCAINE HYDROCHLORIDE 20 MG/ML
JELLY TOPICAL
Status: DISCONTINUED | OUTPATIENT
Start: 2021-11-18 | End: 2021-11-18

## 2021-11-18 RX ORDER — IPRATROPIUM BROMIDE AND ALBUTEROL SULFATE 2.5; .5 MG/3ML; MG/3ML
3 SOLUTION RESPIRATORY (INHALATION) EVERY 6 HOURS PRN
Status: DISCONTINUED | OUTPATIENT
Start: 2021-11-18 | End: 2021-12-04 | Stop reason: HOSPADM

## 2021-11-18 RX ORDER — GLUCAGON 1 MG
1 KIT INJECTION
Status: DISCONTINUED | OUTPATIENT
Start: 2021-11-18 | End: 2021-12-04 | Stop reason: HOSPADM

## 2021-11-18 RX ADMIN — INSULIN ASPART 1 UNITS: 100 INJECTION, SOLUTION INTRAVENOUS; SUBCUTANEOUS at 09:11

## 2021-11-18 RX ADMIN — TAMSULOSIN HYDROCHLORIDE 0.4 MG: 0.4 CAPSULE ORAL at 05:11

## 2021-11-18 RX ADMIN — SODIUM CHLORIDE: 0.9 INJECTION, SOLUTION INTRAVENOUS at 02:11

## 2021-11-18 RX ADMIN — POTASSIUM CHLORIDE 10 MEQ: 7.46 INJECTION, SOLUTION INTRAVENOUS at 05:11

## 2021-11-18 RX ADMIN — CEFTRIAXONE 1 G: 1 INJECTION, SOLUTION INTRAVENOUS at 05:11

## 2021-11-19 LAB
25(OH)D3+25(OH)D2 SERPL-MCNC: 9 NG/ML (ref 30–96)
ALBUMIN SERPL BCP-MCNC: 2.8 G/DL (ref 3.5–5.2)
ALP SERPL-CCNC: 77 U/L (ref 55–135)
ALT SERPL W/O P-5'-P-CCNC: 5 U/L (ref 10–44)
ANION GAP SERPL CALC-SCNC: 26 MMOL/L (ref 8–16)
AST SERPL-CCNC: 19 U/L (ref 10–40)
BASOPHILS # BLD AUTO: 0.03 K/UL (ref 0–0.2)
BASOPHILS NFR BLD: 0.2 % (ref 0–1.9)
BILIRUB SERPL-MCNC: 0.2 MG/DL (ref 0.1–1)
BUN SERPL-MCNC: 210 MG/DL (ref 8–23)
CALCIUM SERPL-MCNC: 9 MG/DL (ref 8.7–10.5)
CHLORIDE SERPL-SCNC: 96 MMOL/L (ref 95–110)
CO2 SERPL-SCNC: 21 MMOL/L (ref 23–29)
CREAT SERPL-MCNC: 6.2 MG/DL (ref 0.5–1.4)
DIFFERENTIAL METHOD: ABNORMAL
EOSINOPHIL # BLD AUTO: 0 K/UL (ref 0–0.5)
EOSINOPHIL NFR BLD: 0.1 % (ref 0–8)
ERYTHROCYTE [DISTWIDTH] IN BLOOD BY AUTOMATED COUNT: 16.5 % (ref 11.5–14.5)
EST. GFR  (AFRICAN AMERICAN): 9 ML/MIN/1.73 M^2
EST. GFR  (NON AFRICAN AMERICAN): 8 ML/MIN/1.73 M^2
ESTIMATED AVG GLUCOSE: 134 MG/DL (ref 68–131)
GLUCOSE SERPL-MCNC: 104 MG/DL (ref 70–110)
HBA1C MFR BLD: 6.3 % (ref 4–5.6)
HCT VFR BLD AUTO: 39.6 % (ref 40–54)
HGB BLD-MCNC: 12.4 G/DL (ref 14–18)
IMM GRANULOCYTES # BLD AUTO: 0.3 K/UL (ref 0–0.04)
IMM GRANULOCYTES NFR BLD AUTO: 2 % (ref 0–0.5)
LYMPHOCYTES # BLD AUTO: 1 K/UL (ref 1–4.8)
LYMPHOCYTES NFR BLD: 6.9 % (ref 18–48)
MAGNESIUM SERPL-MCNC: 2.3 MG/DL (ref 1.6–2.6)
MCH RBC QN AUTO: 24.3 PG (ref 27–31)
MCHC RBC AUTO-ENTMCNC: 31.3 G/DL (ref 32–36)
MCV RBC AUTO: 78 FL (ref 82–98)
MONOCYTES # BLD AUTO: 1.2 K/UL (ref 0.3–1)
MONOCYTES NFR BLD: 8 % (ref 4–15)
NEUTROPHILS # BLD AUTO: 12.1 K/UL (ref 1.8–7.7)
NEUTROPHILS NFR BLD: 82.8 % (ref 38–73)
NRBC BLD-RTO: 0 /100 WBC
PHOSPHATE SERPL-MCNC: 8.4 MG/DL (ref 2.7–4.5)
PLATELET # BLD AUTO: 299 K/UL (ref 150–450)
PMV BLD AUTO: 10.9 FL (ref 9.2–12.9)
POCT GLUCOSE: 120 MG/DL (ref 70–110)
POCT GLUCOSE: 123 MG/DL (ref 70–110)
POCT GLUCOSE: 127 MG/DL (ref 70–110)
POCT GLUCOSE: 138 MG/DL (ref 70–110)
POTASSIUM SERPL-SCNC: 4.1 MMOL/L (ref 3.5–5.1)
PROT SERPL-MCNC: 6 G/DL (ref 6–8.4)
RBC # BLD AUTO: 5.11 M/UL (ref 4.6–6.2)
SODIUM SERPL-SCNC: 143 MMOL/L (ref 136–145)
WBC # BLD AUTO: 14.65 K/UL (ref 3.9–12.7)

## 2021-11-19 PROCEDURE — 82306 VITAMIN D 25 HYDROXY: CPT | Performed by: INTERNAL MEDICINE

## 2021-11-19 PROCEDURE — 80053 COMPREHEN METABOLIC PANEL: CPT | Performed by: HOSPITALIST

## 2021-11-19 PROCEDURE — 25000003 PHARM REV CODE 250: Performed by: INTERNAL MEDICINE

## 2021-11-19 PROCEDURE — 85025 COMPLETE CBC W/AUTO DIFF WBC: CPT | Performed by: HOSPITALIST

## 2021-11-19 PROCEDURE — 83735 ASSAY OF MAGNESIUM: CPT | Performed by: INTERNAL MEDICINE

## 2021-11-19 PROCEDURE — 84100 ASSAY OF PHOSPHORUS: CPT | Performed by: INTERNAL MEDICINE

## 2021-11-19 PROCEDURE — 36415 COLL VENOUS BLD VENIPUNCTURE: CPT | Performed by: HOSPITALIST

## 2021-11-19 PROCEDURE — 36415 COLL VENOUS BLD VENIPUNCTURE: CPT | Performed by: INTERNAL MEDICINE

## 2021-11-19 PROCEDURE — 63600175 PHARM REV CODE 636 W HCPCS: Performed by: HOSPITALIST

## 2021-11-19 PROCEDURE — 97165 OT EVAL LOW COMPLEX 30 MIN: CPT

## 2021-11-19 PROCEDURE — 25000003 PHARM REV CODE 250: Performed by: HOSPITALIST

## 2021-11-19 PROCEDURE — 11000001 HC ACUTE MED/SURG PRIVATE ROOM

## 2021-11-19 PROCEDURE — 97530 THERAPEUTIC ACTIVITIES: CPT

## 2021-11-19 PROCEDURE — 27000221 HC OXYGEN, UP TO 24 HOURS

## 2021-11-19 PROCEDURE — 97161 PT EVAL LOW COMPLEX 20 MIN: CPT

## 2021-11-19 PROCEDURE — 94760 N-INVAS EAR/PLS OXIMETRY 1: CPT

## 2021-11-19 RX ORDER — HEPARIN SODIUM 5000 [USP'U]/ML
5000 INJECTION, SOLUTION INTRAVENOUS; SUBCUTANEOUS EVERY 8 HOURS
Status: DISCONTINUED | OUTPATIENT
Start: 2021-11-19 | End: 2021-11-21

## 2021-11-19 RX ORDER — ONDANSETRON 2 MG/ML
4 INJECTION INTRAMUSCULAR; INTRAVENOUS EVERY 4 HOURS PRN
Status: DISCONTINUED | OUTPATIENT
Start: 2021-11-19 | End: 2021-12-04 | Stop reason: HOSPADM

## 2021-11-19 RX ORDER — SODIUM CHLORIDE 450 MG/100ML
INJECTION, SOLUTION INTRAVENOUS CONTINUOUS
Status: DISCONTINUED | OUTPATIENT
Start: 2021-11-19 | End: 2021-11-20

## 2021-11-19 RX ORDER — MUPIROCIN 20 MG/G
OINTMENT TOPICAL 2 TIMES DAILY
Status: COMPLETED | OUTPATIENT
Start: 2021-11-19 | End: 2021-11-23

## 2021-11-19 RX ADMIN — CEFTRIAXONE 1 G: 1 INJECTION, SOLUTION INTRAVENOUS at 03:11

## 2021-11-19 RX ADMIN — HEPARIN SODIUM 5000 UNITS: 5000 INJECTION INTRAVENOUS; SUBCUTANEOUS at 09:11

## 2021-11-19 RX ADMIN — MUPIROCIN: 20 OINTMENT TOPICAL at 08:11

## 2021-11-19 RX ADMIN — SODIUM CHLORIDE: 0.45 INJECTION, SOLUTION INTRAVENOUS at 08:11

## 2021-11-19 RX ADMIN — LEVOTHYROXINE SODIUM 25 MCG: 0.03 TABLET ORAL at 06:11

## 2021-11-19 RX ADMIN — HEPARIN SODIUM 5000 UNITS: 5000 INJECTION INTRAVENOUS; SUBCUTANEOUS at 02:11

## 2021-11-19 RX ADMIN — TAMSULOSIN HYDROCHLORIDE 0.4 MG: 0.4 CAPSULE ORAL at 08:11

## 2021-11-19 RX ADMIN — SODIUM CHLORIDE: 0.45 INJECTION, SOLUTION INTRAVENOUS at 03:11

## 2021-11-19 RX ADMIN — ATORVASTATIN CALCIUM 20 MG: 10 TABLET, FILM COATED ORAL at 08:11

## 2021-11-19 RX ADMIN — ONDANSETRON 4 MG: 2 INJECTION INTRAMUSCULAR; INTRAVENOUS at 08:11

## 2021-11-20 PROBLEM — I50.32 CHRONIC DIASTOLIC CONGESTIVE HEART FAILURE: Status: ACTIVE | Noted: 2021-11-20

## 2021-11-20 LAB
ALBUMIN SERPL BCP-MCNC: 2.6 G/DL (ref 3.5–5.2)
ALP SERPL-CCNC: 68 U/L (ref 55–135)
ALT SERPL W/O P-5'-P-CCNC: <5 U/L (ref 10–44)
ANION GAP SERPL CALC-SCNC: 24 MMOL/L (ref 8–16)
AST SERPL-CCNC: 17 U/L (ref 10–40)
BASOPHILS # BLD AUTO: 0.03 K/UL (ref 0–0.2)
BASOPHILS NFR BLD: 0.2 % (ref 0–1.9)
BILIRUB SERPL-MCNC: 0.2 MG/DL (ref 0.1–1)
BUN SERPL-MCNC: 189 MG/DL (ref 8–23)
CALCIUM SERPL-MCNC: 9.2 MG/DL (ref 8.7–10.5)
CHLORIDE SERPL-SCNC: 98 MMOL/L (ref 95–110)
CO2 SERPL-SCNC: 20 MMOL/L (ref 23–29)
CREAT SERPL-MCNC: 4.5 MG/DL (ref 0.5–1.4)
DIFFERENTIAL METHOD: ABNORMAL
EOSINOPHIL # BLD AUTO: 0 K/UL (ref 0–0.5)
EOSINOPHIL NFR BLD: 0.1 % (ref 0–8)
ERYTHROCYTE [DISTWIDTH] IN BLOOD BY AUTOMATED COUNT: 17.1 % (ref 11.5–14.5)
EST. GFR  (AFRICAN AMERICAN): 13 ML/MIN/1.73 M^2
EST. GFR  (NON AFRICAN AMERICAN): 11 ML/MIN/1.73 M^2
GLUCOSE SERPL-MCNC: 67 MG/DL (ref 70–110)
HCT VFR BLD AUTO: 37.1 % (ref 40–54)
HGB BLD-MCNC: 11.2 G/DL (ref 14–18)
IMM GRANULOCYTES # BLD AUTO: 0.41 K/UL (ref 0–0.04)
IMM GRANULOCYTES NFR BLD AUTO: 2.9 % (ref 0–0.5)
LYMPHOCYTES # BLD AUTO: 1 K/UL (ref 1–4.8)
LYMPHOCYTES NFR BLD: 7.1 % (ref 18–48)
MCH RBC QN AUTO: 24.2 PG (ref 27–31)
MCHC RBC AUTO-ENTMCNC: 30.2 G/DL (ref 32–36)
MCV RBC AUTO: 80 FL (ref 82–98)
MONOCYTES # BLD AUTO: 1 K/UL (ref 0.3–1)
MONOCYTES NFR BLD: 7.5 % (ref 4–15)
NEUTROPHILS # BLD AUTO: 11.4 K/UL (ref 1.8–7.7)
NEUTROPHILS NFR BLD: 82.2 % (ref 38–73)
NRBC BLD-RTO: 0 /100 WBC
PLATELET # BLD AUTO: 226 K/UL (ref 150–450)
PMV BLD AUTO: 10.4 FL (ref 9.2–12.9)
POCT GLUCOSE: 108 MG/DL (ref 70–110)
POCT GLUCOSE: 119 MG/DL (ref 70–110)
POCT GLUCOSE: 95 MG/DL (ref 70–110)
POTASSIUM SERPL-SCNC: 3.2 MMOL/L (ref 3.5–5.1)
PROT SERPL-MCNC: 5.8 G/DL (ref 6–8.4)
RBC # BLD AUTO: 4.63 M/UL (ref 4.6–6.2)
SODIUM SERPL-SCNC: 142 MMOL/L (ref 136–145)
WBC # BLD AUTO: 13.9 K/UL (ref 3.9–12.7)

## 2021-11-20 PROCEDURE — 94760 N-INVAS EAR/PLS OXIMETRY 1: CPT

## 2021-11-20 PROCEDURE — 85025 COMPLETE CBC W/AUTO DIFF WBC: CPT | Performed by: HOSPITALIST

## 2021-11-20 PROCEDURE — 36415 COLL VENOUS BLD VENIPUNCTURE: CPT | Performed by: HOSPITALIST

## 2021-11-20 PROCEDURE — 30200315 PPD INTRADERMAL TEST REV CODE 302: Performed by: HOSPITALIST

## 2021-11-20 PROCEDURE — 99900035 HC TECH TIME PER 15 MIN (STAT)

## 2021-11-20 PROCEDURE — 27000221 HC OXYGEN, UP TO 24 HOURS

## 2021-11-20 PROCEDURE — 11000001 HC ACUTE MED/SURG PRIVATE ROOM

## 2021-11-20 PROCEDURE — 86580 TB INTRADERMAL TEST: CPT | Performed by: HOSPITALIST

## 2021-11-20 PROCEDURE — 80053 COMPREHEN METABOLIC PANEL: CPT | Performed by: HOSPITALIST

## 2021-11-20 PROCEDURE — 63600175 PHARM REV CODE 636 W HCPCS: Performed by: HOSPITALIST

## 2021-11-20 PROCEDURE — 25000003 PHARM REV CODE 250: Performed by: HOSPITALIST

## 2021-11-20 PROCEDURE — 25000003 PHARM REV CODE 250: Performed by: INTERNAL MEDICINE

## 2021-11-20 RX ORDER — SODIUM CHLORIDE 450 MG/100ML
INJECTION, SOLUTION INTRAVENOUS CONTINUOUS
Status: DISCONTINUED | OUTPATIENT
Start: 2021-11-20 | End: 2021-11-25

## 2021-11-20 RX ORDER — POTASSIUM CHLORIDE 20 MEQ/1
20 TABLET, EXTENDED RELEASE ORAL ONCE
Status: COMPLETED | OUTPATIENT
Start: 2021-11-20 | End: 2021-11-20

## 2021-11-20 RX ADMIN — ATORVASTATIN CALCIUM 20 MG: 10 TABLET, FILM COATED ORAL at 09:11

## 2021-11-20 RX ADMIN — TAMSULOSIN HYDROCHLORIDE 0.4 MG: 0.4 CAPSULE ORAL at 09:11

## 2021-11-20 RX ADMIN — MUPIROCIN: 20 OINTMENT TOPICAL at 09:11

## 2021-11-20 RX ADMIN — SODIUM CHLORIDE: 0.45 INJECTION, SOLUTION INTRAVENOUS at 06:11

## 2021-11-20 RX ADMIN — HEPARIN SODIUM 5000 UNITS: 5000 INJECTION INTRAVENOUS; SUBCUTANEOUS at 09:11

## 2021-11-20 RX ADMIN — HEPARIN SODIUM 5000 UNITS: 5000 INJECTION INTRAVENOUS; SUBCUTANEOUS at 03:11

## 2021-11-20 RX ADMIN — SODIUM CHLORIDE: 0.45 INJECTION, SOLUTION INTRAVENOUS at 09:11

## 2021-11-20 RX ADMIN — TUBERCULIN PURIFIED PROTEIN DERIVATIVE 5 UNITS: 5 INJECTION, SOLUTION INTRADERMAL at 11:11

## 2021-11-20 RX ADMIN — CEFTRIAXONE 1 G: 1 INJECTION, SOLUTION INTRAVENOUS at 03:11

## 2021-11-20 RX ADMIN — POTASSIUM CHLORIDE 20 MEQ: 1500 TABLET, EXTENDED RELEASE ORAL at 09:11

## 2021-11-20 RX ADMIN — SODIUM CHLORIDE: 0.45 INJECTION, SOLUTION INTRAVENOUS at 12:11

## 2021-11-20 RX ADMIN — LEVOTHYROXINE SODIUM 25 MCG: 0.03 TABLET ORAL at 05:11

## 2021-11-20 RX ADMIN — HEPARIN SODIUM 5000 UNITS: 5000 INJECTION INTRAVENOUS; SUBCUTANEOUS at 05:11

## 2021-11-21 LAB
ALBUMIN SERPL BCP-MCNC: 2.5 G/DL (ref 3.5–5.2)
ALP SERPL-CCNC: 62 U/L (ref 55–135)
ALT SERPL W/O P-5'-P-CCNC: 5 U/L (ref 10–44)
ANION GAP SERPL CALC-SCNC: 17 MMOL/L (ref 8–16)
AST SERPL-CCNC: 19 U/L (ref 10–40)
BACTERIA UR CULT: ABNORMAL
BASOPHILS # BLD AUTO: 0.02 K/UL (ref 0–0.2)
BASOPHILS NFR BLD: 0.1 % (ref 0–1.9)
BILIRUB SERPL-MCNC: 0.2 MG/DL (ref 0.1–1)
BUN SERPL-MCNC: 165 MG/DL (ref 8–23)
CALCIUM SERPL-MCNC: 8.8 MG/DL (ref 8.7–10.5)
CHLORIDE SERPL-SCNC: 100 MMOL/L (ref 95–110)
CO2 SERPL-SCNC: 24 MMOL/L (ref 23–29)
CREAT SERPL-MCNC: 3 MG/DL (ref 0.5–1.4)
DIFFERENTIAL METHOD: ABNORMAL
EOSINOPHIL # BLD AUTO: 0 K/UL (ref 0–0.5)
EOSINOPHIL NFR BLD: 0.2 % (ref 0–8)
ERYTHROCYTE [DISTWIDTH] IN BLOOD BY AUTOMATED COUNT: 16.8 % (ref 11.5–14.5)
EST. GFR  (AFRICAN AMERICAN): 21 ML/MIN/1.73 M^2
EST. GFR  (NON AFRICAN AMERICAN): 18 ML/MIN/1.73 M^2
GLUCOSE SERPL-MCNC: 69 MG/DL (ref 70–110)
HCT VFR BLD AUTO: 33.4 % (ref 40–54)
HGB BLD-MCNC: 10.3 G/DL (ref 14–18)
IMM GRANULOCYTES # BLD AUTO: 0.4 K/UL (ref 0–0.04)
IMM GRANULOCYTES NFR BLD AUTO: 2.8 % (ref 0–0.5)
LYMPHOCYTES # BLD AUTO: 0.7 K/UL (ref 1–4.8)
LYMPHOCYTES NFR BLD: 4.6 % (ref 18–48)
MCH RBC QN AUTO: 24.2 PG (ref 27–31)
MCHC RBC AUTO-ENTMCNC: 30.8 G/DL (ref 32–36)
MCV RBC AUTO: 79 FL (ref 82–98)
MONOCYTES # BLD AUTO: 1.2 K/UL (ref 0.3–1)
MONOCYTES NFR BLD: 8.3 % (ref 4–15)
NEUTROPHILS # BLD AUTO: 12 K/UL (ref 1.8–7.7)
NEUTROPHILS NFR BLD: 84 % (ref 38–73)
NRBC BLD-RTO: 0 /100 WBC
PLATELET # BLD AUTO: 206 K/UL (ref 150–450)
PMV BLD AUTO: 11.1 FL (ref 9.2–12.9)
POCT GLUCOSE: 110 MG/DL (ref 70–110)
POCT GLUCOSE: 111 MG/DL (ref 70–110)
POCT GLUCOSE: 83 MG/DL (ref 70–110)
POCT GLUCOSE: 98 MG/DL (ref 70–110)
POCT GLUCOSE: 99 MG/DL (ref 70–110)
POTASSIUM SERPL-SCNC: 3.2 MMOL/L (ref 3.5–5.1)
PROT SERPL-MCNC: 5.1 G/DL (ref 6–8.4)
RBC # BLD AUTO: 4.25 M/UL (ref 4.6–6.2)
SODIUM SERPL-SCNC: 141 MMOL/L (ref 136–145)
WBC # BLD AUTO: 14.24 K/UL (ref 3.9–12.7)

## 2021-11-21 PROCEDURE — 94760 N-INVAS EAR/PLS OXIMETRY 1: CPT

## 2021-11-21 PROCEDURE — 99900035 HC TECH TIME PER 15 MIN (STAT)

## 2021-11-21 PROCEDURE — 27000221 HC OXYGEN, UP TO 24 HOURS

## 2021-11-21 PROCEDURE — 63600175 PHARM REV CODE 636 W HCPCS: Performed by: HOSPITALIST

## 2021-11-21 PROCEDURE — 25000003 PHARM REV CODE 250: Performed by: HOSPITALIST

## 2021-11-21 PROCEDURE — 36415 COLL VENOUS BLD VENIPUNCTURE: CPT | Performed by: HOSPITALIST

## 2021-11-21 PROCEDURE — 11000001 HC ACUTE MED/SURG PRIVATE ROOM

## 2021-11-21 PROCEDURE — 85025 COMPLETE CBC W/AUTO DIFF WBC: CPT | Performed by: HOSPITALIST

## 2021-11-21 PROCEDURE — 94761 N-INVAS EAR/PLS OXIMETRY MLT: CPT

## 2021-11-21 PROCEDURE — 80053 COMPREHEN METABOLIC PANEL: CPT | Performed by: HOSPITALIST

## 2021-11-21 RX ORDER — ACETAMINOPHEN 325 MG/1
650 TABLET ORAL EVERY 6 HOURS PRN
Status: DISCONTINUED | OUTPATIENT
Start: 2021-11-21 | End: 2021-11-28

## 2021-11-21 RX ADMIN — HEPARIN SODIUM 5000 UNITS: 5000 INJECTION INTRAVENOUS; SUBCUTANEOUS at 05:11

## 2021-11-21 RX ADMIN — CEFTRIAXONE 1 G: 1 INJECTION, SOLUTION INTRAVENOUS at 05:11

## 2021-11-21 RX ADMIN — TAMSULOSIN HYDROCHLORIDE 0.4 MG: 0.4 CAPSULE ORAL at 09:11

## 2021-11-21 RX ADMIN — MUPIROCIN: 20 OINTMENT TOPICAL at 09:11

## 2021-11-21 RX ADMIN — LEVOTHYROXINE SODIUM 25 MCG: 0.03 TABLET ORAL at 05:11

## 2021-11-21 RX ADMIN — VANCOMYCIN HYDROCHLORIDE 1250 MG: 1.25 INJECTION, POWDER, LYOPHILIZED, FOR SOLUTION INTRAVENOUS at 12:11

## 2021-11-21 RX ADMIN — ATORVASTATIN CALCIUM 20 MG: 10 TABLET, FILM COATED ORAL at 09:11

## 2021-11-22 LAB
ALBUMIN SERPL BCP-MCNC: 2.3 G/DL (ref 3.5–5.2)
ALP SERPL-CCNC: 59 U/L (ref 55–135)
ALT SERPL W/O P-5'-P-CCNC: 6 U/L (ref 10–44)
ANION GAP SERPL CALC-SCNC: 17 MMOL/L (ref 8–16)
AST SERPL-CCNC: 20 U/L (ref 10–40)
BASOPHILS # BLD AUTO: 0.02 K/UL (ref 0–0.2)
BASOPHILS NFR BLD: 0.1 % (ref 0–1.9)
BILIRUB SERPL-MCNC: 0.2 MG/DL (ref 0.1–1)
BUN SERPL-MCNC: 143 MG/DL (ref 8–23)
CALCIUM SERPL-MCNC: 8.5 MG/DL (ref 8.7–10.5)
CHLORIDE SERPL-SCNC: 100 MMOL/L (ref 95–110)
CO2 SERPL-SCNC: 21 MMOL/L (ref 23–29)
CREAT SERPL-MCNC: 2.3 MG/DL (ref 0.5–1.4)
DIFFERENTIAL METHOD: ABNORMAL
EOSINOPHIL # BLD AUTO: 0 K/UL (ref 0–0.5)
EOSINOPHIL NFR BLD: 0.2 % (ref 0–8)
ERYTHROCYTE [DISTWIDTH] IN BLOOD BY AUTOMATED COUNT: 17.1 % (ref 11.5–14.5)
EST. GFR  (AFRICAN AMERICAN): 29 ML/MIN/1.73 M^2
EST. GFR  (NON AFRICAN AMERICAN): 25 ML/MIN/1.73 M^2
GLUCOSE SERPL-MCNC: 74 MG/DL (ref 70–110)
HCT VFR BLD AUTO: 31.1 % (ref 40–54)
HGB BLD-MCNC: 9.4 G/DL (ref 14–18)
IMM GRANULOCYTES # BLD AUTO: 0.37 K/UL (ref 0–0.04)
IMM GRANULOCYTES NFR BLD AUTO: 2.4 % (ref 0–0.5)
LYMPHOCYTES # BLD AUTO: 0.6 K/UL (ref 1–4.8)
LYMPHOCYTES NFR BLD: 4.1 % (ref 18–48)
MCH RBC QN AUTO: 24.2 PG (ref 27–31)
MCHC RBC AUTO-ENTMCNC: 30.2 G/DL (ref 32–36)
MCV RBC AUTO: 80 FL (ref 82–98)
MONOCYTES # BLD AUTO: 1.3 K/UL (ref 0.3–1)
MONOCYTES NFR BLD: 8.5 % (ref 4–15)
NEUTROPHILS # BLD AUTO: 13.3 K/UL (ref 1.8–7.7)
NEUTROPHILS NFR BLD: 84.7 % (ref 38–73)
NRBC BLD-RTO: 0 /100 WBC
PLATELET # BLD AUTO: 170 K/UL (ref 150–450)
PMV BLD AUTO: 11 FL (ref 9.2–12.9)
POCT GLUCOSE: 110 MG/DL (ref 70–110)
POCT GLUCOSE: 123 MG/DL (ref 70–110)
POCT GLUCOSE: 86 MG/DL (ref 70–110)
POCT GLUCOSE: 89 MG/DL (ref 70–110)
POTASSIUM SERPL-SCNC: 3.1 MMOL/L (ref 3.5–5.1)
PROT SERPL-MCNC: 4.6 G/DL (ref 6–8.4)
RBC # BLD AUTO: 3.89 M/UL (ref 4.6–6.2)
SODIUM SERPL-SCNC: 138 MMOL/L (ref 136–145)
TB INDURATION 48 - 72 HR READ: 0 MM
VANCOMYCIN SERPL-MCNC: 17.8 UG/ML
WBC # BLD AUTO: 15.71 K/UL (ref 3.9–12.7)

## 2021-11-22 PROCEDURE — 25000003 PHARM REV CODE 250: Performed by: INTERNAL MEDICINE

## 2021-11-22 PROCEDURE — 11000001 HC ACUTE MED/SURG PRIVATE ROOM

## 2021-11-22 PROCEDURE — 80053 COMPREHEN METABOLIC PANEL: CPT | Performed by: HOSPITALIST

## 2021-11-22 PROCEDURE — 63600175 PHARM REV CODE 636 W HCPCS: Performed by: HOSPITALIST

## 2021-11-22 PROCEDURE — 36415 COLL VENOUS BLD VENIPUNCTURE: CPT | Performed by: HOSPITALIST

## 2021-11-22 PROCEDURE — 97530 THERAPEUTIC ACTIVITIES: CPT

## 2021-11-22 PROCEDURE — 94760 N-INVAS EAR/PLS OXIMETRY 1: CPT

## 2021-11-22 PROCEDURE — 27000221 HC OXYGEN, UP TO 24 HOURS

## 2021-11-22 PROCEDURE — 80202 ASSAY OF VANCOMYCIN: CPT | Performed by: HOSPITALIST

## 2021-11-22 PROCEDURE — 25000003 PHARM REV CODE 250: Performed by: HOSPITALIST

## 2021-11-22 PROCEDURE — 97535 SELF CARE MNGMENT TRAINING: CPT

## 2021-11-22 PROCEDURE — 85025 COMPLETE CBC W/AUTO DIFF WBC: CPT | Performed by: HOSPITALIST

## 2021-11-22 PROCEDURE — 27000207 HC ISOLATION

## 2021-11-22 RX ORDER — POTASSIUM CHLORIDE 20 MEQ/1
40 TABLET, EXTENDED RELEASE ORAL ONCE
Status: COMPLETED | OUTPATIENT
Start: 2021-11-22 | End: 2021-11-22

## 2021-11-22 RX ADMIN — LEVOTHYROXINE SODIUM 25 MCG: 0.03 TABLET ORAL at 06:11

## 2021-11-22 RX ADMIN — MUPIROCIN: 20 OINTMENT TOPICAL at 08:11

## 2021-11-22 RX ADMIN — TAMSULOSIN HYDROCHLORIDE 0.4 MG: 0.4 CAPSULE ORAL at 09:11

## 2021-11-22 RX ADMIN — CEFTRIAXONE 1 G: 1 INJECTION, SOLUTION INTRAVENOUS at 05:11

## 2021-11-22 RX ADMIN — MUPIROCIN: 20 OINTMENT TOPICAL at 09:11

## 2021-11-22 RX ADMIN — ATORVASTATIN CALCIUM 20 MG: 10 TABLET, FILM COATED ORAL at 09:11

## 2021-11-22 RX ADMIN — POTASSIUM CHLORIDE 40 MEQ: 1500 TABLET, EXTENDED RELEASE ORAL at 05:11

## 2021-11-22 RX ADMIN — POTASSIUM CHLORIDE 40 MEQ: 1500 TABLET, EXTENDED RELEASE ORAL at 09:11

## 2021-11-22 RX ADMIN — ACETAMINOPHEN 650 MG: 325 TABLET ORAL at 01:11

## 2021-11-22 RX ADMIN — VANCOMYCIN HYDROCHLORIDE 500 MG: 500 INJECTION, POWDER, LYOPHILIZED, FOR SOLUTION INTRAVENOUS at 09:11

## 2021-11-22 RX ADMIN — SODIUM CHLORIDE: 0.45 INJECTION, SOLUTION INTRAVENOUS at 10:11

## 2021-11-23 PROBLEM — K92.1 MELENA: Status: ACTIVE | Noted: 2021-11-23

## 2021-11-23 LAB
ALBUMIN SERPL BCP-MCNC: 2.3 G/DL (ref 3.5–5.2)
ALP SERPL-CCNC: 69 U/L (ref 55–135)
ALT SERPL W/O P-5'-P-CCNC: 9 U/L (ref 10–44)
ANION GAP SERPL CALC-SCNC: 15 MMOL/L (ref 8–16)
AST SERPL-CCNC: 23 U/L (ref 10–40)
BASOPHILS # BLD AUTO: 0.02 K/UL (ref 0–0.2)
BASOPHILS NFR BLD: 0.1 % (ref 0–1.9)
BILIRUB SERPL-MCNC: 0.2 MG/DL (ref 0.1–1)
BSA FOR ECHO PROCEDURE: 1.77 M2
BUN SERPL-MCNC: 121 MG/DL (ref 8–23)
CALCIUM SERPL-MCNC: 9 MG/DL (ref 8.7–10.5)
CHLORIDE SERPL-SCNC: 103 MMOL/L (ref 95–110)
CO2 SERPL-SCNC: 18 MMOL/L (ref 23–29)
CREAT SERPL-MCNC: 2 MG/DL (ref 0.5–1.4)
CV ECHO LV RWT: 0.5 CM
DIFFERENTIAL METHOD: ABNORMAL
DOP CALC LVOT AREA: 3.3 CM2
DOP CALC LVOT DIAMETER: 2.06 CM
ECHO LV POSTERIOR WALL: 1.03 CM (ref 0.6–1.1)
EJECTION FRACTION: 60 %
EOSINOPHIL # BLD AUTO: 0.1 K/UL (ref 0–0.5)
EOSINOPHIL NFR BLD: 0.6 % (ref 0–8)
ERYTHROCYTE [DISTWIDTH] IN BLOOD BY AUTOMATED COUNT: 17.1 % (ref 11.5–14.5)
EST. GFR  (AFRICAN AMERICAN): 35 ML/MIN/1.73 M^2
EST. GFR  (NON AFRICAN AMERICAN): 30 ML/MIN/1.73 M^2
FRACTIONAL SHORTENING: 24 % (ref 28–44)
GLUCOSE SERPL-MCNC: 76 MG/DL (ref 70–110)
HCT VFR BLD AUTO: 32 % (ref 40–54)
HGB BLD-MCNC: 9.9 G/DL (ref 14–18)
IMM GRANULOCYTES # BLD AUTO: 0.34 K/UL (ref 0–0.04)
IMM GRANULOCYTES NFR BLD AUTO: 2.1 % (ref 0–0.5)
INTERVENTRICULAR SEPTUM: 1.61 CM (ref 0.6–1.1)
LEFT ATRIUM SIZE: 4.82 CM
LEFT INTERNAL DIMENSION IN SYSTOLE: 3.14 CM (ref 2.1–4)
LEFT VENTRICLE DIASTOLIC VOLUME INDEX: 42.22 ML/M2
LEFT VENTRICLE DIASTOLIC VOLUME: 75.15 ML
LEFT VENTRICLE MASS INDEX: 112 G/M2
LEFT VENTRICLE SYSTOLIC VOLUME INDEX: 21.9 ML/M2
LEFT VENTRICLE SYSTOLIC VOLUME: 39.03 ML
LEFT VENTRICULAR INTERNAL DIMENSION IN DIASTOLE: 4.12 CM (ref 3.5–6)
LEFT VENTRICULAR MASS: 199.43 G
LYMPHOCYTES # BLD AUTO: 0.6 K/UL (ref 1–4.8)
LYMPHOCYTES NFR BLD: 3.6 % (ref 18–48)
MCH RBC QN AUTO: 24.4 PG (ref 27–31)
MCHC RBC AUTO-ENTMCNC: 30.9 G/DL (ref 32–36)
MCV RBC AUTO: 79 FL (ref 82–98)
MONOCYTES # BLD AUTO: 1.1 K/UL (ref 0.3–1)
MONOCYTES NFR BLD: 6.8 % (ref 4–15)
NEUTROPHILS # BLD AUTO: 14 K/UL (ref 1.8–7.7)
NEUTROPHILS NFR BLD: 86.8 % (ref 38–73)
NRBC BLD-RTO: 0 /100 WBC
PHOSPHATE SERPL-MCNC: 3.9 MG/DL (ref 2.7–4.5)
PISA TR MAX VEL: 3.27 M/S
PLATELET # BLD AUTO: 139 K/UL (ref 150–450)
PMV BLD AUTO: 12.5 FL (ref 9.2–12.9)
POCT GLUCOSE: 121 MG/DL (ref 70–110)
POCT GLUCOSE: 189 MG/DL (ref 70–110)
POCT GLUCOSE: 90 MG/DL (ref 70–110)
POCT GLUCOSE: 99 MG/DL (ref 70–110)
POTASSIUM SERPL-SCNC: 3.9 MMOL/L (ref 3.5–5.1)
PROT SERPL-MCNC: 5.5 G/DL (ref 6–8.4)
RBC # BLD AUTO: 4.06 M/UL (ref 4.6–6.2)
SODIUM SERPL-SCNC: 136 MMOL/L (ref 136–145)
STJ: 2.44 CM
TR MAX PG: 43 MMHG
VANCOMYCIN SERPL-MCNC: 18.9 UG/ML
WBC # BLD AUTO: 16.08 K/UL (ref 3.9–12.7)

## 2021-11-23 PROCEDURE — 36415 COLL VENOUS BLD VENIPUNCTURE: CPT | Performed by: INTERNAL MEDICINE

## 2021-11-23 PROCEDURE — C9113 INJ PANTOPRAZOLE SODIUM, VIA: HCPCS | Performed by: STUDENT IN AN ORGANIZED HEALTH CARE EDUCATION/TRAINING PROGRAM

## 2021-11-23 PROCEDURE — 85025 COMPLETE CBC W/AUTO DIFF WBC: CPT | Performed by: HOSPITALIST

## 2021-11-23 PROCEDURE — 27000207 HC ISOLATION

## 2021-11-23 PROCEDURE — 63600175 PHARM REV CODE 636 W HCPCS: Performed by: STUDENT IN AN ORGANIZED HEALTH CARE EDUCATION/TRAINING PROGRAM

## 2021-11-23 PROCEDURE — 11000001 HC ACUTE MED/SURG PRIVATE ROOM

## 2021-11-23 PROCEDURE — 80053 COMPREHEN METABOLIC PANEL: CPT | Performed by: HOSPITALIST

## 2021-11-23 PROCEDURE — 99222 PR INITIAL HOSPITAL CARE,LEVL II: ICD-10-PCS | Mod: ,,, | Performed by: INTERNAL MEDICINE

## 2021-11-23 PROCEDURE — 84100 ASSAY OF PHOSPHORUS: CPT | Performed by: INTERNAL MEDICINE

## 2021-11-23 PROCEDURE — 92610 EVALUATE SWALLOWING FUNCTION: CPT

## 2021-11-23 PROCEDURE — 97530 THERAPEUTIC ACTIVITIES: CPT

## 2021-11-23 PROCEDURE — 99222 1ST HOSP IP/OBS MODERATE 55: CPT | Mod: ,,, | Performed by: INTERNAL MEDICINE

## 2021-11-23 PROCEDURE — 99223 1ST HOSP IP/OBS HIGH 75: CPT | Mod: ,,, | Performed by: STUDENT IN AN ORGANIZED HEALTH CARE EDUCATION/TRAINING PROGRAM

## 2021-11-23 PROCEDURE — 80202 ASSAY OF VANCOMYCIN: CPT | Performed by: HOSPITALIST

## 2021-11-23 PROCEDURE — 99223 PR INITIAL HOSPITAL CARE,LEVL III: ICD-10-PCS | Mod: ,,, | Performed by: STUDENT IN AN ORGANIZED HEALTH CARE EDUCATION/TRAINING PROGRAM

## 2021-11-23 PROCEDURE — 25000003 PHARM REV CODE 250: Performed by: HOSPITALIST

## 2021-11-23 PROCEDURE — 63600175 PHARM REV CODE 636 W HCPCS: Performed by: HOSPITALIST

## 2021-11-23 RX ORDER — ERGOCALCIFEROL 1.25 MG/1
50000 CAPSULE ORAL
Status: DISCONTINUED | OUTPATIENT
Start: 2021-11-24 | End: 2021-11-30

## 2021-11-23 RX ORDER — PANTOPRAZOLE SODIUM 40 MG/1
40 TABLET, DELAYED RELEASE ORAL 2 TIMES DAILY
Status: DISCONTINUED | OUTPATIENT
Start: 2021-11-23 | End: 2021-11-23

## 2021-11-23 RX ORDER — PANTOPRAZOLE SODIUM 40 MG/10ML
40 INJECTION, POWDER, LYOPHILIZED, FOR SOLUTION INTRAVENOUS 2 TIMES DAILY
Status: DISCONTINUED | OUTPATIENT
Start: 2021-11-23 | End: 2021-11-25

## 2021-11-23 RX ADMIN — TAMSULOSIN HYDROCHLORIDE 0.4 MG: 0.4 CAPSULE ORAL at 10:11

## 2021-11-23 RX ADMIN — THERA TABS 1 TABLET: TAB at 10:11

## 2021-11-23 RX ADMIN — CEFTRIAXONE 1 G: 1 INJECTION, SOLUTION INTRAVENOUS at 03:11

## 2021-11-23 RX ADMIN — VANCOMYCIN HYDROCHLORIDE 500 MG: 500 INJECTION, POWDER, LYOPHILIZED, FOR SOLUTION INTRAVENOUS at 10:11

## 2021-11-23 RX ADMIN — LEVOTHYROXINE SODIUM 25 MCG: 0.03 TABLET ORAL at 05:11

## 2021-11-23 RX ADMIN — ATORVASTATIN CALCIUM 20 MG: 10 TABLET, FILM COATED ORAL at 10:11

## 2021-11-23 RX ADMIN — PANTOPRAZOLE SODIUM 40 MG: 40 TABLET, DELAYED RELEASE ORAL at 10:11

## 2021-11-23 RX ADMIN — MUPIROCIN: 20 OINTMENT TOPICAL at 10:11

## 2021-11-23 RX ADMIN — PANTOPRAZOLE SODIUM 40 MG: 40 INJECTION, POWDER, FOR SOLUTION INTRAVENOUS at 09:11

## 2021-11-23 RX ADMIN — SODIUM CHLORIDE: 0.45 INJECTION, SOLUTION INTRAVENOUS at 06:11

## 2021-11-23 RX ADMIN — MUPIROCIN: 20 OINTMENT TOPICAL at 09:11

## 2021-11-24 PROBLEM — N39.0 URINARY TRACT INFECTION WITHOUT HEMATURIA: Status: ACTIVE | Noted: 2021-11-24

## 2021-11-24 LAB
ALBUMIN SERPL BCP-MCNC: 2.2 G/DL (ref 3.5–5.2)
ALP SERPL-CCNC: 79 U/L (ref 55–135)
ALT SERPL W/O P-5'-P-CCNC: 10 U/L (ref 10–44)
ANION GAP SERPL CALC-SCNC: 16 MMOL/L (ref 8–16)
AST SERPL-CCNC: 17 U/L (ref 10–40)
BASOPHILS # BLD AUTO: 0.02 K/UL (ref 0–0.2)
BASOPHILS NFR BLD: 0.2 % (ref 0–1.9)
BILIRUB SERPL-MCNC: 0.2 MG/DL (ref 0.1–1)
BUN SERPL-MCNC: 110 MG/DL (ref 8–23)
CALCIUM SERPL-MCNC: 9 MG/DL (ref 8.7–10.5)
CHLORIDE SERPL-SCNC: 104 MMOL/L (ref 95–110)
CO2 SERPL-SCNC: 19 MMOL/L (ref 23–29)
CREAT SERPL-MCNC: 1.8 MG/DL (ref 0.5–1.4)
DIFFERENTIAL METHOD: ABNORMAL
EOSINOPHIL # BLD AUTO: 0.1 K/UL (ref 0–0.5)
EOSINOPHIL NFR BLD: 1 % (ref 0–8)
ERYTHROCYTE [DISTWIDTH] IN BLOOD BY AUTOMATED COUNT: 17.2 % (ref 11.5–14.5)
EST. GFR  (AFRICAN AMERICAN): 39 ML/MIN/1.73 M^2
EST. GFR  (NON AFRICAN AMERICAN): 34 ML/MIN/1.73 M^2
GLUCOSE SERPL-MCNC: 66 MG/DL (ref 70–110)
HCT VFR BLD AUTO: 31.6 % (ref 40–54)
HGB BLD-MCNC: 9.6 G/DL (ref 14–18)
IMM GRANULOCYTES # BLD AUTO: 0.53 K/UL (ref 0–0.04)
IMM GRANULOCYTES NFR BLD AUTO: 4.1 % (ref 0–0.5)
LYMPHOCYTES # BLD AUTO: 0.6 K/UL (ref 1–4.8)
LYMPHOCYTES NFR BLD: 4.9 % (ref 18–48)
MCH RBC QN AUTO: 24.2 PG (ref 27–31)
MCHC RBC AUTO-ENTMCNC: 30.4 G/DL (ref 32–36)
MCV RBC AUTO: 80 FL (ref 82–98)
MONOCYTES # BLD AUTO: 1.1 K/UL (ref 0.3–1)
MONOCYTES NFR BLD: 8.6 % (ref 4–15)
NEUTROPHILS # BLD AUTO: 10.5 K/UL (ref 1.8–7.7)
NEUTROPHILS NFR BLD: 81.2 % (ref 38–73)
NRBC BLD-RTO: 0 /100 WBC
PLATELET # BLD AUTO: 152 K/UL (ref 150–450)
PMV BLD AUTO: 11.2 FL (ref 9.2–12.9)
POCT GLUCOSE: 79 MG/DL (ref 70–110)
POCT GLUCOSE: 91 MG/DL (ref 70–110)
POCT GLUCOSE: 92 MG/DL (ref 70–110)
POCT GLUCOSE: 94 MG/DL (ref 70–110)
POTASSIUM SERPL-SCNC: 3.4 MMOL/L (ref 3.5–5.1)
PROT SERPL-MCNC: 4.6 G/DL (ref 6–8.4)
RBC # BLD AUTO: 3.97 M/UL (ref 4.6–6.2)
SODIUM SERPL-SCNC: 139 MMOL/L (ref 136–145)
WBC # BLD AUTO: 12.94 K/UL (ref 3.9–12.7)

## 2021-11-24 PROCEDURE — 25000003 PHARM REV CODE 250: Performed by: HOSPITALIST

## 2021-11-24 PROCEDURE — 85025 COMPLETE CBC W/AUTO DIFF WBC: CPT | Performed by: HOSPITALIST

## 2021-11-24 PROCEDURE — 27000207 HC ISOLATION

## 2021-11-24 PROCEDURE — 99232 SBSQ HOSP IP/OBS MODERATE 35: CPT | Mod: ,,, | Performed by: STUDENT IN AN ORGANIZED HEALTH CARE EDUCATION/TRAINING PROGRAM

## 2021-11-24 PROCEDURE — 80053 COMPREHEN METABOLIC PANEL: CPT | Performed by: HOSPITALIST

## 2021-11-24 PROCEDURE — 97110 THERAPEUTIC EXERCISES: CPT | Mod: CQ

## 2021-11-24 PROCEDURE — 63600175 PHARM REV CODE 636 W HCPCS: Performed by: HOSPITALIST

## 2021-11-24 PROCEDURE — 97530 THERAPEUTIC ACTIVITIES: CPT | Mod: CO

## 2021-11-24 PROCEDURE — 99232 PR SUBSEQUENT HOSPITAL CARE,LEVL II: ICD-10-PCS | Mod: ,,, | Performed by: STUDENT IN AN ORGANIZED HEALTH CARE EDUCATION/TRAINING PROGRAM

## 2021-11-24 PROCEDURE — 25000003 PHARM REV CODE 250: Performed by: INTERNAL MEDICINE

## 2021-11-24 PROCEDURE — C9113 INJ PANTOPRAZOLE SODIUM, VIA: HCPCS | Performed by: STUDENT IN AN ORGANIZED HEALTH CARE EDUCATION/TRAINING PROGRAM

## 2021-11-24 PROCEDURE — 36415 COLL VENOUS BLD VENIPUNCTURE: CPT | Performed by: HOSPITALIST

## 2021-11-24 PROCEDURE — 63600175 PHARM REV CODE 636 W HCPCS: Performed by: STUDENT IN AN ORGANIZED HEALTH CARE EDUCATION/TRAINING PROGRAM

## 2021-11-24 PROCEDURE — 11000001 HC ACUTE MED/SURG PRIVATE ROOM

## 2021-11-24 RX ORDER — CEPHALEXIN 500 MG/1
500 CAPSULE ORAL EVERY 8 HOURS
Status: DISCONTINUED | OUTPATIENT
Start: 2021-11-24 | End: 2021-11-27

## 2021-11-24 RX ADMIN — VANCOMYCIN HYDROCHLORIDE 500 MG: 500 INJECTION, POWDER, LYOPHILIZED, FOR SOLUTION INTRAVENOUS at 08:11

## 2021-11-24 RX ADMIN — TAMSULOSIN HYDROCHLORIDE 0.4 MG: 0.4 CAPSULE ORAL at 08:11

## 2021-11-24 RX ADMIN — PANTOPRAZOLE SODIUM 40 MG: 40 INJECTION, POWDER, FOR SOLUTION INTRAVENOUS at 08:11

## 2021-11-24 RX ADMIN — ERGOCALCIFEROL 50000 UNITS: 1.25 CAPSULE ORAL at 08:11

## 2021-11-24 RX ADMIN — PANTOPRAZOLE SODIUM 40 MG: 40 INJECTION, POWDER, FOR SOLUTION INTRAVENOUS at 09:11

## 2021-11-24 RX ADMIN — THERA TABS 1 TABLET: TAB at 08:11

## 2021-11-24 RX ADMIN — LEVOTHYROXINE SODIUM 25 MCG: 0.03 TABLET ORAL at 06:11

## 2021-11-24 RX ADMIN — CEPHALEXIN 500 MG: 500 CAPSULE ORAL at 09:11

## 2021-11-24 RX ADMIN — ATORVASTATIN CALCIUM 20 MG: 10 TABLET, FILM COATED ORAL at 08:11

## 2021-11-24 RX ADMIN — ONDANSETRON 4 MG: 2 INJECTION INTRAMUSCULAR; INTRAVENOUS at 11:11

## 2021-11-25 PROBLEM — E16.2 HYPOGLYCEMIA: Status: ACTIVE | Noted: 2021-11-25

## 2021-11-25 PROBLEM — E43 SEVERE PROTEIN-CALORIE MALNUTRITION: Status: ACTIVE | Noted: 2021-11-25

## 2021-11-25 PROBLEM — Z71.89 GOALS OF CARE, COUNSELING/DISCUSSION: Status: ACTIVE | Noted: 2021-11-25

## 2021-11-25 LAB
ALBUMIN SERPL BCP-MCNC: 2.2 G/DL (ref 3.5–5.2)
ALP SERPL-CCNC: 83 U/L (ref 55–135)
ALT SERPL W/O P-5'-P-CCNC: 8 U/L (ref 10–44)
ANION GAP SERPL CALC-SCNC: 16 MMOL/L (ref 8–16)
AST SERPL-CCNC: 17 U/L (ref 10–40)
BASOPHILS # BLD AUTO: ABNORMAL K/UL (ref 0–0.2)
BASOPHILS NFR BLD: 0 % (ref 0–1.9)
BILIRUB SERPL-MCNC: 0.3 MG/DL (ref 0.1–1)
BUN SERPL-MCNC: 97 MG/DL (ref 8–23)
CALCIUM SERPL-MCNC: 8.8 MG/DL (ref 8.7–10.5)
CHLORIDE SERPL-SCNC: 105 MMOL/L (ref 95–110)
CO2 SERPL-SCNC: 20 MMOL/L (ref 23–29)
CREAT SERPL-MCNC: 1.7 MG/DL (ref 0.5–1.4)
DIFFERENTIAL METHOD: ABNORMAL
EOSINOPHIL # BLD AUTO: ABNORMAL K/UL (ref 0–0.5)
EOSINOPHIL NFR BLD: 0 % (ref 0–8)
ERYTHROCYTE [DISTWIDTH] IN BLOOD BY AUTOMATED COUNT: 17.2 % (ref 11.5–14.5)
EST. GFR  (AFRICAN AMERICAN): 42 ML/MIN/1.73 M^2
EST. GFR  (NON AFRICAN AMERICAN): 36 ML/MIN/1.73 M^2
GLUCOSE SERPL-MCNC: 68 MG/DL (ref 70–110)
HCT VFR BLD AUTO: 31.8 % (ref 40–54)
HGB BLD-MCNC: 9.5 G/DL (ref 14–18)
IMM GRANULOCYTES # BLD AUTO: ABNORMAL K/UL (ref 0–0.04)
IMM GRANULOCYTES NFR BLD AUTO: ABNORMAL % (ref 0–0.5)
LYMPHOCYTES # BLD AUTO: ABNORMAL K/UL (ref 1–4.8)
LYMPHOCYTES NFR BLD: 2 % (ref 18–48)
MCH RBC QN AUTO: 24 PG (ref 27–31)
MCHC RBC AUTO-ENTMCNC: 29.9 G/DL (ref 32–36)
MCV RBC AUTO: 80 FL (ref 82–98)
MONOCYTES # BLD AUTO: ABNORMAL K/UL (ref 0.3–1)
MONOCYTES NFR BLD: 12 % (ref 4–15)
MYELOCYTES NFR BLD MANUAL: 2 %
NEUTROPHILS NFR BLD: 84 % (ref 38–73)
NRBC BLD-RTO: 0 /100 WBC
PLATELET # BLD AUTO: 146 K/UL (ref 150–450)
PMV BLD AUTO: 10.6 FL (ref 9.2–12.9)
POCT GLUCOSE: 111 MG/DL (ref 70–110)
POCT GLUCOSE: 130 MG/DL (ref 70–110)
POCT GLUCOSE: 137 MG/DL (ref 70–110)
POTASSIUM SERPL-SCNC: 3.5 MMOL/L (ref 3.5–5.1)
PROT SERPL-MCNC: 4.5 G/DL (ref 6–8.4)
RBC # BLD AUTO: 3.96 M/UL (ref 4.6–6.2)
SODIUM SERPL-SCNC: 141 MMOL/L (ref 136–145)
VANCOMYCIN TROUGH SERPL-MCNC: 18.8 UG/ML (ref 10–22)
WBC # BLD AUTO: 10.24 K/UL (ref 3.9–12.7)

## 2021-11-25 PROCEDURE — 11000001 HC ACUTE MED/SURG PRIVATE ROOM

## 2021-11-25 PROCEDURE — 85007 BL SMEAR W/DIFF WBC COUNT: CPT | Performed by: HOSPITALIST

## 2021-11-25 PROCEDURE — 99497 PR ADVNCD CARE PLAN 30 MIN: ICD-10-PCS | Mod: 25,,, | Performed by: INTERNAL MEDICINE

## 2021-11-25 PROCEDURE — 25000003 PHARM REV CODE 250: Performed by: HOSPITALIST

## 2021-11-25 PROCEDURE — 36415 COLL VENOUS BLD VENIPUNCTURE: CPT | Performed by: HOSPITALIST

## 2021-11-25 PROCEDURE — 80202 ASSAY OF VANCOMYCIN: CPT | Performed by: HOSPITALIST

## 2021-11-25 PROCEDURE — 85027 COMPLETE CBC AUTOMATED: CPT | Performed by: HOSPITALIST

## 2021-11-25 PROCEDURE — 27000207 HC ISOLATION

## 2021-11-25 PROCEDURE — 99223 PR INITIAL HOSPITAL CARE,LEVL III: ICD-10-PCS | Mod: ,,, | Performed by: INTERNAL MEDICINE

## 2021-11-25 PROCEDURE — 99223 1ST HOSP IP/OBS HIGH 75: CPT | Mod: ,,, | Performed by: INTERNAL MEDICINE

## 2021-11-25 PROCEDURE — 99497 ADVNCD CARE PLAN 30 MIN: CPT | Mod: 25,,, | Performed by: INTERNAL MEDICINE

## 2021-11-25 PROCEDURE — 80053 COMPREHEN METABOLIC PANEL: CPT | Performed by: HOSPITALIST

## 2021-11-25 PROCEDURE — 63600175 PHARM REV CODE 636 W HCPCS: Performed by: HOSPITALIST

## 2021-11-25 RX ORDER — DRONABINOL 2.5 MG/1
2.5 CAPSULE ORAL 2 TIMES DAILY
Status: DISCONTINUED | OUTPATIENT
Start: 2021-11-25 | End: 2021-12-04 | Stop reason: HOSPADM

## 2021-11-25 RX ORDER — PANTOPRAZOLE SODIUM 40 MG/1
40 TABLET, DELAYED RELEASE ORAL 2 TIMES DAILY
Status: DISCONTINUED | OUTPATIENT
Start: 2021-11-25 | End: 2021-12-04 | Stop reason: HOSPADM

## 2021-11-25 RX ORDER — DEXTROSE MONOHYDRATE, SODIUM CHLORIDE, AND POTASSIUM CHLORIDE 50; 2.98; 4.5 G/1000ML; G/1000ML; G/1000ML
INJECTION, SOLUTION INTRAVENOUS CONTINUOUS
Status: DISCONTINUED | OUTPATIENT
Start: 2021-11-25 | End: 2021-11-26

## 2021-11-25 RX ADMIN — VANCOMYCIN HYDROCHLORIDE 500 MG: 500 INJECTION, POWDER, LYOPHILIZED, FOR SOLUTION INTRAVENOUS at 10:11

## 2021-11-25 RX ADMIN — DEXTROSE MONOHYDRATE, SODIUM CHLORIDE, AND POTASSIUM CHLORIDE: 50; 4.5; 2.98 INJECTION, SOLUTION INTRAVENOUS at 11:11

## 2021-11-26 LAB
ALBUMIN SERPL BCP-MCNC: 2.3 G/DL (ref 3.5–5.2)
ALP SERPL-CCNC: 84 U/L (ref 55–135)
ALT SERPL W/O P-5'-P-CCNC: 8 U/L (ref 10–44)
ANION GAP SERPL CALC-SCNC: 14 MMOL/L (ref 8–16)
AST SERPL-CCNC: 13 U/L (ref 10–40)
BASOPHILS # BLD AUTO: 0.02 K/UL (ref 0–0.2)
BASOPHILS NFR BLD: 0.2 % (ref 0–1.9)
BILIRUB SERPL-MCNC: 0.4 MG/DL (ref 0.1–1)
BUN SERPL-MCNC: 75 MG/DL (ref 8–23)
CALCIUM SERPL-MCNC: 8.8 MG/DL (ref 8.7–10.5)
CHLORIDE SERPL-SCNC: 108 MMOL/L (ref 95–110)
CO2 SERPL-SCNC: 19 MMOL/L (ref 23–29)
CREAT SERPL-MCNC: 1.5 MG/DL (ref 0.5–1.4)
DIFFERENTIAL METHOD: ABNORMAL
EOSINOPHIL # BLD AUTO: 0.1 K/UL (ref 0–0.5)
EOSINOPHIL NFR BLD: 1 % (ref 0–8)
ERYTHROCYTE [DISTWIDTH] IN BLOOD BY AUTOMATED COUNT: 17.6 % (ref 11.5–14.5)
EST. GFR  (AFRICAN AMERICAN): 49 ML/MIN/1.73 M^2
EST. GFR  (NON AFRICAN AMERICAN): 42 ML/MIN/1.73 M^2
GLUCOSE SERPL-MCNC: 155 MG/DL (ref 70–110)
HCT VFR BLD AUTO: 32.9 % (ref 40–54)
HGB BLD-MCNC: 9.9 G/DL (ref 14–18)
IMM GRANULOCYTES # BLD AUTO: 0.49 K/UL (ref 0–0.04)
IMM GRANULOCYTES NFR BLD AUTO: 4.8 % (ref 0–0.5)
LYMPHOCYTES # BLD AUTO: 0.5 K/UL (ref 1–4.8)
LYMPHOCYTES NFR BLD: 5 % (ref 18–48)
MCH RBC QN AUTO: 24.3 PG (ref 27–31)
MCHC RBC AUTO-ENTMCNC: 30.1 G/DL (ref 32–36)
MCV RBC AUTO: 81 FL (ref 82–98)
MONOCYTES # BLD AUTO: 1.3 K/UL (ref 0.3–1)
MONOCYTES NFR BLD: 12.4 % (ref 4–15)
NEUTROPHILS # BLD AUTO: 7.8 K/UL (ref 1.8–7.7)
NEUTROPHILS NFR BLD: 76.6 % (ref 38–73)
NRBC BLD-RTO: 1 /100 WBC
PLATELET # BLD AUTO: 154 K/UL (ref 150–450)
PMV BLD AUTO: 10.9 FL (ref 9.2–12.9)
POCT GLUCOSE: 196 MG/DL (ref 70–110)
POCT GLUCOSE: 201 MG/DL (ref 70–110)
POCT GLUCOSE: 208 MG/DL (ref 70–110)
POCT GLUCOSE: 254 MG/DL (ref 70–110)
POTASSIUM SERPL-SCNC: 4.1 MMOL/L (ref 3.5–5.1)
PROT SERPL-MCNC: 4.8 G/DL (ref 6–8.4)
RBC # BLD AUTO: 4.07 M/UL (ref 4.6–6.2)
SODIUM SERPL-SCNC: 141 MMOL/L (ref 136–145)
WBC # BLD AUTO: 10.13 K/UL (ref 3.9–12.7)

## 2021-11-26 PROCEDURE — 63600175 PHARM REV CODE 636 W HCPCS: Performed by: HOSPITALIST

## 2021-11-26 PROCEDURE — 11000001 HC ACUTE MED/SURG PRIVATE ROOM

## 2021-11-26 PROCEDURE — 97530 THERAPEUTIC ACTIVITIES: CPT

## 2021-11-26 PROCEDURE — 94760 N-INVAS EAR/PLS OXIMETRY 1: CPT

## 2021-11-26 PROCEDURE — 85025 COMPLETE CBC W/AUTO DIFF WBC: CPT | Performed by: HOSPITALIST

## 2021-11-26 PROCEDURE — 36415 COLL VENOUS BLD VENIPUNCTURE: CPT | Performed by: HOSPITALIST

## 2021-11-26 PROCEDURE — 25000003 PHARM REV CODE 250: Performed by: HOSPITALIST

## 2021-11-26 PROCEDURE — 27000207 HC ISOLATION

## 2021-11-26 PROCEDURE — 99900035 HC TECH TIME PER 15 MIN (STAT)

## 2021-11-26 PROCEDURE — 80053 COMPREHEN METABOLIC PANEL: CPT | Performed by: HOSPITALIST

## 2021-11-26 PROCEDURE — S5010 5% DEXTROSE AND 0.45% SALINE: HCPCS | Performed by: HOSPITALIST

## 2021-11-26 PROCEDURE — 97535 SELF CARE MNGMENT TRAINING: CPT

## 2021-11-26 RX ORDER — SODIUM CHLORIDE 450 MG/100ML
INJECTION, SOLUTION INTRAVENOUS CONTINUOUS
Status: DISCONTINUED | OUTPATIENT
Start: 2021-11-26 | End: 2021-11-26

## 2021-11-26 RX ORDER — DEXTROSE MONOHYDRATE AND SODIUM CHLORIDE 5; .45 G/100ML; G/100ML
INJECTION, SOLUTION INTRAVENOUS CONTINUOUS
Status: DISCONTINUED | OUTPATIENT
Start: 2021-11-26 | End: 2021-11-27

## 2021-11-26 RX ADMIN — DEXTROSE AND SODIUM CHLORIDE: 5; .45 INJECTION, SOLUTION INTRAVENOUS at 09:11

## 2021-11-26 RX ADMIN — INSULIN ASPART 1 UNITS: 100 INJECTION, SOLUTION INTRAVENOUS; SUBCUTANEOUS at 08:11

## 2021-11-26 RX ADMIN — DEXTROSE MONOHYDRATE, SODIUM CHLORIDE, AND POTASSIUM CHLORIDE: 50; 4.5; 2.98 INJECTION, SOLUTION INTRAVENOUS at 03:11

## 2021-11-26 RX ADMIN — VANCOMYCIN HYDROCHLORIDE 500 MG: 500 INJECTION, POWDER, LYOPHILIZED, FOR SOLUTION INTRAVENOUS at 10:11

## 2021-11-26 RX ADMIN — PANTOPRAZOLE SODIUM 40 MG: 40 TABLET, DELAYED RELEASE ORAL at 08:11

## 2021-11-26 NOTE — ASSESSMENT & PLAN NOTE
Patient has history of anemia of chronic disease and component of iron deficiency  Patient lost to follow up with GI  Continue to monitor counts   without difficulty

## 2021-11-27 PROBLEM — E16.2 HYPOGLYCEMIA: Status: RESOLVED | Noted: 2021-11-25 | Resolved: 2021-11-27

## 2021-11-27 LAB
ALBUMIN SERPL BCP-MCNC: 2.3 G/DL (ref 3.5–5.2)
ALP SERPL-CCNC: 90 U/L (ref 55–135)
ALT SERPL W/O P-5'-P-CCNC: 8 U/L (ref 10–44)
ANION GAP SERPL CALC-SCNC: 11 MMOL/L (ref 8–16)
ANISOCYTOSIS BLD QL SMEAR: SLIGHT
AST SERPL-CCNC: 12 U/L (ref 10–40)
BASOPHILS NFR BLD: 0 % (ref 0–1.9)
BILIRUB SERPL-MCNC: 0.5 MG/DL (ref 0.1–1)
BUN SERPL-MCNC: 57 MG/DL (ref 8–23)
CALCIUM SERPL-MCNC: 9.1 MG/DL (ref 8.7–10.5)
CHLORIDE SERPL-SCNC: 109 MMOL/L (ref 95–110)
CO2 SERPL-SCNC: 22 MMOL/L (ref 23–29)
CREAT SERPL-MCNC: 1.4 MG/DL (ref 0.5–1.4)
DIFFERENTIAL METHOD: ABNORMAL
EOSINOPHIL NFR BLD: 0 % (ref 0–8)
ERYTHROCYTE [DISTWIDTH] IN BLOOD BY AUTOMATED COUNT: 18 % (ref 11.5–14.5)
EST. GFR  (AFRICAN AMERICAN): 53 ML/MIN/1.73 M^2
EST. GFR  (NON AFRICAN AMERICAN): 46 ML/MIN/1.73 M^2
GLUCOSE SERPL-MCNC: 222 MG/DL (ref 70–110)
HCT VFR BLD AUTO: 32.2 % (ref 40–54)
HGB BLD-MCNC: 9.6 G/DL (ref 14–18)
IMM GRANULOCYTES # BLD AUTO: ABNORMAL K/UL (ref 0–0.04)
IMM GRANULOCYTES NFR BLD AUTO: ABNORMAL % (ref 0–0.5)
LYMPHOCYTES NFR BLD: 3 % (ref 18–48)
MCH RBC QN AUTO: 24.2 PG (ref 27–31)
MCHC RBC AUTO-ENTMCNC: 29.8 G/DL (ref 32–36)
MCV RBC AUTO: 81 FL (ref 82–98)
METAMYELOCYTES NFR BLD MANUAL: 1 %
MONOCYTES NFR BLD: 10 % (ref 4–15)
NEUTROPHILS NFR BLD: 86 % (ref 38–73)
NRBC BLD-RTO: 1 /100 WBC
OVALOCYTES BLD QL SMEAR: ABNORMAL
PLATELET # BLD AUTO: 148 K/UL (ref 150–450)
PMV BLD AUTO: 10.7 FL (ref 9.2–12.9)
POCT GLUCOSE: 154 MG/DL (ref 70–110)
POCT GLUCOSE: 176 MG/DL (ref 70–110)
POCT GLUCOSE: 193 MG/DL (ref 70–110)
POCT GLUCOSE: 270 MG/DL (ref 70–110)
POTASSIUM SERPL-SCNC: 3.6 MMOL/L (ref 3.5–5.1)
PROT SERPL-MCNC: 5.1 G/DL (ref 6–8.4)
RBC # BLD AUTO: 3.96 M/UL (ref 4.6–6.2)
SODIUM SERPL-SCNC: 142 MMOL/L (ref 136–145)
VANCOMYCIN SERPL-MCNC: 19.2 UG/ML
VANCOMYCIN TROUGH SERPL-MCNC: 20.6 UG/ML (ref 10–22)
WBC # BLD AUTO: 10.28 K/UL (ref 3.9–12.7)

## 2021-11-27 PROCEDURE — 36415 COLL VENOUS BLD VENIPUNCTURE: CPT | Performed by: HOSPITALIST

## 2021-11-27 PROCEDURE — 27000207 HC ISOLATION

## 2021-11-27 PROCEDURE — 85027 COMPLETE CBC AUTOMATED: CPT | Performed by: HOSPITALIST

## 2021-11-27 PROCEDURE — 11000001 HC ACUTE MED/SURG PRIVATE ROOM

## 2021-11-27 PROCEDURE — 80053 COMPREHEN METABOLIC PANEL: CPT | Performed by: HOSPITALIST

## 2021-11-27 PROCEDURE — 94761 N-INVAS EAR/PLS OXIMETRY MLT: CPT

## 2021-11-27 PROCEDURE — 80202 ASSAY OF VANCOMYCIN: CPT | Mod: 91 | Performed by: HOSPITALIST

## 2021-11-27 PROCEDURE — 25000003 PHARM REV CODE 250: Performed by: HOSPITALIST

## 2021-11-27 PROCEDURE — 80202 ASSAY OF VANCOMYCIN: CPT | Performed by: HOSPITALIST

## 2021-11-27 PROCEDURE — 94760 N-INVAS EAR/PLS OXIMETRY 1: CPT

## 2021-11-27 PROCEDURE — 99900035 HC TECH TIME PER 15 MIN (STAT)

## 2021-11-27 PROCEDURE — 85007 BL SMEAR W/DIFF WBC COUNT: CPT | Performed by: HOSPITALIST

## 2021-11-27 PROCEDURE — 63600175 PHARM REV CODE 636 W HCPCS: Performed by: HOSPITALIST

## 2021-11-27 RX ADMIN — CEPHALEXIN 500 MG: 500 CAPSULE ORAL at 03:11

## 2021-11-27 RX ADMIN — PANTOPRAZOLE SODIUM 40 MG: 40 TABLET, DELAYED RELEASE ORAL at 08:11

## 2021-11-27 RX ADMIN — ATORVASTATIN CALCIUM 20 MG: 10 TABLET, FILM COATED ORAL at 10:11

## 2021-11-27 RX ADMIN — INSULIN ASPART 3 UNITS: 100 INJECTION, SOLUTION INTRAVENOUS; SUBCUTANEOUS at 12:11

## 2021-11-27 RX ADMIN — PANTOPRAZOLE SODIUM 40 MG: 40 TABLET, DELAYED RELEASE ORAL at 10:11

## 2021-11-27 RX ADMIN — THERA TABS 1 TABLET: TAB at 10:11

## 2021-11-27 RX ADMIN — DRONABINOL 2.5 MG: 2.5 CAPSULE ORAL at 10:11

## 2021-11-27 RX ADMIN — TAMSULOSIN HYDROCHLORIDE 0.4 MG: 0.4 CAPSULE ORAL at 10:11

## 2021-11-28 LAB
ALBUMIN SERPL BCP-MCNC: 2.2 G/DL (ref 3.5–5.2)
ALP SERPL-CCNC: 82 U/L (ref 55–135)
ALT SERPL W/O P-5'-P-CCNC: 8 U/L (ref 10–44)
ANION GAP SERPL CALC-SCNC: 11 MMOL/L (ref 8–16)
AST SERPL-CCNC: 10 U/L (ref 10–40)
BASOPHILS # BLD AUTO: 0.03 K/UL (ref 0–0.2)
BASOPHILS NFR BLD: 0.2 % (ref 0–1.9)
BILIRUB SERPL-MCNC: 0.5 MG/DL (ref 0.1–1)
BUN SERPL-MCNC: 50 MG/DL (ref 8–23)
CALCIUM SERPL-MCNC: 9.3 MG/DL (ref 8.7–10.5)
CHLORIDE SERPL-SCNC: 113 MMOL/L (ref 95–110)
CO2 SERPL-SCNC: 20 MMOL/L (ref 23–29)
CREAT SERPL-MCNC: 1.3 MG/DL (ref 0.5–1.4)
DIFFERENTIAL METHOD: ABNORMAL
EOSINOPHIL # BLD AUTO: 0 K/UL (ref 0–0.5)
EOSINOPHIL NFR BLD: 0.3 % (ref 0–8)
ERYTHROCYTE [DISTWIDTH] IN BLOOD BY AUTOMATED COUNT: 18.4 % (ref 11.5–14.5)
EST. GFR  (AFRICAN AMERICAN): 58 ML/MIN/1.73 M^2
EST. GFR  (NON AFRICAN AMERICAN): 50 ML/MIN/1.73 M^2
GLUCOSE SERPL-MCNC: 121 MG/DL (ref 70–110)
HCT VFR BLD AUTO: 32.6 % (ref 40–54)
HGB BLD-MCNC: 9.8 G/DL (ref 14–18)
IMM GRANULOCYTES # BLD AUTO: 0.6 K/UL (ref 0–0.04)
IMM GRANULOCYTES NFR BLD AUTO: 4.8 % (ref 0–0.5)
LYMPHOCYTES # BLD AUTO: 0.8 K/UL (ref 1–4.8)
LYMPHOCYTES NFR BLD: 6 % (ref 18–48)
MCH RBC QN AUTO: 23.7 PG (ref 27–31)
MCHC RBC AUTO-ENTMCNC: 30.1 G/DL (ref 32–36)
MCV RBC AUTO: 79 FL (ref 82–98)
MONOCYTES # BLD AUTO: 1.1 K/UL (ref 0.3–1)
MONOCYTES NFR BLD: 9.2 % (ref 4–15)
NEUTROPHILS # BLD AUTO: 9.9 K/UL (ref 1.8–7.7)
NEUTROPHILS NFR BLD: 79.5 % (ref 38–73)
NRBC BLD-RTO: 1 /100 WBC
PLATELET # BLD AUTO: 128 K/UL (ref 150–450)
PLATELET BLD QL SMEAR: ABNORMAL
PMV BLD AUTO: 10.8 FL (ref 9.2–12.9)
POCT GLUCOSE: 136 MG/DL (ref 70–110)
POCT GLUCOSE: 155 MG/DL (ref 70–110)
POCT GLUCOSE: 158 MG/DL (ref 70–110)
POCT GLUCOSE: 171 MG/DL (ref 70–110)
POTASSIUM SERPL-SCNC: 4 MMOL/L (ref 3.5–5.1)
PROT SERPL-MCNC: 5.1 G/DL (ref 6–8.4)
RBC # BLD AUTO: 4.13 M/UL (ref 4.6–6.2)
SODIUM SERPL-SCNC: 144 MMOL/L (ref 136–145)
WBC # BLD AUTO: 12.43 K/UL (ref 3.9–12.7)

## 2021-11-28 PROCEDURE — 84550 ASSAY OF BLOOD/URIC ACID: CPT | Performed by: HOSPITALIST

## 2021-11-28 PROCEDURE — 25000003 PHARM REV CODE 250: Performed by: HOSPITALIST

## 2021-11-28 PROCEDURE — 27000207 HC ISOLATION

## 2021-11-28 PROCEDURE — 36415 COLL VENOUS BLD VENIPUNCTURE: CPT | Performed by: HOSPITALIST

## 2021-11-28 PROCEDURE — 63600175 PHARM REV CODE 636 W HCPCS: Performed by: HOSPITALIST

## 2021-11-28 PROCEDURE — 80053 COMPREHEN METABOLIC PANEL: CPT | Performed by: HOSPITALIST

## 2021-11-28 PROCEDURE — 11000001 HC ACUTE MED/SURG PRIVATE ROOM

## 2021-11-28 PROCEDURE — 85025 COMPLETE CBC W/AUTO DIFF WBC: CPT | Performed by: HOSPITALIST

## 2021-11-28 RX ORDER — COLCHICINE 0.6 MG/1
0.6 TABLET, FILM COATED ORAL ONCE
Status: DISCONTINUED | OUTPATIENT
Start: 2021-11-28 | End: 2021-11-28

## 2021-11-28 RX ORDER — ACETAMINOPHEN 325 MG/1
650 TABLET ORAL EVERY 4 HOURS PRN
Status: DISCONTINUED | OUTPATIENT
Start: 2021-11-28 | End: 2021-12-04 | Stop reason: HOSPADM

## 2021-11-28 RX ORDER — PREDNISONE 5 MG/1
10 TABLET ORAL DAILY
Status: DISCONTINUED | OUTPATIENT
Start: 2021-11-28 | End: 2021-11-28

## 2021-11-28 RX ADMIN — PANTOPRAZOLE SODIUM 40 MG: 40 TABLET, DELAYED RELEASE ORAL at 08:11

## 2021-11-28 RX ADMIN — THERA TABS 1 TABLET: TAB at 08:11

## 2021-11-28 RX ADMIN — DRONABINOL 2.5 MG: 2.5 CAPSULE ORAL at 08:11

## 2021-11-28 RX ADMIN — VANCOMYCIN HYDROCHLORIDE 500 MG: 500 INJECTION, POWDER, LYOPHILIZED, FOR SOLUTION INTRAVENOUS at 09:11

## 2021-11-28 RX ADMIN — ATORVASTATIN CALCIUM 20 MG: 10 TABLET, FILM COATED ORAL at 08:11

## 2021-11-28 RX ADMIN — TAMSULOSIN HYDROCHLORIDE 0.4 MG: 0.4 CAPSULE ORAL at 08:11

## 2021-11-29 LAB
ALBUMIN SERPL BCP-MCNC: 2.1 G/DL (ref 3.5–5.2)
ALP SERPL-CCNC: 86 U/L (ref 55–135)
ALT SERPL W/O P-5'-P-CCNC: 6 U/L (ref 10–44)
ANION GAP SERPL CALC-SCNC: 9 MMOL/L (ref 8–16)
ANISOCYTOSIS BLD QL SMEAR: ABNORMAL
AST SERPL-CCNC: 8 U/L (ref 10–40)
BASOPHILS NFR BLD: 0 % (ref 0–1.9)
BILIRUB SERPL-MCNC: 0.5 MG/DL (ref 0.1–1)
BUN SERPL-MCNC: 48 MG/DL (ref 8–23)
BURR CELLS BLD QL SMEAR: ABNORMAL
CALCIUM SERPL-MCNC: 8.5 MG/DL (ref 8.7–10.5)
CHLORIDE SERPL-SCNC: 113 MMOL/L (ref 95–110)
CO2 SERPL-SCNC: 23 MMOL/L (ref 23–29)
CREAT SERPL-MCNC: 1.4 MG/DL (ref 0.5–1.4)
DIFFERENTIAL METHOD: ABNORMAL
EOSINOPHIL NFR BLD: 0 % (ref 0–8)
ERYTHROCYTE [DISTWIDTH] IN BLOOD BY AUTOMATED COUNT: 18.6 % (ref 11.5–14.5)
EST. GFR  (AFRICAN AMERICAN): 53 ML/MIN/1.73 M^2
EST. GFR  (NON AFRICAN AMERICAN): 46 ML/MIN/1.73 M^2
GLUCOSE SERPL-MCNC: 132 MG/DL (ref 70–110)
HCT VFR BLD AUTO: 30.1 % (ref 40–54)
HGB BLD-MCNC: 9.1 G/DL (ref 14–18)
HYPOCHROMIA BLD QL SMEAR: ABNORMAL
IMM GRANULOCYTES # BLD AUTO: ABNORMAL K/UL (ref 0–0.04)
IMM GRANULOCYTES NFR BLD AUTO: ABNORMAL % (ref 0–0.5)
LYMPHOCYTES NFR BLD: 2 % (ref 18–48)
MCH RBC QN AUTO: 24.3 PG (ref 27–31)
MCHC RBC AUTO-ENTMCNC: 30.2 G/DL (ref 32–36)
MCV RBC AUTO: 80 FL (ref 82–98)
MONOCYTES NFR BLD: 1 % (ref 4–15)
NEUTROPHILS NFR BLD: 97 % (ref 38–73)
NRBC BLD-RTO: 0 /100 WBC
PLATELET # BLD AUTO: 170 K/UL (ref 150–450)
PMV BLD AUTO: 10.6 FL (ref 9.2–12.9)
POCT GLUCOSE: 117 MG/DL (ref 70–110)
POCT GLUCOSE: 122 MG/DL (ref 70–110)
POCT GLUCOSE: 134 MG/DL (ref 70–110)
POCT GLUCOSE: 177 MG/DL (ref 70–110)
POTASSIUM SERPL-SCNC: 3.5 MMOL/L (ref 3.5–5.1)
PROT SERPL-MCNC: 4.5 G/DL (ref 6–8.4)
RBC # BLD AUTO: 3.75 M/UL (ref 4.6–6.2)
ROULEAUX BLD QL SMEAR: PRESENT
SODIUM SERPL-SCNC: 145 MMOL/L (ref 136–145)
URATE SERPL-MCNC: 13 MG/DL (ref 3.4–7)
WBC # BLD AUTO: 13.71 K/UL (ref 3.9–12.7)

## 2021-11-29 PROCEDURE — 97535 SELF CARE MNGMENT TRAINING: CPT

## 2021-11-29 PROCEDURE — 80053 COMPREHEN METABOLIC PANEL: CPT | Performed by: HOSPITALIST

## 2021-11-29 PROCEDURE — 11000001 HC ACUTE MED/SURG PRIVATE ROOM

## 2021-11-29 PROCEDURE — 99497 ADVNCD CARE PLAN 30 MIN: CPT | Mod: ,,, | Performed by: INTERNAL MEDICINE

## 2021-11-29 PROCEDURE — 99497 PR ADVNCD CARE PLAN 30 MIN: ICD-10-PCS | Mod: ,,, | Performed by: INTERNAL MEDICINE

## 2021-11-29 PROCEDURE — 99233 SBSQ HOSP IP/OBS HIGH 50: CPT | Mod: ,,, | Performed by: INTERNAL MEDICINE

## 2021-11-29 PROCEDURE — 99233 PR SUBSEQUENT HOSPITAL CARE,LEVL III: ICD-10-PCS | Mod: ,,, | Performed by: INTERNAL MEDICINE

## 2021-11-29 PROCEDURE — 25000003 PHARM REV CODE 250: Performed by: HOSPITALIST

## 2021-11-29 PROCEDURE — 36415 COLL VENOUS BLD VENIPUNCTURE: CPT | Performed by: HOSPITALIST

## 2021-11-29 PROCEDURE — 27000207 HC ISOLATION

## 2021-11-29 PROCEDURE — 85007 BL SMEAR W/DIFF WBC COUNT: CPT | Performed by: HOSPITALIST

## 2021-11-29 PROCEDURE — 85027 COMPLETE CBC AUTOMATED: CPT | Performed by: HOSPITALIST

## 2021-11-29 RX ORDER — COLCHICINE 0.6 MG/1
0.6 TABLET, FILM COATED ORAL DAILY
Status: DISCONTINUED | OUTPATIENT
Start: 2021-11-29 | End: 2021-12-04 | Stop reason: HOSPADM

## 2021-11-29 RX ORDER — SODIUM CHLORIDE 9 MG/ML
INJECTION, SOLUTION INTRAVENOUS CONTINUOUS
Status: DISCONTINUED | OUTPATIENT
Start: 2021-11-29 | End: 2021-12-03

## 2021-11-29 RX ADMIN — ACETAMINOPHEN 650 MG: 325 TABLET ORAL at 12:11

## 2021-11-29 RX ADMIN — SODIUM CHLORIDE: 0.9 INJECTION, SOLUTION INTRAVENOUS at 02:11

## 2021-11-29 RX ADMIN — COLCHICINE 0.6 MG: 0.6 TABLET, FILM COATED ORAL at 05:11

## 2021-11-30 LAB
ALBUMIN SERPL BCP-MCNC: 2 G/DL (ref 3.5–5.2)
ALP SERPL-CCNC: 83 U/L (ref 55–135)
ALT SERPL W/O P-5'-P-CCNC: 9 U/L (ref 10–44)
ANION GAP SERPL CALC-SCNC: 10 MMOL/L (ref 8–16)
AST SERPL-CCNC: 15 U/L (ref 10–40)
BILIRUB SERPL-MCNC: 0.5 MG/DL (ref 0.1–1)
BUN SERPL-MCNC: 47 MG/DL (ref 8–23)
CALCIUM SERPL-MCNC: 8.9 MG/DL (ref 8.7–10.5)
CHLORIDE SERPL-SCNC: 115 MMOL/L (ref 95–110)
CO2 SERPL-SCNC: 20 MMOL/L (ref 23–29)
CREAT SERPL-MCNC: 1.6 MG/DL (ref 0.5–1.4)
EST. GFR  (AFRICAN AMERICAN): 45 ML/MIN/1.73 M^2
EST. GFR  (NON AFRICAN AMERICAN): 39 ML/MIN/1.73 M^2
GLUCOSE SERPL-MCNC: 108 MG/DL (ref 70–110)
POCT GLUCOSE: 106 MG/DL (ref 70–110)
POCT GLUCOSE: 110 MG/DL (ref 70–110)
POCT GLUCOSE: 138 MG/DL (ref 70–110)
POCT GLUCOSE: 157 MG/DL (ref 70–110)
POTASSIUM SERPL-SCNC: 5.5 MMOL/L (ref 3.5–5.1)
PROT SERPL-MCNC: 4.9 G/DL (ref 6–8.4)
SODIUM SERPL-SCNC: 145 MMOL/L (ref 136–145)

## 2021-11-30 PROCEDURE — 36415 COLL VENOUS BLD VENIPUNCTURE: CPT | Performed by: HOSPITALIST

## 2021-11-30 PROCEDURE — 25000003 PHARM REV CODE 250: Performed by: HOSPITALIST

## 2021-11-30 PROCEDURE — 11000001 HC ACUTE MED/SURG PRIVATE ROOM

## 2021-11-30 PROCEDURE — 27000207 HC ISOLATION

## 2021-11-30 PROCEDURE — 80053 COMPREHEN METABOLIC PANEL: CPT | Performed by: HOSPITALIST

## 2021-11-30 PROCEDURE — 25000003 PHARM REV CODE 250: Performed by: STUDENT IN AN ORGANIZED HEALTH CARE EDUCATION/TRAINING PROGRAM

## 2021-11-30 RX ADMIN — PANTOPRAZOLE SODIUM 40 MG: 40 TABLET, DELAYED RELEASE ORAL at 09:11

## 2021-11-30 RX ADMIN — ACETAMINOPHEN 650 MG: 325 TABLET ORAL at 09:11

## 2021-11-30 RX ADMIN — LEVOTHYROXINE SODIUM 25 MCG: 0.03 TABLET ORAL at 05:11

## 2021-11-30 RX ADMIN — SODIUM ZIRCONIUM CYCLOSILICATE 10 G: 10 POWDER, FOR SUSPENSION ORAL at 10:11

## 2021-12-01 LAB
POCT GLUCOSE: 127 MG/DL (ref 70–110)
POCT GLUCOSE: 151 MG/DL (ref 70–110)
POCT GLUCOSE: 169 MG/DL (ref 70–110)
POCT GLUCOSE: 178 MG/DL (ref 70–110)
PROCALCITONIN SERPL IA-MCNC: 0.37 NG/ML

## 2021-12-01 PROCEDURE — 25000003 PHARM REV CODE 250: Performed by: HOSPITALIST

## 2021-12-01 PROCEDURE — 36415 COLL VENOUS BLD VENIPUNCTURE: CPT | Performed by: STUDENT IN AN ORGANIZED HEALTH CARE EDUCATION/TRAINING PROGRAM

## 2021-12-01 PROCEDURE — 11000001 HC ACUTE MED/SURG PRIVATE ROOM

## 2021-12-01 PROCEDURE — 27000207 HC ISOLATION

## 2021-12-01 PROCEDURE — 84145 PROCALCITONIN (PCT): CPT | Performed by: STUDENT IN AN ORGANIZED HEALTH CARE EDUCATION/TRAINING PROGRAM

## 2021-12-01 RX ADMIN — TAMSULOSIN HYDROCHLORIDE 0.4 MG: 0.4 CAPSULE ORAL at 08:12

## 2021-12-01 RX ADMIN — COLCHICINE 0.6 MG: 0.6 TABLET, FILM COATED ORAL at 08:12

## 2021-12-01 RX ADMIN — PANTOPRAZOLE SODIUM 40 MG: 40 TABLET, DELAYED RELEASE ORAL at 08:12

## 2021-12-01 RX ADMIN — LEVOTHYROXINE SODIUM 25 MCG: 0.03 TABLET ORAL at 05:12

## 2021-12-01 RX ADMIN — THERA TABS 1 TABLET: TAB at 08:12

## 2021-12-01 RX ADMIN — ATORVASTATIN CALCIUM 20 MG: 10 TABLET, FILM COATED ORAL at 08:12

## 2021-12-02 LAB
ALBUMIN SERPL BCP-MCNC: 2 G/DL (ref 3.5–5.2)
ALP SERPL-CCNC: 94 U/L (ref 55–135)
ALT SERPL W/O P-5'-P-CCNC: 9 U/L (ref 10–44)
ANION GAP SERPL CALC-SCNC: 8 MMOL/L (ref 8–16)
AST SERPL-CCNC: 14 U/L (ref 10–40)
BACTERIA #/AREA URNS HPF: ABNORMAL /HPF
BASOPHILS # BLD AUTO: 0.02 K/UL (ref 0–0.2)
BASOPHILS NFR BLD: 0.2 % (ref 0–1.9)
BILIRUB SERPL-MCNC: 0.4 MG/DL (ref 0.1–1)
BILIRUB UR QL STRIP: NEGATIVE
BUN SERPL-MCNC: 48 MG/DL (ref 8–23)
CALCIUM SERPL-MCNC: 9 MG/DL (ref 8.7–10.5)
CHLORIDE SERPL-SCNC: 112 MMOL/L (ref 95–110)
CLARITY UR: CLEAR
CO2 SERPL-SCNC: 27 MMOL/L (ref 23–29)
COLOR UR: YELLOW
CREAT SERPL-MCNC: 1.6 MG/DL (ref 0.5–1.4)
DIFFERENTIAL METHOD: ABNORMAL
EOSINOPHIL # BLD AUTO: 0 K/UL (ref 0–0.5)
EOSINOPHIL NFR BLD: 0.2 % (ref 0–8)
ERYTHROCYTE [DISTWIDTH] IN BLOOD BY AUTOMATED COUNT: 18.9 % (ref 11.5–14.5)
EST. GFR  (AFRICAN AMERICAN): 45 ML/MIN/1.73 M^2
EST. GFR  (NON AFRICAN AMERICAN): 39 ML/MIN/1.73 M^2
GLUCOSE SERPL-MCNC: 138 MG/DL (ref 70–110)
GLUCOSE UR QL STRIP: NEGATIVE
HCT VFR BLD AUTO: 29.3 % (ref 40–54)
HGB BLD-MCNC: 8.8 G/DL (ref 14–18)
HGB UR QL STRIP: ABNORMAL
HYALINE CASTS #/AREA URNS LPF: 3 /LPF
IMM GRANULOCYTES # BLD AUTO: 0.58 K/UL (ref 0–0.04)
IMM GRANULOCYTES NFR BLD AUTO: 4.5 % (ref 0–0.5)
KETONES UR QL STRIP: NEGATIVE
LEUKOCYTE ESTERASE UR QL STRIP: ABNORMAL
LYMPHOCYTES # BLD AUTO: 0.7 K/UL (ref 1–4.8)
LYMPHOCYTES NFR BLD: 5.1 % (ref 18–48)
MAGNESIUM SERPL-MCNC: 1.3 MG/DL (ref 1.6–2.6)
MCH RBC QN AUTO: 24.4 PG (ref 27–31)
MCHC RBC AUTO-ENTMCNC: 30 G/DL (ref 32–36)
MCV RBC AUTO: 81 FL (ref 82–98)
MICROSCOPIC COMMENT: ABNORMAL
MONOCYTES # BLD AUTO: 1.3 K/UL (ref 0.3–1)
MONOCYTES NFR BLD: 10.5 % (ref 4–15)
NEUTROPHILS # BLD AUTO: 10.2 K/UL (ref 1.8–7.7)
NEUTROPHILS NFR BLD: 79.5 % (ref 38–73)
NITRITE UR QL STRIP: NEGATIVE
NRBC BLD-RTO: 0 /100 WBC
PH UR STRIP: 6 [PH] (ref 5–8)
PHOSPHATE SERPL-MCNC: 1.4 MG/DL (ref 2.7–4.5)
PLATELET # BLD AUTO: 193 K/UL (ref 150–450)
PMV BLD AUTO: 10.6 FL (ref 9.2–12.9)
POCT GLUCOSE: 140 MG/DL (ref 70–110)
POCT GLUCOSE: 154 MG/DL (ref 70–110)
POCT GLUCOSE: 156 MG/DL (ref 70–110)
POTASSIUM SERPL-SCNC: 3 MMOL/L (ref 3.5–5.1)
PROT SERPL-MCNC: 5 G/DL (ref 6–8.4)
PROT UR QL STRIP: ABNORMAL
RBC # BLD AUTO: 3.6 M/UL (ref 4.6–6.2)
RBC #/AREA URNS HPF: 5 /HPF (ref 0–4)
SODIUM SERPL-SCNC: 147 MMOL/L (ref 136–145)
SP GR UR STRIP: 1.01 (ref 1–1.03)
UNIDENT CRYS URNS QL MICRO: ABNORMAL
URN SPEC COLLECT METH UR: ABNORMAL
UROBILINOGEN UR STRIP-ACNC: NEGATIVE EU/DL
WBC # BLD AUTO: 12.79 K/UL (ref 3.9–12.7)
WBC #/AREA URNS HPF: 73 /HPF (ref 0–5)
WBC CLUMPS URNS QL MICRO: ABNORMAL
YEAST URNS QL MICRO: ABNORMAL

## 2021-12-02 PROCEDURE — 25000003 PHARM REV CODE 250: Performed by: HOSPITALIST

## 2021-12-02 PROCEDURE — 36415 COLL VENOUS BLD VENIPUNCTURE: CPT | Performed by: STUDENT IN AN ORGANIZED HEALTH CARE EDUCATION/TRAINING PROGRAM

## 2021-12-02 PROCEDURE — 27000207 HC ISOLATION

## 2021-12-02 PROCEDURE — 97110 THERAPEUTIC EXERCISES: CPT

## 2021-12-02 PROCEDURE — 11000001 HC ACUTE MED/SURG PRIVATE ROOM

## 2021-12-02 PROCEDURE — 36410 VNPNXR 3YR/> PHY/QHP DX/THER: CPT

## 2021-12-02 PROCEDURE — 87086 URINE CULTURE/COLONY COUNT: CPT | Performed by: STUDENT IN AN ORGANIZED HEALTH CARE EDUCATION/TRAINING PROGRAM

## 2021-12-02 PROCEDURE — 80053 COMPREHEN METABOLIC PANEL: CPT | Performed by: STUDENT IN AN ORGANIZED HEALTH CARE EDUCATION/TRAINING PROGRAM

## 2021-12-02 PROCEDURE — 63600175 PHARM REV CODE 636 W HCPCS: Performed by: STUDENT IN AN ORGANIZED HEALTH CARE EDUCATION/TRAINING PROGRAM

## 2021-12-02 PROCEDURE — 85025 COMPLETE CBC W/AUTO DIFF WBC: CPT | Performed by: STUDENT IN AN ORGANIZED HEALTH CARE EDUCATION/TRAINING PROGRAM

## 2021-12-02 PROCEDURE — 83735 ASSAY OF MAGNESIUM: CPT | Performed by: STUDENT IN AN ORGANIZED HEALTH CARE EDUCATION/TRAINING PROGRAM

## 2021-12-02 PROCEDURE — 94760 N-INVAS EAR/PLS OXIMETRY 1: CPT

## 2021-12-02 PROCEDURE — 25000003 PHARM REV CODE 250: Performed by: STUDENT IN AN ORGANIZED HEALTH CARE EDUCATION/TRAINING PROGRAM

## 2021-12-02 PROCEDURE — 81000 URINALYSIS NONAUTO W/SCOPE: CPT | Performed by: STUDENT IN AN ORGANIZED HEALTH CARE EDUCATION/TRAINING PROGRAM

## 2021-12-02 PROCEDURE — 84100 ASSAY OF PHOSPHORUS: CPT | Performed by: STUDENT IN AN ORGANIZED HEALTH CARE EDUCATION/TRAINING PROGRAM

## 2021-12-02 PROCEDURE — B4185 PARENTERAL SOL 10 GM LIPIDS: HCPCS | Performed by: STUDENT IN AN ORGANIZED HEALTH CARE EDUCATION/TRAINING PROGRAM

## 2021-12-02 RX ORDER — SODIUM,POTASSIUM PHOSPHATES 280-250MG
2 POWDER IN PACKET (EA) ORAL
Status: DISPENSED | OUTPATIENT
Start: 2021-12-02 | End: 2021-12-03

## 2021-12-02 RX ORDER — MAGNESIUM SULFATE HEPTAHYDRATE 40 MG/ML
2 INJECTION, SOLUTION INTRAVENOUS ONCE
Status: COMPLETED | OUTPATIENT
Start: 2021-12-02 | End: 2021-12-02

## 2021-12-02 RX ORDER — POTASSIUM CHLORIDE 7.45 MG/ML
10 INJECTION INTRAVENOUS
Status: DISPENSED | OUTPATIENT
Start: 2021-12-02 | End: 2021-12-03

## 2021-12-02 RX ADMIN — TAMSULOSIN HYDROCHLORIDE 0.4 MG: 0.4 CAPSULE ORAL at 08:12

## 2021-12-02 RX ADMIN — MAGNESIUM SULFATE 2 G: 2 INJECTION INTRAVENOUS at 08:12

## 2021-12-02 RX ADMIN — COLCHICINE 0.6 MG: 0.6 TABLET, FILM COATED ORAL at 08:12

## 2021-12-02 RX ADMIN — PANTOPRAZOLE SODIUM 40 MG: 40 TABLET, DELAYED RELEASE ORAL at 08:12

## 2021-12-02 RX ADMIN — SOYBEAN OIL 250 ML: 20 INJECTION, SOLUTION INTRAVENOUS at 11:12

## 2021-12-02 RX ADMIN — Medication 2 PACKET: at 04:12

## 2021-12-02 RX ADMIN — POTASSIUM CHLORIDE 10 MEQ: 7.46 INJECTION, SOLUTION INTRAVENOUS at 11:12

## 2021-12-02 RX ADMIN — THERA TABS 1 TABLET: TAB at 08:12

## 2021-12-02 RX ADMIN — Medication 2 PACKET: at 08:12

## 2021-12-02 RX ADMIN — RETINOL, ERGOCALCIFEROL, .ALPHA.-TOCOPHEROL ACETATE, DL-, PHYTONADIONE, ASCORBIC ACID, NIACINAMIDE, RIBOFLAVIN 5-PHOSPHATE SODIUM, THIAMINE HYDROCHLORIDE, PYRIDOXINE HYDROCHLORIDE, DEXPANTHENOL, BIOTIN, FOLIC ACID, AND CYANOCOBALAMIN: KIT at 11:12

## 2021-12-02 RX ADMIN — ATORVASTATIN CALCIUM 20 MG: 10 TABLET, FILM COATED ORAL at 08:12

## 2021-12-03 VITALS
BODY MASS INDEX: 21.82 KG/M2 | OXYGEN SATURATION: 100 % | WEIGHT: 144 LBS | HEIGHT: 68 IN | SYSTOLIC BLOOD PRESSURE: 171 MMHG | TEMPERATURE: 98 F | DIASTOLIC BLOOD PRESSURE: 79 MMHG | RESPIRATION RATE: 20 BRPM | HEART RATE: 79 BPM

## 2021-12-03 LAB
POCT GLUCOSE: 154 MG/DL (ref 70–110)
POCT GLUCOSE: 167 MG/DL (ref 70–110)
POCT GLUCOSE: 180 MG/DL (ref 70–110)
SARS-COV-2 RDRP RESP QL NAA+PROBE: NEGATIVE

## 2021-12-03 PROCEDURE — 63600175 PHARM REV CODE 636 W HCPCS: Performed by: STUDENT IN AN ORGANIZED HEALTH CARE EDUCATION/TRAINING PROGRAM

## 2021-12-03 PROCEDURE — U0002 COVID-19 LAB TEST NON-CDC: HCPCS | Performed by: STUDENT IN AN ORGANIZED HEALTH CARE EDUCATION/TRAINING PROGRAM

## 2021-12-03 PROCEDURE — 91300 PHARM REV CODE 636 W HCPCS: CPT | Performed by: STUDENT IN AN ORGANIZED HEALTH CARE EDUCATION/TRAINING PROGRAM

## 2021-12-03 PROCEDURE — 0001A HC IMMUNIZ ADMIN, SARS-COV-2 COVID-19 VACC, 30MCG/0.3ML, 1ST DOSE: CPT | Performed by: STUDENT IN AN ORGANIZED HEALTH CARE EDUCATION/TRAINING PROGRAM

## 2021-12-03 RX ORDER — POTASSIUM CHLORIDE 7.45 MG/ML
10 INJECTION INTRAVENOUS
Status: COMPLETED | OUTPATIENT
Start: 2021-12-03 | End: 2021-12-03

## 2021-12-03 RX ADMIN — POTASSIUM CHLORIDE 10 MEQ: 7.46 INJECTION, SOLUTION INTRAVENOUS at 04:12

## 2021-12-03 RX ADMIN — RNA INGREDIENT BNT-162B2 0.3 ML: 0.23 INJECTION, SUSPENSION INTRAMUSCULAR at 03:12

## 2021-12-03 RX ADMIN — POTASSIUM CHLORIDE 10 MEQ: 7.46 INJECTION, SOLUTION INTRAVENOUS at 01:12

## 2021-12-04 LAB — BACTERIA UR CULT: NORMAL

## 2022-05-10 NOTE — SUBJECTIVE & OBJECTIVE
Group Topic:  APRIL Process Group    Date: 5/10/2022  Start Time:  1:45 PM  End Time:  2:30 PM  Facilitators: Mikal Miller LPC    Focus: Check out group  Number in attendance: 6    Check-Out group is a daily process group where Patients engaged in constructive recovery reflection. Main points discussed are positives of the day, self places of improvement, educational take aways from the day, self care and recovery oriented tasks for the evening.        Goals: Self Reflection and evening recovery goals   Handouts: Recovery Preparedness   Method: Group  Attendance: Present  Mood/Affect: Appropriate  Behavior/Socialization: Appropriate to group  Participation: Active  Overall Patient Response to Group: Appropriate to topic  Individual Response to Group: Patient shared that he benefited from the video earlier today that discussed depression. He stated that one take away for today is to help others. He stated that his self-care is up in the air. He is not sure where he will stay tonight. He was hoping to get into sober living. He has not heard back yet. His brother is coming to pick him up. So that is an option for him. Provider offered to support him after group with making phone calls, but he didn't want additional support. His plan is to stay sober tonight. He as recognized for his progress and honesty in sobriety.  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     S/P femoral-popliteal bypass surgery left    Stented coronary artery     Tobacco use        Past Surgical History:   Procedure Laterality Date    CARDIAC CATHETERIZATION      CARDIAC PACEMAKER PLACEMENT      CARDIAC SURGERY      HEMORRHOID SURGERY         Review of patient's allergies indicates:  No Known Allergies    Family History     Problem Relation (Age of Onset)    Diabetes Father, Mother        Social History Main Topics    Smoking status: Current Every Day Smoker     Packs/day: 0.25     Years: 60.00     Types: Cigarettes    Smokeless tobacco: Never Used    Alcohol use No    Drug use: No    Sexual activity: Not Currently         Review of Systems   CV: no syncope  ENT: no sore throat  Resp: per hpi  Eyes: no visual changes  Gastrointestinal: no nausea or vomiting  Integument/Breast: no rash  Musculoskeletal: no arthralgias  Neurological: no headaches  Behavioral/Psych: no confusion or depression  Heme: no bleeding    Objective:     Vital Signs (Most Recent):  Temp: 97.5 °F (36.4 °C) (05/13/18 1910)  Pulse: 71 (05/13/18 1956)  Resp: 20 (05/13/18 1956)  BP: (!) 117/56 (05/13/18 1910)  SpO2: 99 % (05/13/18 1956) Vital Signs (24h Range):  Temp:  [97.5 °F (36.4 °C)-98.3 °F (36.8 °C)] 97.5 °F (36.4 °C)  Pulse:  [71-75] 71  Resp:  [17-20] 20  SpO2:  [92 %-100 %] 99 %  BP: (109-148)/(56-70) 117/56     Weight: 86.2 kg (190 lb 0.6 oz)  Body mass index is 28.06 kg/m².      Intake/Output Summary (Last 24 hours) at 05/13/18 2157  Last data filed at 05/13/18 1730   Gross per 24 hour   Intake             1650 ml   Output              600 ml   Net             1050 ml        Physical Exam  General: no distress  Eyes:  conjunctivae/corneas clear  Nose: no discharge  Neck: no jugular venous distention  Lungs:  normal respiratory effort and no wheezes or rales, +diffusely diminished breath sounds  Heart: regular rate and rhythm and no murmur  Abdomen: non-distended  Extremities: no cyanosis or clubbing, + bilateral pedal edema  Skin: No rashes or lesions. good skin turgor  Neurologic: alert, oriented, thought content appropriate      Lines/Drains/Airways     Peripheral Intravenous Line                 Peripheral IV - Single Lumen 05/13/18 0133 Left Forearm less than 1 day                Significant Labs:    CBC/Anemia Profile:    Recent Labs  Lab 05/13/18  0410   WBC 7.45   HGB 8.0*   HCT 28.0*      MCV 71*   RDW 24.6*        Chemistries:    Recent Labs  Lab 05/12/18  0409 05/13/18  0410    142   K 3.5 3.7    104   CO2 31* 32*   BUN 17 14   CREATININE 1.3 1.1   CALCIUM 11.2* 11.0*       ABGs: No results for input(s): PH, PCO2, HCO3, POCSATURATED, BE in the last 48 hours.    Significant Imaging:   CT chest, my impression- mild emphsyema, consolidative changes vs mass with associated pleural effusion at right lung base. NOTE- this abnormality was seen on prior ct abd 2016 though there has been some change in the appearance since 2016

## 2022-08-29 NOTE — ASSESSMENT & PLAN NOTE
Patient's blood pressure is well-controlled; will continue home regimen of bumetanide, carvedilol, diltiazem losartan, metolazone, spironolactone, and tamsulosin, provide as-needed , and clonidine.   Birth Control Pills Pregnancy And Lactation Text: This medication should be avoided if pregnant and for the first 30 days post-partum.

## 2022-12-14 NOTE — SUBJECTIVE & OBJECTIVE
Goal Outcome Evaluation:  Plan of Care Reviewed With: patient, spouse           Outcome Evaluation: Patient to discharge home today   Interval History: iCa in upper limit of normal. Hgb low but unchanged. Has low iron and ferritin. Pending B12 and folic acid. No BMs yet. Still refusing rectal exam. His breathing is actually better today. Cr up a bit    Review of Systems   Constitutional: Negative.    Respiratory: Positive for shortness of breath (better).    Cardiovascular: Positive for leg swelling (better).   Gastrointestinal: Negative.    Genitourinary: Negative.    Musculoskeletal: Negative.    Skin: Negative.    Neurological: Negative.    Psychiatric/Behavioral: Negative.      Objective:     Vital Signs (Most Recent):  Temp: 98.8 °F (37.1 °C) (05/30/18 0801)  Pulse: 83 (05/30/18 0801)  Resp: 18 (05/30/18 0801)  BP: (!) 140/65 (05/30/18 0801)  SpO2: 98 % (05/30/18 0801) Vital Signs (24h Range):  Temp:  [96.2 °F (35.7 °C)-98.8 °F (37.1 °C)] 98.8 °F (37.1 °C)  Pulse:  [73-98] 83  Resp:  [18-20] 18  SpO2:  [94 %-100 %] 98 %  BP: (123-140)/(63-97) 140/65     Weight: 84.5 kg (186 lb 4.6 oz)  Body mass index is 27.51 kg/m².    Intake/Output Summary (Last 24 hours) at 05/30/18 1220  Last data filed at 05/30/18 0800   Gross per 24 hour   Intake              476 ml   Output             2550 ml   Net            -2074 ml      Physical Exam   Constitutional: He is oriented to person, place, and time. He appears well-developed. No distress.   Cardiovascular: Normal rate, regular rhythm and intact distal pulses.    Pulmonary/Chest: Effort normal. He has no wheezes. He has rales (bibasilar).   Abdominal: Soft. Bowel sounds are normal.   Musculoskeletal: Normal range of motion. He exhibits edema.   Neurological: He is alert and oriented to person, place, and time.   Skin: Skin is warm. He is not diaphoretic.   Psychiatric: He has a normal mood and affect. His behavior is normal. Judgment and thought content normal. Cognition and memory are impaired.   Nursing note and vitals reviewed.      Significant Labs: All pertinent labs within the past 24 hours have  been reviewed.    Significant Imaging: I have reviewed all pertinent imaging results/findings within the past 24 hours.  I have reviewed and interpreted all pertinent imaging results/findings within the past 24 hours.

## 2023-01-06 NOTE — ASSESSMENT & PLAN NOTE
Patient: Richelle Brothers Date: 2023   : 1959    63 year old female      OUTPATIENT WOUND CARE CONSULT NOTE    Supervising Wound Care / Hyperbaric Medicine Physician: Not Applicable  Consulting Provider:  Jean Paul Medina NP  Date of Consultation/Last Comprehensive Exam:  22  Referring  Provider:      SUBJECTIVE:    Chief Complaint:      Wound/Ulcer Present:    Traumatic ulcer    Additional Wound Category:  None     Maximum Baseline Ambulatory Status:  Ambulates unassisted    History of Present Illness:  This is a 63 year old female past medical history of hypertension, hyperlipidemia gastroesophageal reflux disease cerebral infarction.  On 2022 the patient sustained a fall and had a traumatic injury to her left lower leg.  She was started on a course of doxycycline for 10 days due to erythema and increased redness.  She presented to the outpatient wound care clinic today for initial wound care consultation and wound care management.  She does have a history of smoking about 1 pack per day for 39 years.  The patient lives in [place] 6 months of the year and [place] the other 6 months of year.  She states that when she lives in [place] its too cold to smoke and so she has not smoked a full pack of cigarettes in the last 2 weeks she denies any fever, chills, sweats, nausea or vomiting.  She has some numbness sensation to the area of ulceration but does have pain and discomfort at the periwound.    Wound History  Location: left lower leg  Etiology of Wounds: status post fall  Onset of wound/ulcer: 2022  Last Evaluation by Referring Provider: Farrah Euceda MD  Current topical regimen includes: Bactroban daily with secondary dressing.  Dressings being changed by patient;  Previous vascular work up includes: US Negative for DVT 22  Previous Imaging in work up Includes: Xray of the left tib/fib no acute changes, US negative for DVT.   Smoking Status: Patient does  -- Difficulty noted overnight, resolved today  -- Adequate voiding this AM; recommend holding on further catheter placement at this time and attempt only if he is uncomfortable due to inability to void.  -- If repeat catheter needed, recommend using 18 Azeri coude stearns  -- Continue flomax    Will sign off  Follow up in 3-4 weeks   smoke 1PPD-39 years   Current Nutrition Status:  Fair appetite states does consume protein  DM  - patient is not diabetic  Edema - currently using none  Infection/Previous or Current Abx Regimen: Doxy started on 12/15/22-10 days  Current Wound symptoms at Consult:      Current Treatment Regimen:  Dressing:  Gauze dressing   Frequency:  Daily   Changed by:  Patient    Review of Systems:  Pertinent items are noted in HPI (history of present illness).    Past Medical History:   Diagnosis Date   • Anemia 2018    no meds needed   • Cerebral infarction (CMS/HCC)     Cryptogenic stroke , headaches and dizziness, blurred vision as a symtom   • Cervical cancer (CMS/HCC)    • Closed left radial fracture 06/2019   • Colon polyps 06/2018   • GERD (gastroesophageal reflux disease)    • Hemorrhoids 06/2018   • Hyperlipidemia    • Hypertension    • Hypothyroid     no meds needed   • Left knee pain    • Osteoarthrosis, unspecified whether generalized or localized, lower leg    • Transfusion history 06/21/2018   • Wears glasses     for computer work and driving     Past Surgical History:   Procedure Laterality Date   • Cervical conization   w/ laser     • Colonoscopy w/ polypectomy  06/22/2018    Diminutive rectal polyp that appears to be hyperplastic polyp removed   • Cyst removal      Arms & Legs   • Esophagogastroduodenoscopy transoral flex w/ctrl of bleed  06/22/2018    Duodenum nonbleeding AV malformation - cauterized   • Fracture surgery Left 06/18/2019    ORIF left wrist fracture with muscel repair   • Joint replacement Right 02/04/2016    Total Knee Replacement (Dr Melendez. [facility])   • Joint replacement Left 01/09/2014    Total Knee Replacement (Dr Stephens)   • Knee scope,diagnostic Left 11/21/2012   • Loop recorder explant  12/11/2019   • Loop recorder implant  12/18/2016    Successful implantable loop recorder implantation (Dr. SHANI Cisse)   • Muscle repair Left 06/18/2019    Repair of pronator quadratus left forearm with ORIF  left wrist   • Polypectomy      Vocal Cord Polyps   • Small bowel capsule enteroscopy  2018   • Thyroidectomy, partial       Social History     Socioeconomic History   • Marital status:      Spouse name: Not on file   • Number of children: Not on file   • Years of education: Not on file   • Highest education level: Not on file   Occupational History   • Not on file   Tobacco Use   • Smoking status: Every Day     Packs/day: 1.00     Years: 39.00     Pack years: 39.00     Types: Cigarettes     Last attempt to quit: 2018     Years since quittin.9   • Smokeless tobacco: Never   Vaping Use   • Vaping Use: never used   Substance and Sexual Activity   • Alcohol use: No     Alcohol/week: 12.0 standard drinks     Types: 12 Cans of beer per week     Comment: 12/wk   • Drug use: No   • Sexual activity: Not on file   Other Topics Concern   • Not on file   Social History Narrative   • Not on file     Social Determinants of Health     Financial Resource Strain: Not on file   Food Insecurity: Not on file   Transportation Needs: Not on file   Physical Activity: Not on file   Stress: Not on file   Social Connections: Not on file   Intimate Partner Violence: Not At Risk   • Social Determinants: Intimate Partner Violence Past Fear: No   • Social Determinants: Intimate Partner Violence Current Fear: No     Family History   Problem Relation Age of Onset   • Cancer Mother         Ovarian   • Cancer Father         Bone   • Diabetes Father    • Hypertension Father    • Heart disease Father    • Cancer Sister         Breast   • COPD Brother    • Diabetes Brother    • Cancer Sister         Lung   • Diabetes Sister        Current Outpatient Medications   Medication Sig   • sodium hypochlorite (DAKIN'S) 0.0625 % (1/8 strength) irrigation solution Dakins moist gauze dressing   • HYDROcodone-acetaminophen (NORCO)  MG per tablet Take 1 tablet by mouth 2 times daily as needed for Pain.   • mupirocin (BACTROBAN) 2 %  ointment Apply topically 3 times daily.   • Eliquis 5 MG Tab TAKE 1 TABLET BY MOUTH EVERY 12 HOURS   • lisinopril (ZESTRIL) 10 MG tablet TAKE 1 TABLET BY MOUTH DAILY   • omeprazole (PrilOSEC) 20 MG capsule TAKE 1 CAPSULE BY MOUTH DAILY   • furosemide (LASIX) 20 MG tablet TAKE 2 TABLETS BY MOUTH DAILY FOR SWELLING   • metoPROLOL succinate (TOPROL-XL) 50 MG 24 hr tablet TAKE 1 TABLET BY MOUTH DAILY   • FLUoxetine (PROzac) 10 MG capsule TAKE 1 CAPSULE BY MOUTH DAILY   • potassium chloride (KLOR-CON) 10 MEQ ER tablet Take 1 tablet by mouth daily.   • atorvastatin (LIPITOR) 10 MG tablet Take 1 tablet by mouth daily.   • benzonatate (TESSALON PERLES) 100 MG capsule Take 1 capsule by mouth 3 times daily as needed for Cough.   • ferrous sulfate 325 (65 FE) MG tablet Take 1 tablet by mouth daily (with breakfast).   • fluocinonide (LIDEX) 0.05 % cream Apply topically 2 times daily. (Patient taking differently: Apply topically as needed.)   • Melatonin 10 MG Cap Take 10 mg by mouth at bedtime as needed (to help with sleep).   • naLOXone (NARCAN) 4 MG/0.1ML nasal spray Spray the content of 1 device into 1 nostril. Call 911. May repeat with 2nd device in alternate nostril if no response in 2-3 minutes.   • tiotropium-olodaterol (Stiolto Respimat) 2.5-2.5 MCG/ACT inhaler Inhale 2 puffs into the lungs daily.   • ursodiol (ACTIGALL) 300 MG capsule Take 2 capsules by mouth 2 times daily.   • aspirin (ECOTRIN) 81 MG EC tablet Take 1 tablet by mouth daily.   • guaiFENesin-codeine (GUAIFENESIN AC) 100-10 MG/5ML liquid Take 5 mLs by mouth every 6 hours as needed for Cough.   • Cyanocobalamin (VITAMIN B-12 PO) Take 500 mg by mouth daily.      Current Facility-Administered Medications   Medication   • LIDOCAINE HCL URETHRAL/MUCOSAL 2 % EX GEL (UROJET/GLYDO) Pyxis Override        ALLERGIES:  Penicillins, Latex   (environmental), Red dye   (food or med), Metal topical   (environmental), Pravastatin, and Simvastatin    OBJECTIVE:  Vital  Signs:    Visit Vitals  BP (!) 145/66 (BP Location: LUE - Left upper extremity, Patient Position: Sitting)   Pulse 68   Temp 97.2 °F (36.2 °C) (Temporal)   Resp 18         Physical Exam:  General appearance: Appears stated age, in no distress and cooperative  Head:   normocephalic without obvious abnormality  LLE non-pitting edema. LDP pulse easily palpable       Left leg traumatic ulcer with mostly granular base and a small amount of slough to the margins.  Wound was debrided to a healthy bleeding granular base. Minimal depth at approximately 5 o'clock with no significant undermining or tunneling.  Maria Luisa-wound without erythema, warmth or induration.     1/6/23  Post debridement      Pre-debridement                12/21/22      Wound Bed Quality:  Granulation tissue and Non-viable tissue      Maria Luisa-wound Quality:    None    Additional Descriptors:  none    Wound Measurements Per Flowsheet:    Wound Chest Left Anterior;Medial Puncture (Active)   Number of days: 2210     Wound Arm Left Inner Incision (Active)   Number of days: 1298       Wound Chest Left Anterior;Lateral Incision (Active)   Number of days: 1122       Wound Leg Left Anterior (Active)   Wound Length (cm) 4.4 cm 01/06/23 0810   Wound Width (cm) 5.5 cm 01/06/23 0810   Wound Depth (cm) 0.2 cm 01/06/23 0810   Wound Surface Area (cm^2) 24.2 cm^2 01/06/23 0810   Wound Volume (cm^3) 4.84 cm^3 01/06/23 0810   Number of days: 16         PROCEDURE:  Debridement: Selective Non-Excisional Debridement of Non-viable Tissue.  Informed consent obtained.  Time out was completed.  Patient, procedure, and site verified.  Anesthesia-  Topical  Size-  Less than or equal to 20 sq cm  Total debrided area was 20 sq cm    Instrument-  Curette  Non-viable tissue removed-  Fibrin/Slough  Hemostasis achieved-  Pressure  Patient procedure was-  tolerated well, no complications.  Patient stable upon completion of procedure.    Procedure was Performed by:  Nurse Practitioner Jacquelyn ROJAS  CLARISSA Leyva       Laboratory assessments reviewed:  No results found for: PAB   Albumin (g/dL)   Date Value   12/15/2022 3.3 (L)   08/26/2022 3.6   02/25/2022 3.4 (L)      No results available in last 24 hours    Lab Results   Component Value Date    WBC 8.5 12/15/2022    GLUCOSE 104 (H) 12/15/2022    HGBA1C 5.3 08/26/2022    CREATININE 0.61 12/15/2022    GFRA 66 07/24/2019    GFRNA 57 07/24/2019        Culture results:  Specimen Description (no units)   Date Value   04/13/2015 URINE, CLEAN CATCH/MIDSTREAM URINE    CULTURE (no units)   Date Value   04/13/2015 <10,000 CFU/mL GRAM POSITIVE AVELINA        Diagnostic Assessments Reviewed:  Vascular Studies:  Physical exam and Venous duplex study    Venous Duplex 12/15/22  Negative for DVT, left lower extremity.    Left lower leg ulcer 12/15/22  No acute osseous findings involving the left tibia and fibula.      Nutritional Assessment:  Prealbumin and/or Albumin reviewed    Wound treatment goals are palliative:  No    DIAGNOSES:  Traumatic wound left lower leg  Smoker    Medical Decision Making:     Traumatic wound to LLE. Much  after daily moist dressing changes. Wound bed at the level of the surrounding skin. Change to mac. Continue medium tetragrip for compression.     Local wound care:  Cleanse wound with Dakins  Mac to wound bed with dry cover dressing  Change 3 times per week and PRN  Medium tetragrip to LLE    Edema:  Encourage elevating bilateral lower legs as much as possible to assist with lower leg swelling.    Medium tetragrip to left lower leg on during the day time off at nighttime    Vascular:  Ultrasound venous negative for DVT.  If wound fails make forward progress consider arterial workup.     Imaging:  X-ray negative for fracture     Infectious diseases:  No evidence for infection on exam today     Nutrition:  Encourage protein rich diet to promote wound healing.     Offloading:  Patient will need to offload area of ulceration however this  is in the area of non pressure so this should not be concern for the patient    Smoking:  Patient continues to smoke, reports she has cut back  Continue to encourage cessation    Followup in 2-3 weeks      Plan of Care:  Advanced Wound Care Recommendations:  See above  Percent Wound Closure from consult:  NA  Care plan to augment wound closure:  Not applicable.  New wound care consult December 21, 2022    Significant note data copied forward from ISIDRO Sheridan and reviewed by me as needed in the formulation of this note and plan.      Jacquelyn Leyva NP    [facility]

## 2023-04-25 NOTE — PROGRESS NOTES
Date of Admission:3/23/2019    SUBJECTIVE: noted not much urination per nursing    Current Facility-Administered Medications   Medication    acetaminophen tablet 500 mg    albuterol-ipratropium 2.5 mg-0.5 mg/3 mL nebulizer solution 3 mL    albuterol-ipratropium 2.5 mg-0.5 mg/3 mL nebulizer solution 3 mL    aspirin EC tablet 81 mg    atorvastatin tablet 20 mg    bumetanide tablet 1 mg    carvedilol tablet 25 mg    cinacalcet tablet 30 mg    cloNIDine tablet 0.1 mg    diltiaZEM tablet 30 mg    docusate sodium capsule 100 mg    ferrous gluconate tablet 324 mg    influenza (FLUZONE HIGH-DOSE) vaccine 0.5 mL    insulin aspart U-100 pen 0-5 Units    levothyroxine tablet 25 mcg    losartan tablet 25 mg    ondansetron injection 8 mg    pantoprazole EC tablet 40 mg    pneumoc 13-alan conj-dip cr(PF) (PREVNAR 13 (PF)) 0.5 mL    polyethylene glycol packet 17 g    promethazine (PHENERGAN) 6.25 mg in dextrose 5 % 50 mL IVPB    ramelteon tablet 8 mg    senna-docusate 8.6-50 mg per tablet 1 tablet    sodium chloride 0.9% flush 10 mL    And    sodium chloride 0.9% flush 10 mL    spironolactone tablet 25 mg    tamsulosin 24 hr capsule 0.4 mg       Wt Readings from Last 3 Encounters:   04/10/19 99.5 kg (219 lb 5.7 oz)   03/17/19 92.8 kg (204 lb 9.4 oz)   01/05/19 77.6 kg (171 lb 1.2 oz)     Temp Readings from Last 3 Encounters:   04/10/19 97.5 °F (36.4 °C) (Oral)   03/19/19 97.7 °F (36.5 °C)   01/08/19 97.6 °F (36.4 °C) (Oral)     BP Readings from Last 3 Encounters:   04/10/19 128/78   03/19/19 133/68   01/08/19 129/72     Pulse Readings from Last 3 Encounters:   04/10/19 75   03/19/19 78   01/08/19 80       Intake/Output Summary (Last 24 hours) at 4/10/2019 1549  Last data filed at 4/10/2019 1200  Gross per 24 hour   Intake 480 ml   Output --   Net 480 ml       PE:  GEN:wd male in nad  HEENT:ncat,eomi,mm  CVS:chiki 2/6  PULM:no rales  ABD:+bs,soft,nd  EXT:2+edema legs  NEURO:awake    Recent Labs   Lab  04/10/19  0535   *      K 4.4      CO2 31*   BUN 34*   CREATININE 1.1   CALCIUM 9.9       Lab Results   Component Value Date    .6 (H) 04/08/2019    CALCIUM 9.9 04/10/2019    CAION 1.42 05/30/2018    PHOS 2.5 (L) 04/09/2019       No results for input(s): WBC, RBC, HGB, HCT, PLT, MCV, MCH, MCHC in the last 24 hours.      A/P:  1ckd3. Creatinine ok. Following.  2.acute on combined hf.  Cont diuretics.3.hypercalemia. spep negative. pth up. tsh up. Cont sensipar. Need tsh tx. Suspect primary hyperparathyroidism.recommend endo refer.  4.urinary retention. Poor uop today. PVR noted and in/out confirmed.  5.anemia.cont po iron.   Wheel Chair

## 2023-06-12 NOTE — ASSESSMENT & PLAN NOTE
Chronic  Cont eliquis  Plan rate control strategy   [Obstructive Sleep Apnea] : obstructive sleep apnea [Awakes Unrefreshed] : awakes unrefreshed [Home] : home [APAP:] : APAP [Awakes with Dry Mouth] : does not awaken with dry mouth [Awakes with Headache] : does not awaken with headache [Daytime Somnolence] : denies daytime somnolence [Snoring] : no snoring [Witnessed Apneas] : no witnessed apneas [TextBox_77] : 6161 [TextBox_79] : 1000 [TextBox_81] : 5 [TextBox_89] : 5 [TextBox_100] : 9/30/2022 [TextBox_108] : 38.9 [TextBox_120] : 46.2 %  [TextBox_127] : 5/11/2023 [TextBox_125] : 10-20 [TextBox_129] : 6/9/2023 [TextBox_133] : 87 [TextBox_137] : 77 [TextBox_141] : 5 [TextBox_143] : 54 [TextBox_147] : 21.2 [TextBox_158] : Sonny [TextBox_162] : 3/25/2023 [TextBox_165] : Apnea index 24.4, central apnea index of 0.4, 95th percentile: 17.6\par Air leak 95th percentile 84.5 L/min [ESS] : 3

## 2023-11-20 NOTE — CARE UPDATE
H/H = see below      Results for ASHLEY GONZALEZ JR. (MRN [id number]) as of 5/7/2019 15:47   Ref. Range 4/15/2019 04:20 5/7/2019 00:20 5/7/2019 04:38 5/7/2019 09:43   Hemoglobin Latest Ref Range: 14.0 - 18.0 g/dL 7.9 (L) 8.5 (L) 6.9 (L) 7.3 (L)   Hematocrit Latest Ref Range: 40.0 - 54.0 % 27.7 (L) 29.7 (L) 24.2 (L) 25.9 (L)       Patient refuses blood transfusion at this time. He is not symptomatic, up with PT looks fine, sating good on his usual home oxygen.    ANDRE Flower, FNP-C  Hospitalist - Department of Hospital Medicine  [address] [place] [address]  Office [phone]; Pager [phone]     increased physiological demand for nutrients  inadequate protein-energy intake, dementia

## 2024-10-10 NOTE — NURSING
Bladder scan volume was 286    expressive language/pragmatic language    Progress towards goals / Updated goals:  1) Pt will formulate sentences and utilize them during structured conversations related to his personal needs, ADL’s and safety to effectively ask for help with >90% acc given Satya.   Current: Pt formulated sentences during structured conversations to demo concerns for others with 50% acc ARACELI, increase to 100 acc given mod verbal cues.      Formulated sentences to ask for specific questions for help with 60% acc given mod verbal cues.      2) Pt will produce phrases with appropriate non verbal communication and paralinguistic communication (e.g., prosody, rhyme, stress, etc) in 80% of opportunities given modA in order to increase naturalness of speech when communicating wants and needs.  Current: Not targeted this date.      3) Pt will use an idiom/proverb during structured speech tasks (e.g., sentences, paragraphs, structured conversations) with 80% acc given modA to enhance receptive/expressive language skills and promote social communication.  Current: Pt demo use of \"beating around the bush\" during conversation ARACELI.      4) Pt will initiate topics through statements/questions appropriate to audience/subject matter/prioritization during structured conversational tasks in different environmental settings (e.g., face-to-face, in the lobby, via phone) given <x2 cues.   Current: Topic initiation during structured conversation: 50% acc ARACELI, increase to 100% acc given x1 cue/min verbal cues.      5) Pt will demonstrate conversational repair through appropriate discourse transition given <x2 cues/modA.   Current: Topic change/transition: 67% acc ARACELI, increase to 83% acc given mod verbal cues/x2 cues.     PLAN  [x]  Continue plan of care  []  Modify Goals/Treatment Plan      []  Discharge due to:  [] Other:    Desi Carson M.A., CCC-SLP  Speech Language Pathologist   10/10/2024  3:20 PM    Future Appointments   Date Time Provider

## 2025-01-21 NOTE — ASSESSMENT & PLAN NOTE
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  If they are covered, they usually fall under your Durable Medical Equipment (DME) coverage.  The DME codes if they ask are: Knee high , Thigh high , Panty .   Be sure to ask if you need a specific medical diagnosis for coverage, if your deductible needs to be met first, how many pairs are covered and how often you can get them.  If insurance covers them and you would like to order from [employer], or another medical supply company: contact the vein clinic and we will fax a prescription to your medical supply company of choice. They will bill your insurance and ship them to you. Please let us know your color choice (black or beige), and toe choice (open or closed toe) when contacting us.    2.  We sell regular sizes of [employer] in our clinic (except specialty order or petite sizing). Ask us to check if we have your size. We cannot bill your insurance for these purchases.  Knee high are $65/pair  Thigh high are $90/pair.   If you would like to purchase from the clinic, let us know including your color choice (black or beige), and toe choice (open or closed toe). We will set them aside for you to  and pay for at your next clinic appointment or the day of your procedure.    3.  Online website ordering options:   [email or web address]  [email or web address]  [email or web address] has many options available.         Sclerotherapy: Pre-Treatment Instructions    Recommended Sessions:  __2-3__ treatment sessions    Pricing: Full session - $428                 *Payment is due at the time of visit following the treatment    Time Required per Treatment Session - About 45 minutes  Please come in 15 minutes before your  scheduled appointment.  30 min.  Sclerotherapy treatments last approximately 30 minutes.  5 min.    A staff member will wipe your legs off with warm water and dry them with a wash cloth.                 Then you can put your compression hose on, get dressed and check out.  10 min.  After your treatment, you will be asked to walk around for 10 minutes before you get in your car.    Medications  Five days before your appointment, discontinue aspirin (Bufferin, Anacin, etc.) and Ibuprofen (Motrin, Advil, Aleve, etc.) to reduce bruising. Resume these medications the day following the treatment.    Leg Preparation  Do not shave your legs or apply any oil, lotion or powder the night before or the day of your treatment.    Clothing  Shorts:  Bring a pair of loose, comfortable shorts to wear during your treatment (or you can choose to wear ours). Shoes: Bring comfortable shoes to accommodate the compression hose after your treatment. Do not wear flip-flops or thong-style sandals unless you have open-toe compression hose.    Photographs  Photos will be taken before each treatment. This helps monitor your progress.    Injections  The physician will inject your veins with the sclerotherapy solution chosen to meet your specific needs.    Compression Hose  Please bring your compression hose if you have them. They may also be reserved for you at our clinic. Compression hose must be worn immediately after each sclerotherapy treatment.  The hose must be compression level 20-30, and they must be worn for 24 hours straight after your treatment.  If you have never worn compression hose before, a staff member will teach you how to put them on.             You cannot have a treatment without compression hose.               They are critical to the success of your treatment!    You may purchase your compression hose from us. We will measure you and have the hose available when you come for your treatment.    Cancellation and  Rescheduling  If you need to cancel or reschedule your sclerotherapy treatment, please give our office at least 24 hour notice.    Sclerotherapy: Basic Information    What is sclerotherapy?  Sclerotherapy is a treatment for  spider  veins.  Spider  veins are small veins just under your skin that can look red, blue or purple. Most  spider  veins are only a cosmetic problem.  Spider  veins are not useful and treating them will not affect your circulation.    How does sclerotherapy work?  1.  Injections: A very small needle is used to inject a solution into the  spider  veins. The solution irritates the cells that line the vein walls. This causes the veins to collapse. The vein walls to stick together and they can no longer carry blood. Different solutions are used based on the size of the veins.  2.  Compression:  The spider veins are kept collapsed by wearing compression stockings. Your body will break down and absorb the treated veins. You wear the compression hose for 24 hours after the treatment and then for 4 more days during your waking hours only.    How does the body heal after sclerotherapy?  The process is similar to how your body heals after a bad bruise. It takes 4-6 weeks or more for the healing to be complete. When the healing is complete, the vein is no longer visible. It may take more than one treatment.    How do I get the best results?  It is important to follow the post-sclerotherapy instructions. The best results require time and patience. The injection sites will continue to heal and fade for months after a treatment. Please discuss your expectations with your doctor to keep them realistic. Your doctor will do everything possible to meet or exceed your expectations.    How many treatments are needed?  After your initial exam, your doctor will give you an estimate of the number of treatments that may be required. It depends upon the size, type, and quantity of your  spider  veins and on the  doctor's assessment, your history and expectations. You may end up needing fewer or more treatments.    How soon can I have another treatment?  Additional treatments are scheduled every 4-6 weeks to allow time for the body to respond to the previous treatment.    Common Side Effects:  Itching  The areas that were injected may itch. This is usually mild and lasts less than a day. Do not use lotions or creams on your legs until the injection sites have healed over.    Pain  It is common to have some tenderness at the injection sites. Injection of the solution can be uncomfortable, but is usually well tolerated by most patients. The tenderness is temporary, lasting 24 hours at most. Tylenol or Ibuprofen can be used, if needed, following the product directions.    Bruising  This may occur at the injections sites. Bruising may be minimized by avoiding Aspirin and Ibuprofen products for five days before each treatment session.    Hyperpigmentation  A light brown discoloration of the skin may develop along the veins in the areas injected. Approximately 20-30% of patients treated note the discoloration (which is lighter and less obvious than the veins that are being treated). The hyperpigmentation usually fades in a couple of weeks, but may take several months to a year to totally resolve. There is a 1% chance of hyperpigmentation continuing after one year.    Trapped blood  A small amount of blood may become trapped and hardened in the veins. This may feel like a knot or cord and it may look dark blue or bruised. This is a common occurrence. You may need to return before your next treatment so this area can be drained to remove the trapped blood. This will reduce the hyperpigmentation that can occur. The chance of this occurring can be decreased with proper use of compression hose after your treatment.    Matting  Matting is the formation of new, fine  spider  veins in the area injected.  It occurs in approximately 10% of  patients injected. The exact reason for this is unknown. If untreated, the matting usually resolves in 3 to 12 months, but very rarely, it can be permanent. If the matting does not fade, it can be re-injected.    Rare Side Effects:  Ulceration at injection sites  Very rarely, a small ulcer will occur at the site where a vein is injected. An ulcer can take 4 to 6 weeks to completely heal. A small scar may result.    Allergic reactions  There is a very rare incidence of an allergic reaction to the solution injected. You will be observed for such reaction and will be treated appropriately should it occur. Please inform us of any allergy history.    Pulmonary embolus/Deep vein thrombosis  This is a blood clot which moves to the lungs/a blood clot in the deep vein system. There is an extraordinarily low incidence of this complication.      SCLEROTHERAPY AFTER CARE  Immediately:  After treatment, walk for 10-15 minutes before getting in your car.  If your trip home is more than 1 hour, stop and walk around for 5-10 minutes. Avoid sitting or standing for extended periods.   First 24 hours: Wear your compression continuously, even while in bed. After the 24 hours, you may shower if you want to. Put your hose back on, unless you are going to bed. You should NOT wear compression to bed after the first 24 hours. You may fly the next day, but wear your compression.   For 5 days: Wear the compression hose for waking hours only. You may continue to wear them longer than 5 days if you prefer.   For days 5-7: Walking is encouraged, as it promotes efficient circulation in your veins. You may do activities that raise your heart rate, but do NOT run, jog, do high impact aerobics, or weight lifting. After 7 days, no activity restrictions.  Shaving: Wait a few days to shave or apply lotion.   Bathing: Do NOT take hot baths or sit in a hot tub for 7-10 days.    For 1 year: Wear SPF 30 sunscreen on your legs when in the sun. This is very  important! It helps prevent darkening of the skin at the injection sites.   Medications: You may resume your usual medications, including aspirin or ibuprofen.    Common Things to Expect       Compression must be worn for the first 24 hours and then during the day for 5-7 days.    If larger veins are treated with ultrasound-guided sclerotherapy, you will have redness, firmness, tenderness, and swelling.  This firmness and tenderness may take 3-6 months to resolve. Ibuprofen and compression hose will aid in this process.    You will have bruising that can last up to 3 weeks. Most fading of the veins will occur between 3 and 6 weeks after treatment.    You may notice brown discoloration (hyperpigmentation) at the treatment site.  This should fade with time, but will take 3 months to 1 year to fully heal.     Some treated veins may look darker because of trapped blood within the vein. This trapped blood can be removed at a minimum of 1 month following treatment. Larger veins are more likely to develop trapped blood.    It is very important for you to use at least SPF 30 sunscreen in order to help prevent the discoloration of your skin.    Migraines rarely occur following sclerotherapy, but are more likely in patients with a history of migraines.  Treat as you would any other migraine.

## 2025-05-13 NOTE — PLAN OF CARE
Detail Level: Detailed Problem: Physical Therapy Goal  Goal: Physical Therapy Goal  Goals to be met by: 19    Patient will increase functional independence with mobility by performin. Sit to stand transfer with Stand-by Assistance  2. Gait x150 feet with stand-by Assistance using Rolling Walker  3. Lower extremity exercise program x30 reps per handout, with supervision     Outcome: Ongoing (interventions implemented as appropriate)  Pt will benefit from further skilled therapy in order to get back to PLOF.